# Patient Record
Sex: FEMALE | Race: WHITE | Employment: OTHER | URBAN - METROPOLITAN AREA
[De-identification: names, ages, dates, MRNs, and addresses within clinical notes are randomized per-mention and may not be internally consistent; named-entity substitution may affect disease eponyms.]

---

## 2017-01-17 ENCOUNTER — OFFICE VISIT (OUTPATIENT)
Dept: FAMILY MEDICINE CLINIC | Age: 82
End: 2017-01-17

## 2017-01-17 VITALS
DIASTOLIC BLOOD PRESSURE: 84 MMHG | BODY MASS INDEX: 31 KG/M2 | OXYGEN SATURATION: 97 % | RESPIRATION RATE: 16 BRPM | SYSTOLIC BLOOD PRESSURE: 132 MMHG | WEIGHT: 169.5 LBS | HEART RATE: 50 BPM

## 2017-01-17 DIAGNOSIS — I47.1 SVT (SUPRAVENTRICULAR TACHYCARDIA) (HCC): ICD-10-CM

## 2017-01-17 DIAGNOSIS — G47.09 OTHER INSOMNIA: ICD-10-CM

## 2017-01-17 DIAGNOSIS — G25.81 RESTLESS LEGS SYNDROME: ICD-10-CM

## 2017-01-17 DIAGNOSIS — E11.9 CONTROLLED TYPE 2 DIABETES MELLITUS WITHOUT COMPLICATION, WITHOUT LONG-TERM CURRENT USE OF INSULIN (HCC): ICD-10-CM

## 2017-01-17 DIAGNOSIS — M15.9 PRIMARY OSTEOARTHRITIS INVOLVING MULTIPLE JOINTS: Primary | ICD-10-CM

## 2017-01-17 DIAGNOSIS — H10.32 ACUTE BACTERIAL CONJUNCTIVITIS OF LEFT EYE: ICD-10-CM

## 2017-01-17 PROCEDURE — 99214 OFFICE O/P EST MOD 30 MIN: CPT | Performed by: FAMILY MEDICINE

## 2017-01-17 RX ORDER — LORAZEPAM 0.5 MG/1
0.5 TABLET ORAL 3 TIMES DAILY PRN
Qty: 90 TABLET | Refills: 2 | Status: SHIPPED | OUTPATIENT
Start: 2017-01-17 | End: 2017-04-13 | Stop reason: SDUPTHER

## 2017-01-17 RX ORDER — HYDROCODONE BITARTRATE AND ACETAMINOPHEN 5; 325 MG/1; MG/1
TABLET ORAL
Qty: 120 TABLET | Refills: 0 | Status: SHIPPED | OUTPATIENT
Start: 2017-01-17 | End: 2017-02-13 | Stop reason: SDUPTHER

## 2017-01-17 RX ORDER — ROPINIROLE 1 MG/1
TABLET, FILM COATED ORAL
Qty: 60 TABLET | Refills: 2 | Status: SHIPPED | OUTPATIENT
Start: 2017-01-17 | End: 2017-04-13 | Stop reason: SDUPTHER

## 2017-01-17 RX ORDER — CLONAZEPAM 1 MG/1
1 TABLET ORAL 2 TIMES DAILY PRN
Qty: 60 TABLET | Refills: 2 | Status: CANCELLED | OUTPATIENT
Start: 2017-01-17

## 2017-01-17 RX ORDER — DICYCLOMINE HYDROCHLORIDE 10 MG/1
10 CAPSULE ORAL 4 TIMES DAILY PRN
Qty: 90 CAPSULE | Refills: 3 | Status: SHIPPED | OUTPATIENT
Start: 2017-01-17 | End: 2017-04-13 | Stop reason: SDUPTHER

## 2017-01-17 RX ORDER — GENTAMICIN SULFATE 3 MG/ML
2 SOLUTION/ DROPS OPHTHALMIC 3 TIMES DAILY
Qty: 1 BOTTLE | Refills: 0 | Status: SHIPPED | OUTPATIENT
Start: 2017-01-17 | End: 2017-01-22

## 2017-01-18 PROBLEM — E11.9 CONTROLLED TYPE 2 DIABETES MELLITUS WITHOUT COMPLICATION, WITHOUT LONG-TERM CURRENT USE OF INSULIN (HCC): Status: ACTIVE | Noted: 2017-01-18

## 2017-01-18 PROBLEM — G25.81 RESTLESS LEGS SYNDROME: Status: ACTIVE | Noted: 2017-01-18

## 2017-02-09 RX ORDER — MECLIZINE HCL 12.5 MG/1
TABLET ORAL
Qty: 90 TABLET | Refills: 3 | Status: CANCELLED | OUTPATIENT
Start: 2017-02-09

## 2017-02-13 DIAGNOSIS — M15.9 PRIMARY OSTEOARTHRITIS INVOLVING MULTIPLE JOINTS: ICD-10-CM

## 2017-02-13 RX ORDER — HYDROCODONE BITARTRATE AND ACETAMINOPHEN 5; 325 MG/1; MG/1
TABLET ORAL
Qty: 120 TABLET | Refills: 0 | Status: SHIPPED | OUTPATIENT
Start: 2017-02-13 | End: 2017-03-13 | Stop reason: SDUPTHER

## 2017-03-13 ENCOUNTER — TELEPHONE (OUTPATIENT)
Dept: FAMILY MEDICINE CLINIC | Age: 82
End: 2017-03-13

## 2017-03-13 DIAGNOSIS — M15.9 PRIMARY OSTEOARTHRITIS INVOLVING MULTIPLE JOINTS: ICD-10-CM

## 2017-03-13 RX ORDER — HYDROCODONE BITARTRATE AND ACETAMINOPHEN 5; 325 MG/1; MG/1
TABLET ORAL
Qty: 120 TABLET | Refills: 0 | Status: SHIPPED | OUTPATIENT
Start: 2017-03-13 | End: 2017-04-13 | Stop reason: SDUPTHER

## 2017-03-20 ENCOUNTER — OFFICE VISIT (OUTPATIENT)
Dept: FAMILY MEDICINE CLINIC | Age: 82
End: 2017-03-20

## 2017-03-20 VITALS
SYSTOLIC BLOOD PRESSURE: 146 MMHG | HEART RATE: 54 BPM | WEIGHT: 164 LBS | DIASTOLIC BLOOD PRESSURE: 66 MMHG | OXYGEN SATURATION: 95 % | TEMPERATURE: 98.3 F | BODY MASS INDEX: 30 KG/M2

## 2017-03-20 DIAGNOSIS — I10 ESSENTIAL HYPERTENSION: Primary | ICD-10-CM

## 2017-03-20 PROCEDURE — 99213 OFFICE O/P EST LOW 20 MIN: CPT | Performed by: FAMILY MEDICINE

## 2017-03-20 RX ORDER — VALSARTAN 320 MG/1
TABLET ORAL
Qty: 90 TABLET | Refills: 5 | Status: SHIPPED | OUTPATIENT
Start: 2017-03-20 | End: 2017-08-31 | Stop reason: SDUPTHER

## 2017-03-20 RX ORDER — VALSARTAN 320 MG/1
320 TABLET ORAL DAILY
Qty: 30 TABLET | Refills: 5 | Status: SHIPPED | OUTPATIENT
Start: 2017-03-20 | End: 2017-03-20 | Stop reason: SDUPTHER

## 2017-03-21 ENCOUNTER — TELEPHONE (OUTPATIENT)
Dept: FAMILY MEDICINE CLINIC | Age: 82
End: 2017-03-21

## 2017-03-28 ENCOUNTER — HOSPITAL ENCOUNTER (OUTPATIENT)
Dept: OTHER | Age: 82
Discharge: HOME | End: 2017-03-28
Attending: FAMILY MEDICINE | Admitting: FAMILY MEDICINE

## 2017-03-28 ENCOUNTER — OFFICE VISIT (OUTPATIENT)
Dept: FAMILY MEDICINE CLINIC | Age: 82
End: 2017-03-28

## 2017-03-28 VITALS
WEIGHT: 167 LBS | DIASTOLIC BLOOD PRESSURE: 70 MMHG | HEART RATE: 56 BPM | BODY MASS INDEX: 30.54 KG/M2 | SYSTOLIC BLOOD PRESSURE: 150 MMHG

## 2017-03-28 DIAGNOSIS — Z20.5 EXPOSURE TO HEPATITIS C: Primary | ICD-10-CM

## 2017-03-28 LAB
HAV IGM SER IA-ACNC: NORMAL
HEPATITIS B CORE IGM ANTIBODY: NORMAL
HEPATITIS B SURFACE ANTIGEN INTERPRETATION: NORMAL
HEPATITIS C ANTIBODY INTERPRETATION: NORMAL

## 2017-03-28 PROCEDURE — 99212 OFFICE O/P EST SF 10 MIN: CPT | Performed by: PHYSICIAN ASSISTANT

## 2017-03-28 ASSESSMENT — ENCOUNTER SYMPTOMS
VOMITING: 0
DIARRHEA: 0
BACK PAIN: 0
NAUSEA: 0
ABDOMINAL PAIN: 0
CONSTIPATION: 0
SHORTNESS OF BREATH: 0

## 2017-04-13 ENCOUNTER — OFFICE VISIT (OUTPATIENT)
Dept: FAMILY MEDICINE CLINIC | Age: 82
End: 2017-04-13

## 2017-04-13 VITALS
BODY MASS INDEX: 30.43 KG/M2 | RESPIRATION RATE: 16 BRPM | OXYGEN SATURATION: 95 % | TEMPERATURE: 98.5 F | SYSTOLIC BLOOD PRESSURE: 134 MMHG | WEIGHT: 166.38 LBS | HEART RATE: 61 BPM | DIASTOLIC BLOOD PRESSURE: 82 MMHG

## 2017-04-13 DIAGNOSIS — I10 ESSENTIAL HYPERTENSION: Primary | ICD-10-CM

## 2017-04-13 DIAGNOSIS — J06.9 VIRAL UPPER RESPIRATORY TRACT INFECTION: ICD-10-CM

## 2017-04-13 DIAGNOSIS — G25.81 RESTLESS LEGS SYNDROME: ICD-10-CM

## 2017-04-13 DIAGNOSIS — G47.09 OTHER INSOMNIA: ICD-10-CM

## 2017-04-13 DIAGNOSIS — M15.9 PRIMARY OSTEOARTHRITIS INVOLVING MULTIPLE JOINTS: ICD-10-CM

## 2017-04-13 PROCEDURE — 99214 OFFICE O/P EST MOD 30 MIN: CPT | Performed by: FAMILY MEDICINE

## 2017-04-13 PROCEDURE — 3288F FALL RISK ASSESSMENT DOCD: CPT | Performed by: FAMILY MEDICINE

## 2017-04-13 PROCEDURE — G8510 SCR DEP NEG, NO PLAN REQD: HCPCS | Performed by: FAMILY MEDICINE

## 2017-04-13 RX ORDER — HYDROCODONE BITARTRATE AND ACETAMINOPHEN 5; 325 MG/1; MG/1
TABLET ORAL
Qty: 120 TABLET | Refills: 0 | Status: SHIPPED | OUTPATIENT
Start: 2017-04-13 | End: 2017-05-11 | Stop reason: SDUPTHER

## 2017-04-13 RX ORDER — LORAZEPAM 0.5 MG/1
0.5 TABLET ORAL 3 TIMES DAILY PRN
Qty: 90 TABLET | Refills: 2 | Status: SHIPPED | OUTPATIENT
Start: 2017-04-13 | End: 2017-07-06 | Stop reason: SDUPTHER

## 2017-04-13 RX ORDER — DICYCLOMINE HYDROCHLORIDE 10 MG/1
10 CAPSULE ORAL 4 TIMES DAILY PRN
Qty: 90 CAPSULE | Refills: 5 | Status: SHIPPED | OUTPATIENT
Start: 2017-04-13 | End: 2018-01-17 | Stop reason: SDUPTHER

## 2017-04-13 RX ORDER — ROPINIROLE 1 MG/1
TABLET, FILM COATED ORAL
Qty: 60 TABLET | Refills: 5 | Status: SHIPPED | OUTPATIENT
Start: 2017-04-13 | End: 2017-04-13 | Stop reason: SDUPTHER

## 2017-04-13 ASSESSMENT — PATIENT HEALTH QUESTIONNAIRE - PHQ9
SUM OF ALL RESPONSES TO PHQ9 QUESTIONS 1 & 2: 0
2. FEELING DOWN, DEPRESSED OR HOPELESS: 0
1. LITTLE INTEREST OR PLEASURE IN DOING THINGS: 0
SUM OF ALL RESPONSES TO PHQ QUESTIONS 1-9: 0

## 2017-04-19 ENCOUNTER — TELEPHONE (OUTPATIENT)
Dept: FAMILY MEDICINE CLINIC | Age: 82
End: 2017-04-19

## 2017-04-19 RX ORDER — DIPHENOXYLATE HYDROCHLORIDE AND ATROPINE SULFATE 2.5; .025 MG/1; MG/1
1 TABLET ORAL 4 TIMES DAILY PRN
Qty: 40 TABLET | Refills: 0 | OUTPATIENT
Start: 2017-04-19 | End: 2017-12-12 | Stop reason: SDUPTHER

## 2017-04-27 ENCOUNTER — TELEPHONE (OUTPATIENT)
Dept: FAMILY MEDICINE CLINIC | Age: 82
End: 2017-04-27

## 2017-04-27 DIAGNOSIS — G47.09 OTHER INSOMNIA: ICD-10-CM

## 2017-05-09 ENCOUNTER — TELEPHONE (OUTPATIENT)
Dept: FAMILY MEDICINE CLINIC | Age: 82
End: 2017-05-09

## 2017-05-11 ENCOUNTER — TELEPHONE (OUTPATIENT)
Dept: FAMILY MEDICINE CLINIC | Age: 82
End: 2017-05-11

## 2017-05-11 DIAGNOSIS — M15.9 PRIMARY OSTEOARTHRITIS INVOLVING MULTIPLE JOINTS: ICD-10-CM

## 2017-05-11 RX ORDER — DOXYCYCLINE 100 MG/1
100 TABLET ORAL 2 TIMES DAILY
Qty: 20 TABLET | Refills: 0 | Status: SHIPPED | OUTPATIENT
Start: 2017-05-11 | End: 2017-05-21

## 2017-05-11 RX ORDER — HYDROCODONE BITARTRATE AND ACETAMINOPHEN 5; 325 MG/1; MG/1
TABLET ORAL
Qty: 120 TABLET | Refills: 0 | Status: SHIPPED | OUTPATIENT
Start: 2017-05-11 | End: 2017-06-09 | Stop reason: SDUPTHER

## 2017-05-11 RX ORDER — DILTIAZEM HYDROCHLORIDE 360 MG/1
360 CAPSULE, EXTENDED RELEASE ORAL DAILY
Qty: 30 CAPSULE | Refills: 3 | Status: SHIPPED | OUTPATIENT
Start: 2017-05-11 | End: 2017-08-02

## 2017-05-17 ENCOUNTER — TELEPHONE (OUTPATIENT)
Dept: FAMILY MEDICINE CLINIC | Age: 82
End: 2017-05-17

## 2017-05-19 ENCOUNTER — TELEPHONE (OUTPATIENT)
Dept: FAMILY MEDICINE CLINIC | Age: 82
End: 2017-05-19

## 2017-06-07 ENCOUNTER — TELEPHONE (OUTPATIENT)
Dept: FAMILY MEDICINE CLINIC | Age: 82
End: 2017-06-07

## 2017-06-09 DIAGNOSIS — M15.9 PRIMARY OSTEOARTHRITIS INVOLVING MULTIPLE JOINTS: ICD-10-CM

## 2017-06-09 RX ORDER — HYDROCODONE BITARTRATE AND ACETAMINOPHEN 5; 325 MG/1; MG/1
TABLET ORAL
Qty: 120 TABLET | Refills: 0 | Status: SHIPPED | OUTPATIENT
Start: 2017-06-09 | End: 2017-07-06 | Stop reason: SDUPTHER

## 2017-06-19 ENCOUNTER — TELEPHONE (OUTPATIENT)
Dept: FAMILY MEDICINE CLINIC | Age: 82
End: 2017-06-19

## 2017-06-19 RX ORDER — BLOOD-GLUCOSE METER
EACH MISCELLANEOUS
Qty: 1 KIT | Refills: 0 | Status: SHIPPED | OUTPATIENT
Start: 2017-06-19 | End: 2019-01-09

## 2017-06-19 RX ORDER — SYRING-NEEDL,DISP,INSUL,0.3 ML 30 GX5/16"
1 SYRINGE, EMPTY DISPOSABLE MISCELLANEOUS ONCE
Qty: 100 DEVICE | Refills: 3 | Status: SHIPPED | OUTPATIENT
Start: 2017-06-19 | End: 2017-09-14 | Stop reason: SDUPTHER

## 2017-07-06 ENCOUNTER — OFFICE VISIT (OUTPATIENT)
Dept: FAMILY MEDICINE CLINIC | Age: 82
End: 2017-07-06

## 2017-07-06 VITALS
SYSTOLIC BLOOD PRESSURE: 110 MMHG | HEART RATE: 50 BPM | RESPIRATION RATE: 16 BRPM | HEIGHT: 62 IN | OXYGEN SATURATION: 97 % | WEIGHT: 170 LBS | BODY MASS INDEX: 31.28 KG/M2 | DIASTOLIC BLOOD PRESSURE: 70 MMHG

## 2017-07-06 DIAGNOSIS — M15.9 PRIMARY OSTEOARTHRITIS INVOLVING MULTIPLE JOINTS: ICD-10-CM

## 2017-07-06 DIAGNOSIS — J20.9 ACUTE BRONCHITIS, UNSPECIFIED ORGANISM: Primary | ICD-10-CM

## 2017-07-06 DIAGNOSIS — I10 ESSENTIAL HYPERTENSION: ICD-10-CM

## 2017-07-06 DIAGNOSIS — G25.81 RESTLESS LEGS SYNDROME: ICD-10-CM

## 2017-07-06 DIAGNOSIS — G47.09 OTHER INSOMNIA: ICD-10-CM

## 2017-07-06 PROCEDURE — 99214 OFFICE O/P EST MOD 30 MIN: CPT | Performed by: FAMILY MEDICINE

## 2017-07-06 RX ORDER — CEFDINIR 300 MG/1
300 CAPSULE ORAL 2 TIMES DAILY
Qty: 20 CAPSULE | Refills: 0 | Status: SHIPPED | OUTPATIENT
Start: 2017-07-06 | End: 2017-07-25 | Stop reason: SDUPTHER

## 2017-07-06 RX ORDER — LORAZEPAM 0.5 MG/1
0.5 TABLET ORAL 3 TIMES DAILY PRN
Qty: 90 TABLET | Refills: 2 | Status: SHIPPED | OUTPATIENT
Start: 2017-07-06 | End: 2017-08-11 | Stop reason: SDUPTHER

## 2017-07-06 RX ORDER — HYDROCODONE BITARTRATE AND ACETAMINOPHEN 5; 325 MG/1; MG/1
TABLET ORAL
Qty: 120 TABLET | Refills: 0 | Status: SHIPPED | OUTPATIENT
Start: 2017-07-06 | End: 2017-08-02 | Stop reason: SDUPTHER

## 2017-07-06 RX ORDER — LORAZEPAM 0.5 MG/1
0.5 TABLET ORAL 3 TIMES DAILY PRN
Qty: 90 TABLET | Refills: 2 | Status: CANCELLED | OUTPATIENT
Start: 2017-07-06

## 2017-07-25 ENCOUNTER — TELEPHONE (OUTPATIENT)
Dept: FAMILY MEDICINE CLINIC | Age: 82
End: 2017-07-25

## 2017-07-25 RX ORDER — CEFDINIR 300 MG/1
300 CAPSULE ORAL 2 TIMES DAILY
Qty: 20 CAPSULE | Refills: 0 | Status: SHIPPED | OUTPATIENT
Start: 2017-07-25 | End: 2017-12-06 | Stop reason: SDUPTHER

## 2017-08-02 ENCOUNTER — OFFICE VISIT (OUTPATIENT)
Dept: FAMILY MEDICINE CLINIC | Age: 82
End: 2017-08-02

## 2017-08-02 VITALS
OXYGEN SATURATION: 95 % | DIASTOLIC BLOOD PRESSURE: 44 MMHG | HEART RATE: 44 BPM | WEIGHT: 173 LBS | BODY MASS INDEX: 31.64 KG/M2 | TEMPERATURE: 96.7 F | SYSTOLIC BLOOD PRESSURE: 116 MMHG

## 2017-08-02 DIAGNOSIS — M15.9 PRIMARY OSTEOARTHRITIS INVOLVING MULTIPLE JOINTS: ICD-10-CM

## 2017-08-02 DIAGNOSIS — R00.1 BRADYCARDIA: ICD-10-CM

## 2017-08-02 DIAGNOSIS — J20.9 ACUTE BRONCHITIS, UNSPECIFIED ORGANISM: Primary | ICD-10-CM

## 2017-08-02 DIAGNOSIS — I49.9 IRREGULAR HEART RATE: ICD-10-CM

## 2017-08-02 PROCEDURE — 93000 ELECTROCARDIOGRAM COMPLETE: CPT | Performed by: FAMILY MEDICINE

## 2017-08-02 PROCEDURE — 99214 OFFICE O/P EST MOD 30 MIN: CPT | Performed by: FAMILY MEDICINE

## 2017-08-02 RX ORDER — HYDROCODONE BITARTRATE AND ACETAMINOPHEN 5; 325 MG/1; MG/1
TABLET ORAL
Qty: 120 TABLET | Refills: 0 | Status: SHIPPED | OUTPATIENT
Start: 2017-08-02 | End: 2017-08-31 | Stop reason: SDUPTHER

## 2017-08-02 RX ORDER — PREDNISONE 10 MG/1
TABLET ORAL
Qty: 15 TABLET | Refills: 0 | Status: SHIPPED | OUTPATIENT
Start: 2017-08-02 | End: 2017-08-31

## 2017-08-02 ASSESSMENT — ENCOUNTER SYMPTOMS
COUGH: 1
WHEEZING: 1

## 2017-08-04 RX ORDER — AMLODIPINE BESYLATE 5 MG/1
5 TABLET ORAL DAILY
Qty: 30 TABLET | Refills: 3 | Status: SHIPPED | OUTPATIENT
Start: 2017-08-04 | End: 2017-08-04 | Stop reason: SDUPTHER

## 2017-08-05 ENCOUNTER — TELEPHONE (OUTPATIENT)
Dept: FAMILY MEDICINE CLINIC | Age: 82
End: 2017-08-05

## 2017-08-05 RX ORDER — AMLODIPINE BESYLATE 5 MG/1
TABLET ORAL
Qty: 90 TABLET | Refills: 0 | Status: SHIPPED | OUTPATIENT
Start: 2017-08-05 | End: 2017-08-25

## 2017-08-08 ENCOUNTER — HOSPITAL ENCOUNTER (OUTPATIENT)
Dept: OTHER | Age: 82
Discharge: HOME | End: 2017-08-08
Attending: FAMILY MEDICINE | Admitting: FAMILY MEDICINE

## 2017-08-08 ENCOUNTER — TELEPHONE (OUTPATIENT)
Dept: FAMILY MEDICINE CLINIC | Age: 82
End: 2017-08-08

## 2017-08-08 DIAGNOSIS — R05.9 COUGH: ICD-10-CM

## 2017-08-10 ENCOUNTER — TELEPHONE (OUTPATIENT)
Dept: FAMILY MEDICINE CLINIC | Age: 82
End: 2017-08-10

## 2017-08-11 ENCOUNTER — OFFICE VISIT (OUTPATIENT)
Dept: FAMILY MEDICINE CLINIC | Age: 82
End: 2017-08-11

## 2017-08-11 VITALS
BODY MASS INDEX: 30.18 KG/M2 | HEART RATE: 70 BPM | OXYGEN SATURATION: 96 % | WEIGHT: 165 LBS | SYSTOLIC BLOOD PRESSURE: 120 MMHG | DIASTOLIC BLOOD PRESSURE: 70 MMHG

## 2017-08-11 DIAGNOSIS — I47.1 SVT (SUPRAVENTRICULAR TACHYCARDIA) (HCC): ICD-10-CM

## 2017-08-11 DIAGNOSIS — E11.9 CONTROLLED TYPE 2 DIABETES MELLITUS WITHOUT COMPLICATION, WITHOUT LONG-TERM CURRENT USE OF INSULIN (HCC): ICD-10-CM

## 2017-08-11 DIAGNOSIS — I10 ESSENTIAL HYPERTENSION: ICD-10-CM

## 2017-08-11 DIAGNOSIS — R42 VERTIGO: Primary | ICD-10-CM

## 2017-08-11 PROCEDURE — 99213 OFFICE O/P EST LOW 20 MIN: CPT | Performed by: FAMILY MEDICINE

## 2017-08-11 RX ORDER — LORAZEPAM 0.5 MG/1
TABLET ORAL
Qty: 120 TABLET | Refills: 2 | Status: SHIPPED | OUTPATIENT
Start: 2017-08-11 | End: 2017-10-18 | Stop reason: SDUPTHER

## 2017-08-12 ENCOUNTER — HOSPITAL ENCOUNTER (OUTPATIENT)
Dept: OTHER | Age: 82
Discharge: HOME | End: 2017-08-12
Attending: FAMILY MEDICINE | Admitting: FAMILY MEDICINE

## 2017-08-12 LAB
A/G RATIO: 1.6 (ref 1.1–2.2)
ALBUMIN SERPL-MCNC: 4.2 G/DL (ref 3.4–5)
ALP BLD-CCNC: 131 U/L (ref 40–129)
ALT SERPL-CCNC: 20 U/L (ref 10–40)
ANION GAP SERPL CALCULATED.3IONS-SCNC: 14 MMOL/L (ref 3–16)
AST SERPL-CCNC: 15 U/L (ref 15–37)
BILIRUB SERPL-MCNC: <0.2 MG/DL (ref 0–1)
BUN BLDV-MCNC: 37 MG/DL (ref 7–20)
CALCIUM SERPL-MCNC: 9.8 MG/DL (ref 8.3–10.6)
CHLORIDE BLD-SCNC: 106 MMOL/L (ref 99–110)
CHOLESTEROL, TOTAL: 224 MG/DL (ref 0–199)
CO2: 23 MMOL/L (ref 21–32)
CREAT SERPL-MCNC: 1.2 MG/DL (ref 0.6–1.2)
GFR AFRICAN AMERICAN: 52
GFR NON-AFRICAN AMERICAN: 43
GLOBULIN: 2.7 G/DL
GLUCOSE BLD-MCNC: 114 MG/DL (ref 70–99)
HDLC SERPL-MCNC: 39 MG/DL (ref 40–60)
LDL CHOLESTEROL CALCULATED: 140 MG/DL
POTASSIUM SERPL-SCNC: 4.8 MMOL/L (ref 3.5–5.1)
SODIUM BLD-SCNC: 143 MMOL/L (ref 136–145)
TOTAL PROTEIN: 6.9 G/DL (ref 6.4–8.2)
TRIGL SERPL-MCNC: 226 MG/DL (ref 0–150)
TSH SERPL DL<=0.05 MIU/L-ACNC: 3.65 UIU/ML (ref 0.27–4.2)
VLDLC SERPL CALC-MCNC: 45 MG/DL

## 2017-08-13 LAB
ESTIMATED AVERAGE GLUCOSE: 134.1 MG/DL
HBA1C MFR BLD: 6.3 %

## 2017-08-14 RX ORDER — SCOLOPAMINE TRANSDERMAL SYSTEM 1 MG/1
1 PATCH, EXTENDED RELEASE TRANSDERMAL
Qty: 4 PATCH | Refills: 0 | Status: SHIPPED | OUTPATIENT
Start: 2017-08-14 | End: 2017-09-15

## 2017-08-22 ENCOUNTER — TELEPHONE (OUTPATIENT)
Dept: FAMILY MEDICINE CLINIC | Age: 82
End: 2017-08-22

## 2017-08-22 RX ORDER — PANTOPRAZOLE SODIUM 40 MG/1
TABLET, DELAYED RELEASE ORAL
Qty: 90 TABLET | Refills: 0 | OUTPATIENT
Start: 2017-08-22

## 2017-08-22 RX ORDER — VALSARTAN 160 MG/1
TABLET ORAL
Qty: 90 TABLET | Refills: 0 | OUTPATIENT
Start: 2017-08-22

## 2017-08-23 ENCOUNTER — TELEPHONE (OUTPATIENT)
Dept: FAMILY MEDICINE CLINIC | Age: 82
End: 2017-08-23

## 2017-08-24 RX ORDER — LEVOTHYROXINE SODIUM 0.05 MG/1
TABLET ORAL
Qty: 90 TABLET | Refills: 3 | Status: SHIPPED | OUTPATIENT
Start: 2017-08-24 | End: 2018-06-12 | Stop reason: SDUPTHER

## 2017-08-25 ENCOUNTER — NURSE ONLY (OUTPATIENT)
Dept: FAMILY MEDICINE CLINIC | Age: 82
End: 2017-08-25

## 2017-08-25 VITALS — SYSTOLIC BLOOD PRESSURE: 150 MMHG | DIASTOLIC BLOOD PRESSURE: 70 MMHG

## 2017-08-25 RX ORDER — AMLODIPINE BESYLATE 10 MG/1
10 TABLET ORAL DAILY
Qty: 30 TABLET | Refills: 3 | Status: SHIPPED | OUTPATIENT
Start: 2017-08-25 | End: 2017-08-25 | Stop reason: SDUPTHER

## 2017-08-25 RX ORDER — AMLODIPINE BESYLATE 10 MG/1
TABLET ORAL
Qty: 90 TABLET | Refills: 0 | Status: SHIPPED | OUTPATIENT
Start: 2017-08-25 | End: 2017-10-26 | Stop reason: SDUPTHER

## 2017-08-31 ENCOUNTER — OFFICE VISIT (OUTPATIENT)
Dept: FAMILY MEDICINE CLINIC | Age: 82
End: 2017-08-31

## 2017-08-31 VITALS
DIASTOLIC BLOOD PRESSURE: 78 MMHG | SYSTOLIC BLOOD PRESSURE: 114 MMHG | RESPIRATION RATE: 16 BRPM | BODY MASS INDEX: 31.69 KG/M2 | HEART RATE: 80 BPM | WEIGHT: 173.25 LBS

## 2017-08-31 DIAGNOSIS — M15.9 PRIMARY OSTEOARTHRITIS INVOLVING MULTIPLE JOINTS: ICD-10-CM

## 2017-08-31 DIAGNOSIS — I10 ESSENTIAL HYPERTENSION: Primary | ICD-10-CM

## 2017-08-31 DIAGNOSIS — R42 VERTIGO: ICD-10-CM

## 2017-08-31 PROCEDURE — 99214 OFFICE O/P EST MOD 30 MIN: CPT | Performed by: FAMILY MEDICINE

## 2017-08-31 RX ORDER — PANTOPRAZOLE SODIUM 40 MG/1
TABLET, DELAYED RELEASE ORAL
Qty: 90 TABLET | Refills: 3 | Status: CANCELLED | OUTPATIENT
Start: 2017-08-31

## 2017-08-31 RX ORDER — HYDROCODONE BITARTRATE AND ACETAMINOPHEN 5; 325 MG/1; MG/1
TABLET ORAL
Qty: 120 TABLET | Refills: 0 | Status: SHIPPED | OUTPATIENT
Start: 2017-08-31 | End: 2017-10-02 | Stop reason: SDUPTHER

## 2017-08-31 RX ORDER — MUPIROCIN CALCIUM 20 MG/G
CREAM TOPICAL
Qty: 1 TUBE | Refills: 0 | Status: SHIPPED | OUTPATIENT
Start: 2017-08-31 | End: 2017-09-30

## 2017-08-31 RX ORDER — VALSARTAN 320 MG/1
TABLET ORAL
Qty: 90 TABLET | Refills: 3 | Status: SHIPPED | OUTPATIENT
Start: 2017-08-31 | End: 2018-06-12 | Stop reason: SDUPTHER

## 2017-09-08 ENCOUNTER — OFFICE VISIT (OUTPATIENT)
Dept: FAMILY MEDICINE CLINIC | Age: 82
End: 2017-09-08

## 2017-09-08 VITALS
SYSTOLIC BLOOD PRESSURE: 140 MMHG | OXYGEN SATURATION: 97 % | HEART RATE: 78 BPM | WEIGHT: 174 LBS | TEMPERATURE: 97.7 F | DIASTOLIC BLOOD PRESSURE: 64 MMHG | BODY MASS INDEX: 31.83 KG/M2

## 2017-09-08 DIAGNOSIS — R30.0 DYSURIA: Primary | ICD-10-CM

## 2017-09-08 DIAGNOSIS — R60.0 LOCALIZED EDEMA: ICD-10-CM

## 2017-09-08 LAB
BACTERIA URINE, POC: 0
BILIRUBIN URINE: 0 MG/DL
BLOOD, URINE: NEGATIVE
CASTS URINE, POC: 0
CLARITY: CLEAR
COLOR: YELLOW
CRYSTALS URINE, POC: 0
EPI CELLS URINE, POC: 0
GLUCOSE URINE: 0
KETONES, URINE: NEGATIVE
LEUKOCYTE EST, POC: 0
NITRITE, URINE: NEGATIVE
PH UA: 5.5 (ref 4.5–8)
PROTEIN UA: NEGATIVE
RBC URINE, POC: 0
SPECIFIC GRAVITY UA: 1 (ref 1–1.03)
UROBILINOGEN, URINE: NORMAL
WBC URINE, POC: 0
YEAST URINE, POC: 0

## 2017-09-08 PROCEDURE — 81000 URINALYSIS NONAUTO W/SCOPE: CPT | Performed by: FAMILY MEDICINE

## 2017-09-08 PROCEDURE — 99213 OFFICE O/P EST LOW 20 MIN: CPT | Performed by: FAMILY MEDICINE

## 2017-09-08 RX ORDER — HYDROCHLOROTHIAZIDE 25 MG/1
TABLET ORAL
Qty: 90 TABLET | Refills: 3 | Status: SHIPPED | OUTPATIENT
Start: 2017-09-08 | End: 2017-09-15

## 2017-09-08 RX ORDER — HYDROCHLOROTHIAZIDE 25 MG/1
25 TABLET ORAL DAILY
Qty: 30 TABLET | Refills: 3 | Status: SHIPPED | OUTPATIENT
Start: 2017-09-08 | End: 2017-09-08 | Stop reason: SDUPTHER

## 2017-09-14 ENCOUNTER — TELEPHONE (OUTPATIENT)
Dept: FAMILY MEDICINE CLINIC | Age: 82
End: 2017-09-14

## 2017-09-15 ENCOUNTER — TELEPHONE (OUTPATIENT)
Dept: FAMILY MEDICINE CLINIC | Age: 82
End: 2017-09-15

## 2017-09-15 RX ORDER — LANCETS
EACH MISCELLANEOUS
Qty: 100 EACH | Refills: 5 | Status: SHIPPED | OUTPATIENT
Start: 2017-09-15 | End: 2019-01-09

## 2017-09-17 PROBLEM — J96.00 ACUTE RESPIRATORY FAILURE (HCC): Status: ACTIVE | Noted: 2017-09-17

## 2017-09-17 PROBLEM — J18.9 PNEUMONIA: Status: ACTIVE | Noted: 2017-09-17

## 2017-09-18 ENCOUNTER — TELEPHONE (OUTPATIENT)
Dept: FAMILY MEDICINE CLINIC | Age: 82
End: 2017-09-18

## 2017-09-18 DIAGNOSIS — I50.9 CONGESTIVE HEART FAILURE, UNSPECIFIED CONGESTIVE HEART FAILURE CHRONICITY, UNSPECIFIED CONGESTIVE HEART FAILURE TYPE: Primary | ICD-10-CM

## 2017-09-21 ENCOUNTER — OFFICE VISIT (OUTPATIENT)
Dept: FAMILY MEDICINE CLINIC | Age: 82
End: 2017-09-21

## 2017-09-21 VITALS
SYSTOLIC BLOOD PRESSURE: 130 MMHG | WEIGHT: 165 LBS | BODY MASS INDEX: 30.18 KG/M2 | HEART RATE: 76 BPM | DIASTOLIC BLOOD PRESSURE: 70 MMHG | OXYGEN SATURATION: 96 % | TEMPERATURE: 98.3 F

## 2017-09-21 DIAGNOSIS — J96.01 ACUTE RESPIRATORY FAILURE WITH HYPOXIA (HCC): ICD-10-CM

## 2017-09-21 DIAGNOSIS — L21.9 SEBORRHEIC DERMATITIS: ICD-10-CM

## 2017-09-21 DIAGNOSIS — M54.50 ACUTE BILATERAL LOW BACK PAIN WITHOUT SCIATICA: ICD-10-CM

## 2017-09-21 DIAGNOSIS — Z23 NEEDS FLU SHOT: ICD-10-CM

## 2017-09-21 DIAGNOSIS — J18.9 PNEUMONIA DUE TO INFECTIOUS ORGANISM, UNSPECIFIED LATERALITY, UNSPECIFIED PART OF LUNG: Primary | ICD-10-CM

## 2017-09-21 PROCEDURE — 90662 IIV NO PRSV INCREASED AG IM: CPT | Performed by: FAMILY MEDICINE

## 2017-09-21 PROCEDURE — G0008 ADMIN INFLUENZA VIRUS VAC: HCPCS | Performed by: FAMILY MEDICINE

## 2017-09-21 PROCEDURE — 99214 OFFICE O/P EST MOD 30 MIN: CPT | Performed by: FAMILY MEDICINE

## 2017-09-22 ENCOUNTER — HOSPITAL ENCOUNTER (OUTPATIENT)
Dept: NON INVASIVE DIAGNOSTICS | Age: 82
Discharge: HOME | End: 2017-09-22
Attending: PHYSICIAN ASSISTANT | Admitting: PHYSICIAN ASSISTANT

## 2017-09-22 DIAGNOSIS — I50.9 CONGESTIVE HEART FAILURE, UNSPECIFIED CONGESTIVE HEART FAILURE CHRONICITY, UNSPECIFIED CONGESTIVE HEART FAILURE TYPE: ICD-10-CM

## 2017-09-22 DIAGNOSIS — M79.89 OTHER DISORDERS OF SOFT TISSUE: ICD-10-CM

## 2017-09-22 LAB
LEFT VENTRICULAR EJECTION FRACTION HIGH VALUE: 60 %
LEFT VENTRICULAR EJECTION FRACTION MODE: NORMAL
LV EF: 55 %
LV EF: 58 %
LVEF MODALITY: NORMAL

## 2017-09-29 ENCOUNTER — TELEPHONE (OUTPATIENT)
Dept: FAMILY MEDICINE CLINIC | Age: 82
End: 2017-09-29

## 2017-09-29 NOTE — TELEPHONE ENCOUNTER
Patient called to request a refill     HYDROcodone-acetaminophen (Ashley Blanc) 5-325 MG per tablet [[id number]]     She will  on October 3rd

## 2017-10-02 DIAGNOSIS — M15.9 PRIMARY OSTEOARTHRITIS INVOLVING MULTIPLE JOINTS: ICD-10-CM

## 2017-10-02 RX ORDER — HYDROCODONE BITARTRATE AND ACETAMINOPHEN 5; 325 MG/1; MG/1
TABLET ORAL
Qty: 120 TABLET | Refills: 0 | Status: SHIPPED | OUTPATIENT
Start: 2017-10-02 | End: 2017-10-18 | Stop reason: SDUPTHER

## 2017-10-05 RX ORDER — ROPINIROLE 1 MG/1
TABLET, FILM COATED ORAL
Qty: 180 TABLET | Refills: 1 | Status: SHIPPED | OUTPATIENT
Start: 2017-10-05 | End: 2018-01-17 | Stop reason: SDUPTHER

## 2017-10-10 ENCOUNTER — TELEPHONE (OUTPATIENT)
Dept: FAMILY MEDICINE CLINIC | Age: 82
End: 2017-10-10

## 2017-10-10 RX ORDER — POLYETHYLENE GLYCOL 3350 17 G/17G
17 POWDER, FOR SOLUTION ORAL DAILY PRN
Qty: 1 BOTTLE | Refills: 2 | Status: SHIPPED | OUTPATIENT
Start: 2017-10-10 | End: 2017-11-09

## 2017-10-10 NOTE — TELEPHONE ENCOUNTER
Pt states she is having trouble with her bowel movements and is using a laxative and would like to request a prescription for a stool softner.     Mally Rivera in [place] in chart

## 2017-10-18 ENCOUNTER — OFFICE VISIT (OUTPATIENT)
Dept: FAMILY MEDICINE CLINIC | Age: 82
End: 2017-10-18

## 2017-10-18 VITALS
WEIGHT: 166.2 LBS | SYSTOLIC BLOOD PRESSURE: 130 MMHG | TEMPERATURE: 97 F | BODY MASS INDEX: 30.4 KG/M2 | DIASTOLIC BLOOD PRESSURE: 60 MMHG

## 2017-10-18 DIAGNOSIS — M15.9 PRIMARY OSTEOARTHRITIS INVOLVING MULTIPLE JOINTS: ICD-10-CM

## 2017-10-18 DIAGNOSIS — M17.11 PRIMARY OSTEOARTHRITIS OF RIGHT KNEE: Primary | ICD-10-CM

## 2017-10-18 PROCEDURE — 20610 DRAIN/INJ JOINT/BURSA W/O US: CPT | Performed by: FAMILY MEDICINE

## 2017-10-18 PROCEDURE — 99213 OFFICE O/P EST LOW 20 MIN: CPT | Performed by: FAMILY MEDICINE

## 2017-10-18 RX ORDER — LORAZEPAM 0.5 MG/1
TABLET ORAL
Qty: 120 TABLET | Refills: 2 | Status: SHIPPED | OUTPATIENT
Start: 2017-10-18 | End: 2017-11-07 | Stop reason: SDUPTHER

## 2017-10-18 RX ORDER — TRIAMCINOLONE ACETONIDE 40 MG/ML
40 INJECTION, SUSPENSION INTRA-ARTICULAR; INTRAMUSCULAR ONCE
Status: COMPLETED | OUTPATIENT
Start: 2017-10-18 | End: 2017-10-18

## 2017-10-18 RX ORDER — LORAZEPAM 0.5 MG/1
TABLET ORAL
Qty: 120 TABLET | Refills: 2 | Status: SHIPPED | OUTPATIENT
Start: 2017-10-18 | End: 2017-10-18 | Stop reason: SDUPTHER

## 2017-10-18 RX ORDER — HYDROCODONE BITARTRATE AND ACETAMINOPHEN 5; 325 MG/1; MG/1
TABLET ORAL
Qty: 120 TABLET | Refills: 0 | Status: SHIPPED | OUTPATIENT
Start: 2017-10-18 | End: 2017-11-20 | Stop reason: SDUPTHER

## 2017-10-18 RX ORDER — HYDROCODONE BITARTRATE AND ACETAMINOPHEN 5; 325 MG/1; MG/1
TABLET ORAL
Qty: 120 TABLET | Refills: 0 | Status: SHIPPED | OUTPATIENT
Start: 2017-10-18 | End: 2017-10-18 | Stop reason: SDUPTHER

## 2017-10-18 RX ADMIN — TRIAMCINOLONE ACETONIDE 40 MG: 40 INJECTION, SUSPENSION INTRA-ARTICULAR; INTRAMUSCULAR at 16:03

## 2017-10-18 NOTE — PROGRESS NOTES
Arthrocentesis Procedure Note    Pre-operative Diagnosis:   1. Primary osteoarthritis of right knee    2. Primary osteoarthritis involving multiple joints        Post-operative Diagnosis: same    Locations:right knee    Procedure Details   After consent was obtained, using sterile technique the lateral   right knee was prepped and surrounding area with a sterile prep using chloraprep and draped in the usual sterile fashion. .  Kenalog  40 mg and 1 ml 0.5% Marcaine was then injected and the needle withdrawn. The procedure was well tolerated. Sterile dressing applied to site. Complications: none. Plan:  1. The patient was instructed to continue to rest the joint for a few more days before resuming regular activities. It may be more painful for the first 1-2 days. 2. Watch for fever, or increased swelling or persistent pain in the joint. 3. Call or return to clinic prn if such symptoms occur or there is failure to improve as anticipated. 4. Recommended that the patient use OTC analgesics as needed for pain.

## 2017-10-23 ENCOUNTER — TELEPHONE (OUTPATIENT)
Dept: FAMILY MEDICINE CLINIC | Age: 82
End: 2017-10-23

## 2017-10-26 ENCOUNTER — TELEPHONE (OUTPATIENT)
Dept: FAMILY MEDICINE CLINIC | Age: 82
End: 2017-10-26

## 2017-10-26 RX ORDER — AMLODIPINE BESYLATE 10 MG/1
TABLET ORAL
Qty: 90 TABLET | Refills: 3 | Status: SHIPPED | OUTPATIENT
Start: 2017-10-26 | End: 2018-01-17 | Stop reason: SDUPTHER

## 2017-10-26 NOTE — TELEPHONE ENCOUNTER
amLODIPine (NORVASC) 10 MG tablet 90 tablet 0 8/25/2017     Sig: TAKE 1 TABLET BY MOUTH DAILY      Pt requesting 90-day supply      Mally Kunz in chart

## 2017-11-01 ENCOUNTER — TELEPHONE (OUTPATIENT)
Dept: FAMILY MEDICINE CLINIC | Age: 82
End: 2017-11-01

## 2017-11-07 ENCOUNTER — TELEPHONE (OUTPATIENT)
Dept: FAMILY MEDICINE CLINIC | Age: 82
End: 2017-11-07

## 2017-11-07 RX ORDER — LORAZEPAM 0.5 MG/1
TABLET ORAL
Qty: 120 TABLET | Refills: 2 | OUTPATIENT
Start: 2017-11-07 | End: 2018-02-03 | Stop reason: SDUPTHER

## 2017-11-07 NOTE — TELEPHONE ENCOUNTER
Rx called as when pt was in the office at her appt on 10/18 we had printer issues and pt was never given script for this med. See media as both hard copy have been scan. PCP aware of situation and approved refill called.  Pt notified

## 2017-11-07 NOTE — TELEPHONE ENCOUNTER
LORazepam (ATIVAN) 0.5 MG tablet 120 tablet 2 10/18/2017     Si BID prn anxiety and 2 qhs        [facility] on [address] in chart    Please call if needs to pickup prescription

## 2017-11-14 RX ORDER — MECLIZINE HCL 12.5 MG/1
TABLET ORAL
Qty: 90 TABLET | Refills: 1 | Status: SHIPPED | OUTPATIENT
Start: 2017-11-14 | End: 2018-01-13 | Stop reason: SDUPTHER

## 2017-11-20 ENCOUNTER — TELEPHONE (OUTPATIENT)
Dept: FAMILY MEDICINE CLINIC | Age: 82
End: 2017-11-20

## 2017-11-20 DIAGNOSIS — M15.9 PRIMARY OSTEOARTHRITIS INVOLVING MULTIPLE JOINTS: ICD-10-CM

## 2017-11-20 RX ORDER — HYDROCODONE BITARTRATE AND ACETAMINOPHEN 5; 325 MG/1; MG/1
TABLET ORAL
Qty: 120 TABLET | Refills: 0 | Status: SHIPPED | OUTPATIENT
Start: 2017-11-20 | End: 2017-12-06 | Stop reason: SDUPTHER

## 2017-12-06 ENCOUNTER — OFFICE VISIT (OUTPATIENT)
Dept: FAMILY MEDICINE CLINIC | Age: 82
End: 2017-12-06

## 2017-12-06 VITALS
DIASTOLIC BLOOD PRESSURE: 68 MMHG | RESPIRATION RATE: 12 BRPM | WEIGHT: 165.13 LBS | OXYGEN SATURATION: 97 % | BODY MASS INDEX: 30.2 KG/M2 | SYSTOLIC BLOOD PRESSURE: 122 MMHG | HEART RATE: 68 BPM

## 2017-12-06 DIAGNOSIS — M15.9 PRIMARY OSTEOARTHRITIS INVOLVING MULTIPLE JOINTS: Primary | ICD-10-CM

## 2017-12-06 DIAGNOSIS — E11.9 CONTROLLED TYPE 2 DIABETES MELLITUS WITHOUT COMPLICATION, WITHOUT LONG-TERM CURRENT USE OF INSULIN (HCC): ICD-10-CM

## 2017-12-06 DIAGNOSIS — H66.001 ACUTE SUPPURATIVE OTITIS MEDIA OF RIGHT EAR WITHOUT SPONTANEOUS RUPTURE OF TYMPANIC MEMBRANE, RECURRENCE NOT SPECIFIED: ICD-10-CM

## 2017-12-06 PROCEDURE — 99214 OFFICE O/P EST MOD 30 MIN: CPT | Performed by: FAMILY MEDICINE

## 2017-12-06 PROCEDURE — G8417 CALC BMI ABV UP PARAM F/U: HCPCS | Performed by: FAMILY MEDICINE

## 2017-12-06 PROCEDURE — 4040F PNEUMOC VAC/ADMIN/RCVD: CPT | Performed by: FAMILY MEDICINE

## 2017-12-06 PROCEDURE — 3288F FALL RISK ASSESSMENT DOCD: CPT | Performed by: FAMILY MEDICINE

## 2017-12-06 PROCEDURE — 1090F PRES/ABSN URINE INCON ASSESS: CPT | Performed by: FAMILY MEDICINE

## 2017-12-06 PROCEDURE — 1123F ACP DISCUSS/DSCN MKR DOCD: CPT | Performed by: FAMILY MEDICINE

## 2017-12-06 PROCEDURE — G8484 FLU IMMUNIZE NO ADMIN: HCPCS | Performed by: FAMILY MEDICINE

## 2017-12-06 PROCEDURE — G8427 DOCREV CUR MEDS BY ELIG CLIN: HCPCS | Performed by: FAMILY MEDICINE

## 2017-12-06 PROCEDURE — 1036F TOBACCO NON-USER: CPT | Performed by: FAMILY MEDICINE

## 2017-12-06 PROCEDURE — G8399 PT W/DXA RESULTS DOCUMENT: HCPCS | Performed by: FAMILY MEDICINE

## 2017-12-06 RX ORDER — HYDROCODONE BITARTRATE AND ACETAMINOPHEN 5; 325 MG/1; MG/1
TABLET ORAL
Qty: 120 TABLET | Refills: 0 | Status: SHIPPED | OUTPATIENT
Start: 2017-12-06 | End: 2018-01-02 | Stop reason: SDUPTHER

## 2017-12-06 RX ORDER — CEFDINIR 300 MG/1
300 CAPSULE ORAL 2 TIMES DAILY
Qty: 20 CAPSULE | Refills: 0 | Status: SHIPPED | OUTPATIENT
Start: 2017-12-06 | End: 2018-02-06 | Stop reason: SDUPTHER

## 2017-12-06 NOTE — PROGRESS NOTES
Subjective:      Patient ID: Jerome Velazco is a 80 y.o. female. HPI Patient presents for re-evaluation of chronic health problems. Pt states she started feeling lite headed today at work and her right ear feels full and a little sore. Patient is concerned that she did have pneumonia and recurrent bronchitis of the summer months. She done extremely well since she was treated for the pneumonia. Her arthritis is her biggest problem affecting especially her thumb joints recently. She had arthrocentesis of both her knees in the past.  Patient's blood sugar during the hospitalization were elevated but it quickly improved. Her hemoglobin A1c's show extremely well controlled diabetes mellitus. Patient is very compliant with medications with no adverse reactions.     Review of Systems  Patient Active Problem List   Diagnosis    GERD (gastroesophageal reflux disease)    HTN (hypertension)    Hyperlipidemia    Insomnia    [facility]ricular tachycardia) ([facility])    IBS (irritable bowel syndrome)    Overactive bladder    Osteoarthritis    Controlled type 2 diabetes mellitus without complication, without long-term current use of insulin ([facility])    Restless legs syndrome    Pneumonia    Acute respiratory failure ([facility] [address]       Outpatient Prescriptions Marked as Taking for the 12/6/17 encounter (Office Visit) with Cory Urban MD   Medication Sig Dispense Refill    HYDROcodone-acetaminophen (NORCO) 5-325 MG per tablet TAKE 1 TABLET BY MOUTH EVERY 4 HOURS AS NEEDED FOR PAIN. 120 tablet 0    meclizine (ANTIVERT) 12.5 MG tablet TAKE 1 TABLET THREE TIMES DAILY AS NEEDED 90 tablet 1    LORazepam (ATIVAN) 0.5 MG tablet 1 BID prn anxiety and 2 qhs. 120 tablet 2    amLODIPine (NORVASC) 10 MG tablet TAKE 1 TABLET BY MOUTH DAILY 90 tablet 3    rOPINIRole (REQUIP) 1 MG tablet TAKE 1-2 TABLETS BY MOUTH EVERY NIGHT AT BEDTIME AS NEEDED FOR RESTLESS  tablet 1    hydrocortisone 2.5 % cream Apply topically 2 times daily as needed to patches on scalp only. 30 g 1    Accu-Chek Softclix Lancets MISC USE DAILY AS DIRECTED 100 each 5    valsartan (DIOVAN) 320 MG tablet TAKE 1 TABLET BY MOUTH DAILY 90 tablet 3    levothyroxine (SYNTHROID) 50 MCG tablet TAKE 1 TABLET BY MOUTH EVERY DAY 90 tablet 3    Blood Glucose Monitoring Suppl (ACCU-CHEK CHIP PLUS) w/Device KIT Once daily 1 kit 0    glucose blood VI test strips (ACCU-CHEK CHIP) strip 1 each by In Vitro route daily As needed. 50 each 3    diphenoxylate-atropine (LOMOTIL) 2.5-0.025 MG per tablet Take 1 tablet by mouth 4 times daily as needed for Diarrhea 40 tablet 0    dicyclomine (BENTYL) 10 MG capsule Take 1 capsule by mouth 4 times daily as needed (abd pain) 90 capsule 5    pantoprazole (PROTONIX) 40 MG tablet TAKE 1 TABLET BY MOUTH EVERY DAY 90 tablet 3    aspirin 81 MG tablet Take 81 mg by mouth every other day          Allergies   Allergen Reactions    Adhesive Tape     Clinoril [Sulindac] Other (See Comments)     Stomach pain    Codeine      FEEL NUMB    Diclofenac Sodium Hives    Hctz [Hydrochlorothiazide]      Sob    Motrin [Ibuprofen Micronized] Other (See Comments)     Tachycardia      Nsaids      Pt states she has hives and heart races     Pcn [Penicillins] Hives    Peach [Prunus Persica] Itching and Swelling     Cannot eat raw peaches    Ceclor [Cefaclor] Nausea And Vomiting       Social History   Substance Use Topics    Smoking status: Former Smoker     Start date: 9/15/1950     Quit date: 12/30/1995    Smokeless tobacco: Never Used    Alcohol use No       /68 (Site: Right Arm, Position: Sitting, Cuff Size: Large Adult)   Pulse 68   Resp 12   Wt 165 lb 2 oz (74.9 kg)   SpO2 97%   BMI 30.20 kg/m²     Objective:   Physical Exam   Constitutional: She appears well-developed and well-nourished. She is cooperative. HENT:   Right Ear: Ear canal normal. Tympanic membrane is erythematous.    Left Ear: Tympanic membrane and ear canal 3 months    Please note that this chart was generated using Dragon dictation software. Although every effort was made to ensure the accuracy of this automated transcription, some errors in transcription may have occurred.

## 2017-12-12 ENCOUNTER — TELEPHONE (OUTPATIENT)
Dept: FAMILY MEDICINE CLINIC | Age: 82
End: 2017-12-12

## 2017-12-12 RX ORDER — DIPHENOXYLATE HYDROCHLORIDE AND ATROPINE SULFATE 2.5; .025 MG/1; MG/1
1 TABLET ORAL 4 TIMES DAILY PRN
Qty: 40 TABLET | Refills: 0 | Status: SHIPPED | OUTPATIENT
Start: 2017-12-12 | End: 2019-04-01

## 2017-12-12 NOTE — TELEPHONE ENCOUNTER
diphenoxylate-atropine (DIPHENATOL) 2.5-0.025 MG per tablet (Discontinued) 20 tablet 0 2/20/2013 2/27/2013    Sig - Route: Take 1 tablet by mouth every 3 hours as needed for Diarrhea.  - Oral      Pt seen on 12/6 did not need then but needs now    Mally Peterson in chart

## 2018-01-02 ENCOUNTER — OFFICE VISIT (OUTPATIENT)
Dept: FAMILY MEDICINE CLINIC | Age: 83
End: 2018-01-02

## 2018-01-02 VITALS
WEIGHT: 159.13 LBS | HEART RATE: 78 BPM | DIASTOLIC BLOOD PRESSURE: 70 MMHG | RESPIRATION RATE: 12 BRPM | OXYGEN SATURATION: 95 % | BODY MASS INDEX: 29.1 KG/M2 | SYSTOLIC BLOOD PRESSURE: 130 MMHG

## 2018-01-02 DIAGNOSIS — M15.9 PRIMARY OSTEOARTHRITIS INVOLVING MULTIPLE JOINTS: ICD-10-CM

## 2018-01-02 DIAGNOSIS — K57.32 DIVERTICULITIS OF LARGE INTESTINE WITHOUT PERFORATION OR ABSCESS WITHOUT BLEEDING: Primary | ICD-10-CM

## 2018-01-02 PROCEDURE — 99213 OFFICE O/P EST LOW 20 MIN: CPT | Performed by: FAMILY MEDICINE

## 2018-01-02 RX ORDER — METRONIDAZOLE 500 MG/1
500 TABLET ORAL 3 TIMES DAILY
Qty: 21 TABLET | Refills: 0 | Status: SHIPPED | OUTPATIENT
Start: 2018-01-02 | End: 2018-01-09

## 2018-01-02 RX ORDER — HYDROCODONE BITARTRATE AND ACETAMINOPHEN 5; 325 MG/1; MG/1
1 TABLET ORAL EVERY 4 HOURS PRN
Qty: 120 TABLET | Refills: 0 | Status: SHIPPED | OUTPATIENT
Start: 2018-01-02 | End: 2018-01-31 | Stop reason: SDUPTHER

## 2018-01-02 RX ORDER — CIPROFLOXACIN 500 MG/1
500 TABLET, FILM COATED ORAL 2 TIMES DAILY
Qty: 20 TABLET | Refills: 0 | Status: SHIPPED | OUTPATIENT
Start: 2018-01-02 | End: 2018-01-12

## 2018-01-02 RX ORDER — HYDROCODONE BITARTRATE AND ACETAMINOPHEN 5; 325 MG/1; MG/1
TABLET ORAL
Qty: 120 TABLET | Refills: 0 | Status: SHIPPED | OUTPATIENT
Start: 2018-01-02 | End: 2018-01-02 | Stop reason: SDUPTHER

## 2018-01-02 NOTE — PROGRESS NOTES
Subjective:      Patient ID: Shubham Beltran is a 80 y.o. female. HPI pt states she is here due to BM problems. Pt states her BM have not moved lately and when it does it hurts. Pt states for he last 3 days she has been having bm with white thick mucus and some blood and makes her pad wet. Pt denies any diarrhea. Patient just started symptoms on last 4 days. She's having some bowel movements with mucousy Stool. She states she does have occasional normal bowel. She denies any fevers or chills. She is a lot lower abdominal discomfort. She's had no vomiting but feels nausea    Review of Systems  Allergies   Allergen Reactions    Adhesive Tape     Clinoril [Sulindac] Other (See Comments)     Stomach pain    Codeine      FEEL NUMB    Diclofenac Sodium Hives    Hctz [Hydrochlorothiazide]      Sob    Motrin [Ibuprofen Micronized] Other (See Comments)     Tachycardia      Nsaids      Pt states she has hives and heart races     Pcn [Penicillins] Hives    Peach [Prunus Persica] Itching and Swelling     Cannot eat raw peaches    Ceclor [Cefaclor] Nausea And Vomiting     Objective:   Physical Exam   Constitutional: She appears well-developed and well-nourished. She is cooperative. Abdominal: Soft. Normal appearance and bowel sounds are normal. She exhibits no distension and no mass. There is no hepatosplenomegaly. There is tenderness in the left lower quadrant. There is guarding. There is no rigidity, no rebound and no CVA tenderness. No hernia. Neurological: She is alert. Skin: No rash noted. Assessment:      Yang Ramos was seen today for gi problem. Diagnoses and all orders for this visit:    Diverticulitis of large intestine without perforation or abscess without bleeding    Primary osteoarthritis involving multiple joints  -     HYDROcodone-acetaminophen (NORCO) 5-325 MG per tablet; TAKE 1 TABLET BY MOUTH EVERY 4 HOURS AS NEEDED FOR PAIN.     Other orders  -     ciprofloxacin (CIPRO) 500 MG tablet; Take 1 tablet by mouth 2 times daily for 10 days  -     metroNIDAZOLE (FLAGYL) 500 MG tablet;  Take 1 tablet by mouth 3 times daily for 7 days    OARRS report checked          Plan:      Information provided regards to acute diverticulitis  Diet as tolerated  Begin Fiber supplementation 2 weeks  RTC when necessary

## 2018-01-02 NOTE — PATIENT INSTRUCTIONS
fiber supplement, such as Citrucel or Metamucil, every day if needed. Read and follow all instructions on the label. · Schedule time each day for a bowel movement. Having a daily routine may help. Take your time and do not strain when having a bowel movement. Some people avoid nuts, seeds, berries, and popcorn. They believe that these foods might get trapped in the diverticula and cause pain. But there is no proof that these foods cause diverticulitis or make it worse. How are these problems treated? · The best way to treat diverticulosis is to avoid constipation. (See the tips above.)  · Treatment for diverticulitis includes antibiotics and often a change in your diet. You may need only liquids at first. Your doctor may suggest pain medicines for pain or belly cramps. In some cases, surgery may be needed. Follow-up care is a key part of your treatment and safety. Be sure to make and go to all appointments, and call your doctor if you are having problems. It's also a good idea to know your test results and keep a list of the medicines you take. Where can you learn more? Go to [email or web address] org and sign in to your Mi[employer] account. Enter F563 in the [employer] box to learn more about \"Learning About Diverticulosis and Diverticulitis. \"     If you do not have an account, please click on the \"Sign Up Now\" link. Current as of: May 12, 2017  Content Version: 11.4  © 9828-5839 Healthwise, [employer]. Care instructions adapted under license by [facility] ([facility]). If you have questions about a medical condition or this instruction, always ask your healthcare professional. [place] [address] any warranty or liability for your use of this information.

## 2018-01-03 ENCOUNTER — TELEPHONE (OUTPATIENT)
Dept: FAMILY MEDICINE CLINIC | Age: 83
End: 2018-01-03

## 2018-01-05 ENCOUNTER — TELEPHONE (OUTPATIENT)
Dept: FAMILY MEDICINE CLINIC | Age: 83
End: 2018-01-05

## 2018-01-05 NOTE — TELEPHONE ENCOUNTER
Pt states she can not have a bowel movement she has a lot of gas and wants to know if she can take a Laxative      Please call and advise

## 2018-01-15 RX ORDER — MECLIZINE HCL 12.5 MG/1
TABLET ORAL
Qty: 90 TABLET | Refills: 1 | Status: SHIPPED | OUTPATIENT
Start: 2018-01-15 | End: 2018-07-17 | Stop reason: SDUPTHER

## 2018-01-17 ENCOUNTER — TELEPHONE (OUTPATIENT)
Dept: FAMILY MEDICINE CLINIC | Age: 83
End: 2018-01-17

## 2018-01-17 RX ORDER — AMLODIPINE BESYLATE 10 MG/1
TABLET ORAL
Qty: 90 TABLET | Refills: 1 | Status: SHIPPED | OUTPATIENT
Start: 2018-01-17 | End: 2018-07-11 | Stop reason: SDUPTHER

## 2018-01-17 RX ORDER — DICYCLOMINE HYDROCHLORIDE 10 MG/1
10 CAPSULE ORAL 4 TIMES DAILY PRN
Qty: 360 CAPSULE | Refills: 0 | Status: SHIPPED | OUTPATIENT
Start: 2018-01-17 | End: 2019-03-20 | Stop reason: SDUPTHER

## 2018-01-17 RX ORDER — ROPINIROLE 1 MG/1
TABLET, FILM COATED ORAL
Qty: 180 TABLET | Refills: 1 | Status: SHIPPED | OUTPATIENT
Start: 2018-01-17 | End: 2018-03-05 | Stop reason: SDUPTHER

## 2018-01-30 ENCOUNTER — TELEPHONE (OUTPATIENT)
Dept: FAMILY MEDICINE CLINIC | Age: 83
End: 2018-01-30

## 2018-01-30 NOTE — TELEPHONE ENCOUNTER
HYDROcodone-acetaminophen (NORCO) 5-325 MG per tablet 120 tablet 0 1/2/2018 1/2/2019    Sig - Route:  Take 1 tablet by mouth every 4 hours as needed for Pain. - Oral      Pharmacy:  [facility] [address] [phone] - F [phone]  In chart

## 2018-01-31 DIAGNOSIS — M15.9 PRIMARY OSTEOARTHRITIS INVOLVING MULTIPLE JOINTS: ICD-10-CM

## 2018-01-31 RX ORDER — HYDROCODONE BITARTRATE AND ACETAMINOPHEN 5; 325 MG/1; MG/1
1 TABLET ORAL EVERY 4 HOURS PRN
Qty: 120 TABLET | Refills: 0 | Status: SHIPPED | OUTPATIENT
Start: 2018-01-31 | End: 2018-02-01 | Stop reason: SDUPTHER

## 2018-02-01 ENCOUNTER — TELEPHONE (OUTPATIENT)
Dept: FAMILY MEDICINE CLINIC | Age: 83
End: 2018-02-01

## 2018-02-01 DIAGNOSIS — M15.9 PRIMARY OSTEOARTHRITIS INVOLVING MULTIPLE JOINTS: ICD-10-CM

## 2018-02-01 RX ORDER — HYDROCODONE BITARTRATE AND ACETAMINOPHEN 5; 325 MG/1; MG/1
1 TABLET ORAL EVERY 4 HOURS PRN
Qty: 120 TABLET | Refills: 0 | Status: SHIPPED | OUTPATIENT
Start: 2018-02-01 | End: 2018-03-05 | Stop reason: SDUPTHER

## 2018-02-01 NOTE — TELEPHONE ENCOUNTER
HYDROcodone-acetaminophen (NORCO) 5-325 MG per tablet 120 tablet 0 1/31/2018 3/2/2018    Sig - Route:  Take 1 tablet by mouth every 4 hours as needed for Pain for up to 30 days. - Oral      Above script was sent to [address] [place] [address] [address] order in error    Please cancel and resend to   [facility] on [address] in chart

## 2018-02-02 ENCOUNTER — TELEPHONE (OUTPATIENT)
Dept: FAMILY MEDICINE CLINIC | Age: 83
End: 2018-02-02

## 2018-02-03 ENCOUNTER — TELEPHONE (OUTPATIENT)
Dept: FAMILY MEDICINE CLINIC | Age: 83
End: 2018-02-03

## 2018-02-03 RX ORDER — LORAZEPAM 0.5 MG/1
TABLET ORAL
Qty: 120 TABLET | Refills: 0 | OUTPATIENT
Start: 2018-02-03 | End: 2018-03-05 | Stop reason: SDUPTHER

## 2018-02-03 NOTE — TELEPHONE ENCOUNTER
Patient needs a refill on (LORazepam (ATIVAN) 0.5 MG tablet    )     Pharmacy:  [facility] [address] [phone] - F [phone]      Please advise

## 2018-02-06 ENCOUNTER — OFFICE VISIT (OUTPATIENT)
Dept: FAMILY MEDICINE CLINIC | Age: 83
End: 2018-02-06

## 2018-02-06 VITALS
TEMPERATURE: 98.9 F | BODY MASS INDEX: 29.36 KG/M2 | SYSTOLIC BLOOD PRESSURE: 110 MMHG | DIASTOLIC BLOOD PRESSURE: 60 MMHG | WEIGHT: 160.5 LBS | RESPIRATION RATE: 16 BRPM | HEART RATE: 68 BPM | OXYGEN SATURATION: 94 %

## 2018-02-06 DIAGNOSIS — J01.90 ACUTE BACTERIAL SINUSITIS: ICD-10-CM

## 2018-02-06 DIAGNOSIS — B96.89 ACUTE BACTERIAL SINUSITIS: ICD-10-CM

## 2018-02-06 DIAGNOSIS — K52.9 ACUTE GASTROENTERITIS: Primary | ICD-10-CM

## 2018-02-06 PROCEDURE — G8399 PT W/DXA RESULTS DOCUMENT: HCPCS | Performed by: FAMILY MEDICINE

## 2018-02-06 PROCEDURE — 99213 OFFICE O/P EST LOW 20 MIN: CPT | Performed by: FAMILY MEDICINE

## 2018-02-06 PROCEDURE — 1123F ACP DISCUSS/DSCN MKR DOCD: CPT | Performed by: FAMILY MEDICINE

## 2018-02-06 PROCEDURE — G8417 CALC BMI ABV UP PARAM F/U: HCPCS | Performed by: FAMILY MEDICINE

## 2018-02-06 PROCEDURE — 1090F PRES/ABSN URINE INCON ASSESS: CPT | Performed by: FAMILY MEDICINE

## 2018-02-06 PROCEDURE — G8428 CUR MEDS NOT DOCUMENT: HCPCS | Performed by: FAMILY MEDICINE

## 2018-02-06 PROCEDURE — 4040F PNEUMOC VAC/ADMIN/RCVD: CPT | Performed by: FAMILY MEDICINE

## 2018-02-06 PROCEDURE — G8484 FLU IMMUNIZE NO ADMIN: HCPCS | Performed by: FAMILY MEDICINE

## 2018-02-06 PROCEDURE — 1036F TOBACCO NON-USER: CPT | Performed by: FAMILY MEDICINE

## 2018-02-06 RX ORDER — CEFDINIR 300 MG/1
300 CAPSULE ORAL 2 TIMES DAILY
Qty: 20 CAPSULE | Refills: 0 | Status: SHIPPED | OUTPATIENT
Start: 2018-02-06 | End: 2018-03-27 | Stop reason: SDUPTHER

## 2018-02-06 ASSESSMENT — ENCOUNTER SYMPTOMS
VOMITING: 1
ABDOMINAL PAIN: 1

## 2018-02-06 NOTE — PROGRESS NOTES
Subjective:      Patient ID: Lan Closs is a 80 y.o. female. Patient presents for acute medical problem. Medical assistant notes reviewed. Emesis    This is a new problem. The current episode started today. The problem has been gradually improving. The emesis has an appearance of stomach contents. There has been no fever. Associated symptoms include abdominal pain, dizziness and headaches. Treatments tried: mucinex, tylenol. The treatment provided no relief. Pt state she has been feeling sick for the last few days but this am woke up vomiting and feeling dizzy and tired. Pt also states her BP was 168/85 yesterday. Patient started having respiratory symptoms over the last 4-5 days. Then last night she awoke with vomiting and had emesis for 6-8 times. She has had no vomiting for the last 3 hours. She denies any diarrhea. She does have a lot of cramps and discomfort. Review of Systems   Gastrointestinal: Positive for abdominal pain and vomiting. Neurological: Positive for dizziness and headaches. Allergies   Allergen Reactions    Adhesive Tape     Clinoril [Sulindac] Other (See Comments)     Stomach pain    Codeine      FEEL NUMB    Diclofenac Sodium Hives    Hctz [Hydrochlorothiazide]      Sob    Motrin [Ibuprofen Micronized] Other (See Comments)     Tachycardia      Nsaids      Pt states she has hives and heart races     Pcn [Penicillins] Hives    Peach [Prunus Persica] Itching and Swelling     Cannot eat raw peaches    Ceclor [Cefaclor] Nausea And Vomiting       Objective:   Physical Exam   Constitutional: She appears well-developed and well-nourished. She is cooperative. HENT:   Right Ear: Tympanic membrane and ear canal normal.   Left Ear: Tympanic membrane and ear canal normal.   Nose: Mucosal edema and rhinorrhea present. Right sinus exhibits no maxillary sinus tenderness and no frontal sinus tenderness.  Left sinus exhibits no maxillary sinus tenderness and no frontal sinus tenderness. Mouth/Throat: Oropharynx is clear and moist and mucous membranes are normal.   Neck: Neck supple. Pulmonary/Chest: Effort normal and breath sounds normal. No respiratory distress. Abdominal: Soft. Normal appearance. She exhibits no distension and no mass. Bowel sounds are increased. There is no hepatosplenomegaly. There is tenderness in the epigastric area. There is no rigidity, no rebound, no guarding and no CVA tenderness. Tympanic throughout     Lymphadenopathy:     She has no cervical adenopathy. Neurological: She is alert. Assessment:      Jaimie Alberto was seen today for emesis. Diagnoses and all orders for this visit:    Acute gastroenteritis    Acute bacterial sinusitis    Other orders  -     cefdinir (OMNICEF) 300 MG capsule;  Take 1 capsule by mouth 2 times daily for 10 days            Plan:      Advised patient she probably has 2 separate issues one is the respiratory infection and now the acute gastroenteritis  Discussed and are clear liquid diet today and in light diet thereafter  Start antibiotic therapy later today  RTC when necessary

## 2018-02-12 ENCOUNTER — TELEPHONE (OUTPATIENT)
Dept: FAMILY MEDICINE CLINIC | Age: 83
End: 2018-02-12

## 2018-02-12 RX ORDER — DOXYCYCLINE 100 MG/1
100 CAPSULE ORAL 2 TIMES DAILY
Qty: 20 CAPSULE | Refills: 0 | Status: SHIPPED | OUTPATIENT
Start: 2018-02-12 | End: 2018-02-22

## 2018-02-16 ENCOUNTER — TELEPHONE (OUTPATIENT)
Dept: FAMILY MEDICINE CLINIC | Age: 83
End: 2018-02-16

## 2018-03-01 ENCOUNTER — TELEPHONE (OUTPATIENT)
Dept: FAMILY MEDICINE CLINIC | Age: 83
End: 2018-03-01

## 2018-03-02 DIAGNOSIS — M15.9 PRIMARY OSTEOARTHRITIS INVOLVING MULTIPLE JOINTS: ICD-10-CM

## 2018-03-02 RX ORDER — HYDROCODONE BITARTRATE AND ACETAMINOPHEN 5; 325 MG/1; MG/1
1 TABLET ORAL EVERY 4 HOURS PRN
Qty: 120 TABLET | Refills: 0 | Status: CANCELLED | OUTPATIENT
Start: 2018-03-02 | End: 2018-04-01

## 2018-03-02 RX ORDER — LORAZEPAM 0.5 MG/1
TABLET ORAL
Qty: 120 TABLET | Refills: 0 | Status: CANCELLED | OUTPATIENT
Start: 2018-03-02 | End: 2018-03-29

## 2018-03-05 ENCOUNTER — OFFICE VISIT (OUTPATIENT)
Dept: FAMILY MEDICINE CLINIC | Age: 83
End: 2018-03-05

## 2018-03-05 VITALS
OXYGEN SATURATION: 97 % | SYSTOLIC BLOOD PRESSURE: 134 MMHG | BODY MASS INDEX: 30.09 KG/M2 | HEART RATE: 79 BPM | DIASTOLIC BLOOD PRESSURE: 78 MMHG | RESPIRATION RATE: 12 BRPM | WEIGHT: 164.5 LBS

## 2018-03-05 DIAGNOSIS — F51.01 PRIMARY INSOMNIA: ICD-10-CM

## 2018-03-05 DIAGNOSIS — G25.81 RESTLESS LEGS SYNDROME: ICD-10-CM

## 2018-03-05 DIAGNOSIS — E11.9 CONTROLLED TYPE 2 DIABETES MELLITUS WITHOUT COMPLICATION, WITHOUT LONG-TERM CURRENT USE OF INSULIN (HCC): ICD-10-CM

## 2018-03-05 DIAGNOSIS — F41.9 ANXIETY: ICD-10-CM

## 2018-03-05 DIAGNOSIS — M15.9 PRIMARY OSTEOARTHRITIS INVOLVING MULTIPLE JOINTS: Primary | ICD-10-CM

## 2018-03-05 PROBLEM — J18.9 PNEUMONIA: Status: RESOLVED | Noted: 2017-09-17 | Resolved: 2018-03-05

## 2018-03-05 PROBLEM — J96.00 ACUTE RESPIRATORY FAILURE (HCC): Status: RESOLVED | Noted: 2017-09-17 | Resolved: 2018-03-05

## 2018-03-05 PROCEDURE — 1090F PRES/ABSN URINE INCON ASSESS: CPT | Performed by: FAMILY MEDICINE

## 2018-03-05 PROCEDURE — 4040F PNEUMOC VAC/ADMIN/RCVD: CPT | Performed by: FAMILY MEDICINE

## 2018-03-05 PROCEDURE — 1036F TOBACCO NON-USER: CPT | Performed by: FAMILY MEDICINE

## 2018-03-05 PROCEDURE — G8427 DOCREV CUR MEDS BY ELIG CLIN: HCPCS | Performed by: FAMILY MEDICINE

## 2018-03-05 PROCEDURE — G8484 FLU IMMUNIZE NO ADMIN: HCPCS | Performed by: FAMILY MEDICINE

## 2018-03-05 PROCEDURE — 1123F ACP DISCUSS/DSCN MKR DOCD: CPT | Performed by: FAMILY MEDICINE

## 2018-03-05 PROCEDURE — G8417 CALC BMI ABV UP PARAM F/U: HCPCS | Performed by: FAMILY MEDICINE

## 2018-03-05 PROCEDURE — G8399 PT W/DXA RESULTS DOCUMENT: HCPCS | Performed by: FAMILY MEDICINE

## 2018-03-05 PROCEDURE — 99214 OFFICE O/P EST MOD 30 MIN: CPT | Performed by: FAMILY MEDICINE

## 2018-03-05 RX ORDER — LORAZEPAM 0.5 MG/1
TABLET ORAL
Qty: 120 TABLET | Refills: 2 | Status: SHIPPED | OUTPATIENT
Start: 2018-03-05 | End: 2018-04-25 | Stop reason: SDUPTHER

## 2018-03-05 RX ORDER — ROPINIROLE 2 MG/1
2 TABLET, FILM COATED ORAL 2 TIMES DAILY
Qty: 180 TABLET | Refills: 3 | Status: SHIPPED | OUTPATIENT
Start: 2018-03-05 | End: 2019-01-09 | Stop reason: SDUPTHER

## 2018-03-05 RX ORDER — HYDROCODONE BITARTRATE AND ACETAMINOPHEN 5; 325 MG/1; MG/1
1 TABLET ORAL EVERY 4 HOURS PRN
Qty: 120 TABLET | Refills: 0 | Status: SHIPPED | OUTPATIENT
Start: 2018-03-05 | End: 2018-04-02 | Stop reason: SDUPTHER

## 2018-03-05 NOTE — PROGRESS NOTES
Mouth/Throat: Oropharynx is clear and moist and mucous membranes are normal.   Neck: Neck supple. Carotid bruit is not present. Cardiovascular: Normal rate, regular rhythm and normal heart sounds. No murmur heard. Pulses:       Dorsalis pedis pulses are 2+ on the right side, and 2+ on the left side. Posterior tibial pulses are 2+ on the right side, and 2+ on the left side. Pulmonary/Chest: Effort normal and breath sounds normal. No respiratory distress. Musculoskeletal: Normal range of motion. She exhibits no edema or tenderness. Crepitus of both knees and thumb MP joints with enlargement of the thumb MP joints bilaterally   Neurological: She is alert. She has normal strength. No sensory deficit. Psychiatric: She has a normal mood and affect. Her speech is normal and behavior is normal. Judgment and thought content normal. Cognition and memory are normal.       Assessment:      Mariya Neal was seen today for follow-up. Diagnoses and all orders for this visit:    Primary osteoarthritis involving multiple joints  -     HYDROcodone-acetaminophen (NORCO) 5-325 MG per tablet; Take 1 tablet by mouth every 4 hours as needed for Pain for up to 30 days. Earliest Fill Date: 3/5/18    Restless legs syndrome    Primary insomnia    Anxiety    Controlled type 2 diabetes mellitus without complication, without long-term current use of insulin ([facility])    Other orders  -     LORazepam (ATIVAN) 0.5 MG tablet; 1 BID prn anxiety and 2 qhs.  -     rOPINIRole (REQUIP) 2 MG tablet; Take 1 tablet by mouth 2 times daily    OARRS report checked          Plan:      Laboratory profiling ordered  Continue with current activities  Taking current medication  We'll decide upon further management of diabetes and also hyperlipidemia based on laboratory profiling currently she is without need for medication for diabetes mellitus  RTC 3 months    Please note that this chart was generated using Dragon dictation software.  Although every effort was made to ensure the accuracy of this automated transcription, some errors in transcription may have occurred.

## 2018-03-27 ENCOUNTER — OFFICE VISIT (OUTPATIENT)
Dept: FAMILY MEDICINE CLINIC | Age: 83
End: 2018-03-27

## 2018-03-27 VITALS
DIASTOLIC BLOOD PRESSURE: 78 MMHG | RESPIRATION RATE: 12 BRPM | WEIGHT: 166.13 LBS | OXYGEN SATURATION: 94 % | SYSTOLIC BLOOD PRESSURE: 132 MMHG | HEART RATE: 68 BPM | BODY MASS INDEX: 30.38 KG/M2

## 2018-03-27 DIAGNOSIS — B96.89 ACUTE BACTERIAL SINUSITIS: ICD-10-CM

## 2018-03-27 DIAGNOSIS — J01.90 ACUTE BACTERIAL SINUSITIS: ICD-10-CM

## 2018-03-27 DIAGNOSIS — M54.50 ACUTE MIDLINE LOW BACK PAIN WITHOUT SCIATICA: Primary | ICD-10-CM

## 2018-03-27 PROCEDURE — 4040F PNEUMOC VAC/ADMIN/RCVD: CPT | Performed by: FAMILY MEDICINE

## 2018-03-27 PROCEDURE — G8482 FLU IMMUNIZE ORDER/ADMIN: HCPCS | Performed by: FAMILY MEDICINE

## 2018-03-27 PROCEDURE — 1036F TOBACCO NON-USER: CPT | Performed by: FAMILY MEDICINE

## 2018-03-27 PROCEDURE — G8428 CUR MEDS NOT DOCUMENT: HCPCS | Performed by: FAMILY MEDICINE

## 2018-03-27 PROCEDURE — G8417 CALC BMI ABV UP PARAM F/U: HCPCS | Performed by: FAMILY MEDICINE

## 2018-03-27 PROCEDURE — 1090F PRES/ABSN URINE INCON ASSESS: CPT | Performed by: FAMILY MEDICINE

## 2018-03-27 PROCEDURE — G8399 PT W/DXA RESULTS DOCUMENT: HCPCS | Performed by: FAMILY MEDICINE

## 2018-03-27 PROCEDURE — 99213 OFFICE O/P EST LOW 20 MIN: CPT | Performed by: FAMILY MEDICINE

## 2018-03-27 PROCEDURE — 1123F ACP DISCUSS/DSCN MKR DOCD: CPT | Performed by: FAMILY MEDICINE

## 2018-03-27 RX ORDER — PREDNISONE 20 MG/1
TABLET ORAL
Qty: 15 TABLET | Refills: 0 | Status: SHIPPED | OUTPATIENT
Start: 2018-03-27 | End: 2018-04-25

## 2018-03-27 RX ORDER — CEFDINIR 300 MG/1
300 CAPSULE ORAL 2 TIMES DAILY
Qty: 20 CAPSULE | Refills: 0 | Status: SHIPPED | OUTPATIENT
Start: 2018-03-27 | End: 2019-12-28

## 2018-03-27 ASSESSMENT — ENCOUNTER SYMPTOMS: BACK PAIN: 1

## 2018-03-27 NOTE — PROGRESS NOTES
tenderness. Mouth/Throat: Oropharynx is clear and moist and mucous membranes are normal.   Neck: Neck supple. Pulmonary/Chest: Effort normal and breath sounds normal. No respiratory distress. Musculoskeletal:        Thoracic back: She exhibits spasm (Left side). Lumbar back: She exhibits spasm. She exhibits no swelling, no edema and no deformity. Right upper leg: She exhibits no tenderness, no swelling and no deformity. Left upper leg: She exhibits no tenderness, no swelling and no deformity. Lymphadenopathy:     She has no cervical adenopathy. Neurological: She is alert and oriented to person, place, and time. No sensory deficit. Reflex Scores:       Patellar reflexes are 2+ on the right side and 2+ on the left side. Achilles reflexes are 2+ on the right side and 2+ on the left side. Skin: Skin is warm. No rash noted. No erythema. Back Exam     Tenderness   The patient is experiencing tenderness in the lumbar. Range of Motion   Extension: abnormal   Flexion: abnormal   Lateral Bend Right: normal   Lateral Bend Left: normal     Muscle Strength   Right Quadriceps:  5/5/5   Left Quadriceps:  5/5/5   Right Hamstrings:  5/5/5   Left Hamstrings:  5/5/5     Tests   Straight leg raise right: negative  Straight leg raise left: negative    Other   Gait: normal             Assessment:      Jeanine Vicente was seen today for back pain. Diagnoses and all orders for this visit:    Acute midline low back pain without sciatica    Acute bacterial sinusitis    Other orders  -     cefdinir (OMNICEF) 300 MG capsule;  Take 1 capsule by mouth 2 times daily for 10 days  -     predniSONE (DELTASONE) 20 MG tablet; 1 TID for 3 day then 1 BID            Plan:      Local heat to the affected area 3 times a day  Activities as tolerated  RTC when necessary

## 2018-03-30 RX ORDER — ROPINIROLE 1 MG/1
TABLET, FILM COATED ORAL
Qty: 180 TABLET | Refills: 0 | OUTPATIENT
Start: 2018-03-30

## 2018-04-02 DIAGNOSIS — M15.9 PRIMARY OSTEOARTHRITIS INVOLVING MULTIPLE JOINTS: ICD-10-CM

## 2018-04-03 RX ORDER — HYDROCODONE BITARTRATE AND ACETAMINOPHEN 5; 325 MG/1; MG/1
1 TABLET ORAL EVERY 4 HOURS PRN
Qty: 120 TABLET | Refills: 0 | Status: SHIPPED | OUTPATIENT
Start: 2018-04-03 | End: 2018-04-25 | Stop reason: SDUPTHER

## 2018-04-09 ENCOUNTER — TELEPHONE (OUTPATIENT)
Dept: FAMILY MEDICINE CLINIC | Age: 83
End: 2018-04-09

## 2018-04-09 RX ORDER — LEVOFLOXACIN 500 MG/1
500 TABLET, FILM COATED ORAL DAILY
Qty: 7 TABLET | Refills: 0 | Status: SHIPPED | OUTPATIENT
Start: 2018-04-09 | End: 2018-04-25

## 2018-04-13 ENCOUNTER — OFFICE VISIT (OUTPATIENT)
Dept: FAMILY MEDICINE CLINIC | Age: 83
End: 2018-04-13

## 2018-04-13 VITALS
WEIGHT: 164 LBS | HEART RATE: 88 BPM | BODY MASS INDEX: 30 KG/M2 | OXYGEN SATURATION: 92 % | SYSTOLIC BLOOD PRESSURE: 132 MMHG | DIASTOLIC BLOOD PRESSURE: 60 MMHG | TEMPERATURE: 100 F

## 2018-04-13 DIAGNOSIS — J30.9 ACUTE ALLERGIC RHINITIS, UNSPECIFIED SEASONALITY, UNSPECIFIED TRIGGER: ICD-10-CM

## 2018-04-13 DIAGNOSIS — R42 VERTIGO: Primary | ICD-10-CM

## 2018-04-13 PROCEDURE — G8417 CALC BMI ABV UP PARAM F/U: HCPCS | Performed by: FAMILY MEDICINE

## 2018-04-13 PROCEDURE — G8428 CUR MEDS NOT DOCUMENT: HCPCS | Performed by: FAMILY MEDICINE

## 2018-04-13 PROCEDURE — 4040F PNEUMOC VAC/ADMIN/RCVD: CPT | Performed by: FAMILY MEDICINE

## 2018-04-13 PROCEDURE — 1123F ACP DISCUSS/DSCN MKR DOCD: CPT | Performed by: FAMILY MEDICINE

## 2018-04-13 PROCEDURE — 1090F PRES/ABSN URINE INCON ASSESS: CPT | Performed by: FAMILY MEDICINE

## 2018-04-13 PROCEDURE — 99213 OFFICE O/P EST LOW 20 MIN: CPT | Performed by: FAMILY MEDICINE

## 2018-04-13 PROCEDURE — 1036F TOBACCO NON-USER: CPT | Performed by: FAMILY MEDICINE

## 2018-04-13 PROCEDURE — 96372 THER/PROPH/DIAG INJ SC/IM: CPT | Performed by: FAMILY MEDICINE

## 2018-04-13 PROCEDURE — G8399 PT W/DXA RESULTS DOCUMENT: HCPCS | Performed by: FAMILY MEDICINE

## 2018-04-13 RX ORDER — BETAMETHASONE VALERATE 0.1 %
LOTION (ML) TOPICAL
Qty: 1 BOTTLE | Refills: 0 | Status: SHIPPED | OUTPATIENT
Start: 2018-04-13 | End: 2018-09-12

## 2018-04-13 RX ORDER — METHYLPREDNISOLONE ACETATE 80 MG/ML
80 INJECTION, SUSPENSION INTRA-ARTICULAR; INTRALESIONAL; INTRAMUSCULAR; SOFT TISSUE ONCE
Status: COMPLETED | OUTPATIENT
Start: 2018-04-13 | End: 2018-04-13

## 2018-04-13 RX ADMIN — METHYLPREDNISOLONE ACETATE 80 MG: 80 INJECTION, SUSPENSION INTRA-ARTICULAR; INTRALESIONAL; INTRAMUSCULAR; SOFT TISSUE at 09:44

## 2018-04-25 ENCOUNTER — OFFICE VISIT (OUTPATIENT)
Dept: FAMILY MEDICINE CLINIC | Age: 83
End: 2018-04-25

## 2018-04-25 VITALS
SYSTOLIC BLOOD PRESSURE: 150 MMHG | RESPIRATION RATE: 16 BRPM | OXYGEN SATURATION: 97 % | DIASTOLIC BLOOD PRESSURE: 80 MMHG | HEART RATE: 66 BPM | BODY MASS INDEX: 29.23 KG/M2 | WEIGHT: 162.38 LBS

## 2018-04-25 DIAGNOSIS — M15.9 PRIMARY OSTEOARTHRITIS INVOLVING MULTIPLE JOINTS: ICD-10-CM

## 2018-04-25 DIAGNOSIS — N30.00 ACUTE CYSTITIS WITHOUT HEMATURIA: ICD-10-CM

## 2018-04-25 DIAGNOSIS — S20.20XA CONTUSION OF MULTIPLE SITES OF TRUNK, INITIAL ENCOUNTER: Primary | ICD-10-CM

## 2018-04-25 PROCEDURE — 1123F ACP DISCUSS/DSCN MKR DOCD: CPT | Performed by: FAMILY MEDICINE

## 2018-04-25 PROCEDURE — G8417 CALC BMI ABV UP PARAM F/U: HCPCS | Performed by: FAMILY MEDICINE

## 2018-04-25 PROCEDURE — G8399 PT W/DXA RESULTS DOCUMENT: HCPCS | Performed by: FAMILY MEDICINE

## 2018-04-25 PROCEDURE — 1090F PRES/ABSN URINE INCON ASSESS: CPT | Performed by: FAMILY MEDICINE

## 2018-04-25 PROCEDURE — 1036F TOBACCO NON-USER: CPT | Performed by: FAMILY MEDICINE

## 2018-04-25 PROCEDURE — 4040F PNEUMOC VAC/ADMIN/RCVD: CPT | Performed by: FAMILY MEDICINE

## 2018-04-25 PROCEDURE — G8427 DOCREV CUR MEDS BY ELIG CLIN: HCPCS | Performed by: FAMILY MEDICINE

## 2018-04-25 PROCEDURE — 99214 OFFICE O/P EST MOD 30 MIN: CPT | Performed by: FAMILY MEDICINE

## 2018-04-25 RX ORDER — HYDROCODONE BITARTRATE AND ACETAMINOPHEN 5; 325 MG/1; MG/1
1 TABLET ORAL EVERY 4 HOURS PRN
Qty: 120 TABLET | Refills: 0 | Status: SHIPPED | OUTPATIENT
Start: 2018-04-25 | End: 2018-05-30 | Stop reason: SDUPTHER

## 2018-04-25 RX ORDER — LORAZEPAM 0.5 MG/1
TABLET ORAL
Qty: 120 TABLET | Refills: 2 | Status: SHIPPED | OUTPATIENT
Start: 2018-04-25 | End: 2018-07-11 | Stop reason: SDUPTHER

## 2018-04-25 RX ORDER — SULFAMETHOXAZOLE AND TRIMETHOPRIM 400; 80 MG/1; MG/1
1 TABLET ORAL DAILY
Qty: 5 TABLET | Refills: 0 | Status: SHIPPED | OUTPATIENT
Start: 2018-04-25 | End: 2018-04-30

## 2018-04-25 ASSESSMENT — PATIENT HEALTH QUESTIONNAIRE - PHQ9
1. LITTLE INTEREST OR PLEASURE IN DOING THINGS: 0
SUM OF ALL RESPONSES TO PHQ QUESTIONS 1-9: 0
SUM OF ALL RESPONSES TO PHQ9 QUESTIONS 1 & 2: 0
2. FEELING DOWN, DEPRESSED OR HOPELESS: 0

## 2018-05-30 DIAGNOSIS — M15.9 PRIMARY OSTEOARTHRITIS INVOLVING MULTIPLE JOINTS: ICD-10-CM

## 2018-05-30 RX ORDER — HYDROCODONE BITARTRATE AND ACETAMINOPHEN 5; 325 MG/1; MG/1
1 TABLET ORAL EVERY 4 HOURS PRN
Qty: 120 TABLET | Refills: 0 | Status: SHIPPED | OUTPATIENT
Start: 2018-05-30 | End: 2018-06-27 | Stop reason: SDUPTHER

## 2018-05-30 RX ORDER — LORAZEPAM 0.5 MG/1
TABLET ORAL
Qty: 120 TABLET | Refills: 2 | Status: CANCELLED | OUTPATIENT
Start: 2018-05-30 | End: 2018-08-26

## 2018-06-04 ENCOUNTER — TELEPHONE (OUTPATIENT)
Dept: FAMILY MEDICINE CLINIC | Age: 83
End: 2018-06-04

## 2018-06-04 RX ORDER — TOLTERODINE TARTRATE 2 MG/1
2 TABLET, EXTENDED RELEASE ORAL NIGHTLY
Qty: 30 TABLET | Refills: 0 | Status: SHIPPED | OUTPATIENT
Start: 2018-06-04 | End: 2018-07-01 | Stop reason: SDUPTHER

## 2018-06-13 RX ORDER — VALSARTAN 320 MG/1
TABLET ORAL
Qty: 90 TABLET | Refills: 1 | Status: ON HOLD | OUTPATIENT
Start: 2018-06-13 | End: 2018-08-29

## 2018-06-13 RX ORDER — LEVOTHYROXINE SODIUM 0.05 MG/1
TABLET ORAL
Qty: 90 TABLET | Refills: 1 | Status: SHIPPED | OUTPATIENT
Start: 2018-06-13 | End: 2018-10-15 | Stop reason: SDUPTHER

## 2018-06-25 DIAGNOSIS — M15.9 PRIMARY OSTEOARTHRITIS INVOLVING MULTIPLE JOINTS: ICD-10-CM

## 2018-06-27 RX ORDER — HYDROCODONE BITARTRATE AND ACETAMINOPHEN 5; 325 MG/1; MG/1
1 TABLET ORAL EVERY 4 HOURS PRN
Qty: 120 TABLET | Refills: 0 | Status: SHIPPED | OUTPATIENT
Start: 2018-06-27 | End: 2018-07-23 | Stop reason: SDUPTHER

## 2018-07-02 RX ORDER — TOLTERODINE TARTRATE 2 MG/1
TABLET, EXTENDED RELEASE ORAL
Qty: 30 TABLET | Refills: 0 | Status: SHIPPED | OUTPATIENT
Start: 2018-07-02 | End: 2018-08-02 | Stop reason: SDUPTHER

## 2018-07-06 ENCOUNTER — OFFICE VISIT (OUTPATIENT)
Dept: FAMILY MEDICINE CLINIC | Age: 83
End: 2018-07-06

## 2018-07-06 VITALS
SYSTOLIC BLOOD PRESSURE: 136 MMHG | WEIGHT: 169.3 LBS | BODY MASS INDEX: 30.47 KG/M2 | OXYGEN SATURATION: 91 % | HEART RATE: 74 BPM | TEMPERATURE: 99 F | DIASTOLIC BLOOD PRESSURE: 72 MMHG

## 2018-07-06 DIAGNOSIS — J45.41 MODERATE PERSISTENT ASTHMATIC BRONCHITIS WITH ACUTE EXACERBATION: Primary | ICD-10-CM

## 2018-07-06 PROCEDURE — G8399 PT W/DXA RESULTS DOCUMENT: HCPCS | Performed by: FAMILY MEDICINE

## 2018-07-06 PROCEDURE — 99213 OFFICE O/P EST LOW 20 MIN: CPT | Performed by: FAMILY MEDICINE

## 2018-07-06 PROCEDURE — 1036F TOBACCO NON-USER: CPT | Performed by: FAMILY MEDICINE

## 2018-07-06 PROCEDURE — 1090F PRES/ABSN URINE INCON ASSESS: CPT | Performed by: FAMILY MEDICINE

## 2018-07-06 PROCEDURE — 4040F PNEUMOC VAC/ADMIN/RCVD: CPT | Performed by: FAMILY MEDICINE

## 2018-07-06 PROCEDURE — 1123F ACP DISCUSS/DSCN MKR DOCD: CPT | Performed by: FAMILY MEDICINE

## 2018-07-06 PROCEDURE — G8428 CUR MEDS NOT DOCUMENT: HCPCS | Performed by: FAMILY MEDICINE

## 2018-07-06 PROCEDURE — G8417 CALC BMI ABV UP PARAM F/U: HCPCS | Performed by: FAMILY MEDICINE

## 2018-07-06 RX ORDER — LEVOFLOXACIN 500 MG/1
500 TABLET, FILM COATED ORAL DAILY
Qty: 7 TABLET | Refills: 0 | Status: SHIPPED | OUTPATIENT
Start: 2018-07-06 | End: 2018-07-13

## 2018-07-06 RX ORDER — BUDESONIDE AND FORMOTEROL FUMARATE DIHYDRATE 160; 4.5 UG/1; UG/1
2 AEROSOL RESPIRATORY (INHALATION) 2 TIMES DAILY
Qty: 2 INHALER | Refills: 0 | COMMUNITY
Start: 2018-07-06 | End: 2018-10-09 | Stop reason: SDUPTHER

## 2018-07-06 RX ORDER — PREDNISONE 20 MG/1
TABLET ORAL
Qty: 15 TABLET | Refills: 0 | Status: ON HOLD | OUTPATIENT
Start: 2018-07-06 | End: 2018-09-02 | Stop reason: HOSPADM

## 2018-07-06 NOTE — PROGRESS NOTES
Subjective:      Patient ID: Venkatesh Ceron is a 80 y.o. female. Patient presents for acute medical problem-cough for 1 week . Medical assistant notes reviewed. HPI patient presents today for cough and wheezing and over all not feeling well. Symptoms started about 3-4 days ago. Patient states that she has not taken anything to help with her symptoms. Allergies   Allergen Reactions    Adhesive Tape     Clinoril [Sulindac] Other (See Comments)     Stomach pain    Codeine      FEEL NUMB    Diclofenac Sodium Hives    Hctz [Hydrochlorothiazide]      Sob    Motrin [Ibuprofen Micronized] Other (See Comments)     Tachycardia      Nsaids      Pt states she has hives and heart races     Pcn [Penicillins] Hives    Peach [Prunus Persica] Itching and Swelling     Cannot eat raw peaches    Ceclor [Cefaclor] Nausea And Vomiting       Review of Systems    Objective:   Physical Exam   Constitutional: She appears well-developed and well-nourished. She is cooperative. HENT:   Right Ear: Tympanic membrane and ear canal normal.   Left Ear: Tympanic membrane and ear canal normal.   Nose: Nose normal.   Mouth/Throat: Uvula is midline and oropharynx is clear and moist.   Neck: Neck supple. Pulmonary/Chest: Effort normal. No accessory muscle usage. No respiratory distress. She has wheezes. She has no rhonchi. She exhibits no deformity. Lymphadenopathy:     She has no cervical adenopathy. Neurological: She is alert. Assessment:      Jim Recio was seen today for uri. Diagnoses and all orders for this visit:    Moderate persistent asthmatic bronchitis with acute exacerbation    Other orders  -     levofloxacin (LEVAQUIN) 500 MG tablet; Take 1 tablet by mouth daily for 7 days  -     predniSONE (DELTASONE) 20 MG tablet; 1 TID for 3 day then 1 BID  -     budesonide-formoterol (SYMBICORT) 160-4.5 MCG/ACT AERO;  Inhale 2 puffs into the lungs 2 times daily            Plan:      Humidification of the bedroom was discussed.   Maintain over-the-counter medication when necessary  RTC when necessary

## 2018-07-07 ENCOUNTER — TELEPHONE (OUTPATIENT)
Dept: FAMILY MEDICINE CLINIC | Age: 83
End: 2018-07-07

## 2018-07-10 ENCOUNTER — TELEPHONE (OUTPATIENT)
Dept: FAMILY MEDICINE CLINIC | Age: 83
End: 2018-07-10

## 2018-07-10 NOTE — TELEPHONE ENCOUNTER
Her insomnia is probably related to the steroid burst we just gave her.    we can have her try taking 3 of the 5 mg melatonin nightly

## 2018-07-11 ENCOUNTER — OFFICE VISIT (OUTPATIENT)
Dept: FAMILY MEDICINE CLINIC | Age: 83
End: 2018-07-11

## 2018-07-11 VITALS
OXYGEN SATURATION: 96 % | SYSTOLIC BLOOD PRESSURE: 134 MMHG | HEART RATE: 79 BPM | WEIGHT: 164.38 LBS | BODY MASS INDEX: 29.59 KG/M2 | RESPIRATION RATE: 16 BRPM | DIASTOLIC BLOOD PRESSURE: 70 MMHG

## 2018-07-11 DIAGNOSIS — F41.9 ANXIETY: ICD-10-CM

## 2018-07-11 DIAGNOSIS — M15.9 PRIMARY OSTEOARTHRITIS INVOLVING MULTIPLE JOINTS: ICD-10-CM

## 2018-07-11 DIAGNOSIS — E11.9 CONTROLLED TYPE 2 DIABETES MELLITUS WITHOUT COMPLICATION, WITHOUT LONG-TERM CURRENT USE OF INSULIN (HCC): ICD-10-CM

## 2018-07-11 DIAGNOSIS — G60.9 IDIOPATHIC PERIPHERAL NEUROPATHY: ICD-10-CM

## 2018-07-11 DIAGNOSIS — F51.01 PRIMARY INSOMNIA: Primary | ICD-10-CM

## 2018-07-11 PROCEDURE — 1101F PT FALLS ASSESS-DOCD LE1/YR: CPT | Performed by: FAMILY MEDICINE

## 2018-07-11 PROCEDURE — 1123F ACP DISCUSS/DSCN MKR DOCD: CPT | Performed by: FAMILY MEDICINE

## 2018-07-11 PROCEDURE — G8399 PT W/DXA RESULTS DOCUMENT: HCPCS | Performed by: FAMILY MEDICINE

## 2018-07-11 PROCEDURE — 1036F TOBACCO NON-USER: CPT | Performed by: FAMILY MEDICINE

## 2018-07-11 PROCEDURE — 1090F PRES/ABSN URINE INCON ASSESS: CPT | Performed by: FAMILY MEDICINE

## 2018-07-11 PROCEDURE — G8417 CALC BMI ABV UP PARAM F/U: HCPCS | Performed by: FAMILY MEDICINE

## 2018-07-11 PROCEDURE — 4040F PNEUMOC VAC/ADMIN/RCVD: CPT | Performed by: FAMILY MEDICINE

## 2018-07-11 PROCEDURE — G8427 DOCREV CUR MEDS BY ELIG CLIN: HCPCS | Performed by: FAMILY MEDICINE

## 2018-07-11 PROCEDURE — 99214 OFFICE O/P EST MOD 30 MIN: CPT | Performed by: FAMILY MEDICINE

## 2018-07-11 RX ORDER — AMLODIPINE BESYLATE 10 MG/1
TABLET ORAL
Qty: 90 TABLET | Refills: 1 | Status: SHIPPED | OUTPATIENT
Start: 2018-07-11 | End: 2018-08-03 | Stop reason: SDUPTHER

## 2018-07-11 RX ORDER — LORAZEPAM 0.5 MG/1
TABLET ORAL
Qty: 120 TABLET | Refills: 2 | Status: SHIPPED | OUTPATIENT
Start: 2018-07-11 | End: 2018-08-24 | Stop reason: SDUPTHER

## 2018-07-16 ENCOUNTER — TELEPHONE (OUTPATIENT)
Dept: FAMILY MEDICINE CLINIC | Age: 83
End: 2018-07-16

## 2018-07-16 RX ORDER — LEVOFLOXACIN 500 MG/1
500 TABLET, FILM COATED ORAL DAILY
Qty: 7 TABLET | Refills: 0 | Status: ON HOLD | OUTPATIENT
Start: 2018-07-16 | End: 2018-09-02 | Stop reason: HOSPADM

## 2018-07-16 NOTE — TELEPHONE ENCOUNTER
Pt states she woke up on Fri morning w/ stopped up ears, dizzy, congested head, feels sinus infection. Pt offered appt, states she was just in on 7/11, and does not want to come in again. Please advise.

## 2018-07-17 ENCOUNTER — OFFICE VISIT (OUTPATIENT)
Dept: FAMILY MEDICINE CLINIC | Age: 83
End: 2018-07-17

## 2018-07-17 VITALS
OXYGEN SATURATION: 93 % | SYSTOLIC BLOOD PRESSURE: 138 MMHG | HEART RATE: 74 BPM | BODY MASS INDEX: 29.7 KG/M2 | WEIGHT: 165 LBS | DIASTOLIC BLOOD PRESSURE: 64 MMHG | RESPIRATION RATE: 12 BRPM

## 2018-07-17 DIAGNOSIS — R42 VERTIGO: ICD-10-CM

## 2018-07-17 DIAGNOSIS — H66.001 ACUTE SUPPURATIVE OTITIS MEDIA OF RIGHT EAR WITHOUT SPONTANEOUS RUPTURE OF TYMPANIC MEMBRANE, RECURRENCE NOT SPECIFIED: Primary | ICD-10-CM

## 2018-07-17 PROCEDURE — G8399 PT W/DXA RESULTS DOCUMENT: HCPCS | Performed by: FAMILY MEDICINE

## 2018-07-17 PROCEDURE — 1036F TOBACCO NON-USER: CPT | Performed by: FAMILY MEDICINE

## 2018-07-17 PROCEDURE — 99213 OFFICE O/P EST LOW 20 MIN: CPT | Performed by: FAMILY MEDICINE

## 2018-07-17 PROCEDURE — 1101F PT FALLS ASSESS-DOCD LE1/YR: CPT | Performed by: FAMILY MEDICINE

## 2018-07-17 PROCEDURE — 1090F PRES/ABSN URINE INCON ASSESS: CPT | Performed by: FAMILY MEDICINE

## 2018-07-17 PROCEDURE — 4040F PNEUMOC VAC/ADMIN/RCVD: CPT | Performed by: FAMILY MEDICINE

## 2018-07-17 PROCEDURE — G8417 CALC BMI ABV UP PARAM F/U: HCPCS | Performed by: FAMILY MEDICINE

## 2018-07-17 PROCEDURE — 1123F ACP DISCUSS/DSCN MKR DOCD: CPT | Performed by: FAMILY MEDICINE

## 2018-07-17 PROCEDURE — G8428 CUR MEDS NOT DOCUMENT: HCPCS | Performed by: FAMILY MEDICINE

## 2018-07-17 RX ORDER — MECLIZINE HYDROCHLORIDE 25 MG/1
25 TABLET ORAL 3 TIMES DAILY PRN
Qty: 60 TABLET | Refills: 0 | Status: SHIPPED | OUTPATIENT
Start: 2018-07-17 | End: 2019-01-09

## 2018-07-17 ASSESSMENT — ENCOUNTER SYMPTOMS: NAUSEA: 1

## 2018-07-23 DIAGNOSIS — M15.9 PRIMARY OSTEOARTHRITIS INVOLVING MULTIPLE JOINTS: ICD-10-CM

## 2018-07-23 RX ORDER — HYDROCODONE BITARTRATE AND ACETAMINOPHEN 5; 325 MG/1; MG/1
1 TABLET ORAL EVERY 4 HOURS PRN
Qty: 120 TABLET | Refills: 0 | Status: SHIPPED | OUTPATIENT
Start: 2018-07-23 | End: 2018-08-23 | Stop reason: SDUPTHER

## 2018-07-23 NOTE — TELEPHONE ENCOUNTER
Medication:   Requested Prescriptions     Pending Prescriptions Disp Refills    HYDROcodone-acetaminophen (NORCO) 5-325 MG per tablet 120 tablet 0     Sig: Take 1 tablet by mouth every 4 hours as needed for Pain for up to 30 days. .      Last Filled:  6/27/18    Patient Phone Number: [phone] (home) [phone] (work)    Last appt: 7/17/2018   Next appt: 10/11/2018    Last OARRS:   RX Monitoring 7/11/2018   Attestation The Prescription Monitoring Report for this patient was reviewed today.    Documentation -       Preferred Pharmacy:   [facility] [address], [address]  Phone: [phone] Fax: [phone]

## 2018-07-23 NOTE — TELEPHONE ENCOUNTER
HYDROcodone-acetaminophen (NORCO) 5-325 MG per tablet 120 tablet 0 6/27/2018 7/27/2018    Sig - Route: Take 1 tablet by mouth every 4 hours as needed for Pain for up to 30 days. . - Oral       Mally gutierrez in chart

## 2018-07-27 ENCOUNTER — TELEPHONE (OUTPATIENT)
Dept: FAMILY MEDICINE CLINIC | Age: 83
End: 2018-07-27

## 2018-07-27 RX ORDER — IRBESARTAN 300 MG/1
300 TABLET ORAL NIGHTLY
Qty: 90 TABLET | Refills: 0 | Status: ON HOLD | OUTPATIENT
Start: 2018-07-27 | End: 2018-09-02 | Stop reason: HOSPADM

## 2018-07-28 ENCOUNTER — TELEPHONE (OUTPATIENT)
Dept: FAMILY MEDICINE CLINIC | Age: 83
End: 2018-07-28

## 2018-07-31 ENCOUNTER — TELEPHONE (OUTPATIENT)
Dept: FAMILY MEDICINE CLINIC | Age: 83
End: 2018-07-31

## 2018-08-02 RX ORDER — TOLTERODINE TARTRATE 2 MG/1
TABLET, EXTENDED RELEASE ORAL
Qty: 30 TABLET | Refills: 0 | Status: SHIPPED | OUTPATIENT
Start: 2018-08-02 | End: 2018-09-12

## 2018-08-04 RX ORDER — AMLODIPINE BESYLATE 10 MG/1
TABLET ORAL
Qty: 90 TABLET | Refills: 0 | Status: ON HOLD | OUTPATIENT
Start: 2018-08-04 | End: 2018-09-02 | Stop reason: HOSPADM

## 2018-08-13 ENCOUNTER — OFFICE VISIT (OUTPATIENT)
Dept: FAMILY MEDICINE CLINIC | Age: 83
End: 2018-08-13

## 2018-08-13 VITALS
BODY MASS INDEX: 29.59 KG/M2 | WEIGHT: 164.4 LBS | HEART RATE: 74 BPM | DIASTOLIC BLOOD PRESSURE: 70 MMHG | TEMPERATURE: 98.7 F | SYSTOLIC BLOOD PRESSURE: 130 MMHG | OXYGEN SATURATION: 93 %

## 2018-08-13 DIAGNOSIS — K57.92 DIVERTICULITIS: ICD-10-CM

## 2018-08-13 DIAGNOSIS — R10.32 LLQ ABDOMINAL PAIN: Primary | ICD-10-CM

## 2018-08-13 PROCEDURE — G8399 PT W/DXA RESULTS DOCUMENT: HCPCS | Performed by: FAMILY MEDICINE

## 2018-08-13 PROCEDURE — 99213 OFFICE O/P EST LOW 20 MIN: CPT | Performed by: FAMILY MEDICINE

## 2018-08-13 PROCEDURE — 1123F ACP DISCUSS/DSCN MKR DOCD: CPT | Performed by: FAMILY MEDICINE

## 2018-08-13 PROCEDURE — G8428 CUR MEDS NOT DOCUMENT: HCPCS | Performed by: FAMILY MEDICINE

## 2018-08-13 PROCEDURE — 1101F PT FALLS ASSESS-DOCD LE1/YR: CPT | Performed by: FAMILY MEDICINE

## 2018-08-13 PROCEDURE — 4040F PNEUMOC VAC/ADMIN/RCVD: CPT | Performed by: FAMILY MEDICINE

## 2018-08-13 PROCEDURE — 1036F TOBACCO NON-USER: CPT | Performed by: FAMILY MEDICINE

## 2018-08-13 PROCEDURE — G8417 CALC BMI ABV UP PARAM F/U: HCPCS | Performed by: FAMILY MEDICINE

## 2018-08-13 PROCEDURE — 1090F PRES/ABSN URINE INCON ASSESS: CPT | Performed by: FAMILY MEDICINE

## 2018-08-13 RX ORDER — CIPROFLOXACIN 500 MG/1
500 TABLET, FILM COATED ORAL 2 TIMES DAILY
Qty: 20 TABLET | Refills: 0 | Status: SHIPPED | OUTPATIENT
Start: 2018-08-13 | End: 2018-08-23

## 2018-08-13 RX ORDER — METRONIDAZOLE 500 MG/1
500 TABLET ORAL 3 TIMES DAILY
Qty: 21 TABLET | Refills: 0 | Status: SHIPPED | OUTPATIENT
Start: 2018-08-13 | End: 2018-08-20

## 2018-08-17 ENCOUNTER — TELEPHONE (OUTPATIENT)
Dept: FAMILY MEDICINE CLINIC | Age: 83
End: 2018-08-17

## 2018-08-17 RX ORDER — PROMETHAZINE HYDROCHLORIDE 25 MG/1
25 TABLET ORAL EVERY 6 HOURS PRN
Qty: 20 TABLET | Refills: 0 | Status: ON HOLD | OUTPATIENT
Start: 2018-08-17 | End: 2018-08-30

## 2018-08-17 NOTE — TELEPHONE ENCOUNTER
Pt states WM put her on an ATB and it is causing nausea, diarrhea and vomiting. Please send rx to stave off the diarrhea and vomiting or tell pt what she can take OTC.     Pt uses Mally Peterson (in chart)

## 2018-08-23 DIAGNOSIS — M15.9 PRIMARY OSTEOARTHRITIS INVOLVING MULTIPLE JOINTS: ICD-10-CM

## 2018-08-23 RX ORDER — HYDROCODONE BITARTRATE AND ACETAMINOPHEN 5; 325 MG/1; MG/1
1 TABLET ORAL EVERY 4 HOURS PRN
Qty: 120 TABLET | Refills: 0 | Status: SHIPPED | OUTPATIENT
Start: 2018-08-23 | End: 2018-09-24 | Stop reason: SDUPTHER

## 2018-08-24 DIAGNOSIS — F51.01 PRIMARY INSOMNIA: ICD-10-CM

## 2018-08-24 DIAGNOSIS — F41.9 ANXIETY: ICD-10-CM

## 2018-08-24 RX ORDER — LORAZEPAM 0.5 MG/1
TABLET ORAL
Qty: 120 TABLET | Refills: 1 | Status: SHIPPED | OUTPATIENT
Start: 2018-08-24 | End: 2018-09-04 | Stop reason: SDUPTHER

## 2018-08-29 ENCOUNTER — TELEPHONE (OUTPATIENT)
Dept: FAMILY MEDICINE CLINIC | Age: 83
End: 2018-08-29

## 2018-08-29 PROBLEM — I48.91 ATRIAL FIBRILLATION WITH RVR (HCC): Status: ACTIVE | Noted: 2018-08-29

## 2018-08-30 ENCOUNTER — TELEPHONE (OUTPATIENT)
Dept: RHEUMATOLOGY | Age: 83
End: 2018-08-30

## 2018-08-31 ENCOUNTER — TELEPHONE (OUTPATIENT)
Dept: FAMILY MEDICINE CLINIC | Age: 83
End: 2018-08-31

## 2018-08-31 DIAGNOSIS — G62.9 NEUROPATHY: Primary | ICD-10-CM

## 2018-09-04 ENCOUNTER — CARE COORDINATION (OUTPATIENT)
Dept: CASE MANAGEMENT | Age: 83
End: 2018-09-04

## 2018-09-04 DIAGNOSIS — I48.91 ATRIAL FIBRILLATION WITH RAPID VENTRICULAR RESPONSE (HCC): Primary | ICD-10-CM

## 2018-09-04 DIAGNOSIS — F41.9 ANXIETY: ICD-10-CM

## 2018-09-04 DIAGNOSIS — F51.01 PRIMARY INSOMNIA: ICD-10-CM

## 2018-09-04 RX ORDER — LORAZEPAM 0.5 MG/1
TABLET ORAL
Qty: 20 TABLET | Refills: 0 | Status: SHIPPED | OUTPATIENT
Start: 2018-09-04 | End: 2018-10-05 | Stop reason: SDUPTHER

## 2018-09-06 ENCOUNTER — OFFICE VISIT (OUTPATIENT)
Dept: CARDIOLOGY CLINIC | Age: 83
End: 2018-09-06

## 2018-09-06 ENCOUNTER — TELEPHONE (OUTPATIENT)
Dept: FAMILY MEDICINE CLINIC | Age: 83
End: 2018-09-06

## 2018-09-06 VITALS
DIASTOLIC BLOOD PRESSURE: 74 MMHG | HEART RATE: 61 BPM | HEIGHT: 62 IN | BODY MASS INDEX: 29.28 KG/M2 | SYSTOLIC BLOOD PRESSURE: 118 MMHG | WEIGHT: 159.12 LBS

## 2018-09-06 DIAGNOSIS — I48.91 ATRIAL FIBRILLATION WITH RAPID VENTRICULAR RESPONSE (HCC): Primary | ICD-10-CM

## 2018-09-06 PROCEDURE — G8417 CALC BMI ABV UP PARAM F/U: HCPCS | Performed by: NURSE PRACTITIONER

## 2018-09-06 PROCEDURE — G8399 PT W/DXA RESULTS DOCUMENT: HCPCS | Performed by: NURSE PRACTITIONER

## 2018-09-06 PROCEDURE — 99214 OFFICE O/P EST MOD 30 MIN: CPT | Performed by: NURSE PRACTITIONER

## 2018-09-06 PROCEDURE — 1111F DSCHRG MED/CURRENT MED MERGE: CPT | Performed by: NURSE PRACTITIONER

## 2018-09-06 PROCEDURE — 93000 ELECTROCARDIOGRAM COMPLETE: CPT | Performed by: NURSE PRACTITIONER

## 2018-09-06 PROCEDURE — G8598 ASA/ANTIPLAT THER USED: HCPCS | Performed by: NURSE PRACTITIONER

## 2018-09-06 PROCEDURE — 1036F TOBACCO NON-USER: CPT | Performed by: NURSE PRACTITIONER

## 2018-09-06 PROCEDURE — 4040F PNEUMOC VAC/ADMIN/RCVD: CPT | Performed by: NURSE PRACTITIONER

## 2018-09-06 PROCEDURE — G8427 DOCREV CUR MEDS BY ELIG CLIN: HCPCS | Performed by: NURSE PRACTITIONER

## 2018-09-06 PROCEDURE — 1101F PT FALLS ASSESS-DOCD LE1/YR: CPT | Performed by: NURSE PRACTITIONER

## 2018-09-06 PROCEDURE — 1090F PRES/ABSN URINE INCON ASSESS: CPT | Performed by: NURSE PRACTITIONER

## 2018-09-06 PROCEDURE — 1123F ACP DISCUSS/DSCN MKR DOCD: CPT | Performed by: NURSE PRACTITIONER

## 2018-09-06 RX ORDER — PANTOPRAZOLE SODIUM 40 MG/1
TABLET, DELAYED RELEASE ORAL
Qty: 90 TABLET | Refills: 0 | Status: SHIPPED | OUTPATIENT
Start: 2018-09-06 | End: 2018-11-13 | Stop reason: SDUPTHER

## 2018-09-06 NOTE — PROGRESS NOTES
She started smoking about 68 years ago. She has never used smokeless tobacco. She reports that she does not drink alcohol or use drugs. Family History:  Reviewed. family history includes Asthma in her paternal uncle; Heart Attack in her father; Heart Disease in her father; High Blood Pressure in her mother; Other in her brother. Review of System:  All other systems reviewed and are negative except for that noted above. Pertinent negatives are:     · General: negative for fever, chills   · Ophthalmic ROS: negative for - eye pain or loss of vision  · ENT ROS: negative for - headaches, sore throat   · Respiratory: negative for - cough, sputum  · Cardiovascular: Reviewed in HPI  · Gastrointestinal: negative for - abdominal pain, diarrhea, N/V  · Hematology: negative for - bleeding, blood clots, bruising or jaundice  · Genito-Urinary:  negative for - Dysuria or incontinence  · Musculoskeletal: negative for - Joint swelling, muscle pain  · Neurological: negative for - confusion, dizziness, headaches   · Psychiatric: No anxiety, no depression. · Dermatological: negative for - rash    Physical Examination:  Vitals:    09/06/18 1600   BP: 118/74   Pulse: 61        · Constitutional: Oriented. No distress. · Head: Normocephalic and atraumatic. · Mouth/Throat: Oropharynx is clear and moist.   · Eyes: Conjunctivae normal. EOM are normal.   · Neck: Neck supple. No rigidity. No JVD present. · Cardiovascular: Normal rate, regular rhythm, S1&S2. · Pulmonary/Chest: Bilateral respiratory sounds. No wheezes, No rhonchi. · Abdominal: Soft. Bowel sounds present. No distension, No tenderness. · Musculoskeletal: No tenderness. No edema    · Lymphadenopathy: Has no cervical adenopathy. · Neurological: Alert and oriented. Cranial nerve appears intact, No Gross deficit   · Skin: Skin is warm and dry. No rash noted. · Psychiatric: Has a normal behavior     Labs, diagnostic and imaging results reviewed.      ECG: SR, dicyclomine (BENTYL) 10 MG capsule Take 1 capsule by mouth 4 times daily as needed (abd pain) 360 capsule 0    diphenoxylate-atropine (LOMOTIL) 2.5-0.025 MG per tablet Take 1 tablet by mouth 4 times daily as needed for Diarrhea . 40 tablet 0    Accu-Chek Softclix Lancets MISC USE DAILY AS DIRECTED 100 each 5    Blood Glucose Monitoring Suppl (ACCU-CHEK CHIP PLUS) w/Device KIT Once daily 1 kit 0    glucose blood VI test strips (ACCU-CHEK CHIP) strip 1 each by In Vitro route daily As needed. 50 each 3    aspirin 81 MG tablet Take 81 mg by mouth every other day       budesonide-formoterol (SYMBICORT) 160-4.5 MCG/ACT AERO Inhale 2 puffs into the lungs 2 times daily 2 Inhaler 0    betamethasone valerate (VALISONE) 0.1 % lotion Apply topically 2 times daily. 1 Bottle 0    hydrocortisone 2.5 % cream Apply topically 2 times daily as needed to patches on scalp only. 30 g 1     No current facility-administered medications for this visit. Hospital course  80 y.o. female w/PMHx PAF, HTN, HLD, DM, and hypothyroidism. She presented to the hospital after sudden onset of symptoms- palpitations, shortness of breath, fatigue, and weakness. Found to be in AF w/RVR. Started on IV Cardizem admitted to the ICU. Wasn't on [address]  She has past medical history of paroxysmal atrial fibrillation and used to be on verapamil which was previously discontinued.  -Started on [address] and propafenone  -S/p TRACEY/DCCV (8/31/18) per Dr. Yaquelin Post   -Possible thrombus in descending aorta noted on TRACEY-evaluated by vascular    Assessment and plan:     Paroxsymal AF  -Was symptomatic and in RVR as above  -S/p TRACEY/DCCV (8/31/18) per Dr. Yaquelin Post   -During admission discussed antiarrythmic therapy and ablation. Was not interested in ablation at that time  -Calculated creatinine clearance with creatinine of 1.1 was 45ml/min during admission  -Was started on Xarelto 15 mg daily  -Cardiac cath in 2004 was normal.  No LVH.  Was also started on propafenone

## 2018-09-07 ENCOUNTER — TELEPHONE (OUTPATIENT)
Dept: VASCULAR SURGERY | Age: 83
End: 2018-09-07

## 2018-09-07 DIAGNOSIS — I65.23 BILATERAL CAROTID ARTERY STENOSIS: Primary | ICD-10-CM

## 2018-09-11 ENCOUNTER — CARE COORDINATION (OUTPATIENT)
Dept: CASE MANAGEMENT | Age: 83
End: 2018-09-11

## 2018-09-12 ENCOUNTER — OFFICE VISIT (OUTPATIENT)
Dept: FAMILY MEDICINE CLINIC | Age: 83
End: 2018-09-12

## 2018-09-12 VITALS
BODY MASS INDEX: 29.52 KG/M2 | HEART RATE: 64 BPM | OXYGEN SATURATION: 94 % | WEIGHT: 161.4 LBS | DIASTOLIC BLOOD PRESSURE: 92 MMHG | SYSTOLIC BLOOD PRESSURE: 144 MMHG

## 2018-09-12 DIAGNOSIS — I10 ESSENTIAL HYPERTENSION: ICD-10-CM

## 2018-09-12 DIAGNOSIS — M15.9 PRIMARY OSTEOARTHRITIS INVOLVING MULTIPLE JOINTS: ICD-10-CM

## 2018-09-12 DIAGNOSIS — E11.9 CONTROLLED TYPE 2 DIABETES MELLITUS WITHOUT COMPLICATION, WITHOUT LONG-TERM CURRENT USE OF INSULIN (HCC): ICD-10-CM

## 2018-09-12 DIAGNOSIS — I48.91 ATRIAL FIBRILLATION WITH RAPID VENTRICULAR RESPONSE (HCC): Primary | ICD-10-CM

## 2018-09-12 DIAGNOSIS — F51.01 PRIMARY INSOMNIA: ICD-10-CM

## 2018-09-12 PROCEDURE — 99495 TRANSJ CARE MGMT MOD F2F 14D: CPT | Performed by: FAMILY MEDICINE

## 2018-09-12 NOTE — PROGRESS NOTES
Subjective:      Patient ID: Pool Patel is a 80 y.o. female. CC: Patient presents in hospital follow-up  HPI Patient presents today for a follow-up from [facility] hospitalization. Patient was admitted due to a-fib on 8/29/18 and was discharged home on 9/2/18. During hospitalization she underwent cardioversion and medications were adjusted. There was some question of an abnormality in the aorta in regards to clotting and patient was also evaluated by vascular surgery. The decision was to keep the patient on long-term anticoagulation and statin medications. Patient feels much better since he was discharged from the hospital although she states she somewhat tired. She started back to work earlier this week. Patient was reevaluated by cardiology on September 6 and no change in current medical management. Patient also was evaluated by nephrology during hospitalization as she had an acute renal failure and the dehydration. Kidney function returned to baseline by the time of discharge. Care Coordinator phone contact documented in [address] note for 9/4/18.      Review of Systems     Patient Active Problem List   Diagnosis    GERD (gastroesophageal reflux disease)    HTN (hypertension)    Hyperlipidemia    Insomnia    Palpitations    IBS (irritable bowel syndrome)    Overactive bladder    Osteoarthritis    Controlled type 2 diabetes mellitus without complication, without long-term current use of insulin (HCC)    Restless legs syndrome    Atrial fibrillation with rapid ventricular response (HCC)    ANTHONY (obstructive sleep apnea)       Outpatient Prescriptions Marked as Taking for the 9/12/18 encounter (Office Visit) with Claudia Alexis MD   Medication Sig Dispense Refill    pantoprazole (PROTONIX) 40 MG tablet TAKE 1 TABLET BY MOUTH EVERY DAY 90 tablet 0    LORazepam (ATIVAN) 0.5 MG tablet 1 BID prn anxiety and 2 qhs. 20 tablet 0    propafenone (RYTHMOL) 150 MG tablet Take 1 tablet by mouth every 8 hours 90 tablet 3    rivaroxaban (XARELTO) 15 MG TABS tablet Take 1 tablet by mouth daily 42 tablet 2    atorvastatin (LIPITOR) 80 MG tablet Take 1 tablet by mouth nightly 30 tablet 3    amLODIPine (NORVASC) 5 MG tablet Take 1 tablet by mouth daily 30 tablet 3    HYDROcodone-acetaminophen (NORCO) 5-325 MG per tablet Take 1 tablet by mouth every 4 hours as needed for Pain for up to 30 days. . 120 tablet 0    meclizine (ANTIVERT) 25 MG tablet Take 1 tablet by mouth 3 times daily as needed for Dizziness 60 tablet 0    budesonide-formoterol (SYMBICORT) 160-4.5 MCG/ACT AERO Inhale 2 puffs into the lungs 2 times daily 2 Inhaler 0    levothyroxine (SYNTHROID) 50 MCG tablet TAKE 1 TABLET EVERY DAY 90 tablet 1    rOPINIRole (REQUIP) 2 MG tablet Take 1 tablet by mouth 2 times daily 180 tablet 3    dicyclomine (BENTYL) 10 MG capsule Take 1 capsule by mouth 4 times daily as needed (abd pain) 360 capsule 0    diphenoxylate-atropine (LOMOTIL) 2.5-0.025 MG per tablet Take 1 tablet by mouth 4 times daily as needed for Diarrhea . 40 tablet 0    Accu-Chek Softclix Lancets MISC USE DAILY AS DIRECTED 100 each 5    Blood Glucose Monitoring Suppl (ACCU-CHEK CHIP PLUS) w/Device KIT Once daily 1 kit 0    glucose blood VI test strips (ACCU-CHEK CHIP) strip 1 each by In Vitro route daily As needed.  50 each 3    aspirin 81 MG tablet Take 81 mg by mouth every other day          Allergies   Allergen Reactions    Adhesive Tape     Clinoril [Sulindac] Other (See Comments)     Stomach pain    Codeine      FEEL NUMB    Diclofenac Sodium Hives    Hctz [Hydrochlorothiazide]      Sob    Motrin [Ibuprofen Micronized] Other (See Comments)     Tachycardia      Nsaids      Pt states she has hives and heart races     Pcn [Penicillins] Hives    Peach [Prunus Persica] Itching and Swelling     Cannot eat raw peaches    Ceclor [Cefaclor] Nausea And Vomiting       Social History   Substance Use Topics    Smoking status: Former

## 2018-09-14 NOTE — CARE COORDINATION
[facility] Transitions Follow Up Call    2018    Patient: Devra Schwab  Patient : 1935   MRN: <[id number]>  Reason for Admission: There are no discharge diagnoses documented for the most recent discharge. Discharge Date: 18 RARS: Readmission Risk Score: 12       Spoke with: Cristiano Franklin 291 Transitions Subsequent and Final Call    Subsequent and Final Calls  Do you have any ongoing symptoms?:  No  Have your medications changed?:  No  Do you have any questions related to your medications?:  No  Do you currently have any active services?:  No  Do you have any needs or concerns that I can assist you with?:  No  Identified Barriers:  None  Care Transitions Interventions  No Identified Needs  Other Interventions:          Pt states doing real well, no issues or concerns. Denies CP; SOB or heart racing.  No need for further f/u CTC calls      Follow Up  Future Appointments  Date Time Provider Alissa Bustillo   2018 3:10 PM Mady Rushing MD [facility] CHARLES Cantu RN

## 2018-09-19 ENCOUNTER — TELEPHONE (OUTPATIENT)
Dept: FAMILY MEDICINE CLINIC | Age: 83
End: 2018-09-19

## 2018-09-24 DIAGNOSIS — M15.9 PRIMARY OSTEOARTHRITIS INVOLVING MULTIPLE JOINTS: ICD-10-CM

## 2018-09-24 RX ORDER — HYDROCODONE BITARTRATE AND ACETAMINOPHEN 5; 325 MG/1; MG/1
1 TABLET ORAL EVERY 4 HOURS PRN
Qty: 120 TABLET | Refills: 0 | Status: SHIPPED | OUTPATIENT
Start: 2018-09-24 | End: 2018-10-22 | Stop reason: SDUPTHER

## 2018-09-24 NOTE — TELEPHONE ENCOUNTER
Medication:   Requested Prescriptions     Pending Prescriptions Disp Refills    HYDROcodone-acetaminophen (NORCO) 5-325 MG per tablet 120 tablet 0     Sig: Take 1 tablet by mouth every 4 hours as needed for Pain for up to 30 days. Larisa Hart Date: 9/24/18      Last Filled:  08/23/18  Patient Phone Number: [phone] (home)     Last appt: 09/12/18   Next appt: 12/12/2018    Last OARRS:   RX Monitoring 9/12/2018   Attestation The Prescription Monitoring Report for this patient was reviewed today.    Documentation -       Preferred Pharmacy:   [facility] [place] [address]  [address] [address]  Phone: [phone] Fax: [phone]

## 2018-10-05 DIAGNOSIS — F41.9 ANXIETY: ICD-10-CM

## 2018-10-05 DIAGNOSIS — F51.01 PRIMARY INSOMNIA: ICD-10-CM

## 2018-10-05 RX ORDER — LORAZEPAM 0.5 MG/1
TABLET ORAL
Qty: 120 TABLET | Refills: 0 | Status: SHIPPED | OUTPATIENT
Start: 2018-10-05 | End: 2018-11-24 | Stop reason: SDUPTHER

## 2018-10-05 NOTE — TELEPHONE ENCOUNTER
Please send to [facility] due to [facility] [employer] not having medication       Medication:   Requested Prescriptions     Pending Prescriptions Disp Refills    LORazepam (ATIVAN) 0.5 MG tablet 120 tablet 0     Si BID prn anxiety and 2 qhs. Last Filled:  18  Patient Phone Number: [phone] (home)     Last appt: Visit date not found   Next appt: 2018    Last OARRS:   RX Monitoring 2018   Attestation The Prescription Monitoring Report for this patient was reviewed today.    Documentation -       Preferred Pharmacy:   [facility] [address] [address]  Phone: [phone] Fax: [phone]

## 2018-10-09 ENCOUNTER — OFFICE VISIT (OUTPATIENT)
Dept: FAMILY MEDICINE CLINIC | Age: 83
End: 2018-10-09
Payer: MEDICARE

## 2018-10-09 VITALS
BODY MASS INDEX: 29.45 KG/M2 | TEMPERATURE: 97.8 F | DIASTOLIC BLOOD PRESSURE: 60 MMHG | WEIGHT: 161 LBS | HEART RATE: 66 BPM | OXYGEN SATURATION: 96 % | SYSTOLIC BLOOD PRESSURE: 150 MMHG

## 2018-10-09 DIAGNOSIS — H66.91 RIGHT OTITIS MEDIA, UNSPECIFIED OTITIS MEDIA TYPE: ICD-10-CM

## 2018-10-09 DIAGNOSIS — G62.9 NEUROPATHY: ICD-10-CM

## 2018-10-09 DIAGNOSIS — R06.2 WHEEZING: Primary | ICD-10-CM

## 2018-10-09 DIAGNOSIS — I10 ESSENTIAL HYPERTENSION: ICD-10-CM

## 2018-10-09 DIAGNOSIS — R05.9 COUGH: ICD-10-CM

## 2018-10-09 PROCEDURE — G8417 CALC BMI ABV UP PARAM F/U: HCPCS | Performed by: FAMILY MEDICINE

## 2018-10-09 PROCEDURE — 4040F PNEUMOC VAC/ADMIN/RCVD: CPT | Performed by: FAMILY MEDICINE

## 2018-10-09 PROCEDURE — G8399 PT W/DXA RESULTS DOCUMENT: HCPCS | Performed by: FAMILY MEDICINE

## 2018-10-09 PROCEDURE — 99214 OFFICE O/P EST MOD 30 MIN: CPT | Performed by: FAMILY MEDICINE

## 2018-10-09 PROCEDURE — 1123F ACP DISCUSS/DSCN MKR DOCD: CPT | Performed by: FAMILY MEDICINE

## 2018-10-09 PROCEDURE — 1036F TOBACCO NON-USER: CPT | Performed by: FAMILY MEDICINE

## 2018-10-09 PROCEDURE — G8427 DOCREV CUR MEDS BY ELIG CLIN: HCPCS | Performed by: FAMILY MEDICINE

## 2018-10-09 PROCEDURE — 1090F PRES/ABSN URINE INCON ASSESS: CPT | Performed by: FAMILY MEDICINE

## 2018-10-09 PROCEDURE — 94640 AIRWAY INHALATION TREATMENT: CPT | Performed by: FAMILY MEDICINE

## 2018-10-09 PROCEDURE — G8484 FLU IMMUNIZE NO ADMIN: HCPCS | Performed by: FAMILY MEDICINE

## 2018-10-09 PROCEDURE — 1101F PT FALLS ASSESS-DOCD LE1/YR: CPT | Performed by: FAMILY MEDICINE

## 2018-10-09 PROCEDURE — G8598 ASA/ANTIPLAT THER USED: HCPCS | Performed by: FAMILY MEDICINE

## 2018-10-09 RX ORDER — GABAPENTIN 100 MG/1
100 CAPSULE ORAL NIGHTLY PRN
Qty: 90 CAPSULE | Refills: 1 | Status: SHIPPED | OUTPATIENT
Start: 2018-10-09 | End: 2018-12-12

## 2018-10-09 RX ORDER — IPRATROPIUM BROMIDE AND ALBUTEROL SULFATE 2.5; .5 MG/3ML; MG/3ML
1 SOLUTION RESPIRATORY (INHALATION) ONCE
Status: COMPLETED | OUTPATIENT
Start: 2018-10-09 | End: 2018-10-09

## 2018-10-09 RX ORDER — LEVOFLOXACIN 750 MG/1
750 TABLET ORAL DAILY
Qty: 7 TABLET | Refills: 0 | Status: CANCELLED | OUTPATIENT
Start: 2018-10-09 | End: 2018-10-19

## 2018-10-09 RX ORDER — BUDESONIDE AND FORMOTEROL FUMARATE DIHYDRATE 160; 4.5 UG/1; UG/1
2 AEROSOL RESPIRATORY (INHALATION) 2 TIMES DAILY
Qty: 1 INHALER | Refills: 2 | Status: SHIPPED | OUTPATIENT
Start: 2018-10-09 | End: 2019-01-09 | Stop reason: SDUPTHER

## 2018-10-09 RX ORDER — DOXYCYCLINE HYCLATE 100 MG
100 TABLET ORAL 2 TIMES DAILY
Qty: 14 TABLET | Refills: 0 | Status: SHIPPED | OUTPATIENT
Start: 2018-10-09 | End: 2018-11-17 | Stop reason: SDUPTHER

## 2018-10-09 RX ORDER — ALBUTEROL SULFATE 90 UG/1
2 AEROSOL, METERED RESPIRATORY (INHALATION) EVERY 6 HOURS PRN
Qty: 1 INHALER | Refills: 2 | Status: SHIPPED | OUTPATIENT
Start: 2018-10-09 | End: 2019-01-09 | Stop reason: SDUPTHER

## 2018-10-09 RX ADMIN — IPRATROPIUM BROMIDE AND ALBUTEROL SULFATE 1 AMPULE: 2.5; .5 SOLUTION RESPIRATORY (INHALATION) at 08:50

## 2018-10-09 ASSESSMENT — ENCOUNTER SYMPTOMS
SORE THROAT: 1
SINUS PRESSURE: 0
WHEEZING: 1
COUGH: 1
SHORTNESS OF BREATH: 1
SINUS PAIN: 0

## 2018-10-10 RX ORDER — AMLODIPINE BESYLATE 10 MG/1
TABLET ORAL
Qty: 90 TABLET | Refills: 0 | Status: SHIPPED | OUTPATIENT
Start: 2018-10-10 | End: 2019-01-09 | Stop reason: SDUPTHER

## 2018-10-16 RX ORDER — LEVOTHYROXINE SODIUM 0.05 MG/1
TABLET ORAL
Qty: 90 TABLET | Refills: 1 | Status: SHIPPED | OUTPATIENT
Start: 2018-10-16 | End: 2019-03-04 | Stop reason: SDUPTHER

## 2018-10-22 DIAGNOSIS — M15.9 PRIMARY OSTEOARTHRITIS INVOLVING MULTIPLE JOINTS: ICD-10-CM

## 2018-10-22 RX ORDER — HYDROCODONE BITARTRATE AND ACETAMINOPHEN 5; 325 MG/1; MG/1
1 TABLET ORAL EVERY 4 HOURS PRN
Qty: 120 TABLET | Refills: 0 | Status: SHIPPED | OUTPATIENT
Start: 2018-10-22 | End: 2018-11-20 | Stop reason: SDUPTHER

## 2018-10-22 NOTE — TELEPHONE ENCOUNTER
HYDROcodone-acetaminophen (NORCO) 5-325 MG per tablet 120 tablet 0 9/24/2018 10/24/2018    Sig - Route: Take 1 tablet by mouth every 4 hours as needed for Pain for up to 30 days. . - Oral      Mally gutierrez in chart

## 2018-10-23 RX ORDER — IRBESARTAN 300 MG/1
TABLET ORAL
Qty: 90 TABLET | Refills: 0 | Status: SHIPPED | OUTPATIENT
Start: 2018-10-23 | End: 2019-01-09 | Stop reason: SDUPTHER

## 2018-11-14 RX ORDER — PANTOPRAZOLE SODIUM 40 MG/1
TABLET, DELAYED RELEASE ORAL
Qty: 90 TABLET | Refills: 0 | Status: SHIPPED | OUTPATIENT
Start: 2018-11-14 | End: 2019-01-09 | Stop reason: SDUPTHER

## 2018-11-17 ENCOUNTER — TELEPHONE (OUTPATIENT)
Dept: FAMILY MEDICINE CLINIC | Age: 83
End: 2018-11-17

## 2018-11-17 RX ORDER — DOXYCYCLINE HYCLATE 100 MG
100 TABLET ORAL 2 TIMES DAILY
Qty: 14 TABLET | Refills: 0 | Status: SHIPPED | OUTPATIENT
Start: 2018-11-17 | End: 2018-11-24

## 2018-11-20 DIAGNOSIS — M15.9 PRIMARY OSTEOARTHRITIS INVOLVING MULTIPLE JOINTS: ICD-10-CM

## 2018-11-21 RX ORDER — HYDROCODONE BITARTRATE AND ACETAMINOPHEN 5; 325 MG/1; MG/1
1 TABLET ORAL EVERY 4 HOURS PRN
Qty: 120 TABLET | Refills: 0 | Status: SHIPPED | OUTPATIENT
Start: 2018-11-21 | End: 2018-12-12 | Stop reason: SDUPTHER

## 2018-11-24 DIAGNOSIS — F51.01 PRIMARY INSOMNIA: ICD-10-CM

## 2018-11-24 DIAGNOSIS — F41.9 ANXIETY: ICD-10-CM

## 2018-11-27 DIAGNOSIS — F41.9 ANXIETY: ICD-10-CM

## 2018-11-27 DIAGNOSIS — F51.01 PRIMARY INSOMNIA: ICD-10-CM

## 2018-11-27 RX ORDER — LORAZEPAM 0.5 MG/1
TABLET ORAL
Qty: 120 TABLET | Refills: 1 | Status: SHIPPED | OUTPATIENT
Start: 2018-11-27 | End: 2018-11-27 | Stop reason: SDUPTHER

## 2018-11-28 RX ORDER — LORAZEPAM 0.5 MG/1
TABLET ORAL
Qty: 120 TABLET | Refills: 0 | Status: SHIPPED | OUTPATIENT
Start: 2018-11-28 | End: 2018-12-12 | Stop reason: SDUPTHER

## 2018-11-28 RX ORDER — LORAZEPAM 0.5 MG/1
TABLET ORAL
Qty: 360 TABLET | Refills: 0 | Status: SHIPPED | OUTPATIENT
Start: 2018-11-28 | End: 2018-11-28 | Stop reason: SDUPTHER

## 2018-12-07 ENCOUNTER — TELEPHONE (OUTPATIENT)
Dept: FAMILY MEDICINE CLINIC | Age: 83
End: 2018-12-07

## 2018-12-11 RX ORDER — MECLIZINE HCL 12.5 MG/1
TABLET ORAL
Qty: 90 TABLET | Refills: 1 | Status: SHIPPED | OUTPATIENT
Start: 2018-12-11 | End: 2019-01-30 | Stop reason: SDUPTHER

## 2018-12-12 ENCOUNTER — TELEPHONE (OUTPATIENT)
Dept: FAMILY MEDICINE CLINIC | Age: 83
End: 2018-12-12

## 2018-12-12 ENCOUNTER — OFFICE VISIT (OUTPATIENT)
Dept: FAMILY MEDICINE CLINIC | Age: 83
End: 2018-12-12
Payer: MEDICARE

## 2018-12-12 VITALS
HEART RATE: 76 BPM | SYSTOLIC BLOOD PRESSURE: 140 MMHG | BODY MASS INDEX: 29.92 KG/M2 | WEIGHT: 163.6 LBS | OXYGEN SATURATION: 91 % | DIASTOLIC BLOOD PRESSURE: 64 MMHG

## 2018-12-12 DIAGNOSIS — E11.9 CONTROLLED TYPE 2 DIABETES MELLITUS WITHOUT COMPLICATION, WITHOUT LONG-TERM CURRENT USE OF INSULIN (HCC): ICD-10-CM

## 2018-12-12 DIAGNOSIS — F51.01 PRIMARY INSOMNIA: ICD-10-CM

## 2018-12-12 DIAGNOSIS — I48.0 PAROXYSMAL ATRIAL FIBRILLATION (HCC): ICD-10-CM

## 2018-12-12 DIAGNOSIS — F41.9 ANXIETY: ICD-10-CM

## 2018-12-12 DIAGNOSIS — M15.9 PRIMARY OSTEOARTHRITIS INVOLVING MULTIPLE JOINTS: Primary | ICD-10-CM

## 2018-12-12 PROCEDURE — 1123F ACP DISCUSS/DSCN MKR DOCD: CPT | Performed by: FAMILY MEDICINE

## 2018-12-12 PROCEDURE — G8399 PT W/DXA RESULTS DOCUMENT: HCPCS | Performed by: FAMILY MEDICINE

## 2018-12-12 PROCEDURE — 99214 OFFICE O/P EST MOD 30 MIN: CPT | Performed by: FAMILY MEDICINE

## 2018-12-12 PROCEDURE — 4040F PNEUMOC VAC/ADMIN/RCVD: CPT | Performed by: FAMILY MEDICINE

## 2018-12-12 PROCEDURE — G8482 FLU IMMUNIZE ORDER/ADMIN: HCPCS | Performed by: FAMILY MEDICINE

## 2018-12-12 PROCEDURE — G8598 ASA/ANTIPLAT THER USED: HCPCS | Performed by: FAMILY MEDICINE

## 2018-12-12 PROCEDURE — 1101F PT FALLS ASSESS-DOCD LE1/YR: CPT | Performed by: FAMILY MEDICINE

## 2018-12-12 PROCEDURE — 1090F PRES/ABSN URINE INCON ASSESS: CPT | Performed by: FAMILY MEDICINE

## 2018-12-12 PROCEDURE — G8417 CALC BMI ABV UP PARAM F/U: HCPCS | Performed by: FAMILY MEDICINE

## 2018-12-12 PROCEDURE — 1036F TOBACCO NON-USER: CPT | Performed by: FAMILY MEDICINE

## 2018-12-12 PROCEDURE — G8427 DOCREV CUR MEDS BY ELIG CLIN: HCPCS | Performed by: FAMILY MEDICINE

## 2018-12-12 RX ORDER — HYDROCODONE BITARTRATE AND ACETAMINOPHEN 5; 325 MG/1; MG/1
1 TABLET ORAL EVERY 4 HOURS PRN
Qty: 120 TABLET | Refills: 0 | Status: SHIPPED | OUTPATIENT
Start: 2018-12-12 | End: 2019-01-09 | Stop reason: SDUPTHER

## 2018-12-12 RX ORDER — LORAZEPAM 0.5 MG/1
TABLET ORAL
Qty: 120 TABLET | Refills: 2 | Status: SHIPPED | OUTPATIENT
Start: 2018-12-12 | End: 2019-03-20 | Stop reason: SDUPTHER

## 2018-12-12 NOTE — TELEPHONE ENCOUNTER
Patient was seen 12/12/18 and was told to call the office with the name of a medication the Cardiologist prescribed to her    Atorvastatin 80 mg take on qhs    Please advise

## 2018-12-15 ENCOUNTER — OFFICE VISIT (OUTPATIENT)
Dept: FAMILY MEDICINE CLINIC | Age: 83
End: 2018-12-15
Payer: MEDICARE

## 2018-12-15 VITALS
OXYGEN SATURATION: 95 % | BODY MASS INDEX: 29.08 KG/M2 | TEMPERATURE: 97.5 F | HEART RATE: 70 BPM | DIASTOLIC BLOOD PRESSURE: 70 MMHG | SYSTOLIC BLOOD PRESSURE: 138 MMHG | WEIGHT: 159 LBS

## 2018-12-15 DIAGNOSIS — J40 BRONCHITIS: Primary | ICD-10-CM

## 2018-12-15 PROCEDURE — G8427 DOCREV CUR MEDS BY ELIG CLIN: HCPCS | Performed by: FAMILY MEDICINE

## 2018-12-15 PROCEDURE — 1036F TOBACCO NON-USER: CPT | Performed by: FAMILY MEDICINE

## 2018-12-15 PROCEDURE — 99213 OFFICE O/P EST LOW 20 MIN: CPT | Performed by: FAMILY MEDICINE

## 2018-12-15 PROCEDURE — G8482 FLU IMMUNIZE ORDER/ADMIN: HCPCS | Performed by: FAMILY MEDICINE

## 2018-12-15 PROCEDURE — 1090F PRES/ABSN URINE INCON ASSESS: CPT | Performed by: FAMILY MEDICINE

## 2018-12-15 PROCEDURE — 1123F ACP DISCUSS/DSCN MKR DOCD: CPT | Performed by: FAMILY MEDICINE

## 2018-12-15 PROCEDURE — G8598 ASA/ANTIPLAT THER USED: HCPCS | Performed by: FAMILY MEDICINE

## 2018-12-15 PROCEDURE — G8417 CALC BMI ABV UP PARAM F/U: HCPCS | Performed by: FAMILY MEDICINE

## 2018-12-15 PROCEDURE — 4040F PNEUMOC VAC/ADMIN/RCVD: CPT | Performed by: FAMILY MEDICINE

## 2018-12-15 PROCEDURE — G8399 PT W/DXA RESULTS DOCUMENT: HCPCS | Performed by: FAMILY MEDICINE

## 2018-12-15 PROCEDURE — 1101F PT FALLS ASSESS-DOCD LE1/YR: CPT | Performed by: FAMILY MEDICINE

## 2018-12-15 RX ORDER — GUAIFENESIN 600 MG/1
1200 TABLET, EXTENDED RELEASE ORAL 2 TIMES DAILY
Qty: 20 TABLET | Refills: 0 | Status: SHIPPED | OUTPATIENT
Start: 2018-12-15 | End: 2019-05-06

## 2018-12-15 RX ORDER — DOXYCYCLINE HYCLATE 100 MG
100 TABLET ORAL 2 TIMES DAILY
Qty: 20 TABLET | Refills: 0 | Status: SHIPPED | OUTPATIENT
Start: 2018-12-15 | End: 2019-02-01 | Stop reason: SDUPTHER

## 2018-12-15 RX ORDER — PREDNISONE 20 MG/1
20 TABLET ORAL 2 TIMES DAILY
Qty: 10 TABLET | Refills: 0 | Status: SHIPPED | OUTPATIENT
Start: 2018-12-15 | End: 2018-12-20

## 2018-12-15 ASSESSMENT — ENCOUNTER SYMPTOMS
WHEEZING: 1
RHINORRHEA: 1
SINUS PAIN: 1
NAUSEA: 1
SHORTNESS OF BREATH: 1
SINUS PRESSURE: 1
CHEST TIGHTNESS: 1
COUGH: 1

## 2019-01-03 ENCOUNTER — TELEPHONE (OUTPATIENT)
Dept: CARDIOLOGY CLINIC | Age: 84
End: 2019-01-03

## 2019-01-03 RX ORDER — ATORVASTATIN CALCIUM 80 MG/1
80 TABLET, FILM COATED ORAL NIGHTLY
Qty: 90 TABLET | Refills: 3 | Status: SHIPPED | OUTPATIENT
Start: 2019-01-03 | End: 2020-01-13

## 2019-01-03 RX ORDER — PROPAFENONE HYDROCHLORIDE 150 MG/1
150 TABLET, FILM COATED ORAL EVERY 8 HOURS SCHEDULED
Qty: 270 TABLET | Refills: 3 | Status: SHIPPED | OUTPATIENT
Start: 2019-01-03 | End: 2020-01-09

## 2019-01-09 ENCOUNTER — OFFICE VISIT (OUTPATIENT)
Dept: FAMILY MEDICINE CLINIC | Age: 84
End: 2019-01-09
Payer: MEDICARE

## 2019-01-09 VITALS
DIASTOLIC BLOOD PRESSURE: 70 MMHG | OXYGEN SATURATION: 93 % | SYSTOLIC BLOOD PRESSURE: 128 MMHG | WEIGHT: 161.38 LBS | RESPIRATION RATE: 12 BRPM | HEART RATE: 69 BPM | BODY MASS INDEX: 29.52 KG/M2

## 2019-01-09 DIAGNOSIS — J30.9 ALLERGIC RHINITIS, UNSPECIFIED SEASONALITY, UNSPECIFIED TRIGGER: ICD-10-CM

## 2019-01-09 DIAGNOSIS — I48.0 PAROXYSMAL ATRIAL FIBRILLATION (HCC): ICD-10-CM

## 2019-01-09 DIAGNOSIS — M15.9 PRIMARY OSTEOARTHRITIS INVOLVING MULTIPLE JOINTS: ICD-10-CM

## 2019-01-09 DIAGNOSIS — R06.2 WHEEZING: Primary | ICD-10-CM

## 2019-01-09 PROCEDURE — G8427 DOCREV CUR MEDS BY ELIG CLIN: HCPCS | Performed by: FAMILY MEDICINE

## 2019-01-09 PROCEDURE — 1123F ACP DISCUSS/DSCN MKR DOCD: CPT | Performed by: FAMILY MEDICINE

## 2019-01-09 PROCEDURE — 1101F PT FALLS ASSESS-DOCD LE1/YR: CPT | Performed by: FAMILY MEDICINE

## 2019-01-09 PROCEDURE — G8482 FLU IMMUNIZE ORDER/ADMIN: HCPCS | Performed by: FAMILY MEDICINE

## 2019-01-09 PROCEDURE — 4040F PNEUMOC VAC/ADMIN/RCVD: CPT | Performed by: FAMILY MEDICINE

## 2019-01-09 PROCEDURE — G8417 CALC BMI ABV UP PARAM F/U: HCPCS | Performed by: FAMILY MEDICINE

## 2019-01-09 PROCEDURE — 99214 OFFICE O/P EST MOD 30 MIN: CPT | Performed by: FAMILY MEDICINE

## 2019-01-09 PROCEDURE — 1036F TOBACCO NON-USER: CPT | Performed by: FAMILY MEDICINE

## 2019-01-09 PROCEDURE — 1090F PRES/ABSN URINE INCON ASSESS: CPT | Performed by: FAMILY MEDICINE

## 2019-01-09 PROCEDURE — G8399 PT W/DXA RESULTS DOCUMENT: HCPCS | Performed by: FAMILY MEDICINE

## 2019-01-09 RX ORDER — ROPINIROLE 3 MG/1
3 TABLET, FILM COATED ORAL 2 TIMES DAILY
Qty: 180 TABLET | Refills: 1 | Status: SHIPPED | OUTPATIENT
Start: 2019-01-09 | End: 2019-03-20

## 2019-01-09 RX ORDER — ALBUTEROL SULFATE 90 UG/1
2 AEROSOL, METERED RESPIRATORY (INHALATION) EVERY 6 HOURS PRN
Qty: 1 INHALER | Refills: 2 | Status: SHIPPED | OUTPATIENT
Start: 2019-01-09 | End: 2019-10-25 | Stop reason: SDUPTHER

## 2019-01-09 RX ORDER — AMLODIPINE BESYLATE 5 MG/1
TABLET ORAL
Qty: 90 TABLET | Refills: 1 | Status: SHIPPED | OUTPATIENT
Start: 2019-01-09 | End: 2019-05-27 | Stop reason: SDUPTHER

## 2019-01-09 RX ORDER — HYDROCODONE BITARTRATE AND ACETAMINOPHEN 5; 325 MG/1; MG/1
1 TABLET ORAL 4 TIMES DAILY PRN
Qty: 120 TABLET | Refills: 0 | Status: SHIPPED | OUTPATIENT
Start: 2019-01-09 | End: 2019-02-18 | Stop reason: SDUPTHER

## 2019-01-09 RX ORDER — FLUTICASONE PROPIONATE 50 MCG
2 SPRAY, SUSPENSION (ML) NASAL NIGHTLY
Qty: 1 BOTTLE | Refills: 5 | Status: SHIPPED | OUTPATIENT
Start: 2019-01-09 | End: 2019-07-11 | Stop reason: SDUPTHER

## 2019-01-09 RX ORDER — PANTOPRAZOLE SODIUM 40 MG/1
TABLET, DELAYED RELEASE ORAL
Qty: 90 TABLET | Refills: 1 | Status: SHIPPED | OUTPATIENT
Start: 2019-01-09 | End: 2019-06-03 | Stop reason: SDUPTHER

## 2019-01-09 RX ORDER — IRBESARTAN 300 MG/1
TABLET ORAL
Qty: 90 TABLET | Refills: 1 | Status: SHIPPED | OUTPATIENT
Start: 2019-01-09 | End: 2019-06-26 | Stop reason: SDUPTHER

## 2019-01-09 RX ORDER — BUDESONIDE AND FORMOTEROL FUMARATE DIHYDRATE 160; 4.5 UG/1; UG/1
2 AEROSOL RESPIRATORY (INHALATION) 2 TIMES DAILY
Qty: 1 INHALER | Refills: 5 | Status: SHIPPED | OUTPATIENT
Start: 2019-01-09 | End: 2019-06-26 | Stop reason: SDUPTHER

## 2019-01-16 ENCOUNTER — TELEPHONE (OUTPATIENT)
Dept: FAMILY MEDICINE CLINIC | Age: 84
End: 2019-01-16

## 2019-01-22 ENCOUNTER — OFFICE VISIT (OUTPATIENT)
Dept: CARDIOLOGY CLINIC | Age: 84
End: 2019-01-22
Payer: MEDICARE

## 2019-01-22 VITALS
DIASTOLIC BLOOD PRESSURE: 81 MMHG | HEART RATE: 76 BPM | SYSTOLIC BLOOD PRESSURE: 131 MMHG | HEIGHT: 62 IN | BODY MASS INDEX: 30.55 KG/M2 | WEIGHT: 166 LBS

## 2019-01-22 DIAGNOSIS — I10 ESSENTIAL HYPERTENSION: Primary | ICD-10-CM

## 2019-01-22 DIAGNOSIS — I48.0 PAROXYSMAL ATRIAL FIBRILLATION (HCC): ICD-10-CM

## 2019-01-22 DIAGNOSIS — E78.5 HYPERLIPIDEMIA, UNSPECIFIED HYPERLIPIDEMIA TYPE: ICD-10-CM

## 2019-01-22 DIAGNOSIS — R00.2 PALPITATIONS: ICD-10-CM

## 2019-01-22 DIAGNOSIS — I48.91 ATRIAL FIBRILLATION WITH RAPID VENTRICULAR RESPONSE (HCC): ICD-10-CM

## 2019-01-22 PROCEDURE — 1123F ACP DISCUSS/DSCN MKR DOCD: CPT | Performed by: INTERNAL MEDICINE

## 2019-01-22 PROCEDURE — 99214 OFFICE O/P EST MOD 30 MIN: CPT | Performed by: INTERNAL MEDICINE

## 2019-01-22 PROCEDURE — G8482 FLU IMMUNIZE ORDER/ADMIN: HCPCS | Performed by: INTERNAL MEDICINE

## 2019-01-22 PROCEDURE — G8399 PT W/DXA RESULTS DOCUMENT: HCPCS | Performed by: INTERNAL MEDICINE

## 2019-01-22 PROCEDURE — 1090F PRES/ABSN URINE INCON ASSESS: CPT | Performed by: INTERNAL MEDICINE

## 2019-01-22 PROCEDURE — 1036F TOBACCO NON-USER: CPT | Performed by: INTERNAL MEDICINE

## 2019-01-22 PROCEDURE — G8427 DOCREV CUR MEDS BY ELIG CLIN: HCPCS | Performed by: INTERNAL MEDICINE

## 2019-01-22 PROCEDURE — 93000 ELECTROCARDIOGRAM COMPLETE: CPT | Performed by: INTERNAL MEDICINE

## 2019-01-22 PROCEDURE — 4040F PNEUMOC VAC/ADMIN/RCVD: CPT | Performed by: INTERNAL MEDICINE

## 2019-01-22 PROCEDURE — G8417 CALC BMI ABV UP PARAM F/U: HCPCS | Performed by: INTERNAL MEDICINE

## 2019-01-22 PROCEDURE — 1101F PT FALLS ASSESS-DOCD LE1/YR: CPT | Performed by: INTERNAL MEDICINE

## 2019-01-31 ENCOUNTER — TELEPHONE (OUTPATIENT)
Dept: FAMILY MEDICINE CLINIC | Age: 84
End: 2019-01-31

## 2019-01-31 RX ORDER — BENZONATATE 200 MG/1
200 CAPSULE ORAL 3 TIMES DAILY PRN
Qty: 30 CAPSULE | Refills: 0 | Status: SHIPPED | OUTPATIENT
Start: 2019-01-31 | End: 2019-02-01 | Stop reason: SDUPTHER

## 2019-02-01 ENCOUNTER — TELEPHONE (OUTPATIENT)
Dept: FAMILY MEDICINE CLINIC | Age: 84
End: 2019-02-01

## 2019-02-01 DIAGNOSIS — J40 BRONCHITIS: ICD-10-CM

## 2019-02-01 RX ORDER — DOXYCYCLINE HYCLATE 100 MG
100 TABLET ORAL 2 TIMES DAILY
Qty: 14 TABLET | Refills: 0 | Status: SHIPPED | OUTPATIENT
Start: 2019-02-01 | End: 2019-02-08

## 2019-02-01 RX ORDER — MECLIZINE HCL 12.5 MG/1
TABLET ORAL
Qty: 90 TABLET | Refills: 1 | Status: SHIPPED | OUTPATIENT
Start: 2019-02-01 | End: 2019-03-27 | Stop reason: SDUPTHER

## 2019-02-01 RX ORDER — BENZONATATE 200 MG/1
200 CAPSULE ORAL 3 TIMES DAILY PRN
Qty: 30 CAPSULE | Refills: 0 | Status: SHIPPED | OUTPATIENT
Start: 2019-02-01 | End: 2019-02-08

## 2019-02-05 ENCOUNTER — TELEPHONE (OUTPATIENT)
Dept: FAMILY MEDICINE CLINIC | Age: 84
End: 2019-02-05

## 2019-02-16 DIAGNOSIS — M15.9 PRIMARY OSTEOARTHRITIS INVOLVING MULTIPLE JOINTS: ICD-10-CM

## 2019-02-18 RX ORDER — HYDROCODONE BITARTRATE AND ACETAMINOPHEN 5; 325 MG/1; MG/1
1 TABLET ORAL 4 TIMES DAILY PRN
Qty: 120 TABLET | Refills: 0 | Status: SHIPPED | OUTPATIENT
Start: 2019-02-18 | End: 2019-03-20 | Stop reason: SDUPTHER

## 2019-02-21 RX ORDER — ROPINIROLE 2 MG/1
TABLET, FILM COATED ORAL
Qty: 180 TABLET | Refills: 0 | Status: SHIPPED | OUTPATIENT
Start: 2019-02-21 | End: 2019-05-19 | Stop reason: SDUPTHER

## 2019-03-05 RX ORDER — LEVOTHYROXINE SODIUM 0.05 MG/1
TABLET ORAL
Qty: 90 TABLET | Refills: 0 | Status: SHIPPED | OUTPATIENT
Start: 2019-03-05 | End: 2019-05-06 | Stop reason: SDUPTHER

## 2019-03-20 ENCOUNTER — OFFICE VISIT (OUTPATIENT)
Dept: FAMILY MEDICINE CLINIC | Age: 84
End: 2019-03-20
Payer: MEDICARE

## 2019-03-20 VITALS
BODY MASS INDEX: 30.02 KG/M2 | OXYGEN SATURATION: 95 % | HEART RATE: 67 BPM | DIASTOLIC BLOOD PRESSURE: 84 MMHG | SYSTOLIC BLOOD PRESSURE: 136 MMHG | RESPIRATION RATE: 16 BRPM | WEIGHT: 164.13 LBS

## 2019-03-20 DIAGNOSIS — J44.9 CHRONIC OBSTRUCTIVE PULMONARY DISEASE, UNSPECIFIED COPD TYPE (HCC): Primary | ICD-10-CM

## 2019-03-20 DIAGNOSIS — F41.9 ANXIETY: ICD-10-CM

## 2019-03-20 DIAGNOSIS — M15.9 PRIMARY OSTEOARTHRITIS INVOLVING MULTIPLE JOINTS: ICD-10-CM

## 2019-03-20 DIAGNOSIS — F51.01 PRIMARY INSOMNIA: ICD-10-CM

## 2019-03-20 PROCEDURE — G8417 CALC BMI ABV UP PARAM F/U: HCPCS | Performed by: FAMILY MEDICINE

## 2019-03-20 PROCEDURE — 4040F PNEUMOC VAC/ADMIN/RCVD: CPT | Performed by: FAMILY MEDICINE

## 2019-03-20 PROCEDURE — G8482 FLU IMMUNIZE ORDER/ADMIN: HCPCS | Performed by: FAMILY MEDICINE

## 2019-03-20 PROCEDURE — 99214 OFFICE O/P EST MOD 30 MIN: CPT | Performed by: FAMILY MEDICINE

## 2019-03-20 PROCEDURE — G8926 SPIRO NO PERF OR DOC: HCPCS | Performed by: FAMILY MEDICINE

## 2019-03-20 PROCEDURE — 1036F TOBACCO NON-USER: CPT | Performed by: FAMILY MEDICINE

## 2019-03-20 PROCEDURE — 1090F PRES/ABSN URINE INCON ASSESS: CPT | Performed by: FAMILY MEDICINE

## 2019-03-20 PROCEDURE — 1101F PT FALLS ASSESS-DOCD LE1/YR: CPT | Performed by: FAMILY MEDICINE

## 2019-03-20 PROCEDURE — G8427 DOCREV CUR MEDS BY ELIG CLIN: HCPCS | Performed by: FAMILY MEDICINE

## 2019-03-20 PROCEDURE — 1123F ACP DISCUSS/DSCN MKR DOCD: CPT | Performed by: FAMILY MEDICINE

## 2019-03-20 PROCEDURE — G8399 PT W/DXA RESULTS DOCUMENT: HCPCS | Performed by: FAMILY MEDICINE

## 2019-03-20 PROCEDURE — 3023F SPIROM DOC REV: CPT | Performed by: FAMILY MEDICINE

## 2019-03-20 RX ORDER — LORAZEPAM 0.5 MG/1
0.5 TABLET ORAL EVERY 6 HOURS PRN
COMMUNITY
End: 2019-04-01

## 2019-03-20 RX ORDER — LORAZEPAM 1 MG/1
1 TABLET ORAL
COMMUNITY
End: 2019-03-20

## 2019-03-20 RX ORDER — DICYCLOMINE HYDROCHLORIDE 10 MG/1
10 CAPSULE ORAL 4 TIMES DAILY PRN
Qty: 360 CAPSULE | Refills: 0 | Status: SHIPPED | OUTPATIENT
Start: 2019-03-20 | End: 2019-06-26 | Stop reason: SDUPTHER

## 2019-03-20 RX ORDER — LORAZEPAM 0.5 MG/1
TABLET ORAL
Qty: 120 TABLET | Refills: 2 | Status: SHIPPED | OUTPATIENT
Start: 2019-03-20 | End: 2019-06-18 | Stop reason: SDUPTHER

## 2019-03-20 RX ORDER — HYDROCODONE BITARTRATE AND ACETAMINOPHEN 5; 325 MG/1; MG/1
1 TABLET ORAL 4 TIMES DAILY PRN
Qty: 120 TABLET | Refills: 0 | Status: SHIPPED | OUTPATIENT
Start: 2019-03-20 | End: 2019-04-17 | Stop reason: SDUPTHER

## 2019-03-20 ASSESSMENT — PATIENT HEALTH QUESTIONNAIRE - PHQ9
SUM OF ALL RESPONSES TO PHQ QUESTIONS 1-9: 0
1. LITTLE INTEREST OR PLEASURE IN DOING THINGS: 0
2. FEELING DOWN, DEPRESSED OR HOPELESS: 0
SUM OF ALL RESPONSES TO PHQ9 QUESTIONS 1 & 2: 0
SUM OF ALL RESPONSES TO PHQ QUESTIONS 1-9: 0

## 2019-03-23 PROBLEM — J44.9 CHRONIC OBSTRUCTIVE PULMONARY DISEASE (HCC): Status: ACTIVE | Noted: 2019-03-23

## 2019-03-28 RX ORDER — MECLIZINE HCL 12.5 MG/1
TABLET ORAL
Qty: 90 TABLET | Refills: 1 | Status: SHIPPED | OUTPATIENT
Start: 2019-03-28 | End: 2019-05-17 | Stop reason: SDUPTHER

## 2019-04-01 ENCOUNTER — OFFICE VISIT (OUTPATIENT)
Dept: FAMILY MEDICINE CLINIC | Age: 84
End: 2019-04-01
Payer: MEDICARE

## 2019-04-01 VITALS
TEMPERATURE: 97.3 F | HEART RATE: 81 BPM | SYSTOLIC BLOOD PRESSURE: 132 MMHG | OXYGEN SATURATION: 94 % | DIASTOLIC BLOOD PRESSURE: 70 MMHG | WEIGHT: 168 LBS | HEIGHT: 62 IN | BODY MASS INDEX: 30.91 KG/M2

## 2019-04-01 DIAGNOSIS — M47.812 CERVICAL ARTHRITIS: ICD-10-CM

## 2019-04-01 DIAGNOSIS — Z01.818 PREOP EXAMINATION: ICD-10-CM

## 2019-04-01 DIAGNOSIS — H25.9 AGE-RELATED CATARACT OF BOTH EYES, UNSPECIFIED AGE-RELATED CATARACT TYPE: Primary | ICD-10-CM

## 2019-04-01 DIAGNOSIS — I48.0 PAROXYSMAL ATRIAL FIBRILLATION (HCC): ICD-10-CM

## 2019-04-01 DIAGNOSIS — J44.9 CHRONIC OBSTRUCTIVE PULMONARY DISEASE, UNSPECIFIED COPD TYPE (HCC): ICD-10-CM

## 2019-04-01 DIAGNOSIS — E11.9 CONTROLLED TYPE 2 DIABETES MELLITUS WITHOUT COMPLICATION, WITHOUT LONG-TERM CURRENT USE OF INSULIN (HCC): ICD-10-CM

## 2019-04-01 DIAGNOSIS — I10 ESSENTIAL HYPERTENSION: ICD-10-CM

## 2019-04-01 PROBLEM — I48.91 ATRIAL FIBRILLATION WITH RAPID VENTRICULAR RESPONSE (HCC): Status: RESOLVED | Noted: 2018-08-29 | Resolved: 2019-04-01

## 2019-04-01 PROCEDURE — 1090F PRES/ABSN URINE INCON ASSESS: CPT | Performed by: FAMILY MEDICINE

## 2019-04-01 PROCEDURE — 4040F PNEUMOC VAC/ADMIN/RCVD: CPT | Performed by: FAMILY MEDICINE

## 2019-04-01 PROCEDURE — 1036F TOBACCO NON-USER: CPT | Performed by: FAMILY MEDICINE

## 2019-04-01 PROCEDURE — 1123F ACP DISCUSS/DSCN MKR DOCD: CPT | Performed by: FAMILY MEDICINE

## 2019-04-01 PROCEDURE — 3023F SPIROM DOC REV: CPT | Performed by: FAMILY MEDICINE

## 2019-04-01 PROCEDURE — G8417 CALC BMI ABV UP PARAM F/U: HCPCS | Performed by: FAMILY MEDICINE

## 2019-04-01 PROCEDURE — G8399 PT W/DXA RESULTS DOCUMENT: HCPCS | Performed by: FAMILY MEDICINE

## 2019-04-01 PROCEDURE — G8926 SPIRO NO PERF OR DOC: HCPCS | Performed by: FAMILY MEDICINE

## 2019-04-01 PROCEDURE — 99214 OFFICE O/P EST MOD 30 MIN: CPT | Performed by: FAMILY MEDICINE

## 2019-04-01 PROCEDURE — G8427 DOCREV CUR MEDS BY ELIG CLIN: HCPCS | Performed by: FAMILY MEDICINE

## 2019-04-01 NOTE — PATIENT INSTRUCTIONS
Patient Education        Neck Arthritis: Exercises  Your Care Instructions  Here are some examples of typical rehabilitation exercises for your condition. Start each exercise slowly. Ease off the exercise if you start to have pain. Your doctor or physical therapist will tell you when you can start these exercises and which ones will work best for you. How to do the exercises  Neck stretches to the side    1. This stretch works best if you keep your shoulder down as you lean away from it. To help you remember to do this, start by relaxing your shoulders and lightly holding on to your thighs or your chair. 2. Tilt your head toward your shoulder and hold for 15 to 30 seconds. Let the weight of your head stretch your muscles. 3. Repeat 2 to 4 times toward each shoulder. Chin tuck    1. Lie on the floor with a rolled-up towel under your neck. Your head should be touching the floor. 2. Slowly bring your chin toward your chest.  3. Hold for a count of 6, and then relax for up to 10 seconds. 4. Repeat 8 to 12 times. Active cervical rotation    1. Sit in a firm chair, or stand up straight. 2. Keeping your chin level, turn your head to the right, and hold for 15 to 30 seconds. 3. Turn your head to the left and hold for 15 to 30 seconds. 4. Repeat 2 to 4 times to each side. Shoulder blade squeeze    1. While standing, squeeze your shoulder blades together. 2. Do not raise your shoulders up as you are squeezing. 3. Hold for 6 seconds. 4. Repeat 8 to 12 times. Shoulder rolls    1. Sit comfortably with your feet shoulder-width apart. You can also do this exercise standing up. 2. Roll your shoulders up, then back, and then down in a smooth, circular motion. 3. Repeat 2 to 4 times. Follow-up care is a key part of your treatment and safety. Be sure to make and go to all appointments, and call your doctor if you are having problems.  It's also a good idea to know your test results and keep a list of the medicines you take. Where can you learn more? Go to https://chpepiceweb.iGistics. org and sign in to your [employer] account. Enter Y444 in the Grafoid box to learn more about \"Neck Arthritis: Exercises. \"     If you do not have an account, please click on the \"Sign Up Now\" link. Current as of: September 20, 2018  Content Version: 11.9  © 9066-5157 [employer]. Care instructions adapted under license by [facility] ([facility]). If you have questions about a medical condition or this instruction, always ask your healthcare professional. [place] [address] any warranty or liability for your use of this information. Patient Education        Neck: Exercises  Your Care Instructions  Here are some examples of typical rehabilitation exercises for your condition. Start each exercise slowly. Ease off the exercise if you start to have pain. Your doctor or physical therapist will tell you when you can start these exercises and which ones will work best for you. How to do the exercises  Neck stretch    4. This stretch works best if you keep your shoulder down as you lean away from it. To help you remember to do this, start by relaxing your shoulders and lightly holding on to your thighs or your chair. 5. Tilt your head toward your shoulder and hold for 15 to 30 seconds. Let the weight of your head stretch your muscles. 6. If you would like a little added stretch, use your hand to gently and steadily pull your head toward your shoulder. For example, keeping your right shoulder down, lean your head to the left. 7. Repeat 2 to 4 times toward each shoulder. Diagonal neck stretch    5. Turn your head slightly toward the direction you will be stretching, and tilt your head diagonally toward your chest and hold for 15 to 30 seconds.   6. If you would like a little added stretch, use your hand to gently and steadily pull your head forward on the diagonal.  7. Repeat 2 to 4 times

## 2019-04-01 NOTE — PROGRESS NOTES
Preoperative Consultation    Maryanne Moya  YOB: 1935    This patient presents to the office today for a preoperative consultation at the request of surgeon, Dr. Nirmal Robbins, who plans on performing cataract removal right eye on April 16 at [address] [facility]. Patient also discussed severe arthritis of her neck. She has a lot of stiffness and soreness that sometimes even awakens her at nighttime. She has been trying some local heat and rubs. She's not able to take anti-inflammatory medication she she's already taking Xarelto.     Planned anesthesia: Local   Known anesthesia problems: Family history of problems with anesthesia   Bleeding risk: No recent or remote history of abnormal bleeding  Personal or FH of DVT/PE: No      Patient Active Problem List   Diagnosis    GERD (gastroesophageal reflux disease)    HTN (hypertension)    Hyperlipidemia    Insomnia    Palpitations    IBS (irritable bowel syndrome)    Overactive bladder    Osteoarthritis    Controlled type 2 diabetes mellitus without complication, without long-term current use of insulin (HCC)    Restless legs syndrome    Atrial fibrillation with rapid ventricular response (HCC)    ANTHONY (obstructive sleep apnea)    Paroxysmal atrial fibrillation (HCC)    Allergic rhinitis    Chronic obstructive pulmonary disease ([facility] [address]     Past Surgical History:   Procedure Laterality Date    APPENDECTOMY      CHOLECYSTECTOMY, LAPAROSCOPIC  2/24/2013    COLONOSCOPY         Allergies   Allergen Reactions    Adhesive Tape     Clinoril [Sulindac] Other (See Comments)     Stomach pain    Codeine      FEEL NUMB    Diclofenac Sodium Hives    Hctz [Hydrochlorothiazide]      Sob    Motrin [Ibuprofen Micronized] Other (See Comments)     Tachycardia      Nsaids      Pt states she has hives and heart races     Pcn [Penicillins] Hives    Peach [Prunus Persica] Itching and Swelling     Cannot eat raw peaches    Ceclor [Cefaclor] Nausea And Vomiting     Outpatient Medications Marked as Taking for the 4/1/19 encounter (Office Visit) with Fannie Salter MD   Medication Sig Dispense Refill    meclizine (ANTIVERT) 12.5 MG tablet TAKE 1 TABLET THREE TIMES DAILY AS NEEDED 90 tablet 1    HYDROcodone-acetaminophen (NORCO) 5-325 MG per tablet Take 1 tablet by mouth 4 times daily as needed for Pain for up to 30 days.  120 tablet 0    LORazepam (ATIVAN) 0.5 MG tablet TAKE 1 TABLET TWICE DAILY AS NEEDED  FOR  ANXIETY AND TAKE 2 TABLETS AT BEDTIME 120 tablet 2    dicyclomine (BENTYL) 10 MG capsule Take 1 capsule by mouth 4 times daily as needed (abd pain) 360 capsule 0    levothyroxine (SYNTHROID) 50 MCG tablet TAKE 1 TABLET EVERY DAY 90 tablet 0    rOPINIRole (REQUIP) 2 MG tablet TAKE 1 TABLET BY MOUTH TWICE DAILY 180 tablet 0    pantoprazole (PROTONIX) 40 MG tablet TAKE 1 TABLET EVERY DAY 90 tablet 1    irbesartan (AVAPRO) 300 MG tablet TAKE 1 TABLET BY MOUTH EVERY NIGHT 90 tablet 1    amLODIPine (NORVASC) 5 MG tablet TAKE 1 TABLET EVERY DAY 90 tablet 1    budesonide-formoterol (SYMBICORT) 160-4.5 MCG/ACT AERO Inhale 2 puffs into the lungs 2 times daily 1 Inhaler 5    albuterol sulfate HFA (PROAIR HFA) 108 (90 Base) MCG/ACT inhaler Inhale 2 puffs into the lungs every 6 hours as needed for Wheezing 1 Inhaler 2    fluticasone (FLONASE) 50 MCG/ACT nasal spray 2 sprays by Each Nare route nightly 1 Bottle 5    atorvastatin (LIPITOR) 80 MG tablet Take 1 tablet by mouth nightly 90 tablet 3    propafenone (RYTHMOL) 150 MG tablet Take 1 tablet by mouth every 8 hours 270 tablet 3    rivaroxaban (XARELTO) 15 MG TABS tablet Take 1 tablet by mouth daily 90 tablet 3    guaiFENesin (MUCINEX) 600 MG extended release tablet Take 2 tablets by mouth 2 times daily 20 tablet 0    Spacer/Aero-Holding Chambers (E-Z SPACER) CHANDLER 1 Device by Does not apply route daily as needed (wheezing) 1 Device 0    aspirin 81 MG tablet Take 81 mg by mouth

## 2019-04-08 DIAGNOSIS — G62.9 NEUROPATHY: ICD-10-CM

## 2019-04-08 RX ORDER — GABAPENTIN 100 MG/1
100 CAPSULE ORAL NIGHTLY PRN
Qty: 90 CAPSULE | Refills: 0 | Status: SHIPPED | OUTPATIENT
Start: 2019-04-08 | End: 2019-06-26 | Stop reason: SDUPTHER

## 2019-04-17 DIAGNOSIS — M15.9 PRIMARY OSTEOARTHRITIS INVOLVING MULTIPLE JOINTS: ICD-10-CM

## 2019-04-17 RX ORDER — HYDROCODONE BITARTRATE AND ACETAMINOPHEN 5; 325 MG/1; MG/1
1 TABLET ORAL 4 TIMES DAILY PRN
Qty: 120 TABLET | Refills: 0 | Status: SHIPPED | OUTPATIENT
Start: 2019-04-17 | End: 2019-05-17 | Stop reason: SDUPTHER

## 2019-04-17 NOTE — TELEPHONE ENCOUNTER
Medication:   Requested Prescriptions     Pending Prescriptions Disp Refills    HYDROcodone-acetaminophen (NORCO) 5-325 MG per tablet 120 tablet 0     Sig: Take 1 tablet by mouth 4 times daily as needed for Pain for up to 30 days. Last Filled:  03/20/19  Patient Phone Number: [phone] (home)     Last appt: 4/1/2019   Next appt: 5/9/2019    Last OARRS:   RX Monitoring 3/20/2019   Attestation The Prescription Monitoring Report for this patient was reviewed today.    Chronic Pain Routine Monitoring -       Preferred Pharmacy:   [facility] [address], [address] [address]  Phone: [phone] Fax: [phone]

## 2019-04-17 NOTE — TELEPHONE ENCOUNTER
HYDROcodone-acetaminophen (NORCO) 5-325 MG per tablet 120 tablet 0 3/20/2019 4/19/2019    Sig - Route:  Take 1 tablet by mouth 4 times daily as needed for Pain for up to 30 days. - Oral      Pharmacy:  [facility] [address], [address] [phone] - F [phone]    Please advise

## 2019-05-02 ENCOUNTER — TELEPHONE (OUTPATIENT)
Dept: FAMILY MEDICINE CLINIC | Age: 84
End: 2019-05-02

## 2019-05-02 RX ORDER — IPRATROPIUM BROMIDE AND ALBUTEROL SULFATE 2.5; .5 MG/3ML; MG/3ML
SOLUTION RESPIRATORY (INHALATION)
Qty: 1620 ML | Refills: 0 | Status: SHIPPED | OUTPATIENT
Start: 2019-05-02 | End: 2019-05-29 | Stop reason: SDUPTHER

## 2019-05-02 RX ORDER — IPRATROPIUM BROMIDE AND ALBUTEROL SULFATE 2.5; .5 MG/3ML; MG/3ML
1 SOLUTION RESPIRATORY (INHALATION) EVERY 4 HOURS
Qty: 100 VIAL | Refills: 0 | Status: SHIPPED | OUTPATIENT
Start: 2019-05-02 | End: 2019-05-02 | Stop reason: SDUPTHER

## 2019-05-04 ENCOUNTER — TELEPHONE (OUTPATIENT)
Dept: FAMILY MEDICINE CLINIC | Age: 84
End: 2019-05-04

## 2019-05-04 NOTE — TELEPHONE ENCOUNTER
Attempted to make follow up phone call to patient - unavailable - left voicemail with office call back number. Attempted x 2 to call back.

## 2019-05-06 ENCOUNTER — OFFICE VISIT (OUTPATIENT)
Dept: FAMILY MEDICINE CLINIC | Age: 84
End: 2019-05-06
Payer: MEDICARE

## 2019-05-06 VITALS
WEIGHT: 170 LBS | TEMPERATURE: 97.8 F | HEIGHT: 62 IN | OXYGEN SATURATION: 94 % | SYSTOLIC BLOOD PRESSURE: 118 MMHG | DIASTOLIC BLOOD PRESSURE: 52 MMHG | BODY MASS INDEX: 31.28 KG/M2 | HEART RATE: 61 BPM

## 2019-05-06 DIAGNOSIS — H26.9 CATARACT OF LEFT EYE, UNSPECIFIED CATARACT TYPE: ICD-10-CM

## 2019-05-06 DIAGNOSIS — N39.0 URINARY TRACT INFECTION WITHOUT HEMATURIA, SITE UNSPECIFIED: ICD-10-CM

## 2019-05-06 DIAGNOSIS — I10 ESSENTIAL HYPERTENSION: ICD-10-CM

## 2019-05-06 DIAGNOSIS — E11.9 CONTROLLED TYPE 2 DIABETES MELLITUS WITHOUT COMPLICATION, WITHOUT LONG-TERM CURRENT USE OF INSULIN (HCC): ICD-10-CM

## 2019-05-06 DIAGNOSIS — I48.0 PAROXYSMAL ATRIAL FIBRILLATION (HCC): ICD-10-CM

## 2019-05-06 DIAGNOSIS — Z01.818 PREOP EXAMINATION: Primary | ICD-10-CM

## 2019-05-06 LAB
BACTERIA URINE, POC: 0
BILIRUBIN URINE: 0 MG/DL
BLOOD, URINE: NEGATIVE
CASTS URINE, POC: 0
CLARITY: CLEAR
COLOR: YELLOW
CRYSTALS URINE, POC: 0
EPI CELLS URINE, POC: 3
GLUCOSE URINE: NEGATIVE
KETONES, URINE: NEGATIVE
LEUKOCYTE EST, POC: NORMAL
NITRITE, URINE: NEGATIVE
PH UA: 5 (ref 4.5–8)
PROTEIN UA: NEGATIVE
RBC URINE, POC: 0
SPECIFIC GRAVITY UA: 1.01 (ref 1–1.03)
UROBILINOGEN, URINE: NORMAL
WBC URINE, POC: NORMAL
YEAST URINE, POC: 0

## 2019-05-06 PROCEDURE — 99214 OFFICE O/P EST MOD 30 MIN: CPT | Performed by: PHYSICIAN ASSISTANT

## 2019-05-06 PROCEDURE — 4040F PNEUMOC VAC/ADMIN/RCVD: CPT | Performed by: PHYSICIAN ASSISTANT

## 2019-05-06 PROCEDURE — 1123F ACP DISCUSS/DSCN MKR DOCD: CPT | Performed by: PHYSICIAN ASSISTANT

## 2019-05-06 PROCEDURE — G8417 CALC BMI ABV UP PARAM F/U: HCPCS | Performed by: PHYSICIAN ASSISTANT

## 2019-05-06 PROCEDURE — 1036F TOBACCO NON-USER: CPT | Performed by: PHYSICIAN ASSISTANT

## 2019-05-06 PROCEDURE — G8427 DOCREV CUR MEDS BY ELIG CLIN: HCPCS | Performed by: PHYSICIAN ASSISTANT

## 2019-05-06 PROCEDURE — G8399 PT W/DXA RESULTS DOCUMENT: HCPCS | Performed by: PHYSICIAN ASSISTANT

## 2019-05-06 PROCEDURE — 1090F PRES/ABSN URINE INCON ASSESS: CPT | Performed by: PHYSICIAN ASSISTANT

## 2019-05-06 PROCEDURE — 81000 URINALYSIS NONAUTO W/SCOPE: CPT | Performed by: PHYSICIAN ASSISTANT

## 2019-05-06 RX ORDER — CIPROFLOXACIN 500 MG/1
500 TABLET, FILM COATED ORAL 2 TIMES DAILY
Qty: 6 TABLET | Refills: 0 | Status: SHIPPED | OUTPATIENT
Start: 2019-05-06 | End: 2019-05-09

## 2019-05-06 NOTE — PROGRESS NOTES
with JESSICA Moralez   Medication Sig Dispense Refill    ipratropium-albuterol (DUONEB) 0.5-2.5 (3) MG/3ML SOLN nebulizer solution INHALE THE CONTENTS OF 1 VIAL(3 MLS) VIA NEBULIZER EVERY 4 HOURS 1620 mL 0    HYDROcodone-acetaminophen (NORCO) 5-325 MG per tablet Take 1 tablet by mouth 4 times daily as needed for Pain for up to 30 days. 120 tablet 0    gabapentin (NEURONTIN) 100 MG capsule Take 1 capsule by mouth nightly as needed (neuropathy) for up to 122 days.  90 capsule 0    meclizine (ANTIVERT) 12.5 MG tablet TAKE 1 TABLET THREE TIMES DAILY AS NEEDED 90 tablet 1    LORazepam (ATIVAN) 0.5 MG tablet TAKE 1 TABLET TWICE DAILY AS NEEDED  FOR  ANXIETY AND TAKE 2 TABLETS AT BEDTIME 120 tablet 2    dicyclomine (BENTYL) 10 MG capsule Take 1 capsule by mouth 4 times daily as needed (abd pain) 360 capsule 0    levothyroxine (SYNTHROID) 50 MCG tablet TAKE 1 TABLET EVERY DAY 90 tablet 0    rOPINIRole (REQUIP) 2 MG tablet TAKE 1 TABLET BY MOUTH TWICE DAILY 180 tablet 0    pantoprazole (PROTONIX) 40 MG tablet TAKE 1 TABLET EVERY DAY 90 tablet 1    irbesartan (AVAPRO) 300 MG tablet TAKE 1 TABLET BY MOUTH EVERY NIGHT 90 tablet 1    amLODIPine (NORVASC) 5 MG tablet TAKE 1 TABLET EVERY DAY 90 tablet 1    budesonide-formoterol (SYMBICORT) 160-4.5 MCG/ACT AERO Inhale 2 puffs into the lungs 2 times daily 1 Inhaler 5    albuterol sulfate HFA (PROAIR HFA) 108 (90 Base) MCG/ACT inhaler Inhale 2 puffs into the lungs every 6 hours as needed for Wheezing 1 Inhaler 2    fluticasone (FLONASE) 50 MCG/ACT nasal spray 2 sprays by Each Nare route nightly 1 Bottle 5    atorvastatin (LIPITOR) 80 MG tablet Take 1 tablet by mouth nightly 90 tablet 3    propafenone (RYTHMOL) 150 MG tablet Take 1 tablet by mouth every 8 hours 270 tablet 3    rivaroxaban (XARELTO) 15 MG TABS tablet Take 1 tablet by mouth daily 90 tablet 3    Spacer/Aero-Holding Chambers (E-Z SPACER) CHANDLER 1 Device by Does not apply route daily as needed

## 2019-05-08 RX ORDER — LEVOTHYROXINE SODIUM 0.05 MG/1
TABLET ORAL
Qty: 90 TABLET | Refills: 0 | Status: SHIPPED | OUTPATIENT
Start: 2019-05-08 | End: 2019-06-26 | Stop reason: SDUPTHER

## 2019-05-14 ENCOUNTER — OFFICE VISIT (OUTPATIENT)
Dept: FAMILY MEDICINE CLINIC | Age: 84
End: 2019-05-14
Payer: MEDICARE

## 2019-05-14 VITALS
TEMPERATURE: 97.4 F | SYSTOLIC BLOOD PRESSURE: 130 MMHG | DIASTOLIC BLOOD PRESSURE: 80 MMHG | HEART RATE: 75 BPM | OXYGEN SATURATION: 93 %

## 2019-05-14 DIAGNOSIS — R30.0 DYSURIA: Primary | ICD-10-CM

## 2019-05-14 DIAGNOSIS — N30.00 ACUTE CYSTITIS WITHOUT HEMATURIA: ICD-10-CM

## 2019-05-14 LAB
BACTERIA URINE, POC: NORMAL
BILIRUBIN URINE: 0 MG/DL
BLOOD, URINE: NORMAL
CASTS URINE, POC: NORMAL
CLARITY: CLEAR
COLOR: YELLOW
CRYSTALS URINE, POC: NORMAL
EPI CELLS URINE, POC: NORMAL
GLUCOSE URINE: NORMAL
KETONES, URINE: NEGATIVE
LEUKOCYTE EST, POC: NORMAL
NITRITE, URINE: NEGATIVE
PH UA: 6 (ref 4.5–8)
PROTEIN UA: NEGATIVE
RBC URINE, POC: NORMAL
SPECIFIC GRAVITY UA: 1 (ref 1–1.03)
UROBILINOGEN, URINE: NORMAL
WBC URINE, POC: NORMAL
YEAST URINE, POC: NORMAL

## 2019-05-14 PROCEDURE — 81000 URINALYSIS NONAUTO W/SCOPE: CPT | Performed by: FAMILY MEDICINE

## 2019-05-14 PROCEDURE — G8428 CUR MEDS NOT DOCUMENT: HCPCS | Performed by: FAMILY MEDICINE

## 2019-05-14 PROCEDURE — 1123F ACP DISCUSS/DSCN MKR DOCD: CPT | Performed by: FAMILY MEDICINE

## 2019-05-14 PROCEDURE — 1036F TOBACCO NON-USER: CPT | Performed by: FAMILY MEDICINE

## 2019-05-14 PROCEDURE — G8417 CALC BMI ABV UP PARAM F/U: HCPCS | Performed by: FAMILY MEDICINE

## 2019-05-14 PROCEDURE — 4040F PNEUMOC VAC/ADMIN/RCVD: CPT | Performed by: FAMILY MEDICINE

## 2019-05-14 PROCEDURE — 99213 OFFICE O/P EST LOW 20 MIN: CPT | Performed by: FAMILY MEDICINE

## 2019-05-14 PROCEDURE — 1090F PRES/ABSN URINE INCON ASSESS: CPT | Performed by: FAMILY MEDICINE

## 2019-05-14 PROCEDURE — G8399 PT W/DXA RESULTS DOCUMENT: HCPCS | Performed by: FAMILY MEDICINE

## 2019-05-14 RX ORDER — IPRATROPIUM BROMIDE AND ALBUTEROL SULFATE 2.5; .5 MG/3ML; MG/3ML
SOLUTION RESPIRATORY (INHALATION)
Qty: 270 ML | Refills: 0 | Status: SHIPPED | OUTPATIENT
Start: 2019-05-14 | End: 2019-05-26 | Stop reason: SDUPTHER

## 2019-05-14 RX ORDER — CEPHALEXIN 500 MG/1
500 CAPSULE ORAL 3 TIMES DAILY
Qty: 15 CAPSULE | Refills: 0 | Status: SHIPPED | OUTPATIENT
Start: 2019-05-14 | End: 2022-05-19 | Stop reason: SDUPTHER

## 2019-05-14 NOTE — PROGRESS NOTES
Subjective:      Patient ID: Prieto Hannah is a 80 y.o. female. CC: Patient presents for acute medical problem-dysuria . Medical assistant notes reviewed. Dysuria    This is a new problem. Episode onset: about 1 week  There has been no fever. Associated symptoms include frequency and urgency. Associated symptoms comments: Lower back pain and lower abdominal pain. Patient thought her symptoms got somewhat better when she was taken and bike therapy but it recurred. She had similar problems in 2017 which demonstrated an her cocci that was resistant to sulfa drugs and Macrodantin. Patient is concerned that she has cataract surgery tomorrow and wonders if she should proceed. Review of Systems   Genitourinary: Positive for dysuria, frequency and urgency. Allergies   Allergen Reactions    Adhesive Tape     Clinoril [Sulindac] Other (See Comments)     Stomach pain    Codeine      FEEL NUMB    Diclofenac Sodium Hives    Hctz [Hydrochlorothiazide]      Sob    Macrobid [Nitrofurantoin Macrocrystal]      nausea    Motrin [Ibuprofen Micronized] Other (See Comments)     Tachycardia      Nsaids      Pt states she has hives and heart races     Pcn [Penicillins] Hives    Peach [Prunus Persica] Itching and Swelling     Cannot eat raw peaches    Ceclor [Cefaclor] Nausea And Vomiting       Objective:   Physical Exam   Constitutional: She appears well-developed and well-nourished. She is cooperative. Abdominal: Soft. Normal appearance and bowel sounds are normal. She exhibits no distension and no mass. There is no hepatosplenomegaly. There is tenderness in the suprapubic area. There is no rigidity, no rebound, no guarding and no CVA tenderness. No hernia. Neurological: She is alert. Assessment:      Jennifer Sincere was seen today for dysuria.     Diagnoses and all orders for this visit:    Dysuria  -     POC URINE with Microscopic    Acute cystitis without hematuria    Other orders  -     cephALEXin (KEFLEX) 500 MG capsule;  Take 1 capsule by mouth 3 times daily for 5 days            Plan:      Patient was encouraged drink plenty of fluids  May proceed with cataract surgery  RTC when necessary

## 2019-05-16 DIAGNOSIS — M15.9 PRIMARY OSTEOARTHRITIS INVOLVING MULTIPLE JOINTS: ICD-10-CM

## 2019-05-17 RX ORDER — MECLIZINE HCL 12.5 MG/1
TABLET ORAL
Qty: 90 TABLET | Refills: 1 | Status: SHIPPED | OUTPATIENT
Start: 2019-05-17 | End: 2019-07-08 | Stop reason: SDUPTHER

## 2019-05-17 RX ORDER — HYDROCODONE BITARTRATE AND ACETAMINOPHEN 5; 325 MG/1; MG/1
1 TABLET ORAL 4 TIMES DAILY PRN
Qty: 120 TABLET | Refills: 0 | Status: SHIPPED | OUTPATIENT
Start: 2019-05-17 | End: 2019-06-18 | Stop reason: SDUPTHER

## 2019-05-17 NOTE — TELEPHONE ENCOUNTER
Medication:   Requested Prescriptions     Pending Prescriptions Disp Refills    HYDROcodone-acetaminophen (NORCO) 5-325 MG per tablet 120 tablet 0     Sig: Take 1 tablet by mouth 4 times daily as needed for Pain for up to 30 days. Last Filled:  4/17/19    Patient Phone Number: [phone] (home)     Last appt: 5/14/2019   Next appt: 6/26/2019    Last OARRS:   RX Monitoring 3/20/2019   Attestation The Prescription Monitoring Report for this patient was reviewed today.    Chronic Pain Routine Monitoring -       Preferred Pharmacy:   [facility] [address], [address] [address]  Phone: [phone] Fax: [phone]

## 2019-05-20 RX ORDER — ROPINIROLE 2 MG/1
TABLET, FILM COATED ORAL
Qty: 180 TABLET | Refills: 0 | Status: SHIPPED | OUTPATIENT
Start: 2019-05-20 | End: 2019-06-26 | Stop reason: SDUPTHER

## 2019-05-28 RX ORDER — AMLODIPINE BESYLATE 5 MG/1
TABLET ORAL
Qty: 90 TABLET | Refills: 1 | Status: SHIPPED | OUTPATIENT
Start: 2019-05-28 | End: 2019-10-02 | Stop reason: SDUPTHER

## 2019-05-29 RX ORDER — IPRATROPIUM BROMIDE AND ALBUTEROL SULFATE 2.5; .5 MG/3ML; MG/3ML
SOLUTION RESPIRATORY (INHALATION)
Qty: 270 ML | Refills: 0 | Status: SHIPPED | OUTPATIENT
Start: 2019-05-29 | End: 2019-06-10 | Stop reason: SDUPTHER

## 2019-06-04 RX ORDER — PANTOPRAZOLE SODIUM 40 MG/1
TABLET, DELAYED RELEASE ORAL
Qty: 90 TABLET | Refills: 1 | Status: SHIPPED | OUTPATIENT
Start: 2019-06-04 | End: 2019-10-17 | Stop reason: SDUPTHER

## 2019-06-12 RX ORDER — IPRATROPIUM BROMIDE AND ALBUTEROL SULFATE 2.5; .5 MG/3ML; MG/3ML
SOLUTION RESPIRATORY (INHALATION)
Qty: 270 ML | Refills: 3 | Status: SHIPPED | OUTPATIENT
Start: 2019-06-12 | End: 2019-06-12 | Stop reason: SDUPTHER

## 2019-06-12 RX ORDER — IPRATROPIUM BROMIDE AND ALBUTEROL SULFATE 2.5; .5 MG/3ML; MG/3ML
SOLUTION RESPIRATORY (INHALATION)
Qty: 1620 ML | Refills: 3 | Status: SHIPPED | OUTPATIENT
Start: 2019-06-12 | End: 2020-06-16 | Stop reason: SDUPTHER

## 2019-06-14 ENCOUNTER — TELEPHONE (OUTPATIENT)
Dept: FAMILY MEDICINE CLINIC | Age: 84
End: 2019-06-14

## 2019-06-14 RX ORDER — PREDNISONE 20 MG/1
TABLET ORAL
Qty: 15 TABLET | Refills: 0 | Status: SHIPPED | OUTPATIENT
Start: 2019-06-14 | End: 2019-06-26

## 2019-06-14 NOTE — TELEPHONE ENCOUNTER
Patient is complaining that her arthritis is acting up and wants to know if she can get steriod. She can barely move her neck.   She has put the heating pad and aspercream on it nothing is helping    Pharmacy:  [facility] [address], [address] [phone]

## 2019-06-18 DIAGNOSIS — M15.9 PRIMARY OSTEOARTHRITIS INVOLVING MULTIPLE JOINTS: ICD-10-CM

## 2019-06-18 DIAGNOSIS — F41.9 ANXIETY: ICD-10-CM

## 2019-06-18 DIAGNOSIS — F51.01 PRIMARY INSOMNIA: ICD-10-CM

## 2019-06-18 RX ORDER — LORAZEPAM 0.5 MG/1
TABLET ORAL
Qty: 120 TABLET | Refills: 0 | Status: SHIPPED | OUTPATIENT
Start: 2019-06-18 | End: 2019-07-17 | Stop reason: SDUPTHER

## 2019-06-18 RX ORDER — HYDROCODONE BITARTRATE AND ACETAMINOPHEN 5; 325 MG/1; MG/1
1 TABLET ORAL 4 TIMES DAILY PRN
Qty: 120 TABLET | Refills: 0 | Status: SHIPPED | OUTPATIENT
Start: 2019-06-18 | End: 2019-07-17 | Stop reason: SDUPTHER

## 2019-06-18 NOTE — TELEPHONE ENCOUNTER
Medication:   Requested Prescriptions     Pending Prescriptions Disp Refills    LORazepam (ATIVAN) 0.5 MG tablet 120 tablet 2     Sig: TAKE 1 TABLET TWICE DAILY AS NEEDED  FOR  ANXIETY AND TAKE 2 TABLETS AT BEDTIME    HYDROcodone-acetaminophen (NORCO) 5-325 MG per tablet 120 tablet 0     Sig: Take 1 tablet by mouth 4 times daily as needed for Pain for up to 30 days. Last Filled:  5/17/19    Patient Phone Number: [phone] (home)     Last appt: 5/14/2019   Next appt: 6/26/2019    Last OARRS:   RX Monitoring 3/20/2019   Attestation The Prescription Monitoring Report for this patient was reviewed today.    Periodic Controlled Substance Monitoring -       Preferred Pharmacy:   [facility] [address], [address] [address]  Phone: [phone] Fax: [phone]

## 2019-06-18 NOTE — TELEPHONE ENCOUNTER
LORazepam (ATIVAN) 0.5 MG tablet 120 tablet 2 3/20/2019 6/19/2019    Sig: TAKE 1 TABLET TWICE DAILY AS NEEDED  FOR  ANXIETY AND TAKE 2 TABLETS AT BEDTIME      HYDROcodone-acetaminophen (NORCO) 5-325 MG per tablet 120 tablet 0 5/17/2019 6/16/2019    Sig - Route:  Take 1 tablet by mouth 4 times daily as needed for Pain for up to 30 days      Pharmacy:  [address] [place] [address] [address], [address] [phone]

## 2019-06-26 ENCOUNTER — OFFICE VISIT (OUTPATIENT)
Dept: FAMILY MEDICINE CLINIC | Age: 84
End: 2019-06-26
Payer: MEDICARE

## 2019-06-26 VITALS
DIASTOLIC BLOOD PRESSURE: 68 MMHG | WEIGHT: 171 LBS | OXYGEN SATURATION: 90 % | HEART RATE: 81 BPM | RESPIRATION RATE: 16 BRPM | SYSTOLIC BLOOD PRESSURE: 132 MMHG | BODY MASS INDEX: 31.28 KG/M2

## 2019-06-26 DIAGNOSIS — J44.1 CHRONIC OBSTRUCTIVE PULMONARY DISEASE WITH ACUTE EXACERBATION (HCC): ICD-10-CM

## 2019-06-26 DIAGNOSIS — R73.9 HYPERGLYCEMIA: ICD-10-CM

## 2019-06-26 DIAGNOSIS — G62.9 NEUROPATHY: ICD-10-CM

## 2019-06-26 DIAGNOSIS — I10 ESSENTIAL HYPERTENSION: ICD-10-CM

## 2019-06-26 DIAGNOSIS — R06.2 WHEEZING: Primary | ICD-10-CM

## 2019-06-26 DIAGNOSIS — M15.9 PRIMARY OSTEOARTHRITIS INVOLVING MULTIPLE JOINTS: ICD-10-CM

## 2019-06-26 DIAGNOSIS — D17.1 LIPOMA OF TORSO: ICD-10-CM

## 2019-06-26 PROCEDURE — 4040F PNEUMOC VAC/ADMIN/RCVD: CPT | Performed by: FAMILY MEDICINE

## 2019-06-26 PROCEDURE — 3023F SPIROM DOC REV: CPT | Performed by: FAMILY MEDICINE

## 2019-06-26 PROCEDURE — 1123F ACP DISCUSS/DSCN MKR DOCD: CPT | Performed by: FAMILY MEDICINE

## 2019-06-26 PROCEDURE — G8427 DOCREV CUR MEDS BY ELIG CLIN: HCPCS | Performed by: FAMILY MEDICINE

## 2019-06-26 PROCEDURE — G8399 PT W/DXA RESULTS DOCUMENT: HCPCS | Performed by: FAMILY MEDICINE

## 2019-06-26 PROCEDURE — 99214 OFFICE O/P EST MOD 30 MIN: CPT | Performed by: FAMILY MEDICINE

## 2019-06-26 PROCEDURE — G8417 CALC BMI ABV UP PARAM F/U: HCPCS | Performed by: FAMILY MEDICINE

## 2019-06-26 PROCEDURE — 1090F PRES/ABSN URINE INCON ASSESS: CPT | Performed by: FAMILY MEDICINE

## 2019-06-26 PROCEDURE — 1036F TOBACCO NON-USER: CPT | Performed by: FAMILY MEDICINE

## 2019-06-26 PROCEDURE — G8926 SPIRO NO PERF OR DOC: HCPCS | Performed by: FAMILY MEDICINE

## 2019-06-26 RX ORDER — ROPINIROLE 2 MG/1
TABLET, FILM COATED ORAL
Qty: 180 TABLET | Refills: 1 | Status: SHIPPED | OUTPATIENT
Start: 2019-06-26 | End: 2019-09-12

## 2019-06-26 RX ORDER — BUDESONIDE AND FORMOTEROL FUMARATE DIHYDRATE 160; 4.5 UG/1; UG/1
2 AEROSOL RESPIRATORY (INHALATION) 2 TIMES DAILY
Qty: 1 INHALER | Refills: 5 | Status: SHIPPED | OUTPATIENT
Start: 2019-06-26 | End: 2020-01-02

## 2019-06-26 RX ORDER — IRBESARTAN 300 MG/1
TABLET ORAL
Qty: 90 TABLET | Refills: 1 | Status: SHIPPED | OUTPATIENT
Start: 2019-06-26 | End: 2019-12-17 | Stop reason: SDUPTHER

## 2019-06-26 RX ORDER — PREDNISONE 20 MG/1
TABLET ORAL
Qty: 15 TABLET | Refills: 0 | Status: SHIPPED | OUTPATIENT
Start: 2019-06-26 | End: 2019-07-18 | Stop reason: ALTCHOICE

## 2019-06-26 RX ORDER — GABAPENTIN 100 MG/1
100 CAPSULE ORAL NIGHTLY PRN
Qty: 90 CAPSULE | Refills: 1 | Status: SHIPPED | OUTPATIENT
Start: 2019-06-26 | End: 2019-07-31 | Stop reason: SDUPTHER

## 2019-06-26 RX ORDER — LEVOTHYROXINE SODIUM 0.05 MG/1
TABLET ORAL
Qty: 90 TABLET | Refills: 1 | Status: SHIPPED | OUTPATIENT
Start: 2019-06-26 | End: 2019-07-08 | Stop reason: SDUPTHER

## 2019-06-26 RX ORDER — DICYCLOMINE HYDROCHLORIDE 10 MG/1
10 CAPSULE ORAL 4 TIMES DAILY PRN
Qty: 360 CAPSULE | Refills: 1 | Status: SHIPPED | OUTPATIENT
Start: 2019-06-26 | End: 2019-12-17 | Stop reason: SDUPTHER

## 2019-06-26 NOTE — PROGRESS NOTES
Subjective:      Patient ID: Gareth Lyons is a 80 y.o. female. CC: Patient presents for re-evaluation of chronic health problems including wheezing recently, COPD, osteoarthritis, lipoma and hypertension. HPI pt is here for a follow up, med refill, and will like to discuss cough ongoing for a week now, neck pain, right shoulder lump(painful to touch). Patient been having more breathing issues issues recently. She is having more coughing but denies any purulent sputum are no fevers or chills. Her arthritis overall is stable with her current treatment plan and she still continues to work about 4 hours every day. Patient is very compliant with medications with no adverse reactions. Review of Systems  Patient Active Problem List   Diagnosis    GERD (gastroesophageal reflux disease)    HTN (hypertension)    Hyperlipidemia    Insomnia    Palpitations    IBS (irritable bowel syndrome)    Overactive bladder    Osteoarthritis    Controlled type 2 diabetes mellitus without complication, without long-term current use of insulin (HCC)    Restless legs syndrome    ANTHONY (obstructive sleep apnea)    Paroxysmal atrial fibrillation ([facility])    Allergic rhinitis    Chronic obstructive pulmonary disease ([facility] [address]       Outpatient Medications Marked as Taking for the 6/26/19 encounter (Office Visit) with Ce Short MD   Medication Sig Dispense Refill    LORazepam (ATIVAN) 0.5 MG tablet TAKE 1 TABLET TWICE DAILY AS NEEDED  FOR  ANXIETY AND TAKE 2 TABLETS AT BEDTIME 120 tablet 0    HYDROcodone-acetaminophen (NORCO) 5-325 MG per tablet Take 1 tablet by mouth 4 times daily as needed for Pain for up to 30 days.  120 tablet 0    ipratropium-albuterol (DUONEB) 0.5-2.5 (3) MG/3ML SOLN nebulizer solution INHALE THE CONTENTS OF 1 VIAL VIA NEBULIZER EVERY 4 HOURS 1620 mL 3    pantoprazole (PROTONIX) 40 MG tablet TAKE 1 TABLET EVERY DAY 90 tablet 1    amLODIPine (NORVASC) 5 MG tablet TAKE 1 TABLET EVERY DAY 90 tablet 1    rOPINIRole (REQUIP) 2 MG tablet TAKE 1 TABLET BY MOUTH TWICE DAILY 180 tablet 0    meclizine (ANTIVERT) 12.5 MG tablet TAKE 1 TABLET THREE TIMES DAILY AS NEEDED 90 tablet 1    levothyroxine (SYNTHROID) 50 MCG tablet TAKE 1 TABLET EVERY DAY 90 tablet 0    gabapentin (NEURONTIN) 100 MG capsule Take 1 capsule by mouth nightly as needed (neuropathy) for up to 122 days.  90 capsule 0    dicyclomine (BENTYL) 10 MG capsule Take 1 capsule by mouth 4 times daily as needed (abd pain) 360 capsule 0    irbesartan (AVAPRO) 300 MG tablet TAKE 1 TABLET BY MOUTH EVERY NIGHT 90 tablet 1    budesonide-formoterol (SYMBICORT) 160-4.5 MCG/ACT AERO Inhale 2 puffs into the lungs 2 times daily 1 Inhaler 5    albuterol sulfate HFA (PROAIR HFA) 108 (90 Base) MCG/ACT inhaler Inhale 2 puffs into the lungs every 6 hours as needed for Wheezing 1 Inhaler 2    fluticasone (FLONASE) 50 MCG/ACT nasal spray 2 sprays by Each Nare route nightly 1 Bottle 5    atorvastatin (LIPITOR) 80 MG tablet Take 1 tablet by mouth nightly 90 tablet 3    propafenone (RYTHMOL) 150 MG tablet Take 1 tablet by mouth every 8 hours 270 tablet 3    rivaroxaban (XARELTO) 15 MG TABS tablet Take 1 tablet by mouth daily 90 tablet 3    Spacer/Aero-Holding Chambers (E-Z SPACER) CHANDLER 1 Device by Does not apply route daily as needed (wheezing) 1 Device 0    aspirin 81 MG tablet Take 81 mg by mouth every other day          Allergies   Allergen Reactions    Adhesive Tape     Clinoril [Sulindac] Other (See Comments)     Stomach pain    Codeine      FEEL NUMB    Diclofenac Sodium Hives    Hctz [Hydrochlorothiazide]      Sob    Macrobid [Nitrofurantoin Macrocrystal]      nausea    Motrin [Ibuprofen Micronized] Other (See Comments)     Tachycardia      Nsaids      Pt states she has hives and heart races     Pcn [Penicillins] Hives    Peach [Prunus Persica] Itching and Swelling     Cannot eat raw peaches    Ceclor [Cefaclor] Nausea And Vomiting without Reflex; Future    Primary osteoarthritis involving multiple joints    Hyperglycemia  -     Hemoglobin A1C; Future    Lipoma of torso    Other orders  -     rOPINIRole (REQUIP) 2 MG tablet; TAKE 1 TABLET BY MOUTH TWICE DAILY  -     levothyroxine (SYNTHROID) 50 MCG tablet; TAKE 1 TABLET EVERY DAY  -     dicyclomine (BENTYL) 10 MG capsule; Take 1 capsule by mouth 4 times daily as needed (abd pain)  -     irbesartan (AVAPRO) 300 MG tablet; TAKE 1 TABLET BY MOUTH EVERY NIGHT  -     predniSONE (DELTASONE) 20 MG tablet; 1 TID for 3 day then 1 BID      OARRS report checked        Plan:      Maintain current medications except burst prednisone for COPD exacerbation  Advised that if her respiratory symptoms would worsen she can call back for antibiotic therapy  Activities as tolerated  Encourage patient continue to be a non-smoker  Advised that the lipoma does not require any treatment  RTC 3 months    Please note that this chart was generated using Dragon dictation software. Although every effort was made to ensure the accuracy of this automated transcription, some errors in transcription may have occurred.             Ezra Joy

## 2019-07-09 RX ORDER — LEVOTHYROXINE SODIUM 0.05 MG/1
TABLET ORAL
Qty: 90 TABLET | Refills: 1 | Status: SHIPPED | OUTPATIENT
Start: 2019-07-09 | End: 2019-11-25 | Stop reason: SDUPTHER

## 2019-07-09 RX ORDER — MECLIZINE HCL 12.5 MG/1
TABLET ORAL
Qty: 90 TABLET | Refills: 1 | Status: SHIPPED | OUTPATIENT
Start: 2019-07-09 | End: 2019-08-21 | Stop reason: ALTCHOICE

## 2019-07-11 RX ORDER — FLUTICASONE PROPIONATE 50 MCG
SPRAY, SUSPENSION (ML) NASAL
Qty: 1 BOTTLE | Refills: 2 | Status: SHIPPED | OUTPATIENT
Start: 2019-07-11 | End: 2019-07-11 | Stop reason: SDUPTHER

## 2019-07-11 RX ORDER — FLUTICASONE PROPIONATE 50 MCG
SPRAY, SUSPENSION (ML) NASAL
Qty: 3 BOTTLE | Refills: 1 | Status: SHIPPED | OUTPATIENT
Start: 2019-07-11 | End: 2020-07-06 | Stop reason: SDUPTHER

## 2019-07-16 DIAGNOSIS — F51.01 PRIMARY INSOMNIA: ICD-10-CM

## 2019-07-16 DIAGNOSIS — F41.9 ANXIETY: ICD-10-CM

## 2019-07-16 DIAGNOSIS — M15.9 PRIMARY OSTEOARTHRITIS INVOLVING MULTIPLE JOINTS: ICD-10-CM

## 2019-07-17 RX ORDER — LORAZEPAM 0.5 MG/1
TABLET ORAL
Qty: 120 TABLET | Refills: 0 | Status: SHIPPED | OUTPATIENT
Start: 2019-07-17 | End: 2019-08-12 | Stop reason: SDUPTHER

## 2019-07-17 RX ORDER — HYDROCODONE BITARTRATE AND ACETAMINOPHEN 5; 325 MG/1; MG/1
1 TABLET ORAL 4 TIMES DAILY PRN
Qty: 120 TABLET | Refills: 0 | Status: SHIPPED | OUTPATIENT
Start: 2019-07-17 | End: 2019-08-12 | Stop reason: SDUPTHER

## 2019-07-18 ENCOUNTER — APPOINTMENT (OUTPATIENT)
Dept: CT IMAGING | Age: 84
DRG: 190 | End: 2019-07-18
Payer: MEDICARE

## 2019-07-18 ENCOUNTER — HOSPITAL ENCOUNTER (INPATIENT)
Age: 84
LOS: 2 days | Discharge: HOME | DRG: 190 | End: 2019-07-20
Attending: EMERGENCY MEDICINE | Admitting: FAMILY MEDICINE
Payer: MEDICARE

## 2019-07-18 ENCOUNTER — TELEPHONE (OUTPATIENT)
Dept: FAMILY MEDICINE CLINIC | Age: 84
End: 2019-07-18

## 2019-07-18 ENCOUNTER — APPOINTMENT (OUTPATIENT)
Dept: GENERAL RADIOLOGY | Age: 84
DRG: 190 | End: 2019-07-18
Payer: MEDICARE

## 2019-07-18 DIAGNOSIS — J96.01 ACUTE RESPIRATORY FAILURE WITH HYPOXEMIA (HCC): Primary | ICD-10-CM

## 2019-07-18 DIAGNOSIS — J44.1 COPD EXACERBATION (HCC): ICD-10-CM

## 2019-07-18 PROBLEM — J44.9 COPD (CHRONIC OBSTRUCTIVE PULMONARY DISEASE) (HCC): Status: ACTIVE | Noted: 2019-07-18

## 2019-07-18 LAB
A/G RATIO: 1.2 (ref 1.1–2.2)
ALBUMIN SERPL-MCNC: 3.7 G/DL (ref 3.4–5)
ALP BLD-CCNC: 146 U/L (ref 40–129)
ALT SERPL-CCNC: 26 U/L (ref 10–40)
ANION GAP SERPL CALCULATED.3IONS-SCNC: 9 MMOL/L (ref 3–16)
AST SERPL-CCNC: 16 U/L (ref 15–37)
BASOPHILS ABSOLUTE: 0 K/UL (ref 0–0.2)
BASOPHILS RELATIVE PERCENT: 0.8 %
BILIRUB SERPL-MCNC: 0.4 MG/DL (ref 0–1)
BILIRUBIN URINE: NEGATIVE
BLOOD, URINE: NEGATIVE
BUN BLDV-MCNC: 23 MG/DL (ref 7–20)
CALCIUM SERPL-MCNC: 9.1 MG/DL (ref 8.3–10.6)
CASTS: ABNORMAL /LPF
CHLORIDE BLD-SCNC: 102 MMOL/L (ref 99–110)
CLARITY: ABNORMAL
CO2: 23 MMOL/L (ref 21–32)
COLOR: YELLOW
CREAT SERPL-MCNC: 1.1 MG/DL (ref 0.6–1.2)
EKG ATRIAL RATE: 78 BPM
EKG DIAGNOSIS: NORMAL
EKG P AXIS: 22 DEGREES
EKG P-R INTERVAL: 152 MS
EKG Q-T INTERVAL: 362 MS
EKG QRS DURATION: 78 MS
EKG QTC CALCULATION (BAZETT): 412 MS
EKG R AXIS: 11 DEGREES
EKG T AXIS: 27 DEGREES
EKG VENTRICULAR RATE: 78 BPM
EOSINOPHILS ABSOLUTE: 0.1 K/UL (ref 0–0.6)
EOSINOPHILS RELATIVE PERCENT: 2.8 %
EPITHELIAL CELLS, UA: ABNORMAL /HPF
GFR AFRICAN AMERICAN: 57
GFR NON-AFRICAN AMERICAN: 47
GLOBULIN: 3 G/DL
GLUCOSE BLD-MCNC: 195 MG/DL (ref 70–99)
GLUCOSE BLD-MCNC: 203 MG/DL (ref 70–99)
GLUCOSE BLD-MCNC: 323 MG/DL (ref 70–99)
GLUCOSE BLD-MCNC: 493 MG/DL (ref 70–99)
GLUCOSE URINE: NEGATIVE MG/DL
HCT VFR BLD CALC: 35.6 % (ref 36–48)
HEMOGLOBIN: 11.7 G/DL (ref 12–16)
KETONES, URINE: NEGATIVE MG/DL
LACTIC ACID: 1 MMOL/L (ref 0.4–2)
LEUKOCYTE ESTERASE, URINE: ABNORMAL
LYMPHOCYTES ABSOLUTE: 1 K/UL (ref 1–5.1)
LYMPHOCYTES RELATIVE PERCENT: 25.1 %
MCH RBC QN AUTO: 29.6 PG (ref 26–34)
MCHC RBC AUTO-ENTMCNC: 32.9 G/DL (ref 31–36)
MCV RBC AUTO: 90 FL (ref 80–100)
MICROSCOPIC EXAMINATION: YES
MONOCYTES ABSOLUTE: 0.3 K/UL (ref 0–1.3)
MONOCYTES RELATIVE PERCENT: 7.4 %
NEUTROPHILS ABSOLUTE: 2.6 K/UL (ref 1.7–7.7)
NEUTROPHILS RELATIVE PERCENT: 63.9 %
NITRITE, URINE: NEGATIVE
PDW BLD-RTO: 14.9 % (ref 12.4–15.4)
PERFORMED ON: ABNORMAL
PH UA: 5 (ref 5–8)
PLATELET # BLD: 100 K/UL (ref 135–450)
PMV BLD AUTO: 7.9 FL (ref 5–10.5)
POTASSIUM SERPL-SCNC: 4.5 MMOL/L (ref 3.5–5.1)
PRO-BNP: 315 PG/ML (ref 0–449)
PROTEIN UA: NEGATIVE MG/DL
RBC # BLD: 3.96 M/UL (ref 4–5.2)
RBC UA: ABNORMAL /HPF (ref 0–2)
SODIUM BLD-SCNC: 134 MMOL/L (ref 136–145)
SPECIFIC GRAVITY UA: 1.02 (ref 1–1.03)
TOTAL PROTEIN: 6.7 G/DL (ref 6.4–8.2)
TROPONIN: <0.01 NG/ML
URINE REFLEX TO CULTURE: YES
URINE TYPE: ABNORMAL
UROBILINOGEN, URINE: 0.2 E.U./DL
WBC # BLD: 4 K/UL (ref 4–11)
WBC UA: ABNORMAL /HPF (ref 0–5)

## 2019-07-18 PROCEDURE — 6370000000 HC RX 637 (ALT 250 FOR IP): Performed by: FAMILY MEDICINE

## 2019-07-18 PROCEDURE — 81001 URINALYSIS AUTO W/SCOPE: CPT

## 2019-07-18 PROCEDURE — 99285 EMERGENCY DEPT VISIT HI MDM: CPT

## 2019-07-18 PROCEDURE — 6370000000 HC RX 637 (ALT 250 FOR IP): Performed by: PHYSICIAN ASSISTANT

## 2019-07-18 PROCEDURE — 83605 ASSAY OF LACTIC ACID: CPT

## 2019-07-18 PROCEDURE — 71260 CT THORAX DX C+: CPT

## 2019-07-18 PROCEDURE — 94760 N-INVAS EAR/PLS OXIMETRY 1: CPT

## 2019-07-18 PROCEDURE — 93010 ELECTROCARDIOGRAM REPORT: CPT | Performed by: INTERNAL MEDICINE

## 2019-07-18 PROCEDURE — 87040 BLOOD CULTURE FOR BACTERIA: CPT

## 2019-07-18 PROCEDURE — 93005 ELECTROCARDIOGRAM TRACING: CPT | Performed by: EMERGENCY MEDICINE

## 2019-07-18 PROCEDURE — 85025 COMPLETE CBC W/AUTO DIFF WBC: CPT

## 2019-07-18 PROCEDURE — 6360000002 HC RX W HCPCS: Performed by: PHYSICIAN ASSISTANT

## 2019-07-18 PROCEDURE — 83880 ASSAY OF NATRIURETIC PEPTIDE: CPT

## 2019-07-18 PROCEDURE — 96375 TX/PRO/DX INJ NEW DRUG ADDON: CPT

## 2019-07-18 PROCEDURE — 87086 URINE CULTURE/COLONY COUNT: CPT

## 2019-07-18 PROCEDURE — 96365 THER/PROPH/DIAG IV INF INIT: CPT

## 2019-07-18 PROCEDURE — 84484 ASSAY OF TROPONIN QUANT: CPT

## 2019-07-18 PROCEDURE — 83036 HEMOGLOBIN GLYCOSYLATED A1C: CPT

## 2019-07-18 PROCEDURE — 2700000000 HC OXYGEN THERAPY PER DAY

## 2019-07-18 PROCEDURE — 80053 COMPREHEN METABOLIC PANEL: CPT

## 2019-07-18 PROCEDURE — 71045 X-RAY EXAM CHEST 1 VIEW: CPT

## 2019-07-18 PROCEDURE — 94640 AIRWAY INHALATION TREATMENT: CPT

## 2019-07-18 PROCEDURE — 71046 X-RAY EXAM CHEST 2 VIEWS: CPT

## 2019-07-18 PROCEDURE — 1200000000 HC SEMI PRIVATE

## 2019-07-18 PROCEDURE — 6360000004 HC RX CONTRAST MEDICATION: Performed by: EMERGENCY MEDICINE

## 2019-07-18 PROCEDURE — 2580000003 HC RX 258: Performed by: FAMILY MEDICINE

## 2019-07-18 RX ORDER — LORAZEPAM 0.5 MG/1
0.5 TABLET ORAL 2 TIMES DAILY PRN
Status: DISCONTINUED | OUTPATIENT
Start: 2019-07-18 | End: 2019-07-20 | Stop reason: HOSPADM

## 2019-07-18 RX ORDER — PANTOPRAZOLE SODIUM 40 MG/1
40 TABLET, DELAYED RELEASE ORAL
Status: DISCONTINUED | OUTPATIENT
Start: 2019-07-19 | End: 2019-07-20 | Stop reason: HOSPADM

## 2019-07-18 RX ORDER — ATORVASTATIN CALCIUM 80 MG/1
80 TABLET, FILM COATED ORAL NIGHTLY
Status: DISCONTINUED | OUTPATIENT
Start: 2019-07-18 | End: 2019-07-20 | Stop reason: HOSPADM

## 2019-07-18 RX ORDER — ALBUTEROL SULFATE 2.5 MG/3ML
5 SOLUTION RESPIRATORY (INHALATION) ONCE
Status: COMPLETED | OUTPATIENT
Start: 2019-07-18 | End: 2019-07-18

## 2019-07-18 RX ORDER — IPRATROPIUM BROMIDE AND ALBUTEROL SULFATE 2.5; .5 MG/3ML; MG/3ML
1 SOLUTION RESPIRATORY (INHALATION)
Status: DISCONTINUED | OUTPATIENT
Start: 2019-07-18 | End: 2019-07-20 | Stop reason: HOSPADM

## 2019-07-18 RX ORDER — LEVOTHYROXINE SODIUM 0.03 MG/1
50 TABLET ORAL DAILY
Status: DISCONTINUED | OUTPATIENT
Start: 2019-07-19 | End: 2019-07-20 | Stop reason: HOSPADM

## 2019-07-18 RX ORDER — PROPAFENONE HYDROCHLORIDE 150 MG/1
150 TABLET, FILM COATED ORAL EVERY 8 HOURS SCHEDULED
Status: DISCONTINUED | OUTPATIENT
Start: 2019-07-18 | End: 2019-07-20 | Stop reason: HOSPADM

## 2019-07-18 RX ORDER — DEXTROSE MONOHYDRATE 50 MG/ML
100 INJECTION, SOLUTION INTRAVENOUS PRN
Status: DISCONTINUED | OUTPATIENT
Start: 2019-07-18 | End: 2019-07-20 | Stop reason: HOSPADM

## 2019-07-18 RX ORDER — GABAPENTIN 100 MG/1
100 CAPSULE ORAL NIGHTLY PRN
Status: DISCONTINUED | OUTPATIENT
Start: 2019-07-18 | End: 2019-07-20 | Stop reason: HOSPADM

## 2019-07-18 RX ORDER — METHYLPREDNISOLONE SODIUM SUCCINATE 125 MG/2ML
125 INJECTION, POWDER, LYOPHILIZED, FOR SOLUTION INTRAMUSCULAR; INTRAVENOUS ONCE
Status: COMPLETED | OUTPATIENT
Start: 2019-07-18 | End: 2019-07-18

## 2019-07-18 RX ORDER — NICOTINE POLACRILEX 4 MG
15 LOZENGE BUCCAL PRN
Status: DISCONTINUED | OUTPATIENT
Start: 2019-07-18 | End: 2019-07-20 | Stop reason: HOSPADM

## 2019-07-18 RX ORDER — HYDROCODONE BITARTRATE AND ACETAMINOPHEN 5; 325 MG/1; MG/1
1 TABLET ORAL 4 TIMES DAILY PRN
Status: DISCONTINUED | OUTPATIENT
Start: 2019-07-18 | End: 2019-07-20 | Stop reason: HOSPADM

## 2019-07-18 RX ORDER — SODIUM CHLORIDE 0.9 % (FLUSH) 0.9 %
10 SYRINGE (ML) INJECTION EVERY 12 HOURS SCHEDULED
Status: DISCONTINUED | OUTPATIENT
Start: 2019-07-18 | End: 2019-07-20 | Stop reason: HOSPADM

## 2019-07-18 RX ORDER — METHYLPREDNISOLONE SODIUM SUCCINATE 40 MG/ML
40 INJECTION, POWDER, LYOPHILIZED, FOR SOLUTION INTRAMUSCULAR; INTRAVENOUS DAILY
Status: DISCONTINUED | OUTPATIENT
Start: 2019-07-19 | End: 2019-07-19

## 2019-07-18 RX ORDER — AMLODIPINE BESYLATE 5 MG/1
5 TABLET ORAL DAILY
Status: DISCONTINUED | OUTPATIENT
Start: 2019-07-19 | End: 2019-07-20 | Stop reason: HOSPADM

## 2019-07-18 RX ORDER — IRBESARTAN 75 MG/1
75 TABLET ORAL NIGHTLY
Status: DISCONTINUED | OUTPATIENT
Start: 2019-07-18 | End: 2019-07-18 | Stop reason: CLARIF

## 2019-07-18 RX ORDER — ROPINIROLE 1 MG/1
2 TABLET, FILM COATED ORAL 2 TIMES DAILY
Status: DISCONTINUED | OUTPATIENT
Start: 2019-07-18 | End: 2019-07-20 | Stop reason: HOSPADM

## 2019-07-18 RX ORDER — LOSARTAN POTASSIUM 25 MG/1
25 TABLET ORAL NIGHTLY
Status: DISCONTINUED | OUTPATIENT
Start: 2019-07-18 | End: 2019-07-20 | Stop reason: HOSPADM

## 2019-07-18 RX ORDER — SODIUM CHLORIDE 0.9 % (FLUSH) 0.9 %
10 SYRINGE (ML) INJECTION PRN
Status: DISCONTINUED | OUTPATIENT
Start: 2019-07-18 | End: 2019-07-20 | Stop reason: HOSPADM

## 2019-07-18 RX ORDER — LEVOFLOXACIN 5 MG/ML
500 INJECTION, SOLUTION INTRAVENOUS ONCE
Status: COMPLETED | OUTPATIENT
Start: 2019-07-18 | End: 2019-07-18

## 2019-07-18 RX ORDER — DEXTROSE MONOHYDRATE 25 G/50ML
12.5 INJECTION, SOLUTION INTRAVENOUS PRN
Status: DISCONTINUED | OUTPATIENT
Start: 2019-07-18 | End: 2019-07-20 | Stop reason: HOSPADM

## 2019-07-18 RX ORDER — ASPIRIN 81 MG/1
81 TABLET ORAL EVERY OTHER DAY
Status: DISCONTINUED | OUTPATIENT
Start: 2019-07-19 | End: 2019-07-20 | Stop reason: HOSPADM

## 2019-07-18 RX ORDER — ONDANSETRON 2 MG/ML
4 INJECTION INTRAMUSCULAR; INTRAVENOUS EVERY 6 HOURS PRN
Status: DISCONTINUED | OUTPATIENT
Start: 2019-07-18 | End: 2019-07-20 | Stop reason: HOSPADM

## 2019-07-18 RX ORDER — IPRATROPIUM BROMIDE AND ALBUTEROL SULFATE 2.5; .5 MG/3ML; MG/3ML
1 SOLUTION RESPIRATORY (INHALATION) ONCE
Status: COMPLETED | OUTPATIENT
Start: 2019-07-18 | End: 2019-07-18

## 2019-07-18 RX ADMIN — IOPAMIDOL 75 ML: 755 INJECTION, SOLUTION INTRAVENOUS at 14:21

## 2019-07-18 RX ADMIN — Medication 2 PUFF: at 20:16

## 2019-07-18 RX ADMIN — METHYLPREDNISOLONE SODIUM SUCCINATE 125 MG: 125 INJECTION, POWDER, FOR SOLUTION INTRAMUSCULAR; INTRAVENOUS at 13:37

## 2019-07-18 RX ADMIN — GABAPENTIN 100 MG: 100 CAPSULE ORAL at 21:20

## 2019-07-18 RX ADMIN — ALBUTEROL SULFATE 5 MG: 2.5 SOLUTION RESPIRATORY (INHALATION) at 13:43

## 2019-07-18 RX ADMIN — IPRATROPIUM BROMIDE AND ALBUTEROL SULFATE 1 AMPULE: .5; 3 SOLUTION RESPIRATORY (INHALATION) at 13:43

## 2019-07-18 RX ADMIN — HYDROCODONE BITARTRATE AND ACETAMINOPHEN 1 TABLET: 5; 325 TABLET ORAL at 23:42

## 2019-07-18 RX ADMIN — PROPAFENONE HYDROCHLORIDE 150 MG: 150 TABLET, FILM COATED ORAL at 21:19

## 2019-07-18 RX ADMIN — RIVAROXABAN 15 MG: 15 TABLET, FILM COATED ORAL at 17:35

## 2019-07-18 RX ADMIN — Medication 10 ML: at 22:26

## 2019-07-18 RX ADMIN — LEVOFLOXACIN 500 MG: 5 INJECTION, SOLUTION INTRAVENOUS at 15:40

## 2019-07-18 RX ADMIN — LORAZEPAM 0.5 MG: 0.5 TABLET ORAL at 21:20

## 2019-07-18 RX ADMIN — INSULIN LISPRO 4 UNITS: 100 INJECTION, SOLUTION INTRAVENOUS; SUBCUTANEOUS at 22:08

## 2019-07-18 RX ADMIN — PROPAFENONE HYDROCHLORIDE 150 MG: 150 TABLET, FILM COATED ORAL at 17:34

## 2019-07-18 RX ADMIN — ATORVASTATIN CALCIUM 80 MG: 80 TABLET, FILM COATED ORAL at 21:20

## 2019-07-18 RX ADMIN — INSULIN LISPRO 4 UNITS: 100 INJECTION, SOLUTION INTRAVENOUS; SUBCUTANEOUS at 17:33

## 2019-07-18 RX ADMIN — ROPINIROLE HYDROCHLORIDE 2 MG: 1 TABLET, FILM COATED ORAL at 21:54

## 2019-07-18 RX ADMIN — IPRATROPIUM BROMIDE AND ALBUTEROL SULFATE 1 AMPULE: .5; 3 SOLUTION RESPIRATORY (INHALATION) at 20:16

## 2019-07-18 ASSESSMENT — ENCOUNTER SYMPTOMS
NAUSEA: 0
RHINORRHEA: 0
CHEST TIGHTNESS: 0
VOMITING: 0
SHORTNESS OF BREATH: 1
WHEEZING: 1
ABDOMINAL PAIN: 0
COUGH: 1
DIARRHEA: 0

## 2019-07-18 ASSESSMENT — PAIN SCALES - GENERAL
PAINLEVEL_OUTOF10: 9
PAINLEVEL_OUTOF10: 0

## 2019-07-18 NOTE — ED PROVIDER NOTES
EVERY 4 HOURS    IRBESARTAN (AVAPRO) 300 MG TABLET    TAKE 1 TABLET BY MOUTH EVERY NIGHT    LEVOTHYROXINE (SYNTHROID) 50 MCG TABLET    TAKE 1 TABLET EVERY DAY    LORAZEPAM (ATIVAN) 0.5 MG TABLET    TAKE 1 TABLET TWICE DAILY AS NEEDED  FOR  ANXIETY AND TAKE 2 TABLETS AT BEDTIME    MECLIZINE (ANTIVERT) 12.5 MG TABLET    TAKE 1 TABLET THREE TIMES DAILY AS NEEDED    PANTOPRAZOLE (PROTONIX) 40 MG TABLET    TAKE 1 TABLET EVERY DAY    PROPAFENONE (RYTHMOL) 150 MG TABLET    Take 1 tablet by mouth every 8 hours    RIVAROXABAN (XARELTO) 15 MG TABS TABLET    Take 1 tablet by mouth daily    ROPINIROLE (REQUIP) 2 MG TABLET    TAKE 1 TABLET BY MOUTH TWICE DAILY         ALLERGIES     Adhesive tape; Clinoril [sulindac]; Codeine; Diclofenac sodium; Hctz [hydrochlorothiazide]; Macrobid [nitrofurantoin macrocrystal]; Motrin [ibuprofen micronized]; Nsaids; Pcn [penicillins]; Peach [prunus persica]; and Ceclor [cefaclor]    FAMILYHISTORY       Family History   Problem Relation Age of Onset    High Blood Pressure Mother     Heart Attack Father     Heart Disease Father     Other Brother     Asthma Paternal Uncle           SOCIAL HISTORY       Social History     Socioeconomic History    Marital status:       Spouse name: None    Number of children: 9    Years of education: None    Highest education level: None   Occupational History    None   Social Needs    Financial resource strain: None    Food insecurity:     Worry: None     Inability: None    Transportation needs:     Medical: None     Non-medical: None   Tobacco Use    Smoking status: Former Smoker     Start date: 9/15/1950     Last attempt to quit: 1995     Years since quittin.5    Smokeless tobacco: Never Used   Substance and Sexual Activity    Alcohol use: No     Alcohol/week: 0.0 standard drinks    Drug use: No    Sexual activity: None   Lifestyle    Physical activity:     Days per week: None     Minutes per session: None    Stress: None mometasone-formoterol (DULERA) 100-5 MCG/ACT inhaler 2 puff (has no administration in time range)   methylPREDNISolone sodium (SOLU-MEDROL) injection 40 mg (has no administration in time range)   amLODIPine (NORVASC) tablet 5 mg (has no administration in time range)   aspirin tablet 81 mg (has no administration in time range)   atorvastatin (LIPITOR) tablet 80 mg (has no administration in time range)   gabapentin (NEURONTIN) capsule 100 mg (has no administration in time range)   HYDROcodone-acetaminophen (NORCO) 5-325 MG per tablet 1 tablet (has no administration in time range)   irbesartan (AVAPRO) tablet 75 mg (has no administration in time range)   levothyroxine (SYNTHROID) tablet 50 mcg (has no administration in time range)   LORazepam (ATIVAN) tablet 0.5 mg (has no administration in time range)   pantoprazole (PROTONIX) tablet 40 mg (has no administration in time range)   propafenone (RYTHMOL) tablet 150 mg (has no administration in time range)   rivaroxaban (XARELTO) tablet 15 mg (has no administration in time range)   rOPINIRole (REQUIP) tablet 2 mg (has no administration in time range)   sodium chloride flush 0.9 % injection 10 mL (has no administration in time range)   sodium chloride flush 0.9 % injection 10 mL (has no administration in time range)   magnesium hydroxide (MILK OF MAGNESIA) 400 MG/5ML suspension 30 mL (has no administration in time range)   ondansetron (ZOFRAN) injection 4 mg (has no administration in time range)   ipratropium-albuterol (DUONEB) nebulizer solution 1 ampule (1 ampule Inhalation Given 7/18/19 1343)   albuterol (PROVENTIL) nebulizer solution 5 mg (5 mg Nebulization Given 7/18/19 1343)   methylPREDNISolone sodium (SOLU-MEDROL) injection 125 mg (125 mg Intravenous Given 7/18/19 1337)   iopamidol (ISOVUE-370) 76 % injection 75 mL (75 mLs Intravenous Given 7/18/19 1421)       The patient presents to the emergency department today for evaluation for shortness of breath.   The mis-transcribed.)    Didi Alvarez PA-C (electronically signed)            Didi Alvarez PA-C  07/18/19 1261

## 2019-07-18 NOTE — H&P
facility-administered medications on file prior to encounter. Current Outpatient Medications on File Prior to Encounter   Medication Sig Dispense Refill    LORazepam (ATIVAN) 0.5 MG tablet TAKE 1 TABLET TWICE DAILY AS NEEDED  FOR  ANXIETY AND TAKE 2 TABLETS AT BEDTIME 120 tablet 0    HYDROcodone-acetaminophen (NORCO) 5-325 MG per tablet Take 1 tablet by mouth 4 times daily as needed for Pain for up to 30 days. 120 tablet 0    fluticasone (FLONASE) 50 MCG/ACT nasal spray USE 2 SPRAYS IN EACH NOSTRIL EVERY EVENING 3 Bottle 1    levothyroxine (SYNTHROID) 50 MCG tablet TAKE 1 TABLET EVERY DAY 90 tablet 1    meclizine (ANTIVERT) 12.5 MG tablet TAKE 1 TABLET THREE TIMES DAILY AS NEEDED 90 tablet 1    rOPINIRole (REQUIP) 2 MG tablet TAKE 1 TABLET BY MOUTH TWICE DAILY 180 tablet 1    gabapentin (NEURONTIN) 100 MG capsule Take 1 capsule by mouth nightly as needed (neuropathy) for up to 122 days.  90 capsule 1    dicyclomine (BENTYL) 10 MG capsule Take 1 capsule by mouth 4 times daily as needed (abd pain) 360 capsule 1    irbesartan (AVAPRO) 300 MG tablet TAKE 1 TABLET BY MOUTH EVERY NIGHT 90 tablet 1    ipratropium-albuterol (DUONEB) 0.5-2.5 (3) MG/3ML SOLN nebulizer solution INHALE THE CONTENTS OF 1 VIAL VIA NEBULIZER EVERY 4 HOURS 1620 mL 3    pantoprazole (PROTONIX) 40 MG tablet TAKE 1 TABLET EVERY DAY 90 tablet 1    amLODIPine (NORVASC) 5 MG tablet TAKE 1 TABLET EVERY DAY 90 tablet 1    albuterol sulfate HFA (PROAIR HFA) 108 (90 Base) MCG/ACT inhaler Inhale 2 puffs into the lungs every 6 hours as needed for Wheezing 1 Inhaler 2    atorvastatin (LIPITOR) 80 MG tablet Take 1 tablet by mouth nightly 90 tablet 3    propafenone (RYTHMOL) 150 MG tablet Take 1 tablet by mouth every 8 hours 270 tablet 3    rivaroxaban (XARELTO) 15 MG TABS tablet Take 1 tablet by mouth daily 90 tablet 3    aspirin 81 MG tablet Take 81 mg by mouth every other day       budesonide-formoterol (SYMBICORT) 160-4.5 MCG/ACT AERO Inhale 2 puffs into the lungs 2 times daily 1 Inhaler 5     Current Facility-Administered Medications   Medication Dose Route Frequency Provider Last Rate Last Dose    ipratropium-albuterol (DUONEB) nebulizer solution 1 ampule  1 ampule Inhalation Q4H WA Lis Cervantes MD        mometasone-formoterol (DULERA) 100-5 MCG/ACT inhaler 2 puff  2 puff Inhalation BID Lis Cervantes MD       [facility] [START ON 7/19/2019] methylPREDNISolone sodium (SOLU-MEDROL) injection 40 mg  40 mg Intravenous Daily Lis Cervantes MD       [facility] [START ON 7/19/2019] amLODIPine (NORVASC) tablet 5 mg  5 mg Oral Daily Lis Cervantes MD       [facility] [START ON 7/19/2019] aspirin EC tablet 81 mg  81 mg Oral Every Other Day Lis Cervantes MD        atorvastatin (LIPITOR) tablet 80 mg  80 mg Oral Nightly Lis Cervantes MD        gabapentin (NEURONTIN) capsule 100 mg  100 mg Oral Nightly PRN Lis Cervantes MD        HYDROcodone-acetaminophen (NORCO) 5-325 MG per tablet 1 tablet  1 tablet Oral 4x Daily PRN Lis Cervantes MD       [facility] [START ON 7/19/2019] levothyroxine (SYNTHROID) tablet 50 mcg  50 mcg Oral Daily Lis Cervantes MD        LORazepam (ATIVAN) tablet 0.5 mg  0.5 mg Oral BID PRN Lis Cervantes MD       [facility] [START ON 7/19/2019] pantoprazole (PROTONIX) tablet 40 mg  40 mg Oral QAM AC Lis Cervantes MD        propafenone (RYTHMOL) tablet 150 mg  150 mg Oral 3 times per day Lis Cervantes MD   150 mg at 07/18/19 1734    rivaroxaban (XARELTO) tablet 15 mg  15 mg Oral Daily Lis Cervantes MD   15 mg at 07/18/19 1735    rOPINIRole (REQUIP) tablet 2 mg  2 mg Oral BID Lis Cervantes MD        sodium chloride flush 0.9 % injection 10 mL  10 mL Intravenous 2 times per day Lis Cervantes MD        sodium chloride flush 0.9 % injection 10 mL  10 mL Intravenous PRN Lis Cervantes MD        magnesium hydroxide (MILK OF MAGNESIA) 400 MG/5ML suspension 30 mL  30 mL Oral Daily PRN Lis Cervantes MD        ondansetron (ZOFRAN) injection 4 mg  4 mg Intravenous Q6H PRN Theresa Mcknight, necessary diagnoses.       Kamala Castano MD    7/18/2019 6:01 PM

## 2019-07-18 NOTE — ED NOTES
Pharmacy Medication History Note      List of current medications patient is taking is complete. Source of information: patient    Changes made to medication list:  Medications flagged for removal (include reason, ex. noncompliance):  N/A    Medications removed (include reason, ex. therapy complete or physician discontinued):  N/A    Medications added/doses adjusted:  N/A    Other notes (ex. Recent course of antibiotics, Coumadin dosing):  Denies use of other OTC or herbal medications. Last dose times updated. Kell Carranza [facility]  No current facility-administered medications on file prior to encounter. Current Outpatient Medications on File Prior to Encounter   Medication Sig Dispense Refill    LORazepam (ATIVAN) 0.5 MG tablet TAKE 1 TABLET TWICE DAILY AS NEEDED  FOR  ANXIETY AND TAKE 2 TABLETS AT BEDTIME 120 tablet 0    HYDROcodone-acetaminophen (NORCO) 5-325 MG per tablet Take 1 tablet by mouth 4 times daily as needed for Pain for up to 30 days. 120 tablet 0    fluticasone (FLONASE) 50 MCG/ACT nasal spray USE 2 SPRAYS IN EACH NOSTRIL EVERY EVENING 3 Bottle 1    levothyroxine (SYNTHROID) 50 MCG tablet TAKE 1 TABLET EVERY DAY 90 tablet 1    meclizine (ANTIVERT) 12.5 MG tablet TAKE 1 TABLET THREE TIMES DAILY AS NEEDED 90 tablet 1    rOPINIRole (REQUIP) 2 MG tablet TAKE 1 TABLET BY MOUTH TWICE DAILY 180 tablet 1    gabapentin (NEURONTIN) 100 MG capsule Take 1 capsule by mouth nightly as needed (neuropathy) for up to 122 days.  90 capsule 1    dicyclomine (BENTYL) 10 MG capsule Take 1 capsule by mouth 4 times daily as needed (abd pain) 360 capsule 1    irbesartan (AVAPRO) 300 MG tablet TAKE 1 TABLET BY MOUTH EVERY NIGHT 90 tablet 1    ipratropium-albuterol (DUONEB) 0.5-2.5 (3) MG/3ML SOLN nebulizer solution INHALE THE CONTENTS OF 1 VIAL VIA NEBULIZER EVERY 4 HOURS 1620 mL 3    pantoprazole (PROTONIX) 40 MG tablet TAKE 1 TABLET EVERY DAY 90 tablet 1    amLODIPine (NORVASC) 5 MG tablet TAKE 1 TABLET EVERY

## 2019-07-18 NOTE — ED PROVIDER NOTES
I independently performed a history and physical on Hebert Harrison. All diagnostic, treatment, and disposition decisions were made by myself in conjunction with the advanced practice provider. Briefly, this is a 80 y.o. female here for cough, congestion, shortness of breath. Has been ongoing for a little over 2 days but progressively worsening. States that she has been on her Xarelto and using her inhalers faithfully. Denies fevers or chest pain. No leg pain/swelling. On exam,   General: Patient is well appearing  Skin: No cyanosis  HEENT: Moist mucous membranes  Heart: Regular rate, regular rhythm  Lung: No respiratory distress, using nebulizer, mild wheezing left side  Abdomen: Soft, nontender  Neuro: Moving all extremities, no facial droop, no slurred speech, answers questions appropriately    EKG      EKG  The Ekg interpreted by me in the absence of a cardiologist shows. Normal sinus rhythm HR 78  No acute ST changes or T wave abnormalities ; similar compared to 8/2018 study    Screenings            MDM  Patient is an 27-year-old woman with past medical history of COPD who presents with hypoxia to 75% on room air. Does not normally use oxygen at home. Has had URI-like symptoms for the past 2 days. May be due to mucous plugging, COPD exacerbation, heart failure, ACS, pulmonary embolism. Chest x-ray unremarkable. Will obtain CT PE study. Has faithfully been taking her Xarelto. Given DuoNeb's with symptomatic improvement. Will be admitted to medicine service for hypoxia. EKG unremarkable and lower suspicion for ACS. Patient Referrals:  No follow-up provider specified. Discharge Medications:  New Prescriptions    No medications on file       FINAL IMPRESSION  No diagnosis found. Blood pressure (!) 169/55, pulse 84, temperature 99 °F (37.2 °C), temperature source Oral, resp. rate 22, height 5' 2\" (1.575 m), weight 165 lb (74.8 kg), SpO2 99 %, not currently breastfeeding.      For further details of [facility] emergency department encounter, please see documentation by advanced practice provider, Brooklynn Juarez.        Stu Matos MD  07/18/19 3153

## 2019-07-18 NOTE — TELEPHONE ENCOUNTER
Pt call stating her oxygen level have been low since last night. She is at 78% right now but it has been as high as 86%. Pt stated she doesn't feel good at all and that she is wheezing. Pt was advise to go to the ED ans was ask if there was someone with her at home. Pt stated her son is there but it would be a while before she could make to this office. Pt states if PCP was to sent her to the ED anyway she would just go straight to the ED.  WM advise

## 2019-07-19 LAB
ESTIMATED AVERAGE GLUCOSE: 171.4 MG/DL
GLUCOSE BLD-MCNC: 200 MG/DL (ref 70–99)
GLUCOSE BLD-MCNC: 213 MG/DL (ref 70–99)
GLUCOSE BLD-MCNC: 231 MG/DL (ref 70–99)
GLUCOSE BLD-MCNC: 231 MG/DL (ref 70–99)
GLUCOSE BLD-MCNC: 299 MG/DL (ref 70–99)
HBA1C MFR BLD: 7.6 %
PERFORMED ON: ABNORMAL

## 2019-07-19 PROCEDURE — 2700000000 HC OXYGEN THERAPY PER DAY

## 2019-07-19 PROCEDURE — 6370000000 HC RX 637 (ALT 250 FOR IP): Performed by: FAMILY MEDICINE

## 2019-07-19 PROCEDURE — 6360000002 HC RX W HCPCS: Performed by: INTERNAL MEDICINE

## 2019-07-19 PROCEDURE — 1200000000 HC SEMI PRIVATE

## 2019-07-19 PROCEDURE — 2580000003 HC RX 258: Performed by: FAMILY MEDICINE

## 2019-07-19 PROCEDURE — 6370000000 HC RX 637 (ALT 250 FOR IP): Performed by: INTERNAL MEDICINE

## 2019-07-19 PROCEDURE — 94761 N-INVAS EAR/PLS OXIMETRY MLT: CPT

## 2019-07-19 PROCEDURE — 94640 AIRWAY INHALATION TREATMENT: CPT

## 2019-07-19 RX ORDER — ALPRAZOLAM 0.5 MG/1
1 TABLET ORAL NIGHTLY PRN
Status: DISCONTINUED | OUTPATIENT
Start: 2019-07-19 | End: 2019-07-20 | Stop reason: HOSPADM

## 2019-07-19 RX ORDER — LEVOFLOXACIN 500 MG/1
500 TABLET, FILM COATED ORAL DAILY
Status: DISCONTINUED | OUTPATIENT
Start: 2019-07-19 | End: 2019-07-20 | Stop reason: HOSPADM

## 2019-07-19 RX ORDER — PREDNISONE 20 MG/1
40 TABLET ORAL DAILY
Status: DISCONTINUED | OUTPATIENT
Start: 2019-07-20 | End: 2019-07-20 | Stop reason: HOSPADM

## 2019-07-19 RX ORDER — LANOLIN ALCOHOL/MO/W.PET/CERES
6 CREAM (GRAM) TOPICAL NIGHTLY PRN
Status: DISCONTINUED | OUTPATIENT
Start: 2019-07-19 | End: 2019-07-20 | Stop reason: HOSPADM

## 2019-07-19 RX ORDER — METHYLPREDNISOLONE SODIUM SUCCINATE 40 MG/ML
40 INJECTION, POWDER, LYOPHILIZED, FOR SOLUTION INTRAMUSCULAR; INTRAVENOUS EVERY 6 HOURS
Status: DISCONTINUED | OUTPATIENT
Start: 2019-07-19 | End: 2019-07-19

## 2019-07-19 RX ADMIN — Medication 10 ML: at 21:27

## 2019-07-19 RX ADMIN — IPRATROPIUM BROMIDE AND ALBUTEROL SULFATE 1 AMPULE: .5; 3 SOLUTION RESPIRATORY (INHALATION) at 15:59

## 2019-07-19 RX ADMIN — LEVOFLOXACIN 500 MG: 500 TABLET, FILM COATED ORAL at 13:22

## 2019-07-19 RX ADMIN — METHYLPREDNISOLONE SODIUM SUCCINATE 40 MG: 40 INJECTION, POWDER, FOR SOLUTION INTRAMUSCULAR; INTRAVENOUS at 08:49

## 2019-07-19 RX ADMIN — PROPAFENONE HYDROCHLORIDE 150 MG: 150 TABLET, FILM COATED ORAL at 06:07

## 2019-07-19 RX ADMIN — PROPAFENONE HYDROCHLORIDE 150 MG: 150 TABLET, FILM COATED ORAL at 13:22

## 2019-07-19 RX ADMIN — ATORVASTATIN CALCIUM 80 MG: 80 TABLET, FILM COATED ORAL at 21:27

## 2019-07-19 RX ADMIN — IPRATROPIUM BROMIDE AND ALBUTEROL SULFATE 1 AMPULE: .5; 3 SOLUTION RESPIRATORY (INHALATION) at 12:17

## 2019-07-19 RX ADMIN — Medication 10 ML: at 08:50

## 2019-07-19 RX ADMIN — IPRATROPIUM BROMIDE AND ALBUTEROL SULFATE 1 AMPULE: .5; 3 SOLUTION RESPIRATORY (INHALATION) at 21:25

## 2019-07-19 RX ADMIN — LORAZEPAM 0.5 MG: 0.5 TABLET ORAL at 17:30

## 2019-07-19 RX ADMIN — INSULIN LISPRO 4 UNITS: 100 INJECTION, SOLUTION INTRAVENOUS; SUBCUTANEOUS at 08:51

## 2019-07-19 RX ADMIN — INSULIN LISPRO 4 UNITS: 100 INJECTION, SOLUTION INTRAVENOUS; SUBCUTANEOUS at 11:41

## 2019-07-19 RX ADMIN — HYDROCODONE BITARTRATE AND ACETAMINOPHEN 1 TABLET: 5; 325 TABLET ORAL at 14:35

## 2019-07-19 RX ADMIN — MELATONIN TAB 3 MG 6 MG: 3 TAB at 21:45

## 2019-07-19 RX ADMIN — ROPINIROLE HYDROCHLORIDE 2 MG: 1 TABLET, FILM COATED ORAL at 08:49

## 2019-07-19 RX ADMIN — INSULIN GLARGINE 7 UNITS: 100 INJECTION, SOLUTION SUBCUTANEOUS at 08:50

## 2019-07-19 RX ADMIN — Medication 2 PUFF: at 07:43

## 2019-07-19 RX ADMIN — INSULIN LISPRO 2 UNITS: 100 INJECTION, SOLUTION INTRAVENOUS; SUBCUTANEOUS at 21:28

## 2019-07-19 RX ADMIN — ROPINIROLE HYDROCHLORIDE 2 MG: 1 TABLET, FILM COATED ORAL at 21:27

## 2019-07-19 RX ADMIN — RIVAROXABAN 15 MG: 15 TABLET, FILM COATED ORAL at 16:59

## 2019-07-19 RX ADMIN — AMLODIPINE BESYLATE 5 MG: 5 TABLET ORAL at 08:49

## 2019-07-19 RX ADMIN — PROPAFENONE HYDROCHLORIDE 150 MG: 150 TABLET, FILM COATED ORAL at 21:45

## 2019-07-19 RX ADMIN — PANTOPRAZOLE SODIUM 40 MG: 40 TABLET, DELAYED RELEASE ORAL at 06:06

## 2019-07-19 RX ADMIN — INSULIN LISPRO 6 UNITS: 100 INJECTION, SOLUTION INTRAVENOUS; SUBCUTANEOUS at 16:59

## 2019-07-19 RX ADMIN — ASPIRIN 81 MG: 81 TABLET, COATED ORAL at 08:50

## 2019-07-19 RX ADMIN — IPRATROPIUM BROMIDE AND ALBUTEROL SULFATE 1 AMPULE: .5; 3 SOLUTION RESPIRATORY (INHALATION) at 07:43

## 2019-07-19 RX ADMIN — Medication 2 PUFF: at 21:25

## 2019-07-19 RX ADMIN — LOSARTAN POTASSIUM 25 MG: 25 TABLET ORAL at 21:27

## 2019-07-19 RX ADMIN — LEVOTHYROXINE SODIUM 50 MCG: 25 TABLET ORAL at 06:06

## 2019-07-19 ASSESSMENT — PAIN DESCRIPTION - ONSET: ONSET: ON-GOING

## 2019-07-19 ASSESSMENT — PAIN DESCRIPTION - FREQUENCY: FREQUENCY: CONTINUOUS

## 2019-07-19 ASSESSMENT — PAIN - FUNCTIONAL ASSESSMENT: PAIN_FUNCTIONAL_ASSESSMENT: ACTIVITIES ARE NOT PREVENTED

## 2019-07-19 ASSESSMENT — PAIN DESCRIPTION - ORIENTATION: ORIENTATION: RIGHT;LEFT;UPPER

## 2019-07-19 ASSESSMENT — PAIN SCALES - GENERAL
PAINLEVEL_OUTOF10: 0
PAINLEVEL_OUTOF10: 7
PAINLEVEL_OUTOF10: 3
PAINLEVEL_OUTOF10: 0
PAINLEVEL_OUTOF10: 0

## 2019-07-19 ASSESSMENT — PAIN DESCRIPTION - LOCATION: LOCATION: SHOULDER

## 2019-07-19 ASSESSMENT — PAIN DESCRIPTION - DESCRIPTORS: DESCRIPTORS: ACHING

## 2019-07-19 ASSESSMENT — PAIN DESCRIPTION - PAIN TYPE: TYPE: CHRONIC PAIN

## 2019-07-19 ASSESSMENT — PAIN DESCRIPTION - PROGRESSION: CLINICAL_PROGRESSION: GRADUALLY WORSENING

## 2019-07-19 NOTE — PLAN OF CARE
Problem: Pain Control  Goal: Maintain pain level at or below patient's acceptable level (or 5 if patient is unable to determine acceptable level)  7/19/2019 1833 by Katie Mar RN  Outcome: Ongoing  Note:   PRN pain med given x 1 for chronic shoulder pain. Problem: Respiratory  Goal: O2 Sat > 90%  7/19/2019 1833 by Katie Mar RN  Outcome: Ongoing  Note:   Down to 2L O2 per NC.  O2 sats low 90s       Problem: Serum Glucose Level - Abnormal:  Goal: Ability to maintain appropriate glucose levels will improve  Description  Ability to maintain appropriate glucose levels will improve  7/19/2019 1833 by Katie Mar RN  Outcome: Ongoing

## 2019-07-19 NOTE — PROGRESS NOTES
4 Eyes Skin Assessment     The patient is being assess for  Admission    I agree that 2 RN's have performed a thorough Head to Toe Skin Assessment on the patient. ALL assessment sites listed below have been assessed. Areas assessed by both nurses: Lashanda Workman RN and Concepcion Rinaldi RN  [x]   Head, Face, and Ears   [x]   Shoulders, Back, and Chest  [x]   Arms, Elbows, and Hands   [x]   Coccyx, Sacrum, and IschIum  [x]   Legs, Feet, and Heels        Does the Patient have Skin Breakdown?   No         Cecilio Prevention initiated:  No   Wound Care Orders initiated:  No      [facility] nurse consulted for Pressure Injury (Stage 3,4, Unstageable, DTI, NWPT, and Complex wounds), New and Established Ostomies:  No      Nurse 1 eSignature: Electronically signed by Bia Watts RN on 7/19/19 at 5:26 AM    **SHARE this note so that the co-signing nurse is able to place an eSignature**    Nurse 2 eSignature: Electronically signed by Kain Linton RN on 7/19/19 at 10:38 AM
Admission and Assessment complete, see doc flowsheet. Patient resting in bed, call light in reach, bed in lowest position, brake set. Patient denies any needs at this time. Patient encouraged to call if needs arise.   Deejay Ribeiro RN
Ht 5' 2\" (1.575 m)   Wt 165 lb (74.8 kg)   SpO2 90%   BMI 30.18 kg/m²   No intake or output data in the 24 hours ending 07/19/19 1200 Wt Readings from Last 3 Encounters:   07/18/19 165 lb (74.8 kg)   06/26/19 171 lb (77.6 kg)   05/06/19 170 lb (77.1 kg)       General appearance:  Appears comfortable  Eyes: Sclera clear. Pupils equal.  ENT: Moist oral mucosa. Trachea midline, no adenopathy. Cardiovascular: Regular rhythm, normal S1, S2. No murmur. No edema in lower extremities  Respiratory: Not using accessory muscles. Good inspiratory effort. Clear to auscultation bilaterally, no wheeze or crackles. GI: Abdomen soft, no tenderness, not distended, normal bowel sounds  Musculoskeletal: No cyanosis in digits, neck supple  Neurology: CN 2-12 grossly intact. No speech or motor deficits  Psych: Normal affect. Alert and oriented in time, place and person  Skin: Warm, dry, normal turgor  Extremity exam shows brisk capillary refill. Peripheral pulses are palpable in lower extremities     Labs and Tests:  CBC:   Recent Labs     07/18/19  1318   WBC 4.0   HGB 11.7*   *     BMP:  Recent Labs     07/18/19  1318   *   K 4.5      CO2 23   BUN 23*   CREATININE 1.1   GLUCOSE 195*     Hepatic: Recent Labs     07/18/19  1318   AST 16   ALT 26   BILITOT 0.4   ALKPHOS 146*     CT CHEST PULMONARY EMBOLISM W CONTRAST   Preliminary Result   No acute abnormality. No pulmonary embolism. Minimal chronic appearing ground-glass opacities may be infectious or   inflammatory. XR CHEST PORTABLE   Final Result   No acute cardiopulmonary pathology. Problem List  Active Problems:    COPD (chronic obstructive pulmonary disease) ([facility] [address]  Resolved Problems:    * No resolved hospital problems.  *       Assessment & Plan:   Acute respiratory failure with hypoxia due to COPD exacerbation not on home oxygen therapy oxygen being tapered off at this time currently 2.5 L oxygen CT of the chest was negative for

## 2019-07-19 NOTE — CARE COORDINATION
Discharge Planning Assessment  RN/SW discharge planner met with patient/(and family member) to discuss reason for admission, current living situation, and potential needs at the time of discharge    Demographics/Insurance verified Yes    Current type of dwelling: no steps to enter [facility] apartment,  [address] [place] [id number]    Living arrangements: lives with son, he is handicapped    Level of function/Support: Independent, son helps with homemaking    PCP: Jeff Montes MD    DME: no    Active with any community resources/agencies/skilled home care: no, no prefernce    Medication compliance issues: none    Financial issues that could impact healthcare: none    Transportation at the time of discharge:  family    Tentative discharge plan:   Referral made to [facility],  for home oxygen upon discharge. Will need an order and qualifying SATs before discharge. SATs must be day before Discharge or day of Discharge. The following is how the SATs should read:     RA at rest_____%   RA with Ambulation ____%   Ambulation with _____LPM of O2 _____%.    Thanks for the referral

## 2019-07-20 VITALS
SYSTOLIC BLOOD PRESSURE: 113 MMHG | BODY MASS INDEX: 30.36 KG/M2 | DIASTOLIC BLOOD PRESSURE: 55 MMHG | RESPIRATION RATE: 18 BRPM | OXYGEN SATURATION: 92 % | HEIGHT: 62 IN | WEIGHT: 165 LBS | TEMPERATURE: 97.6 F | HEART RATE: 65 BPM

## 2019-07-20 LAB
GLUCOSE BLD-MCNC: 133 MG/DL (ref 70–99)
GLUCOSE BLD-MCNC: 165 MG/DL (ref 70–99)
PERFORMED ON: ABNORMAL
PERFORMED ON: ABNORMAL
URINE CULTURE, ROUTINE: NORMAL

## 2019-07-20 PROCEDURE — 2580000003 HC RX 258: Performed by: FAMILY MEDICINE

## 2019-07-20 PROCEDURE — 94761 N-INVAS EAR/PLS OXIMETRY MLT: CPT

## 2019-07-20 PROCEDURE — 94640 AIRWAY INHALATION TREATMENT: CPT

## 2019-07-20 PROCEDURE — 6370000000 HC RX 637 (ALT 250 FOR IP): Performed by: INTERNAL MEDICINE

## 2019-07-20 PROCEDURE — 6370000000 HC RX 637 (ALT 250 FOR IP): Performed by: FAMILY MEDICINE

## 2019-07-20 PROCEDURE — 94680 O2 UPTK RST&XERS DIR SIMPLE: CPT

## 2019-07-20 PROCEDURE — 99223 1ST HOSP IP/OBS HIGH 75: CPT | Performed by: INTERNAL MEDICINE

## 2019-07-20 RX ORDER — LANCETS 30 GAUGE
1 EACH MISCELLANEOUS 2 TIMES DAILY
Qty: 300 EACH | Refills: 1 | Status: SHIPPED | OUTPATIENT
Start: 2019-07-20 | End: 2021-06-30 | Stop reason: SDUPTHER

## 2019-07-20 RX ORDER — PREDNISONE 20 MG/1
40 TABLET ORAL DAILY
Qty: 6 TABLET | Refills: 0 | Status: SHIPPED | OUTPATIENT
Start: 2019-07-21 | End: 2019-12-17 | Stop reason: SDUPTHER

## 2019-07-20 RX ORDER — LEVOFLOXACIN 500 MG/1
500 TABLET, FILM COATED ORAL DAILY
Qty: 3 TABLET | Refills: 0 | Status: SHIPPED | OUTPATIENT
Start: 2019-07-21 | End: 2019-07-24

## 2019-07-20 RX ORDER — GLIPIZIDE 5 MG/1
5 TABLET ORAL 2 TIMES DAILY
Qty: 60 TABLET | Refills: 3 | Status: SHIPPED | OUTPATIENT
Start: 2019-07-20 | End: 2019-07-31

## 2019-07-20 RX ORDER — GLUCOSAMINE HCL/CHONDROITIN SU 500-400 MG
CAPSULE ORAL
Qty: 100 STRIP | Refills: 0 | Status: SHIPPED | OUTPATIENT
Start: 2019-07-20 | End: 2019-07-22 | Stop reason: SDUPTHER

## 2019-07-20 RX ORDER — BLOOD-GLUCOSE METER
1 KIT MISCELLANEOUS DAILY
Qty: 1 KIT | Refills: 0 | Status: SHIPPED | OUTPATIENT
Start: 2019-07-20 | End: 2020-08-11

## 2019-07-20 RX ADMIN — PREDNISONE 40 MG: 20 TABLET ORAL at 09:31

## 2019-07-20 RX ADMIN — LEVOTHYROXINE SODIUM 50 MCG: 25 TABLET ORAL at 05:52

## 2019-07-20 RX ADMIN — IPRATROPIUM BROMIDE AND ALBUTEROL SULFATE 1 AMPULE: .5; 3 SOLUTION RESPIRATORY (INHALATION) at 08:28

## 2019-07-20 RX ADMIN — IPRATROPIUM BROMIDE AND ALBUTEROL SULFATE 1 AMPULE: .5; 3 SOLUTION RESPIRATORY (INHALATION) at 13:30

## 2019-07-20 RX ADMIN — LEVOFLOXACIN 500 MG: 500 TABLET, FILM COATED ORAL at 09:32

## 2019-07-20 RX ADMIN — INSULIN GLARGINE 7 UNITS: 100 INJECTION, SOLUTION SUBCUTANEOUS at 09:33

## 2019-07-20 RX ADMIN — PROPAFENONE HYDROCHLORIDE 150 MG: 150 TABLET, FILM COATED ORAL at 05:52

## 2019-07-20 RX ADMIN — PANTOPRAZOLE SODIUM 40 MG: 40 TABLET, DELAYED RELEASE ORAL at 05:52

## 2019-07-20 RX ADMIN — PROPAFENONE HYDROCHLORIDE 150 MG: 150 TABLET, FILM COATED ORAL at 16:48

## 2019-07-20 RX ADMIN — AMLODIPINE BESYLATE 5 MG: 5 TABLET ORAL at 09:31

## 2019-07-20 RX ADMIN — ROPINIROLE HYDROCHLORIDE 2 MG: 1 TABLET, FILM COATED ORAL at 09:32

## 2019-07-20 RX ADMIN — Medication 10 ML: at 09:37

## 2019-07-20 RX ADMIN — Medication 2 PUFF: at 08:28

## 2019-07-20 RX ADMIN — INSULIN LISPRO 2 UNITS: 100 INJECTION, SOLUTION INTRAVENOUS; SUBCUTANEOUS at 09:34

## 2019-07-20 RX ADMIN — ALPRAZOLAM 1 MG: 0.5 TABLET ORAL at 01:04

## 2019-07-20 ASSESSMENT — PAIN SCALES - GENERAL: PAINLEVEL_OUTOF10: 0

## 2019-07-20 NOTE — CONSULTS
P Pulmonary and Critical Care   Consult Note      Reason for Consult: Shortness of breath, respiratory failure  Requesting Physician: Dr Be Perez    Subjective:   [facility] / HPI:                The patient is a 80 y.o. female with significant past medical history of:      Diagnosis Date    Arthritis     Atrial fibrillation ([facility]     Diabetes mellitus type II, controlled ([facility]     GERD (gastroesophageal reflux disease)     HTN (hypertension)     Hyperlipidemia     Hypothyroid     Insomnia     Lumbago     SVT (supraventricular tachycardia) ([facility]      The patient was originally admitted to the hospital 7/18 with shortness of breath and wheezing over the preceding several days. In the emergency department she was found to be hypoxemic and placed on 4 L nasal cannula oxygen. She is weaned down to 1 to 2 L oxygen but has been unable to be completely weaned off the oxygen. She does admit to baseline dyspnea. She is short of breath with exertion such as carrying a bag of groceries. She is not short of breath dressing or showering. She has a remote smoking history. She has a nebulizer and inhaler that she takes at home. There are no prior outpatient pulmonary function tests on her. She has a history of atrial fibrillation. She denies sleep apnea.   She does have a history of GERD      Past Surgical History:        Procedure Laterality Date    APPENDECTOMY      CHOLECYSTECTOMY, LAPAROSCOPIC  2/24/2013    COLONOSCOPY       Current Medications:    Current Facility-Administered Medications: levofloxacin (LEVAQUIN) tablet 500 mg, 500 mg, Oral, Daily  insulin glargine (LANTUS) injection pen 7 Units, 7 Units, Subcutaneous, Daily  predniSONE (DELTASONE) tablet 40 mg, 40 mg, Oral, Daily  melatonin tablet 6 mg, 6 mg, Oral, Nightly PRN  ALPRAZolam (XANAX) tablet 1 mg, 1 mg, Oral, Nightly PRN  ipratropium-albuterol (DUONEB) nebulizer solution 1 ampule, 1 ampule, Inhalation, Q4H WA  MDI Treatment, , , BID

## 2019-07-21 ENCOUNTER — CARE COORDINATION (OUTPATIENT)
Dept: CASE MANAGEMENT | Age: 84
End: 2019-07-21

## 2019-07-21 DIAGNOSIS — J44.9 CHRONIC OBSTRUCTIVE PULMONARY DISEASE, UNSPECIFIED COPD TYPE (HCC): Primary | ICD-10-CM

## 2019-07-21 PROCEDURE — 1111F DSCHRG MED/CURRENT MED MERGE: CPT | Performed by: FAMILY MEDICINE

## 2019-07-22 DIAGNOSIS — E11.9 CONTROLLED TYPE 2 DIABETES MELLITUS WITHOUT COMPLICATION, WITHOUT LONG-TERM CURRENT USE OF INSULIN (HCC): Primary | ICD-10-CM

## 2019-07-22 RX ORDER — GLUCOSAMINE HCL/CHONDROITIN SU 500-400 MG
CAPSULE ORAL
Qty: 100 STRIP | Refills: 5 | Status: SHIPPED | OUTPATIENT
Start: 2019-07-22 | End: 2021-03-18 | Stop reason: SDUPTHER

## 2019-07-23 LAB
BLOOD CULTURE, ROUTINE: NORMAL
CULTURE, BLOOD 2: NORMAL

## 2019-07-25 ENCOUNTER — CARE COORDINATION (OUTPATIENT)
Dept: CASE MANAGEMENT | Age: 84
End: 2019-07-25

## 2019-07-27 ENCOUNTER — TELEPHONE (OUTPATIENT)
Dept: FAMILY MEDICINE CLINIC | Age: 84
End: 2019-07-27

## 2019-07-30 ENCOUNTER — CARE COORDINATION (OUTPATIENT)
Dept: CASE MANAGEMENT | Age: 84
End: 2019-07-30

## 2019-07-30 ENCOUNTER — TELEPHONE (OUTPATIENT)
Dept: FAMILY MEDICINE CLINIC | Age: 84
End: 2019-07-30

## 2019-07-31 ENCOUNTER — OFFICE VISIT (OUTPATIENT)
Dept: PULMONOLOGY | Age: 84
End: 2019-07-31
Payer: MEDICARE

## 2019-07-31 ENCOUNTER — OFFICE VISIT (OUTPATIENT)
Dept: FAMILY MEDICINE CLINIC | Age: 84
End: 2019-07-31
Payer: MEDICARE

## 2019-07-31 VITALS
WEIGHT: 167 LBS | HEART RATE: 62 BPM | BODY MASS INDEX: 30.54 KG/M2 | OXYGEN SATURATION: 98 % | RESPIRATION RATE: 16 BRPM | DIASTOLIC BLOOD PRESSURE: 64 MMHG | SYSTOLIC BLOOD PRESSURE: 110 MMHG

## 2019-07-31 VITALS
WEIGHT: 167.38 LBS | DIASTOLIC BLOOD PRESSURE: 72 MMHG | SYSTOLIC BLOOD PRESSURE: 176 MMHG | BODY MASS INDEX: 30.61 KG/M2 | OXYGEN SATURATION: 93 % | HEART RATE: 89 BPM

## 2019-07-31 DIAGNOSIS — E11.9 CONTROLLED TYPE 2 DIABETES MELLITUS WITHOUT COMPLICATION, WITHOUT LONG-TERM CURRENT USE OF INSULIN (HCC): ICD-10-CM

## 2019-07-31 DIAGNOSIS — J44.1 CHRONIC OBSTRUCTIVE PULMONARY DISEASE WITH ACUTE EXACERBATION (HCC): Primary | ICD-10-CM

## 2019-07-31 DIAGNOSIS — G47.00 INSOMNIA, UNSPECIFIED TYPE: ICD-10-CM

## 2019-07-31 DIAGNOSIS — G62.9 NEUROPATHY: ICD-10-CM

## 2019-07-31 DIAGNOSIS — K21.9 GERD WITHOUT ESOPHAGITIS: ICD-10-CM

## 2019-07-31 DIAGNOSIS — R42 VERTIGO: ICD-10-CM

## 2019-07-31 DIAGNOSIS — J44.9 COPD, SEVERITY TO BE DETERMINED (HCC): Primary | ICD-10-CM

## 2019-07-31 DIAGNOSIS — Z09 HOSPITAL DISCHARGE FOLLOW-UP: ICD-10-CM

## 2019-07-31 PROCEDURE — G8926 SPIRO NO PERF OR DOC: HCPCS | Performed by: NURSE PRACTITIONER

## 2019-07-31 PROCEDURE — 3023F SPIROM DOC REV: CPT | Performed by: NURSE PRACTITIONER

## 2019-07-31 PROCEDURE — 1111F DSCHRG MED/CURRENT MED MERGE: CPT | Performed by: NURSE PRACTITIONER

## 2019-07-31 PROCEDURE — 4040F PNEUMOC VAC/ADMIN/RCVD: CPT | Performed by: NURSE PRACTITIONER

## 2019-07-31 PROCEDURE — 1123F ACP DISCUSS/DSCN MKR DOCD: CPT | Performed by: NURSE PRACTITIONER

## 2019-07-31 PROCEDURE — 99214 OFFICE O/P EST MOD 30 MIN: CPT | Performed by: NURSE PRACTITIONER

## 2019-07-31 PROCEDURE — 99495 TRANSJ CARE MGMT MOD F2F 14D: CPT | Performed by: FAMILY MEDICINE

## 2019-07-31 PROCEDURE — 1036F TOBACCO NON-USER: CPT | Performed by: NURSE PRACTITIONER

## 2019-07-31 PROCEDURE — G8399 PT W/DXA RESULTS DOCUMENT: HCPCS | Performed by: NURSE PRACTITIONER

## 2019-07-31 PROCEDURE — G8417 CALC BMI ABV UP PARAM F/U: HCPCS | Performed by: NURSE PRACTITIONER

## 2019-07-31 PROCEDURE — 1090F PRES/ABSN URINE INCON ASSESS: CPT | Performed by: NURSE PRACTITIONER

## 2019-07-31 PROCEDURE — G8427 DOCREV CUR MEDS BY ELIG CLIN: HCPCS | Performed by: NURSE PRACTITIONER

## 2019-07-31 RX ORDER — GABAPENTIN 100 MG/1
100 CAPSULE ORAL NIGHTLY PRN
Qty: 90 CAPSULE | Refills: 1 | Status: SHIPPED | OUTPATIENT
Start: 2019-07-31 | End: 2019-12-17 | Stop reason: SDUPTHER

## 2019-07-31 RX ORDER — LORATADINE 10 MG/1
10 TABLET ORAL DAILY
Qty: 30 TABLET | Refills: 5 | Status: SHIPPED | OUTPATIENT
Start: 2019-07-31 | End: 2019-09-26 | Stop reason: ALTCHOICE

## 2019-07-31 ASSESSMENT — ENCOUNTER SYMPTOMS
SHORTNESS OF BREATH: 1
COUGH: 1
ABDOMINAL PAIN: 0
CONSTIPATION: 0
COLOR CHANGE: 0

## 2019-07-31 NOTE — PROGRESS NOTES
the accuracy of this automated transcription, some errors in transcription may have occurred.             Mariusz Mac

## 2019-07-31 NOTE — PROGRESS NOTES
July 2019 reveals the following:  No acute abnormality. No pulmonary embolism. Minimal chronic appearing ground-glass opacities may be infectious or inflammatory. Last PFTs:  None on file. Assessment / Plan:   1. COPD, severity to be determined ([address]  - Continue Dulera but increase to BID  - Continue AME PRN  - Check Full PFT Study With Bronchodilator; for further evaluation  - She needs new tubing for nebulizer, this is medically necessary     2. GERD without esophagitis  - Continue Protonix    3. Hospital discharge follow-up  - Overall improved     4. Insomnia   - Really she has trouble staying asleep, ? Underlying ANTHONY  - Refer to Dr. Mitchell Hewitt    Return in about 4 weeks (around 8/28/2019) for short term follow up of symptoms.

## 2019-08-02 ENCOUNTER — CARE COORDINATION (OUTPATIENT)
Dept: CASE MANAGEMENT | Age: 84
End: 2019-08-02

## 2019-08-06 ENCOUNTER — OFFICE VISIT (OUTPATIENT)
Dept: CARDIOLOGY CLINIC | Age: 84
End: 2019-08-06
Payer: MEDICARE

## 2019-08-06 VITALS
BODY MASS INDEX: 30.73 KG/M2 | SYSTOLIC BLOOD PRESSURE: 130 MMHG | DIASTOLIC BLOOD PRESSURE: 62 MMHG | WEIGHT: 167 LBS | HEART RATE: 64 BPM | OXYGEN SATURATION: 90 % | HEIGHT: 62 IN

## 2019-08-06 DIAGNOSIS — I65.23 BILATERAL CAROTID ARTERY STENOSIS: ICD-10-CM

## 2019-08-06 DIAGNOSIS — I48.0 PAROXYSMAL ATRIAL FIBRILLATION (HCC): Primary | ICD-10-CM

## 2019-08-06 DIAGNOSIS — I38 HEART VALVE REGURGITATION: ICD-10-CM

## 2019-08-06 DIAGNOSIS — E78.2 MIXED HYPERLIPIDEMIA: ICD-10-CM

## 2019-08-06 DIAGNOSIS — I10 ESSENTIAL HYPERTENSION: ICD-10-CM

## 2019-08-06 PROCEDURE — G8417 CALC BMI ABV UP PARAM F/U: HCPCS | Performed by: NURSE PRACTITIONER

## 2019-08-06 PROCEDURE — G8598 ASA/ANTIPLAT THER USED: HCPCS | Performed by: NURSE PRACTITIONER

## 2019-08-06 PROCEDURE — G8427 DOCREV CUR MEDS BY ELIG CLIN: HCPCS | Performed by: NURSE PRACTITIONER

## 2019-08-06 PROCEDURE — 1111F DSCHRG MED/CURRENT MED MERGE: CPT | Performed by: NURSE PRACTITIONER

## 2019-08-06 PROCEDURE — 1123F ACP DISCUSS/DSCN MKR DOCD: CPT | Performed by: NURSE PRACTITIONER

## 2019-08-06 PROCEDURE — 1090F PRES/ABSN URINE INCON ASSESS: CPT | Performed by: NURSE PRACTITIONER

## 2019-08-06 PROCEDURE — G8399 PT W/DXA RESULTS DOCUMENT: HCPCS | Performed by: NURSE PRACTITIONER

## 2019-08-06 PROCEDURE — 93000 ELECTROCARDIOGRAM COMPLETE: CPT | Performed by: NURSE PRACTITIONER

## 2019-08-06 PROCEDURE — 1036F TOBACCO NON-USER: CPT | Performed by: NURSE PRACTITIONER

## 2019-08-06 PROCEDURE — 4040F PNEUMOC VAC/ADMIN/RCVD: CPT | Performed by: NURSE PRACTITIONER

## 2019-08-06 PROCEDURE — 99214 OFFICE O/P EST MOD 30 MIN: CPT | Performed by: NURSE PRACTITIONER

## 2019-08-06 RX ORDER — GLIPIZIDE 5 MG/1
5 TABLET ORAL
COMMUNITY
End: 2019-09-26 | Stop reason: ALTCHOICE

## 2019-08-06 NOTE — PROGRESS NOTES
distress, and appears well nourished / developed  NEUROLOGIC:  Awake and orientated to person, place and time. PSYCH: Calm affect. SKIN: Warm and dry. HEENT: Sclera non-icteric, normocephalic, neck supple, no elevation of JVP, normal carotid pulses with no bruits and thyroid normal size. LUNGS:  No increased work of breathing and dry crackles  CARDIOVASCULAR:  Regular rate and rhythm with no murmurs, gallops, rubs, or abnormal heart sounds, normal PMI. The apical impulses not displaced  JVP less than 8 cm H2O  Heart tones are crisp and normal  Cervical veins are not engorged  The carotid upstroke is normal in amplitude and contour without delay or bruit  JVP is not elevated  ABDOMEN:  Normal bowel sounds, non-distended and non-tender to palpation  EXT: No edema, no calf tenderness. Pulses are present bilaterally.     DATA:    Lab Results   Component Value Date    ALT 26 07/18/2019    AST 16 07/18/2019    ALKPHOS 146 (H) 07/18/2019    BILITOT 0.4 07/18/2019     Lab Results   Component Value Date    CREATININE 1.1 07/18/2019    BUN 23 (H) 07/18/2019     (L) 07/18/2019    K 4.5 07/18/2019     07/18/2019    CO2 23 07/18/2019     Lab Results   Component Value Date    WBC 4.0 07/18/2019    HGB 11.7 (L) 07/18/2019    HCT 35.6 (L) 07/18/2019    MCV 90.0 07/18/2019     (L) 07/18/2019     No components found for: CHLPL  Lab Results   Component Value Date    TRIG 226 (H) 08/12/2017    TRIG 221 (H) 06/20/2016    TRIG 224 (H) 12/11/2013     Lab Results   Component Value Date    HDL 39 (L) 08/12/2017    HDL 45 06/20/2016    HDL 40 12/11/2013     Lab Results   Component Value Date    LDLCALC 140 (H) 08/12/2017    LDLCALC 133 (H) 06/20/2016    LDLCALC 96 12/11/2013     Lab Results   Component Value Date    LABVLDL 45 08/12/2017    LABVLDL 44 06/20/2016    LABVLDL 45 12/11/2013     Radiology Review:  Pertinent images / reports were reviewed as a part of this visit and reveals the following:    [id number] Score

## 2019-08-08 ENCOUNTER — TELEPHONE (OUTPATIENT)
Dept: FAMILY MEDICINE CLINIC | Age: 84
End: 2019-08-08

## 2019-08-09 ENCOUNTER — HOSPITAL ENCOUNTER (OUTPATIENT)
Dept: PULMONOLOGY | Age: 84
Discharge: HOME | End: 2019-08-09
Payer: MEDICARE

## 2019-08-09 VITALS — OXYGEN SATURATION: 94 % | RESPIRATION RATE: 18 BRPM | HEART RATE: 66 BPM

## 2019-08-09 DIAGNOSIS — J44.9 COPD, SEVERITY TO BE DETERMINED (HCC): ICD-10-CM

## 2019-08-09 LAB
DLCO %PRED: 39 %
DLCO PRED: NORMAL ML/MIN/MMHG
DLCO/VA %PRED: NORMAL %
DLCO/VA PRED: NORMAL ML/MIN/MMHG
DLCO/VA: NORMAL ML/MIN/MMHG
DLCO: NORMAL ML/MIN/MMHG
EXPIRATORY TIME-POST: NORMAL SEC
EXPIRATORY TIME: NORMAL SEC
FEF 25-75% %CHNG: NORMAL
FEF 25-75% %PRED-POST: NORMAL %
FEF 25-75% %PRED-PRE: NORMAL L/SEC
FEF 25-75% PRED: NORMAL L/SEC
FEF 25-75%-POST: NORMAL L/SEC
FEF 25-75%-PRE: NORMAL L/SEC
FEV1 %PRED-POST: 77 %
FEV1 %PRED-PRE: 75 %
FEV1 PRED: NORMAL L
FEV1-POST: NORMAL L
FEV1-PRE: NORMAL L
FEV1/FVC %PRED-POST: NORMAL %
FEV1/FVC %PRED-PRE: NORMAL %
FEV1/FVC PRED: NORMAL %
FEV1/FVC-POST: 63 %
FEV1/FVC-PRE: 65 %
FVC %PRED-POST: NORMAL L
FVC %PRED-PRE: NORMAL %
FVC PRED: NORMAL L
FVC-POST: NORMAL L
FVC-PRE: NORMAL L
GAW %PRED: NORMAL %
GAW PRED: NORMAL L/S/CMH2O
GAW: NORMAL L/S/CMH2O
IC %PRED: NORMAL %
IC PRED: NORMAL L
IC: NORMAL L
MEP: NORMAL
MIP: NORMAL
MVV %PRED-PRE: NORMAL %
MVV PRED: NORMAL L/MIN
MVV-PRE: NORMAL L/MIN
PEF %PRED-POST: NORMAL %
PEF %PRED-PRE: NORMAL L/SEC
PEF PRED: NORMAL L/SEC
PEF%CHNG: NORMAL
PEF-POST: NORMAL L/SEC
PEF-PRE: NORMAL L/SEC
RAW %PRED: NORMAL %
RAW PRED: NORMAL CMH2O/L/S
RAW: NORMAL CMH2O/L/S
RV %PRED: NORMAL %
RV PRED: NORMAL L
RV: NORMAL L
SVC %PRED: NORMAL %
SVC PRED: NORMAL L
SVC: NORMAL L
TLC %PRED: 84 %
TLC PRED: NORMAL L
TLC: NORMAL L
VA %PRED: NORMAL %
VA PRED: NORMAL L
VA: NORMAL L
VTG %PRED: NORMAL %
VTG PRED: NORMAL L
VTG: NORMAL L

## 2019-08-09 PROCEDURE — 94760 N-INVAS EAR/PLS OXIMETRY 1: CPT

## 2019-08-09 PROCEDURE — 94729 DIFFUSING CAPACITY: CPT

## 2019-08-09 PROCEDURE — 94726 PLETHYSMOGRAPHY LUNG VOLUMES: CPT

## 2019-08-09 PROCEDURE — 94200 LUNG FUNCTION TEST (MBC/MVV): CPT

## 2019-08-09 PROCEDURE — 94060 EVALUATION OF WHEEZING: CPT

## 2019-08-09 PROCEDURE — 6370000000 HC RX 637 (ALT 250 FOR IP): Performed by: NURSE PRACTITIONER

## 2019-08-09 RX ORDER — ALBUTEROL SULFATE 90 UG/1
4 AEROSOL, METERED RESPIRATORY (INHALATION) ONCE
Status: COMPLETED | OUTPATIENT
Start: 2019-08-09 | End: 2019-08-09

## 2019-08-09 RX ADMIN — Medication 4 PUFF: at 15:14

## 2019-08-09 ASSESSMENT — PULMONARY FUNCTION TESTS
FEV1/FVC_PRE: 65
FEV1_PERCENT_PREDICTED_PRE: 75
FEV1/FVC_POST: 63
FEV1_PERCENT_PREDICTED_POST: 77

## 2019-08-12 DIAGNOSIS — F51.01 PRIMARY INSOMNIA: ICD-10-CM

## 2019-08-12 DIAGNOSIS — F41.9 ANXIETY: ICD-10-CM

## 2019-08-12 DIAGNOSIS — M15.9 PRIMARY OSTEOARTHRITIS INVOLVING MULTIPLE JOINTS: ICD-10-CM

## 2019-08-12 RX ORDER — HYDROCODONE BITARTRATE AND ACETAMINOPHEN 5; 325 MG/1; MG/1
1 TABLET ORAL 4 TIMES DAILY PRN
Qty: 120 TABLET | Refills: 0 | Status: SHIPPED | OUTPATIENT
Start: 2019-08-12 | End: 2019-09-09 | Stop reason: SDUPTHER

## 2019-08-12 RX ORDER — LORAZEPAM 0.5 MG/1
TABLET ORAL
Qty: 120 TABLET | Refills: 1 | Status: SHIPPED | OUTPATIENT
Start: 2019-08-12 | End: 2019-09-09 | Stop reason: SDUPTHER

## 2019-08-14 ENCOUNTER — TELEPHONE (OUTPATIENT)
Dept: PULMONOLOGY | Age: 84
End: 2019-08-14

## 2019-08-15 ENCOUNTER — TELEPHONE (OUTPATIENT)
Dept: FAMILY MEDICINE CLINIC | Age: 84
End: 2019-08-15

## 2019-08-15 NOTE — TELEPHONE ENCOUNTER
Patient has been on Requip 2mg for a long time for RLS. In the last few nights it has stopped working like it used to. She has been up all night because of it. Is there a stronger dose that she can take?

## 2019-08-16 RX ORDER — ROPINIROLE 2 MG/1
TABLET, FILM COATED ORAL
Qty: 180 TABLET | Refills: 0 | Status: SHIPPED | OUTPATIENT
Start: 2019-08-16 | End: 2019-09-12 | Stop reason: SDUPTHER

## 2019-08-20 ENCOUNTER — HOSPITAL ENCOUNTER (OUTPATIENT)
Dept: NON INVASIVE DIAGNOSTICS | Age: 84
Discharge: HOME | End: 2019-08-20
Payer: MEDICARE

## 2019-08-20 ENCOUNTER — HOSPITAL ENCOUNTER (OUTPATIENT)
Dept: VASCULAR LAB | Age: 84
Discharge: HOME | End: 2019-08-20
Payer: MEDICARE

## 2019-08-20 DIAGNOSIS — I65.23 BILATERAL CAROTID ARTERY STENOSIS: ICD-10-CM

## 2019-08-20 LAB
LEFT VENTRICULAR EJECTION FRACTION MODE: NORMAL
LV EF: 60 %
LV EF: 60 %
LVEF MODALITY: NORMAL

## 2019-08-20 PROCEDURE — 93306 TTE W/DOPPLER COMPLETE: CPT

## 2019-08-20 PROCEDURE — 93880 EXTRACRANIAL BILAT STUDY: CPT

## 2019-08-21 ENCOUNTER — TELEPHONE (OUTPATIENT)
Dept: PULMONOLOGY | Age: 84
End: 2019-08-21

## 2019-08-21 ENCOUNTER — TELEPHONE (OUTPATIENT)
Dept: FAMILY MEDICINE CLINIC | Age: 84
End: 2019-08-21

## 2019-08-21 ENCOUNTER — TELEPHONE (OUTPATIENT)
Dept: CARDIOLOGY CLINIC | Age: 84
End: 2019-08-21

## 2019-08-21 RX ORDER — MECLIZINE HYDROCHLORIDE 25 MG/1
25 TABLET ORAL 3 TIMES DAILY PRN
Qty: 90 TABLET | Refills: 1 | Status: SHIPPED | OUTPATIENT
Start: 2019-08-21 | End: 2019-08-31

## 2019-08-21 NOTE — TELEPHONE ENCOUNTER
Result Notes for VL DUP CAROTID BILATERAL     Notes recorded by KATHY Rodriguez CNP on 8/20/2019 at 4:59 PM EDT  Ok; comparable to [address] done last year; recheck in one year     Spoke with patient informed her of her carotid results she verbally understood.

## 2019-08-23 DIAGNOSIS — E11.9 CONTROLLED TYPE 2 DIABETES MELLITUS WITHOUT COMPLICATION, WITHOUT LONG-TERM CURRENT USE OF INSULIN (HCC): Primary | ICD-10-CM

## 2019-08-28 NOTE — TELEPHONE ENCOUNTER
Pt informed that she needs to stay on her oxygen with exertion and at night - she will keep her appt tomorrow

## 2019-08-29 ENCOUNTER — OFFICE VISIT (OUTPATIENT)
Dept: PULMONOLOGY | Age: 84
End: 2019-08-29
Payer: MEDICARE

## 2019-08-29 VITALS — DIASTOLIC BLOOD PRESSURE: 96 MMHG | HEART RATE: 76 BPM | OXYGEN SATURATION: 86 % | SYSTOLIC BLOOD PRESSURE: 146 MMHG

## 2019-08-29 DIAGNOSIS — J30.2 SEASONAL ALLERGIC RHINITIS, UNSPECIFIED TRIGGER: ICD-10-CM

## 2019-08-29 DIAGNOSIS — R06.02 SOB (SHORTNESS OF BREATH): ICD-10-CM

## 2019-08-29 DIAGNOSIS — J44.9 COPD, MILD (HCC): ICD-10-CM

## 2019-08-29 DIAGNOSIS — J96.11 CHRONIC RESPIRATORY FAILURE WITH HYPOXIA (HCC): Primary | ICD-10-CM

## 2019-08-29 PROCEDURE — 4040F PNEUMOC VAC/ADMIN/RCVD: CPT | Performed by: NURSE PRACTITIONER

## 2019-08-29 PROCEDURE — G8417 CALC BMI ABV UP PARAM F/U: HCPCS | Performed by: NURSE PRACTITIONER

## 2019-08-29 PROCEDURE — 1036F TOBACCO NON-USER: CPT | Performed by: NURSE PRACTITIONER

## 2019-08-29 PROCEDURE — G8399 PT W/DXA RESULTS DOCUMENT: HCPCS | Performed by: NURSE PRACTITIONER

## 2019-08-29 PROCEDURE — G8926 SPIRO NO PERF OR DOC: HCPCS | Performed by: NURSE PRACTITIONER

## 2019-08-29 PROCEDURE — G8598 ASA/ANTIPLAT THER USED: HCPCS | Performed by: NURSE PRACTITIONER

## 2019-08-29 PROCEDURE — 3023F SPIROM DOC REV: CPT | Performed by: NURSE PRACTITIONER

## 2019-08-29 PROCEDURE — 99214 OFFICE O/P EST MOD 30 MIN: CPT | Performed by: NURSE PRACTITIONER

## 2019-08-29 PROCEDURE — 1123F ACP DISCUSS/DSCN MKR DOCD: CPT | Performed by: NURSE PRACTITIONER

## 2019-08-29 PROCEDURE — G8427 DOCREV CUR MEDS BY ELIG CLIN: HCPCS | Performed by: NURSE PRACTITIONER

## 2019-08-29 PROCEDURE — 1090F PRES/ABSN URINE INCON ASSESS: CPT | Performed by: NURSE PRACTITIONER

## 2019-08-29 ASSESSMENT — ENCOUNTER SYMPTOMS
CONSTIPATION: 0
ABDOMINAL PAIN: 0
COLOR CHANGE: 0
COUGH: 1
SHORTNESS OF BREATH: 1

## 2019-08-29 NOTE — PROGRESS NOTES
EVERY NIGHT AT BEDTIME 120 tablet 1    HYDROcodone-acetaminophen (NORCO) 5-325 MG per tablet Take 1 tablet by mouth 4 times daily as needed for Pain for up to 30 days. 120 tablet 0    glipiZIDE (GLUCOTROL) 5 MG tablet Take 5 mg by mouth 2 times daily (before meals)      loratadine (CLARITIN) 10 MG tablet Take 1 tablet by mouth daily 30 tablet 5    gabapentin (NEURONTIN) 100 MG capsule Take 1 capsule by mouth nightly as needed (neuropathy) for up to 122 days.  90 capsule 1    blood glucose monitor strips Test one time a day 100 strip 5    mometasone-formoterol (DULERA) 100-5 MCG/ACT inhaler Inhale 2 puffs into the lungs 2 times daily 1 Inhaler 0    Lancets MISC 1 each by Does not apply route 2 times daily 300 each 1    glucose monitoring kit (FREESTYLE) monitoring kit 1 kit by Does not apply route daily 1 kit 0    fluticasone (FLONASE) 50 MCG/ACT nasal spray USE 2 SPRAYS IN EACH NOSTRIL EVERY EVENING 3 Bottle 1    levothyroxine (SYNTHROID) 50 MCG tablet TAKE 1 TABLET EVERY DAY 90 tablet 1    rOPINIRole (REQUIP) 2 MG tablet TAKE 1 TABLET BY MOUTH TWICE DAILY 180 tablet 1    dicyclomine (BENTYL) 10 MG capsule Take 1 capsule by mouth 4 times daily as needed (abd pain) 360 capsule 1    irbesartan (AVAPRO) 300 MG tablet TAKE 1 TABLET BY MOUTH EVERY NIGHT 90 tablet 1    budesonide-formoterol (SYMBICORT) 160-4.5 MCG/ACT AERO Inhale 2 puffs into the lungs 2 times daily 1 Inhaler 5    ipratropium-albuterol (DUONEB) 0.5-2.5 (3) MG/3ML SOLN nebulizer solution INHALE THE CONTENTS OF 1 VIAL VIA NEBULIZER EVERY 4 HOURS 1620 mL 3    pantoprazole (PROTONIX) 40 MG tablet TAKE 1 TABLET EVERY DAY 90 tablet 1    amLODIPine (NORVASC) 5 MG tablet TAKE 1 TABLET EVERY DAY 90 tablet 1    albuterol sulfate HFA (PROAIR HFA) 108 (90 Base) MCG/ACT inhaler Inhale 2 puffs into the lungs every 6 hours as needed for Wheezing 1 Inhaler 2    atorvastatin (LIPITOR) 80 MG tablet Take 1 tablet by mouth nightly 90 tablet 3    trigger  - Add back Flonase    Return in about 2 weeks (around 9/12/2019) for short term follow up of symptoms.      Klarissa Plasencia MSN APRN-ACNP CCRN

## 2019-09-04 RX ORDER — MECLIZINE HCL 12.5 MG/1
TABLET ORAL
Qty: 90 TABLET | Refills: 0 | Status: SHIPPED | OUTPATIENT
Start: 2019-09-04 | End: 2019-09-27 | Stop reason: SDUPTHER

## 2019-09-09 DIAGNOSIS — M15.9 PRIMARY OSTEOARTHRITIS INVOLVING MULTIPLE JOINTS: ICD-10-CM

## 2019-09-09 DIAGNOSIS — F51.01 PRIMARY INSOMNIA: ICD-10-CM

## 2019-09-09 DIAGNOSIS — F41.9 ANXIETY: ICD-10-CM

## 2019-09-09 NOTE — TELEPHONE ENCOUNTER
Medication:   Requested Prescriptions     Pending Prescriptions Disp Refills    HYDROcodone-acetaminophen (NORCO) 5-325 MG per tablet 120 tablet 0     Sig: Take 1 tablet by mouth 4 times daily as needed for Pain for up to 30 days.  LORazepam (ATIVAN) 0.5 MG tablet 120 tablet 1     Sig: TAKE 1 TABLET BY MOUTH TWICE DAILY AS NEEDED FOR ANXIETY AND TAKE 2 TABLETS BY MOUTH EVERY NIGHT AT BEDTIME      Last Filled:  08/12/19  Patient Phone Number: [phone] (home)     Last appt: 7/31/2019   Next appt: 9/26/2019    Last OARRS:   RX Monitoring 3/20/2019   Attestation The Prescription Monitoring Report for this patient was reviewed today.    Periodic Controlled Substance Monitoring -       Preferred Pharmacy:   [facility] [address] [facility] [address] [phone]  [address] [facility] [address]  Phone: [phone] Fax: [phone]

## 2019-09-10 RX ORDER — LORAZEPAM 0.5 MG/1
TABLET ORAL
Qty: 120 TABLET | Refills: 0 | Status: SHIPPED | OUTPATIENT
Start: 2019-09-10 | End: 2019-11-05 | Stop reason: SDUPTHER

## 2019-09-10 RX ORDER — HYDROCODONE BITARTRATE AND ACETAMINOPHEN 5; 325 MG/1; MG/1
1 TABLET ORAL 4 TIMES DAILY PRN
Qty: 120 TABLET | Refills: 0 | Status: SHIPPED | OUTPATIENT
Start: 2019-09-10 | End: 2019-10-09 | Stop reason: SDUPTHER

## 2019-09-12 ENCOUNTER — TELEPHONE (OUTPATIENT)
Dept: FAMILY MEDICINE CLINIC | Age: 84
End: 2019-09-12

## 2019-09-12 RX ORDER — ROPINIROLE 2 MG/1
TABLET, FILM COATED ORAL
Qty: 270 TABLET | Refills: 0
Start: 2019-09-12 | End: 2020-02-04 | Stop reason: SDUPTHER

## 2019-09-16 ENCOUNTER — HOSPITAL ENCOUNTER (OUTPATIENT)
Age: 84
Discharge: HOME | End: 2019-09-16
Payer: MEDICARE

## 2019-09-16 DIAGNOSIS — R73.9 HYPERGLYCEMIA: ICD-10-CM

## 2019-09-16 DIAGNOSIS — I10 ESSENTIAL HYPERTENSION: ICD-10-CM

## 2019-09-16 LAB
A/G RATIO: 1.6 (ref 1.1–2.2)
ALBUMIN SERPL-MCNC: 4.1 G/DL (ref 3.4–5)
ALP BLD-CCNC: 134 U/L (ref 40–129)
ALT SERPL-CCNC: 18 U/L (ref 10–40)
ANION GAP SERPL CALCULATED.3IONS-SCNC: 16 MMOL/L (ref 3–16)
AST SERPL-CCNC: 20 U/L (ref 15–37)
BILIRUB SERPL-MCNC: 0.3 MG/DL (ref 0–1)
BUN BLDV-MCNC: 28 MG/DL (ref 7–20)
CALCIUM SERPL-MCNC: 9.5 MG/DL (ref 8.3–10.6)
CHLORIDE BLD-SCNC: 101 MMOL/L (ref 99–110)
CO2: 21 MMOL/L (ref 21–32)
CREAT SERPL-MCNC: 1.1 MG/DL (ref 0.6–1.2)
GFR AFRICAN AMERICAN: 57
GFR NON-AFRICAN AMERICAN: 47
GLOBULIN: 2.5 G/DL
GLUCOSE BLD-MCNC: 130 MG/DL (ref 70–99)
POTASSIUM SERPL-SCNC: 4.9 MMOL/L (ref 3.5–5.1)
SODIUM BLD-SCNC: 138 MMOL/L (ref 136–145)
TOTAL PROTEIN: 6.6 G/DL (ref 6.4–8.2)
TSH SERPL DL<=0.05 MIU/L-ACNC: 3.96 UIU/ML (ref 0.27–4.2)

## 2019-09-16 PROCEDURE — 36415 COLL VENOUS BLD VENIPUNCTURE: CPT

## 2019-09-16 PROCEDURE — 84443 ASSAY THYROID STIM HORMONE: CPT

## 2019-09-16 PROCEDURE — 83036 HEMOGLOBIN GLYCOSYLATED A1C: CPT

## 2019-09-16 PROCEDURE — 80053 COMPREHEN METABOLIC PANEL: CPT

## 2019-09-17 LAB
ESTIMATED AVERAGE GLUCOSE: 128.4 MG/DL
HBA1C MFR BLD: 6.1 %

## 2019-09-20 ENCOUNTER — OFFICE VISIT (OUTPATIENT)
Dept: PULMONOLOGY | Age: 84
End: 2019-09-20
Payer: MEDICARE

## 2019-09-20 VITALS
OXYGEN SATURATION: 94 % | HEART RATE: 76 BPM | WEIGHT: 169 LBS | BODY MASS INDEX: 30.91 KG/M2 | DIASTOLIC BLOOD PRESSURE: 74 MMHG | SYSTOLIC BLOOD PRESSURE: 154 MMHG

## 2019-09-20 DIAGNOSIS — J84.9 INTERSTITIAL LUNG DISEASE (HCC): ICD-10-CM

## 2019-09-20 DIAGNOSIS — K21.9 GASTROESOPHAGEAL REFLUX DISEASE, ESOPHAGITIS PRESENCE NOT SPECIFIED: ICD-10-CM

## 2019-09-20 DIAGNOSIS — R05.3 CHRONIC COUGH: ICD-10-CM

## 2019-09-20 DIAGNOSIS — T17.908S ASPIRATION INTO AIRWAY, SEQUELA: Primary | ICD-10-CM

## 2019-09-20 PROBLEM — T17.908A ASPIRATION INTO AIRWAY: Status: ACTIVE | Noted: 2019-09-20

## 2019-09-20 PROCEDURE — 1090F PRES/ABSN URINE INCON ASSESS: CPT | Performed by: INTERNAL MEDICINE

## 2019-09-20 PROCEDURE — G8427 DOCREV CUR MEDS BY ELIG CLIN: HCPCS | Performed by: INTERNAL MEDICINE

## 2019-09-20 PROCEDURE — G8399 PT W/DXA RESULTS DOCUMENT: HCPCS | Performed by: INTERNAL MEDICINE

## 2019-09-20 PROCEDURE — 4040F PNEUMOC VAC/ADMIN/RCVD: CPT | Performed by: INTERNAL MEDICINE

## 2019-09-20 PROCEDURE — 99214 OFFICE O/P EST MOD 30 MIN: CPT | Performed by: INTERNAL MEDICINE

## 2019-09-20 PROCEDURE — 1123F ACP DISCUSS/DSCN MKR DOCD: CPT | Performed by: INTERNAL MEDICINE

## 2019-09-20 PROCEDURE — 1036F TOBACCO NON-USER: CPT | Performed by: INTERNAL MEDICINE

## 2019-09-20 PROCEDURE — G8598 ASA/ANTIPLAT THER USED: HCPCS | Performed by: INTERNAL MEDICINE

## 2019-09-20 PROCEDURE — G8417 CALC BMI ABV UP PARAM F/U: HCPCS | Performed by: INTERNAL MEDICINE

## 2019-09-26 ENCOUNTER — OFFICE VISIT (OUTPATIENT)
Dept: FAMILY MEDICINE CLINIC | Age: 84
End: 2019-09-26
Payer: MEDICARE

## 2019-09-26 VITALS
BODY MASS INDEX: 30.95 KG/M2 | HEART RATE: 79 BPM | WEIGHT: 169.2 LBS | OXYGEN SATURATION: 92 % | SYSTOLIC BLOOD PRESSURE: 134 MMHG | DIASTOLIC BLOOD PRESSURE: 60 MMHG

## 2019-09-26 DIAGNOSIS — M15.9 PRIMARY OSTEOARTHRITIS INVOLVING MULTIPLE JOINTS: ICD-10-CM

## 2019-09-26 DIAGNOSIS — E11.9 CONTROLLED TYPE 2 DIABETES MELLITUS WITHOUT COMPLICATION, WITHOUT LONG-TERM CURRENT USE OF INSULIN (HCC): ICD-10-CM

## 2019-09-26 DIAGNOSIS — J44.1 CHRONIC OBSTRUCTIVE PULMONARY DISEASE WITH ACUTE EXACERBATION (HCC): ICD-10-CM

## 2019-09-26 DIAGNOSIS — G25.81 RESTLESS LEGS SYNDROME: ICD-10-CM

## 2019-09-26 DIAGNOSIS — J84.9 INTERSTITIAL LUNG DISEASE (HCC): Primary | ICD-10-CM

## 2019-09-26 PROCEDURE — 1090F PRES/ABSN URINE INCON ASSESS: CPT | Performed by: FAMILY MEDICINE

## 2019-09-26 PROCEDURE — 4040F PNEUMOC VAC/ADMIN/RCVD: CPT | Performed by: FAMILY MEDICINE

## 2019-09-26 PROCEDURE — 3023F SPIROM DOC REV: CPT | Performed by: FAMILY MEDICINE

## 2019-09-26 PROCEDURE — G8598 ASA/ANTIPLAT THER USED: HCPCS | Performed by: FAMILY MEDICINE

## 2019-09-26 PROCEDURE — 99214 OFFICE O/P EST MOD 30 MIN: CPT | Performed by: FAMILY MEDICINE

## 2019-09-26 PROCEDURE — G8926 SPIRO NO PERF OR DOC: HCPCS | Performed by: FAMILY MEDICINE

## 2019-09-26 PROCEDURE — 1036F TOBACCO NON-USER: CPT | Performed by: FAMILY MEDICINE

## 2019-09-26 PROCEDURE — 1123F ACP DISCUSS/DSCN MKR DOCD: CPT | Performed by: FAMILY MEDICINE

## 2019-09-26 PROCEDURE — G8417 CALC BMI ABV UP PARAM F/U: HCPCS | Performed by: FAMILY MEDICINE

## 2019-09-26 PROCEDURE — G8427 DOCREV CUR MEDS BY ELIG CLIN: HCPCS | Performed by: FAMILY MEDICINE

## 2019-09-26 PROCEDURE — G8399 PT W/DXA RESULTS DOCUMENT: HCPCS | Performed by: FAMILY MEDICINE

## 2019-09-27 RX ORDER — MECLIZINE HCL 12.5 MG/1
TABLET ORAL
Qty: 270 TABLET | Refills: 0 | Status: SHIPPED | OUTPATIENT
Start: 2019-09-27 | End: 2019-11-29 | Stop reason: SDUPTHER

## 2019-10-02 RX ORDER — AMLODIPINE BESYLATE 5 MG/1
TABLET ORAL
Qty: 90 TABLET | Refills: 1 | Status: SHIPPED | OUTPATIENT
Start: 2019-10-02 | End: 2019-10-17 | Stop reason: SDUPTHER

## 2019-10-09 DIAGNOSIS — M15.9 PRIMARY OSTEOARTHRITIS INVOLVING MULTIPLE JOINTS: ICD-10-CM

## 2019-10-09 RX ORDER — HYDROCODONE BITARTRATE AND ACETAMINOPHEN 5; 325 MG/1; MG/1
1 TABLET ORAL 4 TIMES DAILY PRN
Qty: 120 TABLET | Refills: 0 | Status: SHIPPED | OUTPATIENT
Start: 2019-10-09 | End: 2019-11-05 | Stop reason: SDUPTHER

## 2019-10-16 ENCOUNTER — HOSPITAL ENCOUNTER (OUTPATIENT)
Dept: GENERAL RADIOLOGY | Age: 84
Discharge: HOME | End: 2019-10-16
Payer: MEDICARE

## 2019-10-16 ENCOUNTER — HOSPITAL ENCOUNTER (OUTPATIENT)
Dept: CT IMAGING | Age: 84
Discharge: HOME | End: 2019-10-16
Payer: MEDICARE

## 2019-10-16 DIAGNOSIS — T17.908S ASPIRATION INTO AIRWAY, SEQUELA: ICD-10-CM

## 2019-10-16 DIAGNOSIS — R05.3 CHRONIC COUGH: ICD-10-CM

## 2019-10-16 PROCEDURE — 74230 X-RAY XM SWLNG FUNCJ C+: CPT

## 2019-10-16 PROCEDURE — 71250 CT THORAX DX C-: CPT

## 2019-10-16 PROCEDURE — 92611 MOTION FLUOROSCOPY/SWALLOW: CPT

## 2019-10-16 PROCEDURE — 92526 ORAL FUNCTION THERAPY: CPT

## 2019-10-17 RX ORDER — AMLODIPINE BESYLATE 5 MG/1
TABLET ORAL
Qty: 90 TABLET | Refills: 1 | Status: SHIPPED | OUTPATIENT
Start: 2019-10-17 | End: 2020-03-12

## 2019-10-17 RX ORDER — PANTOPRAZOLE SODIUM 40 MG/1
TABLET, DELAYED RELEASE ORAL
Qty: 90 TABLET | Refills: 1 | Status: SHIPPED | OUTPATIENT
Start: 2019-10-17 | End: 2020-03-12

## 2019-10-25 ENCOUNTER — OFFICE VISIT (OUTPATIENT)
Dept: PULMONOLOGY | Age: 84
End: 2019-10-25
Payer: MEDICARE

## 2019-10-25 VITALS
OXYGEN SATURATION: 95 % | BODY MASS INDEX: 30.91 KG/M2 | WEIGHT: 169 LBS | DIASTOLIC BLOOD PRESSURE: 70 MMHG | SYSTOLIC BLOOD PRESSURE: 146 MMHG | HEART RATE: 76 BPM

## 2019-10-25 DIAGNOSIS — K21.9 GASTROESOPHAGEAL REFLUX DISEASE, ESOPHAGITIS PRESENCE NOT SPECIFIED: ICD-10-CM

## 2019-10-25 DIAGNOSIS — R05.3 CHRONIC COUGH: ICD-10-CM

## 2019-10-25 DIAGNOSIS — J84.9 INTERSTITIAL LUNG DISEASE (HCC): ICD-10-CM

## 2019-10-25 DIAGNOSIS — J44.9 COPD, MILD (HCC): Primary | ICD-10-CM

## 2019-10-25 DIAGNOSIS — J47.9 BRONCHIECTASIS WITHOUT COMPLICATION (HCC): ICD-10-CM

## 2019-10-25 DIAGNOSIS — R06.2 WHEEZING: ICD-10-CM

## 2019-10-25 PROCEDURE — G8926 SPIRO NO PERF OR DOC: HCPCS | Performed by: INTERNAL MEDICINE

## 2019-10-25 PROCEDURE — G8598 ASA/ANTIPLAT THER USED: HCPCS | Performed by: INTERNAL MEDICINE

## 2019-10-25 PROCEDURE — 1090F PRES/ABSN URINE INCON ASSESS: CPT | Performed by: INTERNAL MEDICINE

## 2019-10-25 PROCEDURE — 4040F PNEUMOC VAC/ADMIN/RCVD: CPT | Performed by: INTERNAL MEDICINE

## 2019-10-25 PROCEDURE — G8399 PT W/DXA RESULTS DOCUMENT: HCPCS | Performed by: INTERNAL MEDICINE

## 2019-10-25 PROCEDURE — G8417 CALC BMI ABV UP PARAM F/U: HCPCS | Performed by: INTERNAL MEDICINE

## 2019-10-25 PROCEDURE — 3023F SPIROM DOC REV: CPT | Performed by: INTERNAL MEDICINE

## 2019-10-25 PROCEDURE — 99214 OFFICE O/P EST MOD 30 MIN: CPT | Performed by: INTERNAL MEDICINE

## 2019-10-25 PROCEDURE — G8427 DOCREV CUR MEDS BY ELIG CLIN: HCPCS | Performed by: INTERNAL MEDICINE

## 2019-10-25 PROCEDURE — 1036F TOBACCO NON-USER: CPT | Performed by: INTERNAL MEDICINE

## 2019-10-25 PROCEDURE — 1123F ACP DISCUSS/DSCN MKR DOCD: CPT | Performed by: INTERNAL MEDICINE

## 2019-10-25 PROCEDURE — G8484 FLU IMMUNIZE NO ADMIN: HCPCS | Performed by: INTERNAL MEDICINE

## 2019-10-25 RX ORDER — DOXYCYCLINE HYCLATE 100 MG/1
100 CAPSULE ORAL DAILY
Qty: 10 CAPSULE | Refills: 3 | Status: SHIPPED | OUTPATIENT
Start: 2019-10-25 | End: 2019-11-04

## 2019-10-25 RX ORDER — ALBUTEROL SULFATE 90 UG/1
2 AEROSOL, METERED RESPIRATORY (INHALATION) EVERY 6 HOURS PRN
Qty: 1 INHALER | Refills: 6 | Status: SHIPPED | OUTPATIENT
Start: 2019-10-25

## 2019-10-28 RX ORDER — RIVAROXABAN 15 MG/1
TABLET, FILM COATED ORAL
Qty: 90 TABLET | Refills: 3 | Status: SHIPPED | OUTPATIENT
Start: 2019-10-28 | End: 2020-01-30 | Stop reason: SDUPTHER

## 2019-11-05 DIAGNOSIS — F41.9 ANXIETY: ICD-10-CM

## 2019-11-05 DIAGNOSIS — M15.9 PRIMARY OSTEOARTHRITIS INVOLVING MULTIPLE JOINTS: ICD-10-CM

## 2019-11-05 DIAGNOSIS — F51.01 PRIMARY INSOMNIA: ICD-10-CM

## 2019-11-05 RX ORDER — HYDROCODONE BITARTRATE AND ACETAMINOPHEN 5; 325 MG/1; MG/1
1 TABLET ORAL 4 TIMES DAILY PRN
Qty: 120 TABLET | Refills: 0 | Status: SHIPPED | OUTPATIENT
Start: 2019-11-05 | End: 2019-12-02 | Stop reason: SDUPTHER

## 2019-11-05 RX ORDER — LORAZEPAM 0.5 MG/1
TABLET ORAL
Qty: 120 TABLET | Refills: 0 | Status: SHIPPED | OUTPATIENT
Start: 2019-11-05 | End: 2019-12-02 | Stop reason: SDUPTHER

## 2019-11-13 ENCOUNTER — HOSPITAL ENCOUNTER (INPATIENT)
Age: 84
LOS: 1 days | Discharge: HOME | DRG: 310 | End: 2019-11-14
Attending: EMERGENCY MEDICINE | Admitting: HOSPITALIST
Payer: MEDICARE

## 2019-11-13 ENCOUNTER — APPOINTMENT (OUTPATIENT)
Dept: GENERAL RADIOLOGY | Age: 84
DRG: 310 | End: 2019-11-13
Payer: MEDICARE

## 2019-11-13 PROBLEM — I48.91 ATRIAL FIBRILLATION WITH RVR (HCC): Status: ACTIVE | Noted: 2019-11-13

## 2019-11-13 LAB
A/G RATIO: 1.4 (ref 1.1–2.2)
ALBUMIN SERPL-MCNC: 4.2 G/DL (ref 3.4–5)
ALP BLD-CCNC: 216 U/L (ref 40–129)
ALT SERPL-CCNC: 110 U/L (ref 10–40)
ANION GAP SERPL CALCULATED.3IONS-SCNC: 13 MMOL/L (ref 3–16)
AST SERPL-CCNC: 48 U/L (ref 15–37)
BASOPHILS ABSOLUTE: 0 K/UL (ref 0–0.2)
BASOPHILS RELATIVE PERCENT: 0.8 %
BILIRUB SERPL-MCNC: 0.5 MG/DL (ref 0–1)
BILIRUBIN URINE: NEGATIVE
BLOOD, URINE: NEGATIVE
BUN BLDV-MCNC: 25 MG/DL (ref 7–20)
CALCIUM SERPL-MCNC: 9.3 MG/DL (ref 8.3–10.6)
CHLORIDE BLD-SCNC: 100 MMOL/L (ref 99–110)
CLARITY: CLEAR
CO2: 23 MMOL/L (ref 21–32)
COLOR: YELLOW
CREAT SERPL-MCNC: 1.1 MG/DL (ref 0.6–1.2)
EKG ATRIAL RATE: 159 BPM
EKG DIAGNOSIS: NORMAL
EKG Q-T INTERVAL: 304 MS
EKG QRS DURATION: 96 MS
EKG QTC CALCULATION (BAZETT): 472 MS
EKG R AXIS: 50 DEGREES
EKG T AXIS: -62 DEGREES
EKG VENTRICULAR RATE: 145 BPM
EOSINOPHILS ABSOLUTE: 0.1 K/UL (ref 0–0.6)
EOSINOPHILS RELATIVE PERCENT: 3.4 %
EPITHELIAL CELLS, UA: 0 /HPF (ref 0–5)
GFR AFRICAN AMERICAN: 57
GFR NON-AFRICAN AMERICAN: 47
GLOBULIN: 3 G/DL
GLUCOSE BLD-MCNC: 112 MG/DL (ref 70–99)
GLUCOSE BLD-MCNC: 114 MG/DL (ref 70–99)
GLUCOSE BLD-MCNC: 137 MG/DL (ref 70–99)
GLUCOSE BLD-MCNC: 139 MG/DL (ref 70–99)
GLUCOSE BLD-MCNC: 170 MG/DL (ref 70–99)
GLUCOSE URINE: NEGATIVE MG/DL
HCT VFR BLD CALC: 37.3 % (ref 36–48)
HEMOGLOBIN: 12.4 G/DL (ref 12–16)
HYALINE CASTS: 1 /LPF (ref 0–8)
KETONES, URINE: NEGATIVE MG/DL
LEUKOCYTE ESTERASE, URINE: ABNORMAL
LYMPHOCYTES ABSOLUTE: 1.1 K/UL (ref 1–5.1)
LYMPHOCYTES RELATIVE PERCENT: 26.6 %
MCH RBC QN AUTO: 29.3 PG (ref 26–34)
MCHC RBC AUTO-ENTMCNC: 33.2 G/DL (ref 31–36)
MCV RBC AUTO: 88.5 FL (ref 80–100)
MICROSCOPIC EXAMINATION: YES
MONOCYTES ABSOLUTE: 0.3 K/UL (ref 0–1.3)
MONOCYTES RELATIVE PERCENT: 8.1 %
NEUTROPHILS ABSOLUTE: 2.5 K/UL (ref 1.7–7.7)
NEUTROPHILS RELATIVE PERCENT: 61.1 %
NITRITE, URINE: NEGATIVE
PDW BLD-RTO: 15.1 % (ref 12.4–15.4)
PERFORMED ON: ABNORMAL
PH UA: 7 (ref 5–8)
PLATELET # BLD: 105 K/UL (ref 135–450)
PMV BLD AUTO: 8.4 FL (ref 5–10.5)
POTASSIUM REFLEX MAGNESIUM: 4.4 MMOL/L (ref 3.5–5.1)
PRO-BNP: 325 PG/ML (ref 0–449)
PROTEIN UA: ABNORMAL MG/DL
RBC # BLD: 4.22 M/UL (ref 4–5.2)
RBC UA: 1 /HPF (ref 0–4)
SODIUM BLD-SCNC: 136 MMOL/L (ref 136–145)
SPECIFIC GRAVITY UA: 1.01 (ref 1–1.03)
TOTAL PROTEIN: 7.2 G/DL (ref 6.4–8.2)
TROPONIN: <0.01 NG/ML
URINE REFLEX TO CULTURE: YES
URINE TYPE: ABNORMAL
UROBILINOGEN, URINE: 0.2 E.U./DL
WBC # BLD: 4.1 K/UL (ref 4–11)
WBC UA: 4 /HPF (ref 0–5)

## 2019-11-13 PROCEDURE — 96361 HYDRATE IV INFUSION ADD-ON: CPT

## 2019-11-13 PROCEDURE — 83880 ASSAY OF NATRIURETIC PEPTIDE: CPT

## 2019-11-13 PROCEDURE — 99223 1ST HOSP IP/OBS HIGH 75: CPT | Performed by: INTERNAL MEDICINE

## 2019-11-13 PROCEDURE — 2580000003 HC RX 258: Performed by: INTERNAL MEDICINE

## 2019-11-13 PROCEDURE — 2580000003 HC RX 258: Performed by: EMERGENCY MEDICINE

## 2019-11-13 PROCEDURE — 2700000000 HC OXYGEN THERAPY PER DAY

## 2019-11-13 PROCEDURE — 80053 COMPREHEN METABOLIC PANEL: CPT

## 2019-11-13 PROCEDURE — 2060000000 HC ICU INTERMEDIATE R&B

## 2019-11-13 PROCEDURE — 94640 AIRWAY INHALATION TREATMENT: CPT

## 2019-11-13 PROCEDURE — 96374 THER/PROPH/DIAG INJ IV PUSH: CPT

## 2019-11-13 PROCEDURE — 84484 ASSAY OF TROPONIN QUANT: CPT

## 2019-11-13 PROCEDURE — APPSS45 APP SPLIT SHARED TIME 31-45 MINUTES: Performed by: NURSE PRACTITIONER

## 2019-11-13 PROCEDURE — 93010 ELECTROCARDIOGRAM REPORT: CPT | Performed by: INTERNAL MEDICINE

## 2019-11-13 PROCEDURE — 99291 CRITICAL CARE FIRST HOUR: CPT

## 2019-11-13 PROCEDURE — 6370000000 HC RX 637 (ALT 250 FOR IP): Performed by: HOSPITALIST

## 2019-11-13 PROCEDURE — 96365 THER/PROPH/DIAG IV INF INIT: CPT

## 2019-11-13 PROCEDURE — 2500000003 HC RX 250 WO HCPCS: Performed by: EMERGENCY MEDICINE

## 2019-11-13 PROCEDURE — 96366 THER/PROPH/DIAG IV INF ADDON: CPT

## 2019-11-13 PROCEDURE — 85025 COMPLETE CBC W/AUTO DIFF WBC: CPT

## 2019-11-13 PROCEDURE — 94761 N-INVAS EAR/PLS OXIMETRY MLT: CPT

## 2019-11-13 PROCEDURE — 71046 X-RAY EXAM CHEST 2 VIEWS: CPT

## 2019-11-13 PROCEDURE — 87086 URINE CULTURE/COLONY COUNT: CPT

## 2019-11-13 PROCEDURE — 93005 ELECTROCARDIOGRAM TRACING: CPT | Performed by: EMERGENCY MEDICINE

## 2019-11-13 PROCEDURE — 96376 TX/PRO/DX INJ SAME DRUG ADON: CPT

## 2019-11-13 PROCEDURE — 81001 URINALYSIS AUTO W/SCOPE: CPT

## 2019-11-13 RX ORDER — DILTIAZEM HYDROCHLORIDE 5 MG/ML
10 INJECTION INTRAVENOUS ONCE
Status: COMPLETED | OUTPATIENT
Start: 2019-11-13 | End: 2019-11-13

## 2019-11-13 RX ORDER — NICOTINE POLACRILEX 4 MG
15 LOZENGE BUCCAL PRN
Status: DISCONTINUED | OUTPATIENT
Start: 2019-11-13 | End: 2019-11-14 | Stop reason: HOSPADM

## 2019-11-13 RX ORDER — 0.9 % SODIUM CHLORIDE 0.9 %
500 INTRAVENOUS SOLUTION INTRAVENOUS ONCE
Status: COMPLETED | OUTPATIENT
Start: 2019-11-13 | End: 2019-11-13

## 2019-11-13 RX ORDER — SODIUM CHLORIDE 0.9 % (FLUSH) 0.9 %
10 SYRINGE (ML) INJECTION EVERY 12 HOURS SCHEDULED
Status: DISCONTINUED | OUTPATIENT
Start: 2019-11-13 | End: 2019-11-14 | Stop reason: HOSPADM

## 2019-11-13 RX ORDER — FLUTICASONE PROPIONATE 50 MCG
1 SPRAY, SUSPENSION (ML) NASAL DAILY PRN
Status: DISCONTINUED | OUTPATIENT
Start: 2019-11-13 | End: 2019-11-14 | Stop reason: HOSPADM

## 2019-11-13 RX ORDER — DIAPER,BRIEF,INFANT-TODD,DISP
EACH MISCELLANEOUS 2 TIMES DAILY
Status: DISCONTINUED | OUTPATIENT
Start: 2019-11-13 | End: 2019-11-14 | Stop reason: HOSPADM

## 2019-11-13 RX ORDER — DEXTROSE MONOHYDRATE 50 MG/ML
100 INJECTION, SOLUTION INTRAVENOUS PRN
Status: DISCONTINUED | OUTPATIENT
Start: 2019-11-13 | End: 2019-11-14 | Stop reason: HOSPADM

## 2019-11-13 RX ORDER — GABAPENTIN 100 MG/1
100 CAPSULE ORAL NIGHTLY PRN
Status: DISCONTINUED | OUTPATIENT
Start: 2019-11-13 | End: 2019-11-14 | Stop reason: HOSPADM

## 2019-11-13 RX ORDER — ACETAMINOPHEN 325 MG/1
650 TABLET ORAL EVERY 4 HOURS PRN
Status: DISCONTINUED | OUTPATIENT
Start: 2019-11-13 | End: 2019-11-14 | Stop reason: HOSPADM

## 2019-11-13 RX ORDER — PANTOPRAZOLE SODIUM 40 MG/1
40 TABLET, DELAYED RELEASE ORAL DAILY
Status: DISCONTINUED | OUTPATIENT
Start: 2019-11-13 | End: 2019-11-14 | Stop reason: HOSPADM

## 2019-11-13 RX ORDER — ROPINIROLE 1 MG/1
2 TABLET, FILM COATED ORAL 3 TIMES DAILY
Status: DISCONTINUED | OUTPATIENT
Start: 2019-11-13 | End: 2019-11-14 | Stop reason: HOSPADM

## 2019-11-13 RX ORDER — DEXTROSE MONOHYDRATE 25 G/50ML
12.5 INJECTION, SOLUTION INTRAVENOUS PRN
Status: DISCONTINUED | OUTPATIENT
Start: 2019-11-13 | End: 2019-11-14 | Stop reason: HOSPADM

## 2019-11-13 RX ORDER — LEVOTHYROXINE SODIUM 0.03 MG/1
50 TABLET ORAL DAILY
Status: DISCONTINUED | OUTPATIENT
Start: 2019-11-13 | End: 2019-11-14 | Stop reason: HOSPADM

## 2019-11-13 RX ORDER — ONDANSETRON 2 MG/ML
4 INJECTION INTRAMUSCULAR; INTRAVENOUS EVERY 6 HOURS PRN
Status: DISCONTINUED | OUTPATIENT
Start: 2019-11-13 | End: 2019-11-14 | Stop reason: HOSPADM

## 2019-11-13 RX ORDER — LORAZEPAM 0.5 MG/1
0.5 TABLET ORAL 3 TIMES DAILY PRN
Status: DISCONTINUED | OUTPATIENT
Start: 2019-11-13 | End: 2019-11-14 | Stop reason: HOSPADM

## 2019-11-13 RX ORDER — SODIUM CHLORIDE 0.9 % (FLUSH) 0.9 %
10 SYRINGE (ML) INJECTION PRN
Status: DISCONTINUED | OUTPATIENT
Start: 2019-11-13 | End: 2019-11-14 | Stop reason: HOSPADM

## 2019-11-13 RX ORDER — DILTIAZEM HYDROCHLORIDE 60 MG/1
60 CAPSULE, EXTENDED RELEASE ORAL 2 TIMES DAILY PRN
Status: DISCONTINUED | OUTPATIENT
Start: 2019-11-13 | End: 2019-11-14 | Stop reason: HOSPADM

## 2019-11-13 RX ORDER — DICYCLOMINE HYDROCHLORIDE 10 MG/1
10 CAPSULE ORAL 4 TIMES DAILY PRN
Status: DISCONTINUED | OUTPATIENT
Start: 2019-11-13 | End: 2019-11-14 | Stop reason: HOSPADM

## 2019-11-13 RX ORDER — INSULIN LISPRO 100 [IU]/ML
0-6 INJECTION, SOLUTION INTRAVENOUS; SUBCUTANEOUS
Status: DISCONTINUED | OUTPATIENT
Start: 2019-11-13 | End: 2019-11-14 | Stop reason: HOSPADM

## 2019-11-13 RX ORDER — HYDROCODONE BITARTRATE AND ACETAMINOPHEN 5; 325 MG/1; MG/1
1 TABLET ORAL EVERY 6 HOURS PRN
Status: DISCONTINUED | OUTPATIENT
Start: 2019-11-13 | End: 2019-11-14 | Stop reason: HOSPADM

## 2019-11-13 RX ORDER — PROPAFENONE HYDROCHLORIDE 150 MG/1
150 TABLET, FILM COATED ORAL EVERY 8 HOURS SCHEDULED
Status: DISCONTINUED | OUTPATIENT
Start: 2019-11-13 | End: 2019-11-14 | Stop reason: HOSPADM

## 2019-11-13 RX ORDER — INSULIN LISPRO 100 [IU]/ML
0-3 INJECTION, SOLUTION INTRAVENOUS; SUBCUTANEOUS NIGHTLY
Status: DISCONTINUED | OUTPATIENT
Start: 2019-11-13 | End: 2019-11-14 | Stop reason: HOSPADM

## 2019-11-13 RX ADMIN — LEVOTHYROXINE SODIUM 50 MCG: 25 TABLET ORAL at 09:03

## 2019-11-13 RX ADMIN — Medication 2 PUFF: at 20:17

## 2019-11-13 RX ADMIN — HYDROCORTISONE: 1 CREAM TOPICAL at 10:54

## 2019-11-13 RX ADMIN — PROPAFENONE HYDROCHLORIDE 150 MG: 150 TABLET, FILM COATED ORAL at 21:12

## 2019-11-13 RX ADMIN — Medication 2 PUFF: at 13:13

## 2019-11-13 RX ADMIN — DILTIAZEM HYDROCHLORIDE 10 MG: 5 INJECTION INTRAVENOUS at 02:22

## 2019-11-13 RX ADMIN — INSULIN LISPRO 1 UNITS: 100 INJECTION, SOLUTION INTRAVENOUS; SUBCUTANEOUS at 21:52

## 2019-11-13 RX ADMIN — RIVAROXABAN 15 MG: 15 TABLET, FILM COATED ORAL at 17:30

## 2019-11-13 RX ADMIN — DILTIAZEM HYDROCHLORIDE 10 MG: 5 INJECTION INTRAVENOUS at 04:30

## 2019-11-13 RX ADMIN — HYDROCORTISONE: 1 CREAM TOPICAL at 21:12

## 2019-11-13 RX ADMIN — SODIUM CHLORIDE 500 ML: 9 INJECTION, SOLUTION INTRAVENOUS at 02:21

## 2019-11-13 RX ADMIN — ROPINIROLE HYDROCHLORIDE 2 MG: 1 TABLET, FILM COATED ORAL at 09:03

## 2019-11-13 RX ADMIN — Medication 10 ML: at 09:03

## 2019-11-13 RX ADMIN — PROPAFENONE HYDROCHLORIDE 150 MG: 150 TABLET, FILM COATED ORAL at 09:03

## 2019-11-13 RX ADMIN — HYDROCODONE BITARTRATE AND ACETAMINOPHEN 1 TABLET: 5; 325 TABLET ORAL at 13:52

## 2019-11-13 RX ADMIN — Medication 10 ML: at 21:13

## 2019-11-13 RX ADMIN — LORAZEPAM 0.5 MG: 0.5 TABLET ORAL at 21:12

## 2019-11-13 RX ADMIN — ROPINIROLE HYDROCHLORIDE 2 MG: 1 TABLET, FILM COATED ORAL at 13:48

## 2019-11-13 RX ADMIN — DILTIAZEM HYDROCHLORIDE 5 MG/HR: 5 INJECTION INTRAVENOUS at 04:45

## 2019-11-13 RX ADMIN — PANTOPRAZOLE SODIUM 40 MG: 40 TABLET, DELAYED RELEASE ORAL at 09:03

## 2019-11-13 RX ADMIN — PROPAFENONE HYDROCHLORIDE 150 MG: 150 TABLET, FILM COATED ORAL at 13:48

## 2019-11-13 RX ADMIN — ROPINIROLE HYDROCHLORIDE 2 MG: 1 TABLET, FILM COATED ORAL at 21:12

## 2019-11-13 ASSESSMENT — PAIN SCALES - GENERAL
PAINLEVEL_OUTOF10: 3
PAINLEVEL_OUTOF10: 0
PAINLEVEL_OUTOF10: 0
PAINLEVEL_OUTOF10: 3
PAINLEVEL_OUTOF10: 3
PAINLEVEL_OUTOF10: 0
PAINLEVEL_OUTOF10: 0
PAINLEVEL_OUTOF10: 5

## 2019-11-13 NOTE — PROGRESS NOTES
 Macrobid [Nitrofurantoin Macrocrystal]      nausea    Motrin [Ibuprofen Micronized] Other (See Comments)     Tachycardia      Nsaids      Pt states she has hives and heart races     Pcn [Penicillins] Hives    Peach [Prunus Persica] Itching and Swelling     Cannot eat raw peaches    Ceclor [Cefaclor] Nausea And Vomiting       Home Meds:  Prior to Visit Medications    Medication Sig Taking? Authorizing Provider   HYDROcodone-acetaminophen (NORCO) 5-325 MG per tablet Take 1 tablet by mouth 4 times daily as needed for Pain for up to 30 days. Yes Timmy Luna MD   LORazepam (ATIVAN) 0.5 MG tablet TAKE 1 TABLET BY MOUTH TWICE DAILY AS NEEDED FOR ANXIETY AND TAKE 2 TABLETS BY MOUTH EVERY NIGHT AT BEDTIME Yes Timmy Luna MD   XARELTO 15 MG TABS tablet TAKE 1 TABLET BY MOUTH DAILY Yes KATHY Hidalgo - CNP   albuterol sulfate HFA (PROAIR HFA) 108 (90 Base) MCG/ACT inhaler Inhale 2 puffs into the lungs every 6 hours as needed for Wheezing Yes Curtis [address] MD   amLODIPine (NORVASC) 5 MG tablet TAKE 1 TABLET EVERY DAY Yes Timmy Luna MD   pantoprazole (PROTONIX) 40 MG tablet TAKE 1 TABLET EVERY DAY Yes Timmy Luna MD   meclizine (ANTIVERT) 12.5 MG tablet TAKE 1 TABLET THREE TIMES DAILY AS NEEDED Yes Timmy Luna MD   rOPINIRole (REQUIP) 2 MG tablet One tablet 3 times daily Yes Timmy Luna MD   blood glucose test strips (TRUE METRIX BLOOD GLUCOSE TEST) strip 1 each by In Vitro route daily Yes Timmy Luna MD   gabapentin (NEURONTIN) 100 MG capsule Take 1 capsule by mouth nightly as needed (neuropathy) for up to 122 days.  Yes Timmy Luna MD   blood glucose monitor strips Test one time a day Yes Timmy Luna MD   Lancets MISC 1 each by Does not apply route 2 times daily Yes Navid Benton MD   glucose monitoring kit (FREESTYLE) monitoring kit 1 kit by Does not apply route daily Yes Navid Benton MD   fluticasone (FLONASE) 50 MCG/ACT nasal spray USE 2 SPRAYS IN EACH NOSTRIL EVERY EVENING Yes Radha Ingram MD   levothyroxine (SYNTHROID) 50 MCG tablet TAKE 1 TABLET EVERY DAY Yes Radha Ingram MD   dicyclomine (BENTYL) 10 MG capsule Take 1 capsule by mouth 4 times daily as needed (abd pain) Yes Radha Ingram MD   irbesartan (AVAPRO) 300 MG tablet TAKE 1 TABLET BY MOUTH EVERY NIGHT Yes Radha Ingram MD   budesonide-formoterol (SYMBICORT) 160-4.5 MCG/ACT AERO Inhale 2 puffs into the lungs 2 times daily Yes Radha Ingram MD   ipratropium-albuterol (DUONEB) 0.5-2.5 (3) MG/3ML SOLN nebulizer solution INHALE THE CONTENTS OF 1 VIAL VIA NEBULIZER EVERY 4 HOURS Yes Radha Ingram MD   atorvastatin (LIPITOR) 80 MG tablet Take 1 tablet by mouth nightly Yes Angella Sherman MD   propafenone (RYTHMOL) 150 MG tablet Take 1 tablet by mouth every 8 hours Yes Angella Sherman MD   aspirin 81 MG tablet Take 81 mg by mouth every other day  Yes Historical Provider, MD        Past Medical History:  Past Medical History:   Diagnosis Date    Arthritis     Atrial fibrillation ([facility]     Diabetes mellitus type II, controlled ([facility]     GERD (gastroesophageal reflux disease)     HTN (hypertension)     Hyperlipidemia     Hypothyroid     Insomnia     Lumbago     SVT (supraventricular tachycardia) (HCC)         Past Surgical History:    has a past surgical history that includes Appendectomy; Colonoscopy; and Cholecystectomy, laparoscopic (2/24/2013). Social History:  Reviewed. reports that she quit smoking about 23 years ago. She started smoking about 69 years ago. She has never used smokeless tobacco. She reports that she does not drink alcohol or use drugs. Family History:  Reviewed. family history includes Asthma in her paternal uncle; Heart Attack in her father; Heart Disease in her father; High Blood Pressure in her mother; Other in her brother.    Denies family history of sudden cardiac death, arrhythmia, premature CAD    Review of atrial fibrillation ([facility] 12/12/2018    Controlled type 2 diabetes mellitus without complication, without long-term current use of insulin ([facility] 01/18/2017    Restless legs syndrome 01/18/2017    Osteoarthritis 06/18/2015    Overactive bladder 12/11/2013    IBS (irritable bowel syndrome) 05/02/2013    Palpitations 11/09/2012    GERD (gastroesophageal reflux disease)     HTN (hypertension)     Hyperlipidemia     Insomnia         Assessment:  Paroxysmal atrial fibrillation w/ RVR              -in NSR 65 w/ PACs   -S/p TRACEY/DCCV (8/31/18, Dr. Brit Bustillo)               -on Propafenone 150 mg TID    -bradycardia with IV Cardizem this admission  - [id number] score: at least 6 (age, gender, HTN, PAD, DM) ; [id number] Vasc score and anticoagulation discussed. High risk for stroke and thromboembolism. Anticoagulation is recommended.   ~ On Xarelto 15 mg  - Afib risk factors including age, HTN, obesity, inactivity and ANTHONY were discussed with patient. Risk factor modification recommended   ~ TSH 3.96 (9/16/19)      HTN  Goal <130/80   -Controlled      PAD/carotid atherosclerosis               On high dose lipitor. Plan:  -Continue current medications  -Treatment options including cardioversion, rate control strategy, antiarrhythmics, anticoagulation and possible ablation were discussed with patient. Risks, benefits and alternative of each treatment options were explained. All questions answered    EF of 41%  ACEi for systolic HF: N/A  ASA and Statin for CAD: N/A  Anticoagulation for AF and heart failure: Xarelto  Tobacco use was discussed with the patient and education provided: Nonsmoker    All pertinent information and plan of care discussed with the EP physician. All questions and concerns were addressed to the patient/family. Alternatives to my treatment were discussed. I have discussed the above stated plan and the patient verbalized understanding and agreed with the plan.     Discussed plan with patient and nurse.    Thank you for allowing to us to participate in the care of Irina [place].     KATHY Irizarry-CNP  [place]    Office: [phone]

## 2019-11-13 NOTE — CONSULTS
Itching and Swelling     Cannot eat raw peaches    Ceclor [Cefaclor] Nausea And Vomiting       Social History:  Reviewed. reports that she quit smoking about 23 years ago. She started smoking about 69 years ago. She has never used smokeless tobacco. She reports that she does not drink alcohol or use drugs. Family History:  Reviewed. family history includes Asthma in her paternal uncle; Heart Attack in her father; Heart Disease in her father; High Blood Pressure in her mother; Other in her brother. Review of System:  All other systems reviewed except for that noted above. Pertinent negatives and positives are:       · General: negative for fever, chills   · Ophthalmic ROS: negative for - eye pain or loss of vision  · ENT ROS: negative for - headaches, sore throat   · Respiratory: negative for - cough, sputum  · Cardiovascular: Reviewed in HPI  · Gastrointestinal: negative for - abdominal pain, diarrhea, N/V  · Hematology: negative for - bleeding, blood clots, bruising or jaundice  · Genito-Urinary:  negative for - Dysuria or incontinence  · Musculoskeletal: negative for - Joint swelling, muscle pain  · Neurological: negative for - confusion, dizziness, headaches   · Psychiatric: No anxiety, no depression. · Dermatological: negative for - rash    Physical Examination:  Vitals:    19 1345   BP: (!) 142/64   Pulse: 69   Resp: 16   Temp: 96.7 °F (35.9 °C)   SpO2: 94%      In: 250 [P.O.:240;  I.V.:10]  Out: -    Wt Readings from Last 3 Encounters:   19 170 lb 3.1 oz (77.2 kg)   10/25/19 169 lb (76.7 kg)   19 169 lb 3.2 oz (76.7 kg)     Temp  Av.3 °F (36.3 °C)  Min: 96.7 °F (35.9 °C)  Max: 97.8 °F (36.6 °C)  Pulse  Av.1  Min: 51  Max: 144  BP  Min: 110/64  Max: 176/76  SpO2  Av.4 %  Min: 90 %  Max: 96 %    Intake/Output Summary (Last 24 hours) at 2019 1406  Last data filed at 2019 0903  Gross per 24 hour   Intake 250 ml   Output --   Net 250 ml       · Constitutional: Oriented. No distress. · Head: Normocephalic and atraumatic. · Mouth/Throat: Oropharynx is clear and moist.   · Eyes: Conjunctivae normal. EOM are normal.   · Neck: Neck supple. No rigidity. No JVD present. · Cardiovascular: Normal rate, regular rhythm, S1&S2. Faint systolic murmur  · Pulmonary/Chest: Bilateral respiratory sounds. No wheezes, No rhonchi. · Abdominal: Soft. Bowel sounds present. No distension, No tenderness. · Musculoskeletal: No tenderness. No edema    · Lymphadenopathy: Has no cervical adenopathy. · Neurological: Alert and oriented. Cranial nerve appears intact, No Gross deficit   · Skin: Skin is warm and dry. No rash noted. · Psychiatric: Has a normal behavior     Labs, diagnostic and imaging results reviewed. Reviewed.    Recent Labs     11/13/19 0226      K 4.4      CO2 23   BUN 25*   CREATININE 1.1     Recent Labs     11/13/19 0226   WBC 4.1   HGB 12.4   HCT 37.3   MCV 88.5   *     Lab Results   Component Value Date    TROPONINI <0.01 11/13/2019     No results found for: BNP  Lab Results   Component Value Date    PROTIME 10.7 08/29/2018    PROTIME 12.0 09/16/2017    PROTIME 10.9 12/10/2013    INR 0.94 08/29/2018    INR 1.06 09/16/2017    INR 0.97 12/10/2013     Lab Results   Component Value Date    CHOL 224 08/12/2017    HDL 39 08/12/2017    HDL 32 06/23/2011    TRIG 226 08/12/2017       ECG: Atrial fibrillation with rapid ventricular rate  Echo: Left ventricle - normal size, thickness and function with EF of 60%     Aortic valve - sclerotic, mild regurgitation    Scheduled Meds:   sodium chloride flush  10 mL Intravenous 2 times per day    mometasone-formoterol  2 puff Inhalation BID    levothyroxine  50 mcg Oral Daily    pantoprazole  40 mg Oral Daily    propafenone  150 mg Oral 3 times per day    rOPINIRole  2 mg Oral TID    rivaroxaban  15 mg Oral Daily    insulin lispro  0-6 Units Subcutaneous TID     insulin lispro  0-3 Units 07/18/2019       Assessment and plan:     -Paroxysmal atrial fibrillation   Patient is symptomatic with episodes of atrial fibrillation. Treated with IV Cardizem   Converted to sinus rhythm   DC IV Cardizem     She was relatively bradycardic after conversion to sinus rhythm. Discussed treatment options including ablation versus medical therapy. Risk-benefit alternatives discussed   She is not interested in invasive therapy including atrial for ablation ablation which is reasonable due to her age and multiple comorbidities     Will use Cardizem as needed at home with episodes of atrial fibrillation rapid ventricular rate   Recommend taking propafenone 3 times a day as prescribed. Aggressive risk factor modifications recommended. She will be at risk for episodes of atrial fibrillation during acute illness. High [id number] score and high risk for stroke and thromboembolism. Anticoagulation is recommended. On Xarelto    HTN:  Controlled. Continue current medications. PAD: on lipitor. Thank you for allowing me to participate in the care of Luiz Ford     All questions and concerns were addressed to the patient/family. Alternatives to my treatment were discussed. I have discussed the above stated plan and the patient verbalized understanding and agreed with the plan. NOTE: This report was transcribed using voice recognition software. Every effort was made to ensure accuracy, however, inadvertent computerized transcription errors may be present.      Kenzie Urban MD, MPH  [facility]   Office: [phone]

## 2019-11-13 NOTE — ED PROVIDER NOTES
[facility] [address] [place] [address]  [facility]      Pt Name: Lorin Monaco  MRN: [id number]  [place] 1935  Date of evaluation: 11/13/2019  Provider: Susy Arce MD    CHIEF COMPLAINT       Chief Complaint   Patient presents with    Palpitations     Pt presents to ED from home c/o palpitations while going to the bathroom this evening. Pt stated they have hx of afib bit hav not had a flare up in along time. Pt takes xarelto. HISTORY OF PRESENT ILLNESS   (Location/Symptom, Timing/Onset,Context/Setting, Quality, Duration, Modifying Factors, Severity)  Note limiting factors. Lorin Monaco is a 80 y.o. female who presents to the emergency department for malaise and palpitations. The patient states that she was getting ready for bed when she felt like her heart was racing. She has a history of paroxysmal A. fib. She is on Xarelto. She said is been a long time since her A. fib has acted up. She also complains that over the past few days she has been feeling more tired than usual.  No fevers or chills. No cough. No urinary complaints. Nursing notes were reviewed. REVIEW OF SYSTEMS    (2-9 systems for level 4, 10 or more for level 5)     Review of Systems    Positive and pertinent negative as per HPI. Except as noted above in the ROS, all other systems were reviewed and were negative.     PAST MEDICAL HISTORY     Past Medical History:   Diagnosis Date    Arthritis     Atrial fibrillation ([address]     Diabetes mellitus type II, controlled ([address]     GERD (gastroesophageal reflux disease)     HTN (hypertension)     Hyperlipidemia     Hypothyroid     Insomnia     Lumbago     SVT (supraventricular tachycardia) (HCC)          SURGICALHISTORY       Past Surgical History:   Procedure Laterality Date    APPENDECTOMY      CHOLECYSTECTOMY, LAPAROSCOPIC  2/24/2013    COLONOSCOPY           CURRENT MEDICATIONS       Current Discharge Medication List      CONTINUE these long-term current use of insulin (HCC)      Lancets MISC 1 each by Does not apply route 2 times daily  Qty: 300 each, Refills: 1      glucose monitoring kit (FREESTYLE) monitoring kit 1 kit by Does not apply route daily  Qty: 1 kit, Refills: 0      fluticasone (FLONASE) 50 MCG/ACT nasal spray USE 2 SPRAYS IN EACH NOSTRIL EVERY EVENING  Qty: 3 Bottle, Refills: 1    Comments: **Patient requests 90 days supply**      levothyroxine (SYNTHROID) 50 MCG tablet TAKE 1 TABLET EVERY DAY  Qty: 90 tablet, Refills: 1      dicyclomine (BENTYL) 10 MG capsule Take 1 capsule by mouth 4 times daily as needed (abd pain)  Qty: 360 capsule, Refills: 1      irbesartan (AVAPRO) 300 MG tablet TAKE 1 TABLET BY MOUTH EVERY NIGHT  Qty: 90 tablet, Refills: 1      budesonide-formoterol (SYMBICORT) 160-4.5 MCG/ACT AERO Inhale 2 puffs into the lungs 2 times daily  Qty: 1 Inhaler, Refills: 5    Associated Diagnoses: Wheezing      ipratropium-albuterol (DUONEB) 0.5-2.5 (3) MG/3ML SOLN nebulizer solution INHALE THE CONTENTS OF 1 VIAL VIA NEBULIZER EVERY 4 HOURS  Qty: 1620 mL, Refills: 3    Comments: **Patient requests 90 days supply**      atorvastatin (LIPITOR) 80 MG tablet Take 1 tablet by mouth nightly  Qty: 90 tablet, Refills: 3      propafenone (RYTHMOL) 150 MG tablet Take 1 tablet by mouth every 8 hours  Qty: 270 tablet, Refills: 3      aspirin 81 MG tablet Take 81 mg by mouth every other day        !! - Potential duplicate medications found. Please discuss with provider. ALLERGIES     Adhesive tape; Clinoril [sulindac]; Codeine; Diclofenac sodium; Hctz [hydrochlorothiazide]; Macrobid [nitrofurantoin macrocrystal]; Motrin [ibuprofen micronized]; Nsaids; Pcn [penicillins];  Peach [prunus persica]; and Ceclor [cefaclor]    FAMILY HISTORY       Family History   Problem Relation Age of Onset    High Blood Pressure Mother     Heart Attack Father     Heart Disease Father     Other Brother     Asthma Paternal Uncle           SOCIAL BUN 25 (*)     GFR Non- 47 (*)     GFR African American 57 (*)     Alkaline Phosphatase 216 (*)      (*)     AST 48 (*)     All other components within normal limits    Narrative:     Performed at:  [facility]  [address], [place]   Phone [phone]   URINE RT REFLEX TO CULTURE - Abnormal; Notable for the following components:    Protein, UA TRACE (*)     Leukocyte Esterase, Urine TRACE (*)     All other components within normal limits    Narrative:     Performed at:  [facility] [address], [place]   Phone [phone]   URINE CULTURE   TROPONIN    Narrative:     Performed at:  [facility] [address], [place]   Phone [phone]    Narrative:     Performed at:  [facility] [address], [place]   Phone [phone]   MICROSCOPIC URINALYSIS    Narrative:     Performed at:  [facility] [address], [place]   Phone [phone]       All other labs were within normal range or not returned as of this dictation. EMERGENCY DEPARTMENT COURSE and DIFFERENTIAL DIAGNOSIS/MDM:   Vitals:    Vitals:    11/13/19 0500 11/13/19 0515 11/13/19 0545 11/13/19 0600   BP: 115/63 133/72 110/64 (!) 122/52   Pulse: 75 76 77 51   Resp: 18 18 17 16   Temp:       TempSrc:       SpO2: 96% 95% 94% 96%   Weight:       Height:           Elderly female with paroxysmal A. fib. Currently on anticoagulation. She is given 10 mg of Cardizem. The patient did become a rate control but while in the emergency room awaiting disposition she did return in atrial fibrillation with rapid ventricular response at that time she is given another 10 mg of IV Cardizem and started on a Cardizem drip.   One point the patient's heart rate did drop into the 30s she was asymptomatic. Cardizem drip was stopped. Patient should be admitted to the hospital for further care. She is agreeable to this. Her family is also agreeable. The hospitalist on duty is notified. CRITICAL CARE TIME   Total Critical Care time was 45 minutes, excluding separately reportable procedures. There was a high probability of clinically significant/life threatening deterioration in the patient's condition which required my urgent intervention. CONSULTS:  IP CONSULT TO HOSPITALIST  IP CONSULT TO CARDIOLOGY    PROCEDURES:  Unless otherwise noted above, none     Procedures    FINAL IMPRESSION      1.  Paroxysmal atrial fibrillation [facility])          DISPOSITION/PLAN   DISPOSITION Admitted 11/13/2019 05:49:34 AM      PATIENT REFERREDTO:  Christy Osborne MD  [facility]  [phone]            DISCHARGEMEDICATIONS:  Current Discharge Medication List             (Please note that portions of this note were completed with a voice recognition program.  Efforts were made to edit the dictations but occasionally words are mis-transcribed.)    Jemal Hay MD (electronically signed)  Attending Emergency Physician        Jemal Hay MD  11/13/19 1636

## 2019-11-13 NOTE — H&P
90 tablet 1    budesonide-formoterol (SYMBICORT) 160-4.5 MCG/ACT AERO Inhale 2 puffs into the lungs 2 times daily 1 Inhaler 5    ipratropium-albuterol (DUONEB) 0.5-2.5 (3) MG/3ML SOLN nebulizer solution INHALE THE CONTENTS OF 1 VIAL VIA NEBULIZER EVERY 4 HOURS 1620 mL 3    atorvastatin (LIPITOR) 80 MG tablet Take 1 tablet by mouth nightly 90 tablet 3    propafenone (RYTHMOL) 150 MG tablet Take 1 tablet by mouth every 8 hours 270 tablet 3    aspirin 81 MG tablet Take 81 mg by mouth every other day          Allergies: Allergies   Allergen Reactions    Adhesive Tape     Clinoril [Sulindac] Other (See Comments)     Stomach pain    Codeine      FEEL NUMB    Diclofenac Sodium Hives    Hctz [Hydrochlorothiazide]      Sob    Macrobid [Nitrofurantoin Macrocrystal]      nausea    Motrin [Ibuprofen Micronized] Other (See Comments)     Tachycardia      Nsaids      Pt states she has hives and heart races     Pcn [Penicillins] Hives    Peach [Prunus Persica] Itching and Swelling     Cannot eat raw peaches    Ceclor [Cefaclor] Nausea And Vomiting        Social History:   reports that she quit smoking about 23 years ago. She started smoking about 69 years ago. She has never used smokeless tobacco. She reports that she does not drink alcohol or use drugs. Family History:  family history includes Asthma in her paternal uncle; Heart Attack in her father; Heart Disease in her father; High Blood Pressure in her mother; Other in her brother. ,     Physical Exam:  /61   Pulse 67   Temp 97.6 °F (36.4 °C) (Temporal)   Resp 18   Ht 5' 2\" (1.575 m)   Wt 170 lb 3.1 oz (77.2 kg)   SpO2 96%   BMI 31.13 kg/m²     General appearance:  Appears comfortable. Well nourished  Eyes: Sclera clear, pupils equal  ENT: Moist mucus membranes, no thrush. Trachea midline. Cardiovascular: irregular rhythm, normal S1, S2. No murmur, gallop, rub.  No edema in lower extremities  Respiratory: Clear to auscultation bilaterally, no

## 2019-11-13 NOTE — PLAN OF CARE
Problem: Falls - Risk of:  Goal: Will remain free from falls  Description  Will remain free from falls  Outcome: Ongoing  Note:   Fall risk assessment completed. Pt gait steady. Clear pathway provided to and from bathroom as pt is ambulatory. Patient calls appropriately for assist as needed. Bed kept in lowest position with wheels locked. Call light within reach. Pt encouraged to call for any needs. Pt free from accidental injury this shift. Problem: Skin Integrity:  Goal: Skin integrity will stabilize  Description  Skin integrity will stabilize  Outcome: Ongoing  Note:   Patient skin is clean dry and intact. Problem: Cardiac:  Goal: Ability to maintain an adequate cardiac output will improve  Description  Ability to maintain an adequate cardiac output will improve  Outcome: Ongoing  Note:   Patient has been in NSR this shift with HR in the 60's. BP stable this shift. Will continue to monitor cardiac status.

## 2019-11-13 NOTE — PROGRESS NOTES
Pt admitted from ED able to transfer self bed to bed. Family at bedside. Pt is alert and oriented w/ no signs of distress noted. Respirations are even/unlabored/diminished. Pt voices no complaints at the time. Pt on 2L O2 via NC (baseline @ home). Admission completed. Home medications reviewed and updated. Vitals are stable. Pt currently in Sinus Lewis w/ HR in 50's. Will continue to monitor. Fall precautions in place. Bed in lowest position, with call light in reach on bedside table.    Vitals:    11/13/19 0700   BP: (!) 145/55   Pulse: 60   Resp: 16   Temp: 97.8 °F (36.6 °C)   SpO2:

## 2019-11-14 VITALS
HEART RATE: 62 BPM | DIASTOLIC BLOOD PRESSURE: 77 MMHG | SYSTOLIC BLOOD PRESSURE: 149 MMHG | OXYGEN SATURATION: 95 % | RESPIRATION RATE: 18 BRPM | BODY MASS INDEX: 30.59 KG/M2 | HEIGHT: 62 IN | TEMPERATURE: 97.8 F | WEIGHT: 166.2 LBS

## 2019-11-14 LAB
GLUCOSE BLD-MCNC: 116 MG/DL (ref 70–99)
PERFORMED ON: ABNORMAL

## 2019-11-14 PROCEDURE — 2580000003 HC RX 258: Performed by: INTERNAL MEDICINE

## 2019-11-14 PROCEDURE — 99232 SBSQ HOSP IP/OBS MODERATE 35: CPT | Performed by: NURSE PRACTITIONER

## 2019-11-14 PROCEDURE — 6370000000 HC RX 637 (ALT 250 FOR IP): Performed by: HOSPITALIST

## 2019-11-14 RX ORDER — DILTIAZEM HYDROCHLORIDE 60 MG/1
60 CAPSULE, EXTENDED RELEASE ORAL 2 TIMES DAILY PRN
Qty: 60 CAPSULE | Refills: 3 | Status: ON HOLD | OUTPATIENT
Start: 2019-11-14 | End: 2020-02-20 | Stop reason: HOSPADM

## 2019-11-14 RX ADMIN — Medication 10 ML: at 08:34

## 2019-11-14 RX ADMIN — ROPINIROLE HYDROCHLORIDE 2 MG: 1 TABLET, FILM COATED ORAL at 08:33

## 2019-11-14 RX ADMIN — PROPAFENONE HYDROCHLORIDE 150 MG: 150 TABLET, FILM COATED ORAL at 05:30

## 2019-11-14 RX ADMIN — LORAZEPAM 0.5 MG: 0.5 TABLET ORAL at 08:33

## 2019-11-14 RX ADMIN — HYDROCODONE BITARTRATE AND ACETAMINOPHEN 1 TABLET: 5; 325 TABLET ORAL at 02:57

## 2019-11-14 RX ADMIN — LEVOTHYROXINE SODIUM 50 MCG: 25 TABLET ORAL at 08:33

## 2019-11-14 RX ADMIN — HYDROCORTISONE: 1 CREAM TOPICAL at 08:34

## 2019-11-14 RX ADMIN — PANTOPRAZOLE SODIUM 40 MG: 40 TABLET, DELAYED RELEASE ORAL at 08:33

## 2019-11-14 ASSESSMENT — PAIN SCALES - GENERAL
PAINLEVEL_OUTOF10: 3
PAINLEVEL_OUTOF10: 4
PAINLEVEL_OUTOF10: 4
PAINLEVEL_OUTOF10: 8

## 2019-11-14 NOTE — PROGRESS NOTES
100 MG capsule Take 1 capsule by mouth nightly as needed (neuropathy) for up to 122 days. Yes Jesus Ulloa MD   blood glucose monitor strips Test one time a day Yes Jesus Ulloa MD   Lancets MISC 1 each by Does not apply route 2 times daily Yes Antoni Bangura MD   glucose monitoring kit (FREESTYLE) monitoring kit 1 kit by Does not apply route daily Yes Antoni Bangura MD   fluticasone (FLONASE) 50 MCG/ACT nasal spray USE 2 SPRAYS IN EACH NOSTRIL EVERY EVENING Yes Jesus Ulloa MD   levothyroxine (SYNTHROID) 50 MCG tablet TAKE 1 TABLET EVERY DAY Yes Jesus Ulloa MD   dicyclomine (BENTYL) 10 MG capsule Take 1 capsule by mouth 4 times daily as needed (abd pain) Yes Jesus Ulloa MD   irbesartan (AVAPRO) 300 MG tablet TAKE 1 TABLET BY MOUTH EVERY NIGHT Yes Jesus Ulloa MD   budesonide-formoterol (SYMBICORT) 160-4.5 MCG/ACT AERO Inhale 2 puffs into the lungs 2 times daily Yes Jesus Ulloa MD   ipratropium-albuterol (DUONEB) 0.5-2.5 (3) MG/3ML SOLN nebulizer solution INHALE THE CONTENTS OF 1 VIAL VIA NEBULIZER EVERY 4 HOURS Yes Jesus Ulloa MD   atorvastatin (LIPITOR) 80 MG tablet Take 1 tablet by mouth nightly Yes Favian Perez MD   propafenone (RYTHMOL) 150 MG tablet Take 1 tablet by mouth every 8 hours Yes Favian Perez MD   aspirin 81 MG tablet Take 81 mg by mouth every other day  Yes Bign Bearden MD        Past Medical History:  Past Medical History:   Diagnosis Date    Arthritis     [facility]     Diabetes mellitus type II, controlled ([facility]     GERD (gastroesophageal reflux disease)     HTN (hypertension)     Hyperlipidemia     Hypothyroid     Insomnia     Lumbago     SVT (supraventricular tachycardia) (HCC)         Past Surgical History:    has a past surgical history that includes Appendectomy; Colonoscopy; and Cholecystectomy, laparoscopic (2/24/2013). Social History:  Reviewed. reports that she quit smoking about 23 years ago. High    Atrial fibrillation with RVR ([facility]) 11/13/2019    Bronchiectasis without complication ([facility]) 33/79/4651    Aspiration into airway 09/20/2019    Interstitial lung disease ([facility]) 09/20/2019    Chronic cough 09/20/2019    Vertigo 07/31/2019    COPD, mild ([facility] 07/18/2019    Chronic obstructive pulmonary disease ([facility]) 03/23/2019    Allergic rhinitis 01/09/2019    Paroxysmal atrial fibrillation ([facility] 12/12/2018    Controlled type 2 diabetes mellitus without complication, without long-term current use of insulin ([facility] 01/18/2017    Restless legs syndrome 01/18/2017    Osteoarthritis 06/18/2015    Overactive bladder 12/11/2013    IBS (irritable bowel syndrome) 05/02/2013    Palpitations 11/09/2012    GERD (gastroesophageal reflux disease)     HTN (hypertension)     Hyperlipidemia     Insomnia       Assessment:  Paroxysmal atrial fibrillation w/ RVR              -Off monitor, regular rhythm, no symptoms   -S/p TRACEY/DCCV (8/31/18, Dr. Oh Conti)               -on Propafenone 150 mg TID, added prn po Cardizem for use at home if episodes reoccur  - [id number] score: at least 6 (age, gender, HTN, PAD, DM) ; POS4CI2 Vasc score and anticoagulation discussed. High risk for stroke and thromboembolism. Anticoagulation is recommended.   ~ On Xarelto 15 mg  - Afib risk factors including age, HTN, obesity, inactivity and ANTHONY were discussed with patient.  Risk factor modification recommended   ~ TSH 3.96 (9/16/19)      HTN  Goal <130/80   -slightly elevated, will follow in office in 2 months with NP   -discussed low sodium diet and lifestyle modifications   -encouraged her to monitor BP at home and bring to visits  PAD/carotid atherosclerosis               On high dose lipitor     Plan:  -Continue current medications  -Ok for discharge from EP standpoint  -reinforced appropriated use of prn Cardizem, pt and daughter VU  -Follow up in 2 months    EF of 16%  ACEi for systolic HF: N/A  ASA and Statin for CAD: N/A  Anticoagulation for AF and heart failure: Xarelto  Tobacco use was discussed with the patient and education provided: Nonsmoker    All pertinent information and plan of care discussed with the EP physician. All questions and concerns were addressed to the patient/family. Alternatives to my treatment were discussed. I have discussed the above stated plan and the patient verbalized understanding and agreed with the plan. Discussed plan with patient and nurse. Thank you for allowing to us to participate in the care of Sreekanth Rooney.     KATHY Solis-CNP  [facility]   Office: [phone]

## 2019-11-14 NOTE — PROGRESS NOTES
.Shift assessment done. Vitals stable. Meds passed. Resting well. Call light and table in reach. Hourly roundings done. Will continue to monitor.

## 2019-11-14 NOTE — PLAN OF CARE
Problem: Falls - Risk of:  Goal: Will remain free from falls  Description  Will remain free from falls  11/14/2019 0513 by Larissa Grey RN  Outcome: Met This Shift  11/13/2019 1754 by Charley Horne RN  Outcome: Ongoing  Note:   Fall risk assessment completed. Pt gait steady. Clear pathway provided to and from bathroom as pt is ambulatory. Patient calls appropriately for assist as needed. Bed kept in lowest position with wheels locked. Call light within reach. Pt encouraged to call for any needs. Pt free from accidental injury this shift. Problem: Pain:  Goal: Patient's pain/discomfort is manageable  Description  Patient's pain/discomfort is manageable  11/14/2019 0513 by Larissa rGey RN  Outcome: Met This Shift  11/13/2019 1754 by Charley Horne RN  Note:   Patient with complaint of pain to left shoulder 5/10. Patient stated it is arthritis which is chronic for patient. Norco given as ordered. Patient stated pain is tolerable at 3/10 at this time. Will continue to monitor comfort. Problem: Infection:  Goal: Will remain free from infection  Description  Will remain free from infection  11/14/2019 0513 by Larissa Grey RN  Outcome: Met This Shift  11/13/2019 1754 by Charley Horne RN  Note:   Patient has no signs of infection at this time. Problem: Safety:  Goal: Free from accidental physical injury  Description  Free from accidental physical injury  Outcome: Met This Shift     Problem: Daily Care:  Goal: Daily care needs are met  Description  Daily care needs are met  Outcome: Met This Shift     Problem: Skin Integrity:  Goal: Skin integrity will stabilize  Description  Skin integrity will stabilize  11/14/2019 0513 by Larissa Grey RN  Outcome: Met This Shift  11/13/2019 1754 by Charley Horne RN  Outcome: Ongoing  Note:   Patient skin is clean dry and intact.       Problem: Discharge Planning:  Goal: Patients continuum of care needs are met  Description  Patients continuum of

## 2019-11-14 NOTE — PROGRESS NOTES
Disposition is home (no HHC/DME needs), transported with daughter per car, taken to lobby via w/c w/ prescription & portable oxygen from home, no complications. Anuja Webster RN, BSN, PCCN.

## 2019-11-15 ENCOUNTER — CARE COORDINATION (OUTPATIENT)
Dept: CASE MANAGEMENT | Age: 84
End: 2019-11-15

## 2019-11-15 DIAGNOSIS — I48.91 ATRIAL FIBRILLATION WITH RVR (HCC): Primary | ICD-10-CM

## 2019-11-15 LAB
ORGANISM: ABNORMAL
URINE CULTURE, ROUTINE: ABNORMAL

## 2019-11-15 PROCEDURE — 1111F DSCHRG MED/CURRENT MED MERGE: CPT | Performed by: FAMILY MEDICINE

## 2019-11-18 ENCOUNTER — OFFICE VISIT (OUTPATIENT)
Dept: FAMILY MEDICINE CLINIC | Age: 84
End: 2019-11-18
Payer: MEDICARE

## 2019-11-18 VITALS
WEIGHT: 166.4 LBS | OXYGEN SATURATION: 97 % | DIASTOLIC BLOOD PRESSURE: 62 MMHG | SYSTOLIC BLOOD PRESSURE: 130 MMHG | BODY MASS INDEX: 30.43 KG/M2 | HEART RATE: 74 BPM

## 2019-11-18 DIAGNOSIS — J44.9 COPD, MILD (HCC): ICD-10-CM

## 2019-11-18 DIAGNOSIS — J44.1 CHRONIC OBSTRUCTIVE PULMONARY DISEASE WITH ACUTE EXACERBATION (HCC): ICD-10-CM

## 2019-11-18 DIAGNOSIS — I48.91 ATRIAL FIBRILLATION WITH RVR (HCC): Primary | ICD-10-CM

## 2019-11-18 PROCEDURE — 99495 TRANSJ CARE MGMT MOD F2F 14D: CPT | Performed by: FAMILY MEDICINE

## 2019-11-18 RX ORDER — ROPINIROLE 2 MG/1
TABLET, FILM COATED ORAL
Qty: 180 TABLET | Refills: 0 | Status: SHIPPED | OUTPATIENT
Start: 2019-11-18 | End: 2019-11-18

## 2019-11-19 ENCOUNTER — CARE COORDINATION (OUTPATIENT)
Dept: CASE MANAGEMENT | Age: 84
End: 2019-11-19

## 2019-11-25 RX ORDER — LEVOTHYROXINE SODIUM 0.05 MG/1
TABLET ORAL
Qty: 90 TABLET | Refills: 2 | Status: SHIPPED | OUTPATIENT
Start: 2019-11-25 | End: 2020-06-02 | Stop reason: SDUPTHER

## 2019-11-29 ENCOUNTER — CARE COORDINATION (OUTPATIENT)
Dept: CASE MANAGEMENT | Age: 84
End: 2019-11-29

## 2019-11-29 RX ORDER — MECLIZINE HCL 12.5 MG/1
TABLET ORAL
Qty: 270 TABLET | Refills: 0 | Status: SHIPPED | OUTPATIENT
Start: 2019-11-29 | End: 2020-01-30

## 2019-12-02 DIAGNOSIS — F41.9 ANXIETY: ICD-10-CM

## 2019-12-02 DIAGNOSIS — M15.9 PRIMARY OSTEOARTHRITIS INVOLVING MULTIPLE JOINTS: ICD-10-CM

## 2019-12-02 DIAGNOSIS — F51.01 PRIMARY INSOMNIA: ICD-10-CM

## 2019-12-02 RX ORDER — LORAZEPAM 0.5 MG/1
TABLET ORAL
Qty: 120 TABLET | Refills: 0 | Status: SHIPPED | OUTPATIENT
Start: 2019-12-02 | End: 2020-01-03

## 2019-12-02 RX ORDER — HYDROCODONE BITARTRATE AND ACETAMINOPHEN 5; 325 MG/1; MG/1
1 TABLET ORAL 4 TIMES DAILY PRN
Qty: 120 TABLET | Refills: 0 | Status: SHIPPED | OUTPATIENT
Start: 2019-12-02 | End: 2020-01-02 | Stop reason: SDUPTHER

## 2019-12-17 ENCOUNTER — OFFICE VISIT (OUTPATIENT)
Dept: FAMILY MEDICINE CLINIC | Age: 84
End: 2019-12-17
Payer: MEDICARE

## 2019-12-17 VITALS
WEIGHT: 167.25 LBS | BODY MASS INDEX: 30.59 KG/M2 | HEART RATE: 74 BPM | OXYGEN SATURATION: 95 % | DIASTOLIC BLOOD PRESSURE: 62 MMHG | RESPIRATION RATE: 16 BRPM | SYSTOLIC BLOOD PRESSURE: 134 MMHG

## 2019-12-17 DIAGNOSIS — G62.9 NEUROPATHY: ICD-10-CM

## 2019-12-17 DIAGNOSIS — I48.91 ATRIAL FIBRILLATION WITH RVR (HCC): ICD-10-CM

## 2019-12-17 DIAGNOSIS — J06.9 URI, ACUTE: ICD-10-CM

## 2019-12-17 DIAGNOSIS — M17.11 PRIMARY OSTEOARTHRITIS OF RIGHT KNEE: Primary | ICD-10-CM

## 2019-12-17 DIAGNOSIS — N18.30 CONTROLLED TYPE 2 DIABETES MELLITUS WITH STAGE 3 CHRONIC KIDNEY DISEASE, WITHOUT LONG-TERM CURRENT USE OF INSULIN (HCC): ICD-10-CM

## 2019-12-17 DIAGNOSIS — I48.0 PAROXYSMAL ATRIAL FIBRILLATION (HCC): ICD-10-CM

## 2019-12-17 DIAGNOSIS — E11.22 CONTROLLED TYPE 2 DIABETES MELLITUS WITH STAGE 3 CHRONIC KIDNEY DISEASE, WITHOUT LONG-TERM CURRENT USE OF INSULIN (HCC): ICD-10-CM

## 2019-12-17 PROBLEM — T17.908A ASPIRATION INTO AIRWAY: Status: RESOLVED | Noted: 2019-09-20 | Resolved: 2019-12-17

## 2019-12-17 PROCEDURE — G8417 CALC BMI ABV UP PARAM F/U: HCPCS | Performed by: FAMILY MEDICINE

## 2019-12-17 PROCEDURE — 1123F ACP DISCUSS/DSCN MKR DOCD: CPT | Performed by: FAMILY MEDICINE

## 2019-12-17 PROCEDURE — 4040F PNEUMOC VAC/ADMIN/RCVD: CPT | Performed by: FAMILY MEDICINE

## 2019-12-17 PROCEDURE — 99214 OFFICE O/P EST MOD 30 MIN: CPT | Performed by: FAMILY MEDICINE

## 2019-12-17 PROCEDURE — G8427 DOCREV CUR MEDS BY ELIG CLIN: HCPCS | Performed by: FAMILY MEDICINE

## 2019-12-17 PROCEDURE — 1090F PRES/ABSN URINE INCON ASSESS: CPT | Performed by: FAMILY MEDICINE

## 2019-12-17 PROCEDURE — G8598 ASA/ANTIPLAT THER USED: HCPCS | Performed by: FAMILY MEDICINE

## 2019-12-17 PROCEDURE — 1036F TOBACCO NON-USER: CPT | Performed by: FAMILY MEDICINE

## 2019-12-17 PROCEDURE — G8399 PT W/DXA RESULTS DOCUMENT: HCPCS | Performed by: FAMILY MEDICINE

## 2019-12-17 PROCEDURE — G8482 FLU IMMUNIZE ORDER/ADMIN: HCPCS | Performed by: FAMILY MEDICINE

## 2019-12-17 RX ORDER — GABAPENTIN 100 MG/1
100 CAPSULE ORAL NIGHTLY PRN
Qty: 90 CAPSULE | Refills: 1 | Status: SHIPPED | OUTPATIENT
Start: 2019-12-17 | End: 2020-06-19

## 2019-12-17 RX ORDER — PREDNISONE 20 MG/1
40 TABLET ORAL DAILY
Qty: 12 TABLET | Refills: 0 | Status: SHIPPED | OUTPATIENT
Start: 2019-12-17 | End: 2019-12-23

## 2019-12-17 RX ORDER — IRBESARTAN 300 MG/1
TABLET ORAL
Qty: 90 TABLET | Refills: 1 | Status: SHIPPED | OUTPATIENT
Start: 2019-12-17 | End: 2020-01-03 | Stop reason: SDUPTHER

## 2019-12-17 RX ORDER — DICYCLOMINE HYDROCHLORIDE 10 MG/1
10 CAPSULE ORAL 4 TIMES DAILY PRN
Qty: 360 CAPSULE | Refills: 1 | Status: ON HOLD | OUTPATIENT
Start: 2019-12-17 | End: 2021-02-22

## 2019-12-18 PROBLEM — E11.22 CONTROLLED TYPE 2 DIABETES MELLITUS WITH STAGE 3 CHRONIC KIDNEY DISEASE, WITHOUT LONG-TERM CURRENT USE OF INSULIN (HCC): Status: ACTIVE | Noted: 2017-01-18

## 2019-12-18 PROBLEM — N18.30 CONTROLLED TYPE 2 DIABETES MELLITUS WITH STAGE 3 CHRONIC KIDNEY DISEASE, WITHOUT LONG-TERM CURRENT USE OF INSULIN (HCC): Status: ACTIVE | Noted: 2017-01-18

## 2019-12-25 ENCOUNTER — HOSPITAL ENCOUNTER (EMERGENCY)
Age: 84
Discharge: HOME | End: 2019-12-26
Payer: MEDICARE

## 2019-12-25 DIAGNOSIS — R21 RASH AND OTHER NONSPECIFIC SKIN ERUPTION: Primary | ICD-10-CM

## 2019-12-25 PROCEDURE — 99283 EMERGENCY DEPT VISIT LOW MDM: CPT

## 2019-12-26 ENCOUNTER — TELEPHONE (OUTPATIENT)
Dept: FAMILY MEDICINE CLINIC | Age: 84
End: 2019-12-26

## 2019-12-26 VITALS
HEIGHT: 62 IN | TEMPERATURE: 97.8 F | SYSTOLIC BLOOD PRESSURE: 150 MMHG | OXYGEN SATURATION: 92 % | BODY MASS INDEX: 30.36 KG/M2 | RESPIRATION RATE: 18 BRPM | WEIGHT: 165 LBS | HEART RATE: 68 BPM | DIASTOLIC BLOOD PRESSURE: 76 MMHG

## 2019-12-26 RX ORDER — DIAPER,BRIEF,INFANT-TODD,DISP
EACH MISCELLANEOUS
Qty: 1 TUBE | Refills: 1 | Status: SHIPPED | OUTPATIENT
Start: 2019-12-26 | End: 2020-01-02

## 2019-12-26 ASSESSMENT — ENCOUNTER SYMPTOMS
NAUSEA: 0
ABDOMINAL PAIN: 0
VOMITING: 0
SHORTNESS OF BREATH: 0

## 2019-12-28 ENCOUNTER — TELEPHONE (OUTPATIENT)
Dept: FAMILY MEDICINE CLINIC | Age: 84
End: 2019-12-28

## 2019-12-28 RX ORDER — CEFDINIR 300 MG/1
300 CAPSULE ORAL 2 TIMES DAILY
Qty: 14 CAPSULE | Refills: 0 | Status: SHIPPED | OUTPATIENT
Start: 2019-12-28 | End: 2020-01-04

## 2019-12-30 ENCOUNTER — TELEPHONE (OUTPATIENT)
Dept: PULMONOLOGY | Age: 84
End: 2019-12-30

## 2020-01-02 RX ORDER — BUDESONIDE AND FORMOTEROL FUMARATE DIHYDRATE 160; 4.5 UG/1; UG/1
AEROSOL RESPIRATORY (INHALATION)
Qty: 10.2 G | Refills: 5 | Status: SHIPPED | OUTPATIENT
Start: 2020-01-02 | End: 2021-01-20 | Stop reason: SDUPTHER

## 2020-01-02 RX ORDER — HYDROCODONE BITARTRATE AND ACETAMINOPHEN 5; 325 MG/1; MG/1
1 TABLET ORAL 4 TIMES DAILY PRN
Qty: 120 TABLET | Refills: 0 | Status: SHIPPED | OUTPATIENT
Start: 2020-01-02 | End: 2020-02-04 | Stop reason: SDUPTHER

## 2020-01-02 NOTE — TELEPHONE ENCOUNTER
Medication:   Requested Prescriptions     Pending Prescriptions Disp Refills    HYDROcodone-acetaminophen (NORCO) 5-325 MG per tablet 120 tablet 0     Sig: Take 1 tablet by mouth 4 times daily as needed for Pain for up to 30 days. Last Filled:  12/2/19  Patient Phone Number: [phone] (home)     Last appt: 12/17/2019   Next appt: 3/17/2020    Last OARRS:   RX Monitoring 3/20/2019   Attestation The Prescription Monitoring Report for this patient was reviewed today.    Periodic Controlled Substance Monitoring -     PDMP Monitoring:    Last PDMP Aabd Garcia as Reviewed [facility]):  Review User Review Instant Review Result   Louie Aid 12/17/2019  1:40 PM Reviewed PDMP [1]     [facility] [address] [address]  Phone: [phone] Fax: [phone]

## 2020-01-02 NOTE — TELEPHONE ENCOUNTER
Medication:   Requested Prescriptions     Pending Prescriptions Disp Refills    SYMBICORT 160-4.5 MCG/ACT AERO [Pharmacy Med Name: SYMBICORT 160/4. 5MCG (120 ORAL INH)] 10.2 g      Sig: INHALE 2 PUFFS INTO THE LUNGS TWICE DAILY      Last Filled:  6/26/2019    Patient Phone Number: [phone] (home)     Last appt: 12/17/2019   Next appt: 3/17/2020    Last OARRS:   RX Monitoring 3/20/2019   Attestation The Prescription Monitoring Report for this patient was reviewed today.    Periodic Controlled Substance Monitoring -     PDMP Monitoring:    Last PDMP Greyson Samaniego as Reviewed [facility]):  Review User Review Instant Review Result   Clarence Joaquin 12/17/2019  1:40 PM Reviewed PDMP [1]     Preferred Pharmacy:   [facility] [id number]  Phone: [phone] Fax: [phone]    [facility] - [address] [phone] - F [phone]  [address] [place] [id number]  Phone: [phone] Fax: [phone]

## 2020-01-02 NOTE — TELEPHONE ENCOUNTER
Patient requesting a medication refill.yes  Medication hydrocodone and ativan   [facility]  Last office visit: 12/2019  Next office visit: 3/17/2020        Needs refills

## 2020-01-03 RX ORDER — IRBESARTAN 300 MG/1
TABLET ORAL
Qty: 90 TABLET | Refills: 1 | Status: SHIPPED | OUTPATIENT
Start: 2020-01-03 | End: 2020-10-12 | Stop reason: SDUPTHER

## 2020-01-03 RX ORDER — LORAZEPAM 0.5 MG/1
TABLET ORAL
Qty: 120 TABLET | Refills: 0 | Status: SHIPPED | OUTPATIENT
Start: 2020-01-03 | End: 2020-02-04 | Stop reason: SDUPTHER

## 2020-01-13 RX ORDER — ATORVASTATIN CALCIUM 80 MG/1
TABLET, FILM COATED ORAL
Qty: 90 TABLET | Refills: 3 | Status: SHIPPED | OUTPATIENT
Start: 2020-01-13 | End: 2020-04-09

## 2020-01-13 NOTE — TELEPHONE ENCOUNTER
Received refill request for Lipitor 80 mg from [facility] AND [facility].     Last OV: 1/22/19 (RMM) 8/6/19 (NPTS)    Next Appt: 1/16/20 (NPAL)    Last Refill: 1/3/19#90 with 3 refills    Last Labs: 11/13/19

## 2020-01-15 ENCOUNTER — TELEPHONE (OUTPATIENT)
Dept: CARDIOLOGY CLINIC | Age: 85
End: 2020-01-15

## 2020-01-15 NOTE — TELEPHONE ENCOUNTER
She can be added on to my schedule in the afternoon 2:30 or 3:00pm slot the week of Jan 27th any day that is available.      Gabrielle Gutierrez, APRN-CNP

## 2020-01-20 NOTE — PROGRESS NOTES
albuterol sulfate HFA (PROAIR HFA) 108 (90 Base) MCG/ACT inhaler Inhale 2 puffs into the lungs every 6 hours as needed for Wheezing  Aster Quinones MD   amLODIPine (NORVASC) 5 MG tablet TAKE 1 TABLET EVERY DAY  Rick Davila MD   pantoprazole (PROTONIX) 40 MG tablet TAKE 1 TABLET EVERY DAY  Rick Davila MD   rOPINIRole (REQUIP) 2 MG tablet One tablet 3 times daily  Rick Davila MD   blood glucose test strips (TRUE METRIX BLOOD GLUCOSE TEST) strip 1 each by In Vitro route daily  Rick Davila MD   blood glucose monitor strips Test one time a day  Rick Davila MD   Lancets MISC 1 each by Does not apply route 2 times daily  Kyree Way MD   glucose monitoring kit (FREESTYLE) monitoring kit 1 kit by Does not apply route daily  Kyree Way MD   fluticasone (FLONASE) 50 MCG/ACT nasal spray USE 2 SPRAYS IN EACH NOSTRIL EVERY EVENING  Rick Davila MD   ipratropium-albuterol (DUONEB) 0.5-2.5 (3) MG/3ML SOLN nebulizer solution INHALE THE CONTENTS OF 1 VIAL VIA NEBULIZER EVERY 4 HOURS  Rick Davila MD   aspirin 81 MG tablet Take 81 mg by mouth every other day   Historical Provider, MD      Past Medical History:  Past Medical History:   Diagnosis Date    Arthritis     [facility]     Diabetes mellitus type II, controlled ([facility]     GERD (gastroesophageal reflux disease)     HTN (hypertension)     Hyperlipidemia     Hypothyroid     Insomnia     Lumbago     SVT (supraventricular tachycardia) (HCC)      Past Surgical History:    has a past surgical history that includes Appendectomy; Colonoscopy; and Cholecystectomy, laparoscopic (2/24/2013). Social History:  Reviewed. reports that she quit smoking about 24 years ago. She started smoking about 69 years ago. She has never used smokeless tobacco. She reports that she does not drink alcohol or use drugs. Family History:  Reviewed. family history includes Asthma in her paternal uncle;  Heart Attack in her father; Heart Disease in her father; High Blood Pressure in her mother; Other in her brother. Denies family history of sudden cardiac death, arrhythmia, premature CAD    Review of System:  · Constitutional: No fevers, chills, weight changes or weakness  · HEENT: No visual changes. No mouth sores or sore throat. · Cardiovascular: denies chest pain, admits to dyspnea on exertion and at rest, denies palpitations or denies loss of consciousness. No cough, hemoptysis, denies pleuritic pain, or phlebitis. · Respiratory: admits to cough or wheezing. No hematemesis. · Gastrointestinal: No abdominal pain, blood in stools. · Genitourinary: No dysuria, urgency or hematuria. · Musculoskeletal: denies gait disturbance, No muscle weakness. · Integumentary: No rash or pruritis. · Neurological: No headache, change in muscle strength, numbness or tingling. · Psychiatric: No confusion, anxiety, or depression. · Endocrine: No temperature intolerance. No excessive thirst, fluid intake, or urination. · Hem/Lymph: No abnormal bruising or bleeding, blood clots or swollen lymph nodes. Physical Examination:  There were no vitals filed for this visit. Wt Readings from Last 3 Encounters:   12/25/19 165 lb (74.8 kg)   12/17/19 167 lb 4 oz (75.9 kg)   11/18/19 166 lb 6.4 oz (75.5 kg)       Constitutional: Cooperative and in no apparent distress, and appears well nourished + weight gain 10 lbs  Skin: Warm and pink; no pallor, cyanosis, bruising, or clubbing  HEENT: Symmetric and normocephalic. PERRL, EOM intact. Conjunctiva pink with clear sclera. Mucus membranes pink and moist. Teeth intact. Thyroid smooth without nodules or goiter  Respiratory: Respirations symmetric and unlabored. Lungs clear to auscultation bilaterally, no wheezing, rhonchi, or crackles  Cardiovascular:  regular rate and rhythm. S1 & S2 present without murmurs, rubs, or gallops. Peripheral pulses 2+, capillary refill < 3 seconds.  negative elevation of JVP. + trace peripheral edema, + crackles BLL  Gastrointestinal: Abdomen soft and round. Bowel sounds normoactive in all quadrants without tenderness or masses. Musculoskeletal: Bilateral upper and lower extremity strength 5/5 with full ROM. Neurological/Psych: Awake and orientated to person, place and time. Calm affect, appropriate mood. Pertinent labs, diagnostic, device, and imaging results reviewed as a part of this visit    LABS    CBC:   Lab Results   Component Value Date    WBC 4.1 2019    HGB 12.4 2019    HCT 37.3 2019    MCV 88.5 2019     (L) 2019     BMP:   Lab Results   Component Value Date    CREATININE 1.1 2019    BUN 25 (H) 2019     2019    K 4.4 2019     2019    CO2 23 2019     CrCl cannot be calculated (Unknown ideal weight.). No results found for: BNP    Thyroid:   Lab Results   Component Value Date    TSH 3.96 2019     Lipid Panel:   Lab Results   Component Value Date    CHOL 224 2017    HDL 39 2017    HDL 32 2011    TRIG 226 2017     LFTs:  Lab Results   Component Value Date     (H) 2019    AST 48 (H) 2019    ALKPHOS 216 (H) 2019    BILITOT 0.5 2019     Coags:   Lab Results   Component Value Date    PROTIME 10.7 2018    INR 0.94 2018    APTT 134.0 (HH) 2018     EC2020 SR HR 70, , QRS 92, QTc 401    Echo: 19  Summary  Left ventricle - normal size, thickness and function with EF of 60%   Aortic valve - sclerotic, mild regurgitation   Left Atrium   The left atrium is normal in size.   GXT:   Normal LV function and no evidence of ischemia  LHC: 2004  Nonobstructive CAD    Assessment:  Paroxysmal Atrial Fibrillation  - ECG today shows SR  - S/p TRACEY/DCCV (Dr. Georgiana Jay, 18)  - On propafenone 150 mg tid and Cardizem 60 mg bid  - Denies recurrence of symptoms  - [id number] score: at least 10 (age, gender, Cessation Counseling: Nonsmoker, N/A   2. Retake of BP if >140/90: N/A  3. Documentation to PCP: Note sent to PCP office visit  4. CAD patient on anti-platelet: N/A  5.   CAD patient on STATIN therapy: N/A  6. Patient with history of CHF and atrial fibrillation on anticoagulation: Yes     I have addressed the patient's cardiac risk factors and adjusted pharmacologic treatment as needed. In addition, I have reinforced the need for patient directed risk factor modification. I independently reviewed the ECG    All questions and concerns were addressed with the patient. Alternatives to treatment were discussed. Thank you for allowing to us to participate in the care of Radha DanKATHY Sr-CNP  [address]   Office: [phone]

## 2020-01-27 ENCOUNTER — OFFICE VISIT (OUTPATIENT)
Dept: CARDIOLOGY CLINIC | Age: 85
End: 2020-01-27
Payer: MEDICARE

## 2020-01-27 VITALS
DIASTOLIC BLOOD PRESSURE: 62 MMHG | BODY MASS INDEX: 32.39 KG/M2 | SYSTOLIC BLOOD PRESSURE: 136 MMHG | HEIGHT: 62 IN | OXYGEN SATURATION: 93 % | HEART RATE: 72 BPM | WEIGHT: 176 LBS

## 2020-01-27 PROCEDURE — 4040F PNEUMOC VAC/ADMIN/RCVD: CPT | Performed by: NURSE PRACTITIONER

## 2020-01-27 PROCEDURE — 1090F PRES/ABSN URINE INCON ASSESS: CPT | Performed by: NURSE PRACTITIONER

## 2020-01-27 PROCEDURE — 1123F ACP DISCUSS/DSCN MKR DOCD: CPT | Performed by: NURSE PRACTITIONER

## 2020-01-27 PROCEDURE — 1036F TOBACCO NON-USER: CPT | Performed by: NURSE PRACTITIONER

## 2020-01-27 PROCEDURE — G8427 DOCREV CUR MEDS BY ELIG CLIN: HCPCS | Performed by: NURSE PRACTITIONER

## 2020-01-27 PROCEDURE — G8399 PT W/DXA RESULTS DOCUMENT: HCPCS | Performed by: NURSE PRACTITIONER

## 2020-01-27 PROCEDURE — 93000 ELECTROCARDIOGRAM COMPLETE: CPT | Performed by: NURSE PRACTITIONER

## 2020-01-27 PROCEDURE — G8482 FLU IMMUNIZE ORDER/ADMIN: HCPCS | Performed by: NURSE PRACTITIONER

## 2020-01-27 PROCEDURE — 99213 OFFICE O/P EST LOW 20 MIN: CPT | Performed by: NURSE PRACTITIONER

## 2020-01-27 PROCEDURE — G8417 CALC BMI ABV UP PARAM F/U: HCPCS | Performed by: NURSE PRACTITIONER

## 2020-01-27 RX ORDER — FUROSEMIDE 20 MG/1
20 TABLET ORAL DAILY
Qty: 30 TABLET | Refills: 0 | Status: SHIPPED | OUTPATIENT
Start: 2020-01-27 | End: 2020-02-15

## 2020-01-30 RX ORDER — MECLIZINE HCL 12.5 MG/1
TABLET ORAL
Qty: 270 TABLET | Refills: 0 | Status: SHIPPED | OUTPATIENT
Start: 2020-01-30 | End: 2020-04-04

## 2020-02-03 ENCOUNTER — TELEPHONE (OUTPATIENT)
Dept: CARDIOLOGY | Age: 85
End: 2020-02-03

## 2020-02-03 ENCOUNTER — HOSPITAL ENCOUNTER (OUTPATIENT)
Age: 85
Discharge: HOME | End: 2020-02-03
Payer: MEDICARE

## 2020-02-03 LAB
ANION GAP SERPL CALCULATED.3IONS-SCNC: 13 MMOL/L (ref 3–16)
BUN BLDV-MCNC: 22 MG/DL (ref 7–20)
CALCIUM SERPL-MCNC: 9.5 MG/DL (ref 8.3–10.6)
CHLORIDE BLD-SCNC: 100 MMOL/L (ref 99–110)
CO2: 24 MMOL/L (ref 21–32)
CREAT SERPL-MCNC: 1.3 MG/DL (ref 0.6–1.2)
GFR AFRICAN AMERICAN: 47
GFR NON-AFRICAN AMERICAN: 39
GLUCOSE BLD-MCNC: 110 MG/DL (ref 70–99)
POTASSIUM SERPL-SCNC: 4.6 MMOL/L (ref 3.5–5.1)
SODIUM BLD-SCNC: 137 MMOL/L (ref 136–145)

## 2020-02-03 PROCEDURE — 36415 COLL VENOUS BLD VENIPUNCTURE: CPT

## 2020-02-03 PROCEDURE — 80048 BASIC METABOLIC PNL TOTAL CA: CPT

## 2020-02-04 ENCOUNTER — OFFICE VISIT (OUTPATIENT)
Dept: FAMILY MEDICINE CLINIC | Age: 85
End: 2020-02-04
Payer: MEDICARE

## 2020-02-04 VITALS
HEART RATE: 78 BPM | WEIGHT: 172.13 LBS | SYSTOLIC BLOOD PRESSURE: 116 MMHG | RESPIRATION RATE: 16 BRPM | BODY MASS INDEX: 31.48 KG/M2 | OXYGEN SATURATION: 91 % | DIASTOLIC BLOOD PRESSURE: 72 MMHG

## 2020-02-04 PROBLEM — I50.21 ACUTE SYSTOLIC CONGESTIVE HEART FAILURE (HCC): Status: ACTIVE | Noted: 2020-02-04

## 2020-02-04 PROCEDURE — G8417 CALC BMI ABV UP PARAM F/U: HCPCS | Performed by: FAMILY MEDICINE

## 2020-02-04 PROCEDURE — 4040F PNEUMOC VAC/ADMIN/RCVD: CPT | Performed by: FAMILY MEDICINE

## 2020-02-04 PROCEDURE — 1090F PRES/ABSN URINE INCON ASSESS: CPT | Performed by: FAMILY MEDICINE

## 2020-02-04 PROCEDURE — 99214 OFFICE O/P EST MOD 30 MIN: CPT | Performed by: FAMILY MEDICINE

## 2020-02-04 PROCEDURE — 1123F ACP DISCUSS/DSCN MKR DOCD: CPT | Performed by: FAMILY MEDICINE

## 2020-02-04 PROCEDURE — G8399 PT W/DXA RESULTS DOCUMENT: HCPCS | Performed by: FAMILY MEDICINE

## 2020-02-04 PROCEDURE — 1036F TOBACCO NON-USER: CPT | Performed by: FAMILY MEDICINE

## 2020-02-04 PROCEDURE — G8482 FLU IMMUNIZE ORDER/ADMIN: HCPCS | Performed by: FAMILY MEDICINE

## 2020-02-04 PROCEDURE — G8427 DOCREV CUR MEDS BY ELIG CLIN: HCPCS | Performed by: FAMILY MEDICINE

## 2020-02-04 RX ORDER — LORAZEPAM 0.5 MG/1
2 TABLET ORAL
COMMUNITY
End: 2020-03-17 | Stop reason: SDUPTHER

## 2020-02-04 RX ORDER — HYDROCODONE BITARTRATE AND ACETAMINOPHEN 5; 325 MG/1; MG/1
1 TABLET ORAL 4 TIMES DAILY PRN
Qty: 120 TABLET | Refills: 0 | Status: SHIPPED | OUTPATIENT
Start: 2020-02-04 | End: 2020-03-03 | Stop reason: SDUPTHER

## 2020-02-04 RX ORDER — LORAZEPAM 0.5 MG/1
TABLET ORAL
Qty: 120 TABLET | Refills: 0 | Status: SHIPPED | OUTPATIENT
Start: 2020-02-04 | End: 2020-03-02 | Stop reason: SDUPTHER

## 2020-02-04 RX ORDER — ROPINIROLE 2 MG/1
TABLET, FILM COATED ORAL
Qty: 270 TABLET | Refills: 0 | Status: SHIPPED | OUTPATIENT
Start: 2020-02-04 | End: 2020-05-06

## 2020-02-04 RX ORDER — PREDNISONE 20 MG/1
TABLET ORAL
Qty: 20 TABLET | Refills: 0 | Status: SHIPPED | OUTPATIENT
Start: 2020-02-04 | End: 2020-03-17 | Stop reason: ALTCHOICE

## 2020-02-04 ASSESSMENT — PATIENT HEALTH QUESTIONNAIRE - PHQ9
SUM OF ALL RESPONSES TO PHQ9 QUESTIONS 1 & 2: 0
SUM OF ALL RESPONSES TO PHQ QUESTIONS 1-9: 0
1. LITTLE INTEREST OR PLEASURE IN DOING THINGS: 0
2. FEELING DOWN, DEPRESSED OR HOPELESS: 0
SUM OF ALL RESPONSES TO PHQ QUESTIONS 1-9: 0

## 2020-02-05 ENCOUNTER — TELEPHONE (OUTPATIENT)
Dept: FAMILY MEDICINE CLINIC | Age: 85
End: 2020-02-05

## 2020-02-07 RX ORDER — BLOOD-GLUCOSE METER
1 EACH MISCELLANEOUS
Qty: 1 KIT | Refills: 0 | Status: SHIPPED | OUTPATIENT
Start: 2020-02-07 | End: 2020-05-27 | Stop reason: SDUPTHER

## 2020-02-07 RX ORDER — LANCETS 30 GAUGE
1 EACH MISCELLANEOUS DAILY
Qty: 100 EACH | Refills: 3 | Status: SHIPPED | OUTPATIENT
Start: 2020-02-07 | End: 2020-08-11

## 2020-02-07 NOTE — TELEPHONE ENCOUNTER
[facility] to [facility] and pt gets a free meter at the beginning of the year when signing up with them. Talked to patient and she is good with us sending a new script in for the free meter. Please send to pharmacy.

## 2020-02-15 ENCOUNTER — APPOINTMENT (OUTPATIENT)
Dept: GENERAL RADIOLOGY | Age: 85
End: 2020-02-15
Payer: MEDICARE

## 2020-02-15 ENCOUNTER — TELEPHONE (OUTPATIENT)
Dept: FAMILY MEDICINE CLINIC | Age: 85
End: 2020-02-15

## 2020-02-15 ENCOUNTER — HOSPITAL ENCOUNTER (EMERGENCY)
Age: 85
Discharge: HOME | End: 2020-02-15
Attending: EMERGENCY MEDICINE
Payer: MEDICARE

## 2020-02-15 VITALS
WEIGHT: 165 LBS | RESPIRATION RATE: 19 BRPM | HEIGHT: 63 IN | DIASTOLIC BLOOD PRESSURE: 66 MMHG | TEMPERATURE: 97 F | HEART RATE: 76 BPM | SYSTOLIC BLOOD PRESSURE: 121 MMHG | BODY MASS INDEX: 29.23 KG/M2 | OXYGEN SATURATION: 91 %

## 2020-02-15 LAB
A/G RATIO: 1.6 (ref 1.1–2.2)
ALBUMIN SERPL-MCNC: 3.9 G/DL (ref 3.4–5)
ALP BLD-CCNC: 102 U/L (ref 40–129)
ALT SERPL-CCNC: 21 U/L (ref 10–40)
ANION GAP SERPL CALCULATED.3IONS-SCNC: 9 MMOL/L (ref 3–16)
APTT: 35.2 SEC (ref 24.2–36.2)
AST SERPL-CCNC: 18 U/L (ref 15–37)
ATYPICAL LYMPHOCYTE RELATIVE PERCENT: 3 % (ref 0–6)
BASOPHILS ABSOLUTE: 0 K/UL (ref 0–0.2)
BASOPHILS RELATIVE PERCENT: 0 %
BILIRUB SERPL-MCNC: 0.4 MG/DL (ref 0–1)
BUN BLDV-MCNC: 28 MG/DL (ref 7–20)
CALCIUM SERPL-MCNC: 9.1 MG/DL (ref 8.3–10.6)
CHLORIDE BLD-SCNC: 99 MMOL/L (ref 99–110)
CO2: 24 MMOL/L (ref 21–32)
CREAT SERPL-MCNC: 1.2 MG/DL (ref 0.6–1.2)
EOSINOPHILS ABSOLUTE: 0 K/UL (ref 0–0.6)
EOSINOPHILS RELATIVE PERCENT: 0 %
GFR AFRICAN AMERICAN: 52
GFR NON-AFRICAN AMERICAN: 43
GLOBULIN: 2.5 G/DL
GLUCOSE BLD-MCNC: 105 MG/DL (ref 70–99)
HCT VFR BLD CALC: 38.3 % (ref 36–48)
HEMOGLOBIN: 12.5 G/DL (ref 12–16)
INR BLD: 1.21 (ref 0.86–1.14)
LYMPHOCYTES ABSOLUTE: 1.3 K/UL (ref 1–5.1)
LYMPHOCYTES RELATIVE PERCENT: 16 %
MAGNESIUM: 2.1 MG/DL (ref 1.8–2.4)
MCH RBC QN AUTO: 29.9 PG (ref 26–34)
MCHC RBC AUTO-ENTMCNC: 32.8 G/DL (ref 31–36)
MCV RBC AUTO: 91.1 FL (ref 80–100)
METAMYELOCYTES RELATIVE PERCENT: 3 %
MONOCYTES ABSOLUTE: 0.7 K/UL (ref 0–1.3)
MONOCYTES RELATIVE PERCENT: 11 %
NEUTROPHILS ABSOLUTE: 4.7 K/UL (ref 1.7–7.7)
NEUTROPHILS RELATIVE PERCENT: 67 %
PDW BLD-RTO: 15.4 % (ref 12.4–15.4)
PLATELET # BLD: 136 K/UL (ref 135–450)
PLATELET SLIDE REVIEW: ADEQUATE
PMV BLD AUTO: 8.1 FL (ref 5–10.5)
POLYCHROMASIA: ABNORMAL
POTASSIUM SERPL-SCNC: 4.9 MMOL/L (ref 3.5–5.1)
PRO-BNP: 1142 PG/ML (ref 0–449)
PROTHROMBIN TIME: 14.1 SEC (ref 10–13.2)
RBC # BLD: 4.2 M/UL (ref 4–5.2)
SLIDE REVIEW: ABNORMAL
SODIUM BLD-SCNC: 132 MMOL/L (ref 136–145)
TOTAL PROTEIN: 6.4 G/DL (ref 6.4–8.2)
TROPONIN: <0.01 NG/ML
WBC # BLD: 6.7 K/UL (ref 4–11)

## 2020-02-15 PROCEDURE — 85730 THROMBOPLASTIN TIME PARTIAL: CPT

## 2020-02-15 PROCEDURE — 2580000003 HC RX 258: Performed by: PHYSICIAN ASSISTANT

## 2020-02-15 PROCEDURE — 99285 EMERGENCY DEPT VISIT HI MDM: CPT

## 2020-02-15 PROCEDURE — 85025 COMPLETE CBC W/AUTO DIFF WBC: CPT

## 2020-02-15 PROCEDURE — 84484 ASSAY OF TROPONIN QUANT: CPT

## 2020-02-15 PROCEDURE — 93005 ELECTROCARDIOGRAM TRACING: CPT | Performed by: PHYSICIAN ASSISTANT

## 2020-02-15 PROCEDURE — 83880 ASSAY OF NATRIURETIC PEPTIDE: CPT

## 2020-02-15 PROCEDURE — 80053 COMPREHEN METABOLIC PANEL: CPT

## 2020-02-15 PROCEDURE — 2500000003 HC RX 250 WO HCPCS

## 2020-02-15 PROCEDURE — 93005 ELECTROCARDIOGRAM TRACING: CPT | Performed by: EMERGENCY MEDICINE

## 2020-02-15 PROCEDURE — 71045 X-RAY EXAM CHEST 1 VIEW: CPT

## 2020-02-15 PROCEDURE — 83735 ASSAY OF MAGNESIUM: CPT

## 2020-02-15 PROCEDURE — 92960 CARDIOVERSION ELECTRIC EXT: CPT

## 2020-02-15 PROCEDURE — 85610 PROTHROMBIN TIME: CPT

## 2020-02-15 RX ORDER — DILTIAZEM HYDROCHLORIDE 5 MG/ML
10 INJECTION INTRAVENOUS ONCE
Status: DISCONTINUED | OUTPATIENT
Start: 2020-02-15 | End: 2020-02-15

## 2020-02-15 RX ORDER — 0.9 % SODIUM CHLORIDE 0.9 %
500 INTRAVENOUS SOLUTION INTRAVENOUS ONCE
Status: COMPLETED | OUTPATIENT
Start: 2020-02-15 | End: 2020-02-15

## 2020-02-15 RX ORDER — ETOMIDATE 2 MG/ML
INJECTION INTRAVENOUS
Status: COMPLETED
Start: 2020-02-15 | End: 2020-02-15

## 2020-02-15 RX ADMIN — ETOMIDATE: 20 INJECTION, SOLUTION INTRAVENOUS at 16:52

## 2020-02-15 RX ADMIN — SODIUM CHLORIDE 500 ML: 9 INJECTION, SOLUTION INTRAVENOUS at 15:45

## 2020-02-15 ASSESSMENT — ENCOUNTER SYMPTOMS
DIARRHEA: 0
COLOR CHANGE: 0
CONSTIPATION: 0
NAUSEA: 0
ABDOMINAL PAIN: 0
BACK PAIN: 0
VOMITING: 0
ABDOMINAL DISTENTION: 0
SHORTNESS OF BREATH: 1
WHEEZING: 0
COUGH: 0
STRIDOR: 0

## 2020-02-15 ASSESSMENT — PAIN SCALES - GENERAL: PAINLEVEL_OUTOF10: 0

## 2020-02-15 NOTE — ED NOTES
10mg of etomidate given.      December VINNY Garner  02/15/20 5368 Bilateral Helical Rim Advancement Flap Text: The defect edges were debeveled with a #15 blade scalpel.  Given the location of the defect and the proximity to free margins (helical rim) a bilateral helical rim advancement flap was deemed most appropriate.  Using a sterile surgical marker, the appropriate advancement flaps were drawn incorporating the defect and placing the expected incisions between the helical rim and antihelix where possible.  The area thus outlined was incised through and through with a #15 scalpel blade.  With a skin hook and iris scissors, the flaps were gently and sharply undermined and freed up.

## 2020-02-15 NOTE — PROGRESS NOTES
Respiratory assisted with Cardioversion. Patient SPO2 93-96% on 2L. Patient was increased to 4L per Minute due to patient mouth breathing.  SpO2 still at 94%

## 2020-02-15 NOTE — ED PROVIDER NOTES
[address] [facility]        Pt Name: Giovanni Lopez  MRN: [id number]  Armstracigfsharon 1935  Date of evaluation: 2/15/2020  Provider: Eric Dobbs PA-C  PCP: Nadine Martin MD    This patient was seen and evaluated by the attending physician  Dr Faustino Neal       Chief Complaint   Patient presents with    Shortness of Breath     HX of AFIB, was SOB and her NP came to see her and stated she was in RVR, called cardiologist and he said come to ED, no CP. Took PO Cardizem (120mg) and Rythmol (300mg)       HISTORY OF PRESENT ILLNESS   (Location, Timing/Onset, Context/Setting, Quality, Duration, Modifying Factors, Severity, Associated Signs and Symptoms)  Note limiting factors. Giovanni Lopez is a 80 y.o. female who presents to the emergency department complaining of palpitations and shortness of breath that she reported this morning. She called her nurse practitioner who came to the house to assess her. She was found to be in atrial fibrillation with rapid ventricular response and instructed to take Cardizem 120 mg and Rythmol 300 mg. She states that her palpitations and shortness of breath has improved but still feels the sensation and slightly fatigued. She is anticoagulated on Xarelto. She typically takes these other medications, Cardizem and Rythmol, as needed for these palpitation sensation. She was admitted in November of last year for the same presentation. They were able to control this with medication. The Last time she was cardioverted for this issue was in 2018. Nursing Notes were all reviewed and agreed with or any disagreements were addressed in the HPI. REVIEW OF SYSTEMS    (2-9 systems for level 4, 10 or more for level 5)     Review of Systems   Constitutional: Positive for fatigue. Negative for chills and fever. HENT: Negative. Eyes: Negative for visual disturbance. Effort: Pulmonary effort is normal.      Breath sounds: Normal breath sounds. Abdominal:      General: Bowel sounds are normal. There is no distension. Palpations: Abdomen is soft. Tenderness: There is no abdominal tenderness. Musculoskeletal: Normal range of motion. Comments: Trace pretibial edema. No posterior calf or thigh tenderness, palpable cord, discoloration. Negative homans   Skin:     General: Skin is warm and dry. Capillary Refill: Capillary refill takes less than 2 seconds. Coloration: Skin is not jaundiced or pale. Findings: No bruising, erythema, lesion or rash. Neurological:      General: No focal deficit present. Mental Status: She is alert and oriented to person, place, and time.    Psychiatric:         Mood and Affect: Mood normal.         Behavior: Behavior normal.         DIAGNOSTIC RESULTS   LABS:    Labs Reviewed   CBC WITH AUTO DIFFERENTIAL - Abnormal; Notable for the following components:       Result Value    Metamyelocytes Relative 3 (*)     Polychromasia Occasional (*)     All other components within normal limits    Narrative:     Performed at:  [facility] [address], [place], [address]   Phone [phone]   COMPREHENSIVE METABOLIC PANEL - Abnormal; Notable for the following components:    Sodium 132 (*)     Glucose 105 (*)     BUN 28 (*)     GFR Non- 43 (*)     GFR  52 (*)     All other components within normal limits    Narrative:     Performed at:  [facility] [address], [place], [facility] [place]   Phone [phone]   PROTIME-INR - Abnormal; Notable for the following components:    Protime 14.1 (*)     INR 1.21 (*)     All other components within normal limits    Narrative:     Performed at:  [facility] [address], [place], [address]   Phone [phone]   [address] - Abnormal; Notable for the following components:    Pro-BNP 1,142 (*)     All other components within normal limits    Narrative:     Performed at:  [facility] [address], [facility], [address]   Phone [phone]   TROPONIN    Narrative:     Performed at:  [facility] [address], [facility], [address]   Phone [phone]   APTT    Narrative:     Performed at:  [facility] [address], [facility], [address]   Phone [phone]   MAGNESIUM    Narrative:     Performed at:  [facility] [address], [facility], [address]   Phone [phone]   POCT GLUCOSE       All other labs were within normal range or not returned as of this dictation. EKG: All EKG's are interpreted by the Emergency Department Physician in the absence of a cardiologist.  Please see their note for interpretation of EKG. RADIOLOGY:   Non-plain film images such as CT, Ultrasound and MRI are read by the radiologist. Plain radiographic images are visualized and preliminarily interpreted by the ED Provider with the below findings:        Interpretation per the Radiologist below, if available at the time of this note:    XR CHEST PORTABLE   Final Result   Vascular congestion without overt pulmonary edema. PROCEDURES   Unless otherwise noted below, none     Procedures    CRITICAL CARE TIME   Critical Care  There was a high probability of life-threatening clinical deterioration in the patient's condition requiring my urgent intervention. Total critical care time with the patient was 31 minutes excluding separately reportable procedures. Critical care required due to patients atrial fibrillation with rvr prompting consideration of medical management, treatment, reassessment.       CONSULTS:  None      EMERGENCY DEPARTMENT COURSE and DIFFERENTIAL DIAGNOSIS/MDM:   Vitals: days    [facility]  [address] [facility]  [phone]    If symptoms worsen      DISCHARGE MEDICATIONS:  New Prescriptions    No medications on file       DISCONTINUED MEDICATIONS:  Discontinued Medications    FUROSEMIDE (LASIX) 20 MG TABLET    Take 1 tablet by mouth daily              (Please note that portions of this note were completed with a voice recognition program.  Efforts were made to edit the dictations but occasionally words are mis-transcribed.)    Cheyenne oFx PA-C (electronically signed)           Cheyenne Fox PA-C  02/15/20 1705       Quentin Delaney MD  82/03/92 [address] Carlo Nguyen MD  93/08/53 1850

## 2020-02-16 LAB
EKG ATRIAL RATE: 69 BPM
EKG ATRIAL RATE: 98 BPM
EKG DIAGNOSIS: NORMAL
EKG DIAGNOSIS: NORMAL
EKG P AXIS: 81 DEGREES
EKG P-R INTERVAL: 174 MS
EKG Q-T INTERVAL: 320 MS
EKG Q-T INTERVAL: 386 MS
EKG QRS DURATION: 86 MS
EKG QRS DURATION: 96 MS
EKG QTC CALCULATION (BAZETT): 413 MS
EKG QTC CALCULATION (BAZETT): 444 MS
EKG R AXIS: 18 DEGREES
EKG R AXIS: 32 DEGREES
EKG T AXIS: 61 DEGREES
EKG T AXIS: 61 DEGREES
EKG VENTRICULAR RATE: 116 BPM
EKG VENTRICULAR RATE: 69 BPM

## 2020-02-16 PROCEDURE — 93010 ELECTROCARDIOGRAM REPORT: CPT | Performed by: INTERNAL MEDICINE

## 2020-02-17 ENCOUNTER — TELEPHONE (OUTPATIENT)
Dept: FAMILY MEDICINE CLINIC | Age: 85
End: 2020-02-17

## 2020-02-18 PROBLEM — I48.91 ATRIAL FIBRILLATION WITH RVR (HCC): Status: RESOLVED | Noted: 2019-11-13 | Resolved: 2020-02-18

## 2020-02-18 PROBLEM — J44.9 COPD, MILD (HCC): Status: RESOLVED | Noted: 2019-07-18 | Resolved: 2020-02-18

## 2020-02-18 PROBLEM — I25.119 CORONARY ARTERY DISEASE INVOLVING NATIVE CORONARY ARTERY OF NATIVE HEART WITH ANGINA PECTORIS (HCC): Status: ACTIVE | Noted: 2020-02-18

## 2020-02-19 ENCOUNTER — HOSPITAL ENCOUNTER (INPATIENT)
Age: 85
LOS: 1 days | Discharge: HOME HEALTH | DRG: 309 | End: 2020-02-20
Attending: EMERGENCY MEDICINE | Admitting: INTERNAL MEDICINE
Payer: MEDICARE

## 2020-02-19 ENCOUNTER — APPOINTMENT (OUTPATIENT)
Dept: CT IMAGING | Age: 85
DRG: 309 | End: 2020-02-19
Payer: MEDICARE

## 2020-02-19 PROBLEM — I48.91 A-FIB (HCC): Status: ACTIVE | Noted: 2020-02-19

## 2020-02-19 PROBLEM — Z78.9 FAILURE OF OUTPATIENT TREATMENT: Status: ACTIVE | Noted: 2020-02-19

## 2020-02-19 PROBLEM — E03.9 HYPOTHYROID: Status: ACTIVE | Noted: 2020-02-19

## 2020-02-19 PROBLEM — J96.11 CHRONIC RESPIRATORY FAILURE WITH HYPOXIA (HCC): Status: ACTIVE | Noted: 2020-02-19

## 2020-02-19 LAB
ANION GAP SERPL CALCULATED.3IONS-SCNC: 12 MMOL/L (ref 3–16)
BASOPHILS ABSOLUTE: 0 K/UL (ref 0–0.2)
BASOPHILS RELATIVE PERCENT: 0.6 %
BILIRUBIN URINE: NEGATIVE
BLOOD, URINE: NEGATIVE
BUN BLDV-MCNC: 21 MG/DL (ref 7–20)
CALCIUM SERPL-MCNC: 9.3 MG/DL (ref 8.3–10.6)
CHLORIDE BLD-SCNC: 102 MMOL/L (ref 99–110)
CLARITY: CLEAR
CO2: 25 MMOL/L (ref 21–32)
COLOR: YELLOW
CREAT SERPL-MCNC: 1 MG/DL (ref 0.6–1.2)
EKG ATRIAL RATE: 105 BPM
EKG ATRIAL RATE: 115 BPM
EKG ATRIAL RATE: 80 BPM
EKG DIAGNOSIS: NORMAL
EKG P AXIS: 21 DEGREES
EKG P-R INTERVAL: 150 MS
EKG Q-T INTERVAL: 302 MS
EKG Q-T INTERVAL: 310 MS
EKG Q-T INTERVAL: 364 MS
EKG QRS DURATION: 72 MS
EKG QRS DURATION: 90 MS
EKG QRS DURATION: 92 MS
EKG QTC CALCULATION (BAZETT): 419 MS
EKG QTC CALCULATION (BAZETT): 450 MS
EKG QTC CALCULATION (BAZETT): 466 MS
EKG R AXIS: 10 DEGREES
EKG R AXIS: 13 DEGREES
EKG R AXIS: 25 DEGREES
EKG T AXIS: 107 DEGREES
EKG T AXIS: 45 DEGREES
EKG T AXIS: 74 DEGREES
EKG VENTRICULAR RATE: 127 BPM
EKG VENTRICULAR RATE: 143 BPM
EKG VENTRICULAR RATE: 80 BPM
EOSINOPHILS ABSOLUTE: 0.1 K/UL (ref 0–0.6)
EOSINOPHILS RELATIVE PERCENT: 1.6 %
GFR AFRICAN AMERICAN: >60
GFR NON-AFRICAN AMERICAN: 53
GLUCOSE BLD-MCNC: 136 MG/DL (ref 70–99)
GLUCOSE URINE: NEGATIVE MG/DL
HCT VFR BLD CALC: 36.9 % (ref 36–48)
HEMOGLOBIN: 12.2 G/DL (ref 12–16)
KETONES, URINE: NEGATIVE MG/DL
LEUKOCYTE ESTERASE, URINE: NEGATIVE
LYMPHOCYTES ABSOLUTE: 0.9 K/UL (ref 1–5.1)
LYMPHOCYTES RELATIVE PERCENT: 12.7 %
MCH RBC QN AUTO: 29.8 PG (ref 26–34)
MCHC RBC AUTO-ENTMCNC: 33.1 G/DL (ref 31–36)
MCV RBC AUTO: 90.2 FL (ref 80–100)
MICROSCOPIC EXAMINATION: NORMAL
MONOCYTES ABSOLUTE: 0.5 K/UL (ref 0–1.3)
MONOCYTES RELATIVE PERCENT: 8 %
NEUTROPHILS ABSOLUTE: 5.2 K/UL (ref 1.7–7.7)
NEUTROPHILS RELATIVE PERCENT: 77.1 %
NITRITE, URINE: NEGATIVE
PDW BLD-RTO: 14.9 % (ref 12.4–15.4)
PH UA: 7.5 (ref 5–8)
PLATELET # BLD: 112 K/UL (ref 135–450)
PMV BLD AUTO: 8 FL (ref 5–10.5)
POTASSIUM REFLEX MAGNESIUM: 4.9 MMOL/L (ref 3.5–5.1)
PRO-BNP: 356 PG/ML (ref 0–449)
PROTEIN UA: NEGATIVE MG/DL
RAPID INFLUENZA  B AGN: NEGATIVE
RAPID INFLUENZA A AGN: NEGATIVE
RBC # BLD: 4.09 M/UL (ref 4–5.2)
SODIUM BLD-SCNC: 139 MMOL/L (ref 136–145)
SPECIFIC GRAVITY UA: 1.01 (ref 1–1.03)
T4 FREE: 1.2 NG/DL (ref 0.9–1.8)
TROPONIN: <0.01 NG/ML
TSH REFLEX: 5.82 UIU/ML (ref 0.27–4.2)
URINE REFLEX TO CULTURE: NORMAL
URINE TYPE: NORMAL
UROBILINOGEN, URINE: 0.2 E.U./DL
WBC # BLD: 6.8 K/UL (ref 4–11)

## 2020-02-19 PROCEDURE — 6370000000 HC RX 637 (ALT 250 FOR IP): Performed by: INTERNAL MEDICINE

## 2020-02-19 PROCEDURE — 81003 URINALYSIS AUTO W/O SCOPE: CPT

## 2020-02-19 PROCEDURE — 96376 TX/PRO/DX INJ SAME DRUG ADON: CPT

## 2020-02-19 PROCEDURE — 2700000000 HC OXYGEN THERAPY PER DAY

## 2020-02-19 PROCEDURE — 96365 THER/PROPH/DIAG IV INF INIT: CPT

## 2020-02-19 PROCEDURE — 96374 THER/PROPH/DIAG INJ IV PUSH: CPT

## 2020-02-19 PROCEDURE — 84439 ASSAY OF FREE THYROXINE: CPT

## 2020-02-19 PROCEDURE — 2580000003 HC RX 258: Performed by: EMERGENCY MEDICINE

## 2020-02-19 PROCEDURE — 93010 ELECTROCARDIOGRAM REPORT: CPT | Performed by: INTERNAL MEDICINE

## 2020-02-19 PROCEDURE — 99223 1ST HOSP IP/OBS HIGH 75: CPT | Performed by: INTERNAL MEDICINE

## 2020-02-19 PROCEDURE — 2500000003 HC RX 250 WO HCPCS: Performed by: INTERNAL MEDICINE

## 2020-02-19 PROCEDURE — 92960 CARDIOVERSION ELECTRIC EXT: CPT | Performed by: INTERNAL MEDICINE

## 2020-02-19 PROCEDURE — 99285 EMERGENCY DEPT VISIT HI MDM: CPT

## 2020-02-19 PROCEDURE — 84484 ASSAY OF TROPONIN QUANT: CPT

## 2020-02-19 PROCEDURE — 94761 N-INVAS EAR/PLS OXIMETRY MLT: CPT

## 2020-02-19 PROCEDURE — 93005 ELECTROCARDIOGRAM TRACING: CPT | Performed by: EMERGENCY MEDICINE

## 2020-02-19 PROCEDURE — 36415 COLL VENOUS BLD VENIPUNCTURE: CPT

## 2020-02-19 PROCEDURE — 94640 AIRWAY INHALATION TREATMENT: CPT

## 2020-02-19 PROCEDURE — 99222 1ST HOSP IP/OBS MODERATE 55: CPT | Performed by: INTERNAL MEDICINE

## 2020-02-19 PROCEDURE — 92610 EVALUATE SWALLOWING FUNCTION: CPT

## 2020-02-19 PROCEDURE — 96366 THER/PROPH/DIAG IV INF ADDON: CPT

## 2020-02-19 PROCEDURE — 1200000000 HC SEMI PRIVATE

## 2020-02-19 PROCEDURE — 92526 ORAL FUNCTION THERAPY: CPT

## 2020-02-19 PROCEDURE — 84443 ASSAY THYROID STIM HORMONE: CPT

## 2020-02-19 PROCEDURE — 87804 INFLUENZA ASSAY W/OPTIC: CPT

## 2020-02-19 PROCEDURE — 93005 ELECTROCARDIOGRAM TRACING: CPT | Performed by: INTERNAL MEDICINE

## 2020-02-19 PROCEDURE — 92960 CARDIOVERSION ELECTRIC EXT: CPT

## 2020-02-19 PROCEDURE — 71250 CT THORAX DX C-: CPT

## 2020-02-19 PROCEDURE — 83880 ASSAY OF NATRIURETIC PEPTIDE: CPT

## 2020-02-19 PROCEDURE — 2500000003 HC RX 250 WO HCPCS: Performed by: EMERGENCY MEDICINE

## 2020-02-19 PROCEDURE — 85025 COMPLETE CBC W/AUTO DIFF WBC: CPT

## 2020-02-19 PROCEDURE — 5A2204Z RESTORATION OF CARDIAC RHYTHM, SINGLE: ICD-10-PCS | Performed by: INTERNAL MEDICINE

## 2020-02-19 PROCEDURE — 80048 BASIC METABOLIC PNL TOTAL CA: CPT

## 2020-02-19 PROCEDURE — 2580000003 HC RX 258: Performed by: INTERNAL MEDICINE

## 2020-02-19 RX ORDER — ACETAMINOPHEN 325 MG/1
650 TABLET ORAL EVERY 6 HOURS PRN
Status: DISCONTINUED | OUTPATIENT
Start: 2020-02-19 | End: 2020-02-20 | Stop reason: HOSPADM

## 2020-02-19 RX ORDER — LEVOTHYROXINE SODIUM 0.03 MG/1
50 TABLET ORAL DAILY
Status: DISCONTINUED | OUTPATIENT
Start: 2020-02-19 | End: 2020-02-20 | Stop reason: HOSPADM

## 2020-02-19 RX ORDER — IPRATROPIUM BROMIDE AND ALBUTEROL SULFATE 2.5; .5 MG/3ML; MG/3ML
1 SOLUTION RESPIRATORY (INHALATION)
Status: DISCONTINUED | OUTPATIENT
Start: 2020-02-19 | End: 2020-02-20 | Stop reason: HOSPADM

## 2020-02-19 RX ORDER — ROPINIROLE 1 MG/1
2 TABLET, FILM COATED ORAL 2 TIMES DAILY
Status: DISCONTINUED | OUTPATIENT
Start: 2020-02-19 | End: 2020-02-20 | Stop reason: HOSPADM

## 2020-02-19 RX ORDER — ONDANSETRON 2 MG/ML
4 INJECTION INTRAMUSCULAR; INTRAVENOUS EVERY 6 HOURS PRN
Status: DISCONTINUED | OUTPATIENT
Start: 2020-02-19 | End: 2020-02-20 | Stop reason: HOSPADM

## 2020-02-19 RX ORDER — DICYCLOMINE HYDROCHLORIDE 10 MG/1
10 CAPSULE ORAL 4 TIMES DAILY PRN
Status: DISCONTINUED | OUTPATIENT
Start: 2020-02-19 | End: 2020-02-20 | Stop reason: HOSPADM

## 2020-02-19 RX ORDER — LORAZEPAM 0.5 MG/1
0.5 TABLET ORAL 2 TIMES DAILY
Status: DISCONTINUED | OUTPATIENT
Start: 2020-02-19 | End: 2020-02-20 | Stop reason: HOSPADM

## 2020-02-19 RX ORDER — SODIUM CHLORIDE 0.9 % (FLUSH) 0.9 %
10 SYRINGE (ML) INJECTION EVERY 12 HOURS SCHEDULED
Status: DISCONTINUED | OUTPATIENT
Start: 2020-02-19 | End: 2020-02-20 | Stop reason: HOSPADM

## 2020-02-19 RX ORDER — PROMETHAZINE HYDROCHLORIDE 25 MG/1
12.5 TABLET ORAL EVERY 6 HOURS PRN
Status: DISCONTINUED | OUTPATIENT
Start: 2020-02-19 | End: 2020-02-20 | Stop reason: HOSPADM

## 2020-02-19 RX ORDER — POLYETHYLENE GLYCOL 3350 17 G/17G
17 POWDER, FOR SOLUTION ORAL DAILY PRN
Status: DISCONTINUED | OUTPATIENT
Start: 2020-02-19 | End: 2020-02-20 | Stop reason: HOSPADM

## 2020-02-19 RX ORDER — PANTOPRAZOLE SODIUM 40 MG/1
40 TABLET, DELAYED RELEASE ORAL DAILY
Status: DISCONTINUED | OUTPATIENT
Start: 2020-02-19 | End: 2020-02-20 | Stop reason: HOSPADM

## 2020-02-19 RX ORDER — DILTIAZEM HYDROCHLORIDE 120 MG/1
120 CAPSULE, COATED, EXTENDED RELEASE ORAL DAILY
Status: DISCONTINUED | OUTPATIENT
Start: 2020-02-19 | End: 2020-02-20 | Stop reason: HOSPADM

## 2020-02-19 RX ORDER — LOSARTAN POTASSIUM 100 MG/1
100 TABLET ORAL DAILY
Status: DISCONTINUED | OUTPATIENT
Start: 2020-02-19 | End: 2020-02-20 | Stop reason: HOSPADM

## 2020-02-19 RX ORDER — SODIUM CHLORIDE 0.9 % (FLUSH) 0.9 %
10 SYRINGE (ML) INJECTION PRN
Status: DISCONTINUED | OUTPATIENT
Start: 2020-02-19 | End: 2020-02-20 | Stop reason: HOSPADM

## 2020-02-19 RX ORDER — DILTIAZEM HYDROCHLORIDE 5 MG/ML
10 INJECTION INTRAVENOUS ONCE
Status: DISCONTINUED | OUTPATIENT
Start: 2020-02-19 | End: 2020-02-19 | Stop reason: SDUPTHER

## 2020-02-19 RX ORDER — BUDESONIDE AND FORMOTEROL FUMARATE DIHYDRATE 160; 4.5 UG/1; UG/1
1 AEROSOL RESPIRATORY (INHALATION) DAILY
Status: DISCONTINUED | OUTPATIENT
Start: 2020-02-19 | End: 2020-02-20 | Stop reason: HOSPADM

## 2020-02-19 RX ORDER — GABAPENTIN 100 MG/1
100 CAPSULE ORAL NIGHTLY PRN
Status: DISCONTINUED | OUTPATIENT
Start: 2020-02-19 | End: 2020-02-20 | Stop reason: HOSPADM

## 2020-02-19 RX ORDER — HYDROCODONE BITARTRATE AND ACETAMINOPHEN 5; 325 MG/1; MG/1
1 TABLET ORAL EVERY 6 HOURS PRN
Status: DISCONTINUED | OUTPATIENT
Start: 2020-02-19 | End: 2020-02-20 | Stop reason: HOSPADM

## 2020-02-19 RX ORDER — ATORVASTATIN CALCIUM 80 MG/1
80 TABLET, FILM COATED ORAL DAILY
Status: DISCONTINUED | OUTPATIENT
Start: 2020-02-19 | End: 2020-02-20 | Stop reason: HOSPADM

## 2020-02-19 RX ORDER — ASPIRIN 81 MG/1
81 TABLET, CHEWABLE ORAL EVERY OTHER DAY
Status: DISCONTINUED | OUTPATIENT
Start: 2020-02-19 | End: 2020-02-20 | Stop reason: HOSPADM

## 2020-02-19 RX ORDER — PROPAFENONE HYDROCHLORIDE 150 MG/1
150 TABLET, FILM COATED ORAL EVERY 8 HOURS SCHEDULED
Status: DISCONTINUED | OUTPATIENT
Start: 2020-02-19 | End: 2020-02-20 | Stop reason: HOSPADM

## 2020-02-19 RX ORDER — AMLODIPINE BESYLATE 5 MG/1
5 TABLET ORAL DAILY
Status: DISCONTINUED | OUTPATIENT
Start: 2020-02-19 | End: 2020-02-20 | Stop reason: HOSPADM

## 2020-02-19 RX ORDER — LORAZEPAM 1 MG/1
1 TABLET ORAL NIGHTLY PRN
Status: DISCONTINUED | OUTPATIENT
Start: 2020-02-19 | End: 2020-02-20 | Stop reason: HOSPADM

## 2020-02-19 RX ORDER — DILTIAZEM HYDROCHLORIDE 5 MG/ML
10 INJECTION INTRAVENOUS ONCE
Status: COMPLETED | OUTPATIENT
Start: 2020-02-19 | End: 2020-02-19

## 2020-02-19 RX ORDER — ACETAMINOPHEN 650 MG/1
650 SUPPOSITORY RECTAL EVERY 6 HOURS PRN
Status: DISCONTINUED | OUTPATIENT
Start: 2020-02-19 | End: 2020-02-20 | Stop reason: HOSPADM

## 2020-02-19 RX ORDER — ALBUTEROL SULFATE 90 UG/1
2 AEROSOL, METERED RESPIRATORY (INHALATION) EVERY 6 HOURS PRN
Status: DISCONTINUED | OUTPATIENT
Start: 2020-02-19 | End: 2020-02-20 | Stop reason: HOSPADM

## 2020-02-19 RX ADMIN — LEVOTHYROXINE SODIUM 50 MCG: 0.03 TABLET ORAL at 14:04

## 2020-02-19 RX ADMIN — ATORVASTATIN CALCIUM 80 MG: 80 TABLET, FILM COATED ORAL at 14:03

## 2020-02-19 RX ADMIN — LOSARTAN POTASSIUM 100 MG: 100 TABLET, FILM COATED ORAL at 14:04

## 2020-02-19 RX ADMIN — DILTIAZEM HYDROCHLORIDE 10 MG: 5 INJECTION INTRAVENOUS at 05:42

## 2020-02-19 RX ADMIN — IPRATROPIUM BROMIDE AND ALBUTEROL SULFATE 1 AMPULE: .5; 3 SOLUTION RESPIRATORY (INHALATION) at 08:31

## 2020-02-19 RX ADMIN — LORAZEPAM 0.5 MG: 0.5 TABLET ORAL at 14:04

## 2020-02-19 RX ADMIN — IPRATROPIUM BROMIDE AND ALBUTEROL SULFATE 1 AMPULE: .5; 3 SOLUTION RESPIRATORY (INHALATION) at 15:21

## 2020-02-19 RX ADMIN — ASPIRIN 81 MG 81 MG: 81 TABLET ORAL at 14:03

## 2020-02-19 RX ADMIN — ROPINIROLE HYDROCHLORIDE 2 MG: 1 TABLET, FILM COATED ORAL at 14:03

## 2020-02-19 RX ADMIN — Medication 10 ML: at 20:16

## 2020-02-19 RX ADMIN — PROPAFENONE HYDROCHLORIDE 150 MG: 150 TABLET, FILM COATED ORAL at 14:04

## 2020-02-19 RX ADMIN — ROPINIROLE HYDROCHLORIDE 2 MG: 1 TABLET, FILM COATED ORAL at 20:16

## 2020-02-19 RX ADMIN — IPRATROPIUM BROMIDE AND ALBUTEROL SULFATE 1 AMPULE: .5; 3 SOLUTION RESPIRATORY (INHALATION) at 20:17

## 2020-02-19 RX ADMIN — PANTOPRAZOLE SODIUM 40 MG: 40 TABLET, DELAYED RELEASE ORAL at 14:03

## 2020-02-19 RX ADMIN — LORAZEPAM 1 MG: 1 TABLET ORAL at 20:16

## 2020-02-19 RX ADMIN — Medication 1 PUFF: at 08:31

## 2020-02-19 RX ADMIN — PROPAFENONE HYDROCHLORIDE 150 MG: 150 TABLET, FILM COATED ORAL at 21:00

## 2020-02-19 RX ADMIN — DILTIAZEM HYDROCHLORIDE 120 MG: 120 CAPSULE, COATED, EXTENDED RELEASE ORAL at 17:53

## 2020-02-19 RX ADMIN — DILTIAZEM HYDROCHLORIDE 5 MG/HR: 5 INJECTION INTRAVENOUS at 05:43

## 2020-02-19 RX ADMIN — AMLODIPINE BESYLATE 5 MG: 5 TABLET ORAL at 14:03

## 2020-02-19 RX ADMIN — RIVAROXABAN 15 MG: 15 TABLET, FILM COATED ORAL at 12:28

## 2020-02-19 ASSESSMENT — PAIN SCALES - GENERAL
PAINLEVEL_OUTOF10: 0

## 2020-02-19 NOTE — ED PROVIDER NOTES
Emergency Department Encounter    Patient: Isabel Paulino  MRN: [id number]  : 1935  Date of Evaluation: 2020  ED Provider:  Romie Holm    Triage Chief Complaint:   Palpitations    [place]:  Isabel Paulino is a 80 y.o. female that presents to the ER for evaluation of increasing palpitations, actually saw this patient 4 days prior, with A. fib with RVR, she had synchronized cardioversion with no complication she is on chronic anticoagulation, and now presents the ER for evaluation recurrent atrial fibrillation. No shortness of breath. No active chest pain. No rectal bleeding. No headache. No weakness. No syncope.     ROS - see HPI, below listed is current ROS at time of my eval:  General:  No fevers, no weakness  Eyes:  no discharge  ENT:  No sore throat, no nasal congestion  Cardiovascular:  no chest pain, + palpitations  Respiratory:  No shortness of breath, no cough, no wheezing  Gastrointestinal:  No pain, no nausea, no vomiting, no diarrhea  Musculoskeletal:  No muscle pain, no joint pain  Skin:  No rash, no pruritis  Neurologic:  No speech problems, no headache, no extremity numbness, no extremity tingling, no extremity weakness  Psychiatric:  No anxiety  Genitourinary:  No dysuria, no hematuria  Endocrine:  No unexpected weight gain, no unexpected weight loss  Extremities:  no edema, no pain    Past Medical History:   Diagnosis Date    Arthritis     Atrial fibrillation (HCC)     Diabetes mellitus type II, controlled ([facility]     GERD (gastroesophageal reflux disease)     HTN (hypertension)     Hyperlipidemia     Hypothyroid     Insomnia     Lumbago     SVT (supraventricular tachycardia) (HCC)      Past Surgical History:   Procedure Laterality Date    APPENDECTOMY      CHOLECYSTECTOMY, LAPAROSCOPIC  2013    COLONOSCOPY       Family History   Problem Relation Age of Onset    High Blood Pressure Mother     Heart Attack Father     Heart Disease Father    [facility] Other Brother     Asthma Paternal Uncle      Social History     Socioeconomic History    Marital status:       Spouse name: Not on file    Number of children: 9    Years of education: Not on file    Highest education level: Not on file   Occupational History    Not on file   Social Needs    Financial resource strain: Not on file    Food insecurity:     Worry: Not on file     Inability: Not on file    Transportation needs:     Medical: Not on file     Non-medical: Not on file   Tobacco Use    Smoking status: Former Smoker     Start date: 9/15/1950     Last attempt to quit: 1995     Years since quittin.1    Smokeless tobacco: Never Used   Substance and Sexual Activity    Alcohol use: No     Alcohol/week: 0.0 standard drinks    Drug use: No    Sexual activity: Not Currently     Partners: Male   Lifestyle    Physical activity:     Days per week: Not on file     Minutes per session: Not on file    Stress: Not on file   Relationships    Social connections:     Talks on phone: Not on file     Gets together: Not on file     Attends [employer] service: Not on file     Active member of club or organization: Not on file     Attends meetings of clubs or organizations: Not on file     Relationship status: Not on file    Intimate partner violence:     Fear of current or ex partner: Not on file     Emotionally abused: Not on file     Physically abused: Not on file     Forced sexual activity: Not on file   Other Topics Concern    Not on file   Social History Narrative    Not on file     Current Facility-Administered Medications   Medication Dose Route Frequency Provider Last Rate Last Dose    dilTIAZem 125 mg in dextrose 5 % 125 mL infusion  5 mg/hr Intravenous Continuous Abhay Gibson MD 5 mL/hr at 20 0543 5 mg/hr at 20 0543     Current Outpatient Medications   Medication Sig Dispense Refill    blood glucose test strips (ACCU-CHEK CHIP) strip 1 each by In Vitro route daily As needed. 100 each 3    Blood Glucose Monitoring Suppl (ACCU-CHEK CHIP PLUS) w/Device KIT 1 kit by Does not apply route 4 times daily (before meals and nightly) 1 kit 0    Lancets MISC 1 each by Does not apply route daily 100 each 3    LORazepam (ATIVAN) 0.5 MG tablet Take 2 tablets by mouth.  rOPINIRole (REQUIP) 2 MG tablet One tablet 3 times daily 270 tablet 0    LORazepam (ATIVAN) 0.5 MG tablet TAKE 1 TABLET BY MOUTH TWICE DAILY AND 2 TABLETS AT BEDTIME AS NEEDED FOR ANXIETY 120 tablet 0    HYDROcodone-acetaminophen (NORCO) 5-325 MG per tablet Take 1 tablet by mouth 4 times daily as needed for Pain for up to 30 days. 120 tablet 0    predniSONE (DELTASONE) 20 MG tablet 1 TID for 4 day then 1 BID 20 tablet 0    meclizine (ANTIVERT) 12.5 MG tablet TAKE 1 TABLET THREE TIMES DAILY AS NEEDED 270 tablet 0    rivaroxaban (XARELTO) 15 MG TABS tablet TAKE 1 TABLET BY MOUTH DAILY 90 tablet 3    atorvastatin (LIPITOR) 80 MG tablet TAKE 1 TABLET BY MOUTH EVERY NIGHT 90 tablet 3    propafenone (RYTHMOL) 150 MG tablet TAKE 1 TABLET BY MOUTH EVERY 8 HOURS 270 tablet 1    irbesartan (AVAPRO) 300 MG tablet TAKE 1 TABLET BY MOUTH EVERY NIGHT 90 tablet 1    SYMBICORT 160-4.5 MCG/ACT AERO INHALE 2 PUFFS INTO THE LUNGS TWICE DAILY 10.2 g 5    dicyclomine (BENTYL) 10 MG capsule Take 1 capsule by mouth 4 times daily as needed (abd pain) 360 capsule 1    gabapentin (NEURONTIN) 100 MG capsule Take 1 capsule by mouth nightly as needed (neuropathy) for up to 90 days.  90 capsule 1    levothyroxine (SYNTHROID) 50 MCG tablet TAKE 1 TABLET EVERY DAY 90 tablet 2    diltiazem (CARDIZEM 12 HR) 60 MG extended release capsule Take 1 capsule by mouth 2 times daily as needed (For Afib w/ RVR >100) 60 capsule 3    albuterol sulfate HFA (PROAIR HFA) 108 (90 Base) MCG/ACT inhaler Inhale 2 puffs into the lungs every 6 hours as needed for Wheezing 1 Inhaler 6    amLODIPine (NORVASC) 5 MG tablet TAKE 1 TABLET EVERY DAY 90 tablet 1 interpretation is - normal    General appearance:  No acute distress. Skin:  Warm. Dry. Eye:  Extraocular movements intact. Ears, nose, mouth and throat:  Oral mucosa moist   Neck:  Trachea midline. Extremity:  No swelling. Normal ROM     Heart:  Irregular tachycardia  Perfusion:  intact  Respiratory:  Lungs clear to auscultation bilaterally. Respirations nonlabored. Abdominal:  Normal bowel sounds. Soft. Nontender. Non distended. Neurological:  Alert and oriented times 3.              Psychiatric:  Appropriate    I have reviewed and interpreted all of the currently available lab results from this visit (if applicable):  Results for orders placed or performed during the hospital encounter of 02/19/20   CBC Auto Differential   Result Value Ref Range    WBC 6.8 4.0 - 11.0 K/uL    RBC 4.09 4.00 - 5.20 M/uL    Hemoglobin 12.2 12.0 - 16.0 g/dL    Hematocrit 36.9 36.0 - 48.0 %    MCV 90.2 80.0 - 100.0 fL    MCH 29.8 26.0 - 34.0 pg    MCHC 33.1 31.0 - 36.0 g/dL    RDW 14.9 12.4 - 15.4 %    Platelets 240 (L) 843 - 450 K/uL    MPV 8.0 5.0 - 10.5 fL    Neutrophils % 77.1 %    Lymphocytes % 12.7 %    Monocytes % 8.0 %    Eosinophils % 1.6 %    Basophils % 0.6 %    Neutrophils Absolute 5.2 1.7 - 7.7 K/uL    Lymphocytes Absolute 0.9 (L) 1.0 - 5.1 K/uL    Monocytes Absolute 0.5 0.0 - 1.3 K/uL    Eosinophils Absolute 0.1 0.0 - 0.6 K/uL    Basophils Absolute 0.0 0.0 - 0.2 K/uL   Basic Metabolic Panel w/ Reflex to MG   Result Value Ref Range    Sodium 139 136 - 145 mmol/L    Potassium reflex Magnesium 4.9 3.5 - 5.1 mmol/L    Chloride 102 99 - 110 mmol/L    CO2 25 21 - 32 mmol/L    Anion Gap 12 3 - 16    Glucose 136 (H) 70 - 99 mg/dL    BUN 21 (H) 7 - 20 mg/dL    CREATININE 1.0 0.6 - 1.2 mg/dL    GFR Non- 53 (A) >60    GFR African American >60 >60    Calcium 9.3 8.3 - 10.6 mg/dL   Troponin   Result Value Ref Range    Troponin <0.01 <0.01 ng/mL   Brain Natriuretic Peptide   Result Value Ref Range Pro- 0 - 449 pg/mL   EKG 12 Lead   Result Value Ref Range    Ventricular Rate 143 BPM    Atrial Rate 105 BPM    QRS Duration 90 ms    Q-T Interval 302 ms    QTc Calculation (Bazett) 466 ms    R Axis 13 degrees    T Axis 107 degrees    Diagnosis       Atrial fibrillation with rapid ventricular responsePossible Anterior infarct , age undeterminedAbnormal ECG      Radiographs (if obtained):  Radiologist's Report Reviewed:  No results found. EKG (if obtained): (All EKG's are interpreted by myself in the absence of a cardiologist)      MDM:  Presents to the ER for evaluation of recurrent proximal atrial fibrillation now is her second episode in the last 5 days she was recently cardioverted she did not want cardioversion she was given a dose of diltiazem bolus and diltiazem infusion, she will be admitted for cardiology consultation for possible ablation or reevaluation medical treatment goals. Rate controlled this point in time, reevaluation of rhythm versus rate control versus possible ablation. Clinical Impression:  1. Paroxysmal atrial fibrillation (HCC)      Disposition referral (if applicable):  No follow-up provider specified. Disposition medications (if applicable):  New Prescriptions    No medications on file       Comment: Please note this report has been produced using speech recognition software and may contain errors related to that system including errors in grammar, punctuation, and spelling, as well as words and phrases that may be inappropriate. Efforts were made to edit the dictations.      Abhay Gibson MD  49/97/32 [id number]

## 2020-02-19 NOTE — PROGRESS NOTES
CLINICAL BEDSIDE SWALLOWING EVALUATION  Speech Therapy Department    Patient Name:  Alexander Woods  :  1935  Pain level: Pt did not report pain. Medical Diagnosis:   A-fib ([address] [I48.91]  A-fib ([address] [I48.91]    HPI: Patient underwent synchronized DC cardioversion for atrial fibrillation on 2/15. Has had history of persistent atrial fibrillation-failed prior DCCV in 2018. Complains of recurrent palpitations, shortness of breath and chronic dry cough. States that she had 1 fever spike of 102F at home over the last couple of days. Denies any flulike symptoms. Pulmonary consultation has been requested. Chest X-ray 02/15/20:   Impression   Vascular congestion without overt pulmonary edema. \"SLP eval and treat\" orders received. Pt had a MBS study conducted in 2019. Results revealed intermittent laryngeal penetration with thin liquids and nectar thick liquids. Per report, penetrated material entered the airway, remained above the vocal cords and was ejected from the airway. Recommendations were made for a regular texture diet with thin liquids. Please refer to MBS report for any further questions. Treatment Diagnosis: Mild Oropharyngeal Dysphagia    Impressions: Pt repositioned in bed upon SLP arrival and currently on 2L O2. Pt typically on 2L O2 at home. Pt reports no notable swallowing issues since MBS in October. Oral motor exam was unremarkable. Various textures were provided to assess swallow function. Pt presents with mild oropharyngeal dysphagia characterized by prolonged mastication, suspected bolus loss to the pharynx, delayed swallow initiation, reduced A-P propulsion and mildly reduced laryngeal elevation. Thin liquids revealed reduced bolus control with a delayed swallow initiation. Prolonged mastication and reduced A-P propulsion assessed with regular solids. Pt able to achieve adequate oral clearance with use of liquid wash.  Mildly reduced laryngeal elevation upon manual palpation assessed. No overt throat clearing, coughing, or wet vocal quality assessed with any consistency. Recommend regular texture diet with thin liquids. Dietary Recommendations:  Recommend regular texture diet with thin liquids, no straws, meds as tolerated    Strategies: 90 degree positioning with all p.o. intake; small bites/sips; alternate textures through meal; reduce rate of intake    Treatment/Goals: Speech therapy for dysphagia tx 3-5 times per week. ST.) Pt will tolerate recommended diet without s/s of aspiration     Oral motor Exam:  Dentition: adequate  Labial/Facial: within functional limits  Lingual: within functional limits  Voice: within functional limits     Oral Phase:   Prolonged mastication  Reduced A-P propulsion  Suspected premature bolus loss to pharynx    Pharyngeal Phase:  Suspect pharyngeal pooling  Delayed swallow initiation  Decreased laryngeal elevation via palpation     Patient/Family Education: Education, results and recommendations given to the Pt and nurse, who verbalized understanding    Timed Code Treatment: 0 minutes    Total Treatment Time: 23 minutes    Discharge Recommendations: Speech Therapy for Speech/Dysphagia treatment at discharge. Lori DUNN,  Clinician    The speech-language pathologist was present, directed the patient's care, made skilled judgment and was responsible for assessment and treatment.     Kasia Duek M.A., [address] [place] [address]  Speech-Language Pathologist

## 2020-02-19 NOTE — CONSULTS
tablet 1 tablet, 1 tablet, Oral, Q6H PRN  ipratropium-albuterol (DUONEB) nebulizer solution 1 ampule, 1 ampule, Inhalation, Q4H WA  losartan (COZAAR) tablet 100 mg, 100 mg, Oral, Daily  levothyroxine (SYNTHROID) tablet 50 mcg, 50 mcg, Oral, Daily  LORazepam (ATIVAN) tablet 0.5 mg, 0.5 mg, Oral, BID  LORazepam (ATIVAN) tablet 1 mg, 1 mg, Oral, Nightly PRN  pantoprazole (PROTONIX) tablet 40 mg, 40 mg, Oral, Daily  propafenone (RYTHMOL) tablet 150 mg, 150 mg, Oral, 3 times per day  rivaroxaban (XARELTO) tablet 15 mg, 15 mg, Oral, Daily  rOPINIRole (REQUIP) tablet 2 mg, 2 mg, Oral, BID  budesonide-formoterol (SYMBICORT) 160-4.5 MCG/ACT inhaler 1 puff, 1 puff, Inhalation, Daily  sodium chloride flush 0.9 % injection 10 mL, 10 mL, Intravenous, 2 times per day  sodium chloride flush 0.9 % injection 10 mL, 10 mL, Intravenous, PRN  acetaminophen (TYLENOL) tablet 650 mg, 650 mg, Oral, Q6H PRN  acetaminophen (TYLENOL) suppository 650 mg, 650 mg, Rectal, Q6H PRN  polyethylene glycol (GLYCOLAX) packet 17 g, 17 g, Oral, Daily PRN  promethazine (PHENERGAN) tablet 12.5 mg, 12.5 mg, Oral, Q6H PRN  ondansetron (ZOFRAN) injection 4 mg, 4 mg, Intravenous, Q6H PRN  dilTIAZem 125 mg in dextrose 5 % 125 mL infusion, 5 mg/hr, Intravenous, Continuous    Allergies   Allergen Reactions    Adhesive Tape Anaphylaxis    Cefaclor Nausea And Vomiting     Other reaction(s): Chest pain    Nsaids      Pt states she has hives and heart races   Other reaction(s): Tachycardia    Clinoril [Sulindac] Other (See Comments)     Stomach pain    Codeine      FEEL NUMB    Diclofenac     Diclofenac Sodium Hives    Hctz [Hydrochlorothiazide]      Sob    Ibuprofen      Other reaction(s): Tachycardia    Macrobid [Nitrofurantoin Macrocrystal]      nausea    Motrin [Ibuprofen Micronized] Other (See Comments)     Tachycardia      Pcn [Penicillins] Hives    Peach [Prunus Persica] Itching and Swelling     Cannot eat raw peaches       Social History: -- -- -- -- --   02/19/20 0900 (!) 91/49 -- -- -- -- -- -- --   02/19/20 0831 126/82 97.4 °F (36.3 °C) Temporal 88 18 96 % -- --   02/19/20 0800 124/62 -- -- -- -- -- -- --   02/19/20 0703 -- -- -- -- -- -- 5' 2\" (1.575 m) 172 lb 4.8 oz (78.2 kg)   02/19/20 0649 (!) 160/81 96.1 °F (35.6 °C) Temporal 76 18 98 % -- --   02/19/20 0600 (!) 159/83 -- -- 90 21 98 % -- --   02/19/20 0555 -- -- -- 94 16 98 % -- --   02/19/20 0545 (!) 141/59 -- -- 92 17 96 % -- --     No intake/output data recorded. No intake/output data recorded.     Physical Exam:  General Appearance: alert and oriented to person, place and time, well developed and well- nourished, in no acute distress  Skin: warm and dry, no rash or erythema  Head: normocephalic and atraumatic  Eyes: pupils equal, round, and reactive to light, extraocular eye movements intact, conjunctivae normal  ENT: external ear and ear canal normal bilaterally, nose without deformity, nasal mucosa and turbinates normal  Neck: supple and non-tender without mass, no cervical lymphadenopathy  Pulmonary/Chest: clear to auscultation bilaterally- no wheezes, rales or rhonchi, normal air movement, no respiratory distress  Cardiovascular: normal rate, regular rhythm,  no murmurs, rubs, distal pulses intact, no carotid bruits  Abdomen: soft, non-tender, non-distended, normal bowel sounds, no masses or organomegaly  Lymph Nodes: Cervical, supraclavicular normal  Extremities: no cyanosis, clubbing or edema  Musculoskeletal: normal range of motion, no joint swelling, deformity or tenderness  Neurologic: alert, no focal neurologic deficits    Data Review:  CBC:   Lab Results   Component Value Date    WBC 6.8 02/19/2020    RBC 4.09 02/19/2020     BMP:   Lab Results   Component Value Date    GLUCOSE 136 02/19/2020    CO2 25 02/19/2020    BUN 21 02/19/2020    CREATININE 1.0 02/19/2020    CALCIUM 9.3 02/19/2020     ABG: No results found for: NWO1QGL, BEART, B0JKOJZE, PHART, THGBART, HAD1AQT, PO2ART, [address] [place]    Radiology: All pertinent images / reports were reviewed as a part of this visit. Narrative   EXAMINATION:   ONE XRAY VIEW OF THE CHEST       2/15/2020 3:36 pm       COMPARISON:   11/13/2019 and August 29, 2018       HISTORY:   ORDERING SYSTEM PROVIDED HISTORY: palpitations   TECHNOLOGIST PROVIDED HISTORY:   Reason for exam:->palpitations   Reason for Exam: Shortness of Breath (HX of AFIB, was SOB and her NP came to   see her and stated she was in RVR, called cardiologist and he said come to   ED, no CP   Acuity: Unknown   Type of Exam: Unknown       FINDINGS:   There is mild vascular congestion without overt pulmonary edema.  No evidence   of focal consolidation, pleural effusion or pneumothorax.  No evidence of   acute process of the cardiac or mediastinal structures.           Impression   Vascular congestion without overt pulmonary edema. Problem List:   Chronic bronchitis/moderate COPD, stable  A. fib with RVR-refractory    Assessment/Plan:     Patient has had recurrent issues from atrial fibrillation with RVR, currently rate in the low 100s. Symptomatic with palpitations and shortness of breath, plans for DCCV today by cardiology. May need to consider RFA, since multiple failed attempts noted. Chronic bronchitis/moderate COPD, chest x-ray appears stable. Reviewed patient's prior CT imaging from October which shows bilateral apical scar, bronchiectasis and scattered groundglass opacities/pulmonary nodules which are nonspecific. No indication for steroids or antibiotics. Check influenza swab. Pulmonary will follow for 1 day.     Mary King MD

## 2020-02-19 NOTE — PROGRESS NOTES
H&P Update    I have reviewed the history and physical and examined the patient and updated with relevant changes. Consent: I have discussed with the patient and/or the patient representative the indication, alternatives, and the possible risks and/or complications of the planned procedure and the anesthesia methods. The patient and/or patient representative appear to understand and agree to proceed. Vitals:    02/19/20 1211   BP: (!) 117/49   Pulse: 76   Resp: 17   Temp: 97.4 °F (36.3 °C)   SpO2: 94%     Prior to Admission medications    Medication Sig Start Date End Date Taking? Authorizing Provider   blood glucose test strips (ACCU-CHEK CHIP) strip 1 each by In Vitro route daily As needed. 2/7/20   KATHY Rodriguez CNP   Blood Glucose Monitoring Suppl (ACCU-CHEK CHIP PLUS) w/Device KIT 1 kit by Does not apply route 4 times daily (before meals and nightly) 2/7/20   KATHY Rodriguez CNP   Lancets MISC 1 each by Does not apply route daily 2/7/20   KATHY Rodriguez CNP   LORazepam (ATIVAN) 0.5 MG tablet Take 2 tablets by mouth. Historical Provider, MD   rOPINIRole (REQUIP) 2 MG tablet One tablet 3 times daily 2/4/20   Juan José Castellano MD   LORazepam (ATIVAN) 0.5 MG tablet TAKE 1 TABLET BY MOUTH TWICE DAILY AND 2 TABLETS AT BEDTIME AS NEEDED FOR ANXIETY 2/4/20 3/6/20  Juan José Castellano MD   HYDROcodone-acetaminophen (NORCO) 5-325 MG per tablet Take 1 tablet by mouth 4 times daily as needed for Pain for up to 30 days.  2/4/20 3/5/20  Juan José Castellano MD   predniSONE (DELTASONE) 20 MG tablet 1 TID for 4 day then 1 BID 2/4/20   Juan José Castellano MD   meclizine (ANTIVERT) 12.5 MG tablet TAKE 1 TABLET THREE TIMES DAILY AS NEEDED 1/30/20   Juan José Castellano MD   rivaroxaban (XARELTO) 15 MG TABS tablet TAKE 1 TABLET BY MOUTH DAILY 1/30/20   KATHY Yoon CNP   atorvastatin (LIPITOR) 80 MG tablet TAKE 1 TABLET BY MOUTH EVERY NIGHT 1/13/20   KATHY Carrington CNP   propafenone (RYTHMOL) 150 MG tablet TAKE 1 TABLET BY MOUTH EVERY 8 HOURS 1/9/20   Dino Agarwal APRN - CNP   irbesartan (AVAPRO) 300 MG tablet TAKE 1 TABLET BY MOUTH EVERY NIGHT 1/3/20   Sony Quinones MD   SYMBICORT 160-4.5 MCG/ACT AERO INHALE 2 PUFFS INTO THE LUNGS TWICE DAILY 1/2/20 1/1/21  Sony Quinones MD   dicyclomine (BENTYL) 10 MG capsule Take 1 capsule by mouth 4 times daily as needed (abd pain) 12/17/19   Sony Quinones MD   gabapentin (NEURONTIN) 100 MG capsule Take 1 capsule by mouth nightly as needed (neuropathy) for up to 90 days.  12/17/19 3/16/20  Sony Quinones MD   levothyroxine (SYNTHROID) 50 MCG tablet TAKE 1 TABLET EVERY DAY 11/25/19   Sony Quinones MD   diltiazem (CARDIZEM 12 HR) 60 MG extended release capsule Take 1 capsule by mouth 2 times daily as needed (For Afib w/ RVR >100) 11/14/19   Pranay Davis MD   albuterol sulfate HFA (PROAIR HFA) 108 (90 Base) MCG/ACT inhaler Inhale 2 puffs into the lungs every 6 hours as needed for Wheezing 10/25/19   Thelma Jansen MD   amLODIPine (NORVASC) 5 MG tablet TAKE 1 TABLET EVERY DAY 10/17/19   Sony Quinones MD   pantoprazole (PROTONIX) 40 MG tablet TAKE 1 TABLET EVERY DAY  Patient taking differently: TAKE 1 TABLET EVERY DAY PRN 10/17/19   Sony Quinones MD   blood glucose test strips (TRUE METRIX BLOOD GLUCOSE TEST) strip 1 each by In Vitro route daily 8/23/19   Sony Quinones MD   blood glucose monitor strips Test one time a day 7/22/19   Sony Quinones MD   Lancets MISC 1 each by Does not apply route 2 times daily 7/20/19   Bryson Mejia MD   glucose monitoring kit (FREESTYLE) monitoring kit 1 kit by Does not apply route daily 7/20/19   Bryson Mejia MD   fluticasone (FLONASE) 50 MCG/ACT nasal spray USE 2 SPRAYS IN EACH NOSTRIL EVERY EVENING 7/11/19   Sony Quinones MD   ipratropium-albuterol (DUONEB) 0.5-2.5 (3) MG/3ML SOLN nebulizer solution INHALE THE CONTENTS OF 1 VIAL VIA NEBULIZER EVERY 4 HOURS

## 2020-02-19 NOTE — CONSULTS
[address]   Electrophysiology Consultation   Date: 2/19/2020  Reason for Consultation: Atrial fibrillation   Consult Requesting Physician: Morteza Balderrama MD     Chief Complaint   Patient presents with    Palpitations     HPI: Mesfin Jalloh is a 80 y.o. female with multiple co morbidities including HTN, HLD, DM, COPD, hypothyroid, and atrial fibrillation. S/p TRACEY/DCCV 8/31/18, treated with propafenone and Xarelto. Admitted on 11/13/19 for afib with RVR accompanied by sudden on set palpitations, fatigue, and SOB. She was also being treated for PNA. She developed bradycardia with IV cardizem. She converted to SR spontaneously. Came to the ER with palpitation and SOB. She was found to be in atrial fibrillation with RVR. She complains of being fatigued, tired and also palpitation. She was admitted to the hospital. She is on chronic anticoagulation. Cardiology has been consulted.        Past Medical History:   Diagnosis Date    Arthritis     Atrial fibrillation ([facility]     Diabetes mellitus type II, controlled ([facility]     GERD (gastroesophageal reflux disease)     HTN (hypertension)     Hyperlipidemia     Hypothyroid     Insomnia     Lumbago     SVT (supraventricular tachycardia) (HCC)         Past Surgical History:   Procedure Laterality Date    APPENDECTOMY      CHOLECYSTECTOMY, LAPAROSCOPIC  2/24/2013    COLONOSCOPY         Allergies   Allergen Reactions    Adhesive Tape Anaphylaxis    Cefaclor Nausea And Vomiting     Other reaction(s): Chest pain    Nsaids      Pt states she has hives and heart races   Other reaction(s): Tachycardia    Clinoril [Sulindac] Other (See Comments)     Stomach pain    Codeine      FEEL NUMB    Diclofenac     Diclofenac Sodium Hives    Hctz [Hydrochlorothiazide]      Sob    Ibuprofen      Other reaction(s): Tachycardia    Macrobid [Nitrofurantoin Macrocrystal]      nausea    Motrin [Ibuprofen Micronized] Other (See Comments)     Tachycardia      Pcn [Penicillins] Hives    Peach [Prunus Persica] Itching and Swelling     Cannot eat raw peaches       Social History:  Reviewed. reports that she quit smoking about 24 years ago. She started smoking about 69 years ago. She has never used smokeless tobacco. She reports that she does not drink alcohol or use drugs. Family History:  Reviewed. family history includes Asthma in her paternal uncle; Heart Attack in her father; Heart Disease in her father; High Blood Pressure in her mother; Other in her brother. Review of System:  All other systems reviewed except for that noted above. Pertinent negatives and positives are:       · General: negative for fever, chills + fatigue   · Ophthalmic ROS: negative for - eye pain or loss of vision  · ENT ROS: negative for - headaches, sore throat   · Respiratory: negative for - cough, sputum  · Cardiovascular: Reviewed in HPI  · Gastrointestinal: negative for - abdominal pain, diarrhea, N/V  · Hematology: negative for - bleeding, blood clots, bruising or jaundice  · Genito-Urinary:  negative for - Dysuria or incontinence  · Musculoskeletal: negative for - Joint swelling, muscle pain  · Neurological: negative for - confusion, dizziness, headaches   · Psychiatric: No anxiety, no depression. · Dermatological: negative for - rash    Physical Examination:  Vitals:    20 1211   BP: (!) 117/49   Pulse: 76   Resp: 17   Temp: 97.4 °F (36.3 °C)   SpO2: 94%      No intake/output data recorded. Wt Readings from Last 3 Encounters:   20 172 lb 4.8 oz (78.2 kg)   02/15/20 165 lb (74.8 kg)   20 172 lb 2 oz (78.1 kg)     Temp  Av.1 °F (36.2 °C)  Min: 96.1 °F (35.6 °C)  Max: 97.6 °F (36.4 °C)  Pulse  Av.2  Min: 76  Max: 141  BP  Min: 91/49  Max: 160/72  SpO2  Av.1 %  Min: 92 %  Max: 98 %  No intake or output data in the 24 hours ending 20 1255    · Telemetry: Atrial fibrillation  · Constitutional: Oriented. No distress.    · Head: Normocephalic and flush  10 mL Intravenous 2 times per day     Continuous Infusions:   diltiazem       PRN Meds:.albuterol sulfate HFA, dicyclomine, gabapentin, HYDROcodone-acetaminophen, LORazepam, sodium chloride flush, acetaminophen, acetaminophen, polyethylene glycol, promethazine, ondansetron     Patient Active Problem List    Diagnosis Date Noted    ANTHONY (obstructive sleep apnea)      Priority: High    Controlled type 2 diabetes mellitus with stage 3 chronic kidney disease, without long-term current use of insulin ([facility] [facility] 01/18/2017     Priority: Low    Restless legs syndrome 01/18/2017     Priority: Low    Osteoarthritis 06/18/2015     Priority: Low    Overactive bladder 12/11/2013     Priority: Low    IBS (irritable bowel syndrome) 05/02/2013     Priority: Low    GERD (gastroesophageal reflux disease)      Priority: Low    HTN (hypertension)      Priority: Low    Hyperlipidemia      Priority: Low    Insomnia      Priority: Low    A-fib ([facility] 02/19/2020    Hypothyroid 02/19/2020    Chronic respiratory failure with hypoxia ([facility] [address] [facility] 02/19/2020    Coronary artery disease involving native coronary artery of native heart with angina pectoris ([address] 88/54/4307    Acute systolic congestive heart failure ([address] 02/04/2020    Bronchiectasis without complication ([address] 29/59/1315    Interstitial lung disease ([address] 09/20/2019    Chronic cough 09/20/2019    Vertigo 07/31/2019    Chronic obstructive pulmonary disease ([address] 03/23/2019    Allergic rhinitis 01/09/2019    Paroxysmal atrial fibrillation ([address] 12/12/2018      Active Hospital Problems    Diagnosis Date Noted    ANTHONY (obstructive sleep apnea) [G47.33]      Priority: High    Controlled type 2 diabetes mellitus with stage 3 chronic kidney disease, without long-term current use of insulin ([address] [E11.22, N18.3] 01/18/2017     Priority: Low    Restless legs syndrome [G25.81] 01/18/2017     Priority: Low    HTN (hypertension) [I10]      Priority: Low    GERD (gastroesophageal reflux disease) [K21.9]      Priority: Low    Hyperlipidemia [E78.5]      Priority: Low    Insomnia [G47.00]      Priority: Low    A-fib ([address] [I48.91] 02/19/2020    Hypothyroid [E03.9] 02/19/2020    Chronic respiratory failure with hypoxia (HCC) [J96.11] 02/19/2020    Coronary artery disease involving native coronary artery of native heart with angina pectoris ([address] [I25.119] 02/18/2020    Bronchiectasis without complication ([address] [M63.7] 10/25/2019    Interstitial lung disease ([address] [J84.9] 09/20/2019    Chronic cough [R05] 09/20/2019    Chronic obstructive pulmonary disease ([address] [J44.9] 03/23/2019    Paroxysmal atrial fibrillation ([address] [I48.0] 12/12/2018       Assessment:     -Paroxysmal atrial fibrillation with rapid ventricular rate. Patient remains tachycardic and symptomatic with atrial fibrillation. Rate is not controlled. Cardioversion discussed with patient in details. Risk-benefit alternative discussed     Opted for cardioversion. Continue with anticoagulation. Also discussed ablation of atrial fibrillation with patient. She has had recurrent symptomatic atrial fibrillation. I have discussed ablation in the past with her. Resume Propafenone   Resume Cardizem     - HTN: Controlled. - PAD on lipitor. Thank you for allowing me to participate in the care of Rodney Smith       All questions and concerns were addressed to the patient/family. Alternatives to my treatment were discussed. I have discussed the above stated plan and the patient verbalized understanding and agreed with the plan. NOTE: This report was transcribed using voice recognition software. Every effort was made to ensure accuracy, however, inadvertent computerized transcription errors may be present.      Jessica Goncalves MD, MPH  [place] [address]   Office: [phone]

## 2020-02-19 NOTE — ED NOTES
Bed: 07  Expected date:   Expected time:   Means of arrival:   Comments:  [address], RN  02/19/20 7153

## 2020-02-19 NOTE — PROGRESS NOTES
Assessment completed, see doc flowsheets. Pt is alert, awake and orient x 3. Lung sounds are diminished. Tele on, rhythm A-FIB.  VSS. Patient is currently NPO and awaiting cardiology. Call light with in reach, will continue to monitor.

## 2020-02-19 NOTE — PROGRESS NOTES
Speech Language Pathology    Rosendo Warner  1935    SLP Eval and Treat orders were received. Pt is currently NPO with cardiology consult. Will hold evaluation and follow-up as schedule allows.     Aleksander Ozuna M.A., [address] [place] [address]  Speech-Language Pathologist

## 2020-02-20 ENCOUNTER — TELEPHONE (OUTPATIENT)
Dept: PULMONOLOGY | Age: 85
End: 2020-02-20

## 2020-02-20 ENCOUNTER — TELEPHONE (OUTPATIENT)
Dept: FAMILY MEDICINE CLINIC | Age: 85
End: 2020-02-20

## 2020-02-20 VITALS
BODY MASS INDEX: 31.14 KG/M2 | WEIGHT: 169.2 LBS | HEIGHT: 62 IN | SYSTOLIC BLOOD PRESSURE: 122 MMHG | DIASTOLIC BLOOD PRESSURE: 57 MMHG | RESPIRATION RATE: 16 BRPM | OXYGEN SATURATION: 95 % | HEART RATE: 66 BPM | TEMPERATURE: 97.6 F

## 2020-02-20 LAB
ANION GAP SERPL CALCULATED.3IONS-SCNC: 14 MMOL/L (ref 3–16)
BASOPHILS ABSOLUTE: 0 K/UL (ref 0–0.2)
BASOPHILS RELATIVE PERCENT: 0.7 %
BUN BLDV-MCNC: 22 MG/DL (ref 7–20)
CALCIUM SERPL-MCNC: 8.7 MG/DL (ref 8.3–10.6)
CHLORIDE BLD-SCNC: 99 MMOL/L (ref 99–110)
CO2: 22 MMOL/L (ref 21–32)
CREAT SERPL-MCNC: 1.2 MG/DL (ref 0.6–1.2)
EOSINOPHILS ABSOLUTE: 0.1 K/UL (ref 0–0.6)
EOSINOPHILS RELATIVE PERCENT: 2.3 %
GFR AFRICAN AMERICAN: 52
GFR NON-AFRICAN AMERICAN: 43
GLUCOSE BLD-MCNC: 151 MG/DL (ref 70–99)
HCT VFR BLD CALC: 33.1 % (ref 36–48)
HEMOGLOBIN: 11.1 G/DL (ref 12–16)
INR BLD: 1.43 (ref 0.86–1.14)
LYMPHOCYTES ABSOLUTE: 1.2 K/UL (ref 1–5.1)
LYMPHOCYTES RELATIVE PERCENT: 24 %
MAGNESIUM: 1.8 MG/DL (ref 1.8–2.4)
MCH RBC QN AUTO: 30 PG (ref 26–34)
MCHC RBC AUTO-ENTMCNC: 33.5 G/DL (ref 31–36)
MCV RBC AUTO: 89.5 FL (ref 80–100)
MONOCYTES ABSOLUTE: 0.4 K/UL (ref 0–1.3)
MONOCYTES RELATIVE PERCENT: 9.2 %
NEUTROPHILS ABSOLUTE: 3.1 K/UL (ref 1.7–7.7)
NEUTROPHILS RELATIVE PERCENT: 63.8 %
PDW BLD-RTO: 15 % (ref 12.4–15.4)
PLATELET # BLD: 105 K/UL (ref 135–450)
PMV BLD AUTO: 7.9 FL (ref 5–10.5)
POTASSIUM SERPL-SCNC: 4.1 MMOL/L (ref 3.5–5.1)
PROTHROMBIN TIME: 16.6 SEC (ref 10–13.2)
RBC # BLD: 3.7 M/UL (ref 4–5.2)
SODIUM BLD-SCNC: 135 MMOL/L (ref 136–145)
WBC # BLD: 4.8 K/UL (ref 4–11)

## 2020-02-20 PROCEDURE — 7100000010 HC PHASE II RECOVERY - FIRST 15 MIN

## 2020-02-20 PROCEDURE — 99232 SBSQ HOSP IP/OBS MODERATE 35: CPT | Performed by: INTERNAL MEDICINE

## 2020-02-20 PROCEDURE — 92526 ORAL FUNCTION THERAPY: CPT

## 2020-02-20 PROCEDURE — 2500000003 HC RX 250 WO HCPCS

## 2020-02-20 PROCEDURE — 94761 N-INVAS EAR/PLS OXIMETRY MLT: CPT

## 2020-02-20 PROCEDURE — 80048 BASIC METABOLIC PNL TOTAL CA: CPT

## 2020-02-20 PROCEDURE — 97161 PT EVAL LOW COMPLEX 20 MIN: CPT

## 2020-02-20 PROCEDURE — 6370000000 HC RX 637 (ALT 250 FOR IP): Performed by: INTERNAL MEDICINE

## 2020-02-20 PROCEDURE — 97116 GAIT TRAINING THERAPY: CPT

## 2020-02-20 PROCEDURE — 85025 COMPLETE CBC W/AUTO DIFF WBC: CPT

## 2020-02-20 PROCEDURE — 97165 OT EVAL LOW COMPLEX 30 MIN: CPT

## 2020-02-20 PROCEDURE — 83735 ASSAY OF MAGNESIUM: CPT

## 2020-02-20 PROCEDURE — 2700000000 HC OXYGEN THERAPY PER DAY

## 2020-02-20 PROCEDURE — 97530 THERAPEUTIC ACTIVITIES: CPT

## 2020-02-20 PROCEDURE — 2580000003 HC RX 258: Performed by: INTERNAL MEDICINE

## 2020-02-20 PROCEDURE — 94640 AIRWAY INHALATION TREATMENT: CPT

## 2020-02-20 PROCEDURE — 85610 PROTHROMBIN TIME: CPT

## 2020-02-20 RX ORDER — DOXYCYCLINE HYCLATE 100 MG
100 TABLET ORAL 2 TIMES DAILY
Qty: 14 TABLET | Refills: 0 | Status: SHIPPED | OUTPATIENT
Start: 2020-02-20 | End: 2020-02-27

## 2020-02-20 RX ORDER — DILTIAZEM HYDROCHLORIDE 120 MG/1
120 CAPSULE, COATED, EXTENDED RELEASE ORAL DAILY
Qty: 30 CAPSULE | Refills: 5 | Status: SHIPPED | OUTPATIENT
Start: 2020-02-20 | End: 2020-08-11 | Stop reason: SDUPTHER

## 2020-02-20 RX ADMIN — LOSARTAN POTASSIUM 100 MG: 100 TABLET, FILM COATED ORAL at 07:59

## 2020-02-20 RX ADMIN — PROPAFENONE HYDROCHLORIDE 150 MG: 150 TABLET, FILM COATED ORAL at 05:12

## 2020-02-20 RX ADMIN — LORAZEPAM 0.5 MG: 0.5 TABLET ORAL at 14:19

## 2020-02-20 RX ADMIN — DILTIAZEM HYDROCHLORIDE 120 MG: 120 CAPSULE, COATED, EXTENDED RELEASE ORAL at 14:56

## 2020-02-20 RX ADMIN — PANTOPRAZOLE SODIUM 40 MG: 40 TABLET, DELAYED RELEASE ORAL at 07:58

## 2020-02-20 RX ADMIN — RIVAROXABAN 15 MG: 15 TABLET, FILM COATED ORAL at 07:59

## 2020-02-20 RX ADMIN — Medication 1 PUFF: at 08:13

## 2020-02-20 RX ADMIN — LEVOTHYROXINE SODIUM 50 MCG: 0.03 TABLET ORAL at 07:58

## 2020-02-20 RX ADMIN — AMLODIPINE BESYLATE 5 MG: 5 TABLET ORAL at 07:58

## 2020-02-20 RX ADMIN — ATORVASTATIN CALCIUM 80 MG: 80 TABLET, FILM COATED ORAL at 07:59

## 2020-02-20 RX ADMIN — PROPAFENONE HYDROCHLORIDE 150 MG: 150 TABLET, FILM COATED ORAL at 14:56

## 2020-02-20 RX ADMIN — ROPINIROLE HYDROCHLORIDE 2 MG: 1 TABLET, FILM COATED ORAL at 07:58

## 2020-02-20 RX ADMIN — LORAZEPAM 0.5 MG: 0.5 TABLET ORAL at 07:59

## 2020-02-20 RX ADMIN — IPRATROPIUM BROMIDE AND ALBUTEROL SULFATE 1 AMPULE: .5; 3 SOLUTION RESPIRATORY (INHALATION) at 08:13

## 2020-02-20 RX ADMIN — Medication 10 ML: at 08:00

## 2020-02-20 RX ADMIN — IPRATROPIUM BROMIDE AND ALBUTEROL SULFATE 1 AMPULE: .5; 3 SOLUTION RESPIRATORY (INHALATION) at 11:51

## 2020-02-20 ASSESSMENT — PAIN SCALES - GENERAL
PAINLEVEL_OUTOF10: 0

## 2020-02-20 NOTE — DISCHARGE INSTR - COC
***    Intake/Output Summary (Last 24 hours) at 2020 1433  Last data filed at 2020 1316  Gross per 24 hour   Intake 490 ml   Output --   Net 490 ml     I/O last 3 completed shifts: In: 10 [I.V.:10]  Out: -     Safety Concerns:     [address] Safety Concerns:451895920}    Impairments/Disabilities:      [address] Impairments/Disabilities:564003476}    Nutrition Therapy:  Current Nutrition Therapy:   [address] Diet List:934390294}    Routes of Feeding: {CHP DME Other Feedings:616923679}  Liquids: {Slp liquid thickness:77633}  Daily Fluid Restriction: {CHP DME Yes amt example:750285131}  Last Modified Barium Swallow with Video (Video Swallowing Test): {Done Not Done XFBN:202793109}    Treatments at the Time of Hospital Discharge:   Respiratory Treatments: ***  Oxygen Therapy:  {Therapy; copd oxygen:44307}  Ventilator:    { CC Vent YHSV:471010773}    Rehab Therapies: NURSE, PT,OT, HHA, SLP  Weight Bearing Status/Restrictions: [employer] Weight Bearin}  Other Medical Equipment (for information only, NOT a DME order):  {EQUIPMENT:281652512}  Other Treatments: ***    Patient's personal belongings (please select all that are sent with patient):  {[facility] DME Belongings:216327988}    RN SIGNATURE:  {Esignature:611241154}    CASE MANAGEMENT/SOCIAL WORK SECTION    Inpatient Status Date: 2020    Readmission Risk Assessment Score:  Readmission Risk              Risk of Unplanned Readmission:        30           Discharging to Facility/ Agency   · Name: [facility]  · [address] [facility] [place] [facility] [address]  · Phone: [phone]  · Fax:      / signature: Electronically signed by KIET Carter on 2020 at 3:19 PM    PHYSICIAN SECTION    Prognosis: Fair    Condition at Discharge: Stable    Rehab Potential (if transferring to Rehab):  Fair    Recommended Labs or Other Treatments After Discharge:     Physician Certification: I certify the above information and

## 2020-02-20 NOTE — CARE COORDINATION
The Plan for Transition of Care is related to the following treatment goals: To improve patient's functional status. The Patient and/or patient was provided with a choice of provider and agrees with the discharge plan. [x] Yes [] No    Freedom of choice list was provided with basic dialogue that supports the patient's individualized plan of care/goals, treatment preferences and shares the quality data associated with the providers.  [x] Yes [] No    Patient requesting to be referred to [facility]

## 2020-02-20 NOTE — PROGRESS NOTES
Patient assessment completed and morning medications given. Vitals are stable. Patient in NSR but nancy this am. Will hold off on cardizem until heart rate rises. Patient on room air and satting 89%. 1 L oxygen administered. Will continue to monitor. Patient denies any pain, sob. Call light within reach. Chair alarm on.

## 2020-02-20 NOTE — PROGRESS NOTES
in recliner upon arrival, agreeable to evaluation. Patient Currently in Pain: Denies    Social/Functional History  Social/Functional History  Lives With: Alone  Type of Home: Apartment  Home Layout: One level  Home Access: Level entry  Bathroom Shower/Tub: Tub/Shower unit  Bathroom Toilet: Standard  Bathroom Equipment: Grab bars in shower, Hand-held shower  Bathroom Accessibility: Walker accessible  Home Equipment: Oxygen(2L O2 at home almost 24/7 + portable O2)  Receives Help From: Family(Pt reports she spends much time with family, one family member is almost always at the house)  ADL Assistance: Independent  Homemaking Assistance: Independent  Homemaking Responsibilities: Yes  Ambulation Assistance: Independent  Transfer Assistance: Independent  Active : Yes  Mode of Transportation: Car  Occupation: Part time employment  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, 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[employer] [address]: Visit kids, grandkids, great grandkids, reading, watch tv  Additional Comments: Pt reports no recent falls in past 6 months       Objective   Vision: Impaired  Vision Exceptions: Wears glasses at all times  Hearing: Exceptions to [facility]  Hearing Exceptions: Hard of hearing/hearing concerns    Orientation  Overall Orientation Status: Within Functional Limits  Observation/Palpation  Posture: Fair  Balance  Sitting Balance: Supervision  Standing Balance: Stand by assistance  Standing Balance  Activity: ~3 min in stance at sink without AD  Functional Mobility  Functional - Mobility Device: Rolling Walker  Activity: Other  Assist Level: Stand by assistance  Functional Mobility Comments: ~270 ft total, with cues to bring RW closer to body  ADL  Grooming: Stand by assistance(brushing teeth and using mouthwash in stance at bathroom sink)  Tone RUE  RUE Tone: Normotonic  Tone LUE  LUE Tone: Normotonic  Coordination  Movements Are Fluid And Coordinated: Yes     Bed mobility  Comment: Unable to assess.  Pt in recliner upon OTR/L [id number]

## 2020-02-20 NOTE — PROGRESS NOTES
Discussed d/c paperwork with patient, removed IV, removed tele, gathered belongings. Patient wheeled out for discharge.

## 2020-02-20 NOTE — PROGRESS NOTES
Speech Language Pathology  Dysphagia Treatment Note/Discharge note     Name:  Fina Cuello  :   1935  Medical Diagnosis:  A-fib ([address] [I48.91]  A-fib ([facility] [I48.91]  Treatment Diagnosis: Oropharyngeal Dysphagia  Pain level: denies     Current Diet Level: Regular texture diet with thin liquids   Tolerance of Current Diet Level: Per chart review, no documented difficulties with current diet level noted. Assessment of Texture Tolerance:  -Impressions: Pt was positioned upright in bed. She denied any difficulty swallowing. Trials of regular solids and thin liquids were provided to assess swallow function. Pt demonstrated minimally prolonged but effective mastication. She was able to independently take small bites/sips. No overt clinical s/s of aspiration/penetration assessed this date. Pt was educated on safe swallow strategies and s/s of aspiration. Diet and Treatment Recommendations:  regular texture diet with thin liquids, no straws, meds as tolerated     (Dysphagia Goals addressed, if appropriate)  1.) Pt will tolerate recommended diet without s/s of aspiration (GOAL MET)    Plan: D/c from SLP caseload. Patient/Family Education:Education given to the Pt and nurse, who verbalized understanding    Discharge Recommendations:  No further SLP for dysphagia indicated at this time.      Timed Code Treatment:  0 minutes     Total Treatment Time: 10 minutes     Alon Raines [address]  Speech Language Pathologist

## 2020-02-20 NOTE — FLOWSHEET NOTE
02/20/20 1128   Provider Notification   Reason for Communication Evaluate   Provider Name [facility] cardiology   Provider Notification Physician   Method of Communication Secure Message   Response Waiting for response   Notification Time [phone]   Patient has ER Cardizem scheduled for this am. HR has been in 50's all morning. It's at 64 currently. Should I hold or give Cardizem? Cardiology RN stated to hold cardizem until seen by Dr. Dondra Peabody. Will continue to monitor HR.

## 2020-02-20 NOTE — PROGRESS NOTES
and Cholecystectomy, laparoscopic (2/24/2013). Restrictions  Restrictions/Precautions  Restrictions/Precautions: Modified Diet, Fall Risk(high fall risk, cardiac diet)  Position Activity Restriction  Other position/activity restrictions: Jeff Logan is a 80 y.o. female that presents to the ER for evaluation of increasing palpitations, actually saw this patient 4 days prior, with A. fib with RVR, she had synchronized cardioversion with no complication she is on chronic anticoagulation, and now presents the ER for evaluation recurrent atrial fibrillation. No shortness of breath. No active chest pain. No rectal bleeding. No headache. No weakness. No syncope.   Vision/Hearing  Vision: Impaired  Vision Exceptions: Wears glasses at all times  Hearing: Exceptions to [facility]  Hearing Exceptions: Hard of hearing/hearing concerns     Subjective  General  Chart Reviewed: Yes  Response To Previous Treatment: Not applicable  Family / Caregiver Present: No  Diagnosis: Atrial Fribrillation  Follows Commands: Within Functional Limits  General Comment  Comments: Pt sitting recliner in chair upon arrival.  Subjective  Subjective: Pt aggreeable to PT/OT eval.  Pain Screening  Patient Currently in Pain: Denies  Vital Signs  Patient Currently in Pain: Denies       Orientation  Orientation  Overall Orientation Status: Within Normal Limits  Social/Functional History  Social/Functional History  Lives With: Alone  Type of Home: Apartment  Home Layout: One level  Home Access: Level entry  Bathroom Shower/Tub: Tub/Shower unit  Bathroom Toilet: Standard  Bathroom Equipment: Grab bars in shower, Hand-held shower  Bathroom Accessibility: Walker accessible  Home Equipment: Oxygen(2L O2 at home almost 24/7 + portable O2)  Receives Help From: Family(Pt reports she spends much time with family, one family member is almost always at the house)  ADL Assistance: [address] [place] [address] Avenue: Independent  Homemaking Responsibilities: Training, Stair training, Home Exercise Program, Safety Education & Training, Patient/Caregiver Education & Training, Equipment Evaluation, Education, & procurement  Safety Devices  Type of devices: All fall risk precautions in place, Call light within reach, Chair alarm in place, Gait belt, Patient at risk for falls, Left in chair, Nurse notified  Restraints  Initially in place: No    G-Code       OutComes Score                                                  AM-PAC Score  AM-PAC Inpatient Mobility Raw Score : 19 (02/20/20 1027)  AM-PAC Inpatient T-Scale Score : 45.44 (02/20/20 1027)  Mobility Inpatient CMS 0-100% Score: 41.77 (02/20/20 1027)  Mobility Inpatient CMS G-Code Modifier : CK (02/20/20 1027)          Goals  Short term goals  Time Frame for Short term goals:  To be met prior to discharge  Short term goal 1: Pt will complete bed mobility with mod I  Short term goal 2: Pt will complete sit to/from stand with mod I  Short term goal 3: P will ambulate 150 ft with LRAD and mod I       Therapy Time   Individual Concurrent Group Co-treatment   Time In 0833         Time Out 0904         Minutes 31         Timed Code Treatment Minutes: [address], PT   Rani Briceño, DPT, [id number]

## 2020-02-20 NOTE — TELEPHONE ENCOUNTER
Jaimie from [place] [address] [phone] states patient will be discharged from hospital soon and will need home health care. They would like to know if  will sign future home health orders.         Provider out of office      Please advise

## 2020-02-20 NOTE — TELEPHONE ENCOUNTER
----- Message from Sharath Anna MD sent at 2/20/2020 10:53 AM EST -----  Please organize follow up with Dr Garrett Grigsby or Jeannine Worthington in 2 weeks.

## 2020-02-20 NOTE — DISCHARGE SUMMARY
DAY  Qty: 90 tablet, Refills: 1      !! blood glucose test strips (TRUE METRIX BLOOD GLUCOSE TEST) strip 1 each by In Vitro route daily  Qty: 100 each, Refills: 3    Associated Diagnoses: Controlled type 2 diabetes mellitus without complication, without long-term current use of insulin ([address]      ! ! blood glucose monitor strips Test one time a day  Qty: 100 strip, Refills: 5    Comments: Brand per patient preference. May round up to next available package size. Associated Diagnoses: Controlled type 2 diabetes mellitus without complication, without long-term current use of insulin ([address]      ! ! Lancets MISC 1 each by Does not apply route 2 times daily  Qty: 300 each, Refills: 1      glucose monitoring kit (FREESTYLE) monitoring kit 1 kit by Does not apply route daily  Qty: 1 kit, Refills: 0      fluticasone (FLONASE) 50 MCG/ACT nasal spray USE 2 SPRAYS IN EACH NOSTRIL EVERY EVENING  Qty: 3 Bottle, Refills: 1    Comments: **Patient requests 90 days supply**      ipratropium-albuterol (DUONEB) 0.5-2.5 (3) MG/3ML SOLN nebulizer solution INHALE THE CONTENTS OF 1 VIAL VIA NEBULIZER EVERY 4 HOURS  Qty: 1620 mL, Refills: 3    Comments: **Patient requests 90 days supply**      aspirin 81 MG tablet Take 81 mg by mouth every other day        !! - Potential duplicate medications found. Please discuss with provider. Current Discharge Medication List      STOP taking these medications       diltiazem (CARDIZEM 12 HR) 60 MG extended release capsule Comments:   Reason for Stopping:                 Discharge Exam:    BP (!) 122/57   Pulse 66   Temp 97.6 °F (36.4 °C) (Temporal)   Resp 16   Ht 5' 2\" (1.575 m)   Wt 169 lb 3.2 oz (76.7 kg)   SpO2 95%   BMI 30.95 kg/m²   General appearance:  Appears comfortable, obese. Well nourished  Eyes: Sclera clear, pupils equal  ENT: Moist mucus membranes, no thrush. Trachea midline. Cardiovascular: RRR. No murmur, gallop, rub.  No edema in lower extremities  Respiratory: Bibasilar rales.  No wheezing or rhonchi  Gastrointestinal: Abdomen soft, obese, non-tender, not distended, normal bowel sounds  Musculoskeletal: No cyanosis in digits, neck supple  Neurology: Grossly intact. Alert and oriented in time, place and person. No speech or motor deficits  Psychiatry: Anxious affect. Not agitated  Skin: Warm, dry, normal turgor, no rash  Brisk capillary refill, peripheral pulses palpable     Labs: For convenience and continuity at follow-up the following most recent labs are provided:    Lab Results   Component Value Date    WBC 4.8 02/20/2020    HGB 11.1 02/20/2020    HCT 33.1 02/20/2020    MCV 89.5 02/20/2020     02/20/2020     02/20/2020    K 4.1 02/20/2020    K 4.9 02/19/2020    CL 99 02/20/2020    CO2 22 02/20/2020    BUN 22 02/20/2020    CREATININE 1.2 02/20/2020    CALCIUM 8.7 02/20/2020    PHOS 3.0 08/31/2018    ALKPHOS 102 02/15/2020    ALT 21 02/15/2020    AST 18 02/15/2020    BILITOT 0.4 02/15/2020    LABALBU 3.9 02/15/2020    LDLCALC 140 08/12/2017    TRIG 226 08/12/2017     Lab Results   Component Value Date    INR 1.43 (H) 02/20/2020    INR 1.21 (H) 02/15/2020    INR 0.94 08/29/2018       Radiology:  Ct Chest Wo Contrast    Result Date: 2/19/2020  EXAMINATION: CT OF THE CHEST WITHOUT CONTRAST 2/19/2020 4:18 pm TECHNIQUE: CT of the chest was performed without the administration of intravenous contrast. Multiplanar reformatted images are provided for review. Dose modulation, iterative reconstruction, and/or weight based adjustment of the mA/kV was utilized to reduce the radiation dose to as low as reasonably achievable. COMPARISON: 10/16/2019, 09/01/2018 HISTORY: ORDERING SYSTEM PROVIDED HISTORY: SOB TECHNOLOGIST PROVIDED HISTORY: Reason for exam:->SOB Reason for Exam: sob Acuity: Acute Type of Exam: Initial FINDINGS: Mediastinum: The thyroid is unremarkable. There are stable borderline enlarged mediastinal lymph nodes noted, unchanged from 02/16/2019.   No new pathologic with any daily progress note from day of discharge. Time Spent on discharge is 45 minutes  in the examination, evaluation, counseling and review of medications and discharge plan. Note that more than 30 minutes was spent in preparing discharge papers, discussing discharge with patient, medication review, etc.       Signed:    Hilda Bianchi MD   2/20/2020      Thank you Saran Azul MD for the opportunity to be involved in this patient's care.  If you have any questions or concerns please feel free to contact me at [address] [place] [address]

## 2020-02-20 NOTE — CARE COORDINATION
Discharge Planning:    PT referred to [facility]. [facility] could not take patient. Patient agreeable to a referral to [facility]. SW referred patient to [facility]. Pt discharging home today with [facility]. Order/ AVS faxed and agency notifed. No other   SW needs at this time.   Rose SANTA, [facility]  [phone]    Electronically signed by KIET Ross on 2/20/2020 at 4:39 PM

## 2020-02-20 NOTE — PROGRESS NOTES
CLINICAL PHARMACY NOTE: MEDS TO [address] Select Patient?: No  Total # of Prescriptions Filled: 2   The following medications were delivered to the patient:  · Diltiazem   · Doxycycline 10mg  Total # of Interventions Completed: 0  Time Spent (min): 15    Additional Documentation:    Delivered to Patient    Julia Denton CPhT

## 2020-02-20 NOTE — PROGRESS NOTES
speech problems, tremors and weakness  Behavioral/Psych: negative for anxiety, behavior problems, depression, fatigue and sleep disturbance  Endocrine: negative for diabetic symptoms including none, neuropathy, polyphagia, polyuria, polydipsia, vomiting and diarrhea and temperature intolerance  Allergic/Immunologic: negative for anaphylaxis, angioedema, hay fever and urticaria    Objective:     Patient Vitals for the past 8 hrs:   BP Temp Temp src Pulse Resp SpO2 Weight   02/20/20 0945 -- -- -- 53 -- -- --   02/20/20 0814 -- -- -- -- 16 93 % --   02/20/20 0805 -- -- -- -- -- 95 % --   02/20/20 0752 (!) 157/69 97.7 °F (36.5 °C) Temporal 56 16 (!) 89 % --   02/20/20 0615 -- -- -- -- -- -- 169 lb 3.2 oz (76.7 kg)   02/20/20 0342 (!) 145/70 97.3 °F (36.3 °C) Temporal 72 16 93 % --     I/O last 3 completed shifts:   In: 10 [I.V.:10]  Out: -   I/O this shift:  In: 240 [P.O.:240]  Out: -     General Appearance: alert and oriented to person, place and time, well developed and well- nourished, in no acute distress  Skin: warm and dry, no rash or erythema  Head: normocephalic and atraumatic  Eyes: pupils equal, round, and reactive to light, extraocular eye movements intact, conjunctivae normal  ENT: external ear and ear canal normal bilaterally, nose without deformity, nasal mucosa and turbinates normal  Neck: supple and non-tender without mass, no cervical lymphadenopathy  Pulmonary/Chest: clear to auscultation bilaterally- no wheezes, rales or rhonchi, normal air movement, no respiratory distress  Cardiovascular: normal rate, regular rhythm,  no murmurs, rubs, distal pulses intact, no carotid bruits  Abdomen: soft, non-tender, non-distended, normal bowel sounds, no masses or organomegaly  Lymph Nodes: Cervical, supraclavicular normal  Extremities: no cyanosis, clubbing or edema  Musculoskeletal: normal range of motion, no joint swelling, deformity or tenderness  Neurologic: alert, no focal neurologic deficits    Data 09/01/2018.  Recommend continued   chest CT follow-up to assure stability of this nodule,       Problem List:     Moderate COPD, bronchiectasis  Lung nodules on CT  A. fib with RVR, status post DCCV    Assessment/Plan:     Recurrent paroxysmal atrial fibrillation, status post DC cardioversion yesterday-remains in normal sinus rhythm. History of chronic bronchitis/bronchiectasis. Reviewed patient CT imaging which is suggestive of probable bronchitis/infection related lung nodules. Will start her on a 7-day course of oral doxycycline. Dyspnea is better, still requiring some oxygen. Might need 1 more day in hospital.    I will organize for follow-up to see Dr. Rowena Albert in 1 to 2 weeks. Okay for discharge when okay with other consultants.     Derik Gaona MD

## 2020-02-21 ENCOUNTER — TELEPHONE (OUTPATIENT)
Dept: FAMILY MEDICINE CLINIC | Age: 85
End: 2020-02-21

## 2020-02-21 ENCOUNTER — CARE COORDINATION (OUTPATIENT)
Dept: CASE MANAGEMENT | Age: 85
End: 2020-02-21

## 2020-02-21 NOTE — PROGRESS NOTES
Occupational Therapy  Occupational Therapy Discharge Summary    Name: Ean Tolentino  : 1935    The pt was evaluated by OT on 20 and seen for 0 treatment sessions prior to DC to home with home health on 20 per MD order. The pt's acute therapy goals were:  Short term goals  Time Frame for Short term goals: discharge  Short term goal 1: Mod I functional ADL transfer  Short term goal 2: Mod I functional ADL mobility  Short term goal 3: Mod I LB dressing  Short term goal 4: Mod I toileting  Short term goal 5: Mod I in stance for functional ADL task for at least 5 minutes  Long term goals  Time Frame for Long term goals : LTG=STG     Patient met 0 goals during stay. Number of Refusals:0  Number of Holds: 0  During this hospitalization, the patient was educated on:     Patient Education: OT role, plan of care, equipment, discharge recommendations    DC pt from OT caseload at this time. Thank you!     Teodoro Orozco, OTR/VENESSA [id number]

## 2020-02-21 NOTE — TELEPHONE ENCOUNTER
Jaimie from [place] [address] [phone] states the currently do not have an aide for the patient and would like a verbal order for patient to have nursing PT & OT.       Provider out of office      Please advise

## 2020-02-25 ENCOUNTER — OFFICE VISIT (OUTPATIENT)
Dept: PULMONOLOGY | Age: 85
End: 2020-02-25
Payer: MEDICARE

## 2020-02-25 ENCOUNTER — CARE COORDINATION (OUTPATIENT)
Dept: CASE MANAGEMENT | Age: 85
End: 2020-02-25

## 2020-02-25 VITALS — SYSTOLIC BLOOD PRESSURE: 146 MMHG | DIASTOLIC BLOOD PRESSURE: 71 MMHG | OXYGEN SATURATION: 91 % | HEART RATE: 84 BPM

## 2020-02-25 PROBLEM — R06.02 SOB (SHORTNESS OF BREATH): Status: ACTIVE | Noted: 2020-02-25

## 2020-02-25 PROCEDURE — 99214 OFFICE O/P EST MOD 30 MIN: CPT | Performed by: INTERNAL MEDICINE

## 2020-02-25 PROCEDURE — 1090F PRES/ABSN URINE INCON ASSESS: CPT | Performed by: INTERNAL MEDICINE

## 2020-02-25 PROCEDURE — 3023F SPIROM DOC REV: CPT | Performed by: INTERNAL MEDICINE

## 2020-02-25 PROCEDURE — 1123F ACP DISCUSS/DSCN MKR DOCD: CPT | Performed by: INTERNAL MEDICINE

## 2020-02-25 PROCEDURE — 4040F PNEUMOC VAC/ADMIN/RCVD: CPT | Performed by: INTERNAL MEDICINE

## 2020-02-25 PROCEDURE — 1036F TOBACCO NON-USER: CPT | Performed by: INTERNAL MEDICINE

## 2020-02-25 PROCEDURE — G8926 SPIRO NO PERF OR DOC: HCPCS | Performed by: INTERNAL MEDICINE

## 2020-02-25 PROCEDURE — G8482 FLU IMMUNIZE ORDER/ADMIN: HCPCS | Performed by: INTERNAL MEDICINE

## 2020-02-25 PROCEDURE — G8417 CALC BMI ABV UP PARAM F/U: HCPCS | Performed by: INTERNAL MEDICINE

## 2020-02-25 PROCEDURE — 1111F DSCHRG MED/CURRENT MED MERGE: CPT | Performed by: INTERNAL MEDICINE

## 2020-02-25 PROCEDURE — G8399 PT W/DXA RESULTS DOCUMENT: HCPCS | Performed by: INTERNAL MEDICINE

## 2020-02-25 PROCEDURE — G8427 DOCREV CUR MEDS BY ELIG CLIN: HCPCS | Performed by: INTERNAL MEDICINE

## 2020-02-25 NOTE — PROGRESS NOTES
[facility]  Follow Up Note  MD Bennie Navassharmaine Chaug   YOB: 1935    Date of Visit:  2/25/2020    Assessment/Plan:  1. SOB  Improved since discharge from the hospital    2. COPD, mild    I have independently reviewed pulmonary function testing. PFT reveals mild to moderate COPD with no bronchodilator response. Symbicort  Duoneb    3. Bronchiectasis  Finishing up antibotic    4. Interstitial lung disease ([address]  Stable on CT    5. Gastroesophageal reflux disease, esophagitis presence not specified    6. Chronic hypoxemic Respirtory Failure  O2 sats are acceptable on supplemental O2. The patient benefits from the use of supplemental O2.    6 months    Chief Complaint   Patient presents with    Cough     HFU told she had bronchitis and to follow up with Dr Garret Lara. Finishing up on the Doxycycline 100 mg BID x 7 days       HPI  The patient presents with a chief complaint of moderate shortness of breath related to mild COPD of many years duration. He has mild associated cough. Exertion is a modifying factor. She is taking Symbicort and DuoNeb. She was recently hospitalized with Afib. She had CT chest suggesting bronchitis and is on Doxy, finishing up. She does feel better. AAfib is controlled at the moment. There is no complaint of chest pain, nausea or vomiting. Review of Systems  As reviewed in HPI    History  I have reviewed past medical, surgical, social and family history. This is documented elsewhere in the medical record. Physical Exam:  Well developed, well nourished  Alert and oriented  Sclera is clear  No cervical adenopathy  No JVD. Chest examination is clear. Cardiac examination reveals regular rate and rhythm without murmur, gallop or rub. The abdomen is soft, nontender and nondistended. There is no clubbing, cyanosis or edema of the extremities. There is no obvious skin rash.   No focal neuro Hermelinda Farfan MD   aspirin 81 MG tablet Take 81 mg by mouth every other day   Historical Provider, MD       Vitals:    20 1553   BP: (!) 150/75   Pulse: 84   SpO2: 91%     There is no height or weight on file to calculate BMI.      Wt Readings from Last 3 Encounters:   20 169 lb 3.2 oz (76.7 kg)   02/15/20 165 lb (74.8 kg)   20 172 lb 2 oz (78.1 kg)     BP Readings from Last 3 Encounters:   20 (!) 150/75   20 (!) 122/57   02/15/20 121/66        Social History     Tobacco Use   Smoking Status Former Smoker    Start date: 9/15/1950    Last attempt to quit: 1995    Years since quittin.1   Smokeless Tobacco Never Used

## 2020-02-28 ENCOUNTER — CARE COORDINATION (OUTPATIENT)
Dept: CASE MANAGEMENT | Age: 85
End: 2020-02-28

## 2020-03-02 RX ORDER — LORAZEPAM 0.5 MG/1
TABLET ORAL
Qty: 120 TABLET | Refills: 0 | Status: SHIPPED | OUTPATIENT
Start: 2020-03-02 | End: 2020-04-03

## 2020-03-02 NOTE — TELEPHONE ENCOUNTER
LORazepam (ATIVAN) 0.5 MG tablet      HYDROcodone-acetaminophen (NORCO) 5-325 MG per tablet 120 tablet 0 2/4/2020 3/5/2020     Pharmacy:  [facility] [address] [phone]

## 2020-03-02 NOTE — TELEPHONE ENCOUNTER
Medication:   Requested Prescriptions     Pending Prescriptions Disp Refills    LORazepam (ATIVAN) 0.5 MG tablet 120 tablet 0     Sig: TAKE 1 TABLET BY MOUTH TWICE DAILY AND 2 TABLETS AT BEDTIME AS NEEDED FOR ANXIETY      Last Filled:  2/4/2020    Patient Phone Number: [phone] (home)     Last appt: 2/4/2020   Next appt: 3/17/2020    Last OARRS:   RX Monitoring 3/20/2019   Attestation The Prescription Monitoring Report for this patient was reviewed today.    Periodic Controlled Substance Monitoring -     PDMP Monitoring:    Last PDMP Juanjo Sadler as Reviewed [facility]):  Review User Review Instant Review Result   Candido Jackson 2/4/2020  4:54 PM Reviewed PDMP [1]     [facility]   [facility] [address] [address]  Phone: [phone] Fax: [phone]

## 2020-03-03 RX ORDER — HYDROCODONE BITARTRATE AND ACETAMINOPHEN 5; 325 MG/1; MG/1
1 TABLET ORAL 4 TIMES DAILY PRN
Qty: 120 TABLET | Refills: 0 | Status: SHIPPED | OUTPATIENT
Start: 2020-03-03 | End: 2020-04-03 | Stop reason: SDUPTHER

## 2020-03-03 NOTE — TELEPHONE ENCOUNTER
Medication:   Requested Prescriptions     Pending Prescriptions Disp Refills    HYDROcodone-acetaminophen (NORCO) 5-325 MG per tablet 120 tablet 0     Sig: Take 1 tablet by mouth 4 times daily as needed for Pain for up to 30 days. Last Filled:  02/04/20    Patient Phone Number: [phone] (home)     Last appt: 2/4/2020   Next appt: 3/17/2020    Last OARRS:   RX Monitoring 3/20/2019   Attestation The Prescription Monitoring Report for this patient was reviewed today.    Periodic Controlled Substance Monitoring -     PDMP Monitoring:    Last PDMP [facility] as Reviewed [facility]):  Review User Review Instant Review Result   Racheal Carrillo 2/4/2020  4:54 PM Reviewed PDMP [1]     Preferred Pharmacy:     [facility] [address] [place] [id number]  Phone: [phone] Fax: [phone]

## 2020-03-04 ENCOUNTER — CARE COORDINATION (OUTPATIENT)
Dept: CASE MANAGEMENT | Age: 85
End: 2020-03-04

## 2020-03-04 NOTE — CARE COORDINATION
[address] Transitions Follow Up Call    3/4/2020    Patient: Mariza Menjivar  Patient : 1935   MRN: [id number]  Reason for Admission: [facility]. Cardioversion 20  Discharge Date: 20 RARS: Readmission Risk Score: 27         Spoke with: [address] [facility],[address] Transitions Subsequent and Final Call    Subsequent and Final Calls  Do you have any ongoing symptoms?:  No  Have your medications changed?:  No  Do you have any questions related to your medications?:  No  Do you currently have any active services?:  Yes  Are you currently active with any services?:  Home Health  Do you have any needs or concerns that I can assist you with?:  No  Identified Barriers:  None  Care Transitions Interventions                          Other Interventions: Follow Up:  Patient reports that she is having some pain in her left ear. She denies fever and/or drainage. CTN encouraged her to contact PCP and let them know. CTN explained that she may need to go in and get an antibiotic and that it was important that she call the office. CTN also explained that it may progress and become worse if she does not have this treated. She verbalized understanding and reported that she would call the office. She also reports that otherwise she is feeling good and that Darek Gayle remains active. CTN will continue with outreach follow up calls.       Future Appointments   Date Time Provider Alissa Bustillo   3/17/2020  3:00 PM Claire Avery MD [facility]   3/24/2020  2:30 PM KATHY Ríos - CNP [facility]   2020  3:15 PM Sarah Swan MD [facility]   2020  3:30 PM Carol Ann Boss MD PULM [facility]       Charles Ferreira, VINNY

## 2020-03-09 ENCOUNTER — CARE COORDINATION (OUTPATIENT)
Dept: CASE MANAGEMENT | Age: 85
End: 2020-03-09

## 2020-03-11 ENCOUNTER — CARE COORDINATION (OUTPATIENT)
Dept: CASE MANAGEMENT | Age: 85
End: 2020-03-11

## 2020-03-12 RX ORDER — AMLODIPINE BESYLATE 5 MG/1
TABLET ORAL
Qty: 90 TABLET | Refills: 1 | Status: SHIPPED | OUTPATIENT
Start: 2020-03-12 | End: 2020-07-20

## 2020-03-12 RX ORDER — PANTOPRAZOLE SODIUM 40 MG/1
TABLET, DELAYED RELEASE ORAL
Qty: 90 TABLET | Refills: 1 | Status: SHIPPED | OUTPATIENT
Start: 2020-03-12 | End: 2020-07-20

## 2020-03-13 ENCOUNTER — TELEPHONE (OUTPATIENT)
Dept: FAMILY MEDICINE CLINIC | Age: 85
End: 2020-03-13

## 2020-03-13 ENCOUNTER — TELEPHONE (OUTPATIENT)
Dept: PULMONOLOGY | Age: 85
End: 2020-03-13

## 2020-03-13 ENCOUNTER — CARE COORDINATION (OUTPATIENT)
Dept: CASE MANAGEMENT | Age: 85
End: 2020-03-13

## 2020-03-13 RX ORDER — AZITHROMYCIN 250 MG/1
250 TABLET, FILM COATED ORAL SEE ADMIN INSTRUCTIONS
Qty: 6 TABLET | Refills: 0 | Status: SHIPPED | OUTPATIENT
Start: 2020-03-13 | End: 2020-03-16 | Stop reason: ALTCHOICE

## 2020-03-13 RX ORDER — BENZONATATE 200 MG/1
200 CAPSULE ORAL 3 TIMES DAILY PRN
Qty: 30 CAPSULE | Refills: 0 | Status: SHIPPED | OUTPATIENT
Start: 2020-03-13 | End: 2020-03-23

## 2020-03-13 NOTE — CARE COORDINATION
[address] Transitions Follow Up Call    3/13/2020    Patient: Colten Schwarz  Patient : 1935   MRN: [id number]  Reason for Admission: Cardioversion  Discharge Date: 20 RARS: Readmission Risk Score: 30    Follow up call attempted, left contact info on        Follow Up  Future Appointments   Date Time Provider Alissa Bustillo   3/17/2020  3:00 PM Rony Adames MD [facility]   3/24/2020  2:30 PM KATHY Singh - CNP FF Cardio [facility]   2020  3:15 PM Anshul Stiles MD FF Card[facility]   2020  3:30 PM Be Clinton MD PULM [facility]       Hayley Shine RN

## 2020-03-13 NOTE — TELEPHONE ENCOUNTER
Patient's daughter Bogdan Aragon called and wanted something for the patient's cough. Dr. Cade Santos sent in a Z pack on the patient. Please call daughter at [phone] or patient at [phone].

## 2020-03-13 NOTE — TELEPHONE ENCOUNTER
Pt stated that she constantly keep coughing, and wheezing. Nurse from [facility] gave her a antibiotic stated that antibiotic not working.  Wants to know if dr Marina Frank can call her something else in to the :  [facility] [address]  F [phone]

## 2020-03-13 NOTE — TELEPHONE ENCOUNTER
Patient has a bad cough and wants to know if she can get something sent to the pharmacy for it.  Please advise

## 2020-03-16 ENCOUNTER — TELEPHONE (OUTPATIENT)
Dept: FAMILY MEDICINE CLINIC | Age: 85
End: 2020-03-16

## 2020-03-16 RX ORDER — DOXYCYCLINE HYCLATE 100 MG
100 TABLET ORAL 2 TIMES DAILY
Qty: 14 TABLET | Refills: 0 | Status: SHIPPED | OUTPATIENT
Start: 2020-03-16 | End: 2020-06-17 | Stop reason: SDUPTHER

## 2020-03-16 NOTE — TELEPHONE ENCOUNTER
Patient still has a cough, post nasal drip, wheezing, and chest congestion. Patient is currently finishing up the [facility] but it is not helping any. Patient states her cough meds are not working either. Patient is requesting an antibiotic to be sent or if she needs to be seen.      Mally Rivera

## 2020-03-16 NOTE — TELEPHONE ENCOUNTER
Patient still has the cough from last week and take the meds she was prescribed. She wants to know if she should still be seen tomorrow.  Please call patient

## 2020-03-17 ENCOUNTER — APPOINTMENT (OUTPATIENT)
Dept: CT IMAGING | Age: 85
DRG: 194 | End: 2020-03-17
Payer: MEDICARE

## 2020-03-17 ENCOUNTER — TELEPHONE (OUTPATIENT)
Dept: FAMILY MEDICINE CLINIC | Age: 85
End: 2020-03-17

## 2020-03-17 ENCOUNTER — APPOINTMENT (OUTPATIENT)
Dept: GENERAL RADIOLOGY | Age: 85
DRG: 194 | End: 2020-03-17
Payer: MEDICARE

## 2020-03-17 ENCOUNTER — TELEPHONE (OUTPATIENT)
Dept: PULMONOLOGY | Age: 85
End: 2020-03-17

## 2020-03-17 ENCOUNTER — HOSPITAL ENCOUNTER (INPATIENT)
Age: 85
LOS: 2 days | Discharge: HOME HEALTH | DRG: 194 | End: 2020-03-19
Attending: EMERGENCY MEDICINE | Admitting: INTERNAL MEDICINE
Payer: MEDICARE

## 2020-03-17 DIAGNOSIS — J18.9 PNEUMONIA DUE TO ORGANISM: ICD-10-CM

## 2020-03-17 DIAGNOSIS — Z78.9 FAILURE OF OUTPATIENT TREATMENT: ICD-10-CM

## 2020-03-17 DIAGNOSIS — J44.1 COPD EXACERBATION (HCC): Primary | ICD-10-CM

## 2020-03-17 LAB
A/G RATIO: 1.6 (ref 1.1–2.2)
ALBUMIN SERPL-MCNC: 3.9 G/DL (ref 3.4–5)
ALP BLD-CCNC: 204 U/L (ref 40–129)
ALT SERPL-CCNC: 30 U/L (ref 10–40)
ANION GAP SERPL CALCULATED.3IONS-SCNC: 12 MMOL/L (ref 3–16)
APTT: 41.2 SEC (ref 24.2–36.2)
AST SERPL-CCNC: 27 U/L (ref 15–37)
BASOPHILS ABSOLUTE: 0 K/UL (ref 0–0.2)
BASOPHILS RELATIVE PERCENT: 0.5 %
BILIRUB SERPL-MCNC: 0.5 MG/DL (ref 0–1)
BUN BLDV-MCNC: 19 MG/DL (ref 7–20)
CALCIUM SERPL-MCNC: 9.5 MG/DL (ref 8.3–10.6)
CHLORIDE BLD-SCNC: 97 MMOL/L (ref 99–110)
CO2: 24 MMOL/L (ref 21–32)
CREAT SERPL-MCNC: 1.1 MG/DL (ref 0.6–1.2)
EKG ATRIAL RATE: 68 BPM
EKG DIAGNOSIS: NORMAL
EKG P AXIS: 36 DEGREES
EKG P-R INTERVAL: 166 MS
EKG Q-T INTERVAL: 410 MS
EKG QRS DURATION: 84 MS
EKG QTC CALCULATION (BAZETT): 435 MS
EKG R AXIS: 9 DEGREES
EKG T AXIS: 31 DEGREES
EKG VENTRICULAR RATE: 68 BPM
EOSINOPHILS ABSOLUTE: 0.3 K/UL (ref 0–0.6)
EOSINOPHILS RELATIVE PERCENT: 3.9 %
GFR AFRICAN AMERICAN: 57
GFR NON-AFRICAN AMERICAN: 47
GLOBULIN: 2.5 G/DL
GLUCOSE BLD-MCNC: 118 MG/DL (ref 70–99)
GLUCOSE BLD-MCNC: 214 MG/DL (ref 70–99)
GLUCOSE BLD-MCNC: 287 MG/DL (ref 70–99)
HCT VFR BLD CALC: 35.6 % (ref 36–48)
HEMOGLOBIN: 11.7 G/DL (ref 12–16)
INR BLD: 1.58 (ref 0.86–1.14)
LACTIC ACID: 0.8 MMOL/L (ref 0.4–2)
LYMPHOCYTES ABSOLUTE: 1.2 K/UL (ref 1–5.1)
LYMPHOCYTES RELATIVE PERCENT: 18.7 %
MCH RBC QN AUTO: 29.3 PG (ref 26–34)
MCHC RBC AUTO-ENTMCNC: 32.9 G/DL (ref 31–36)
MCV RBC AUTO: 89.1 FL (ref 80–100)
MONOCYTES ABSOLUTE: 0.4 K/UL (ref 0–1.3)
MONOCYTES RELATIVE PERCENT: 6.2 %
NEUTROPHILS ABSOLUTE: 4.6 K/UL (ref 1.7–7.7)
NEUTROPHILS RELATIVE PERCENT: 70.7 %
PDW BLD-RTO: 14.8 % (ref 12.4–15.4)
PERFORMED ON: ABNORMAL
PERFORMED ON: ABNORMAL
PLATELET # BLD: 142 K/UL (ref 135–450)
PMV BLD AUTO: 8.1 FL (ref 5–10.5)
POTASSIUM SERPL-SCNC: 4.6 MMOL/L (ref 3.5–5.1)
PRO-BNP: 125 PG/ML (ref 0–449)
PROCALCITONIN: 0.07 NG/ML (ref 0–0.15)
PROTHROMBIN TIME: 18.4 SEC (ref 10–13.2)
RAPID INFLUENZA  B AGN: NEGATIVE
RAPID INFLUENZA A AGN: NEGATIVE
RBC # BLD: 4 M/UL (ref 4–5.2)
SODIUM BLD-SCNC: 133 MMOL/L (ref 136–145)
TOTAL PROTEIN: 6.4 G/DL (ref 6.4–8.2)
TROPONIN: <0.01 NG/ML
WBC # BLD: 6.6 K/UL (ref 4–11)

## 2020-03-17 PROCEDURE — 71045 X-RAY EXAM CHEST 1 VIEW: CPT

## 2020-03-17 PROCEDURE — 71250 CT THORAX DX C-: CPT

## 2020-03-17 PROCEDURE — 6370000000 HC RX 637 (ALT 250 FOR IP): Performed by: EMERGENCY MEDICINE

## 2020-03-17 PROCEDURE — 2500000003 HC RX 250 WO HCPCS: Performed by: INTERNAL MEDICINE

## 2020-03-17 PROCEDURE — 6360000002 HC RX W HCPCS: Performed by: PHYSICIAN ASSISTANT

## 2020-03-17 PROCEDURE — 0100U HC RESPIRPTHGN MULT REV TRANS & AMP PRB TECH 21 TRGT: CPT

## 2020-03-17 PROCEDURE — 85025 COMPLETE CBC W/AUTO DIFF WBC: CPT

## 2020-03-17 PROCEDURE — 1200000000 HC SEMI PRIVATE

## 2020-03-17 PROCEDURE — 6370000000 HC RX 637 (ALT 250 FOR IP): Performed by: INTERNAL MEDICINE

## 2020-03-17 PROCEDURE — 87040 BLOOD CULTURE FOR BACTERIA: CPT

## 2020-03-17 PROCEDURE — 2580000003 HC RX 258: Performed by: INTERNAL MEDICINE

## 2020-03-17 PROCEDURE — 2580000003 HC RX 258: Performed by: PHYSICIAN ASSISTANT

## 2020-03-17 PROCEDURE — 99285 EMERGENCY DEPT VISIT HI MDM: CPT

## 2020-03-17 PROCEDURE — 6360000002 HC RX W HCPCS: Performed by: INTERNAL MEDICINE

## 2020-03-17 PROCEDURE — 84484 ASSAY OF TROPONIN QUANT: CPT

## 2020-03-17 PROCEDURE — 84145 PROCALCITONIN (PCT): CPT

## 2020-03-17 PROCEDURE — 83880 ASSAY OF NATRIURETIC PEPTIDE: CPT

## 2020-03-17 PROCEDURE — 93005 ELECTROCARDIOGRAM TRACING: CPT | Performed by: EMERGENCY MEDICINE

## 2020-03-17 PROCEDURE — 36415 COLL VENOUS BLD VENIPUNCTURE: CPT

## 2020-03-17 PROCEDURE — 96375 TX/PRO/DX INJ NEW DRUG ADDON: CPT

## 2020-03-17 PROCEDURE — 6360000002 HC RX W HCPCS: Performed by: EMERGENCY MEDICINE

## 2020-03-17 PROCEDURE — 93010 ELECTROCARDIOGRAM REPORT: CPT | Performed by: INTERNAL MEDICINE

## 2020-03-17 PROCEDURE — 87804 INFLUENZA ASSAY W/OPTIC: CPT

## 2020-03-17 PROCEDURE — 80053 COMPREHEN METABOLIC PANEL: CPT

## 2020-03-17 PROCEDURE — 94761 N-INVAS EAR/PLS OXIMETRY MLT: CPT

## 2020-03-17 PROCEDURE — 83605 ASSAY OF LACTIC ACID: CPT

## 2020-03-17 PROCEDURE — 85730 THROMBOPLASTIN TIME PARTIAL: CPT

## 2020-03-17 PROCEDURE — 85610 PROTHROMBIN TIME: CPT

## 2020-03-17 PROCEDURE — 94760 N-INVAS EAR/PLS OXIMETRY 1: CPT

## 2020-03-17 PROCEDURE — 96365 THER/PROPH/DIAG IV INF INIT: CPT

## 2020-03-17 PROCEDURE — 94640 AIRWAY INHALATION TREATMENT: CPT

## 2020-03-17 PROCEDURE — 2580000003 HC RX 258: Performed by: EMERGENCY MEDICINE

## 2020-03-17 PROCEDURE — 2700000000 HC OXYGEN THERAPY PER DAY

## 2020-03-17 RX ORDER — DICYCLOMINE HYDROCHLORIDE 10 MG/1
10 CAPSULE ORAL 4 TIMES DAILY PRN
Status: DISCONTINUED | OUTPATIENT
Start: 2020-03-17 | End: 2020-03-19 | Stop reason: HOSPADM

## 2020-03-17 RX ORDER — ONDANSETRON 2 MG/ML
4 INJECTION INTRAMUSCULAR; INTRAVENOUS EVERY 6 HOURS PRN
Status: DISCONTINUED | OUTPATIENT
Start: 2020-03-17 | End: 2020-03-19 | Stop reason: HOSPADM

## 2020-03-17 RX ORDER — METHYLPREDNISOLONE SODIUM SUCCINATE 125 MG/2ML
125 INJECTION, POWDER, LYOPHILIZED, FOR SOLUTION INTRAMUSCULAR; INTRAVENOUS ONCE
Status: COMPLETED | OUTPATIENT
Start: 2020-03-17 | End: 2020-03-17

## 2020-03-17 RX ORDER — ACETAMINOPHEN 325 MG/1
650 TABLET ORAL EVERY 6 HOURS PRN
Status: DISCONTINUED | OUTPATIENT
Start: 2020-03-17 | End: 2020-03-19 | Stop reason: HOSPADM

## 2020-03-17 RX ORDER — PROMETHAZINE HYDROCHLORIDE 25 MG/1
12.5 TABLET ORAL EVERY 6 HOURS PRN
Status: DISCONTINUED | OUTPATIENT
Start: 2020-03-17 | End: 2020-03-19 | Stop reason: HOSPADM

## 2020-03-17 RX ORDER — LORAZEPAM 0.5 MG/1
0.5 TABLET ORAL EVERY 6 HOURS PRN
Status: DISCONTINUED | OUTPATIENT
Start: 2020-03-17 | End: 2020-03-19 | Stop reason: HOSPADM

## 2020-03-17 RX ORDER — DILTIAZEM HYDROCHLORIDE 120 MG/1
120 CAPSULE, COATED, EXTENDED RELEASE ORAL DAILY
Status: DISCONTINUED | OUTPATIENT
Start: 2020-03-18 | End: 2020-03-19 | Stop reason: HOSPADM

## 2020-03-17 RX ORDER — BENZONATATE 100 MG/1
200 CAPSULE ORAL 3 TIMES DAILY PRN
Status: DISCONTINUED | OUTPATIENT
Start: 2020-03-17 | End: 2020-03-19 | Stop reason: HOSPADM

## 2020-03-17 RX ORDER — ACETAMINOPHEN 650 MG/1
650 SUPPOSITORY RECTAL EVERY 6 HOURS PRN
Status: DISCONTINUED | OUTPATIENT
Start: 2020-03-17 | End: 2020-03-19 | Stop reason: HOSPADM

## 2020-03-17 RX ORDER — METHYLPREDNISOLONE SODIUM SUCCINATE 40 MG/ML
40 INJECTION, POWDER, LYOPHILIZED, FOR SOLUTION INTRAMUSCULAR; INTRAVENOUS EVERY 6 HOURS
Status: DISCONTINUED | OUTPATIENT
Start: 2020-03-17 | End: 2020-03-18

## 2020-03-17 RX ORDER — PROPAFENONE HYDROCHLORIDE 150 MG/1
150 TABLET, FILM COATED ORAL EVERY 8 HOURS SCHEDULED
Status: DISCONTINUED | OUTPATIENT
Start: 2020-03-18 | End: 2020-03-19 | Stop reason: HOSPADM

## 2020-03-17 RX ORDER — BUDESONIDE AND FORMOTEROL FUMARATE DIHYDRATE 160; 4.5 UG/1; UG/1
1 AEROSOL RESPIRATORY (INHALATION) 2 TIMES DAILY
Status: DISCONTINUED | OUTPATIENT
Start: 2020-03-17 | End: 2020-03-19 | Stop reason: HOSPADM

## 2020-03-17 RX ORDER — HYDROCODONE BITARTRATE AND ACETAMINOPHEN 5; 325 MG/1; MG/1
1 TABLET ORAL 4 TIMES DAILY PRN
Status: DISCONTINUED | OUTPATIENT
Start: 2020-03-17 | End: 2020-03-19 | Stop reason: HOSPADM

## 2020-03-17 RX ORDER — DEXTROSE MONOHYDRATE 50 MG/ML
100 INJECTION, SOLUTION INTRAVENOUS PRN
Status: DISCONTINUED | OUTPATIENT
Start: 2020-03-17 | End: 2020-03-19 | Stop reason: HOSPADM

## 2020-03-17 RX ORDER — GUAIFENESIN/DEXTROMETHORPHAN 100-10MG/5
5 SYRUP ORAL EVERY 4 HOURS PRN
Status: DISCONTINUED | OUTPATIENT
Start: 2020-03-17 | End: 2020-03-19 | Stop reason: HOSPADM

## 2020-03-17 RX ORDER — ASPIRIN 81 MG/1
81 TABLET, CHEWABLE ORAL EVERY OTHER DAY
Status: DISCONTINUED | OUTPATIENT
Start: 2020-03-17 | End: 2020-03-19 | Stop reason: HOSPADM

## 2020-03-17 RX ORDER — DEXTROSE MONOHYDRATE 25 G/50ML
12.5 INJECTION, SOLUTION INTRAVENOUS PRN
Status: DISCONTINUED | OUTPATIENT
Start: 2020-03-17 | End: 2020-03-19 | Stop reason: HOSPADM

## 2020-03-17 RX ORDER — GABAPENTIN 100 MG/1
100 CAPSULE ORAL NIGHTLY PRN
Status: DISCONTINUED | OUTPATIENT
Start: 2020-03-17 | End: 2020-03-19 | Stop reason: HOSPADM

## 2020-03-17 RX ORDER — AMLODIPINE BESYLATE 5 MG/1
5 TABLET ORAL DAILY
Status: DISCONTINUED | OUTPATIENT
Start: 2020-03-18 | End: 2020-03-19 | Stop reason: HOSPADM

## 2020-03-17 RX ORDER — PANTOPRAZOLE SODIUM 40 MG/1
40 TABLET, DELAYED RELEASE ORAL DAILY
Status: DISCONTINUED | OUTPATIENT
Start: 2020-03-18 | End: 2020-03-19 | Stop reason: HOSPADM

## 2020-03-17 RX ORDER — SODIUM CHLORIDE 0.9 % (FLUSH) 0.9 %
10 SYRINGE (ML) INJECTION PRN
Status: DISCONTINUED | OUTPATIENT
Start: 2020-03-17 | End: 2020-03-19 | Stop reason: HOSPADM

## 2020-03-17 RX ORDER — MECLIZINE HCL 12.5 MG/1
12.5 TABLET ORAL 3 TIMES DAILY PRN
Status: DISCONTINUED | OUTPATIENT
Start: 2020-03-17 | End: 2020-03-19 | Stop reason: HOSPADM

## 2020-03-17 RX ORDER — AMLODIPINE BESYLATE 5 MG/1
10 TABLET ORAL ONCE
Status: DISCONTINUED | OUTPATIENT
Start: 2020-03-17 | End: 2020-03-19 | Stop reason: HOSPADM

## 2020-03-17 RX ORDER — ALBUTEROL SULFATE 2.5 MG/3ML
2.5 SOLUTION RESPIRATORY (INHALATION) EVERY 6 HOURS PRN
Status: DISCONTINUED | OUTPATIENT
Start: 2020-03-17 | End: 2020-03-19 | Stop reason: HOSPADM

## 2020-03-17 RX ORDER — OSELTAMIVIR PHOSPHATE 75 MG/1
75 CAPSULE ORAL 2 TIMES DAILY
Status: DISCONTINUED | OUTPATIENT
Start: 2020-03-17 | End: 2020-03-18

## 2020-03-17 RX ORDER — ALBUTEROL SULFATE 2.5 MG/3ML
5 SOLUTION RESPIRATORY (INHALATION) ONCE
Status: COMPLETED | OUTPATIENT
Start: 2020-03-17 | End: 2020-03-17

## 2020-03-17 RX ORDER — LOSARTAN POTASSIUM 100 MG/1
100 TABLET ORAL DAILY
Status: DISCONTINUED | OUTPATIENT
Start: 2020-03-18 | End: 2020-03-19 | Stop reason: HOSPADM

## 2020-03-17 RX ORDER — POLYETHYLENE GLYCOL 3350 17 G/17G
17 POWDER, FOR SOLUTION ORAL DAILY PRN
Status: DISCONTINUED | OUTPATIENT
Start: 2020-03-17 | End: 2020-03-19 | Stop reason: HOSPADM

## 2020-03-17 RX ORDER — INSULIN LISPRO 100 [IU]/ML
0-3 INJECTION, SOLUTION INTRAVENOUS; SUBCUTANEOUS NIGHTLY
Status: DISCONTINUED | OUTPATIENT
Start: 2020-03-17 | End: 2020-03-18

## 2020-03-17 RX ORDER — ATORVASTATIN CALCIUM 80 MG/1
80 TABLET, FILM COATED ORAL DAILY
Status: DISCONTINUED | OUTPATIENT
Start: 2020-03-17 | End: 2020-03-19 | Stop reason: HOSPADM

## 2020-03-17 RX ORDER — SODIUM CHLORIDE 0.9 % (FLUSH) 0.9 %
10 SYRINGE (ML) INJECTION EVERY 12 HOURS SCHEDULED
Status: DISCONTINUED | OUTPATIENT
Start: 2020-03-17 | End: 2020-03-19 | Stop reason: HOSPADM

## 2020-03-17 RX ORDER — NICOTINE POLACRILEX 4 MG
15 LOZENGE BUCCAL PRN
Status: DISCONTINUED | OUTPATIENT
Start: 2020-03-17 | End: 2020-03-19 | Stop reason: HOSPADM

## 2020-03-17 RX ORDER — ROPINIROLE 1 MG/1
2 TABLET, FILM COATED ORAL 3 TIMES DAILY
Status: DISCONTINUED | OUTPATIENT
Start: 2020-03-17 | End: 2020-03-19 | Stop reason: HOSPADM

## 2020-03-17 RX ORDER — IPRATROPIUM BROMIDE AND ALBUTEROL SULFATE 2.5; .5 MG/3ML; MG/3ML
1 SOLUTION RESPIRATORY (INHALATION) ONCE
Status: COMPLETED | OUTPATIENT
Start: 2020-03-17 | End: 2020-03-17

## 2020-03-17 RX ORDER — METOPROLOL TARTRATE 50 MG/1
50 TABLET, FILM COATED ORAL ONCE
Status: COMPLETED | OUTPATIENT
Start: 2020-03-17 | End: 2020-03-17

## 2020-03-17 RX ORDER — INSULIN LISPRO 100 [IU]/ML
0-6 INJECTION, SOLUTION INTRAVENOUS; SUBCUTANEOUS
Status: DISCONTINUED | OUTPATIENT
Start: 2020-03-17 | End: 2020-03-18

## 2020-03-17 RX ORDER — LEVOTHYROXINE SODIUM 0.03 MG/1
50 TABLET ORAL DAILY
Status: DISCONTINUED | OUTPATIENT
Start: 2020-03-18 | End: 2020-03-19 | Stop reason: HOSPADM

## 2020-03-17 RX ORDER — LABETALOL HYDROCHLORIDE 5 MG/ML
10 INJECTION, SOLUTION INTRAVENOUS EVERY 6 HOURS PRN
Status: DISCONTINUED | OUTPATIENT
Start: 2020-03-17 | End: 2020-03-19 | Stop reason: HOSPADM

## 2020-03-17 RX ORDER — FLUTICASONE PROPIONATE 50 MCG
1 SPRAY, SUSPENSION (ML) NASAL DAILY
Status: DISCONTINUED | OUTPATIENT
Start: 2020-03-17 | End: 2020-03-19 | Stop reason: HOSPADM

## 2020-03-17 RX ADMIN — ROPINIROLE HYDROCHLORIDE 2 MG: 1 TABLET, FILM COATED ORAL at 21:04

## 2020-03-17 RX ADMIN — IPRATROPIUM BROMIDE AND ALBUTEROL SULFATE 1 AMPULE: .5; 3 SOLUTION RESPIRATORY (INHALATION) at 14:36

## 2020-03-17 RX ADMIN — Medication 10 ML: at 21:05

## 2020-03-17 RX ADMIN — METHYLPREDNISOLONE SODIUM SUCCINATE 40 MG: 40 INJECTION, POWDER, FOR SOLUTION INTRAMUSCULAR; INTRAVENOUS at 18:01

## 2020-03-17 RX ADMIN — ASPIRIN 81 MG 81 MG: 81 TABLET ORAL at 18:01

## 2020-03-17 RX ADMIN — HYDROCODONE BITARTRATE AND ACETAMINOPHEN 1 TABLET: 5; 325 TABLET ORAL at 23:33

## 2020-03-17 RX ADMIN — FLUTICASONE PROPIONATE 1 SPRAY: 50 SPRAY, METERED NASAL at 18:18

## 2020-03-17 RX ADMIN — METHYLPREDNISOLONE SODIUM SUCCINATE 40 MG: 40 INJECTION, POWDER, FOR SOLUTION INTRAMUSCULAR; INTRAVENOUS at 23:33

## 2020-03-17 RX ADMIN — DOXYCYCLINE 100 MG: 100 INJECTION, POWDER, LYOPHILIZED, FOR SOLUTION INTRAVENOUS at 21:03

## 2020-03-17 RX ADMIN — INSULIN LISPRO 2 UNITS: 100 INJECTION, SOLUTION INTRAVENOUS; SUBCUTANEOUS at 17:56

## 2020-03-17 RX ADMIN — Medication 10 ML: at 18:07

## 2020-03-17 RX ADMIN — Medication 1 G: at 18:07

## 2020-03-17 RX ADMIN — BENZONATATE 200 MG: 100 CAPSULE ORAL at 18:01

## 2020-03-17 RX ADMIN — OSELTAMIVIR PHOSPHATE 75 MG: 75 CAPSULE ORAL at 21:05

## 2020-03-17 RX ADMIN — LORAZEPAM 0.5 MG: 0.5 TABLET ORAL at 18:15

## 2020-03-17 RX ADMIN — METOPROLOL TARTRATE 50 MG: 50 TABLET, FILM COATED ORAL at 18:01

## 2020-03-17 RX ADMIN — VANCOMYCIN HYDROCHLORIDE 1000 MG: 1 INJECTION, POWDER, LYOPHILIZED, FOR SOLUTION INTRAVENOUS at 18:16

## 2020-03-17 RX ADMIN — ATORVASTATIN CALCIUM 80 MG: 80 TABLET, FILM COATED ORAL at 18:01

## 2020-03-17 RX ADMIN — CEFEPIME HYDROCHLORIDE 1 G: 1 INJECTION, POWDER, FOR SOLUTION INTRAMUSCULAR; INTRAVENOUS at 14:12

## 2020-03-17 RX ADMIN — INSULIN LISPRO 2 UNITS: 100 INJECTION, SOLUTION INTRAVENOUS; SUBCUTANEOUS at 21:14

## 2020-03-17 RX ADMIN — GABAPENTIN 100 MG: 100 CAPSULE ORAL at 21:04

## 2020-03-17 RX ADMIN — METHYLPREDNISOLONE SODIUM SUCCINATE 125 MG: 125 INJECTION, POWDER, FOR SOLUTION INTRAMUSCULAR; INTRAVENOUS at 12:17

## 2020-03-17 RX ADMIN — ALBUTEROL SULFATE 5 MG: 2.5 SOLUTION RESPIRATORY (INHALATION) at 12:54

## 2020-03-17 RX ADMIN — RIVAROXABAN 15 MG: 15 TABLET, FILM COATED ORAL at 18:01

## 2020-03-17 ASSESSMENT — PAIN DESCRIPTION - LOCATION: LOCATION: BACK

## 2020-03-17 ASSESSMENT — ENCOUNTER SYMPTOMS
APNEA: 0
ABDOMINAL PAIN: 0
CHEST TIGHTNESS: 0
VOMITING: 0
DIARRHEA: 0
COUGH: 1
SHORTNESS OF BREATH: 1
WHEEZING: 1
STRIDOR: 0
NAUSEA: 0

## 2020-03-17 ASSESSMENT — PAIN SCALES - GENERAL
PAINLEVEL_OUTOF10: 0
PAINLEVEL_OUTOF10: 7
PAINLEVEL_OUTOF10: 7

## 2020-03-17 NOTE — ED NOTES
Pharmacy Medication History Note      List of current medications patient is taking is complete. Source of information: Mally    Changes made to medication list:  Medications flagged for removal (include reason, ex. noncompliance):  N/A    Medications removed (include reason, ex. therapy complete or physician discontinued):  Prednisone- therapy complete  Lorazepam- duplicate    Medications added/doses adjusted:  N/A    Other notes (ex. Recent course of antibiotics, Coumadin dosing):  Doxycycline 100mg BID started 3/16 for 7 days  Denies use of other OTC or herbal medications. Last dose times updated. Liliana Fofana [facility]    No current facility-administered medications on file prior to encounter. Current Outpatient Medications on File Prior to Encounter   Medication Sig Dispense Refill    doxycycline hyclate (VIBRA-TABS) 100 MG tablet Take 1 tablet by mouth 2 times daily for 7 days 14 tablet 0    benzonatate (TESSALON) 200 MG capsule Take 1 capsule by mouth 3 times daily as needed for Cough 30 capsule 0    amLODIPine (NORVASC) 5 MG tablet TAKE 1 TABLET EVERY DAY 90 tablet 1    pantoprazole (PROTONIX) 40 MG tablet TAKE 1 TABLET EVERY DAY 90 tablet 1    HYDROcodone-acetaminophen (NORCO) 5-325 MG per tablet Take 1 tablet by mouth 4 times daily as needed for Pain for up to 30 days. 120 tablet 0    LORazepam (ATIVAN) 0.5 MG tablet TAKE 1 TABLET BY MOUTH TWICE DAILY AND 2 TABLETS AT BEDTIME AS NEEDED FOR ANXIETY 120 tablet 0    dilTIAZem (CARDIZEM CD) 120 MG extended release capsule Take 1 capsule by mouth daily 30 capsule 5    blood glucose test strips (ACCU-CHEK CHIP) strip 1 each by In Vitro route daily As needed.  100 each 3    Blood Glucose Monitoring Suppl (ACCU-CHEK CHIP PLUS) w/Device KIT 1 kit by Does not apply route 4 times daily (before meals and nightly) 1 kit 0    Lancets MISC 1 each by Does not apply route daily 100 each 3    rOPINIRole (REQUIP) 2 MG tablet One tablet 3 times daily 270 tablet 0    meclizine (ANTIVERT) 12.5 MG tablet TAKE 1 TABLET THREE TIMES DAILY AS NEEDED 270 tablet 0    rivaroxaban (XARELTO) 15 MG TABS tablet TAKE 1 TABLET BY MOUTH DAILY 90 tablet 3    atorvastatin (LIPITOR) 80 MG tablet TAKE 1 TABLET BY MOUTH EVERY NIGHT 90 tablet 3    propafenone (RYTHMOL) 150 MG tablet TAKE 1 TABLET BY MOUTH EVERY 8 HOURS 270 tablet 1    irbesartan (AVAPRO) 300 MG tablet TAKE 1 TABLET BY MOUTH EVERY NIGHT 90 tablet 1    SYMBICORT 160-4.5 MCG/ACT AERO INHALE 2 PUFFS INTO THE LUNGS TWICE DAILY 10.2 g 5    dicyclomine (BENTYL) 10 MG capsule Take 1 capsule by mouth 4 times daily as needed (abd pain) 360 capsule 1    gabapentin (NEURONTIN) 100 MG capsule Take 1 capsule by mouth nightly as needed (neuropathy) for up to 90 days. 90 capsule 1    levothyroxine (SYNTHROID) 50 MCG tablet TAKE 1 TABLET EVERY DAY 90 tablet 2    albuterol sulfate HFA (PROAIR HFA) 108 (90 Base) MCG/ACT inhaler Inhale 2 puffs into the lungs every 6 hours as needed for Wheezing 1 Inhaler 6    blood glucose test strips (TRUE METRIX BLOOD GLUCOSE TEST) strip 1 each by In Vitro route daily 100 each 3    blood glucose monitor strips Test one time a day 100 strip 5    Lancets MISC 1 each by Does not apply route 2 times daily 300 each 1    glucose monitoring kit (FREESTYLE) monitoring kit 1 kit by Does not apply route daily 1 kit 0    fluticasone (FLONASE) 50 MCG/ACT nasal spray USE 2 SPRAYS IN EACH NOSTRIL EVERY EVENING 3 Bottle 1    ipratropium-albuterol (DUONEB) 0.5-2.5 (3) MG/3ML SOLN nebulizer solution INHALE THE CONTENTS OF 1 VIAL VIA NEBULIZER EVERY 4 HOURS 1620 mL 3    aspirin 81 MG tablet Take 81 mg by mouth every other day       [DISCONTINUED] LORazepam (ATIVAN) 0.5 MG tablet Take 2 tablets by mouth.       [DISCONTINUED] predniSONE (DELTASONE) 20 MG tablet 1 TID for 4 day then 1 BID 20 tablet 0

## 2020-03-17 NOTE — H&P
HOSPITALISTS HISTORY AND PHYSICAL    3/17/2020 4:01 PM    Patient Information:  Manfred Pickens is a 80 y.o. female [id number]  PCP:  Tyson Whitfield MD (Tel: [phone] )    Chief complaint:    Chief Complaint   Patient presents with    Cough     Pt reports coughing and wheezing since saturday, on abx but unsure why. Pt was sent here by PCP. History of Present Illness:  Jerry Liang is a 80 y.o. female is a known case of COPD on home oxygen therapy 2 L/min she is ex-smoker currently does not smoke she presents to us with cough and shortness of breath the last 7 days is getting worse over the Period Of time this is sharp decline from baseline status saw her PCP initially given azithromycin on Friday later on switched to doxycycline went back to her PCP did not had increasing oxygen requirement but patient markedly symptomatic patient admitted further evaluation     X-ray shows perihilar infiltrate. Was given cefepime and IV vancomycin in  ER along with Solu-Medrol albuterol nebulization symptoms have  marginally improved    Was preceded with flulike symptoms in the form of runny nose cough body pain flu test was negative    Discharge from the hospital on February 20 for atrial fibrillation with rapid ventricular rate    REVIEW OF SYSTEMS:     Point ROS is complete negative unless noted above  Past Medical History:   has a past medical history of Arthritis, Atrial fibrillation ([address] Diabetes mellitus type II, controlled ([address] GERD (gastroesophageal reflux disease), HTN (hypertension), Hyperlipidemia, Hypothyroid, Insomnia, Lumbago, and SVT (supraventricular tachycardia) ([address] Past Surgical History:   has a past surgical history that includes Appendectomy; Colonoscopy; and Cholecystectomy, laparoscopic (2/24/2013).      Medications:  No current facility-administered medications on file prior to encounter. Current Outpatient Medications on File Prior to Encounter   Medication Sig Dispense Refill    doxycycline hyclate (VIBRA-TABS) 100 MG tablet Take 1 tablet by mouth 2 times daily for 7 days 14 tablet 0    benzonatate (TESSALON) 200 MG capsule Take 1 capsule by mouth 3 times daily as needed for Cough 30 capsule 0    amLODIPine (NORVASC) 5 MG tablet TAKE 1 TABLET EVERY DAY 90 tablet 1    pantoprazole (PROTONIX) 40 MG tablet TAKE 1 TABLET EVERY DAY 90 tablet 1    HYDROcodone-acetaminophen (NORCO) 5-325 MG per tablet Take 1 tablet by mouth 4 times daily as needed for Pain for up to 30 days. 120 tablet 0    LORazepam (ATIVAN) 0.5 MG tablet TAKE 1 TABLET BY MOUTH TWICE DAILY AND 2 TABLETS AT BEDTIME AS NEEDED FOR ANXIETY 120 tablet 0    dilTIAZem (CARDIZEM CD) 120 MG extended release capsule Take 1 capsule by mouth daily 30 capsule 5    blood glucose test strips (ACCU-CHEK CHIP) strip 1 each by In Vitro route daily As needed.  100 each 3    Blood Glucose Monitoring Suppl (ACCU-CHEK CHIP PLUS) w/Device KIT 1 kit by Does not apply route 4 times daily (before meals and nightly) 1 kit 0    Lancets MISC 1 each by Does not apply route daily 100 each 3    rOPINIRole (REQUIP) 2 MG tablet One tablet 3 times daily 270 tablet 0    meclizine (ANTIVERT) 12.5 MG tablet TAKE 1 TABLET THREE TIMES DAILY AS NEEDED 270 tablet 0    rivaroxaban (XARELTO) 15 MG TABS tablet TAKE 1 TABLET BY MOUTH DAILY 90 tablet 3    atorvastatin (LIPITOR) 80 MG tablet TAKE 1 TABLET BY MOUTH EVERY NIGHT 90 tablet 3    propafenone (RYTHMOL) 150 MG tablet TAKE 1 TABLET BY MOUTH EVERY 8 HOURS 270 tablet 1    irbesartan (AVAPRO) 300 MG tablet TAKE 1 TABLET BY MOUTH EVERY NIGHT 90 tablet 1    SYMBICORT 160-4.5 MCG/ACT AERO INHALE 2 PUFFS INTO THE LUNGS TWICE DAILY 10.2 g 5    dicyclomine (BENTYL) 10 MG capsule Take 1 capsule by mouth 4 times daily as needed (abd pain) 360 capsule 1    gabapentin (NEURONTIN) 100 MG Family History:  family history includes Asthma in her paternal uncle; Heart Attack in her father; Heart Disease in her father; High Blood Pressure in her mother; Other in her brother. ,     Physical Exam:  BP (!) 211/73   Pulse 73   Temp 97.7 °F (36.5 °C) (Infrared)   Resp 18   Ht 5' 2\" (1.575 m)   Wt 165 lb (74.8 kg)   SpO2 92%   BMI 30.18 kg/m²     General appearance:  Appears comfortable. Well nourished  Eyes: Sclera clear, pupils equal  ENT: Moist mucus membranes, no thrush. Trachea midline. Cardiovascular: Regular rhythm, normal S1, S2. No murmur, gallop, rub. No edema in lower extremities  Respiratory: Bilateral rhonchi  Gastrointestinal: Abdomen soft, non-tender, not distended, normal bowel sounds  Musculoskeletal: No cyanosis in digits, neck supple  Neurology: Cranial nerves grossly intact. Alert and oriented in time, place and person. No speech or motor deficits  Psychiatry: Appropriate affect. Not agitated  Skin: Warm, dry, normal turgor, no rash  Brisk capillary refill, peripheral pulses palpable   Labs:  CBC:   Lab Results   Component Value Date    WBC 6.6 03/17/2020    RBC 4.00 03/17/2020    HGB 11.7 03/17/2020    HCT 35.6 03/17/2020    MCV 89.1 03/17/2020    MCH 29.3 03/17/2020    MCHC 32.9 03/17/2020    RDW 14.8 03/17/2020     03/17/2020    MPV 8.1 03/17/2020     BMP:    Lab Results   Component Value Date     03/17/2020    K 4.6 03/17/2020    K 4.9 02/19/2020    CL 97 03/17/2020    CO2 24 03/17/2020    BUN 19 03/17/2020    CREATININE 1.1 03/17/2020    CALCIUM 9.5 03/17/2020    GFRAA 57 03/17/2020    GFRAA >60 02/27/2013    LABGLOM 47 03/17/2020    GLUCOSE 118 03/17/2020     XR CHEST PORTABLE   Final Result   Increasing perihilar and lower zone airspace opacities from prior exam.   Pattern is nonspecific and may represent developing pulmonary edema or an   underlying viral infection.              EKG: Normal sinus rhythm        Problem List  Active Problems:

## 2020-03-17 NOTE — ED NOTES
PCP patient sent patient into ER today, pt reporting that she has been coughing since Sunday and patient was put on ATBs, patient was not told why she was placed on ATBs by her pcp. IV placed, blood collected and sent to lab, patient on O2 which is her baseline, medicated per MAR. Will continue to monitor.       Anna Lay RN  03/17/20 [id number]

## 2020-03-17 NOTE — ED PROVIDER NOTES
[facility]        Pt Name: Salomon Riddle  MRN: [id number]  [place] 1935  Date of evaluation: 3/17/2020  Provider: Houston Landon PA-C  PCP: Abby Fuentes MD     I have seen and evaluated this patient with my supervising physician Teresa Calhoun, [address] [facility]       Chief Complaint   Patient presents with    Cough     Pt reports coughing and wheezing since saturday, on abx but unsure why. Pt was sent here by PCP. HISTORY OF PRESENT ILLNESS   (Location, Timing/Onset, Context/Setting, Quality, Duration, Modifying Factors, Severity, Associated Signs and Symptoms)  Note limiting factors. Salomon Riddle is a 80 y.o. female with a history of hypertension, hyperlipidemia, atrial fibrillation, COPD, tobacco abuse and diabetes who presents to the emergency department today at the recommendation of her PCP secondary to shortness of breath, coughing and wheezing that has been worsening over the last week. She states this is starting to keep her up at night. She reports her cough is mostly nonproductive. She was started on azithromycin on Friday and switched to doxycycline today. She denies chest pain, palpitations, diaphoresis, lightheadedness or dizziness. She denies having any fevers or chills. She denies any recent travel or unilateral leg swelling. Nursing Notes were all reviewed and agreed with or any disagreements were addressed in the HPI. REVIEW OF SYSTEMS    (2-9 systems for level 4, 10 or more for level 5)     Review of Systems   Constitutional: Negative for chills, diaphoresis, fatigue and fever. Respiratory: Positive for cough, shortness of breath and wheezing. Negative for apnea, chest tightness and stridor. Cardiovascular: Negative for chest pain, palpitations and leg swelling. Gastrointestinal: Negative for abdominal pain, diarrhea, nausea and vomiting.    Genitourinary: Negative for difficulty urinating, dysuria, flank pain, frequency, hematuria and urgency. Neurological: Negative for dizziness, weakness, light-headedness, numbness and headaches. All other systems reviewed and are negative. Positives and Pertinent negatives as per HPI. Except as noted above in the ROS, all other systems were reviewed and negative. PAST MEDICAL HISTORY     Past Medical History:   Diagnosis Date    Arthritis     [facility]     Diabetes mellitus type II, controlled ([facility]     GERD (gastroesophageal reflux disease)     HTN (hypertension)     Hyperlipidemia     Hypothyroid     Insomnia     Lumbago     SVT (supraventricular tachycardia) ([facility])          SURGICAL HISTORY     Past Surgical History:   Procedure Laterality Date    APPENDECTOMY      CHOLECYSTECTOMY, LAPAROSCOPIC  2/24/2013    COLONOSCOPY           CURRENTMEDICATIONS       Previous Medications    ALBUTEROL SULFATE HFA (PROAIR HFA) 108 (90 BASE) MCG/ACT INHALER    Inhale 2 puffs into the lungs every 6 hours as needed for Wheezing    AMLODIPINE (NORVASC) 5 MG TABLET    TAKE 1 TABLET EVERY DAY    ASPIRIN 81 MG TABLET    Take 81 mg by mouth every other day     ATORVASTATIN (LIPITOR) 80 MG TABLET    TAKE 1 TABLET BY MOUTH EVERY NIGHT    BENZONATATE (TESSALON) 200 MG CAPSULE    Take 1 capsule by mouth 3 times daily as needed for Cough    BLOOD GLUCOSE MONITOR STRIPS    Test one time a day    BLOOD GLUCOSE MONITORING SUPPL (ACCU-CHEK CHIP PLUS) W/DEVICE KIT    1 kit by Does not apply route 4 times daily (before meals and nightly)    BLOOD GLUCOSE TEST STRIPS (ACCU-CHEK CHIP) STRIP    1 each by In Vitro route daily As needed.     BLOOD GLUCOSE TEST STRIPS (TRUE METRIX BLOOD GLUCOSE TEST) STRIP    1 each by In Vitro route daily    DICYCLOMINE (BENTYL) 10 MG CAPSULE    Take 1 capsule by mouth 4 times daily as needed (abd pain)    DILTIAZEM (CARDIZEM CD) 120 MG EXTENDED RELEASE CAPSULE    Take 1 capsule by mouth daily    DOXYCYCLINE HYCLATE (VIBRA-TABS) 100 MG TABLET    Take 1 tablet by mouth 2 times daily for 7 days    FLUTICASONE (FLONASE) 50 MCG/ACT NASAL SPRAY    USE 2 SPRAYS IN EACH NOSTRIL EVERY EVENING    GABAPENTIN (NEURONTIN) 100 MG CAPSULE    Take 1 capsule by mouth nightly as needed (neuropathy) for up to 90 days. GLUCOSE MONITORING KIT (FREESTYLE) MONITORING KIT    1 kit by Does not apply route daily    HYDROCODONE-ACETAMINOPHEN (NORCO) 5-325 MG PER TABLET    Take 1 tablet by mouth 4 times daily as needed for Pain for up to 30 days. IPRATROPIUM-ALBUTEROL (DUONEB) 0.5-2.5 (3) MG/3ML SOLN NEBULIZER SOLUTION    INHALE THE CONTENTS OF 1 VIAL VIA NEBULIZER EVERY 4 HOURS    IRBESARTAN (AVAPRO) 300 MG TABLET    TAKE 1 TABLET BY MOUTH EVERY NIGHT    LANCETS MISC    1 each by Does not apply route 2 times daily    LANCETS MISC    1 each by Does not apply route daily    LEVOTHYROXINE (SYNTHROID) 50 MCG TABLET    TAKE 1 TABLET EVERY DAY    LORAZEPAM (ATIVAN) 0.5 MG TABLET    TAKE 1 TABLET BY MOUTH TWICE DAILY AND 2 TABLETS AT BEDTIME AS NEEDED FOR ANXIETY    MECLIZINE (ANTIVERT) 12.5 MG TABLET    TAKE 1 TABLET THREE TIMES DAILY AS NEEDED    PANTOPRAZOLE (PROTONIX) 40 MG TABLET    TAKE 1 TABLET EVERY DAY    PROPAFENONE (RYTHMOL) 150 MG TABLET    TAKE 1 TABLET BY MOUTH EVERY 8 HOURS    RIVAROXABAN (XARELTO) 15 MG TABS TABLET    TAKE 1 TABLET BY MOUTH DAILY    ROPINIROLE (REQUIP) 2 MG TABLET    One tablet 3 times daily    SYMBICORT 160-4.5 MCG/ACT AERO    INHALE 2 PUFFS INTO THE LUNGS TWICE DAILY         ALLERGIES     Adhesive tape; Cefaclor; Nsaids; Clinoril [sulindac]; Codeine; Diclofenac; Diclofenac sodium; Hctz [hydrochlorothiazide]; Ibuprofen; Macrobid [nitrofurantoin macrocrystal];  Motrin [ibuprofen micronized]; Pcn [penicillins]; and Peach [prunus persica]    FAMILYHISTORY       Family History   Problem Relation Age of Onset    High Blood Pressure Mother     Heart Attack Father     Heart Disease Father    [employer] Other Brother     Asthma Paternal Uncle           SOCIAL HISTORY       Social History     Tobacco Use    Smoking status: Former Smoker     Packs/day: 1.00     Years: 45.00     Pack years: 45.00     Start date: 9/15/1950     Last attempt to quit: 1995     Years since quittin.2    Smokeless tobacco: Never Used   Substance Use Topics    Alcohol use: No     Alcohol/week: 0.0 standard drinks    Drug use: No       SCREENINGS             PHYSICAL EXAM    (up to 7 for level 4, 8 or more for level 5)     ED Triage Vitals [20 1131]   BP Temp Temp Source Pulse Resp SpO2 Height Weight   (!) 149/65 97.7 °F (36.5 °C) Infrared 70 22 90 % 5' 2\" (1.575 m) 165 lb (74.8 kg)       Physical Exam  Vitals signs and nursing note reviewed. Constitutional:       Appearance: She is well-developed. She is not diaphoretic. HENT:      Head: Normocephalic and atraumatic. Nose: Nose normal.   Eyes:      General:         Right eye: No discharge. Left eye: No discharge. Neck:      Musculoskeletal: Normal range of motion and neck supple. Cardiovascular:      Rate and Rhythm: Normal rate and regular rhythm. Pulses: Normal pulses. Heart sounds: Normal heart sounds. Pulmonary:      Effort: Tachypnea present. No respiratory distress. Breath sounds: Wheezing and rhonchi present. Abdominal:      General: Abdomen is flat. Palpations: Abdomen is soft. Musculoskeletal: Normal range of motion. Skin:     General: Skin is warm and dry. Coloration: Skin is not pale. Neurological:      Mental Status: She is alert and oriented to person, place, and time.    Psychiatric:         Behavior: Behavior normal.         DIAGNOSTIC RESULTS   LABS:    Labs Reviewed   CBC WITH AUTO DIFFERENTIAL - Abnormal; Notable for the following components:       Result Value    Hemoglobin 11.7 (*)     Hematocrit 35.6 (*)     All other components within normal limits    Narrative:     Performed at:  [facility]) - or not returned as of this dictation. EKG: All EKG's are interpreted by the Emergency Department Physician in the absence of a cardiologist.  Please see their note for interpretation of EKG. Please see attending physician's note    RADIOLOGY:   Non-plain film images such as CT, Ultrasound and MRI are read by the radiologist. Plain radiographic images are visualized and preliminarily interpreted by the ED Provider with the below findings:    Interpretation per the Radiologist below, if available at the time of this note:    XR CHEST PORTABLE   Final Result   Increasing perihilar and lower zone airspace opacities from prior exam.   Pattern is nonspecific and may represent developing pulmonary edema or an   underlying viral infection. PROCEDURES   Unless otherwise noted below, none     Procedures    CRITICAL CARE TIME   N/A    CONSULTS:  IP CONSULT TO HOSPITALIST      EMERGENCY DEPARTMENT COURSE and DIFFERENTIAL DIAGNOSIS/MDM:   Vitals:    Vitals:    03/17/20 1330 03/17/20 1345 03/17/20 1400 03/17/20 1415   BP: (!) 145/60 (!) 146/59 (!) 140/63 (!) 147/51   Pulse: 59 62 64 70   Resp: 17 22 20 20   Temp:       TempSrc:       SpO2: 91% 93% 93% 91%   Weight:       Height:           Patient was given the following medications:  Medications   vancomycin 1000 mg IVPB in 250 mL D5W addavial (has no administration in time range)   cefepime (MAXIPIME) 1 g IVPB minibag (1 g Intravenous New Bag 3/17/20 1412)   ipratropium-albuterol (DUONEB) nebulizer solution 1 ampule (has no administration in time range)   albuterol (PROVENTIL) nebulizer solution 5 mg (5 mg Nebulization Given 3/17/20 1254)   methylPREDNISolone sodium (SOLU-MEDROL) injection 125 mg (125 mg Intravenous Given 3/17/20 1217)       Patient presented today at the recommendation of her PCP secondary to worsening shortness of breath, coughing and wheezing despite conservative treatment including 2 different antibiotics.   She is oxygenating in the low 90s on room air and is placed on 2 L. She is very wheezy with some rhonchi on auscultation. Chest x-ray shows increasing perihilar and lower zone airspace opacities from prior exam.  She is given breathing treatments as well as Solu-Medrol. Blood cultures are pending and she is started on vancomycin and cefepime. She denies having any chest pain. EKG shows no signs of acute ischemia or infarction. Troponin <0.01.  CBC is without leukocytosis or anemia. Rapid influenza is negative. I spoke with the hospitalist. We thoroughly discussed the history, physical exam, laboratory and imaging studies, as well as, emergency department course. Based upon that discussion, we've decided to admit Juajno Guerrier for further observation and evaluation of Abhay Stauffer's dyspnea. As I have deemed necessary from their history, physical, and studies, I have considered and evaluated Juanjo Guerrier for the following diagnoses:  ACUTE CORONARY SYNDROME, CHRONIC OBSTRUCTIVE PULMONARY DISEASE, CONGESTIVE HEART FAILURE, PERICARDIAL TAMPONADE, PNEUMONIA, PNEUMOTHORAX, PULMONARY EMBOLISM, SEPSIS, and THORACIC DISSECTION. FINAL IMPRESSION      1. COPD exacerbation ([facility] [address]    2. Pneumonia due to organism          DISPOSITION/PLAN   DISPOSITION Decision To Admit 03/17/2020 01:19:12 PM      PATIENT REFERREDTO:  No follow-up provider specified. DISCHARGE MEDICATIONS:  New Prescriptions    No medications on file       DISCONTINUED MEDICATIONS:  Discontinued Medications    LORAZEPAM (ATIVAN) 0.5 MG TABLET    Take 2 tablets by mouth.     PREDNISONE (DELTASONE) 20 MG TABLET    1 TID for 4 day then 1 BID              (Please note that portions of this note were completed with a voice recognition program.  Efforts were made to edit the dictations but occasionally words are mis-transcribed.)    Yeni Gamboa PA-C (electronically signed)           Yeni Gamboa PA-C  03/17/20 [id number]

## 2020-03-17 NOTE — ED NOTES
Report given to Juma Qureshi RN pt transported to floor in stable condition, pt wore mask to be transferred to floor.      Deepak Cano RN  03/17/20 [id number]

## 2020-03-18 LAB
A/G RATIO: 1.3 (ref 1.1–2.2)
ALBUMIN SERPL-MCNC: 3.5 G/DL (ref 3.4–5)
ALP BLD-CCNC: 195 U/L (ref 40–129)
ALT SERPL-CCNC: 23 U/L (ref 10–40)
ANION GAP SERPL CALCULATED.3IONS-SCNC: 15 MMOL/L (ref 3–16)
AST SERPL-CCNC: 15 U/L (ref 15–37)
BILIRUB SERPL-MCNC: 0.3 MG/DL (ref 0–1)
BUN BLDV-MCNC: 26 MG/DL (ref 7–20)
CALCIUM SERPL-MCNC: 9.5 MG/DL (ref 8.3–10.6)
CHLORIDE BLD-SCNC: 100 MMOL/L (ref 99–110)
CO2: 19 MMOL/L (ref 21–32)
CREAT SERPL-MCNC: 1.2 MG/DL (ref 0.6–1.2)
GFR AFRICAN AMERICAN: 52
GFR NON-AFRICAN AMERICAN: 43
GLOBULIN: 2.7 G/DL
GLUCOSE BLD-MCNC: 172 MG/DL (ref 70–99)
GLUCOSE BLD-MCNC: 220 MG/DL (ref 70–99)
GLUCOSE BLD-MCNC: 225 MG/DL (ref 70–99)
GLUCOSE BLD-MCNC: 254 MG/DL (ref 70–99)
GLUCOSE BLD-MCNC: 286 MG/DL (ref 70–99)
HCT VFR BLD CALC: 32.2 % (ref 36–48)
HEMOGLOBIN: 10.9 G/DL (ref 12–16)
MCH RBC QN AUTO: 30 PG (ref 26–34)
MCHC RBC AUTO-ENTMCNC: 34 G/DL (ref 31–36)
MCV RBC AUTO: 88.2 FL (ref 80–100)
ORGANISM: ABNORMAL
PDW BLD-RTO: 14.4 % (ref 12.4–15.4)
PERFORMED ON: ABNORMAL
PLATELET # BLD: 138 K/UL (ref 135–450)
PMV BLD AUTO: 8 FL (ref 5–10.5)
POTASSIUM REFLEX MAGNESIUM: 4.6 MMOL/L (ref 3.5–5.1)
RBC # BLD: 3.65 M/UL (ref 4–5.2)
REPORT: NORMAL
RESPIRATORY PANEL PCR: ABNORMAL
SODIUM BLD-SCNC: 134 MMOL/L (ref 136–145)
TOTAL PROTEIN: 6.2 G/DL (ref 6.4–8.2)
WBC # BLD: 9.3 K/UL (ref 4–11)

## 2020-03-18 PROCEDURE — 2580000003 HC RX 258: Performed by: INTERNAL MEDICINE

## 2020-03-18 PROCEDURE — 2500000003 HC RX 250 WO HCPCS: Performed by: INTERNAL MEDICINE

## 2020-03-18 PROCEDURE — 94640 AIRWAY INHALATION TREATMENT: CPT

## 2020-03-18 PROCEDURE — 6360000002 HC RX W HCPCS: Performed by: INTERNAL MEDICINE

## 2020-03-18 PROCEDURE — 36415 COLL VENOUS BLD VENIPUNCTURE: CPT

## 2020-03-18 PROCEDURE — 80053 COMPREHEN METABOLIC PANEL: CPT

## 2020-03-18 PROCEDURE — 85027 COMPLETE CBC AUTOMATED: CPT

## 2020-03-18 PROCEDURE — 99223 1ST HOSP IP/OBS HIGH 75: CPT | Performed by: INTERNAL MEDICINE

## 2020-03-18 PROCEDURE — 6370000000 HC RX 637 (ALT 250 FOR IP): Performed by: INTERNAL MEDICINE

## 2020-03-18 PROCEDURE — 1200000000 HC SEMI PRIVATE

## 2020-03-18 PROCEDURE — 6370000000 HC RX 637 (ALT 250 FOR IP): Performed by: NURSE PRACTITIONER

## 2020-03-18 RX ORDER — INSULIN LISPRO 100 [IU]/ML
0-6 INJECTION, SOLUTION INTRAVENOUS; SUBCUTANEOUS NIGHTLY
Status: DISCONTINUED | OUTPATIENT
Start: 2020-03-18 | End: 2020-03-19 | Stop reason: HOSPADM

## 2020-03-18 RX ORDER — METHYLPREDNISOLONE SODIUM SUCCINATE 40 MG/ML
40 INJECTION, POWDER, LYOPHILIZED, FOR SOLUTION INTRAMUSCULAR; INTRAVENOUS EVERY 12 HOURS
Status: DISCONTINUED | OUTPATIENT
Start: 2020-03-18 | End: 2020-03-19 | Stop reason: HOSPADM

## 2020-03-18 RX ORDER — INSULIN LISPRO 100 [IU]/ML
0-12 INJECTION, SOLUTION INTRAVENOUS; SUBCUTANEOUS
Status: DISCONTINUED | OUTPATIENT
Start: 2020-03-19 | End: 2020-03-19 | Stop reason: HOSPADM

## 2020-03-18 RX ADMIN — ROPINIROLE HYDROCHLORIDE 2 MG: 1 TABLET, FILM COATED ORAL at 14:36

## 2020-03-18 RX ADMIN — Medication 10 ML: at 21:28

## 2020-03-18 RX ADMIN — LEVOTHYROXINE SODIUM 50 MCG: 0.03 TABLET ORAL at 09:07

## 2020-03-18 RX ADMIN — PROPAFENONE HYDROCHLORIDE 150 MG: 150 TABLET, FILM COATED ORAL at 05:27

## 2020-03-18 RX ADMIN — METHYLPREDNISOLONE SODIUM SUCCINATE 40 MG: 40 INJECTION, POWDER, FOR SOLUTION INTRAMUSCULAR; INTRAVENOUS at 05:27

## 2020-03-18 RX ADMIN — FLUTICASONE PROPIONATE 1 SPRAY: 50 SPRAY, METERED NASAL at 09:10

## 2020-03-18 RX ADMIN — METHYLPREDNISOLONE SODIUM SUCCINATE 40 MG: 40 INJECTION, POWDER, FOR SOLUTION INTRAMUSCULAR; INTRAVENOUS at 23:36

## 2020-03-18 RX ADMIN — RIVAROXABAN 15 MG: 15 TABLET, FILM COATED ORAL at 09:07

## 2020-03-18 RX ADMIN — ROPINIROLE HYDROCHLORIDE 2 MG: 1 TABLET, FILM COATED ORAL at 21:26

## 2020-03-18 RX ADMIN — Medication 10 ML: at 05:27

## 2020-03-18 RX ADMIN — INSULIN LISPRO 2 UNITS: 100 INJECTION, SOLUTION INTRAVENOUS; SUBCUTANEOUS at 09:12

## 2020-03-18 RX ADMIN — INSULIN LISPRO 3 UNITS: 100 INJECTION, SOLUTION INTRAVENOUS; SUBCUTANEOUS at 21:28

## 2020-03-18 RX ADMIN — OSELTAMIVIR PHOSPHATE 75 MG: 75 CAPSULE ORAL at 09:07

## 2020-03-18 RX ADMIN — LORAZEPAM 0.5 MG: 0.5 TABLET ORAL at 18:57

## 2020-03-18 RX ADMIN — DOXYCYCLINE 100 MG: 100 INJECTION, POWDER, LYOPHILIZED, FOR SOLUTION INTRAVENOUS at 17:14

## 2020-03-18 RX ADMIN — INSULIN LISPRO 3 UNITS: 100 INJECTION, SOLUTION INTRAVENOUS; SUBCUTANEOUS at 12:13

## 2020-03-18 RX ADMIN — ROPINIROLE HYDROCHLORIDE 2 MG: 1 TABLET, FILM COATED ORAL at 09:07

## 2020-03-18 RX ADMIN — PROPAFENONE HYDROCHLORIDE 150 MG: 150 TABLET, FILM COATED ORAL at 14:36

## 2020-03-18 RX ADMIN — Medication 1 PUFF: at 20:53

## 2020-03-18 RX ADMIN — AMLODIPINE BESYLATE 5 MG: 5 TABLET ORAL at 09:07

## 2020-03-18 RX ADMIN — Medication 1 G: at 17:14

## 2020-03-18 RX ADMIN — HYDROCODONE BITARTRATE AND ACETAMINOPHEN 1 TABLET: 5; 325 TABLET ORAL at 21:25

## 2020-03-18 RX ADMIN — DOXYCYCLINE 100 MG: 100 INJECTION, POWDER, LYOPHILIZED, FOR SOLUTION INTRAVENOUS at 05:49

## 2020-03-18 RX ADMIN — BENZONATATE 200 MG: 100 CAPSULE ORAL at 23:36

## 2020-03-18 RX ADMIN — PANTOPRAZOLE SODIUM 40 MG: 40 TABLET, DELAYED RELEASE ORAL at 09:07

## 2020-03-18 RX ADMIN — PROPAFENONE HYDROCHLORIDE 150 MG: 150 TABLET, FILM COATED ORAL at 21:26

## 2020-03-18 RX ADMIN — ATORVASTATIN CALCIUM 80 MG: 80 TABLET, FILM COATED ORAL at 09:07

## 2020-03-18 RX ADMIN — LOSARTAN POTASSIUM 100 MG: 100 TABLET, FILM COATED ORAL at 09:07

## 2020-03-18 RX ADMIN — BENZONATATE 200 MG: 100 CAPSULE ORAL at 14:36

## 2020-03-18 RX ADMIN — INSULIN LISPRO 1 UNITS: 100 INJECTION, SOLUTION INTRAVENOUS; SUBCUTANEOUS at 17:20

## 2020-03-18 RX ADMIN — Medication 10 ML: at 09:11

## 2020-03-18 RX ADMIN — LORAZEPAM 0.5 MG: 0.5 TABLET ORAL at 09:07

## 2020-03-18 RX ADMIN — DILTIAZEM HYDROCHLORIDE 120 MG: 120 CAPSULE, COATED, EXTENDED RELEASE ORAL at 09:06

## 2020-03-18 ASSESSMENT — PAIN SCALES - GENERAL
PAINLEVEL_OUTOF10: 8
PAINLEVEL_OUTOF10: 3
PAINLEVEL_OUTOF10: 0

## 2020-03-18 ASSESSMENT — PAIN DESCRIPTION - LOCATION: LOCATION: BACK

## 2020-03-18 NOTE — PLAN OF CARE
Problem: Falls - Risk of:  Goal: Will remain free from falls  Description: Will remain free from falls  3/18/2020 1413 by Earline Fermin RN  Outcome: Ongoing  Note: Pt remains free from falls. Safety precautions in place. Bed in lowest position, bed wheels locked, call light with in reach, bed alarm on, yellow blanket in place, fall risk wrist band on, SAFE outside of doorway. Will continue to monitor. 3/18/2020 0708 by Saskia Lao RN  Outcome: Ongoing  Note: Patient free from harm. ID bands on, bed in lowest position, call light in reach. Patient instructed to call for help if needed. Patient educated on ambulation and safety.     Goal: Absence of physical injury  Description: Absence of physical injury  3/18/2020 1413 by Earline Fermin RN  Outcome: Ongoing  3/18/2020 0708 by Saskia Lao RN  Outcome: Ongoing

## 2020-03-18 NOTE — CONSULTS
[facility] Pulmonary and Critical Care   Consult Note      Reason for Consult: COPD exacerbation, pneumonia, respiratory failure  Requesting Physician: Dr Lenka Enriquez    Subjective:   [facility] / HPI:                The patient is a 80 y.o. female with significant past medical history of:      Diagnosis Date    Arthritis     Atrial fibrillation ([facility]     Diabetes mellitus type II, controlled ([facility]     GERD (gastroesophageal reflux disease)     HTN (hypertension)     Hyperlipidemia     Hypothyroid     Insomnia     Lumbago     SVT (supraventricular tachycardia) ([facility]      The patient presented to the emergency department with a chief complaint of increasingly severe shortness of breath over the preceding 7 days. She had moderate associated cough as well. As a modifying factor the patient is known to have COPD. Based on pulmonary function testing in the past this has been graded as mild. However, she has had prior admissions with exacerbation. She denied fever, chills. She is coughing up a little bit of yellow mucus. She denied contact with other sick people. She is also known to have bronchiectasis. CT imaging has revealed mild interstitial lung disease in the past as well.   She does have an oxygen requirement at home      Past Surgical History:        Procedure Laterality Date    APPENDECTOMY      CHOLECYSTECTOMY, LAPAROSCOPIC  2/24/2013    COLONOSCOPY       Current Medications:    Current Facility-Administered Medications: albuterol (PROVENTIL) nebulizer solution 2.5 mg, 2.5 mg, Nebulization, Q6H PRN  amLODIPine (NORVASC) tablet 5 mg, 5 mg, Oral, Daily  aspirin chewable tablet 81 mg, 81 mg, Oral, Every Other Day  atorvastatin (LIPITOR) tablet 80 mg, 80 mg, Oral, Daily  benzonatate (TESSALON) capsule 200 mg, 200 mg, Oral, TID PRN  dicyclomine (BENTYL) capsule 10 mg, 10 mg, Oral, 4x Daily PRN  dilTIAZem (CARDIZEM CD) extended release capsule 120 mg, 120 mg, Oral, Daily  fluticasone (FLONASE) 50 MCG/ACT nasal spray 1 spray, 1 spray, Each Nostril, Daily  gabapentin (NEURONTIN) capsule 100 mg, 100 mg, Oral, Nightly PRN  HYDROcodone-acetaminophen (NORCO) 5-325 MG per tablet 1 tablet, 1 tablet, Oral, 4x Daily PRN  losartan (COZAAR) tablet 100 mg, 100 mg, Oral, Daily  levothyroxine (SYNTHROID) tablet 50 mcg, 50 mcg, Oral, Daily  LORazepam (ATIVAN) tablet 0.5 mg, 0.5 mg, Oral, Q6H PRN  meclizine (ANTIVERT) tablet 12.5 mg, 12.5 mg, Oral, TID PRN  pantoprazole (PROTONIX) tablet 40 mg, 40 mg, Oral, Daily  propafenone (RYTHMOL) tablet 150 mg, 150 mg, Oral, 3 times per day  rivaroxaban (XARELTO) tablet 15 mg, 15 mg, Oral, Daily  rOPINIRole (REQUIP) tablet 2 mg, 2 mg, Oral, TID  budesonide-formoterol (SYMBICORT) 160-4.5 MCG/ACT inhaler 1 puff, 1 puff, Inhalation, BID  methylPREDNISolone sodium (SOLU-MEDROL) injection 40 mg, 40 mg, Intravenous, Q6H  cefTRIAXone (ROCEPHIN) 1 g in sterile water 10 mL IV syringe, 1 g, Intravenous, Q24H  doxycycline (VIBRAMYCIN) 100 mg in dextrose 5 % 100 mL IVPB, 100 mg, Intravenous, Q12H  oseltamivir (TAMIFLU) capsule 75 mg, 75 mg, Oral, BID  guaiFENesin-dextromethorphan (ROBITUSSIN DM) 100-10 MG/5ML syrup 5 mL, 5 mL, Oral, Q4H PRN  sodium chloride flush 0.9 % injection 10 mL, 10 mL, Intravenous, 2 times per day  sodium chloride flush 0.9 % injection 10 mL, 10 mL, Intravenous, PRN  acetaminophen (TYLENOL) tablet 650 mg, 650 mg, Oral, Q6H PRN **OR** acetaminophen (TYLENOL) suppository 650 mg, 650 mg, Rectal, Q6H PRN  polyethylene glycol (GLYCOLAX) packet 17 g, 17 g, Oral, Daily PRN  promethazine (PHENERGAN) tablet 12.5 mg, 12.5 mg, Oral, Q6H PRN **OR** ondansetron (ZOFRAN) injection 4 mg, 4 mg, Intravenous, Q6H PRN  insulin lispro (1 Unit Dial) 0-6 Units, 0-6 Units, Subcutaneous, TID WC  insulin lispro (1 Unit Dial) 0-3 Units, 0-3 Units, Subcutaneous, Nightly  amLODIPine (NORVASC) tablet 10 mg, 10 mg, Oral, Once  labetalol (NORMODYNE;TRANDATE) injection 10 mg, 10 mg, Intravenous, Q6H PRN  glucose (GLUTOSE) 40 % oral gel 15 g, 15 g, Oral, PRN  dextrose 50 % IV solution, 12.5 g, Intravenous, PRN  glucagon (rDNA) injection 1 mg, 1 mg, Intramuscular, PRN  dextrose 5 % solution, 100 mL/hr, Intravenous, PRN    Allergies   Allergen Reactions    Adhesive Tape Anaphylaxis    Cefaclor Nausea And Vomiting     Other reaction(s): Chest pain    Nsaids      Pt states she has hives and heart races   Other reaction(s): Tachycardia    Clinoril [Sulindac] Other (See Comments)     Stomach pain    Codeine      FEEL NUMB    Diclofenac     Diclofenac Sodium Hives    Hctz [Hydrochlorothiazide]      Sob    Ibuprofen      Other reaction(s): Tachycardia    Macrobid [Nitrofurantoin Macrocrystal]      nausea    Motrin [Ibuprofen Micronized] Other (See Comments)     Tachycardia      Pcn [Penicillins] Hives    Peach [Prunus Persica] Itching and Swelling     Cannot eat raw peaches       Social History:    TOBACCO:   reports that she quit smoking about 24 years ago. She started smoking about 69 years ago. She has a 45.00 pack-year smoking history. She has never used smokeless tobacco.  ETOH:   reports no history of alcohol use. Patient currently lives independently  Environmental/chemical exposure: None known    Family History:       Problem Relation Age of Onset    High Blood Pressure Mother     Heart Attack Father     Heart Disease Father     Other Brother     Asthma Paternal Uncle      REVIEW OF SYSTEMS:    CONSTITUTIONAL:  negative for fevers, chills, diaphoresis, activity change, appetite change, fatigue, night sweats and unexpected weight change.    EYES:  negative for blurred vision, eye discharge, visual disturbance and icterus  HEENT:  negative for hearing loss, tinnitus, ear drainage, sinus pressure, nasal congestion, epistaxis and snoring  RESPIRATORY:  See HPI  CARDIOVASCULAR:  negative for chest pain, palpitations, exertional chest pressure/discomfort, edema, syncope  GASTROINTESTINAL:  negative for nausea, vomiting, diarrhea, constipation, blood in stool and abdominal pain  GENITOURINARY:  negative for frequency, dysuria, urinary incontinence, decreased urine volume, and hematuria  HEMATOLOGIC/LYMPHATIC:  negative for easy bruising, bleeding and lymphadenopathy  ALLERGIC/IMMUNOLOGIC:  negative for recurrent infections, angioedema, anaphylaxis and drug reactions  ENDOCRINE:  negative for weight changes and diabetic symptoms including polyuria, polydipsia and polyphagia  MUSCULOSKELETAL:  negative for  pain, joint swelling, decreased range of motion and muscle weakness  NEUROLOGICAL:  negative for headaches, slurred speech, unilateral weakness  PSYCHIATRIC/BEHAVIORAL: negative for hallucinations, behavioral problems, confusion and agitation. Objective:   PHYSICAL EXAM:      VITALS:  /67   Pulse 78   Temp 96.6 °F (35.9 °C) (Temporal)   Resp 18   Ht 5' 2\" (1.575 m)   Wt 165 lb (74.8 kg)   SpO2 94%   BMI 30.18 kg/m²      24HR INTAKE/OUTPUT:      Intake/Output Summary (Last 24 hours) at 3/18/2020 1138  Last data filed at 3/18/2020 8152  Gross per 24 hour   Intake 400 ml   Output --   Net 400 ml     CONSTITUTIONAL:  awake, alert, cooperative, no apparent distress, and appears stated age  NECK:  Supple, symmetrical, trachea midline, no adenopathy, thyroid symmetric, not enlarged and no tenderness, skin normal  LUNGS:  no increased work of breathing and crackles at the left lung base to auscultation. No accessory muscle use  CARDIOVASCULAR: S1 and S2, no edema and no JVD  ABDOMEN:  normal bowel sounds, non-distended and no masses palpated, and no tenderness to palpation. No hepatospleenomegaly  LYMPHADENOPATHY:  no axillary or supraclavicular adenopathy. No cervical adnenopathy  PSYCHIATRIC: Oriented to person place and time. No obvious depression or anxiety. MUSCULOSKELETAL: No obvious misalignment or effusion of the joints. No clubbing, cyanosis of the digits.   SKIN:  normal skin color, texture, turgor and no redness, warmth, or swelling. No palpable nodules    DATA:    Old records have been reviewed    CBC:  Recent Labs     03/17/20  1218 03/18/20  0509   WBC 6.6 9.3   RBC 4.00 3.65*   HGB 11.7* 10.9*   HCT 35.6* 32.2*    138   MCV 89.1 88.2   MCH 29.3 30.0   MCHC 32.9 34.0   RDW 14.8 14.4      BMP:  Recent Labs     03/17/20  1218 03/18/20  0509   * 134*   K 4.6 4.6   CL 97* 100   CO2 24 19*   BUN 19 26*   CREATININE 1.1 1.2   CALCIUM 9.5 9.5   GLUCOSE 118* 225*      ABG:  No results for input(s): PHART, TEC2WKE, PO2ART, UCL6LBK, N2OXBCVV, BEART in the last 72 hours. No results found for: BNP  Lab Results   Component Value Date    TROPONINI <0.01 03/17/2020       Cultures:     Abx:    Radiology Review:  All pertinent images / reports were reviewed as a part of this visit. Assessment:     1. Acute hypoxemic respiratory failure  · Presented with hypoxemia  · Has required 2 L/min oxygen supplement to maintain saturations in the low to mid 90s  · Does have supplemental oxygen at home which she mostly uses with sleep. 2.  Community-acquired pneumonia  · I have reviewed laboratories, medical records and images for this visit  · Febrile she is afebrile and does not have leukocytosis  · Procalcitonin is normal  · CT imaging revealing some evidence of mucous plugging but no significant lobar consolidation or groundglass opacities are noted  · She is on doxycycline and ceftriaxone  · Can likely de-escalate to doxycycline at the time of discharge  · Did test positive for human rhinovirus  · Can stop Tamiflu    3.   COPD exacerbation  · Looks and feels a bit better  · Not wheezing  · Taper steroids  · Continue Symbicort  · If she continues to improve, she may be able to go home in the morning

## 2020-03-18 NOTE — PLAN OF CARE
Problem: Falls - Risk of:  Goal: Will remain free from falls  Description: Will remain free from falls  Outcome: Ongoing  Note: Patient free from harm. ID bands on, bed in lowest position, call light in reach. Patient instructed to call for help if needed. Patient educated on ambulation and safety. Goal: Absence of physical injury  Description: Absence of physical injury  Outcome: Ongoing     Problem: Pain:  Goal: Pain level will decrease  Description: Pain level will decrease  Outcome: Ongoing  Note: . Pain assessed. Patient received relief with medication. Patient offered other alternatives such as distraction. Pain reassessed post medication.     Goal: Control of acute pain  Description: Control of acute pain  Outcome: Ongoing  Goal: Control of chronic pain  Description: Control of chronic pain  Outcome: Ongoing

## 2020-03-18 NOTE — PROGRESS NOTES
[facility] PROGRESS NOTE    3/18/2020 1:56 PM        Name: Gabriela Odom . Admitted: 3/17/2020  Primary Care Provider: Katarzyna Park MD (Tel: [phone])      Subjective:  Patient is an 81 yo female with hx atrial fibrillation, DM2, COPD, GERD, HTN, HLD. She presented to hospital with increasing shortness of breath. CT demonstrated bilateral bronchiectasis with mucous plugging. Respiratory panel + human rhinovirus/enterovirus. Presently resting in bed. States she is feeling better and hopes to be able to go home tomorrow. Main complaint remains cough productive \"little yellow mucous. \" Afebrile, denies chills. Says breathing is at baseline.      Reviewed interval ancillary notes    Current Medications  methylPREDNISolone sodium (SOLU-MEDROL) injection 40 mg, Q12H  albuterol (PROVENTIL) nebulizer solution 2.5 mg, Q6H PRN  amLODIPine (NORVASC) tablet 5 mg, Daily  aspirin chewable tablet 81 mg, Every Other Day  atorvastatin (LIPITOR) tablet 80 mg, Daily  benzonatate (TESSALON) capsule 200 mg, TID PRN  dicyclomine (BENTYL) capsule 10 mg, 4x Daily PRN  dilTIAZem (CARDIZEM CD) extended release capsule 120 mg, Daily  fluticasone (FLONASE) 50 MCG/ACT nasal spray 1 spray, Daily  gabapentin (NEURONTIN) capsule 100 mg, Nightly PRN  HYDROcodone-acetaminophen (NORCO) 5-325 MG per tablet 1 tablet, 4x Daily PRN  losartan (COZAAR) tablet 100 mg, Daily  levothyroxine (SYNTHROID) tablet 50 mcg, Daily  LORazepam (ATIVAN) tablet 0.5 mg, Q6H PRN  meclizine (ANTIVERT) tablet 12.5 mg, TID PRN  pantoprazole (PROTONIX) tablet 40 mg, Daily  propafenone (RYTHMOL) tablet 150 mg, 3 times per day  rivaroxaban (XARELTO) tablet 15 mg, Daily  rOPINIRole (REQUIP) tablet 2 mg, TID  budesonide-formoterol (SYMBICORT) 160-4.5 MCG/ACT inhaler 1 puff, BID  cefTRIAXone (ROCEPHIN) 1 g in sterile water 10 mL IV syringe, Q24H  doxycycline (VIBRAMYCIN) 100 mg in dextrose 5 % 100 mL IVPB, Q12H  guaiFENesin-dextromethorphan (ROBITUSSIN DM) 100-10 MG/5ML syrup 5 mL, Q4H PRN  sodium chloride flush 0.9 % injection 10 mL, 2 times per day  sodium chloride flush 0.9 % injection 10 mL, PRN  acetaminophen (TYLENOL) tablet 650 mg, Q6H PRN    Or  acetaminophen (TYLENOL) suppository 650 mg, Q6H PRN  polyethylene glycol (GLYCOLAX) packet 17 g, Daily PRN  promethazine (PHENERGAN) tablet 12.5 mg, Q6H PRN    Or  ondansetron (ZOFRAN) injection 4 mg, Q6H PRN  insulin lispro (1 Unit Dial) 0-6 Units, TID WC  insulin lispro (1 Unit Dial) 0-3 Units, Nightly  amLODIPine (NORVASC) tablet 10 mg, Once  labetalol (NORMODYNE;TRANDATE) injection 10 mg, Q6H PRN  glucose (GLUTOSE) 40 % oral gel 15 g, PRN  dextrose 50 % IV solution, PRN  glucagon (rDNA) injection 1 mg, PRN  dextrose 5 % solution, PRN        Objective:  /71   Pulse 73   Temp 96.5 °F (35.8 °C) (Temporal)   Resp 18   Ht 5' 2\" (1.575 m)   Wt 165 lb (74.8 kg)   SpO2 93%   BMI 30.18 kg/m²     Intake/Output Summary (Last 24 hours) at 3/18/2020 1356  Last data filed at 3/18/2020 0903  Gross per 24 hour   Intake 400 ml   Output --   Net 400 ml      Wt Readings from Last 3 Encounters:   03/17/20 165 lb (74.8 kg)   02/20/20 169 lb 3.2 oz (76.7 kg)   02/15/20 165 lb (74.8 kg)       General appearance:  Appears comfortable  Eyes: Sclera clear. Pupils equal.  ENT: Moist oral mucosa. Trachea midline, no adenopathy. Cardiovascular: Regular rhythm, normal S1, S2. No murmur. No edema in lower extremities  Respiratory: Not using accessory muscles. Good inspiratory effort. Crackles in bases, no wheezes   GI: Abdomen soft, no tenderness, not distended, normal bowel sounds  Musculoskeletal: No cyanosis in digits, neck supple  Neurology: CN 2-12 grossly intact. No speech or motor deficits  Psych: Normal affect.  Alert and oriented in time, place and person  Skin: Warm, dry, normal turgor    Labs and Tests:  CBC:   Recent Labs Previously considered mild. On home O2, sats stable at 2 liters for mid 90s. On ceftriaxone, doxycycline, and IV steroid. On prn albuterol. No wheezes. 4. Atrial fibrillation. Remains in sinus rhythm on propafenone and Xarelto. On diltiazem for rate control. 5. Hypertension. Controlled. Continue amlodipine and losartan. 6. DM2. BG elevated secondary to steroids. Increase insulin to medium dose corrective algorithm.       Diet: DIET CARB CONTROL;  Code:Full Code  DVT PPX: on Syl [address], APRN - CNP   3/18/2020 1:56 PM

## 2020-03-18 NOTE — ED PROVIDER NOTES
I independently performed a history and physical on Rocio Horowitz. All diagnostic, treatment, and disposition decisions were made by myself in conjunction with the advanced practice provider. Briefly, this is a 80 y.o. female here for shortness of breath. Longstanding history of COPD, wears 2L oxygen at baseline. Has had worsening symptoms over the past week, has felt worsening despite breathing treatments and doxycycline outpatient. .    On exam, Patient afebrile and nontoxic. No distress. Heart RRR. Lungs diminished at bases with expiratory wheezes and largely prolonged expiratory phase. Abdomen soft, nondistended, nontender to palpation in all quadrants. EKG  EKG was reviewed by emergency department physician in the absence of a cardiologist    Narrow complex sinus rhythm, rate 68, normal axis, normal TX and QRS intervals, normal Qtc, no ST elevations or depressions, normal t-wave morphology, impression NSR, no STEMI      Screenings   Desire Coma Scale  Eye Opening: Spontaneous  Best Verbal Response: Oriented  Best Motor Response: Obeys commands  Desire Coma Scale Score: 15        MDM    Patient with worsening COPD exacerbation despite outpatient treatment. No distress. CXR appears with worsening infiltrate, likely viral however patient with significant risk for bacterial infection including recent hospitalization and will cover with antibiotics for HCAP. Cardiac workup thus far unremarkable, no chest pain. She is not septic. Will admit to internal medicine service for further evaluation and care. Patient Referrals:  MD Yana Winter  [phone]            Discharge Medications:  Current Discharge Medication List          FINAL IMPRESSION  1. COPD exacerbation ([facility]    2. Pneumonia due to organism    3. Failure of outpatient treatment        Blood pressure 137/64, pulse 63, temperature 97 °F (36.1 °C), temperature source Temporal, resp.  rate 18, height 5' 2\" (1.575 m), weight 165 lb (74.8 kg), SpO2 94 %, not currently breastfeeding. For further details of [facility] emergency department encounter, please see documentation by advanced practice provider, Angela President, PA.     Tanna Fernandes DO (electronically signed)  Attending Emergency Physician       Tanna Fernandes DO  03/17/20 5896

## 2020-03-19 VITALS
RESPIRATION RATE: 16 BRPM | HEIGHT: 62 IN | HEART RATE: 68 BPM | SYSTOLIC BLOOD PRESSURE: 105 MMHG | TEMPERATURE: 98 F | DIASTOLIC BLOOD PRESSURE: 57 MMHG | WEIGHT: 169.5 LBS | OXYGEN SATURATION: 93 % | BODY MASS INDEX: 31.19 KG/M2

## 2020-03-19 LAB
ANION GAP SERPL CALCULATED.3IONS-SCNC: 14 MMOL/L (ref 3–16)
BUN BLDV-MCNC: 38 MG/DL (ref 7–20)
CALCIUM SERPL-MCNC: 9.2 MG/DL (ref 8.3–10.6)
CHLORIDE BLD-SCNC: 100 MMOL/L (ref 99–110)
CO2: 21 MMOL/L (ref 21–32)
CREAT SERPL-MCNC: 1.3 MG/DL (ref 0.6–1.2)
GFR AFRICAN AMERICAN: 47
GFR NON-AFRICAN AMERICAN: 39
GLUCOSE BLD-MCNC: 184 MG/DL (ref 70–99)
GLUCOSE BLD-MCNC: 202 MG/DL (ref 70–99)
GLUCOSE BLD-MCNC: 232 MG/DL (ref 70–99)
HCT VFR BLD CALC: 31.9 % (ref 36–48)
HEMOGLOBIN: 10.7 G/DL (ref 12–16)
MCH RBC QN AUTO: 29.2 PG (ref 26–34)
MCHC RBC AUTO-ENTMCNC: 33.5 G/DL (ref 31–36)
MCV RBC AUTO: 87.4 FL (ref 80–100)
PDW BLD-RTO: 14.3 % (ref 12.4–15.4)
PERFORMED ON: ABNORMAL
PERFORMED ON: ABNORMAL
PLATELET # BLD: 153 K/UL (ref 135–450)
PMV BLD AUTO: 8.1 FL (ref 5–10.5)
POTASSIUM SERPL-SCNC: 4.7 MMOL/L (ref 3.5–5.1)
RBC # BLD: 3.65 M/UL (ref 4–5.2)
SODIUM BLD-SCNC: 135 MMOL/L (ref 136–145)
WBC # BLD: 14.9 K/UL (ref 4–11)

## 2020-03-19 PROCEDURE — 2500000003 HC RX 250 WO HCPCS: Performed by: INTERNAL MEDICINE

## 2020-03-19 PROCEDURE — 6360000002 HC RX W HCPCS: Performed by: INTERNAL MEDICINE

## 2020-03-19 PROCEDURE — 85027 COMPLETE CBC AUTOMATED: CPT

## 2020-03-19 PROCEDURE — 6370000000 HC RX 637 (ALT 250 FOR IP): Performed by: INTERNAL MEDICINE

## 2020-03-19 PROCEDURE — 99232 SBSQ HOSP IP/OBS MODERATE 35: CPT | Performed by: INTERNAL MEDICINE

## 2020-03-19 PROCEDURE — 36415 COLL VENOUS BLD VENIPUNCTURE: CPT

## 2020-03-19 PROCEDURE — 2580000003 HC RX 258: Performed by: INTERNAL MEDICINE

## 2020-03-19 PROCEDURE — 6370000000 HC RX 637 (ALT 250 FOR IP): Performed by: NURSE PRACTITIONER

## 2020-03-19 PROCEDURE — 80048 BASIC METABOLIC PNL TOTAL CA: CPT

## 2020-03-19 RX ORDER — PREDNISONE 10 MG/1
TABLET ORAL
Qty: 30 TABLET | Refills: 0 | Status: SHIPPED | OUTPATIENT
Start: 2020-03-19 | End: 2020-03-25 | Stop reason: ALTCHOICE

## 2020-03-19 RX ADMIN — GUAIFENESIN AND DEXTROMETHORPHAN 5 ML: 100; 10 SYRUP ORAL at 09:01

## 2020-03-19 RX ADMIN — ASPIRIN 81 MG 81 MG: 81 TABLET ORAL at 09:01

## 2020-03-19 RX ADMIN — GUAIFENESIN AND DEXTROMETHORPHAN 5 ML: 100; 10 SYRUP ORAL at 01:34

## 2020-03-19 RX ADMIN — RIVAROXABAN 15 MG: 15 TABLET, FILM COATED ORAL at 09:01

## 2020-03-19 RX ADMIN — ROPINIROLE HYDROCHLORIDE 2 MG: 1 TABLET, FILM COATED ORAL at 09:01

## 2020-03-19 RX ADMIN — ROPINIROLE HYDROCHLORIDE 2 MG: 1 TABLET, FILM COATED ORAL at 13:13

## 2020-03-19 RX ADMIN — BENZONATATE 200 MG: 100 CAPSULE ORAL at 14:20

## 2020-03-19 RX ADMIN — Medication 10 ML: at 09:13

## 2020-03-19 RX ADMIN — PROPAFENONE HYDROCHLORIDE 150 MG: 150 TABLET, FILM COATED ORAL at 05:56

## 2020-03-19 RX ADMIN — ATORVASTATIN CALCIUM 80 MG: 80 TABLET, FILM COATED ORAL at 09:02

## 2020-03-19 RX ADMIN — LEVOTHYROXINE SODIUM 50 MCG: 0.03 TABLET ORAL at 09:01

## 2020-03-19 RX ADMIN — PANTOPRAZOLE SODIUM 40 MG: 40 TABLET, DELAYED RELEASE ORAL at 09:01

## 2020-03-19 RX ADMIN — INSULIN LISPRO 4 UNITS: 100 INJECTION, SOLUTION INTRAVENOUS; SUBCUTANEOUS at 09:03

## 2020-03-19 RX ADMIN — PROPAFENONE HYDROCHLORIDE 150 MG: 150 TABLET, FILM COATED ORAL at 13:13

## 2020-03-19 RX ADMIN — AMLODIPINE BESYLATE 5 MG: 5 TABLET ORAL at 09:02

## 2020-03-19 RX ADMIN — Medication 1 PUFF: at 08:08

## 2020-03-19 RX ADMIN — INSULIN LISPRO 2 UNITS: 100 INJECTION, SOLUTION INTRAVENOUS; SUBCUTANEOUS at 13:15

## 2020-03-19 RX ADMIN — LOSARTAN POTASSIUM 100 MG: 100 TABLET, FILM COATED ORAL at 09:02

## 2020-03-19 RX ADMIN — METHYLPREDNISOLONE SODIUM SUCCINATE 40 MG: 40 INJECTION, POWDER, FOR SOLUTION INTRAMUSCULAR; INTRAVENOUS at 13:14

## 2020-03-19 RX ADMIN — FLUTICASONE PROPIONATE 1 SPRAY: 50 SPRAY, METERED NASAL at 09:03

## 2020-03-19 RX ADMIN — DILTIAZEM HYDROCHLORIDE 120 MG: 120 CAPSULE, COATED, EXTENDED RELEASE ORAL at 09:02

## 2020-03-19 RX ADMIN — DOXYCYCLINE 100 MG: 100 INJECTION, POWDER, LYOPHILIZED, FOR SOLUTION INTRAVENOUS at 05:56

## 2020-03-19 RX ADMIN — HYDROCODONE BITARTRATE AND ACETAMINOPHEN 1 TABLET: 5; 325 TABLET ORAL at 09:02

## 2020-03-19 RX ADMIN — LORAZEPAM 0.5 MG: 0.5 TABLET ORAL at 09:14

## 2020-03-19 ASSESSMENT — PAIN SCALES - GENERAL
PAINLEVEL_OUTOF10: 4
PAINLEVEL_OUTOF10: 0
PAINLEVEL_OUTOF10: 3
PAINLEVEL_OUTOF10: 0
PAINLEVEL_OUTOF10: 7

## 2020-03-19 ASSESSMENT — PAIN DESCRIPTION - PROGRESSION: CLINICAL_PROGRESSION: NOT CHANGED

## 2020-03-19 ASSESSMENT — PAIN DESCRIPTION - FREQUENCY: FREQUENCY: CONTINUOUS

## 2020-03-19 ASSESSMENT — PAIN DESCRIPTION - LOCATION: LOCATION: BACK;KNEE

## 2020-03-19 ASSESSMENT — PAIN DESCRIPTION - ORIENTATION: ORIENTATION: RIGHT;LEFT

## 2020-03-19 ASSESSMENT — PAIN DESCRIPTION - PAIN TYPE: TYPE: CHRONIC PAIN

## 2020-03-19 ASSESSMENT — PAIN DESCRIPTION - ONSET: ONSET: ON-GOING

## 2020-03-19 ASSESSMENT — PAIN - FUNCTIONAL ASSESSMENT: PAIN_FUNCTIONAL_ASSESSMENT: PREVENTS OR INTERFERES SOME ACTIVE ACTIVITIES AND ADLS

## 2020-03-19 ASSESSMENT — PAIN DESCRIPTION - DESCRIPTORS: DESCRIPTORS: ACHING

## 2020-03-19 NOTE — CONSULTS
P Pulmonary and Critical Care   Consult Note      Reason for Consult: COPD exacerbation, pneumonia, respiratory failure  Requesting Physician: Dr Nelson Friends    Subjective:   [facility]:                The patient is a 80 y.o. female with significant past medical history of:      Diagnosis Date    Arthritis     Atrial fibrillation ([facility]     [facility]     GERD (gastroesophageal reflux disease)     HTN (hypertension)     Hyperlipidemia     Hypothyroid     Insomnia     Lumbago     SVT (supraventricular tachycardia) (HCC)      Interval history: She feels better. Does still have some cough. She is blaming this mostly on sinus drainage.       Past Surgical History:        Procedure Laterality Date    APPENDECTOMY      CHOLECYSTECTOMY, LAPAROSCOPIC  2/24/2013    COLONOSCOPY       Current Medications:    Current Facility-Administered Medications: methylPREDNISolone sodium (SOLU-MEDROL) injection 40 mg, 40 mg, Intravenous, Q12H  insulin lispro (1 Unit Dial) 0-12 Units, 0-12 Units, Subcutaneous, TID WC  insulin lispro (1 Unit Dial) 0-6 Units, 0-6 Units, Subcutaneous, Nightly  albuterol (PROVENTIL) nebulizer solution 2.5 mg, 2.5 mg, Nebulization, Q6H PRN  amLODIPine (NORVASC) tablet 5 mg, 5 mg, Oral, Daily  aspirin chewable tablet 81 mg, 81 mg, Oral, Every Other Day  atorvastatin (LIPITOR) tablet 80 mg, 80 mg, Oral, Daily  benzonatate (TESSALON) capsule 200 mg, 200 mg, Oral, TID PRN  dicyclomine (BENTYL) capsule 10 mg, 10 mg, Oral, 4x Daily PRN  dilTIAZem (CARDIZEM CD) extended release capsule 120 mg, 120 mg, Oral, Daily  fluticasone (FLONASE) 50 MCG/ACT nasal spray 1 spray, 1 spray, Each Nostril, Daily  gabapentin (NEURONTIN) capsule 100 mg, 100 mg, Oral, Nightly PRN  HYDROcodone-acetaminophen (NORCO) 5-325 MG per tablet 1 tablet, 1 tablet, Oral, 4x Daily PRN  losartan (COZAAR) tablet 100 mg, 100 mg, Oral, Daily  levothyroxine (SYNTHROID) tablet 50 mcg, 50 mcg, Oral, Daily  LORazepam (ATIVAN) tablet 0.5 mg, 0.5 mg, Oral, Q6H PRN  meclizine (ANTIVERT) tablet 12.5 mg, 12.5 mg, Oral, TID PRN  pantoprazole (PROTONIX) tablet 40 mg, 40 mg, Oral, Daily  propafenone (RYTHMOL) tablet 150 mg, 150 mg, Oral, 3 times per day  rivaroxaban (XARELTO) tablet 15 mg, 15 mg, Oral, Daily  rOPINIRole (REQUIP) tablet 2 mg, 2 mg, Oral, TID  budesonide-formoterol (SYMBICORT) 160-4.5 MCG/ACT inhaler 1 puff, 1 puff, Inhalation, BID  cefTRIAXone (ROCEPHIN) 1 g in sterile water 10 mL IV syringe, 1 g, Intravenous, Q24H  doxycycline (VIBRAMYCIN) 100 mg in dextrose 5 % 100 mL IVPB, 100 mg, Intravenous, Q12H  guaiFENesin-dextromethorphan (ROBITUSSIN DM) 100-10 MG/5ML syrup 5 mL, 5 mL, Oral, Q4H PRN  sodium chloride flush 0.9 % injection 10 mL, 10 mL, Intravenous, 2 times per day  sodium chloride flush 0.9 % injection 10 mL, 10 mL, Intravenous, PRN  acetaminophen (TYLENOL) tablet 650 mg, 650 mg, Oral, Q6H PRN **OR** acetaminophen (TYLENOL) suppository 650 mg, 650 mg, Rectal, Q6H PRN  polyethylene glycol (GLYCOLAX) packet 17 g, 17 g, Oral, Daily PRN  promethazine (PHENERGAN) tablet 12.5 mg, 12.5 mg, Oral, Q6H PRN **OR** ondansetron (ZOFRAN) injection 4 mg, 4 mg, Intravenous, Q6H PRN  amLODIPine (NORVASC) tablet 10 mg, 10 mg, Oral, Once  labetalol (NORMODYNE;TRANDATE) injection 10 mg, 10 mg, Intravenous, Q6H PRN  glucose (GLUTOSE) 40 % oral gel 15 g, 15 g, Oral, PRN  dextrose 50 % IV solution, 12.5 g, Intravenous, PRN  glucagon (rDNA) injection 1 mg, 1 mg, Intramuscular, PRN  dextrose 5 % solution, 100 mL/hr, Intravenous, PRN    Allergies   Allergen Reactions    Adhesive Tape Anaphylaxis    Cefaclor Nausea And Vomiting     Other reaction(s): Chest pain    Nsaids      Pt states she has hives and heart races   Other reaction(s): Tachycardia    Clinoril [Sulindac] Other (See Comments)     Stomach pain    Codeine      FEEL NUMB    Diclofenac     Diclofenac Sodium Hives    Hctz [Hydrochlorothiazide]      Sob  Ibuprofen      Other reaction(s): Tachycardia    Macrobid [Nitrofurantoin Macrocrystal]      nausea    Motrin [Ibuprofen Micronized] Other (See Comments)     Tachycardia      Pcn [Penicillins] Hives    Peach [Prunus Persica] Itching and Swelling     Cannot eat raw peaches       Social History:    TOBACCO:   reports that she quit smoking about 24 years ago. She started smoking about 69 years ago. She has a 45.00 pack-year smoking history. She has never used smokeless tobacco.  ETOH:   reports no history of alcohol use. Patient currently lives independently  Environmental/chemical exposure: None known    Family History:       Problem Relation Age of Onset    High Blood Pressure Mother     Heart Attack Father     Heart Disease Father     Other Brother     Asthma Paternal Uncle      REVIEW OF SYSTEMS:    CONSTITUTIONAL:  negative for fevers, chills, diaphoresis, activity change, appetite change, fatigue, night sweats and unexpected weight change.    EYES:  negative for blurred vision, eye discharge, visual disturbance and icterus  HEENT:  negative for hearing loss, tinnitus, ear drainage, sinus pressure, nasal congestion, epistaxis and snoring  RESPIRATORY:  See HPI  CARDIOVASCULAR:  negative for chest pain, palpitations, exertional chest pressure/discomfort, edema, syncope  GASTROINTESTINAL:  negative for nausea, vomiting, diarrhea, constipation, blood in stool and abdominal pain  GENITOURINARY:  negative for frequency, dysuria, urinary incontinence, decreased urine volume, and hematuria  HEMATOLOGIC/LYMPHATIC:  negative for easy bruising, bleeding and lymphadenopathy  ALLERGIC/IMMUNOLOGIC:  negative for recurrent infections, angioedema, anaphylaxis and drug reactions  ENDOCRINE:  negative for weight changes and diabetic symptoms including polyuria, polydipsia and polyphagia  MUSCULOSKELETAL:  negative for  pain, joint swelling, decreased range of motion and muscle weakness  NEUROLOGICAL:  negative for headaches, slurred speech, unilateral weakness  PSYCHIATRIC/BEHAVIORAL: negative for hallucinations, behavioral problems, confusion and agitation. Objective:   PHYSICAL EXAM:      VITALS:  /65   Pulse 70   Temp 98.1 °F (36.7 °C) (Oral)   Resp 16   Ht 5' 2\" (1.575 m)   Wt 169 lb 8 oz (76.9 kg)   SpO2 92%   BMI 31.00 kg/m²      24HR INTAKE/OUTPUT:      Intake/Output Summary (Last 24 hours) at 3/19/2020 1016  Last data filed at 3/19/2020 0857  Gross per 24 hour   Intake 880 ml   Output --   Net 880 ml     CONSTITUTIONAL:  awake, alert, cooperative, no apparent distress, and appears stated age  NECK:  Supple, symmetrical, trachea midline, no adenopathy, thyroid symmetric, not enlarged and no tenderness, skin normal  LUNGS:  no increased work of breathing and crackles at the left lung base to auscultation. No accessory muscle use  CARDIOVASCULAR: S1 and S2, no edema and no JVD  ABDOMEN:  normal bowel sounds, non-distended and no masses palpated, and no tenderness to palpation. No hepatospleenomegaly  LYMPHADENOPATHY:  no axillary or supraclavicular adenopathy. No cervical adnenopathy  PSYCHIATRIC: Oriented to person place and time. No obvious depression or anxiety. MUSCULOSKELETAL: No obvious misalignment or effusion of the joints. No clubbing, cyanosis of the digits. SKIN:  normal skin color, texture, turgor and no redness, warmth, or swelling.  No palpable nodules    DATA:    Old records have been reviewed    CBC:  Recent Labs     03/17/20  1218 03/18/20  0509 03/19/20  0452   WBC 6.6 9.3 14.9*   RBC 4.00 3.65* 3.65*   HGB 11.7* 10.9* 10.7*   HCT 35.6* 32.2* 31.9*    138 153   MCV 89.1 88.2 87.4   MCH 29.3 30.0 29.2   MCHC 32.9 34.0 33.5   RDW 14.8 14.4 14.3      BMP:  Recent Labs     03/17/20  1218 03/18/20  0509 03/19/20  0452   * 134* 135*   K 4.6 4.6 4.7   CL 97* 100 100   CO2 24 19* 21   BUN 19 26* 38*   CREATININE 1.1 1.2 1.3*   CALCIUM 9.5 9.5 9.2   GLUCOSE 118* 225* 202* ABG:  No results for input(s): PHART, NQJ9MTY, PO2ART, AZS2ODX, H0XWTLIP, BEART in the last 72 hours. No results found for: BNP  Lab Results   Component Value Date    TROPONINI <0.01 03/17/2020       Cultures:     Abx:    Radiology Review:  All pertinent images / reports were reviewed as a part of this visit. Assessment:     1. Acute hypoxemic respiratory failure  · Presented with hypoxemia  · Has required 2 L/min oxygen supplement to maintain saturations in the low to mid 90s  · Does have supplemental oxygen at home which she mostly uses with sleep. 2.  Community-acquired pneumonia  · I have reviewed laboratories, medical records and images for this visit  · Febrile she is afebrile and does not have leukocytosis  · Procalcitonin is normal  · CT imaging revealing some evidence of mucous plugging but no significant lobar consolidation or groundglass opacities are noted  · She is on doxycycline and ceftriaxone  · Can likely de-escalate to doxycycline at the time of discharge  · Did test positive for human rhinovirus  · Can stop Tamiflu    3. COPD exacerbation  · Looks and feels a bit better  · Not wheezing  · Taper steroids  · Continue Symbicort    Okay with me if she is discharged home.

## 2020-03-19 NOTE — PLAN OF CARE
Problem: Falls - Risk of:  Goal: Will remain free from falls  Description: Will remain free from falls  Outcome: Ongoing     Problem: Pain:  Goal: Pain level will decrease  Description: Pain level will decrease  Outcome: Ongoing   Patient alert and oriented, respirations easy and unlabored, slight dyspnea on exertion, norco given for C/O of  back pain, stated pain tolerable after given norco, tessalon and  robitussin DM given for cough with slight cough relief,  patient O2 sat>90% on 2L/NC, free from falls all safety precautions in place.

## 2020-03-19 NOTE — DISCHARGE SUMMARY
[facility] DISCHARGE SUMMARY    Patient Demographics    Patient. Ayesha Berman  Date of Birth. 1935  MRN. [id number]     Primary care provider. Ulises Madrigal MD  (Tel: [phone])    Admit date: 3/17/2020    Discharge date (blank if same as Note Date): Note Date: 3/19/2020     Reason for Hospitalization. Chief Complaint   Patient presents with    Cough     Pt reports coughing and wheezing since saturday, on abx but unsure why. Pt was sent here by PCP. Significant Findings. Active Problems:    Pneumonia  Resolved Problems:    * No resolved hospital problems. *       Problems and results from this hospitalization that need follow up. 1. BMP on Monday 3/23. Home health to draw. 2. Pulmonary nodule vs mucous plugging RML. Recommend CT follow-up to assess stability / clearing. Significant test results and incidental findings. CXR 3/17/2020:  FINDINGS:   The heart is mildly enlarged.  Increasing mild perihilar opacities have   developed from prior exam.  Scattered ground-glass opacities have also   increased in the right mid and lower lung zone and in the left lower lung   zone.  No focal consolidation.  No pneumothorax.  No significant skeletal   finding.      CT of chest 3/17/2020:  Emphysema.  Bilateral bronchiectasis with mucous plugging.  Scattered   bilateral airspace disease is partially improved as compared to exam dated   02/19/2020 with residual opacity at bilateral lung bases.       New 5 mm nodule or focus of mucous plugging within the right middle lobe. Additional pulmonary nodules are not significantly changed.  Continued   attention on CT follow-up recommended.       Sequela of granulomatous disease. Invasive procedures and treatments. 1. None     Problem-based Hospital Course.   Patient is an 81 yo female with hx atrial fibrillation, DM2, COPD, GERD, HTN, HLD. She presented to hospital with increasing shortness of breath. CT demonstrated bilateral bronchiectasis with mucous plugging. She was admitted and treated for followin. Acute on chronic hypoxic respiratory failure. O2 at 2 liters at baseline. Sats 90% on arrival and increased to 3 liters. Now at 2 liters for mid 90s. Pulmonary followed    2. CAP. CT scan with bilateral bronchiectasis with mucous plugging. Respiratory panel + for human rhinovirus/enterovirus. No significant leukocytosis, procalcitonin 0.07. Discharged on doxycycline. 3. COPD. Previously considered mild. On home O2, sats stable at 2 liters for mid 90s. Discharged on slow steroid dose taper per pulmonary recs. 4. Atrial fibrillation. Remains in sinus rhythm on propafenone and Xarelto. On diltiazem for rate control. 5. Hypertension. Controlled. Continue amlodipine and losartan. 6. DM2. A1c 6.1 (2019). BG elevated secondary to steroids. She was covered with insulin while in hospital.      7. Renal insufficiency. Creatinine 1.2 on admission, 1.3 on DC. Declined to remain overnight for recheck lab in am. Home health to draw labs (BMP) Monday 3/23, results to PCP      Consults. IP CONSULT TO HOSPITALIST  IP CONSULT TO PULMONOLOGY  IP CONSULT TO SPIRITUAL SERVICES    Physical examination on discharge day. /65   Pulse 70   Temp 98.1 °F (36.7 °C) (Oral)   Resp 16   Ht 5' 2\" (1.575 m)   Wt 169 lb 8 oz (76.9 kg)   SpO2 92%   BMI 31.00 kg/m²   General appearance. Alert. Looks comfortable. HEENT. Sclera clear. Moist mucus membranes. Cardiovascular. Regular rate and rhythm, normal S1, S2. No murmur. Respiratory. Not using accessory muscles. Crackles in bases, no wheeze. Gastrointestinal. Abdomen soft, non-tender, not distended, normal bowel sounds  Neurology. Facial symmetry. No speech deficits. Moving all extremities equally. Extremities. No edema in lower extremities. Skin.  Warm, dry, normal turgor    Condition at time of discharge stable    Medication instructions provided to patient at discharge. Medication List      CHANGE how you take these medications    predniSONE 10 MG tablet  Commonly known as:  DELTASONE  Take 4 tablets by mouth daily for 3 days, THEN 3 tablets daily for 3 days, THEN 2 tablets daily for 3 days, THEN 1 tablet daily for 3 days. Start taking on:  March 19, 2020  What changed:    · medication strength  · See the new instructions. Notes to patient:  Use: treat asthma and copd, inflammation  Side effects: upset stomach, high blood sugars, weight gain, mood changes, and increased chances of infection        CONTINUE taking these medications    albuterol sulfate  (90 Base) MCG/ACT inhaler  Commonly known as:  ProAir HFA  Inhale 2 puffs into the lungs every 6 hours as needed for Wheezing  Notes to patient:  Use:bronchodilator, to open airways, rescue inhaler  Side effects: nervousness, jitteriness, shakiness, headache, fast heartbeat     amLODIPine 5 MG tablet  Commonly known as:  NORVASC  TAKE 1 TABLET EVERY DAY  Notes to patient:  Amlodipine (Norvasc*)  Use: lower blood pressure  Side effects: dizziness, headache, and constipation. aspirin 81 MG tablet  Notes to patient:  Aspirin  Use: Prevents heart attacks & strokes. Side effects: bleeding or bruising more easily, upset stomach. atorvastatin 80 MG tablet  Commonly known as:  LIPITOR  TAKE 1 TABLET BY MOUTH EVERY NIGHT  Notes to patient:     Atorvastatin (Lipitor®)  Use: lower bad cholesterol   Side effects: headache, muscle pains, constipation, diarrhea.      benzonatate 200 MG capsule  Commonly known as:  TESSALON  Take 1 capsule by mouth 3 times daily as needed for Cough     * blood glucose test strips  Test one time a day     * blood glucose test strips strip  Commonly known as:  True Metrix Blood Glucose Test  1 each by In Vitro route daily     * blood glucose test strips strip  Commonly known as:  Accu-Chek Teodora  1 each by In Vitro route daily As needed. dicyclomine 10 MG capsule  Commonly known as:  Bentyl  Take 1 capsule by mouth 4 times daily as needed (abd pain)  Notes to patient:  Use: Treatment of irritable bowel syndrome  Side effects: Fatigue, blurred vision, stomach upset     dilTIAZem 120 MG extended release capsule  Commonly known as:  CARDIZEM CD  Take 1 capsule by mouth daily  Notes to patient:  Diltiazem (Cardizem ®)  Use: lower heart rate. Side effects: dizziness, headache, and constipation. doxycycline hyclate 100 MG tablet  Commonly known as:  VIBRA-TABS  Take 1 tablet by mouth 2 times daily for 7 days  Notes to patient:  Doxycycline (Vibramycin, Doxy, Vibra Tabs)  Use: Treat Infection  Side effects: nausea, vomiting, diarrhea & rash     fluticasone 50 MCG/ACT nasal spray  Commonly known as:  FLONASE  USE 2 SPRAYS IN EACH NOSTRIL EVERY EVENING  Notes to patient:  Use: Allergies  Side effects: Headache, back pain, dizziness     gabapentin 100 MG capsule  Commonly known as:  Neurontin  Take 1 capsule by mouth nightly as needed (neuropathy) for up to 90 days. Notes to patient:  Gabapentin (Neurontin)  Use: alleviate nerve pain  Side Effects: Dizziness, drowsiness       * glucose monitoring kit monitoring kit  1 kit by Does not apply route daily     * Accu-Chek Teodora Plus w/Device Kit  1 kit by Does not apply route 4 times daily (before meals and nightly)     HYDROcodone-acetaminophen 5-325 MG per tablet  Commonly known as:  NORCO  Take 1 tablet by mouth 4 times daily as needed for Pain for up to 30 days.   Notes to patient:  Analgesics  Use: moderate to severe pain  Side Effects: drowsiness, dizziness or possibly nausea     ipratropium-albuterol 0.5-2.5 (3) MG/3ML Soln nebulizer solution  Commonly known as:  DUONEB  INHALE THE CONTENTS OF 1 VIAL VIA NEBULIZER EVERY 4 HOURS  Notes to patient:  Use: Treatment of asthma, bronchospasm  Side effects: Back pain, headache, nasal or throat irritation irbesartan 300 MG tablet  Commonly known as:  AVAPRO  TAKE 1 TABLET BY MOUTH EVERY NIGHT     * Lancets Misc  1 each by Does not apply route 2 times daily     * Lancets Misc  1 each by Does not apply route daily     levothyroxine 50 MCG tablet  Commonly known as:  SYNTHROID  TAKE 1 TABLET EVERY DAY  Notes to patient:  Use: to treat low thyroid levels  Side effects: hair loss, chest pain, irregular heartbeat, irritability, insomnia      LORazepam 0.5 MG tablet  Commonly known as:  ATIVAN  TAKE 1 TABLET BY MOUTH TWICE DAILY AND 2 TABLETS AT BEDTIME AS NEEDED FOR ANXIETY  Notes to patient:  Lorazepam (Ativan)  Use: anti-anxiety, sometimes used to treat seizures  Side effects: dizziness, drowsiness, headache, feeling weak and tired     meclizine 12.5 MG tablet  Commonly known as:  ANTIVERT  TAKE 1 TABLET THREE TIMES DAILY AS NEEDED  Notes to patient:  Use: Prevention and treatment of motion sickness and dizziness  Side effects: Dry mouth, fatigue, headache, vomiting     pantoprazole 40 MG tablet  Commonly known as:  PROTONIX  TAKE 1 TABLET EVERY DAY  Notes to patient:  Use: Prevention and treatment of gastric ulcers  Side Effects: Headache, fatigue, constipation, dry  mouth     propafenone 150 MG tablet  Commonly known as:  RYTHMOL  TAKE 1 TABLET BY MOUTH EVERY 8 HOURS  Notes to patient:  Propafenone (Rythmol®)  Use: restore a regular heart rhythm, lower the heart rate  Side effects: feeling lightheaded, headache, and upset stomach     rivaroxaban 15 MG Tabs tablet  Commonly known as:  Xarelto  TAKE 1 TABLET BY MOUTH DAILY  Notes to patient:  Use:  Reduces risk of blood clots  Side effects:  Unusual bleeding, bruising, stomach irritation     rOPINIRole 2 MG tablet  Commonly known as:  REQUIP  One tablet 3 times daily     Symbicort 160-4.5 MCG/ACT Aero  Generic drug:  budesonide-formoterol  INHALE 2 PUFFS INTO THE LUNGS TWICE DAILY  Notes to patient:  Use: to treat asthma or COPD  Side effects: runny nose, sore throat, headache, oral yeast infection         * This list has 7 medication(s) that are the same as other medications prescribed for you. Read the directions carefully, and ask your doctor or other care provider to review them with you. Where to Get Your Medications      These medications were sent to [facility] [address] [address]    Phone:  [phone]   · predniSONE 10 MG tablet       Discharge recommendations given to patient. Follow Up. PCP in 1 week   Disposition. Home with [facility]   Activity. As tolerated  Diet: DIET CARB CONTROL;      Spent 35 minutes in discharge process.     Signed:  KATHY Perez CNP     3/19/2020 10:48 AM

## 2020-03-19 NOTE — PROGRESS NOTES
Data- discharge order received, pt verbalized agreement to discharge, needs for [facility] with [facility], REYNALDO reviewed and signed by MD, to be completed by RN. Action- AVS prepared, discharge instructions prepared and given to Patient, medication information packet given r/t NEW or CHANGED prescriptions, pt verbalized understanding further self-review. D/C instruction summary: Diet- Carb control, Activity- As tolerated, follow up with Primary Care Physician Bia Enrique [phone] appointment within 1 week, medications prescriptions sent to personal pharmacy. Contact information provided to above agencies used. Response- Case Management/ reported faxing completed REYNALDO and AVS to needed HHC/DME services stated above. Pt belongings gathered, IV removed, pt dressed with personal belongings. Disposition is home with HHC/DME as stated above, taken to lobby via w/c with personal belongings, no complications.

## 2020-03-19 NOTE — CARE COORDINATION
Patient discharged 3-19-20, Home most likely w/continuing [facility] pending a HC order.  (Notified via perfect serve)  All discharge needs met per case management

## 2020-03-20 ENCOUNTER — CARE COORDINATION (OUTPATIENT)
Dept: CASE MANAGEMENT | Age: 85
End: 2020-03-20

## 2020-03-20 PROCEDURE — 1111F DSCHRG MED/CURRENT MED MERGE: CPT | Performed by: FAMILY MEDICINE

## 2020-03-20 NOTE — CARE COORDINATION
[facility] Transitions Initial Follow Up Call    Call within 2 business days of discharge: Yes    Patient: Cesar Lanier Patient : 1935   MRN: [id number]  Reason for Admission: pneumonia  Discharge Date: 3/19/20 RARS: Readmission Risk Score: 40      Last Discharge [facility]       Complaint Diagnosis Description Type Department Provider    3/17/20 Cough COPD exacerbation ([address] . .. ED to Hosp-Admission (Discharged) (ADMITTED) [facility] 3T Khurram Marin MD; Miranda Kerr . .. Spoke with: Cesar Lanier      Facility:[facility]    Non-face-to-face services provided:  Obtained and reviewed discharge summary and/or continuity of care documents    Care Transitions 24 Hour Call    Do you have any ongoing symptoms?:  No  Do you have a copy of your discharge instructions?:  Yes  Do you have all of your prescriptions and are they filled?:  Yes  Have you been contacted by a [address]?:  No  Have you scheduled your follow up appointment?:  Yes  How are you going to get to your appointment?:  Car - family or friend to transport  Were you discharged with any Home Care or Post Acute Services:  Yes  Post Acute Services:  Home Health (Comment: [facility])  Do you have support at home?:  Child  Do you feel like you have everything you need to keep you well at home?:  Yes  Are you an active caregiver in your home?:  No  Care Transitions Interventions  No Identified Needs       Pt states doing pretty well, just \"a little worn out\" Denies SOB, CP, fever. Continues on antibiotics and prednisone. Using oxygen at 2L. Weight today was 169, which was her d/c weight as well. No new meds, reviewed all others. [facility] will be out tomorrow.       COVID-19 Screening Initial Follow-up Note    Other Symptoms reviewed with patient:    Pain or aching joints no  Cough no  Shortness of breath no    Confusion or unusual change in mental status no    Chills or shaking no    Sweating no    Fast heart rate no    Fast

## 2020-03-21 ENCOUNTER — TELEPHONE (OUTPATIENT)
Dept: FAMILY MEDICINE CLINIC | Age: 85
End: 2020-03-21

## 2020-03-21 LAB
BLOOD CULTURE, ROUTINE: NORMAL
CULTURE, BLOOD 2: NORMAL

## 2020-03-21 NOTE — TELEPHONE ENCOUNTER
Patient states that she has been up all night go to the bathroom that she hasn't had any sleep. Lower stomach has a discomfort like if she is having to have a movement. But when she goes to the bathroom she is just urinating. She states she was prescribed steroids and they want her to take it qid and she states she can't take that many a day. That she can't sleep when she takes those. Also is asking about blood work that she is supposed to have and states to follow up with her PCP.         Please advise

## 2020-03-23 ENCOUNTER — TELEPHONE (OUTPATIENT)
Dept: FAMILY MEDICINE CLINIC | Age: 85
End: 2020-03-23

## 2020-03-23 LAB
ANION GAP SERPL CALCULATED.3IONS-SCNC: 9 MMOL/L (ref 6–18)
BUN BLDV-MCNC: 35 MG/DL (ref 8–26)
CALCIUM SERPL-MCNC: 9.1 MG/DL (ref 8.5–10.5)
CHLORIDE BLD-SCNC: 102 MEQ/L (ref 101–111)
CO2: 23 MMOL/L (ref 24–36)
CREAT SERPL-MCNC: 1.28 MG/DL (ref 0.44–1.03)
GFR AFRICAN AMERICAN: 43 ML/MIN/1.73 M2
GFR NON-AFRICAN AMERICAN: 37 ML/MIN/1.73 M2
GLUCOSE BLD-MCNC: 204 MG/DL (ref 70–99)
POTASSIUM SERPL-SCNC: 4.2 MEQ/L (ref 3.6–5.1)
SODIUM BLD-SCNC: 134 MEQ/L (ref 135–145)

## 2020-03-23 RX ORDER — BENZONATATE 200 MG/1
CAPSULE ORAL
Qty: 30 CAPSULE | Refills: 0 | Status: SHIPPED | OUTPATIENT
Start: 2020-03-23 | End: 2020-07-29 | Stop reason: SDUPTHER

## 2020-03-23 NOTE — TELEPHONE ENCOUNTER
Yusef Negrete with [facility] [phone]    Wasn't able to get the BMP due the patient being dehydrated and bruised up from just getting out of the hospital.  They will send another nurse out to retry and a few days

## 2020-03-25 ENCOUNTER — OFFICE VISIT (OUTPATIENT)
Dept: FAMILY MEDICINE CLINIC | Age: 85
End: 2020-03-25
Payer: MEDICARE

## 2020-03-25 VITALS
SYSTOLIC BLOOD PRESSURE: 126 MMHG | BODY MASS INDEX: 30.45 KG/M2 | DIASTOLIC BLOOD PRESSURE: 72 MMHG | OXYGEN SATURATION: 97 % | TEMPERATURE: 97.6 F | HEART RATE: 67 BPM | WEIGHT: 166.5 LBS

## 2020-03-25 PROCEDURE — 99496 TRANSJ CARE MGMT HIGH F2F 7D: CPT | Performed by: FAMILY MEDICINE

## 2020-03-25 NOTE — PROGRESS NOTES
(See Comments)     Tachycardia      Pcn [Penicillins] Hives    Peach [Prunus Persica] Itching and Swelling     Cannot eat raw peaches       Social History     Tobacco Use    Smoking status: Former Smoker     Packs/day: 1.00     Years: 45.00     Pack years: 45.00     Start date: 9/15/1950     Last attempt to quit: 1995     Years since quittin.2    Smokeless tobacco: Never Used   Substance Use Topics    Alcohol use: No     Alcohol/week: 0.0 standard drinks       /72 (Site: Left Upper Arm, Position: Sitting, Cuff Size: Medium Adult)   Pulse 67   Temp 97.6 °F (36.4 °C) (Tympanic)   Wt 166 lb 8 oz (75.5 kg)   SpO2 97% Comment: with 2l oxygen  BMI 30.45 kg/m²     Objective:   Physical Exam  Constitutional:       General: She is not in acute distress. Appearance: Normal appearance. She is well-developed. Neck:      Vascular: No carotid bruit. Cardiovascular:      Rate and Rhythm: Normal rate and regular rhythm. Pulses:           Dorsalis pedis pulses are 2+ on the right side and 2+ on the left side. Posterior tibial pulses are 2+ on the right side and 2+ on the left side. Heart sounds: Normal heart sounds. No murmur. No friction rub. No gallop. Pulmonary:      Effort: Pulmonary effort is normal.      Breath sounds: Normal breath sounds. Abdominal:      General: Bowel sounds are normal. There is no distension. Palpations: Abdomen is soft. Abdomen is not rigid. There is no hepatomegaly, splenomegaly or mass. Tenderness: There is abdominal tenderness in the epigastric area and suprapubic area. There is no right CVA tenderness, left CVA tenderness, guarding or rebound. Hernia: No hernia is present. Musculoskeletal: Normal range of motion. General: No tenderness. Right lower leg: No edema. Left lower leg: No edema. Skin:     General: Skin is warm. Findings: No rash. Neurological:      Mental Status: She is alert.  Mental status

## 2020-03-26 ENCOUNTER — CARE COORDINATION (OUTPATIENT)
Dept: CASE MANAGEMENT | Age: 85
End: 2020-03-26

## 2020-03-26 RX ORDER — NEBULIZER ACCESSORIES
1 KIT MISCELLANEOUS 4 TIMES DAILY PRN
Qty: 1 KIT | Refills: 0 | Status: SHIPPED | OUTPATIENT
Start: 2020-03-26

## 2020-03-26 NOTE — TELEPHONE ENCOUNTER
Patient stated that she has infibulator and she need the parts to it. They come separate from the machine. They have been washed and broken they are old,    [facility] on [address] they said they have them in stock that she needs a script for it.       Please advise

## 2020-03-26 NOTE — CARE COORDINATION
Edd 45 Transitions Follow Up Call    3/26/2020    Patient: Dipika Guzmán  Patient : 1935   MRN: [id number]  Reason for Admission: PNA   Discharge Date: 3/19/20 RARS: Readmission Risk Score: 40         Spoke with: Dipika Guzmán    Spoke with patient who reported she is doing ok and stated her breathing is stable and would be better if she could get her nebulizer machine. Patient stated her nebulizer broke and she called MD office and is waiting for a script to be sent to [place]. CTN encouraged patient to call back to PCP office is she doesn't hear from the pharmacy. Patient verbalized understanding and denied any further issues or concerns at this time. CTN advised patient of use of urgent care or physicians 24 hr access line if assistance is needed after hours. Also advised that they can always contact their home care provider to request a nurse visit even if it isn't their regularly scheduled day for their nurse to visit. Care Transitions Subsequent and Final Call    Subsequent and Final Calls  Do you have any ongoing symptoms?:  No  Have your medications changed?:  No  Do you have any questions related to your medications?:  No  Do you currently have any active services?:  Yes  Are you currently active with any services?:  Home Health  Do you have any needs or concerns that I can assist you with?:  No  Identified Barriers:  None  Care Transitions Interventions  Other Interventions:             Follow Up  Future Appointments   Date Time Provider Alissa Bustillo   2020  1:15 PM Darby Merlin, MD [facility]   2020  3:15 PM Steve Lagunas MD FF Cardio [facility]   2020  3:30 PM James Narvaez MD PULM & CC [facility]       Brittany Lamar RN

## 2020-03-30 ENCOUNTER — TELEPHONE (OUTPATIENT)
Dept: PULMONOLOGY | Age: 85
End: 2020-03-30

## 2020-03-30 RX ORDER — GUAIFENESIN 600 MG/1
600 TABLET, EXTENDED RELEASE ORAL 2 TIMES DAILY
Qty: 60 TABLET | Refills: 3 | Status: SHIPPED | OUTPATIENT
Start: 2020-03-30 | End: 2020-07-28

## 2020-03-30 RX ORDER — LEVOFLOXACIN 500 MG/1
500 TABLET, FILM COATED ORAL DAILY
Qty: 7 TABLET | Refills: 0 | Status: SHIPPED | OUTPATIENT
Start: 2020-03-30 | End: 2020-04-06

## 2020-03-30 NOTE — TELEPHONE ENCOUNTER
Pt was seen by you during hospital admission 3-19-20. She was discharged home with Doxycycline and Prednisone Taper. She had a HFU with Dr Maren Pepe 3-25-20 who had her stop the Prednisone due to GI issues and she had taken her last Doxycycline by this appointment . Looks like her nebulizer had broken and Dr Maren Pepe ordered another one and she has this again and using it. Fever has been running around 100 but nothing higher. Tessalon Pearls were no help. Daughter feels she is still infected. Would sputum culture be beneficial? Or a stronger antibiotic?

## 2020-03-30 NOTE — TELEPHONE ENCOUNTER
Pt's daughter Jose Amor called in stating that Pt has been \"coughing her head off\". Jose  stated that Pt tried to take the prednisone that  prescribed her, but it just makes her shake. Jose  stated that Pt's home nurse was in today and told them that she still hears rattling in Pt's lungs, Pt wanting another abx. Jose  stated the doxy did not work well for her, wanting to get something different. Pt c/o of dry cough,  prescribed Mucinex. Jose  sated Pt has been running a low grade, close to 100 degree fever off and on but not constant. Pt uses [facility] on [address].      Jose  # [phone]

## 2020-03-30 NOTE — TELEPHONE ENCOUNTER
She probably has a recurrence of the mucus plugging.   I would recommend that she use her nebulizer every 4 hours for the next several days and start Mucinex 600 mg twice daily #60×3

## 2020-04-02 ENCOUNTER — CARE COORDINATION (OUTPATIENT)
Dept: CASE MANAGEMENT | Age: 85
End: 2020-04-02

## 2020-04-03 ENCOUNTER — TELEPHONE (OUTPATIENT)
Dept: FAMILY MEDICINE CLINIC | Age: 85
End: 2020-04-03

## 2020-04-03 RX ORDER — HYDROCODONE BITARTRATE AND ACETAMINOPHEN 5; 325 MG/1; MG/1
1 TABLET ORAL 4 TIMES DAILY PRN
Qty: 120 TABLET | Refills: 0 | Status: SHIPPED | OUTPATIENT
Start: 2020-04-03 | End: 2020-04-29 | Stop reason: SDUPTHER

## 2020-04-03 RX ORDER — LORAZEPAM 0.5 MG/1
TABLET ORAL
Qty: 120 TABLET | Refills: 0 | Status: SHIPPED | OUTPATIENT
Start: 2020-04-03 | End: 2020-04-29

## 2020-04-04 RX ORDER — MECLIZINE HCL 12.5 MG/1
TABLET ORAL
Qty: 270 TABLET | Refills: 0 | Status: SHIPPED | OUTPATIENT
Start: 2020-04-04 | End: 2020-06-12

## 2020-04-09 ENCOUNTER — CARE COORDINATION (OUTPATIENT)
Dept: CASE MANAGEMENT | Age: 85
End: 2020-04-09

## 2020-04-09 RX ORDER — ATORVASTATIN CALCIUM 80 MG/1
TABLET, FILM COATED ORAL
Qty: 90 TABLET | Refills: 3 | Status: CANCELLED | OUTPATIENT
Start: 2020-04-09

## 2020-04-09 RX ORDER — ATORVASTATIN CALCIUM 80 MG/1
TABLET, FILM COATED ORAL
Qty: 90 TABLET | Refills: 3 | OUTPATIENT
Start: 2020-04-09

## 2020-04-09 RX ORDER — ATORVASTATIN CALCIUM 80 MG/1
TABLET, FILM COATED ORAL
Qty: 90 TABLET | Refills: 2 | Status: SHIPPED | OUTPATIENT
Start: 2020-04-09 | End: 2020-08-17

## 2020-04-10 ENCOUNTER — CARE COORDINATION (OUTPATIENT)
Dept: CASE MANAGEMENT | Age: 85
End: 2020-04-10

## 2020-04-13 ENCOUNTER — CARE COORDINATION (OUTPATIENT)
Dept: CASE MANAGEMENT | Age: 85
End: 2020-04-13

## 2020-04-13 NOTE — CARE COORDINATION
COVID-19 Screening Follow-up Note    Patient contacted regarding COVID-19 risk and screening. Care Transition Nurse/ Ambulatory Care Manager contacted the patient by telephone to perform follow-up assessment. Verified name and  with patient as identifiers. **Patient reports that she is feeling better, very tired. She denies fever, chills, cough, chest discomfort. She has followed up with MD's and is using her breathing treatments as prescribed. CTN will continue with outreach follow up calls. Symptoms reviewed with patient who verbalized the following symptoms: fatigue and no new/worsening symptoms       Due to no new or worsening symptoms encounter was not routed to provider for escalation. Education provided regarding infection prevention, and signs and symptoms of COVID-19 and when to seek medical attention with patient who verbalized understanding. Discussed exposure protocols and quarantine from [address] you at higher risk for severe illness  and given an opportunity for questions and concerns. The patient agrees to contact the COVID-19 hotline [phone] or PCP office for questions related to their healthcare. CTN/ACM provided contact information for future reference. From CDC: Are you at higher risk for severe illness?  Wash your hands often.  Avoid close contact (6 feet, which is about two arm lengths) with people who are sick.  Put distance between yourself and other people if COVID-19 is spreading in your community.  Clean and disinfect frequently touched surfaces.  Avoid all cruise travel and non-essential air travel.  Call your healthcare professional if you have concerns about COVID-19 and your underlying condition or if you are sick.     For more information on steps you can take to protect yourself, see CDC's How to [address] [place] [address] for follow-up call in 3-5 days based on severity of symptoms and risk factors

## 2020-04-16 ENCOUNTER — CARE COORDINATION (OUTPATIENT)
Dept: CASE MANAGEMENT | Age: 85
End: 2020-04-16

## 2020-04-27 ENCOUNTER — TELEPHONE (OUTPATIENT)
Dept: OTHER | Facility: CLINIC | Age: 85
End: 2020-04-27

## 2020-04-27 NOTE — TELEPHONE ENCOUNTER
Edgar Meraz was contacted to set up a video visit with Darrick Montesinos MD.     Spoke with: Patient     Patient encouraged to sign up for MyChart in order to complete Virtual Visits, if MyChart status was not already active. Patient agreeableto sign up for a Virtual Visit with PCP within the next week.      Patient appointment rescheduled as VV with PCP      Maybell Cogan

## 2020-04-29 ENCOUNTER — VIRTUAL VISIT (OUTPATIENT)
Dept: FAMILY MEDICINE CLINIC | Age: 85
End: 2020-04-29
Payer: MEDICARE

## 2020-04-29 VITALS
HEART RATE: 70 BPM | TEMPERATURE: 95.7 F | WEIGHT: 167 LBS | DIASTOLIC BLOOD PRESSURE: 70 MMHG | BODY MASS INDEX: 30.54 KG/M2 | SYSTOLIC BLOOD PRESSURE: 134 MMHG

## 2020-04-29 PROBLEM — R06.02 SOB (SHORTNESS OF BREATH): Status: RESOLVED | Noted: 2020-02-25 | Resolved: 2020-04-29

## 2020-04-29 PROBLEM — I50.21 ACUTE SYSTOLIC CONGESTIVE HEART FAILURE (HCC): Status: RESOLVED | Noted: 2020-02-04 | Resolved: 2020-04-29

## 2020-04-29 PROBLEM — J18.9 PNEUMONIA: Status: RESOLVED | Noted: 2020-03-17 | Resolved: 2020-04-29

## 2020-04-29 PROCEDURE — 99443 PR PHYS/QHP TELEPHONE EVALUATION 21-30 MIN: CPT | Performed by: FAMILY MEDICINE

## 2020-04-29 RX ORDER — LORAZEPAM 0.5 MG/1
TABLET ORAL
Qty: 120 TABLET | Refills: 2 | Status: SHIPPED | OUTPATIENT
Start: 2020-04-29 | End: 2020-07-29

## 2020-04-29 RX ORDER — LORAZEPAM 0.5 MG/1
TABLET ORAL
Qty: 120 TABLET | Refills: 0 | Status: CANCELLED | OUTPATIENT
Start: 2020-04-29 | End: 2020-05-29

## 2020-04-29 RX ORDER — HYDROCODONE BITARTRATE AND ACETAMINOPHEN 5; 325 MG/1; MG/1
1 TABLET ORAL 4 TIMES DAILY PRN
Qty: 120 TABLET | Refills: 0 | Status: SHIPPED | OUTPATIENT
Start: 2020-04-29 | End: 2020-06-01 | Stop reason: SDUPTHER

## 2020-04-29 NOTE — PROGRESS NOTES
2020    TELEHEALTH EVALUATION --telephone evaluation (During [facility]-17 public health emergency)    HPI: Susie Baig (:  1935) has requested an audio/video evaluation for the following concern(s):    Osteoarthritis, insomnia, anxiety, chronic respiratory failure with hypoxia and atrial fibrillation    Subjective: Patient feels she is done well since she completed antibiotic therapy. The doxycycline back was not effective and she was changed to Levaquin and she feels now her breathing is much improved. She is down using 2 L nasal oxygen. If she takes her oxygen off her O2 saturation dropped down to 82%. She is not working at this point in time mostly because of the osteoarthritis. She is having significant discomfort in both her knees and using her pain medications regularly to keep her more comfortable. She also request a refill of the medications for chronic insomnia. She feels her anxiety levels overall are controlled at this point of time since her respiratory status has significantly improved. She does use her nebulizer 3 times a day and knows she can increase it up if necessary. Patient maintains a Symbicort inhaler twice a day. She is not noticed any heart racing episodes. She continues with blood thinning medication regularly.     Review of Systems    Patient Active Problem List   Diagnosis    GERD (gastroesophageal reflux disease)    HTN (hypertension)    Hyperlipidemia    Insomnia    IBS (irritable bowel syndrome)    Overactive bladder    Osteoarthritis    Controlled type 2 diabetes mellitus with stage 3 chronic kidney disease, without long-term current use of insulin (HCC)    Restless legs syndrome    ANTHONY (obstructive sleep apnea)    Paroxysmal atrial fibrillation (HCC)    Allergic rhinitis    Chronic obstructive pulmonary disease (HCC)    COPD, mild (HCC)    Vertigo    Interstitial lung disease ([facility] [address]    Chronic cough    Bronchiectasis without complication transcription, some errors in transcription may have occurred. An  electronic signature was used to authenticate this note. --Christy Osborne MD on 4/29/2020 at 1:03 PM        Pursuant to the emergency declaration under the [address] [place] [address], [facility] waiver authority and the [employer] Act, this Virtual  Visit was conducted, with patient's consent, to reduce the patient's risk of exposure to COVID-19 and provide continuity of care for an established patient. Services were provided through a video synchronous discussion virtually to substitute for in-person clinic visit.

## 2020-05-05 ENCOUNTER — TELEPHONE (OUTPATIENT)
Dept: FAMILY MEDICINE CLINIC | Age: 85
End: 2020-05-05

## 2020-05-05 ENCOUNTER — NURSE TRIAGE (OUTPATIENT)
Dept: OTHER | Facility: CLINIC | Age: 85
End: 2020-05-05

## 2020-05-06 RX ORDER — ROPINIROLE 2 MG/1
TABLET, FILM COATED ORAL
Qty: 270 TABLET | Refills: 0 | Status: SHIPPED | OUTPATIENT
Start: 2020-05-06 | End: 2020-07-27

## 2020-05-11 ENCOUNTER — OFFICE VISIT (OUTPATIENT)
Dept: INTERNAL MEDICINE CLINIC | Age: 85
End: 2020-05-11
Payer: MEDICARE

## 2020-05-11 VITALS
TEMPERATURE: 98.3 F | OXYGEN SATURATION: 93 % | SYSTOLIC BLOOD PRESSURE: 142 MMHG | DIASTOLIC BLOOD PRESSURE: 66 MMHG | BODY MASS INDEX: 32.01 KG/M2 | HEART RATE: 80 BPM | RESPIRATION RATE: 16 BRPM | WEIGHT: 175 LBS

## 2020-05-11 PROCEDURE — 20610 DRAIN/INJ JOINT/BURSA W/O US: CPT | Performed by: FAMILY MEDICINE

## 2020-05-11 RX ORDER — TRIAMCINOLONE ACETONIDE 40 MG/ML
40 INJECTION, SUSPENSION INTRA-ARTICULAR; INTRAMUSCULAR ONCE
Status: COMPLETED | OUTPATIENT
Start: 2020-05-11 | End: 2020-05-11

## 2020-05-11 RX ADMIN — TRIAMCINOLONE ACETONIDE 40 MG: 40 INJECTION, SUSPENSION INTRA-ARTICULAR; INTRAMUSCULAR at 09:56

## 2020-05-11 NOTE — PROGRESS NOTES
Medication given during visit:    Administrations This Visit     triamcinolone acetonide (KENALOG-40) injection 40 mg     Admin Date  05/11/2020  09:56 Action  Given Dose  40 mg Route  Intra-articular Site  Knee Right Administered By  Rocio Sadler    Ordering Provider:  Angeles Tavarez MD    NDC:  2642-0290-10    Lot#:  [id number]    :  [place] [employer] [place] (PRIMARY CARE)    Patient Supplied?:  No    Comments:  EXP10/20                Patient instructed to remain in clinic for 20 minutes after injection and was advised to report any adverse reaction to me immediately.

## 2020-05-11 NOTE — PROGRESS NOTES
Subjective:      Patient ID: Darcy Funez is a 80 y.o. female. CC: Presents wanting cortisone injection of her right knee. She is never had a cortisone injection in the past.  She is taking her pain medication regularly. Knee Pain    There was no injury mechanism. The pain is present in the right knee. The quality of the pain is described as stabbing, shooting and aching. The pain is at a severity of 5/10. The pain is moderate. The pain has been worsening since onset. Associated symptoms include an inability to bear weight. She reports no foreign bodies present. The symptoms are aggravated by movement. She has tried ice and non-weight bearing for the symptoms. The treatment provided mild relief. Review of Systems  Allergies   Allergen Reactions    Adhesive Tape Anaphylaxis    Cefaclor Nausea And Vomiting     Other reaction(s): Chest pain    Nsaids      Pt states she has hives and heart races   Other reaction(s): Tachycardia    Clinoril [Sulindac] Other (See Comments)     Stomach pain    Codeine      FEEL NUMB    Diclofenac     Diclofenac Sodium Hives    Hctz [Hydrochlorothiazide]      Sob    Ibuprofen      Other reaction(s): Tachycardia    Macrobid [Nitrofurantoin Macrocrystal]      nausea    Motrin [Ibuprofen Micronized] Other (See Comments)     Tachycardia      Pcn [Penicillins] Hives    Peach [Prunus Persica] Itching and Swelling     Cannot eat raw peaches       Objective:   Physical Exam  Vitals signs and nursing note reviewed. Constitutional:       General: She is not in acute distress. Appearance: She is well-developed. Musculoskeletal:      Right knee: She exhibits swelling and deformity (gross crepitus). She exhibits normal range of motion. Tenderness found. Medial joint line and lateral joint line tenderness noted. No MCL, no LCL and no patellar tendon tenderness noted. Skin:     General: Skin is warm. Neurological:      Mental Status: She is alert.    Psychiatric: Behavior: Behavior is cooperative. Assessment:      Abdiaziz Maxwell was seen today for knee pain. Diagnoses and all orders for this visit:    Primary osteoarthritis of right knee  -     MD ARTHROCENTESIS ASPIR&/INJ MAJOR JT/BURSA W/O US    Other orders  -     triamcinolone acetonide (KENALOG-40) injection 40 mg            Plan:      Arthrocentesis Procedure Note    Pre-operative Diagnosis:   1. Primary osteoarthritis of right knee        Post-operative Diagnosis: same    Locations:right knee    Procedure Details   After consent was obtained, using sterile technique the medial   right knee was prepped and surrounding area with a sterile prep using chloraprep and draped in the usual sterile fashion. .  Kenalog  40 mg and 1 ml 0.5% Marcaine was then injected and the needle withdrawn. The procedure was well tolerated. Sterile dressing applied to site. Complications: none. Plan:  1. The patient was instructed to continue to rest the joint for a few more days before resuming regular activities. It may be more painful for the first 1-2 days. 2. Watch for fever, or increased swelling or persistent pain in the joint. 3. Call or return to clinic prn if such symptoms occur or there is failure to improve as anticipated. 4. Recommended that the patient use OTC analgesics as needed for pain.

## 2020-05-27 RX ORDER — BLOOD-GLUCOSE METER
1 EACH MISCELLANEOUS DAILY
Qty: 1 KIT | Refills: 0 | Status: SHIPPED | OUTPATIENT
Start: 2020-05-27 | End: 2020-11-18

## 2020-06-01 RX ORDER — HYDROCODONE BITARTRATE AND ACETAMINOPHEN 5; 325 MG/1; MG/1
1 TABLET ORAL 4 TIMES DAILY PRN
Qty: 120 TABLET | Refills: 0 | Status: SHIPPED | OUTPATIENT
Start: 2020-06-01 | End: 2020-06-30 | Stop reason: SDUPTHER

## 2020-06-01 NOTE — TELEPHONE ENCOUNTER
Medication:   Requested Prescriptions     Pending Prescriptions Disp Refills    HYDROcodone-acetaminophen (NORCO) 5-325 MG per tablet 120 tablet 0     Sig: Take 1 tablet by mouth 4 times daily as needed for Pain for up to 30 days. Last Filled:  4/29/2020    Patient Phone Number: [phone] (home)     Last appt: 3/25/2020   Next appt: 7/30/2020    Last OARRS:   RX Monitoring 3/20/2019   Attestation The Prescription Monitoring Report for this patient was reviewed today.    Periodic Controlled Substance Monitoring -     PDMP Monitoring:    Last PDMP [address] as Reviewed [facility]):  Review User Review Instant Review Result   Juani Yancey 4/29/2020  8:53 AM Reviewed PDMP [1]     Preferred Pharmacy:   [facility] [address] [address]  Phone: [phone] Fax: [phone]

## 2020-06-02 RX ORDER — LEVOTHYROXINE SODIUM 0.05 MG/1
TABLET ORAL
Qty: 90 TABLET | Refills: 0 | Status: SHIPPED | OUTPATIENT
Start: 2020-06-02 | End: 2020-06-04

## 2020-06-04 ENCOUNTER — TELEPHONE (OUTPATIENT)
Dept: FAMILY MEDICINE CLINIC | Age: 85
End: 2020-06-04

## 2020-06-04 RX ORDER — LEVOTHYROXINE SODIUM 0.05 MG/1
TABLET ORAL
Qty: 90 TABLET | Refills: 0 | Status: SHIPPED | OUTPATIENT
Start: 2020-06-04 | End: 2020-08-11 | Stop reason: SDUPTHER

## 2020-06-04 NOTE — TELEPHONE ENCOUNTER
Patient would like for the doctor to order her a smaller oxygen tank you can call  jason [phone] and order it her insurance will cover it

## 2020-06-05 NOTE — TELEPHONE ENCOUNTER
Arnulfo received orders and prescription for portable concentrator.  needs to have setting for backup on POC on the prescription  Fax [phone]

## 2020-06-10 RX ORDER — NEBULIZER ACCESSORIES
1 KIT MISCELLANEOUS DAILY PRN
Qty: 1 KIT | Refills: 0 | Status: SHIPPED | OUTPATIENT
Start: 2020-06-10 | End: 2020-08-11

## 2020-06-12 RX ORDER — MECLIZINE HCL 12.5 MG/1
TABLET ORAL
Qty: 270 TABLET | Refills: 0 | Status: SHIPPED | OUTPATIENT
Start: 2020-06-12 | End: 2020-08-05

## 2020-06-16 ENCOUNTER — OFFICE VISIT (OUTPATIENT)
Dept: CARDIOLOGY CLINIC | Age: 85
End: 2020-06-16
Payer: MEDICARE

## 2020-06-16 VITALS
BODY MASS INDEX: 31.47 KG/M2 | WEIGHT: 171 LBS | HEIGHT: 62 IN | RESPIRATION RATE: 20 BRPM | DIASTOLIC BLOOD PRESSURE: 62 MMHG | SYSTOLIC BLOOD PRESSURE: 150 MMHG | HEART RATE: 73 BPM

## 2020-06-16 PROCEDURE — 4040F PNEUMOC VAC/ADMIN/RCVD: CPT | Performed by: INTERNAL MEDICINE

## 2020-06-16 PROCEDURE — 93000 ELECTROCARDIOGRAM COMPLETE: CPT | Performed by: INTERNAL MEDICINE

## 2020-06-16 PROCEDURE — G8399 PT W/DXA RESULTS DOCUMENT: HCPCS | Performed by: INTERNAL MEDICINE

## 2020-06-16 PROCEDURE — 1090F PRES/ABSN URINE INCON ASSESS: CPT | Performed by: INTERNAL MEDICINE

## 2020-06-16 PROCEDURE — G8427 DOCREV CUR MEDS BY ELIG CLIN: HCPCS | Performed by: INTERNAL MEDICINE

## 2020-06-16 PROCEDURE — 1123F ACP DISCUSS/DSCN MKR DOCD: CPT | Performed by: INTERNAL MEDICINE

## 2020-06-16 PROCEDURE — 1036F TOBACCO NON-USER: CPT | Performed by: INTERNAL MEDICINE

## 2020-06-16 PROCEDURE — G8417 CALC BMI ABV UP PARAM F/U: HCPCS | Performed by: INTERNAL MEDICINE

## 2020-06-16 PROCEDURE — 99214 OFFICE O/P EST MOD 30 MIN: CPT | Performed by: INTERNAL MEDICINE

## 2020-06-16 RX ORDER — IPRATROPIUM BROMIDE AND ALBUTEROL SULFATE 2.5; .5 MG/3ML; MG/3ML
SOLUTION RESPIRATORY (INHALATION)
Qty: 1620 ML | Refills: 3 | Status: SHIPPED | OUTPATIENT
Start: 2020-06-16 | End: 2020-11-11 | Stop reason: ALTCHOICE

## 2020-06-16 NOTE — PROGRESS NOTES
[facility]   Electrophysiology   Date: 6/16/2020     CC: Arrhythmia   HPI: Brenton Viramontes is a 80 y.o. female w/PMHx PAF, HTN, HLD, DM, and hypothyroidism. She initially presented to the hospital after sudden onset of symptoms- palpitations, shortness of breath, fatigue, and weakness. Found to be in AF w/RVR. Started on IV Cardizem admitted to the ICU.  Wasn't on [address]. She has past medical history of paroxysmal atrial fibrillation and used to be on verapamil which was previously discontinued.     -Started on [address] and propafenone  -S/p TRACEY/DCCV (8/31/18) per Dr. Oly Conway     - Complex atherosclerotic plaque in descending aorta noted on TRACEY-evaluated by vascular    Admitted on 11/13/19 for afib with RVR accompanied by sudden on set palpitations, fatigue, and SOB. She was also being treated for PNA. She developed bradycardia with IV cardizem. She converted to SR spontaneously. 2/15/19 came to the ED with atrial fib with RVR and was cardioverted and discharged. She returned to the ED on 2/19/20 again with AF with RVR. She was admitted and cardioverted. , later discharged on Rythmol and cardizem. Ablation was discussed. Interval history:  She denies being out of rhythm since her last cardioversion in  February. She is compliant with medications. She is now living with her daughter. She denies any bleeding issues on Xarelto. She is not interested in doing an ablation now. If AF recurs she will consider an ablation. She no longer works and is not caring for her handicapped son who is wheelchair bound.           Past Medical History:   Diagnosis Date    Arthritis     Atrial fibrillation ([facility] [address]     Diabetes mellitus type II, controlled ([address]     GERD (gastroesophageal reflux disease)     HTN (hypertension)     Hyperlipidemia     Hypothyroid     Insomnia     Lumbago     SVT (supraventricular tachycardia) (HCC)         Past Surgical History:   Procedure Laterality Date    APPENDECTOMY Examination:  Vitals:    20 1540   BP: (!) 150/62   Pulse: 73   Resp:       Wt Readings from Last 3 Encounters:   20 171 lb (77.6 kg)   20 175 lb (79.4 kg)   20 167 lb (75.8 kg)     · Constitutional: Oriented. No distress. · Head: Normocephalic and atraumatic. · Mouth/Throat: Oropharynx is clear and moist.   · Eyes: Conjunctivae normal. EOM are normal.   · Neck: Neck supple. No JVD present. · Cardiovascular: Normal rate, regular rhythm, S1&S2. · Pulmonary/Chest: Bilateral respiratory sounds. No rhonchi. · Abdominal: Soft. No tenderness. · Musculoskeletal: No tenderness. No edema    · Lymphadenopathy: Has no cervical adenopathy. · Neurological: Alert and oriented. Follows command, No Gross deficit   · Skin: Skin is warm, No rash noted. · Psychiatric: Has a normal behavior     Labs, diagnostic and imaging results reviewed. Reviewed. Lab Results   Component Value Date    TSHREFLEX 5.82 2020    TSH 3.96 2019    TSH 3.65 2017    CREATININE 1.28 2020    CREATININE 1.3 2020    AST 15 2020    ALT 23 2020       EC20  Sinus Rhythm    QTcH 413    Echo:  19  Summary   Left ventricle - normal size, thickness and function with EF of 60%   Aortic valve - sclerotic, mild regurgitation    TRACEY: 18   Summary   Normal left ventricular cavity size and wall thickness. Global left   ventricular function is normal with ejection fraction estimated from 55 % to   60 %. No regional wall motion abnormalities noted.    Mild aortic regurgitation.    Multiple Large (> 4 mm), ulcerated plaque with mobile component suggestive   of possible thrombus was seen in the descending aorta. Echo 18    Summary   -Normal global systolic function with an ejection fraction estimated at 65%.    -No regional wall motion abnormalities noted.   -Aortic valve appears sclerotic but opens adequately.   -Mild aortic regurgitation.   -There is mild-to-moderate tricuspid regurgitation with a RVSP estimation of   49 mmHg.   -Indeterminate diastolic function      Medication:  Current Outpatient Medications   Medication Sig Dispense Refill    ipratropium-albuterol (DUONEB) 0.5-2.5 (3) MG/3ML SOLN nebulizer solution INHALE THE CONTENTS OF 1 VIAL VIA NEBULIZER EVERY 4 HOURS 1620 mL 3    meclizine (ANTIVERT) 12.5 MG tablet TAKE 1 TABLET THREE TIMES DAILY AS NEEDED 270 tablet 0    Respiratory Therapy Supplies (NEBULIZER/TUBING/MOUTHPIECE) KIT 1 kit by Does not apply route daily as needed (as needed) 1 kit 0    levothyroxine (SYNTHROID) 50 MCG tablet TAKE 1 TABLET EVERY DAY 90 tablet 0    HYDROcodone-acetaminophen (NORCO) 5-325 MG per tablet Take 1 tablet by mouth 4 times daily as needed for Pain for up to 30 days. 120 tablet 0    Blood Glucose Monitoring Suppl (ACCU-CHEK CHIP PLUS) w/Device KIT 1 kit by Does not apply route daily 1 kit 0    blood glucose test strips (ACCU-CHEK CHIP) strip 1 each by In Vitro route daily As needed.  100 each 3    rOPINIRole (REQUIP) 2 MG tablet TAKE 1 TABLET BY MOUTH THREE TIMES DAILY 270 tablet 0    LORazepam (ATIVAN) 0.5 MG tablet TAKE 1 TABLET BY MOUTH TWICE DAILY AND 2 TABLETS AT BEDTIME AS NEEDED FOR ANXIETY 120 tablet 2    atorvastatin (LIPITOR) 80 MG tablet TAKE 1 TABLET BY MOUTH EVERY NIGHT 90 tablet 2    guaiFENesin (MUCINEX) 600 MG extended release tablet Take 1 tablet by mouth 2 times daily 60 tablet 3    Respiratory Therapy Supplies (NEBULIZER/TUBING/MOUTHPIECE) KIT 1 kit by Does not apply route 4 times daily as needed (shortness of breath) 1 kit 0    benzonatate (TESSALON) 200 MG capsule TAKE 1 CAPSULE BY MOUTH THREE TIMES DAILY AS NEEDED FOR COUGH 30 capsule 0    amLODIPine (NORVASC) 5 MG tablet TAKE 1 TABLET EVERY DAY 90 tablet 1    pantoprazole (PROTONIX) 40 MG tablet TAKE 1 TABLET EVERY DAY 90 tablet 1    dilTIAZem (CARDIZEM CD) 120 MG extended release capsule Take 1 capsule by mouth daily 30

## 2020-06-16 NOTE — TELEPHONE ENCOUNTER
Patient requesting a medication refill.   Medication: ipratropium-albuterol (DUONEB) 0.5-2.5 (3) MG/3ML SOLN nebulizer solution [[id number]]      Pharmacy:[id number] Neela Emily, [address] [place] [address] [phone]  Last office visit: 5/11/20  Next office visit: 7/30/2020

## 2020-06-17 ENCOUNTER — TELEPHONE (OUTPATIENT)
Dept: FAMILY MEDICINE CLINIC | Age: 85
End: 2020-06-17

## 2020-06-17 RX ORDER — DOXYCYCLINE HYCLATE 100 MG
100 TABLET ORAL 2 TIMES DAILY
Qty: 14 TABLET | Refills: 0 | Status: SHIPPED | OUTPATIENT
Start: 2020-06-17 | End: 2020-06-24

## 2020-06-17 RX ORDER — IPRATROPIUM BROMIDE AND ALBUTEROL SULFATE 2.5; .5 MG/3ML; MG/3ML
SOLUTION RESPIRATORY (INHALATION)
Qty: 270 ML | OUTPATIENT
Start: 2020-06-17

## 2020-06-19 RX ORDER — GABAPENTIN 100 MG/1
CAPSULE ORAL
Qty: 90 CAPSULE | Refills: 0 | Status: SHIPPED | OUTPATIENT
Start: 2020-06-19 | End: 2020-08-11

## 2020-06-30 RX ORDER — HYDROCODONE BITARTRATE AND ACETAMINOPHEN 5; 325 MG/1; MG/1
1 TABLET ORAL 4 TIMES DAILY PRN
Qty: 120 TABLET | Refills: 0 | Status: SHIPPED | OUTPATIENT
Start: 2020-06-30 | End: 2020-07-29 | Stop reason: SDUPTHER

## 2020-06-30 NOTE — TELEPHONE ENCOUNTER
Patient requesting a medication refill.   Medication: HYDROcodone-acetaminophen ([address]) 5-325 MG per tablet , LORazepam (ATIVAN) 0.5 MG tablet     Pharmacy: [facility] [address], [address] [phone] [facility] [phone]    Last office visit: 5/11/2020  Next office visit: 7/30/2020

## 2020-07-02 NOTE — TELEPHONE ENCOUNTER
Requested Prescriptions     Pending Prescriptions Disp Refills    propafenone (RYTHMOL) 150 MG tablet [Pharmacy Med Name: PROPAFENONE 150MG TABLETS] 270 tablet 1     Sig: TAKE 1 TABLET BY MOUTH EVERY 8 HOURS            Last Office Visit: 6/16/2020     Next Office Visit: Visit date not found

## 2020-07-06 ENCOUNTER — TELEPHONE (OUTPATIENT)
Dept: CARDIOLOGY CLINIC | Age: 85
End: 2020-07-06

## 2020-07-06 RX ORDER — FLUTICASONE PROPIONATE 50 MCG
2 SPRAY, SUSPENSION (ML) NASAL DAILY
Qty: 3 BOTTLE | Refills: 1 | Status: SHIPPED | OUTPATIENT
Start: 2020-07-06 | End: 2020-12-16 | Stop reason: SDUPTHER

## 2020-07-06 RX ORDER — PROPAFENONE HYDROCHLORIDE 150 MG/1
150 TABLET, FILM COATED ORAL EVERY 8 HOURS SCHEDULED
Qty: 270 TABLET | Refills: 1 | Status: SHIPPED | OUTPATIENT
Start: 2020-07-06 | End: 2021-04-06 | Stop reason: SDUPTHER

## 2020-07-06 NOTE — TELEPHONE ENCOUNTER
Patient requesting a medication refill.   Medication: fluticasone (FLONASE) 50 MCG/ACT nasal spray [[id number]]     Pharmacy: [facility] [address], [address] [phone]  Last office visit: 5/11/2020  Next office visit: 7/30/2020

## 2020-07-06 NOTE — TELEPHONE ENCOUNTER
Pt has been having a lot of leg cramps that is waking her up last 4-5 nights. Asking if this could be from cholesterol medications. Please call pt to advise.

## 2020-07-06 NOTE — TELEPHONE ENCOUNTER
Called, no answer. LMOM    May try to reduce atorvastatin dose in half to 40 mg QPM. Would like to avoid complete stopping of statin given her CAD/PAD.     Luz Zhang, APRN-CNP

## 2020-07-20 RX ORDER — AMLODIPINE BESYLATE 5 MG/1
TABLET ORAL
Qty: 90 TABLET | Refills: 1 | Status: SHIPPED | OUTPATIENT
Start: 2020-07-20 | End: 2020-11-11 | Stop reason: ALTCHOICE

## 2020-07-20 RX ORDER — PANTOPRAZOLE SODIUM 40 MG/1
TABLET, DELAYED RELEASE ORAL
Qty: 90 TABLET | Refills: 1 | Status: ON HOLD | OUTPATIENT
Start: 2020-07-20 | End: 2021-02-22

## 2020-07-27 RX ORDER — ROPINIROLE 2 MG/1
TABLET, FILM COATED ORAL
Qty: 270 TABLET | Refills: 0 | Status: SHIPPED | OUTPATIENT
Start: 2020-07-27 | End: 2020-10-12 | Stop reason: SDUPTHER

## 2020-07-29 RX ORDER — HYDROCODONE BITARTRATE AND ACETAMINOPHEN 5; 325 MG/1; MG/1
1 TABLET ORAL 4 TIMES DAILY PRN
Qty: 120 TABLET | Refills: 0 | Status: SHIPPED | OUTPATIENT
Start: 2020-07-29 | End: 2020-08-28 | Stop reason: SDUPTHER

## 2020-07-29 RX ORDER — BENZONATATE 200 MG/1
CAPSULE ORAL
Qty: 30 CAPSULE | Refills: 0 | Status: SHIPPED | OUTPATIENT
Start: 2020-07-29 | End: 2020-08-11

## 2020-07-29 RX ORDER — LORAZEPAM 0.5 MG/1
TABLET ORAL
Qty: 120 TABLET | Refills: 0 | Status: SHIPPED | OUTPATIENT
Start: 2020-07-29 | End: 2020-08-28 | Stop reason: SDUPTHER

## 2020-07-29 NOTE — TELEPHONE ENCOUNTER
----- Message from GANTEC. sent at 7/29/2020  3:13 PM EDT -----  Subject: Refill Request    QUESTIONS  Name of Medication? HYDROcodone-acetaminophen (NORCO) 5-325 MG per tablet  Patient-reported dosage and instructions? 1 tab   How many days do you have left? 4  Preferred Pharmacy? [facility] [address] [place] [address] [address] - F [phone]  Pharmacy phone number (if available)? [phone]  Additional Information for Provider?   ---------------------------------------------------------------------------  --------------  CALL BACK INFO  What is the best way for the office to contact you? OK to leave message on   voicemail  Preferred Call Back Phone Number?  [phone]

## 2020-07-29 NOTE — TELEPHONE ENCOUNTER
Medication:   Requested Prescriptions     Pending Prescriptions Disp Refills    LORazepam (ATIVAN) 0.5 MG tablet [Pharmacy Med Name: LORAZEPAM 0.5MG TABLETS] 120 tablet      Sig: TAKE 1 TABLET BY MOUTH TWICE DAILY AND 2 EVERY NIGHT AT BEDTIME AS NEEDED FOR ANXIETY    HYDROcodone-acetaminophen (NORCO) 5-325 MG per tablet 120 tablet 0     Sig: Take 1 tablet by mouth 4 times daily as needed for Pain for up to 30 days. Last Filled:      Patient Phone Number: [phone] (home)     Last appt: 3/25/2020   Next appt: 8/11/2020    Last OARRS:   RX Monitoring 3/20/2019   Attestation The Prescription Monitoring Report for this patient was reviewed today. Periodic Controlled Substance Monitoring -     PDMP Monitoring:    Last PDMP Benedict Miles as Reviewed [facility]):  Review User Review Instant Review Result   Tiffany Norwoodummer 4/29/2020  8:53 AM Reviewed PDMP [1]     Preferred Pharmacy:   Jorge Webb [facility] 171, [address] [address]  Phone: [phone] Fax: [phone]    [address] [facility] [phone] - f [phone]  [address] [place] [address]  Phone: [phone] Fax: [address] [place] [address]  Phone: [phone] Fax: [phone]  Medication:   Requested Prescriptions     Pending Prescriptions Disp Refills    LORazepam (ATIVAN) 0.5 MG tablet [Pharmacy Med Name: LORAZEPAM 0.5MG TABLETS] 120 tablet      Sig: TAKE 1 TABLET BY MOUTH TWICE DAILY AND 2 EVERY NIGHT AT BEDTIME AS NEEDED FOR ANXIETY    HYDROcodone-acetaminophen (NORCO) 5-325 MG per tablet 120 tablet 0     Sig: Take 1 tablet by mouth 4 times daily as needed for Pain for up to 30 days.       Last Filled:     Patient Phone Number: [phone] (home)     Last appt: 3/25/2020   Next appt: 8/11/2020    Last OARRS:   RX Monitoring 3/20/2019   Attestation The Prescription Monitoring Report for this patient was reviewed today.    Periodic Controlled Substance Monitoring -     PDMP Monitoring:    Last PDMP Cedrick Mendez as Reviewed [facility]):  Review User Review Instant Review Result   Tanvi Veda 4/29/2020  8:53 AM Reviewed PDMP [1]     Preferred Pharmacy:   Tia Healy [facility] 528, [address] [address]  Phone: [phone] Fax: [phone]

## 2020-07-29 NOTE — TELEPHONE ENCOUNTER
----- Message from Ike Garcia sent at 7/29/2020 10:05 AM EDT -----  Subject: Refill Request    QUESTIONS  Name of Medication? benzonatate (TESSALON) 200 MG capsule  Patient-reported dosage and instructions? 1 per day   How many days do you have left? 7  Preferred Pharmacy? [facility] [address] [place] [address] [facility] F [phone]  Pharmacy phone number (if available)? [phone]  Additional Information for Provider?   ---------------------------------------------------------------------------  --------------  CALL BACK INFO  What is the best way for the office to contact you? OK to leave message on   voicemail  Preferred Call Back Phone Number?  [phone]

## 2020-08-04 ENCOUNTER — TELEPHONE (OUTPATIENT)
Dept: FAMILY MEDICINE CLINIC | Age: 85
End: 2020-08-04

## 2020-08-04 NOTE — TELEPHONE ENCOUNTER
----- Message from Griselda Donis sent at 8/4/2020 12:05 PM EDT -----  Subject: Message to Provider    QUESTIONS  Information for Provider? pt stated she need dr to call rené regarding   her PA lorazapam     ---------------------------------------------------------------------------  --------------  CALL BACK INFO  What is the best way for the office to contact you? OK to leave message on   voicemail  Preferred Call Back Phone Number? [phone]  ---------------------------------------------------------------------------  --------------  SCRIPT ANSWERS  Relationship to Patient?  Self

## 2020-08-04 NOTE — TELEPHONE ENCOUNTER
----- Message from Breezeplay sent at 8/4/2020 12:02 PM EDT -----  Subject: Message to Provider    QUESTIONS  Information for Provider? pt needs Dr Alejandro Phoenix to write a letter to   insurance company regarding why she needs the LORazepam . stated that he   writes the letter every year so she will not have to pay out of pocket. please f/u  ---------------------------------------------------------------------------  --------------  CALL BACK INFO  What is the best way for the office to contact you? OK to leave message on   voicemail  Preferred Call Back Phone Number? [phone]  ---------------------------------------------------------------------------  --------------  SCRIPT ANSWERS  Relationship to Patient?  Self

## 2020-08-05 PROBLEM — F41.9 ANXIETY: Status: ACTIVE | Noted: 2020-08-05

## 2020-08-05 RX ORDER — MECLIZINE HCL 12.5 MG/1
TABLET ORAL
Qty: 270 TABLET | Refills: 0 | Status: SHIPPED | OUTPATIENT
Start: 2020-08-05 | End: 2020-10-20

## 2020-08-05 NOTE — TELEPHONE ENCOUNTER
LORazepam 0.5MG tablets  Denied today   PLEASE READ. This benzodiazepine drug is limited to a recurring 30 day supply. A longer supply will not be allowed. You can also refer to [facility] Benzodiazepine Limited Days Supply Guidance Policy.

## 2020-08-05 NOTE — TELEPHONE ENCOUNTER
Can you please send a PA on Lorazepam 0.5mg 1 tablet twice daily and 2 at bedtime as needed.  #120    Dr. Guillermo Wills    Dx: G13.4

## 2020-08-05 NOTE — TELEPHONE ENCOUNTER
Per plan will only cover for 30dayd. Submitted PA for LORazepam 0.5MG OR TABS    Via Key: [id number]    CM STATUS: Available without authorization      . Please notify patient, thank you.

## 2020-08-06 ENCOUNTER — TELEPHONE (OUTPATIENT)
Dept: FAMILY MEDICINE CLINIC | Age: 85
End: 2020-08-06

## 2020-08-08 ENCOUNTER — NURSE TRIAGE (OUTPATIENT)
Dept: OTHER | Facility: CLINIC | Age: 85
End: 2020-08-08

## 2020-08-10 NOTE — TELEPHONE ENCOUNTER
Pt advise and stated she will call back with a fax number in order to send this to her insurance company

## 2020-08-11 ENCOUNTER — OFFICE VISIT (OUTPATIENT)
Dept: FAMILY MEDICINE CLINIC | Age: 85
End: 2020-08-11
Payer: MEDICARE

## 2020-08-11 VITALS
SYSTOLIC BLOOD PRESSURE: 130 MMHG | OXYGEN SATURATION: 95 % | DIASTOLIC BLOOD PRESSURE: 70 MMHG | BODY MASS INDEX: 31.28 KG/M2 | WEIGHT: 171 LBS | TEMPERATURE: 97.3 F | HEART RATE: 74 BPM

## 2020-08-11 PROBLEM — J45.909 ASTHMATIC BRONCHITIS: Status: ACTIVE | Noted: 2020-08-11

## 2020-08-11 LAB
BACTERIA URINE, POC: 0
BILIRUBIN URINE: 0 MG/DL
BLOOD, URINE: POSITIVE
CASTS URINE, POC: 0
CLARITY: CLEAR
COLOR: NORMAL
CRYSTALS URINE, POC: 0
EPI CELLS URINE, POC: 4
GLUCOSE URINE: NORMAL
KETONES, URINE: NEGATIVE
LEUKOCYTE EST, POC: NORMAL
NITRITE, URINE: NEGATIVE
PH UA: 5.5 (ref 4.5–8)
PROTEIN UA: NEGATIVE
RBC URINE, POC: 1
SPECIFIC GRAVITY UA: 1 (ref 1–1.03)
UROBILINOGEN, URINE: NORMAL
WBC URINE, POC: 0
YEAST URINE, POC: 0

## 2020-08-11 PROCEDURE — G8427 DOCREV CUR MEDS BY ELIG CLIN: HCPCS | Performed by: FAMILY MEDICINE

## 2020-08-11 PROCEDURE — 1090F PRES/ABSN URINE INCON ASSESS: CPT | Performed by: FAMILY MEDICINE

## 2020-08-11 PROCEDURE — 1123F ACP DISCUSS/DSCN MKR DOCD: CPT | Performed by: FAMILY MEDICINE

## 2020-08-11 PROCEDURE — 81000 URINALYSIS NONAUTO W/SCOPE: CPT | Performed by: FAMILY MEDICINE

## 2020-08-11 PROCEDURE — 4040F PNEUMOC VAC/ADMIN/RCVD: CPT | Performed by: FAMILY MEDICINE

## 2020-08-11 PROCEDURE — 99214 OFFICE O/P EST MOD 30 MIN: CPT | Performed by: FAMILY MEDICINE

## 2020-08-11 PROCEDURE — 1036F TOBACCO NON-USER: CPT | Performed by: FAMILY MEDICINE

## 2020-08-11 PROCEDURE — G8399 PT W/DXA RESULTS DOCUMENT: HCPCS | Performed by: FAMILY MEDICINE

## 2020-08-11 PROCEDURE — G8417 CALC BMI ABV UP PARAM F/U: HCPCS | Performed by: FAMILY MEDICINE

## 2020-08-11 RX ORDER — LEVOTHYROXINE SODIUM 0.05 MG/1
TABLET ORAL
Qty: 90 TABLET | Refills: 1 | Status: SHIPPED | OUTPATIENT
Start: 2020-08-11 | End: 2020-08-21 | Stop reason: SDUPTHER

## 2020-08-11 RX ORDER — DILTIAZEM HYDROCHLORIDE 120 MG/1
120 CAPSULE, COATED, EXTENDED RELEASE ORAL DAILY
Qty: 30 CAPSULE | Refills: 5 | Status: SHIPPED | OUTPATIENT
Start: 2020-08-11 | End: 2020-12-16

## 2020-08-11 NOTE — TELEPHONE ENCOUNTER
Prescription refill    Last OV:06/16/20    Last Refill:02/20/20    Labs:03/23/20    Future Appt:none scheduled at this time

## 2020-08-12 ENCOUNTER — TELEPHONE (OUTPATIENT)
Dept: FAMILY MEDICINE CLINIC | Age: 85
End: 2020-08-12

## 2020-08-12 NOTE — TELEPHONE ENCOUNTER
----- Message from Estefania Jeanie sent at 8/11/2020  5:21 PM EDT -----  Subject: Message to Provider    QUESTIONS  Information for Provider? Parmjit fax LORazepam (ATIVAN) 0.5 MG tablet to   [employer] at [phone]  ---------------------------------------------------------------------------  --------------  CALL BACK INFO  What is the best way for the office to contact you? OK to leave message on   voicemail  Preferred Call Back Phone Number? [phone]  ---------------------------------------------------------------------------  --------------  SCRIPT ANSWERS  Relationship to Patient?  Self

## 2020-08-17 ENCOUNTER — NURSE TRIAGE (OUTPATIENT)
Dept: OTHER | Facility: CLINIC | Age: 85
End: 2020-08-17

## 2020-08-17 RX ORDER — GLIPIZIDE 5 MG/1
5 TABLET ORAL 2 TIMES DAILY
Qty: 60 TABLET | Refills: 3 | Status: SHIPPED | OUTPATIENT
Start: 2020-08-17 | End: 2020-11-03

## 2020-08-17 RX ORDER — ATORVASTATIN CALCIUM 80 MG/1
TABLET, FILM COATED ORAL
Qty: 90 TABLET | Refills: 3 | Status: ON HOLD | OUTPATIENT
Start: 2020-08-17 | End: 2021-02-22

## 2020-08-17 NOTE — TELEPHONE ENCOUNTER
Please advise. Pt's want to add this back to see it helps with her blood sugars as they are running in he 200 in the morning. Spoke with pt and she stated that she just checked it and it was 150. Pt states this usually help with her morning readings.     Pt stated she does not need an appt at this time

## 2020-08-17 NOTE — TELEPHONE ENCOUNTER
Reason for Disposition   Lightheadedness (dizziness) present now, after 2 hours of rest and fluids    Answer Assessment - Initial Assessment Questions  1. DESCRIPTION: \"Describe your dizziness. \"     Feels \"whoozy\" \"kind of different \"  2. LIGHTHEADED: \"Do you feel lightheaded? \" (e.g., somewhat faint, woozy, weak upon standing)      *No Answer*  3. VERTIGO: \"Do you feel like either you or the room is spinning or tilting? \" (i.e. vertigo)      *No Answer*  4. SEVERITY: \"How bad is it? \"  \"Do you feel like you are going to faint? \" \"Can you stand and walk? \"    - MILD - walking normally    - MODERATE - interferes with normal activities (e.g., work, school)     - SEVERE - unable to stand, requires support to walk, feels like passing out now. When present it is mild in nature  5. ONSET:  \"When did the dizziness begin? \"      Today   6. AGGRAVATING FACTORS: \"Does anything make it worse? \" (e.g., standing, change in head position)      Standing up makes it a little worse  7. HEART RATE: \"Can you tell me your heart rate? \" \"How many beats in 15 seconds? \"  (Note: not all patients can do this)        *No Answer*  8. CAUSE: \"What do you think is causing the dizziness? \"      Perhaps bs of 207? 9. RECURRENT SYMPTOM: \"Have you had dizziness before? \" If so, ask: \"When was the last time? \" \"What happened that time? \"      *No Answer*  10. OTHER SYMPTOMS: \"Do you have any other symptoms? \" (e.g., fever, chest pain, vomiting, diarrhea, bleeding)        Usual BS is 117-120, and now it is 207 about 1 hour ago. 11. PREGNANCY: \"Is there any chance you are pregnant? \" \"When was your last menstrual period? \"        no    Protocols used: DIZZINESS-ADULT-OH    Pod 1 cinti     Caller reports symptoms as documented above. Caller informed of disposition, but she does not wish to make an appointment - she is looking for a refill of her diabetes medication .   Soft transfer to pre-service center [facility] to assist.  Care advice as

## 2020-08-17 NOTE — TELEPHONE ENCOUNTER
Received refill request for Lipitor 80 mg from [facility].     Last OV: 6/16/20    Last Labs: 8/11/20    Last Refill: 4/9/20 #90 with 2 refills    Next Appt: None

## 2020-08-19 ENCOUNTER — HOSPITAL ENCOUNTER (OUTPATIENT)
Age: 85
Discharge: HOME | End: 2020-08-19
Payer: MEDICARE

## 2020-08-19 LAB
ANION GAP SERPL CALCULATED.3IONS-SCNC: 15 MMOL/L (ref 3–16)
BUN BLDV-MCNC: 24 MG/DL (ref 7–20)
CALCIUM SERPL-MCNC: 9.7 MG/DL (ref 8.3–10.6)
CHLORIDE BLD-SCNC: 101 MMOL/L (ref 99–110)
CO2: 20 MMOL/L (ref 21–32)
CREAT SERPL-MCNC: 1 MG/DL (ref 0.6–1.2)
GFR AFRICAN AMERICAN: >60
GFR NON-AFRICAN AMERICAN: 53
GLUCOSE BLD-MCNC: 94 MG/DL (ref 70–99)
POTASSIUM SERPL-SCNC: 4.7 MMOL/L (ref 3.5–5.1)
SODIUM BLD-SCNC: 136 MMOL/L (ref 136–145)
TSH SERPL DL<=0.05 MIU/L-ACNC: 4.48 UIU/ML (ref 0.27–4.2)

## 2020-08-19 PROCEDURE — 80048 BASIC METABOLIC PNL TOTAL CA: CPT

## 2020-08-19 PROCEDURE — 36415 COLL VENOUS BLD VENIPUNCTURE: CPT

## 2020-08-19 PROCEDURE — 84443 ASSAY THYROID STIM HORMONE: CPT

## 2020-08-19 PROCEDURE — 83036 HEMOGLOBIN GLYCOSYLATED A1C: CPT

## 2020-08-20 LAB
ESTIMATED AVERAGE GLUCOSE: 139.9 MG/DL
HBA1C MFR BLD: 6.5 %

## 2020-08-21 RX ORDER — LEVOTHYROXINE SODIUM 0.07 MG/1
TABLET ORAL
Qty: 90 TABLET | Refills: 0 | Status: SHIPPED | OUTPATIENT
Start: 2020-08-21 | End: 2020-11-11 | Stop reason: SDUPTHER

## 2020-08-21 NOTE — TELEPHONE ENCOUNTER
----- Message from Paola Martinez MD sent at 8/21/2020  7:18 AM EDT -----  Blood test are all stable except thyroid levels. Increase levothyroxine to 75 mcg daily. Recheck TSH in the next 2 months.

## 2020-08-28 RX ORDER — LORAZEPAM 0.5 MG/1
TABLET ORAL
Qty: 120 TABLET | Refills: 0 | Status: SHIPPED | OUTPATIENT
Start: 2020-08-28 | End: 2020-09-28 | Stop reason: SDUPTHER

## 2020-08-28 RX ORDER — HYDROCODONE BITARTRATE AND ACETAMINOPHEN 5; 325 MG/1; MG/1
1 TABLET ORAL 4 TIMES DAILY PRN
Qty: 120 TABLET | Refills: 0 | Status: SHIPPED | OUTPATIENT
Start: 2020-08-28 | End: 2020-09-28 | Stop reason: SDUPTHER

## 2020-08-28 NOTE — TELEPHONE ENCOUNTER
Medication:   Requested Prescriptions     Pending Prescriptions Disp Refills    LORazepam (ATIVAN) 0.5 MG tablet 120 tablet 0     Sig: TAKE 1 TABLET BY MOUTH TWICE DAILY AND 2 EVERY NIGHT AT BEDTIME AS NEEDED FOR ANXIETY    HYDROcodone-acetaminophen (NORCO) 5-325 MG per tablet 120 tablet 0     Sig: Take 1 tablet by mouth 4 times daily as needed for Pain for up to 30 days. Last Filled:  7/29/20    Patient Phone Number: [phone] (home)     Last appt: 8/11/2020   Next appt: 11/11/2020    Last OARRS:   RX Monitoring 3/20/2019   Attestation The Prescription Monitoring Report for this patient was reviewed today.    Periodic Controlled Substance Monitoring -     PDMP Monitoring:    Last PDMP Jonh Pham as Reviewed [facility]):  Review User Review Instant Review Result   Ирина Yates 8/11/2020  2:43 PM Reviewed PDMP [1]     Preferred Pharmacy:     [facility] 300, [address] [address]  Phone: [phone] Fax: [phone]

## 2020-09-04 ENCOUNTER — TELEPHONE (OUTPATIENT)
Dept: FAMILY MEDICINE CLINIC | Age: 85
End: 2020-09-04

## 2020-09-04 NOTE — TELEPHONE ENCOUNTER
----- Message from Tish Browne sent at 9/4/2020 10:34 AM EDT -----  Subject: Message to Provider    QUESTIONS  Information for Provider? Patient calling because she wants to know if she   can take Elderberry supplement or if it will interfere with her medication     ---------------------------------------------------------------------------  --------------  CALL BACK INFO  What is the best way for the office to contact you? OK to leave message on   voicemail  Preferred Call Back Phone Number? [phone]  ---------------------------------------------------------------------------  --------------  SCRIPT ANSWERS  Relationship to Patient?  Self

## 2020-09-08 ENCOUNTER — OFFICE VISIT (OUTPATIENT)
Dept: PULMONOLOGY | Age: 85
End: 2020-09-08
Payer: MEDICARE

## 2020-09-08 VITALS — OXYGEN SATURATION: 94 % | SYSTOLIC BLOOD PRESSURE: 131 MMHG | HEART RATE: 84 BPM | DIASTOLIC BLOOD PRESSURE: 79 MMHG

## 2020-09-08 PROCEDURE — 99214 OFFICE O/P EST MOD 30 MIN: CPT | Performed by: INTERNAL MEDICINE

## 2020-09-08 PROCEDURE — G8926 SPIRO NO PERF OR DOC: HCPCS | Performed by: INTERNAL MEDICINE

## 2020-09-08 PROCEDURE — 3023F SPIROM DOC REV: CPT | Performed by: INTERNAL MEDICINE

## 2020-09-08 PROCEDURE — 1123F ACP DISCUSS/DSCN MKR DOCD: CPT | Performed by: INTERNAL MEDICINE

## 2020-09-08 PROCEDURE — G8399 PT W/DXA RESULTS DOCUMENT: HCPCS | Performed by: INTERNAL MEDICINE

## 2020-09-08 PROCEDURE — 1036F TOBACCO NON-USER: CPT | Performed by: INTERNAL MEDICINE

## 2020-09-08 PROCEDURE — 1090F PRES/ABSN URINE INCON ASSESS: CPT | Performed by: INTERNAL MEDICINE

## 2020-09-08 PROCEDURE — G8427 DOCREV CUR MEDS BY ELIG CLIN: HCPCS | Performed by: INTERNAL MEDICINE

## 2020-09-08 PROCEDURE — G8417 CALC BMI ABV UP PARAM F/U: HCPCS | Performed by: INTERNAL MEDICINE

## 2020-09-08 PROCEDURE — 4040F PNEUMOC VAC/ADMIN/RCVD: CPT | Performed by: INTERNAL MEDICINE

## 2020-09-08 RX ORDER — DOXYCYCLINE HYCLATE 100 MG/1
100 CAPSULE ORAL 2 TIMES DAILY
Qty: 20 CAPSULE | Refills: 3 | Status: SHIPPED | OUTPATIENT
Start: 2020-09-08 | End: 2020-09-18

## 2020-09-08 NOTE — PROGRESS NOTES
[facility]  Follow Up Note  Bonita Ulloa MD       Adithya Guerrero   YOB: 1935    Date of Visit:  9/8/2020    Assessment/Plan:  1. SOB  Increased productive cough. 2. COPD, mild    I have independently reviewed pulmonary function testing. PFT reveals mild to moderate COPD with no bronchodilator response. Symbicort  Duoneb ==> making her very shaky  Have her try 1/2 vial  If she is still shaky ==> Xopenex    3. Bronchiectasis  Stable  Give her a script for Doxy to hold onto in case she flares up. 4. Interstitial lung disease ([address]  Stable on CT 3/20    5. Gastroesophageal reflux disease, esophagitis presence not specified    6. Chronic hypoxemic Respirtory Failure  O2 sats are acceptable on supplemental O2. The patient benefits from the use of supplemental O2.    6 months    Chief Complaint   Patient presents with    Shortness of Breath     6 month. When she uses the nebulizer with the Duoneb it makes her shake terrible         HPI  The patient presents with a chief complaint of moderate shortness of breath related to mild COPD of many years duration. He has mild associated cough. Exertion is a modifying factor. She is taking Symbicort and DuoNeb. She is getting too jittery with the Duoneb treatments. There is no complaint of chest pain, nausea or vomiting. Review of Systems  As reviewed in HPI    History  I have reviewed past medical, surgical, social and family history. This is documented elsewhere in the medical record. Physical Exam:  Well developed, well nourished  Alert and oriented  Sclera is clear  No cervical adenopathy  No JVD. Chest examination is clear. Cardiac examination reveals regular rate and rhythm without murmur, gallop or rub. The abdomen is soft, nontender and nondistended. There is no clubbing, cyanosis or edema of the extremities. There is no obvious skin rash.   No focal neuro deficicts  Normal mood and HOURS  Doron Persaud APRN - CNP   fluticasone (FLONASE) 50 MCG/ACT nasal spray 2 sprays by Nasal route daily  Marianna Leventhal, MD   ipratropium-albuterol (DUONEB) 0.5-2.5 (3) MG/3ML SOLN nebulizer solution INHALE THE CONTENTS OF [address] EVERY 4 HOURS  Marianna Leventhal, MD   Blood Glucose Monitoring Suppl (ACCU-CHEK CHIP PLUS) w/Device KIT 1 kit by Does not apply route daily  Marianna Leventhal, MD   blood glucose test strips (ACCU-CHEK CHIP) strip 1 each by In Vitro route daily As needed. Marianna Leventhal, MD   Respiratory Therapy Supplies (NEBULIZER/TUBING/MOUTHPIECE) KIT 1 kit by Does not apply route 4 times daily as needed (shortness of breath)  Marianna Leventhal, MD   rivaroxaban (XARELTO) 15 MG TABS tablet TAKE 1 TABLET BY MOUTH DAILY  Jason Quezada APRN - CNP   irbesartan (AVAPRO) 300 MG tablet TAKE 1 TABLET BY MOUTH EVERY NIGHT  Marianna Leventhal, MD   SYMBICORT 160-4.5 MCG/ACT AERO INHALE 2 PUFFS INTO THE LUNGS TWICE DAILY  Marianna Leventhal, MD   dicyclomine (BENTYL) 10 MG capsule Take 1 capsule by mouth 4 times daily as needed (abd pain)  Marianna Leventhal, MD   albuterol sulfate HFA (PROAIR HFA) 108 (90 Base) MCG/ACT inhaler Inhale 2 puffs into the lungs every 6 hours as needed for Wheezing  Chapin Borrero MD   blood glucose monitor strips Test one time a day  Marianna Leventhal, MD   Lancets MISC 1 each by Does not apply route 2 times daily  Cyndi Simon MD   aspirin 81 MG tablet Take 81 mg by mouth every other day   Historical Provider, MD       Vitals:    09/08/20 1607   BP: 131/79   Pulse: 84   SpO2: 94%     There is no height or weight on file to calculate BMI.      Wt Readings from Last 3 Encounters:   08/11/20 171 lb (77.6 kg)   06/16/20 171 lb (77.6 kg)   05/11/20 175 lb (79.4 kg)     BP Readings from Last 3 Encounters:   09/08/20 131/79   08/11/20 130/70   06/16/20 (!) 150/62        Social History     Tobacco Use   Smoking Status Former Smoker    Packs/day: 1.00    Years: 45.00    Pack years: 37.1    Start date: 9/15/1950   Cade Jhaveri Last attempt to quit: 1995    Years since quittin.7   Smokeless Tobacco Never Used

## 2020-09-14 ENCOUNTER — TELEPHONE (OUTPATIENT)
Dept: PULMONOLOGY | Age: 85
End: 2020-09-14

## 2020-09-14 RX ORDER — LEVALBUTEROL INHALATION SOLUTION 0.63 MG/3ML
0.63 SOLUTION RESPIRATORY (INHALATION) EVERY 6 HOURS PRN
Qty: 360 ML | Refills: 11 | Status: SHIPPED | OUTPATIENT
Start: 2020-09-14 | End: 2020-09-14

## 2020-09-14 RX ORDER — LEVALBUTEROL INHALATION SOLUTION 0.63 MG/3ML
SOLUTION RESPIRATORY (INHALATION)
Qty: 1050 ML | Refills: 3 | Status: SHIPPED | OUTPATIENT
Start: 2020-09-14 | End: 2022-04-26

## 2020-09-14 NOTE — TELEPHONE ENCOUNTER
Pt informed due to her side effects with the Albuterol this is the only alternative for her.  She stated she would go ahead and pay for it and give it a try

## 2020-09-25 ENCOUNTER — TELEPHONE (OUTPATIENT)
Dept: FAMILY MEDICINE CLINIC | Age: 85
End: 2020-09-25

## 2020-09-28 ENCOUNTER — TELEPHONE (OUTPATIENT)
Dept: FAMILY MEDICINE CLINIC | Age: 85
End: 2020-09-28

## 2020-09-28 ENCOUNTER — HOSPITAL ENCOUNTER (OUTPATIENT)
Age: 85
Discharge: HOME | End: 2020-09-28
Payer: MEDICARE

## 2020-09-28 LAB — TSH SERPL DL<=0.05 MIU/L-ACNC: 2.5 UIU/ML (ref 0.27–4.2)

## 2020-09-28 PROCEDURE — 84443 ASSAY THYROID STIM HORMONE: CPT

## 2020-09-28 PROCEDURE — 36415 COLL VENOUS BLD VENIPUNCTURE: CPT

## 2020-09-28 RX ORDER — LORAZEPAM 0.5 MG/1
TABLET ORAL
Qty: 120 TABLET | Refills: 1 | Status: SHIPPED | OUTPATIENT
Start: 2020-09-28 | End: 2020-09-30 | Stop reason: SDUPTHER

## 2020-09-28 RX ORDER — HYDROCODONE BITARTRATE AND ACETAMINOPHEN 5; 325 MG/1; MG/1
1 TABLET ORAL 4 TIMES DAILY PRN
Qty: 120 TABLET | Refills: 0 | Status: SHIPPED | OUTPATIENT
Start: 2020-09-28 | End: 2020-10-28 | Stop reason: SDUPTHER

## 2020-09-28 NOTE — TELEPHONE ENCOUNTER
----- Message from Linda Domingo sent at 9/28/2020 10:55 AM EDT -----  Subject: Message to Provider    QUESTIONS  Information for Provider? pt needs HYDROcodone-acetaminophen (NORCO) 5-325   MG per tablet [48660] HYDROcodone-acetaminophen (NORCO) 5-325 MG per   tablet [[id number]]  ENDED   Order Details Dose? 1 tablet Route? Oral   Frequency? 4 TIMES DAILY send to soniya on [employer]   ---------------------------------------------------------------------------  --------------  CALL BACK INFO  What is the best way for the office to contact you? OK to leave message on   voicemail  Preferred Call Back Phone Number? [phone]  ---------------------------------------------------------------------------  --------------  SCRIPT ANSWERS  Relationship to Patient?  Self

## 2020-09-28 NOTE — TELEPHONE ENCOUNTER
Medication:   Requested Prescriptions     Pending Prescriptions Disp Refills    LORazepam (ATIVAN) 0.5 MG tablet 120 tablet 0     Sig: TAKE 1 TABLET BY MOUTH TWICE DAILY AND 2 EVERY NIGHT AT BEDTIME AS NEEDED FOR ANXIETY      Last Filled: 8/28/2020    Patient Phone Number: [phone] (home)     Last appt: 8/11/2020   Next appt: 11/11/2020    Last OARRS:   RX Monitoring 3/20/2019   Attestation The Prescription Monitoring Report for this patient was reviewed today.    Periodic Controlled Substance Monitoring -     PDMP Monitoring:    Last PDMP José Miguel Su as Reviewed [facility]):  Review User Review Instant Review Result   Angelina Woodruff 8/11/2020  2:43 PM Reviewed PDMP [1]     Preferred Pharmacy:   Jonathan [facility]all [address], [address] [place] [phone]  Phone: [phone] Fax: [phone]

## 2020-09-28 NOTE — TELEPHONE ENCOUNTER
Medication:   Requested Prescriptions     Pending Prescriptions Disp Refills    HYDROcodone-acetaminophen (NORCO) 5-325 MG per tablet 120 tablet 0     Sig: Take 1 tablet by mouth 4 times daily as needed for Pain for up to 30 days. Last Filled: 8/28/2020    Patient Phone Number: [phone] (home)     Last appt: 8/11/2020   Next appt: 11/11/2020    Last OARRS:   RX Monitoring 3/20/2019   Attestation The Prescription Monitoring Report for this patient was reviewed today.    Periodic Controlled Substance Monitoring -     PDMP Monitoring:    Last PDMP Clovis Allred as Reviewed [facility]):  Review User Review Instant Review Result   Geetha Villarreal 8/11/2020  2:43 PM Reviewed PDMP [1]     Preferred Pharmacy:   Charles Woodall [address], [address] [place] [phone]  Phone: [phone] Fax: [phone]    [address] [address] Mail Delivery - [address] [phone] - F [phone]  [address] [place] [address]  Phone: [phone] Fax: [phone] [facility]  6 Rosana Hardwick [phone]  [address] [place] [address]  Phone: [phone] Fax: [phone]

## 2020-09-28 NOTE — TELEPHONE ENCOUNTER
Pt states this is regards to her lorazepam. Pt stated she spoke with her insurance and they said they will need a new Rx for a 1 mg tab to be taken  0.5 mg 3 x a day and that way they can pay for it. Pt is wasn't sure what they meant.  Pt will call back with insurance number so we can call and figure out what they need

## 2020-09-28 NOTE — TELEPHONE ENCOUNTER
----- Message from Shola Farias sent at 9/28/2020  3:20 PM EDT -----  Subject: Message to Provider    QUESTIONS  Information for Provider? P/t refill request cant be filled because of too   many pills. Only pay for 60 or 90 per Insurance. Needs new refill   ---------------------------------------------------------------------------  --------------  CALL BACK INFO  What is the best way for the office to contact you? OK to leave message on   voicemail  Preferred Call Back Phone Number? [phone]  ---------------------------------------------------------------------------  --------------  SCRIPT ANSWERS  Relationship to Patient?  Self

## 2020-09-28 NOTE — TELEPHONE ENCOUNTER
----- Message from Gema Cantrell sent at 9/28/2020 10:52 AM EDT -----  Subject: Refill Request    QUESTIONS  Name of Medication? LORazepam (ATIVAN) 0.5 MG tablet  Patient-reported dosage and instructions? 0.5 mg   How many days do you have left? 3  Preferred Pharmacy? [facility] [address] [place] [address] [address] - F [phone]  Pharmacy phone number (if available)? [phone]  Additional Information for Provider? pt needs refill on this med right   away .   ---------------------------------------------------------------------------  --------------  CALL BACK INFO  What is the best way for the office to contact you? OK to leave message on   voicemail  Preferred Call Back Phone Number?  [phone]

## 2020-09-29 NOTE — TELEPHONE ENCOUNTER
Pt called back again and states her usual [facility] is now back open.  She would like the prescription sent to:    Vicente Woodall [address], [address]  f [phone]

## 2020-09-30 ENCOUNTER — TELEPHONE (OUTPATIENT)
Dept: FAMILY MEDICINE CLINIC | Age: 85
End: 2020-09-30

## 2020-09-30 RX ORDER — LORAZEPAM 1 MG/1
TABLET ORAL
Qty: 60 TABLET | Refills: 1 | Status: SHIPPED | OUTPATIENT
Start: 2020-09-30 | End: 2020-11-27 | Stop reason: SDUPTHER

## 2020-09-30 NOTE — TELEPHONE ENCOUNTER
Spoke with pharmacy and they stated that insurance will cover the 1 mg lorazepam and to adjust sig to 1/2 tab twice a day and 1 tab every night.

## 2020-09-30 NOTE — TELEPHONE ENCOUNTER
Spoke with pt as Rx was sent to pharmacy about an hr ago.  Pt stated that she also spoke with the pharmacy and they will get med ready for her

## 2020-10-12 RX ORDER — ROPINIROLE 2 MG/1
TABLET, FILM COATED ORAL
Qty: 270 TABLET | Refills: 1 | Status: SHIPPED | OUTPATIENT
Start: 2020-10-12 | End: 2020-11-18

## 2020-10-12 RX ORDER — IRBESARTAN 300 MG/1
TABLET ORAL
Qty: 90 TABLET | Refills: 1 | Status: SHIPPED | OUTPATIENT
Start: 2020-10-12 | End: 2021-01-25 | Stop reason: SDUPTHER

## 2020-10-12 NOTE — TELEPHONE ENCOUNTER
Medication:   Requested Prescriptions     Pending Prescriptions Disp Refills    irbesartan (AVAPRO) 300 MG tablet 90 tablet 1     Sig: TAKE 1 TABLET BY MOUTH EVERY NIGHT          Patient Phone Number: [phone] (home)     Last appt: 8/11/2020   Next appt: 11/11/2020    Last OARRS:   RX Monitoring 3/20/2019   Attestation The Prescription Monitoring Report for this patient was reviewed today.    Periodic Controlled Substance Monitoring -     PDMP Monitoring:    Last PDMP Higinio Drummond as Reviewed [facility]):  Review User Review Instant Review Result   Mary Parra 8/11/2020  2:43 PM Reviewed PDMP [1]     Preferred Pharmacy:   [facility], [address] [place] [phone]  Phone: [phone] Fax: [phone]    Viral [address] [place] [address] [phone] [facility] [phone]  [address] [place] [address]  Phone: [phone] Fax: [phone], [address] Jenni Dumas [phone]  [address] [place] [address]  Phone: [phone] Fax: [phone]

## 2020-10-12 NOTE — TELEPHONE ENCOUNTER
Medication:   Requested Prescriptions     Pending Prescriptions Disp Refills    rOPINIRole (REQUIP) 2 MG tablet 270 tablet 0     Sig: TAKE 1 TABLET BY MOUTH THREE TIMES DAILY          Patient Phone Number: [phone] (home)     Last appt: 8/11/2020   Next appt: 11/11/2020    Last OARRS:   RX Monitoring 3/20/2019   Attestation The Prescription Monitoring Report for this patient was reviewed today.    Periodic Controlled Substance Monitoring -     PDMP Monitoring:    Last PDMP Carmel Schwab as Reviewed [facility]):  Review User Review Instant Review Result   Vanessa Correa 8/11/2020  2:43 PM Reviewed PDMP [1]     Preferred Pharmacy:   Radha Dunham [facility] 229, [address] [address]  Phone: [phone] Fax: [phone]

## 2020-10-20 ENCOUNTER — TELEPHONE (OUTPATIENT)
Dept: FAMILY MEDICINE CLINIC | Age: 85
End: 2020-10-20

## 2020-10-20 RX ORDER — MECLIZINE HCL 12.5 MG/1
TABLET ORAL
Qty: 270 TABLET | Refills: 0 | Status: SHIPPED | OUTPATIENT
Start: 2020-10-20 | End: 2020-11-11 | Stop reason: ALTCHOICE

## 2020-10-20 NOTE — TELEPHONE ENCOUNTER
Patient is calling with concerns about getting a flu shot. She is  not sure if she should get it because she hasn't taken a COVID test. She does not have any symptoms.       Please give patient a callback

## 2020-10-27 NOTE — TELEPHONE ENCOUNTER
HYDROcodone-acetaminophen (NORCO) 5-325 MG per tablet  120 tablet  0  9/28/2020  10/28/2020     Sig - Route:  Take 1 tablet by mouth 4 times daily as needed for Pain for up to 30 days. - [facility]

## 2020-10-27 NOTE — TELEPHONE ENCOUNTER
Medication:   Requested Prescriptions     Pending Prescriptions Disp Refills    HYDROcodone-acetaminophen (NORCO) 5-325 MG per tablet 120 tablet 0     Sig: Take 1 tablet by mouth 4 times daily as needed for Pain for up to 30 days. Last Filled:  9/28/2020    Patient Phone Number: [phone] (home)     Last appt: 8/11/2020   Next appt: 11/11/2020    Last OARRS:   RX Monitoring 3/20/2019   Attestation The Prescription Monitoring Report for this patient was reviewed today.    Periodic Controlled Substance Monitoring -     PDMP Monitoring:    Last PDMP Verberthaon as Reviewed [facility]):  Review User Review Instant Review Result   Lily Jeffery 8/11/2020  2:43 PM Reviewed PDMP [1]     Preferred Pharmacy:   [facility] [address], [address] [id number]  Phone: [phone] Fax: [phone]    [facility] [address] [place] [address] [phone] F [phone]  [address] [place] [address]  Phone: [phone] Fax: [phone] [facility] [place] [address] [address] [phone]  [address] [place] [address]  Phone: [phone] Fax: [phone]

## 2020-10-28 RX ORDER — HYDROCODONE BITARTRATE AND ACETAMINOPHEN 5; 325 MG/1; MG/1
1 TABLET ORAL 4 TIMES DAILY PRN
Qty: 120 TABLET | Refills: 0 | Status: SHIPPED | OUTPATIENT
Start: 2020-10-28 | End: 2020-12-07 | Stop reason: SDUPTHER

## 2020-10-31 RX ORDER — GLIPIZIDE 5 MG/1
TABLET ORAL
Qty: 60 TABLET | Refills: 3 | Status: CANCELLED | OUTPATIENT
Start: 2020-10-31

## 2020-11-02 NOTE — TELEPHONE ENCOUNTER
Received refill request for Xarelto from [facility] [employer] [facility].      Last OV: 06/16/2020 w/ RMM     Last Refill: 01/30/2020 #90 w/ 3 refills    Next Appointment: none at this time

## 2020-11-03 PROBLEM — J44.9 CHRONIC OBSTRUCTIVE PULMONARY DISEASE (HCC): Status: RESOLVED | Noted: 2019-03-23 | Resolved: 2020-11-03

## 2020-11-03 RX ORDER — GLIPIZIDE 5 MG/1
TABLET ORAL
Qty: 60 TABLET | Refills: 3 | Status: SHIPPED | OUTPATIENT
Start: 2020-11-03 | End: 2020-11-11 | Stop reason: ALTCHOICE

## 2020-11-11 ENCOUNTER — OFFICE VISIT (OUTPATIENT)
Dept: FAMILY MEDICINE CLINIC | Age: 85
End: 2020-11-11
Payer: MEDICARE

## 2020-11-11 VITALS
BODY MASS INDEX: 30.91 KG/M2 | DIASTOLIC BLOOD PRESSURE: 80 MMHG | OXYGEN SATURATION: 96 % | WEIGHT: 169 LBS | TEMPERATURE: 96.9 F | HEART RATE: 74 BPM | SYSTOLIC BLOOD PRESSURE: 116 MMHG

## 2020-11-11 PROCEDURE — 4040F PNEUMOC VAC/ADMIN/RCVD: CPT | Performed by: FAMILY MEDICINE

## 2020-11-11 PROCEDURE — 1036F TOBACCO NON-USER: CPT | Performed by: FAMILY MEDICINE

## 2020-11-11 PROCEDURE — 99214 OFFICE O/P EST MOD 30 MIN: CPT | Performed by: FAMILY MEDICINE

## 2020-11-11 PROCEDURE — G8427 DOCREV CUR MEDS BY ELIG CLIN: HCPCS | Performed by: FAMILY MEDICINE

## 2020-11-11 PROCEDURE — G8399 PT W/DXA RESULTS DOCUMENT: HCPCS | Performed by: FAMILY MEDICINE

## 2020-11-11 PROCEDURE — 1090F PRES/ABSN URINE INCON ASSESS: CPT | Performed by: FAMILY MEDICINE

## 2020-11-11 PROCEDURE — G8482 FLU IMMUNIZE ORDER/ADMIN: HCPCS | Performed by: FAMILY MEDICINE

## 2020-11-11 PROCEDURE — G8417 CALC BMI ABV UP PARAM F/U: HCPCS | Performed by: FAMILY MEDICINE

## 2020-11-11 PROCEDURE — 1123F ACP DISCUSS/DSCN MKR DOCD: CPT | Performed by: FAMILY MEDICINE

## 2020-11-11 RX ORDER — LEVOTHYROXINE SODIUM 0.07 MG/1
TABLET ORAL
Qty: 90 TABLET | Refills: 0 | Status: SHIPPED | OUTPATIENT
Start: 2020-11-11 | End: 2021-01-26 | Stop reason: SDUPTHER

## 2020-11-11 NOTE — PROGRESS NOTES
complication ([facility]    Coronary artery disease involving native coronary artery of native heart with angina pectoris ([facility]    A-fib ([facility]    Hypothyroid    Chronic respiratory failure with hypoxia (HCC)    Failure of outpatient treatment    SOB (shortness of breath)    Anxiety    Asthmatic bronchitis       Outpatient Medications Marked as Taking for the 11/11/20 encounter (Office Visit) with Mark Zarate MD   Medication Sig Dispense Refill    glipiZIDE (GLUCOTROL) 5 MG tablet TAKE 1 TABLET BY MOUTH TWICE DAILY 60 tablet 3    rivaroxaban (XARELTO) 15 MG TABS tablet TAKE 1 TABLET BY MOUTH DAILY 90 tablet 3    HYDROcodone-acetaminophen (NORCO) 5-325 MG per tablet Take 1 tablet by mouth 4 times daily as needed for Pain for up to 30 days.  120 tablet 0    meclizine (ANTIVERT) 12.5 MG tablet TAKE 1 TABLET THREE TIMES DAILY AS NEEDED 270 tablet 0    rOPINIRole (REQUIP) 2 MG tablet TAKE 1 TABLET BY MOUTH THREE TIMES DAILY 270 tablet 1    irbesartan (AVAPRO) 300 MG tablet TAKE 1 TABLET BY MOUTH EVERY NIGHT 90 tablet 1    LORazepam (ATIVAN) 1 MG tablet TAKE 1/2 TABLET BY MOUTH TWICE DAILY AND 1 EVERY NIGHT AT BEDTIME AS NEEDED FOR ANXIETY 60 tablet 1    levalbuterol (XOPENEX) 0.63 MG/3ML nebulization INHALE CONTENTS OF 1 VIAL PER NEBULIZER EVERY 6 HOURS AS NEEDED FOR WHEEZING 1050 mL 3    levothyroxine (SYNTHROID) 75 MCG tablet TAKE 1 TABLET EVERY DAY 90 tablet 0    atorvastatin (LIPITOR) 80 MG tablet TAKE 1 TABLET EVERY NIGHT 90 tablet 3    dilTIAZem (CARDIZEM CD) 120 MG extended release capsule Take 1 capsule by mouth daily 30 capsule 5    pantoprazole (PROTONIX) 40 MG tablet TAKE 1 TABLET EVERY DAY 90 tablet 1    amLODIPine (NORVASC) 5 MG tablet TAKE 1 TABLET EVERY DAY 90 tablet 1    propafenone (RYTHMOL) 150 MG tablet TAKE 1 TABLET BY MOUTH EVERY 8 HOURS 270 tablet 1    fluticasone (FLONASE) 50 MCG/ACT nasal spray 2 sprays by Nasal route daily 3 Bottle 1    ipratropium-albuterol (DUONEB) 0.5-2.5 (3) MG/3ML SOLN nebulizer solution INHALE THE CONTENTS OF 1 VIAL VIA NEBULIZER EVERY 4 HOURS 1620 mL 3    Blood Glucose Monitoring Suppl (ACCU-CHEK CHIP PLUS) w/Device KIT 1 kit by Does not apply route daily 1 kit 0    blood glucose test strips (ACCU-CHEK CHIP) strip 1 each by In Vitro route daily As needed.  100 each 3    Respiratory Therapy Supplies (NEBULIZER/TUBING/MOUTHPIECE) KIT 1 kit by Does not apply route 4 times daily as needed (shortness of breath) 1 kit 0    SYMBICORT 160-4.5 MCG/ACT AERO INHALE 2 PUFFS INTO THE LUNGS TWICE DAILY 10.2 g 5    dicyclomine (BENTYL) 10 MG capsule Take 1 capsule by mouth 4 times daily as needed (abd pain) 360 capsule 1    albuterol sulfate HFA (PROAIR HFA) 108 (90 Base) MCG/ACT inhaler Inhale 2 puffs into the lungs every 6 hours as needed for Wheezing 1 Inhaler 6    blood glucose monitor strips Test one time a day 100 strip 5    Lancets MISC 1 each by Does not apply route 2 times daily 300 each 1    aspirin 81 MG tablet Take 81 mg by mouth every other day          Allergies   Allergen Reactions    Adhesive Tape Anaphylaxis    Cefaclor Nausea And Vomiting     Other reaction(s): Chest pain    Nsaids      Pt states she has hives and heart races   Other reaction(s): Tachycardia    Clinoril [Sulindac] Other (See Comments)     Stomach pain    Codeine      FEEL NUMB    Diclofenac     Diclofenac Sodium Hives    Diclofenac Sodium Hives    Hctz [Hydrochlorothiazide]      Sob    Ibuprofen Other (See Comments)     Other reaction(s): Tachycardia    Macrobid [Nitrofurantoin Macrocrystal]      nausea    Motrin [Ibuprofen Micronized] Other (See Comments)     Tachycardia      Pcn [Penicillins] Hives    Peach [Prunus Persica] Itching and Swelling     Cannot eat raw peaches       Social History     Tobacco Use    Smoking status: Former Smoker     Packs/day: 1.00     Years: 45.00     Pack years: 45.00     Start date: 9/15/1950     Last attempt to quit: 12/30/1995 insomnia    Ataxia    Chronic respiratory failure with hypoxia (HCC)    Controlled type 2 diabetes mellitus with stage 3 chronic kidney disease, without long-term current use of insulin (HCC)    Paroxysmal atrial fibrillation (HCC)    Contusion of head, unspecified part of head, initial encounter      OARRS report checked        Plan:      Laboratory profile reviewed with patient demonstrating good control of her diabetes mellitus. Kidney function has improved    Thyroid is now in the normal range and she should maintain her current dose of thyroid. Patient was advised she needs to bring in her medications for us to review entirely. Probably should not be on 2 calcium channel blockers and since she does have a history of rapid atrial fibrillation probably discontinue amlodipine we will have to monitor blood pressure closely. RTC in 3 months for regular appointment time    Please note that this chart was generated using Dragon dictation software. Although every effort was made to ensure the accuracy of this automated transcription, some errors in transcription may have occurred.

## 2020-11-11 NOTE — TELEPHONE ENCOUNTER
Medication:   Requested Prescriptions     Pending Prescriptions Disp Refills    levothyroxine (SYNTHROID) 75 MCG tablet [Pharmacy Med Name: LEVOTHYROXINE 0.075MG (75MCG) TABS] 90 tablet 0     Sig: TAKE 1 TABLET BY MOUTH EVERY DAY          Patient Phone Number: [phone] (home)     Last appt: 8/11/2020   Next appt: 11/11/2020    Last OARRS:   RX Monitoring 3/20/2019   Attestation The Prescription Monitoring Report for this patient was reviewed today.    Periodic Controlled Substance Monitoring -     PDMP Monitoring:    Last PDMP Gold Mathis as Reviewed [facility]):  Review User Review Instant Review Result   Ezra Island 8/11/2020  2:43 PM Reviewed PDMP [1]     Preferred Pharmacy:   [facility] [address], [address] [address]  Phone: [phone] Fax: [phone]

## 2020-11-12 PROBLEM — R27.0 ATAXIA: Status: ACTIVE | Noted: 2020-11-12

## 2020-11-13 ENCOUNTER — TELEPHONE (OUTPATIENT)
Dept: FAMILY MEDICINE CLINIC | Age: 85
End: 2020-11-13

## 2020-11-13 RX ORDER — LEVOTHYROXINE SODIUM 0.07 MG/1
TABLET ORAL
Qty: 90 TABLET | Refills: 0 | OUTPATIENT
Start: 2020-11-13

## 2020-11-13 NOTE — TELEPHONE ENCOUNTER
Continue on current medication regimen. For Restless leg she is at the max recommended daily dosage. Can address with Dr. Angela Chamberlain if she would like to make changes. Recently had office visit two days prior and she was advised to bring in medication bottles - please ensure she does so if she needs in office follow up.

## 2020-11-13 NOTE — TELEPHONE ENCOUNTER
Patient states that the mediacation isn't helping her. She is wanting to know if she can have the medication increased. She is take bid now. States she is to old for this pain.             Please advise    Provider out of the office           rOPINIRole (REQUIP) 2 MG tablet  270 tablet  1  10/12/2020      Sig: TAKE 1 TABLET BY MOUTH THREE TIMES DAILY

## 2020-11-16 ENCOUNTER — TELEPHONE (OUTPATIENT)
Dept: FAMILY MEDICINE CLINIC | Age: 85
End: 2020-11-16

## 2020-11-16 NOTE — TELEPHONE ENCOUNTER
Patient is calling to request a different med for her restless leg syndrome    propafenone (RYTHMOL) 150 MG tablet  270 tablet  1  7/6/2020      Sig - Route: TAKE 1 TABLET BY MOUTH EVERY 8 HOURS - Oral      She says that it doesn't seem to be working.  Theres no pain but has to get up and walk and is more irritating than anything      Patients provider is out of office

## 2020-11-17 NOTE — TELEPHONE ENCOUNTER
This medicine is not for restless legs this is for her heart rhythm problem.   Patient was to bring up her medications for my MA to review or she can do this over the phone

## 2020-11-18 RX ORDER — ROPINIROLE 3 MG/1
TABLET, FILM COATED ORAL
Qty: 270 TABLET | Refills: 1 | Status: SHIPPED | OUTPATIENT
Start: 2020-11-18 | End: 2021-01-25 | Stop reason: SDUPTHER

## 2020-11-18 NOTE — TELEPHONE ENCOUNTER
Patient is calling about her Requip medication. She states it is not strong enough for her. She is having a lot of issues with her restless legs. She is taking 2mg 3 times daily. Patient is wanting to know if it can be increased or changed.  Please advise    Mally Kunz

## 2020-11-27 RX ORDER — LORAZEPAM 1 MG/1
TABLET ORAL
Qty: 60 TABLET | Refills: 0 | Status: SHIPPED | OUTPATIENT
Start: 2020-11-27 | End: 2020-12-29 | Stop reason: SDUPTHER

## 2020-11-27 NOTE — TELEPHONE ENCOUNTER
Pt is calling for a refill on her Lorazepam 1 mg.         [facility] [address], [address] - [place] [phone]

## 2020-11-27 NOTE — TELEPHONE ENCOUNTER
Medication:   Requested Prescriptions     Pending Prescriptions Disp Refills    LORazepam (ATIVAN) 1 MG tablet 60 tablet 1     Sig: TAKE 1/2 TABLET BY MOUTH TWICE DAILY AND 1 EVERY NIGHT AT BEDTIME AS NEEDED FOR ANXIETY      Last Filled:      Patient Phone Number: [phone] (home)     Last appt: 11/11/2020   Next appt: 12/16/2020    Last OARRS:   RX Monitoring 3/20/2019   Attestation The Prescription Monitoring Report for this patient was reviewed today.    Periodic Controlled Substance Monitoring -     PDMP Monitoring:    Last PDMP Alexi Currie as Reviewed [facility]):  Review User Review Instant Review Result   Marge Crawfordwaylon 8/11/2020  2:43 PM Reviewed PDMP [1]     Preferred Pharmacy:   [facility] [address] [address]  Phone: [phone] Fax: [phone]

## 2020-12-07 RX ORDER — HYDROCODONE BITARTRATE AND ACETAMINOPHEN 5; 325 MG/1; MG/1
1 TABLET ORAL 4 TIMES DAILY PRN
Qty: 120 TABLET | Refills: 0 | Status: SHIPPED | OUTPATIENT
Start: 2020-12-07 | End: 2020-12-29 | Stop reason: SDUPTHER

## 2020-12-07 NOTE — TELEPHONE ENCOUNTER
Medication:   Requested Prescriptions     Pending Prescriptions Disp Refills    HYDROcodone-acetaminophen (NORCO) 5-325 MG per tablet 120 tablet 0     Sig: Take 1 tablet by mouth 4 times daily as needed for Pain for up to 30 days. Last Filled:  10/28/20    Patient Phone Number: [phone] (home)     Last appt: 11/11/2020   Next appt: 12/16/2020    Last OARRS:   RX Monitoring 3/20/2019   Attestation The Prescription Monitoring Report for this patient was reviewed today.    Periodic Controlled Substance Monitoring -     PDMP Monitoring:    Last PDMP Rick Beasley as Reviewed [facility]):  Review User Review Instant Review Result   Frank Peraza 8/11/2020  2:43 PM Reviewed PDMP [1]     Preferred Pharmacy:     [facility] [address], [address] [address]  Phone: [phone] Fax: [phone]

## 2020-12-10 RX ORDER — AMLODIPINE BESYLATE 5 MG/1
TABLET ORAL
Qty: 90 TABLET | OUTPATIENT
Start: 2020-12-10

## 2020-12-10 RX ORDER — PANTOPRAZOLE SODIUM 40 MG/1
TABLET, DELAYED RELEASE ORAL
Qty: 90 TABLET | Refills: 0 | OUTPATIENT
Start: 2020-12-10

## 2020-12-15 ENCOUNTER — TELEPHONE (OUTPATIENT)
Dept: PULMONOLOGY | Age: 85
End: 2020-12-15

## 2020-12-16 ENCOUNTER — OFFICE VISIT (OUTPATIENT)
Dept: FAMILY MEDICINE CLINIC | Age: 85
End: 2020-12-16
Payer: MEDICARE

## 2020-12-16 VITALS
DIASTOLIC BLOOD PRESSURE: 84 MMHG | BODY MASS INDEX: 31.28 KG/M2 | RESPIRATION RATE: 16 BRPM | OXYGEN SATURATION: 94 % | WEIGHT: 171 LBS | HEART RATE: 101 BPM | TEMPERATURE: 97.6 F | SYSTOLIC BLOOD PRESSURE: 150 MMHG

## 2020-12-16 PROBLEM — Z78.9 FAILURE OF OUTPATIENT TREATMENT: Status: RESOLVED | Noted: 2020-02-19 | Resolved: 2020-12-16

## 2020-12-16 PROCEDURE — G8427 DOCREV CUR MEDS BY ELIG CLIN: HCPCS | Performed by: FAMILY MEDICINE

## 2020-12-16 PROCEDURE — 1036F TOBACCO NON-USER: CPT | Performed by: FAMILY MEDICINE

## 2020-12-16 PROCEDURE — 20610 DRAIN/INJ JOINT/BURSA W/O US: CPT | Performed by: FAMILY MEDICINE

## 2020-12-16 PROCEDURE — 1123F ACP DISCUSS/DSCN MKR DOCD: CPT | Performed by: FAMILY MEDICINE

## 2020-12-16 PROCEDURE — 4040F PNEUMOC VAC/ADMIN/RCVD: CPT | Performed by: FAMILY MEDICINE

## 2020-12-16 PROCEDURE — 1090F PRES/ABSN URINE INCON ASSESS: CPT | Performed by: FAMILY MEDICINE

## 2020-12-16 PROCEDURE — G8482 FLU IMMUNIZE ORDER/ADMIN: HCPCS | Performed by: FAMILY MEDICINE

## 2020-12-16 PROCEDURE — G8399 PT W/DXA RESULTS DOCUMENT: HCPCS | Performed by: FAMILY MEDICINE

## 2020-12-16 PROCEDURE — 99214 OFFICE O/P EST MOD 30 MIN: CPT | Performed by: FAMILY MEDICINE

## 2020-12-16 PROCEDURE — G8417 CALC BMI ABV UP PARAM F/U: HCPCS | Performed by: FAMILY MEDICINE

## 2020-12-16 RX ORDER — DILTIAZEM HYDROCHLORIDE 240 MG/1
240 CAPSULE, COATED, EXTENDED RELEASE ORAL DAILY
Qty: 90 CAPSULE | Refills: 1 | Status: SHIPPED | OUTPATIENT
Start: 2020-12-16 | End: 2021-01-27 | Stop reason: SDUPTHER

## 2020-12-16 RX ORDER — TRIAMCINOLONE ACETONIDE 40 MG/ML
40 INJECTION, SUSPENSION INTRA-ARTICULAR; INTRAMUSCULAR ONCE
Status: COMPLETED | OUTPATIENT
Start: 2020-12-16 | End: 2020-12-16

## 2020-12-16 RX ORDER — FLUTICASONE PROPIONATE 50 MCG
2 SPRAY, SUSPENSION (ML) NASAL DAILY
Qty: 3 BOTTLE | Refills: 1 | Status: SHIPPED | OUTPATIENT
Start: 2020-12-16 | End: 2021-01-27 | Stop reason: SDUPTHER

## 2020-12-16 RX ADMIN — TRIAMCINOLONE ACETONIDE 40 MG: 40 INJECTION, SUSPENSION INTRA-ARTICULAR; INTRAMUSCULAR at 17:24

## 2020-12-16 NOTE — PROGRESS NOTES
Subjective:      Patient ID: Kaia Zavala is a 80 y.o. female. CC: Patient presents for re-evaluation of chronic health problems including increasing right knee pain secondary to osteoarthritis, hypertension, chronic respiratory failure and history of atrial fibrillation. Freddy HUMPHREY Pt is here for a 1 month follow up on her blood pressure. Pt states she has been having high BP in the last 4 days and thinks this is due to her right knee pain. Pt states she cant hardly walk. Pt has been using ice and heat and pain med. Patient arthrocentesis 6 months ago she feels this helped out a great deal and she would like to have this done again. She does continue wearing her oxygen and she feels her respiratory status is stable. She is very limited in her activity at this point time because of the Covid pandemic. Last month she was taken off amlodipine and the rest of her medications were continued.     Review of Systems  Patient Active Problem List   Diagnosis    GERD (gastroesophageal reflux disease)    HTN (hypertension)    Hyperlipidemia    Insomnia    IBS (irritable bowel syndrome)    Overactive bladder    Osteoarthritis    Controlled type 2 diabetes mellitus with stage 3 chronic kidney disease, without long-term current use of insulin (HCC)    Restless legs syndrome    ANTHONY (obstructive sleep apnea)    Paroxysmal atrial fibrillation (HCC)    Allergic rhinitis    COPD, mild (HCC)    Vertigo    Interstitial lung disease ([address]    Chronic cough    Bronchiectasis without complication ([address]    Coronary artery disease involving native coronary artery of native heart with angina pectoris ([address]    A-fib ([address]    Hypothyroid    Chronic respiratory failure with hypoxia (HCC)    Failure of outpatient treatment    SOB (shortness of breath)    Anxiety    Asthmatic bronchitis    Ataxia       Outpatient Medications Marked as Taking for the 12/16/20 encounter (Office Visit) with Isaac Harrison MD Medication Sig Dispense Refill    HYDROcodone-acetaminophen (NORCO) 5-325 MG per tablet Take 1 tablet by mouth 4 times daily as needed for Pain for up to 30 days.  120 tablet 0    LORazepam (ATIVAN) 1 MG tablet TAKE 1/2 TABLET BY MOUTH TWICE DAILY AND 1 EVERY NIGHT AT BEDTIME AS NEEDED FOR ANXIETY 60 tablet 0    rOPINIRole (REQUIP) 3 MG tablet TAKE 1 TABLET BY MOUTH THREE TIMES DAILY 270 tablet 1    levothyroxine (SYNTHROID) 75 MCG tablet TAKE 1 TABLET EVERY DAY 90 tablet 0    rivaroxaban (XARELTO) 15 MG TABS tablet TAKE 1 TABLET BY MOUTH DAILY 90 tablet 3    irbesartan (AVAPRO) 300 MG tablet TAKE 1 TABLET BY MOUTH EVERY NIGHT 90 tablet 1    levalbuterol (XOPENEX) 0.63 MG/3ML nebulization INHALE CONTENTS OF 1 VIAL PER NEBULIZER EVERY 6 HOURS AS NEEDED FOR WHEEZING 1050 mL 3    atorvastatin (LIPITOR) 80 MG tablet TAKE 1 TABLET EVERY NIGHT 90 tablet 3    dilTIAZem (CARDIZEM CD) 120 MG extended release capsule Take 1 capsule by mouth daily 30 capsule 5    pantoprazole (PROTONIX) 40 MG tablet TAKE 1 TABLET EVERY DAY 90 tablet 1    propafenone (RYTHMOL) 150 MG tablet TAKE 1 TABLET BY MOUTH EVERY 8 HOURS 270 tablet 1    fluticasone (FLONASE) 50 MCG/ACT nasal spray 2 sprays by Nasal route daily 3 Bottle 1    Respiratory Therapy Supplies (NEBULIZER/TUBING/MOUTHPIECE) KIT 1 kit by Does not apply route 4 times daily as needed (shortness of breath) 1 kit 0    SYMBICORT 160-4.5 MCG/ACT AERO INHALE 2 PUFFS INTO THE LUNGS TWICE DAILY 10.2 g 5    dicyclomine (BENTYL) 10 MG capsule Take 1 capsule by mouth 4 times daily as needed (abd pain) 360 capsule 1    albuterol sulfate HFA (PROAIR HFA) 108 (90 Base) MCG/ACT inhaler Inhale 2 puffs into the lungs every 6 hours as needed for Wheezing 1 Inhaler 6    blood glucose monitor strips Test one time a day 100 strip 5    Lancets MISC 1 each by Does not apply route 2 times daily 300 each 1    aspirin 81 MG tablet Take 81 mg by mouth every other day          Allergies Allergen Reactions    Adhesive Tape Anaphylaxis    Cefaclor Nausea And Vomiting     Other reaction(s): Chest pain    Nsaids      Pt states she has hives and heart races   Other reaction(s): Tachycardia    Clinoril [Sulindac] Other (See Comments)     Stomach pain    Codeine      FEEL NUMB    Diclofenac     Diclofenac Sodium Hives    Diclofenac Sodium Hives    Hctz [Hydrochlorothiazide]      Sob    Ibuprofen Other (See Comments)     Other reaction(s): Tachycardia    Macrobid [Nitrofurantoin Macrocrystal]      nausea    Motrin [Ibuprofen Micronized] Other (See Comments)     Tachycardia      Pcn [Penicillins] Hives    Peach [Prunus Persica] Itching and Swelling     Cannot eat raw peaches       Social History     Tobacco Use    Smoking status: Former Smoker     Packs/day: 1.00     Years: 45.00     Pack years: 45.00     Start date: 9/15/1950     Quit date: 1995     Years since quittin.9    Smokeless tobacco: Never Used   Substance Use Topics    Alcohol use: No     Alcohol/week: 0.0 standard drinks       BP (!) 150/84 (Site: Left Upper Arm, Position: Sitting, Cuff Size: Large Adult)   Pulse 101   Temp 97.6 °F (36.4 °C) (Infrared)   Resp 16   Wt 171 lb (77.6 kg)   SpO2 94%   BMI 31.28 kg/m²     Objective:   Physical Exam  Vitals signs and nursing note reviewed. Constitutional:       General: She is not in acute distress. Appearance: She is well-developed. Neck:      Vascular: No carotid bruit. Cardiovascular:      Rate and Rhythm: Normal rate and regular rhythm. Pulses:           Dorsalis pedis pulses are 2+ on the right side and 2+ on the left side. Posterior tibial pulses are 2+ on the right side and 2+ on the left side. Heart sounds: Normal heart sounds. No murmur. No friction rub. No gallop. Pulmonary:      Effort: Pulmonary effort is normal.      Breath sounds: Normal breath sounds.    Musculoskeletal: Right knee: She exhibits swelling and deformity (gross crepitus). She exhibits normal range of motion. Tenderness found. Medial joint line and lateral joint line tenderness noted. No MCL, no LCL and no patellar tendon tenderness noted. Right lower leg: No edema. Left lower leg: No edema. Skin:     General: Skin is warm. Neurological:      Mental Status: She is alert and oriented to person, place, and time. Sensory: Sensation is intact. Motor: Motor function is intact. Psychiatric:         Behavior: Behavior is cooperative. Assessment:      Katie Melendez was seen today for 1 month follow-up. Diagnoses and all orders for this visit:    Primary osteoarthritis of right knee  -     OH ARTHROCENTESIS ASPIR&/INJ MAJOR JT/BURSA W/O US    Hypertension, unspecified type    Chronic respiratory failure with hypoxia (HCC)    Other orders  -     dilTIAZem (CARDIZEM CD) 240 MG extended release capsule; Take 1 capsule by mouth daily  -     fluticasone (FLONASE) 50 MCG/ACT nasal spray; 2 sprays by Nasal route daily  -     triamcinolone acetonide (KENALOG-40) injection 40 mg    OARRS report checked          Plan:      Medications reviewed with patient and daughter    Increase diltiazem to 240 mg daily    No other change in medications    Patient would like to proceed with arthrocentesis    RTC 3 months    Please note that this chart was generated using Dragon dictation software. Although every effort was made to ensure the accuracy of this automated transcription, some errors in transcription may have occurred.

## 2020-12-16 NOTE — PROGRESS NOTES
Arthrocentesis Procedure Note    Pre-operative Diagnosis:   1. Primary osteoarthritis of right knee    2. Hypertension, unspecified type    3. Chronic respiratory failure with hypoxia (HCC)        Post-operative Diagnosis: same    Locations:right knee    Procedure Details   After consent was obtained, using sterile technique the medial   right knee was prepped and surrounding area with a sterile prep using chloraprep and draped in the usual sterile fashion. .  Kenalog  40 mg and 1 ml 0.5% Marcaine was then injected and the needle withdrawn. The procedure was well tolerated. Sterile dressing applied to site. Complications: none. Plan:  1. The patient was instructed to continue to rest the joint for a few more days before resuming regular activities. It may be more painful for the first 1-2 days. 2. Watch for fever, or increased swelling or persistent pain in the joint. 3. Call or return to clinic prn if such symptoms occur or there is failure to improve as anticipated. 4. Recommended that the patient use OTC analgesics as needed for pain.

## 2020-12-16 NOTE — PROGRESS NOTES
Medication given during visit:    Administrations This Visit     triamcinolone acetonide (KENALOG-40) injection 40 mg     Admin Date  12/16/2020  17:24 Action  Given Dose  40 mg Route  Intra-articular Site  Knee Right Administered By  Mobile Cintia    Ordering Provider: Enedina Durbin MD    NDC: 0346-1632-91    Lot#: [id number]    : [employer] (PRIMARY CARE)    Patient Supplied?: No    Comments: EXP 02/22                Patient instructed to remain in clinic for 20 minutes after injection and was advised to report any adverse reaction to me immediately.

## 2020-12-28 ENCOUNTER — TELEPHONE (OUTPATIENT)
Dept: PULMONOLOGY | Age: 85
End: 2020-12-28

## 2020-12-28 NOTE — TELEPHONE ENCOUNTER
Called pt and her sx's a week ago. She states that it is getting worse today. She is real light headed when she stands up and feels like she has laryngitis  Was coughing up phlegm but now cannot get anything up.  Real weak no fever Allergic to PCN

## 2020-12-29 ENCOUNTER — OFFICE VISIT (OUTPATIENT)
Dept: PRIMARY CARE CLINIC | Age: 85
End: 2020-12-29
Payer: MEDICARE

## 2020-12-29 PROCEDURE — G8428 CUR MEDS NOT DOCUMENT: HCPCS | Performed by: NURSE PRACTITIONER

## 2020-12-29 PROCEDURE — 99211 OFF/OP EST MAY X REQ PHY/QHP: CPT | Performed by: NURSE PRACTITIONER

## 2020-12-29 PROCEDURE — G8417 CALC BMI ABV UP PARAM F/U: HCPCS | Performed by: NURSE PRACTITIONER

## 2020-12-29 RX ORDER — HYDROCODONE BITARTRATE AND ACETAMINOPHEN 5; 325 MG/1; MG/1
1 TABLET ORAL 4 TIMES DAILY PRN
Qty: 120 TABLET | Refills: 0 | Status: SHIPPED | OUTPATIENT
Start: 2020-12-29 | End: 2021-03-01 | Stop reason: SDUPTHER

## 2020-12-29 RX ORDER — LORAZEPAM 1 MG/1
TABLET ORAL
Qty: 60 TABLET | Refills: 0 | Status: SHIPPED | OUTPATIENT
Start: 2020-12-29 | End: 2021-01-27 | Stop reason: SDUPTHER

## 2020-12-29 NOTE — TELEPHONE ENCOUNTER
Disp Refills Start End    HYDROcodone-acetaminophen (NORCO) 5-325 MG per tablet 120 tablet 0 12/7/2020 1/6/2021    Sig - Route:  Take 1 tablet by mouth 4 times daily as needed for Pain for up to 30 days. - Oral       Disp Refills Start End    LORazepam (ATIVAN) 1 MG tablet 60 tablet 0 11/27/2020 12/27/2020    Sig: TAKE 1/2 TABLET BY MOUTH TWICE DAILY AND 1 EVERY NIGHT AT BEDTIME AS NEEDED FOR ANXIETY      [facility] #[id number] [address], [address] - F [phone]       Please advise

## 2020-12-29 NOTE — PATIENT INSTRUCTIONS
You have received a viral test for COVID-19. Below is education on quarantine per the CDC guidelines. For any symptoms, seek care from your PCP, call [phone] to establish care with a doctor, or go directly to an urgent care or the emergency room. Test results will take 2-7 days and will be sent to you in your [employer] account. If you test positive, you will be contacted via phone. If you test negative, the ONLY communication will be through [address] GO TO [employer] AND SIGN UP FOR [employer]  (LOWER LEFT OF THE HOME PAGE)  No test is 100%. If you have symptoms, you should follow the guidance of quarantine as previously stated. You can still be contagious if you have symptoms. Your [facility] will reach out to you if you have a positive result. They will provide you with a return to work date and note. If you were tested for a pre-op, then you should remain in quarantine until your procedure. How do I know if I need to be in quarantine? If you live in a community where COVID-19 is or might be spreading (currently, that is virtually everywhere in the [place])  Be alert for symptoms. Watch for fever, cough, shortness of breath, or other symptoms of COVID-19.  ? Take your temperature if symptoms develop. ? Practice social distancing. Maintain 6 feet of distance from others and stay out of crowded places. ? Follow CDC guidance if symptoms develop. If you feel healthy but:  ? Recently had close contact with a person with COVID-19 you need to Quarantine:  ? Stay home until 14 days after your last exposure. ? Check your temperature twice a day and watch for symptoms of COVID-19.  ? If possible, stay away from people who are at higher-risk for getting very sick from COVID-19. Stay Home and Monitor Your Health if you:  ? Have been diagnosed with COVID-19, or  ? Are waiting for test results, or  ?  Have cough, fever, or shortness of breath, or symptoms of COVID-19 When You Can be Around Others After You Had or Likely Had COVID-19     If you have or think you might have COVID-19, it is important to stay home and away from other people. Staying away from others helps stop the spread of COVID-19. If you have an emergency warning sign (including trouble breathing), get emergency medical care immediately. When you can be around others (end home isolation) depends on different factors for different situations. Find CDC's recommendations for your situation below. I think or know I had COVID-19, and I had symptoms  You can be with others after  ? 3 days with no fever and  ? Respiratory symptoms have improved (e.g. cough, shortness of breath) and  ? 10 days since symptoms first appeared  Depending on your healthcare provider's advice and availability of testing, you might get tested to see if you still have COVID-19. If you will be tested, you can be around others when you have no fever, respiratory symptoms have improved, and you receive two negative test results in a row, at least 24 hours apart. I tested positive for COVID-19 but had no symptoms  If you continue to have no symptoms, you can be with others after:  ? 10 days have passed since test or 14 days since your exposure test   Depending on your healthcare provider's advice and availability of testing, you might get tested to see if you still have COVID-19. If you will be tested, you can be around others after you receive two negative test results in a row, at least 24 hours apart. If you develop symptoms after testing positive, follow the guidance above for I think or know I had COVID, and I had symptoms.   For Anyone Who Has Been Around a Person with COVID-19  It is important to remember that anyone who has close contact with someone with COVID-19 should stay home for 14 days after exposure based on the time it takes to develop illness. Testing is not necessary.     www.cdc.gov/coronavirus/2019-ncov/index.html

## 2020-12-29 NOTE — PROGRESS NOTES
Justin Wilson received a viral test for COVID-19. They were educated on isolation and quarantine as appropriate. For any symptoms, they were directed to seek care from their PCP, given contact information to establish with a doctor, directed to an urgent care or the emergency room.

## 2020-12-29 NOTE — TELEPHONE ENCOUNTER
Medication:   Requested Prescriptions      No prescriptions requested or ordered in this encounter      Last Filled:  12/7/2020    Patient Phone Number: [phone] (home)     Last appt: 12/16/2020   Next appt: 3/22/2021    Last OARRS:   RX Monitoring 3/20/2019   Attestation The Prescription Monitoring Report for this patient was reviewed today.    Periodic Controlled Substance Monitoring -     PDMP Monitoring:    Last PDMP Cloyde Records as Reviewed [facility]):  Review User Review Instant Review Result   Geovanylashaun Noah 8/11/2020  2:43 PM Reviewed PDMP [1]     [facility]:   [facility] [address] [address]  Phone: [phone] Fax: [phone]    [facility] - [facility] Elianestela [phone] - F [phone]  [address] [place] [address]  Phone: [phone] Fax: [phone]  [address] Rosana [address] [phone]  [address] [address]  Phone: [phone] Fax: [phone]

## 2020-12-31 ENCOUNTER — TELEPHONE (OUTPATIENT)
Dept: PULMONOLOGY | Age: 85
End: 2020-12-31

## 2020-12-31 LAB — SARS-COV-2, NAA: NOT DETECTED

## 2020-12-31 RX ORDER — PREDNISONE 10 MG/1
TABLET ORAL
Qty: 30 TABLET | Refills: 0 | Status: ON HOLD | OUTPATIENT
Start: 2020-12-31 | End: 2021-02-22

## 2020-12-31 RX ORDER — AZITHROMYCIN 250 MG/1
250 TABLET, FILM COATED ORAL SEE ADMIN INSTRUCTIONS
Qty: 6 TABLET | Refills: 0 | Status: SHIPPED | OUTPATIENT
Start: 2020-12-31 | End: 2021-01-05

## 2020-12-31 NOTE — TELEPHONE ENCOUNTER
Pt called in stating that she had the Covid test 12/29 and it was negative. Pt stated she still has the symptoms from the 12/28 telephone call but feels like they could be getting worse. Pt stated that she thinks it could be sinus infection, informed Pt to call her PCP she stated they are not in office on Thursdays. Pt requesting an abx other than doxy.     Pt # [phone]

## 2021-01-08 RX ORDER — MECLIZINE HCL 12.5 MG/1
12.5 TABLET ORAL 3 TIMES DAILY PRN
Qty: 30 TABLET | Refills: 1 | Status: SHIPPED | OUTPATIENT
Start: 2021-01-08 | End: 2021-01-27

## 2021-01-08 NOTE — TELEPHONE ENCOUNTER
she could either use over-the-counter meclizine or we consented a prescription for meclizine 12 mg 3 times a day as needed

## 2021-01-08 NOTE — TELEPHONE ENCOUNTER
Flora Arana from [facility]. 74 states patient's dizziness has increased in the past 2 days and has a new cough. Patient has no other symptoms. They want to know if they can give patient an antihistamine or if he would like for them to do something else for her.        Please advise

## 2021-01-14 ENCOUNTER — NURSE TRIAGE (OUTPATIENT)
Dept: OTHER | Facility: CLINIC | Age: 86
End: 2021-01-14

## 2021-01-14 NOTE — TELEPHONE ENCOUNTER
Patient reports bladder or kidney issue. Patient report's that there was a nurse practitioner that told her she had blood in her urine a few weeks ago. She prescribed an abx but patient is not any better after abx. Patient reports burning with urination, nausea, foul odor, and lower abdominal pain. Reason for Disposition   All other patients with painful urination(Exception: [1] EITHER frequency or urgency AND [2] has on-call doctor)    Answer Assessment - Initial Assessment Questions  1. SEVERITY: \"How bad is the pain? \"  (e.g., Scale 1-10; mild, moderate, or severe)    - MILD (1-3): complains slightly about urination hurting    - MODERATE (4-7): interferes with normal activities      - SEVERE (8-10): excruciating, unwilling or unable to urinate because of the pain       Moderate    2. FREQUENCY: \"How many times have you had painful urination today? \"       Every time. 3. PATTERN: \"Is pain present every time you urinate or just sometimes? \"       Every time. 4. ONSET: \"When did the painful urination start? \"       Couple seeks. 5. FEVER: \"Do you have a fever? \" If so, ask: \"What is your temperature, how was it measured, and when did it start? \"      Denies    6. PAST UTI: \"Have you had a urine infection before? \" If so, ask: \"When was the last time? \" and \"What happened that time? \"       Hx of them. 7. CAUSE: \"What do you think is causing the painful urination? \"  (e.g., UTI, scratch, Herpes sore)      UTI    8. OTHER SYMPTOMS: \"Do you have any other symptoms? \" (e.g., flank pain, vaginal discharge, genital sores, urgency, blood in urine)      Back pain, nausea, and lower abdominal pain. 9. PREGNANCY: \"Is there any chance you are pregnant? \" \"When was your last menstrual period? \"      N/A    Protocols used: URINATION PAIN [facility]    Patient called pre-service center [facility] with red flag complaint. Brief description of triage: see above.     Triage indicates for patient to see in 24 hours. Care advice provided, patient verbalizes understanding; denies any other questions or concerns; instructed to call back for any new or worsening symptoms. Writer provided warm transfer to [facility] for appointment scheduling. Attention Provider: Thank you for allowing me to participate in the care of your patient. The patient was connected to triage in response to information provided to the [facility]. Please do not respond through this encounter as the response is not directed to a shared pool.

## 2021-01-15 ENCOUNTER — TELEPHONE (OUTPATIENT)
Dept: ADMINISTRATIVE | Age: 86
End: 2021-01-15

## 2021-01-15 ENCOUNTER — HOSPITAL ENCOUNTER (OUTPATIENT)
Age: 86
Discharge: HOME | End: 2021-01-15
Payer: MEDICARE

## 2021-01-15 ENCOUNTER — OFFICE VISIT (OUTPATIENT)
Dept: FAMILY MEDICINE CLINIC | Age: 86
End: 2021-01-15
Payer: MEDICARE

## 2021-01-15 ENCOUNTER — TELEPHONE (OUTPATIENT)
Dept: FAMILY MEDICINE CLINIC | Age: 86
End: 2021-01-15

## 2021-01-15 VITALS — HEART RATE: 75 BPM | TEMPERATURE: 98 F | OXYGEN SATURATION: 98 %

## 2021-01-15 DIAGNOSIS — R10.30 LOWER ABDOMINAL PAIN: ICD-10-CM

## 2021-01-15 DIAGNOSIS — K92.1 BLACK STOOLS: ICD-10-CM

## 2021-01-15 DIAGNOSIS — R30.0 DYSURIA: ICD-10-CM

## 2021-01-15 DIAGNOSIS — K92.1 BLACK STOOLS: Primary | ICD-10-CM

## 2021-01-15 LAB
APPEARANCE FLUID: ABNORMAL
BASOPHILS ABSOLUTE: 0 K/UL (ref 0–0.2)
BASOPHILS RELATIVE PERCENT: 0.4 %
BILIRUBIN, POC: NEGATIVE
BLOOD URINE, POC: NEGATIVE
CLARITY, POC: ABNORMAL
COLOR, POC: YELLOW
EOSINOPHILS ABSOLUTE: 0 K/UL (ref 0–0.6)
EOSINOPHILS RELATIVE PERCENT: 0.1 %
GLUCOSE URINE, POC: NEGATIVE
HCT VFR BLD CALC: 36.2 % (ref 36–48)
HEMOGLOBIN: 11.8 G/DL (ref 12–16)
KETONES, POC: NEGATIVE
LEUKOCYTE EST, POC: ABNORMAL
LYMPHOCYTES ABSOLUTE: 1.1 K/UL (ref 1–5.1)
LYMPHOCYTES RELATIVE PERCENT: 11.1 %
MCH RBC QN AUTO: 29.6 PG (ref 26–34)
MCHC RBC AUTO-ENTMCNC: 32.7 G/DL (ref 31–36)
MCV RBC AUTO: 90.6 FL (ref 80–100)
MONOCYTES ABSOLUTE: 0.3 K/UL (ref 0–1.3)
MONOCYTES RELATIVE PERCENT: 3.1 %
NEUTROPHILS ABSOLUTE: 8.7 K/UL (ref 1.7–7.7)
NEUTROPHILS RELATIVE PERCENT: 85.3 %
NITRITE, POC: ABNORMAL
PDW BLD-RTO: 15.4 % (ref 12.4–15.4)
PH, POC: 6
PLATELET # BLD: 148 K/UL (ref 135–450)
PMV BLD AUTO: 7.8 FL (ref 5–10.5)
PROTEIN, POC: ABNORMAL
RBC # BLD: 3.99 M/UL (ref 4–5.2)
SPECIFIC GRAVITY, POC: 1.02
UROBILINOGEN, POC: 0.2
WBC # BLD: 10.2 K/UL (ref 4–11)

## 2021-01-15 PROCEDURE — 1123F ACP DISCUSS/DSCN MKR DOCD: CPT | Performed by: NURSE PRACTITIONER

## 2021-01-15 PROCEDURE — 85025 COMPLETE CBC W/AUTO DIFF WBC: CPT

## 2021-01-15 PROCEDURE — 99214 OFFICE O/P EST MOD 30 MIN: CPT | Performed by: NURSE PRACTITIONER

## 2021-01-15 PROCEDURE — 81002 URINALYSIS NONAUTO W/O SCOPE: CPT | Performed by: NURSE PRACTITIONER

## 2021-01-15 PROCEDURE — 1036F TOBACCO NON-USER: CPT | Performed by: NURSE PRACTITIONER

## 2021-01-15 PROCEDURE — 4040F PNEUMOC VAC/ADMIN/RCVD: CPT | Performed by: NURSE PRACTITIONER

## 2021-01-15 PROCEDURE — G8428 CUR MEDS NOT DOCUMENT: HCPCS | Performed by: NURSE PRACTITIONER

## 2021-01-15 PROCEDURE — 1090F PRES/ABSN URINE INCON ASSESS: CPT | Performed by: NURSE PRACTITIONER

## 2021-01-15 PROCEDURE — G8417 CALC BMI ABV UP PARAM F/U: HCPCS | Performed by: NURSE PRACTITIONER

## 2021-01-15 PROCEDURE — 36415 COLL VENOUS BLD VENIPUNCTURE: CPT

## 2021-01-15 PROCEDURE — G8399 PT W/DXA RESULTS DOCUMENT: HCPCS | Performed by: NURSE PRACTITIONER

## 2021-01-15 PROCEDURE — G8482 FLU IMMUNIZE ORDER/ADMIN: HCPCS | Performed by: NURSE PRACTITIONER

## 2021-01-15 NOTE — PROGRESS NOTES
1/15/2021  Migel Sultana (:  1935)    Allergies: Allergies   Allergen Reactions    Adhesive Tape Anaphylaxis    Cefaclor Nausea And Vomiting     Other reaction(s): Chest pain    Nsaids      Pt states she has hives and heart races   Other reaction(s): Tachycardia    Clinoril [Sulindac] Other (See Comments)     Stomach pain    Codeine      FEEL NUMB    Diclofenac     Diclofenac Sodium Hives    Diclofenac Sodium Hives    Hctz [Hydrochlorothiazide]      Sob    Ibuprofen Other (See Comments)     Other reaction(s): Tachycardia    Macrobid [Nitrofurantoin Macrocrystal]      nausea    Motrin [Ibuprofen Micronized] Other (See Comments)     Tachycardia      Pcn [Penicillins] Hives    Peach [Prunus Persica] Itching and Swelling     Cannot eat raw peaches     (review in Epic)      FLU/RESPIRATORY/COVID-19 CLINIC EVALUATION    HPI:   Chief Complaint   Patient presents with    Abdominal Pain        SYMPTOMS:    INSTRUCTIONS:  \"[x]\" Indicates a positive item  \"[]\" Indicates a negative item      [] Denies Fever, Cough, SOB, Loss of Taste or Smell, Body Aches, Diarrhea      Symptom duration, days:  [] 1   [] 2   [] 3   [] 4 - 7   [] 8 - 10   [] 11 - 13   [] >14    [] Fevers    [] Symptom (not measured)  [] Measured (Result:  degrees)  [] Chills  [] Cough [] Dry [] Productive   []Loss of Taste  [] Loss of Smell  []Decreased Appetite  [] Coughing up blood  }  [] Chest Congestion  [x] Nasal Congestion - chronic - several months - Pulmonology has given her steroids in the recent past to help.   [] Runny  Nose  [] Sneezing  [] Feeling short of breath   []Sometimes    [] Frequently    [] All the time     [] Chest pain     [] Headaches  []Tolerable  [] Severe     [] Fatigue  [] Sore throat  [] Muscle aches  [] Nausea  [] Vomiting  []Unable to keep fluids down     [] Diarrhea  [] Mild  []Severe         [x] OTHER SYMPTOMS:  Reports burning with urination that originally started one month prior - urine was dipped and culture sent through home health agency - patient reports that they said she had bacteria in her urine and required two antibiotics for treatment - patient unsure of which ones - no record of this in Epic. Since being treated has continued with burning with urination and lower abdominal discomfort. Over the past 2-3 weeks has also noted black stools along with the abdominal discomfort. Have been more loose in the recent past, but no diarrhea. Denies any bright red blood or rectal discomfort. Black stools are intermittent. Patient on a blood thinner - Xarelto. Symptom course:   [x] Worsening     [] Stable     [] Improving      RISK FACTORS:1INSTRUCTIONS:  \"[x]\" Indicates a positive item. Negative  for risk factors if not checked.     [] Close contact with a lab confirmed COVID-19 patient within 14 days of symptom onset  [] History of travel from affected geographical areas within 14 days of symptom onset    PHYSICAL EXAMINATION:    Vitals:    01/15/21 1452   Pulse: 75   Temp: 98 °F (36.7 °C)   SpO2: 98%      [x] Alert  [x] Oriented to person/place/time    [x] No apparent distress   [] Toxic appearing  [] Face flushed appearing     [x] Normal Mood  [] Anxious appearing      [] Sclera clear    [] Pinna, TMs,  Canals normal bilaterally  [] TM Red  [] Right [] Left [] Bilateral  [] TM Bulging [] Right [] Left []  Billateral    [] Oropharynx [] Clear [] Red [] Exudate [] Swollen    [] No adenopathy [] Adenopathy __________    [x] Lungs clear with good movement and effort  [x] Breathing appears normal     [] Speaks in complete sentences  [] Appears tachypneic   [] Wheezing           [] Rhonchi   [] Decreased    [x] CV RRR  [x] No Murmur  [] Murmur  [] Irregular  [] Tachycardic    [] OTHER:  1}      TESTS ORDERED:    [] POCT FLU  [] POCT STREP  [] COVID-19 Test sent  [] Appointment made at testing clinic for patient to get a COVID test.       TEST RESULTS:    Results for POC orders placed in visit on 01/15/21   POCT Urinalysis no Micro   Result Value Ref Range    Color, UA Yellow     Clarity, UA Cloudy     Glucose, UA POC Negative     Bilirubin, UA Negative     Ketones, UA Negative     Spec Grav, UA 1.020     Blood, UA POC Negative     pH, UA 6.0     Protein, UA POC 30mg/dL     Urobilinogen, UA 0.2     Leukocytes, UA Small     Nitrite, UA Trace     Appearance, Fluid Cloudy (A) Clear, Slightly Cloudy     ASSESSMENT:  [] Allergic Rhinitis  [] Asthma Exacerbation  [] Bronchitis  [] COPD Exacerbation  [] Gastroenteritis  [] Influenza  [] Sinusitis  [] Strep Throat [] Sore Throat  [] Viral URI   [] Possible COVID-19   [] Exposure to COVID -19  [] Positive for COVID  [] Screening for Viral Disease (COVID test no sx)        Mino Matute was seen today for abdominal pain. Diagnoses and all orders for this visit:    Black stools  Complete STAT CBC. If normal will have patient follow with GI next week. If abnormal or worsening will send to ED.  -     CBC Auto Differential; Future  -     BAY Kurtz MD, Gastroenterology, [facility]    Lower abdominal pain  -     CBC Auto Differential; Future  -     BAY Kurtz MD, Gastroenterology, [facility]    Dysuria  U/A unremarkable in office. Will send for culture for further evaluation.   -     POCT Urinalysis no Micro  -     Culture, Urine                PLAN:    [x] Discharge home with written instructions for:  [] Flu management  [] Strep throat management  [] Viral respiratory illness management  [] Sinusitis management  [] Bronchitis Management  [] Possible COVID-19 infection with self-quarantine and management of symptoms  [x] Follow-up with primary care physician or emergency department if worsens  [] Referred to emergency department for evaluation    Medical decision making of moderate complexity.

## 2021-01-15 NOTE — TELEPHONE ENCOUNTER
Shereen Graham has called regarding a FAX he sent for request for information for [facility], for Medicaid.     FAX#: [phone]

## 2021-01-15 NOTE — TELEPHONE ENCOUNTER
----- Message from Tomi Devlin sent at 1/15/2021  5:09 PM EST -----  Subject: Message to Provider    QUESTIONS  Information for Provider? PT returned phone call A/H   please call back as soon as possible  ---------------------------------------------------------------------------  --------------  [address]  What is the best way for the office to contact you? OK to leave message on   voicemail  Preferred Call Back Phone Number? [phone]  ---------------------------------------------------------------------------  --------------  SCRIPT ANSWERS  Relationship to Patient?  Self

## 2021-01-16 ENCOUNTER — NURSE TRIAGE (OUTPATIENT)
Dept: OTHER | Facility: CLINIC | Age: 86
End: 2021-01-16

## 2021-01-16 NOTE — TELEPHONE ENCOUNTER
Reason for Disposition   Health Information question, no triage required and triager able to answer question    Protocols used: INFORMATION ONLY CALL - NO TRIAGE-ADULT-AH    Patient called pre-service center [facility]) Meghan with red flag complaint. Brief description of triage: Pt calling states she had a message yesterday to call the office. Wondering if her lab results are back yet. Pt will call on Monday if she hasn't heard from PCP. Triage indicates for patient to     Care advice provided, patient verbalizes understanding; denies any other questions or concerns; instructed to call back for any new or worsening symptoms. Attention Provider: Thank you for allowing me to participate in the care of your patient. The patient was connected to triage in response to information provided to the [facility]. Please do not respond through this encounter as the response is not directed to a shared pool.

## 2021-01-17 LAB — URINE CULTURE, ROUTINE: NORMAL

## 2021-01-19 RX ORDER — NEBULIZER ACCESSORIES
1 KIT MISCELLANEOUS 4 TIMES DAILY PRN
Qty: 1 KIT | Refills: 0 | Status: SHIPPED | OUTPATIENT
Start: 2021-01-19 | End: 2021-03-03

## 2021-01-19 NOTE — TELEPHONE ENCOUNTER
Patient states that she needs a attachments for her nebuliser. She states that Mally knows what she needs. She said they come in a bag.         Mally in chart

## 2021-01-20 DIAGNOSIS — R06.2 WHEEZING: ICD-10-CM

## 2021-01-20 RX ORDER — BUDESONIDE AND FORMOTEROL FUMARATE DIHYDRATE 160; 4.5 UG/1; UG/1
AEROSOL RESPIRATORY (INHALATION)
Qty: 10.2 G | Refills: 5 | Status: SHIPPED | OUTPATIENT
Start: 2021-01-20 | End: 2021-03-24 | Stop reason: SDUPTHER

## 2021-01-25 RX ORDER — CLONIDINE HYDROCHLORIDE 0.1 MG/1
0.1 TABLET ORAL 2 TIMES DAILY
Qty: 60 TABLET | Refills: 0 | Status: SHIPPED | OUTPATIENT
Start: 2021-01-25 | End: 2021-01-27 | Stop reason: SDUPTHER

## 2021-01-25 RX ORDER — IRBESARTAN 300 MG/1
TABLET ORAL
Qty: 90 TABLET | Refills: 1 | Status: SHIPPED | OUTPATIENT
Start: 2021-01-25 | End: 2021-01-29 | Stop reason: SDUPTHER

## 2021-01-25 RX ORDER — ROPINIROLE 3 MG/1
TABLET, FILM COATED ORAL
Qty: 270 TABLET | Refills: 1 | Status: SHIPPED | OUTPATIENT
Start: 2021-01-25 | End: 2021-02-22 | Stop reason: ALTCHOICE

## 2021-01-25 NOTE — TELEPHONE ENCOUNTER
Medication:   Requested Prescriptions     Pending Prescriptions Disp Refills    irbesartan (AVAPRO) 300 MG tablet 90 tablet 1     Sig: TAKE 1 TABLET BY MOUTH EVERY NIGHT    rOPINIRole (REQUIP) 3 MG tablet 270 tablet 1     Sig: TAKE 1 TABLET BY MOUTH THREE TIMES DAILY        Last Filled:      Patient Phone Number: [phone] (home)     Last appt: 1/15/2021   Next appt: 3/22/2021    Last OARRS:   RX Monitoring 3/20/2019   Attestation The Prescription Monitoring Report for this patient was reviewed today.    Periodic Controlled Substance Monitoring -

## 2021-01-26 DIAGNOSIS — E03.9 ACQUIRED HYPOTHYROIDISM: ICD-10-CM

## 2021-01-26 RX ORDER — LEVOTHYROXINE SODIUM 0.07 MG/1
TABLET ORAL
Qty: 90 TABLET | Refills: 1 | Status: SHIPPED | OUTPATIENT
Start: 2021-01-26 | End: 2021-07-06 | Stop reason: SDUPTHER

## 2021-01-26 NOTE — TELEPHONE ENCOUNTER
levothyroxine (SYNTHROID) 75 MCG tablet 90 tablet 0 11/11/2020     Sig: TAKE 1 TABLET EVERY DAY          Mally in chart

## 2021-01-26 NOTE — TELEPHONE ENCOUNTER
Medication:   Requested Prescriptions     Pending Prescriptions Disp Refills    levothyroxine (SYNTHROID) 75 MCG tablet 90 tablet 0     Sig: TAKE 1 TABLET EVERY DAY      Last Filled:      Patient Phone Number: [phone] (home)     Last appt: 1/15/2021   Next appt: 3/22/2021    Last OARRS:   RX Monitoring 3/20/2019   Attestation The Prescription Monitoring Report for this patient was reviewed today.    Periodic Controlled Substance Monitoring -     PDMP Monitoring:    Last PDMP Cresenciano Lele as Reviewed [facility]):  Review User Review Instant Review Result   Mary Ellen Diaz 8/11/2020  2:43 PM Reviewed PDMP [1]     Preferred Pharmacy:   [facility], [address] [address]  Phone: [phone] Fax: [phone]    [facility] - [address] [phone] - F [phone]  [address] [place] [address]  Phone: [phone] Fax: [phone] [address] 6 [facility] [phone]  [address] [place] [address]  Phone: [phone] Fax: [phone]

## 2021-01-27 DIAGNOSIS — F41.9 ANXIETY: ICD-10-CM

## 2021-01-27 DIAGNOSIS — F51.01 PRIMARY INSOMNIA: ICD-10-CM

## 2021-01-27 RX ORDER — LORAZEPAM 1 MG/1
TABLET ORAL
Qty: 60 TABLET | Refills: 1 | Status: ON HOLD | OUTPATIENT
Start: 2021-01-27 | End: 2021-02-28 | Stop reason: HOSPADM

## 2021-01-27 RX ORDER — CLONIDINE HYDROCHLORIDE 0.1 MG/1
0.1 TABLET ORAL 2 TIMES DAILY
Qty: 180 TABLET | Refills: 1 | Status: SHIPPED | OUTPATIENT
Start: 2021-01-27 | End: 2021-03-02

## 2021-01-27 RX ORDER — FLUTICASONE PROPIONATE 50 MCG
2 SPRAY, SUSPENSION (ML) NASAL DAILY
Qty: 3 BOTTLE | Refills: 1 | Status: SHIPPED | OUTPATIENT
Start: 2021-01-27 | End: 2021-08-27

## 2021-01-27 RX ORDER — DILTIAZEM HYDROCHLORIDE 240 MG/1
240 CAPSULE, COATED, EXTENDED RELEASE ORAL DAILY
Qty: 90 CAPSULE | Refills: 1 | Status: SHIPPED | OUTPATIENT
Start: 2021-01-27 | End: 2021-06-22

## 2021-01-27 RX ORDER — ALBUTEROL SULFATE 90 UG/1
2 AEROSOL, METERED RESPIRATORY (INHALATION) EVERY 6 HOURS PRN
Qty: 3 INHALER | Refills: 3 | Status: SHIPPED | OUTPATIENT
Start: 2021-01-27 | End: 2021-03-03

## 2021-01-27 RX ORDER — MECLIZINE HCL 12.5 MG/1
TABLET ORAL
Qty: 270 TABLET | Refills: 0 | Status: SHIPPED | OUTPATIENT
Start: 2021-01-27 | End: 2021-03-24

## 2021-01-27 NOTE — TELEPHONE ENCOUNTER
Medication:   Requested Prescriptions     Pending Prescriptions Disp Refills    LORazepam (ATIVAN) 1 MG tablet 60 tablet 0     Sig: TAKE 1/2 TABLET BY MOUTH TWICE DAILY AND 1 EVERY NIGHT AT BEDTIME AS NEEDED FOR ANXIETY      Last Filled:  12/29/2020    Patient Phone Number: [phone] (home)     Last appt: 1/15/2021   Next appt: 3/22/2021    Last OARRS:   RX Monitoring 3/20/2019   Attestation The Prescription Monitoring Report for this patient was reviewed today.    Periodic Controlled Substance Monitoring -     PDMP Monitoring:    Last PDMP Cresenciano Lele as Reviewed [facility]):  Review User Review Instant Review Result   Mary Ellen Diaz 8/11/2020  2:43 PM Reviewed PDMP [1]     Preferred Pharmacy:   Morgan [address], [address] [address]  Phone: [phone] Fax: [phone]    [facility] - [facility] [phone] - F [phone]  [address] [place] [address]  Phone: [phone] Fax: [address], [address]  [address] [phone]  [address] [place] [address]  Phone: [phone] Fax: [phone]

## 2021-01-27 NOTE — TELEPHONE ENCOUNTER
Disp Refills Start End    LORazepam (ATIVAN) 1 MG tablet 60 tablet 0 12/29/2020 1/28/2021    Sig: TAKE 1/2 TABLET BY MOUTH TWICE DAILY AND 1 EVERY NIGHT AT BEDTIME AS NEEDED FOR ANXIETY      [id number] Teddy [place], [address] [facility] - P [phone] - F [phone]    Please advise

## 2021-01-29 RX ORDER — IRBESARTAN 300 MG/1
TABLET ORAL
Qty: 90 TABLET | Refills: 1 | Status: SHIPPED | OUTPATIENT
Start: 2021-01-29 | End: 2021-04-23

## 2021-01-29 NOTE — TELEPHONE ENCOUNTER
Medication:   Requested Prescriptions     Pending Prescriptions Disp Refills    irbesartan (AVAPRO) 300 MG tablet 90 tablet 1     Sig: TAKE 1 TABLET BY MOUTH EVERY NIGHT      Last Filled:      Patient Phone Number: [phone] (home)     Last appt: 1/15/2021   Next appt: 3/22/2021    Last OARRS:   RX Monitoring 3/20/2019   Attestation The Prescription Monitoring Report for this patient was reviewed today.    Periodic Controlled Substance Monitoring -     PDMP Monitoring:    Last PDMP Von Galvan as Reviewed [facility]):  Review User Review Instant Review Result   Mariel Persons 8/11/2020  2:43 PM Reviewed PDMP [1]     Preferred Pharmacy:   [address] [place] [address] Rosana [address], [address] [address]  Phone: [phone] Fax: [phone]    [address] [facility] - [facility] [phone] - F [phone]  [address] [place] [address]  [facility] [place] [address]  Phone: [phone] Fax: [phone] [facility] [address] [phone]  [address] [place] [address]  Phone: [phone] Fax: [phone]

## 2021-02-10 RX ORDER — IPRATROPIUM BROMIDE AND ALBUTEROL SULFATE 2.5; .5 MG/3ML; MG/3ML
SOLUTION RESPIRATORY (INHALATION)
Qty: 1620 ML | Refills: 3 | Status: SHIPPED | OUTPATIENT
Start: 2021-02-10 | End: 2021-03-09 | Stop reason: SINTOL

## 2021-02-10 NOTE — TELEPHONE ENCOUNTER
Medication:   Requested Prescriptions     Pending Prescriptions Disp Refills    ipratropium-albuterol (DUONEB) 0.5-2.5 (3) MG/3ML SOLN nebulizer solution 1620 mL 3     Sig: INHALE THE CONTENTS OF 1 VIAL VIA NEBULIZER EVERY 4 HOURS      Last Filled:     Patient Phone Number: [phone] (home)     Last appt: 1/15/2021   Next appt: 3/22/2021    Last OARRS:   RX Monitoring 3/20/2019   Attestation The Prescription Monitoring Report for this patient was reviewed today.    Periodic Controlled Substance Monitoring -     PDMP Monitoring:    Last PDMP Von Galvan as Reviewed [facility]):  Review User Review Instant Review Result   Mariel Luque 8/11/2020  2:43 PM Reviewed PDMP [1]     Preferred Pharmacy:   [facility], [address] [id number]  Phone: [phone] Fax: [phone]    [facility] [address] [phone] - F [phone]  [address] [place] [address]  Phone: [phone] Fax: [phone] [facility] [address] [address] [address] [phone]  [address] [place] [address]  Phone: [phone] Fax: [phone]

## 2021-02-12 ENCOUNTER — TELEPHONE (OUTPATIENT)
Dept: FAMILY MEDICINE CLINIC | Age: 86
End: 2021-02-12

## 2021-02-12 NOTE — TELEPHONE ENCOUNTER
Left message for pt to call- pt needs to stay off the Glipizide, Dr. Wallace More stopped that medication back in November.

## 2021-02-12 NOTE — TELEPHONE ENCOUNTER
Priscilla Nurse from [facility] [phone] states patient called them again today stating she does not feel well and dizzy. They went through patient's current meds and found out she is taking her glipizide PRN, checking her sugar PRN and they do not have a current A1C on her. As of now, Gwen Hoyos has advised patient to hold off on the glipizide and to check her sugar right when she wakes up and 2hrs after her meals. Priscilla wants to speak with Dr Jacque Goode about the glipizide and if he feels she should continue it.       Please advise

## 2021-02-15 ENCOUNTER — TELEPHONE (OUTPATIENT)
Dept: FAMILY MEDICINE CLINIC | Age: 86
End: 2021-02-15

## 2021-02-15 NOTE — TELEPHONE ENCOUNTER
----- Message from Kimberley Mines sent at 2/15/2021  1:21 PM EST -----  Subject: Medication Problem    QUESTIONS  Name of Medication? dilTIAZem (CARDIZEM CD) 240 MG extended release   capsule  Patient-reported dosage and instructions? 1 tablet daily by mouth   What question or problem do you have with the medication? Patient is   currently taking 1 tablet 120mg daily of Cardizem CD and 2 tablets of   0.1mg of Clonidine by mouth daily she isn't sure if she is taking the   medication correctly or if she is to take both of them daily. She can be   reached at [phone]  Preferred Pharmacy? [address] [phone] [facility] [phone]  Pharmacy phone number (if available)? [phone]  Additional Information for Provider?   ---------------------------------------------------------------------------  --------------  [address]  What is the best way for the office to contact you? OK to leave message on   voicemail  Preferred Call Back Phone Number? [phone]  ---------------------------------------------------------------------------  --------------  SCRIPT ANSWERS  Relationship to Patient?  Self

## 2021-02-18 ENCOUNTER — TELEPHONE (OUTPATIENT)
Dept: FAMILY MEDICINE CLINIC | Age: 86
End: 2021-02-18

## 2021-02-18 NOTE — TELEPHONE ENCOUNTER
Belkis Hussein is calling from [facility] [phone] asking if we received the fax needing a ICD code for special recovery waiver for medicaid.  Was faxed on 2/11/21      Please advise      Provider out of the office

## 2021-02-22 ENCOUNTER — APPOINTMENT (OUTPATIENT)
Dept: GENERAL RADIOLOGY | Age: 86
DRG: 191 | End: 2021-02-22
Payer: MEDICARE

## 2021-02-22 ENCOUNTER — APPOINTMENT (OUTPATIENT)
Dept: CT IMAGING | Age: 86
DRG: 191 | End: 2021-02-22
Payer: MEDICARE

## 2021-02-22 ENCOUNTER — HOSPITAL ENCOUNTER (INPATIENT)
Age: 86
LOS: 6 days | Discharge: HOME HEALTH | DRG: 191 | End: 2021-02-28
Attending: INTERNAL MEDICINE | Admitting: INTERNAL MEDICINE
Payer: MEDICARE

## 2021-02-22 DIAGNOSIS — N17.9 AKI (ACUTE KIDNEY INJURY) (HCC): ICD-10-CM

## 2021-02-22 DIAGNOSIS — R74.01 TRANSAMINITIS: ICD-10-CM

## 2021-02-22 DIAGNOSIS — J18.9 PNEUMONIA DUE TO ORGANISM: Primary | ICD-10-CM

## 2021-02-22 DIAGNOSIS — R09.02 HYPOXIA: ICD-10-CM

## 2021-02-22 LAB
A/G RATIO: 1.5 (ref 1.1–2.2)
ALBUMIN SERPL-MCNC: 3.8 G/DL (ref 3.4–5)
ALP BLD-CCNC: 238 U/L (ref 40–129)
ALT SERPL-CCNC: 613 U/L (ref 10–40)
ANION GAP SERPL CALCULATED.3IONS-SCNC: 11 MMOL/L (ref 3–16)
AST SERPL-CCNC: 528 U/L (ref 15–37)
BASOPHILS ABSOLUTE: 0 K/UL (ref 0–0.2)
BASOPHILS RELATIVE PERCENT: 0.4 %
BILIRUB SERPL-MCNC: 1.1 MG/DL (ref 0–1)
BUN BLDV-MCNC: 37 MG/DL (ref 7–20)
CALCIUM SERPL-MCNC: 9.1 MG/DL (ref 8.3–10.6)
CHLORIDE BLD-SCNC: 103 MMOL/L (ref 99–110)
CO2: 21 MMOL/L (ref 21–32)
CREAT SERPL-MCNC: 1.8 MG/DL (ref 0.6–1.2)
EOSINOPHILS ABSOLUTE: 0 K/UL (ref 0–0.6)
EOSINOPHILS RELATIVE PERCENT: 0.5 %
GFR AFRICAN AMERICAN: 32
GFR NON-AFRICAN AMERICAN: 27
GLOBULIN: 2.6 G/DL
GLUCOSE BLD-MCNC: 148 MG/DL (ref 70–99)
HCT VFR BLD CALC: 33 % (ref 36–48)
HEMOGLOBIN: 10.8 G/DL (ref 12–16)
INR BLD: 1.58 (ref 0.86–1.14)
LACTATE DEHYDROGENASE: 632 U/L (ref 100–190)
LACTIC ACID, SEPSIS: 1.5 MMOL/L (ref 0.4–1.9)
LYMPHOCYTES ABSOLUTE: 1.1 K/UL (ref 1–5.1)
LYMPHOCYTES RELATIVE PERCENT: 12.7 %
MCH RBC QN AUTO: 30.5 PG (ref 26–34)
MCHC RBC AUTO-ENTMCNC: 32.7 G/DL (ref 31–36)
MCV RBC AUTO: 93.3 FL (ref 80–100)
MONOCYTES ABSOLUTE: 0.3 K/UL (ref 0–1.3)
MONOCYTES RELATIVE PERCENT: 3.8 %
NEUTROPHILS ABSOLUTE: 7.1 K/UL (ref 1.7–7.7)
NEUTROPHILS RELATIVE PERCENT: 82.6 %
PDW BLD-RTO: 16.6 % (ref 12.4–15.4)
PLATELET # BLD: 120 K/UL (ref 135–450)
PMV BLD AUTO: 7.8 FL (ref 5–10.5)
POTASSIUM REFLEX MAGNESIUM: 4.6 MMOL/L (ref 3.5–5.1)
PRO-BNP: 367 PG/ML (ref 0–449)
PROCALCITONIN: 1.11 NG/ML (ref 0–0.15)
PROTHROMBIN TIME: 18.4 SEC (ref 10–13.2)
RBC # BLD: 3.53 M/UL (ref 4–5.2)
SODIUM BLD-SCNC: 135 MMOL/L (ref 136–145)
TOTAL CK: 80 U/L (ref 26–192)
TOTAL PROTEIN: 6.4 G/DL (ref 6.4–8.2)
TROPONIN: <0.01 NG/ML
WBC # BLD: 8.6 K/UL (ref 4–11)

## 2021-02-22 PROCEDURE — 6370000000 HC RX 637 (ALT 250 FOR IP): Performed by: PHYSICIAN ASSISTANT

## 2021-02-22 PROCEDURE — 82550 ASSAY OF CK (CPK): CPT

## 2021-02-22 PROCEDURE — 71045 X-RAY EXAM CHEST 1 VIEW: CPT

## 2021-02-22 PROCEDURE — U0003 INFECTIOUS AGENT DETECTION BY NUCLEIC ACID (DNA OR RNA); SEVERE ACUTE RESPIRATORY SYNDROME CORONAVIRUS 2 (SARS-COV-2) (CORONAVIRUS DISEASE [COVID-19]), AMPLIFIED PROBE TECHNIQUE, MAKING USE OF HIGH THROUGHPUT TECHNOLOGIES AS DESCRIBED BY CMS-2020-01-R: HCPCS

## 2021-02-22 PROCEDURE — 85025 COMPLETE CBC W/AUTO DIFF WBC: CPT

## 2021-02-22 PROCEDURE — 74176 CT ABD & PELVIS W/O CONTRAST: CPT

## 2021-02-22 PROCEDURE — 36415 COLL VENOUS BLD VENIPUNCTURE: CPT

## 2021-02-22 PROCEDURE — 6360000002 HC RX W HCPCS: Performed by: PHYSICIAN ASSISTANT

## 2021-02-22 PROCEDURE — 2580000003 HC RX 258: Performed by: PHYSICIAN ASSISTANT

## 2021-02-22 PROCEDURE — 83880 ASSAY OF NATRIURETIC PEPTIDE: CPT

## 2021-02-22 PROCEDURE — 83605 ASSAY OF LACTIC ACID: CPT

## 2021-02-22 PROCEDURE — 80074 ACUTE HEPATITIS PANEL: CPT

## 2021-02-22 PROCEDURE — 93005 ELECTROCARDIOGRAM TRACING: CPT | Performed by: INTERNAL MEDICINE

## 2021-02-22 PROCEDURE — 99283 EMERGENCY DEPT VISIT LOW MDM: CPT

## 2021-02-22 PROCEDURE — 84145 PROCALCITONIN (PCT): CPT

## 2021-02-22 PROCEDURE — 1200000000 HC SEMI PRIVATE

## 2021-02-22 PROCEDURE — 84484 ASSAY OF TROPONIN QUANT: CPT

## 2021-02-22 PROCEDURE — 85610 PROTHROMBIN TIME: CPT

## 2021-02-22 PROCEDURE — 87040 BLOOD CULTURE FOR BACTERIA: CPT

## 2021-02-22 PROCEDURE — 80053 COMPREHEN METABOLIC PANEL: CPT

## 2021-02-22 PROCEDURE — 83615 LACTATE (LD) (LDH) ENZYME: CPT

## 2021-02-22 RX ORDER — ONDANSETRON 2 MG/ML
4 INJECTION INTRAMUSCULAR; INTRAVENOUS EVERY 6 HOURS PRN
Status: DISCONTINUED | OUTPATIENT
Start: 2021-02-22 | End: 2021-02-28 | Stop reason: HOSPADM

## 2021-02-22 RX ORDER — DEXAMETHASONE SODIUM PHOSPHATE 10 MG/ML
6 INJECTION, SOLUTION INTRAMUSCULAR; INTRAVENOUS EVERY 24 HOURS
Status: DISCONTINUED | OUTPATIENT
Start: 2021-02-23 | End: 2021-02-23

## 2021-02-22 RX ORDER — SODIUM CHLORIDE 0.9 % (FLUSH) 0.9 %
10 SYRINGE (ML) INJECTION EVERY 12 HOURS SCHEDULED
Status: DISCONTINUED | OUTPATIENT
Start: 2021-02-22 | End: 2021-02-22 | Stop reason: SDUPTHER

## 2021-02-22 RX ORDER — LACTOBACILLUS RHAMNOSUS GG 10B CELL
1 CAPSULE ORAL 2 TIMES DAILY WITH MEALS
Status: DISCONTINUED | OUTPATIENT
Start: 2021-02-23 | End: 2021-02-28 | Stop reason: HOSPADM

## 2021-02-22 RX ORDER — CLONIDINE HYDROCHLORIDE 0.1 MG/1
0.1 TABLET ORAL 2 TIMES DAILY
Status: DISCONTINUED | OUTPATIENT
Start: 2021-02-22 | End: 2021-02-28 | Stop reason: HOSPADM

## 2021-02-22 RX ORDER — AMLODIPINE BESYLATE 5 MG/1
1 TABLET ORAL DAILY
Status: ON HOLD | COMMUNITY
End: 2021-02-22

## 2021-02-22 RX ORDER — SODIUM CHLORIDE 9 MG/ML
INJECTION, SOLUTION INTRAVENOUS CONTINUOUS
Status: ACTIVE | OUTPATIENT
Start: 2021-02-22 | End: 2021-02-25

## 2021-02-22 RX ORDER — LORAZEPAM 1 MG/1
1 TABLET ORAL NIGHTLY
Status: DISCONTINUED | OUTPATIENT
Start: 2021-02-22 | End: 2021-02-28 | Stop reason: HOSPADM

## 2021-02-22 RX ORDER — 0.9 % SODIUM CHLORIDE 0.9 %
500 INTRAVENOUS SOLUTION INTRAVENOUS ONCE
Status: COMPLETED | OUTPATIENT
Start: 2021-02-22 | End: 2021-02-22

## 2021-02-22 RX ORDER — ASPIRIN 81 MG/1
81 TABLET ORAL EVERY OTHER DAY
Status: DISCONTINUED | OUTPATIENT
Start: 2021-02-23 | End: 2021-02-28 | Stop reason: HOSPADM

## 2021-02-22 RX ORDER — BUDESONIDE AND FORMOTEROL FUMARATE DIHYDRATE 160; 4.5 UG/1; UG/1
2 AEROSOL RESPIRATORY (INHALATION) 2 TIMES DAILY
Status: DISCONTINUED | OUTPATIENT
Start: 2021-02-22 | End: 2021-02-28 | Stop reason: HOSPADM

## 2021-02-22 RX ORDER — LORAZEPAM 0.5 MG/1
0.5 TABLET ORAL 2 TIMES DAILY PRN
Status: DISCONTINUED | OUTPATIENT
Start: 2021-02-22 | End: 2021-02-28 | Stop reason: HOSPADM

## 2021-02-22 RX ORDER — MECLIZINE HCL 12.5 MG/1
12.5 TABLET ORAL 3 TIMES DAILY PRN
Status: DISCONTINUED | OUTPATIENT
Start: 2021-02-22 | End: 2021-02-28 | Stop reason: HOSPADM

## 2021-02-22 RX ORDER — DEXAMETHASONE SODIUM PHOSPHATE 10 MG/ML
10 INJECTION, SOLUTION INTRAMUSCULAR; INTRAVENOUS ONCE
Status: COMPLETED | OUTPATIENT
Start: 2021-02-22 | End: 2021-02-22

## 2021-02-22 RX ORDER — SODIUM CHLORIDE 0.9 % (FLUSH) 0.9 %
10 SYRINGE (ML) INJECTION PRN
Status: DISCONTINUED | OUTPATIENT
Start: 2021-02-22 | End: 2021-02-28 | Stop reason: HOSPADM

## 2021-02-22 RX ORDER — FLUTICASONE PROPIONATE 50 MCG
2 SPRAY, SUSPENSION (ML) NASAL DAILY
Status: DISCONTINUED | OUTPATIENT
Start: 2021-02-23 | End: 2021-02-28 | Stop reason: HOSPADM

## 2021-02-22 RX ORDER — SODIUM CHLORIDE 0.9 % (FLUSH) 0.9 %
10 SYRINGE (ML) INJECTION PRN
Status: DISCONTINUED | OUTPATIENT
Start: 2021-02-22 | End: 2021-02-22 | Stop reason: SDUPTHER

## 2021-02-22 RX ORDER — HYDROCODONE BITARTRATE AND ACETAMINOPHEN 5; 325 MG/1; MG/1
1 TABLET ORAL EVERY 6 HOURS PRN
Status: DISCONTINUED | OUTPATIENT
Start: 2021-02-22 | End: 2021-02-28 | Stop reason: HOSPADM

## 2021-02-22 RX ORDER — DILTIAZEM HYDROCHLORIDE 240 MG/1
240 CAPSULE, COATED, EXTENDED RELEASE ORAL DAILY
Status: DISCONTINUED | OUTPATIENT
Start: 2021-02-23 | End: 2021-02-28 | Stop reason: HOSPADM

## 2021-02-22 RX ORDER — ACETAMINOPHEN 325 MG/1
650 TABLET ORAL EVERY 6 HOURS PRN
Status: CANCELLED | OUTPATIENT
Start: 2021-02-22

## 2021-02-22 RX ORDER — ROPINIROLE 2 MG/1
1 TABLET, FILM COATED ORAL 3 TIMES DAILY
COMMUNITY
Start: 2021-01-06 | End: 2021-10-29 | Stop reason: ALTCHOICE

## 2021-02-22 RX ORDER — ROPINIROLE 1 MG/1
3 TABLET, FILM COATED ORAL 3 TIMES DAILY
Status: DISCONTINUED | OUTPATIENT
Start: 2021-02-22 | End: 2021-02-22

## 2021-02-22 RX ORDER — LEVOTHYROXINE SODIUM 0.07 MG/1
75 TABLET ORAL DAILY
Status: DISCONTINUED | OUTPATIENT
Start: 2021-02-23 | End: 2021-02-28 | Stop reason: HOSPADM

## 2021-02-22 RX ORDER — SODIUM CHLORIDE 0.9 % (FLUSH) 0.9 %
10 SYRINGE (ML) INJECTION EVERY 12 HOURS SCHEDULED
Status: DISCONTINUED | OUTPATIENT
Start: 2021-02-22 | End: 2021-02-28 | Stop reason: HOSPADM

## 2021-02-22 RX ORDER — ALBUTEROL SULFATE 90 UG/1
2 AEROSOL, METERED RESPIRATORY (INHALATION) 4 TIMES DAILY
Status: DISCONTINUED | OUTPATIENT
Start: 2021-02-22 | End: 2021-02-23

## 2021-02-22 RX ORDER — DICYCLOMINE HYDROCHLORIDE 10 MG/1
10 CAPSULE ORAL 4 TIMES DAILY PRN
Status: DISCONTINUED | OUTPATIENT
Start: 2021-02-22 | End: 2021-02-28 | Stop reason: HOSPADM

## 2021-02-22 RX ORDER — FAMOTIDINE 20 MG/1
10 TABLET, FILM COATED ORAL DAILY
Status: DISCONTINUED | OUTPATIENT
Start: 2021-02-23 | End: 2021-02-28 | Stop reason: HOSPADM

## 2021-02-22 RX ORDER — ACETAMINOPHEN 650 MG/1
650 SUPPOSITORY RECTAL EVERY 6 HOURS PRN
Status: CANCELLED | OUTPATIENT
Start: 2021-02-22

## 2021-02-22 RX ORDER — ATORVASTATIN CALCIUM 80 MG/1
80 TABLET, FILM COATED ORAL NIGHTLY
Status: CANCELLED | OUTPATIENT
Start: 2021-02-22

## 2021-02-22 RX ORDER — ROPINIROLE 1 MG/1
2 TABLET, FILM COATED ORAL 3 TIMES DAILY
Status: DISCONTINUED | OUTPATIENT
Start: 2021-02-22 | End: 2021-02-28 | Stop reason: HOSPADM

## 2021-02-22 RX ORDER — ATORVASTATIN CALCIUM 80 MG/1
80 TABLET, FILM COATED ORAL NIGHTLY
Status: DISCONTINUED | OUTPATIENT
Start: 2021-02-22 | End: 2021-02-28 | Stop reason: HOSPADM

## 2021-02-22 RX ORDER — AMLODIPINE BESYLATE 5 MG/1
5 TABLET ORAL DAILY
Status: DISCONTINUED | OUTPATIENT
Start: 2021-02-23 | End: 2021-02-28 | Stop reason: HOSPADM

## 2021-02-22 RX ORDER — PROPAFENONE HYDROCHLORIDE 150 MG/1
150 TABLET, FILM COATED ORAL EVERY 8 HOURS SCHEDULED
Status: DISCONTINUED | OUTPATIENT
Start: 2021-02-22 | End: 2021-02-28 | Stop reason: HOSPADM

## 2021-02-22 RX ORDER — HYDROCODONE BITARTRATE AND ACETAMINOPHEN 5; 325 MG/1; MG/1
1 TABLET ORAL EVERY 6 HOURS PRN
COMMUNITY
End: 2021-06-15

## 2021-02-22 RX ORDER — ALBUTEROL SULFATE 90 UG/1
2 AEROSOL, METERED RESPIRATORY (INHALATION) EVERY 6 HOURS PRN
Status: DISCONTINUED | OUTPATIENT
Start: 2021-02-22 | End: 2021-02-28 | Stop reason: HOSPADM

## 2021-02-22 RX ADMIN — ATORVASTATIN CALCIUM 80 MG: 80 TABLET, FILM COATED ORAL at 23:38

## 2021-02-22 RX ADMIN — SODIUM CHLORIDE 500 ML: 9 INJECTION, SOLUTION INTRAVENOUS at 21:54

## 2021-02-22 RX ADMIN — ROPINIROLE HYDROCHLORIDE 2 MG: 1 TABLET, FILM COATED ORAL at 23:38

## 2021-02-22 RX ADMIN — DEXAMETHASONE SODIUM PHOSPHATE 10 MG: 10 INJECTION, SOLUTION INTRAMUSCULAR; INTRAVENOUS at 18:17

## 2021-02-22 RX ADMIN — AZITHROMYCIN MONOHYDRATE 500 MG: 500 INJECTION, POWDER, LYOPHILIZED, FOR SOLUTION INTRAVENOUS at 21:54

## 2021-02-22 RX ADMIN — Medication 1000 MG: at 21:54

## 2021-02-22 RX ADMIN — CLONIDINE HYDROCHLORIDE 0.1 MG: 0.1 TABLET ORAL at 23:38

## 2021-02-22 RX ADMIN — SODIUM CHLORIDE: 9 INJECTION, SOLUTION INTRAVENOUS at 23:19

## 2021-02-22 RX ADMIN — PROPAFENONE HYDROCHLORIDE 150 MG: 150 TABLET, FILM COATED ORAL at 23:38

## 2021-02-22 RX ADMIN — Medication 10 ML: at 23:40

## 2021-02-22 RX ADMIN — LORAZEPAM 1 MG: 1 TABLET ORAL at 23:38

## 2021-02-22 NOTE — ED NOTES
Bed: 19  Expected date:   Expected time:   Means of arrival:   Comments:  Keisha Willson RN  02/22/21 [id number]

## 2021-02-23 LAB
A/G RATIO: 1.4 (ref 1.1–2.2)
ALBUMIN SERPL-MCNC: 3.5 G/DL (ref 3.4–5)
ALP BLD-CCNC: 195 U/L (ref 40–129)
ALT SERPL-CCNC: 412 U/L (ref 10–40)
ANION GAP SERPL CALCULATED.3IONS-SCNC: 8 MMOL/L (ref 3–16)
AST SERPL-CCNC: 193 U/L (ref 15–37)
BILIRUB SERPL-MCNC: 0.6 MG/DL (ref 0–1)
BILIRUBIN DIRECT: <0.2 MG/DL (ref 0–0.3)
BILIRUBIN, INDIRECT: NORMAL MG/DL (ref 0–1)
BUN BLDV-MCNC: 30 MG/DL (ref 7–20)
CALCIUM SERPL-MCNC: 8.8 MG/DL (ref 8.3–10.6)
CHLORIDE BLD-SCNC: 109 MMOL/L (ref 99–110)
CO2: 21 MMOL/L (ref 21–32)
CREAT SERPL-MCNC: 1.4 MG/DL (ref 0.6–1.2)
EKG ATRIAL RATE: 87 BPM
EKG DIAGNOSIS: NORMAL
EKG P AXIS: 69 DEGREES
EKG P-R INTERVAL: 166 MS
EKG Q-T INTERVAL: 360 MS
EKG QRS DURATION: 84 MS
EKG QTC CALCULATION (BAZETT): 433 MS
EKG R AXIS: 10 DEGREES
EKG T AXIS: 58 DEGREES
EKG VENTRICULAR RATE: 87 BPM
ESTIMATED AVERAGE GLUCOSE: 128.4 MG/DL
GAMMA GLUTAMYL TRANSFERASE: 226 U/L (ref 5–36)
GFR AFRICAN AMERICAN: 43
GFR NON-AFRICAN AMERICAN: 36
GLOBULIN: 2.5 G/DL
GLUCOSE BLD-MCNC: 175 MG/DL (ref 70–99)
GLUCOSE BLD-MCNC: 207 MG/DL (ref 70–99)
GLUCOSE BLD-MCNC: 244 MG/DL (ref 70–99)
HAV IGM SER IA-ACNC: NORMAL
HBA1C MFR BLD: 6.1 %
HCT VFR BLD CALC: 30.5 % (ref 36–48)
HEMOGLOBIN: 9.9 G/DL (ref 12–16)
HEPATITIS B CORE IGM ANTIBODY: NORMAL
HEPATITIS B SURFACE ANTIGEN INTERPRETATION: NORMAL
HEPATITIS C ANTIBODY INTERPRETATION: NORMAL
L. PNEUMOPHILA SEROGP 1 UR AG: NORMAL
MCH RBC QN AUTO: 30.6 PG (ref 26–34)
MCHC RBC AUTO-ENTMCNC: 32.6 G/DL (ref 31–36)
MCV RBC AUTO: 93.7 FL (ref 80–100)
PDW BLD-RTO: 16.1 % (ref 12.4–15.4)
PERFORMED ON: ABNORMAL
PERFORMED ON: ABNORMAL
PLATELET # BLD: 109 K/UL (ref 135–450)
PMV BLD AUTO: 7.9 FL (ref 5–10.5)
POTASSIUM REFLEX MAGNESIUM: 4.6 MMOL/L (ref 3.5–5.1)
RBC # BLD: 3.25 M/UL (ref 4–5.2)
SARS-COV-2: NOT DETECTED
SODIUM BLD-SCNC: 138 MMOL/L (ref 136–145)
STREP PNEUMONIAE ANTIGEN, URINE: NORMAL
TOTAL PROTEIN: 6 G/DL (ref 6.4–8.2)
WBC # BLD: 6.8 K/UL (ref 4–11)

## 2021-02-23 PROCEDURE — 94640 AIRWAY INHALATION TREATMENT: CPT

## 2021-02-23 PROCEDURE — 85027 COMPLETE CBC AUTOMATED: CPT

## 2021-02-23 PROCEDURE — 93010 ELECTROCARDIOGRAM REPORT: CPT | Performed by: INTERNAL MEDICINE

## 2021-02-23 PROCEDURE — 87449 NOS EACH ORGANISM AG IA: CPT

## 2021-02-23 PROCEDURE — 82977 ASSAY OF GGT: CPT

## 2021-02-23 PROCEDURE — 1200000000 HC SEMI PRIVATE

## 2021-02-23 PROCEDURE — 2580000003 HC RX 258: Performed by: PHYSICIAN ASSISTANT

## 2021-02-23 PROCEDURE — 83516 IMMUNOASSAY NONANTIBODY: CPT

## 2021-02-23 PROCEDURE — 2700000000 HC OXYGEN THERAPY PER DAY

## 2021-02-23 PROCEDURE — 83036 HEMOGLOBIN GLYCOSYLATED A1C: CPT

## 2021-02-23 PROCEDURE — 94150 VITAL CAPACITY TEST: CPT

## 2021-02-23 PROCEDURE — 94761 N-INVAS EAR/PLS OXIMETRY MLT: CPT

## 2021-02-23 PROCEDURE — 6360000002 HC RX W HCPCS: Performed by: INTERNAL MEDICINE

## 2021-02-23 PROCEDURE — 36415 COLL VENOUS BLD VENIPUNCTURE: CPT

## 2021-02-23 PROCEDURE — 82248 BILIRUBIN DIRECT: CPT

## 2021-02-23 PROCEDURE — 6370000000 HC RX 637 (ALT 250 FOR IP): Performed by: INTERNAL MEDICINE

## 2021-02-23 PROCEDURE — 6360000002 HC RX W HCPCS: Performed by: PHYSICIAN ASSISTANT

## 2021-02-23 PROCEDURE — 6370000000 HC RX 637 (ALT 250 FOR IP): Performed by: PHYSICIAN ASSISTANT

## 2021-02-23 PROCEDURE — 80053 COMPREHEN METABOLIC PANEL: CPT

## 2021-02-23 RX ORDER — NICOTINE POLACRILEX 4 MG
15 LOZENGE BUCCAL PRN
Status: DISCONTINUED | OUTPATIENT
Start: 2021-02-23 | End: 2021-02-28 | Stop reason: HOSPADM

## 2021-02-23 RX ORDER — DEXTROSE MONOHYDRATE 25 G/50ML
12.5 INJECTION, SOLUTION INTRAVENOUS PRN
Status: DISCONTINUED | OUTPATIENT
Start: 2021-02-23 | End: 2021-02-28 | Stop reason: HOSPADM

## 2021-02-23 RX ORDER — METHYLPREDNISOLONE SODIUM SUCCINATE 40 MG/ML
40 INJECTION, POWDER, LYOPHILIZED, FOR SOLUTION INTRAMUSCULAR; INTRAVENOUS EVERY 8 HOURS
Status: DISCONTINUED | OUTPATIENT
Start: 2021-02-23 | End: 2021-02-24

## 2021-02-23 RX ORDER — DEXTROSE MONOHYDRATE 50 MG/ML
100 INJECTION, SOLUTION INTRAVENOUS PRN
Status: DISCONTINUED | OUTPATIENT
Start: 2021-02-23 | End: 2021-02-28 | Stop reason: HOSPADM

## 2021-02-23 RX ORDER — IPRATROPIUM BROMIDE AND ALBUTEROL SULFATE 2.5; .5 MG/3ML; MG/3ML
1 SOLUTION RESPIRATORY (INHALATION)
Status: DISCONTINUED | OUTPATIENT
Start: 2021-02-23 | End: 2021-02-28 | Stop reason: HOSPADM

## 2021-02-23 RX ADMIN — RIVAROXABAN 15 MG: 15 TABLET, FILM COATED ORAL at 08:06

## 2021-02-23 RX ADMIN — Medication 10 ML: at 21:28

## 2021-02-23 RX ADMIN — Medication 2 PUFF: at 16:03

## 2021-02-23 RX ADMIN — CLONIDINE HYDROCHLORIDE 0.1 MG: 0.1 TABLET ORAL at 21:28

## 2021-02-23 RX ADMIN — AZITHROMYCIN MONOHYDRATE 500 MG: 500 INJECTION, POWDER, LYOPHILIZED, FOR SOLUTION INTRAVENOUS at 21:32

## 2021-02-23 RX ADMIN — SODIUM CHLORIDE: 9 INJECTION, SOLUTION INTRAVENOUS at 14:19

## 2021-02-23 RX ADMIN — Medication 10 ML: at 23:44

## 2021-02-23 RX ADMIN — PROPAFENONE HYDROCHLORIDE 150 MG: 150 TABLET, FILM COATED ORAL at 06:09

## 2021-02-23 RX ADMIN — FAMOTIDINE 10 MG: 20 TABLET ORAL at 08:06

## 2021-02-23 RX ADMIN — Medication 2 PUFF: at 12:27

## 2021-02-23 RX ADMIN — ROPINIROLE HYDROCHLORIDE 2 MG: 1 TABLET, FILM COATED ORAL at 08:06

## 2021-02-23 RX ADMIN — METHYLPREDNISOLONE SODIUM SUCCINATE 40 MG: 40 INJECTION, POWDER, FOR SOLUTION INTRAMUSCULAR; INTRAVENOUS at 16:40

## 2021-02-23 RX ADMIN — AMLODIPINE BESYLATE 5 MG: 5 TABLET ORAL at 08:06

## 2021-02-23 RX ADMIN — ROPINIROLE HYDROCHLORIDE 2 MG: 1 TABLET, FILM COATED ORAL at 21:30

## 2021-02-23 RX ADMIN — DILTIAZEM HYDROCHLORIDE 240 MG: 240 CAPSULE, COATED, EXTENDED RELEASE ORAL at 08:07

## 2021-02-23 RX ADMIN — PROPAFENONE HYDROCHLORIDE 150 MG: 150 TABLET, FILM COATED ORAL at 21:28

## 2021-02-23 RX ADMIN — CLONIDINE HYDROCHLORIDE 0.1 MG: 0.1 TABLET ORAL at 08:06

## 2021-02-23 RX ADMIN — LEVOTHYROXINE SODIUM 75 MCG: 0.07 TABLET ORAL at 06:09

## 2021-02-23 RX ADMIN — IPRATROPIUM BROMIDE AND ALBUTEROL SULFATE 1 AMPULE: .5; 3 SOLUTION RESPIRATORY (INHALATION) at 20:52

## 2021-02-23 RX ADMIN — Medication 2 PUFF: at 09:11

## 2021-02-23 RX ADMIN — ATORVASTATIN CALCIUM 80 MG: 80 TABLET, FILM COATED ORAL at 21:28

## 2021-02-23 RX ADMIN — FLUTICASONE PROPIONATE 2 SPRAY: 50 SPRAY, METERED NASAL at 11:58

## 2021-02-23 RX ADMIN — Medication 1 CAPSULE: at 16:40

## 2021-02-23 RX ADMIN — ASPIRIN 81 MG: 81 TABLET, COATED ORAL at 08:06

## 2021-02-23 RX ADMIN — METHYLPREDNISOLONE SODIUM SUCCINATE 40 MG: 40 INJECTION, POWDER, FOR SOLUTION INTRAMUSCULAR; INTRAVENOUS at 23:44

## 2021-02-23 RX ADMIN — Medication 2 PUFF: at 20:52

## 2021-02-23 RX ADMIN — PROPAFENONE HYDROCHLORIDE 150 MG: 150 TABLET, FILM COATED ORAL at 14:17

## 2021-02-23 RX ADMIN — Medication 1000 MG: at 21:27

## 2021-02-23 RX ADMIN — Medication 1 CAPSULE: at 08:06

## 2021-02-23 RX ADMIN — ROPINIROLE HYDROCHLORIDE 2 MG: 1 TABLET, FILM COATED ORAL at 14:17

## 2021-02-23 RX ADMIN — LORAZEPAM 1 MG: 1 TABLET ORAL at 21:28

## 2021-02-23 ASSESSMENT — PAIN SCALES - GENERAL
PAINLEVEL_OUTOF10: 0
PAINLEVEL_OUTOF10: 0

## 2021-02-23 NOTE — PROGRESS NOTES
Incentive Spirometry education and demonstration completed by Respiratory Therapy Yes      Response to education: Very Good     Teaching Time: 5 minutes    Minimum Predicted Vital Capacity - 524 mL. Patient's Actual Vital Capacity - 1000 mL. Turning over to Nursing for routine follow-up Yes.         Electronically signed by Shelley Wells RCP on 2/23/2021 at 12:32 PM

## 2021-02-23 NOTE — CARE COORDINATION
CM received call back from [place] with [facility] and they will accept pt at discharge.      Kirk Huynh RN, BSN  [phone]

## 2021-02-23 NOTE — H&P
Hospital Medicine History & Physical      Patient Name: Tez Willard    : 1935    PCP: Velma Knapp MD    Date of Service:  Patient seen and examined on 2021     Chief Complaint:  Shortness of breath    History Of Present Illness:    Tez Willard is a 80 y.o. female who presented to ED with complaint of increased shortness of breath and cough. She has a history of COPD and normally wears 2L NC O2 at night only. She reports she woke up with increased productive cough and increased shortness of breath. Sje reports some chest discomfort which is only present when she coughs. She reports some nausea and one episode of vomiting last night. She denies fever, dizziness, abdominal pain, diarrhea, constipation, urinary symptoms. She denies any known sick contacts. Imaging and labs performed in ED reviewed. CXR shows findings concerning for RLL pneumonia. CT abdomen negative for acute abdominopelvic process but reveals bibasilar ground-glass opacities, right > left. Cr 1.8 (baseline 1.0). , , bilirubin 1.1. No leukocytosis, stable anemia. Troponin negative, proBNP 367. Past Medical History:    Patient has a past medical history of Arthritis, Atrial fibrillation ([address] Diabetes mellitus type II, controlled ([address] GERD (gastroesophageal reflux disease), HTN (hypertension), Hyperlipidemia, Hypothyroid, Insomnia, Lumbago, and SVT (supraventricular tachycardia) ([address] Past Surgical History:    Patient has a past surgical history that includes Appendectomy; Colonoscopy; and Cholecystectomy, laparoscopic (2013). Medications Prior to Admission:      Prior to Admission medications    Medication Sig Start Date End Date Taking?  Authorizing Provider   rivaroxaban (XARELTO) 15 MG TABS tablet TAKE 1 TABLET EVERY DAY 21  Yes Mirella Hitchcock MD   ipratropium-albuterol (DUONEB) 0.5-2.5 (3) MG/3ML SOLN nebulizer solution INHALE THE CONTENTS OF 1 VIAL VIA NEBULIZER EVERY 4 HOURS 2/10/21  Yes Marisela Encinas MD   irbesartan (AVAPRO) 300 MG tablet TAKE 1 TABLET BY MOUTH EVERY NIGHT 1/29/21  Yes Marisela Encinas MD   LORazepam (ATIVAN) 1 MG tablet TAKE 1/2 TABLET BY MOUTH TWICE DAILY AND 1 EVERY NIGHT AT BEDTIME AS NEEDED FOR ANXIETY 1/27/21 2/26/21 Yes Marisela Encinas MD   albuterol sulfate  (90 Base) MCG/ACT inhaler Inhale 2 puffs into the lungs every 6 hours as needed for Wheezing 1/27/21 1/27/22 Yes Bhupendra Rogel MD   meclizine (ANTIVERT) 12.5 MG tablet TAKE 1 TABLET THREE TIMES DAILY AS NEEDED 1/27/21  Yes Marisela Encinas MD   cloNIDine (CATAPRES) 0.1 MG tablet Take 1 tablet by mouth 2 times daily 1/27/21  Yes Marisela Encinas MD   dilTIAZem (CARDIZEM CD) 240 MG extended release capsule Take 1 capsule by mouth daily 1/27/21  Yes Marisela Encinas MD   fluticasone [facility]) 50 MCG/ACT nasal spray 2 sprays by Nasal route daily 1/27/21  Yes Marisela Encinas MD   levothyroxine (SYNTHROID) 75 MCG tablet TAKE 1 TABLET EVERY DAY 1/26/21  Yes Marisela Encinas MD   rOPINIRole (REQUIP) 3 MG tablet TAKE 1 TABLET BY MOUTH THREE TIMES DAILY 1/25/21  Yes Marisela Encinas MD   budesonide-formoterol (SYMBICORT) 160-4.5 MCG/ACT AERO INHALE 2 PUFFS INTO THE LUNGS TWICE DAILY 1/20/21  Yes Marisela Encinas MD   levalbuterol (XOPENEX) 0.63 MG/3ML nebulization INHALE CONTENTS OF 1 VIAL PER NEBULIZER EVERY 6 HOURS AS NEEDED FOR WHEEZING 9/14/20  Yes Bhupendra Rogel MD   atorvastatin (LIPITOR) 80 MG tablet TAKE 1 TABLET EVERY NIGHT 8/17/20  Yes KATHY Ball CNP   pantoprazole (PROTONIX) 40 MG tablet TAKE 1 TABLET EVERY DAY 7/20/20  Yes Marisela Encinas MD   propafenone (RYTHMOL) 150 MG tablet TAKE 1 TABLET BY MOUTH EVERY 8 HOURS 7/6/20  Yes KATHY Che CNP   dicyclomine (BENTYL) 10 MG capsule Take 1 capsule by mouth 4 times daily as needed (abd pain) 12/17/19  Yes Marisela Encinas MD   albuterol sulfate HFA (PROAIR HFA) 108 (90 Base) MCG/ACT inhaler Inhale 2 puffs into the lungs every 6 hours as needed for Wheezing 10/25/19  Yes Alexi Kurtz MD   Lancets MISC 1 each by Does not apply route 2 times daily 7/20/19  Yes Wendy Gordon MD   aspirin 81 MG tablet Take 81 mg by mouth every other day    Yes Historical Provider, MD   Respiratory Therapy Supplies (NEBULIZER/TUBING/MOUTHPIECE) KIT 1 kit by Does not apply route 4 times daily as needed (wheezing, shortness of breath) 1/19/21   Neeta Jiang MD   predniSONE (DELTASONE) 10 MG tablet Take 40 mg daily x 3 days, 30 mg daily x 3 days, 20 mg daily x 3 days, 10 mg daily x 3 days then stop 12/31/20   Alexi Kurtz MD   Respiratory Therapy Supplies (NEBULIZER/TUBING/MOUTHPIECE) KIT 1 kit by Does not apply route 4 times daily as needed (shortness of breath) 3/26/20   Neeta Jiang MD   blood glucose monitor strips Test one time a day 7/22/19   Neeta Jiang MD       Allergies:  Adhesive tape, Cefaclor, Nsaids, Clinoril [sulindac], Codeine, Diclofenac, Diclofenac sodium, Diclofenac sodium, Hctz [hydrochlorothiazide], Ibuprofen, Macrobid [nitrofurantoin macrocrystal], Motrin [ibuprofen micronized], Pcn [penicillins], and Peach [prunus persica]    Social History:      TOBACCO:   reports that she quit smoking about 25 years ago. She started smoking about 70 years ago. She has a 45.00 pack-year smoking history. She has never used smokeless tobacco.  ETOH:   reports no history of alcohol use. DRUGS:  reports no history of drug use. Family History:      Reviewed in detail positive as follows:        Problem Relation Age of Onset    High Blood Pressure Mother     Heart Attack Father     Heart Disease Father     Other Brother     Asthma Paternal Uncle        REVIEW OF SYSTEMS:   Pertinent positives as noted in the HPI. All other systems reviewed and negative.     PHYSICAL EXAM PERFORMED:    BP (!) 148/63   Pulse 76   Temp 98.6 °F (37 °C) (Oral)   Resp 17   Ht 5' 3\" (1.6 m)   Wt 170 lb (77.1 kg)   SpO2 98%   BMI 30.11 kg/m²     General appearance:  Awake, alert, no apparent distress  HEENT:  Normocephalic, atraumatic without obvious deformity. PERRL. EOM intact. Conjunctivae/corneas clear. Neck: Supple, with full range of motion. No JVD. Trachea midline. Respiratory:  Diminished throughout. Clear to auscultation bilaterally without rales, wheezes, or rhonchi. Normal respiratory effort. Cardiovascular:  Regular rate and rhythm without murmurs, rubs or gallops. Abdomen: Soft, NT, ND, without rebound or guarding. Normal bowel sounds. Extremities:  No clubbing, cyanosis, or edema bilaterally. Full range of motion without deformity. +2 palpable pulses, equal bilaterally. Capillary refill brisk,< 3 seconds   Skin: No rashes or lesions. Warm/dry. Neurologic:  Neurovascularly intact without any focal sensory/motor deficits. Cranial nerves: II-XII intact, grossly non-focal. Alert and oriented x 3. Normal speech. Psychiatric:  Thought content appropriate, normal insight.     Labs:   CBC   Recent Labs     02/22/21  1629   WBC 8.6   HGB 10.8*   HCT 33.0*   *        RENAL  Recent Labs     02/22/21  1629   *   K 4.6      CO2 21   BUN 37*   CREATININE 1.8*       LFTS  Recent Labs     02/22/21  1629   *   *   BILITOT 1.1*   ALKPHOS 238*       COAG  Recent Labs     02/22/21  1629   INR 1.58*       CARDIAC ENZYMES  Recent Labs     02/22/21  1629   TROPONINI <0.01       LIPIDS  Cholesterol, Total   Date/Time Value Ref Range Status   08/12/2017 07:22  (H) 0 - 199 mg/dL Final     Triglycerides   Date/Time Value Ref Range Status   08/12/2017 07:22  (H) 0 - 150 mg/dL Final     HDL   Date/Time Value Ref Range Status   08/12/2017 07:22 AM 39 (L) 40 - 60 mg/dL Final   06/23/2011 06:45 AM 32 (L) 40 - 60 mg/dl Final     LDL Calculated   Date/Time Value Ref Range Status   08/12/2017 07:22  (H) <100 mg/dL Final         Radiology:     CT ABDOMEN PELVIS WO CONTRAST Additional Contrast? None   Final Result   No acute inflammatory abnormality is identified in the abdomen or pelvis. Bibasilar ground-glass opacities, greater in the right lung. These are   nonspecific for pulmonary edema, atelectasis or pneumonia. Small hiatal hernia. CT ABDOMEN PELVIS WO CONTRAST Additional Contrast? None   Final Result   No acute intra-abdominal abnormality         XR CHEST PORTABLE   Final Result   Right infrahilar airspace disease, likely pneumonia in the right lower lobe. Follow-up until resolution is recommended. ASSESSMENT/PLAN:    Pneumonia  Empiric rocephin and azithromycin started in the ED. Will continue  Check blood and sputum cultures, strep pneumo and legionella antigens  COVID test results pending. Droplet Plus Isolation  Procalcitonin 1.11  Supplemental O2 PRN   Albuterol/Ipratropium inhalers    CAROLANN  Cr baseline 1.0, today 1.8  Suspect prerenal, due to dehydration  IVF  Avoid nephrotoxic medications  Renally dose medications  Monitor for electrolyte abnormalities    COPD exacerbation  Albuterol/Ipratropium inhalers  Decadron  Symbicort  Supplemental O2; on 2L NC O2 at night only at baseline  Azithromycin day 1     Transaminitis  ; ; bilirubin 1.1  Acute hepatitis panel pending  Check total direct and indirect bilirubin  GI consulted    Paroxysmal A fib  Currently NSR  Continue home rythmol, diltiazem, xarelto   On Coumadin; monitor daily PT/INR.      HTN  Hold home irbesartan for CAROLANN  Continue home norvasc, cardizem,  and clonidine    CAD  Continue home ASA and lipitor    Hypothyroidism  Continue home synthroid      DVT prophylaxis: Xarelto   Probiotic if on abx: Yes    Diet: DIET GENERAL;  Code Status: Full Code    Consults:  IP CONSULT TO HOSPITALIST  IP CONSULT TO GI    Disposition: Admit to Inpatient   ELOS: Greater than two midnights due to medical therapy     Mejia Pacheco PA-C    Thank you Miesha Perez

## 2021-02-23 NOTE — PROGRESS NOTES
Admission assessment complete. Med rec verified with [address] d/t pt. not knowing medications. VSS. Scheduled medications given. Pt. oriented to the room. Call light within reach. Pt. denies further needs at this time.

## 2021-02-23 NOTE — CONSULTS
Diclofenac     Diclofenac Sodium Hives    Diclofenac Sodium Hives    Hctz [Hydrochlorothiazide]      Sob    Ibuprofen Other (See Comments)     Other reaction(s): Tachycardia    Macrobid [Nitrofurantoin Macrocrystal]      nausea    Motrin [Ibuprofen Micronized] Other (See Comments)     Tachycardia      Pcn [Penicillins] Hives    Peach [Prunus Persica] Itching and Swelling     Cannot eat raw peaches      Social   Social History     Tobacco Use    Smoking status: Former Smoker     Packs/day: 1.00     Years: 45.00     Pack years: 45.00     Start date: 9/15/1950     Quit date: 1995     Years since quittin.1    Smokeless tobacco: Never Used   Substance Use Topics    Alcohol use: No     Alcohol/week: 0.0 standard drinks        Family History   Problem Relation Age of Onset    High Blood Pressure Mother     Heart Attack Father     Heart Disease Father     Other Brother     Asthma Paternal Uncle         Review of Systems  Constitutional: negative for fevers, chills, sweats    Ears, nose, mouth, throat, and face: negative for nasal congestion and sore throat   Respiratory: + for cough and shortness of breath   Cardiovascular: negative for chest pain and dyspnea   Gastrointestinal: see hpi   Genitourinary:negative for dysuria and frequency   Integument/breast: negative for pruritus and rash   Hematologic/lymphatic: negative for bleeding and easy bruising   Musculoskeletal:negative for arthralgias and myalgias   Neurological: negative for dizziness and weakness         Physical Exam  Blood pressure 128/74, pulse 70, temperature 98.3 °F (36.8 °C), resp. rate 18, height 5' 3\" (1.6 m), weight 185 lb 1.6 oz (84 kg), SpO2 98 %, not currently breastfeeding.     General appearance: alert, cooperative, no distress, appears stated age  Eyes: Anicteric  Head: Normocephalic, without obvious abnormality  Lungs: clear to auscultation bilaterally, Normal Effort  Heart: regular rate and rhythm, normal S1 and S2, no murmurs or rubs  Abdomen: soft, non-tender. Bowel sounds normal. No masses,  no organomegaly. Extremities: atraumatic, no cyanosis or edema  Skin: warm and dry, no jaundice  Neuro: Grossly intact, A&OX3  Musculoskeletal: 5/5  strength BUE      Data Review:    Recent Labs     02/22/21 1629 02/23/21  0519   WBC 8.6 6.8   HGB 10.8* 9.9*   HCT 33.0* 30.5*   MCV 93.3 93.7   * 109*     Recent Labs     02/22/21 1629 02/23/21  0519   * 138   K 4.6 4.6    109   CO2 21 21   BUN 37* 30*   CREATININE 1.8* 1.4*     Recent Labs     02/22/21  1629 02/23/21  0519   * 193*   * 412*   BILIDIR  --  <0.2   BILITOT 1.1* 0.6   ALKPHOS 238* 195*     No results for input(s): LIPASE, AMYLASE in the last 72 hours. Recent Labs     02/22/21 1629   PROTIME 18.4*   INR 1.58*     No results for input(s): PTT in the last 72 hours. No results for input(s): OCCULTBLD in the last 72 hours. Imaging Studies:                               CT-scan of abdomen and pelvis wo contrast 2/22/21  Impression   No acute inflammatory abnormality is identified in the abdomen or pelvis.       Bibasilar ground-glass opacities, greater in the right lung.  These are   nonspecific for pulmonary edema, atelectasis or pneumonia.       Small hiatal hernia.                          Assessment:     Active Problems:    Pneumonia  Resolved Problems:    * No resolved hospital problems. *    Elevated liver enzymes - she has had elevated alk phos in the past and intermittent mild elevation of transaminases. ,  at admission with bili 1.1 and alk phos 238. Enzymes all improved today. INR 1.5 but appears she was on xarelto. She is s/p josue. No RUQ pain suspicious for CBD stone. Normal liver on CT. Acute hepatitis panel neg for Hep A, B, and C. If she has covid, this could be source of elevated liver enzymes.  She is on rythmol which can cause elevated liver enzymes but enzymes are improving while on this med so this is less likely. Pneumonia - covid pending. Recommendations:   - monitor liver enzymes  - repeat INR tomorrow  - check f-actin  - f/u covid test    Discussed with Dr. Shelli Morales, LU[facility]  [facility]  I have personally performed a face to face diagnostic evaluation on this patient. I have interviewed and examined the patient and I agree with the findings and recommended plan of care. In summary, my findings and plan are the following: As above, elderly woman with history of chronic A.fib, SVT, GERD, DM admitted with bilateral pneumonia, but COVID (-), found to have elevated liver enzymes. She denies abdominal pain. On exam she is not jaundiced, has no stigmata of chronic liver disease, abd is soft and NT with no HSM. CT imaging of liver is normal, HepbA,B,C serologies are (-). This acute hepatitis is resolving with levels declining today. Ischemia, viral infection or medication-induced liver injury all possible. Rhythmol and Cardizem high on possible list, but she has been on these for months. We will Check f-actin to be sure this isn't an autoimmune hepatitis and follow liver enzyme trend daily -- hopefully will resolve without intervention. If numbers rise, she may require liver biopsy.     Austin Tello, [address]  2/23/2021

## 2021-02-23 NOTE — PROGRESS NOTES
Pt.'s daughter updated regarding plan of care. Pt.'s daughter requesting consults to nephrology and is concerned about liver enzymes.

## 2021-02-23 NOTE — CARE COORDINATION
CM discussed with pt possible therapy recommendations of hc vs snf. Pt states that she will go home with home care she does not want to go to a facility. Therapy evaluations are pending at this time. Patient has used [facility] in the past and she would like to use them again if needed. CM called Ayden Chiang with [facility] [phone] and left vm making referral for her to follow pt. Therapy and covid pending at this time.      Silvia Sandoval RN, BSN  [phone]

## 2021-02-23 NOTE — PROGRESS NOTES
started in the ED. blood and sputum cultures NTD, strep pneumo and legionella antigens negative; SARS-CoV-2 negative  Procalcitonin 1.11  Supplemental O2 PRN   Albuterol/Ipratropium inhalers     COPD exacerbation  Albuterol/Ipratropium inhalers  Solumedrol  Symbicort  Supplemental O2; on 2L NC O2 at night only at baseline  Azithromycin day 2   Pt improving; Pulm consulted per Pt request    CAROLANN  Cr baseline 1.0, today 1.4  Suspect prerenal, due to dehydration  IVF  Avoid nephrotoxic medications  Renally dose medications  Monitor for electrolyte abnormalities  resolving     Transaminitis  GGT elevated  Acute hepatitis panel unremarkable  CT ABD/Pelvis w/o contrast shows no acute intraabdominal or Pelvis abnormality, specifically no evidence of an Acute Pancreatitis; thus, amylase and lipase are not medically necessary  resolving  GI consulted; r/o autoimmune ds, ischemia, medication-induced; consider Liver Bx if worsens  Serialize LFTs daily    Hyperglycemia  Suspect Steroid induced  A1C 6.1 %  Follow Accuchecks and adjust PRN  Avoid SSI currently given high risk of hypoglycemia     Paroxysmal A fib  Currently NSR  Continue home rythmol, diltiazem, xarelto     HTN  Hold home irbesartan for CAROLANN  Continue home norvasc, cardizem,  and clonidine     CAD  Continue home ASA and lipitor     Hypothyroidism  Continue home synthroid        DVT prophylaxis: Xarelto   Probiotic if on abx:  Yes     Diet: DIET GENERAL;  Code Status: Full Code            Sonal Vanegas DO

## 2021-02-23 NOTE — CARE COORDINATION
Discharge Planning Assessment  Discharge Planning Assessment  RN/SW discharge planner met with patient/ (and family member) to discuss reason for admission, current living situation, and potential needs at the time of discharge    Demographics/Insurance verified Yes    Current type of dwelling:Tri level home with 6-7 steps to between levels and pt stays on main level. There is one step from outside to enter home. Patient from ECF/SW confirmed with:n/a     Living arrangements: patient lives with her daughter and son in law. They work during the day. Level of function/Support: Independent with adl;s, pt still cooks. Pt does not drive     PCP: Sam López     Last Visit to PCP:2 months ago     DME: has a walker only uses once in awhile. Pt has oxygen through [facility] and uses 2 liters at all times. Has portable tank and concentrator. Active with any community resources/agencies/skilled home care: Not active with any agencies at this time. Has had [facility] in the past and would use again. Medication compliance issues:independent     Financial issues that could impact healthcare:none       Tentative discharge plan: pt wants to go home with home care. Discussed and provided facilities of choice if transition to a skilled nursing facility is required at the time of discharge      Discussed with patient and/or family that on the day of discharge home tentative time of discharge will be between 10 AM and noon. Transportation at the time of discharge: family in private vehicle.      Natalie Ayon RN, BSN  [phone]

## 2021-02-24 LAB
ALBUMIN SERPL-MCNC: 3.4 G/DL (ref 3.4–5)
ALP BLD-CCNC: 174 U/L (ref 40–129)
ALT SERPL-CCNC: 262 U/L (ref 10–40)
ANION GAP SERPL CALCULATED.3IONS-SCNC: 13 MMOL/L (ref 3–16)
ANION GAP SERPL CALCULATED.3IONS-SCNC: 16 MMOL/L (ref 3–16)
AST SERPL-CCNC: 66 U/L (ref 15–37)
BASE EXCESS VENOUS: -3.9 MMOL/L (ref -3–3)
BASOPHILS ABSOLUTE: 0.1 K/UL (ref 0–0.2)
BASOPHILS RELATIVE PERCENT: 0.9 %
BILIRUB SERPL-MCNC: 0.3 MG/DL (ref 0–1)
BILIRUBIN DIRECT: <0.2 MG/DL (ref 0–0.3)
BILIRUBIN, INDIRECT: ABNORMAL MG/DL (ref 0–1)
BUN BLDV-MCNC: 25 MG/DL (ref 7–20)
BUN BLDV-MCNC: 26 MG/DL (ref 7–20)
CALCIUM SERPL-MCNC: 8.6 MG/DL (ref 8.3–10.6)
CALCIUM SERPL-MCNC: 8.8 MG/DL (ref 8.3–10.6)
CARBOXYHEMOGLOBIN: 4.7 % (ref 0–1.5)
CHLORIDE BLD-SCNC: 102 MMOL/L (ref 99–110)
CHLORIDE BLD-SCNC: 107 MMOL/L (ref 99–110)
CO2: 13 MMOL/L (ref 21–32)
CO2: 19 MMOL/L (ref 21–32)
CREAT SERPL-MCNC: 0.9 MG/DL (ref 0.6–1.2)
CREAT SERPL-MCNC: 1.1 MG/DL (ref 0.6–1.2)
EOSINOPHILS ABSOLUTE: 0 K/UL (ref 0–0.6)
EOSINOPHILS RELATIVE PERCENT: 0.2 %
GFR AFRICAN AMERICAN: 57
GFR AFRICAN AMERICAN: >60
GFR NON-AFRICAN AMERICAN: 47
GFR NON-AFRICAN AMERICAN: 59
GLUCOSE BLD-MCNC: 253 MG/DL (ref 70–99)
GLUCOSE BLD-MCNC: 280 MG/DL (ref 70–99)
GLUCOSE BLD-MCNC: 324 MG/DL (ref 70–99)
GLUCOSE BLD-MCNC: 332 MG/DL (ref 70–99)
HCO3 VENOUS: 17.8 MMOL/L (ref 23–29)
HCT VFR BLD CALC: 31.4 % (ref 36–48)
HEMOGLOBIN, VEN, REDUCED: 3 %
HEMOGLOBIN: 10.1 G/DL (ref 12–16)
LYMPHOCYTES ABSOLUTE: 0.5 K/UL (ref 1–5.1)
LYMPHOCYTES RELATIVE PERCENT: 4.6 %
MAGNESIUM: 2.2 MG/DL (ref 1.8–2.4)
MCH RBC QN AUTO: 30.5 PG (ref 26–34)
MCHC RBC AUTO-ENTMCNC: 32.2 G/DL (ref 31–36)
MCV RBC AUTO: 94.8 FL (ref 80–100)
METHEMOGLOBIN VENOUS: 0.4 %
MONOCYTES ABSOLUTE: 0.3 K/UL (ref 0–1.3)
MONOCYTES RELATIVE PERCENT: 2.3 %
NEUTROPHILS ABSOLUTE: 10 K/UL (ref 1.7–7.7)
NEUTROPHILS RELATIVE PERCENT: 92 %
O2 CONTENT, VEN: 14 VOL %
O2 SAT, VEN: 97 %
O2 THERAPY: ABNORMAL
PCO2, VEN: 22.5 MMHG (ref 40–50)
PDW BLD-RTO: 16.2 % (ref 12.4–15.4)
PERFORMED ON: ABNORMAL
PERFORMED ON: ABNORMAL
PH VENOUS: 7.51 (ref 7.35–7.45)
PHOSPHORUS: 2.6 MG/DL (ref 2.5–4.9)
PLATELET # BLD: 135 K/UL (ref 135–450)
PMV BLD AUTO: 9.2 FL (ref 5–10.5)
PO2, VEN: 70.1 MMHG (ref 25–40)
POTASSIUM REFLEX MAGNESIUM: 5.6 MMOL/L (ref 3.5–5.1)
POTASSIUM SERPL-SCNC: 4.6 MMOL/L (ref 3.5–5.1)
PROCALCITONIN: 0.26 NG/ML (ref 0–0.15)
RBC # BLD: 3.31 M/UL (ref 4–5.2)
REPORT: NORMAL
RESPIRATORY PANEL PCR: NORMAL
SODIUM BLD-SCNC: 134 MMOL/L (ref 136–145)
SODIUM BLD-SCNC: 136 MMOL/L (ref 136–145)
TCO2 CALC VENOUS: 41 MMOL/L
TOTAL PROTEIN: 6.4 G/DL (ref 6.4–8.2)
WBC # BLD: 10.8 K/UL (ref 4–11)

## 2021-02-24 PROCEDURE — 97165 OT EVAL LOW COMPLEX 30 MIN: CPT

## 2021-02-24 PROCEDURE — 97161 PT EVAL LOW COMPLEX 20 MIN: CPT

## 2021-02-24 PROCEDURE — 94669 MECHANICAL CHEST WALL OSCILL: CPT

## 2021-02-24 PROCEDURE — 6370000000 HC RX 637 (ALT 250 FOR IP): Performed by: PHYSICIAN ASSISTANT

## 2021-02-24 PROCEDURE — 97116 GAIT TRAINING THERAPY: CPT

## 2021-02-24 PROCEDURE — 94640 AIRWAY INHALATION TREATMENT: CPT

## 2021-02-24 PROCEDURE — 2580000003 HC RX 258: Performed by: PHYSICIAN ASSISTANT

## 2021-02-24 PROCEDURE — 80048 BASIC METABOLIC PNL TOTAL CA: CPT

## 2021-02-24 PROCEDURE — 97530 THERAPEUTIC ACTIVITIES: CPT

## 2021-02-24 PROCEDURE — 36415 COLL VENOUS BLD VENIPUNCTURE: CPT

## 2021-02-24 PROCEDURE — 82803 BLOOD GASES ANY COMBINATION: CPT

## 2021-02-24 PROCEDURE — 84145 PROCALCITONIN (PCT): CPT

## 2021-02-24 PROCEDURE — 6360000002 HC RX W HCPCS: Performed by: PHYSICIAN ASSISTANT

## 2021-02-24 PROCEDURE — 99223 1ST HOSP IP/OBS HIGH 75: CPT | Performed by: INTERNAL MEDICINE

## 2021-02-24 PROCEDURE — 80076 HEPATIC FUNCTION PANEL: CPT

## 2021-02-24 PROCEDURE — 85025 COMPLETE CBC W/AUTO DIFF WBC: CPT

## 2021-02-24 PROCEDURE — 2580000003 HC RX 258: Performed by: INTERNAL MEDICINE

## 2021-02-24 PROCEDURE — 84100 ASSAY OF PHOSPHORUS: CPT

## 2021-02-24 PROCEDURE — 83735 ASSAY OF MAGNESIUM: CPT

## 2021-02-24 PROCEDURE — 0202U NFCT DS 22 TRGT SARS-COV-2: CPT

## 2021-02-24 PROCEDURE — 6370000000 HC RX 637 (ALT 250 FOR IP): Performed by: INTERNAL MEDICINE

## 2021-02-24 PROCEDURE — 1200000000 HC SEMI PRIVATE

## 2021-02-24 PROCEDURE — 97535 SELF CARE MNGMENT TRAINING: CPT

## 2021-02-24 PROCEDURE — 2700000000 HC OXYGEN THERAPY PER DAY

## 2021-02-24 PROCEDURE — 94761 N-INVAS EAR/PLS OXIMETRY MLT: CPT

## 2021-02-24 RX ORDER — POLYETHYLENE GLYCOL 3350 17 G/17G
17 POWDER, FOR SOLUTION ORAL DAILY
Status: DISCONTINUED | OUTPATIENT
Start: 2021-02-24 | End: 2021-02-28 | Stop reason: HOSPADM

## 2021-02-24 RX ORDER — NICOTINE POLACRILEX 4 MG
15 LOZENGE BUCCAL PRN
Status: DISCONTINUED | OUTPATIENT
Start: 2021-02-24 | End: 2021-02-24 | Stop reason: SDUPTHER

## 2021-02-24 RX ORDER — DEXTROSE MONOHYDRATE 25 G/50ML
12.5 INJECTION, SOLUTION INTRAVENOUS PRN
Status: DISCONTINUED | OUTPATIENT
Start: 2021-02-24 | End: 2021-02-24 | Stop reason: SDUPTHER

## 2021-02-24 RX ORDER — INSULIN LISPRO 100 [IU]/ML
0-3 INJECTION, SOLUTION INTRAVENOUS; SUBCUTANEOUS NIGHTLY
Status: DISCONTINUED | OUTPATIENT
Start: 2021-02-24 | End: 2021-02-28 | Stop reason: HOSPADM

## 2021-02-24 RX ORDER — METHYLPREDNISOLONE SODIUM SUCCINATE 40 MG/ML
40 INJECTION, POWDER, LYOPHILIZED, FOR SOLUTION INTRAMUSCULAR; INTRAVENOUS DAILY
Status: DISCONTINUED | OUTPATIENT
Start: 2021-02-25 | End: 2021-02-25

## 2021-02-24 RX ORDER — INSULIN LISPRO 100 [IU]/ML
0-6 INJECTION, SOLUTION INTRAVENOUS; SUBCUTANEOUS
Status: DISCONTINUED | OUTPATIENT
Start: 2021-02-24 | End: 2021-02-28 | Stop reason: HOSPADM

## 2021-02-24 RX ORDER — DEXTROSE MONOHYDRATE 50 MG/ML
100 INJECTION, SOLUTION INTRAVENOUS PRN
Status: DISCONTINUED | OUTPATIENT
Start: 2021-02-24 | End: 2021-02-24 | Stop reason: SDUPTHER

## 2021-02-24 RX ADMIN — PROPAFENONE HYDROCHLORIDE 150 MG: 150 TABLET, FILM COATED ORAL at 06:06

## 2021-02-24 RX ADMIN — SODIUM CHLORIDE: 9 INJECTION, SOLUTION INTRAVENOUS at 04:23

## 2021-02-24 RX ADMIN — ATORVASTATIN CALCIUM 80 MG: 80 TABLET, FILM COATED ORAL at 21:24

## 2021-02-24 RX ADMIN — INSULIN LISPRO 2 UNITS: 100 INJECTION, SOLUTION INTRAVENOUS; SUBCUTANEOUS at 21:28

## 2021-02-24 RX ADMIN — CLONIDINE HYDROCHLORIDE 0.1 MG: 0.1 TABLET ORAL at 21:24

## 2021-02-24 RX ADMIN — AZITHROMYCIN MONOHYDRATE 500 MG: 500 INJECTION, POWDER, LYOPHILIZED, FOR SOLUTION INTRAVENOUS at 21:24

## 2021-02-24 RX ADMIN — SODIUM CHLORIDE: 9 INJECTION, SOLUTION INTRAVENOUS at 21:31

## 2021-02-24 RX ADMIN — CLONIDINE HYDROCHLORIDE 0.1 MG: 0.1 TABLET ORAL at 08:05

## 2021-02-24 RX ADMIN — HYDROCODONE BITARTRATE AND ACETAMINOPHEN 1 TABLET: 5; 325 TABLET ORAL at 21:24

## 2021-02-24 RX ADMIN — Medication 1 CAPSULE: at 16:11

## 2021-02-24 RX ADMIN — FLUTICASONE PROPIONATE 2 SPRAY: 50 SPRAY, METERED NASAL at 08:08

## 2021-02-24 RX ADMIN — INSULIN LISPRO 4 UNITS: 100 INJECTION, SOLUTION INTRAVENOUS; SUBCUTANEOUS at 17:42

## 2021-02-24 RX ADMIN — POLYETHYLENE GLYCOL 3350 17 G: 17 POWDER, FOR SOLUTION ORAL at 16:11

## 2021-02-24 RX ADMIN — DILTIAZEM HYDROCHLORIDE 240 MG: 240 CAPSULE, COATED, EXTENDED RELEASE ORAL at 08:06

## 2021-02-24 RX ADMIN — Medication 2 PUFF: at 07:59

## 2021-02-24 RX ADMIN — AMLODIPINE BESYLATE 5 MG: 5 TABLET ORAL at 08:05

## 2021-02-24 RX ADMIN — PROPAFENONE HYDROCHLORIDE 150 MG: 150 TABLET, FILM COATED ORAL at 14:04

## 2021-02-24 RX ADMIN — FAMOTIDINE 10 MG: 20 TABLET ORAL at 08:05

## 2021-02-24 RX ADMIN — Medication 10 ML: at 21:25

## 2021-02-24 RX ADMIN — LEVOTHYROXINE SODIUM 75 MCG: 0.07 TABLET ORAL at 06:06

## 2021-02-24 RX ADMIN — PROPAFENONE HYDROCHLORIDE 150 MG: 150 TABLET, FILM COATED ORAL at 21:23

## 2021-02-24 RX ADMIN — Medication 1000 MG: at 21:23

## 2021-02-24 RX ADMIN — ROPINIROLE HYDROCHLORIDE 2 MG: 1 TABLET, FILM COATED ORAL at 21:23

## 2021-02-24 RX ADMIN — ROPINIROLE HYDROCHLORIDE 2 MG: 1 TABLET, FILM COATED ORAL at 14:04

## 2021-02-24 RX ADMIN — Medication 1 CAPSULE: at 08:05

## 2021-02-24 RX ADMIN — ROPINIROLE HYDROCHLORIDE 2 MG: 1 TABLET, FILM COATED ORAL at 08:05

## 2021-02-24 RX ADMIN — Medication 2 PUFF: at 20:08

## 2021-02-24 RX ADMIN — RIVAROXABAN 15 MG: 15 TABLET, FILM COATED ORAL at 08:06

## 2021-02-24 RX ADMIN — LORAZEPAM 0.5 MG: 0.5 TABLET ORAL at 14:04

## 2021-02-24 RX ADMIN — LORAZEPAM 1 MG: 1 TABLET ORAL at 21:24

## 2021-02-24 ASSESSMENT — PAIN SCALES - GENERAL
PAINLEVEL_OUTOF10: 1
PAINLEVEL_OUTOF10: 7

## 2021-02-24 ASSESSMENT — PAIN DESCRIPTION - LOCATION: LOCATION: ABDOMEN;BACK;NECK

## 2021-02-24 NOTE — PROGRESS NOTES
Hospitalist Progress Note      PCP: Adriana Biggs MD    Date of Admission: 2/22/2021    Chief Complaint: Dyspnea    Hospital Course: 80-year-old female hospital medical history of COPD on 2 L nasal cannula oxygen nightly at home presented with increased shortness of breath. Is currently being treated for CAP and COPD exacerbation. Subjective: Patient seen and examined at bedside. States respirations are better.       Medications:  Reviewed    Infusion Medications    dextrose      dextrose      sodium chloride 100 mL/hr at 02/24/21 1220     Scheduled Medications    [START ON 2/25/2021] methylPREDNISolone  40 mg Intravenous Daily    polyethylene glycol  17 g Oral Daily    insulin lispro  0-6 Units Subcutaneous TID WC    insulin lispro  0-3 Units Subcutaneous Nightly    ipratropium-albuterol  1 ampule Inhalation Q4H WA    aspirin  81 mg Oral Every Other Day    budesonide-formoterol  2 puff Inhalation BID    cloNIDine  0.1 mg Oral BID    dilTIAZem  240 mg Oral Daily    fluticasone  2 spray Nasal Daily    levothyroxine  75 mcg Oral Daily    propafenone  150 mg Oral 3 times per day    sodium chloride flush  10 mL Intravenous 2 times per day    famotidine  10 mg Oral Daily    rivaroxaban  15 mg Oral Daily with breakfast    amLODIPine  5 mg Oral Daily    rOPINIRole  2 mg Oral TID    atorvastatin  80 mg Oral Nightly    LORazepam  1 mg Oral Nightly    lactobacillus  1 capsule Oral BID     cefTRIAXone (ROCEPHIN) IV  1,000 mg Intravenous Q24H    azithromycin  500 mg Intravenous Q24H     PRN Meds: glucose, dextrose, glucagon (rDNA), dextrose, glucose, dextrose, glucagon (rDNA), dextrose, albuterol sulfate HFA, dicyclomine, sodium chloride flush, magnesium hydroxide, ondansetron, meclizine, HYDROcodone-acetaminophen, LORazepam **AND** LORazepam      Intake/Output Summary (Last 24 hours) at 2/24/2021 4734  Last data filed at 2/24/2021 0828  Gross per 24 hour   Intake 480 ml   Output -- Net 480 ml       Physical Exam Performed:    /71   Pulse 71   Temp 98 °F (36.7 °C) (Oral)   Resp 17   Ht 5' 3\" (1.6 m)   Wt 185 lb 1.6 oz (84 kg)   SpO2 98%   BMI 32.79 kg/m²     General appearance: No apparent distress, appears stated age and cooperative. HEENT: Pupils equal, round, and reactive to light. Conjunctivae/corneas clear. Neck: Supple, with full range of motion. No jugular venous distention. Trachea midline. Respiratory:  Normal respiratory effort. Clear to auscultation, bilaterally without Rales/Wheezes/Rhonchi. Cardiovascular: Regular rate and rhythm with normal S1/S2 without murmurs, rubs or gallops. Abdomen: Soft, non-tender, non-distended with normal bowel sounds. Musculoskeletal: No clubbing, cyanosis or edema bilaterally. Full range of motion without deformity. Skin: Skin color, texture, turgor normal.  No rashes or lesions. Neurologic:  Neurovascularly intact without any focal sensory/motor deficits.  Cranial nerves: II-XII intact, grossly non-focal.  Psychiatric: Alert and oriented, thought content appropriate, normal insight  Capillary Refill: Brisk,< 3 seconds   Peripheral Pulses: +2 palpable, equal bilaterally       Labs:   Recent Labs     02/22/21 1629 02/23/21 0519 02/24/21  0606   WBC 8.6 6.8 10.8   HGB 10.8* 9.9* 10.1*   HCT 33.0* 30.5* 31.4*   * 109* 135     Recent Labs     02/22/21 1629 02/23/21 0519 02/24/21  0606 02/24/21  0624   * 138  --  136   K 4.6 4.6  --  5.6*    109  --  107   CO2 21 21  --  13*   BUN 37* 30*  --  26*   CREATININE 1.8* 1.4*  --  1.1   CALCIUM 9.1 8.8  --  8.8   PHOS  --   --  2.6  --      Recent Labs     02/22/21 1629 02/23/21 0519 02/24/21  0624   * 193* 66*   * 412* 262*   BILIDIR  --  <0.2 <0.2   BILITOT 1.1* 0.6 0.3   ALKPHOS 238* 195* 174*     Recent Labs     02/22/21  1629   INR 1.58*     Recent Labs     02/22/21  1629   CKTOTAL 80   TROPONINI <0.01       Urinalysis:      Lab Results Component Value Date    NITRU Negative 08/11/2020    WBCUA 4 11/13/2019    BACTERIA 0 08/11/2020    BACTERIA Rare 09/15/2017    RBCUA 1 08/11/2020    RBCUA 1 11/13/2019    BLOODU Negative 01/15/2021    BLOODU Positive 08/11/2020    SPECGRAV 1.020 01/15/2021    SPECGRAV 1.005 08/11/2020    GLUCOSEU Negative 01/15/2021    GLUCOSEU neg 08/11/2020       Radiology:  CT ABDOMEN PELVIS WO CONTRAST Additional Contrast? None   Final Result   No acute inflammatory abnormality is identified in the abdomen or pelvis. Bibasilar ground-glass opacities, greater in the right lung. These are   nonspecific for pulmonary edema, atelectasis or pneumonia. Small hiatal hernia. CT ABDOMEN PELVIS WO CONTRAST Additional Contrast? None   Final Result   Addendum 1 of 1   ADDENDUM:   Please disregard this report as it was entered into the wrong patient    chart. Final      XR CHEST PORTABLE   Final Result   Right infrahilar airspace disease, likely pneumonia in the right lower lobe. Follow-up until resolution is recommended. Assessment/Plan:    Active Hospital Problems    Diagnosis    Pneumonia [J18.9]     CAP  RLL pneumonia on chest x-ray  Rocephin cefepime  Procalcitonin 1.1 on admission  Basilar groundglass opacification on abdominal CT imaging, concerning for viral pneumonia  Follow-up molecular panel  Maintain droplet plus isolation    COPD exacerbation  Continue with inhaled therapy  On IV corticosteroid  Pulmonology input appreciated    CAROLANN  Resolved with fluids    Hyperkalemia  Secondary to acidosis?   We will start insulin for this hyperglycemic patient  Continue with IV fluids  Repeat BMP this afternoon    Transaminitis  GI consulted  Negative for acute hepatitis  Imaging within normal limits  Trending down    Acidosis on basic chemistry  Follow-up VBG    Paroxysmal A. fib  Rate controlled  On Xarelto    DVT Prophylaxis: Xarelto  Diet: DIET GENERAL;  Code Status: Full

## 2021-02-24 NOTE — DISCHARGE INSTR - COC
Continuity of Care Form    Patient Name: Phani Pradhan   :  1935  MRN:  [id number]    Admit date:  2021  Discharge date:  2021    Code Status Order: Full Code   Advance Directives:   Advance Care Flowsheet Documentation       Date/Time Healthcare Directive Type of Healthcare Directive Copy in [address] Agent's Name Healthcare Agent's Phone Number    [phone]  No, patient does not have an advance directive for healthcare treatment -- -- -- -- --            Admitting Physician:  Maico Clinton MD  PCP: Naomie Wolfe MD    Discharging Nurse: [facility] Unit/Room#: 9XT-6145/6976-10  Discharging Unit Phone Number: [phone]    Emergency Contact:   Extended Emergency Contact Information  Primary Emergency Contact: [address] Phone: [phone]  Relation: Child  Secondary Emergency Contact: [facility]  Home Phone: [phone]  Relation: Child    Past Surgical History:  Past Surgical History:   Procedure Laterality Date    APPENDECTOMY      CHOLECYSTECTOMY, LAPAROSCOPIC  2013    COLONOSCOPY         Immunization History:   Immunization History   Administered Date(s) Administered    Influenza Vaccine, unspecified formulation 10/24/2015    Influenza Virus Vaccine 09/10/2013, 2014    Influenza, High Dose (Fluzone 65 yrs and older) 2016, 2017, 2018    Influenza, Quadv, adjuvanted, 65 yrs +, IM, PF (Fluad) 10/22/2020    Influenza, Triv, inactivated, subunit, adjuvanted, IM (Fluad 65 yrs and older) 2019, 10/22/2020    Pneumococcal Conjugate 13-valent (Qozpiwk07) 2015    Pneumococcal Polysaccharide (Ujdbcqwyq55) 09/10/2010, 2016    Td, unspecified formulation 2005    Tdap (Boostrix, Adacel) 2018       Active Problems:  Patient Active Problem List   Diagnosis Code    HTN (hypertension) I10    Hyperlipidemia E78.5    Insomnia G47.00    IBS (irritable bowel syndrome) K58.9    Overactive bladder N32.81    Osteoarthritis M19.90    Controlled type 2 diabetes mellitus with stage 3 chronic kidney disease, without long-term current use of insulin ([facility]) E11.22, N18.30    Restless legs syndrome G25.81    ANTHONY (obstructive sleep apnea) G47.33    Paroxysmal atrial fibrillation ([facility]) I48.0    Allergic rhinitis J30.9    COPD, mild ([facility]) J44.9    Vertigo R42    Interstitial lung disease ([facility]) J84.9    Chronic cough R05    Bronchiectasis without complication ([address] J85.0    Coronary artery disease involving native coronary artery of native heart with angina pectoris ([facility]) I25.119    A-fib ([facility]) I48.91    Hypothyroid E03.9    Chronic respiratory failure with hypoxia ([facility]) J96.11    SOB (shortness of breath) R06.02    Anxiety F41.9    Asthmatic bronchitis J45.909    Ataxia R27.0    Pneumonia J18.9       Isolation/Infection:   Isolation            Droplet Plus          Patient Infection Status       Infection Onset Added Last Indicated Last Indicated By Review Planned Expiration Resolved Resolved By    COVID-19 Rule Out 02/24/21 02/24/21 02/24/21 Respiratory Panel, Molecular, with COVID-19 (Restricted: peds pts or suitable admitted adults) (Ordered) 03/03/21 03/10/21      Resolved    COVID-19 Rule Out 02/22/21 02/22/21 02/22/21 COVID-19 (Ordered)   02/23/21 Rule-Out Test Resulted            Nurse Assessment:  Last Vital Signs: /71   Pulse 71   Temp 98 °F (36.7 °C) (Oral)   Resp 17   Ht 5' 3\" (1.6 m)   Wt 185 lb 1.6 oz (84 kg)   SpO2 98%   BMI 32.79 kg/m²     Last documented pain score (0-10 scale): Pain Level: 2(slight pain in abdomen, back, and neck)  Last Weight:   Wt Readings from Last 1 Encounters:   02/22/21 185 lb 1.6 oz (84 kg)     Mental Status:  oriented, alert, logical, thought processes intact and able to concentrate and follow conversation    IV Access:  - None    Nursing Mobility/ADLs:  Walking   Independent  Transfer  Independent  Bathing Independent  Dressing  Independent  Toileting  Independent  Feeding  Independent  Med Admin  Independent  Med Delivery   whole    Wound Care Documentation and Therapy:        Elimination:  Continence: Bowel: Yes  Bladder: Yes  Urinary Catheter: None   Colostomy/Ileostomy/Ileal Conduit: No       Date of Last BM: 02/24/2021    Intake/Output Summary (Last 24 hours) at 2/24/2021 1409  Last data filed at 2/24/2021 0828  Gross per 24 hour   Intake 480 ml   Output --   Net 480 ml     I/O last 3 completed shifts: In: 360 [P.O.:360]  Out: -     Safety Concerns:     None    Impairments/Disabilities:      None    Nutrition Therapy:  Current Nutrition Therapy:   - Oral Diet:  General    Routes of Feeding: Oral  Liquids: Thin Liquids  Daily Fluid Restriction: no  Last Modified Barium Swallow with Video (Video Swallowing Test): not done    Treatments at the Time of Hospital Discharge:   Respiratory Treatments: Flonase, Albuterol, Symbicort, Duoneb-Refer to med list  Oxygen Therapy:  is on oxygen at 2 L/min per nasal cannula.   Ventilator:    - No ventilator support    Rehab Therapies: Physical Therapy and Occupational Therapy  Weight Bearing Status/Restrictions: No weight bearing restirctions  Other Medical Equipment (for information only, NOT a DME order):  None  Other Treatments: NA    Patient's personal belongings (please select all that are sent with patient):  Glasses    RN SIGNATURE:  Electronically signed by Sarah Clemons RN on 2/28/21 at 3:04 PM EST    CASE MANAGEMENT/SOCIAL WORK SECTION    Inpatient Status Date: 2/24/2021    Readmission Risk Assessment Score:  Readmission Risk              Risk of Unplanned Readmission:        22           Discharging to Facility/ Agency   Name: [address]   Address:  [address]   Fax:[phone]    Dialysis Facility (if applicable)   Name:  Address:  Dialysis Schedule:  Phone:  Fax:    / signature: Mayank Lynn RN, [facility]  [phone]       PHYSICIAN SECTION    Prognosis: Good    Condition at Discharge: Stable    Rehab Potential (if transferring to Rehab): Good    Recommended Labs or Other Treatments After Discharge: PT, OT, home oxygen titrate to SPO2 of 90 to 89%    Physician Certification: I certify the above information and transfer of Rosy Go  is necessary for the continuing treatment of the diagnosis listed and that she requires Home Care for greater 30 days.      Update Admission H&P: No change in H&P    PHYSICIAN SIGNATURE:  Electronically signed by Dionna Howell MD on 2/28/21 at 3:22 PM EST

## 2021-02-24 NOTE — PROGRESS NOTES
Physical Therapy    Facility/Department: [address] ONCOLOGY  Initial Assessment    NAME: Von Livingston  : 1935  MRN: [id number]    Date of Service: 2021    Discharge Recommendations: Von Livingston scored a 21/24 on the AM-PAC short mobility form. Current research shows that an AM-PAC score of 18 or greater is typically associated with a discharge to the patient's home setting. Based on the patient's AM-PAC score and their current functional mobility deficits, it is recommended that the patient have 2-3 sessions per week of Physical Therapy at d/c to increase the patient's independence. At this time, this patient demonstrates the endurance and safety to discharge home with HHPT and a follow up treatment frequency of 2-3x/wk. Please see assessment section for further patient specific details. HOME HEALTH CARE: LEVEL 3 SAFETY  - Initial home health evaluation to occur within 24-48 hours, in patient home   - Therapy evaluations in home within 24-48 hours of discharge; including DME and home safety   - Frontload therapy 5 days, then 3x a week   - Therapy to evaluate if patient has [address] needs for personal care   -  evaluation within 24-48 hours, includes evaluation of resources and insurance to determine [place], [place], LTC, and Medicaid options     If patient discharges prior to next session this note will serve as a discharge summary. Please see below for the latest assessment towards goals. PT Equipment Recommendations  Equipment Needed: No    Assessment   Body structures, Functions, Activity limitations: Decreased functional mobility ; Decreased strength;Decreased endurance;Decreased balance;Decreased sensation  Assessment: Pt presents with decreased functional mobility, unsteady gait pattern, and decreased balance. Pt's gait pattern was improved after utilization of rolling walker as it decreased lateral sway and staggering.  Pt would continue to benefit from skilled therapy in order to increased ambulation safety and endurance, assess gait pattern with LRAD, increase strength and balance and improve overall functional mobility. Treatment Diagnosis: impaired gait, transfers, and balance  Prognosis: Good  Decision Making: Low Complexity  Clinical Presentation: stable  PT Education: PT Role;Transfer Training;Equipment;Orientation;General Safety;Gait Training;Functional Mobility Training  Patient Education: Pt educated to ambulate within the base of the rolling walker rather than pushing it out in front of her and d/c recommendations--pt verbalizing understanding  Barriers to Learning: none  REQUIRES PT FOLLOW UP: Yes  Activity Tolerance  Activity Tolerance: Patient Tolerated treatment well;Patient limited by endurance       Patient Diagnosis(es): The primary encounter diagnosis was Pneumonia due to organism. Diagnoses of Hypoxia, Transaminitis, and CAROLANN (acute kidney injury) ([facility] were also pertinent to this visit. has a past medical history of Arthritis, Atrial fibrillation ([address] Diabetes mellitus type II, controlled ([facility] GERD (gastroesophageal reflux disease), HTN (hypertension), Hyperlipidemia, Hypothyroid, Insomnia, Lumbago, and SVT (supraventricular tachycardia) ([address] has a past surgical history that includes Appendectomy; Colonoscopy; and Cholecystectomy, laparoscopic (2/24/2013). Restrictions  Restrictions/Precautions  Restrictions/Precautions: Fall Risk(medium fall risk)  Position Activity Restriction  Other position/activity restrictions: Naty Causey is a 80 y.o. female that presents to the emergency department with a chief complaint of increased shortness of breath and cough. Has history of COPD normally just on 2 L of nasal cannula oxygen at night. She states she woke up this morning with increased cough productive of brown sputum, increased shortness of breath. She does have some chest discomfort only when she coughs.   Denies any pain just sitting there. Denies fevers. States that she has been having some intermittent nausea and one episode of vomiting this morning. Has not had Covid or Covid vaccine. Denies recent sick contacts, abdominal pain, urinary or bowel symptoms. States she has been having chills. Vision/Hearing  Vision: Impaired(reports eye sight deterioation)  Vision Exceptions: Wears glasses at all times  Hearing: Exceptions to [facility]  Hearing Exceptions: Hard of hearing/hearing concerns; No hearing aid       Subjective  General  Chart Reviewed: Yes  Patient assessed for rehabilitation services?: Yes  Response To Previous Treatment: Not applicable  Family / Caregiver Present: No  Diagnosis: Pneumonia  Follows Commands: Within Functional Limits  Subjective  Subjective: Pt reports slight abdominal pain.  Pt was agreeable to treatment  Pain Screening  Patient Currently in Pain: Yes  Pain Assessment  Pain Assessment: 0-10  Pain Level: 2(slight pain in abdomen, back, and neck)  Pain Location: Abdomen;Back;Neck  Vital Signs  Patient Currently in Pain: Yes       Orientation  Orientation  Overall Orientation Status: Within Functional Limits     Social/Functional History  Social/Functional History  Lives With: Family(lives with lisa in tri-level)  Type of Home: House  Home Layout: Multi-level, Bed/Bath upstairs(tri-level, 6 stairs to bedroom level, one handrail)  Home Access: Stairs to enter without rails  Entrance Stairs - Number of Steps: 1  Bathroom Shower/Tub: Tub/Shower unit  Bathroom Toilet: Standard  Home Equipment: 4 wheeled walker  ADL Assistance: Independent  Homemaking Assistance: Needs assistance(Pt does light cleaning and daughter does most of chores)  Ambulation Assistance: Independent(uses 4 wheeled walker if feeling woozy)  Transfer Assistance: Independent  Active : No  Occupation: Retired  Leisure & Hobbies: TV, country music, sometimes dances to it, iPad, Facebook  Additional Comments: reports no falls in last 6 months. Pt's daughter works; pt home alone 9-4 or 11-4. Cognition   Cognition  Overall Cognitive Status: WFL    Objective  Observation/Palpation  Posture: Fair(slight kyphotic posture)    AROM RLE (degrees)  RLE AROM: WFL  AROM LLE (degrees)  LLE AROM : WFL  Strength RLE  Strength RLE: WFL  Comment: MMT grossly 4/5  Strength LLE  Strength LLE: WFL  Comment: MMT grossly 4/5  Tone RLE  RLE Tone: Normotonic  Tone LLE  LLE Tone: Normotonic  Sensation  Overall Sensation Status: Impaired  Light Touch: Partial deficits in the RLE;Partial deficits in the LLE(left deficits >right defictis)  Bed mobility  Supine to Sit: Supervision(HOB elevated)  Scooting: Supervision  Transfers  Sit to Stand: Stand by assistance  Stand to sit: Stand by assistance  Bed to Chair: Stand by assistance(SBA with rolling walker)  Ambulation  Ambulation?: Yes  More Ambulation?: Yes  Ambulation 1  Surface: level tile  Device: No Device  Assistance: Contact guard assistance  Quality of Gait: pt ambulates with unsteady gait pattern with B lateral sway  Gait Deviations: Deviated path;Staggers  Distance: 90 ft  Comments: After ambulation was attempted without AD, rolling walker was trialed for increased safety. PT followed Pt with 2 L portable 02  Ambulation 2  Surface - 2: level tile  Device 2: Rolling Walker  Assistance 2: Stand by assistance  Quality of Gait 2: cues provided for pt to ambulate within the rolling walker rather than pushing it out in front of her. Pt ambulates wih decreased velocity and deviated path.   Gait Deviations: Slow Gila;Deviated path  Distance: 200 ft  Comments: PT followed pt with 2 L portable 02     Balance  Sitting - Static: Good  Sitting - Dynamic: Good;-  Standing - Static: Fair;+(Occasional UE support needed on sink and SBA to ensure safety)  Standing - Dynamic: Fair;-(Pt performed bathing/grooming activities standing at sink for 10-15 mins with SBA and occasional UE support on sink)        Plan   Plan  Times per week: 3-5x  Times per day: Daily  Current Treatment Recommendations: Strengthening, Balance Training, Functional Mobility Training, Transfer Training, Stair training, Gait Training, Endurance Training, Patient/Caregiver Education & Training, Safety Education & Training, Equipment Evaluation, Education, & procurement  Safety Devices  Type of devices: All fall risk precautions in place, Call light within reach, Gait belt, Left in chair, Nurse notified  Restraints  Initially in place: No    G-Code       OutComes Score       AM-PAC Score  AM-PAC Inpatient Mobility Raw Score : 21 (02/24/21 1336)  AM-PAC Inpatient T-Scale Score : 50.25 (02/24/21 1336)  Mobility Inpatient CMS 0-100% Score: 28.97 (02/24/21 1336)  Mobility Inpatient CMS G-Code Modifier : Desean Palmas (02/24/21 1336)          Goals  Short term goals  Time Frame for Short term goals: upon d/c  Short term goal 1: Pt will ambulate 250 ft with LRAD and supervision  Short term goal 2: Pt will perfrom dynamic standing balance ~15 minutes with LRAD and supervision  Short term goal 3: Pt will perform sit<>stand transfers with supervision  Short term goal 4: Pt will negotiate 6 stairs with HR and SBA       Therapy Time   Individual Concurrent Group Co-treatment   Time In 1127         Time Out 1212         Minutes 45              Timed Code Treatment Minutes:   30    Total Treatment Minutes:  39    Jennifer Cabrera, SPT    PT providing direct supervision during session and assisting in making skilled judgements throughout session.   [id number] [facility] PT, DPT [id number]    [id number] [facility], PT

## 2021-02-24 NOTE — CONSULTS
PULMONARY AND CRITICAL CARE MEDICINE CONSULTATION NOTE    CONSULTING PHYSICIAN:  Ancelmo Robles DO    ADMISSION DATE: 2/22/2021  ADMISSION DIAGNOSIS: Pneumonia [J18.9]    REASON FOR CONSULT:   Chief Complaint   Patient presents with    Shortness of Breath     Pt with c/o SOB and chest discomfort. O2 79% on RA. Placed on 2L O2 in triage. States she ususally wears 2L O2 at night. DATE OF CONSULT: 2/22/2021    HISTORY OF PRESENT ILLNESS: 80y.o. year old female with a past medical history significant for mild COPD on nocturnal oxygen at 2 L/min, mild pulmonary fibrosis, obesity, hypertension, hyperlipidemia who presented to the hospital with worsening shortness of breath associated with cough. Patient reports that she had been having worsening shortness of breath for at least a week prior to hospital admission. It was associated with cough productive of whitish-yellowish expectoration. Denied any hemoptysis, fevers, night sweats. Has been taking her inhaler therapy including Symbicort and ProAir at home. Denies any sick contact or recent travel. Patient chest imaging showed right lower lobe pneumonia. She is being treated for that and getting round-the-clock breathing treatments. Today patient is sitting in bed in no apparent respiratory distress. Reports that while sitting she does not have shortness of breath, but does get short of breath with any exertion. Denies any chest pain, chest tightness. Cough is slowly resolving. REVIEW OF SYSTEMS:     CONSTITUTIONAL SYMPTOMS: The patient denies fever, fatigue, night sweats, weight loss or weight gain. HEENT: No vision changes. No tinnitus, Denies sinus pain. No hoarseness, or dysphagia. NECK: Patient denies swelling in the neck. CARDIOVASCULAR: Denies chest pain, palpitation, syncope. RESPIRATORY: As per HPI. GASTROINTESTINAL: Denies nausea, abdominal pain or change in bowel function. GENITOURINARY: Denies obstructive symptoms. TAKE 1 TABLET BY MOUTH EVERY NIGHT 90 tablet 1    albuterol sulfate  (90 Base) MCG/ACT inhaler Inhale 2 puffs into the lungs every 6 hours as needed for Wheezing 3 Inhaler 3    cloNIDine (CATAPRES) 0.1 MG tablet Take 1 tablet by mouth 2 times daily 180 tablet 1    dilTIAZem (CARDIZEM CD) 240 MG extended release capsule Take 1 capsule by mouth daily 90 capsule 1    fluticasone (FLONASE) 50 MCG/ACT nasal spray 2 sprays by Nasal route daily 3 Bottle 1    levothyroxine (SYNTHROID) 75 MCG tablet TAKE 1 TABLET EVERY DAY 90 tablet 1    budesonide-formoterol (SYMBICORT) 160-4.5 MCG/ACT AERO INHALE 2 PUFFS INTO THE LUNGS TWICE DAILY 10.2 g 5    Respiratory Therapy Supplies (NEBULIZER/TUBING/MOUTHPIECE) KIT 1 kit by Does not apply route 4 times daily as needed (wheezing, shortness of breath) 1 kit 0    propafenone (RYTHMOL) 150 MG tablet TAKE 1 TABLET BY MOUTH EVERY 8 HOURS 270 tablet 1    Respiratory Therapy Supplies (NEBULIZER/TUBING/MOUTHPIECE) KIT 1 kit by Does not apply route 4 times daily as needed (shortness of breath) 1 kit 0    albuterol sulfate HFA (PROAIR HFA) 108 (90 Base) MCG/ACT inhaler Inhale 2 puffs into the lungs every 6 hours as needed for Wheezing 1 Inhaler 6    blood glucose monitor strips Test one time a day 100 strip 5    Lancets MISC 1 each by Does not apply route 2 times daily 300 each 1    HYDROcodone-acetaminophen (NORCO) 5-325 MG per tablet Take 1 tablet by mouth every 6 hours as needed.       LORazepam (ATIVAN) 1 MG tablet TAKE 1/2 TABLET BY MOUTH TWICE DAILY AND 1 EVERY NIGHT AT BEDTIME AS NEEDED FOR ANXIETY 60 tablet 1    meclizine (ANTIVERT) 12.5 MG tablet TAKE 1 TABLET THREE TIMES DAILY AS NEEDED 270 tablet 0    levalbuterol (XOPENEX) 0.63 MG/3ML nebulization INHALE CONTENTS OF 1 VIAL PER NEBULIZER EVERY 6 HOURS AS NEEDED FOR WHEEZING 1050 mL 3    aspirin 81 MG tablet Take 81 mg by mouth every other day           [START ON 2/25/2021] methylPREDNISolone  40 mg Intravenous Daily    polyethylene glycol  17 g Oral Daily    ipratropium-albuterol  1 ampule Inhalation Q4H WA    aspirin  81 mg Oral Every Other Day    budesonide-formoterol  2 puff Inhalation BID    cloNIDine  0.1 mg Oral BID    dilTIAZem  240 mg Oral Daily    fluticasone  2 spray Nasal Daily    levothyroxine  75 mcg Oral Daily    propafenone  150 mg Oral 3 times per day    sodium chloride flush  10 mL Intravenous 2 times per day    famotidine  10 mg Oral Daily    rivaroxaban  15 mg Oral Daily with breakfast    amLODIPine  5 mg Oral Daily    rOPINIRole  2 mg Oral TID    atorvastatin  80 mg Oral Nightly    LORazepam  1 mg Oral Nightly    lactobacillus  1 capsule Oral BID WC    cefTRIAXone (ROCEPHIN) IV  1,000 mg Intravenous Q24H    azithromycin  500 mg Intravenous Q24H      dextrose      sodium chloride 100 mL/hr at 02/24/21 1220     glucose, dextrose, glucagon (rDNA), dextrose, albuterol sulfate HFA, dicyclomine, sodium chloride flush, magnesium hydroxide, ondansetron, meclizine, HYDROcodone-acetaminophen, LORazepam **AND** LORazepam      ALLERGIES:   Allergies as of 02/22/2021 - Review Complete 02/22/2021   Allergen Reaction Noted    Adhesive tape Anaphylaxis 07/31/2015    Cefaclor Nausea And Vomiting 10/15/2012    Nsaids  02/24/2013    Clinoril [sulindac] Other (See Comments) 10/15/2012    Codeine  10/15/2012    Diclofenac  04/03/2019    Diclofenac sodium Hives 10/15/2012    Diclofenac sodium Hives 10/15/2012    Hctz [hydrochlorothiazide]  09/15/2017    Ibuprofen Other (See Comments) 10/15/2012    Macrobid [nitrofurantoin macrocrystal]  05/06/2019    Motrin [ibuprofen micronized] Other (See Comments) 10/15/2012    Pcn [penicillins] Hives 10/15/2012    Peach [prunus persica] Itching and Swelling 12/10/2013      OBJECTIVE:   height is 5' 3\" (1.6 m) and weight is 185 lb 1.6 oz (84 kg). Her oral temperature is 98 °F (36.7 °C). Her blood pressure is 127/71 and her pulse is 71.  Her respiration is 17 and oxygen saturation is 98%. No intake/output data recorded. PHYSICAL EXAM:    CONSTITUTIONAL: She is a 80y.o.-year-old who appears her stated age. She is alert and oriented x 3 and in no acute distress. HEENT: PERRLA, EOMI. No scleral icterus. No thrush, atraumatic, normocephalic. NECK: Supple, without cervical or supraclavicular lymphadenopathy:  CARDIOVASCULAR: S1 S2 RRR. Without murmer  RESPIRATORY & CHEST: Bilateral scattered wheezes are heard. GASTROINTESTINAL & ABDOMEN: Soft, nontender, positive bowel sounds in all quadrants, non-distended, without hepatosplenomegaly. GENITOURINARY: Deferred. MUSCULOSKELETAL: No tenderness to palpation of the axial skeleton. There is no clubbing. No cyanosis. No edema of the lower extremities. SKIN OF BODY: No rash or jaundice. PSYCHIATRIC EVALUATION: Normal affect. Patient answers questions appropriately. HEMATOLOGIC/LYMPHATIC/ IMMUNOLOGIC: No palpable lymphadenopathy. NEUROLOGIC: Alert and oriented x 3. Groslly non-focal. Motor strength is 5+/5 in all muscle groups. The patient has a normal sensorium globally. LABS:  Recent Labs     02/22/21  1629 02/23/21 0519 02/24/21  0606 02/24/21  0624   WBC 8.6 6.8 10.8  --    HGB 10.8* 9.9* 10.1*  --    HCT 33.0* 30.5* 31.4*  --    * 109* 135  --    * 412*  --  262*   * 193*  --  66*   * 138  --  136   K 4.6 4.6  --  5.6*    109  --  107   CREATININE 1.8* 1.4*  --  1.1   BUN 37* 30*  --  26*   CO2 21 21  --  13*   INR 1.58*  --   --   --        Recent Labs     02/22/21  1629 02/23/21  0519 02/24/21  0624   GLUCOSE 148* 207* 280*   CALCIUM 9.1 8.8 8.8   * 138 136   K 4.6 4.6 5.6*   CO2 21 21 13*    109 107   BUN 37* 30* 26*   CREATININE 1.8* 1.4* 1.1       No results for input(s): PHART, QJL0DVA, PO2ART, IOJ9RVR, X0IBGPNH, BEART, Z0ADYBEX in the last 72 hours.     Lab Results   Component Value Date    INR 1.58 (H) 02/22/2021    INR 1.58 (H) 03/17/2020    INR 1.43 (H) 02/20/2020    PROTIME 18.4 (H) 02/22/2021    PROTIME 18.4 (H) 03/17/2020    PROTIME 16.6 (H) 02/20/2020     No results found for: AMYLASE   Lab Results   Component Value Date    LABA1C 6.1 02/23/2021     Lab Results   Component Value Date    .4 02/23/2021     Lab Results   Component Value Date    TSH 2.50 09/28/2020    T4FREE 1.2 02/19/2020     Lab Results   Component Value Date    CKTOTAL 80 02/22/2021    TROPONINI <0.01 02/22/2021      No results found for: CRP   No results found for: BNP   No results found for: DDIMER   No results found for: FERRITIN   Lab Results   Component Value Date    LACTA 0.8 03/17/2020           IMAGING:    Chest x-ray portable 1 view done on 2/22/2021 was personally viewed by me and my interpretation is: Right lower lobe lung infiltrate suggestive of focal consolidation. No pleural effusions or pneumothorax seen. Cardiac silhouette within normal limits. IMPRESSION:     1. Community-acquired pneumonia  2. Mild COPD in exacerbation  3. Chronic hypoxic respiratory failure  4. Bibasilar bronchiectasis  5. Previous history of tobacco abuse  6. Obesity      RECOMMENDATION:     1. Patient has presented to the hospital with acute respiratory distress. 2. Found to have right lower lobe pneumonia suspected to be community-acquired. Urine Legionella and strep antigens are negative. No leukocytosis or fevers. Patient does have elevated procalcitonin level. 3. Patient also has a background of mild COPD and bibasilar bronchiectasis. She does have scattered wheezes on auscultation today. 4. Continue with Rocephin and azithromycin for a total of 5 days. 5. Patient already on Symbicort and albuterol. We will continue. 6. I will add MetaNebs to the regimen to help patient get intermittent positive pressure breathing and help her expectorate. 7. Continue with Solu-Medrol 40 mg IV daily. Will change it to prednisone 40 mg p.o. daily by tomorrow.   8. Incentive spirometry. 9. Out of bed into chair. 10. Oxygen supplementation to maintain SPO2 between 88 to 92%. Can try taking her off the oxygen during the daytime as she does at home in order to assess if she is close to her baseline. Thank you for your consultation. We will continue to follow the patient. Regan Barriga MD  Pulmonary Critical Care and Sleep Medicine  2/22/2021, 3:48 PM    This note was completed using dragon medical speech recognition software. Grammatical errors, random word insertions, pronoun errors and incomplete sentences are occasional consequences of this technology due to software limitations. If there are questions or concerns about the content of this note of information contained within the body of this dictation they should be addressed with the provider for clarification.

## 2021-02-24 NOTE — PROGRESS NOTES
Gastroenterology Progress Note    Phani Pradhan is a 80 y.o. female patient. Active Problems:    Pneumonia  Resolved Problems:    * No resolved hospital problems. *      SUBJECTIVE:  Didn't sleep well last night. No abdominal pain. ROS:  No fever, chills  No chest pain, palpitations  No SOB, cough  Gastrointestinal ROS: see above    Physical    VITALS:  /71   Pulse 71   Temp 98 °F (36.7 °C) (Oral)   Resp 17   Ht 5' 3\" (1.6 m)   Wt 185 lb 1.6 oz (84 kg)   SpO2 98%   BMI 32.79 kg/m²   TEMPERATURE:  Current - Temp: 98 °F (36.7 °C); Max - Temp  Av.6 °F (36.4 °C)  Min: 97.4 °F (36.3 °C)  Max: 98 °F (36.7 °C)    NAD  Regular rate   Lungs CTA anteriorly  Abdomen soft, ND, NT,  Bowel sounds normal.    Data    Data Review:    Recent Labs     21   WBC 8.6 6.8 10.8   HGB 10.8* 9.9* 10.1*   HCT 33.0* 30.5* 31.4*   MCV 93.3 93.7 94.8   * 109* 135     Recent Labs     21  0606 21  0624   * 138  --  136   K 4.6 4.6  --  5.6*    109  --  107   CO2   --  13*   PHOS  --   --  2.6  --    BUN 37* 30*  --  26*   CREATININE 1.8* 1.4*  --  1.1     Recent Labs     21  0624   * 193* 66*   * 412* 262*   BILIDIR  --  <0.2 <0.2   BILITOT 1.1* 0.6 0.3   ALKPHOS 238* 195* 174*     No results for input(s): LIPASE, AMYLASE in the last 72 hours. Recent Labs     21   PROTIME 18.4*   INR 1.58*     No results for input(s): PTT in the last 72 hours. ASSESSMENT :    Elevated liver enzymes - she has had elevated alk phos in the past and intermittent mild elevation of transaminases. ,  at admission with bili 1.1 and alk phos 238. Enzymes all improved since. INR 1.5 but appears she was on xarelto. She is s/p josue. No RUQ pain suspicious for CBD stone. Normal liver on CT.  Acute hepatitis panel neg for Hep A, B, and C. covid negative. She is on rythmol and cardizem which can cause elevated liver enzymes but enzymes are improving while on these meds and these are not new meds so this is less likely. Pneumonia - covid negative. constipation - new onset. PLAN :  - follow liver enzymes  - f/u f-actin  - miralax daily for constipation    Discussed with Dr. Gamal Heaton, PALILLI  [address] [place] [address]  I have personally performed a face to face diagnostic evaluation on this patient. I have interviewed and examined the patient and I agree with the findings and recommended plan of care. In summary, my findings and plan are the following: As above, liver enzymes are improving. She is having BM's. She will take Miralax daily for constipation and hold if diarrhea. As long as enzymes improving, no need for liver bx or stop meds. Reconsider if liver enzymes rise.     Bonnie Mena, [address] [place] [address]  2/24/2021

## 2021-02-24 NOTE — PROGRESS NOTES
Occupational Therapy   Occupational Therapy Initial Assessment  Date: 2021   Patient Name: Frankey Son  MRN: [id number]     : 1935    Date of Service: 2021    Discharge Recommendations:  Frankey Son scored a 20/24 on the AM-PAC ADL Inpatient form. Current research shows that an AM-PAC score of 18 or greater is typically associated with a discharge to the patient's home setting. Based on the patient's AM-PAC score, and their current ADL deficits, it is recommended that the patient have 2-3 sessions per week of Occupational Therapy at d/c to increase the patient's independence. At this time, this patient demonstrates the endurance and safety to discharge home with home services and a follow up treatment frequency of 2-3x/wk. Please see assessment section for further patient specific details. If patient discharges prior to next session this note will serve as a discharge summary. Please see below for the latest assessment towards goals. OT Equipment Recommendations  Equipment Needed: Yes  Mobility Devices: ADL Assistive Devices  ADL Assistive Devices: Shower Chair with back;Grab Bars - shower(vs tub transfer bench for safeety; pt reports sometims gets woozy standing in the shower;pt holds towel rack for balance at times.)    Assessment   Performance deficits / Impairments: Decreased functional mobility ; Decreased ADL status  Assessment: Pt is below her baseline level of occupational function, based on the above deficits associated with PNA. Pt would benefit from continued skilled acute OT services to address these deficits. Treatment Diagnosis: Decreased ADL status and functional mobility associated with PNA  Prognosis: Good  Decision Making: Low Complexity  History: Pt 81 yo, lives w/dtr, tri-level home, I ADLs, dtr does IADLs.  PMH: DM, HTN, HLD, A-fib  Exam: ROM, MMT, 6 clicks, 2 performance deficits, stable presentation  Assistance / Modification: SBA w/RW for tranfers, ambulation, SBA UB bathing, grooming in stance at sink  OT Education: OT Role;Plan of Care;Equipment  Patient Education: D/C recommendation, safety w/walker for functional mobility. Pt independently verbalized and demonstrated understanding. Barriers to Learning: None, hearing  REQUIRES OT FOLLOW UP: Yes  Activity Tolerance  Activity Tolerance: Patient Tolerated treatment well  Safety Devices  Safety Devices in place: Yes  Type of devices: Call light within reach; Left in chair;Nurse notified;Gait belt           Patient Diagnosis(es): The primary encounter diagnosis was Pneumonia due to organism. Diagnoses of Hypoxia, Transaminitis, and CAROLANN (acute kidney injury) ([facility] [address] were also pertinent to this visit. has a past medical history of Arthritis, Atrial fibrillation ([address] Diabetes mellitus type II, controlled ([address] GERD (gastroesophageal reflux disease), HTN (hypertension), Hyperlipidemia, Hypothyroid, Insomnia, Lumbago, and SVT (supraventricular tachycardia) ([address] has a past surgical history that includes Appendectomy; Colonoscopy; and Cholecystectomy, laparoscopic (2/24/2013). Treatment Diagnosis: Decreased ADL status and functional mobility associated with PNA      Restrictions  Restrictions/Precautions  Restrictions/Precautions: Fall Risk(medium fall risk)  Position Activity Restriction  Other position/activity restrictions: Naty Causey is a 80 y.o. female that presents to the emergency department with a chief complaint of increased shortness of breath and cough. Has history of COPD normally just on 2 L of nasal cannula oxygen at night. She states she woke up this morning with increased cough productive of brown sputum, increased shortness of breath. She does have some chest discomfort only when she coughs. Denies any pain just sitting there. Denies fevers. States that she has been having some intermittent nausea and one episode of vomiting this morning.   Has not had Covid or Covid vaccine. Denies recent sick contacts, abdominal pain, urinary or bowel symptoms. States she has been having chills. Subjective   General  Chart Reviewed: Yes  Patient assessed for rehabilitation services?: Yes  Family / Caregiver Present: No  Diagnosis: PNA  Subjective  Subjective: Pt side-lying in bed watching TV on arrival. Pt pleasant and agreeable to OT eval. Pt reports 1-2/10 pain in abdomen, neck, back  Patient Currently in Pain: Yes  Pain Assessment  Pain Assessment: 0-10  Pain Level: 2(slight pain in abdomen, back, and neck)  Pain Location: Abdomen;Back;Neck  Functional Pain Assessment: Activities are not prevented  Pre Treatment Pain Screening  Intervention List: Patient able to continue with treatment  Vital Signs  Patient Currently in Pain: Yes  Social/Functional History  Social/Functional History  Lives With: Family(lives with lisa in tri-level)  Type of Home: House  Home Layout: Multi-level, Bed/Bath upstairs(tri-level, 6 stairs to bedroom level, one handrail)  Home Access: Stairs to enter without rails  Entrance Stairs - Number of Steps: 1  Bathroom Shower/Tub: Tub/Shower unit  Bathroom Toilet: Standard  Home Equipment: 4 wheeled walker  ADL Assistance: Independent  Homemaking Assistance: Needs assistance(Pt does light cleaning and daughter does most of chores)  Ambulation Assistance: Independent(uses 4 wheeled walker if feeling woozy)  Transfer Assistance: Independent  Active : No  Occupation: Retired  Leisure & Hobbies: TV, country music, sometimes dances to it, iPad, Facebook  Additional Comments: reports no falls in last 6 months. Pt's daughter works; pt home alone 9-4 or 11-4. Objective   Vision: Impaired(reports eye sight deterioation)  Vision Exceptions: Wears glasses at all times  Hearing: Exceptions to [facility]  Hearing Exceptions: Hard of hearing/hearing concerns; No hearing aid    Orientation  Overall Orientation Status: Within Functional Limits  Observation/Palpation  Posture: Fair(slight kyphotic posture)  Balance  Sitting Balance: Independent  Standing Balance: Contact guard assistance(without AD, SBA w/RW)  Standing Balance  Time: 11 min  Activity: functional mobility to bathroom, UB bathing in stance at sink, functional mobility ~250 ft in aden  Functional Mobility  Functional - Mobility Device: Rolling Walker(CGA without AD ~90 ft; used RW ~200 ft w/SBA)  Activity: To/from bathroom; Other  Assist Level: Stand by assistance(w/RW)  Functional Mobility Comments: verbal cues to stay closer to walker; pt used to 4WW. ADL  Grooming: None  UE Bathing: Stand by assistance(in stance at sink)  LE Bathing: None  UE Dressing: Moderate assistance(assist with gown)  LE Dressing: Independent(don/doff socks)  Toileting: None  Tone RUE  RUE Tone: Normotonic  Tone LUE  LUE Tone: Normotonic  Coordination  Movements Are Fluid And Coordinated: Yes     Bed mobility  Supine to Sit: Supervision(HOB elevated)  Transfers  Stand Step Transfers: Stand by assistance  Sit to stand: Stand by assistance  Stand to sit: Stand by assistance  Vision - Basic Assessment  Prior Vision: Wears glasses all the time  Visual History: No significant visual history  Patient Visual Report: No visual complaint reported.   Cognition  Overall Cognitive Status: WNL  Perception  Overall Perceptual Status: WFL     Sensation  Overall Sensation Status: Impaired  Light Touch: Partial deficits in the RLE;Partial deficits in the LLE(left deficits >right defictis; no deficits UEs)        LUE AROM (degrees)  LUE AROM : WNL  Left Hand AROM (degrees)  Left Hand AROM: WFL  Left Hand General AROM: arthritic changes  RUE AROM (degrees)  RUE AROM : WNL  Right Hand AROM (degrees)  Right Hand AROM: WFL  Right Hand General AROM: arthritic changes  LUE Strength  Gross LUE Strength: WNL(5/5 elbow, shoulder)  L Hand General: 5/5  RUE Strength  Gross RUE Strength: WNL(5/5 elbow, shoulder)  R Hand General: 5/5 Plan   Plan  Times per week: 3-5  Times per day: Daily  Current Treatment Recommendations: Self-Care / ADL, Functional Mobility Training    AM-PAC Score        AM-PAC Inpatient Daily Activity Raw Score: 20 (02/24/21 1333)  AM-PAC Inpatient ADL T-Scale Score : 42.03 (02/24/21 1333)  ADL Inpatient CMS 0-100% Score: 38.32 (02/24/21 1333)  ADL Inpatient CMS G-Code Modifier : Teepranav Hummel (02/24/21 1333)    Goals  Short term goals  Time Frame for Short term goals: Discharge  Short term goal 1: Mod I for functional transfers to ADL surfaces w/RW  Short term goal 2: Mod I for functional mobility w/RW for ADL activity  Short term goal 3: Mod I for standing ADLs at sink  Short term goal 4: Mod I for LB ADLs  Short term goal 5: Mod I for toileting       Therapy Time   Individual Concurrent Group Co-treatment   Time In 1127         Time Out 1212         Minutes 45               Timed Code Treatment Minutes:   30 min    Total Treatment Minutes:  45 min    BRICE Zheng 66, OT   Darby Avery., OTR/L, [id number]

## 2021-02-24 NOTE — CARE COORDINATION
Therapy recommends home care. Pt will need hc orders on d/c for RN/PT/OT/Aide. CM called Zi Jarrett with [facility] and updated on pt and that Covid result was negative.      Eriberto Hartley RN, BSN  [phone]

## 2021-02-25 ENCOUNTER — APPOINTMENT (OUTPATIENT)
Dept: GENERAL RADIOLOGY | Age: 86
DRG: 191 | End: 2021-02-25
Payer: MEDICARE

## 2021-02-25 LAB
A/G RATIO: 1.2 (ref 1.1–2.2)
ALBUMIN SERPL-MCNC: 3.5 G/DL (ref 3.4–5)
ALP BLD-CCNC: 174 U/L (ref 40–129)
ALT SERPL-CCNC: 190 U/L (ref 10–40)
ANION GAP SERPL CALCULATED.3IONS-SCNC: 9 MMOL/L (ref 3–16)
AST SERPL-CCNC: 27 U/L (ref 15–37)
BASOPHILS ABSOLUTE: 0 K/UL (ref 0–0.2)
BASOPHILS RELATIVE PERCENT: 0.1 %
BILIRUB SERPL-MCNC: 0.3 MG/DL (ref 0–1)
BILIRUBIN DIRECT: <0.2 MG/DL (ref 0–0.3)
BILIRUBIN, INDIRECT: ABNORMAL MG/DL (ref 0–1)
BUN BLDV-MCNC: 22 MG/DL (ref 7–20)
CALCIUM SERPL-MCNC: 9.3 MG/DL (ref 8.3–10.6)
CHLORIDE BLD-SCNC: 108 MMOL/L (ref 99–110)
CO2: 20 MMOL/L (ref 21–32)
CREAT SERPL-MCNC: 1 MG/DL (ref 0.6–1.2)
EOSINOPHILS ABSOLUTE: 0 K/UL (ref 0–0.6)
EOSINOPHILS RELATIVE PERCENT: 0.1 %
F-ACTIN AB IGG: 2 UNITS (ref 0–19)
GFR AFRICAN AMERICAN: >60
GFR NON-AFRICAN AMERICAN: 53
GLOBULIN: 3 G/DL
GLUCOSE BLD-MCNC: 143 MG/DL (ref 70–99)
GLUCOSE BLD-MCNC: 157 MG/DL (ref 70–99)
GLUCOSE BLD-MCNC: 218 MG/DL (ref 70–99)
GLUCOSE BLD-MCNC: 237 MG/DL (ref 70–99)
GLUCOSE BLD-MCNC: 258 MG/DL (ref 70–99)
HCT VFR BLD CALC: 31.9 % (ref 36–48)
HEMOGLOBIN: 10.2 G/DL (ref 12–16)
LYMPHOCYTES ABSOLUTE: 1.1 K/UL (ref 1–5.1)
LYMPHOCYTES RELATIVE PERCENT: 8.5 %
MAGNESIUM: 2.1 MG/DL (ref 1.8–2.4)
MCH RBC QN AUTO: 29.4 PG (ref 26–34)
MCHC RBC AUTO-ENTMCNC: 31.9 G/DL (ref 31–36)
MCV RBC AUTO: 92.3 FL (ref 80–100)
MONOCYTES ABSOLUTE: 0.5 K/UL (ref 0–1.3)
MONOCYTES RELATIVE PERCENT: 3.9 %
NEUTROPHILS ABSOLUTE: 11.3 K/UL (ref 1.7–7.7)
NEUTROPHILS RELATIVE PERCENT: 87.4 %
PDW BLD-RTO: 16.3 % (ref 12.4–15.4)
PERFORMED ON: ABNORMAL
PHOSPHORUS: 2.4 MG/DL (ref 2.5–4.9)
PLATELET # BLD: 167 K/UL (ref 135–450)
PMV BLD AUTO: 8.3 FL (ref 5–10.5)
POTASSIUM SERPL-SCNC: 4.4 MMOL/L (ref 3.5–5.1)
RBC # BLD: 3.46 M/UL (ref 4–5.2)
SODIUM BLD-SCNC: 137 MMOL/L (ref 136–145)
TOTAL PROTEIN: 6.5 G/DL (ref 6.4–8.2)
WBC # BLD: 13 K/UL (ref 4–11)

## 2021-02-25 PROCEDURE — 6370000000 HC RX 637 (ALT 250 FOR IP): Performed by: INTERNAL MEDICINE

## 2021-02-25 PROCEDURE — 2580000003 HC RX 258: Performed by: PHYSICIAN ASSISTANT

## 2021-02-25 PROCEDURE — 1200000000 HC SEMI PRIVATE

## 2021-02-25 PROCEDURE — 94669 MECHANICAL CHEST WALL OSCILL: CPT

## 2021-02-25 PROCEDURE — 6360000002 HC RX W HCPCS: Performed by: PHYSICIAN ASSISTANT

## 2021-02-25 PROCEDURE — 82248 BILIRUBIN DIRECT: CPT

## 2021-02-25 PROCEDURE — 6360000002 HC RX W HCPCS: Performed by: INTERNAL MEDICINE

## 2021-02-25 PROCEDURE — 84100 ASSAY OF PHOSPHORUS: CPT

## 2021-02-25 PROCEDURE — 36415 COLL VENOUS BLD VENIPUNCTURE: CPT

## 2021-02-25 PROCEDURE — 6370000000 HC RX 637 (ALT 250 FOR IP): Performed by: PHYSICIAN ASSISTANT

## 2021-02-25 PROCEDURE — 80053 COMPREHEN METABOLIC PANEL: CPT

## 2021-02-25 PROCEDURE — 94640 AIRWAY INHALATION TREATMENT: CPT

## 2021-02-25 PROCEDURE — 97530 THERAPEUTIC ACTIVITIES: CPT

## 2021-02-25 PROCEDURE — 83735 ASSAY OF MAGNESIUM: CPT

## 2021-02-25 PROCEDURE — 99233 SBSQ HOSP IP/OBS HIGH 50: CPT | Performed by: INTERNAL MEDICINE

## 2021-02-25 PROCEDURE — 85025 COMPLETE CBC W/AUTO DIFF WBC: CPT

## 2021-02-25 PROCEDURE — 71045 X-RAY EXAM CHEST 1 VIEW: CPT

## 2021-02-25 RX ORDER — PREDNISONE 20 MG/1
40 TABLET ORAL DAILY
Status: DISCONTINUED | OUTPATIENT
Start: 2021-02-25 | End: 2021-02-28 | Stop reason: HOSPADM

## 2021-02-25 RX ADMIN — PROPAFENONE HYDROCHLORIDE 150 MG: 150 TABLET, FILM COATED ORAL at 06:23

## 2021-02-25 RX ADMIN — CLONIDINE HYDROCHLORIDE 0.1 MG: 0.1 TABLET ORAL at 21:10

## 2021-02-25 RX ADMIN — PROPAFENONE HYDROCHLORIDE 150 MG: 150 TABLET, FILM COATED ORAL at 14:37

## 2021-02-25 RX ADMIN — FLUTICASONE PROPIONATE 2 SPRAY: 50 SPRAY, METERED NASAL at 08:17

## 2021-02-25 RX ADMIN — HYDROCODONE BITARTRATE AND ACETAMINOPHEN 1 TABLET: 5; 325 TABLET ORAL at 23:26

## 2021-02-25 RX ADMIN — AZITHROMYCIN MONOHYDRATE 500 MG: 500 INJECTION, POWDER, LYOPHILIZED, FOR SOLUTION INTRAVENOUS at 23:13

## 2021-02-25 RX ADMIN — LEVOTHYROXINE SODIUM 75 MCG: 0.07 TABLET ORAL at 06:23

## 2021-02-25 RX ADMIN — METHYLPREDNISOLONE SODIUM SUCCINATE 40 MG: 40 INJECTION, POWDER, FOR SOLUTION INTRAMUSCULAR; INTRAVENOUS at 08:17

## 2021-02-25 RX ADMIN — ATORVASTATIN CALCIUM 80 MG: 80 TABLET, FILM COATED ORAL at 21:10

## 2021-02-25 RX ADMIN — PREDNISONE 40 MG: 20 TABLET ORAL at 16:57

## 2021-02-25 RX ADMIN — Medication 2 PUFF: at 19:41

## 2021-02-25 RX ADMIN — Medication 1 CAPSULE: at 08:16

## 2021-02-25 RX ADMIN — LORAZEPAM 0.5 MG: 0.5 TABLET ORAL at 08:20

## 2021-02-25 RX ADMIN — CLONIDINE HYDROCHLORIDE 0.1 MG: 0.1 TABLET ORAL at 08:16

## 2021-02-25 RX ADMIN — INSULIN LISPRO 1 UNITS: 100 INJECTION, SOLUTION INTRAVENOUS; SUBCUTANEOUS at 21:16

## 2021-02-25 RX ADMIN — ROPINIROLE HYDROCHLORIDE 2 MG: 1 TABLET, FILM COATED ORAL at 08:15

## 2021-02-25 RX ADMIN — INSULIN LISPRO 1 UNITS: 100 INJECTION, SOLUTION INTRAVENOUS; SUBCUTANEOUS at 08:25

## 2021-02-25 RX ADMIN — IPRATROPIUM BROMIDE AND ALBUTEROL SULFATE 1 AMPULE: .5; 3 SOLUTION RESPIRATORY (INHALATION) at 08:52

## 2021-02-25 RX ADMIN — AMLODIPINE BESYLATE 5 MG: 5 TABLET ORAL at 08:16

## 2021-02-25 RX ADMIN — ROPINIROLE HYDROCHLORIDE 2 MG: 1 TABLET, FILM COATED ORAL at 14:37

## 2021-02-25 RX ADMIN — IPRATROPIUM BROMIDE AND ALBUTEROL SULFATE 1 AMPULE: .5; 3 SOLUTION RESPIRATORY (INHALATION) at 11:34

## 2021-02-25 RX ADMIN — Medication 1 CAPSULE: at 16:57

## 2021-02-25 RX ADMIN — ASPIRIN 81 MG: 81 TABLET, COATED ORAL at 08:15

## 2021-02-25 RX ADMIN — IPRATROPIUM BROMIDE AND ALBUTEROL SULFATE 1 AMPULE: .5; 3 SOLUTION RESPIRATORY (INHALATION) at 19:42

## 2021-02-25 RX ADMIN — IPRATROPIUM BROMIDE AND ALBUTEROL SULFATE 1 AMPULE: .5; 3 SOLUTION RESPIRATORY (INHALATION) at 16:24

## 2021-02-25 RX ADMIN — LORAZEPAM 1 MG: 1 TABLET ORAL at 21:10

## 2021-02-25 RX ADMIN — RIVAROXABAN 15 MG: 15 TABLET, FILM COATED ORAL at 08:16

## 2021-02-25 RX ADMIN — Medication 1000 MG: at 23:12

## 2021-02-25 RX ADMIN — Medication 2 PUFF: at 08:52

## 2021-02-25 RX ADMIN — INSULIN LISPRO 3 UNITS: 100 INJECTION, SOLUTION INTRAVENOUS; SUBCUTANEOUS at 16:59

## 2021-02-25 RX ADMIN — POLYETHYLENE GLYCOL 3350 17 G: 17 POWDER, FOR SOLUTION ORAL at 08:15

## 2021-02-25 RX ADMIN — Medication 10 ML: at 21:11

## 2021-02-25 RX ADMIN — ROPINIROLE HYDROCHLORIDE 2 MG: 1 TABLET, FILM COATED ORAL at 21:11

## 2021-02-25 RX ADMIN — INSULIN LISPRO 2 UNITS: 100 INJECTION, SOLUTION INTRAVENOUS; SUBCUTANEOUS at 12:21

## 2021-02-25 RX ADMIN — PROPAFENONE HYDROCHLORIDE 150 MG: 150 TABLET, FILM COATED ORAL at 23:11

## 2021-02-25 RX ADMIN — DILTIAZEM HYDROCHLORIDE 240 MG: 240 CAPSULE, COATED, EXTENDED RELEASE ORAL at 08:16

## 2021-02-25 RX ADMIN — FAMOTIDINE 10 MG: 20 TABLET ORAL at 08:15

## 2021-02-25 ASSESSMENT — PAIN SCALES - GENERAL
PAINLEVEL_OUTOF10: 7
PAINLEVEL_OUTOF10: 0
PAINLEVEL_OUTOF10: 0

## 2021-02-25 ASSESSMENT — PAIN DESCRIPTION - LOCATION: LOCATION: BACK

## 2021-02-25 NOTE — PROGRESS NOTES
Hospitalist Progress Note      PCP: Tamiko Hendrickson MD    Date of Admission: 2/22/2021    Chief Complaint: Dyspnea    Hospital Course: 80-year-old female hospital medical history of COPD on 2 L nasal cannula oxygen nightly at home presented with increased shortness of breath. Is currently being treated for CAP and COPD exacerbation. Subjective: Patient seen and examined at bedside. States respirations are better.       Medications:  Reviewed    Infusion Medications    dextrose       Scheduled Medications    predniSONE  40 mg Oral Daily    polyethylene glycol  17 g Oral Daily    insulin lispro  0-6 Units Subcutaneous TID WC    insulin lispro  0-3 Units Subcutaneous Nightly    ipratropium-albuterol  1 ampule Inhalation Q4H WA    aspirin  81 mg Oral Every Other Day    budesonide-formoterol  2 puff Inhalation BID    cloNIDine  0.1 mg Oral BID    dilTIAZem  240 mg Oral Daily    fluticasone  2 spray Nasal Daily    levothyroxine  75 mcg Oral Daily    propafenone  150 mg Oral 3 times per day    sodium chloride flush  10 mL Intravenous 2 times per day    famotidine  10 mg Oral Daily    rivaroxaban  15 mg Oral Daily with breakfast    amLODIPine  5 mg Oral Daily    rOPINIRole  2 mg Oral TID    atorvastatin  80 mg Oral Nightly    LORazepam  1 mg Oral Nightly    lactobacillus  1 capsule Oral BID WC    cefTRIAXone (ROCEPHIN) IV  1,000 mg Intravenous Q24H    azithromycin  500 mg Intravenous Q24H     PRN Meds: glucose, dextrose, glucagon (rDNA), dextrose, albuterol sulfate HFA, dicyclomine, sodium chloride flush, magnesium hydroxide, ondansetron, meclizine, HYDROcodone-acetaminophen, LORazepam **AND** LORazepam    No intake or output data in the 24 hours ending 02/25/21 0778    Physical Exam Performed:    BP (!) 138/59   Pulse 72   Temp 98.3 °F (36.8 °C) (Oral)   Resp 17   Ht 5' 3\" (1.6 m)   Wt 185 lb 1.6 oz (84 kg)   SpO2 93%   BMI 32.79 kg/m²     General appearance: No apparent distress, appears stated age and cooperative. HEENT: Pupils equal, round, and reactive to light. Conjunctivae/corneas clear. Neck: Supple, with full range of motion. No jugular venous distention. Trachea midline. Respiratory:  Normal respiratory effort. Clear to auscultation, bilaterally without Rales/Wheezes/Rhonchi. Cardiovascular: Regular rate and rhythm with normal S1/S2 without murmurs, rubs or gallops. Abdomen: Soft, non-tender, non-distended with normal bowel sounds. Musculoskeletal: No clubbing, cyanosis or edema bilaterally. Full range of motion without deformity. Skin: Skin color, texture, turgor normal.  No rashes or lesions. Neurologic:  Neurovascularly intact without any focal sensory/motor deficits. Cranial nerves: II-XII intact, grossly non-focal.  Psychiatric: Alert and oriented, thought content appropriate, normal insight  Capillary Refill: Brisk,< 3 seconds   Peripheral Pulses: +2 palpable, equal bilaterally       Labs:   Recent Labs     02/23/21  0519 02/24/21  0606 02/25/21  0608   WBC 6.8 10.8 13.0*   HGB 9.9* 10.1* 10.2*   HCT 30.5* 31.4* 31.9*   * 135 167     Recent Labs     02/24/21  0606 02/24/21  0624 02/24/21  1712 02/25/21  0608   NA  --  136 134* 137   K  --  5.6* 4.6 4.4   CL  --  107 102 108   CO2  --  13* 19* 20*   BUN  --  26* 25* 22*   CREATININE  --  1.1 0.9 1.0   CALCIUM  --  8.8 8.6 9.3   PHOS 2.6  --   --  2.4*     Recent Labs     02/23/21  0519 02/24/21  0624 02/25/21  0608   * 66* 27   * 262* 190*   BILIDIR <0.2 <0.2 <0.2   BILITOT 0.6 0.3 0.3   ALKPHOS 195* 174* 174*     No results for input(s): INR in the last 72 hours. No results for input(s): Dipika Simmer in the last 72 hours.     Urinalysis:      Lab Results   Component Value Date    NITRU Negative 08/11/2020    WBCUA 4 11/13/2019    BACTERIA 0 08/11/2020    BACTERIA Rare 09/15/2017    RBCUA 1 08/11/2020    RBCUA 1 11/13/2019    BLOODU Negative 01/15/2021    BLOODU Positive 08/11/2020    SPECGRAV

## 2021-02-25 NOTE — PROGRESS NOTES
Occupational Therapy  Facility/Department: [address] ONCOLOGY  Daily Treatment Note  NAME: Shai Tarango  : 1935  MRN: [id number]    Date of Service: 2021    Discharge Recommendations:  Shai Tarango scored a 21/24 on the AM-PAC ADL Inpatient form. Current research shows that an AM-PAC score of 18 or greater is typically associated with a discharge to the patient's home setting. Based on the patient's AM-PAC score, and their current ADL deficits, it is recommended that the patient have 2-3 sessions per week of Occupational Therapy at d/c to increase the patient's independence. At this time, this patient demonstrates the endurance and safety to discharge home with home health (home vs OP services) and a follow up treatment frequency of 2-3x/wk. Please see assessment section for further patient specific details. If patient discharges prior to next session this note will serve as a discharge summary. Please see below for the latest assessment towards goals. HOME HEALTH CARE: LEVEL 1 STANDARD    - Initial home health evaluation to occur within 24-48 hours, in patient home   - Therapy to evaluate with goal of regaining prior level of functioning   - Therapy to evaluate if patient has [address] needs for personal care       OT Equipment Recommendations  ADL Assistive Devices: Shower Chair with back;Grab Bars - shower(must fit in tub-needed secondary to low endurance)    Assessment   Performance deficits / Impairments: Decreased functional mobility ; Decreased ADL status; Decreased endurance  Assessment: pt supervision for treatment this date, pt educated on energy conservation this date and verbalized understanding this date.  pt would benefit from ongoing skilled OT services in order to return to PLOF  Treatment Diagnosis: Decreased ADL status and functional mobility/endurance associated with PNA  Prognosis: Good  History: Pt 79 yo, lives w/dtr, tri-level home, I ADLs, dtr does IADLs. PMH: DM, HTN, HLD, A-fib  Assistance / Modification: supervision  OT Education: OT Role;Plan of Care;Equipment; Energy Conservation  Patient Education: D/C recommendation, safety w/walker for functional mobility. Pt independently verbalized and demonstrated understanding. REQUIRES OT FOLLOW UP: Yes  Activity Tolerance  Activity Tolerance: Patient Tolerated treatment well  Safety Devices  Safety Devices in place: Yes  Type of devices: Call light within reach; Left in chair;Nurse notified;Gait belt(med fall risk, RN notified)         Patient Diagnosis(es): The primary encounter diagnosis was Pneumonia due to organism. Diagnoses of Hypoxia, Transaminitis, and CAROLANN (acute kidney injury) ([facility] were also pertinent to this visit. has a past medical history of Arthritis, Atrial fibrillation ([address] Diabetes mellitus type II, controlled ([address] GERD (gastroesophageal reflux disease), HTN (hypertension), Hyperlipidemia, Hypothyroid, Insomnia, Lumbago, and SVT (supraventricular tachycardia) ([address] has a past surgical history that includes Appendectomy; Colonoscopy; and Cholecystectomy, laparoscopic (2/24/2013). Restrictions  Restrictions/Precautions  Restrictions/Precautions: Fall Risk(medium fall risk)  Position Activity Restriction  Other position/activity restrictions: Ewa Aguilar is a 80 y.o. female that presents to the emergency department with a chief complaint of increased shortness of breath and cough. Has history of COPD normally just on 2 L of nasal cannula oxygen at night. She states she woke up this morning with increased cough productive of brown sputum, increased shortness of breath. She does have some chest discomfort only when she coughs. Denies any pain just sitting there. Denies fevers. States that she has been having some intermittent nausea and one episode of vomiting this morning. Has not had Covid or Covid vaccine.   Denies recent sick contacts, abdominal pain, urinary or bowel symptoms. States she has been having chills. Subjective   General  Chart Reviewed: Yes  Patient assessed for rehabilitation services?: Yes  Response to previous treatment: Patient with no complaints from previous session  Family / Caregiver Present: No  Diagnosis: PNA  Subjective  Subjective: pt in bed upon arrival, SPO2 at 89% on 2 L O2. agreeable to OT session, denies pain  Vital Signs  Patient Currently in Pain: Denies   Orientation  Orientation  Overall Orientation Status: Within Functional Limits  Objective    ADL  Feeding: Independent  Additional Comments: pt declined ADLs this date, reports she had already completed this date. lunch arriving, pt seated in recliner. independent with set up for Lunch        Balance  Sitting Balance: Independent  Standing Balance: Stand by assistance(SBA without AD, supervision with RW)  Standing Balance  Time: ~12 minutes total  Activity: mobility with RW in room, ~60 ft total in room  Comment: no LOB, supervision for mobility. pt mildly SOB after mobility, seated for rest SPO2 at 92%  Functional Mobility  Functional - Mobility Device: Rolling Walker  Activity: Other  Assist Level: Supervision  Functional Mobility Comments: pt used to using 4WW, used RW this date. ~65 ft total. good line manamgement. pt educated on energy conservation technique this date, verbalized independent understanding as well as asked questions about education this date  Tub Transfers  Tub Transfers Comments: educated pt on energy conservation technique for showering with use of shower chair and use of O2 while showering.  pt verbalized independent understanding  Bed mobility  Supine to Sit: Modified independent  Scooting: Modified independent  Comment: pt left in recliner for lunch  Transfers  Sit to stand: Supervision  Stand to sit: Supervision                       Cognition  Overall Cognitive Status: [facility]     Perception  Overall Perceptual Status: [facility]         Plan   Plan  Times per week: 1-2  Times per day: Daily  Current Treatment Recommendations: Self-Care / ADL, Functional Mobility Training, Endurance Training    AM-PAC Score        AM-PAC Inpatient Daily Activity Raw Score: 21 (02/25/21 1327)  AM-PAC Inpatient ADL T-Scale Score : 44.27 (02/25/21 1327)  ADL Inpatient CMS 0-100% Score: 32.79 (02/25/21 1327)  ADL Inpatient CMS G-Code Modifier : Mei Contreras (02/25/21 1327)    Goals  Short term goals  Time Frame for Short term goals: Discharge  Short term goal 1: Mod I for functional transfers to ADL surfaces w/RW---supervision 2/25  Short term goal 2: Mod I for functional mobility w/RW for ADL activity--supervision 2/25  Short term goal 3: Mod I for standing ADLs at sink---supervision in static stance 2/25  Short term goal 4: Mod I for LB ADLs---pt declined 2/25  Short term goal 5: Mod I for toileting---pt declined 2/25       Therapy Time   Individual Concurrent Group Co-treatment   Time In 1145         Time Out 1208         Minutes 23           Timed Code Treatment Minutes:   23 minutes    Total Treatment Minutes:  23 minutes      Hector Wilkerson OTR/L [id number]      Hector Wilkerson OT

## 2021-02-25 NOTE — PROGRESS NOTES
Gastroenterology Progress Note    Micki Ramirez is a 80 y.o. female patient. Active Problems:    Pneumonia  Resolved Problems:    * No resolved hospital problems. *      SUBJECTIVE:  Feels ok today. No abdominal pain. ROS:  No fever, chills  No chest pain, palpitations  No SOB, cough  Gastrointestinal ROS: see above    Physical    VITALS:  /72   Pulse 62   Temp 97.9 °F (36.6 °C) (Oral)   Resp 18   Ht 5' 3\" (1.6 m)   Wt 185 lb 1.6 oz (84 kg)   SpO2 90%   BMI 32.79 kg/m²   TEMPERATURE:  Current - Temp: 97.9 °F (36.6 °C); Max - Temp  Av.8 °F (36.6 °C)  Min: 97.2 °F (36.2 °C)  Max: 98.2 °F (36.8 °C)    NAD  Regular rate   Lungs CTA anteriorly  Abdomen soft, ND, NT,  Bowel sounds normal.    Data    Data Review:    Recent Labs     21  0621  0608   WBC 6.8 10.8 13.0*   HGB 9.9* 10.1* 10.2*   HCT 30.5* 31.4* 31.9*   MCV 93.7 94.8 92.3   * 135 167     Recent Labs     21  0621  0621  1712 21  0608   NA  --  136 134* 137   K  --  5.6* 4.6 4.4   CL  --  107 102 108   CO2  --  13* 19* 20*   PHOS 2.6  --   --  2.4*   BUN  --  26* 25* 22*   CREATININE  --  1.1 0.9 1.0     Recent Labs     21  0521  0621  0608   * 66* 27   * 262* 190*   BILIDIR <0.2 <0.2 <0.2   BILITOT 0.6 0.3 0.3   ALKPHOS 195* 174* 174*     No results for input(s): LIPASE, AMYLASE in the last 72 hours. Recent Labs     21  1629   PROTIME 18.4*   INR 1.58*     No results for input(s): PTT in the last 72 hours. ASSESSMENT :    Elevated liver enzymes - she has had elevated alk phos in the past and intermittent mild elevation of transaminases. ,  at admission with bili 1.1 and alk phos 238. Enzymes all improved since. INR 1.5 but appears she was on xarelto. She is s/p josue. No RUQ pain suspicious for CBD stone. Normal liver on CT. Acute hepatitis panel neg for Hep A, B, and C. covid negative.  She

## 2021-02-25 NOTE — PROGRESS NOTES
PULMONARY AND CRITICAL CARE MEDICINE PROGRESS NOTE    Interval history: Patient sitting in chair in no apparent respiratory distress. Reports that she is able to cough more and is expectorating yellowish mucus slightly blood-tinged. She was unable to tolerate MetaNeb therapy. Did have some wheezing this morning. Oxygen requirements remain stable at 2 L/min. REVIEW OF SYSTEMS:     CONSTITUTIONAL SYMPTOMS: The patient denies fever, fatigue, night sweats, weight loss or weight gain. HEENT: No vision changes. No tinnitus, Denies sinus pain. No hoarseness, or dysphagia. NECK: Patient denies swelling in the neck. CARDIOVASCULAR: Denies chest pain, palpitation, syncope. RESPIRATORY: As per HPI. GASTROINTESTINAL: Denies nausea, abdominal pain or change in bowel function. GENITOURINARY: Denies obstructive symptoms. No history of incontinence. BREASTS: No masses or lumps in the breasts. SKIN: No rashes or itching. MUSCULOSKELETAL: Denies weakness or bone pain. NEUROLOGICAL: No headaches or seizures. PSYCHIATRIC: Denies mood swings or depression. ENDOCRINE: Denies heat or cold intolerance or excessive thirst.  HEMATOLOGIC/LYMPHATIC: Denies easy bruising or lymph node swelling. ALLERGIC/IMMUNOLOGIC: No environmental allergies.     PAST MEDICAL HISTORY:   Past Medical History:   Diagnosis Date    Arthritis     Atrial fibrillation ([address]     Diabetes mellitus type II, controlled ([address]     GERD (gastroesophageal reflux disease)     HTN (hypertension)     Hyperlipidemia     Hypothyroid     Insomnia     Lumbago     SVT (supraventricular tachycardia) (HCC)        PAST SURGICAL HISTORY:   Past Surgical History:   Procedure Laterality Date    APPENDECTOMY      CHOLECYSTECTOMY, LAPAROSCOPIC  2/24/2013    COLONOSCOPY         SOCIAL HISTORY:   Social History     Tobacco Use    Smoking status: Former Smoker     Packs/day: 1.00     Years: 45.00     Pack years: 45.00     Start date: 9/15/1950 HYDROcodone-acetaminophen, LORazepam **AND** LORazepam      ALLERGIES:   Allergies as of 02/22/2021 - Review Complete 02/22/2021   Allergen Reaction Noted    Adhesive tape Anaphylaxis 07/31/2015    Cefaclor Nausea And Vomiting 10/15/2012    Nsaids  02/24/2013    Clinoril [sulindac] Other (See Comments) 10/15/2012    Codeine  10/15/2012    Diclofenac  04/03/2019    Diclofenac sodium Hives 10/15/2012    Diclofenac sodium Hives 10/15/2012    Hctz [hydrochlorothiazide]  09/15/2017    Ibuprofen Other (See Comments) 10/15/2012    Macrobid [nitrofurantoin macrocrystal]  05/06/2019    Motrin [ibuprofen micronized] Other (See Comments) 10/15/2012    Pcn [penicillins] Hives 10/15/2012    Peach [prunus persica] Itching and Swelling 12/10/2013      OBJECTIVE:   height is 5' 3\" (1.6 m) and weight is 185 lb 1.6 oz (84 kg). Her oral temperature is 98 °F (36.7 °C). Her blood pressure is 131/66 and her pulse is 78. Her respiration is 16 and oxygen saturation is 94%. No intake/output data recorded. PHYSICAL EXAM:    CONSTITUTIONAL: She is a 80y.o.-year-old who appears her stated age. She is alert and oriented x 3 and in no acute distress. HEENT: PERRLA, EOMI. No scleral icterus. No thrush, atraumatic, normocephalic. NECK: Supple, without cervical or supraclavicular lymphadenopathy:  CARDIOVASCULAR: S1 S2 RRR. Without murmer  RESPIRATORY & CHEST: Bibasilar decreased breath sounds. Minimal wheezing with no crackles heard. GASTROINTESTINAL & ABDOMEN: Soft, nontender, positive bowel sounds in all quadrants, non-distended, without hepatosplenomegaly. GENITOURINARY: Deferred. MUSCULOSKELETAL: No tenderness to palpation of the axial skeleton. There is no clubbing. No cyanosis. No edema of the lower extremities. SKIN OF BODY: No rash or jaundice. PSYCHIATRIC EVALUATION: Normal affect. Patient answers questions appropriately. HEMATOLOGIC/LYMPHATIC/ IMMUNOLOGIC: No palpable lymphadenopathy.   NEUROLOGIC: Alert and oriented x 3. Groslly non-focal. Motor strength is 5+/5 in all muscle groups. The patient has a normal sensorium globally. LABS:  Recent Labs     02/22/21  1629 02/23/21  0519 02/24/21  0606 02/24/21  0624 02/24/21  1712 02/25/21  0608   WBC 8.6 6.8 10.8  --   --  13.0*   HGB 10.8* 9.9* 10.1*  --   --  10.2*   HCT 33.0* 30.5* 31.4*  --   --  31.9*   * 109* 135  --   --  167   * 412*  --  262*  --  190*   * 193*  --  66*  --  27   * 138  --  136 134* 137   K 4.6 4.6  --  5.6* 4.6 4.4    109  --  107 102 108   CREATININE 1.8* 1.4*  --  1.1 0.9 1.0   BUN 37* 30*  --  26* 25* 22*   CO2 21 21  --  13* 19* 20*   INR 1.58*  --   --   --   --   --        Recent Labs     02/24/21  0624 02/24/21 1712 02/25/21  0608   GLUCOSE 280* 324* 157*   CALCIUM 8.8 8.6 9.3    134* 137   K 5.6* 4.6 4.4   CO2 13* 19* 20*    102 108   BUN 26* 25* 22*   CREATININE 1.1 0.9 1.0       No results for input(s): PHART, NVR5PFL, PO2ART, FAS4BFL, W2DOOHFZ, BEART, W1NNIJHU in the last 72 hours.     Lab Results   Component Value Date    INR 1.58 (H) 02/22/2021    INR 1.58 (H) 03/17/2020    INR 1.43 (H) 02/20/2020    PROTIME 18.4 (H) 02/22/2021    PROTIME 18.4 (H) 03/17/2020    PROTIME 16.6 (H) 02/20/2020     No results found for: AMYLASE   Lab Results   Component Value Date    LABA1C 6.1 02/23/2021     Lab Results   Component Value Date    .4 02/23/2021     Lab Results   Component Value Date    TSH 2.50 09/28/2020    T4FREE 1.2 02/19/2020     Lab Results   Component Value Date    CKTOTAL 80 02/22/2021    TROPONINI <0.01 02/22/2021      No results found for: CRP   No results found for: BNP   No results found for: DDIMER   No results found for: FERRITIN   Lab Results   Component Value Date    LACTA 0.8 03/17/2020           IMAGING:    Chest x-ray portable 1 view done on 2/22/2021 was personally viewed by me and my interpretation is: Right lower lobe lung infiltrate suggestive of focal consolidation. No pleural effusions or pneumothorax seen. Cardiac silhouette within normal limits. IMPRESSION:     1. Community-acquired pneumonia, resolving  2. Mild COPD in exacerbation, improving  3. Chronic hypoxic respiratory failure, stable  4. Bibasilar bronchiectasis  5. Previous history of tobacco abuse  6. Obesity      RECOMMENDATION:     1. Patient's respiratory status is slowly improving. 2. She has started to expectorate slightly more. Blood streaking of the mucus is expected as the pneumonia slowly heals. I will get a repeat chest x-ray to assess for radiological resolution. 3. Unfortunately she could not tolerate MetaNeb therapy. Instead I will start her on Acapella device to help her with the pulmonary toilet. 4. Patient also has a background of mild COPD and bibasilar bronchiectasis. Her wheezing has decreased. 5. Continue with Rocephin and azithromycin for a total of 5 days. 6. Patient already on Symbicort and albuterol. We will continue. 7. I will change the Solu-Medrol to prednisone 40 p.o. daily today. 8. Incentive spirometry. 9. Out of bed into chair. 10. Oxygen supplementation to maintain SPO2 between 88 to 92%. Can try taking her off the oxygen during the daytime as she does at home in order to assess if she is close to her baseline. We will continue to follow the patient. Alesia Cheung MD  Pulmonary Critical Care and Sleep Medicine  2/22/2021, 11:05 AM    This note was completed using dragon medical speech recognition software. Grammatical errors, random word insertions, pronoun errors and incomplete sentences are occasional consequences of this technology due to software limitations. If there are questions or concerns about the content of this note of information contained within the body of this dictation they should be addressed with the provider for clarification.

## 2021-02-26 DIAGNOSIS — M15.9 PRIMARY OSTEOARTHRITIS INVOLVING MULTIPLE JOINTS: ICD-10-CM

## 2021-02-26 LAB
ALBUMIN SERPL-MCNC: 3.6 G/DL (ref 3.4–5)
ALP BLD-CCNC: 142 U/L (ref 40–129)
ALT SERPL-CCNC: 127 U/L (ref 10–40)
ANION GAP SERPL CALCULATED.3IONS-SCNC: 14 MMOL/L (ref 3–16)
AST SERPL-CCNC: 17 U/L (ref 15–37)
ATYPICAL LYMPHOCYTE RELATIVE PERCENT: 1 % (ref 0–6)
BASOPHILS ABSOLUTE: 0 K/UL (ref 0–0.2)
BASOPHILS RELATIVE PERCENT: 0 %
BILIRUB SERPL-MCNC: 0.4 MG/DL (ref 0–1)
BILIRUBIN DIRECT: <0.2 MG/DL (ref 0–0.3)
BILIRUBIN, INDIRECT: ABNORMAL MG/DL (ref 0–1)
BLOOD CULTURE, ROUTINE: NORMAL
BUN BLDV-MCNC: 22 MG/DL (ref 7–20)
BURR CELLS: ABNORMAL
CALCIUM SERPL-MCNC: 8.7 MG/DL (ref 8.3–10.6)
CHLORIDE BLD-SCNC: 105 MMOL/L (ref 99–110)
CO2: 19 MMOL/L (ref 21–32)
CREAT SERPL-MCNC: 0.8 MG/DL (ref 0.6–1.2)
CULTURE, BLOOD 2: NORMAL
EOSINOPHILS ABSOLUTE: 0 K/UL (ref 0–0.6)
EOSINOPHILS RELATIVE PERCENT: 0 %
GFR AFRICAN AMERICAN: >60
GFR NON-AFRICAN AMERICAN: >60
GLUCOSE BLD-MCNC: 156 MG/DL (ref 70–99)
GLUCOSE BLD-MCNC: 160 MG/DL (ref 70–99)
GLUCOSE BLD-MCNC: 204 MG/DL (ref 70–99)
GLUCOSE BLD-MCNC: 231 MG/DL (ref 70–99)
GLUCOSE BLD-MCNC: 322 MG/DL (ref 70–99)
HCT VFR BLD CALC: 33 % (ref 36–48)
HEMOGLOBIN: 9.9 G/DL (ref 12–16)
LYMPHOCYTES ABSOLUTE: 1.2 K/UL (ref 1–5.1)
LYMPHOCYTES RELATIVE PERCENT: 12 %
MACROCYTES: ABNORMAL
MAGNESIUM: 2 MG/DL (ref 1.8–2.4)
MCH RBC QN AUTO: 30.5 PG (ref 26–34)
MCHC RBC AUTO-ENTMCNC: 29.8 G/DL (ref 31–36)
MCV RBC AUTO: 102.4 FL (ref 80–100)
MONOCYTES ABSOLUTE: 0.4 K/UL (ref 0–1.3)
MONOCYTES RELATIVE PERCENT: 4 %
NEUTROPHILS ABSOLUTE: 7.8 K/UL (ref 1.7–7.7)
NEUTROPHILS RELATIVE PERCENT: 83 %
PDW BLD-RTO: 17.3 % (ref 12.4–15.4)
PERFORMED ON: ABNORMAL
PHOSPHORUS: 2.3 MG/DL (ref 2.5–4.9)
PLATELET # BLD: 137 K/UL (ref 135–450)
PLATELET SLIDE REVIEW: ABNORMAL
PMV BLD AUTO: 8.2 FL (ref 5–10.5)
POTASSIUM REFLEX MAGNESIUM: 4.3 MMOL/L (ref 3.5–5.1)
RBC # BLD: 3.23 M/UL (ref 4–5.2)
SLIDE REVIEW: ABNORMAL
SODIUM BLD-SCNC: 138 MMOL/L (ref 136–145)
TOTAL PROTEIN: 6.1 G/DL (ref 6.4–8.2)
WBC # BLD: 9.4 K/UL (ref 4–11)

## 2021-02-26 PROCEDURE — 99233 SBSQ HOSP IP/OBS HIGH 50: CPT | Performed by: INTERNAL MEDICINE

## 2021-02-26 PROCEDURE — 2580000003 HC RX 258: Performed by: INTERNAL MEDICINE

## 2021-02-26 PROCEDURE — 94761 N-INVAS EAR/PLS OXIMETRY MLT: CPT

## 2021-02-26 PROCEDURE — 84100 ASSAY OF PHOSPHORUS: CPT

## 2021-02-26 PROCEDURE — 6370000000 HC RX 637 (ALT 250 FOR IP): Performed by: INTERNAL MEDICINE

## 2021-02-26 PROCEDURE — 94669 MECHANICAL CHEST WALL OSCILL: CPT

## 2021-02-26 PROCEDURE — 2700000000 HC OXYGEN THERAPY PER DAY

## 2021-02-26 PROCEDURE — 1200000000 HC SEMI PRIVATE

## 2021-02-26 PROCEDURE — 85025 COMPLETE CBC W/AUTO DIFF WBC: CPT

## 2021-02-26 PROCEDURE — 94640 AIRWAY INHALATION TREATMENT: CPT

## 2021-02-26 PROCEDURE — 6370000000 HC RX 637 (ALT 250 FOR IP): Performed by: PHYSICIAN ASSISTANT

## 2021-02-26 PROCEDURE — 6360000002 HC RX W HCPCS: Performed by: INTERNAL MEDICINE

## 2021-02-26 PROCEDURE — 80076 HEPATIC FUNCTION PANEL: CPT

## 2021-02-26 PROCEDURE — 2580000003 HC RX 258: Performed by: PHYSICIAN ASSISTANT

## 2021-02-26 PROCEDURE — 83735 ASSAY OF MAGNESIUM: CPT

## 2021-02-26 PROCEDURE — 36415 COLL VENOUS BLD VENIPUNCTURE: CPT

## 2021-02-26 PROCEDURE — 80048 BASIC METABOLIC PNL TOTAL CA: CPT

## 2021-02-26 RX ADMIN — PROPAFENONE HYDROCHLORIDE 150 MG: 150 TABLET, FILM COATED ORAL at 05:47

## 2021-02-26 RX ADMIN — PROPAFENONE HYDROCHLORIDE 150 MG: 150 TABLET, FILM COATED ORAL at 13:09

## 2021-02-26 RX ADMIN — ATORVASTATIN CALCIUM 80 MG: 80 TABLET, FILM COATED ORAL at 20:29

## 2021-02-26 RX ADMIN — PREDNISONE 40 MG: 20 TABLET ORAL at 09:43

## 2021-02-26 RX ADMIN — DILTIAZEM HYDROCHLORIDE 240 MG: 240 CAPSULE, COATED, EXTENDED RELEASE ORAL at 09:43

## 2021-02-26 RX ADMIN — ROPINIROLE HYDROCHLORIDE 2 MG: 1 TABLET, FILM COATED ORAL at 20:30

## 2021-02-26 RX ADMIN — HYDROCODONE BITARTRATE AND ACETAMINOPHEN 1 TABLET: 5; 325 TABLET ORAL at 22:29

## 2021-02-26 RX ADMIN — IPRATROPIUM BROMIDE AND ALBUTEROL SULFATE 1 AMPULE: .5; 3 SOLUTION RESPIRATORY (INHALATION) at 08:57

## 2021-02-26 RX ADMIN — CLONIDINE HYDROCHLORIDE 0.1 MG: 0.1 TABLET ORAL at 20:29

## 2021-02-26 RX ADMIN — Medication 1 CAPSULE: at 09:42

## 2021-02-26 RX ADMIN — INSULIN LISPRO 1 UNITS: 100 INJECTION, SOLUTION INTRAVENOUS; SUBCUTANEOUS at 20:30

## 2021-02-26 RX ADMIN — IPRATROPIUM BROMIDE AND ALBUTEROL SULFATE 1 AMPULE: .5; 3 SOLUTION RESPIRATORY (INHALATION) at 12:42

## 2021-02-26 RX ADMIN — AZITHROMYCIN MONOHYDRATE 500 MG: 500 INJECTION, POWDER, LYOPHILIZED, FOR SOLUTION INTRAVENOUS at 23:18

## 2021-02-26 RX ADMIN — INSULIN LISPRO 1 UNITS: 100 INJECTION, SOLUTION INTRAVENOUS; SUBCUTANEOUS at 09:44

## 2021-02-26 RX ADMIN — RIVAROXABAN 15 MG: 15 TABLET, FILM COATED ORAL at 09:42

## 2021-02-26 RX ADMIN — FAMOTIDINE 10 MG: 20 TABLET ORAL at 09:43

## 2021-02-26 RX ADMIN — ROPINIROLE HYDROCHLORIDE 2 MG: 1 TABLET, FILM COATED ORAL at 09:42

## 2021-02-26 RX ADMIN — LORAZEPAM 0.5 MG: 0.5 TABLET ORAL at 09:48

## 2021-02-26 RX ADMIN — AMLODIPINE BESYLATE 5 MG: 5 TABLET ORAL at 09:43

## 2021-02-26 RX ADMIN — INSULIN LISPRO 4 UNITS: 100 INJECTION, SOLUTION INTRAVENOUS; SUBCUTANEOUS at 17:39

## 2021-02-26 RX ADMIN — IPRATROPIUM BROMIDE AND ALBUTEROL SULFATE 1 AMPULE: .5; 3 SOLUTION RESPIRATORY (INHALATION) at 18:22

## 2021-02-26 RX ADMIN — ROPINIROLE HYDROCHLORIDE 2 MG: 1 TABLET, FILM COATED ORAL at 13:09

## 2021-02-26 RX ADMIN — LORAZEPAM 1 MG: 1 TABLET ORAL at 20:29

## 2021-02-26 RX ADMIN — Medication 2 PUFF: at 08:57

## 2021-02-26 RX ADMIN — INSULIN LISPRO 1 UNITS: 100 INJECTION, SOLUTION INTRAVENOUS; SUBCUTANEOUS at 13:10

## 2021-02-26 RX ADMIN — IPRATROPIUM BROMIDE AND ALBUTEROL SULFATE 1 AMPULE: .5; 3 SOLUTION RESPIRATORY (INHALATION) at 18:23

## 2021-02-26 RX ADMIN — CLONIDINE HYDROCHLORIDE 0.1 MG: 0.1 TABLET ORAL at 09:43

## 2021-02-26 RX ADMIN — LEVOTHYROXINE SODIUM 75 MCG: 0.07 TABLET ORAL at 05:47

## 2021-02-26 RX ADMIN — Medication 1 CAPSULE: at 17:41

## 2021-02-26 RX ADMIN — Medication 1000 MG: at 20:29

## 2021-02-26 RX ADMIN — Medication 2 PUFF: at 18:24

## 2021-02-26 RX ADMIN — Medication 10 ML: at 11:08

## 2021-02-26 RX ADMIN — Medication 10 ML: at 20:30

## 2021-02-26 RX ADMIN — PROPAFENONE HYDROCHLORIDE 150 MG: 150 TABLET, FILM COATED ORAL at 20:29

## 2021-02-26 ASSESSMENT — PAIN SCALES - GENERAL: PAINLEVEL_OUTOF10: 5

## 2021-02-26 ASSESSMENT — PAIN DESCRIPTION - LOCATION: LOCATION: BACK;CHEST

## 2021-02-26 NOTE — PROGRESS NOTES
PULMONARY AND CRITICAL CARE MEDICINE PROGRESS NOTE    Interval history: Patient sitting in bed in no apparent respiratory distress. Reports that her breathing has improved. She has been able to expectorate more. Oxygen requirements remain stable at 2 L/min. REVIEW OF SYSTEMS:     CONSTITUTIONAL SYMPTOMS: The patient denies fever, fatigue, night sweats, weight loss or weight gain. HEENT: No vision changes. No tinnitus, Denies sinus pain. No hoarseness, or dysphagia. NECK: Patient denies swelling in the neck. CARDIOVASCULAR: Denies chest pain, palpitation, syncope. RESPIRATORY: As per HPI. GASTROINTESTINAL: Denies nausea, abdominal pain or change in bowel function. GENITOURINARY: Denies obstructive symptoms. No history of incontinence. BREASTS: No masses or lumps in the breasts. SKIN: No rashes or itching. MUSCULOSKELETAL: Denies weakness or bone pain. NEUROLOGICAL: No headaches or seizures. PSYCHIATRIC: Denies mood swings or depression. ENDOCRINE: Denies heat or cold intolerance or excessive thirst.  HEMATOLOGIC/LYMPHATIC: Denies easy bruising or lymph node swelling. ALLERGIC/IMMUNOLOGIC: No environmental allergies.     PAST MEDICAL HISTORY:   Past Medical History:   Diagnosis Date    Arthritis     Atrial fibrillation ([facility] [address]     Diabetes mellitus type II, controlled ([address]     GERD (gastroesophageal reflux disease)     HTN (hypertension)     Hyperlipidemia     Hypothyroid     Insomnia     Lumbago     SVT (supraventricular tachycardia) (HCC)        PAST SURGICAL HISTORY:   Past Surgical History:   Procedure Laterality Date    APPENDECTOMY      CHOLECYSTECTOMY, LAPAROSCOPIC  2013    COLONOSCOPY         SOCIAL HISTORY:   Social History     Tobacco Use    Smoking status: Former Smoker     Packs/day: 1.00     Years: 45.00     Pack years: 45.00     Start date: 9/15/1950     Quit date: 1995     Years since quittin.1    Smokeless tobacco: Never Used   Substance Use Topics    Alcohol use: No     Alcohol/week: 0.0 standard drinks    Drug use: No       FAMILY HISTORY:   Family History   Problem Relation Age of Onset    High Blood Pressure Mother     Heart Attack Father     Heart Disease Father     Other Brother     Asthma Paternal Uncle        MEDICATIONS:     No current facility-administered medications on file prior to encounter.       Current Outpatient Medications on File Prior to Encounter   Medication Sig Dispense Refill    rOPINIRole (REQUIP) 2 MG tablet Take 1 tablet by mouth 3 times daily      ipratropium-albuterol (DUONEB) 0.5-2.5 (3) MG/3ML SOLN nebulizer solution INHALE THE CONTENTS OF 1 VIAL VIA NEBULIZER EVERY 4 HOURS 1620 mL 3    irbesartan (AVAPRO) 300 MG tablet TAKE 1 TABLET BY MOUTH EVERY NIGHT 90 tablet 1    albuterol sulfate  (90 Base) MCG/ACT inhaler Inhale 2 puffs into the lungs every 6 hours as needed for Wheezing 3 Inhaler 3    cloNIDine (CATAPRES) 0.1 MG tablet Take 1 tablet by mouth 2 times daily 180 tablet 1    dilTIAZem (CARDIZEM CD) 240 MG extended release capsule Take 1 capsule by mouth daily 90 capsule 1    fluticasone (FLONASE) 50 MCG/ACT nasal spray 2 sprays by Nasal route daily 3 Bottle 1    levothyroxine (SYNTHROID) 75 MCG tablet TAKE 1 TABLET EVERY DAY 90 tablet 1    budesonide-formoterol (SYMBICORT) 160-4.5 MCG/ACT AERO INHALE 2 PUFFS INTO THE LUNGS TWICE DAILY 10.2 g 5    Respiratory Therapy Supplies (NEBULIZER/TUBING/MOUTHPIECE) KIT 1 kit by Does not apply route 4 times daily as needed (wheezing, shortness of breath) 1 kit 0    propafenone (RYTHMOL) 150 MG tablet TAKE 1 TABLET BY MOUTH EVERY 8 HOURS 270 tablet 1    Respiratory Therapy Supplies (NEBULIZER/TUBING/MOUTHPIECE) KIT 1 kit by Does not apply route 4 times daily as needed (shortness of breath) 1 kit 0    albuterol sulfate HFA (PROAIR HFA) 108 (90 Base) MCG/ACT inhaler Inhale 2 puffs into the lungs every 6 hours as needed for Wheezing 1 Inhaler 6    blood glucose monitor strips Test one time a day 100 strip 5    Lancets MISC 1 each by Does not apply route 2 times daily 300 each 1    HYDROcodone-acetaminophen (NORCO) 5-325 MG per tablet Take 1 tablet by mouth every 6 hours as needed.       LORazepam (ATIVAN) 1 MG tablet TAKE 1/2 TABLET BY MOUTH TWICE DAILY AND 1 EVERY NIGHT AT BEDTIME AS NEEDED FOR ANXIETY 60 tablet 1    meclizine (ANTIVERT) 12.5 MG tablet TAKE 1 TABLET THREE TIMES DAILY AS NEEDED 270 tablet 0    levalbuterol (XOPENEX) 0.63 MG/3ML nebulization INHALE CONTENTS OF 1 VIAL PER NEBULIZER EVERY 6 HOURS AS NEEDED FOR WHEEZING 1050 mL 3    aspirin 81 MG tablet Take 81 mg by mouth every other day           predniSONE  40 mg Oral Daily    polyethylene glycol  17 g Oral Daily    insulin lispro  0-6 Units Subcutaneous TID WC    insulin lispro  0-3 Units Subcutaneous Nightly    ipratropium-albuterol  1 ampule Inhalation Q4H WA    aspirin  81 mg Oral Every Other Day    budesonide-formoterol  2 puff Inhalation BID    cloNIDine  0.1 mg Oral BID    dilTIAZem  240 mg Oral Daily    fluticasone  2 spray Nasal Daily    levothyroxine  75 mcg Oral Daily    propafenone  150 mg Oral 3 times per day    sodium chloride flush  10 mL Intravenous 2 times per day    famotidine  10 mg Oral Daily    rivaroxaban  15 mg Oral Daily with breakfast    amLODIPine  5 mg Oral Daily    rOPINIRole  2 mg Oral TID    atorvastatin  80 mg Oral Nightly    LORazepam  1 mg Oral Nightly    lactobacillus  1 capsule Oral BID     cefTRIAXone (ROCEPHIN) IV  1,000 mg Intravenous Q24H    azithromycin  500 mg Intravenous Q24H      dextrose       glucose, dextrose, glucagon (rDNA), dextrose, albuterol sulfate HFA, dicyclomine, sodium chloride flush, magnesium hydroxide, ondansetron, meclizine, HYDROcodone-acetaminophen, LORazepam **AND** LORazepam      ALLERGIES:   Allergies as of 02/22/2021 - Review Complete 02/22/2021   Allergen Reaction Noted    Adhesive tape Anaphylaxis 07/31/2015    Cefaclor Nausea And Vomiting 10/15/2012    Nsaids  02/24/2013    Clinoril [sulindac] Other (See Comments) 10/15/2012    Codeine  10/15/2012    Diclofenac  04/03/2019    Diclofenac sodium Hives 10/15/2012    Diclofenac sodium Hives 10/15/2012    Hctz [hydrochlorothiazide]  09/15/2017    Ibuprofen Other (See Comments) 10/15/2012    Macrobid [nitrofurantoin macrocrystal]  05/06/2019    Motrin [ibuprofen micronized] Other (See Comments) 10/15/2012    Pcn [penicillins] Hives 10/15/2012    Peach [prunus persica] Itching and Swelling 12/10/2013      OBJECTIVE:   height is 5' 3\" (1.6 m) and weight is 185 lb 1.6 oz (84 kg). Her oral temperature is 97.9 °F (36.6 °C). Her blood pressure is 174/75 (abnormal) and her pulse is 84. Her respiration is 18 and oxygen saturation is 92%. No intake/output data recorded. PHYSICAL EXAM:    CONSTITUTIONAL: She is a 80y.o.-year-old who appears her stated age. She is alert and oriented x 3 and in no acute distress. HEENT: PERRLA, EOMI. No scleral icterus. No thrush, atraumatic, normocephalic. NECK: Supple, without cervical or supraclavicular lymphadenopathy:  CARDIOVASCULAR: S1 S2 RRR. Without murmer  RESPIRATORY & CHEST: Bibasilar decreased breath sounds. Minimal wheezing with no crackles heard. GASTROINTESTINAL & ABDOMEN: Soft, nontender, positive bowel sounds in all quadrants, non-distended, without hepatosplenomegaly. GENITOURINARY: Deferred. MUSCULOSKELETAL: No tenderness to palpation of the axial skeleton. There is no clubbing. No cyanosis. No edema of the lower extremities. SKIN OF BODY: No rash or jaundice. PSYCHIATRIC EVALUATION: Normal affect. Patient answers questions appropriately. HEMATOLOGIC/LYMPHATIC/ IMMUNOLOGIC: No palpable lymphadenopathy. NEUROLOGIC: Alert and oriented x 3. Groslly non-focal. Motor strength is 5+/5 in all muscle groups. The patient has a normal sensorium globally.       LABS:  Recent Labs     02/24/21  0606 02/24/21  0606 02/24/21  7569 02/24/21  1712 02/25/21  0922 02/26/21  0521 02/26/21  0522   WBC 10.8  --   --   --  13.0*  --  9.4   HGB 10.1*  --   --   --  10.2*  --  9.9*   HCT 31.4*  --   --   --  31.9*  --  33.0*     --   --   --  167  --  137   ALT  --   --  262*  --  190* 127*  --    AST  --   --  66*  --  27 17  --    NA  --    < > 136 134* 137 138  --    K  --   --  5.6* 4.6 4.4 4.3  --    CL  --    < > 107 102 108 105  --    CREATININE  --    < > 1.1 0.9 1.0 0.8  --    BUN  --    < > 26* 25* 22* 22*  --    CO2  --    < > 13* 19* 20* 19*  --     < > = values in this interval not displayed. Recent Labs     02/24/21 1712 02/25/21  0608 02/26/21  0521   GLUCOSE 324* 157* 204*   CALCIUM 8.6 9.3 8.7   * 137 138   K 4.6 4.4 4.3   CO2 19* 20* 19*    108 105   BUN 25* 22* 22*   CREATININE 0.9 1.0 0.8       No results for input(s): PHART, XAY6EGV, PO2ART, LGN1LEH, C9XXVYQX, BEART, P8LGDDVQ in the last 72 hours. Lab Results   Component Value Date    INR 1.58 (H) 02/22/2021    INR 1.58 (H) 03/17/2020    INR 1.43 (H) 02/20/2020    PROTIME 18.4 (H) 02/22/2021    PROTIME 18.4 (H) 03/17/2020    PROTIME 16.6 (H) 02/20/2020     No results found for: AMYLASE   Lab Results   Component Value Date    LABA1C 6.1 02/23/2021     Lab Results   Component Value Date    .4 02/23/2021     Lab Results   Component Value Date    TSH 2.50 09/28/2020    T4FREE 1.2 02/19/2020     Lab Results   Component Value Date    CKTOTAL 80 02/22/2021    TROPONINI <0.01 02/22/2021      No results found for: CRP   No results found for: BNP   No results found for: DDIMER   No results found for: FERRITIN   Lab Results   Component Value Date    LACTA 0.8 03/17/2020           IMAGING:    Chest x-ray portable 1 view done on 2/25/2021 was personally viewed by me and my interpretation is: Right lower lobe lung infiltrate which has improved. No pleural effusions or pneumothorax seen.   Cardiac silhouette within normal limits. IMPRESSION:     1. Community-acquired pneumonia, resolving  2. Mild COPD in exacerbation, improving  3. Chronic hypoxic respiratory failure, stable  4. Bibasilar bronchiectasis  5. Previous history of tobacco abuse  6. Obesity      RECOMMENDATION:     1. Patient's respiratory status has improved and she is close to her baseline. 2. Chest imaging performed yesterday showed improvement in the right lung pneumonia. 3. She has started to expectorate slightly more. Blood streaking of the mucus is expected as the pneumonia slowly heals. Patient is also on chronic anticoagulation with Xarelto. 4. She can be given Acapella device to use at home for pulmonary toilet. 5. Patient also has a background of mild COPD and bibasilar bronchiectasis. Her wheezing has decreased. 6. She has finished a 5-day course of Rocephin and azithromycin. Afebrile without leukocytosis. Antibiotics can be stopped. 7. Patient already on Symbicort and albuterol. She can be discharged on these inhalers. 8. On prednisone 40 p.o. daily today. Can take it for 5 more days and then stop. 9. Incentive spirometry. 10. Out of bed into chair. 11. Oxygen supplementation to maintain SPO2 between 88 to 92%. 12. From pulmonary standpoint patient can be discharged back home today. She will follow-up with pulmonary office in 7 to 10 days post hospital discharge. Thank you for allowing us to participate in this patient's management. Chapito Stovall MD  Pulmonary Critical Care and Sleep Medicine  2/22/2021, 10:49 AM    This note was completed using dragon medical speech recognition software. Grammatical errors, random word insertions, pronoun errors and incomplete sentences are occasional consequences of this technology due to software limitations.  If there are questions or concerns about the content of this note of information contained within the body of this dictation they should be addressed with the provider for

## 2021-02-26 NOTE — TELEPHONE ENCOUNTER
HYDROcodone-acetaminophen (NORCO) 5-325 MG per tablet        Sig - Route: Take 1 tablet by mouth every 6 hours as needed.  - Oral      LORazepam (ATIVAN) tablet 0.5 mg 0.5 mg 2 TIMES DAILY PRN 2/22/2021    Route: Oral     [facility] in chart

## 2021-02-26 NOTE — PROGRESS NOTES
Physical Therapy  Patient states she has been ambulating in room multiple times each day. She plans to go home with daughter who is taking a leave of absence to be home with her. Patient informed therapy wouldn't be back until Monday but patient encouraged to continue mobility with nursing staff. Patient declined therapy at this time stating she feels tired.   Rani Quijano, DPT, ATC-R [id number]

## 2021-02-26 NOTE — PROGRESS NOTES
Hospitalist Progress Note      PCP: Beti James MD    Date of Admission: 2/22/2021    Chief Complaint: Dyspnea    Hospital Course: 80-year-old female hospital medical history of COPD on 2 L nasal cannula oxygen nightly at home presented with increased shortness of breath. Is currently being treated for CAP and COPD exacerbation. Subjective: Patient seen and examined at bedside. States respirations are better.       Medications:  Reviewed    Infusion Medications    dextrose       Scheduled Medications    predniSONE  40 mg Oral Daily    polyethylene glycol  17 g Oral Daily    insulin lispro  0-6 Units Subcutaneous TID WC    insulin lispro  0-3 Units Subcutaneous Nightly    ipratropium-albuterol  1 ampule Inhalation Q4H WA    aspirin  81 mg Oral Every Other Day    budesonide-formoterol  2 puff Inhalation BID    cloNIDine  0.1 mg Oral BID    dilTIAZem  240 mg Oral Daily    fluticasone  2 spray Nasal Daily    levothyroxine  75 mcg Oral Daily    propafenone  150 mg Oral 3 times per day    sodium chloride flush  10 mL Intravenous 2 times per day    famotidine  10 mg Oral Daily    rivaroxaban  15 mg Oral Daily with breakfast    amLODIPine  5 mg Oral Daily    rOPINIRole  2 mg Oral TID    atorvastatin  80 mg Oral Nightly    LORazepam  1 mg Oral Nightly    lactobacillus  1 capsule Oral BID WC    cefTRIAXone (ROCEPHIN) IV  1,000 mg Intravenous Q24H    azithromycin  500 mg Intravenous Q24H     PRN Meds: glucose, dextrose, glucagon (rDNA), dextrose, albuterol sulfate HFA, dicyclomine, sodium chloride flush, magnesium hydroxide, ondansetron, meclizine, HYDROcodone-acetaminophen, LORazepam **AND** LORazepam      Intake/Output Summary (Last 24 hours) at 2/26/2021 1657  Last data filed at 2/25/2021 1748  Gross per 24 hour   Intake 120 ml   Output --   Net 120 ml       Physical Exam Performed:    BP (!) 174/75   Pulse 84   Temp 97.9 °F (36.6 °C) (Oral)   Resp 18   Ht 5' 3\" (1.6 m)   Wt 185 lb 1.6 oz (84 kg)   SpO2 92%   BMI 32.79 kg/m²     General appearance: No apparent distress, appears stated age and cooperative. HEENT: Pupils equal, round, and reactive to light. Conjunctivae/corneas clear. Neck: Supple, with full range of motion. No jugular venous distention. Trachea midline. Respiratory:  Normal respiratory effort. Clear to auscultation, bilaterally without Rales/Wheezes/Rhonchi. Cardiovascular: Regular rate and rhythm with normal S1/S2 without murmurs, rubs or gallops. Abdomen: Soft, non-tender, non-distended with normal bowel sounds. Musculoskeletal: No clubbing, cyanosis or edema bilaterally. Full range of motion without deformity. Skin: Skin color, texture, turgor normal.  No rashes or lesions. Neurologic:  Neurovascularly intact without any focal sensory/motor deficits. Cranial nerves: II-XII intact, grossly non-focal.  Psychiatric: Alert and oriented, thought content appropriate, normal insight  Capillary Refill: Brisk,< 3 seconds   Peripheral Pulses: +2 palpable, equal bilaterally       Labs:   Recent Labs     02/24/21  0606 02/25/21  0608 02/26/21  0522   WBC 10.8 13.0* 9.4   HGB 10.1* 10.2* 9.9*   HCT 31.4* 31.9* 33.0*    167 137     Recent Labs     02/24/21  0606 02/24/21  0606 02/24/21  1712 02/25/21  0608 02/26/21  0521   NA  --    < > 134* 137 138   K  --    < > 4.6 4.4 4.3   CL  --    < > 102 108 105   CO2  --    < > 19* 20* 19*   BUN  --    < > 25* 22* 22*   CREATININE  --    < > 0.9 1.0 0.8   CALCIUM  --    < > 8.6 9.3 8.7   PHOS 2.6  --   --  2.4* 2.3*    < > = values in this interval not displayed. Recent Labs     02/24/21  0624 02/25/21  0608 02/26/21  0521   AST 66* 27 17   * 190* 127*   BILIDIR <0.2 <0.2 <0.2   BILITOT 0.3 0.3 0.4   ALKPHOS 174* 174* 142*     No results for input(s): INR in the last 72 hours. No results for input(s): Towana Ilda in the last 72 hours.     Urinalysis:      Lab Results   Component Value Date    NITRU Negative 08/11/2020    WBCUA 4 11/13/2019    BACTERIA 0 08/11/2020    BACTERIA Rare 09/15/2017    RBCUA 1 08/11/2020    RBCUA 1 11/13/2019    BLOODU Negative 01/15/2021    BLOODU Positive 08/11/2020    SPECGRAV 1.020 01/15/2021    SPECGRAV 1.005 08/11/2020    GLUCOSEU Negative 01/15/2021    GLUCOSEU neg 08/11/2020       Radiology:  XR CHEST PORTABLE   Final Result   Interval change in pulmonary opacities as above suggesting multifocal   pneumonia. Atypical edema is less likely. CT ABDOMEN PELVIS WO CONTRAST Additional Contrast? None   Final Result   No acute inflammatory abnormality is identified in the abdomen or pelvis. Bibasilar ground-glass opacities, greater in the right lung. These are   nonspecific for pulmonary edema, atelectasis or pneumonia. Small hiatal hernia. CT ABDOMEN PELVIS WO CONTRAST Additional Contrast? None   Final Result   Addendum 1 of 1   ADDENDUM:   Please disregard this report as it was entered into the wrong patient    chart. Final      XR CHEST PORTABLE   Final Result   Right infrahilar airspace disease, likely pneumonia in the right lower lobe. Follow-up until resolution is recommended. Assessment/Plan:    Active Hospital Problems    Diagnosis    Pneumonia [J18.9]     CAP  RLL pneumonia on chest x-ray  Rocephin cefepime  Procalcitonin 1.1 on admission  Basilar groundglass opacification on abdominal CT imaging, concerning for viral pneumonia  Follow-up molecular panel  Maintain droplet plus isolation    COPD exacerbation  Continue with inhaled therapy  On IV corticosteroid  Pulmonology input appreciated    CAROLANN  Resolved with fluids    Hyperkalemia  Secondary to acidosis? We will start insulin for this hyperglycemic patient  Continue with IV fluids  Repeat BMP this afternoon    Transaminitis  GI consulted  Negative for acute hepatitis  Imaging within normal limits  Trending down    Acidosis on basic chemistry  Follow-up VBG    Paroxysmal A. fib  Rate controlled  On Xarelto    DVT Prophylaxis: Xarelto  Diet: DIET GENERAL;  Code Status: Full Code      Electronically signed by Clint Parks MD on 2/26/2021 at 4:57 PM

## 2021-02-26 NOTE — PROGRESS NOTES
Shift assessment complete. VSS. Scheduled medications given. Pt independent in the room and denies further needs at this time.

## 2021-02-27 LAB
ALBUMIN SERPL-MCNC: 4.1 G/DL (ref 3.4–5)
ALP BLD-CCNC: 147 U/L (ref 40–129)
ALT SERPL-CCNC: 101 U/L (ref 10–40)
ANION GAP SERPL CALCULATED.3IONS-SCNC: 14 MMOL/L (ref 3–16)
ANISOCYTOSIS: ABNORMAL
AST SERPL-CCNC: 16 U/L (ref 15–37)
BANDED NEUTROPHILS RELATIVE PERCENT: 6 % (ref 0–7)
BASOPHILS ABSOLUTE: 0 K/UL (ref 0–0.2)
BASOPHILS RELATIVE PERCENT: 0 %
BILIRUB SERPL-MCNC: 0.6 MG/DL (ref 0–1)
BILIRUBIN DIRECT: <0.2 MG/DL (ref 0–0.3)
BILIRUBIN, INDIRECT: ABNORMAL MG/DL (ref 0–1)
BUN BLDV-MCNC: 18 MG/DL (ref 7–20)
CALCIUM SERPL-MCNC: 9.3 MG/DL (ref 8.3–10.6)
CHLORIDE BLD-SCNC: 102 MMOL/L (ref 99–110)
CO2: 24 MMOL/L (ref 21–32)
CREAT SERPL-MCNC: 0.9 MG/DL (ref 0.6–1.2)
EKG ATRIAL RATE: 147 BPM
EKG DIAGNOSIS: NORMAL
EKG Q-T INTERVAL: 340 MS
EKG QRS DURATION: 88 MS
EKG QTC CALCULATION (BAZETT): 480 MS
EKG R AXIS: 20 DEGREES
EKG T AXIS: 198 DEGREES
EKG VENTRICULAR RATE: 120 BPM
EOSINOPHILS ABSOLUTE: 0 K/UL (ref 0–0.6)
EOSINOPHILS RELATIVE PERCENT: 0 %
GFR AFRICAN AMERICAN: >60
GFR NON-AFRICAN AMERICAN: 59
GLUCOSE BLD-MCNC: 136 MG/DL (ref 70–99)
GLUCOSE BLD-MCNC: 147 MG/DL (ref 70–99)
GLUCOSE BLD-MCNC: 157 MG/DL (ref 70–99)
GLUCOSE BLD-MCNC: 255 MG/DL (ref 70–99)
GLUCOSE BLD-MCNC: 261 MG/DL (ref 70–99)
HCT VFR BLD CALC: 32.7 % (ref 36–48)
HEMOGLOBIN: 10.9 G/DL (ref 12–16)
LYMPHOCYTES ABSOLUTE: 1.6 K/UL (ref 1–5.1)
LYMPHOCYTES RELATIVE PERCENT: 15 %
MAGNESIUM: 1.9 MG/DL (ref 1.8–2.4)
MCH RBC QN AUTO: 30.5 PG (ref 26–34)
MCHC RBC AUTO-ENTMCNC: 33.5 G/DL (ref 31–36)
MCV RBC AUTO: 91 FL (ref 80–100)
MONOCYTES ABSOLUTE: 0.4 K/UL (ref 0–1.3)
MONOCYTES RELATIVE PERCENT: 4 %
NEUTROPHILS ABSOLUTE: 8.6 K/UL (ref 1.7–7.7)
NEUTROPHILS RELATIVE PERCENT: 75 %
PDW BLD-RTO: 15.7 % (ref 12.4–15.4)
PERFORMED ON: ABNORMAL
PHOSPHORUS: 2.2 MG/DL (ref 2.5–4.9)
PLATELET # BLD: 162 K/UL (ref 135–450)
PLATELET SLIDE REVIEW: ADEQUATE
PMV BLD AUTO: 8.1 FL (ref 5–10.5)
POTASSIUM SERPL-SCNC: 3.2 MMOL/L (ref 3.5–5.1)
RBC # BLD: 3.59 M/UL (ref 4–5.2)
SLIDE REVIEW: ABNORMAL
SODIUM BLD-SCNC: 140 MMOL/L (ref 136–145)
TEAR DROP CELLS: ABNORMAL
TOTAL PROTEIN: 6.6 G/DL (ref 6.4–8.2)
WBC # BLD: 10.6 K/UL (ref 4–11)

## 2021-02-27 PROCEDURE — 6370000000 HC RX 637 (ALT 250 FOR IP): Performed by: INTERNAL MEDICINE

## 2021-02-27 PROCEDURE — 1200000000 HC SEMI PRIVATE

## 2021-02-27 PROCEDURE — 85025 COMPLETE CBC W/AUTO DIFF WBC: CPT

## 2021-02-27 PROCEDURE — 93005 ELECTROCARDIOGRAM TRACING: CPT | Performed by: INTERNAL MEDICINE

## 2021-02-27 PROCEDURE — 94761 N-INVAS EAR/PLS OXIMETRY MLT: CPT

## 2021-02-27 PROCEDURE — 94640 AIRWAY INHALATION TREATMENT: CPT

## 2021-02-27 PROCEDURE — 2580000003 HC RX 258: Performed by: PHYSICIAN ASSISTANT

## 2021-02-27 PROCEDURE — 6370000000 HC RX 637 (ALT 250 FOR IP): Performed by: PHYSICIAN ASSISTANT

## 2021-02-27 PROCEDURE — 80048 BASIC METABOLIC PNL TOTAL CA: CPT

## 2021-02-27 PROCEDURE — 80076 HEPATIC FUNCTION PANEL: CPT

## 2021-02-27 PROCEDURE — 84100 ASSAY OF PHOSPHORUS: CPT

## 2021-02-27 PROCEDURE — 36415 COLL VENOUS BLD VENIPUNCTURE: CPT

## 2021-02-27 PROCEDURE — 99222 1ST HOSP IP/OBS MODERATE 55: CPT | Performed by: INTERNAL MEDICINE

## 2021-02-27 PROCEDURE — 83735 ASSAY OF MAGNESIUM: CPT

## 2021-02-27 PROCEDURE — 2700000000 HC OXYGEN THERAPY PER DAY

## 2021-02-27 PROCEDURE — 2580000003 HC RX 258: Performed by: INTERNAL MEDICINE

## 2021-02-27 PROCEDURE — 93010 ELECTROCARDIOGRAM REPORT: CPT | Performed by: INTERNAL MEDICINE

## 2021-02-27 PROCEDURE — 2500000003 HC RX 250 WO HCPCS: Performed by: INTERNAL MEDICINE

## 2021-02-27 RX ORDER — DILTIAZEM HYDROCHLORIDE 5 MG/ML
10 INJECTION INTRAVENOUS ONCE
Status: COMPLETED | OUTPATIENT
Start: 2021-02-27 | End: 2021-02-27

## 2021-02-27 RX ADMIN — INSULIN LISPRO 2 UNITS: 100 INJECTION, SOLUTION INTRAVENOUS; SUBCUTANEOUS at 20:14

## 2021-02-27 RX ADMIN — ROPINIROLE HYDROCHLORIDE 2 MG: 1 TABLET, FILM COATED ORAL at 14:10

## 2021-02-27 RX ADMIN — LORAZEPAM 1 MG: 1 TABLET ORAL at 20:11

## 2021-02-27 RX ADMIN — RIVAROXABAN 15 MG: 15 TABLET, FILM COATED ORAL at 10:16

## 2021-02-27 RX ADMIN — DICYCLOMINE HYDROCHLORIDE 10 MG: 10 CAPSULE ORAL at 10:28

## 2021-02-27 RX ADMIN — PROPAFENONE HYDROCHLORIDE 150 MG: 150 TABLET, FILM COATED ORAL at 05:55

## 2021-02-27 RX ADMIN — IPRATROPIUM BROMIDE AND ALBUTEROL SULFATE 1 AMPULE: .5; 3 SOLUTION RESPIRATORY (INHALATION) at 07:39

## 2021-02-27 RX ADMIN — PREDNISONE 40 MG: 20 TABLET ORAL at 10:16

## 2021-02-27 RX ADMIN — ATORVASTATIN CALCIUM 80 MG: 80 TABLET, FILM COATED ORAL at 20:10

## 2021-02-27 RX ADMIN — DILTIAZEM HYDROCHLORIDE 10 MG: 5 INJECTION INTRAVENOUS at 11:46

## 2021-02-27 RX ADMIN — Medication 2 PUFF: at 07:41

## 2021-02-27 RX ADMIN — CLONIDINE HYDROCHLORIDE 0.1 MG: 0.1 TABLET ORAL at 20:11

## 2021-02-27 RX ADMIN — AMLODIPINE BESYLATE 5 MG: 5 TABLET ORAL at 10:18

## 2021-02-27 RX ADMIN — FLUTICASONE PROPIONATE 2 SPRAY: 50 SPRAY, METERED NASAL at 10:19

## 2021-02-27 RX ADMIN — INSULIN LISPRO 1 UNITS: 100 INJECTION, SOLUTION INTRAVENOUS; SUBCUTANEOUS at 08:36

## 2021-02-27 RX ADMIN — DILTIAZEM HYDROCHLORIDE 240 MG: 240 CAPSULE, COATED, EXTENDED RELEASE ORAL at 10:18

## 2021-02-27 RX ADMIN — INSULIN LISPRO 1 UNITS: 100 INJECTION, SOLUTION INTRAVENOUS; SUBCUTANEOUS at 12:00

## 2021-02-27 RX ADMIN — ROPINIROLE HYDROCHLORIDE 2 MG: 1 TABLET, FILM COATED ORAL at 10:17

## 2021-02-27 RX ADMIN — CLONIDINE HYDROCHLORIDE 0.1 MG: 0.1 TABLET ORAL at 10:16

## 2021-02-27 RX ADMIN — POLYETHYLENE GLYCOL 3350 17 G: 17 POWDER, FOR SOLUTION ORAL at 10:16

## 2021-02-27 RX ADMIN — ASPIRIN 81 MG: 81 TABLET, COATED ORAL at 10:18

## 2021-02-27 RX ADMIN — Medication 10 ML: at 20:11

## 2021-02-27 RX ADMIN — INSULIN LISPRO 3 UNITS: 100 INJECTION, SOLUTION INTRAVENOUS; SUBCUTANEOUS at 17:51

## 2021-02-27 RX ADMIN — Medication 10 ML: at 10:18

## 2021-02-27 RX ADMIN — LORAZEPAM 0.5 MG: 0.5 TABLET ORAL at 09:54

## 2021-02-27 RX ADMIN — POTASSIUM PHOSPHATE, MONOBASIC AND POTASSIUM PHOSPHATE, DIBASIC 15 MMOL: 224; 236 INJECTION, SOLUTION, CONCENTRATE INTRAVENOUS at 10:28

## 2021-02-27 RX ADMIN — PROPAFENONE HYDROCHLORIDE 150 MG: 150 TABLET, FILM COATED ORAL at 20:10

## 2021-02-27 RX ADMIN — FAMOTIDINE 10 MG: 20 TABLET ORAL at 10:20

## 2021-02-27 RX ADMIN — LEVOTHYROXINE SODIUM 75 MCG: 0.07 TABLET ORAL at 05:55

## 2021-02-27 RX ADMIN — ROPINIROLE HYDROCHLORIDE 2 MG: 1 TABLET, FILM COATED ORAL at 20:11

## 2021-02-27 RX ADMIN — PROPAFENONE HYDROCHLORIDE 150 MG: 150 TABLET, FILM COATED ORAL at 14:10

## 2021-02-27 RX ADMIN — Medication 1 CAPSULE: at 10:18

## 2021-02-27 RX ADMIN — Medication 1 CAPSULE: at 17:55

## 2021-02-27 ASSESSMENT — PAIN SCALES - GENERAL: PAINLEVEL_OUTOF10: 0

## 2021-02-27 NOTE — CONSULTS
[address]  Inpatient Consult/H+P  Interventional Cardiology/Structural Heart Disease    REASON FOR CONSULT/CHIEF COMPLAINT/HPI     RFC/CC afib   HPI Mihai Castelan is a 80 y. o.presented with sob on 2/22/21. Found to have pneumonia and copd exacerbation. Treated for pneumonia with abx and steroids. covid negative. Hx of p afib. Hx multiple admissions for afib with rvr and cardioversions. Taking xarelto and propafenone op and follows with Dr. Jan Long, last visit 6/20. Last echo 8/19 60%. Episode of afib this am with RVR and found weak, tired and sob. Tele now with afib with HR 70-80. Patient feels much better. HISTORY/ALLERGIES/ROS     Med:  has a past medical history of Arthritis, Atrial fibrillation ([address] Diabetes mellitus type II, controlled ([address] GERD (gastroesophageal reflux disease), HTN (hypertension), Hyperlipidemia, Hypothyroid, Insomnia, Lumbago, and SVT (supraventricular tachycardia) ([facility] [address] Surg:  has a past surgical history that includes Appendectomy; Colonoscopy; and Cholecystectomy, laparoscopic (2/24/2013). Soc:  reports that she quit smoking about 25 years ago. She started smoking about 70 years ago. She has a 45.00 pack-year smoking history. She has never used smokeless tobacco. She reports that she does not drink alcohol or use drugs. Fam: @[employer]@  Allerg: Adhesive tape, Cefaclor, Nsaids, Clinoril [sulindac], Codeine, Diclofenac, Diclofenac sodium, Diclofenac sodium, Hctz [hydrochlorothiazide], Ibuprofen, Macrobid [nitrofurantoin macrocrystal], Motrin [ibuprofen micronized], Pcn [penicillins], and Peach [prunus persica]   ROS:  [x]Full ROS obtained and negative except as mentioned in HPI    MEDICATIONS      Prior to Admission medications    Medication Sig Start Date End Date Taking?  Authorizing Provider   rOPINIRole (REQUIP) 2 MG tablet Take 1 tablet by mouth 3 times daily 1/6/21  Yes Historical Provider, MD   ipratropium-albuterol (DUONEB) 0.5-2.5 (3) MG/3ML SOLN nebulizer solution INHALE THE CONTENTS OF 1 VIAL VIA NEBULIZER EVERY 4 HOURS 2/10/21  Yes Naya Kent MD   irbesartan (AVAPRO) 300 MG tablet TAKE 1 TABLET BY MOUTH EVERY NIGHT 1/29/21  Yes Naya Kent MD   albuterol sulfate  (90 Base) MCG/ACT inhaler Inhale 2 puffs into the lungs every 6 hours as needed for Wheezing 1/27/21 1/27/22 Yes Jose Juan Lagunas MD   cloNIDine (CATAPRES) 0.1 MG tablet Take 1 tablet by mouth 2 times daily 1/27/21  Yes Naya Kent MD   dilTIAZem (CARDIZEM CD) 240 MG extended release capsule Take 1 capsule by mouth daily 1/27/21  Yes Naya Kent MD   fluticasone [facility]) 50 MCG/ACT nasal spray 2 sprays by Nasal route daily 1/27/21  Yes Naya Kent MD   levothyroxine (SYNTHROID) 75 MCG tablet TAKE 1 TABLET EVERY DAY 1/26/21  Yes Naya Kent MD   budesonide-formoterol (SYMBICORT) 160-4.5 MCG/ACT AERO INHALE 2 PUFFS INTO THE LUNGS TWICE DAILY 1/20/21  Yes Naya Kent MD   Respiratory Therapy Supplies (NEBULIZER/TUBING/MOUTHPIECE) KIT 1 kit by Does not apply route 4 times daily as needed (wheezing, shortness of breath) 1/19/21  Yes Naya Kent MD   propafenone (RYTHMOL) 150 MG tablet TAKE 1 TABLET BY MOUTH EVERY 8 HOURS 7/6/20  Yes KATHY Davidson - CNP   Respiratory Therapy Supplies (NEBULIZER/TUBING/MOUTHPIECE) KIT 1 kit by Does not apply route 4 times daily as needed (shortness of breath) 3/26/20  Yes Naya Kent MD   albuterol sulfate HFA (PROAIR HFA) 108 (90 Base) MCG/ACT inhaler Inhale 2 puffs into the lungs every 6 hours as needed for Wheezing 10/25/19  Yes Jose Juan Lagunas MD   blood glucose monitor strips Test one time a day 7/22/19  Yes Naya Kent MD   Lancets MISC 1 each by Does not apply route 2 times daily 7/20/19  Yes Fauzia Reinoso MD   HYDROcodone-acetaminophen (NORCO) 5-325 MG per tablet Take 1 tablet by mouth every 6 hours as needed.     Historical Provider, MD   meclizine (ANTIVERT) 12.5 MG tablet TAKE 1 TABLET THREE TIMES DAILY AS NEEDED 1/27/21   Jose Miguel Thompson MD   levalbuterol (XOPENEX) 0.63 MG/3ML nebulization INHALE CONTENTS OF 1 VIAL PER NEBULIZER EVERY 6 HOURS AS NEEDED FOR WHEEZING 9/14/20   Cathleen Madrigal MD   aspirin 81 MG tablet Take 81 mg by mouth every other day     Historical Provider, MD       PHYSICAL EXAM        Vitals:    02/27/21 1045   BP:    Pulse: 129   Resp:    Temp:    SpO2:     Weight: 185 lb 1.6 oz (84 kg)     Gen Alert, coop, no distress Heart irreg   Head NC, AT, no abnorm Abd Soft, NT, ND, +BS, no mass, no OM   Eyes PERRLA, conj/corn clear Ext Ext nl, AT, no c/c/e   Nose Nares nl, no drain, NT Pulse 1+ symm ra, dp, pt   Throat Lips, mucosa, tongue nl Skin Color/tect/turg nl, no rash/lesion   Neck S/S, TM, NT, no bruit/JVD Psych Nl mood and affect   Lung decr bilat Lymph No cervical or ax LA   Ch Wall NT, no deform Neuro Nl gross M/S exam     LABS     Relevant and available CV data reviewed    ASSESSMENT AND PLAN     *pAFIB  Status Chronic intermittent problem, multiple CVs, follows with EP  OPM [address], propafenone 150tid, diltiazem 240  TTE 8/19 60%  MPI No recent  LHC None  Plan Chronic intermittent problem   Exacerbation likely due to copd, pneumonia, steroids    Rate controlled now   Continue current meds, outpatient followup with Dr. Abril Manzo for high CVS Gabapentin Counseling: I discussed with the patient the risks of gabapentin including but not limited to dizziness, somnolence, fatigue and ataxia.

## 2021-02-27 NOTE — PROGRESS NOTES
Hospitalist Progress Note      PCP: Katelyn Garcia MD    Date of Admission: 2/22/2021    Chief Complaint: Dyspnea    Hospital Course: 80-year-old female hospital medical history of COPD on 2 L nasal cannula oxygen nightly at home presented with increased shortness of breath. Is currently being treated for CAP and COPD exacerbation. Subjective: Patient seen and examined at bedside. States she is feeling crappy today. Nothing in particular just feeling weak.       Medications:  Reviewed    Infusion Medications    dextrose       Scheduled Medications    potassium phosphate IVPB  15 mmol Intravenous Once    predniSONE  40 mg Oral Daily    polyethylene glycol  17 g Oral Daily    insulin lispro  0-6 Units Subcutaneous TID WC    insulin lispro  0-3 Units Subcutaneous Nightly    ipratropium-albuterol  1 ampule Inhalation Q4H WA    aspirin  81 mg Oral Every Other Day    budesonide-formoterol  2 puff Inhalation BID    cloNIDine  0.1 mg Oral BID    dilTIAZem  240 mg Oral Daily    fluticasone  2 spray Nasal Daily    levothyroxine  75 mcg Oral Daily    propafenone  150 mg Oral 3 times per day    sodium chloride flush  10 mL Intravenous 2 times per day    famotidine  10 mg Oral Daily    rivaroxaban  15 mg Oral Daily with breakfast    amLODIPine  5 mg Oral Daily    rOPINIRole  2 mg Oral TID    atorvastatin  80 mg Oral Nightly    LORazepam  1 mg Oral Nightly    lactobacillus  1 capsule Oral BID      PRN Meds: glucose, dextrose, glucagon (rDNA), dextrose, albuterol sulfate HFA, dicyclomine, sodium chloride flush, magnesium hydroxide, ondansetron, meclizine, HYDROcodone-acetaminophen, LORazepam **AND** LORazepam      Intake/Output Summary (Last 24 hours) at 2/27/2021 1404  Last data filed at 2/27/2021 1045  Gross per 24 hour   Intake 250 ml   Output --   Net 250 ml       Physical Exam Performed:    BP (!) 163/89   Pulse 129   Temp 97.8 °F (36.6 °C) (Oral)   Resp 20   Ht 5' 3\" (1.6 m)   Wt 185 lb 1.6 oz (84 kg)   SpO2 93%   BMI 32.79 kg/m²     General appearance: No apparent distress, appears stated age and cooperative. HEENT: Pupils equal, round, and reactive to light. Conjunctivae/corneas clear. Neck: Supple, with full range of motion. No jugular venous distention. Trachea midline. Respiratory:  Normal respiratory effort. Clear to auscultation, bilaterally without Rales/Wheezes/Rhonchi. Cardiovascular: Irregularly irregular tachycardic rhythm with normal S1/S2 without murmurs, rubs or gallops. Abdomen: Soft, non-tender, non-distended with normal bowel sounds. Musculoskeletal: No clubbing, cyanosis or edema bilaterally. Full range of motion without deformity. Skin: Skin color, texture, turgor normal.  No rashes or lesions. Neurologic:  Neurovascularly intact without any focal sensory/motor deficits. Cranial nerves: II-XII intact, grossly non-focal.  Psychiatric: Alert and oriented, thought content appropriate, normal insight  Capillary Refill: Brisk,< 3 seconds   Peripheral Pulses: +2 palpable, equal bilaterally       Labs:   Recent Labs     02/25/21  0608 02/26/21  0522 02/27/21  0550   WBC 13.0* 9.4 10.6   HGB 10.2* 9.9* 10.9*   HCT 31.9* 33.0* 32.7*    137 162     Recent Labs     02/25/21  0608 02/26/21  0521 02/27/21  0550    138 140   K 4.4 4.3 3.2*    105 102   CO2 20* 19* 24   BUN 22* 22* 18   CREATININE 1.0 0.8 0.9   CALCIUM 9.3 8.7 9.3   PHOS 2.4* 2.3* 2.2*     Recent Labs     02/25/21  0608 02/26/21  0521 02/27/21  0550   AST 27 17 16   * 127* 101*   BILIDIR <0.2 <0.2 <0.2   BILITOT 0.3 0.4 0.6   ALKPHOS 174* 142* 147*     No results for input(s): INR in the last 72 hours. No results for input(s): [employer] in the last 72 hours.     Urinalysis:      Lab Results   Component Value Date    NITRU Negative 08/11/2020    WBCUA 4 11/13/2019    BACTERIA 0 08/11/2020    BACTERIA Rare 09/15/2017    RBCUA 1 08/11/2020    RBCUA 1 11/13/2019    BLOODU

## 2021-02-28 VITALS
BODY MASS INDEX: 32.8 KG/M2 | DIASTOLIC BLOOD PRESSURE: 78 MMHG | RESPIRATION RATE: 18 BRPM | SYSTOLIC BLOOD PRESSURE: 130 MMHG | TEMPERATURE: 98.6 F | HEIGHT: 63 IN | HEART RATE: 90 BPM | OXYGEN SATURATION: 97 % | WEIGHT: 185.1 LBS

## 2021-02-28 LAB
ALBUMIN SERPL-MCNC: 3.2 G/DL (ref 3.4–5)
ALP BLD-CCNC: 134 U/L (ref 40–129)
ALT SERPL-CCNC: 72 U/L (ref 10–40)
ANION GAP SERPL CALCULATED.3IONS-SCNC: 11 MMOL/L (ref 3–16)
ANISOCYTOSIS: ABNORMAL
AST SERPL-CCNC: 14 U/L (ref 15–37)
BASOPHILS ABSOLUTE: 0.1 K/UL (ref 0–0.2)
BASOPHILS RELATIVE PERCENT: 1 %
BILIRUB SERPL-MCNC: 0.8 MG/DL (ref 0–1)
BILIRUBIN DIRECT: <0.2 MG/DL (ref 0–0.3)
BILIRUBIN, INDIRECT: ABNORMAL MG/DL (ref 0–1)
BUN BLDV-MCNC: 23 MG/DL (ref 7–20)
BURR CELLS: ABNORMAL
CALCIUM SERPL-MCNC: 9.3 MG/DL (ref 8.3–10.6)
CHLORIDE BLD-SCNC: 105 MMOL/L (ref 99–110)
CO2: 22 MMOL/L (ref 21–32)
CREAT SERPL-MCNC: 0.9 MG/DL (ref 0.6–1.2)
EOSINOPHILS ABSOLUTE: 0 K/UL (ref 0–0.6)
EOSINOPHILS RELATIVE PERCENT: 0 %
GFR AFRICAN AMERICAN: >60
GFR NON-AFRICAN AMERICAN: 59
GLUCOSE BLD-MCNC: 144 MG/DL (ref 70–99)
GLUCOSE BLD-MCNC: 161 MG/DL (ref 70–99)
GLUCOSE BLD-MCNC: 192 MG/DL (ref 70–99)
HCT VFR BLD CALC: 35.5 % (ref 36–48)
HEMOGLOBIN: 11.2 G/DL (ref 12–16)
LYMPHOCYTES ABSOLUTE: 2.2 K/UL (ref 1–5.1)
LYMPHOCYTES RELATIVE PERCENT: 22 %
MAGNESIUM: 2 MG/DL (ref 1.8–2.4)
MCH RBC QN AUTO: 30.4 PG (ref 26–34)
MCHC RBC AUTO-ENTMCNC: 31.6 G/DL (ref 31–36)
MCV RBC AUTO: 96.3 FL (ref 80–100)
MONOCYTES ABSOLUTE: 0.5 K/UL (ref 0–1.3)
MONOCYTES RELATIVE PERCENT: 5 %
NEUTROPHILS ABSOLUTE: 7.1 K/UL (ref 1.7–7.7)
NEUTROPHILS RELATIVE PERCENT: 72 %
PDW BLD-RTO: 16.4 % (ref 12.4–15.4)
PERFORMED ON: ABNORMAL
PERFORMED ON: ABNORMAL
PHOSPHORUS: 3 MG/DL (ref 2.5–4.9)
PLATELET # BLD: 165 K/UL (ref 135–450)
PLATELET SLIDE REVIEW: ADEQUATE
PMV BLD AUTO: 8.2 FL (ref 5–10.5)
POLYCHROMASIA: ABNORMAL
POTASSIUM SERPL-SCNC: 4.1 MMOL/L (ref 3.5–5.1)
RBC # BLD: 3.68 M/UL (ref 4–5.2)
SLIDE REVIEW: ABNORMAL
SODIUM BLD-SCNC: 138 MMOL/L (ref 136–145)
TOTAL PROTEIN: 6.2 G/DL (ref 6.4–8.2)
WBC # BLD: 9.9 K/UL (ref 4–11)

## 2021-02-28 PROCEDURE — 94680 O2 UPTK RST&XERS DIR SIMPLE: CPT

## 2021-02-28 PROCEDURE — 83735 ASSAY OF MAGNESIUM: CPT

## 2021-02-28 PROCEDURE — 80048 BASIC METABOLIC PNL TOTAL CA: CPT

## 2021-02-28 PROCEDURE — 80076 HEPATIC FUNCTION PANEL: CPT

## 2021-02-28 PROCEDURE — 6370000000 HC RX 637 (ALT 250 FOR IP): Performed by: INTERNAL MEDICINE

## 2021-02-28 PROCEDURE — 6370000000 HC RX 637 (ALT 250 FOR IP): Performed by: PHYSICIAN ASSISTANT

## 2021-02-28 PROCEDURE — 94640 AIRWAY INHALATION TREATMENT: CPT

## 2021-02-28 PROCEDURE — 85025 COMPLETE CBC W/AUTO DIFF WBC: CPT

## 2021-02-28 PROCEDURE — 2700000000 HC OXYGEN THERAPY PER DAY

## 2021-02-28 PROCEDURE — 94761 N-INVAS EAR/PLS OXIMETRY MLT: CPT

## 2021-02-28 PROCEDURE — 2580000003 HC RX 258: Performed by: PHYSICIAN ASSISTANT

## 2021-02-28 PROCEDURE — 84100 ASSAY OF PHOSPHORUS: CPT

## 2021-02-28 RX ORDER — LACTOBACILLUS RHAMNOSUS GG 10B CELL
1 CAPSULE ORAL 2 TIMES DAILY WITH MEALS
Qty: 60 CAPSULE | Refills: 0 | Status: SHIPPED | OUTPATIENT
Start: 2021-02-28 | End: 2021-07-20

## 2021-02-28 RX ORDER — ATORVASTATIN CALCIUM 80 MG/1
80 TABLET, FILM COATED ORAL NIGHTLY
Qty: 30 TABLET | Refills: 3 | Status: SHIPPED | OUTPATIENT
Start: 2021-02-28 | End: 2021-10-18 | Stop reason: SDUPTHER

## 2021-02-28 RX ORDER — PREDNISONE 20 MG/1
TABLET ORAL
Qty: 9 TABLET | Refills: 0 | Status: SHIPPED | OUTPATIENT
Start: 2021-03-01 | End: 2021-03-07

## 2021-02-28 RX ADMIN — PREDNISONE 40 MG: 20 TABLET ORAL at 08:04

## 2021-02-28 RX ADMIN — Medication 10 ML: at 08:05

## 2021-02-28 RX ADMIN — Medication 1 CAPSULE: at 08:04

## 2021-02-28 RX ADMIN — INSULIN LISPRO 1 UNITS: 100 INJECTION, SOLUTION INTRAVENOUS; SUBCUTANEOUS at 08:19

## 2021-02-28 RX ADMIN — FAMOTIDINE 10 MG: 20 TABLET ORAL at 08:04

## 2021-02-28 RX ADMIN — LORAZEPAM 0.5 MG: 0.5 TABLET ORAL at 08:17

## 2021-02-28 RX ADMIN — PROPAFENONE HYDROCHLORIDE 150 MG: 150 TABLET, FILM COATED ORAL at 05:48

## 2021-02-28 RX ADMIN — LEVOTHYROXINE SODIUM 75 MCG: 0.07 TABLET ORAL at 05:48

## 2021-02-28 RX ADMIN — PROPAFENONE HYDROCHLORIDE 150 MG: 150 TABLET, FILM COATED ORAL at 14:21

## 2021-02-28 RX ADMIN — POLYETHYLENE GLYCOL 3350 17 G: 17 POWDER, FOR SOLUTION ORAL at 08:04

## 2021-02-28 RX ADMIN — ROPINIROLE HYDROCHLORIDE 2 MG: 1 TABLET, FILM COATED ORAL at 08:05

## 2021-02-28 RX ADMIN — INSULIN LISPRO 1 UNITS: 100 INJECTION, SOLUTION INTRAVENOUS; SUBCUTANEOUS at 13:45

## 2021-02-28 RX ADMIN — FLUTICASONE PROPIONATE 2 SPRAY: 50 SPRAY, METERED NASAL at 08:06

## 2021-02-28 RX ADMIN — DILTIAZEM HYDROCHLORIDE 240 MG: 240 CAPSULE, COATED, EXTENDED RELEASE ORAL at 08:05

## 2021-02-28 RX ADMIN — RIVAROXABAN 15 MG: 15 TABLET, FILM COATED ORAL at 08:04

## 2021-02-28 RX ADMIN — AMLODIPINE BESYLATE 5 MG: 5 TABLET ORAL at 08:04

## 2021-02-28 RX ADMIN — CLONIDINE HYDROCHLORIDE 0.1 MG: 0.1 TABLET ORAL at 08:05

## 2021-02-28 RX ADMIN — ROPINIROLE HYDROCHLORIDE 2 MG: 1 TABLET, FILM COATED ORAL at 14:21

## 2021-02-28 ASSESSMENT — PAIN SCALES - GENERAL: PAINLEVEL_OUTOF10: 0

## 2021-02-28 NOTE — CARE COORDINATION
Patient discharged 2/28/2021 to home with [address]  PHONE; [phone]: [phone]. Order/ AVS faxed and agency notifed. No other SW needs at this time.     All discharge needs met per case management    RENETTA KhalilN, CCM, RN  [facility]  [phone]

## 2021-02-28 NOTE — PROGRESS NOTES
Pt discharged. Discharge paperwork completed, questions answered and signature sheet in chart. Pt left with own oxygen pump on 2L, via nasal cannula. Tele monitor taken off and returned to charge RN. RN escorted patient in wheelchair to front lobby where daughter picked her up.

## 2021-02-28 NOTE — PROGRESS NOTES
Patient alert and awake, OOB and sitting in chair. Routine vitals stable. Shift assessment completed. Respirations easy and unlabored, no distress noted. Currently on 2L oxygen via nasal cannula. Scheduled medications given per MAR orders and tolerated. Needs met appropriately. Resting comfortably in chair. Fall precautions being maintained and call light within reach. Will continue to monitor.

## 2021-02-28 NOTE — PROGRESS NOTES
(Blue and Red & unused documentation).     -89% or greater on room air at rest and awake        O2 sat on room air at rest =95%       O2 sat on room air with exertion = 90%

## 2021-02-28 NOTE — DISCHARGE SUMMARY
thought content appropriate, normal insight  Capillary Refill: Brisk,< 3 seconds   Peripheral Pulses: +2 palpable, equal bilaterally       Labs: For convenience and continuity at follow-up the following most recent labs are provided:      CBC:    Lab Results   Component Value Date    WBC 9.9 02/28/2021    HGB 11.2 02/28/2021    HCT 35.5 02/28/2021     02/28/2021       Renal:    Lab Results   Component Value Date     02/28/2021    K 4.1 02/28/2021    K 4.3 02/26/2021     02/28/2021    CO2 22 02/28/2021    BUN 23 02/28/2021    CREATININE 0.9 02/28/2021    CALCIUM 9.3 02/28/2021    PHOS 3.0 02/28/2021         Significant Diagnostic Studies    Radiology:   XR CHEST PORTABLE   Final Result   Interval change in pulmonary opacities as above suggesting multifocal   pneumonia. Atypical edema is less likely. CT ABDOMEN PELVIS WO CONTRAST Additional Contrast? None   Final Result   No acute inflammatory abnormality is identified in the abdomen or pelvis. Bibasilar ground-glass opacities, greater in the right lung. These are   nonspecific for pulmonary edema, atelectasis or pneumonia. Small hiatal hernia. CT ABDOMEN PELVIS WO CONTRAST Additional Contrast? None   Final Result   Addendum 1 of 1   ADDENDUM:   Please disregard this report as it was entered into the wrong patient    chart. Final      XR CHEST PORTABLE   Final Result   Right infrahilar airspace disease, likely pneumonia in the right lower lobe. Follow-up until resolution is recommended.                 Consults:     IP CONSULT TO HOSPITALIST  IP CONSULT TO GI  IP CONSULT TO PULMONOLOGY  IP CONSULT TO HOME CARE NEEDS  IP CONSULT TO CARDIOLOGY  IP CONSULT TO HOME CARE NEEDS    Disposition: Home with home care    Condition at Discharge: Stable    Discharge Instructions/Follow-up: PCP  Pulmonology    Code Status:  Full Code     Activity: activity as tolerated    Diet: cardiac diet      Discharge Medications: Discharge Medication List as of 2/28/2021  3:31 PM           Details   lactobacillus (CULTURELLE) capsule Take 1 capsule by mouth 2 times daily (with meals), Disp-60 capsule, R-0Normal              Details   rivaroxaban (XARELTO) 15 MG TABS tablet Take 1 tablet by mouth daily (with breakfast), Disp-42 tablet, R-0Normal      predniSONE (DELTASONE) 20 MG tablet Take 2 tablets by mouth daily for 3 days, THEN 1 tablet daily for 3 days. , Disp-9 tablet, R-0Normal      atorvastatin (LIPITOR) 80 MG tablet Take 1 tablet by mouth nightly, Disp-30 tablet, R-3Normal              Details   HYDROcodone-acetaminophen (NORCO) 5-325 MG per tablet Take 1 tablet by mouth every 6 hours as needed. Historical Med      rOPINIRole (REQUIP) 2 MG tablet Take 1 tablet by mouth 3 times dailyHistorical Med      ipratropium-albuterol (DUONEB) 0.5-2.5 (3) MG/3ML SOLN nebulizer solution INHALE THE CONTENTS OF 1 VIAL VIA NEBULIZER EVERY 4 HOURS, Disp-1620 mL, R-3**Patient requests 90 days supply**Normal      irbesartan (AVAPRO) 300 MG tablet TAKE 1 TABLET BY MOUTH EVERY NIGHT, Disp-90 tablet, R-1Normal      !! albuterol sulfate  (90 Base) MCG/ACT inhaler Inhale 2 puffs into the lungs every 6 hours as needed for Wheezing, Disp-3 Inhaler, R-3Normal      meclizine (ANTIVERT) 12.5 MG tablet TAKE 1 TABLET THREE TIMES DAILY AS NEEDED, Disp-270 tablet, R-0Normal      cloNIDine (CATAPRES) 0.1 MG tablet Take 1 tablet by mouth 2 times daily, Disp-180 tablet, R-1Normal      dilTIAZem (CARDIZEM CD) 240 MG extended release capsule Take 1 capsule by mouth daily, Disp-90 capsule, R-1Normal      fluticasone (FLONASE) 50 MCG/ACT nasal spray 2 sprays by Nasal route daily, Disp-3 Bottle, R-1**Patient requests 90 days supply**Normal      levothyroxine (SYNTHROID) 75 MCG tablet TAKE 1 TABLET EVERY DAY, Disp-90 tablet, R-1Normal      budesonide-formoterol (SYMBICORT) 160-4.5 MCG/ACT AERO INHALE 2 PUFFS INTO THE LUNGS TWICE DAILY, Disp-10.2 g, R-5Normal      !! Respiratory Therapy Supplies (NEBULIZER/TUBING/MOUTHPIECE) KIT 4 TIMES DAILY PRN Starting Tue 1/19/2021, Disp-1 kit, R-0, Normal      levalbuterol (XOPENEX) 0.63 MG/3ML nebulization INHALE CONTENTS OF 1 VIAL PER NEBULIZER EVERY 6 HOURS AS NEEDED FOR WHEEZING, Disp-1050 mL,R-3DX:COPD J44.9Normal      propafenone (RYTHMOL) 150 MG tablet TAKE 1 TABLET BY MOUTH EVERY 8 HOURS, Disp-270 tablet, R-1Normal      !! Respiratory Therapy Supplies (NEBULIZER/TUBING/MOUTHPIECE) KIT 4 TIMES DAILY PRN Starting Thu 3/26/2020, Disp-1 kit, R-0, Normal      !! albuterol sulfate HFA (PROAIR HFA) 108 (90 Base) MCG/ACT inhaler Inhale 2 puffs into the lungs every 6 hours as needed for Wheezing, Disp-1 Inhaler, R-6Normal      blood glucose monitor strips Test one time a day, Disp-100 strip, R-5, Normal      Lancets MISC 2 TIMES DAILY Starting Sat 7/20/2019, Disp-300 each, R-1, Normal      aspirin 81 MG tablet Take 81 mg by mouth every other day        !! - Potential duplicate medications found. Please discuss with provider. Time Spent on discharge is more than 30 minutes in the examination, evaluation, counseling and review of medications and discharge plan.       Signed:    Electronically signed by Ritesh Purvis MD on 2/28/2021 at 4:56 PM

## 2021-03-01 ENCOUNTER — CARE COORDINATION (OUTPATIENT)
Dept: CASE MANAGEMENT | Age: 86
End: 2021-03-01

## 2021-03-01 DIAGNOSIS — J18.9 PNEUMONIA OF RIGHT LOWER LOBE DUE TO INFECTIOUS ORGANISM: Primary | ICD-10-CM

## 2021-03-01 PROCEDURE — 1111F DSCHRG MED/CURRENT MED MERGE: CPT | Performed by: FAMILY MEDICINE

## 2021-03-01 RX ORDER — HYDROCODONE BITARTRATE AND ACETAMINOPHEN 5; 325 MG/1; MG/1
1 TABLET ORAL 4 TIMES DAILY PRN
Qty: 120 TABLET | Refills: 0 | Status: SHIPPED | OUTPATIENT
Start: 2021-03-01 | End: 2021-03-03

## 2021-03-01 NOTE — CARE COORDINATION
Edd 45 Transitions Initial Follow Up Call:  Patient reports that she is doing \"OK\". She does have SOB due to being hospitalized with pneumonia, encouraged her to call PCP if the SOB does not seem to be improving. She denies cough, fever, chest pain. Discussed discharge instructions and reviewed medications, 1111F completed. She was instructed to discontinue ativan, she reports that she has been on it for a long time and cannot relax without it. She just picked up a new refill for this medications. CTN encouraged her to discuss with Dr. Monica Jama when she goes in for her hospital follow up appointment. She will call today and schedule a follow up with her PCP. She is scheduled to follow up with pulmonologist 3/9/21. CTN will continue with outreach follow up calls. Call within 2 business days of discharge: Yes    Patient: Toma Damian Patient : 1935   MRN: [id number]  Reason for Admission: PNA  Discharge Date: 21 RARS: Readmission Risk Score: 22      Last Discharge [address] [address]       Complaint Diagnosis Description Type Department Provider    21 Shortness of Breath Pneumonia due to organism . .. ED to Hosp-Admission (Discharged) (ADMITTED) FZ 5T Danny Arreaga MD; [place]. .. Spoke with: Toma Damian        Challenges to be reviewed by the provider   Additional needs identified to be addressed with provider No  none    Discussed COVID-19 related testing which was available at this time. Test results were negative. Patient informed of results, if available? No         Method of communication with provider : none    Advance Care Planning:   Does patient have an Advance Directive:  not on file. Was this a readmission? No  Patient stated reason for admission: PNA  Patients top risk factors for readmission: medical condition    Care Transition Nurse (CTN) contacted the patient by telephone to perform post hospital discharge assessment. Verified name and  with patient as identifiers. Provided introduction to self, and explanation of the CTN role. CTN reviewed discharge instructions, medical action plan and red flags with patient who verbalized understanding. Patient given an opportunity to ask questions and does not have any further questions or concerns at this time. Were discharge instructions available to patient? Yes. Reviewed appropriate site of care based on symptoms and resources available to patient including: PCP, Urgent care clinics, Home health and When to call 911. The patient agrees to contact the PCP office for questions related to their healthcare. Medication reconciliation was performed with patient, who verbalizes understanding of administration of home medications. Advised obtaining a 90-day supply of all daily and as-needed medications. Covid Risk Education    Patient has following risk factors of: COPD, acute respiratory failure and diabetes. Education provided regarding infection prevention, and signs and symptoms of COVID-19 and when to seek medical attention with patient who verbalized understanding. Discussed exposure protocols and quarantine From CDC: Are you at higher risk for severe illness?   and given an opportunity for questions and concerns. The patient agrees to contact the COVID-19 hotline [phone] or PCP office for questions related to COVID-19. For more information on steps you can take to protect yourself, see CDC's How to Protect Yourself         Discussed follow-up appointments. If no appointment was previously scheduled, appointment scheduling offered: Yes. Is follow up appointment scheduled within 7 days of discharge? No  Non-[facility] follow up appointment(s):     Plan for follow-up call in 3-5 days based on severity of symptoms and risk factors. Plan for next call: symptom management-., self management-. and follow up appointment-.   CTN provided contact information for future needs.          Non-face-to-face services provided:  Obtained and reviewed discharge summary and/or continuity of care documents    Care Transitions 24 Hour Call    Do you have any ongoing symptoms?: No  Do you have a copy of your discharge instructions?: Yes  Do you have all of your prescriptions and are they filled?: Yes  Have you been contacted by a [employer] [address]?: No  Have you scheduled your follow up appointment?: No  Were you discharged with any Home Care or Post Acute Services: Yes  Post Acute Services: [facility] (Comment: [facility])  Do you have support at home?: Child  Do you feel like you have everything you need to keep you well at home?: Yes  Are you an active caregiver in your home?: No  Care Transitions Interventions         Follow Up  Future Appointments   Date Time Provider Alissa Bustillo   3/9/2021  4:00 PM Edwin Sánchez MD [address] [facility]   3/19/2021  8:30 AM Edwin Sánchez MD PULM & CC [facility]   3/22/2021  8:45 AM Layo Whalen MD [facility]       Poonam Bailey RN

## 2021-03-02 RX ORDER — CLONIDINE HYDROCHLORIDE 0.1 MG/1
TABLET ORAL
Qty: 60 TABLET | Refills: 0 | Status: SHIPPED | OUTPATIENT
Start: 2021-03-02 | End: 2021-04-07

## 2021-03-02 NOTE — TELEPHONE ENCOUNTER
Medication:   Requested Prescriptions     Pending Prescriptions Disp Refills    cloNIDine (CATAPRES) 0.1 MG tablet [Pharmacy Med Name: CLONIDINE 0.1MG TABLETS] 60 tablet      Sig: TAKE 1 TABLET BY MOUTH TWICE DAILY      Last Filled:      Patient Phone Number: [phone] (home)     Last appt: 1/15/2021   Next appt: 3/22/2021    Last OARRS:   RX Monitoring 3/20/2019   Attestation The Prescription Monitoring Report for this patient was reviewed today.    Periodic Controlled Substance Monitoring -     PDMP Monitoring:    Last PDMP Hi Yepez as Reviewed [facility]):  Review User Review Instant Review Result   Juani Yeny 8/11/2020  2:43 PM Reviewed PDMP [1]     Preferred Pharmacy:   [facility] [address], [address] [address]  Phone: [phone] Fax: [phone]    [facility] - [place] , [place] [phone] - F [phone]  [address] [place] [address]  [address] [facility] [address]  Phone: [phone] Fax: [phone] [address] [address] Jenni Dumas [phone]  [address] [facility] [address]  Phone: [phone] Fax: [phone]  [address] [address]  Phone: [phone] Fax: [phone]

## 2021-03-03 ENCOUNTER — TELEPHONE (OUTPATIENT)
Dept: FAMILY MEDICINE CLINIC | Age: 86
End: 2021-03-03

## 2021-03-03 ENCOUNTER — OFFICE VISIT (OUTPATIENT)
Dept: FAMILY MEDICINE CLINIC | Age: 86
End: 2021-03-03
Payer: MEDICARE

## 2021-03-03 VITALS
WEIGHT: 171.5 LBS | RESPIRATION RATE: 16 BRPM | BODY MASS INDEX: 30.38 KG/M2 | OXYGEN SATURATION: 98 % | HEART RATE: 111 BPM | SYSTOLIC BLOOD PRESSURE: 132 MMHG | TEMPERATURE: 97.2 F | DIASTOLIC BLOOD PRESSURE: 68 MMHG

## 2021-03-03 DIAGNOSIS — N18.2 CONTROLLED TYPE 2 DIABETES MELLITUS WITH STAGE 2 CHRONIC KIDNEY DISEASE, WITHOUT LONG-TERM CURRENT USE OF INSULIN (HCC): ICD-10-CM

## 2021-03-03 DIAGNOSIS — J96.11 CHRONIC RESPIRATORY FAILURE WITH HYPOXIA (HCC): ICD-10-CM

## 2021-03-03 DIAGNOSIS — I48.21 PERMANENT ATRIAL FIBRILLATION (HCC): ICD-10-CM

## 2021-03-03 DIAGNOSIS — E11.22 CONTROLLED TYPE 2 DIABETES MELLITUS WITH STAGE 2 CHRONIC KIDNEY DISEASE, WITHOUT LONG-TERM CURRENT USE OF INSULIN (HCC): ICD-10-CM

## 2021-03-03 DIAGNOSIS — F51.01 PRIMARY INSOMNIA: ICD-10-CM

## 2021-03-03 DIAGNOSIS — J18.9 PNEUMONIA OF RIGHT LOWER LOBE DUE TO INFECTIOUS ORGANISM: Primary | ICD-10-CM

## 2021-03-03 PROBLEM — I48.0 PAROXYSMAL ATRIAL FIBRILLATION (HCC): Status: RESOLVED | Noted: 2018-12-12 | Resolved: 2021-03-03

## 2021-03-03 PROCEDURE — 99495 TRANSJ CARE MGMT MOD F2F 14D: CPT | Performed by: FAMILY MEDICINE

## 2021-03-03 RX ORDER — DICYCLOMINE HYDROCHLORIDE 10 MG/1
10 CAPSULE ORAL 4 TIMES DAILY PRN
Qty: 360 CAPSULE | Refills: 1 | Status: SHIPPED | OUTPATIENT
Start: 2021-03-03 | End: 2021-10-04

## 2021-03-03 ASSESSMENT — PATIENT HEALTH QUESTIONNAIRE - PHQ9
SUM OF ALL RESPONSES TO PHQ9 QUESTIONS 1 & 2: 0
SUM OF ALL RESPONSES TO PHQ QUESTIONS 1-9: 0
SUM OF ALL RESPONSES TO PHQ QUESTIONS 1-9: 0

## 2021-03-03 NOTE — TELEPHONE ENCOUNTER
Jareth Valencia with [facility] is calling from [phone] to request a order that the patient needs to be on a diabetic diet     The order can be faxed to [phone]

## 2021-03-03 NOTE — PROGRESS NOTES
Subjective:      Patient ID: Toma Damian is a 80 y.o. female. CC: Patient presents for hospital follow-up. HPI pt is here for a hospital follow up in accompaniment of daughter. . Pt was seen and admitted to [facility] on 2/22/21 due to SOB, weakness. Patient was admitted to the hospital February 22 and discharged February 28. She was sent home on oxygen at 2 L nasal cannula. She had completed antibiotic therapy in the hospital of IV steroids. She was sent home on oral steroids. Her blood sugars were quite elevated with the steroids and she received sliding scale insulin during hospital.  Daughter is concerned about what her blood sugars are doing now at home. She did have a hemoglobin A1c is 6.1 prior to steroid therapy. She plans to start home care soon with physical therapy and Occupational Therapy. She is feeling much better although she is feeling fatigued at this time. Care Coordinator phone contact documented in [address] note for work for 2021. Patricia Arias      Review of Systems  Patient Active Problem List   Diagnosis    HTN (hypertension)    Hyperlipidemia    Insomnia    IBS (irritable bowel syndrome)    Overactive bladder    Osteoarthritis    Controlled type 2 diabetes mellitus with stage 3 chronic kidney disease, without long-term current use of insulin (HCC)    Restless legs syndrome    ANTHONY (obstructive sleep apnea)    Paroxysmal atrial fibrillation (HCC)    Allergic rhinitis    COPD, mild (HCC)    Vertigo    Interstitial lung disease (HCC)    Chronic cough    Bronchiectasis without complication ([address]    Coronary artery disease involving native coronary artery of native heart with angina pectoris (HCC)    A-fib ([address]    Hypothyroid    Chronic respiratory failure with hypoxia (HCC)    SOB (shortness of breath)    Anxiety    Asthmatic bronchitis    Ataxia    Pneumonia       Outpatient Medications Marked as Taking for the 3/3/21 encounter (Office Visit) with Randolph Friend MD Medication Sig Dispense Refill    cloNIDine (CATAPRES) 0.1 MG tablet TAKE 1 TABLET BY MOUTH TWICE DAILY 60 tablet 0    rivaroxaban (XARELTO) 15 MG TABS tablet Take 1 tablet by mouth daily (with breakfast) 42 tablet 0    lactobacillus (CULTURELLE) capsule Take 1 capsule by mouth 2 times daily (with meals) 60 capsule 0    predniSONE (DELTASONE) 20 MG tablet Take 2 tablets by mouth daily for 3 days, THEN 1 tablet daily for 3 days. 9 tablet 0    atorvastatin (LIPITOR) 80 MG tablet Take 1 tablet by mouth nightly 30 tablet 3    HYDROcodone-acetaminophen (NORCO) 5-325 MG per tablet Take 1 tablet by mouth every 6 hours as needed.       rOPINIRole (REQUIP) 2 MG tablet Take 1 tablet by mouth 3 times daily      ipratropium-albuterol (DUONEB) 0.5-2.5 (3) MG/3ML SOLN nebulizer solution INHALE THE CONTENTS OF 1 VIAL VIA NEBULIZER EVERY 4 HOURS 1620 mL 3    irbesartan (AVAPRO) 300 MG tablet TAKE 1 TABLET BY MOUTH EVERY NIGHT 90 tablet 1    meclizine (ANTIVERT) 12.5 MG tablet TAKE 1 TABLET THREE TIMES DAILY AS NEEDED 270 tablet 0    dilTIAZem (CARDIZEM CD) 240 MG extended release capsule Take 1 capsule by mouth daily 90 capsule 1    fluticasone (FLONASE) 50 MCG/ACT nasal spray 2 sprays by Nasal route daily 3 Bottle 1    levothyroxine (SYNTHROID) 75 MCG tablet TAKE 1 TABLET EVERY DAY 90 tablet 1    budesonide-formoterol (SYMBICORT) 160-4.5 MCG/ACT AERO INHALE 2 PUFFS INTO THE LUNGS TWICE DAILY 10.2 g 5    levalbuterol (XOPENEX) 0.63 MG/3ML nebulization INHALE CONTENTS OF 1 VIAL PER NEBULIZER EVERY 6 HOURS AS NEEDED FOR WHEEZING 1050 mL 3    propafenone (RYTHMOL) 150 MG tablet TAKE 1 TABLET BY MOUTH EVERY 8 HOURS 270 tablet 1    Respiratory Therapy Supplies (NEBULIZER/TUBING/MOUTHPIECE) KIT 1 kit by Does not apply route 4 times daily as needed (shortness of breath) 1 kit 0    albuterol sulfate HFA (PROAIR HFA) 108 (90 Base) MCG/ACT inhaler Inhale 2 puffs into the lungs every 6 hours as needed for Wheezing 1 Inhaler 6    blood glucose monitor strips Test one time a day 100 strip 5    Lancets MISC 1 each by Does not apply route 2 times daily 300 each 1       Allergies   Allergen Reactions    Adhesive Tape Anaphylaxis    Cefaclor Nausea And Vomiting     Other reaction(s): Chest pain    Nsaids      Pt states she has hives and heart races   Other reaction(s): Tachycardia    Clinoril [Sulindac] Other (See Comments)     Stomach pain    Codeine      FEEL NUMB    Diclofenac     Diclofenac Sodium Hives    Diclofenac Sodium Hives    Hctz [Hydrochlorothiazide]      Sob    Ibuprofen Other (See Comments)     Other reaction(s): Tachycardia    Macrobid [Nitrofurantoin Macrocrystal]      nausea    Motrin [Ibuprofen Micronized] Other (See Comments)     Tachycardia      Pcn [Penicillins] Hives    Peach [Prunus Persica] Itching and Swelling     Cannot eat raw peaches       Social History     Tobacco Use    Smoking status: Former Smoker     Packs/day: 1.00     Years: 45.00     Pack years: 45.00     Start date: 9/15/1950     Quit date: 1995     Years since quittin.1    Smokeless tobacco: Never Used   Substance Use Topics    Alcohol use: No     Alcohol/week: 0.0 standard drinks       /68 (Site: Left Upper Arm, Position: Sitting, Cuff Size: Medium Adult)   Pulse 111   Temp 97.2 °F (36.2 °C)   Resp 16   Wt 171 lb 8 oz (77.8 kg)   SpO2 98%   BMI 30.38 kg/m²     Objective:   Physical Exam  Constitutional:       General: She is not in acute distress. Appearance: She is well-developed. Neck:      Vascular: No carotid bruit. Cardiovascular:      Rate and Rhythm: Normal rate. Rhythm irregularly irregular. Pulses:           Dorsalis pedis pulses are 2+ on the right side and 2+ on the left side. Posterior tibial pulses are 2+ on the right side and 2+ on the left side. Heart sounds: Normal heart sounds. No murmur. No friction rub. No gallop.     Pulmonary:      Effort: Pulmonary effort is

## 2021-03-05 ENCOUNTER — TELEPHONE (OUTPATIENT)
Dept: CARDIOLOGY CLINIC | Age: 86
End: 2021-03-05

## 2021-03-05 ENCOUNTER — CARE COORDINATION (OUTPATIENT)
Dept: CASE MANAGEMENT | Age: 86
End: 2021-03-05

## 2021-03-05 NOTE — CARE COORDINATION
E[facility] Transitions Follow Up Call    3/5/2021    Patient: Nieves Leung  Patient : 1935   MRN: [id number]  Reason for Admission: PNA  Discharge Date: 21 RARS: Readmission Risk Score: 25         Spoke with: Daughter \"Maxine\"    Care Transitions Subsequent and Final Call    Subsequent and Final Calls  Do you have any ongoing symptoms?: No  Have your medications changed?: No  Do you have any questions related to your medications?: No  Do you currently have any active services?: Yes  Are you currently active with any services?: Home Health  Do you have any needs or concerns that I can assist you with?: No  Identified Barriers: None  Care Transitions Interventions  Other Interventions: Follow Up: Daughter reports that patient is resting and that she \"is not doing very well\". Daughter denies that patient has any SOB, chest pain, cough, fever. She reports that she is lethargic and tired, experiencing Afib. She also stated that home care remains active and \"I have all of her doctors on board\". Patient will follow up with cardiology on Monday. Daughter agrees to additional follow up calls. CTN will remain available.   Future Appointments   Date Time Provider Alissa Bustillo   3/8/2021  2:00 PM Geovanny Medina MD [facility] PLANO Cardio [facility]   3/9/2021  4:00 PM Won Carvajal MD PULM & [facility]   3/19/2021  8:30 AM Won Carvajal MD PULMARIE & CC [facility]   2021  3:15 PM Claudio Sheth MD [facility]       Tiffanie Trent RN

## 2021-03-05 NOTE — TELEPHONE ENCOUNTER
Home from hospital on Sunday . She is still very weak from having pneumonia . Her AF started acting up while she was in the hospital and she states RMM was notified .  Her AF is really bothering her now , it is waking her up

## 2021-03-05 NOTE — TELEPHONE ENCOUNTER
Daughter calling asking what to do because the patient is so weak , cant sleep, and feels bad . Please call daughter .

## 2021-03-05 NOTE — TELEPHONE ENCOUNTER
Discussed with daughter,  She has been waking up with tachycardia to 115 and feels bad with it. HR has been  at home. BP has been 514-746'M systolic. Instructed to take additional 60 mg diltiazem 2 times daily as needed for HR >100. If symptoms worsen over the weekend she will go to the emergency room. Recently hospitalized with PNA and seen by Se. Khan on 3/3 without any concerns.      Yonis Johnson, KATHY-CNP

## 2021-03-05 NOTE — TELEPHONE ENCOUNTER
I spoke w/ pt's daughter. She states since patient has been home from hospital her HR's have been fluctuating with high HR's in the evening. She states her mom doesn't feel well when her HR is fast. HR's fluctuate between 70's 115bpm, mostly low 100's. I made an appt with [facility] 3/8/21, but her daughter wants to know if there is anything she can do in the meantime to help with HR's.

## 2021-03-08 ENCOUNTER — TELEPHONE (OUTPATIENT)
Dept: CARDIOLOGY CLINIC | Age: 86
End: 2021-03-08

## 2021-03-08 ENCOUNTER — OFFICE VISIT (OUTPATIENT)
Dept: CARDIOLOGY CLINIC | Age: 86
End: 2021-03-08
Payer: MEDICARE

## 2021-03-08 VITALS
DIASTOLIC BLOOD PRESSURE: 77 MMHG | HEIGHT: 62 IN | WEIGHT: 172.4 LBS | BODY MASS INDEX: 31.73 KG/M2 | SYSTOLIC BLOOD PRESSURE: 112 MMHG | OXYGEN SATURATION: 98 % | HEART RATE: 56 BPM

## 2021-03-08 DIAGNOSIS — E78.5 HYPERLIPIDEMIA, UNSPECIFIED HYPERLIPIDEMIA TYPE: ICD-10-CM

## 2021-03-08 DIAGNOSIS — I10 HYPERTENSION, UNSPECIFIED TYPE: Primary | ICD-10-CM

## 2021-03-08 PROCEDURE — 1036F TOBACCO NON-USER: CPT | Performed by: INTERNAL MEDICINE

## 2021-03-08 PROCEDURE — 99214 OFFICE O/P EST MOD 30 MIN: CPT | Performed by: INTERNAL MEDICINE

## 2021-03-08 PROCEDURE — G8399 PT W/DXA RESULTS DOCUMENT: HCPCS | Performed by: INTERNAL MEDICINE

## 2021-03-08 PROCEDURE — 93000 ELECTROCARDIOGRAM COMPLETE: CPT | Performed by: INTERNAL MEDICINE

## 2021-03-08 PROCEDURE — G8482 FLU IMMUNIZE ORDER/ADMIN: HCPCS | Performed by: INTERNAL MEDICINE

## 2021-03-08 PROCEDURE — 1123F ACP DISCUSS/DSCN MKR DOCD: CPT | Performed by: INTERNAL MEDICINE

## 2021-03-08 PROCEDURE — 1090F PRES/ABSN URINE INCON ASSESS: CPT | Performed by: INTERNAL MEDICINE

## 2021-03-08 PROCEDURE — 1111F DSCHRG MED/CURRENT MED MERGE: CPT | Performed by: INTERNAL MEDICINE

## 2021-03-08 PROCEDURE — G8417 CALC BMI ABV UP PARAM F/U: HCPCS | Performed by: INTERNAL MEDICINE

## 2021-03-08 PROCEDURE — 4040F PNEUMOC VAC/ADMIN/RCVD: CPT | Performed by: INTERNAL MEDICINE

## 2021-03-08 PROCEDURE — G8427 DOCREV CUR MEDS BY ELIG CLIN: HCPCS | Performed by: INTERNAL MEDICINE

## 2021-03-08 NOTE — LETTER
[facility]  EP Procedure Sheet    3/8/21  Dakota Griffin  1935  EP Procedures     Pacemaker implant (single/dual)  EP Study    ICD implant (single/dual)  Atrial flutter ablation (TRACEY Y/N)    Biv implant ICD  Tilt Table    Biv implant PPM  Atrial fibrillation ablation (TRACEY Yes)    Generator Change (PPM/ICD/BiV)  SVT ablation    Lead revision (RV/LA/RA) (<1 month)  VT ablation      Lead extraction +/- upgrade (BiV/PPM/ICD)  VT Ischemic/ non-ischemic    Loop implant/ removal  VT RVOT   xxx Cardioversion  VT Left sided    TRACEY  AVN ablation     Equipment     Medtr[employer]   ANABELLA Mapping System    [place]  [address] [address]  CryoAblation    Biotronik  Laser Lead Extraction     EP Procedures Scheduling Request    # hours Requested   Scheduled  Date:   Specific Day  Completed    Anesthesia  Yes  F/u Date:   CT surgery backup  COVID     Overnight stay      Location [facility]       Pre-Procedure Labs / Imaging     PT/INR  Type & cross    CBC  Units PRBC    BMP/Mg  Units FFP    Venogram  Cardiac CTA for Pulmonary vein mapping     RN INITIALS: DW   Patient Instructions  Do not eat or drink after midnight the night prior to procedure  Dx: Atrial fibrillation

## 2021-03-08 NOTE — PROGRESS NOTES
[address]   Electrophysiology Follow up   Date: 3/8/2021  I had the privilege of visiting Cuco oSto in the office. CC: Shortness of breath/ lethagy   HPI: Cuco Soto is a 80 y.o. female  w/PMHx PAF, HTN, HLD, DM, and hypothyroidism. She initially presented to the hospital after sudden onset of symptoms- palpitations, shortness of breath, fatigue, and weakness. Found to be in AF w/RVR. Started on IV Cardizem admitted to the ICU.  Wasn't on [address]. She has past medical history of paroxysmal atrial fibrillation and used to be on verapamil which was previously discontinued.     -Started on [address] and propafenone  -S/p TRACEY/DCCV (8/31/18) per Dr. Rob Russell      - Complex atherosclerotic plaque in descending aorta noted on TRACEY-evaluated by vascular     Admitted on 11/13/19 for afib with RVR accompanied by sudden on set palpitations, fatigue, and SOB. She was also being treated for PNA. She developed bradycardia with IV cardizem. She converted to SR spontaneously.      2/15/19 came to the ED with atrial fib with RVR and was cardioverted and discharged. She returned to the ED on 2/19/20 again with AF with RVR. She was admitted and cardioverted. , later discharged on Rythmol and cardizem. Ablation was discussed. 02/22/2021- admitted with Pneumonia and COPD exacerbation. Discharged home on O2 2L. Iwona Rajan presents today in follow up. She complains of Fatigue and SOB. She has been tachycardic. She is in atrial fibrillation today. Assessment and plan:     Persistent Atrial Fibrillation   EKG Today  Atrial fibrillation rate 94    Cardizem 240 mg daily   Cardizem 60 mg 2 time daily prn for HR > 100   Propafenone 150 mg every 8 hours   Xarelto 15 mg daily creatinine 02/28/021 0.9, creatinine clearance 02/28/2021   Ablation has been discussed in the past and she has not been interested. He has had fusion 2018.   Discussed her treatment options including cardioversion again she would like to have cardioversion     She has COPD and is oxygen. Needs anesthesia help prior to cardioversion     Not a good candidate for ablation due to multiple comorbidities. Also not a good candidate for amiodarone therapy due to COPD and oxygen    HTN  -Controlled  -BP goal <130/80  -Home BP monitoring encouraged, printed information provided on how to accurately measure BP at home.  -Counseled to follow a low salt diet to assure blood pressure remains controlled for cardiovascular risk factor modification.   -The patient is counseled to get regular exercise 3-5 times per week and maintain a healthy weight reduce cardiovascular risk factors.    Catapres 0.1 mg BID   Follows with Dr. Melida Martínez     Hyperlipidemia    On Lipitor 80 mg     PAD and complex Aortic atherosclerosis   On High intensity statin    COPD   Follows with Dr. Jade Solo   Has beebn on  Home O2 for a year  Plan  Cardioversion with Anesthesia       Patient Active Problem List    Diagnosis Date Noted    ANTHONY (obstructive sleep apnea)      Priority: High    Controlled type 2 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 disease, without long-term current use of insulin ([facility] [address] 01/18/2017     Priority: Low    Restless legs syndrome 01/18/2017     Priority: Low    Osteoarthritis 06/18/2015     Priority: Low    Overactive bladder 12/11/2013     Priority: Low    IBS (irritable bowel syndrome) 05/02/2013     Priority: Low    HTN (hypertension)      Priority: Low    Hyperlipidemia      Priority: Low    Insomnia      Priority: Low    Pneumonia 02/22/2021    Ataxia 11/12/2020    Asthmatic bronchitis 08/11/2020    Anxiety 08/05/2020    SOB (shortness of breath) 02/25/2020    A-fib ([address] 02/19/2020    Hypothyroid 02/19/2020    Chronic respiratory failure with hypoxia ([address] 02/19/2020    Coronary artery disease involving native coronary artery of native heart with angina pectoris ([address] 02/18/2020    Bronchiectasis without complication ([address] 10/25/2019    Interstitial lung disease ([facility] [address] 09/20/2019    Chronic cough 09/20/2019    Vertigo 07/31/2019    COPD, mild ([facility] [address] 07/18/2019    Allergic rhinitis 01/09/2019     Diagnostic studies:   EKG today  Atrial fibrillation     Echo:  8/20/19  Summary   Left ventricle - normal size, thickness and function with EF of 60%   Aortic valve - sclerotic, mild regurgitation     TRACEY: 8/31/18   Summary   Normal left ventricular cavity size and wall thickness. Global left   ventricular function is normal with ejection fraction estimated from 55 % to   60 %. No regional wall motion abnormalities noted.    Mild aortic regurgitation.    Multiple Large (> 4 mm), ulcerated plaque with mobile component suggestive   of possible thrombus was seen in the descending aorta.     Echo 8/29/18    Summary   -Normal global systolic function with an ejection fraction estimated at 65%.  -No regional wall motion abnormalities noted.   -Aortic valve appears sclerotic but opens adequately.   -Mild aortic regurgitation.   -There is mild-to-moderate tricuspid regurgitation with a RVSP estimation of   49 mmHg.   -Indeterminate diastolic function     Left heart Cath 01/19/2004  Luminal irregularities pLAD and pRCA    I independently reviewed the cardiac diagnostic studies, ECG and relevant imaging studies. Lab Results   Component Value Date    LVEF 60 08/20/2019    LVEFMODE Echo 08/20/2019     Lab Results   Component Value Date    TSH 2.50 09/28/2020         Physical Examination:  Vitals:    03/08/21 1425   BP: 112/77   Pulse: 56   SpO2: 98%      Wt Readings from Last 3 Encounters:   03/08/21 172 lb 6.4 oz (78.2 kg)   03/03/21 171 lb 8 oz (77.8 kg)   02/22/21 185 lb 1.6 oz (84 kg)       · Constitutional: Oriented. No distress. On oxygen  · Head: Normocephalic and atraumatic. · Mouth/Throat: Oropharynx is clear and moist.   · Eyes: Conjunctivae normal. EOM are normal.   · Neck: Neck supple. No JVD present.     · Cardiovascular: Normal rate, irregular rhythm, S1&S2. · Pulmonary/Chest: Coarse respiratory sounds. No rhonchi. · Abdominal: Soft. No tenderness. · Musculoskeletal: No tenderness. No edema    · Lymphadenopathy: Has no cervical adenopathy. · Neurological: Alert and oriented. Follows command, No Gross deficit   · Skin: Skin is warm, No rash noted. · Psychiatric: Has a normal behavior          Review of System:  [x] Full ROS obtained and negative except as mentioned in HPI    Prior to Admission medications    Medication Sig Start Date End Date Taking? Authorizing Provider   dilTIAZem (CARDIZEM) 30 MG tablet Take 2 tablets by mouth 2 times daily as needed (tachycardia with heart rate >100) 3/5/21  Yes KATHY Singre - CNP   dicyclomine (BENTYL) 10 MG capsule Take 1 capsule by mouth 4 times daily as needed (abd pain) 3/3/21  Yes Claudio Sheth MD   cloNIDine (CATAPRES) 0.1 MG tablet TAKE 1 TABLET BY MOUTH TWICE DAILY 3/2/21  Yes Claudio Sheth MD   rivaroxaban (XARELTO) 15 MG TABS tablet Take 1 tablet by mouth daily (with breakfast) 3/1/21  Yes Danny Parker MD   atorvastatin (LIPITOR) 80 MG tablet Take 1 tablet by mouth nightly 2/28/21  Yes Danny Parker MD   HYDROcodone-acetaminophen (NORCO) 5-325 MG per tablet Take 1 tablet by mouth every 6 hours as needed.    Yes Historical Provider, MD   rOPINIRole (REQUIP) 2 MG tablet Take 1 tablet by mouth 3 times daily PRN 1/6/21  Yes Historical Provider, MD   ipratropium-albuterol (DUONEB) 0.5-2.5 (3) MG/3ML SOLN nebulizer solution INHALE THE CONTENTS OF 1 VIAL VIA NEBULIZER EVERY 4 HOURS 2/10/21  Yes Claudio Sheth MD   irbesartan (AVAPRO) 300 MG tablet TAKE 1 TABLET BY MOUTH EVERY NIGHT 1/29/21  Yes Claudio Sheth MD   meclizine (ANTIVERT) 12.5 MG tablet TAKE 1 TABLET THREE TIMES DAILY AS NEEDED 1/27/21  Yes Claudio Sheth MD   dilTIAZem (CARDIZEM CD) 240 MG extended release capsule Take 1 capsule by mouth daily 1/27/21  Yes Claduio Sheth MD fluticasone (FLONASE) 50 MCG/ACT nasal spray 2 sprays by Nasal route daily 1/27/21  Yes Adriana Biggs MD   levothyroxine (SYNTHROID) 75 MCG tablet TAKE 1 TABLET EVERY DAY 1/26/21  Yes Adriana Biggs MD   budesonide-formoterol (SYMBICORT) 160-4.5 MCG/ACT AERO INHALE 2 PUFFS INTO THE LUNGS TWICE DAILY 1/20/21  Yes Adriana Biggs MD   levalbuterol (XOPENEX) 0.63 MG/3ML nebulization INHALE CONTENTS OF 1 VIAL PER NEBULIZER EVERY 6 HOURS AS NEEDED FOR WHEEZING 9/14/20  Yes Lorraine Nagel MD   propafenone (RYTHMOL) 150 MG tablet TAKE 1 TABLET BY MOUTH EVERY 8 HOURS 7/6/20  Yes KATHY Woods - CNP   Respiratory Therapy Supplies (NEBULIZER/TUBING/MOUTHPIECE) KIT 1 kit by Does not apply route 4 times daily as needed (shortness of breath) 3/26/20  Yes Adriana Biggs MD   albuterol sulfate HFA (PROAIR HFA) 108 (90 Base) MCG/ACT inhaler Inhale 2 puffs into the lungs every 6 hours as needed for Wheezing 10/25/19  Yes Lorraine Nagel MD   blood glucose monitor strips Test one time a day 7/22/19  Yes Adriana Biggs MD   Lancets MISC 1 each by Does not apply route 2 times daily 7/20/19  Yes Rowena Barriga MD   lactobacillus (CULTURELLE) capsule Take 1 capsule by mouth 2 times daily (with meals)  Patient not taking: Reported on 3/8/2021 2/28/21   Doris Salinas MD       Allergies   Allergen Reactions    Adhesive Tape Anaphylaxis    Cefaclor Nausea And Vomiting     Other reaction(s): Chest pain    Nsaids      Pt states she has hives and heart races   Other reaction(s): Tachycardia    Clinoril [Sulindac] Other (See Comments)     Stomach pain    Codeine      FEEL NUMB    Diclofenac     Diclofenac Sodium Hives    Diclofenac Sodium Hives    Hctz [Hydrochlorothiazide]      Sob    Ibuprofen Other (See Comments)     Other reaction(s): Tachycardia    Macrobid [Nitrofurantoin Macrocrystal]      nausea    Motrin [Ibuprofen Micronized] Other (See Comments)     Tachycardia      Pcn [Penicillins] Hives    Peach [Prunus Persica] Itching and Swelling     Cannot eat raw peaches       Social History:  Reviewed. reports that she quit smoking about 25 years ago. She started smoking about 70 years ago. She has a 45.00 pack-year smoking history. She has never used smokeless tobacco. She reports that she does not drink alcohol or use drugs. Family History:  Reviewed. Reviewed. No family history of SCD. Relevant and available labs, and cardiovascular diagnostics reviewed. Reviewed. I independently reviewed relevant and available cardiac diagnostic tests ECG, CXR, Echo, Stress test, Device interrogation, Holter, CT scan. Complex medical condition with multiple medical problems affecting prognosis and outcome of EP interventions    All questions and concerns were addressed to the patient/family. Alternatives to my treatment were discussed. I have discussed the above stated plan and the patient verbalized understanding and agreed with the plan. Physician Attestation: I, Dr. Isidro Pelaez, confirm that the scribe's documentation has been prepared under my direction and personally reviewed by me in its entirety. I also confirm that the note above accurately reflects all work, treatment, procedures, and medical decision making performed by me. NOTE: This report was transcribed using voice recognition software. Every effort was made to ensure accuracy, however, inadvertent computerized transcription errors may be present. Scribe attestation:  This note was scribed in the presence of Isidro Pelaez MD by Angle Pelaez MD, MPH  [facility]   Office: [phone]  Fax: [phone]

## 2021-03-08 NOTE — TELEPHONE ENCOUNTER
I spoke with Damaris Stephens. She stated that she spoke with pt family on the phone. Pt's issue was addressed in office today.

## 2021-03-08 NOTE — TELEPHONE ENCOUNTER
Pt daughter ho calling, pt has an appt today at 2 pm with RMM. However, pts HR is 120-155 and she's in AFib, very tired and Gina Mederos gave her the extra clonidine like ERIC told her to do on Fri and her HR is still 120 now. Needs to know what pt should do?  Pls call to advise Thank you

## 2021-03-09 ENCOUNTER — HOSPITAL ENCOUNTER (OUTPATIENT)
Dept: GENERAL RADIOLOGY | Age: 86
Discharge: HOME | End: 2021-03-09
Payer: MEDICARE

## 2021-03-09 ENCOUNTER — HOSPITAL ENCOUNTER (OUTPATIENT)
Age: 86
Discharge: HOME | End: 2021-03-09
Payer: MEDICARE

## 2021-03-09 ENCOUNTER — OFFICE VISIT (OUTPATIENT)
Dept: PULMONOLOGY | Age: 86
End: 2021-03-09
Payer: MEDICARE

## 2021-03-09 VITALS — HEART RATE: 74 BPM | OXYGEN SATURATION: 96 %

## 2021-03-09 DIAGNOSIS — J84.9 INTERSTITIAL LUNG DISEASE (HCC): ICD-10-CM

## 2021-03-09 DIAGNOSIS — J44.9 COPD, MILD (HCC): ICD-10-CM

## 2021-03-09 DIAGNOSIS — J96.11 CHRONIC RESPIRATORY FAILURE WITH HYPOXIA (HCC): ICD-10-CM

## 2021-03-09 DIAGNOSIS — J18.9 PNEUMONIA OF RIGHT LOWER LOBE DUE TO INFECTIOUS ORGANISM: ICD-10-CM

## 2021-03-09 DIAGNOSIS — J47.9 BRONCHIECTASIS WITHOUT COMPLICATION (HCC): ICD-10-CM

## 2021-03-09 DIAGNOSIS — R06.02 SOB (SHORTNESS OF BREATH): Primary | ICD-10-CM

## 2021-03-09 PROCEDURE — 4040F PNEUMOC VAC/ADMIN/RCVD: CPT | Performed by: INTERNAL MEDICINE

## 2021-03-09 PROCEDURE — 1090F PRES/ABSN URINE INCON ASSESS: CPT | Performed by: INTERNAL MEDICINE

## 2021-03-09 PROCEDURE — 3023F SPIROM DOC REV: CPT | Performed by: INTERNAL MEDICINE

## 2021-03-09 PROCEDURE — 99214 OFFICE O/P EST MOD 30 MIN: CPT | Performed by: INTERNAL MEDICINE

## 2021-03-09 PROCEDURE — G8417 CALC BMI ABV UP PARAM F/U: HCPCS | Performed by: INTERNAL MEDICINE

## 2021-03-09 PROCEDURE — 1111F DSCHRG MED/CURRENT MED MERGE: CPT | Performed by: INTERNAL MEDICINE

## 2021-03-09 PROCEDURE — G8399 PT W/DXA RESULTS DOCUMENT: HCPCS | Performed by: INTERNAL MEDICINE

## 2021-03-09 PROCEDURE — 1036F TOBACCO NON-USER: CPT | Performed by: INTERNAL MEDICINE

## 2021-03-09 PROCEDURE — G8926 SPIRO NO PERF OR DOC: HCPCS | Performed by: INTERNAL MEDICINE

## 2021-03-09 PROCEDURE — G8482 FLU IMMUNIZE ORDER/ADMIN: HCPCS | Performed by: INTERNAL MEDICINE

## 2021-03-09 PROCEDURE — 71046 X-RAY EXAM CHEST 2 VIEWS: CPT

## 2021-03-09 PROCEDURE — 1123F ACP DISCUSS/DSCN MKR DOCD: CPT | Performed by: INTERNAL MEDICINE

## 2021-03-09 PROCEDURE — G8427 DOCREV CUR MEDS BY ELIG CLIN: HCPCS | Performed by: INTERNAL MEDICINE

## 2021-03-09 RX ORDER — PREDNISONE 10 MG/1
TABLET ORAL
Qty: 30 TABLET | Refills: 0 | Status: SHIPPED | OUTPATIENT
Start: 2021-03-09 | End: 2021-03-19

## 2021-03-09 RX ORDER — LEVALBUTEROL INHALATION SOLUTION 0.63 MG/3ML
0.63 SOLUTION RESPIRATORY (INHALATION) EVERY 4 HOURS PRN
Qty: 360 ML | Refills: 11 | Status: SHIPPED | OUTPATIENT
Start: 2021-03-09 | End: 2021-04-13

## 2021-03-09 RX ORDER — DOXYCYCLINE HYCLATE 100 MG/1
100 CAPSULE ORAL 2 TIMES DAILY
Qty: 20 CAPSULE | Refills: 0 | Status: SHIPPED | OUTPATIENT
Start: 2021-03-09 | End: 2021-03-19

## 2021-03-09 NOTE — PROGRESS NOTES
HPI    History  I have reviewed past medical, surgical, social and family history. This is documented elsewhere in the medical record. Physical Exam:  Well developed, well nourished  Alert and oriented  Sclera is clear  No cervical adenopathy  No JVD. Chest examination is wheezing and congested. Cardiac examination reveals regular rate and rhythm without murmur, gallop or rub. The abdomen is soft, nontender and nondistended. There is no clubbing, cyanosis or edema of the extremities. There is no obvious skin rash. No focal neuro deficicts  Normal mood and affect    Allergies   Allergen Reactions    Adhesive Tape Anaphylaxis    Cefaclor Nausea And Vomiting     Other reaction(s): Chest pain    Nsaids      Pt states she has hives and heart races   Other reaction(s): Tachycardia    Clinoril [Sulindac] Other (See Comments)     Stomach pain    Codeine      FEEL NUMB    Diclofenac     Diclofenac Sodium Hives    Diclofenac Sodium Hives    Hctz [Hydrochlorothiazide]      Sob    Ibuprofen Other (See Comments)     Other reaction(s): Tachycardia    Macrobid [Nitrofurantoin Macrocrystal]      nausea    Motrin [Ibuprofen Micronized] Other (See Comments)     Tachycardia      Pcn [Penicillins] Hives    Peach [Prunus Persica] Itching and Swelling     Cannot eat raw peaches     Prior to Visit Medications    Medication Sig Taking?  Authorizing Provider   levalbuterol (XOPENEX) 0.63 MG/3ML nebulization Take 3 mLs by nebulization every 4 hours as needed for Wheezing DX:COPD J44.9 Yes Yeny Acevedo MD   dilTIAZem (CARDIZEM) 30 MG tablet Take 2 tablets by mouth 2 times daily as needed (tachycardia with heart rate >100)  Jose Goodman APRN - CNP   dicyclomine (BENTYL) 10 MG capsule Take 1 capsule by mouth 4 times daily as needed (abd pain)  Bismark Flaherty MD   cloNIDine (CATAPRES) 0.1 MG tablet TAKE 1 TABLET BY MOUTH TWICE DAILY  Bismark Flaherty MD   rivaroxaban (XARELTO) 15 MG TABS tablet Take 1 tablet by mouth daily (with breakfast)  Danny Cisse MD   lactobacillus (CULTURELLE) capsule Take 1 capsule by mouth 2 times daily (with meals)  Patient not taking: Reported on 3/8/2021  [facility] Ulices Cisse MD   atorvastatin (LIPITOR) 80 MG tablet Take 1 tablet by mouth nightly  Danny CANNON MD   HYDROcodone-acetaminophen (NORCO) 5-325 MG per tablet Take 1 tablet by mouth every 6 hours as needed.   Historical Provider, MD   rOPINIRole (REQUIP) 2 MG tablet Take 1 tablet by mouth 3 times daily PRN  Historical Provider, MD   irbesartan (AVAPRO) 300 MG tablet TAKE 1 TABLET BY MOUTH EVERY NIGHT  Johnny Fitzpatrick MD   meclizine (ANTIVERT) 12.5 MG tablet TAKE 1 TABLET THREE TIMES DAILY AS NEEDED  Johnny Fitzpatrick MD   dilTIAZem (CARDIZEM CD) 240 MG extended release capsule Take 1 capsule by mouth daily  Johnny Fitzpatrick MD   fluticasone (FLONASE) 50 MCG/ACT nasal spray 2 sprays by Nasal route daily  Johnny Fitzpatrick MD   levothyroxine (SYNTHROID) 75 MCG tablet TAKE 1 TABLET EVERY DAY  Johnny Fitzpatrick MD   budesonide-formoterol (SYMBICORT) 160-4.5 MCG/ACT AERO INHALE 2 PUFFS INTO THE LUNGS TWICE DAILY  Johnny Fitzpatrick MD   levalbuterol (XOPENEX) 0.63 MG/3ML nebulization INHALE CONTENTS OF 1 VIAL PER NEBULIZER EVERY 6 HOURS AS NEEDED FOR WHEEZING  Michelle Alvarez MD   propafenone (RYTHMOL) 150 MG tablet TAKE 1 TABLET BY MOUTH EVERY 8 HOURS  Mihai Buchanan APRN - MU   Respiratory Therapy Supplies (NEBULIZER/TUBING/MOUTHPIECE) KIT 1 kit by Does not apply route 4 times daily as needed (shortness of breath)  Johnny Fitzpatrick MD   albuterol sulfate HFA (PROAIR HFA) 108 (90 Base) MCG/ACT inhaler Inhale 2 puffs into the lungs every 6 hours as needed for Wheezing  Michelle Alvarez MD   blood glucose monitor strips Test one time a day  Johnny Fitzpatrick MD   Lancets MISC 1 each by Does not apply route 2 times daily  Maggi Cohen MD       Vitals:    03/09/21 1607   Pulse: 74   SpO2: 96%     There is no height or weight on file to calculate BMI.      Wt Readings from Last 3 Encounters:   21 172 lb 6.4 oz (78.2 kg)   21 171 lb 8 oz (77.8 kg)   21 185 lb 1.6 oz (84 kg)     BP Readings from Last 3 Encounters:   21 112/77   21 132/68   21 130/78        Social History     Tobacco Use   Smoking Status Former Smoker    Packs/day: 1.00    Years: 45.00    Pack years: 45.00    Start date: 9/15/1950   [facility] Quit date: 1995    Years since quittin.2   Smokeless Tobacco Never Used

## 2021-03-10 ENCOUNTER — CARE COORDINATION (OUTPATIENT)
Dept: CASE MANAGEMENT | Age: 86
End: 2021-03-10

## 2021-03-11 ENCOUNTER — OFFICE VISIT (OUTPATIENT)
Dept: PRIMARY CARE CLINIC | Age: 86
End: 2021-03-11
Payer: MEDICARE

## 2021-03-11 DIAGNOSIS — Z01.818 PREOP TESTING: Primary | ICD-10-CM

## 2021-03-11 PROCEDURE — G8417 CALC BMI ABV UP PARAM F/U: HCPCS | Performed by: NURSE PRACTITIONER

## 2021-03-11 PROCEDURE — G8428 CUR MEDS NOT DOCUMENT: HCPCS | Performed by: NURSE PRACTITIONER

## 2021-03-11 PROCEDURE — 99211 OFF/OP EST MAY X REQ PHY/QHP: CPT | Performed by: NURSE PRACTITIONER

## 2021-03-12 ENCOUNTER — HOSPITAL ENCOUNTER (OUTPATIENT)
Dept: CARDIAC CATH/INVASIVE PROCEDURES | Age: 86
Setting detail: OUTPATIENT SURGERY
Discharge: HOME | End: 2021-03-12
Attending: INTERNAL MEDICINE | Admitting: INTERNAL MEDICINE
Payer: MEDICARE

## 2021-03-12 ENCOUNTER — ANESTHESIA EVENT (OUTPATIENT)
Dept: CARDIAC CATH/INVASIVE PROCEDURES | Age: 86
End: 2021-03-12
Payer: MEDICARE

## 2021-03-12 ENCOUNTER — ANESTHESIA (OUTPATIENT)
Dept: CARDIAC CATH/INVASIVE PROCEDURES | Age: 86
End: 2021-03-12
Payer: MEDICARE

## 2021-03-12 VITALS
WEIGHT: 172 LBS | RESPIRATION RATE: 18 BRPM | TEMPERATURE: 98.5 F | OXYGEN SATURATION: 99 % | BODY MASS INDEX: 31.65 KG/M2 | SYSTOLIC BLOOD PRESSURE: 140 MMHG | HEART RATE: 77 BPM | DIASTOLIC BLOOD PRESSURE: 66 MMHG | HEIGHT: 62 IN

## 2021-03-12 VITALS — OXYGEN SATURATION: 98 % | SYSTOLIC BLOOD PRESSURE: 111 MMHG | DIASTOLIC BLOOD PRESSURE: 53 MMHG

## 2021-03-12 LAB
EKG ATRIAL RATE: 357 BPM
EKG DIAGNOSIS: NORMAL
EKG Q-T INTERVAL: 360 MS
EKG QRS DURATION: 94 MS
EKG QTC CALCULATION (BAZETT): 435 MS
EKG R AXIS: 21 DEGREES
EKG T AXIS: 19 DEGREES
EKG VENTRICULAR RATE: 88 BPM
SARS-COV-2: NOT DETECTED

## 2021-03-12 PROCEDURE — 93005 ELECTROCARDIOGRAM TRACING: CPT | Performed by: INTERNAL MEDICINE

## 2021-03-12 PROCEDURE — 3700000001 HC ADD 15 MINUTES (ANESTHESIA)

## 2021-03-12 PROCEDURE — 93010 ELECTROCARDIOGRAM REPORT: CPT | Performed by: INTERNAL MEDICINE

## 2021-03-12 PROCEDURE — 92960 CARDIOVERSION ELECTRIC EXT: CPT

## 2021-03-12 PROCEDURE — 6360000002 HC RX W HCPCS: Performed by: NURSE ANESTHETIST, CERTIFIED REGISTERED

## 2021-03-12 PROCEDURE — 2580000003 HC RX 258: Performed by: NURSE ANESTHETIST, CERTIFIED REGISTERED

## 2021-03-12 PROCEDURE — 92960 CARDIOVERSION ELECTRIC EXT: CPT | Performed by: INTERNAL MEDICINE

## 2021-03-12 PROCEDURE — 7100000010 HC PHASE II RECOVERY - FIRST 15 MIN

## 2021-03-12 PROCEDURE — 3700000000 HC ANESTHESIA ATTENDED CARE

## 2021-03-12 RX ORDER — SODIUM CHLORIDE, SODIUM LACTATE, POTASSIUM CHLORIDE, CALCIUM CHLORIDE 600; 310; 30; 20 MG/100ML; MG/100ML; MG/100ML; MG/100ML
INJECTION, SOLUTION INTRAVENOUS CONTINUOUS PRN
Status: DISCONTINUED | OUTPATIENT
Start: 2021-03-12 | End: 2021-03-12 | Stop reason: SDUPTHER

## 2021-03-12 RX ORDER — PROPOFOL 10 MG/ML
INJECTION, EMULSION INTRAVENOUS PRN
Status: DISCONTINUED | OUTPATIENT
Start: 2021-03-12 | End: 2021-03-12 | Stop reason: SDUPTHER

## 2021-03-12 RX ADMIN — SODIUM CHLORIDE, POTASSIUM CHLORIDE, SODIUM LACTATE AND CALCIUM CHLORIDE: 600; 310; 30; 20 INJECTION, SOLUTION INTRAVENOUS at 09:59

## 2021-03-12 RX ADMIN — PROPOFOL 50 MG: 10 INJECTION, EMULSION INTRAVENOUS at 11:45

## 2021-03-12 ASSESSMENT — ENCOUNTER SYMPTOMS: SHORTNESS OF BREATH: 1

## 2021-03-12 ASSESSMENT — COPD QUESTIONNAIRES: CAT_SEVERITY: SEVERE

## 2021-03-12 ASSESSMENT — LIFESTYLE VARIABLES: SMOKING_STATUS: 0

## 2021-03-12 NOTE — PROCEDURES
[employer]     Electrophysiology Procedure Note       Date of Procedure: 3/12/2021  Patient's Name: Olivier Jackson  YOB: 1935   Medical Record Number: [id number]  Procedure Performed by: Regina Howard MD    Procedures performed:  External Electrical cardioversion       Indication of the procedure: Persistent atrial fibrillation   Details of procedure: The patient was brought to the cath lab area in a fasting and non-sedated state. The risks, benefits and alternatives of the procedure were discussed with the patient. The patient opted to proceed with the procedure. Written informed consent was signed and placed in the chart. A timeout protocol was completed to identify the patient and the procedure being performed. Sedation was provided by anesthesia team.     Patient was converted to sinus rhythm. The patient tolerated the procedure well and there were no complications.      Conclusion:   Successful external DC cardioversion of atrial fibrillation

## 2021-03-12 NOTE — ANESTHESIA PRE PROCEDURE
Department of Anesthesiology  Preprocedure Note       Name:  Migel Sultana   Age:  80 y.o.  :  1935                                          MRN:  [id number]         Date:  3/12/2021      Surgeon: * Surgery not found *    Procedure:     Medications prior to admission:   Prior to Admission medications    Medication Sig Start Date End Date Taking? Authorizing Provider   levalbuterol Jay Rutledge) 0.63 MG/3ML nebulization Take 3 mLs by nebulization every 4 hours as needed for Wheezing DX:COPD J44.9 3/9/21   Flex Tabor MD   doxycycline hyclate (VIBRAMYCIN) 100 MG capsule Take 1 capsule by mouth 2 times daily for 10 days 3/9/21 3/19/21  Flex Tabor MD   predniSONE (DELTASONE) 10 MG tablet Take 40 mg by mouth for 3 days 30 mg for 3 days 20 mg for 3 days 10 mg for 3 days. 3/9/21 3/19/21  Flex Tabor MD   dilTIAZem (CARDIZEM) 30 MG tablet Take 2 tablets by mouth 2 times daily as needed (tachycardia with heart rate >100) 3/5/21   KATHY Blank - CNP   dicyclomine (BENTYL) 10 MG capsule Take 1 capsule by mouth 4 times daily as needed (abd pain) 3/3/21   Katie Farias MD   cloNIDine (CATAPRES) 0.1 MG tablet TAKE 1 TABLET BY MOUTH TWICE DAILY 3/2/21   Katie Farias MD   rivaroxaban (XARELTO) 15 MG TABS tablet Take 1 tablet by mouth daily (with breakfast) 3/1/21   Danny Herrmann MD   lactobacillus (CULTURELLE) capsule Take 1 capsule by mouth 2 times daily (with meals)  Patient not taking: Reported on 3/8/2021 2/28/21   Danny Herrmann MD   atorvastatin (LIPITOR) 80 MG tablet Take 1 tablet by mouth nightly 21   Danny Herrmann MD   HYDROcodone-acetaminophen (NORCO) 5-325 MG per tablet Take 1 tablet by mouth every 6 hours as needed.     Historical Provider, MD   rOPINIRole (REQUIP) 2 MG tablet Take 1 tablet by mouth 3 times daily PRN 21   Historical Provider, MD   irbesartan (AVAPRO) 300 MG tablet TAKE 1 TABLET BY MOUTH EVERY NIGHT 21   Katie Farias MD meclizine (ANTIVERT) 12.5 MG tablet TAKE 1 TABLET THREE TIMES DAILY AS NEEDED 1/27/21   Johnny Fitzpatrick MD   dilTIAZem (CARDIZEM CD) 240 MG extended release capsule Take 1 capsule by mouth daily 1/27/21   Johnny Fitzpatrick MD   fluticasone [facility]) 50 MCG/ACT nasal spray 2 sprays by Nasal route daily 1/27/21   Johnny Fitzpatrick MD   levothyroxine (SYNTHROID) 75 MCG tablet TAKE 1 TABLET EVERY DAY 1/26/21   Johnny Fitzpatrick MD   budesonide-formoterol (SYMBICORT) 160-4.5 MCG/ACT AERO INHALE 2 PUFFS INTO THE LUNGS TWICE DAILY 1/20/21   Johnny Fitzpatrick MD   levalbuterol (XOPENEX) 0.63 MG/3ML nebulization INHALE CONTENTS OF 1 VIAL PER NEBULIZER EVERY 6 HOURS AS NEEDED FOR WHEEZING 9/14/20   Michelle Alvarez MD   propafenone (RYTHMOL) 150 MG tablet TAKE 1 TABLET BY MOUTH EVERY 8 HOURS 7/6/20   KATHY Mercado - MU   Respiratory Therapy Supplies (NEBULIZER/TUBING/MOUTHPIECE) KIT 1 kit by Does not apply route 4 times daily as needed (shortness of breath) 3/26/20   Johnny Fitzpatrick MD   albuterol sulfate HFA (PROAIR HFA) 108 (90 Base) MCG/ACT inhaler Inhale 2 puffs into the lungs every 6 hours as needed for Wheezing 10/25/19   Michelle Alvarez MD   blood glucose monitor strips Test one time a day 7/22/19   Johnny Fitzpatrick MD   Lancets MISC 1 each by Does not apply route 2 times daily 7/20/19   Maggi Cohen MD       Current medications:    No current facility-administered medications for this encounter. Allergies:     Allergies   Allergen Reactions    Adhesive Tape Anaphylaxis    Cefaclor Nausea And Vomiting     Other reaction(s): Chest pain    Nsaids      Pt states she has hives and heart races   Other reaction(s): Tachycardia    Clinoril [Sulindac] Other (See Comments)     Stomach pain    Codeine      FEEL NUMB    Diclofenac     Diclofenac Sodium Hives    Diclofenac Sodium Hives    Hctz [Hydrochlorothiazide]      Sob    Ibuprofen Other (See Comments)     Other reaction(s): Tachycardia    Macrobid [Nitrofurantoin Macrocrystal]      nausea    Motrin [Ibuprofen Micronized] Other (See Comments)     Tachycardia      Pcn [Penicillins] Hives    Peach [Prunus Persica] Itching and Swelling     Cannot eat raw peaches       Problem List:    Patient Active Problem List   Diagnosis Code    HTN (hypertension) I10    Hyperlipidemia E78.5    Insomnia G47.00    IBS (irritable bowel syndrome) K58.9    Overactive bladder N32.81    Osteoarthritis M19.90    Controlled type 2 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 disease, without long-term current use of insulin ([facility]) E11.22, N18.2    Restless legs syndrome G25.81    ANTHONY (obstructive sleep apnea) G47.33    Allergic rhinitis J30.9    COPD, mild ([facility]) J44.9    Vertigo R42    Interstitial lung disease ([facility]) J84.9    Chronic cough R05    Bronchiectasis without complication ([facility]) G91.7    Coronary artery disease involving native coronary artery of native heart with angina pectoris ([facility]) I25.119    A-fib ([facility]) I48.91    Hypothyroid E03.9    Chronic respiratory failure with hypoxia ([facility]) J96.11    SOB (shortness of breath) R06.02    Anxiety F41.9    Asthmatic bronchitis J45.909    Ataxia R27.0    Pneumonia J18.9       Past Medical History:        Diagnosis Date    Arthritis     Atrial fibrillation ([facility])     Diabetes mellitus type II, controlled ([facility] [address]     GERD (gastroesophageal reflux disease)     HTN (hypertension)     Hyperlipidemia     Hypothyroid     Insomnia     Lumbago     SVT (supraventricular tachycardia) ([facility])        Past Surgical History:        Procedure Laterality Date    APPENDECTOMY      CHOLECYSTECTOMY, LAPAROSCOPIC  2013    COLONOSCOPY         Social History:    Social History     Tobacco Use    Smoking status: Former Smoker     Packs/day: 1.00     Years: 45.00     Pack years: 45.00     Start date: 9/15/1950     Quit date: 1995     Years since quittin.2    Smokeless tobacco: Never Used   Substance Use Topics    Alcohol use: No     Alcohol/week: 0.0 standard drinks                                Counseling given: Not Answered      Vital Signs (Current): There were no vitals filed for this visit. BP Readings from Last 3 Encounters:   03/08/21 112/77   03/03/21 132/68   02/28/21 130/78       NPO Status:                                                                                 BMI:   Wt Readings from Last 3 Encounters:   03/08/21 172 lb 6.4 oz (78.2 kg)   03/03/21 171 lb 8 oz (77.8 kg)   02/22/21 185 lb 1.6 oz (84 kg)     There is no height or weight on file to calculate BMI.    CBC:   Lab Results   Component Value Date    WBC 9.9 02/28/2021    RBC 3.68 02/28/2021    HGB 11.2 02/28/2021    HCT 35.5 02/28/2021    MCV 96.3 02/28/2021    RDW 16.4 02/28/2021     02/28/2021       CMP:   Lab Results   Component Value Date     02/28/2021    K 4.1 02/28/2021    K 4.3 02/26/2021     02/28/2021    CO2 22 02/28/2021    BUN 23 02/28/2021    CREATININE 0.9 02/28/2021    GFRAA >60 02/28/2021    GFRAA >60 02/27/2013    AGRATIO 1.2 02/25/2021    LABGLOM 59 02/28/2021    GLUCOSE 161 02/28/2021    PROT 6.2 02/28/2021    PROT 6.9 02/21/2013    CALCIUM 9.3 02/28/2021    BILITOT 0.8 02/28/2021    ALKPHOS 134 02/28/2021    AST 14 02/28/2021    ALT 72 02/28/2021       POC Tests: No results for input(s): POCGLU, POCNA, POCK, POCCL, POCBUN, POCHEMO, POCHCT in the last 72 hours.     Coags:   Lab Results   Component Value Date    PROTIME 18.4 02/22/2021    INR 1.58 02/22/2021    APTT 41.2 03/17/2020       HCG (If Applicable): No results found for: PREGTESTUR, PREGSERUM, HCG, HCGQUANT     ABGs: No results found for: PHART, PO2ART, KQY9CCC, VSS3EKO, BEART, I3YSLPAG     Type & Screen (If Applicable):  No results found for: LABABO, LABRH    Drug/Infectious Status (If Applicable):  No results found for: HIV, HEPCAB    COVID-19 Screening (If Applicable):   Lab Results   Component Value Date    COVID19 Not Detected 03/11/2021    COVID19 NOT DETECTED 12/29/2020         Anesthesia Evaluation  Patient summary reviewed and Nursing notes reviewed no history of anesthetic complications:   Airway: Mallampati: III  TM distance: >3 FB   Neck ROM: full  Mouth opening: > = 3 FB Dental:    (+) upper dentures and lower dentures      Pulmonary:   (+) COPD (chronic hypox resp failure, on supp O2, daily inh/nebs): severe,  shortness of breath (ILD, stable on CT, on pred):  sleep apnea: on noncompliant,  asthma (mixed resp dz):     (-) not a current smoker (former)                          PE comment: Distant Cardiovascular:    (+) hypertension:, dysrhythmias (persistent afib, on dilt and rythmol, on ac as outpt): atrial fibrillation, hyperlipidemia    (-) past MI, CAD, CABG/stent,  angina and  CHF (echo 2019 EF 60 mild AR)    ECG reviewed  Rhythm: irregular  Rate: normal  Echocardiogram reviewed                  Neuro/Psych:   (+) neuromuscular disease (RLS, vertigo):,    (-) seizures, TIA and CVA           GI/Hepatic/Renal:   (+) GERD: well controlled,      (-) liver disease and no renal disease       Endo/Other:    (+) Diabetes (a1c 6.1)Type II DM, , hypothyroidism: arthritis: OA., .                 Abdominal:   (+) obese,         Vascular:   + PVD, aortic or cerebral (PAD and complex Aortic atherosclerosis), . Anesthesia Plan      MAC     ASA 4       Induction: intravenous. Anesthetic plan and risks discussed with patient. Plan discussed with CRNA.                   Lara Pal MD   3/12/2021

## 2021-03-12 NOTE — H&P
[address]   Electrophysiology Follow up   Date: 3/12/2021  I had the privilege of visiting Lo Jacob in the office. CC: Shortness of breath/ lethagy   HPI: Lo Jacob is a 80 y.o. female  w/PMHx PAF, HTN, HLD, DM, and hypothyroidism. She initially presented to the hospital after sudden onset of symptoms- palpitations, shortness of breath, fatigue, and weakness. Found to be in AF w/RVR. Started on IV Cardizem admitted to the ICU.  Wasn't on [address]. She has past medical history of paroxysmal atrial fibrillation and used to be on verapamil which was previously discontinued.     -Started on [address] and propafenone  -S/p TRACEY/DCCV (8/31/18) per Dr. Neff File      - Complex atherosclerotic plaque in descending aorta noted on TRACEY-evaluated by vascular     Admitted on 11/13/19 for afib with RVR accompanied by sudden on set palpitations, fatigue, and SOB. She was also being treated for PNA. She developed bradycardia with IV cardizem. She converted to SR spontaneously.      2/15/19 came to the ED with atrial fib with RVR and was cardioverted and discharged. She returned to the ED on 2/19/20 again with AF with RVR. She was admitted and cardioverted. , later discharged on Rythmol and cardizem. Ablation was discussed. 02/22/2021- admitted with Pneumonia and COPD exacerbation. Discharged home on O2 2L. Loraine Haddad presents today in follow up. She complains of Fatigue and SOB. She has been tachycardic. She is in atrial fibrillation today. Assessment and plan:     Persistent Atrial Fibrillation   EKG Today  Atrial fibrillation rate 94    Cardizem 240 mg daily   Cardizem 60 mg 2 time daily prn for HR > 100   Propafenone 150 mg every 8 hours   Xarelto 15 mg daily creatinine 02/28/021 0.9, creatinine clearance 02/28/2021   Ablation has been discussed in the past and she has not been interested. He has had fusion 2018.   Discussed her treatment options including cardioversion again she would like to have cardioversion     She has COPD and is oxygen. Needs anesthesia help prior to cardioversion     Not a good candidate for ablation due to multiple comorbidities. Also not a good candidate for amiodarone therapy due to COPD and oxygen    HTN  -Controlled  -BP goal <130/80  -Home BP monitoring encouraged, printed information provided on how to accurately measure BP at home.  -Counseled to follow a low salt diet to assure blood pressure remains controlled for cardiovascular risk factor modification.   -The patient is counseled to get regular exercise 3-5 times per week and maintain a healthy weight reduce cardiovascular risk factors.    Catapres 0.1 mg BID   Follows with Dr. Deandra Dickson     Hyperlipidemia    On Lipitor 80 mg     PAD and complex Aortic atherosclerosis   On High intensity statin    COPD   Follows with Dr. Paul Patel   Has beebn on  Home O2 for a year  Plan  Cardioversion with Anesthesia       Patient Active Problem List    Diagnosis Date Noted    ANTHONY (obstructive sleep apnea)      Priority: High    Controlled type 2 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 disease, without long-term current use of insulin ([facility] [address] 01/18/2017     Priority: Low    Restless legs syndrome 01/18/2017     Priority: Low    Osteoarthritis 06/18/2015     Priority: Low    Overactive bladder 12/11/2013     Priority: Low    IBS (irritable bowel syndrome) 05/02/2013     Priority: Low    HTN (hypertension)      Priority: Low    Hyperlipidemia      Priority: Low    Insomnia      Priority: Low    Pneumonia 02/22/2021    Ataxia 11/12/2020    Asthmatic bronchitis 08/11/2020    Anxiety 08/05/2020    SOB (shortness of breath) 02/25/2020    A-fib ([address] 02/19/2020    Hypothyroid 02/19/2020    Chronic respiratory failure with hypoxia ([address] 02/19/2020    Coronary artery disease involving native coronary artery of native heart with angina pectoris ([address] 02/18/2020    Bronchiectasis without complication ([address] present. · Cardiovascular: Normal rate, irregular rhythm, S1&S2. · Pulmonary/Chest: Coarse respiratory sounds. No rhonchi. · Abdominal: Soft. No tenderness. · Musculoskeletal: No tenderness. No edema    · Lymphadenopathy: Has no cervical adenopathy. · Neurological: Alert and oriented. Follows command, No Gross deficit   · Skin: Skin is warm, No rash noted. · Psychiatric: Has a normal behavior          Review of System:  [x] Full ROS obtained and negative except as mentioned in HPI    Prior to Admission medications    Medication Sig Start Date End Date Taking? Authorizing Provider   levalbuterol (XOPENEX) 0.63 MG/3ML nebulization Take 3 mLs by nebulization every 4 hours as needed for Wheezing DX:COPD J44.9 3/9/21  Yes Rylee Rivera MD   doxycycline hyclate (VIBRAMYCIN) 100 MG capsule Take 1 capsule by mouth 2 times daily for 10 days 3/9/21 3/19/21 Yes Rylee Rivera MD   predniSONE (DELTASONE) 10 MG tablet Take 40 mg by mouth for 3 days 30 mg for 3 days 20 mg for 3 days 10 mg for 3 days.  3/9/21 3/19/21 Yes Rylee Rivera MD   dicyclomine (BENTYL) 10 MG capsule Take 1 capsule by mouth 4 times daily as needed (abd pain) 3/3/21  Yes Lorena Peña MD   cloNIDine (CATAPRES) 0.1 MG tablet TAKE 1 TABLET BY MOUTH TWICE DAILY 3/2/21  Yes Lorena Peña MD   rivaroxaban (XARELTO) 15 MG TABS tablet Take 1 tablet by mouth daily (with breakfast) 3/1/21  Yes Danny Garcia MD   lactobacillus (CULTURELLE) capsule Take 1 capsule by mouth 2 times daily (with meals) 2/28/21  Yes Danny Garcia MD   atorvastatin (LIPITOR) 80 MG tablet Take 1 tablet by mouth nightly 2/28/21  Yes Danny Garcia MD   rOPINIRole (REQUIP) 2 MG tablet Take 1 tablet by mouth 3 times daily PRN 1/6/21  Yes Historical Provider, MD   irbesartan (AVAPRO) 300 MG tablet TAKE 1 TABLET BY MOUTH EVERY NIGHT 1/29/21  Yes Lorena Peña MD   dilTIAZem (CARDIZEM CD) 240 MG extended release capsule Take 1 capsule by mouth daily 1/27/21  Yes Adam Rodríguez MD   fluticasone [facility]) 50 MCG/ACT nasal spray 2 sprays by Nasal route daily 1/27/21  Yes Adam Rodríguez MD   levothyroxine (SYNTHROID) 75 MCG tablet TAKE 1 TABLET EVERY DAY 1/26/21  Yes Adam Rodríguez MD   levalbuterol (XOPENEX) 0.63 MG/3ML nebulization INHALE CONTENTS OF 1 VIAL PER NEBULIZER EVERY 6 HOURS AS NEEDED FOR WHEEZING 9/14/20  Yes Mandeep Payton MD   propafenone (RYTHMOL) 150 MG tablet TAKE 1 TABLET BY MOUTH EVERY 8 HOURS 7/6/20  Yes KATHY Floyd CNP   Respiratory Therapy Supplies (NEBULIZER/TUBING/MOUTHPIECE) KIT 1 kit by Does not apply route 4 times daily as needed (shortness of breath) 3/26/20  Yes Medina Sports, MD   dilTIAZem (CARDIZEM) 30 MG tablet Take 2 tablets by mouth 2 times daily as needed (tachycardia with heart rate >100) 3/5/21   KATHY Floyd CNP   HYDROcodone-acetaminophen (NORCO) 5-325 MG per tablet Take 1 tablet by mouth every 6 hours as needed.     Historical Provider, MD   meclizine (ANTIVERT) 12.5 MG tablet TAKE 1 TABLET THREE TIMES DAILY AS NEEDED 1/27/21   Adam Rodríguez MD   budesonide-formoterol (SYMBICORT) 160-4.5 MCG/ACT AERO INHALE 2 PUFFS INTO THE LUNGS TWICE DAILY 1/20/21   Adam Rodríguez MD   albuterol sulfate HFA (PROAIR HFA) 108 (90 Base) MCG/ACT inhaler Inhale 2 puffs into the lungs every 6 hours as needed for Wheezing 10/25/19   Mandeep Payton MD   blood glucose monitor strips Test one time a day 7/22/19   Adam Rodríguez MD   Lancets MISC 1 each by Does not apply route 2 times daily 7/20/19   Yash Alston MD       Allergies   Allergen Reactions    Adhesive Tape Anaphylaxis    Cefaclor Nausea And Vomiting     Other reaction(s): Chest pain    Nsaids      Pt states she has hives and heart races   Other reaction(s): Tachycardia    Clinoril [Sulindac] Other (See Comments)     Stomach pain    Codeine      FEEL NUMB    Diclofenac     Diclofenac Sodium Hives    Diclofenac Sodium Hives    Hctz [Hydrochlorothiazide]      Sob    Ibuprofen Other (See Comments)     Other reaction(s): Tachycardia    Macrobid [Nitrofurantoin Macrocrystal]      nausea    Motrin [Ibuprofen Micronized] Other (See Comments)     Tachycardia      Pcn [Penicillins] Hives    Peach [Prunus Persica] Itching and Swelling     Cannot eat raw peaches       Social History:  Reviewed. reports that she quit smoking about 25 years ago. She started smoking about 70 years ago. She has a 45.00 pack-year smoking history. She has never used smokeless tobacco. She reports that she does not drink alcohol or use drugs. Family History:  Reviewed. Reviewed. No family history of SCD. Relevant and available labs, and cardiovascular diagnostics reviewed. Reviewed. I independently reviewed relevant and available cardiac diagnostic tests ECG, CXR, Echo, Stress test, Device interrogation, Holter, CT scan. Complex medical condition with multiple medical problems affecting prognosis and outcome of EP interventions    All questions and concerns were addressed to the patient/family. Alternatives to my treatment were discussed. I have discussed the above stated plan and the patient verbalized understanding and agreed with the plan. NOTE: This report was transcribed using voice recognition software. Every effort was made to ensure accuracy, however, inadvertent computerized transcription errors may be present. Chelle Bueno MD, MPH  [facility]   Office: [phone]  Fax: [phone]      H&P Update    I have reviewed the history and physical and examined the patient and updated with relevant changes. Consent: I have discussed with the patient and/or the patient representative the indication, alternatives, and the possible risks and/or complications of the planned procedure and the anesthesia methods.  The patient and/or patient representative appear to understand and agree to proceed. Vitals:    03/12/21 0937   BP: (!) 140/66   Pulse: 77   Resp: 18   Temp: 98.5 °F (36.9 °C)   SpO2: 99%     Prior to Admission medications    Medication Sig Start Date End Date Taking? Authorizing Provider   levalbuterol (XOPENEX) 0.63 MG/3ML nebulization Take 3 mLs by nebulization every 4 hours as needed for Wheezing DX:COPD J44.9 3/9/21  Yes Darryle Innocent, MD   doxycycline hyclate (VIBRAMYCIN) 100 MG capsule Take 1 capsule by mouth 2 times daily for 10 days 3/9/21 3/19/21 Yes Darryle Innocent, MD   predniSONE (DELTASONE) 10 MG tablet Take 40 mg by mouth for 3 days 30 mg for 3 days 20 mg for 3 days 10 mg for 3 days.  3/9/21 3/19/21 Yes Darryle Innocent, MD   dicyclomine (BENTYL) 10 MG capsule Take 1 capsule by mouth 4 times daily as needed (abd pain) 3/3/21  Yes Ale Ivey MD   cloNIDine (CATAPRES) 0.1 MG tablet TAKE 1 TABLET BY MOUTH TWICE DAILY 3/2/21  Yes Ale Ivey MD   rivaroxaban (XARELTO) 15 MG TABS tablet Take 1 tablet by mouth daily (with breakfast) 3/1/21  Yes Danny Hmumel MD   lactobacillus (CULTURELLE) capsule Take 1 capsule by mouth 2 times daily (with meals) 2/28/21  Yes Danny Hummel MD   atorvastatin (LIPITOR) 80 MG tablet Take 1 tablet by mouth nightly 2/28/21  Yes Danny Hummel MD   rOPINIRole (REQUIP) 2 MG tablet Take 1 tablet by mouth 3 times daily PRN 1/6/21  Yes Historical Provider, MD   irbesartan (AVAPRO) 300 MG tablet TAKE 1 TABLET BY MOUTH EVERY NIGHT 1/29/21  Yes Ale Ivey MD   dilTIAZem (CARDIZEM CD) 240 MG extended release capsule Take 1 capsule by mouth daily 1/27/21  Yes Ale Ivey MD   fluticasone [facility]) 50 MCG/ACT nasal spray 2 sprays by Nasal route daily 1/27/21  Yes Ale Ivey MD   levothyroxine (SYNTHROID) 75 MCG tablet TAKE 1 TABLET EVERY DAY 1/26/21  Yes Ale Ivey MD   levalbuterol (XOPENEX) 0.63 MG/3ML nebulization INHALE CONTENTS OF 1 VIAL PER NEBULIZER EVERY 6 HOURS AS NEEDED FOR WHEEZING 9/14/20  Diclofenac     Diclofenac Sodium Hives    Diclofenac Sodium Hives    Hctz [Hydrochlorothiazide]      Sob    Ibuprofen Other (See Comments)     Other reaction(s): Tachycardia    Macrobid [Nitrofurantoin Macrocrystal]      nausea    Motrin [Ibuprofen Micronized] Other (See Comments)     Tachycardia      Pcn [Penicillins] Hives    Peach [Prunus Persica] Itching and Swelling     Cannot eat raw peaches       Documentation and Exam:   I have personally completed a history, physical exam & review of systems for this patient (see notes).     Sedation will be provided by anesthesia team.     Electronically signed by Denise Ortiz MD on 3/12/2021 at 11:35 AM

## 2021-03-12 NOTE — ANESTHESIA POSTPROCEDURE EVALUATION
Department of Anesthesiology  Postprocedure Note    Patient: Too Mata  MRN: [id number]  YOB: 1935  Date of evaluation: 3/12/2021  Time:  12:35 PM     Procedure Summary     Date: 03/12/21 Room / Location: [facility] Cath Lab    Anesthesia Start: 4477 Anesthesia Stop: 6618    Procedure: CARDIOVERSION Diagnosis: Paroxysmal atrial fibrillation    Scheduled Providers:  Responsible Provider: Maura Dang MD    Anesthesia Type: MAC ASA Status: 4          Anesthesia Type: MAC    Patti Phase I:      Patti Phase II:      Last vitals: Reviewed and per EMR flowsheets.        Anesthesia Post Evaluation    Patient location during evaluation: PACU  Patient participation: complete - patient participated  Level of consciousness: awake and alert  Airway patency: patent  Nausea & Vomiting: no vomiting and no nausea  Complications: no  Cardiovascular status: hemodynamically stable  Respiratory status: acceptable  Hydration status: stable

## 2021-03-13 LAB
EKG ATRIAL RATE: 52 BPM
EKG DIAGNOSIS: NORMAL
EKG P AXIS: 27 DEGREES
EKG P-R INTERVAL: 188 MS
EKG Q-T INTERVAL: 444 MS
EKG QRS DURATION: 74 MS
EKG QTC CALCULATION (BAZETT): 412 MS
EKG R AXIS: 18 DEGREES
EKG T AXIS: 38 DEGREES
EKG VENTRICULAR RATE: 52 BPM

## 2021-03-13 PROCEDURE — 93010 ELECTROCARDIOGRAM REPORT: CPT | Performed by: INTERNAL MEDICINE

## 2021-03-15 ENCOUNTER — TELEPHONE (OUTPATIENT)
Dept: FAMILY MEDICINE CLINIC | Age: 86
End: 2021-03-15

## 2021-03-15 NOTE — TELEPHONE ENCOUNTER
Patient is stating that she is feeling better and wants to know if she can get the covid vaccine?         Please advise

## 2021-03-16 ENCOUNTER — IMMUNIZATION (OUTPATIENT)
Dept: FAMILY MEDICINE CLINIC | Age: 86
End: 2021-03-16
Payer: MEDICARE

## 2021-03-16 ENCOUNTER — CARE COORDINATION (OUTPATIENT)
Dept: CASE MANAGEMENT | Age: 86
End: 2021-03-16

## 2021-03-17 PROCEDURE — 0001A COVID-19, PFIZER VACCINE 30MCG/0.3ML DOSE: CPT | Performed by: FAMILY MEDICINE

## 2021-03-17 PROCEDURE — 91300 COVID-19, PFIZER VACCINE 30MCG/0.3ML DOSE: CPT | Performed by: FAMILY MEDICINE

## 2021-03-18 ENCOUNTER — CARE COORDINATION (OUTPATIENT)
Dept: CASE MANAGEMENT | Age: 86
End: 2021-03-18

## 2021-03-18 DIAGNOSIS — E11.9 CONTROLLED TYPE 2 DIABETES MELLITUS WITHOUT COMPLICATION, WITHOUT LONG-TERM CURRENT USE OF INSULIN (HCC): ICD-10-CM

## 2021-03-18 RX ORDER — GLUCOSAMINE HCL/CHONDROITIN SU 500-400 MG
CAPSULE ORAL
Qty: 100 STRIP | Refills: 5 | Status: SHIPPED | OUTPATIENT
Start: 2021-03-18 | End: 2022-07-11

## 2021-03-18 NOTE — TELEPHONE ENCOUNTER
Medication:   Requested Prescriptions     Pending Prescriptions Disp Refills    blood glucose monitor strips 100 strip 5     Sig: Test one time a day      Last Filled:      Patient Phone Number: [phone] (home)     Last appt: 3/3/2021   Next appt: 4/13/2021    Last OARRS:   RX Monitoring 3/20/2019   Attestation The Prescription Monitoring Report for this patient was reviewed today.    Periodic Controlled Substance Monitoring -     PDMP Monitoring:    Last PDMP Dalton Hannon as Reviewed [facility]):  Review User Review Instant Review Result   Rema Reyna 8/11/2020  2:43 PM Reviewed PDMP [1]     Preferred Pharmacy:   [facility] [address] [address]  Phone: [phone] Fax: [phone]

## 2021-03-18 NOTE — CARE COORDINATION
\"Keith\" called and left  for CTN confirming that she would reach out to Dr. Marie Even office today and request order to resume PT.

## 2021-03-19 ENCOUNTER — TELEPHONE (OUTPATIENT)
Dept: FAMILY MEDICINE CLINIC | Age: 86
End: 2021-03-19

## 2021-03-19 NOTE — TELEPHONE ENCOUNTER
[facility] from [facility] would like an order to continue Home Care, PT and OT      Please advise

## 2021-03-22 ENCOUNTER — TELEPHONE (OUTPATIENT)
Dept: FAMILY MEDICINE CLINIC | Age: 86
End: 2021-03-22

## 2021-03-22 ENCOUNTER — HOSPITAL ENCOUNTER (OUTPATIENT)
Age: 86
Discharge: HOME | End: 2021-03-22
Payer: MEDICARE

## 2021-03-22 LAB
ALBUMIN SERPL-MCNC: 3.9 G/DL (ref 3.4–5)
ALP BLD-CCNC: 130 U/L (ref 40–129)
ALT SERPL-CCNC: 20 U/L (ref 10–40)
AST SERPL-CCNC: 19 U/L (ref 15–37)
BILIRUB SERPL-MCNC: 0.4 MG/DL (ref 0–1)
BILIRUBIN DIRECT: <0.2 MG/DL (ref 0–0.3)
BILIRUBIN, INDIRECT: ABNORMAL MG/DL (ref 0–1)
TOTAL PROTEIN: 6.8 G/DL (ref 6.4–8.2)

## 2021-03-22 PROCEDURE — 36415 COLL VENOUS BLD VENIPUNCTURE: CPT

## 2021-03-22 PROCEDURE — 80076 HEPATIC FUNCTION PANEL: CPT

## 2021-03-22 RX ORDER — LORATADINE 10 MG/1
10 TABLET ORAL DAILY
Qty: 90 TABLET | Refills: 1 | Status: SHIPPED | OUTPATIENT
Start: 2021-03-22 | End: 2021-03-30 | Stop reason: ALTCHOICE

## 2021-03-22 NOTE — TELEPHONE ENCOUNTER
Patient would like to know if you can call in a prescription for Claritin.   The Mucinex causes her heart rate to increase      [facility] DRUG STORE [address], [address] [place] [phone]

## 2021-03-22 NOTE — TELEPHONE ENCOUNTER
Maria De Jesus PT from [address] [place] [address] requests verbal for patient to continue PT w/ frequency of 2 times a week for 3 weeks and 1 time a week for 1 week. Okay to leave message on secured VM.       Please advise

## 2021-03-23 ENCOUNTER — CARE COORDINATION (OUTPATIENT)
Dept: CASE MANAGEMENT | Age: 86
End: 2021-03-23

## 2021-03-24 DIAGNOSIS — F51.01 PRIMARY INSOMNIA: ICD-10-CM

## 2021-03-24 DIAGNOSIS — F41.9 ANXIETY: ICD-10-CM

## 2021-03-24 DIAGNOSIS — R06.2 WHEEZING: ICD-10-CM

## 2021-03-24 RX ORDER — BUDESONIDE AND FORMOTEROL FUMARATE DIHYDRATE 160; 4.5 UG/1; UG/1
AEROSOL RESPIRATORY (INHALATION)
Qty: 3 INHALER | Refills: 0 | Status: SHIPPED | OUTPATIENT
Start: 2021-03-24 | End: 2022-03-23 | Stop reason: SDUPTHER

## 2021-03-24 RX ORDER — LORAZEPAM 1 MG/1
TABLET ORAL
Qty: 60 TABLET | Refills: 0 | Status: SHIPPED | OUTPATIENT
Start: 2021-03-24 | End: 2021-04-23 | Stop reason: SDUPTHER

## 2021-03-24 RX ORDER — MECLIZINE HCL 12.5 MG/1
TABLET ORAL
Qty: 270 TABLET | Refills: 0 | Status: SHIPPED | OUTPATIENT
Start: 2021-03-24 | End: 2021-06-08

## 2021-03-24 NOTE — TELEPHONE ENCOUNTER
[id number] Korina Marquis for this? Medication:   Requested Prescriptions     Pending Prescriptions Disp Refills    LORazepam (ATIVAN) 1 MG tablet 60 tablet 0     Sig: TAKE 1/2 TABLET BY MOUTH TWICE DAILY AND 1 EVERY NIGHT AT BEDTIME AS NEEDED FOR ANXIETY      Last Filled:  1/27/21    Patient Phone Number: [phone] (home)     Last appt: 3/3/2021   Next appt: 4/13/2021    Last OARRS:   RX Monitoring 3/20/2019   Attestation The Prescription Monitoring Report for this patient was reviewed today.    Periodic Controlled Substance Monitoring -     PDMP Monitoring:    Last PDMP Cresenciano Lele as Reviewed [facility]):  Review User Review Instant Review Result   Mary Ellen Diaz 8/11/2020  2:43 PM Reviewed PDMP [1]     Preferred Pharmacy:     Morgan [address], [address] [address]  Phone: [phone] Fax: [phone]

## 2021-03-24 NOTE — TELEPHONE ENCOUNTER
Medication:   Requested Prescriptions     Pending Prescriptions Disp Refills    meclizine (ANTIVERT) 12.5 MG tablet [Pharmacy Med Name: MECLIZINE HCL 12.5 MG Tablet] 270 tablet 0     Sig: TAKE 1 TABLET THREE TIMES DAILY AS NEEDED      Last Filled:      Patient Phone Number: [phone] (home)     Last appt: 3/3/2021   Next appt: 4/13/2021    Last OARRS:   RX Monitoring 3/20/2019   Attestation The Prescription Monitoring Report for this patient was reviewed today.    Periodic Controlled Substance Monitoring -     PDMP Monitoring:    Last PDMP Johnston Lower as Reviewed [facility]):  Review User Review Instant Review Result   Saúl Dante 8/11/2020  2:43 PM Reviewed PDMP [1]     Preferred Pharmacy:   [facility], [address] [address]  Phone: [phone] Fax: [phone]    [facility] - [address] [phone] - F [phone]  18 [facility]  550 [facility] [address]  Phone: [phone] Fax: [phone] [facility] [place] [address] 6 [facility]i [facility] [phone]  2408 [facility] [address]  Phone: [phone] Fax: [phone]  [address] [address]  Phone: [phone] Fax: [phone]

## 2021-03-25 ENCOUNTER — CARE COORDINATION (OUTPATIENT)
Dept: CASE MANAGEMENT | Age: 86
End: 2021-03-25

## 2021-03-25 ENCOUNTER — TELEPHONE (OUTPATIENT)
Dept: FAMILY MEDICINE CLINIC | Age: 86
End: 2021-03-25

## 2021-03-25 NOTE — TELEPHONE ENCOUNTER
Yecenia Fiore from [address] [facility] care calling stating that patient's BP is running high. Physical therapist was there earlier this morning told her that her BP was high, but patient didn't remember the numbers, but Yecenia Fiore (nurse) states that BP was 192/92 LA  192/89 RA  - she switched cuffs and retook BP and her BP was 181/86 - LA  180/89 -RA   Said that she has taken all her BP meds this morning and hasn't missed any doses. Nurse states that she is having a HA and dizziness.

## 2021-03-30 ENCOUNTER — TELEPHONE (OUTPATIENT)
Dept: FAMILY MEDICINE CLINIC | Age: 86
End: 2021-03-30

## 2021-03-30 RX ORDER — CETIRIZINE HYDROCHLORIDE 10 MG/1
10 TABLET ORAL DAILY
Qty: 30 TABLET | Refills: 5 | Status: SHIPPED | OUTPATIENT
Start: 2021-03-30 | End: 2021-07-20

## 2021-03-30 NOTE — TELEPHONE ENCOUNTER
Patient states the Claritin is not helping her sinus, she is still congested. She wants to know if she can have something else called into the pharmacy.     [facility] DRUG STORE [address], [address] [place] [phone]       Please advise

## 2021-03-31 ENCOUNTER — CARE COORDINATION (OUTPATIENT)
Dept: CASE MANAGEMENT | Age: 86
End: 2021-03-31

## 2021-04-01 ENCOUNTER — HOSPITAL ENCOUNTER (OUTPATIENT)
Age: 86
Discharge: HOME | End: 2021-04-01
Payer: MEDICARE

## 2021-04-01 ENCOUNTER — HOSPITAL ENCOUNTER (OUTPATIENT)
Dept: GENERAL RADIOLOGY | Age: 86
Discharge: HOME | End: 2021-04-01
Payer: MEDICARE

## 2021-04-01 ENCOUNTER — TELEPHONE (OUTPATIENT)
Dept: PULMONOLOGY | Age: 86
End: 2021-04-01

## 2021-04-01 DIAGNOSIS — R06.02 SOB (SHORTNESS OF BREATH): Primary | ICD-10-CM

## 2021-04-01 DIAGNOSIS — R06.02 SOB (SHORTNESS OF BREATH): ICD-10-CM

## 2021-04-01 PROCEDURE — 71046 X-RAY EXAM CHEST 2 VIEWS: CPT

## 2021-04-01 RX ORDER — LEVOFLOXACIN 750 MG/1
750 TABLET ORAL DAILY
Qty: 7 TABLET | Refills: 0 | Status: SHIPPED | OUTPATIENT
Start: 2021-04-01 | End: 2021-04-08

## 2021-04-01 NOTE — TELEPHONE ENCOUNTER
Pt daughter called and said that pt is coughing up reddish brown mucas that started 2 days ago. When she walks with 02 on her 02 drops down to the 80's and she get sob, no fever, bp is good. Please call to discuss.

## 2021-04-02 ENCOUNTER — CARE COORDINATION (OUTPATIENT)
Dept: CASE MANAGEMENT | Age: 86
End: 2021-04-02

## 2021-04-02 NOTE — CARE COORDINATION
Edd 45 Transitions Follow Up Call    2021    Patient: Eh High  Patient : 1935   MRN: [id number]  Reason for Admission:   Discharge Date: 3/12/21 RARS: Readmission Risk Score: 25         Spoke with: pt's daughter, HIPAA verified    Care Transitions Subsequent and Final Call    Subsequent and Final Calls  Do you have any ongoing symptoms?: Yes  Onset of Patient-reported symptoms: Other  Patient-reported symptoms: Cough  Interventions for patient-reported symptoms: Other  Have your medications changed?: Yes  Patient Reports: levaquin  Do you have any questions related to your medications?: No  Do you currently have any active services?: Yes  Are you currently active with any services?: Home Health  Do you have any needs or concerns that I can assist you with?: No  Identified Barriers: None  Care Transitions Interventions  No Identified Needs  Other Interventions:         Pt's daughter states pt is doing ok, she may have PNA again, productive cough and oximeter readings are in the 80s at times. Dr. Eric Arreaga ordered a chest xray and Levaquin. Had chest xray done, waiting on results. Taking antibiotic as directed. F/U appts listed below.  Agreed to more CTC f/u calls      Follow Up  Future Appointments   Date Time Provider Alissa Bustillo   2021  3:00 PM Klarissa DESOUZA, PFIZER 21 DAY SECOND DOSE Ingrid Butler [facility]   2021  3:15 PM Brit Gutierrez MD [facility]   2021  2:30 PM Breezy Hayes, APRN - CNP FF Cardio [facility]   2021  3:30 PM Geri Madrid MD PULM [facility] CC CHARLES Kaur RN

## 2021-04-06 ENCOUNTER — IMMUNIZATION (OUTPATIENT)
Dept: FAMILY MEDICINE CLINIC | Age: 86
End: 2021-04-06
Payer: MEDICARE

## 2021-04-06 RX ORDER — PROPAFENONE HYDROCHLORIDE 150 MG/1
150 TABLET, FILM COATED ORAL EVERY 8 HOURS SCHEDULED
Qty: 270 TABLET | Refills: 1 | Status: SHIPPED | OUTPATIENT
Start: 2021-04-06 | End: 2021-12-22 | Stop reason: SDUPTHER

## 2021-04-07 ENCOUNTER — CARE COORDINATION (OUTPATIENT)
Dept: CASE MANAGEMENT | Age: 86
End: 2021-04-07

## 2021-04-07 RX ORDER — CLONIDINE HYDROCHLORIDE 0.1 MG/1
TABLET ORAL
Qty: 60 TABLET | Refills: 0 | Status: ON HOLD | OUTPATIENT
Start: 2021-04-07 | End: 2021-08-13

## 2021-04-09 PROCEDURE — 0002A COVID-19, PFIZER VACCINE 30MCG/0.3ML DOSE: CPT | Performed by: FAMILY MEDICINE

## 2021-04-09 PROCEDURE — 91300 COVID-19, PFIZER VACCINE 30MCG/0.3ML DOSE: CPT | Performed by: FAMILY MEDICINE

## 2021-04-12 DIAGNOSIS — M15.9 PRIMARY OSTEOARTHRITIS INVOLVING MULTIPLE JOINTS: ICD-10-CM

## 2021-04-12 RX ORDER — HYDROCODONE BITARTRATE AND ACETAMINOPHEN 5; 325 MG/1; MG/1
1 TABLET ORAL 4 TIMES DAILY PRN
Qty: 120 TABLET | Refills: 0 | Status: SHIPPED | OUTPATIENT
Start: 2021-04-12 | End: 2021-05-12 | Stop reason: SDUPTHER

## 2021-04-12 NOTE — TELEPHONE ENCOUNTER
HYDROcodone-acetaminophen (Cherl Teetee) 5-325 MG per tablet [[id number]] 238 [facility] [address], [address] F [phone]

## 2021-04-12 NOTE — TELEPHONE ENCOUNTER
Medication:   Requested Prescriptions     Pending Prescriptions Disp Refills    HYDROcodone-acetaminophen (NORCO) 5-325 MG per tablet 120 tablet 0     Sig: Take 1 tablet by mouth 4 times daily as needed for Pain for up to 30 days. Last Filled:  3/1/21  Patient Phone Number: [phone] (home)     Last appt: 3/3/2021   Next appt: 4/13/2021    Last OARRS:   RX Monitoring 3/20/2019   Attestation The Prescription Monitoring Report for this patient was reviewed today.    Periodic Controlled Substance Monitoring -     PDMP Monitoring:    Last PDMP [employer] as Reviewed [facility]):  Review User Review Instant Review Result   Lori Saxena 8/11/2020  2:43 PM Reviewed PDMP [1]     Preferred Pharmacy:     Ludy [address], [address] [address]  Phone: [phone] Fax: [phone]

## 2021-04-13 ENCOUNTER — OFFICE VISIT (OUTPATIENT)
Dept: FAMILY MEDICINE CLINIC | Age: 86
End: 2021-04-13
Payer: MEDICARE

## 2021-04-13 VITALS
DIASTOLIC BLOOD PRESSURE: 70 MMHG | SYSTOLIC BLOOD PRESSURE: 122 MMHG | HEART RATE: 61 BPM | TEMPERATURE: 97.3 F | OXYGEN SATURATION: 98 % | WEIGHT: 175.38 LBS | BODY MASS INDEX: 32.08 KG/M2 | RESPIRATION RATE: 16 BRPM

## 2021-04-13 DIAGNOSIS — I48.21 PERMANENT ATRIAL FIBRILLATION (HCC): ICD-10-CM

## 2021-04-13 DIAGNOSIS — R79.89 ELEVATED LIVER FUNCTION TESTS: ICD-10-CM

## 2021-04-13 DIAGNOSIS — I25.119 CORONARY ARTERY DISEASE INVOLVING NATIVE CORONARY ARTERY OF NATIVE HEART WITH ANGINA PECTORIS (HCC): ICD-10-CM

## 2021-04-13 DIAGNOSIS — J96.11 CHRONIC RESPIRATORY FAILURE WITH HYPOXIA (HCC): ICD-10-CM

## 2021-04-13 DIAGNOSIS — M75.52 ACUTE BURSITIS OF LEFT SHOULDER: Primary | ICD-10-CM

## 2021-04-13 PROCEDURE — G8427 DOCREV CUR MEDS BY ELIG CLIN: HCPCS | Performed by: FAMILY MEDICINE

## 2021-04-13 PROCEDURE — G8399 PT W/DXA RESULTS DOCUMENT: HCPCS | Performed by: FAMILY MEDICINE

## 2021-04-13 PROCEDURE — 4040F PNEUMOC VAC/ADMIN/RCVD: CPT | Performed by: FAMILY MEDICINE

## 2021-04-13 PROCEDURE — 1090F PRES/ABSN URINE INCON ASSESS: CPT | Performed by: FAMILY MEDICINE

## 2021-04-13 PROCEDURE — G8417 CALC BMI ABV UP PARAM F/U: HCPCS | Performed by: FAMILY MEDICINE

## 2021-04-13 PROCEDURE — 99214 OFFICE O/P EST MOD 30 MIN: CPT | Performed by: FAMILY MEDICINE

## 2021-04-13 PROCEDURE — 1123F ACP DISCUSS/DSCN MKR DOCD: CPT | Performed by: FAMILY MEDICINE

## 2021-04-13 PROCEDURE — 1036F TOBACCO NON-USER: CPT | Performed by: FAMILY MEDICINE

## 2021-04-13 PROCEDURE — 20610 DRAIN/INJ JOINT/BURSA W/O US: CPT | Performed by: FAMILY MEDICINE

## 2021-04-13 RX ORDER — TRIAMCINOLONE ACETONIDE 40 MG/ML
40 INJECTION, SUSPENSION INTRA-ARTICULAR; INTRAMUSCULAR ONCE
Status: COMPLETED | OUTPATIENT
Start: 2021-04-13 | End: 2021-04-13

## 2021-04-13 RX ADMIN — TRIAMCINOLONE ACETONIDE 40 MG: 40 INJECTION, SUSPENSION INTRA-ARTICULAR; INTRAMUSCULAR at 16:51

## 2021-04-13 NOTE — PROGRESS NOTES
Arthrocentesis Procedure Note    Pre-operative Diagnosis:   1. Acute bursitis of left shoulder    2. Coronary artery disease involving native coronary artery of native heart with angina pectoris ([address]    3. Chronic respiratory failure with hypoxia (HCC)    4. Elevated liver function tests    5. Permanent atrial fibrillation (HCC)        Post-operative Diagnosis: same    Locations:left shoulder    Procedure Details   After consent was obtained, using sterile technique the subacromial bursa of the   left shoulder was prepped and surrounding area with a sterile prep using chloraprep and draped in the usual sterile fashion. .  Kenalog  40 mg and 1 ml 0.5% Marcaine was then injected and the needle withdrawn. The procedure was well tolerated. Sterile dressing applied to site. Complications: none. Plan:  1. The patient was instructed to continue to rest the joint for a few more days before resuming regular activities. It may be more painful for the first 1-2 days. 2. Watch for fever, or increased swelling or persistent pain in the joint. 3. Call or return to clinic prn if such symptoms occur or there is failure to improve as anticipated. 4. Recommended that the patient use OTC analgesics as needed for pain.

## 2021-04-13 NOTE — PROGRESS NOTES
Medication given during visit:    Administrations This Visit     triamcinolone acetonide (KENALOG-40) injection 40 mg     Admin Date  04/13/2021  16:51 Action  Given Dose  40 mg Route  Intra-articular Site  Shoulder Left Administered By  Margaux Kenney    Ordering Provider: Lorena Peña MD    NDC: 0274-0785-20    Lot#: [id number]    : [employer] (PRIMARY CARE)    Patient Supplied?: No    Comments: EXP 04/2022            atient instructed to remain in clinic for 20 minutes after injection and was advised to report any adverse reaction to me immediately. Subjective:      Patient ID: Lora Valles is a 80 y.o. female. CC: Patient presents for re-evaluation of chronic health problems including left shoulder pain, COPD with hypoxia, elevated liver function test, atrial fibrillation coronary disease. HPI pt is here for a follow up in accompaniment of daughter. Since last office evaluation patient was evaluated by cardiology and found to have atrial fibrillation with a rapid ventricular response. She then underwent a cardioversion March 12 and since that time her heart rate is more stable for her. She was reevaluated with pulmonology and originally treated with doxycycline but then she still had persistent cough and underwent a chest x-ray demonstrating near atelectasis of both lung bases and is now completed Levaquin medication. She is requiring oxygen at 2 L nasal cannula. She still feels her cough is persistent nature although not as severe since she is completing antibiotic therapy. Patient been compliant with her medications with no adverse reactions. She is having increasing left shoulder pain and discomfort having difficulty sleeping on the left side. She has discomfort with any overhead maneuvers and reaching.     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, without long-term current use of insulin (HCC)    Restless legs syndrome    ANTHONY (obstructive sleep apnea)    Allergic rhinitis    COPD, mild (HCC)    Vertigo    Interstitial lung disease ([facility])    Chronic cough    [facility]ctasis without complication [facility]    Coronary artery disease involving native coronary artery of native heart with angina pectoris ([facility])    A-fib (HCC)    Hypothyroid    Chronic respiratory failure with hypoxia ([facility])    SOB (shortness of breath)    Anxiety    Asthmatic bronchitis    Ataxia    Pneumonia       Outpatient Medications Marked as Taking for the 4/13/21 encounter (Office Visit) with Shakir Rudolph MD   Medication Sig Dispense Refill    HYDROcodone-acetaminophen (NORCO) 5-325 MG per tablet Take 1 tablet by mouth 4 times daily as needed for Pain for up to 30 days.  120 tablet 0    cloNIDine (CATAPRES) 0.1 MG tablet TAKE 1 TABLET BY MOUTH TWICE DAILY 60 tablet 0    propafenone (RYTHMOL) 150 MG tablet Take 1 tablet by mouth every 8 hours 270 tablet 1    cetirizine (ZYRTEC) 10 MG tablet Take 1 tablet by mouth daily 30 tablet 5    LORazepam (ATIVAN) 1 MG tablet TAKE 1/2 TABLET BY MOUTH TWICE DAILY AND 1 EVERY NIGHT AT BEDTIME AS NEEDED FOR ANXIETY 60 tablet 0    meclizine (ANTIVERT) 12.5 MG tablet TAKE 1 TABLET THREE TIMES DAILY AS NEEDED 270 tablet 0    budesonide-formoterol (SYMBICORT) 160-4.5 MCG/ACT AERO INHALE 2 PUFFS INTO THE LUNGS TWICE DAILY 3 Inhaler 0    blood glucose monitor strips Test one time a day 100 strip 5    dilTIAZem (CARDIZEM) 30 MG tablet Take 2 tablets by mouth 2 times daily as needed (tachycardia with heart rate >100) 60 tablet 0    dicyclomine (BENTYL) 10 MG capsule Take 1 capsule by mouth 4 times daily as needed (abd pain) 360 capsule 1    rivaroxaban (XARELTO) 15 MG TABS tablet Take 1 tablet by mouth daily (with breakfast) 42 tablet 0 coronary artery of native heart with angina pectoris (HCC)    Chronic respiratory failure with hypoxia (HCC)    Elevated liver function tests    Permanent atrial fibrillation (HCC)    Other orders  -     triamcinolone acetonide (KENALOG-40) injection 40 mg            Plan:      Hospital information and chest x-ray reviewed with patient and daughter. Advised her to go to maintain her oxygen therapy and continue with respiratory care. Liver function test that were elevated during hospitalization have improved significantly almost back to normal.    Patient wishes to proceed with arthrocentesis of the left shoulder. Atrial fibrillation is now controlled with the current heart rate    Keep follow-up RTC appointment with cardiology in 1 month    RTC 3 months    Medical decision making of moderate complexity. Please note that this chart was generated using Dragon dictation software. Although every effort was made to ensure the accuracy of this automated transcription, some errors in transcription may have occurred.

## 2021-04-14 PROBLEM — J18.9 PNEUMONIA: Status: RESOLVED | Noted: 2021-02-22 | Resolved: 2021-04-14

## 2021-04-23 DIAGNOSIS — F41.9 ANXIETY: ICD-10-CM

## 2021-04-23 DIAGNOSIS — F51.01 PRIMARY INSOMNIA: ICD-10-CM

## 2021-04-23 RX ORDER — IRBESARTAN 300 MG/1
TABLET ORAL
Qty: 90 TABLET | Refills: 0 | Status: SHIPPED | OUTPATIENT
Start: 2021-04-23 | End: 2021-07-20

## 2021-04-23 RX ORDER — LORAZEPAM 1 MG/1
TABLET ORAL
Qty: 60 TABLET | Refills: 2 | Status: SHIPPED | OUTPATIENT
Start: 2021-04-23 | End: 2021-07-15 | Stop reason: SDUPTHER

## 2021-04-23 NOTE — TELEPHONE ENCOUNTER
Medication:   Requested Prescriptions     Pending Prescriptions Disp Refills    LORazepam (ATIVAN) 1 MG tablet 60 tablet 0     Sig: TAKE 1/2 TABLET BY MOUTH TWICE DAILY AND 1 EVERY NIGHT AT BEDTIME AS NEEDED FOR ANXIETY      Last Filled:  3/24/2021    Patient Phone Number: [phone] (home)     Last appt: 4/13/2021   Next appt: 7/13/2021    Last OARRS:   RX Monitoring 3/20/2019   Attestation The Prescription Monitoring Report for this patient was reviewed today.    Periodic Controlled Substance Monitoring -     PDMP Monitoring:    Last PDMP Lucius Montesinos as Reviewed [facility]):  Review User Review Instant Review Result   Heather Jane 8/11/2020  2:43 PM Reviewed PDMP [1]     Preferred Pharmacy:   [facility] ST[facility] [address], [address] [address]  Phone: [phone] Fax: [phone]    [facility] - [place] [phone] - F [phone]  18 [facility]  550 [facility] [address]  Phone: [phone] Fax: 2107 [facility] [address] Rosana [address] [phone]  2400 [facility] [address]  Phone: [phone] Fax: 569  [address] [address]  Phone: [phone] Fax: [phone]

## 2021-04-23 NOTE — TELEPHONE ENCOUNTER
LORazepam (ATIVAN) 1 MG tablet [[id number]    [facility] DRUG STORE [address], [address] [facility] Flip Antwon Smith [phone]   [address] [address]   Phone:  [phone]  Fax:  [phone]

## 2021-04-26 RX ORDER — NITROFURANTOIN 25; 75 MG/1; MG/1
100 CAPSULE ORAL 2 TIMES DAILY
Qty: 10 CAPSULE | Refills: 0 | OUTPATIENT
Start: 2021-04-26 | End: 2021-05-01

## 2021-04-26 RX ORDER — SULFAMETHOXAZOLE AND TRIMETHOPRIM 800; 160 MG/1; MG/1
1 TABLET ORAL 2 TIMES DAILY
Qty: 10 TABLET | Refills: 0 | Status: SHIPPED | OUTPATIENT
Start: 2021-04-26 | End: 2021-05-01

## 2021-04-26 NOTE — TELEPHONE ENCOUNTER
Patient called with concerns that she has burning when she urinates and would like to know if you will call in something for her            [facility]""NAYELI [address], [address] [place] [id number]   Phone:  [phone]  Fax:  [phone]

## 2021-04-27 RX ORDER — CIPROFLOXACIN 500 MG/1
500 TABLET, FILM COATED ORAL 2 TIMES DAILY
Qty: 6 TABLET | Refills: 0 | Status: SHIPPED | OUTPATIENT
Start: 2021-04-27 | End: 2021-04-30

## 2021-04-27 NOTE — TELEPHONE ENCOUNTER
Patient states that the medication that was called in yesterday for her for her bladder infection has sulfa.   She cant take it she says its making her sick and gives her diarrhea      Please advise        Mally in chart

## 2021-04-27 NOTE — TELEPHONE ENCOUNTER
This is not listed on her allergies and really is more of an intolerance.   Change to Cipro 500 mg twice daily #6

## 2021-04-29 ENCOUNTER — TELEPHONE (OUTPATIENT)
Dept: FAMILY MEDICINE CLINIC | Age: 86
End: 2021-04-29

## 2021-04-29 NOTE — TELEPHONE ENCOUNTER
Gema [facility] [address] is calling to state that the provider Ann Wood was out to see the patient . She  states that she needs Humidified oxygen to be ordered. She had noticed that her sinus cavity was dry and that she is going to start having nose bleeds if she doesn't get this. They were told that Dr. Peres Client orders her oxygen.       Please advise      Provider out of the office

## 2021-04-30 NOTE — TELEPHONE ENCOUNTER
Talked to [facility] and they will be sending out the equipment to the patient. Patient advised of this.

## 2021-05-03 DIAGNOSIS — R06.2 WHEEZING: ICD-10-CM

## 2021-05-03 RX ORDER — BUDESONIDE AND FORMOTEROL FUMARATE DIHYDRATE 160; 4.5 UG/1; UG/1
AEROSOL RESPIRATORY (INHALATION)
Qty: 1 INHALER | OUTPATIENT
Start: 2021-05-03

## 2021-05-05 NOTE — PROGRESS NOTES
[facility]   Electrophysiology  KATHY Stevens-MU  Attending EP: Dr. Mauricio Teran    Date: 5/12/2021  I had the privilege of visiting Mendel Memos in the office. Chief Complaint:   Chief Complaint   Patient presents with    Follow-up     post CV     History of Present Illness: History obtained from patient and medical record. Mendel Memos is 80 y.o. female with a past medical history of HTN, HLD, DM, COPD, hypothyroid, and atrial fibrillation. S/p TRACEY/DCCV (8/31/18, Dr. Kaylie Cano) and placed on propafenone and Xarelto without recurrence until now. In November of 2019, she was admitted for afib with RVR accompanied by sudden onset palpitations, fatigue, and SOB. She was also being treated for PNA. She developed bradycardia with IV cardizem. She converted to SR spontaneously. S/p DCCV (3/12/21)    -Interval history: Today, Mendel Memos is being seen for routine follow up. Her daughter is present for the visit. She does not feel well and has felt poorly for several days. She feels very dizzy with standing. Pt is in sinus rhythm on EKG. Pt denies any recurrent AF Her BP is extremely elevated, 192/72. Upon my manual recheck, she was 176/70. She states it is generally very well controlled at home when she checks it. Pt reports that she had a GI bug for several days with a lot of diarrhea, which stopped yesterday. She tries to drink plenty of fluids, but her daughter does not think she stays well hydrated. Pt is very restricted by her COPD and wears 2L of oxygen continuously. Denies having chest pain, palpitations, shortness of breath, orthopnea/PND, cough, or dizziness at the time of this visit. With regard to medication therapy the patient has been compliant with prescribed regimen. They have tolerated therapy to date. Allergies:   Allergies   Allergen Reactions    Adhesive Tape Anaphylaxis    Cefaclor Nausea And Vomiting     Other reaction(s): Chest pain    Nsaids Pt states she has hives and heart races   Other reaction(s): Tachycardia    Clinoril [Sulindac] Other (See Comments)     Stomach pain    Codeine      FEEL NUMB    Diclofenac     Diclofenac Sodium Hives    Diclofenac Sodium Hives    Hctz [Hydrochlorothiazide]      Sob    Ibuprofen Other (See Comments)     Other reaction(s): Tachycardia    Macrobid [Nitrofurantoin Macrocrystal]      nausea    Motrin [Ibuprofen Micronized] Other (See Comments)     Tachycardia      Pcn [Penicillins] Hives    Peach [Prunus Persica] Itching and Swelling     Cannot eat raw peaches    Sulfa Antibiotics      Nausea, diarrhea     Home Medications:  Prior to Visit Medications    Medication Sig Taking? Authorizing Provider   OXYGEN Inhale 2 L into the lungs Yes Historical Provider, MD   XARELTO 15 MG TABS tablet TAKE 1 TABLET EVERY DAY Yes Lyric Markham APRN - CNP   irbesartan (AVAPRO) 300 MG tablet TAKE 1 TABLET BY MOUTH EVERY NIGHT Yes Ale Ivey MD   LORazepam (ATIVAN) 1 MG tablet TAKE 1/2 TABLET BY MOUTH TWICE DAILY AND 1 EVERY NIGHT AT BEDTIME AS NEEDED FOR ANXIETY Yes Ale Ivey MD   HYDROcodone-acetaminophen (NORCO) 5-325 MG per tablet Take 1 tablet by mouth 4 times daily as needed for Pain for up to 30 days.  Yes Ale Ivey MD   cloNIDine (CATAPRES) 0.1 MG tablet TAKE 1 TABLET BY MOUTH TWICE DAILY Yes Ale Ivey MD   propafenone (RYTHMOL) 150 MG tablet Take 1 tablet by mouth every 8 hours Yes Ale Ivey MD   cetirizine (ZYRTEC) 10 MG tablet Take 1 tablet by mouth daily Yes Ale Ivey MD   meclizine (ANTIVERT) 12.5 MG tablet TAKE 1 TABLET THREE TIMES DAILY AS NEEDED Yes Ale Ivey MD   budesonide-formoterol (SYMBICORT) 160-4.5 MCG/ACT AERO INHALE 2 PUFFS INTO THE LUNGS TWICE DAILY Yes Ale Ivey MD   blood glucose monitor strips Test one time a day Yes Ale Ivey MD   dilTIAZem (CARDIZEM) 30 MG tablet Take 2 tablets by mouth 2 times daily as needed (tachycardia with heart rate >100) Yes KATHY Hwang - CNP   dicyclomine (BENTYL) 10 MG capsule Take 1 capsule by mouth 4 times daily as needed (abd pain) Yes Dayanara Sun MD   lactobacillus (CULTURELLE) capsule Take 1 capsule by mouth 2 times daily (with meals) Yes Danny CANNON MD   atorvastatin (LIPITOR) 80 MG tablet Take 1 tablet by mouth nightly Yes Danny CANNON MD   HYDROcodone-acetaminophen (NORCO) 5-325 MG per tablet Take 1 tablet by mouth every 6 hours as needed.  Yes Historical Provider, MD   rOPINIRole (REQUIP) 2 MG tablet Take 1 tablet by mouth 3 times daily PRN Yes Historical Provider, MD   dilTIAZem (CARDIZEM CD) 240 MG extended release capsule Take 1 capsule by mouth daily Yes Dayanara Sun MD   fluticasone (FLONASE) 50 MCG/ACT nasal spray 2 sprays by Nasal route daily Yes Dayanara Sun MD   levothyroxine (SYNTHROID) 75 MCG tablet TAKE 1 TABLET EVERY DAY Yes Dayanara Sun MD   levalbuterol (XOPENEX) 0.63 MG/3ML nebulization INHALE CONTENTS OF 1 VIAL PER NEBULIZER EVERY 6 HOURS AS NEEDED FOR WHEEZING Yes Win Montes De Oca MD   Respiratory Therapy Supplies (NEBULIZER/TUBING/MOUTHPIECE) KIT 1 kit by Does not apply route 4 times daily as needed (shortness of breath) Yes Dayanara Sun MD   albuterol sulfate HFA (PROAIR HFA) 108 (90 Base) MCG/ACT inhaler Inhale 2 puffs into the lungs every 6 hours as needed for Wheezing Yes Win Montes De Oca MD   Lancets MISC 1 each by Does not apply route 2 times daily Yes Micheal Daugherty MD      Past Medical History:  Past Medical History:   Diagnosis Date    Arthritis     [facility]     Diabetes mellitus type II, controlled ([facility]     GERD (gastroesophageal reflux disease)     HTN (hypertension)     Hyperlipidemia     Hypothyroid     Insomnia     Lumbago     SVT (supraventricular tachycardia) ([facility]      Past Surgical History:    has a past surgical history that includes Appendectomy; Colonoscopy; and rhonchi, or crackles. On 2L of oxygen (chronic)  Cardiovascular:  Regular rate and rhythm. S1/S2 present without murmurs, rubs, or gallops. Peripheral pulses 2+, capillary refill < 3 seconds. No elevation of JVP. No peripheral edema  Gastrointestinal: Abdomen soft and round. Bowel sounds normoactive in all quadrants without tenderness or masses. Musculoskeletal: Bilateral upper and lower extremity strength 5/5 with full ROM. In wheel chair  Neurological/Psych: Awake and orientated to person, place and time. Calm affect, appropriate mood. Pertinent labs, diagnostic, device, and imaging results reviewed as a part of this visit    LABS    CBC:   Lab Results   Component Value Date    WBC 9.9 2021    HGB 11.2 (L) 2021    HCT 35.5 (L) 2021    MCV 96.3 2021     2021     BMP:   Lab Results   Component Value Date    CREATININE 0.9 2021    BUN 23 (H) 2021     2021    K 4.1 2021     2021    CO2 22 2021     Estimated Creatinine Clearance: 45 mL/min (based on SCr of 0.9 mg/dL). Thyroid:   Lab Results   Component Value Date    TSH 2.50 2020     Lipid Panel:   Lab Results   Component Value Date    CHOL 224 2017    HDL 39 2017    HDL 32 2011    TRIG 226 2017     LFTs:  Lab Results   Component Value Date    ALT 20 2021    AST 19 2021     (H) 2021    ALKPHOS 130 (H) 2021    BILITOT 0.4 2021     Coags:   Lab Results   Component Value Date    PROTIME 18.4 (H) 2021    INR 1.58 (H) 2021    APTT 41.2 (H) 2020       EC2021  - NSR, rate 61. QTc 415,     Echo:   Left ventricle - normal size, thickness and function with EF of 60%  Aortic valve - sclerotic, mild regurgitation    GXT:   Normal LV function and no evidence of ischemia    LHC: 2004  Nonobstructive CAD    Assessment:    1.  Paroxysmal Atrial Fibrillation  - S/p TRACEY/DCCV (); DCCV (3/12/21)  - Currently in NSR  - Continue cardizem 60 mg BID (may take extra 30 mg BID for HR >100)  - On propafenone 150 mg TID    ~     - [id number] score:high ; [id number] Vasc score and anticoagulation discussed. High risk for stroke and thromboembolism. Anticoagulation is recommended. Risk of bleeding was discussed.  ~ On Xarelto 15 mg QD; tolerating well    - Afib risk factors including age, HTN, obesity, inactivity and ANTHONY were discussed with patient. Risk factor modification recommended   ~ TSH 3.96 (9/19)      - Treatment options including cardioversion, rate control strategy, antiarrhythmics, anticoagulation and possible ablation were discussed with patient. Risks, benefits and alternative of each treatment options were explained. All questions answered    ~ Pt not interested in ablation and poor candidate for her age and co-morbidities    2. HTN  - Uncontrolled: Goal <140/80 (Given age and fall risk)  - Continue current medications   ~ Instructed to take extra dose of clonidine when she gets home  - Instructed to monitor BP at home 1-2 hours after her morning medications   ~ Pt will call if SBP consistently >140  - Discussed importance of low sodium diet, weight control and exercise    3. Dizziness   - Likely secondary to hypovolemia due to recent GI issues/diarrhea   - Encourage adequate fluid intake   - Instructed to call if dizziness worsens or does not improve    4. Hyperlipidemia  - Uncontrolled ? Goal: LDL <100   ~  (2017); needs checked  - On atorvastatin 80 mg QD  - Discussed diet, weight loss, and exercise needs  - Followed per PCP    5. PAD   - On Xarelto, Statin    6. ANTHONY  - Stable: Uses CPAP  - Encourage to use CPAP to prevent long term effects of untreated ANTHONY    7. COPD  - Stable, wears 2L oxygen baseline  - Continue medical therapy and inhalers  - Followed by pulmonary    Plan:  1. Take a dose of clonidine when you get home  2. Call if blood pressure consistently >150  3.

## 2021-05-12 ENCOUNTER — OFFICE VISIT (OUTPATIENT)
Dept: CARDIOLOGY CLINIC | Age: 86
End: 2021-05-12
Payer: MEDICARE

## 2021-05-12 VITALS
SYSTOLIC BLOOD PRESSURE: 176 MMHG | HEIGHT: 63 IN | BODY MASS INDEX: 30.48 KG/M2 | DIASTOLIC BLOOD PRESSURE: 70 MMHG | OXYGEN SATURATION: 99 % | HEART RATE: 62 BPM | WEIGHT: 172 LBS

## 2021-05-12 DIAGNOSIS — G47.33 OSA (OBSTRUCTIVE SLEEP APNEA): ICD-10-CM

## 2021-05-12 DIAGNOSIS — M15.9 PRIMARY OSTEOARTHRITIS INVOLVING MULTIPLE JOINTS: ICD-10-CM

## 2021-05-12 DIAGNOSIS — J44.9 COPD, MILD (HCC): ICD-10-CM

## 2021-05-12 DIAGNOSIS — I10 HYPERTENSION, UNSPECIFIED TYPE: ICD-10-CM

## 2021-05-12 DIAGNOSIS — E78.5 HYPERLIPIDEMIA, UNSPECIFIED HYPERLIPIDEMIA TYPE: ICD-10-CM

## 2021-05-12 DIAGNOSIS — I48.21 PERMANENT ATRIAL FIBRILLATION (HCC): Primary | ICD-10-CM

## 2021-05-12 DIAGNOSIS — I25.119 CORONARY ARTERY DISEASE INVOLVING NATIVE CORONARY ARTERY OF NATIVE HEART WITH ANGINA PECTORIS (HCC): ICD-10-CM

## 2021-05-12 PROBLEM — J47.9 BRONCHIECTASIS WITHOUT COMPLICATION (HCC): Status: RESOLVED | Noted: 2019-10-25 | Resolved: 2021-05-12

## 2021-05-12 PROCEDURE — 1036F TOBACCO NON-USER: CPT | Performed by: NURSE PRACTITIONER

## 2021-05-12 PROCEDURE — 93000 ELECTROCARDIOGRAM COMPLETE: CPT | Performed by: NURSE PRACTITIONER

## 2021-05-12 PROCEDURE — 4040F PNEUMOC VAC/ADMIN/RCVD: CPT | Performed by: NURSE PRACTITIONER

## 2021-05-12 PROCEDURE — G8417 CALC BMI ABV UP PARAM F/U: HCPCS | Performed by: NURSE PRACTITIONER

## 2021-05-12 PROCEDURE — 1090F PRES/ABSN URINE INCON ASSESS: CPT | Performed by: NURSE PRACTITIONER

## 2021-05-12 PROCEDURE — 1123F ACP DISCUSS/DSCN MKR DOCD: CPT | Performed by: NURSE PRACTITIONER

## 2021-05-12 PROCEDURE — G8399 PT W/DXA RESULTS DOCUMENT: HCPCS | Performed by: NURSE PRACTITIONER

## 2021-05-12 PROCEDURE — 99214 OFFICE O/P EST MOD 30 MIN: CPT | Performed by: NURSE PRACTITIONER

## 2021-05-12 PROCEDURE — G8427 DOCREV CUR MEDS BY ELIG CLIN: HCPCS | Performed by: NURSE PRACTITIONER

## 2021-05-12 PROCEDURE — 3023F SPIROM DOC REV: CPT | Performed by: NURSE PRACTITIONER

## 2021-05-12 RX ORDER — HYDROCODONE BITARTRATE AND ACETAMINOPHEN 5; 325 MG/1; MG/1
1 TABLET ORAL 4 TIMES DAILY PRN
Qty: 120 TABLET | Refills: 0 | Status: SHIPPED | OUTPATIENT
Start: 2021-05-12 | End: 2021-06-09 | Stop reason: SDUPTHER

## 2021-05-12 NOTE — PATIENT INSTRUCTIONS
1. Take a dose of clonidine when you get home  2. Call if blood pressure consistently >150  3.  Drink plenty of fluids

## 2021-05-12 NOTE — TELEPHONE ENCOUNTER
Disp Refills Start End    HYDROcodone-acetaminophen (NORCO) 5-325 MG per tablet 120 tablet 0 4/12/2021 5/12/2021    Sig - Route:  Take 1 tablet by mouth 4 times daily as needed for Pain for up to 30 days. - [facility] [address] [id number] - [address] [phone]       Please advise

## 2021-05-12 NOTE — TELEPHONE ENCOUNTER
Medication:   Requested Prescriptions     Pending Prescriptions Disp Refills    HYDROcodone-acetaminophen (NORCO) 5-325 MG per tablet 120 tablet 0     Sig: Take 1 tablet by mouth 4 times daily as needed for Pain for up to 30 days. Last Filled:  4/12/21    Patient Phone Number: [phone] (home)     Last appt: 4/13/2021   Next appt: 7/20/2021    Last OARRS:   RX Monitoring 3/20/2019   Attestation The Prescription Monitoring Report for this patient was reviewed today.    Periodic Controlled Substance Monitoring -     PDMP Monitoring:    Last PDMP Pam Guillermo as Reviewed [facility]):  Review User Review Instant Review Result   Zoraida Leung 8/11/2020  2:43 PM Reviewed PDMP [1]     Preferred Pharmacy:     [facility] [id number] [address]  Phone: [phone] Fax: [phone]

## 2021-05-27 ENCOUNTER — TELEPHONE (OUTPATIENT)
Dept: FAMILY MEDICINE CLINIC | Age: 86
End: 2021-05-27

## 2021-06-02 ENCOUNTER — CLINICAL DOCUMENTATION (OUTPATIENT)
Dept: OTHER | Age: 86
End: 2021-06-02

## 2021-06-04 ENCOUNTER — OFFICE VISIT (OUTPATIENT)
Dept: FAMILY MEDICINE CLINIC | Age: 86
End: 2021-06-04
Payer: MEDICARE

## 2021-06-04 VITALS
SYSTOLIC BLOOD PRESSURE: 120 MMHG | TEMPERATURE: 97.1 F | DIASTOLIC BLOOD PRESSURE: 60 MMHG | HEART RATE: 59 BPM | BODY MASS INDEX: 29.97 KG/M2 | WEIGHT: 169.2 LBS | OXYGEN SATURATION: 98 %

## 2021-06-04 DIAGNOSIS — R10.32 LLQ PAIN: ICD-10-CM

## 2021-06-04 DIAGNOSIS — N30.00 ACUTE CYSTITIS WITHOUT HEMATURIA: Primary | ICD-10-CM

## 2021-06-04 PROCEDURE — 81002 URINALYSIS NONAUTO W/O SCOPE: CPT | Performed by: FAMILY MEDICINE

## 2021-06-04 PROCEDURE — 99213 OFFICE O/P EST LOW 20 MIN: CPT | Performed by: FAMILY MEDICINE

## 2021-06-04 PROCEDURE — 1036F TOBACCO NON-USER: CPT | Performed by: FAMILY MEDICINE

## 2021-06-04 PROCEDURE — 1123F ACP DISCUSS/DSCN MKR DOCD: CPT | Performed by: FAMILY MEDICINE

## 2021-06-04 PROCEDURE — G8417 CALC BMI ABV UP PARAM F/U: HCPCS | Performed by: FAMILY MEDICINE

## 2021-06-04 PROCEDURE — 4040F PNEUMOC VAC/ADMIN/RCVD: CPT | Performed by: FAMILY MEDICINE

## 2021-06-04 PROCEDURE — G8399 PT W/DXA RESULTS DOCUMENT: HCPCS | Performed by: FAMILY MEDICINE

## 2021-06-04 PROCEDURE — G8428 CUR MEDS NOT DOCUMENT: HCPCS | Performed by: FAMILY MEDICINE

## 2021-06-04 PROCEDURE — 1090F PRES/ABSN URINE INCON ASSESS: CPT | Performed by: FAMILY MEDICINE

## 2021-06-04 RX ORDER — CIPROFLOXACIN 500 MG/1
500 TABLET, FILM COATED ORAL 2 TIMES DAILY
Qty: 10 TABLET | Refills: 0 | Status: SHIPPED | OUTPATIENT
Start: 2021-06-04 | End: 2022-06-13 | Stop reason: SDUPTHER

## 2021-06-04 RX ORDER — CIPROFLOXACIN 500 MG/1
500 TABLET, FILM COATED ORAL 2 TIMES DAILY
Qty: 20 TABLET | Refills: 0 | Status: SHIPPED | OUTPATIENT
Start: 2021-06-04 | End: 2021-06-14

## 2021-06-04 NOTE — PROGRESS NOTES
Here with concerns for UTI. Sx stated 2 weeks ago, seen by Blanca ROSS, thought UTI, took 3-4 doses of bactrim and 2-3 doses of a different \"sulfa\" med. States didn't tolerate them and had to stop early. Was feeling better until yesterday. Symptoms started last night. Frequency: off and on  Urgency: Yes - sometimes feels like has to go but can't  Burning: Yes  Blood in urine: No  Abd pain: Yes - low abd. Back pain: Yes - started last night L>R low back  Nausea: Yes  Vomiting: No  Bowel changes :No  Fever: No  Vaginal discharge: No    Vitals:    06/04/21 1434   BP: 120/60   Site: Left Upper Arm   Position: Sitting   Cuff Size: Large Adult   Pulse: 59   Temp: 97.1 °F (36.2 °C)   TempSrc: Infrared   SpO2: 98%   Weight: 169 lb 3.2 oz (76.7 kg)     Wt Readings from Last 3 Encounters:   06/04/21 169 lb 3.2 oz (76.7 kg)   05/12/21 172 lb (78 kg)   04/13/21 175 lb 6 oz (79.5 kg)     Body mass index is 29.97 kg/m². PHQ Scores 3/3/2021 2/4/2020 3/20/2019 4/25/2018 4/13/2017 3/10/2016 9/25/2015   PHQ2 Score 0 0 0 0 0 0 0   PHQ9 Score 0 0 0 0 0 0 0           GEN: Alert and oriented x 4 NAD, affect appropriate and overweight, well hydrated, well developed. ABD: nl BS, soft, tender suprapubic but more so in LLQ, no R/g          ASSESSMENT AND PLAN:         was seen today for urinary tract infection. Diagnoses and all orders for this visit:    Acute cystitis without hematuria  -     POCT Urinalysis no Micro  -     Culture, Urine  UA looks suspicious  cipro    LLQ pain  Wonder if diverticulitis given pain  -     ciprofloxacin (CIPRO) 500 MG tablet;  Take 1 tablet by mouth 2 times daily for 10 days  Soft diet  If worsening sx, n/v, fever over weekend go to ED  RTO next week if not improving

## 2021-06-06 LAB — URINE CULTURE, ROUTINE: NORMAL

## 2021-06-08 ENCOUNTER — APPOINTMENT (OUTPATIENT)
Dept: CT IMAGING | Age: 86
End: 2021-06-08
Payer: MEDICARE

## 2021-06-08 ENCOUNTER — HOSPITAL ENCOUNTER (EMERGENCY)
Age: 86
Discharge: HOME | End: 2021-06-08
Attending: EMERGENCY MEDICINE
Payer: MEDICARE

## 2021-06-08 VITALS
SYSTOLIC BLOOD PRESSURE: 191 MMHG | RESPIRATION RATE: 17 BRPM | HEIGHT: 62 IN | DIASTOLIC BLOOD PRESSURE: 76 MMHG | BODY MASS INDEX: 31.1 KG/M2 | HEART RATE: 58 BPM | OXYGEN SATURATION: 99 % | WEIGHT: 169 LBS | TEMPERATURE: 97.6 F

## 2021-06-08 DIAGNOSIS — N39.0 URINARY TRACT INFECTION WITHOUT HEMATURIA, SITE UNSPECIFIED: ICD-10-CM

## 2021-06-08 DIAGNOSIS — R11.2 NON-INTRACTABLE VOMITING WITH NAUSEA, UNSPECIFIED VOMITING TYPE: Primary | ICD-10-CM

## 2021-06-08 LAB
ALBUMIN SERPL-MCNC: 4.5 G/DL (ref 3.4–5)
ALP BLD-CCNC: 151 U/L (ref 40–129)
ALT SERPL-CCNC: 18 U/L (ref 10–40)
ANION GAP SERPL CALCULATED.3IONS-SCNC: 12 MMOL/L (ref 3–16)
AST SERPL-CCNC: 16 U/L (ref 15–37)
BASE EXCESS VENOUS: -0.1 MMOL/L (ref -3–3)
BASOPHILS ABSOLUTE: 0 K/UL (ref 0–0.2)
BASOPHILS RELATIVE PERCENT: 0.6 %
BILIRUB SERPL-MCNC: 0.6 MG/DL (ref 0–1)
BILIRUBIN DIRECT: <0.2 MG/DL (ref 0–0.3)
BILIRUBIN URINE: NEGATIVE
BILIRUBIN, INDIRECT: ABNORMAL MG/DL (ref 0–1)
BLOOD, URINE: ABNORMAL
BUN BLDV-MCNC: 17 MG/DL (ref 7–20)
CALCIUM SERPL-MCNC: 9.9 MG/DL (ref 8.3–10.6)
CARBOXYHEMOGLOBIN: 2.6 % (ref 0–1.5)
CHLORIDE BLD-SCNC: 92 MMOL/L (ref 99–110)
CLARITY: CLEAR
CO2: 25 MMOL/L (ref 21–32)
COLOR: YELLOW
CREAT SERPL-MCNC: 1 MG/DL (ref 0.6–1.2)
EOSINOPHILS ABSOLUTE: 0.1 K/UL (ref 0–0.6)
EOSINOPHILS RELATIVE PERCENT: 2 %
EPITHELIAL CELLS, UA: 0 /HPF (ref 0–5)
GFR AFRICAN AMERICAN: >60
GFR NON-AFRICAN AMERICAN: 53
GLUCOSE BLD-MCNC: 127 MG/DL (ref 70–99)
GLUCOSE URINE: NEGATIVE MG/DL
HCO3 VENOUS: 26.8 MMOL/L (ref 23–29)
HCT VFR BLD CALC: 34.4 % (ref 36–48)
HEMOGLOBIN, VEN, REDUCED: 29 %
HEMOGLOBIN: 11.5 G/DL (ref 12–16)
HYALINE CASTS: 0 /LPF (ref 0–8)
KETONES, URINE: NEGATIVE MG/DL
LACTIC ACID, SEPSIS: 0.8 MMOL/L (ref 0.4–1.9)
LEUKOCYTE ESTERASE, URINE: NEGATIVE
LIPASE: 18 U/L (ref 13–60)
LYMPHOCYTES ABSOLUTE: 1.1 K/UL (ref 1–5.1)
LYMPHOCYTES RELATIVE PERCENT: 25.8 %
MCH RBC QN AUTO: 29.5 PG (ref 26–34)
MCHC RBC AUTO-ENTMCNC: 33.6 G/DL (ref 31–36)
MCV RBC AUTO: 87.8 FL (ref 80–100)
METHEMOGLOBIN VENOUS: 0.5 %
MICROSCOPIC EXAMINATION: YES
MONOCYTES ABSOLUTE: 0.3 K/UL (ref 0–1.3)
MONOCYTES RELATIVE PERCENT: 6.7 %
NEUTROPHILS ABSOLUTE: 2.9 K/UL (ref 1.7–7.7)
NEUTROPHILS RELATIVE PERCENT: 64.9 %
NITRITE, URINE: NEGATIVE
O2 CONTENT, VEN: 11 VOL %
O2 SAT, VEN: 71 %
O2 THERAPY: ABNORMAL
PCO2, VEN: 53 MMHG (ref 40–50)
PDW BLD-RTO: 15.2 % (ref 12.4–15.4)
PH UA: 6.5 (ref 5–8)
PH VENOUS: 7.31 (ref 7.35–7.45)
PLATELET # BLD: 139 K/UL (ref 135–450)
PMV BLD AUTO: 7.9 FL (ref 5–10.5)
PO2, VEN: 41.3 MMHG (ref 25–40)
POTASSIUM REFLEX MAGNESIUM: 4.7 MMOL/L (ref 3.5–5.1)
PROTEIN UA: 30 MG/DL
RBC # BLD: 3.92 M/UL (ref 4–5.2)
RBC UA: 1 /HPF (ref 0–4)
SODIUM BLD-SCNC: 129 MMOL/L (ref 136–145)
SPECIFIC GRAVITY UA: 1.01 (ref 1–1.03)
TCO2 CALC VENOUS: 64 MMOL/L
TOTAL PROTEIN: 7.2 G/DL (ref 6.4–8.2)
TROPONIN: <0.01 NG/ML
URINE TYPE: ABNORMAL
UROBILINOGEN, URINE: 0.2 E.U./DL
WBC # BLD: 4.4 K/UL (ref 4–11)
WBC UA: 1 /HPF (ref 0–5)

## 2021-06-08 PROCEDURE — 93005 ELECTROCARDIOGRAM TRACING: CPT | Performed by: EMERGENCY MEDICINE

## 2021-06-08 PROCEDURE — 82803 BLOOD GASES ANY COMBINATION: CPT

## 2021-06-08 PROCEDURE — 99284 EMERGENCY DEPT VISIT MOD MDM: CPT

## 2021-06-08 PROCEDURE — 83605 ASSAY OF LACTIC ACID: CPT

## 2021-06-08 PROCEDURE — 87086 URINE CULTURE/COLONY COUNT: CPT

## 2021-06-08 PROCEDURE — 80076 HEPATIC FUNCTION PANEL: CPT

## 2021-06-08 PROCEDURE — 80048 BASIC METABOLIC PNL TOTAL CA: CPT

## 2021-06-08 PROCEDURE — 6360000002 HC RX W HCPCS: Performed by: EMERGENCY MEDICINE

## 2021-06-08 PROCEDURE — 81001 URINALYSIS AUTO W/SCOPE: CPT

## 2021-06-08 PROCEDURE — 85025 COMPLETE CBC W/AUTO DIFF WBC: CPT

## 2021-06-08 PROCEDURE — 74176 CT ABD & PELVIS W/O CONTRAST: CPT

## 2021-06-08 PROCEDURE — 84484 ASSAY OF TROPONIN QUANT: CPT

## 2021-06-08 PROCEDURE — 6370000000 HC RX 637 (ALT 250 FOR IP): Performed by: EMERGENCY MEDICINE

## 2021-06-08 PROCEDURE — 87040 BLOOD CULTURE FOR BACTERIA: CPT

## 2021-06-08 PROCEDURE — 96374 THER/PROPH/DIAG INJ IV PUSH: CPT

## 2021-06-08 PROCEDURE — 83690 ASSAY OF LIPASE: CPT

## 2021-06-08 RX ORDER — SODIUM CHLORIDE, SODIUM LACTATE, POTASSIUM CHLORIDE, CALCIUM CHLORIDE 600; 310; 30; 20 MG/100ML; MG/100ML; MG/100ML; MG/100ML
1000 INJECTION, SOLUTION INTRAVENOUS ONCE
Status: DISCONTINUED | OUTPATIENT
Start: 2021-06-08 | End: 2021-06-08 | Stop reason: HOSPADM

## 2021-06-08 RX ORDER — PROMETHAZINE HYDROCHLORIDE 12.5 MG/1
12.5 TABLET ORAL 4 TIMES DAILY PRN
Qty: 20 TABLET | Refills: 0 | Status: SHIPPED | OUTPATIENT
Start: 2021-06-08 | End: 2021-06-15

## 2021-06-08 RX ORDER — ONDANSETRON 2 MG/ML
4 INJECTION INTRAMUSCULAR; INTRAVENOUS EVERY 6 HOURS PRN
Status: DISCONTINUED | OUTPATIENT
Start: 2021-06-08 | End: 2021-06-08 | Stop reason: HOSPADM

## 2021-06-08 RX ORDER — CEPHALEXIN 500 MG/1
CAPSULE ORAL
COMMUNITY
Start: 2021-04-28 | End: 2021-06-15

## 2021-06-08 RX ORDER — SULFAMETHOXAZOLE AND TRIMETHOPRIM 800; 160 MG/1; MG/1
TABLET ORAL
COMMUNITY
Start: 2021-05-25 | End: 2021-06-15

## 2021-06-08 RX ORDER — MECLIZINE HCL 12.5 MG/1
TABLET ORAL
Qty: 270 TABLET | Refills: 0 | Status: SHIPPED | OUTPATIENT
Start: 2021-06-08 | End: 2021-09-15

## 2021-06-08 RX ORDER — FENTANYL CITRATE 50 UG/ML
25 INJECTION, SOLUTION INTRAMUSCULAR; INTRAVENOUS ONCE
Status: DISCONTINUED | OUTPATIENT
Start: 2021-06-08 | End: 2021-06-08 | Stop reason: HOSPADM

## 2021-06-08 RX ORDER — ONDANSETRON 4 MG/1
4 TABLET, FILM COATED ORAL DAILY PRN
Qty: 30 TABLET | Refills: 0 | Status: SHIPPED | OUTPATIENT
Start: 2021-06-08

## 2021-06-08 RX ORDER — ONDANSETRON 4 MG/1
4 TABLET, ORALLY DISINTEGRATING ORAL ONCE
Status: COMPLETED | OUTPATIENT
Start: 2021-06-08 | End: 2021-06-08

## 2021-06-08 RX ADMIN — ONDANSETRON 4 MG: 2 INJECTION INTRAMUSCULAR; INTRAVENOUS at 08:20

## 2021-06-08 RX ADMIN — ONDANSETRON 4 MG: 4 TABLET, ORALLY DISINTEGRATING ORAL at 10:19

## 2021-06-08 NOTE — ED PROVIDER NOTES
Emergency Department Encounter    Patient: Becky Dominique  MRN: [id number]  : 1935  Date of Evaluation: 2021  ED Provider:  Eduardo Allen MD    Triage Chief Complaint:   Abdominal Pain (brought in by ems, accidently hit her life line button, pt has been vomiting through out the night. being treated for a uti since last friday. no diarrhea. oxygen dependent at 2 liters)    [place]:  Becky Dominique is a 80 y.o. female that presents to the ER for evaluation of nausea and vomiting, currently being treated for urinary tract infection with Cipro, chronic 2 L supplemental oxygen dependent was admitted in February for pneumonia. From home. History obtained from the patient, as well as daughter. Afebrile. No new shortness of breath no active chest pain. Left lower quadrant abdominal pain was seen by the physician 96 hours prior, and was started on Cipro for UTI prior history of diverticulitis as well. Suprapubic and left lower quadrant pain.     ROS - see HPI, below listed is current ROS at time of my eval:  General:  No fevers, no chills, no weakness  Eyes:  no discharge  ENT:  No sore throat, no nasal congestion  Cardiovascular:  No chest pain, no palpitations  Respiratory:  No shortness of breath, no cough, no wheezing  Gastrointestinal:  + pain, + nausea, + vomiting, no diarrhea  Musculoskeletal:  No muscle pain, no joint pain  Skin:  No rash, no pruritis  Neurologic:  no headache  Genitourinary:  No dysuria, no hematuria  Endocrine:  No unexpected weight gain, no unexpected weight loss  Extremities:  no edema, no pain    Past Medical History:   Diagnosis Date    Arthritis     Atrial fibrillation ([address]     Diabetes mellitus type II, controlled ([address]     GERD (gastroesophageal reflux disease)     HTN (hypertension)     Hyperlipidemia     Hypothyroid     Insomnia     Lumbago     SVT (supraventricular tachycardia) (HCC)      Past Surgical History:   Procedure Laterality Date    APPENDECTOMY      CHOLECYSTECTOMY, LAPAROSCOPIC  2013    COLONOSCOPY       Family History   Problem Relation Age of Onset    High Blood Pressure Mother     Heart Attack Father     Heart Disease Father     Other Brother     Asthma Paternal Uncle      Social History     Socioeconomic History    Marital status:      Spouse name: Not on file    Number of children: 9    Years of education: Not on file    Highest education level: Not on file   Occupational History    Not on file   Tobacco Use    Smoking status: Former Smoker     Packs/day: 1.00     Years: 45.00     Pack years: 45.00     Start date: 9/15/1950     Quit date: 1995     Years since quittin.4    Smokeless tobacco: Never Used   Vaping Use    Vaping Use: Never used   Substance and Sexual Activity    Alcohol use: No     Alcohol/week: 0.0 standard drinks    Drug use: No    Sexual activity: Not Currently     Partners: Male   Other Topics Concern    Not on file   Social History Narrative    Not on file     Social Determinants of Health     Financial Resource Strain:     Difficulty of Paying Living Expenses:    Food Insecurity:     Worried About Running Out of Food in the Last Year:     [address] in the Last Year:    Transportation Needs:     Lack of Transportation (Medical):      Lack of Transportation (Non-Medical):    Physical Activity:     Days of Exercise per Week:     Minutes of Exercise per Session:    Stress:     Feeling of Stress :    Social Connections:     Frequency of Communication with Friends and Family:     Frequency of Social Gatherings with Friends and Family:     Attends Druze Services:     Active Member of Clubs or Organizations:     Attends Club or Organization Meetings:     Marital Status:    Intimate Partner Violence:     Fear of Current or Ex-Partner:     Emotionally Abused:     Physically Abused:     Sexually Abused:      No current facility-administered medications for this lactobacillus (CULTURELLE) capsule Take 1 capsule by mouth 2 times daily (with meals) 60 capsule 0    atorvastatin (LIPITOR) 80 MG tablet Take 1 tablet by mouth nightly 30 tablet 3    HYDROcodone-acetaminophen (NORCO) 5-325 MG per tablet Take 1 tablet by mouth every 6 hours as needed.       rOPINIRole (REQUIP) 2 MG tablet Take 1 tablet by mouth 3 times daily PRN      dilTIAZem (CARDIZEM CD) 240 MG extended release capsule Take 1 capsule by mouth daily 90 capsule 1    fluticasone (FLONASE) 50 MCG/ACT nasal spray 2 sprays by Nasal route daily 3 Bottle 1    levothyroxine (SYNTHROID) 75 MCG tablet TAKE 1 TABLET EVERY DAY 90 tablet 1    levalbuterol (XOPENEX) 0.63 MG/3ML nebulization INHALE CONTENTS OF 1 VIAL PER NEBULIZER EVERY 6 HOURS AS NEEDED FOR WHEEZING 1050 mL 3    Respiratory Therapy Supplies (NEBULIZER/TUBING/MOUTHPIECE) KIT 1 kit by Does not apply route 4 times daily as needed (shortness of breath) 1 kit 0    albuterol sulfate HFA (PROAIR HFA) 108 (90 Base) MCG/ACT inhaler Inhale 2 puffs into the lungs every 6 hours as needed for Wheezing 1 Inhaler 6    Lancets MISC 1 each by Does not apply route 2 times daily 300 each 1     Allergies   Allergen Reactions    Adhesive Tape Anaphylaxis    Cefaclor Nausea And Vomiting     Other reaction(s): Chest pain    Nsaids      Pt states she has hives and heart races   Other reaction(s): Tachycardia    Clinoril [Sulindac] Other (See Comments)     Stomach pain    Codeine      FEEL NUMB    Diclofenac     Diclofenac Sodium Hives    Diclofenac Sodium Hives    Hctz [Hydrochlorothiazide]      Sob    Ibuprofen Other (See Comments)     Other reaction(s): Tachycardia    Macrobid [Nitrofurantoin Macrocrystal]      nausea    Motrin [Ibuprofen Micronized] Other (See Comments)     Tachycardia      Pcn [Penicillins] Hives    Peach [Prunus Persica] Itching and Swelling     Cannot eat raw peaches    Sulfa Antibiotics      Nausea, diarrhea       Nursing Notes Reviewed    Physical Exam:  Triage VS:    ED Triage Vitals [06/08/21 0732]   Enc Vitals Group      BP (!) 187/75      Pulse 65      Resp 14      Temp 97.6 °F (36.4 °C)      Temp Source Oral      SpO2 98 %      Weight 169 lb (76.7 kg)      Height 5' 2\" (1.575 m)      Head Circumference       Peak Flow       Pain Score       Pain Loc       Pain Edu? Excl. in [address]? My pulse ox interpretation is - normal    General appearance:  No acute distress. Skin:  Warm. Dry. No rash. Neck:  Trachea midline. Heart:  Regular rate and rhythm, normal S1 & S2.    Perfusion:  intact  Respiratory:  Lungs clear to auscultation bilaterally. Respirations nonlabored. Abdominal: Mild suprapubic and left lower quadrant tenderness to palpation, no rebound guarding or rigidity, neg Gomez's sign, neg McBurney's point tenderness to palpation, normal bowel sounds, no masses, no organomegaly, no pulsatile masses  Back:  No CVA TTP  Extremity:    normal ROM   Neurological:  Alert and oriented times 3.       I have reviewed and interpreted all of the currently available lab results from this visit (if applicable):  Results for orders placed or performed during the hospital encounter of 06/08/21   CBC Auto Differential   Result Value Ref Range    WBC 4.4 4.0 - 11.0 K/uL    RBC 3.92 (L) 4.00 - 5.20 M/uL    Hemoglobin 11.5 (L) 12.0 - 16.0 g/dL    Hematocrit 34.4 (L) 36.0 - 48.0 %    MCV 87.8 80.0 - 100.0 fL    MCH 29.5 26.0 - 34.0 pg    MCHC 33.6 31.0 - 36.0 g/dL    RDW 15.2 12.4 - 15.4 %    Platelets 154 832 - 921 K/uL    MPV 7.9 5.0 - 10.5 fL    Neutrophils % 64.9 %    Lymphocytes % 25.8 %    Monocytes % 6.7 %    Eosinophils % 2.0 %    Basophils % 0.6 %    Neutrophils Absolute 2.9 1.7 - 7.7 K/uL    Lymphocytes Absolute 1.1 1.0 - 5.1 K/uL    Monocytes Absolute 0.3 0.0 - 1.3 K/uL    Eosinophils Absolute 0.1 0.0 - 0.6 K/uL    Basophils Absolute 0.0 0.0 - 0.2 K/uL   Basic Metabolic Panel w/ Reflex to MG   Result Value Ref Range Sodium 129 (L) 136 - 145 mmol/L    Potassium reflex Magnesium 4.7 3.5 - 5.1 mmol/L    Chloride 92 (L) 99 - 110 mmol/L    CO2 25 21 - 32 mmol/L    Anion Gap 12 3 - 16    Glucose 127 (H) 70 - 99 mg/dL    BUN 17 7 - 20 mg/dL    CREATININE 1.0 0.6 - 1.2 mg/dL    GFR Non- 53 (A) >60    GFR African American >60 >60    Calcium 9.9 8.3 - 10.6 mg/dL   Troponin   Result Value Ref Range    Troponin <0.01 <0.01 ng/mL   Hepatic Function Panel   Result Value Ref Range    Total Protein 7.2 6.4 - 8.2 g/dL    Albumin 4.5 3.4 - 5.0 g/dL    Alkaline Phosphatase 151 (H) 40 - 129 U/L    ALT 18 10 - 40 U/L    AST 16 15 - 37 U/L    Total Bilirubin 0.6 0.0 - 1.0 mg/dL    Bilirubin, Direct <0.2 0.0 - 0.3 mg/dL    Bilirubin, Indirect see below 0.0 - 1.0 mg/dL   Lipase   Result Value Ref Range    Lipase 18.0 13.0 - 60.0 U/L   Blood Gas, Venous   Result Value Ref Range    pH, Nadeem 7.313 (L) 7.350 - 7.450    pCO2, Nadeem 53.0 (H) 40.0 - 50.0 mmHg    pO2, Nadeem 41.3 (H) 25 - 40 mmHg    HCO3, Venous 26.8 23.0 - 29.0 mmol/L    Base Excess, Nadeem -0.1 -3.0 - 3.0 mmol/L    O2 Sat, Nadeem 71 Not Established %    Carboxyhemoglobin 2.6 (H) 0.0 - 1.5 %    MetHgb, Nadeem 0.5 <1.5 %    TC02 (Calc), Nadeem 64 Not Established mmol/L    O2 Content, Nadeem 11 Not Established VOL %    O2 Therapy Unknown     Hemoglobin, Nadeem, Reduced 29 %   Lactate, Sepsis   Result Value Ref Range    Lactic Acid, Sepsis 0.8 0.4 - 1.9 mmol/L   EKG 12 Lead   Result Value Ref Range    Ventricular Rate 64 BPM    Atrial Rate 64 BPM    P-R Interval 152 ms    QRS Duration 88 ms    Q-T Interval 404 ms    QTc Calculation (Bazett) 416 ms    P Axis 26 degrees    R Axis 11 degrees    T Axis 35 degrees    Diagnosis Normal sinus rhythmNormal ECG       Radiographs (if obtained):  Radiologist's Report Reviewed:  No results found.     EKG (if obtained): (All EKG's are interpreted by myself in the absence of a cardiologist)  Sinus rhythm, normal axis, normal R wave progression no acute ST segment abnormalities    MDM:  Patient here with abdominal pain. I estimate there is LOW risk for an acute emergent intraabdominal process including, but not limited to, acute appendicitis, bowel obstruction, acute cholecystitis, ruptured diverticulitis, incarcerated/strangling hernia,  perforated bowel/ulcer. Workup reveals no bowel obstruction or free air no diverticulitis no signs of sepsis. Normal creatinine. No active vomiting in the ER. Antiemetic therapy continue oral antibiotic, return if worse or new symptoms    Patient's abdominal exam in the ED is nonsurgical.  I do feel the Patient can be discharged home. I have discussed the results, plan for treatment and possible risks, and patient agrees with discharging home with close follow-up. Patient understands and agrees with the plan, return warnings given. We have discussed the symptoms which are most concerning that necessitate immediate return. Clinical Impression:  1. Non-intractable vomiting with nausea, unspecified vomiting type    2. Urinary tract infection without hematuria, site unspecified      Disposition referral (if applicable): Lorena Peña MD  [place]  [phone]          Disposition medications (if applicable):  Discharge Medication List as of 6/8/2021 10:16 AM      START taking these medications    Details   ondansetron (ZOFRAN) 4 MG tablet Take 1 tablet by mouth daily as needed for Nausea or Vomiting, Disp-30 tablet, R-0Print      promethazine (PHENERGAN) 12.5 MG tablet Take 1 tablet by mouth 4 times daily as needed for Nausea, Disp-20 tablet, R-0Print             Comment: Please note this report has been produced using speech recognition software and may contain errors related to that system including errors in grammar, punctuation, and spelling, as well as words and phrases that may be inappropriate. Efforts were made to edit the dictations.       Quynh Garcia MD  08/78/95 6601

## 2021-06-09 ENCOUNTER — TELEPHONE (OUTPATIENT)
Dept: FAMILY MEDICINE CLINIC | Age: 86
End: 2021-06-09

## 2021-06-09 ENCOUNTER — CARE COORDINATION (OUTPATIENT)
Dept: CARE COORDINATION | Age: 86
End: 2021-06-09

## 2021-06-09 DIAGNOSIS — M15.9 PRIMARY OSTEOARTHRITIS INVOLVING MULTIPLE JOINTS: ICD-10-CM

## 2021-06-09 LAB
EKG ATRIAL RATE: 64 BPM
EKG DIAGNOSIS: NORMAL
EKG P AXIS: 26 DEGREES
EKG P-R INTERVAL: 152 MS
EKG Q-T INTERVAL: 404 MS
EKG QRS DURATION: 88 MS
EKG QTC CALCULATION (BAZETT): 416 MS
EKG R AXIS: 11 DEGREES
EKG T AXIS: 35 DEGREES
EKG VENTRICULAR RATE: 64 BPM
URINE CULTURE, ROUTINE: NORMAL

## 2021-06-09 PROCEDURE — 93010 ELECTROCARDIOGRAM REPORT: CPT | Performed by: INTERNAL MEDICINE

## 2021-06-09 RX ORDER — HYDROCODONE BITARTRATE AND ACETAMINOPHEN 5; 325 MG/1; MG/1
1 TABLET ORAL 4 TIMES DAILY PRN
Qty: 120 TABLET | Refills: 0 | Status: SHIPPED | OUTPATIENT
Start: 2021-06-09 | End: 2021-07-12 | Stop reason: SDUPTHER

## 2021-06-09 NOTE — CARE COORDINATION
Ambulatory Care Coordination  ED Follow up Call    Reason for ED visit:  LLQ and Suprapubic  Still having back pain. Pt accidentally hit her medical alert button and EMS showed up yesterday. Status:     improved    Did you call your PCP prior to going to the ED? Not Applicable      Did you receive a discharge instructions from the Emergency Room? Yes  Review of Instructions:     Understands what to report/when to return?:  Yes   Understands discharge instructions?:  Yes   Following discharge instructions?:  Yes       Are there any new complaints of pain? Yes stil having back pain   New Pain Meds? No    Constipation prophylaxis needed? N/A    If you have a wound is the dressing clean, dry, and intact? N/A  Understands wound care regimen? N/A    Are there any other complaints/concerns that you wish to tell your provider? Drinking a lot of water. FU appts/Provider:    Future Appointments   Date Time Provider Alissa Bustillo   7/20/2021  3:00 PM Camilla Whalen MD [facility] [address]   9/8/2021  3:30 PM Shane Wynne MD [facility]   9/14/2021  3:00 PM KATHY Morel - CNP [facility]           New Medications?:   Yes   ondansetron (ZOFRAN) 4 MG tablet 30 tablet 0 6/8/2021     Sig - Route: Take 1 tablet by mouth daily as needed for Nausea or Vomiting - Oral      promethazine (PHENERGAN) 12.5 MG tablet 20 tablet 0 6/8/2021 6/15/2021    Sig - Route: Take 1 tablet by mouth 4 times daily as needed for Nausea - Oral      meclizine (ANTIVERT) 12.5 MG tablet 270 tablet 0 6/8/2021     Sig: TAKE 1 TABLET THREE TIMES DAILY AS NEEDED           Medication Reconciliation by phone - Yes  Understands Medications? Yes  Taking Medications? Yes  Can you swallow your pills? Yes    Any further needs in the home i.e. Equipment?   Yes    Link to services in community?:  No   Which services:  NA

## 2021-06-09 NOTE — TELEPHONE ENCOUNTER
HYDROcodone-acetaminophen (NORCO) 5-325 MG per tablet 120 tablet 0 5/12/2021 6/11/2021    Sig - Route:  Take 1 tablet by mouth 4 times daily as needed for Pain for up to 30 days. - Oral        [facility] in chart     Please advise

## 2021-06-09 NOTE — TELEPHONE ENCOUNTER
Medication:   Requested Prescriptions     Pending Prescriptions Disp Refills    HYDROcodone-acetaminophen (NORCO) 5-325 MG per tablet 120 tablet 0     Sig: Take 1 tablet by mouth 4 times daily as needed for Pain for up to 30 days. Last Filled:  5/12/2021    Patient Phone Number: [phone] (home)     Last appt: 6/4/2021   Next appt: 7/20/2021    Last OARRS:   RX Monitoring 3/20/2019   Attestation The Prescription Monitoring Report for this patient was reviewed today.    Periodic Controlled Substance Monitoring -     PDMP Monitoring:    Last PDMP Maryam Marietta as Reviewed [facility]):  Review User Review Instant Review Result   Dolly Watkins 8/11/2020  2:43 PM Reviewed PDMP [1]     Preferred Pharmacy:   [facility] [phone]  Phone: [phone] Fax: [phone]    [facility] [address], [place] [phone] - F [phone]  [address] [facility]  [address] [facility] [place] [address]  Phone: [phone] Fax: [phone] [address] [place] [phone]  [address] [facility] [address]  Phone: [phone] Fax: [phone]  [address], [address] [address]  Phone: [phone] Fax: [phone]

## 2021-06-12 LAB
BLOOD CULTURE, ROUTINE: NORMAL
CULTURE, BLOOD 2: NORMAL

## 2021-06-15 ENCOUNTER — OFFICE VISIT (OUTPATIENT)
Dept: FAMILY MEDICINE CLINIC | Age: 86
End: 2021-06-15
Payer: MEDICARE

## 2021-06-15 ENCOUNTER — CARE COORDINATION (OUTPATIENT)
Dept: CARE COORDINATION | Age: 86
End: 2021-06-15

## 2021-06-15 VITALS
BODY MASS INDEX: 31.31 KG/M2 | SYSTOLIC BLOOD PRESSURE: 130 MMHG | DIASTOLIC BLOOD PRESSURE: 80 MMHG | OXYGEN SATURATION: 98 % | TEMPERATURE: 97.1 F | HEART RATE: 54 BPM | WEIGHT: 171.2 LBS

## 2021-06-15 DIAGNOSIS — J44.9 COPD, MILD (HCC): ICD-10-CM

## 2021-06-15 DIAGNOSIS — R42 VERTIGO: ICD-10-CM

## 2021-06-15 DIAGNOSIS — N32.81 OAB (OVERACTIVE BLADDER): ICD-10-CM

## 2021-06-15 DIAGNOSIS — N39.0 RECURRENT UTI: Primary | ICD-10-CM

## 2021-06-15 PROCEDURE — 3023F SPIROM DOC REV: CPT | Performed by: FAMILY MEDICINE

## 2021-06-15 PROCEDURE — G8427 DOCREV CUR MEDS BY ELIG CLIN: HCPCS | Performed by: FAMILY MEDICINE

## 2021-06-15 PROCEDURE — 99214 OFFICE O/P EST MOD 30 MIN: CPT | Performed by: FAMILY MEDICINE

## 2021-06-15 PROCEDURE — G8399 PT W/DXA RESULTS DOCUMENT: HCPCS | Performed by: FAMILY MEDICINE

## 2021-06-15 PROCEDURE — 1090F PRES/ABSN URINE INCON ASSESS: CPT | Performed by: FAMILY MEDICINE

## 2021-06-15 PROCEDURE — 1123F ACP DISCUSS/DSCN MKR DOCD: CPT | Performed by: FAMILY MEDICINE

## 2021-06-15 PROCEDURE — 1036F TOBACCO NON-USER: CPT | Performed by: FAMILY MEDICINE

## 2021-06-15 PROCEDURE — 4040F PNEUMOC VAC/ADMIN/RCVD: CPT | Performed by: FAMILY MEDICINE

## 2021-06-15 PROCEDURE — G8417 CALC BMI ABV UP PARAM F/U: HCPCS | Performed by: FAMILY MEDICINE

## 2021-06-15 RX ORDER — FESOTERODINE FUMARATE 4 MG/1
4 TABLET, EXTENDED RELEASE ORAL DAILY
Qty: 30 TABLET | Refills: 2 | Status: SHIPPED | OUTPATIENT
Start: 2021-06-15 | End: 2021-07-20

## 2021-06-15 NOTE — CARE COORDINATION
The RN, ACM started to call the pt and realized the pt was following up with the PCP today at 4:15 PM. The RN, ACM will reach out to the pt next week.

## 2021-06-15 NOTE — PATIENT INSTRUCTIONS
Patient Education        Ménière's Disease: Care Instructions  Your Care Instructions     Ménière's (say \"men-YEERS\") disease is a problem of the inner ear that affects hearing and balance. It causes sudden attacks of vertigo that make you feel like you are spinning. It can also cause a loud ringing in the ears called tinnitus, a temporary loss of hearing, and a feeling of fullness in the ear. Your hearing loss may not get better. The cause of Ménière's disease is not known, but it may be related to a fluid imbalance in the inner ear. The goal of treatment is to make the vertigo less severe until the attack ends. Some people can prevent attacks by eating a diet low in sodium and by doing exercises to improve balance. Medicines may also help. Surgery is an option for some people. Follow-up care is a key part of your treatment and safety. Be sure to make and go to all appointments, and call your doctor if you are having problems. It's also a good idea to know your test results and keep a list of the medicines you take. How can you care for yourself at home? · During an attack of vertigo, lie down and hold your head very still until the feeling passes. This may help you cope with vertigo. · Take your medicines exactly as prescribed. Call your doctor if you think you are having a problem with your medicine. You will get more details on the specific medicines your doctor prescribes. · Avoid caffeine, alcohol, tobacco, and stress, along with any other substances or conditions that trigger an attack. · Eat a diet low in sodium to reduce fluid buildup in the inner ear. · Do exercises to improve your balance. These can help ease vertigo. Keep a chart to track your progress. It can make you aware of any improvement in your vertigo symptoms. Include things like the date, what exercises were done or how far you walked, and how you felt during each exercise.   ? Stand with your feet together, arms at your sides, and or leg, especially on only one side of your body. ? Sudden vision changes. ? Sudden trouble speaking. ? Sudden confusion or trouble understanding simple statements. ? Sudden problems with walking or balance. ? A sudden, severe headache that is different from past headaches. Call your doctor now or seek immediate medical care if:    · You have new or worse dizziness.     · You notice changes in your hearing. Watch closely for changes in your health, and be sure to contact your doctor if:    · You have new or worse nausea or vomiting.     · Your vertigo gets worse.     · You do not get better as expected. Where can you learn more? Go to https://chpepiceweb.[employer]. org and sign in to your [employer] account. Enter I144 in the "[employer] box to learn more about \"Ménière's Disease: Care Instructions. \"     If you do not have an account, please click on the \"Sign Up Now\" link. Current as of: December 2, 2020               Content Version: 12.8  © 2006-2021 [employer]. Care instructions adapted under license by [facility] ([facility]). If you have questions about a medical condition or this instruction, always ask your healthcare professional. [place] [address] any warranty or liability for your use of this information. Patient Education        Vertigo: Exercises  Introduction  Here are some examples of exercises for you to try. The exercises may be suggested for a condition or for rehabilitation. Start each exercise slowly. Ease off the exercises if you start to have pain. You will be told when to start these exercises and which ones will work best for you. How to do the exercises  Exercise 1   1. Stand with a chair in front of you and a wall behind you. If you begin to fall, you may use them for support. 2. Stand with your feet together and your arms at your sides. 3. Move your head up and down 10 times. Exercise 2   1.  Move your head side to side 10 times.    Exercise 3   1. Move your head diagonally up and down 10 times. Exercise 4   1. Move your head diagonally up and down 10 times on the other side. Follow-up care is a key part of your treatment and safety. Be sure to make and go to all appointments, and call your doctor if you are having problems. It's also a good idea to know your test results and keep a list of the medicines you take. Where can you learn more? Go to https://MedStartrpeHole 19.[employer]. org and sign in to your [employer] account. Enter F349 in the [employer] box to learn more about \"Vertigo: Exercises. \"     If you do not have an account, please click on the \"Sign Up Now\" link. Current as of: December 2, 2020               Content Version: 12.8  © 2006-2021 Healthwise, [employer]. Care instructions adapted under license by [facility] ([facility]). If you have questions about a medical condition or this instruction, always ask your healthcare professional. [place] [address] any warranty or liability for your use of this information. Patient Education        [place] Exercises for Vertigo: Care Instructions  Your Care Instructions  Simple exercises can help you regain your balance when you have vertigo. If you have Ménière's disease, benign paroxysmal positional vertigo (BPPV), or another inner ear problem, you may have vertigo off and on. Do these exercises first thing in the morning and before you go to bed. You might get dizzy when you first start them. If this happens, try to do them for at least 5 minutes. Do a group of exercises at a time, starting at the top of the list. It may take several weeks before you can do all the exercises without feeling dizzy. Follow-up care is a key part of your treatment and safety. Be sure to make and go to all appointments, and call your doctor if you are having problems.  It's also a good idea to know your test results and keep a list of the medicines you about \"[place] Exercises for Vertigo: Care Instructions. \"     If you do not have an account, please click on the \"Sign Up Now\" link. Current as of: December 2, 2020               Content Version: 12.8  © 6919-0498 Healthwise, Incorporated. Care instructions adapted under license by [facility] ([facility]). If you have questions about a medical condition or this instruction, always ask your healthcare professional. [address] any warranty or liability for your use of this information.

## 2021-06-15 NOTE — PROGRESS NOTES
Subjective:      Patient ID: Joanne Humphrey is a 80 y.o. female. CC: Patient presents for hospital follow-up. [facility] Patient presents for follow-up from [facility] ED visit in accompaniment of daughter. Patient originally was seen and treated for UTI prior to coming into our office June 4. June 4 she was still having symptoms of starting Cipro antibiotic but her urine culture came back with no bacterial growth. 4 days later she awoke with nausea and vomiting and was feeling extremely weak. She was then brought to the emergency room and was found to have high blood pressure and was treated with Zofran medication and then discharged home. He also had a low sodium during that time which is not typical for her. Daughter is concerned that she seems to have recurrent urinary tract infections and patient has overactive bladder with nocturia and daytime frequency sometimes up to 30 times a day. She also has recurrent vertigo which have been longstanding for her. She seems to have episodes once or twice a week. She has never been evaluated by ENT specialist in the past.  She does take the meclizine on a as needed basis. She apparently was on a diuretic therapy years ago and had significant renal impairment. Daughter would also like to have a transport chair as she has difficulty getting her mom in and out of doctors offices and shopping.     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, without long-term current use of insulin (HCC)    Restless legs syndrome    ANTHONY (obstructive sleep apnea)    Allergic rhinitis    COPD, mild (HCC)    Vertigo    Interstitial lung disease (HCC)    Chronic cough    Coronary artery disease involving native coronary artery of native heart with angina pectoris ([facility]    A-fib ([facility]    Hypothyroid    Chronic respiratory failure with hypoxia (HCC)    SOB (shortness of breath)    Anxiety    Asthmatic bronchitis    Ataxia       Outpatient Medications Marked as Taking for the 6/15/21 encounter (Office Visit) with Susan Arriaga MD   Medication Sig Dispense Refill    HYDROcodone-acetaminophen (NORCO) 5-325 MG per tablet Take 1 tablet by mouth 4 times daily as needed for Pain for up to 30 days.  120 tablet 0    meclizine (ANTIVERT) 12.5 MG tablet TAKE 1 TABLET THREE TIMES DAILY AS NEEDED 270 tablet 0    ondansetron (ZOFRAN) 4 MG tablet Take 1 tablet by mouth daily as needed for Nausea or Vomiting 30 tablet 0    promethazine (PHENERGAN) 12.5 MG tablet Take 1 tablet by mouth 4 times daily as needed for Nausea 20 tablet 0    OXYGEN Inhale 2 L into the lungs      XARELTO 15 MG TABS tablet TAKE 1 TABLET EVERY DAY 90 tablet 0    irbesartan (AVAPRO) 300 MG tablet TAKE 1 TABLET BY MOUTH EVERY NIGHT 90 tablet 0    LORazepam (ATIVAN) 1 MG tablet TAKE 1/2 TABLET BY MOUTH TWICE DAILY AND 1 EVERY NIGHT AT BEDTIME AS NEEDED FOR ANXIETY 60 tablet 2    cloNIDine (CATAPRES) 0.1 MG tablet TAKE 1 TABLET BY MOUTH TWICE DAILY 60 tablet 0    propafenone (RYTHMOL) 150 MG tablet Take 1 tablet by mouth every 8 hours 270 tablet 1    cetirizine (ZYRTEC) 10 MG tablet Take 1 tablet by mouth daily 30 tablet 5    budesonide-formoterol (SYMBICORT) 160-4.5 MCG/ACT AERO INHALE 2 PUFFS INTO THE LUNGS TWICE DAILY 3 Inhaler 0    blood glucose monitor strips Test one time a day 100 strip 5    dilTIAZem (CARDIZEM) 30 MG tablet Take 2 tablets by mouth 2 times daily as needed (tachycardia with heart rate >100) 60 tablet 0    dicyclomine (BENTYL) 10 MG capsule Take 1 capsule by mouth 4 times daily as needed (abd pain) 360 capsule 1    lactobacillus (CULTURELLE) capsule Take 1 capsule by mouth 2 times daily (with meals) 60 capsule 0    atorvastatin (LIPITOR) 80 MG tablet Take 1 tablet by mouth nightly 30 tablet 3    rOPINIRole (REQUIP) 2 MG tablet Take 1 tablet by mouth 3 times daily PRN      dilTIAZem (CARDIZEM CD) 240 MG extended release capsule Take 1 capsule by mouth daily 90 capsule 1    fluticasone (FLONASE) 50 MCG/ACT nasal spray 2 sprays by Nasal route daily 3 Bottle 1    levothyroxine (SYNTHROID) 75 MCG tablet TAKE 1 TABLET EVERY DAY 90 tablet 1    levalbuterol (XOPENEX) 0.63 MG/3ML nebulization INHALE CONTENTS OF 1 VIAL PER NEBULIZER EVERY 6 HOURS AS NEEDED FOR WHEEZING 1050 mL 3    Respiratory Therapy Supplies (NEBULIZER/TUBING/MOUTHPIECE) KIT 1 kit by Does not apply route 4 times daily as needed (shortness of breath) 1 kit 0    albuterol sulfate HFA (PROAIR HFA) 108 (90 Base) MCG/ACT inhaler Inhale 2 puffs into the lungs every 6 hours as needed for Wheezing 1 Inhaler 6    Lancets MISC 1 each by Does not apply route 2 times daily 300 each 1       Allergies   Allergen Reactions    Adhesive Tape Anaphylaxis    Cefaclor Nausea And Vomiting     Other reaction(s): Chest pain    Nsaids      Pt states she has hives and heart races   Other reaction(s): Tachycardia    Clinoril [Sulindac] Other (See Comments)     Stomach pain    Codeine      FEEL NUMB    Diclofenac     Diclofenac Sodium Hives    Diclofenac Sodium Hives    Hctz [Hydrochlorothiazide]      Sob    Ibuprofen Other (See Comments)     Other reaction(s): Tachycardia    Macrobid [Nitrofurantoin Macrocrystal]      nausea    Motrin [Ibuprofen Micronized] Other (See Comments)     Tachycardia      Pcn [Penicillins] Hives    Peach [Prunus Persica] Itching and Swelling     Cannot eat raw peaches    Sulfa Antibiotics      Nausea, diarrhea       Social History     Tobacco Use    Smoking status: Former Smoker     Packs/day: 1.00     Years: 45.00     Pack years: 45.00     Start date: 9/15/1950     Quit date: 1995     Years since quittin.4    Smokeless tobacco: Never Used   Substance Use Topics    Alcohol use: No     Alcohol/week: 0.0 standard drinks       /80 (Site: Right Upper Arm, Position: Sitting, Cuff Size: Medium Adult)   Pulse 54   Temp 97.1 °F (36.2 °C) (Infrared)   Wt 171 lb 3.2 oz (77.7 kg)   SpO2 98%   BMI 31.31 kg/m²             Objective:   Physical Exam  Constitutional:       General: She is not in acute distress. Appearance: Normal appearance. She is well-developed. HENT:      Right Ear: Hearing and tympanic membrane normal.      Left Ear: Hearing and tympanic membrane normal.   Cardiovascular:      Rate and Rhythm: Normal rate and regular rhythm. Pulmonary:      Effort: Pulmonary effort is normal.      Breath sounds: Normal breath sounds. Abdominal:      General: Bowel sounds are normal. There is no distension. Palpations: Abdomen is soft. Abdomen is not rigid. There is no hepatomegaly, splenomegaly or mass. Tenderness: There is no abdominal tenderness. There is no right CVA tenderness, left CVA tenderness, guarding or rebound. Hernia: No hernia is present. Skin:     General: Skin is warm. Findings: No rash. Neurological:      General: No focal deficit present. Mental Status: She is alert. Mental status is at baseline. Psychiatric:         Behavior: Behavior is cooperative. Assessment:      Dali Parish was seen today for follow-up from hospital.    Diagnoses and all orders for this visit:    Recurrent UTI  -     BAY - Anthony Whittington MD, Urology, [facility]    OAB (overactive bladder)  -     BAY - Anthony Whittington MD, Urology, [facility]    Vertigo    COPD, mild ([address]    Other orders  -     fesoterodine (TOVIAZ) 4 MG TB24 ER tablet; Take 1 tablet by mouth daily            Plan:      ER information reviewed with patient and daughter    Clinically she has not been found to have a urinary tract infection this may be all overactive bladder.   She is in agreement this point time to start [place] but a referral be made to urology for consultation    For the vertigo recommended that we start her on vertigo exercises and these were given to her today. Told her she needs to do these every day. If she fails this the next step would be physical therapy for vestibular training    Prescription was sent for transport chair to [facility] at normal appointment time    Medical decision making of moderate complexity. Please note that this chart was generated using Dragon dictation software. Although every effort was made to ensure the accuracy of this automated transcription, some errors in transcription may have occurred.

## 2021-06-21 ENCOUNTER — CARE COORDINATION (OUTPATIENT)
Dept: CARE COORDINATION | Age: 86
End: 2021-06-21

## 2021-06-22 RX ORDER — DILTIAZEM HYDROCHLORIDE 240 MG/1
240 CAPSULE, COATED, EXTENDED RELEASE ORAL DAILY
Qty: 90 CAPSULE | Refills: 0 | Status: SHIPPED | OUTPATIENT
Start: 2021-06-22 | End: 2021-07-06

## 2021-06-25 ENCOUNTER — TELEPHONE (OUTPATIENT)
Dept: FAMILY MEDICINE CLINIC | Age: 86
End: 2021-06-25

## 2021-06-25 RX ORDER — CONJUGATED ESTROGENS 0.62 MG/G
1 CREAM VAGINAL
Qty: 1 TUBE | Refills: 3 | Status: SHIPPED | OUTPATIENT
Start: 2021-06-28 | End: 2022-02-11 | Stop reason: SDUPTHER

## 2021-06-25 NOTE — TELEPHONE ENCOUNTER
----- Message from Zee Xochilt sent at 6/25/2021  7:32 AM EDT -----  Subject: Message to Provider    QUESTIONS  Information for Provider? patient seen Neurologist, Dr. Chetna Messina,   did not exam her, assistant did sonogram, and had prescribed tamsulosin -   4 mg. Patient states that she is still having the same irritation. and was   advised that her bladder was not emptying. NO follow up scheduled, to call   back in 3 weeks. Patient is asking for a vaginal cream to help with the   continued irritation. Please contact patient to advise.  ---------------------------------------------------------------------------  --------------  CALL BACK INFO  What is the best way for the office to contact you? OK to leave message on   voicemail  Preferred Call Back Phone Number? [phone]  ---------------------------------------------------------------------------  --------------  SCRIPT ANSWERS  Relationship to Patient?  Self

## 2021-06-28 ENCOUNTER — TELEPHONE (OUTPATIENT)
Dept: FAMILY MEDICINE CLINIC | Age: 86
End: 2021-06-28

## 2021-06-28 NOTE — TELEPHONE ENCOUNTER
Patient called to state that the Urologist prescribed Tamsulosin and it causing her pulse to be in the 90's    She will not be taking it anymore    Please advise and give her a call back

## 2021-06-30 ENCOUNTER — TELEPHONE (OUTPATIENT)
Dept: PULMONOLOGY | Age: 86
End: 2021-06-30

## 2021-06-30 DIAGNOSIS — R73.9 HYPERGLYCEMIA: Primary | ICD-10-CM

## 2021-06-30 RX ORDER — DOXYCYCLINE HYCLATE 100 MG
100 TABLET ORAL DAILY
Qty: 10 TABLET | Refills: 0 | Status: SHIPPED | OUTPATIENT
Start: 2021-06-30 | End: 2021-07-10

## 2021-06-30 RX ORDER — LANCETS 30 GAUGE
1 EACH MISCELLANEOUS 2 TIMES DAILY
Qty: 300 EACH | Refills: 1 | Status: SHIPPED | OUTPATIENT
Start: 2021-06-30

## 2021-06-30 NOTE — TELEPHONE ENCOUNTER
Patient is calling and she checked her sugar today 250. She is out of there pin needles and she has nothing to take. She doesn't know what she needs. She was saying she was taking a pill and they took them away from her because the were old.       Mally in chart

## 2021-06-30 NOTE — TELEPHONE ENCOUNTER
Pt said she has been coughing up brown stuff and would like something called in.       Call pt at [phone]

## 2021-06-30 NOTE — TELEPHONE ENCOUNTER
I would recommend that she start monitor blood sugars over the next several days. Typically I would have her take it before breakfast and before supper. She should call back in about 1 week with her blood sugars. Not sure I would tell her to do anything differently we just one isolated reading.

## 2021-07-06 ENCOUNTER — HOSPITAL ENCOUNTER (OUTPATIENT)
Age: 86
Discharge: HOME | End: 2021-07-06
Payer: MEDICARE

## 2021-07-06 ENCOUNTER — TELEPHONE (OUTPATIENT)
Dept: CARDIOLOGY CLINIC | Age: 86
End: 2021-07-06

## 2021-07-06 ENCOUNTER — TELEPHONE (OUTPATIENT)
Dept: FAMILY MEDICINE CLINIC | Age: 86
End: 2021-07-06

## 2021-07-06 DIAGNOSIS — E03.9 ACQUIRED HYPOTHYROIDISM: ICD-10-CM

## 2021-07-06 DIAGNOSIS — Z79.01 ON ANTICOAGULANT THERAPY: ICD-10-CM

## 2021-07-06 DIAGNOSIS — I10 HYPERTENSION, UNSPECIFIED TYPE: ICD-10-CM

## 2021-07-06 DIAGNOSIS — I10 HYPERTENSION, UNSPECIFIED TYPE: Primary | ICD-10-CM

## 2021-07-06 DIAGNOSIS — Z79.01 ON ANTICOAGULANT THERAPY: Primary | ICD-10-CM

## 2021-07-06 LAB
ANION GAP SERPL CALCULATED.3IONS-SCNC: 12 MMOL/L (ref 3–16)
BUN BLDV-MCNC: 40 MG/DL (ref 7–20)
CALCIUM SERPL-MCNC: 9.7 MG/DL (ref 8.3–10.6)
CHLORIDE BLD-SCNC: 101 MMOL/L (ref 99–110)
CO2: 23 MMOL/L (ref 21–32)
CREAT SERPL-MCNC: 1.3 MG/DL (ref 0.6–1.2)
GFR AFRICAN AMERICAN: 47
GFR NON-AFRICAN AMERICAN: 39
GLUCOSE BLD-MCNC: 101 MG/DL (ref 70–99)
HCT VFR BLD CALC: 28.6 % (ref 36–48)
HEMOGLOBIN: 9.8 G/DL (ref 12–16)
MCH RBC QN AUTO: 30 PG (ref 26–34)
MCHC RBC AUTO-ENTMCNC: 34.1 G/DL (ref 31–36)
MCV RBC AUTO: 88 FL (ref 80–100)
PDW BLD-RTO: 15.2 % (ref 12.4–15.4)
PLATELET # BLD: 147 K/UL (ref 135–450)
PMV BLD AUTO: 8 FL (ref 5–10.5)
POTASSIUM SERPL-SCNC: 5.2 MMOL/L (ref 3.5–5.1)
RBC # BLD: 3.25 M/UL (ref 4–5.2)
SODIUM BLD-SCNC: 136 MMOL/L (ref 136–145)
TSH REFLEX: 1.31 UIU/ML (ref 0.27–4.2)
WBC # BLD: 5.9 K/UL (ref 4–11)

## 2021-07-06 PROCEDURE — 36415 COLL VENOUS BLD VENIPUNCTURE: CPT

## 2021-07-06 PROCEDURE — 85027 COMPLETE CBC AUTOMATED: CPT

## 2021-07-06 PROCEDURE — 80048 BASIC METABOLIC PNL TOTAL CA: CPT

## 2021-07-06 PROCEDURE — 84443 ASSAY THYROID STIM HORMONE: CPT

## 2021-07-06 RX ORDER — LEVOTHYROXINE SODIUM 0.07 MG/1
TABLET ORAL
Qty: 90 TABLET | Refills: 0 | Status: SHIPPED | OUTPATIENT
Start: 2021-07-06 | End: 2021-09-29 | Stop reason: SDUPTHER

## 2021-07-06 RX ORDER — DILTIAZEM HYDROCHLORIDE 240 MG/1
CAPSULE, COATED, EXTENDED RELEASE ORAL
Qty: 90 CAPSULE | Refills: 0 | Status: SHIPPED | OUTPATIENT
Start: 2021-07-06 | End: 2021-09-10

## 2021-07-06 NOTE — TELEPHONE ENCOUNTER
May check CBC, although, results from June appear unremarkable. Unfortunately, bruising is common with all anti-coagulants which she needs for her atrial fibrillation to reduce the risk of stroke. If CBC unremarkable, she may try eliquis 5 mg BID to see if bruising is better on a different blood thinner.     Andrew Carrillo, APRN-CNP

## 2021-07-06 NOTE — TELEPHONE ENCOUNTER
----- Message from Nicolasa Bañuelos sent at 7/6/2021  8:33 AM EDT -----  Subject: Referral Request    QUESTIONS   Reason for referral request? 3month bw   Has the physician seen you for this condition before? Yes  Select a date? 2020-09-28  Select the physician (PCP or Specialist)? Carlso Spring   Preferred Specialist (if applicable)? Do you already have an appointment scheduled? Yes  Select Scheduled Date? 2021-07-20  Select Scheduled Physician? Carlos Spring   Additional Information for Provider?   ---------------------------------------------------------------------------  --------------  Deannie Sandhoff INFO  What is the best way for the office to contact you? OK to leave message on   voicemail  Preferred Call Back Phone Number?  [phone]

## 2021-07-08 ENCOUNTER — TELEPHONE (OUTPATIENT)
Dept: FAMILY MEDICINE CLINIC | Age: 86
End: 2021-07-08

## 2021-07-08 NOTE — TELEPHONE ENCOUNTER
----- Message from [facility] (ESTEFANIA AHN) sent at 7/8/2021 10:13 AM EDT -----  Subject: Referral Request    QUESTIONS   Reason for referral request? pt is needing to know her test results   Has the physician seen you for this condition before? No   Preferred Specialist (if applicable)? Do you already have an appointment scheduled? No  Additional Information for Provider?   ---------------------------------------------------------------------------  --------------  CALL BACK INFO  What is the best way for the office to contact you? OK to leave message on   voicemail  Preferred Call Back Phone Number?  [phone]

## 2021-07-09 ENCOUNTER — TELEPHONE (OUTPATIENT)
Dept: CARDIOLOGY CLINIC | Age: 86
End: 2021-07-09

## 2021-07-09 NOTE — TELEPHONE ENCOUNTER
Spoke with the patient and advised her of the message below per NPSR . Patient states that we can try to switch to Eliquis from Xarelto. Pt would like it to be sent to [facility] on [address] . Pt also aware to stop taking Irbesartan . Pt will also follow up with PCP in regards to repeat labs due to elevated values.      Xarelto d/c in chart

## 2021-07-09 NOTE — TELEPHONE ENCOUNTER
CBC shows anemia, but no cause of bruising. She may try switching to eliquis 5 mg BID from Xarelto if she wants. Based on her chart, she has labile H/H with chronic anemia. May benefit from iron studies and possible iron supplementation if found to be iron deficient. BMP was ordered by her PCP. May need rechecked in a few weeks as creatinine and potassium are elevated from baseline.  Would recommend stopping irbesartan for now    Deepa Mark, APRN-CNP

## 2021-07-12 DIAGNOSIS — M15.9 PRIMARY OSTEOARTHRITIS INVOLVING MULTIPLE JOINTS: ICD-10-CM

## 2021-07-12 RX ORDER — HYDROCODONE BITARTRATE AND ACETAMINOPHEN 5; 325 MG/1; MG/1
1 TABLET ORAL 4 TIMES DAILY PRN
Qty: 120 TABLET | Refills: 0 | Status: SHIPPED | OUTPATIENT
Start: 2021-07-12 | End: 2021-08-09 | Stop reason: SDUPTHER

## 2021-07-12 NOTE — TELEPHONE ENCOUNTER
Medication:   Requested Prescriptions     Pending Prescriptions Disp Refills    HYDROcodone-acetaminophen (NORCO) 5-325 MG per tablet 120 tablet 0     Sig: Take 1 tablet by mouth 4 times daily as needed for Pain for up to 30 days. Last Filled:  6/9/2021    Patient Phone Number: [phone] (home)     Last appt: 6/15/2021   Next appt: 7/20/2021    Last OARRS:   RX Monitoring 3/20/2019   Attestation The Prescription Monitoring Report for this patient was reviewed today.    Periodic Controlled Substance Monitoring -     PDMP Monitoring:    Last PDMP Eva Donnelly as Reviewed [facility]):  Review User Review Instant Review Result   Raul Avalos 8/11/2020  2:43 PM Reviewed PDMP [1]     Preferred Pharmacy:   [address] [id number]  Phone: [phone] Fax: [phone]    [facility] [address] [place] [address] [phone] - F [phone]  [address] [facility]  [address] [id number]  Phone: [phone] Fax: [phone] [facility] [address] [phone]  [address] [facility] [address]  Phone: [phone] Fax: 100  [address], [address] [address]  Phone: [phone] Fax: [phone]

## 2021-07-12 NOTE — TELEPHONE ENCOUNTER
Disp Refills Start End    HYDROcodone-acetaminophen (NORCO) 5-325 MG per tablet 120 tablet 0 6/9/2021 7/9/2021    Sig - Route:  Take 1 tablet by mouth 4 times daily as needed for Pain for up to 30 days. - [facility] [address], [address] [phone] - F [phone]         Provider out of office      Please advise

## 2021-07-12 NOTE — CARE COORDINATION
E[address] Transitions Follow Up Call    3/10/2021    Patient: Magy Vega  Patient : 1935   MRN: [id number]  Reason for Admission: PNA  Discharge Date: 21 RARS: Readmission Risk Score: 25         Spoke with: Daughter \"Maxine\"    Care Transitions Subsequent and Final Call    Subsequent and Final Calls  Do you have any ongoing symptoms?: No  Have your medications changed?: No  Do you have any questions related to your medications?: No  Do you currently have any active services?: Yes  Are you currently active with any services?: Home Health  Do you have any needs or concerns that I can assist you with?: No  Identified Barriers: None  Care Transitions Interventions  Other Interventions: Follow Up: Daughter reports that patient is doing \"OK\", she will be having an ablation, she is waiting for a call to schedule the procedure. Patient did follow up with cardiology and pulmonology this week, continues to be in afib. Daughter denies any questions or concerns at this time. CTN will continue with outreach follow up calls.   Future Appointments   Date Time Provider Alissa Bustillo   2021  3:15 PM Keeley Urena MD [facility]   2021  3:30 PM MD [address] [facility]       Luciana Cheney RN
If you are a smoker, it is important for your health to stop smoking. Please be aware that second hand smoke is also harmful.

## 2021-07-13 ENCOUNTER — CARE COORDINATION (OUTPATIENT)
Dept: CARE COORDINATION | Age: 86
End: 2021-07-13

## 2021-07-13 NOTE — CARE COORDINATION
RN, ACM Note:  The RN, ACM spoke with the pt's daughter, Sonia Paul and she stated the pt has been very tired lately and sleeps a lot. The daughter stated the pt's lab results were off so she made an appointment with the PCP to discuss the labs and the pt's increased fatigue. The daughter stated the cardiologist did make some recent changes to the pt's medications. The pt was switched to Eliquis from Xarelto. Pt was instructed to stop taking Irbesartan per cardiology. The pt's daughter was driving at the time so the conversation was brief. The pt's daughter thanked the RN, [place] for calling and checking on the pt. The RN, ACM encouraged the daughter to call the nurse with any questions or concerns. Plan:  The RN, ACM will follow up with the pt in 2 weeks.

## 2021-07-15 DIAGNOSIS — F51.01 PRIMARY INSOMNIA: ICD-10-CM

## 2021-07-15 DIAGNOSIS — F41.9 ANXIETY: ICD-10-CM

## 2021-07-15 RX ORDER — LORAZEPAM 1 MG/1
TABLET ORAL
Qty: 60 TABLET | Refills: 2 | Status: SHIPPED | OUTPATIENT
Start: 2021-07-15 | End: 2021-10-12 | Stop reason: SDUPTHER

## 2021-07-15 NOTE — TELEPHONE ENCOUNTER
----- Message from Héctor Santana sent at 7/15/2021  7:05 AM EDT -----  Subject: Refill Request    QUESTIONS  Name of Medication? LORazepam (ATIVAN) 1 MG tablet  Patient-reported dosage and instructions? TAKE 1/2 TABLET BY MOUTH TWICE   DAILY AND 1 EVERY NIGHT AT BEDTIME AS NEEDED FOR ANXIETY  How many days do you have left? 3  [facility]? [id number]  Pharmacy phone number (if available)? [phone]  ---------------------------------------------------------------------------  --------------  Merissa COWAN  What is the best way for the office to contact you? OK to leave message on   voicemail  Preferred Call Back Phone Number?  [phone]

## 2021-07-15 NOTE — TELEPHONE ENCOUNTER
Medication:   Requested Prescriptions     Pending Prescriptions Disp Refills    LORazepam (ATIVAN) 1 MG tablet 60 tablet 2     Sig: TAKE 1/2 TABLET BY MOUTH TWICE DAILY AND 1 EVERY NIGHT AT BEDTIME AS NEEDED FOR ANXIETY      Last Filled:      Patient Phone Number: [phone] (home)     Last appt: 6/15/2021   Next appt: 7/20/2021    Last OARRS:   RX Monitoring 3/20/2019   Attestation The Prescription Monitoring Report for this patient was reviewed today.    Periodic Controlled Substance Monitoring -     PDMP Monitoring:    Last PDMP Lauren Arora as Reviewed [facility]):  Review User Review Instant Review Result   Tang MONTEZ 7/12/2021 12:57 PM Reviewed PDMP [1]     Preferred Pharmacy:   [facility] [address], [address] [address]  Phone: [phone] Fax: [phone]

## 2021-07-20 ENCOUNTER — OFFICE VISIT (OUTPATIENT)
Dept: FAMILY MEDICINE CLINIC | Age: 86
End: 2021-07-20
Payer: MEDICARE

## 2021-07-20 ENCOUNTER — HOSPITAL ENCOUNTER (OUTPATIENT)
Age: 86
Discharge: HOME | End: 2021-07-20
Payer: MEDICARE

## 2021-07-20 VITALS
BODY MASS INDEX: 30.18 KG/M2 | WEIGHT: 165 LBS | HEART RATE: 62 BPM | TEMPERATURE: 97 F | OXYGEN SATURATION: 94 % | SYSTOLIC BLOOD PRESSURE: 110 MMHG | DIASTOLIC BLOOD PRESSURE: 62 MMHG

## 2021-07-20 DIAGNOSIS — D64.9 ANEMIA, UNSPECIFIED TYPE: ICD-10-CM

## 2021-07-20 DIAGNOSIS — I10 HYPERTENSION, UNSPECIFIED TYPE: ICD-10-CM

## 2021-07-20 DIAGNOSIS — E03.9 ACQUIRED HYPOTHYROIDISM: ICD-10-CM

## 2021-07-20 DIAGNOSIS — R53.83 FATIGUE, UNSPECIFIED TYPE: ICD-10-CM

## 2021-07-20 DIAGNOSIS — J84.9 INTERSTITIAL LUNG DISEASE (HCC): ICD-10-CM

## 2021-07-20 DIAGNOSIS — N17.9 ACUTE RENAL FAILURE, UNSPECIFIED ACUTE RENAL FAILURE TYPE (HCC): Primary | ICD-10-CM

## 2021-07-20 LAB
ANION GAP SERPL CALCULATED.3IONS-SCNC: 13 MMOL/L (ref 3–16)
BUN BLDV-MCNC: 27 MG/DL (ref 7–20)
CALCIUM SERPL-MCNC: 9.6 MG/DL (ref 8.3–10.6)
CHLORIDE BLD-SCNC: 97 MMOL/L (ref 99–110)
CO2: 25 MMOL/L (ref 21–32)
CREAT SERPL-MCNC: 1.2 MG/DL (ref 0.6–1.2)
FOLATE: 10.96 NG/ML (ref 4.78–24.2)
GFR AFRICAN AMERICAN: 52
GFR NON-AFRICAN AMERICAN: 43
GLUCOSE BLD-MCNC: 98 MG/DL (ref 70–99)
HCT VFR BLD CALC: 30.8 % (ref 36–48)
HEMOGLOBIN: 10.4 G/DL (ref 12–16)
IRON SATURATION: 20 % (ref 15–50)
IRON: 59 UG/DL (ref 37–145)
MCH RBC QN AUTO: 29.9 PG (ref 26–34)
MCHC RBC AUTO-ENTMCNC: 33.7 G/DL (ref 31–36)
MCV RBC AUTO: 88.6 FL (ref 80–100)
PDW BLD-RTO: 15.8 % (ref 12.4–15.4)
PLATELET # BLD: 130 K/UL (ref 135–450)
PMV BLD AUTO: 8.5 FL (ref 5–10.5)
POTASSIUM SERPL-SCNC: 4.5 MMOL/L (ref 3.5–5.1)
RBC # BLD: 3.48 M/UL (ref 4–5.2)
SODIUM BLD-SCNC: 135 MMOL/L (ref 136–145)
TOTAL IRON BINDING CAPACITY: 300 UG/DL (ref 260–445)
VITAMIN B-12: 579 PG/ML (ref 211–911)
WBC # BLD: 6.1 K/UL (ref 4–11)

## 2021-07-20 PROCEDURE — 1123F ACP DISCUSS/DSCN MKR DOCD: CPT | Performed by: FAMILY MEDICINE

## 2021-07-20 PROCEDURE — G8399 PT W/DXA RESULTS DOCUMENT: HCPCS | Performed by: FAMILY MEDICINE

## 2021-07-20 PROCEDURE — G8427 DOCREV CUR MEDS BY ELIG CLIN: HCPCS | Performed by: FAMILY MEDICINE

## 2021-07-20 PROCEDURE — 99214 OFFICE O/P EST MOD 30 MIN: CPT | Performed by: FAMILY MEDICINE

## 2021-07-20 PROCEDURE — 1090F PRES/ABSN URINE INCON ASSESS: CPT | Performed by: FAMILY MEDICINE

## 2021-07-20 PROCEDURE — 36415 COLL VENOUS BLD VENIPUNCTURE: CPT

## 2021-07-20 PROCEDURE — 1036F TOBACCO NON-USER: CPT | Performed by: FAMILY MEDICINE

## 2021-07-20 PROCEDURE — 80048 BASIC METABOLIC PNL TOTAL CA: CPT

## 2021-07-20 PROCEDURE — 82607 VITAMIN B-12: CPT

## 2021-07-20 PROCEDURE — 4040F PNEUMOC VAC/ADMIN/RCVD: CPT | Performed by: FAMILY MEDICINE

## 2021-07-20 PROCEDURE — G8417 CALC BMI ABV UP PARAM F/U: HCPCS | Performed by: FAMILY MEDICINE

## 2021-07-20 PROCEDURE — 85027 COMPLETE CBC AUTOMATED: CPT

## 2021-07-20 PROCEDURE — 83540 ASSAY OF IRON: CPT

## 2021-07-20 PROCEDURE — 82746 ASSAY OF FOLIC ACID SERUM: CPT

## 2021-07-20 PROCEDURE — 83550 IRON BINDING TEST: CPT

## 2021-07-20 RX ORDER — BETHANECHOL CHLORIDE 25 MG/1
25 TABLET ORAL 3 TIMES DAILY PRN
COMMUNITY
End: 2022-02-15

## 2021-07-20 ASSESSMENT — PATIENT HEALTH QUESTIONNAIRE - PHQ9
SUM OF ALL RESPONSES TO PHQ QUESTIONS 1-9: 0
1. LITTLE INTEREST OR PLEASURE IN DOING THINGS: 0
SUM OF ALL RESPONSES TO PHQ QUESTIONS 1-9: 0
2. FEELING DOWN, DEPRESSED OR HOPELESS: 0
SUM OF ALL RESPONSES TO PHQ QUESTIONS 1-9: 0
SUM OF ALL RESPONSES TO PHQ9 QUESTIONS 1 & 2: 0

## 2021-07-20 NOTE — PATIENT INSTRUCTIONS
Patient Education        Cawthorne Exercises for Vertigo: Care Instructions  Your Care Instructions  Simple exercises can help you regain your balance when you have vertigo. If you have Ménière's disease, benign paroxysmal positional vertigo (BPPV), or another inner ear problem, you may have vertigo off and on. Do these exercises first thing in the morning and before you go to bed. You might get dizzy when you first start them. If this happens, try to do them for at least 5 minutes. Do a group of exercises at a time, starting at the top of the list. It may take several weeks before you can do all the exercises without feeling dizzy. Follow-up care is a key part of your treatment and safety. Be sure to make and go to all appointments, and call your doctor if you are having problems. It's also a good idea to know your test results and keep a list of the medicines you take. How can you care for yourself at home? Exercise 1  While sitting on the side of the bed and holding your head still:  · Look up as far as you can. · Look down as far as you can. · Look from side to side as far as you can. · Stretch your arm straight out in front of you. Focus on your index finger. Continue to focus on your finger while you bring it to your nose. Exercise 2  While sitting on the side of the bed:  · Bring your head as far back as you can. · Bring your head forward to touch your chin to your chest.  · Turn your head from side to side. · Do these exercises first with your eyes open. Then try with your eyes closed. Exercise 3  While sitting on the side of the bed:  · Shrug your shoulders straight upward, then relax them. · Bend over and try to touch the ground with your fingers. Then go back to a sitting position. · Toss a small ball from one hand to the other. Throw the ball higher than your eyes so you have to look up.   Exercise 4  While standing (with someone close by if you feel uncomfortable):  · Repeat Exercise 1. · Repeat Exercise 2.  · Pass a ball between your legs and above your head. · Sit down and then stand up. Repeat. Turn around in a [place] a different way each time you stand. · With someone close by to help you, try the above exercises with your eyes closed. Exercise 5  In a room that is cleared of obstacles:  · Walk to a corner of the room, turn to your right, and walk back to the starting point. Now, repeat and turn left. · Walk up and down a slope. Now try stairs. · While holding on to someone's arm, try these exercises with your eyes closed. When should you call for help? Watch closely for changes in your health, and be sure to contact your doctor if:    · You do not get better as expected. Where can you learn more? Go to [email or web address]://[email or web address].[email or web address]. org and sign in to your [employer] account. Enter M690 in the Tame box to learn more about \"Cawthorne Exercises for Vertigo: Care Instructions. \"     If you do not have an account, please click on the \"Sign Up Now\" link. Current as of: December 2, 2020               Content Version: 12.9  © 4267-8240 [employer] Care instructions adapted under license by [facility] ([facility]). If you have questions about a medical condition or this instruction, always ask your healthcare professional. [address] any warranty or liability for your use of this information. Patient Education        Vertigo: Exercises  Introduction  Here are some examples of exercises for you to try. The exercises may be suggested for a condition or for rehabilitation. Start each exercise slowly. Ease off the exercises if you start to have pain. You will be told when to start these exercises and which ones will work best for you. How to do the exercises  Exercise 1   1. Stand with a chair in front of you and a wall behind you. If you begin to fall, you may use them for support.   2. Stand with your feet together and your arms at your sides. 3. Move your head up and down 10 times. Exercise 2   1. Move your head side to side 10 times. Exercise 3   1. Move your head diagonally up and down 10 times. Exercise 4   1. Move your head diagonally up and down 10 times on the other side. Follow-up care is a key part of your treatment and safety. Be sure to make and go to all appointments, and call your doctor if you are having problems. It's also a good idea to know your test results and keep a list of the medicines you take. Where can you learn more? Go to https://Hughes [email or web address] org and sign in to your [employer] account. Enter F349 in the multiBIND biotec box to learn more about \"Vertigo: Exercises. \"     If you do not have an account, please click on the \"Sign Up Now\" link. Current as of: December 2, 2020               Content Version: 12.9  © 2006-2021 [employer]. Care instructions adapted under license by [facility] ([facility]). If you have questions about a medical condition or this instruction, always ask your healthcare professional. [address] [place] [address] any warranty or liability for your use of this information.

## 2021-07-20 NOTE — PROGRESS NOTES
Subjective:      Patient ID: Brandon Lubin is a 80 y.o. female. CC: Patient presents for re-evaluation of chronic health problems including acute renal failure, anemia, fatigue, interstitial lung disease, hypertension and hypothyroidism. Rob HUMPHREY Patient presents today for a follow-up on chronic medications and medical conditions in accompaniment of daughter. Patient has been more tired lately for about 3-4 weeks. She was feeling better and now she feels worse today. Patient was recently evaluated by cardiology and found to have acute renal failure and was taken off Avapro medication. There was also some concern that she is more anemic now than she was in the past.  She is also just feeling generally more tired. She states she did receive a new mattress and with a new mattress she feels more rested when she awakens in the morning. She feels she is eating well although her daughter states there is days she simply will not eat because she does not have any appetite. She denies any issues with her bowel or bladder. Respiratory status is unchanged.     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, without long-term current use of insulin (HCC)    Restless legs syndrome    ANTHONY (obstructive sleep apnea)    Allergic rhinitis    COPD, mild (HCC)    Vertigo    Interstitial lung disease (HCC)    Chronic cough    Coronary artery disease involving native coronary artery of native heart with angina pectoris (HCC)    A-fib ([address]    Hypothyroid    Chronic respiratory failure with hypoxia (HCC)    SOB (shortness of breath)    Anxiety    Asthmatic bronchitis    Ataxia       Outpatient Medications Marked as Taking for the 7/20/21 encounter (Office Visit) with Kamille Dudley MD Medication Sig Dispense Refill    bethanechol (URECHOLINE) 25 MG tablet Take 25 mg by mouth 3 times daily      rivaroxaban (XARELTO) 15 MG TABS tablet Take 15 mg by mouth daily      LORazepam (ATIVAN) 1 MG tablet TAKE 1/2 TABLET BY MOUTH TWICE DAILY AND 1 EVERY NIGHT AT BEDTIME AS NEEDED FOR ANXIETY 60 tablet 2    HYDROcodone-acetaminophen (NORCO) 5-325 MG per tablet Take 1 tablet by mouth 4 times daily as needed for Pain for up to 30 days.  120 tablet 0    dilTIAZem (CARDIZEM CD) 240 MG extended release capsule TAKE 1 CAPSULE EVERY DAY 90 capsule 0    levothyroxine (SYNTHROID) 75 MCG tablet TAKE 1 TABLET EVERY DAY 90 tablet 0    Lancets MISC 1 each by Does not apply route 2 times daily 300 each 1    conjugated estrogens (PREMARIN) 0.625 MG/GM vaginal cream Place 1 g vaginally Twice a Week 1 Tube 3    meclizine (ANTIVERT) 12.5 MG tablet TAKE 1 TABLET THREE TIMES DAILY AS NEEDED 270 tablet 0    ondansetron (ZOFRAN) 4 MG tablet Take 1 tablet by mouth daily as needed for Nausea or Vomiting 30 tablet 0    OXYGEN Inhale 2 L into the lungs      cloNIDine (CATAPRES) 0.1 MG tablet TAKE 1 TABLET BY MOUTH TWICE DAILY 60 tablet 0    propafenone (RYTHMOL) 150 MG tablet Take 1 tablet by mouth every 8 hours 270 tablet 1    budesonide-formoterol (SYMBICORT) 160-4.5 MCG/ACT AERO INHALE 2 PUFFS INTO THE LUNGS TWICE DAILY 3 Inhaler 0    blood glucose monitor strips Test one time a day 100 strip 5    dilTIAZem (CARDIZEM) 30 MG tablet Take 2 tablets by mouth 2 times daily as needed (tachycardia with heart rate >100) 60 tablet 0    dicyclomine (BENTYL) 10 MG capsule Take 1 capsule by mouth 4 times daily as needed (abd pain) 360 capsule 1    atorvastatin (LIPITOR) 80 MG tablet Take 1 tablet by mouth nightly 30 tablet 3    rOPINIRole (REQUIP) 2 MG tablet Take 1 tablet by mouth 3 times daily PRN      fluticasone (FLONASE) 50 MCG/ACT nasal spray 2 sprays by Nasal route daily 3 Bottle 1    levalbuterol (XOPENEX) 0.63 MG/3ML nebulization INHALE CONTENTS OF 1 VIAL PER NEBULIZER EVERY 6 HOURS AS NEEDED FOR WHEEZING 1050 mL 3    Respiratory Therapy Supplies (NEBULIZER/TUBING/MOUTHPIECE) KIT 1 kit by Does not apply route 4 times daily as needed (shortness of breath) 1 kit 0    albuterol sulfate HFA (PROAIR HFA) 108 (90 Base) MCG/ACT inhaler Inhale 2 puffs into the lungs every 6 hours as needed for Wheezing 1 Inhaler 6       Allergies   Allergen Reactions    Adhesive Tape Anaphylaxis    Cefaclor Nausea And Vomiting     Other reaction(s): Chest pain    Nsaids      Pt states she has hives and heart races   Other reaction(s): Tachycardia    Clinoril [Sulindac] Other (See Comments)     Stomach pain    Codeine      FEEL NUMB    Diclofenac     Diclofenac Sodium Hives    Diclofenac Sodium Hives    Hctz [Hydrochlorothiazide]      Sob    Ibuprofen Other (See Comments)     Other reaction(s): Tachycardia    Macrobid [Nitrofurantoin Macrocrystal]      nausea    Motrin [Ibuprofen Micronized] Other (See Comments)     Tachycardia      Pcn [Penicillins] Hives    Peach [Prunus Persica] Itching and Swelling     Cannot eat raw peaches    Sulfa Antibiotics      Nausea, diarrhea       Social History     Tobacco Use    Smoking status: Former Smoker     Packs/day: 1.00     Years: 45.00     Pack years: 45.00     Start date: 9/15/1950     Quit date: 1995     Years since quittin.5    Smokeless tobacco: Never Used   Substance Use Topics    Alcohol use: No     Alcohol/week: 0.0 standard drinks       /62 (Site: Left Upper Arm, Position: Sitting, Cuff Size: Medium Adult)   Pulse 62   Temp 97 °F (36.1 °C) (Infrared)   Wt 165 lb (74.8 kg)   SpO2 94%   BMI 30.18 kg/m²       Objective:   Physical Exam  Constitutional:       General: She is not in acute distress. Appearance: She is well-developed. Neck:      Vascular: No carotid bruit. Cardiovascular:      Rate and Rhythm: Normal rate. Rhythm irregularly irregular. Pulses:           Dorsalis pedis pulses are 2+ on the right side and 2+ on the left side. Posterior tibial pulses are 2+ on the right side and 2+ on the left side. Heart sounds: Normal heart sounds. No murmur heard. No friction rub. No gallop. Pulmonary:      Effort: Pulmonary effort is normal. No respiratory distress. Breath sounds: Rhonchi present. No wheezing or rales. Musculoskeletal:         General: No tenderness. Normal range of motion. Right lower leg: No edema. Left lower leg: No edema. Lymphadenopathy:      Cervical: No cervical adenopathy. Neurological:      Mental Status: She is alert and oriented to person, place, and time. Sensory: Sensation is intact. Motor: Motor function is intact. Psychiatric:         Mood and Affect: Mood and affect normal.         Behavior: Behavior is cooperative. Cognition and Memory: Cognition and memory normal.         Assessment:      Yamila Law was seen today for 3 month follow-up and hypertension. Diagnoses and all orders for this visit:    Acute renal failure, unspecified acute renal failure type ([address]    Hypertension, unspecified type  -     Basic Metabolic Panel; Future    Anemia, unspecified type  -     CBC; Future  -     Vitamin B12 & Folate; Future  -     Iron and TIBC; Future    Fatigue, unspecified type    Acquired hypothyroidism    Interstitial lung disease ([address]      OARRS report checked        Plan:      Laboratory profile reviewed with patient and daughter. Kidney function did become more stressed and this need to be reevaluated. Certainly recommended she maintain a low-salt diet and avoid anti-inflammatory medications. For the anemia this will also need reinvestigation and additional laboratory testing was ordered. Proceed with the above laboratory testing regards to fatigue but she may indeed need a sleep evaluation as she has known sleep apnea and is currently not under treatment.     No other change in medication at this time    RTC 3 months    Medical decision making of moderate complexity. Please note that this chart was generated using Dragon dictation software. Although every effort was made to ensure the accuracy of this automated transcription, some errors in transcription may have occurred.

## 2021-07-27 ENCOUNTER — CARE COORDINATION (OUTPATIENT)
Dept: CARE COORDINATION | Age: 86
End: 2021-07-27

## 2021-07-27 NOTE — CARE COORDINATION
RN, ACM Note:  The RN, ACM left a voice message for the pt's daughter asking if the daughter and pt were still interested in Care Coordination. The RN, ACM asked the pt's daughter to return the nurse's call.

## 2021-08-06 ENCOUNTER — TELEPHONE (OUTPATIENT)
Dept: CARDIOLOGY CLINIC | Age: 86
End: 2021-08-06

## 2021-08-06 NOTE — TELEPHONE ENCOUNTER
Spoke spoke with daughter and then with the patient. Patient states she large \" spots' on her arms and legs from washing in the shower. They are not bleeding and do not hurt. We discussed wound care. Patient would like to go back on  Eliquis. She will call us if she has further issues.

## 2021-08-06 NOTE — TELEPHONE ENCOUNTER
Pt took shower and wash her left  arm, skin came off started bleeding now her arm has blue spots like its bleeding underneath the skin , she's never had this before and she stated it is scary. She is taking her blood thinners, should she stop?     plse advise thank you

## 2021-08-09 ENCOUNTER — TELEPHONE (OUTPATIENT)
Dept: CARDIOLOGY CLINIC | Age: 86
End: 2021-08-09

## 2021-08-09 DIAGNOSIS — K92.1 MELENA: Primary | ICD-10-CM

## 2021-08-09 DIAGNOSIS — M15.9 PRIMARY OSTEOARTHRITIS INVOLVING MULTIPLE JOINTS: ICD-10-CM

## 2021-08-09 RX ORDER — HYDROCODONE BITARTRATE AND ACETAMINOPHEN 5; 325 MG/1; MG/1
1 TABLET ORAL 4 TIMES DAILY PRN
Qty: 120 TABLET | Refills: 0 | Status: SHIPPED | OUTPATIENT
Start: 2021-08-09 | End: 2021-09-13 | Stop reason: SDUPTHER

## 2021-08-09 NOTE — TELEPHONE ENCOUNTER
Patient is no longer having red spots on her underwear she states ' that was just hemmorhoids\" She is having black stool. Daughter spoke to GI doctor  And per Valene List she is not passing blood . She states  Bruising on her arms are getting better     Explained to patient the importance of staying on eliquis as ordered. Ordered CBC to check blood counts. Instructed to notify us if he has red stool or rectal bleeding. Advised at last week  And again today  follow up with GI or PCP for cause of bleeding. She verbalized understanding .

## 2021-08-09 NOTE — TELEPHONE ENCOUNTER
HYDROcodone-acetaminophen (Alex Carne) 5-325 MG per tablet [[id number]    [facility] [address] [facility] [address] [phone]   [address], [place] [facility] [address]   Phone:  [phone]  Fax:  [phone]

## 2021-08-09 NOTE — TELEPHONE ENCOUNTER
Pt is having constipation and her stool is black. Pt states she does not feel right and very fatigued . Pt states she changed her eliquis to once a day 2 days ago. Pt states she had labs done on 7/20/21 please review and call pt to advise.

## 2021-08-10 ENCOUNTER — TELEPHONE (OUTPATIENT)
Dept: CARDIOLOGY CLINIC | Age: 86
End: 2021-08-10

## 2021-08-10 ENCOUNTER — CARE COORDINATION (OUTPATIENT)
Dept: CARE COORDINATION | Age: 86
End: 2021-08-10

## 2021-08-10 ENCOUNTER — HOSPITAL ENCOUNTER (OUTPATIENT)
Age: 86
Discharge: HOME | End: 2021-08-10
Payer: MEDICARE

## 2021-08-10 DIAGNOSIS — K92.1 MELENA: ICD-10-CM

## 2021-08-10 LAB
HCT VFR BLD CALC: 31.5 % (ref 36–48)
HEMOGLOBIN: 10.5 G/DL (ref 12–16)
MCH RBC QN AUTO: 29.7 PG (ref 26–34)
MCHC RBC AUTO-ENTMCNC: 33.3 G/DL (ref 31–36)
MCV RBC AUTO: 89.1 FL (ref 80–100)
PDW BLD-RTO: 16.5 % (ref 12.4–15.4)
PLATELET # BLD: 138 K/UL (ref 135–450)
PMV BLD AUTO: 9.1 FL (ref 5–10.5)
RBC # BLD: 3.53 M/UL (ref 4–5.2)
WBC # BLD: 8.2 K/UL (ref 4–11)

## 2021-08-10 PROCEDURE — 36415 COLL VENOUS BLD VENIPUNCTURE: CPT

## 2021-08-10 PROCEDURE — 85027 COMPLETE CBC AUTOMATED: CPT

## 2021-08-10 NOTE — CARE COORDINATION
RNERIK Note:  The pt appears to be stable at this time. The pt's daughter and the nurse are not engaged in any current plan of care. The RNERIK will sign off for now.

## 2021-08-10 NOTE — TELEPHONE ENCOUNTER
Called Mayra White and let her know her h/h is stable. She will call if she has further bleeding issues.  She has resumed her Eliquis at [facility].

## 2021-08-10 NOTE — TELEPHONE ENCOUNTER
LIBBY... just letting SHERRY know that she got her labs drawn yesterday at [facility]

## 2021-08-11 ENCOUNTER — TELEPHONE (OUTPATIENT)
Dept: FAMILY MEDICINE CLINIC | Age: 86
End: 2021-08-11

## 2021-08-11 ENCOUNTER — APPOINTMENT (OUTPATIENT)
Dept: GENERAL RADIOLOGY | Age: 86
End: 2021-08-11
Payer: MEDICARE

## 2021-08-11 ENCOUNTER — HOSPITAL ENCOUNTER (OUTPATIENT)
Age: 86
Setting detail: OBSERVATION
Discharge: HOME | End: 2021-08-13
Attending: EMERGENCY MEDICINE | Admitting: HOSPITALIST
Payer: MEDICARE

## 2021-08-11 ENCOUNTER — APPOINTMENT (OUTPATIENT)
Dept: CT IMAGING | Age: 86
End: 2021-08-11
Payer: MEDICARE

## 2021-08-11 DIAGNOSIS — Z79.01 ANTICOAGULATED: ICD-10-CM

## 2021-08-11 DIAGNOSIS — R51.9 PRESSURE IN HEAD: Primary | ICD-10-CM

## 2021-08-11 DIAGNOSIS — R53.81 MALAISE AND FATIGUE: ICD-10-CM

## 2021-08-11 DIAGNOSIS — I10 ACCELERATED HYPERTENSION: ICD-10-CM

## 2021-08-11 DIAGNOSIS — R53.83 MALAISE AND FATIGUE: ICD-10-CM

## 2021-08-11 LAB
A/G RATIO: 1.4 (ref 1.1–2.2)
ALBUMIN SERPL-MCNC: 4.2 G/DL (ref 3.4–5)
ALP BLD-CCNC: 150 U/L (ref 40–129)
ALT SERPL-CCNC: 21 U/L (ref 10–40)
ANION GAP SERPL CALCULATED.3IONS-SCNC: 14 MMOL/L (ref 3–16)
AST SERPL-CCNC: 18 U/L (ref 15–37)
BASOPHILS ABSOLUTE: 0 K/UL (ref 0–0.2)
BASOPHILS RELATIVE PERCENT: 0.2 %
BILIRUB SERPL-MCNC: 0.5 MG/DL (ref 0–1)
BILIRUBIN URINE: NEGATIVE
BLOOD, URINE: NEGATIVE
BUN BLDV-MCNC: 27 MG/DL (ref 7–20)
CALCIUM SERPL-MCNC: 9.8 MG/DL (ref 8.3–10.6)
CHLORIDE BLD-SCNC: 99 MMOL/L (ref 99–110)
CLARITY: CLEAR
CO2: 23 MMOL/L (ref 21–32)
COLOR: YELLOW
CREAT SERPL-MCNC: 1.2 MG/DL (ref 0.6–1.2)
EOSINOPHILS ABSOLUTE: 0 K/UL (ref 0–0.6)
EOSINOPHILS RELATIVE PERCENT: 0.2 %
EPITHELIAL CELLS, UA: 1 /HPF (ref 0–5)
GFR AFRICAN AMERICAN: 52
GFR NON-AFRICAN AMERICAN: 43
GLOBULIN: 2.9 G/DL
GLUCOSE BLD-MCNC: 135 MG/DL (ref 70–99)
GLUCOSE BLD-MCNC: 180 MG/DL (ref 70–99)
GLUCOSE URINE: 100 MG/DL
HCT VFR BLD CALC: 36 % (ref 36–48)
HEMOGLOBIN: 11.9 G/DL (ref 12–16)
HYALINE CASTS: 1 /LPF (ref 0–8)
INR BLD: 1.46 (ref 0.88–1.12)
KETONES, URINE: NEGATIVE MG/DL
LEUKOCYTE ESTERASE, URINE: ABNORMAL
LYMPHOCYTES ABSOLUTE: 0.9 K/UL (ref 1–5.1)
LYMPHOCYTES RELATIVE PERCENT: 10 %
MCH RBC QN AUTO: 30 PG (ref 26–34)
MCHC RBC AUTO-ENTMCNC: 33.2 G/DL (ref 31–36)
MCV RBC AUTO: 90.4 FL (ref 80–100)
MICROSCOPIC EXAMINATION: YES
MONOCYTES ABSOLUTE: 0.1 K/UL (ref 0–1.3)
MONOCYTES RELATIVE PERCENT: 1.3 %
NEUTROPHILS ABSOLUTE: 8.3 K/UL (ref 1.7–7.7)
NEUTROPHILS RELATIVE PERCENT: 88.3 %
NITRITE, URINE: NEGATIVE
PDW BLD-RTO: 16.2 % (ref 12.4–15.4)
PERFORMED ON: ABNORMAL
PH UA: 7 (ref 5–8)
PLATELET # BLD: 143 K/UL (ref 135–450)
PMV BLD AUTO: 8.1 FL (ref 5–10.5)
POTASSIUM REFLEX MAGNESIUM: 4.5 MMOL/L (ref 3.5–5.1)
PRO-BNP: 386 PG/ML (ref 0–449)
PROTEIN UA: 100 MG/DL
PROTHROMBIN TIME: 16.8 SEC (ref 9.9–12.7)
RBC # BLD: 3.98 M/UL (ref 4–5.2)
RBC UA: 0 /HPF (ref 0–4)
SODIUM BLD-SCNC: 136 MMOL/L (ref 136–145)
SPECIFIC GRAVITY UA: 1.01 (ref 1–1.03)
TOTAL PROTEIN: 7.1 G/DL (ref 6.4–8.2)
TROPONIN: <0.01 NG/ML
URINE REFLEX TO CULTURE: ABNORMAL
URINE TYPE: ABNORMAL
UROBILINOGEN, URINE: 0.2 E.U./DL
WBC # BLD: 9.3 K/UL (ref 4–11)
WBC UA: 6 /HPF (ref 0–5)

## 2021-08-11 PROCEDURE — 71045 X-RAY EXAM CHEST 1 VIEW: CPT

## 2021-08-11 PROCEDURE — 85025 COMPLETE CBC W/AUTO DIFF WBC: CPT

## 2021-08-11 PROCEDURE — 2580000003 HC RX 258: Performed by: HOSPITALIST

## 2021-08-11 PROCEDURE — 70496 CT ANGIOGRAPHY HEAD: CPT

## 2021-08-11 PROCEDURE — G0378 HOSPITAL OBSERVATION PER HR: HCPCS

## 2021-08-11 PROCEDURE — 99283 EMERGENCY DEPT VISIT LOW MDM: CPT

## 2021-08-11 PROCEDURE — 93005 ELECTROCARDIOGRAM TRACING: CPT | Performed by: STUDENT IN AN ORGANIZED HEALTH CARE EDUCATION/TRAINING PROGRAM

## 2021-08-11 PROCEDURE — 85610 PROTHROMBIN TIME: CPT

## 2021-08-11 PROCEDURE — 6370000000 HC RX 637 (ALT 250 FOR IP): Performed by: EMERGENCY MEDICINE

## 2021-08-11 PROCEDURE — 80053 COMPREHEN METABOLIC PANEL: CPT

## 2021-08-11 PROCEDURE — 2580000003 HC RX 258: Performed by: EMERGENCY MEDICINE

## 2021-08-11 PROCEDURE — 70450 CT HEAD/BRAIN W/O DYE: CPT

## 2021-08-11 PROCEDURE — 6360000004 HC RX CONTRAST MEDICATION: Performed by: EMERGENCY MEDICINE

## 2021-08-11 PROCEDURE — 83880 ASSAY OF NATRIURETIC PEPTIDE: CPT

## 2021-08-11 PROCEDURE — 81001 URINALYSIS AUTO W/SCOPE: CPT

## 2021-08-11 PROCEDURE — 6370000000 HC RX 637 (ALT 250 FOR IP): Performed by: HOSPITALIST

## 2021-08-11 PROCEDURE — 84484 ASSAY OF TROPONIN QUANT: CPT

## 2021-08-11 RX ORDER — PROPAFENONE HYDROCHLORIDE 150 MG/1
150 TABLET, FILM COATED ORAL EVERY 8 HOURS SCHEDULED
Status: DISCONTINUED | OUTPATIENT
Start: 2021-08-11 | End: 2021-08-13 | Stop reason: HOSPADM

## 2021-08-11 RX ORDER — BETHANECHOL CHLORIDE 10 MG/1
25 TABLET ORAL 3 TIMES DAILY
Status: DISCONTINUED | OUTPATIENT
Start: 2021-08-11 | End: 2021-08-13 | Stop reason: HOSPADM

## 2021-08-11 RX ORDER — SODIUM CHLORIDE 0.9 % (FLUSH) 0.9 %
5-40 SYRINGE (ML) INJECTION EVERY 12 HOURS SCHEDULED
Status: DISCONTINUED | OUTPATIENT
Start: 2021-08-11 | End: 2021-08-13 | Stop reason: HOSPADM

## 2021-08-11 RX ORDER — LORAZEPAM 1 MG/1
1 TABLET ORAL NIGHTLY PRN
Status: DISCONTINUED | OUTPATIENT
Start: 2021-08-11 | End: 2021-08-13 | Stop reason: HOSPADM

## 2021-08-11 RX ORDER — SODIUM CHLORIDE, SODIUM LACTATE, POTASSIUM CHLORIDE, AND CALCIUM CHLORIDE .6; .31; .03; .02 G/100ML; G/100ML; G/100ML; G/100ML
1000 INJECTION, SOLUTION INTRAVENOUS ONCE
Status: COMPLETED | OUTPATIENT
Start: 2021-08-11 | End: 2021-08-11

## 2021-08-11 RX ORDER — ACETAMINOPHEN 325 MG/1
650 TABLET ORAL EVERY 6 HOURS PRN
Status: DISCONTINUED | OUTPATIENT
Start: 2021-08-11 | End: 2021-08-13 | Stop reason: HOSPADM

## 2021-08-11 RX ORDER — PREDNISONE 10 MG/1
TABLET ORAL
Qty: 15 TABLET | Refills: 0 | Status: ON HOLD | OUTPATIENT
Start: 2021-08-11 | End: 2021-08-13 | Stop reason: HOSPADM

## 2021-08-11 RX ORDER — ONDANSETRON 2 MG/ML
4 INJECTION INTRAMUSCULAR; INTRAVENOUS EVERY 6 HOURS PRN
Status: DISCONTINUED | OUTPATIENT
Start: 2021-08-11 | End: 2021-08-13 | Stop reason: HOSPADM

## 2021-08-11 RX ORDER — CLONIDINE HYDROCHLORIDE 0.1 MG/1
0.1 TABLET ORAL 2 TIMES DAILY
Status: DISCONTINUED | OUTPATIENT
Start: 2021-08-11 | End: 2021-08-12

## 2021-08-11 RX ORDER — POLYETHYLENE GLYCOL 3350 17 G/17G
17 POWDER, FOR SOLUTION ORAL DAILY PRN
Status: DISCONTINUED | OUTPATIENT
Start: 2021-08-11 | End: 2021-08-13 | Stop reason: HOSPADM

## 2021-08-11 RX ORDER — HYDRALAZINE HYDROCHLORIDE 25 MG/1
50 TABLET, FILM COATED ORAL ONCE
Status: COMPLETED | OUTPATIENT
Start: 2021-08-11 | End: 2021-08-11

## 2021-08-11 RX ORDER — BUDESONIDE AND FORMOTEROL FUMARATE DIHYDRATE 160; 4.5 UG/1; UG/1
1 AEROSOL RESPIRATORY (INHALATION) DAILY
Status: DISCONTINUED | OUTPATIENT
Start: 2021-08-12 | End: 2021-08-13 | Stop reason: HOSPADM

## 2021-08-11 RX ORDER — LEVOTHYROXINE SODIUM 0.07 MG/1
75 TABLET ORAL DAILY
Status: DISCONTINUED | OUTPATIENT
Start: 2021-08-12 | End: 2021-08-13 | Stop reason: HOSPADM

## 2021-08-11 RX ORDER — ONDANSETRON 4 MG/1
4 TABLET, ORALLY DISINTEGRATING ORAL EVERY 8 HOURS PRN
Status: DISCONTINUED | OUTPATIENT
Start: 2021-08-11 | End: 2021-08-13 | Stop reason: HOSPADM

## 2021-08-11 RX ORDER — LORAZEPAM 1 MG/1
0.5 TABLET ORAL 2 TIMES DAILY PRN
Status: DISCONTINUED | OUTPATIENT
Start: 2021-08-12 | End: 2021-08-13 | Stop reason: HOSPADM

## 2021-08-11 RX ORDER — DILTIAZEM HYDROCHLORIDE 240 MG/1
240 CAPSULE, COATED, EXTENDED RELEASE ORAL DAILY
Status: DISCONTINUED | OUTPATIENT
Start: 2021-08-12 | End: 2021-08-13 | Stop reason: HOSPADM

## 2021-08-11 RX ORDER — SODIUM CHLORIDE 0.9 % (FLUSH) 0.9 %
5-40 SYRINGE (ML) INJECTION PRN
Status: DISCONTINUED | OUTPATIENT
Start: 2021-08-11 | End: 2021-08-13 | Stop reason: HOSPADM

## 2021-08-11 RX ORDER — HYDROCODONE BITARTRATE AND ACETAMINOPHEN 5; 325 MG/1; MG/1
1 TABLET ORAL 4 TIMES DAILY PRN
Status: DISCONTINUED | OUTPATIENT
Start: 2021-08-11 | End: 2021-08-13 | Stop reason: HOSPADM

## 2021-08-11 RX ORDER — ACETAMINOPHEN 650 MG/1
650 SUPPOSITORY RECTAL EVERY 6 HOURS PRN
Status: DISCONTINUED | OUTPATIENT
Start: 2021-08-11 | End: 2021-08-13 | Stop reason: HOSPADM

## 2021-08-11 RX ORDER — SODIUM CHLORIDE 9 MG/ML
25 INJECTION, SOLUTION INTRAVENOUS PRN
Status: DISCONTINUED | OUTPATIENT
Start: 2021-08-11 | End: 2021-08-13 | Stop reason: HOSPADM

## 2021-08-11 RX ORDER — ATORVASTATIN CALCIUM 80 MG/1
80 TABLET, FILM COATED ORAL NIGHTLY
Status: DISCONTINUED | OUTPATIENT
Start: 2021-08-11 | End: 2021-08-13 | Stop reason: HOSPADM

## 2021-08-11 RX ADMIN — APIXABAN 5 MG: 5 TABLET, FILM COATED ORAL at 23:49

## 2021-08-11 RX ADMIN — IOPAMIDOL 75 ML: 755 INJECTION, SOLUTION INTRAVENOUS at 17:39

## 2021-08-11 RX ADMIN — LORAZEPAM 1 MG: 1 TABLET ORAL at 23:52

## 2021-08-11 RX ADMIN — HYDROCODONE BITARTRATE AND ACETAMINOPHEN 1 TABLET: 5; 325 TABLET ORAL at 23:53

## 2021-08-11 RX ADMIN — CLONIDINE HYDROCHLORIDE 0.1 MG: 0.1 TABLET ORAL at 23:51

## 2021-08-11 RX ADMIN — HYDRALAZINE HYDROCHLORIDE 50 MG: 25 TABLET, FILM COATED ORAL at 18:44

## 2021-08-11 RX ADMIN — BETHANECHOL CHLORIDE 25 MG: 10 TABLET ORAL at 23:50

## 2021-08-11 RX ADMIN — Medication 10 ML: at 23:51

## 2021-08-11 RX ADMIN — ATORVASTATIN CALCIUM 80 MG: 80 TABLET, FILM COATED ORAL at 23:50

## 2021-08-11 RX ADMIN — PROPAFENONE HYDROCHLORIDE 150 MG: 150 TABLET, FILM COATED ORAL at 23:51

## 2021-08-11 RX ADMIN — SODIUM CHLORIDE, POTASSIUM CHLORIDE, SODIUM LACTATE AND CALCIUM CHLORIDE 1000 ML: 600; 310; 30; 20 INJECTION, SOLUTION INTRAVENOUS at 17:50

## 2021-08-11 ASSESSMENT — PAIN SCALES - GENERAL
PAINLEVEL_OUTOF10: 4
PAINLEVEL_OUTOF10: 5
PAINLEVEL_OUTOF10: 7

## 2021-08-11 ASSESSMENT — PAIN DESCRIPTION - LOCATION
LOCATION: HEAD
LOCATION: HEAD

## 2021-08-11 NOTE — TELEPHONE ENCOUNTER
Patient would like to know mif you will call her in a prescription for prednisone for swollen sinuses.   She does not have a fever        [facility] [address] [facility] [address] [phone]   [address] [place] [id number]   Phone:  [phone]  Fax:  [phone]

## 2021-08-11 NOTE — ED NOTES
Bed: 01  Expected date:   Expected time:   Means of arrival:   Comments:  ashley Guzman December, RN  08/11/21 9113

## 2021-08-11 NOTE — ED PROVIDER NOTES
[facility] Emergency Department    CHIEF COMPLAINT  Chief Complaint   Patient presents with    Dizziness     Pt reports waking up this morning feeling weak, fatigued, and dizzy. c/o headache and bilat ear pain. Hx COPD-wears 2LNC      HISTORY OF PRESENT ILLNESS  Kei Reid is a 80 y.o. female  who presents to the ED complaining of malaise, fatigue this morning with lightheadedness. No fevers, no headaches but c/o head pressure and neck pressure onset since she woke up at 0400 with progression of sx throughout the day. No syncope. Did not have sx last night when she went to bed between 9-10pm or so. Anticoagulated on Eliquis for afib history. Denies any focal numbness tingling or weakness, denies any chest pain cough SOB or fevers, no abd pain or n/v/d. No vertiginous dizziness but rather a feeling of lightheadedness. Took 20mg total of prednisone after calling her PCP - had a sinus infection a few weeks ago but is not complaining of sinus drainage, ST or ear pain now. No other complaints, modifying factors or associated symptoms. I have reviewed the following from the nursing documentation. Past Medical History:   Diagnosis Date    Arthritis     Atrial fibrillation ([address]     Diabetes mellitus type II, controlled ([address]     GERD (gastroesophageal reflux disease)     HTN (hypertension)     Hyperlipidemia     Hypothyroid     Insomnia     Lumbago     SVT (supraventricular tachycardia) (HCC)      Past Surgical History:   Procedure Laterality Date    APPENDECTOMY      CHOLECYSTECTOMY, LAPAROSCOPIC  2/24/2013    COLONOSCOPY       Family History   Problem Relation Age of Onset    High Blood Pressure Mother     Heart Attack Father     Heart Disease Father     Other Brother     Asthma Paternal Uncle      Social History     Socioeconomic History    Marital status:       Spouse name: Not on file    Number of children: 9    Years of education: Not on file daily as needed for Pain for up to 30 days.  120 tablet 0    apixaban (ELIQUIS) 5 MG TABS tablet Take 1 tablet by mouth 2 times daily 180 tablet 1    bethanechol (URECHOLINE) 25 MG tablet Take 25 mg by mouth 3 times daily      LORazepam (ATIVAN) 1 MG tablet TAKE 1/2 TABLET BY MOUTH TWICE DAILY AND 1 EVERY NIGHT AT BEDTIME AS NEEDED FOR ANXIETY 60 tablet 2    dilTIAZem (CARDIZEM CD) 240 MG extended release capsule TAKE 1 CAPSULE EVERY DAY 90 capsule 0    levothyroxine (SYNTHROID) 75 MCG tablet TAKE 1 TABLET EVERY DAY 90 tablet 0    Lancets MISC 1 each by Does not apply route 2 times daily 300 each 1    conjugated estrogens (PREMARIN) 0.625 MG/GM vaginal cream Place 1 g vaginally Twice a Week 1 Tube 3    meclizine (ANTIVERT) 12.5 MG tablet TAKE 1 TABLET THREE TIMES DAILY AS NEEDED 270 tablet 0    ondansetron (ZOFRAN) 4 MG tablet Take 1 tablet by mouth daily as needed for Nausea or Vomiting 30 tablet 0    OXYGEN Inhale 2 L into the lungs      cloNIDine (CATAPRES) 0.1 MG tablet TAKE 1 TABLET BY MOUTH TWICE DAILY 60 tablet 0    propafenone (RYTHMOL) 150 MG tablet Take 1 tablet by mouth every 8 hours 270 tablet 1    budesonide-formoterol (SYMBICORT) 160-4.5 MCG/ACT AERO INHALE 2 PUFFS INTO THE LUNGS TWICE DAILY 3 Inhaler 0    blood glucose monitor strips Test one time a day 100 strip 5    dilTIAZem (CARDIZEM) 30 MG tablet Take 2 tablets by mouth 2 times daily as needed (tachycardia with heart rate >100) 60 tablet 0    dicyclomine (BENTYL) 10 MG capsule Take 1 capsule by mouth 4 times daily as needed (abd pain) 360 capsule 1    atorvastatin (LIPITOR) 80 MG tablet Take 1 tablet by mouth nightly 30 tablet 3    rOPINIRole (REQUIP) 2 MG tablet Take 1 tablet by mouth 3 times daily PRN      fluticasone (FLONASE) 50 MCG/ACT nasal spray 2 sprays by Nasal route daily 3 Bottle 1    levalbuterol (XOPENEX) 0.63 MG/3ML nebulization INHALE CONTENTS OF 1 VIAL PER NEBULIZER EVERY 6 HOURS AS NEEDED FOR WHEEZING 1050 mL 3  Respiratory Therapy Supplies (NEBULIZER/TUBING/MOUTHPIECE) KIT 1 kit by Does not apply route 4 times daily as needed (shortness of breath) 1 kit 0    albuterol sulfate HFA (PROAIR HFA) 108 (90 Base) MCG/ACT inhaler Inhale 2 puffs into the lungs every 6 hours as needed for Wheezing 1 Inhaler 6     Allergies   Allergen Reactions    Adhesive Tape Anaphylaxis    Cefaclor Nausea And Vomiting     Other reaction(s): Chest pain    Nsaids      Pt states she has hives and heart races   Other reaction(s): Tachycardia    Clinoril [Sulindac] Other (See Comments)     Stomach pain    Codeine      FEEL NUMB    Diclofenac     Diclofenac Sodium Hives    Diclofenac Sodium Hives    Hctz [Hydrochlorothiazide]      Sob    Ibuprofen Other (See Comments)     Other reaction(s): Tachycardia    Macrobid [Nitrofurantoin Macrocrystal]      nausea    Motrin [Ibuprofen Micronized] Other (See Comments)     Tachycardia      Pcn [Penicillins] Hives    Peach [Prunus Persica] Itching and Swelling     Cannot eat raw peaches    Sulfa Antibiotics      Nausea, diarrhea       REVIEW OF SYSTEMS  10 systems reviewed, pertinent positives per HPI otherwise noted to be negative. PHYSICAL EXAM  BP (!) 199/81   Pulse 89   Temp 98.6 °F (37 °C) (Oral)   Resp 18   Ht 5' 2\" (1.575 m)   Wt 165 lb (74.8 kg)   SpO2 96%   BMI 30.18 kg/m²    GENERAL APPEARANCE: Awake and alert. Cooperative. No distress. HENT: Normocephalic. Atraumatic. Mucous membranes are dry. NECK: Supple. No bruits. Trachea midline. EYES: PERRL. EOM's grossly intact. No nystagmus. No vertical skew deviation. HEART/CHEST: RRR. No murmurs. No chest wall tenderness. LUNGS: Respirations unlabored. CTAB. Good air exchange. Speaking comfortably in full sentences. ABDOMEN: No tenderness. Soft. Non-distended. No masses. No organomegaly. No guarding or rebound. Normal bowel sounds throughout. MUSCULOSKELETAL: No extremity edema. Compartments soft. No deformity.   No tenderness in the extremities. All extremities neurovascularly intact. SKIN: Warm and dry. No acute rashes. NEUROLOGICAL: Alert and oriented. CN's 2-12 intact. No gross facial drooping. Strength 5/5, sensation intact in all 4 extremities. 2 plus DTR's in knees bilaterally. Gait normal.  NIHSS of 0. No dysmetria/ataxia, normal FNF testing bilat. PSYCHIATRIC: Normal mood and affect. LABS  I have reviewed all labs for this visit.    Results for orders placed or performed during the hospital encounter of 08/11/21   CBC auto differential   Result Value Ref Range    WBC 9.3 4.0 - 11.0 K/uL    RBC 3.98 (L) 4.00 - 5.20 M/uL    Hemoglobin 11.9 (L) 12.0 - 16.0 g/dL    Hematocrit 36.0 36.0 - 48.0 %    MCV 90.4 80.0 - 100.0 fL    MCH 30.0 26.0 - 34.0 pg    MCHC 33.2 31.0 - 36.0 g/dL    RDW 16.2 (H) 12.4 - 15.4 %    Platelets 100 671 - 033 K/uL    MPV 8.1 5.0 - 10.5 fL    Neutrophils % 88.3 %    Lymphocytes % 10.0 %    Monocytes % 1.3 %    Eosinophils % 0.2 %    Basophils % 0.2 %    Neutrophils Absolute 8.3 (H) 1.7 - 7.7 K/uL    Lymphocytes Absolute 0.9 (L) 1.0 - 5.1 K/uL    Monocytes Absolute 0.1 0.0 - 1.3 K/uL    Eosinophils Absolute 0.0 0.0 - 0.6 K/uL    Basophils Absolute 0.0 0.0 - 0.2 K/uL   Comprehensive Metabolic Panel w/ Reflex to MG   Result Value Ref Range    Sodium 136 136 - 145 mmol/L    Potassium reflex Magnesium 4.5 3.5 - 5.1 mmol/L    Chloride 99 99 - 110 mmol/L    CO2 23 21 - 32 mmol/L    Anion Gap 14 3 - 16    Glucose 180 (H) 70 - 99 mg/dL    BUN 27 (H) 7 - 20 mg/dL    CREATININE 1.2 0.6 - 1.2 mg/dL    GFR Non- 43 (A) >60    GFR  52 (A) >60    Calcium 9.8 8.3 - 10.6 mg/dL    Total Protein 7.1 6.4 - 8.2 g/dL    Albumin 4.2 3.4 - 5.0 g/dL    Albumin/Globulin Ratio 1.4 1.1 - 2.2    Total Bilirubin 0.5 0.0 - 1.0 mg/dL    Alkaline Phosphatase 150 (H) 40 - 129 U/L    ALT 21 10 - 40 U/L    AST 18 15 - 37 U/L    Globulin 2.9 g/dL   Protime-INR   Result Value Ref Range    Protime of intravenous contrast. Multiplanar reformatted images are provided for review. MIP images are provided for review. Stenosis of the internal carotid arteries measured using NASCET criteria. Dose modulation, iterative reconstruction, and/or weight based adjustment of the mA/kV was utilized to reduce the radiation dose to as low as reasonably achievable.; CT of the head was performed without the administration of intravenous contrast. Dose modulation, iterative reconstruction, and/or weight based adjustment of the mA/kV was utilized to reduce the radiation dose to as low as reasonably achievable. COMPARISON: None. HISTORY: ORDERING SYSTEM PROVIDED HISTORY: dizzy HTN on a/c TECHNOLOGIST PROVIDED HISTORY: Reason for exam:->dizzy HTN on a/c Decision Support Exception - unselect if not a suspected or confirmed emergency medical condition->Emergency Medical Condition (MA) Reason for Exam: Dizziness (Pt reports waking up this morning feeling weak, fatigued, and dizzy. c/o headache and bilat ear pain. Hx COPD-wears 2LNC; ORDERING SYSTEM PROVIDED HISTORY: dizzy HTN TECHNOLOGIST PROVIDED HISTORY: Reason for exam:->dizzy HTN Has a \"code stroke\" or \"stroke alert\" been called? ->No Decision Support Exception - unselect if not a suspected or confirmed emergency medical condition->Emergency Medical Condition (MA) Reason for Exam: Dizziness (Pt reports waking up this morning feeling weak, fatigued, and dizzy. c/o headache and bilat ear pain. Hx COPD-wears 2LNC FINDINGS: CTA NECK: AORTIC ARCH/ARCH VESSELS: No dissection or arterial injury. No significant stenosis of the brachiocephalic or subclavian arteries. CAROTID ARTERIES: No dissection, arterial injury, or hemodynamically significant stenosis by NASCET criteria. Atherosclerotic changes are noted. There is 50% stenosis of the proximal right cervical ICA. VERTEBRAL ARTERIES: No dissection, arterial injury, or significant stenosis. SOFT TISSUES: The lung apices are clear.   No cervical or superior mediastinal lymphadenopathy. The larynx and pharynx are unremarkable. No acute abnormality of the salivary and thyroid glands. BONES: Degenerative changes of the spine are present. CTA HEAD: ANTERIOR CIRCULATION: No significant stenosis of the intracranial internal carotid, anterior cerebral, or middle cerebral arteries. No aneurysm. POSTERIOR CIRCULATION: No significant stenosis of the vertebral, basilar, or posterior cerebral arteries. No aneurysm. OTHER: No dural venous sinus thrombosis on this non-dedicated study. BRAIN: No acute intracranial mass, shift, or bleed is identified. There is no CT evidence of an acute infarct. No CT evidence of an acute infarct. No large vessel occlusion visualized within the head or neck. 50% stenosis of the proximal right cervical ICA. XR CHEST PORTABLE    Result Date: 8/11/2021  EXAMINATION: ONE XRAY VIEW OF THE CHEST 8/11/2021 3:31 pm COMPARISON: April 1, 2021 HISTORY: ORDERING SYSTEM PROVIDED HISTORY: HTN TECHNOLOGIST PROVIDED HISTORY: Reason for exam:->HTN Reason for Exam: HTN Acuity: Acute Type of Exam: Initial FINDINGS: No evidence of pneumonia, edema or other acute pulmonary process. No evidence of acute process of the cardiac or mediastinal structures. No evidence of pneumothorax or pleural effusion. No evidence of acute cardiopulmonary disease. CTA HEAD NECK W CONTRAST    Result Date: 8/11/2021  EXAMINATION: CTA OF THE HEAD AND NECK WITH CONTRAST; CT OF THE HEAD WITHOUT CONTRAST 8/11/2021 4:57 pm: TECHNIQUE: CTA of the head and neck was performed with the administration of intravenous contrast. Multiplanar reformatted images are provided for review. MIP images are provided for review. Stenosis of the internal carotid arteries measured using NASCET criteria.  Dose modulation, iterative reconstruction, and/or weight based adjustment of the mA/kV was utilized to reduce the radiation dose to as low as reasonably achievable.; CT of the head was performed without the administration of intravenous contrast. Dose modulation, iterative reconstruction, and/or weight based adjustment of the mA/kV was utilized to reduce the radiation dose to as low as reasonably achievable. COMPARISON: None. HISTORY: ORDERING SYSTEM PROVIDED HISTORY: dizzy HTN on a/c TECHNOLOGIST PROVIDED HISTORY: Reason for exam:->dizzy HTN on a/c Decision Support Exception - unselect if not a suspected or confirmed emergency medical condition->Emergency Medical Condition (MA) Reason for Exam: Dizziness (Pt reports waking up this morning feeling weak, fatigued, and dizzy. c/o headache and bilat ear pain. Hx COPD-wears 2LNC; ORDERING SYSTEM PROVIDED HISTORY: dizzy HTN TECHNOLOGIST PROVIDED HISTORY: Reason for exam:->dizzy HTN Has a \"code stroke\" or \"stroke alert\" been called? ->No Decision Support Exception - unselect if not a suspected or confirmed emergency medical condition->Emergency Medical Condition (MA) Reason for Exam: Dizziness (Pt reports waking up this morning feeling weak, fatigued, and dizzy. c/o headache and bilat ear pain. Hx COPD-wears 2LNC FINDINGS: CTA NECK: AORTIC ARCH/ARCH VESSELS: No dissection or arterial injury. No significant stenosis of the brachiocephalic or subclavian arteries. CAROTID ARTERIES: No dissection, arterial injury, or hemodynamically significant stenosis by NASCET criteria. Atherosclerotic changes are noted. There is 50% stenosis of the proximal right cervical ICA. VERTEBRAL ARTERIES: No dissection, arterial injury, or significant stenosis. SOFT TISSUES: The lung apices are clear. No cervical or superior mediastinal lymphadenopathy. The larynx and pharynx are unremarkable. No acute abnormality of the salivary and thyroid glands. BONES: Degenerative changes of the spine are present. CTA HEAD: ANTERIOR CIRCULATION: No significant stenosis of the intracranial internal carotid, anterior cerebral, or middle cerebral arteries. No aneurysm.  POSTERIOR CIRCULATION: No significant stenosis of the vertebral, basilar, or posterior cerebral arteries. No aneurysm. OTHER: No dural venous sinus thrombosis on this non-dedicated study. BRAIN: No acute intracranial mass, shift, or bleed is identified. There is no CT evidence of an acute infarct. No CT evidence of an acute infarct. No large vessel occlusion visualized within the head or neck. 50% stenosis of the proximal right cervical ICA. ED COURSE/MDM  Patient seen and evaluated. Old records reviewed. Labs and imaging reviewed and results discussed with patient. After initial evaluation, differential diagnostic considerations included: benign positional vertigo, labyrinthitis/otitis, sepsis, dehydration/orthostasis, vasovagal reaction, stroke, TIA, intracranial bleed, migraine, anxiety, ACS/dysrhythmia, medication side effects, head trauma    The patient's ED workup was notable for notable hypertension with concern for accelerated HTN. On anticoagulation. HCT neg, CTA nonacute. NIHSS of 0, plus anticoagulation and timeline and sx more typical for lightheadedness than vertigo, so stroke alert protocols not initiated. Will admit for Bp control, PO meds for now for BP control. Nothing infectious apparent currently. Trop neg. EKG not ischemic. During the patient's ED course, the patient was given:  Medications   lactated ringers bolus (1,000 mLs Intravenous New Bag 8/11/21 1750)   iopamidol (ISOVUE-370) 76 % injection 75 mL (75 mLs Intravenous Given 8/11/21 1739)   hydrALAZINE (APRESOLINE) tablet 50 mg (50 mg Oral Given 8/11/21 1844)        CLINICAL IMPRESSION  1. Pressure in head    2. Accelerated hypertension    3. Anticoagulated    4. Malaise and fatigue        Blood pressure (!) 199/81, pulse 89, temperature 98.6 °F (37 °C), temperature source Oral, resp. rate 18, height 5' 2\" (1.575 m), weight 165 lb (74.8 kg), SpO2 96 %, not currently breastfeeding.     DISPOSITION  Brandon Lubin was admitted in fair condition. The plan is to admit to the hospital at this time under the hospitalist service. Dr. Alon Sargent accepted the patient and will take over the patient's care. The total critical care time spent while evaluating and treating this patient was 35 minutes. This excludes time spent doing separately billable procedures. This includes time at the bedside, data interpretation, medication management, obtaining critical history from collateral sources if the patient is unable to provide it directly, and physician consultation. Specifics of interventions taken and potentially life-threatening diagnostic considerations are listed above in the medical decision making. DISCLAIMER: This chart was created using Dragon dictation software. Efforts were made by me to ensure accuracy, however some errors may be present due to limitations of this technology and occasionally words are not transcribed correctly.         Shyanne Webster MD  08/11/21 2869

## 2021-08-11 NOTE — H&P
HOSPITALISTS HISTORY AND PHYSICAL    8/11/2021 6:46 PM    Patient Information:  Alisha Boudreaux is a 80 y.o. female [id number]  PCP:  Malick Benjamin MD (Tel: [phone] )    Chief complaint:    Chief Complaint   Patient presents with    Dizziness     Pt reports waking up this morning feeling weak, fatigued, and dizzy. c/o headache and bilat ear pain. Hx COPD-wears 2LNC        History of Present Illness:  Rodrigo Lyons is a 80 y.o. female who presented with complaints of feeling weird feeling dizzy and headache. Bilateral neck pain. Onset this morning. Not getting better. Patient with history of COPD on 2 L nasal cannula. Patient denies any fever chills no focal deficit no speech disorder order no facial droop. No history of stroke. Patient on arrival noted to have blood pressure of 210. Patient does endorse running high blood pressure. Currently denies any vision issues. Nothing that makes it better or worse. REVIEW OF SYSTEMS:   Constitutional: Negative for fever,chills or night sweats  ENT: Negative for rhinorrhea, epistaxis, hoarseness, sore throat. Respiratory: Negative for shortness of breath,wheezing  Cardiovascular: Negative for chest pain, palpitations   Gastrointestinal: Negative for nausea, vomiting, diarrhea  Genitourinary: Negative for polyuria, dysuria   Hematologic/Lymphatic: Negative for bleeding tendency, easy bruising  Musculoskeletal: Negative for myalgias and arthralgias  Neurologic: Negative for confusion,dysarthria. Skin: Negative for itching,rash, good capillary refill. Psychiatric: Negative for depression,anxiety, agitation. Endocrine: Negative for polydipsia,polyuria,heat /cold intolerance.     Past Medical History:   has a past medical history of Arthritis, Atrial fibrillation ([address] Diabetes mellitus type II, controlled ([address] GERD (gastroesophageal reflux disease), HTN (hypertension), Hyperlipidemia, Hypothyroid, Insomnia, Lumbago, and SVT (supraventricular tachycardia) ([facility] Past Surgical History:   has a past surgical history that includes Appendectomy; Colonoscopy; and Cholecystectomy, laparoscopic (2/24/2013). Medications:  No current facility-administered medications on file prior to encounter. Current Outpatient Medications on File Prior to Encounter   Medication Sig Dispense Refill    predniSONE (DELTASONE) 10 MG tablet 1 TID for 3 day then 1 BID 15 tablet 0    HYDROcodone-acetaminophen (NORCO) 5-325 MG per tablet Take 1 tablet by mouth 4 times daily as needed for Pain for up to 30 days.  120 tablet 0    apixaban (ELIQUIS) 5 MG TABS tablet Take 1 tablet by mouth 2 times daily 180 tablet 1    bethanechol (URECHOLINE) 25 MG tablet Take 25 mg by mouth 3 times daily      LORazepam (ATIVAN) 1 MG tablet TAKE 1/2 TABLET BY MOUTH TWICE DAILY AND 1 EVERY NIGHT AT BEDTIME AS NEEDED FOR ANXIETY 60 tablet 2    dilTIAZem (CARDIZEM CD) 240 MG extended release capsule TAKE 1 CAPSULE EVERY DAY 90 capsule 0    levothyroxine (SYNTHROID) 75 MCG tablet TAKE 1 TABLET EVERY DAY 90 tablet 0    Lancets MISC 1 each by Does not apply route 2 times daily 300 each 1    conjugated estrogens (PREMARIN) 0.625 MG/GM vaginal cream Place 1 g vaginally Twice a Week 1 Tube 3    meclizine (ANTIVERT) 12.5 MG tablet TAKE 1 TABLET THREE TIMES DAILY AS NEEDED 270 tablet 0    ondansetron (ZOFRAN) 4 MG tablet Take 1 tablet by mouth daily as needed for Nausea or Vomiting 30 tablet 0    OXYGEN Inhale 2 L into the lungs      cloNIDine (CATAPRES) 0.1 MG tablet TAKE 1 TABLET BY MOUTH TWICE DAILY 60 tablet 0    propafenone (RYTHMOL) 150 MG tablet Take 1 tablet by mouth every 8 hours 270 tablet 1    budesonide-formoterol (SYMBICORT) 160-4.5 MCG/ACT AERO INHALE 2 PUFFS INTO THE LUNGS TWICE DAILY 3 Inhaler 0    blood glucose monitor strips Test one time a day 100 strip 5    dilTIAZem (CARDIZEM) 30 MG tablet Take 2 tablets by mouth 2 times daily as needed (tachycardia with heart rate >100) 60 tablet 0    dicyclomine (BENTYL) 10 MG capsule Take 1 capsule by mouth 4 times daily as needed (abd pain) 360 capsule 1    atorvastatin (LIPITOR) 80 MG tablet Take 1 tablet by mouth nightly 30 tablet 3    rOPINIRole (REQUIP) 2 MG tablet Take 1 tablet by mouth 3 times daily PRN      fluticasone (FLONASE) 50 MCG/ACT nasal spray 2 sprays by Nasal route daily 3 Bottle 1    levalbuterol (XOPENEX) 0.63 MG/3ML nebulization INHALE CONTENTS OF 1 VIAL PER NEBULIZER EVERY 6 HOURS AS NEEDED FOR WHEEZING 1050 mL 3    Respiratory Therapy Supplies (NEBULIZER/TUBING/MOUTHPIECE) KIT 1 kit by Does not apply route 4 times daily as needed (shortness of breath) 1 kit 0    albuterol sulfate HFA (PROAIR HFA) 108 (90 Base) MCG/ACT inhaler Inhale 2 puffs into the lungs every 6 hours as needed for Wheezing 1 Inhaler 6       Allergies: Allergies   Allergen Reactions    Adhesive Tape Anaphylaxis    Cefaclor Nausea And Vomiting     Other reaction(s): Chest pain    Nsaids      Pt states she has hives and heart races   Other reaction(s): Tachycardia    Clinoril [Sulindac] Other (See Comments)     Stomach pain    Codeine      FEEL NUMB    Diclofenac     Diclofenac Sodium Hives    Diclofenac Sodium Hives    Hctz [Hydrochlorothiazide]      Sob    Ibuprofen Other (See Comments)     Other reaction(s): Tachycardia    Macrobid [Nitrofurantoin Macrocrystal]      nausea    Motrin [Ibuprofen Micronized] Other (See Comments)     Tachycardia      Pcn [Penicillins] Hives    Peach [Prunus Persica] Itching and Swelling     Cannot eat raw peaches    Sulfa Antibiotics      Nausea, diarrhea        Social History:   reports that she quit smoking about 25 years ago. She started smoking about 70 years ago. She has a 45.00 pack-year smoking history. She has never used smokeless tobacco. She reports that she does not drink alcohol and does not use drugs.      Family History:  family history includes Asthma in her paternal uncle; Heart Attack in her father; Heart Disease in her father; High Blood Pressure in her mother; Other in her brother. Physical Exam:  BP (!) 199/81   Pulse 89   Temp 98.6 °F (37 °C) (Oral)   Resp 18   Ht 5' 2\" (1.575 m)   Wt 165 lb (74.8 kg)   SpO2 96%   BMI 30.18 kg/m²     General appearance:  Appears comfortable. Well nourished  Eyes: Sclera clear, pupils equal  ENT: Moist mucus membranes, no thrush. Trachea midline. Cardiovascular: Regular rhythm, normal S1, S2. No murmur, gallop, rub. No edema in lower extremities  Respiratory: Clear to auscultation bilaterally, no wheeze, good inspiratory effort  Gastrointestinal: Abdomen soft, non-tender, not distended, normal bowel sounds  Musculoskeletal: No cyanosis in digits, neck supple  Neurology: Cranial nerves grossly intact. Alert and oriented in time, place and person. No speech or motor deficits  Psychiatry: Appropriate affect. Not agitated  Skin: Warm, dry, normal turgor, no rash    Labs:  CBC:   Lab Results   Component Value Date    WBC 9.3 08/11/2021    RBC 3.98 08/11/2021    HGB 11.9 08/11/2021    HCT 36.0 08/11/2021    MCV 90.4 08/11/2021    MCH 30.0 08/11/2021    MCHC 33.2 08/11/2021    RDW 16.2 08/11/2021     08/11/2021    MPV 8.1 08/11/2021     BMP:    Lab Results   Component Value Date     08/11/2021    K 4.5 08/11/2021    CL 99 08/11/2021    CO2 23 08/11/2021    BUN 27 08/11/2021    CREATININE 1.2 08/11/2021    CALCIUM 9.8 08/11/2021    GFRAA 52 08/11/2021    GFRAA >60 02/27/2013    LABGLOM 43 08/11/2021    GLUCOSE 180 08/11/2021       Chest Xray:   EKG:    I visualized CXR images and EKG strip        Problem List  Active Problems:    Head ache  Resolved Problems:    * No resolved hospital problems.  *        Assessment/Plan:   Dizziness with headache  -With excellent high blood pressure on arrival to [facility]  -Was given p.o. hydralazine with improvement in the 190s CT head negative.   Patient will be admitted for observation  -MRI to rule out stroke-  -blood pressure medication will be resume home dose and monitor to see if adjustment needs to be made    COPD on 2 L nasal cannula  Continue home dose inhalers           Loco Harris MD    8/11/2021 6:46 PM

## 2021-08-12 ENCOUNTER — APPOINTMENT (OUTPATIENT)
Dept: MRI IMAGING | Age: 86
End: 2021-08-12
Payer: MEDICARE

## 2021-08-12 PROBLEM — I48.0 PAF (PAROXYSMAL ATRIAL FIBRILLATION) (HCC): Status: ACTIVE | Noted: 2020-02-19

## 2021-08-12 LAB
ABO/RH: NORMAL
ANTIBODY SCREEN: NORMAL
BASOPHILS ABSOLUTE: 0 K/UL (ref 0–0.2)
BASOPHILS RELATIVE PERCENT: 0.5 %
EKG ATRIAL RATE: 83 BPM
EKG DIAGNOSIS: NORMAL
EKG P AXIS: 72 DEGREES
EKG P-R INTERVAL: 162 MS
EKG Q-T INTERVAL: 380 MS
EKG QRS DURATION: 90 MS
EKG QTC CALCULATION (BAZETT): 446 MS
EKG R AXIS: 7 DEGREES
EKG T AXIS: 35 DEGREES
EKG VENTRICULAR RATE: 83 BPM
EOSINOPHILS ABSOLUTE: 0.1 K/UL (ref 0–0.6)
EOSINOPHILS RELATIVE PERCENT: 0.5 %
HCT VFR BLD CALC: 30.4 % (ref 36–48)
HCT VFR BLD CALC: 31.4 % (ref 36–48)
HCT VFR BLD CALC: 33.3 % (ref 36–48)
HCT VFR BLD CALC: 35.5 % (ref 36–48)
HEMOGLOBIN: 10.1 G/DL (ref 12–16)
HEMOGLOBIN: 10.5 G/DL (ref 12–16)
HEMOGLOBIN: 11 G/DL (ref 12–16)
HEMOGLOBIN: 11.8 G/DL (ref 12–16)
LYMPHOCYTES ABSOLUTE: 1.8 K/UL (ref 1–5.1)
LYMPHOCYTES RELATIVE PERCENT: 17.9 %
MCH RBC QN AUTO: 30.2 PG (ref 26–34)
MCHC RBC AUTO-ENTMCNC: 33.3 G/DL (ref 31–36)
MCV RBC AUTO: 90.9 FL (ref 80–100)
MONOCYTES ABSOLUTE: 0.6 K/UL (ref 0–1.3)
MONOCYTES RELATIVE PERCENT: 6.1 %
NEUTROPHILS ABSOLUTE: 7.4 K/UL (ref 1.7–7.7)
NEUTROPHILS RELATIVE PERCENT: 75 %
OCCULT BLOOD DIAGNOSTIC: NORMAL
PDW BLD-RTO: 16 % (ref 12.4–15.4)
PLATELET # BLD: 142 K/UL (ref 135–450)
PMV BLD AUTO: 7.8 FL (ref 5–10.5)
RBC # BLD: 3.91 M/UL (ref 4–5.2)
WBC # BLD: 9.9 K/UL (ref 4–11)

## 2021-08-12 PROCEDURE — 6360000002 HC RX W HCPCS: Performed by: INTERNAL MEDICINE

## 2021-08-12 PROCEDURE — 6370000000 HC RX 637 (ALT 250 FOR IP): Performed by: HOSPITALIST

## 2021-08-12 PROCEDURE — 85025 COMPLETE CBC W/AUTO DIFF WBC: CPT

## 2021-08-12 PROCEDURE — 85018 HEMOGLOBIN: CPT

## 2021-08-12 PROCEDURE — 96374 THER/PROPH/DIAG INJ IV PUSH: CPT

## 2021-08-12 PROCEDURE — 86900 BLOOD TYPING SEROLOGIC ABO: CPT

## 2021-08-12 PROCEDURE — 6370000000 HC RX 637 (ALT 250 FOR IP): Performed by: INTERNAL MEDICINE

## 2021-08-12 PROCEDURE — 86901 BLOOD TYPING SEROLOGIC RH(D): CPT

## 2021-08-12 PROCEDURE — 36415 COLL VENOUS BLD VENIPUNCTURE: CPT

## 2021-08-12 PROCEDURE — 99205 OFFICE O/P NEW HI 60 MIN: CPT | Performed by: PSYCHIATRY & NEUROLOGY

## 2021-08-12 PROCEDURE — G0378 HOSPITAL OBSERVATION PER HR: HCPCS

## 2021-08-12 PROCEDURE — 93010 ELECTROCARDIOGRAM REPORT: CPT | Performed by: INTERNAL MEDICINE

## 2021-08-12 PROCEDURE — 85014 HEMATOCRIT: CPT

## 2021-08-12 PROCEDURE — G0328 FECAL BLOOD SCRN IMMUNOASSAY: HCPCS

## 2021-08-12 PROCEDURE — 6370000000 HC RX 637 (ALT 250 FOR IP): Performed by: PHYSICIAN ASSISTANT

## 2021-08-12 PROCEDURE — 86850 RBC ANTIBODY SCREEN: CPT

## 2021-08-12 PROCEDURE — 2580000003 HC RX 258: Performed by: INTERNAL MEDICINE

## 2021-08-12 PROCEDURE — 70551 MRI BRAIN STEM W/O DYE: CPT

## 2021-08-12 PROCEDURE — 2580000003 HC RX 258: Performed by: HOSPITALIST

## 2021-08-12 PROCEDURE — C9113 INJ PANTOPRAZOLE SODIUM, VIA: HCPCS | Performed by: INTERNAL MEDICINE

## 2021-08-12 RX ORDER — ROPINIROLE 1 MG/1
2 TABLET, FILM COATED ORAL NIGHTLY PRN
Status: DISCONTINUED | OUTPATIENT
Start: 2021-08-12 | End: 2021-08-13 | Stop reason: HOSPADM

## 2021-08-12 RX ORDER — PANTOPRAZOLE SODIUM 40 MG/10ML
80 INJECTION, POWDER, LYOPHILIZED, FOR SOLUTION INTRAVENOUS ONCE
Status: COMPLETED | OUTPATIENT
Start: 2021-08-12 | End: 2021-08-12

## 2021-08-12 RX ORDER — SODIUM CHLORIDE, SODIUM LACTATE, POTASSIUM CHLORIDE, CALCIUM CHLORIDE 600; 310; 30; 20 MG/100ML; MG/100ML; MG/100ML; MG/100ML
INJECTION, SOLUTION INTRAVENOUS CONTINUOUS
Status: DISCONTINUED | OUTPATIENT
Start: 2021-08-12 | End: 2021-08-12

## 2021-08-12 RX ORDER — DIAZEPAM 5 MG/1
10 TABLET ORAL
Status: COMPLETED | OUTPATIENT
Start: 2021-08-12 | End: 2021-08-12

## 2021-08-12 RX ORDER — CLONIDINE HYDROCHLORIDE 0.1 MG/1
0.2 TABLET ORAL 3 TIMES DAILY
Status: DISCONTINUED | OUTPATIENT
Start: 2021-08-12 | End: 2021-08-13 | Stop reason: HOSPADM

## 2021-08-12 RX ORDER — PANTOPRAZOLE SODIUM 40 MG/10ML
40 INJECTION, POWDER, LYOPHILIZED, FOR SOLUTION INTRAVENOUS DAILY
Status: DISCONTINUED | OUTPATIENT
Start: 2021-08-13 | End: 2021-08-13 | Stop reason: HOSPADM

## 2021-08-12 RX ORDER — HYDRALAZINE HYDROCHLORIDE 20 MG/ML
10 INJECTION INTRAMUSCULAR; INTRAVENOUS EVERY 4 HOURS PRN
Status: DISCONTINUED | OUTPATIENT
Start: 2021-08-12 | End: 2021-08-13 | Stop reason: HOSPADM

## 2021-08-12 RX ADMIN — ROPINIROLE HYDROCHLORIDE 2 MG: 1 TABLET, FILM COATED ORAL at 21:28

## 2021-08-12 RX ADMIN — DIAZEPAM 10 MG: 5 TABLET ORAL at 11:16

## 2021-08-12 RX ADMIN — CLONIDINE HYDROCHLORIDE 0.2 MG: 0.1 TABLET ORAL at 14:52

## 2021-08-12 RX ADMIN — Medication 10 ML: at 21:27

## 2021-08-12 RX ADMIN — PROPAFENONE HYDROCHLORIDE 150 MG: 150 TABLET, FILM COATED ORAL at 21:28

## 2021-08-12 RX ADMIN — LEVOTHYROXINE SODIUM 75 MCG: 0.07 TABLET ORAL at 07:08

## 2021-08-12 RX ADMIN — BETHANECHOL CHLORIDE 25 MG: 10 TABLET ORAL at 14:52

## 2021-08-12 RX ADMIN — Medication 10 ML: at 08:46

## 2021-08-12 RX ADMIN — BETHANECHOL CHLORIDE 25 MG: 10 TABLET ORAL at 08:46

## 2021-08-12 RX ADMIN — SODIUM CHLORIDE, POTASSIUM CHLORIDE, SODIUM LACTATE AND CALCIUM CHLORIDE: 600; 310; 30; 20 INJECTION, SOLUTION INTRAVENOUS at 04:51

## 2021-08-12 RX ADMIN — CLONIDINE HYDROCHLORIDE 0.2 MG: 0.1 TABLET ORAL at 21:27

## 2021-08-12 RX ADMIN — CLONIDINE HYDROCHLORIDE 0.1 MG: 0.1 TABLET ORAL at 08:47

## 2021-08-12 RX ADMIN — DILTIAZEM HYDROCHLORIDE 240 MG: 240 CAPSULE, COATED, EXTENDED RELEASE ORAL at 08:47

## 2021-08-12 RX ADMIN — BETHANECHOL CHLORIDE 25 MG: 10 TABLET ORAL at 21:27

## 2021-08-12 RX ADMIN — PANTOPRAZOLE SODIUM 80 MG: 40 INJECTION, POWDER, FOR SOLUTION INTRAVENOUS at 04:52

## 2021-08-12 RX ADMIN — ROPINIROLE HYDROCHLORIDE 2 MG: 1 TABLET, FILM COATED ORAL at 02:20

## 2021-08-12 RX ADMIN — APIXABAN 5 MG: 5 TABLET, FILM COATED ORAL at 08:47

## 2021-08-12 RX ADMIN — LORAZEPAM 1 MG: 1 TABLET ORAL at 23:21

## 2021-08-12 RX ADMIN — PROPAFENONE HYDROCHLORIDE 150 MG: 150 TABLET, FILM COATED ORAL at 05:06

## 2021-08-12 RX ADMIN — ATORVASTATIN CALCIUM 80 MG: 80 TABLET, FILM COATED ORAL at 21:27

## 2021-08-12 RX ADMIN — PROPAFENONE HYDROCHLORIDE 150 MG: 150 TABLET, FILM COATED ORAL at 14:52

## 2021-08-12 RX ADMIN — APIXABAN 5 MG: 5 TABLET, FILM COATED ORAL at 21:27

## 2021-08-12 ASSESSMENT — PAIN SCALES - GENERAL
PAINLEVEL_OUTOF10: 0

## 2021-08-12 NOTE — TELEPHONE ENCOUNTER
Medication:   Requested Prescriptions     Pending Prescriptions Disp Refills    cloNIDine (CATAPRES) 0.1 MG tablet [Pharmacy Med Name: CLONIDINE HYDROCHLORIDE 0.1 MG Tablet] 180 tablet      Sig: TAKE 1 TABLET TWICE DAILY      Last Filled:      Patient Phone Number: [phone] (home)     Last appt: 7/20/2021   Next appt: Visit date not found    Last OARRS:   RX Monitoring 3/20/2019   Attestation The Prescription Monitoring Report for this patient was reviewed today.    Periodic Controlled Substance Monitoring -     PDMP Monitoring:    Last PDMP Ginny Woodson as Reviewed [facility]):  Review User Review Instant Review Result   Mio Lito 7/12/2021 12:57 PM Reviewed PDMP [1]     Preferred Pharmacy:       [facility] [phone] - F [phone]  [address] [address]  Phone: [phone] Fax: [phone]

## 2021-08-12 NOTE — PROGRESS NOTES
According to page received from nursing staff, patient had a bowel movement that happens to be black, concerning for possible GI bleed. For now, will discontinue Eliquis (pending results of occult stool). Will send for occult stool. Will check stat CBC now. Will type and screen. Give IV Protonix 80 mg x 1. If occult stool returns positive, will start Protonix infusion and consult GI to assist with evaluation.     SIGNED: Simon Baldwin MD, MPH . 8/12/2021

## 2021-08-12 NOTE — PROGRESS NOTES
Patient arrived from ED via stretcher, alert and orient x 4, 2l NC ,  sat > 90 dyspnea on exertion, dizziness and headache , pain med will be given as ordered,  oriented to room and call light, plan of care reviewed with patient with verbalizing understanding. All safety precaution put in place.

## 2021-08-12 NOTE — PROGRESS NOTES
Assessment complete. Pain evaluation complete. Rate pain at 8-10 , dilaudid x2   slight relief , tramadol x1 with slight relief  , 4 mg  Ativan total over night. patient declined non pharmacologolical  Pain intervention  Offered . Patient encouraged to use call light with any needs. Patient states understanding, call light in reach, bed alarm on. Will continue to monitor.

## 2021-08-12 NOTE — PROGRESS NOTES
Hospitalist Progress Note      PCP: Eduardo Lee MD    Date of Admission: 8/11/2021    LOS: 0    Chief Complaint:   Chief Complaint   Patient presents with    Dizziness     Pt reports waking up this morning feeling weak, fatigued, and dizzy. c/o headache and bilat ear pain. Hx COPD-wears 2LNC       Case Summary:   80-year-old lady with history of atrial fibrillation, type 2 diabetes, GERD, hypertension, hyperlipidemia, hypothyroidism who presented with dizziness and associated head pressure/headache in the setting of hypertensive urgency. Active Hospital Problems    Diagnosis Date Noted    Head ache [R51.9] 08/11/2021    Hypothyroid [E03.9] 02/19/2020    Coronary artery disease involving native coronary artery of native heart with angina pectoris ([address] [I25.119] 02/18/2020    COPD, mild ([address] [J44.9] 07/18/2019    Controlled type 2 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 disease, without long-term current use of insulin ([address] [E11.22, N18.2] 01/18/2017    Hyperlipidemia [E78.5]     Hypertensive urgency [I16.0]          Principal Problem:    Hypertensive urgency: Blood pressure remains uncontrolled this morning. Still feeling head pressure. No nausea or vomiting.  -We will readjust home medications and increase clonidine 2.2 mg 3 times daily as well as adding as needed hydralazine for systolics more than 012  -Monitor BP closely  -Get head imaging as proposed with MRI      Head ache: In the setting of uncontrolled hypertension. Reports significantly improved this morning. No nausea or vomiting. Await MRI brain. Will get neurology consult. Control blood pressure. Active Problems:    Hyperlipidemia: On statin    Controlled type 2 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 disease, without long-term current use of insulin: On sliding scale insulin    COPD, mild ([address]): Without exacerbation.   Will monitor on home inhalers    Coronary artery disease involving native coronary artery of native heart with angina pectoris [facility]): With no chest pains or shortness of breath. On apixaban, statin, clonidine, diltiazem. Hypothyroid: On thyroid replacement therapy    Atrial fibrillation: Controlled on diltiazem and propafenone with Eliquis        Medications:  Reviewed  Infusion Medications    sodium chloride       Scheduled Medications    apixaban  5 mg Oral BID    [START ON 8/13/2021] pantoprazole  40 mg Intravenous Daily    cloNIDine  0.2 mg Oral TID    atorvastatin  80 mg Oral Nightly    bethanechol  25 mg Oral TID    budesonide-formoterol  1 puff Inhalation Daily    dilTIAZem  240 mg Oral Daily    levothyroxine  75 mcg Oral Daily    propafenone  150 mg Oral 3 times per day    sodium chloride flush  5-40 mL Intravenous 2 times per day     PRN Meds: rOPINIRole, diazePAM, hydrALAZINE, HYDROcodone-acetaminophen, LORazepam, sodium chloride flush, sodium chloride, ondansetron **OR** ondansetron, polyethylene glycol, acetaminophen **OR** acetaminophen, LORazepam      DVT Prophylaxis: On Eliquis  Diet: ADULT DIET; Regular  Code Status: Full Code    Dispo: Anticipate discharge in the next 24 hrs    ____________________________________________________________________________    Subjective:   Overnight Events:   Uneventful overnight  Improved chest pressure  Still with uncontrolled BP      Physical Exam:  BP (!) 209/79   Pulse 61   Temp 97.8 °F (36.6 °C) (Oral)   Resp 16   Ht 5' 2\" (1.575 m)   Wt 162 lb 6.4 oz (73.7 kg)   SpO2 99%   BMI 29.70 kg/m²   General appearance: No apparent distress, appears stated age and cooperative. HEENT: Normocephalic, atraumatic, MMM, No sclera icterus/conjuctival palor  Neck: Supple, no thyromegally. No jugular venous distention. Respiratory:  Normal respiratory effort. Clear to auscultation, no Rales/Wheezes/Rhonchi. Cardiovascular: S1/S2 without murmurs, rubs or gallops.  RRR  Abdomen: Soft, non-tender, non-distended, bowel sounds

## 2021-08-12 NOTE — CONSULTS
In patient Neurology consult        [facility] Neurology      MD Fadi Capellan  1935    Date of Service: 8/12/2021    Referring Physician: Marlon Tariq MD      Reason for the consult and CC: New onset dizziness    HPI:   The patient is a 80y.o.  years old female with history of COPD, hypertension and CAD who was admitted to the hospital last night with new onset dizziness and ataxia. Onset was several hours prior to admission. Description was sudden lightheadedness and ataxia without spinning sensation. Degree was severe. Duration was persistent. No chest pain or headache or focal weakness. No spinning sensation. No other relieving or aggravating factors. No prior history of dizziness. She denies any recent falling or injury or fever. Symptoms were severe enough and she decided to come to the ER. Initial blood pressure was above 200. CT head showed no acute stroke or large vessel occlusion. She was admitted to the hospital.  Today she is awake and alert. She denies any residual deficit. Blood pressure remains elevated. Blood test showed some anemia. Other review of system was unremarkable.       Family History   Problem Relation Age of Onset    High Blood Pressure Mother     Heart Attack Father     Heart Disease Father     Other Brother     Asthma Paternal Uncle      Past Surgical History:   Procedure Laterality Date    APPENDECTOMY      CHOLECYSTECTOMY, LAPAROSCOPIC  2/24/2013    COLONOSCOPY          Past Medical History:   Diagnosis Date    Arthritis     Atrial fibrillation ([address]     Diabetes mellitus type II, controlled ([address]     GERD (gastroesophageal reflux disease)     HTN (hypertension)     Hyperlipidemia     Hypothyroid     Insomnia     Lumbago     SVT (supraventricular tachycardia) (HCC)      Social History     Tobacco Use    Smoking status: Former Smoker     Packs/day: 1.00     Years: 45.00     Pack years: 45.00     Start date: (CARDIZEM CD) extended release capsule 240 mg  240 mg Oral Daily Pranay Davis MD   240 mg at 08/12/21 0847    HYDROcodone-acetaminophen (NORCO) 5-325 MG per tablet 1 tablet  1 tablet Oral 4x Daily PRN Corine Bond MD   1 tablet at 08/11/21 2353    levothyroxine (SYNTHROID) tablet 75 mcg  75 mcg Oral Daily Pranay Davis MD   75 mcg at 08/12/21 0708    LORazepam (ATIVAN) tablet 0.5 mg  0.5 mg Oral BID PRN Corine Bond MD        propafenone (RYTHMOL) tablet 150 mg  150 mg Oral 3 times per day Corine Bond MD   150 mg at 08/12/21 0506    sodium chloride flush 0.9 % injection 5-40 mL  5-40 mL Intravenous 2 times per day Corine Bond MD   10 mL at 08/12/21 0846    sodium chloride flush 0.9 % injection 5-40 mL  5-40 mL Intravenous PRN Pranay Davis MD        0.9 % sodium chloride infusion  25 mL Intravenous PRN Corine Bond MD        ondansetron (ZOFRAN-ODT) disintegrating tablet 4 mg  4 mg Oral Q8H PRN Pranay Davis MD        Or    ondansetron (ZOFRAN) injection 4 mg  4 mg Intravenous Q6H PRN Pranay Davis MD        polyethylene glycol (GLYCOLAX) packet 17 g  17 g Oral Daily PRN Corine Bond MD        acetaminophen (TYLENOL) tablet 650 mg  650 mg Oral Q6H PRN Pranay Davis MD        Or   Jhoan Simon acetaminophen (TYLENOL) suppository 650 mg  650 mg Rectal Q6H PRN Pranay Davis MD        LORazepam (ATIVAN) tablet 1 mg  1 mg Oral Nightly PRN Corine Bond MD   1 mg at 08/11/21 2352       ROS : A 10-14 system review of constitutional, cardiovascular, respiratory, eyes, musculoskeletal, endocrine, GI, ENT, skin, hematological, genitourinary, psychiatric and neurologic systems was obtained and updated today and is unremarkable except as mentioned in my HPI      Exam:     Constitutional:   Vitals:    08/11/21 2353 08/12/21 0407 08/12/21 0830 08/12/21 0848   BP: (!) 163/82 (!) 147/78 (!) 209/79    Pulse: 64 57 61    Resp: 18 18 16    Temp: 97.8 °F (36.6 °C) 97.9 °F (36.6 °C) 97.8 °F (36.6 °C)    TempSrc: Oral Oral Oral SpO2: 96% 98% 99%    Weight:    162 lb 6.4 oz (73.7 kg)   Height:           General appearance:  Normal development and appear in no acute distress. Eye:  Fundus of the eye: Funduscopic examination cannot be performed due to COVID19 restrictions and precautions. Neck: supple  Cardiovascular: Distal lower leg edema with good pulsation. Mental Status:   Oriented to person, place, problem, and time. Memory: Good immediate recall. Intact remote memory  Normal attention span and concentration. Language: intact naming, repeating and fluency   Good fund of Knowledge. Aware of current events and vocabulary   Cranial Nerves:   II: Visual fields: Full. Pupils: equal, round, reactive to light  III,IV,VI: Extra Ocular Movements are intact. No nystagmus  V: Facial sensation is intact   VII: Facial strength and movements: intact and symmetric  VIII: Hearing: Intact  IX: Palate elevation is symmetric  XI: Shoulder shrug is intact  XII: Tongue movements are normal  Musculoskeletal: 5/5 in all 4 extremities. Tone: Normal tone. Reflexes: 2+ in the upper extremity and 1 in the lower extremity   Planters: flexor bilaterally. Coordination: no pronator drift, no dysmetria    Sensation: normal to all modalities in both arms and legs.   Gait/Posture: Not tested due to dizziness    Data:  LABS:   Lab Results   Component Value Date     08/11/2021    K 4.5 08/11/2021    CL 99 08/11/2021    CO2 23 08/11/2021    BUN 27 08/11/2021    CREATININE 1.2 08/11/2021    GFRAA 52 08/11/2021    GFRAA >60 02/27/2013    LABGLOM 43 08/11/2021    GLUCOSE 180 08/11/2021    PHOS 3.0 02/28/2021    MG 2.00 02/28/2021    CALCIUM 9.8 08/11/2021     Lab Results   Component Value Date    WBC 9.9 08/12/2021    RBC 3.91 08/12/2021    HGB 11.0 08/12/2021    HCT 33.3 08/12/2021    MCV 90.9 08/12/2021    RDW 16.0 08/12/2021     08/12/2021     Lab Results   Component Value Date    INR 1.46 (H) 08/11/2021    PROTIME 16.8 (H) 08/11/2021 Neuroimaging were independently reviewed by me and discussed results with the patient  Reviewed notes from different physicians  Reviewed lab and blood testing    Impression:  New onset dizziness with ataxia, severe. Likely hypertensive urgency. Other possibility could include hypoperfusion from high blood pressure and anemia. Hypertension, not controlled  Anemia  Hyperlipidemia  COPD  A. fib on Eliquis    Recommendation:  Blood pressure monitor and control. Goal inpatient 160/90  MRI brain  PT OT  Speech  Telemetry  Neurochecks  Continue Eliquis but follow CBC closely and anemia work-up. Lipid panel  A1c  Statin  Resume home blood pressure medications  Respiratory support  PPI  We will follow    MDM: High due to hypertensive urgency, risk of ICH and strokes especially being on Eliquis. Thank you for referring such patient. If you have any questions regarding my consult note, please don't hesitate to call me. Ranjit Eric MD  [phone]    This dictation was generated by voice recognition computer software.  Although all attempts are made to edit the dictation for accuracy, there may be errors in the  transcription that are not intended

## 2021-08-12 NOTE — PROGRESS NOTES
Notified hospitalist about patient's dark stool ,patient states has been having it for a while. Occult blood collected and sent as ordered  ,see mar for additional orders.

## 2021-08-13 VITALS
WEIGHT: 161.5 LBS | HEART RATE: 52 BPM | BODY MASS INDEX: 29.72 KG/M2 | TEMPERATURE: 97.8 F | SYSTOLIC BLOOD PRESSURE: 105 MMHG | DIASTOLIC BLOOD PRESSURE: 52 MMHG | RESPIRATION RATE: 14 BRPM | HEIGHT: 62 IN | OXYGEN SATURATION: 98 %

## 2021-08-13 LAB
GLUCOSE BLD-MCNC: 149 MG/DL (ref 70–99)
GLUCOSE BLD-MCNC: 214 MG/DL (ref 70–99)
HCT VFR BLD CALC: 30.2 % (ref 36–48)
HEMOGLOBIN: 10.2 G/DL (ref 12–16)
PERFORMED ON: ABNORMAL
PERFORMED ON: ABNORMAL

## 2021-08-13 PROCEDURE — G0378 HOSPITAL OBSERVATION PER HR: HCPCS

## 2021-08-13 PROCEDURE — 96376 TX/PRO/DX INJ SAME DRUG ADON: CPT

## 2021-08-13 PROCEDURE — 94760 N-INVAS EAR/PLS OXIMETRY 1: CPT

## 2021-08-13 PROCEDURE — 6360000002 HC RX W HCPCS: Performed by: INTERNAL MEDICINE

## 2021-08-13 PROCEDURE — C9113 INJ PANTOPRAZOLE SODIUM, VIA: HCPCS | Performed by: INTERNAL MEDICINE

## 2021-08-13 PROCEDURE — 6370000000 HC RX 637 (ALT 250 FOR IP): Performed by: INTERNAL MEDICINE

## 2021-08-13 PROCEDURE — 85014 HEMATOCRIT: CPT

## 2021-08-13 PROCEDURE — 2580000003 HC RX 258: Performed by: HOSPITALIST

## 2021-08-13 PROCEDURE — 2700000000 HC OXYGEN THERAPY PER DAY

## 2021-08-13 PROCEDURE — 94640 AIRWAY INHALATION TREATMENT: CPT

## 2021-08-13 PROCEDURE — 36415 COLL VENOUS BLD VENIPUNCTURE: CPT

## 2021-08-13 PROCEDURE — 99214 OFFICE O/P EST MOD 30 MIN: CPT | Performed by: PSYCHIATRY & NEUROLOGY

## 2021-08-13 PROCEDURE — 85018 HEMOGLOBIN: CPT

## 2021-08-13 PROCEDURE — 6370000000 HC RX 637 (ALT 250 FOR IP): Performed by: HOSPITALIST

## 2021-08-13 RX ORDER — CLONIDINE HYDROCHLORIDE 0.2 MG/1
0.2 TABLET ORAL 3 TIMES DAILY
Qty: 60 TABLET | Refills: 3 | Status: SHIPPED | OUTPATIENT
Start: 2021-08-13 | End: 2021-08-20

## 2021-08-13 RX ORDER — CLONIDINE HYDROCHLORIDE 0.1 MG/1
TABLET ORAL
Qty: 180 TABLET | Refills: 0 | Status: SHIPPED | OUTPATIENT
Start: 2021-08-13 | End: 2021-08-13 | Stop reason: HOSPADM

## 2021-08-13 RX ADMIN — CLONIDINE HYDROCHLORIDE 0.2 MG: 0.1 TABLET ORAL at 09:14

## 2021-08-13 RX ADMIN — LEVOTHYROXINE SODIUM 75 MCG: 0.07 TABLET ORAL at 06:32

## 2021-08-13 RX ADMIN — APIXABAN 5 MG: 5 TABLET, FILM COATED ORAL at 09:14

## 2021-08-13 RX ADMIN — PROPAFENONE HYDROCHLORIDE 150 MG: 150 TABLET, FILM COATED ORAL at 06:32

## 2021-08-13 RX ADMIN — PANTOPRAZOLE SODIUM 40 MG: 40 INJECTION, POWDER, FOR SOLUTION INTRAVENOUS at 09:13

## 2021-08-13 RX ADMIN — BETHANECHOL CHLORIDE 25 MG: 10 TABLET ORAL at 09:13

## 2021-08-13 RX ADMIN — DILTIAZEM HYDROCHLORIDE 240 MG: 240 CAPSULE, COATED, EXTENDED RELEASE ORAL at 09:13

## 2021-08-13 RX ADMIN — HYDROCODONE BITARTRATE AND ACETAMINOPHEN 1 TABLET: 5; 325 TABLET ORAL at 04:46

## 2021-08-13 RX ADMIN — Medication 1 PUFF: at 05:58

## 2021-08-13 RX ADMIN — Medication 10 ML: at 09:14

## 2021-08-13 ASSESSMENT — PAIN DESCRIPTION - ONSET: ONSET: ON-GOING

## 2021-08-13 ASSESSMENT — PAIN DESCRIPTION - FREQUENCY: FREQUENCY: CONTINUOUS

## 2021-08-13 ASSESSMENT — PAIN SCALES - GENERAL
PAINLEVEL_OUTOF10: 0
PAINLEVEL_OUTOF10: 7

## 2021-08-13 ASSESSMENT — PAIN DESCRIPTION - LOCATION: LOCATION: NECK

## 2021-08-13 ASSESSMENT — PAIN DESCRIPTION - ORIENTATION: ORIENTATION: POSTERIOR

## 2021-08-13 ASSESSMENT — PAIN DESCRIPTION - PAIN TYPE: TYPE: ACUTE PAIN

## 2021-08-13 ASSESSMENT — PAIN DESCRIPTION - DESCRIPTORS: DESCRIPTORS: ACHING;SORE

## 2021-08-13 NOTE — DISCHARGE SUMMARY
Hospital Medicine Discharge Summary    Patient ID: Wes Benjamin      Patient's PCP: Raheel Fowler MD    Admit Date: 8/11/2021     Discharge Date:   8/13/2021     Admitting Physician: Cynthia Manzo MD     Discharge Physician: Kyree Barraza MD     Discharge Diagnoses: Active Hospital Problems    Diagnosis     HTN (hypertension), benign [I10]     Head ache [R51.9]     Hypothyroid [E03.9]     PAF (paroxysmal atrial fibrillation) (HCC) [I48.0]     Coronary artery disease involving native coronary artery of native heart with angina pectoris (HCC) [I25.119]     Dizziness [R42]     COPD, mild (HCC) [J44.9]     Controlled type 2 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 disease, without long-term current use of insulin (HCC) [E11.22, N18.2]     Hyperlipidemia [E78.5]     Hypertensive urgency [I16.0]        The patient was seen and examined on day of discharge and this discharge summary is in conjunction with any daily progress note from day of discharge. Hospital Course: The patient is an 60-year-old lady with history of atrial fibrillation, type 2 diabetes, GERD, hypertension, hyperlipidemia, hypothyroidism who presented with dizziness and associated head pressure/headache in the setting of hypertensive urgency. He had sensation may be related to uncontrolled hypertension. She had an unremarkable MRI of the brain. He was seen by neurology with no other details. Symptoms improved with blood pressure control. Of note and clonidine was increased to 0.2 mg 3 times a day. Her head pressure/headache has since resolved. She will discharge home to follow-up with PCP in 1 to 2 weeks to monitor BP        Physical Exam Performed:     BP (!) 105/52   Pulse 52   Temp 97.8 °F (36.6 °C) (Oral)   Resp 14   Ht 5' 2\" (1.575 m)   Wt 161 lb 8 oz (73.3 kg)   SpO2 98%   BMI 29.54 kg/m²       General appearance:  No apparent distress, appears stated age and cooperative.   HEENT: Normal cephalic, atraumatic without obvious deformity. Pupils equal, round, and reactive to light. Extra ocular muscles intact. Conjunctivae/corneas clear. Neck: Supple, with full range of motion. No jugular venous distention. Trachea midline. Respiratory:  Normal respiratory effort. Clear to auscultation, bilaterally without Rales/Wheezes/Rhonchi. Cardiovascular:  Regular rate and rhythm with normal S1/S2 without murmurs, rubs or gallops. Abdomen: Soft, non-tender, non-distended with normal bowel sounds. Musculoskeletal:  No clubbing, cyanosis or edema bilaterally. Full range of motion without deformity. Skin: Skin color, texture, turgor normal.  No rashes or lesions. Neurologic:  Neurovascularly intact without any focal sensory/motor deficits. Cranial nerves: II-XII intact, grossly non-focal.  Psychiatric:  Alert and oriented, thought content appropriate, normal insight  Capillary Refill: Brisk,< 3 seconds   Peripheral Pulses: +2 palpable, equal bilaterally       Labs: For convenience and continuity at follow-up the following most recent labs are provided:      CBC:    Lab Results   Component Value Date    WBC 9.9 08/12/2021    HGB 10.2 08/13/2021    HCT 30.2 08/13/2021     08/12/2021       Renal:    Lab Results   Component Value Date     08/11/2021    K 4.5 08/11/2021    CL 99 08/11/2021    CO2 23 08/11/2021    BUN 27 08/11/2021    CREATININE 1.2 08/11/2021    CALCIUM 9.8 08/11/2021    PHOS 3.0 02/28/2021         Significant Diagnostic Studies    Radiology:   MRI BRAIN WO CONTRAST   Final Result   1. No acute intracranial abnormality. No acute infarct. 2. Minimal global volume loss with chronic microvascular ischemic changes. CTA HEAD NECK W CONTRAST   Final Result   No CT evidence of an acute infarct. No large vessel occlusion visualized within the head or neck. 50% stenosis of the proximal right cervical ICA.          CT HEAD WO CONTRAST   Final Result   No CT evidence of an acute infarct. No large vessel occlusion visualized within the head or neck. 50% stenosis of the proximal right cervical ICA. XR CHEST PORTABLE   Final Result   No evidence of acute cardiopulmonary disease. Consults:     IP CONSULT TO HOSPITALIST  IP CONSULT TO NEUROLOGY    Disposition: Home    Condition at Discharge: Stable    Discharge Instructions/Follow-up:    PCP follow-up in 1 to 2 weeks    Code Status:  Full Code     Activity: activity as tolerated    Diet: regular diet      Discharge Medications:     Current Discharge Medication List           Details   cloNIDine (CATAPRES) 0.2 MG tablet Take 1 tablet by mouth 3 times daily  Qty: 60 tablet, Refills: 3              Details   HYDROcodone-acetaminophen (NORCO) 5-325 MG per tablet Take 1 tablet by mouth 4 times daily as needed for Pain for up to 30 days. Qty: 120 tablet, Refills: 0    Comments: Reduce doses taken as pain becomes manageable  Associated Diagnoses: Primary osteoarthritis involving multiple joints      apixaban (ELIQUIS) 5 MG TABS tablet Take 1 tablet by mouth 2 times daily  Qty: 180 tablet, Refills: 1      bethanechol (URECHOLINE) 25 MG tablet Take 25 mg by mouth 3 times daily      LORazepam (ATIVAN) 1 MG tablet TAKE 1/2 TABLET BY MOUTH TWICE DAILY AND 1 EVERY NIGHT AT BEDTIME AS NEEDED FOR ANXIETY  Qty: 60 tablet, Refills: 2    Associated Diagnoses: Primary insomnia;  Anxiety      dilTIAZem (CARDIZEM CD) 240 MG extended release capsule TAKE 1 CAPSULE EVERY DAY  Qty: 90 capsule, Refills: 0      levothyroxine (SYNTHROID) 75 MCG tablet TAKE 1 TABLET EVERY DAY  Qty: 90 tablet, Refills: 0    Associated Diagnoses: Acquired hypothyroidism      Lancets MISC 1 each by Does not apply route 2 times daily  Qty: 300 each, Refills: 1    Associated Diagnoses: Hyperglycemia      conjugated estrogens (PREMARIN) 0.625 MG/GM vaginal cream Place 1 g vaginally Twice a Week  Qty: 1 Tube, Refills: 3      meclizine (ANTIVERT) 12.5 MG tablet TAKE 1 TABLET THREE TIMES DAILY AS NEEDED  Qty: 270 tablet, Refills: 0      ondansetron (ZOFRAN) 4 MG tablet Take 1 tablet by mouth daily as needed for Nausea or Vomiting  Qty: 30 tablet, Refills: 0      OXYGEN Inhale 2 L into the lungs      propafenone (RYTHMOL) 150 MG tablet Take 1 tablet by mouth every 8 hours  Qty: 270 tablet, Refills: 1      budesonide-formoterol (SYMBICORT) 160-4.5 MCG/ACT AERO INHALE 2 PUFFS INTO THE LUNGS TWICE DAILY  Qty: 3 Inhaler, Refills: 0    Associated Diagnoses: Wheezing      blood glucose monitor strips Test one time a day  Qty: 100 strip, Refills: 5    Comments: Brand per patient preference. May round up to next available package size.   Associated Diagnoses: Controlled type 2 diabetes mellitus without complication, without long-term current use of insulin ([facility])      dicyclomine (BENTYL) 10 MG capsule Take 1 capsule by mouth 4 times daily as needed (abd pain)  Qty: 360 capsule, Refills: 1      atorvastatin (LIPITOR) 80 MG tablet Take 1 tablet by mouth nightly  Qty: 30 tablet, Refills: 3      rOPINIRole (REQUIP) 2 MG tablet Take 1 tablet by mouth 3 times daily PRN      fluticasone (FLONASE) 50 MCG/ACT nasal spray 2 sprays by Nasal route daily  Qty: 3 Bottle, Refills: 1    Comments: **Patient requests 90 days supply**      levalbuterol (XOPENEX) 0.63 MG/3ML nebulization INHALE CONTENTS OF 1 VIAL PER NEBULIZER EVERY 6 HOURS AS NEEDED FOR WHEEZING  Qty: 1050 mL, Refills: 3    Comments: DX:COPD J44.9      Respiratory Therapy Supplies (NEBULIZER/TUBING/MOUTHPIECE) KIT 1 kit by Does not apply route 4 times daily as needed (shortness of breath)  Qty: 1 kit, Refills: 0    Associated Diagnoses: Chronic respiratory failure with hypoxia ([facility])      albuterol sulfate HFA (PROAIR HFA) 108 (90 Base) MCG/ACT inhaler Inhale 2 puffs into the lungs every 6 hours as needed for Wheezing  Qty: 1 Inhaler, Refills: 6    Associated Diagnoses: Wheezing             Time Spent on discharge is more than 20 minutes in the examination, evaluation, counseling and review of medications and discharge plan. Signed: Juan Greco MD   8/13/2021      Thank you Madison Harvey MD for the opportunity to be involved in this patient's care. If you have any questions or concerns please feel free to contact me at [phone].

## 2021-08-13 NOTE — PROGRESS NOTES
Sofie Gunter  Neurology Follow-up  [facility] Neurology    Date of Service: 8/13/2021    Subjective:   CC: Follow up today regarding: Onset dizziness and hypertensive urgency    Events noted. Chart and lab reviewed. The patient feels better today. No residual deficit. No headache or chest pain or dizziness. MRI showed no acute stroke. Other review of system was unremarkable. ROS : A 10-12 system review obtained and updated today and is unremarkable except as mentioned  in my interval history. family history includes Asthma in her paternal uncle; Heart Attack in her father; Heart Disease in her father; High Blood Pressure in her mother; Other in her brother.     Past Medical History:   Diagnosis Date    Arthritis     Atrial fibrillation ([facility] [address]     Diabetes mellitus type II, controlled ([address]     GERD (gastroesophageal reflux disease)     HTN (hypertension)     Hyperlipidemia     Hypothyroid     Insomnia     Lumbago     SVT (supraventricular tachycardia) (HCC)      Current Facility-Administered Medications   Medication Dose Route Frequency Provider Last Rate Last Admin    rOPINIRole (REQUIP) tablet 2 mg  2 mg Oral Nightly PRN Vijaya Harrison PA-C   2 mg at 08/12/21 2128    apixaban (ELIQUIS) tablet 5 mg  5 mg Oral BID Rupinder Lazaro MD   5 mg at 08/13/21 0914    pantoprazole (PROTONIX) injection 40 mg  40 mg Intravenous Daily Rupinder Lazaro MD   40 mg at 08/13/21 0913    cloNIDine (CATAPRES) tablet 0.2 mg  0.2 mg Oral TID Raheem Mcnamara MD   0.2 mg at 08/13/21 0914    hydrALAZINE (APRESOLINE) injection 10 mg  10 mg Intravenous Q4H PRN Raheem Mcnamara MD        atorvastatin (LIPITOR) tablet 80 mg  80 mg Oral Nightly Pranay Davis MD   80 mg at 08/12/21 2127    bethanechol (URECHOLINE) tablet 25 mg  25 mg Oral TID Loco Harris MD   25 mg at 08/13/21 0913    budesonide-formoterol (SYMBICORT) 160-4.5 MCG/ACT inhaler 1 puff  1 puff Inhalation Daily Loco Harris MD 1 puff at 08/13/21 0558    dilTIAZem (CARDIZEM CD) extended release capsule 240 mg  240 mg Oral Daily Pranay Davis MD   240 mg at 08/13/21 0913    HYDROcodone-acetaminophen (NORCO) 5-325 MG per tablet 1 tablet  1 tablet Oral 4x Daily PRN Annita Parra MD   1 tablet at 08/13/21 0446    levothyroxine (SYNTHROID) tablet 75 mcg  75 mcg Oral Daily Pranay Davis MD   75 mcg at 08/13/21 7535    LORazepam (ATIVAN) tablet 0.5 mg  0.5 mg Oral BID PRN Annita Parra MD        propafenone (RYTHMOL) tablet 150 mg  150 mg Oral 3 times per day Annita Parra MD   150 mg at 08/13/21 4992    sodium chloride flush 0.9 % injection 5-40 mL  5-40 mL Intravenous 2 times per day Annita Parra MD   10 mL at 08/13/21 0914    sodium chloride flush 0.9 % injection 5-40 mL  5-40 mL Intravenous PRN Annita Parra MD        0.9 % sodium chloride infusion  25 mL Intravenous PRN Annita Parra MD        ondansetron (ZOFRAN-ODT) disintegrating tablet 4 mg  4 mg Oral Q8H PRN Pranay Davis MD        Or    ondansetron (ZOFRAN) injection 4 mg  4 mg Intravenous Q6H PRN Pranay Davis MD        polyethylene glycol (GLYCOLAX) packet 17 g  17 g Oral Daily PRN Annita Parra MD        acetaminophen (TYLENOL) tablet 650 mg  650 mg Oral Q6H PRN Pranay Davis MD        Or   [facility] acetaminophen (TYLENOL) suppository 650 mg  650 mg Rectal Q6H PRN Pranay Davis MD        LORazepam (ATIVAN) tablet 1 mg  1 mg Oral Nightly PRN Pranay Davis MD   1 mg at 08/12/21 2881     Allergies   Allergen Reactions    Adhesive Tape Anaphylaxis    Cefaclor Nausea And Vomiting     Other reaction(s): Chest pain    Nsaids      Pt states she has hives and heart races   Other reaction(s): Tachycardia    Clinoril [Sulindac] Other (See Comments)     Stomach pain    Codeine      FEEL NUMB    Diclofenac     Diclofenac Sodium Hives    Diclofenac Sodium Hives    Hctz [Hydrochlorothiazide]      Sob    Ibuprofen Other (See Comments)     Other reaction(s): Tachycardia    Macrobid [Nitrofurantoin Macrocrystal]      nausea    Motrin [Ibuprofen Micronized] Other (See Comments)     Tachycardia      Pcn [Penicillins] Hives    Peach [Prunus Persica] Itching and Swelling     Cannot eat raw peaches    Sulfa Antibiotics      Nausea, diarrhea      reports that she quit smoking about 25 years ago. She started smoking about 70 years ago. She has a 45.00 pack-year smoking history. She has never used smokeless tobacco. She reports that she does not drink alcohol and does not use drugs. Objective:  Exam:   Constitutional:   Vitals:    08/13/21 0614 08/13/21 0624 08/13/21 0632 08/13/21 0900   BP:   (!) 151/64 (!) 105/52   Pulse:  (!) 47 52 52   Resp:    14   Temp:    97.8 °F (36.6 °C)   TempSrc:    Oral   SpO2:    98%   Weight: 161 lb 8 oz (73.3 kg)      Height:         General appearance:  Normal development and appear in no acute distress. Mental Status:   Oriented to person, place, problem, and time. Memory: Good immediate recall. Intact remote memory  Normal attention span and concentration. Language: intact naming, repeating and fluency   Good fund of Knowledge. Cranial Nerves:   II: Visual fields: Full. Pupils: equal, round, reactive to light  III,IV,VI: Extra Ocular Movements are intact. No nystagmus  V: Facial sensation is intact  VII: Facial strength and movements: intact and symmetric  XII: Tongue movements are normal  Musculoskeletal: 5/5 in all 4 extremities. Coordination: no pronator drift, no dysmetria with FNF  Sensation: normal to all modalities in both arms and legs.   Gait/Posture: Not tested        Data:  LABS:   Lab Results   Component Value Date     08/11/2021    K 4.5 08/11/2021    CL 99 08/11/2021    CO2 23 08/11/2021    BUN 27 08/11/2021    CREATININE 1.2 08/11/2021    GFRAA 52 08/11/2021    GFRAA >60 02/27/2013    LABGLOM 43 08/11/2021    GLUCOSE 180 08/11/2021    PHOS 3.0 02/28/2021    MG 2.00 02/28/2021    CALCIUM 9.8 08/11/2021     Lab Results Component Value Date    WBC 9.9 08/12/2021    RBC 3.91 08/12/2021    HGB 10.2 08/13/2021    HCT 30.2 08/13/2021    MCV 90.9 08/12/2021    RDW 16.0 08/12/2021     08/12/2021     Lab Results   Component Value Date    INR 1.46 (H) 08/11/2021    PROTIME 16.8 (H) 08/11/2021       Neuroimaging was independently reviewed by me and discussed results with the patient  I reviewed blood testing and other test results and discussed results with the patient      Impression:  New onset dizziness with ataxia likely secondary to hypertensive urgency  Hypertension, not controlled  A. fib on Eliquis  Hyperlipidemia      Recommendation  MRI results discussed with the patient  PT and OT  Continue current blood pressure medications  Statin  Eliquis  Telemetry  Fall precautions  Stroke education was addressed  Discussed risk of poorly controlled hypertension  Can be discharged once medically stable  No further recommendation  We will sign off           Perlita Rose MD   [phone]      This dictation was generated by voice recognition computer software. Although all attempts are made to edit the dictation for accuracy, there may be errors in the transcription that are not intended.

## 2021-08-13 NOTE — PROGRESS NOTES
Data- discharge order received, pt verbalized agreement to discharge, disposition to previous residence, no needs for HHC/DME. Action- discharge instructions prepared and given to pt, pt verbalized understanding. Medication information packet given r/t NEW and/or CHANGED prescriptions emphasizing name/purpose/side effects, pt verbalized understanding. Discharge instruction summary: Diet- general, Activity- as tolerated, Primary Care Physician as follows: Alba Miranda [phone] f/u appointment to be scheduled by pt, immunizations reviewed and, prescription medications filled at [place]. 1. WEIGHT: Admit Weight: 165 lb (74.8 kg) (08/11/21 1508)        Today  Weight: 161 lb 8 oz (73.3 kg) (08/13/21 0124)       2. O2 SAT.: SpO2: 98 % (08/13/21 0900)    Response- Pt belongings gathered, IV removed. Disposition is home (no HHC/DME needs), transported with daughter, taken to lobby via w/c w/ this RN, no complications.

## 2021-08-13 NOTE — PROGRESS NOTES
Dr. Christie Olivas notified of telemetry showing sinus nancy 45-50, his is okay for pt to get Rythmol per [facility]

## 2021-08-16 ENCOUNTER — TELEPHONE (OUTPATIENT)
Dept: FAMILY MEDICINE CLINIC | Age: 86
End: 2021-08-16

## 2021-08-17 ENCOUNTER — TELEPHONE (OUTPATIENT)
Dept: FAMILY MEDICINE CLINIC | Age: 86
End: 2021-08-17

## 2021-08-17 NOTE — PROGRESS NOTES
[address]   Electrophysiology  [facility], APRN-CNP  Attending EP: Dr. Geetha Feliciano    Date: 9/14/2021  I had the privilege of visiting Angela Delgado in the office. Chief Complaint:   Chief Complaint   Patient presents with    Follow-up    Atrial Fibrillation     History of Present Illness: History obtained from patient and medical record. Angela Delgado is 80 y.o. female with a past medical history of HTN, HLD, DM, COPD, hypothyroid, and atrial fibrillation. S/p TRACEY/DCCV (8/31/18, Dr. Griselda Amaya) and placed on propafenone and Xarelto without recurrence until now. In November of 2019, she was admitted for afib with RVR accompanied by sudden onset palpitations, fatigue, and SOB. She was also being treated for PNA. She developed bradycardia with IV cardizem. She converted to SR spontaneously. S/p DCCV (3/12/21)    -Interval history: Today, Angela Delgado is being seen for follow up. Her son is present for the visit. Pt is present in a wheel chair on 2L of oxygen, which is chronic for her. She is doing pretty well. She got the flu shot this week and feels \"bleh\", but denies any cardiac complaints. She now lives with her daughter and no longer drives. Her BP is high today, 168/82. Her BP was 146/80 this morning at home. Pt reports it generally runs in the 140-150s at home since her PCP started her on hydralazine. We discussed what to do to help control her BP when it is elevated. She admits she is not very active at home due to her age and co-morbidities. Denies having chest pain, palpitations, shortness of breath, orthopnea/PND, cough, or dizziness at the time of this visit. With regard to medication therapy the patient has been compliant with prescribed regimen. They have tolerated therapy to date. Allergies:   Allergies   Allergen Reactions    Adhesive Tape Anaphylaxis    Cefaclor Nausea And Vomiting     Other reaction(s): Chest pain    Nsaids      Pt states she has hives and heart races   Other reaction(s): Tachycardia    Clinoril [Sulindac] Other (See Comments)     Stomach pain    Codeine      FEEL NUMB    Diclofenac     Diclofenac Sodium Hives    Diclofenac Sodium Hives    Hctz [Hydrochlorothiazide]      Sob    Ibuprofen Other (See Comments)     Other reaction(s): Tachycardia    Macrobid [Nitrofurantoin Macrocrystal]      nausea    Motrin [Ibuprofen Micronized] Other (See Comments)     Tachycardia      Pcn [Penicillins] Hives    Peach [Prunus Persica] Itching and Swelling     Cannot eat raw peaches    Prednisone      Causes elevated blood pressure     Sulfa Antibiotics      Nausea, diarrhea     Home Medications:  Prior to Visit Medications    Medication Sig Taking? Authorizing Provider   cloNIDine (CATAPRES) 0.1 MG tablet Take 0.1 mg by mouth 2 times daily Yes Historical Provider, MD   HYDROcodone-acetaminophen (NORCO) 5-325 MG per tablet Take 1 tablet by mouth 4 times daily as needed for Pain for up to 30 days.  Yes Abril Garza MD   dilTIAZem (CARDIZEM CD) 240 MG extended release capsule TAKE 1 CAPSULE EVERY DAY Yes Abril Garza MD   fluticasone (FLONASE) 50 MCG/ACT nasal spray USE 2 SPRAYS NASALLY EVERY DAY Yes Abril Garza MD   hydrALAZINE (APRESOLINE) 50 MG tablet TAKE 1 TABLET BY MOUTH THREE TIMES DAILY Yes Abril Garza MD   irbesartan (AVAPRO) 300 MG tablet TAKE 1 TABLET BY MOUTH EVERY NIGHT Yes Abril Garza MD   apixaban (ELIQUIS) 5 MG TABS tablet Take 1 tablet by mouth 2 times daily Yes Kalie Summers MD   bethanechol (URECHOLINE) 25 MG tablet Take 25 mg by mouth 3 times daily Yes Historical Provider, MD   LORazepam (ATIVAN) 1 MG tablet TAKE 1/2 TABLET BY MOUTH TWICE DAILY AND 1 EVERY NIGHT AT BEDTIME AS NEEDED FOR ANXIETY Yes Cesario Cyr APRN - NP   levothyroxine (SYNTHROID) 75 MCG tablet TAKE 1 TABLET EVERY DAY Yes Abril Garza MD   Lancets MISC 1 each by Does not apply route 2 times daily Yes Bárbara Haley MD Erin   conjugated estrogens (PREMARIN) 0.625 MG/GM vaginal cream Place 1 g vaginally Twice a Week Yes Leanne Hampton MD   meclizine (ANTIVERT) 12.5 MG tablet TAKE 1 TABLET THREE TIMES DAILY AS NEEDED Yes Leanne Hampton MD   ondansetron (ZOFRAN) 4 MG tablet Take 1 tablet by mouth daily as needed for Nausea or Vomiting Yes Cruz Chisholm MD   OXYGEN Inhale 2 L into the lungs Yes Historical Provider, MD   propafenone (RYTHMOL) 150 MG tablet Take 1 tablet by mouth every 8 hours Yes Leanne Hampton MD   budesonide-formoterol (SYMBICORT) 160-4.5 MCG/ACT AERO INHALE 2 PUFFS INTO THE LUNGS TWICE DAILY Yes Leanne Hampton MD   blood glucose monitor strips Test one time a day Yes Leanne Hampton MD   dicyclomine (BENTYL) 10 MG capsule Take 1 capsule by mouth 4 times daily as needed (abd pain) Yes Leanne Hampton MD   atorvastatin (LIPITOR) 80 MG tablet Take 1 tablet by mouth nightly Yes Danny CANNON MD   rOPINIRole (REQUIP) 2 MG tablet Take 1 tablet by mouth 3 times daily PRN Yes Historical Provider, MD   levalbuterol (XOPENEX) 0.63 MG/3ML nebulization INHALE CONTENTS OF 1 VIAL PER NEBULIZER EVERY 6 HOURS AS NEEDED FOR WHEEZING Yes Lai العراقي MD   Respiratory Therapy Supplies (NEBULIZER/TUBING/MOUTHPIECE) KIT 1 kit by Does not apply route 4 times daily as needed (shortness of breath) Yes Leanne Hampton MD   albuterol sulfate HFA (PROAIR HFA) 108 (90 Base) MCG/ACT inhaler Inhale 2 puffs into the lungs every 6 hours as needed for Wheezing Yes Lai العراقي MD      Past Medical History:  Past Medical History:   Diagnosis Date    Arthritis     [facility]rial fibrillation ([facility] [facility]     Diabetes mellitus type II, controlled ([facility]     GERD (gastroesophageal reflux disease)     HTN (hypertension)     Hyperlipidemia     Hypothyroid     Insomnia     Lumbago     SVT (supraventricular tachycardia) ([facility] [facility]      Past Surgical History:    has a past surgical history that includes Appendectomy; Colonoscopy; and Cholecystectomy, laparoscopic (2/24/2013). Social History:  Reviewed. reports that she quit smoking about 25 years ago. Her smoking use included cigarettes. She started smoking about 71 years ago. She has a 45.00 pack-year smoking history. She has never used smokeless tobacco. She reports that she does not drink alcohol and does not use drugs. Family History:  Reviewed. family history includes Asthma in her paternal uncle; Heart Attack in her father; Heart Disease in her father; High Blood Pressure in her mother; Other in her brother. Review of System:  · Constitutional: Negative for fever, night sweats, chills, weight changes, or weakness  · Skin: Negative for rash, dry skin, pruritus, bruising, bleeding, blood clots, or changes in skin pigment  · HEENT: Negative for vision changes, ringing in the ears, sore throat, dysphagia, or swollen lymph nodes  · Respiratory: Positive for SOB/CHIN (chronic at baseline)  · Cardiovascular: Reviewed in HPI  · Gastrointestinal: Negative for abdominal pain, N/V/D, constipation, or black/tarry stools  · Genito-Urinary: Negative for dysuria, incontinence, urgency, or hematuria  · Musculoskeletal: Negative for joint swelling, muscle pain, or injuries  · Neurological/Psych: Negative for confusion, seizures, dizziness, headaches, balance issues or TIA-like symptoms. No anxiety, depression, or insomnia    Physical Examination:  Vitals:    09/14/21 1513   BP: (!) 168/82   Pulse: 63   SpO2: 100%      Wt Readings from Last 3 Encounters:   09/14/21 168 lb (76.2 kg)   08/13/21 161 lb 8 oz (73.3 kg)   07/20/21 165 lb (74.8 kg)     Constitutional: Cooperative and in no apparent distress, and appears stated age  Skin: Warm and pink; no pallor, cyanosis, bruising, or clubbing  HEENT: Symmetric and normocephalic. PERRL, EOM intact. Conjunctiva pink with clear sclera. Mucus membranes pink and moist. Teeth intact.  Thyroid smooth without nodules or 1/19/2004  Nonobstructive CAD    Assessment:    1. Paroxysmal Atrial Fibrillation  - S/p TRACEY/DCCV (9/18); DCCV (3/12/21)  - Currently in NSR  - Continue cardizem 240 mg QD  - On propafenone 150 mg TID    ~ QRS 98    - [id number] score:high ; [id number] Vasc score and anticoagulation discussed. High risk for stroke and thromboembolism. Anticoagulation is recommended. Risk of bleeding was discussed.  ~ On Xarelto 15 mg QD; tolerating well    - Afib risk factors including age, HTN, obesity, inactivity and ANTHONY were discussed with patient. Risk factor modification recommended   ~ TSH 3.96 (9/19)      - Treatment options including cardioversion, rate control strategy, antiarrhythmics, anticoagulation and possible ablation were discussed with patient. Risks, benefits and alternative of each treatment options were explained. All questions answered    ~ Pt not interested in ablation and poor candidate due to her age and co-morbidities    2. HTN  - Controlled: Goal <140/80 (Given age and fall risk)  - Continue current medications  - Instructed to monitor BP at home around lunch time   ~ If BP >170, she may take an extra dose of clonidine. If pt consistently taking extra clonidine, may need to increase hydralazine to 75 mg TID  - Discussed importance of low sodium diet, weight control and exercise    3. Hyperlipidemia  - Uncontrolled ? Goal: LDL <100   ~  (2017); needs checked  - On atorvastatin 80 mg QD  - Discussed diet, weight loss, and exercise needs  - Followed per PCP    4. PAD   - On Xarelto, Statin    5. ANTHONY  - Stable: Uses CPAP  - Encourage to use CPAP to prevent long term effects of untreated ANTHONY    6. COPD, Chronic respiratory failure  - Stable, wears 2L oxygen baseline  - Continue medical therapy and inhalers  - Followed by pulmonary    Plan:  1. Call office if any issues  2.  If blood pressure elevated >170, ok to take extra dose of clonidine daily    F/U: Follow-up with EP in 6 months  -Call [facility] at [phone] with any questions    Diet & Exercise:   The patient is counseled to follow a low salt diet to assure blood pressure remains controlled for cardiovascular risk factor modification   The patient is counseled to avoid excess caffeine, and energy drinks as this may exacerbated ectopy and arrhythmia   The patient is counseled to lose weight to control cardiovascular risk factors   Exercise program discussed: To improve overall cardiovascular health, the patient is instructed to increase cardiovascular related activities with a goal of 150 min/week of moderate level activity or 10,000 steps per day. Encouraged to perform as much activity as tolerated    I have addressed the patient's cardiac risk factors and adjusted pharmacologic treatment as needed. In addition, I have reinforced the need for patient directed risk factor modification. I independently reviewed the ECG    All questions and concerns were addressed with the patient. Alternatives to treatment were discussed. Thank you for allowing to us to participate in the care of Korina Mratinez. Time  32 minutes spent preparing to see patient including reviewing patient history/prior tests/prior consults, performing a medical exam, counseling and educating patient/family/caregiver, ordering medications/tests/procedures, referring and communicating with PCPs and other pertinent consultants, documenting information in the EMR, independently interpreting results and communicating to family and coordination of patient care.     KATHY Heck-CNP  [facility]   Office: [phone]

## 2021-08-17 NOTE — TELEPHONE ENCOUNTER
PT is having really bad sinus drainage and is making her sick to her stomach and would like something to be prescribed and sent to her pharmacy. There are no more red visit appts left for today PT also has a F/U from the hospital on 08/20 @ 4:15. Please advise.  If alvarado is called in please send it to:    [facility] Rosana [address]all [address], [address] [facility]

## 2021-08-20 ENCOUNTER — OFFICE VISIT (OUTPATIENT)
Dept: FAMILY MEDICINE CLINIC | Age: 86
End: 2021-08-20
Payer: MEDICAID

## 2021-08-20 DIAGNOSIS — G44.52 NEW DAILY PERSISTENT HEADACHE: ICD-10-CM

## 2021-08-20 DIAGNOSIS — I16.0 HYPERTENSIVE URGENCY: Primary | ICD-10-CM

## 2021-08-20 DIAGNOSIS — E11.22 CONTROLLED TYPE 2 DIABETES MELLITUS WITH STAGE 2 CHRONIC KIDNEY DISEASE, WITHOUT LONG-TERM CURRENT USE OF INSULIN (HCC): ICD-10-CM

## 2021-08-20 DIAGNOSIS — R42 DIZZINESS: ICD-10-CM

## 2021-08-20 DIAGNOSIS — J96.11 CHRONIC RESPIRATORY FAILURE WITH HYPOXIA (HCC): ICD-10-CM

## 2021-08-20 DIAGNOSIS — N18.2 CONTROLLED TYPE 2 DIABETES MELLITUS WITH STAGE 2 CHRONIC KIDNEY DISEASE, WITHOUT LONG-TERM CURRENT USE OF INSULIN (HCC): ICD-10-CM

## 2021-08-20 PROCEDURE — 1123F ACP DISCUSS/DSCN MKR DOCD: CPT | Performed by: FAMILY MEDICINE

## 2021-08-20 PROCEDURE — G8417 CALC BMI ABV UP PARAM F/U: HCPCS | Performed by: FAMILY MEDICINE

## 2021-08-20 PROCEDURE — 1090F PRES/ABSN URINE INCON ASSESS: CPT | Performed by: FAMILY MEDICINE

## 2021-08-20 PROCEDURE — 1036F TOBACCO NON-USER: CPT | Performed by: FAMILY MEDICINE

## 2021-08-20 PROCEDURE — 4040F PNEUMOC VAC/ADMIN/RCVD: CPT | Performed by: FAMILY MEDICINE

## 2021-08-20 PROCEDURE — G8399 PT W/DXA RESULTS DOCUMENT: HCPCS | Performed by: FAMILY MEDICINE

## 2021-08-20 PROCEDURE — 99214 OFFICE O/P EST MOD 30 MIN: CPT | Performed by: FAMILY MEDICINE

## 2021-08-20 PROCEDURE — G8427 DOCREV CUR MEDS BY ELIG CLIN: HCPCS | Performed by: FAMILY MEDICINE

## 2021-08-20 RX ORDER — ZOSTER VACCINE RECOMBINANT, ADJUVANTED 50 MCG/0.5
0.5 KIT INTRAMUSCULAR SEE ADMIN INSTRUCTIONS
Qty: 0.5 ML | Refills: 0 | Status: SHIPPED | OUTPATIENT
Start: 2021-08-20 | End: 2021-09-14 | Stop reason: CLARIF

## 2021-08-20 RX ORDER — IRBESARTAN 300 MG/1
TABLET ORAL
Qty: 90 TABLET | Refills: 1 | Status: SHIPPED | OUTPATIENT
Start: 2021-08-20 | End: 2021-09-15

## 2021-08-20 NOTE — PROGRESS NOTES
Subjective:      Patient ID: Thalia Thibodeaux is a 80 y.o. female. CC: Patient presents for hospital follow-up. HPI Patient presents due to a follow-up from [facility] hospitalization. Patient went to the ED due her not feeling well and her blood pressure was very elevated. She was having a lot of dizziness, and she was feeling very foggy. Patient was admitted to [facility] August 11 and discharged August 13. She was found to have uncontrolled hypertension and admitted for hypertensive urgency. She had increasing confusion thought secondary to the hypertensive issues. The clonidine blood pressure medication was increased to 0.2 mg 3 times a day. Her headaches did improve to some extent by the time she left the hospital.  She had an MRI scan of the brain and was evaluated by neurology. The MRI scan was unremarkable. Since she is left the hospital she discontinued the clonidine medication as she thought this was causing her not to feel well and her heart rate was too slow.     Review of Systems     Patient Active Problem List   Diagnosis    Hyperlipidemia    Insomnia    IBS (irritable bowel syndrome)    Overactive bladder    Osteoarthritis    Controlled type 2 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 disease, without long-term current use of insulin (HCC)    Restless legs syndrome    ANTHONY (obstructive sleep apnea)    Allergic rhinitis    COPD, mild (HCC)    Dizziness    Interstitial lung disease (HCC)    Chronic cough    Coronary artery disease involving native coronary artery of native heart with angina pectoris (HCC)    PAF [facility]oxysmal atrial fibrillation) ([facility] [address]    Hypothyroid    Chronic respiratory failure with hypoxia (HCC)    SOB (shortness of breath)    Anxiety    Asthmatic bronchitis    Ataxia    Head ache    HTN (hypertension), benign       Outpatient Medications Marked as Taking for the 8/20/21 encounter (Office Visit) with Alba Miranda MD Medication Sig Dispense Refill    HYDROcodone-acetaminophen (NORCO) 5-325 MG per tablet Take 1 tablet by mouth 4 times daily as needed for Pain for up to 30 days.  120 tablet 0    apixaban (ELIQUIS) 5 MG TABS tablet Take 1 tablet by mouth 2 times daily 180 tablet 1    bethanechol (URECHOLINE) 25 MG tablet Take 25 mg by mouth 3 times daily      LORazepam (ATIVAN) 1 MG tablet TAKE 1/2 TABLET BY MOUTH TWICE DAILY AND 1 EVERY NIGHT AT BEDTIME AS NEEDED FOR ANXIETY 60 tablet 2    dilTIAZem (CARDIZEM CD) 240 MG extended release capsule TAKE 1 CAPSULE EVERY DAY 90 capsule 0    levothyroxine (SYNTHROID) 75 MCG tablet TAKE 1 TABLET EVERY DAY 90 tablet 0    Lancets MISC 1 each by Does not apply route 2 times daily 300 each 1    conjugated estrogens (PREMARIN) 0.625 MG/GM vaginal cream Place 1 g vaginally Twice a Week 1 Tube 3    meclizine (ANTIVERT) 12.5 MG tablet TAKE 1 TABLET THREE TIMES DAILY AS NEEDED 270 tablet 0    ondansetron (ZOFRAN) 4 MG tablet Take 1 tablet by mouth daily as needed for Nausea or Vomiting 30 tablet 0    OXYGEN Inhale 2 L into the lungs      propafenone (RYTHMOL) 150 MG tablet Take 1 tablet by mouth every 8 hours 270 tablet 1    budesonide-formoterol (SYMBICORT) 160-4.5 MCG/ACT AERO INHALE 2 PUFFS INTO THE LUNGS TWICE DAILY 3 Inhaler 0    blood glucose monitor strips Test one time a day 100 strip 5    dicyclomine (BENTYL) 10 MG capsule Take 1 capsule by mouth 4 times daily as needed (abd pain) 360 capsule 1    atorvastatin (LIPITOR) 80 MG tablet Take 1 tablet by mouth nightly 30 tablet 3    rOPINIRole (REQUIP) 2 MG tablet Take 1 tablet by mouth 3 times daily PRN      fluticasone (FLONASE) 50 MCG/ACT nasal spray 2 sprays by Nasal route daily 3 Bottle 1    levalbuterol (XOPENEX) 0.63 MG/3ML nebulization INHALE CONTENTS OF 1 VIAL PER NEBULIZER EVERY 6 HOURS AS NEEDED FOR WHEEZING 1050 mL 3    Respiratory Therapy Supplies (NEBULIZER/TUBING/MOUTHPIECE) KIT 1 kit by Does not apply route 4 times daily as needed (shortness of breath) 1 kit 0    albuterol sulfate HFA (PROAIR HFA) 108 (90 Base) MCG/ACT inhaler Inhale 2 puffs into the lungs every 6 hours as needed for Wheezing 1 Inhaler 6       Allergies   Allergen Reactions    Adhesive Tape Anaphylaxis    Cefaclor Nausea And Vomiting     Other reaction(s): Chest pain    Nsaids      Pt states she has hives and heart races   Other reaction(s): Tachycardia    Clinoril [Sulindac] Other (See Comments)     Stomach pain    Codeine      FEEL NUMB    Diclofenac     Diclofenac Sodium Hives    Diclofenac Sodium Hives    Hctz [Hydrochlorothiazide]      Sob    Ibuprofen Other (See Comments)     Other reaction(s): Tachycardia    Macrobid [Nitrofurantoin Macrocrystal]      nausea    Motrin [Ibuprofen Micronized] Other (See Comments)     Tachycardia      Pcn [Penicillins] Hives    Peach [Prunus Persica] Itching and Swelling     Cannot eat raw peaches    Prednisone      Causes elevated blood pressure     Sulfa Antibiotics      Nausea, diarrhea       Social History     Tobacco Use    Smoking status: Former Smoker     Packs/day: 1.00     Years: 45.00     Pack years: 45.00     Start date: 9/15/1950     Quit date: 1995     Years since quittin.6    Smokeless tobacco: Never Used   Substance Use Topics    Alcohol use: No     Alcohol/week: 0.0 standard drinks       BP (!) 152/80 (Site: Right Upper Arm, Position: Sitting, Cuff Size: Medium Adult)   Pulse 77   Temp 97.2 °F (36.2 °C) (Infrared)   SpO2 97%       Objective:   Physical Exam  Constitutional:       General: She is not in acute distress. Appearance: She is well-developed. Neck:      Vascular: No carotid bruit. Cardiovascular:      Rate and Rhythm: Normal rate and regular rhythm. Pulses:           Dorsalis pedis pulses are 2+ on the right side and 2+ on the left side. Posterior tibial pulses are 2+ on the right side and 2+ on the left side.       Heart sounds: Normal heart sounds. No murmur heard. No friction rub. No gallop. Pulmonary:      Effort: Pulmonary effort is normal.      Breath sounds: Normal breath sounds. Musculoskeletal:         General: No tenderness. Normal range of motion. Right lower leg: No edema. Left lower leg: No edema. Neurological:      General: No focal deficit present. Mental Status: She is alert and oriented to person, place, and time. Sensory: Sensation is intact. Motor: Motor function is intact. Psychiatric:         Behavior: Behavior is cooperative. Assessment:      Jaclyn Noland was seen today for follow-up from hospital.    Diagnoses and all orders for this visit:    Hypertensive urgency    New daily persistent headache    Chronic respiratory failure with hypoxia ([address]    Controlled type 2 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 disease, without long-term current use of insulin ([facility])    Dizziness    Other orders  -     irbesartan (AVAPRO) 300 MG tablet; TAKE 1 TABLET BY MOUTH EVERY NIGHT  -     zoster recombinant adjuvanted vaccine (SHINGRIX) 50 MCG/0.5ML SUSR injection; Inject 0.5 mLs into the muscle See Admin Instructions 1 dose now and repeat in 2-6 months            Plan:      Reviewed labs and test results with patient and daughter    Restart irbesartan medication. Irbesartan was discontinued at one time as she developed acute renal failure but this probably combination with other medications. Family is noted with daily blood pressure checks and report back her blood pressure in the next week. Headaches and dizziness are obviously secondary to hypertensive urgency. Maintain oxygen therapy and current diabetic management. Patient received counseling on the following healthy behaviors: nutrition and exercise     Patient given educational materials     Health maintenance updated    Discussed use, benefit, and side effects of prescribed medications.   Barriers to medication compliance addressed. All patient questions answered. Pt voiced understanding. Patient needs RTC in 1 month. Medical decision making of moderate complexity. Please note that this chart was generated using Dragon dictation software. Although every effort was made to ensure the accuracy of this automated transcription, some errors in transcription may have occurred.

## 2021-08-23 ENCOUNTER — TELEPHONE (OUTPATIENT)
Dept: FAMILY MEDICINE CLINIC | Age: 86
End: 2021-08-23

## 2021-08-23 VITALS
DIASTOLIC BLOOD PRESSURE: 100 MMHG | OXYGEN SATURATION: 97 % | TEMPERATURE: 97.2 F | SYSTOLIC BLOOD PRESSURE: 165 MMHG | HEART RATE: 77 BPM

## 2021-08-23 PROBLEM — R05.3 CHRONIC COUGH: Status: RESOLVED | Noted: 2019-09-20 | Resolved: 2021-08-23

## 2021-08-23 NOTE — TELEPHONE ENCOUNTER
PT was just discharged from the Hospital for High Blood pressure @ the Hospital 240/95 and now it's at  212/70 @4:30pm Today PT's daughter wants to know what to do? ?

## 2021-08-24 ENCOUNTER — TELEPHONE (OUTPATIENT)
Dept: CARDIOLOGY CLINIC | Age: 86
End: 2021-08-24

## 2021-08-24 RX ORDER — HYDRALAZINE HYDROCHLORIDE 50 MG/1
TABLET, FILM COATED ORAL
Qty: 270 TABLET | Refills: 1 | Status: SHIPPED | OUTPATIENT
Start: 2021-08-24 | End: 2022-07-14

## 2021-08-24 RX ORDER — HYDRALAZINE HYDROCHLORIDE 50 MG/1
50 TABLET, FILM COATED ORAL 3 TIMES DAILY
Qty: 90 TABLET | Refills: 3 | Status: SHIPPED | OUTPATIENT
Start: 2021-08-24 | End: 2021-08-24

## 2021-08-24 NOTE — TELEPHONE ENCOUNTER
I sent a prescription for hydralazine to be taken 3 times a day if her blood pressures over 160.   The irbesartan medication has not had time to work and may take up to 4 weeks to be fully effective

## 2021-08-24 NOTE — TELEPHONE ENCOUNTER
Called Dulce Maria Amador (daughter) and relayed message below with verbal understanding from her and encouraged her to call office with any other questions.

## 2021-08-24 NOTE — TELEPHONE ENCOUNTER
Called pt about the message below and she stated that she is feeling like she is in a fog with no energy and that she don't want to go to the ER because she was just there. Patient stated that her BP is not in the 200's and her HR is not in the 30's.

## 2021-08-24 NOTE — TELEPHONE ENCOUNTER
It appears that Dr. Latanya Martini has been managing her BP and their office has sent recommendations. I am uncertain about her heartrate as I do not see documentation of this. One provider should be managing BP we will  defer this to .  if her heartrate is actually in the 30s she needs to go to the ED

## 2021-08-24 NOTE — TELEPHONE ENCOUNTER
If her BP is over 200 and/or HR in the 30s, she needs to go to the ER.  She would benefit from a nephrologist to help with her BP management as she is on numerous blood pressure medications with poor control    TIM Asencio

## 2021-08-24 NOTE — TELEPHONE ENCOUNTER
Pt daughter called sitting pt was in the hos couple weeks ago, she stated she was  put on different BP meds and her BP has not been stable since she has been put on it.  HR rate goes down into the 30's BP is 212/77 does she need an appt sooner than Sept?    Reford Oven  [phone]    pls advise thank you

## 2021-08-24 NOTE — TELEPHONE ENCOUNTER
Last OV 5/12/21 SR, CNP  Dr Carrillo Koenig has also been involved in this, and has told the pt to contact our office.

## 2021-08-25 ENCOUNTER — OFFICE VISIT (OUTPATIENT)
Dept: FAMILY MEDICINE CLINIC | Age: 86
End: 2021-08-25
Payer: MEDICAID

## 2021-08-25 DIAGNOSIS — G44.52 NEW DAILY PERSISTENT HEADACHE: ICD-10-CM

## 2021-08-25 DIAGNOSIS — I10 UNCONTROLLED HYPERTENSION: Primary | ICD-10-CM

## 2021-08-25 PROCEDURE — 1036F TOBACCO NON-USER: CPT | Performed by: FAMILY MEDICINE

## 2021-08-25 PROCEDURE — 4040F PNEUMOC VAC/ADMIN/RCVD: CPT | Performed by: FAMILY MEDICINE

## 2021-08-25 PROCEDURE — 1090F PRES/ABSN URINE INCON ASSESS: CPT | Performed by: FAMILY MEDICINE

## 2021-08-25 PROCEDURE — 1123F ACP DISCUSS/DSCN MKR DOCD: CPT | Performed by: FAMILY MEDICINE

## 2021-08-25 PROCEDURE — G8428 CUR MEDS NOT DOCUMENT: HCPCS | Performed by: FAMILY MEDICINE

## 2021-08-25 PROCEDURE — G8399 PT W/DXA RESULTS DOCUMENT: HCPCS | Performed by: FAMILY MEDICINE

## 2021-08-25 PROCEDURE — 99214 OFFICE O/P EST MOD 30 MIN: CPT | Performed by: FAMILY MEDICINE

## 2021-08-25 PROCEDURE — G8417 CALC BMI ABV UP PARAM F/U: HCPCS | Performed by: FAMILY MEDICINE

## 2021-08-26 ENCOUNTER — TELEPHONE (OUTPATIENT)
Dept: FAMILY MEDICINE CLINIC | Age: 86
End: 2021-08-26

## 2021-08-26 NOTE — TELEPHONE ENCOUNTER
Medication:   Requested Prescriptions     Pending Prescriptions Disp Refills    fluticasone (FLONASE) 50 MCG/ACT nasal spray [Pharmacy Med Name: FLUTICASONE PROPIONATE 50 MCG/ACT Suspension] 48 g      Sig: USE 2 SPRAYS NASALLY EVERY DAY      Last Filled:      Patient Phone Number: [phone] (home)     Last appt: 8/20/2021   Next appt: 9/17/2021    Last OARRS:   RX Monitoring 3/20/2019   Attestation The Prescription Monitoring Report for this patient was reviewed today.    Periodic Controlled Substance Monitoring -     PDMP Monitoring:    Last PDMP Renata Jones as Reviewed [facility]):  Review User Review Instant Review Result   Kammichael Sanjana 7/12/2021 12:57 PM Reviewed PDMP [1]     Preferred Pharmacy:     [facility] Cee[facility] [phone] - F [phone]  [address] [address]  Phone: [phone] Fax: [phone]

## 2021-08-26 NOTE — TELEPHONE ENCOUNTER
Pt started taking dilTIAZem (CARDIZEM CD) 240 MG extended release capsule again and when she checked her pulse today it was 35. She wants to know if she should only take half the pill or if the dose should be lowered or what she should do to prevent her pulse from dropping that low.     Please advise

## 2021-08-27 RX ORDER — FLUTICASONE PROPIONATE 50 MCG
SPRAY, SUSPENSION (ML) NASAL
Qty: 48 G | Refills: 3 | Status: SHIPPED | OUTPATIENT
Start: 2021-08-27 | End: 2021-11-08

## 2021-08-28 VITALS
TEMPERATURE: 98.5 F | DIASTOLIC BLOOD PRESSURE: 78 MMHG | HEART RATE: 65 BPM | SYSTOLIC BLOOD PRESSURE: 184 MMHG | OXYGEN SATURATION: 94 %

## 2021-08-28 PROBLEM — I10 UNCONTROLLED HYPERTENSION: Status: ACTIVE | Noted: 2021-08-28

## 2021-08-28 NOTE — PROGRESS NOTES
Subjective:      Patient ID: Sofie Gunter is a 80 y.o. female. HPI patient resents for reevaluation of elevated blood pressure. She came into the office and stated she had respiratory symptoms and therefore was seen in the parking lot with PPE. Since last appointment time she only took 1 dose of the irbesartan medication thought this was causing some side effects and therefore decided not to take it. She was prescribed hydralazine yesterday but was never picked up from the pharmacy and daughter started giving her lower dose clonidine which she felt helped out her blood pressure. She has a lot of headaches and feels she has a sinus infection but this is secondary to the hypertension as she has no respiratory symptoms. Patient was just recently hospitalized for accelerated hypertension. 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, without long-term current use of insulin (HCC)    Restless legs syndrome    ANTHONY (obstructive sleep apnea)    Allergic rhinitis    COPD, mild (HCC)    Dizziness    Interstitial lung disease ([facility]    Coronary artery disease involving native coronary artery of native heart with angina pectoris (HCC)    PAF (paroxysmal atrial fibrillation) ([facility]    Hypothyroid    Chronic respiratory failure with hypoxia (HCC)    SOB (shortness of breath)    Anxiety    Asthmatic bronchitis    Ataxia    Head ache    HTN (hypertension), benign    Uncontrolled hypertension       Outpatient Medications Marked as Taking for the 8/25/21 encounter (Office Visit) with Ra Alonso MD   Medication Sig Dispense Refill    HYDROcodone-acetaminophen (NORCO) 5-325 MG per tablet Take 1 tablet by mouth 4 times daily as needed for Pain for up to 30 days.  120 tablet 0    apixaban (ELIQUIS) 5 MG TABS tablet Take 1 tablet by mouth 2 times daily 180 tablet 1    bethanechol (URECHOLINE) 25 MG tablet Take 25 mg by mouth 3 times daily      LORazepam (ATIVAN) 1 MG tablet TAKE 1/2 TABLET BY MOUTH TWICE DAILY AND 1 EVERY NIGHT AT BEDTIME AS NEEDED FOR ANXIETY 60 tablet 2    dilTIAZem (CARDIZEM CD) 240 MG extended release capsule TAKE 1 CAPSULE EVERY DAY 90 capsule 0    levothyroxine (SYNTHROID) 75 MCG tablet TAKE 1 TABLET EVERY DAY 90 tablet 0    OXYGEN Inhale 2 L into the lungs      propafenone (RYTHMOL) 150 MG tablet Take 1 tablet by mouth every 8 hours 270 tablet 1    budesonide-formoterol (SYMBICORT) 160-4.5 MCG/ACT AERO INHALE 2 PUFFS INTO THE LUNGS TWICE DAILY 3 Inhaler 0    atorvastatin (LIPITOR) 80 MG tablet Take 1 tablet by mouth nightly 30 tablet 3    rOPINIRole (REQUIP) 2 MG tablet Take 1 tablet by mouth 3 times daily PRN      albuterol sulfate HFA (PROAIR HFA) 108 (90 Base) MCG/ACT inhaler Inhale 2 puffs into the lungs every 6 hours as needed for Wheezing 1 Inhaler 6       Allergies   Allergen Reactions    Adhesive Tape Anaphylaxis    Cefaclor Nausea And Vomiting     Other reaction(s): Chest pain    Nsaids      Pt states she has hives and heart races   Other reaction(s): Tachycardia    Clinoril [Sulindac] Other (See Comments)     Stomach pain    Codeine      FEEL NUMB    Diclofenac     Diclofenac Sodium Hives    Diclofenac Sodium Hives    Hctz [Hydrochlorothiazide]      Sob    Ibuprofen Other (See Comments)     Other reaction(s): Tachycardia    Macrobid [Nitrofurantoin Macrocrystal]      nausea    Motrin [Ibuprofen Micronized] Other (See Comments)     Tachycardia      Pcn [Penicillins] Hives    Peach [Prunus Persica] Itching and Swelling     Cannot eat raw peaches    Prednisone      Causes elevated blood pressure     Sulfa Antibiotics      Nausea, diarrhea       Social History     Tobacco Use    Smoking status: Former Smoker     Packs/day: 1.00     Years: 45.00     Pack years: 45.00     Start date: 9/15/1950     Quit date: 1995     Years since quittin.6    Smokeless tobacco: Never Used   Substance Use Topics    Alcohol use: No     Alcohol/week: 0.0 standard drinks       BP (!) 184/78   Pulse 65   Temp 98.5 °F (36.9 °C)   SpO2 94%       Objective:   Physical Exam  Constitutional:       General: She is not in acute distress. Appearance: She is well-developed. HENT:      Right Ear: Tympanic membrane normal.      Left Ear: Tympanic membrane normal.      Nose: Nose normal.   Neck:      Vascular: No carotid bruit. Cardiovascular:      Rate and Rhythm: Normal rate and regular rhythm. Pulses:           Dorsalis pedis pulses are 2+ on the right side and 2+ on the left side. Posterior tibial pulses are 2+ on the right side and 2+ on the left side. Heart sounds: Normal heart sounds. No murmur heard. No friction rub. No gallop. Pulmonary:      Effort: Pulmonary effort is normal.      Breath sounds: Normal breath sounds. Musculoskeletal:      Right lower leg: No edema. Left lower leg: No edema. Lymphadenopathy:      Cervical: No cervical adenopathy. Neurological:      Mental Status: She is alert and oriented to person, place, and time. Sensory: Sensation is intact. Motor: Motor function is intact. Psychiatric:         Behavior: Behavior is cooperative. Assessment:      Alana Haynes was seen today for hypertension and headache. Diagnoses and all orders for this visit:    Uncontrolled hypertension    New daily persistent headache            Plan:      Advised patient and daughter that she need to restart the irbesartan medication that was advised at her last appointment time. She was doing well on this medication in the past and this was stopped as she developed some acute renal failure. She is also going to restart the lower dose clonidine 0.1 mg twice daily. Maintain Cardizem.     We discussed for her to maintain a

## 2021-09-01 ENCOUNTER — TELEPHONE (OUTPATIENT)
Dept: FAMILY MEDICINE CLINIC | Age: 86
End: 2021-09-01

## 2021-09-01 NOTE — TELEPHONE ENCOUNTER
----- Message from Paras Yun sent at 8/31/2021  5:49 PM EDT -----  Subject: Message to Provider    QUESTIONS  Information for Provider? patient called to see when she needs to get her   flu shot and would to know she she can get that done as soon as possible   ---------------------------------------------------------------------------  --------------  CALL BACK INFO  What is the best way for the office to contact you? OK to leave message on   voicemail  Preferred Call Back Phone Number? [phone]  ---------------------------------------------------------------------------  --------------  SCRIPT ANSWERS  Relationship to Patient?  Self

## 2021-09-03 ENCOUNTER — TELEPHONE (OUTPATIENT)
Dept: PULMONOLOGY | Age: 86
End: 2021-09-03

## 2021-09-03 RX ORDER — DOXYCYCLINE HYCLATE 100 MG
100 TABLET ORAL DAILY
Qty: 10 TABLET | Refills: 0 | Status: SHIPPED | OUTPATIENT
Start: 2021-09-03 | End: 2021-09-13

## 2021-09-03 NOTE — TELEPHONE ENCOUNTER
Patient is c/o productive cough yellowish phlegm, sob, chest congestion, and chest tightness. She denies fever she is using her prescribed inhalers.  She is requesting something be called in before it gets worse

## 2021-09-08 ENCOUNTER — OFFICE VISIT (OUTPATIENT)
Dept: PULMONOLOGY | Age: 86
End: 2021-09-08
Payer: MEDICARE

## 2021-09-08 VITALS — OXYGEN SATURATION: 94 % | HEART RATE: 74 BPM

## 2021-09-08 DIAGNOSIS — J44.9 COPD, MILD (HCC): ICD-10-CM

## 2021-09-08 DIAGNOSIS — J96.11 CHRONIC RESPIRATORY FAILURE WITH HYPOXIA (HCC): ICD-10-CM

## 2021-09-08 DIAGNOSIS — R06.02 SOB (SHORTNESS OF BREATH): Primary | ICD-10-CM

## 2021-09-08 DIAGNOSIS — J43.2 CENTRILOBULAR EMPHYSEMA (HCC): ICD-10-CM

## 2021-09-08 DIAGNOSIS — R91.1 PULMONARY NODULE: ICD-10-CM

## 2021-09-08 DIAGNOSIS — J84.9 INTERSTITIAL LUNG DISEASE (HCC): ICD-10-CM

## 2021-09-08 PROCEDURE — G8399 PT W/DXA RESULTS DOCUMENT: HCPCS | Performed by: INTERNAL MEDICINE

## 2021-09-08 PROCEDURE — 1090F PRES/ABSN URINE INCON ASSESS: CPT | Performed by: INTERNAL MEDICINE

## 2021-09-08 PROCEDURE — 99214 OFFICE O/P EST MOD 30 MIN: CPT | Performed by: INTERNAL MEDICINE

## 2021-09-08 PROCEDURE — 90694 VACC AIIV4 NO PRSRV 0.5ML IM: CPT | Performed by: INTERNAL MEDICINE

## 2021-09-08 PROCEDURE — 3023F SPIROM DOC REV: CPT | Performed by: INTERNAL MEDICINE

## 2021-09-08 PROCEDURE — G8417 CALC BMI ABV UP PARAM F/U: HCPCS | Performed by: INTERNAL MEDICINE

## 2021-09-08 PROCEDURE — 1036F TOBACCO NON-USER: CPT | Performed by: INTERNAL MEDICINE

## 2021-09-08 PROCEDURE — G8427 DOCREV CUR MEDS BY ELIG CLIN: HCPCS | Performed by: INTERNAL MEDICINE

## 2021-09-08 PROCEDURE — 4040F PNEUMOC VAC/ADMIN/RCVD: CPT | Performed by: INTERNAL MEDICINE

## 2021-09-08 PROCEDURE — 1123F ACP DISCUSS/DSCN MKR DOCD: CPT | Performed by: INTERNAL MEDICINE

## 2021-09-08 PROCEDURE — G0008 ADMIN INFLUENZA VIRUS VAC: HCPCS | Performed by: INTERNAL MEDICINE

## 2021-09-08 RX ORDER — BUDESONIDE AND FORMOTEROL FUMARATE DIHYDRATE 160; 4.5 UG/1; UG/1
2 AEROSOL RESPIRATORY (INHALATION) 2 TIMES DAILY
Qty: 30.6 G | Refills: 1 | Status: SHIPPED | OUTPATIENT
Start: 2021-09-08 | End: 2021-09-14 | Stop reason: SDUPTHER

## 2021-09-08 RX ORDER — ALBUTEROL SULFATE 90 UG/1
2 AEROSOL, METERED RESPIRATORY (INHALATION) EVERY 6 HOURS PRN
Qty: 18 G | Refills: 11 | Status: SHIPPED | OUTPATIENT
Start: 2021-09-08 | End: 2021-09-14 | Stop reason: SDUPTHER

## 2021-09-08 RX ORDER — LEVALBUTEROL INHALATION SOLUTION 0.63 MG/3ML
0.63 SOLUTION RESPIRATORY (INHALATION) EVERY 6 HOURS PRN
Qty: 360 ML | Refills: 11 | Status: SHIPPED | OUTPATIENT
Start: 2021-09-08 | End: 2021-09-14 | Stop reason: SDUPTHER

## 2021-09-08 NOTE — PROGRESS NOTES
[facility]  Follow Up Note  Sherlie Lombard, MD       Jessica Kanika   YOB: 1935    Date of Visit:  9/8/2021    Assessment/Plan:  1. SOB  Stable  Good days and bad days. 2. COPD, mild/Emphysema/Bronchiectasis  CT Chest 3/20:    Impression   Emphysema.  Bilateral bronchiectasis with mucous plugging.  Scattered   bilateral airspace disease is partially improved as compared to exam dated   02/19/2020 with residual opacity at bilateral lung bases.       New 5 mm nodule or focus of mucous plugging within the right middle lobe. Additional pulmonary nodules are not significantly changed.  Continued   attention on CT follow-up recommended.       Sequela of granulomatous disease. I have independently reviewed pulmonary function testing. PFT reveals mild to moderate COPD with no bronchodilator response. Symbicort  Duoneb ==> making her very shaky  Have her try 1/2 vial  If she is still shaky ==> Xopenex  Her nebulizer is broken beyond repair and she needs a new device. 3. Bronchiectasis/PN  Recent Bronchitis    We called in Abx 9/3  Doxycycline 100mg, 10 days   She is improving and almost finished with the medication    4. Interstitial lung disease ([address]  Stable on CT 3/20    5. Gastroesophageal reflux disease, esophagitis presence not specified    6. Chronic hypoxemic Respirtory Failure  O2 sats are acceptable on supplemental O2. The patient benefits from the use of supplemental O2.    6 months    Chief Complaint   Patient presents with    Shortness of Breath     6 month f.u. In need of new nebulizer       HPI  The patient presents with a chief complaint of moderate shortness of breath related to mild COPD of many years duration. He has mild associated cough. Exertion is a modifying factor. She was hospitalized here the end of February with right-sided pneumonia. She was discharged from the hospital with a brief prednisone taper. However, she is still pretty congested and wheezy. Her daughter brought her in and said that she really has not bounced back. She has been lethargic at times at home as well. No complaint of chest pain, nausea or vomiting. .    Review of Systems  As reviewed in HPI    History  I have reviewed past medical, surgical, social and family history. This is documented elsewhere in the medical record. Physical Exam:  Well developed, well nourished  Alert and oriented  Sclera is clear  No cervical adenopathy  No JVD. Chest examination is few basilar crackles. Cardiac examination reveals regular rate and rhythm without murmur, gallop or rub. The abdomen is soft, nontender and nondistended. There is no clubbing, cyanosis or edema of the extremities. There is no obvious skin rash. No focal neuro deficicts  Normal mood and affect    Allergies   Allergen Reactions    Adhesive Tape Anaphylaxis    Cefaclor Nausea And Vomiting     Other reaction(s): Chest pain    Nsaids      Pt states she has hives and heart races   Other reaction(s): Tachycardia    Clinoril [Sulindac] Other (See Comments)     Stomach pain    Codeine      FEEL NUMB    Diclofenac     Diclofenac Sodium Hives    Diclofenac Sodium Hives    Hctz [Hydrochlorothiazide]      Sob    Ibuprofen Other (See Comments)     Other reaction(s): Tachycardia    Macrobid [Nitrofurantoin Macrocrystal]      nausea    Motrin [Ibuprofen Micronized] Other (See Comments)     Tachycardia      Pcn [Penicillins] Hives    Peach [Prunus Persica] Itching and Swelling     Cannot eat raw peaches    Prednisone      Causes elevated blood pressure     Sulfa Antibiotics      Nausea, diarrhea     Prior to Visit Medications    Medication Sig Taking?  Authorizing Provider   doxycycline hyclate (VIBRA-TABS) 100 MG tablet Take 1 tablet by mouth daily for 10 days  Quique Delgado MD   fluticasone (FLONASE) 50 MCG/ACT nasal spray USE 2 SPRAYS NASALLY EVERY DAY  Sadie Jerome, MD   hydrALAZINE (APRESOLINE) 50 MG tablet TAKE 1 TABLET BY MOUTH THREE TIMES DAILY  Patient not taking: Reported on 8/28/2021  Gage Keith MD   irbesartan (AVAPRO) 300 MG tablet TAKE 1 TABLET BY MOUTH EVERY NIGHT  Patient not taking: Reported on 8/28/2021  Gage Keith MD   zoster recombinant adjuvanted vaccine [facility]) 50 MCG/0.5ML SUSR injection Inject 0.5 mLs into the muscle See Admin Instructions 1 dose now and repeat in 2-6 months  Gage Keith MD   HYDROcodone-acetaminophen (NORCO) 5-325 MG per tablet Take 1 tablet by mouth 4 times daily as needed for Pain for up to 30 days.   Gage Keith MD   apixaban (ELIQUIS) 5 MG TABS tablet Take 1 tablet by mouth 2 times daily  Griselda Amaya MD   bethanechol (URECHOLINE) 25 MG tablet Take 25 mg by mouth 3 times daily  Historical Provider, MD   LORazepam (ATIVAN) 1 MG tablet TAKE 1/2 TABLET BY MOUTH TWICE DAILY AND 1 EVERY NIGHT AT BEDTIME AS NEEDED FOR ANXIETY  Lelia Chávez Blinks, APRN - NP   dilTIAZem (CARDIZEM CD) 240 MG extended release capsule TAKE 1 CAPSULE EVERY DAY  Gage Keith MD   levothyroxine (SYNTHROID) 75 MCG tablet TAKE 1 TABLET EVERY DAY  Gage Keith MD   Lancets MISC 1 each by Does not apply route 2 times daily  Gage Keith MD   conjugated estrogens (PREMARIN) 0.625 MG/GM vaginal cream Place 1 g vaginally Twice a Week  Gage Keith MD   meclizine (ANTIVERT) 12.5 MG tablet TAKE 1 TABLET THREE TIMES DAILY AS NEEDED  Gage Keith MD   ondansetron (ZOFRAN) 4 MG tablet Take 1 tablet by mouth daily as needed for Nausea or Vomiting  Rose Barrera MD   OXYGEN Inhale 2 L into the lungs  Historical Provider, MD   propafenone (RYTHMOL) 150 MG tablet Take 1 tablet by mouth every 8 hours  Gage Keith MD   budesonide-formoterol (SYMBICORT) 160-4.5 MCG/ACT AERO INHALE 2 PUFFS INTO THE LUNGS TWICE DAILY  Gage Keith MD   blood glucose monitor strips Test one time a day  Jordyn Ludwig MD Erin   dicyclomine (BENTYL) 10 MG capsule Take 1 capsule by mouth 4 times daily as needed (abd pain)  Raheel Fowler MD   atorvastatin (LIPITOR) 80 MG tablet Take 1 tablet by mouth nightly  Danny Suh MD   rOPINIRole (REQUIP) 2 MG tablet Take 1 tablet by mouth 3 times daily PRN  Historical Provider, MD   levalbuterol (XOPENEX) 0.63 MG/3ML nebulization INHALE CONTENTS OF 1 VIAL PER NEBULIZER EVERY 6 HOURS AS NEEDED FOR WHEEZING  Jasbir Rincon MD   Respiratory Therapy Supplies (NEBULIZER/TUBING/MOUTHPIECE) KIT 1 kit by Does not apply route 4 times daily as needed (shortness of breath)  Raheel Fowler MD   albuterol sulfate HFA (PROAIR HFA) 108 (90 Base) MCG/ACT inhaler Inhale 2 puffs into the lungs every 6 hours as needed for Wheezing  Jasbir Rincon MD       Vitals:    21 1549   Pulse: 74   SpO2: 94%     There is no height or weight on file to calculate BMI.      Wt Readings from Last 3 Encounters:   21 161 lb 8 oz (73.3 kg)   21 165 lb (74.8 kg)   06/15/21 171 lb 3.2 oz (77.7 kg)     BP Readings from Last 3 Encounters:   21 (!) 184/78   21 (!) 165/100   21 (!) 105/52        Social History     Tobacco Use   Smoking Status Former Smoker    Packs/day: 1.00    Years: 45.00    Pack years: 45.00    Start date: 9/15/1950    Quit date: 1995    Years since quittin.7   Smokeless Tobacco Never Used

## 2021-09-08 NOTE — PROGRESS NOTES
Vaccine Information Sheet, \"Influenza - Inactivated\"  given to Stan Savage, or parent/legal guardian of  Stan Savage and verbalized understanding. Patient responses:    Have you ever had a reaction to a flu vaccine? No  Do you have any current illness? No  Have you ever had [place] Syndrome? No  Do you have a serious allergy to any of the follow: Neomycin, Polymyxin, Thimerosal, eggs or egg products? No    Flu vaccine given per order. Please see immunization tab. Risks and benefits explained. Current VIS given.       Immunizations Administered     Name Date Dose Route    Influenza, Quadv, adjuvanted, 65 yrs +, IM, PF (Fluad) 9/8/2021 0.5 mL Intramuscular    Site: Deltoid- Left    Lot: [id number]    NDC: 29303-318-98

## 2021-09-10 RX ORDER — DILTIAZEM HYDROCHLORIDE 240 MG/1
CAPSULE, COATED, EXTENDED RELEASE ORAL
Qty: 90 CAPSULE | Refills: 1 | Status: SHIPPED | OUTPATIENT
Start: 2021-09-10 | End: 2022-02-14

## 2021-09-13 DIAGNOSIS — M15.9 PRIMARY OSTEOARTHRITIS INVOLVING MULTIPLE JOINTS: ICD-10-CM

## 2021-09-13 RX ORDER — HYDROCODONE BITARTRATE AND ACETAMINOPHEN 5; 325 MG/1; MG/1
1 TABLET ORAL 4 TIMES DAILY PRN
Qty: 120 TABLET | Refills: 0 | Status: SHIPPED | OUTPATIENT
Start: 2021-09-13 | End: 2021-10-12 | Stop reason: SDUPTHER

## 2021-09-13 NOTE — TELEPHONE ENCOUNTER
PT is requesting a refill on HYDROcodone-acetaminophen (NORCO) 5-325 MG per tablet to please be sent to [address] [address] [address] [facility] [phone]

## 2021-09-14 ENCOUNTER — OFFICE VISIT (OUTPATIENT)
Dept: CARDIOLOGY CLINIC | Age: 86
End: 2021-09-14
Payer: MEDICAID

## 2021-09-14 VITALS
OXYGEN SATURATION: 100 % | BODY MASS INDEX: 30.91 KG/M2 | HEIGHT: 62 IN | DIASTOLIC BLOOD PRESSURE: 82 MMHG | SYSTOLIC BLOOD PRESSURE: 168 MMHG | HEART RATE: 68 BPM | WEIGHT: 168 LBS

## 2021-09-14 DIAGNOSIS — E78.5 HYPERLIPIDEMIA, UNSPECIFIED HYPERLIPIDEMIA TYPE: ICD-10-CM

## 2021-09-14 DIAGNOSIS — I10 HTN (HYPERTENSION), BENIGN: ICD-10-CM

## 2021-09-14 DIAGNOSIS — I48.0 PAF (PAROXYSMAL ATRIAL FIBRILLATION) (HCC): Primary | ICD-10-CM

## 2021-09-14 DIAGNOSIS — J96.11 CHRONIC RESPIRATORY FAILURE WITH HYPOXIA (HCC): ICD-10-CM

## 2021-09-14 DIAGNOSIS — G47.33 OSA (OBSTRUCTIVE SLEEP APNEA): ICD-10-CM

## 2021-09-14 DIAGNOSIS — I25.119 CORONARY ARTERY DISEASE INVOLVING NATIVE CORONARY ARTERY OF NATIVE HEART WITH ANGINA PECTORIS (HCC): ICD-10-CM

## 2021-09-14 PROBLEM — R51.9 HEAD ACHE: Status: RESOLVED | Noted: 2021-08-11 | Resolved: 2021-09-14

## 2021-09-14 PROBLEM — J44.9 COPD, MILD (HCC): Status: RESOLVED | Noted: 2019-07-18 | Resolved: 2021-09-14

## 2021-09-14 PROBLEM — J45.909 ASTHMATIC BRONCHITIS: Status: RESOLVED | Noted: 2020-08-11 | Resolved: 2021-09-14

## 2021-09-14 PROCEDURE — G8417 CALC BMI ABV UP PARAM F/U: HCPCS | Performed by: NURSE PRACTITIONER

## 2021-09-14 PROCEDURE — 1090F PRES/ABSN URINE INCON ASSESS: CPT | Performed by: NURSE PRACTITIONER

## 2021-09-14 PROCEDURE — G8399 PT W/DXA RESULTS DOCUMENT: HCPCS | Performed by: NURSE PRACTITIONER

## 2021-09-14 PROCEDURE — 1123F ACP DISCUSS/DSCN MKR DOCD: CPT | Performed by: NURSE PRACTITIONER

## 2021-09-14 PROCEDURE — G8427 DOCREV CUR MEDS BY ELIG CLIN: HCPCS | Performed by: NURSE PRACTITIONER

## 2021-09-14 PROCEDURE — 93000 ELECTROCARDIOGRAM COMPLETE: CPT | Performed by: NURSE PRACTITIONER

## 2021-09-14 PROCEDURE — 1036F TOBACCO NON-USER: CPT | Performed by: NURSE PRACTITIONER

## 2021-09-14 PROCEDURE — 4040F PNEUMOC VAC/ADMIN/RCVD: CPT | Performed by: NURSE PRACTITIONER

## 2021-09-14 PROCEDURE — 99214 OFFICE O/P EST MOD 30 MIN: CPT | Performed by: NURSE PRACTITIONER

## 2021-09-14 RX ORDER — CLONIDINE HYDROCHLORIDE 0.2 MG/1
0.1 TABLET ORAL 2 TIMES DAILY
COMMUNITY
End: 2021-09-14

## 2021-09-14 RX ORDER — CLONIDINE HYDROCHLORIDE 0.1 MG/1
0.1 TABLET ORAL 2 TIMES DAILY
COMMUNITY
End: 2021-10-13 | Stop reason: SDUPTHER

## 2021-09-14 NOTE — PATIENT INSTRUCTIONS
1. Call office if any issues  2.  If blood pressure elevated >170, ok to take extra dose of clonidine

## 2021-09-15 RX ORDER — MECLIZINE HCL 12.5 MG/1
TABLET ORAL
Qty: 270 TABLET | Refills: 1 | Status: SHIPPED | OUTPATIENT
Start: 2021-09-15 | End: 2022-02-11

## 2021-09-15 RX ORDER — IRBESARTAN 300 MG/1
TABLET ORAL
Qty: 90 TABLET | Refills: 1 | Status: SHIPPED | OUTPATIENT
Start: 2021-09-15 | End: 2022-02-04

## 2021-09-22 ENCOUNTER — NURSE TRIAGE (OUTPATIENT)
Dept: OTHER | Facility: CLINIC | Age: 86
End: 2021-09-22

## 2021-09-22 ENCOUNTER — TELEPHONE (OUTPATIENT)
Dept: FAMILY MEDICINE CLINIC | Age: 86
End: 2021-09-22

## 2021-09-22 ENCOUNTER — TELEPHONE (OUTPATIENT)
Dept: PULMONOLOGY | Age: 86
End: 2021-09-22

## 2021-09-22 RX ORDER — DOXYCYCLINE HYCLATE 100 MG
100 TABLET ORAL DAILY
Qty: 10 TABLET | Refills: 0 | Status: SHIPPED | OUTPATIENT
Start: 2021-09-22 | End: 2021-10-02

## 2021-09-22 NOTE — TELEPHONE ENCOUNTER
----- Message from Eleni Alvarado sent at 9/22/2021  8:15 AM EDT -----  Subject: Appointment Request    Reason for Call: Routine Return from RN Triage    QUESTIONS  Type of Appointment? Established Patient  Reason for appointment request? Available appointments did not meet   patient need  Additional Information for Provider? Pt is having elevated BP and is   needing an appt soon. ---------------------------------------------------------------------------  --------------  Sloan Blocker INFO  What is the best way for the office to contact you? OK to leave message on   voicemail  Preferred Call Back Phone Number? [phone]  ---------------------------------------------------------------------------  --------------  SCRIPT ANSWERS  Patient needs to be seen today? No  Patient needs to be seen today or tomorrow? No  Patient needs to be seen within 3 days? No  Patient needs to be seen within 5 days? No  Patient can be seen for a routine visit? Yes   Nurse Name? Jignesh  Have you been diagnosed with, awaiting test results for, or told that you   are suspected of having COVID-19 (Coronavirus)? (If patient has tested   negative or was tested as a requirement for work, school, or travel and   not based on symptoms, answer no)? No  Within the past two weeks have you developed any of the following symptoms   (answer no if symptoms have been present longer than 2 weeks or began   more than 2 weeks ago)? Fever or Chills, Cough, Shortness of breath or   difficulty breathing, Loss of taste or smell, Sore throat, Nasal   congestion, Sneezing or runny nose, Fatigue or generalized body aches   (answer no if pain is specific to a body part e.g. back pain), Diarrhea,   Headache? No  Have you had close contact with someone with COVID-19 in the last 14 days? No  (Service Expert  click yes below to proceed with [employer] As Usual   Scheduling)?  Yes

## 2021-09-22 NOTE — TELEPHONE ENCOUNTER
Received call from Matt at [facility] with Red Flag Complaint. Brief description of triage: Elevated BP. Triage indicates for patient to go to PCP office now. Caller stated that she needs to schedule transportation and will not be able to come in today. She stated that she wants to see her PCP , not [facility] or ER. Care advice provided, patient verbalizes understanding; denies any other questions or concerns; instructed to call back for any new or worsening symptoms. Writer provided warm transfer to [facility] at [facility] for appointment scheduling. Attention Provider: Thank you for allowing me to participate in the care of your patient. The patient was connected to triage in response to information provided to the [facility]. Please do not respond through this encounter as the response is not directed to a shared pool. Reason for Disposition   BP Systolic BP >= 635 OR Diastolic >= 90 and postpartum (from 0 to 6 weeks after delivery)    Answer Assessment - Initial Assessment Questions  1. BLOOD PRESSURE: \"What is the blood pressure? \" \"Did you take at least two measurements 5 minutes apart?\"      199/88 last night, then 154/600 at 0730 this morning    2. ONSET: \"When did you take your blood pressure?\"      0730    3. HOW: \"How did you obtain the blood pressure? \" (e.g., visiting nurse, automatic home BP monitor)      Home automatic bp cuff    4. HISTORY: \"Do you have a history of high blood pressure? \"      Yes    5. MEDICATIONS: Ebb Cheese you taking any medications for blood pressure? \" \"Have you missed any doses recently? \"      See chart    6. OTHER SYMPTOMS: \"Do you have any symptoms? \" (e.g., headache, chest pain, blurred vision, difficulty breathing, weakness)      Palpitations, headaches    7. PREGNANCY: \"Is there any chance you are pregnant? \" \"When was your last menstrual period? \"      N/a    Protocols used: HIGH BLOOD PRESSURE-ADULT-[place]

## 2021-09-22 NOTE — TELEPHONE ENCOUNTER
Doxycycline 100mg, 10 days
Last seen 9-8-21 Allergic to PCN and Sulfa
Prescription sent pt informed
HALIMA done no EV thrombus, DCCV performed by Dr. Wang, continue apixaban BID and follow up with EPS.

## 2021-09-25 ENCOUNTER — NURSE TRIAGE (OUTPATIENT)
Dept: OTHER | Facility: CLINIC | Age: 86
End: 2021-09-25

## 2021-09-25 NOTE — TELEPHONE ENCOUNTER
Received call from [facility] with Red Flag Complaint. Brief description of triage: Melisa Brantley family  sinus congestion, called physician a few days ago, was given doxicycline. no cough other than spitting up d/t drainage. tired and weak from feeling poorly. Has COPD and is on oxygen. Triage indicates for patient to see provider within 24 hours. Informed her to go to [facility] or Emergency room. Care advice provided, patient verbalizes understanding; denies any other questions or concerns; instructed to call back for any new or worsening symptoms. Writer provided warm transfer to Tenna Necessary at [facility] for info regarding urgent care in [place]. Attention Provider: Thank you for allowing me to participate in the care of your patient. The patient was connected to triage in response to information provided to the [facility]. Please do not respond through this encounter as the response is not directed to a shared pool. Reason for Disposition   [1] Taking antibiotic > 72 hours (3 days) AND [2] sinus pain not improved    Answer Assessment - Initial Assessment Questions  1. ANTIBIOTIC: \"What antibiotic are you receiving? \" \"How many times per day? \"      Doxycycline 100 mg twice a day, has been on it 4 days and it isn't helping much at all.   2. ONSET: \"When was the antibiotic started? \"      4 days ago  3. PAIN: \"How bad is the sinus pain? \"   (Scale 1-10; mild, moderate or severe)    - MILD (1-3): doesn't interfere with normal activities     - MODERATE (4-7): interferes with normal activities (e.g., work or school) or awakens from sleep    - SEVERE (8-10): excruciating pain and patient unable to do any normal activities         No pain but she is lightheaded and weak  4. FEVER: \"Do you have a fever? \" If so, ask: \"What is it, how was it measured, and when did it start? \"       no  5. SYMPTOMS: \"Are there any other symptoms you're concerned about? \" If so, ask: \"When did it start? \"      Chill once in awhile, no fever, lightheaded and weakness, had a flu shot about 2 weeks ago. 6. PREGNANCY: \"Is there any chance you are pregnant? \" \"When was your last menstrual period? \"      n/a    Protocols used: SINUS INFECTION ON ANTIBIOTIC FOLLOW-UP CALL-[facility]

## 2021-09-27 ENCOUNTER — TELEPHONE (OUTPATIENT)
Dept: FAMILY MEDICINE CLINIC | Age: 86
End: 2021-09-27

## 2021-09-27 ENCOUNTER — TELEPHONE (OUTPATIENT)
Dept: CARDIOLOGY CLINIC | Age: 86
End: 2021-09-27

## 2021-09-27 NOTE — TELEPHONE ENCOUNTER
Patient called with concerns that she needs to have surgery on her eyelids but is on blood thinners and is not sure what to do      Please advise and give her a  Callback

## 2021-09-27 NOTE — TELEPHONE ENCOUNTER
Please call pt to schedule for a pre-op appt. Make sure is within 30 days of surgery. Thanks.  WM will advise pt what to do at appt time

## 2021-09-27 NOTE — TELEPHONE ENCOUNTER
She needs to discuss this with the surgeon. Ok to hold for 2 days prior to surgery from our perspective.     Debbra Frankel, KATHY-CNP

## 2021-09-27 NOTE — TELEPHONE ENCOUNTER
Pt called stating she is having her eye lids pulled back so she can see better, she needs to know how long she should be off her ELIQUIS  before her surgery,.  surgery is not scheduled yet.     Pls advise thank you

## 2021-09-27 NOTE — TELEPHONE ENCOUNTER
Patient wants to know how long should she be off her blood thinner medication before her surgery    Please advise

## 2021-09-28 ENCOUNTER — TELEPHONE (OUTPATIENT)
Dept: FAMILY MEDICINE CLINIC | Age: 86
End: 2021-09-28

## 2021-09-28 NOTE — TELEPHONE ENCOUNTER
----- Message from [employer] sent at 9/28/2021  4:31 PM EDT -----  Subject: Message to Provider    QUESTIONS  Information for Provider? PT is asking for a prescription for her sinuses. It's ongoing for apprx 2 weeks. [facility] is [facility]. ---------------------------------------------------------------------------  --------------  Michael COWAN  What is the best way for the office to contact you? OK to leave message on   voicemail  Preferred Call Back Phone Number? [phone]  ---------------------------------------------------------------------------  --------------  SCRIPT ANSWERS  Relationship to Patient?  Self

## 2021-09-28 NOTE — TELEPHONE ENCOUNTER
Patient is already on an antibiotic from Dr. Carrillo Mark.   I believe her headache is secondary to high blood pressure

## 2021-09-29 DIAGNOSIS — E03.9 ACQUIRED HYPOTHYROIDISM: ICD-10-CM

## 2021-09-29 RX ORDER — LEVOTHYROXINE SODIUM 0.07 MG/1
TABLET ORAL
Qty: 90 TABLET | Refills: 0 | Status: SHIPPED | OUTPATIENT
Start: 2021-09-29 | End: 2021-12-07 | Stop reason: SDUPTHER

## 2021-09-29 NOTE — TELEPHONE ENCOUNTER
PT is requesting a refill on levothyroxine (SYNTHROID) 75 MCG tablet toplease be called into [facility] [phone]

## 2021-10-12 DIAGNOSIS — F51.01 PRIMARY INSOMNIA: ICD-10-CM

## 2021-10-12 DIAGNOSIS — F41.9 ANXIETY: ICD-10-CM

## 2021-10-12 DIAGNOSIS — M15.9 PRIMARY OSTEOARTHRITIS INVOLVING MULTIPLE JOINTS: ICD-10-CM

## 2021-10-12 RX ORDER — HYDROCODONE BITARTRATE AND ACETAMINOPHEN 5; 325 MG/1; MG/1
1 TABLET ORAL 4 TIMES DAILY PRN
Qty: 120 TABLET | Refills: 0 | Status: SHIPPED | OUTPATIENT
Start: 2021-10-12 | End: 2021-11-09 | Stop reason: SDUPTHER

## 2021-10-12 RX ORDER — LORAZEPAM 1 MG/1
TABLET ORAL
Qty: 60 TABLET | Refills: 0 | Status: SHIPPED | OUTPATIENT
Start: 2021-10-12 | End: 2021-11-09 | Stop reason: SDUPTHER

## 2021-10-12 NOTE — TELEPHONE ENCOUNTER
Medication:   Requested Prescriptions     Pending Prescriptions Disp Refills    HYDROcodone-acetaminophen (NORCO) 5-325 MG per tablet 120 tablet 0     Sig: Take 1 tablet by mouth 4 times daily as needed for Pain for up to 30 days.  LORazepam (ATIVAN) 1 MG tablet 60 tablet 2     Sig: TAKE 1/2 TABLET BY MOUTH TWICE DAILY AND 1 EVERY NIGHT AT BEDTIME AS NEEDED FOR ANXIETY      Last Filled:  9/13/2021    Patient Phone Number: [phone] (home)     Last appt: 8/25/2021   Next appt: 11/5/2021    Last OARRS:   RX Monitoring 3/20/2019   Attestation The Prescription Monitoring Report for this patient was reviewed today.    Periodic Controlled Substance Monitoring -     PDMP Monitoring:    Last PDMP [place] as Reviewed [facility]):  Review User Review Instant Review Result   Jhon MONTEZ 7/12/2021 12:57 PM Reviewed PDMP [1]     Preferred Pharmacy:   [facility], [address] [address]  Phone: [phone] Fax: [phone]    [address] [place] [address] [phone] - F [phone]  18 [facility]  [address] [id number]  Phone: [phone] Fax: [phone] [address] 6 [facility] [phone]  [address] [facility] [address]  Phone: [phone] Fax: [phone]  [address], [facility], [address] [address]  Phone: [phone] Fax: [phone]

## 2021-10-12 NOTE — TELEPHONE ENCOUNTER
HYDROcodone-acetaminophen (NORCO) 5-325 MG per tablet 120 tablet 0 9/13/2021 10/13/2021    Sig - Route:  Take 1 tablet by mouth 4 times daily as needed for Pain for up to 30 days. - Oral      LORazepam (ATIVAN) 1 MG tablet 60 tablet 2 7/15/2021 10/14/2021    Sig: TAKE 1/2 TABLET BY MOUTH TWICE DAILY AND 1 EVERY NIGHT AT BEDTIME AS NEEDED FOR ANXIETY          Provider out of the office

## 2021-10-13 RX ORDER — CLONIDINE HYDROCHLORIDE 0.1 MG/1
0.1 TABLET ORAL 2 TIMES DAILY
Qty: 180 TABLET | Refills: 1 | Status: SHIPPED | OUTPATIENT
Start: 2021-10-13 | End: 2021-10-19 | Stop reason: SDUPTHER

## 2021-10-13 NOTE — TELEPHONE ENCOUNTER
Patient is calling to see if  got the fax from [facility] [employer] regarding her clonidine 0.1MG. The reena supposed to fax over the request for the prescription. She said they wont send her the medication until they get the response. Deonna in chart      Please advise     Provider out of the office.

## 2021-10-13 NOTE — TELEPHONE ENCOUNTER
Medication:   Requested Prescriptions     Pending Prescriptions Disp Refills    cloNIDine (CATAPRES) 0.1 MG tablet 180 tablet 0     Sig: Take 1 tablet by mouth 2 times daily      Last Filled:      Patient Phone Number: [phone] (home)     Last appt: 8/25/2021   Next appt: 11/5/2021    Last OARRS:   RX Monitoring 3/20/2019   Attestation The Prescription Monitoring Report for this patient was reviewed today.    Periodic Controlled Substance Monitoring -     PDMP Monitoring:    Last PDMP [place] as Reviewed [facility]):  Review User Review Instant Review Result   Judge Tamera MONTEZ 7/12/2021 12:57 PM Reviewed PDMP [1]     Preferred Pharmacy:   [facility], [address] [address]  Phone: [phone] Fax: [phone]    [address] Mail Delivery - [place] [phone] - F [phone]  [address] [place] [address]  550 [facility] [address]  Phone: [phone] Fax: [phone] [address] 6 abida Arteaga [facility] [phone]  [address] [facility] [address]  Phone: [phone] Fax: [phone]  [address], [address] [address]  Phone: [phone] Fax: [phone]

## 2021-10-18 RX ORDER — ATORVASTATIN CALCIUM 80 MG/1
80 TABLET, FILM COATED ORAL NIGHTLY
Qty: 90 TABLET | Refills: 3 | Status: SHIPPED | OUTPATIENT
Start: 2021-10-18 | End: 2022-08-02

## 2021-10-18 NOTE — TELEPHONE ENCOUNTER
Received refill request for atorvastatin from [facility] mail order pharmacy.     Last ov:9/14/2021 NPSR    Last labs:cmp 2/25/2021    Last Refill:2/28/2021 #30 with 3 refills  Needing sent to mail order    Next appointment: on recall list with NPSR

## 2021-10-19 RX ORDER — CLONIDINE HYDROCHLORIDE 0.1 MG/1
0.1 TABLET ORAL 2 TIMES DAILY
Qty: 180 TABLET | Refills: 1 | Status: SHIPPED | OUTPATIENT
Start: 2021-10-19 | End: 2021-10-26

## 2021-10-21 ENCOUNTER — NURSE TRIAGE (OUTPATIENT)
Dept: OTHER | Facility: CLINIC | Age: 86
End: 2021-10-21

## 2021-10-21 ENCOUNTER — TELEPHONE (OUTPATIENT)
Dept: FAMILY MEDICINE CLINIC | Age: 86
End: 2021-10-21

## 2021-10-21 NOTE — TELEPHONE ENCOUNTER
Received call from [facility] at [facility] with [employer] Complaint. Brief description of triage: blood pressure is 88/45, 128/54, 110/54, worried about fluctuation,  Has no energy, feels it is her clonidine that is causing her fatigue     Triage indicates for patient to be seen today, states she will wait until next week when her appointment is already scheduled. Wants the doctor to change her clonidine. Care advice provided, patient verbalizes understanding; denies any other questions or concerns; instructed to call back for any new or worsening symptoms. Writer provided warm transfer to [address] [place] [address] at [facility] for appointment scheduling. Attention Provider: Thank you for allowing me to participate in the care of your patient. The patient was connected to triage in response to information provided to the ECC/PSC. Please do not respond through this encounter as the response is not directed to a shared pool. Reason for Disposition   Systolic BP < 90 and NOT dizzy, lightheaded or weak    Answer Assessment - Initial Assessment Questions  1. BLOOD PRESSURE: \"What is the blood pressure? \" \"Did you take at least two measurements 5 minutes apart? \"      Lowest today is 88/45, now up to 118/54    2. ONSET: \"When did you take your blood pressure? \"      several times today     3. HOW: \"How did you obtain the blood pressure? \" (e.g., visiting nurse, automatic home BP monitor)      Home cuff     4. HISTORY: \"Do you have a history of low blood pressure? \" \"What is your blood pressure normally? \"      Yes, but feels it is getting too low     5. MEDICATIONS: Sugar Leon you taking any medications for blood pressure? \" If yes: \"Have they been changed recently? \"      Clonidine     6. PULSE RATE: \"Do you know what your pulse rate is? \"       57    7. OTHER SYMPTOMS: Laverta Drech you been sick recently? \" \"Have you had a recent injury? \"      Fatigued, no energy     8. PREGNANCY: \"Is there any chance you are pregnant? \" \"When was your last menstrual period? \"      N/a    Protocols used: LOW BLOOD PRESSURE-ADULT-OH

## 2021-10-26 ENCOUNTER — OFFICE VISIT (OUTPATIENT)
Dept: FAMILY MEDICINE CLINIC | Age: 86
End: 2021-10-26
Payer: MEDICARE

## 2021-10-26 VITALS
SYSTOLIC BLOOD PRESSURE: 154 MMHG | BODY MASS INDEX: 30.91 KG/M2 | DIASTOLIC BLOOD PRESSURE: 72 MMHG | HEIGHT: 62 IN | HEART RATE: 70 BPM | WEIGHT: 168 LBS | TEMPERATURE: 97.3 F | OXYGEN SATURATION: 96 %

## 2021-10-26 DIAGNOSIS — I10 HTN (HYPERTENSION), BENIGN: ICD-10-CM

## 2021-10-26 DIAGNOSIS — H02.403 PTOSIS OF EYELID, BILATERAL: Primary | ICD-10-CM

## 2021-10-26 DIAGNOSIS — Z01.818 PREOP EXAMINATION: ICD-10-CM

## 2021-10-26 DIAGNOSIS — I48.0 PAF (PAROXYSMAL ATRIAL FIBRILLATION) (HCC): ICD-10-CM

## 2021-10-26 DIAGNOSIS — M75.52 BURSITIS OF LEFT SHOULDER: ICD-10-CM

## 2021-10-26 DIAGNOSIS — I25.119 CORONARY ARTERY DISEASE INVOLVING NATIVE CORONARY ARTERY OF NATIVE HEART WITH ANGINA PECTORIS (HCC): ICD-10-CM

## 2021-10-26 PROCEDURE — 96372 THER/PROPH/DIAG INJ SC/IM: CPT | Performed by: FAMILY MEDICINE

## 2021-10-26 PROCEDURE — 1090F PRES/ABSN URINE INCON ASSESS: CPT | Performed by: FAMILY MEDICINE

## 2021-10-26 PROCEDURE — G8399 PT W/DXA RESULTS DOCUMENT: HCPCS | Performed by: FAMILY MEDICINE

## 2021-10-26 PROCEDURE — G8484 FLU IMMUNIZE NO ADMIN: HCPCS | Performed by: FAMILY MEDICINE

## 2021-10-26 PROCEDURE — G8427 DOCREV CUR MEDS BY ELIG CLIN: HCPCS | Performed by: FAMILY MEDICINE

## 2021-10-26 PROCEDURE — 1123F ACP DISCUSS/DSCN MKR DOCD: CPT | Performed by: FAMILY MEDICINE

## 2021-10-26 PROCEDURE — 1036F TOBACCO NON-USER: CPT | Performed by: FAMILY MEDICINE

## 2021-10-26 PROCEDURE — 99214 OFFICE O/P EST MOD 30 MIN: CPT | Performed by: FAMILY MEDICINE

## 2021-10-26 PROCEDURE — 4040F PNEUMOC VAC/ADMIN/RCVD: CPT | Performed by: FAMILY MEDICINE

## 2021-10-26 PROCEDURE — G8417 CALC BMI ABV UP PARAM F/U: HCPCS | Performed by: FAMILY MEDICINE

## 2021-10-26 RX ORDER — TRIAMCINOLONE ACETONIDE 40 MG/ML
40 INJECTION, SUSPENSION INTRA-ARTICULAR; INTRAMUSCULAR ONCE
Status: COMPLETED | OUTPATIENT
Start: 2021-10-26 | End: 2021-10-26

## 2021-10-26 RX ORDER — CLONIDINE HYDROCHLORIDE 0.2 MG/1
0.2 TABLET ORAL 2 TIMES DAILY
Qty: 180 TABLET | Refills: 1 | Status: SHIPPED | OUTPATIENT
Start: 2021-10-26 | End: 2021-11-03

## 2021-10-26 RX ADMIN — TRIAMCINOLONE ACETONIDE 40 MG: 40 INJECTION, SUSPENSION INTRA-ARTICULAR; INTRAMUSCULAR at 14:31

## 2021-10-26 ASSESSMENT — PATIENT HEALTH QUESTIONNAIRE - PHQ9
SUM OF ALL RESPONSES TO PHQ QUESTIONS 1-9: 2
SUM OF ALL RESPONSES TO PHQ9 QUESTIONS 1 & 2: 2
1. LITTLE INTEREST OR PLEASURE IN DOING THINGS: 1
2. FEELING DOWN, DEPRESSED OR HOPELESS: 1
SUM OF ALL RESPONSES TO PHQ QUESTIONS 1-9: 2
SUM OF ALL RESPONSES TO PHQ QUESTIONS 1-9: 2

## 2021-10-26 NOTE — PATIENT INSTRUCTIONS
Patient Education        Cawthorne Exercises for Vertigo: Care Instructions  Your Care Instructions  Simple exercises can help you regain your balance when you have vertigo. If you have Ménière's disease, benign paroxysmal positional vertigo (BPPV), or another inner ear problem, you may have vertigo off and on. Do these exercises first thing in the morning and before you go to bed. You might get dizzy when you first start them. If this happens, try to do them for at least 5 minutes. Do a group of exercises at a time, starting at the top of the list. It may take several weeks before you can do all the exercises without feeling dizzy. Follow-up care is a key part of your treatment and safety. Be sure to make and go to all appointments, and call your doctor if you are having problems. It's also a good idea to know your test results and keep a list of the medicines you take. How can you care for yourself at home? Exercise 1  While sitting on the side of the bed and holding your head still:  · Look up as far as you can. · Look down as far as you can. · Look from side to side as far as you can. · Stretch your arm straight out in front of you. Focus on your index finger. Continue to focus on your finger while you bring it to your nose. Exercise 2  While sitting on the side of the bed:  · Bring your head as far back as you can. · Bring your head forward to touch your chin to your chest.  · Turn your head from side to side. · Do these exercises first with your eyes open. Then try with your eyes closed. Exercise 3  While sitting on the side of the bed:  · Shrug your shoulders straight upward, then relax them. · Bend over and try to touch the ground with your fingers. Then go back to a sitting position. · Toss a small ball from one hand to the other. Throw the ball higher than your eyes so you have to look up.   Exercise 4  While standing (with someone close by if you feel uncomfortable):  · Repeat Exercise 1. · Repeat Exercise 2.  · Pass a ball between your legs and above your head. · Sit down and then stand up. Repeat. Turn around in a [place] a different way each time you stand. · With someone close by to help you, try the above exercises with your eyes closed. Exercise 5  In a room that is cleared of obstacles:  · Walk to a corner of the room, turn to your right, and walk back to the starting point. Now, repeat and turn left. · Walk up and down a slope. Now try stairs. · While holding on to someone's arm, try these exercises with your eyes closed. When should you call for help? Watch closely for changes in your health, and be sure to contact your doctor if:    · You do not get better as expected. Where can you learn more? Go to [email or web address] org and sign in to your [employer] account. Enter P649 in the [place]yipai box to learn more about \"Cawthorne Exercises for Vertigo: Care Instructions. \"     If you do not have an account, please click on the \"Sign Up Now\" link. Current as of: December 2, 2020               Content Version: 13.0  © 0583-1344 [employer]. Care instructions adapted under license by [facility] ([facility]). If you have questions about a medical condition or this instruction, always ask your healthcare professional. [address] any warranty or liability for your use of this information. Patient Education        Vertigo: Exercises  Introduction  Here are some examples of exercises for you to try. The exercises may be suggested for a condition or for rehabilitation. Start each exercise slowly. Ease off the exercises if you start to have pain. You will be told when to start these exercises and which ones will work best for you. How to do the exercises  Exercise 1    1. Stand with a chair in front of you and a wall behind you. If you begin to fall, you may use them for support.   2. Stand with your feet together and your arms at your sides. 3. Move your head up and down 10 times. Exercise 2    1. Move your head side to side 10 times. Exercise 3    1. Move your head diagonally up and down 10 times. Exercise 4    1. Move your head diagonally up and down 10 times on the other side. Follow-up care is a key part of your treatment and safety. Be sure to make and go to all appointments, and call your doctor if you are having problems. It's also a good idea to know your test results and keep a list of the medicines you take. Where can you learn more? Go to [email or web address] org and sign in to your [employer] account. Enter F349 in the Revinate box to learn more about \"Vertigo: Exercises. \"     If you do not have an account, please click on the \"Sign Up Now\" link. Current as of: December 2, 2020               Content Version: 13.0  © 2006-2021 Healthwise, Incorporated. Care instructions adapted under license by [facility] ([facility]). If you have questions about a medical condition or this instruction, always ask your healthcare professional. [address] [address] any warranty or liability for your use of this information.

## 2021-10-26 NOTE — PROGRESS NOTES
Preoperative Consultation    Alfonso Kat  YOB: 1935    This patient presents to the office today for a preoperative consultation at the request of surgeon, Dr. Mery Ferrari, who plans on performing eye lid surgery- bilaterally on November 10 at  [address] in [facility] .       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, without long-term current use of insulin (HCC)    Restless legs syndrome    ANTHONY (obstructive sleep apnea)    Allergic rhinitis    Dizziness    Interstitial lung disease ([address]    Coronary artery disease involving native coronary artery of native heart with angina pectoris ([address]    PAF (paroxysmal atrial fibrillation) ([address]    Hypothyroid    Chronic respiratory failure with hypoxia (HCC)    SOB (shortness of breath)    Anxiety    Ataxia    HTN (hypertension), benign    Centrilobular emphysema ([address]    Pulmonary nodule     Past Surgical History:   Procedure Laterality Date    APPENDECTOMY      CHOLECYSTECTOMY, LAPAROSCOPIC  2/24/2013    COLONOSCOPY         Allergies   Allergen Reactions    Adhesive Tape Anaphylaxis    Cefaclor Nausea And Vomiting     Other reaction(s): Chest pain    Nsaids      Pt states she has hives and heart races   Other reaction(s): Tachycardia    Clinoril [Sulindac] Other (See Comments)     Stomach pain    Codeine      FEEL NUMB    Diclofenac     Diclofenac Sodium Hives    Diclofenac Sodium Hives    Hctz [Hydrochlorothiazide]      Sob    Ibuprofen Other (See Comments)     Other reaction(s): Tachycardia    Macrobid [Nitrofurantoin Macrocrystal]      nausea    Motrin [Ibuprofen Micronized] Other (See Comments)     Tachycardia      Pcn [Penicillins] Hives    Peach [Prunus Persica] Itching and Swelling     Cannot eat raw peaches    Prednisone      Causes elevated blood pressure     Sulfa Antibiotics      Nausea, diarrhea     Outpatient Medications Marked as Taking for the 10/26/21 encounter (Office Visit) with Dominic Don MD   Medication Sig Dispense Refill    cloNIDine (CATAPRES) 0.1 MG tablet Take 1 tablet by mouth 2 times daily 180 tablet 1    atorvastatin (LIPITOR) 80 MG tablet Take 1 tablet by mouth nightly 90 tablet 3    HYDROcodone-acetaminophen (NORCO) 5-325 MG per tablet Take 1 tablet by mouth 4 times daily as needed for Pain for up to 30 days.  120 tablet 0    LORazepam (ATIVAN) 1 MG tablet TAKE 1/2 TABLET BY MOUTH TWICE DAILY AND 1 EVERY NIGHT AT BEDTIME AS NEEDED FOR ANXIETY 60 tablet 0    dicyclomine (BENTYL) 10 MG capsule TAKE 1 CAPSULE BY MOUTH FOUR TIMES DAILY AS NEEDED FOR ABDOMINAL PAIN 360 capsule 0    levothyroxine (SYNTHROID) 75 MCG tablet TAKE 1 TABLET EVERY DAY 90 tablet 0    irbesartan (AVAPRO) 300 MG tablet TAKE 1 TABLET BY MOUTH EVERY NIGHT 90 tablet 1    meclizine (ANTIVERT) 12.5 MG tablet TAKE 1 TABLET THREE TIMES DAILY AS NEEDED 270 tablet 1    dilTIAZem (CARDIZEM CD) 240 MG extended release capsule TAKE 1 CAPSULE EVERY DAY 90 capsule 1    fluticasone (FLONASE) 50 MCG/ACT nasal spray USE 2 SPRAYS NASALLY EVERY DAY 48 g 3    hydrALAZINE (APRESOLINE) 50 MG tablet TAKE 1 TABLET BY MOUTH THREE TIMES DAILY 270 tablet 1    apixaban (ELIQUIS) 5 MG TABS tablet Take 1 tablet by mouth 2 times daily 180 tablet 1    bethanechol (URECHOLINE) 25 MG tablet Take 25 mg by mouth 3 times daily      Lancets MISC 1 each by Does not apply route 2 times daily 300 each 1    conjugated estrogens (PREMARIN) 0.625 MG/GM vaginal cream Place 1 g vaginally Twice a Week 1 Tube 3    ondansetron (ZOFRAN) 4 MG tablet Take 1 tablet by mouth daily as needed for Nausea or Vomiting 30 tablet 0    OXYGEN Inhale 2 L into the lungs      propafenone (RYTHMOL) 150 MG tablet Take 1 tablet by mouth every 8 hours 270 tablet 1    budesonide-formoterol (SYMBICORT) 160-4.5 MCG/ACT AERO INHALE 2 PUFFS INTO THE LUNGS TWICE DAILY 3 Inhaler 0    blood glucose monitor strips Test one time a day 100 strip 5    rOPINIRole (REQUIP) 2 MG tablet Take 1 tablet by mouth 3 times daily PRN      levalbuterol (XOPENEX) 0.63 MG/3ML nebulization INHALE CONTENTS OF 1 VIAL PER NEBULIZER EVERY 6 HOURS AS NEEDED FOR WHEEZING 1050 mL 3    Respiratory Therapy Supplies (NEBULIZER/TUBING/MOUTHPIECE) KIT 1 kit by Does not apply route 4 times daily as needed (shortness of breath) 1 kit 0    albuterol sulfate HFA (PROAIR HFA) 108 (90 Base) MCG/ACT inhaler Inhale 2 puffs into the lungs every 6 hours as needed for Wheezing 1 Inhaler 6       Social History     Tobacco Use    Smoking status: Former Smoker     Packs/day: 1.00     Years: 45.00     Pack years: 45.00     Types: Cigarettes     Start date: 9/15/1950     Quit date: 1995     Years since quittin.8    Smokeless tobacco: Never Used   Substance Use Topics    Alcohol use: No     Alcohol/week: 0.0 standard drinks     Family History   Problem Relation Age of Onset    High Blood Pressure Mother     Heart Attack Father     Heart Disease Father     Other Brother     Asthma Paternal Uncle        Review of Systems  A comprehensive review of systems was negative except for what was noted in the HPI. Physical Exam   BP (!) 154/72 (Site: Left Upper Arm, Position: Sitting, Cuff Size: Medium Adult)   Pulse 70   Temp 97.3 °F (36.3 °C) (Infrared)   Ht 5' 2\" (1.575 m)   Wt 168 lb (76.2 kg)   SpO2 96%   BMI 30.73 kg/m²   Weight: 168 lb (76.2 kg)   Constitutional: She is oriented to person, place, and time. She appears well-developed and well-nourished. No distress. HENT:   Head: Normocephalic and atraumatic.    Mouth/Throat: Uvula is midline, oropharynx is clear and moist and mucous membranes are normal.   Eyes: Conjunctivae and EOM are normal. Pupils are equal, round, and reactive to light. Neck: Trachea normal and normal range of motion. Neck supple. No JVD present. Carotid bruit is not present. No mass and no thyromegaly present. Cardiovascular: Normal rate, regular rhythm, normal heart sounds and intact distal pulses. Exam reveals no gallop and no friction rub. No murmur heard. Pulmonary/Chest: Effort normal and breath sounds normal. No respiratory distress. She has no wheezes. She has no rales. Abdominal: Soft. Normal aorta and bowel sounds are normal. She exhibits no distension and no mass. There is no hepatosplenomegaly. No tenderness. Musculoskeletal: She exhibits no edema and no tenderness. Neurological: She is alert and oriented to person, place, and time. She has normal strength. No cranial nerve deficit or sensory deficit. Coordination and gait normal.   Skin: Skin is warm and dry. No rash noted. No erythema. Lab Review Yes       Assessment:       Hallie Narayanan was seen today for pre-op exam.    Diagnoses and all orders for this visit:    Ptosis of eyelid, bilateral    Preop examination    HTN (hypertension), benign    Bursitis of left shoulder    Coronary artery disease involving native coronary artery of native heart with angina pectoris (HCC)    PAF (paroxysmal atrial fibrillation) ([facility]    Other orders  -     cloNIDine (CATAPRES) 0.2 MG tablet; Take 1 tablet by mouth 2 times daily  -     triamcinolone acetonide (KENALOG-40) injection 40 mg      80 y.o. patient  approved for Surgery         Plan:     1. Preoperative workup as follows: none  2. Change in medication regimen before surgery: Discontinue Eliquis blood thinner 4 days before surgery  3. No contraindications to planned surgery  4. Medical decision making of moderate complexity. Note electronically signed by provider.

## 2021-10-26 NOTE — PROGRESS NOTES
Medication given during visit:    Administrations This Visit     triamcinolone acetonide (KENALOG-40) injection 40 mg     Admin Date  10/26/2021  14:31 Action  Given Dose  40 mg Route  IntraMUSCular Site  Shoulder Left Administered By  Jb Vincent    Ordering Provider: Heena Juares MD    NDC: 5712-6593-51    Lot#: [id number]    : [employer] (PRIMARY CARE)    Patient Supplied?: No                Patient instructed to remain in clinic for 20 minutes after injection and was advised to report any adverse reaction to me immediately.

## 2021-10-29 ENCOUNTER — TELEPHONE (OUTPATIENT)
Dept: FAMILY MEDICINE CLINIC | Age: 86
End: 2021-10-29

## 2021-10-29 RX ORDER — PRAMIPEXOLE DIHYDROCHLORIDE 0.25 MG/1
TABLET ORAL
Qty: 90 TABLET | Refills: 0 | Status: SHIPPED | OUTPATIENT
Start: 2021-10-29 | End: 2022-01-28 | Stop reason: SDUPTHER

## 2021-10-29 RX ORDER — PRAMIPEXOLE DIHYDROCHLORIDE 0.25 MG/1
0.25 TABLET ORAL NIGHTLY
Qty: 30 TABLET | Refills: 3 | Status: SHIPPED | OUTPATIENT
Start: 2021-10-29 | End: 2021-10-29

## 2021-11-01 ENCOUNTER — TELEPHONE (OUTPATIENT)
Dept: FAMILY MEDICINE CLINIC | Age: 86
End: 2021-11-01

## 2021-11-02 NOTE — TELEPHONE ENCOUNTER
I would recommend that we check pulse and blood pressure twice daily for the next week and report back readings

## 2021-11-02 NOTE — TELEPHONE ENCOUNTER
Patient's daughter states patients heart rate keeps on going down in the 40's. She is concerned that her medication got adjusted again and her heart rate keeps dropping. She states every time her medication gets adjusted she ends up in the hospital. Patient is getting ready to have surgery soon and they are concerned.

## 2021-11-03 ENCOUNTER — TELEPHONE (OUTPATIENT)
Dept: FAMILY MEDICINE CLINIC | Age: 86
End: 2021-11-03

## 2021-11-03 RX ORDER — CLONIDINE HYDROCHLORIDE 0.1 MG/1
0.1 TABLET ORAL 2 TIMES DAILY
Qty: 180 TABLET | Refills: 0 | Status: SHIPPED | OUTPATIENT
Start: 2021-11-03 | End: 2022-04-15

## 2021-11-05 ENCOUNTER — TELEPHONE (OUTPATIENT)
Dept: FAMILY MEDICINE CLINIC | Age: 86
End: 2021-11-05

## 2021-11-05 NOTE — TELEPHONE ENCOUNTER
PT is requesting to know if she still takes the cloNIDine (CATAPRES) 0.1 MG tablet and if so how many a day, Or if Dr Sendy Yuan will change the medication? Please advise? ?

## 2021-11-08 RX ORDER — FLUTICASONE PROPIONATE 50 MCG
SPRAY, SUSPENSION (ML) NASAL
Qty: 48 G | Refills: 3 | Status: SHIPPED | OUTPATIENT
Start: 2021-11-08 | End: 2022-08-02

## 2021-11-08 NOTE — TELEPHONE ENCOUNTER
Medication:   Requested Prescriptions     Pending Prescriptions Disp Refills    fluticasone (FLONASE) 50 MCG/ACT nasal spray [Pharmacy Med Name: FLUTICASONE 50MCG NASAL SP (120) RX] 48 g 3     Sig: SHAKE LIQUID AND USE 2 SPRAYS IN EACH NOSTRIL DAILY      Last Filled:      Patient Phone Number: [phone] (home)     Last appt: 10/26/2021   Next appt: 12/7/2021    Last OARRS:   RX Monitoring 3/20/2019   Attestation The Prescription Monitoring Report for this patient was reviewed today.    Periodic Controlled Substance Monitoring -     PDMP Monitoring:    Last PDMP [place] as Reviewed [facility]):  Review User Review Instant Review Result   Callum MONTEZ 7/12/2021 12:57 PM Reviewed PDMP [1]     Preferred Pharmacy:   [facility], [address] [address]  Phone: [phone] Fax: [phone]    [address] [facility] Delivery - [address] [phone] - F [phone]  18 [facility]  [address] [address]  Phone: [phone] Fax: [phone] [address] 6 [facility] [phone]  [address] [facility] [address]  Phone: [phone] Fax: [phone]  [address] [address]  Phone: [phone] Fax: [phone]

## 2021-11-09 DIAGNOSIS — F41.9 ANXIETY: ICD-10-CM

## 2021-11-09 DIAGNOSIS — M15.9 PRIMARY OSTEOARTHRITIS INVOLVING MULTIPLE JOINTS: ICD-10-CM

## 2021-11-09 DIAGNOSIS — F51.01 PRIMARY INSOMNIA: ICD-10-CM

## 2021-11-09 RX ORDER — LORAZEPAM 1 MG/1
TABLET ORAL
Qty: 60 TABLET | Refills: 0 | Status: SHIPPED | OUTPATIENT
Start: 2021-11-09 | End: 2021-12-07 | Stop reason: SDUPTHER

## 2021-11-09 RX ORDER — HYDROCODONE BITARTRATE AND ACETAMINOPHEN 5; 325 MG/1; MG/1
1 TABLET ORAL 4 TIMES DAILY PRN
Qty: 120 TABLET | Refills: 0 | Status: SHIPPED | OUTPATIENT
Start: 2021-11-09 | End: 2021-12-07 | Stop reason: SDUPTHER

## 2021-11-09 NOTE — TELEPHONE ENCOUNTER
HYDROcodone-acetaminophen (NORCO) 5-325 MG per tablet 120 tablet 0 10/12/2021 11/11/2021    Sig - Route:  Take 1 tablet by mouth 4 times daily as needed for Pain for up to 30 days. - Oral      LORazepam (ATIVAN) 1 MG tablet 60 tablet 0 10/12/2021 1/11/2022    Sig: TAKE 1/2 TABLET BY MOUTH TWICE DAILY AND 1 EVERY NIGHT AT BEDTIME AS NEEDED FOR ANXIETY        [facility] in chart

## 2021-11-09 NOTE — TELEPHONE ENCOUNTER
Medication:   Requested Prescriptions     Pending Prescriptions Disp Refills    HYDROcodone-acetaminophen (NORCO) 5-325 MG per tablet 120 tablet 0     Sig: Take 1 tablet by mouth 4 times daily as needed for Pain for up to 30 days.  LORazepam (ATIVAN) 1 MG tablet 60 tablet 0     Sig: TAKE 1/2 TABLET BY MOUTH TWICE DAILY AND 1 EVERY NIGHT AT BEDTIME AS NEEDED FOR ANXIETY      Last Filled:  10/12/21    Patient Phone Number: [phone] (home)     Last appt: 10/26/2021   Next appt: 12/7/2021    Last OARRS:   RX Monitoring 3/20/2019   Attestation The Prescription Monitoring Report for this patient was reviewed today.    Periodic Controlled Substance Monitoring -     PDMP Monitoring:    Last PDMP Marylee Liter as Reviewed [facility]):  Review User Review Instant Review Result   Kun MONTEZ 7/12/2021 12:57 PM Reviewed PDMP [1]     Preferred Pharmacy:     [facility], [address]  [address] [place] [address]  Phone: [phone] Fax: [phone]

## 2021-11-25 NOTE — CARE COORDINATION
[facility] Transitions Follow Up Call    2019    Patient: Kenneth Wolfe  Patient : 1935   MRN: <[id number]>  Reason for Admission: PNA  Discharge Date: 19 RARS: Readmission Risk Score: 23         Spoke with: Kenneth Wolfe (patient)    Care Transitions Subsequent and Final Call    Schedule Follow Up Appointment with PCP:  Completed  Subsequent and Final Calls  Do you have any ongoing symptoms?:  Yes  Onset of Patient-reported symptoms:  Other  Patient-reported symptoms:  Other  Interventions for patient-reported symptoms:  Other  Have your medications changed?:  No  Do you have any questions related to your medications?:  No  Do you currently have any active services?:  No  Do you have any needs or concerns that I can assist you with?:  No  Identified Barriers:  None  Care Transitions Interventions  No Identified Needs  Other Interventions:          Reports her breathing is improved. Denies fever, chills. States she is occasionally \"anil dizzy\". No falls or injuries. Thinks it is her new diabetic medication. Monitoring BS. States Terrance Reeks are good\" but reports \"anil low yesterday\". Asked how low - states 72. States she is trying to learn a diabetic diet what she can and cannot eat. Her kids are also making recommendations. Per diet discussion, doesn't sound like she eats a lot of carbs to begin with but she is decreasing. States she will continue to monitor sugar. Has juice int he home. Knows to drink 4oz and check sugar if symptomatic. Reviewed s/s hypoglycemia. She plans to take glucometer and discuss with PCP at [facility] visit on . Care transition following.     Follow Up  Future Appointments   Date Time Provider Alissa Bustillo   2019  9:30 AM Guero Farooq MD [facility]   2019  4:00 PM [address], APRN - CNP PULM & CC [facility]   2019  3:00 PM KATHY Crandall CNP FF Cardio [facility]   2019  2:10 PM Guero Farooq MD [facility]       Krista Vasques
[[id number]]

## 2021-11-29 ENCOUNTER — HOSPITAL ENCOUNTER (OUTPATIENT)
Age: 86
Discharge: HOME | End: 2021-11-29
Payer: MEDICARE

## 2021-11-29 ENCOUNTER — TELEPHONE (OUTPATIENT)
Dept: PULMONOLOGY | Age: 86
End: 2021-11-29

## 2021-11-29 ENCOUNTER — HOSPITAL ENCOUNTER (OUTPATIENT)
Dept: GENERAL RADIOLOGY | Age: 86
Discharge: HOME | End: 2021-11-29
Payer: MEDICARE

## 2021-11-29 DIAGNOSIS — R05.9 COUGH: ICD-10-CM

## 2021-11-29 DIAGNOSIS — R05.9 COUGH: Primary | ICD-10-CM

## 2021-11-29 PROCEDURE — 71046 X-RAY EXAM CHEST 2 VIEWS: CPT

## 2021-11-29 RX ORDER — DOXYCYCLINE HYCLATE 100 MG
100 TABLET ORAL DAILY
Qty: 10 TABLET | Refills: 0 | Status: SHIPPED | OUTPATIENT
Start: 2021-11-29 | End: 2021-12-07

## 2021-11-29 NOTE — TELEPHONE ENCOUNTER
For 3 days pt has had a cough, spitting up yellow chunks with sinus drainage. No fever.   She uses [facility]     Ph # [phone]

## 2021-11-29 NOTE — TELEPHONE ENCOUNTER
Pt daughter called and said that her mom would like to get a cxr, she said it feels like she has an elephant on her chest.    Call daughter back at [phone]

## 2021-12-07 ENCOUNTER — OFFICE VISIT (OUTPATIENT)
Dept: FAMILY MEDICINE CLINIC | Age: 86
End: 2021-12-07
Payer: MEDICARE

## 2021-12-07 VITALS
OXYGEN SATURATION: 93 % | SYSTOLIC BLOOD PRESSURE: 120 MMHG | BODY MASS INDEX: 29.63 KG/M2 | WEIGHT: 162 LBS | TEMPERATURE: 97.1 F | HEART RATE: 66 BPM | DIASTOLIC BLOOD PRESSURE: 80 MMHG

## 2021-12-07 DIAGNOSIS — F51.01 PRIMARY INSOMNIA: ICD-10-CM

## 2021-12-07 DIAGNOSIS — E03.9 ACQUIRED HYPOTHYROIDISM: ICD-10-CM

## 2021-12-07 DIAGNOSIS — M15.9 PRIMARY OSTEOARTHRITIS INVOLVING MULTIPLE JOINTS: ICD-10-CM

## 2021-12-07 DIAGNOSIS — E11.22 CONTROLLED TYPE 2 DIABETES MELLITUS WITH STAGE 2 CHRONIC KIDNEY DISEASE, WITHOUT LONG-TERM CURRENT USE OF INSULIN (HCC): ICD-10-CM

## 2021-12-07 DIAGNOSIS — I10 HTN (HYPERTENSION), BENIGN: Primary | ICD-10-CM

## 2021-12-07 DIAGNOSIS — R61 NIGHT SWEATS: ICD-10-CM

## 2021-12-07 DIAGNOSIS — F41.9 ANXIETY: ICD-10-CM

## 2021-12-07 DIAGNOSIS — N18.2 CONTROLLED TYPE 2 DIABETES MELLITUS WITH STAGE 2 CHRONIC KIDNEY DISEASE, WITHOUT LONG-TERM CURRENT USE OF INSULIN (HCC): ICD-10-CM

## 2021-12-07 DIAGNOSIS — E78.5 HYPERLIPIDEMIA, UNSPECIFIED HYPERLIPIDEMIA TYPE: ICD-10-CM

## 2021-12-07 PROCEDURE — G8417 CALC BMI ABV UP PARAM F/U: HCPCS | Performed by: FAMILY MEDICINE

## 2021-12-07 PROCEDURE — 1036F TOBACCO NON-USER: CPT | Performed by: FAMILY MEDICINE

## 2021-12-07 PROCEDURE — 1090F PRES/ABSN URINE INCON ASSESS: CPT | Performed by: FAMILY MEDICINE

## 2021-12-07 PROCEDURE — 1123F ACP DISCUSS/DSCN MKR DOCD: CPT | Performed by: FAMILY MEDICINE

## 2021-12-07 PROCEDURE — G8427 DOCREV CUR MEDS BY ELIG CLIN: HCPCS | Performed by: FAMILY MEDICINE

## 2021-12-07 PROCEDURE — 4040F PNEUMOC VAC/ADMIN/RCVD: CPT | Performed by: FAMILY MEDICINE

## 2021-12-07 PROCEDURE — G8399 PT W/DXA RESULTS DOCUMENT: HCPCS | Performed by: FAMILY MEDICINE

## 2021-12-07 PROCEDURE — G8484 FLU IMMUNIZE NO ADMIN: HCPCS | Performed by: FAMILY MEDICINE

## 2021-12-07 PROCEDURE — 99214 OFFICE O/P EST MOD 30 MIN: CPT | Performed by: FAMILY MEDICINE

## 2021-12-07 RX ORDER — LORAZEPAM 1 MG/1
TABLET ORAL
Qty: 60 TABLET | Refills: 2 | Status: SHIPPED | OUTPATIENT
Start: 2021-12-07 | End: 2022-03-01 | Stop reason: SDUPTHER

## 2021-12-07 RX ORDER — HYDROCODONE BITARTRATE AND ACETAMINOPHEN 5; 325 MG/1; MG/1
1 TABLET ORAL 4 TIMES DAILY PRN
Qty: 120 TABLET | Refills: 0 | Status: SHIPPED | OUTPATIENT
Start: 2021-12-07 | End: 2022-01-11 | Stop reason: SDUPTHER

## 2021-12-07 RX ORDER — LEVOTHYROXINE SODIUM 0.07 MG/1
TABLET ORAL
Qty: 90 TABLET | Refills: 1 | Status: SHIPPED | OUTPATIENT
Start: 2021-12-07 | End: 2022-03-23 | Stop reason: SDUPTHER

## 2021-12-07 NOTE — PROGRESS NOTES
Subjective:      Patient ID: Neeta Butler is a 80 y.o. female. CC: Patient presents for re-evaluation of chronic health problems including hypertension, chest discomfort and wheezing and night sweats. Dougie HUMPHREY Patient presents for one month follow-up in accompaniment of daughter. Patient had a chest xray done last Monday and they would like to know what it says. She was having some chest heaviness and wheezing. She has been having sweating, headaches, and shaking. Patient did do a home covid test and it was negative. She has not been feeling well lately. She describes night sweats as she has now completed antibiotic therapy as of today. She was treated with 10 days of antibiotics per pulmonary for possible pneumonia. She feels her respiratory status is significantly improved. She was mainly to come in today for blood pressure check which has been better controlled at home. She does request a refill of pain medications and anxiety medications.     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, without long-term current use of insulin (HCC)    Restless legs syndrome    ANTHONY (obstructive sleep apnea)    Allergic rhinitis    Dizziness    Interstitial lung disease ([address]    Coronary artery disease involving native coronary artery of native heart with angina pectoris ([address]    PAF (paroxysmal atrial fibrillation) ([address]    Hypothyroid    Chronic respiratory failure with hypoxia (HCC)    SOB (shortness of breath)    Anxiety    Ataxia    HTN (hypertension), benign    Centrilobular emphysema ([address]    Pulmonary nodule    Ptosis of eyelid, bilateral       Outpatient Medications Marked as Taking for the 12/7/21 encounter (Office Visit) with Orlando Phillips MD   Medication Sig Dispense Refill   Claudia Coral HYDROcodone-acetaminophen (NORCO) 5-325 MG per tablet Take 1 tablet by mouth 4 times daily as needed for Pain for up to 30 days.  120 tablet 0    LORazepam (ATIVAN) 1 MG tablet TAKE 1/2 TABLET BY MOUTH TWICE DAILY AND 1 EVERY NIGHT AT BEDTIME AS NEEDED FOR ANXIETY 60 tablet 0    fluticasone (FLONASE) 50 MCG/ACT nasal spray SHAKE LIQUID AND USE 2 SPRAYS IN EACH NOSTRIL DAILY 48 g 3    cloNIDine (CATAPRES) 0.1 MG tablet Take 1 tablet by mouth 2 times daily 180 tablet 0    pramipexole (MIRAPEX) 0.25 MG tablet TAKE 1 TABLET BY MOUTH EVERY NIGHT 90 tablet 0    atorvastatin (LIPITOR) 80 MG tablet Take 1 tablet by mouth nightly 90 tablet 3    dicyclomine (BENTYL) 10 MG capsule TAKE 1 CAPSULE BY MOUTH FOUR TIMES DAILY AS NEEDED FOR ABDOMINAL PAIN 360 capsule 0    levothyroxine (SYNTHROID) 75 MCG tablet TAKE 1 TABLET EVERY DAY 90 tablet 0    irbesartan (AVAPRO) 300 MG tablet TAKE 1 TABLET BY MOUTH EVERY NIGHT 90 tablet 1    meclizine (ANTIVERT) 12.5 MG tablet TAKE 1 TABLET THREE TIMES DAILY AS NEEDED 270 tablet 1    dilTIAZem (CARDIZEM CD) 240 MG extended release capsule TAKE 1 CAPSULE EVERY DAY 90 capsule 1    hydrALAZINE (APRESOLINE) 50 MG tablet TAKE 1 TABLET BY MOUTH THREE TIMES DAILY 270 tablet 1    apixaban (ELIQUIS) 5 MG TABS tablet Take 1 tablet by mouth 2 times daily 180 tablet 1    bethanechol (URECHOLINE) 25 MG tablet Take 25 mg by mouth 3 times daily      Lancets MISC 1 each by Does not apply route 2 times daily 300 each 1    conjugated estrogens (PREMARIN) 0.625 MG/GM vaginal cream Place 1 g vaginally Twice a Week 1 Tube 3    ondansetron (ZOFRAN) 4 MG tablet Take 1 tablet by mouth daily as needed for Nausea or Vomiting 30 tablet 0    OXYGEN Inhale 2 L into the lungs      propafenone (RYTHMOL) 150 MG tablet Take 1 tablet by mouth every 8 hours 270 tablet 1    budesonide-formoterol (SYMBICORT) 160-4.5 MCG/ACT AERO INHALE 2 PUFFS INTO THE LUNGS TWICE DAILY 3 Inhaler 0    blood glucose monitor strips Test one time a day 100 strip 5    levalbuterol (XOPENEX) 0.63 MG/3ML nebulization INHALE CONTENTS OF 1 VIAL PER NEBULIZER EVERY 6 HOURS AS NEEDED FOR WHEEZING 1050 mL 3    Respiratory Therapy Supplies (NEBULIZER/TUBING/MOUTHPIECE) KIT 1 kit by Does not apply route 4 times daily as needed (shortness of breath) 1 kit 0    albuterol sulfate HFA (PROAIR HFA) 108 (90 Base) MCG/ACT inhaler Inhale 2 puffs into the lungs every 6 hours as needed for Wheezing 1 Inhaler 6       Allergies   Allergen Reactions    Adhesive Tape Anaphylaxis    Cefaclor Nausea And Vomiting     Other reaction(s): Chest pain    Nsaids      Pt states she has hives and heart races   Other reaction(s): Tachycardia    Clinoril [Sulindac] Other (See Comments)     Stomach pain    Codeine      FEEL NUMB    Diclofenac     Diclofenac Sodium Hives    Diclofenac Sodium Hives    Hctz [Hydrochlorothiazide]      Sob    Ibuprofen Other (See Comments)     Other reaction(s): Tachycardia    Macrobid [Nitrofurantoin Macrocrystal]      nausea    Motrin [Ibuprofen Micronized] Other (See Comments)     Tachycardia      Pcn [Penicillins] Hives    Peach [Prunus Persica] Itching and Swelling     Cannot eat raw peaches    Prednisone      Causes elevated blood pressure     Sulfa Antibiotics      Nausea, diarrhea       Social History     Tobacco Use    Smoking status: Former Smoker     Packs/day: 1.00     Years: 45.00     Pack years: 45.00     Types: Cigarettes     Start date: 9/15/1950     Quit date: 1995     Years since quittin.9    Smokeless tobacco: Never Used   Substance Use Topics    Alcohol use: No     Alcohol/week: 0.0 standard drinks       /80 (Site: Right Upper Arm, Position: Sitting, Cuff Size: Large Adult)   Pulse 66   Temp 97.1 °F (36.2 °C) (Infrared)   Wt 162 lb (73.5 kg)   SpO2 93%   BMI 29.63 kg/m²         Objective:   Physical Exam  Constitutional:       General: She is not in acute distress.      Appearance: DAY    Hyperlipidemia, unspecified hyperlipidemia type  -     Lipid Panel; Future  -     Comprehensive Metabolic Panel; Future    Controlled type 2 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 disease, without long-term current use of insulin (HCC)  -     Comprehensive Metabolic Panel; Future  -     Hemoglobin A1C; Future    Night sweats    OARRS report checked          Plan:      Maintain current blood pressure management    Proceed with laboratory testing    Night sweats may be secondary to resolution of illness and clinically she appears to be doing better from what she described    May continue with lorazepam on a as needed basis and pain medication as needed. Keep RTC appointment in January    Medical decision making of moderate complexity. Please note that this chart was generated using Dragon dictation software. Although every effort was made to ensure the accuracy of this automated transcription, some errors in transcription may have occurred.

## 2021-12-08 ENCOUNTER — TELEPHONE (OUTPATIENT)
Dept: FAMILY MEDICINE CLINIC | Age: 86
End: 2021-12-08

## 2021-12-08 DIAGNOSIS — J18.9 PNEUMONIA DUE TO INFECTIOUS ORGANISM, UNSPECIFIED LATERALITY, UNSPECIFIED PART OF LUNG: Primary | ICD-10-CM

## 2021-12-08 RX ORDER — LEVOFLOXACIN 500 MG/1
500 TABLET, FILM COATED ORAL DAILY
Qty: 7 TABLET | Refills: 0 | Status: SHIPPED | OUTPATIENT
Start: 2021-12-08 | End: 2022-01-14 | Stop reason: SDUPTHER

## 2021-12-08 NOTE — TELEPHONE ENCOUNTER
Pt advised of all information and will go to [id number] [facility] for chest xray. Order put in Alecia Matos.

## 2021-12-08 NOTE — TELEPHONE ENCOUNTER
Patient was seen yesterday. She states today that she can barely walk across the room and is still having trouble breathing.     Please advise

## 2021-12-08 NOTE — TELEPHONE ENCOUNTER
A prescription for Levaquin antibiotic was sent to the pharmacy but I also recommend a PA and lateral chest x-ray with a diagnosis of pneumonia.   If her breathing worsens she may need to go to the emergency room

## 2021-12-09 ENCOUNTER — HOSPITAL ENCOUNTER (OUTPATIENT)
Age: 86
Discharge: HOME | End: 2021-12-09
Payer: MEDICARE

## 2021-12-09 ENCOUNTER — HOSPITAL ENCOUNTER (OUTPATIENT)
Dept: GENERAL RADIOLOGY | Age: 86
Discharge: HOME | End: 2021-12-09
Payer: MEDICARE

## 2021-12-09 ENCOUNTER — TELEPHONE (OUTPATIENT)
Dept: FAMILY MEDICINE CLINIC | Age: 86
End: 2021-12-09

## 2021-12-09 ENCOUNTER — TELEPHONE (OUTPATIENT)
Dept: PULMONOLOGY | Age: 86
End: 2021-12-09

## 2021-12-09 DIAGNOSIS — N18.2 CONTROLLED TYPE 2 DIABETES MELLITUS WITH STAGE 2 CHRONIC KIDNEY DISEASE, WITHOUT LONG-TERM CURRENT USE OF INSULIN (HCC): ICD-10-CM

## 2021-12-09 DIAGNOSIS — E11.22 CONTROLLED TYPE 2 DIABETES MELLITUS WITH STAGE 2 CHRONIC KIDNEY DISEASE, WITHOUT LONG-TERM CURRENT USE OF INSULIN (HCC): ICD-10-CM

## 2021-12-09 DIAGNOSIS — E78.5 HYPERLIPIDEMIA, UNSPECIFIED HYPERLIPIDEMIA TYPE: ICD-10-CM

## 2021-12-09 DIAGNOSIS — I10 HTN (HYPERTENSION), BENIGN: ICD-10-CM

## 2021-12-09 DIAGNOSIS — J18.9 PNEUMONIA DUE TO INFECTIOUS ORGANISM, UNSPECIFIED LATERALITY, UNSPECIFIED PART OF LUNG: ICD-10-CM

## 2021-12-09 LAB
A/G RATIO: 1.3 (ref 1.1–2.2)
ALBUMIN SERPL-MCNC: 3.9 G/DL (ref 3.4–5)
ALP BLD-CCNC: 134 U/L (ref 40–129)
ALT SERPL-CCNC: 20 U/L (ref 10–40)
ANION GAP SERPL CALCULATED.3IONS-SCNC: 15 MMOL/L (ref 3–16)
AST SERPL-CCNC: 17 U/L (ref 15–37)
BILIRUB SERPL-MCNC: 0.3 MG/DL (ref 0–1)
BUN BLDV-MCNC: 20 MG/DL (ref 7–20)
CALCIUM SERPL-MCNC: 9.6 MG/DL (ref 8.3–10.6)
CHLORIDE BLD-SCNC: 103 MMOL/L (ref 99–110)
CHOLESTEROL, TOTAL: 127 MG/DL (ref 0–199)
CO2: 20 MMOL/L (ref 21–32)
CREAT SERPL-MCNC: 1 MG/DL (ref 0.6–1.2)
GFR AFRICAN AMERICAN: >60
GFR NON-AFRICAN AMERICAN: 53
GLUCOSE BLD-MCNC: 110 MG/DL (ref 70–99)
HCT VFR BLD CALC: 31.7 % (ref 36–48)
HDLC SERPL-MCNC: 48 MG/DL (ref 40–60)
HEMOGLOBIN: 10.5 G/DL (ref 12–16)
LDL CHOLESTEROL CALCULATED: 60 MG/DL
MCH RBC QN AUTO: 29.3 PG (ref 26–34)
MCHC RBC AUTO-ENTMCNC: 33.1 G/DL (ref 31–36)
MCV RBC AUTO: 88.4 FL (ref 80–100)
PDW BLD-RTO: 14.8 % (ref 12.4–15.4)
PLATELET # BLD: 174 K/UL (ref 135–450)
PMV BLD AUTO: 7.8 FL (ref 5–10.5)
POTASSIUM SERPL-SCNC: 4.2 MMOL/L (ref 3.5–5.1)
RBC # BLD: 3.58 M/UL (ref 4–5.2)
SODIUM BLD-SCNC: 138 MMOL/L (ref 136–145)
TOTAL PROTEIN: 6.8 G/DL (ref 6.4–8.2)
TRIGL SERPL-MCNC: 96 MG/DL (ref 0–150)
VLDLC SERPL CALC-MCNC: 19 MG/DL
WBC # BLD: 5 K/UL (ref 4–11)

## 2021-12-09 PROCEDURE — 85027 COMPLETE CBC AUTOMATED: CPT

## 2021-12-09 PROCEDURE — 71046 X-RAY EXAM CHEST 2 VIEWS: CPT

## 2021-12-09 PROCEDURE — 36415 COLL VENOUS BLD VENIPUNCTURE: CPT

## 2021-12-09 PROCEDURE — 80061 LIPID PANEL: CPT

## 2021-12-09 PROCEDURE — 80053 COMPREHEN METABOLIC PANEL: CPT

## 2021-12-09 PROCEDURE — 83036 HEMOGLOBIN GLYCOSYLATED A1C: CPT

## 2021-12-10 LAB
ESTIMATED AVERAGE GLUCOSE: 145.6 MG/DL
HBA1C MFR BLD: 6.7 %

## 2021-12-15 ENCOUNTER — TELEPHONE (OUTPATIENT)
Dept: FAMILY MEDICINE CLINIC | Age: 86
End: 2021-12-15

## 2021-12-15 NOTE — TELEPHONE ENCOUNTER
Patient states that she is unable to function and stay awake.   Patient has done this for 3 days      Patient doenst know if its her medicine      Please advise

## 2021-12-15 NOTE — TELEPHONE ENCOUNTER
Patient advised, she states she is feeling better and her blood pressure is doing ok. I advised her if she feels like that again that she might need to go to the ED depending on how she feels.

## 2021-12-15 NOTE — TELEPHONE ENCOUNTER
She is not able to function or stay awake she probably needs to go the emergency room. Certainly I would recommend we eliminate any sedate of medications which would be the pain medications and lorazepam.  She may take Tylenol for pain.

## 2021-12-22 RX ORDER — LORATADINE 10 MG/1
10 TABLET ORAL DAILY
Qty: 90 TABLET | Refills: 0 | Status: SHIPPED | OUTPATIENT
Start: 2021-12-22 | End: 2022-04-07

## 2021-12-22 RX ORDER — PROPAFENONE HYDROCHLORIDE 150 MG/1
150 TABLET, FILM COATED ORAL EVERY 8 HOURS SCHEDULED
Qty: 270 TABLET | Refills: 0 | Status: SHIPPED | OUTPATIENT
Start: 2021-12-22 | End: 2022-04-05

## 2021-12-22 NOTE — TELEPHONE ENCOUNTER
propafenone (RYTHMOL) 150 MG tablet [[id number]    [facility] DRUG STORE Rosana Woodall [address], [address] [facility] Hernán Moscoso [phone]   [address] [address]   Phone:  [phone]  Fax:  [phone]

## 2022-01-01 NOTE — TELEPHONE ENCOUNTER
Ok to hold and discuss with WM at visit  Tell her to check her BP twice daily and bring to her visit
Patient called into nurse triage for complaints of low blood pressure readings. Patient is currently taking Clonidine 0.1mg twice a day. Patient states that after she takes this she feels really tired and weak. This has been going on for about 3 weeks. She states that she goes to sleep for 2-3 hours after she takes it. She was calling to make us aware that she is going to stop taking the medication because she does not like the way it makes her feel. Advised that Dr. Manuel Contreras is out of the office but I am going to send this to the on call provider just to make sure that this is okay and if there are any other changes that needs to be made. Patient verbalizes understanding and in agreement. (See Nurse Triage note.)     Please advise. Does have an appointment schedule with Dr. Manuel Contreras on 10/26/21 as well.
Pt advise on 10/22/21
11.020

## 2022-01-03 RX ORDER — DICYCLOMINE HYDROCHLORIDE 10 MG/1
CAPSULE ORAL
Qty: 360 CAPSULE | Refills: 0 | Status: SHIPPED | OUTPATIENT
Start: 2022-01-03 | End: 2022-04-08

## 2022-01-11 DIAGNOSIS — M15.9 PRIMARY OSTEOARTHRITIS INVOLVING MULTIPLE JOINTS: ICD-10-CM

## 2022-01-11 RX ORDER — HYDROCODONE BITARTRATE AND ACETAMINOPHEN 5; 325 MG/1; MG/1
1 TABLET ORAL 4 TIMES DAILY PRN
Qty: 120 TABLET | Refills: 0 | Status: SHIPPED | OUTPATIENT
Start: 2022-01-11 | End: 2022-03-07 | Stop reason: SDUPTHER

## 2022-01-11 NOTE — TELEPHONE ENCOUNTER
HYDROcodone-acetaminophen ([address]) 5-325 MG per tablet [[id number]] [address] [facility], 8[address] [facility] Flip [facility] [phone]   [address] [address]   Phone:  [phone]  Fax:  [phone]

## 2022-01-11 NOTE — TELEPHONE ENCOUNTER
Medication:   Requested Prescriptions     Pending Prescriptions Disp Refills    HYDROcodone-acetaminophen (NORCO) 5-325 MG per tablet 120 tablet 0     Sig: Take 1 tablet by mouth 4 times daily as needed for Pain for up to 30 days. Last Filled:  12/7/21  Patient Phone Number: [phone] (home)     Last appt: 12/7/2021   Next appt: Visit date not found    Last OARRS:   RX Monitoring 3/20/2019   Attestation The Prescription Monitoring Report for this patient was reviewed today.    Periodic Controlled Substance Monitoring -     PDMP Monitoring:    Last PDMP Bill Cerda as Reviewed [facility]):  Review User Review Instant Review Result   Kirit MONTEZ 7/12/2021 12:57 PM Reviewed PDMP [1]     [facility]     [facility] [address], [address] [address]  Phone: [phone] Fax: [phone]

## 2022-01-14 ENCOUNTER — TELEPHONE (OUTPATIENT)
Dept: FAMILY MEDICINE CLINIC | Age: 87
End: 2022-01-14

## 2022-01-14 RX ORDER — LEVOFLOXACIN 500 MG/1
500 TABLET, FILM COATED ORAL DAILY
Qty: 7 TABLET | Refills: 0 | Status: SHIPPED | OUTPATIENT
Start: 2022-01-14 | End: 2022-01-14

## 2022-01-14 RX ORDER — DOXYCYCLINE HYCLATE 100 MG
100 TABLET ORAL 2 TIMES DAILY
Qty: 14 TABLET | Refills: 0 | Status: SHIPPED | OUTPATIENT
Start: 2022-01-14 | End: 2022-01-24 | Stop reason: SDUPTHER

## 2022-01-14 NOTE — TELEPHONE ENCOUNTER
Patient is calling stating that she has a sinus infection and she will not be able to come in for an appointment. She would like something called in.     Please advise

## 2022-01-14 NOTE — TELEPHONE ENCOUNTER
Patient is calling stating that this is too strong and she can't take it.     levoFLOXacin (LEVAQUIN) 500 MG tablet 7 tablet 0 1/14/2022 1/21/2022    Sig - Route:  Take 1 tablet by mouth daily for 7 days - Oral        Please advise

## 2022-01-18 ENCOUNTER — TELEPHONE (OUTPATIENT)
Dept: FAMILY MEDICINE CLINIC | Age: 87
End: 2022-01-18

## 2022-01-18 NOTE — TELEPHONE ENCOUNTER
PT is getting dizziness from the doxycycline hyclate (VIBRA-TABS) 100 MG tablet and is wanting to ask Dr Jeri Ceja if he can give her something else that won't cause dizziness. Please advise?

## 2022-01-18 NOTE — TELEPHONE ENCOUNTER
Pt stated that she meant meclizine is not working for her dizziness and wants to know if there is something else she can take or be prescribe?

## 2022-01-18 NOTE — TELEPHONE ENCOUNTER
This is not an antibiotic to cause his dizziness. The dizziness prior to that underlying sinus issues and would recommend she use antibiotic to eliminate to sinus problems.

## 2022-01-24 RX ORDER — DOXYCYCLINE HYCLATE 100 MG
100 TABLET ORAL 2 TIMES DAILY
Qty: 14 TABLET | Refills: 0 | Status: SHIPPED | OUTPATIENT
Start: 2022-01-24 | End: 2022-04-15 | Stop reason: SDUPTHER

## 2022-01-24 NOTE — TELEPHONE ENCOUNTER
Spoke with pt's daughter and she stated that pt's sx have not clear still have sinus issues, coughing phlegm

## 2022-01-24 NOTE — TELEPHONE ENCOUNTER
PT is requesting a refill on doxycycline hyclate (VIBRA-TABS) 100 MG tablet to please be called into [facility] [address] [address], [address] [place] [address]

## 2022-01-24 NOTE — TELEPHONE ENCOUNTER
PT is requesting a refill on  loratadine (CLARITIN) 10 MG tablet to please be called into Ferny Woodall [address], [address] Tim Castelan

## 2022-01-28 RX ORDER — PRAMIPEXOLE DIHYDROCHLORIDE 0.25 MG/1
TABLET ORAL
Qty: 90 TABLET | Refills: 1 | Status: SHIPPED | OUTPATIENT
Start: 2022-01-28 | End: 2022-02-15

## 2022-01-28 NOTE — TELEPHONE ENCOUNTER
Medication:   Requested Prescriptions     Pending Prescriptions Disp Refills    pramipexole (MIRAPEX) 0.25 MG tablet 90 tablet 1     Sig: TAKE 1 TABLET BY MOUTH EVERY NIGHT      Last Filled:     Patient Phone Number: [phone] (home)     Last appt: 12/7/2021   Next appt: 2/15/2022    Last OARRS:   RX Monitoring 3/20/2019   Attestation The Prescription Monitoring Report for this patient was reviewed today.    Periodic Controlled Substance Monitoring -     PDMP Monitoring:    Last PDMP [place] as Reviewed [facility]):  Review User Review Instant Review Result   Jan MONTEZ 7/12/2021 12:57 PM Reviewed PDMP [1]     Preferred Pharmacy:   [facility] DR[facility] STORE [facility] 992, 6941 [facility] [phone]  [address] [place] [address]  Phone: [phone] Fax: [phone]    [facility] - CeeGuerrero ramirez [phone] - f [phone]  [address] [place] [address]  Phone: [phone] Fax: [phone]

## 2022-02-01 ENCOUNTER — TELEPHONE (OUTPATIENT)
Dept: FAMILY MEDICINE CLINIC | Age: 87
End: 2022-02-01

## 2022-02-01 NOTE — TELEPHONE ENCOUNTER
PT is requesting a refill onapixaban (ELIQUIS) 5 MG TABS tablet to please be called into  Vicente  Tracee Jon Alaniz [address], [address] [address]

## 2022-02-01 NOTE — TELEPHONE ENCOUNTER
Medication:   Requested Prescriptions     Pending Prescriptions Disp Refills    apixaban (ELIQUIS) 5 MG TABS tablet 180 tablet 1     Sig: Take 1 tablet by mouth 2 times daily      Last Filled:      Patient Phone Number: [phone] (home)     Last appt: 12/7/2021   Next appt: 2/15/2022    Last OARRS:   RX Monitoring 3/20/2019   Attestation The Prescription Monitoring Report for this patient was reviewed today.    Periodic Controlled Substance Monitoring -     PDMP Monitoring:    Last PDMP [place] as Reviewed [facility]):  Review User Review Instant Review Result   Rg MONTEZ 7/12/2021 12:57 PM Reviewed PDMP [1]     Preferred Pharmacy:   [facility] DRUG STORE [facility] 611, 9136 [facility] [phone]  [address] [place] [address]  Phone: [phone] Fax: [phone]    [facility] - CeeGuerrero ramirez [phone] - f [phone]  [address] [place] [address]  Phone: [phone] Fax: [phone]

## 2022-02-04 RX ORDER — IRBESARTAN 300 MG/1
TABLET ORAL
Qty: 90 TABLET | Refills: 0 | Status: SHIPPED | OUTPATIENT
Start: 2022-02-04 | End: 2022-04-18

## 2022-02-04 NOTE — TELEPHONE ENCOUNTER
Medication:   Requested Prescriptions     Pending Prescriptions Disp Refills    irbesartan (AVAPRO) 300 MG tablet [Pharmacy Med Name: IRBESARTAN 300 MG Tablet] 90 tablet 1     Sig: TAKE 1 TABLET BY MOUTH EVERY NIGHT      Last Filled:      Patient Phone Number: [phone] (home)     Last appt: 12/7/2021   Next appt: 2/15/2022    Last OARRS:   RX Monitoring 3/20/2019   Attestation The Prescription Monitoring Report for this patient was reviewed today.    Periodic Controlled Substance Monitoring -     PDMP Monitoring:    Last PDMP [place] as Reviewed [facility]):  Review User Review Instant Review Result   Cheri MONTEZ 7/12/2021 12:57 PM Reviewed PDMP [1]     Preferred Pharmacy:   [facility] STORE [facility] 054, 8427 [facility] [phone]  [address] [place] [address]  Phone: [phone] Fax: [phone]    [facility] - Guerrero Cee [phone] - F [phone]  [address] [place] [address]  Phone: [phone] Fax: [phone]

## 2022-02-10 NOTE — TELEPHONE ENCOUNTER
Medication:   Requested Prescriptions     Pending Prescriptions Disp Refills    meclizine (ANTIVERT) 12.5 MG tablet [Pharmacy Med Name: Marciano Ramsay 12.5 MG Tablet] 270 tablet 1     Sig: TAKE 1 TABLET THREE TIMES DAILY AS NEEDED      Last Filled:     Patient Phone Number: [phone] (home)     Last appt: 12/7/2021   Next appt: 2/15/2022    Last OARRS:   RX Monitoring 3/20/2019   Attestation The Prescription Monitoring Report for this patient was reviewed today.    Periodic Controlled Substance Monitoring -     PDMP Monitoring:    Last PDMP Raven Romero as Reviewed [facility]):  Review User Review Instant Review Result   Arianna Wilkinson 7/12/2021 12:57 PM Reviewed PDMP [1]     Preferred Pharmacy:       [facility] Guerrero Cee [phone] - F [phone]  [address] [address] [place] [address]  Phone: [phone] Fax: [phone]

## 2022-02-11 RX ORDER — MECLIZINE HCL 12.5 MG/1
TABLET ORAL
Qty: 270 TABLET | Refills: 1 | Status: SHIPPED | OUTPATIENT
Start: 2022-02-11 | End: 2022-07-12

## 2022-02-11 RX ORDER — CONJUGATED ESTROGENS 0.62 MG/G
1 CREAM VAGINAL
Qty: 30 G | Refills: 2 | Status: SHIPPED | OUTPATIENT
Start: 2022-02-14 | End: 2022-05-17 | Stop reason: SDUPTHER

## 2022-02-11 NOTE — TELEPHONE ENCOUNTER
Medication:   Requested Prescriptions     Pending Prescriptions Disp Refills    conjugated estrogens (PREMARIN) 0.625 MG/GM vaginal cream       Sig: Place 1 g vaginally Twice a Week      Last Filled:      Patient Phone Number: [phone] (home)     Last appt: 2021   Next appt: 2/15/2022    Last OARRS:   RX Monitoring 3/20/2019   Attestation The Prescription Monitoring Report for this patient was reviewed today.    Periodic Controlled Substance Monitoring -     PDMP Monitoring:    Last PDMP Mirtha Wood as Reviewed [facility]):  Review User Review Instant Review Result   Cheri MONTEZ 2021 12:57 PM Reviewed PDMP [1]     Preferred Pharmacy:   [facility] Rosana Woodall [address], [address] [address]  Phone: [phone] Fax: [phone]

## 2022-02-14 RX ORDER — DILTIAZEM HYDROCHLORIDE 240 MG/1
CAPSULE, COATED, EXTENDED RELEASE ORAL
Qty: 90 CAPSULE | Refills: 0 | Status: SHIPPED | OUTPATIENT
Start: 2022-02-14 | End: 2022-04-15 | Stop reason: SDUPTHER

## 2022-02-14 NOTE — TELEPHONE ENCOUNTER
Medication:   Requested Prescriptions     Pending Prescriptions Disp Refills    dilTIAZem (CARDIZEM CD) 240 MG extended release capsule [Pharmacy Med Name: DILTIAZEM HYDROCHLORIDE  MG Capsule Extended Release 24 Hour] 90 capsule 1     Sig: TAKE 1 CAPSULE EVERY DAY      Last Filled:      Patient Phone Number: [phone] (home)     Last appt: 12/7/2021   Next appt: 2/15/2022    Last OARRS:   RX Monitoring 3/20/2019   Attestation The Prescription Monitoring Report for this patient was reviewed today.    Periodic Controlled Substance Monitoring -     PDMP Monitoring:    Last PDMP [place] as Reviewed [facility]):  Review User Review Instant Review Result   Yoselin MONTEZ 7/12/2021 12:57 PM Reviewed PDMP [1]     Preferred Pharmacy:   [facility] DRUG STORE [facility] Ariel [facility] 489, 9128 [facility] [phone]  [address] [place] [address]  Phone: [phone] Fax: [phone]    [facility] - [address] [phone] - F [phone]  [address] [place] [address]  Phone: [phone] Fax: [phone]

## 2022-02-15 ENCOUNTER — OFFICE VISIT (OUTPATIENT)
Dept: FAMILY MEDICINE CLINIC | Age: 87
End: 2022-02-15
Payer: MEDICARE

## 2022-02-15 VITALS
SYSTOLIC BLOOD PRESSURE: 120 MMHG | OXYGEN SATURATION: 96 % | HEART RATE: 68 BPM | TEMPERATURE: 97.2 F | WEIGHT: 160 LBS | BODY MASS INDEX: 29.26 KG/M2 | DIASTOLIC BLOOD PRESSURE: 70 MMHG

## 2022-02-15 DIAGNOSIS — F41.9 ANXIETY: ICD-10-CM

## 2022-02-15 DIAGNOSIS — I10 HTN (HYPERTENSION), BENIGN: Primary | ICD-10-CM

## 2022-02-15 DIAGNOSIS — E11.22 CONTROLLED TYPE 2 DIABETES MELLITUS WITH STAGE 2 CHRONIC KIDNEY DISEASE, WITHOUT LONG-TERM CURRENT USE OF INSULIN (HCC): ICD-10-CM

## 2022-02-15 DIAGNOSIS — N18.2 CONTROLLED TYPE 2 DIABETES MELLITUS WITH STAGE 2 CHRONIC KIDNEY DISEASE, WITHOUT LONG-TERM CURRENT USE OF INSULIN (HCC): ICD-10-CM

## 2022-02-15 DIAGNOSIS — G25.81 RESTLESS LEGS SYNDROME: ICD-10-CM

## 2022-02-15 DIAGNOSIS — M15.9 PRIMARY OSTEOARTHRITIS INVOLVING MULTIPLE JOINTS: ICD-10-CM

## 2022-02-15 DIAGNOSIS — J96.11 CHRONIC RESPIRATORY FAILURE WITH HYPOXIA (HCC): ICD-10-CM

## 2022-02-15 PROCEDURE — 99214 OFFICE O/P EST MOD 30 MIN: CPT | Performed by: FAMILY MEDICINE

## 2022-02-15 RX ORDER — ROPINIROLE 3 MG/1
3 TABLET, FILM COATED ORAL 3 TIMES DAILY PRN
COMMUNITY
End: 2022-06-13

## 2022-02-15 RX ORDER — ROPINIROLE 3 MG/1
TABLET, FILM COATED ORAL
Qty: 270 TABLET | Refills: 1 | Status: SHIPPED | OUTPATIENT
Start: 2022-02-15 | End: 2022-06-27

## 2022-02-15 RX ORDER — ROPINIROLE 3 MG/1
TABLET, FILM COATED ORAL
Qty: 270 TABLET | Refills: 1 | OUTPATIENT
Start: 2022-02-15

## 2022-02-15 ASSESSMENT — PATIENT HEALTH QUESTIONNAIRE - PHQ9
SUM OF ALL RESPONSES TO PHQ QUESTIONS 1-9: 0
2. FEELING DOWN, DEPRESSED OR HOPELESS: 0
SUM OF ALL RESPONSES TO PHQ9 QUESTIONS 1 & 2: 0
1. LITTLE INTEREST OR PLEASURE IN DOING THINGS: 0
SUM OF ALL RESPONSES TO PHQ QUESTIONS 1-9: 0

## 2022-02-15 NOTE — PROGRESS NOTES
Subjective:      Patient ID: Suman Hernandez is a 80 y.o. female. CC: Patient presents for re-evaluation of chronic health problems including restless leg syndrome, osteoarthritis, anxiety, hypertension, respiratory failure with hypoxia and diabetes mellitus. James HUMPHREY Patient presents today for a follow-up on chronic medications and medical conditions in accompaniment of daughter. Patient is having issues with restless legs. Patient also has still been having issues sleeping at night. She states she goes to sleep but wakes up about 3 hours later and can't go back to sleep. She apparently ran out of the Requip medication. She tried Mirapex but this did not seem to help her out much. She feels her pain symptoms are better controlled at this point time as her arthritis seems of settled down. She not requesting a refill of pain medication. She continues with anxiety and feels this is also better controlled and wants to maintain anxiety pills. Blood pressures been much better controlled at home as well although sometimes when she has discomfort her blood pressure will increase but most of time is ranging tween 1 20-1 30.     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, without long-term current use of insulin (HCC)    Restless legs syndrome    ANTHONY (obstructive sleep apnea)    Allergic rhinitis    Dizziness    Interstitial lung disease ([address]    Coronary artery disease involving native coronary artery of native heart with angina pectoris ([address]    PAF (paroxysmal atrial fibrillation) ([address]    Hypothyroid    Chronic respiratory failure with hypoxia (HCC)    SOB (shortness of breath)    Anxiety    Ataxia    HTN (hypertension), benign    Centrilobular emphysema ([address]    Pulmonary nodule    Ptosis of eyelid, bilateral Outpatient Medications Marked as Taking for the 2/15/22 encounter (Office Visit) with Brit Gutierrez MD   Medication Sig Dispense Refill    rOPINIRole (REQUIP) 3 MG tablet Take 3 mg by mouth 3 times daily as needed (restless legs)      dilTIAZem (CARDIZEM CD) 240 MG extended release capsule TAKE 1 CAPSULE EVERY DAY 90 capsule 0    meclizine (ANTIVERT) 12.5 MG tablet TAKE 1 TABLET THREE TIMES DAILY AS NEEDED 270 tablet 1    conjugated estrogens (PREMARIN) 0.625 MG/GM vaginal cream Place 1 g vaginally Twice a Week 30 g 2    irbesartan (AVAPRO) 300 MG tablet TAKE 1 TABLET BY MOUTH EVERY NIGHT 90 tablet 0    apixaban (ELIQUIS) 5 MG TABS tablet Take 1 tablet by mouth 2 times daily 180 tablet 1    pramipexole (MIRAPEX) 0.25 MG tablet TAKE 1 TABLET BY MOUTH EVERY NIGHT 90 tablet 1    dicyclomine (BENTYL) 10 MG capsule TAKE 1 CAPSULE BY MOUTH FOUR TIMES DAILY AS NEEDED FOR ABDOMINAL PAIN 360 capsule 0    propafenone (RYTHMOL) 150 MG tablet Take 1 tablet by mouth every 8 hours 270 tablet 0    loratadine (CLARITIN) 10 MG tablet Take 1 tablet by mouth daily 90 tablet 0    LORazepam (ATIVAN) 1 MG tablet TAKE 1/2 TABLET BY MOUTH TWICE DAILY AND 1 EVERY NIGHT AT BEDTIME AS NEEDED FOR ANXIETY 60 tablet 2    levothyroxine (SYNTHROID) 75 MCG tablet TAKE 1 TABLET EVERY DAY 90 tablet 1    fluticasone (FLONASE) 50 MCG/ACT nasal spray SHAKE LIQUID AND USE 2 SPRAYS IN EACH NOSTRIL DAILY 48 g 3    cloNIDine (CATAPRES) 0.1 MG tablet Take 1 tablet by mouth 2 times daily 180 tablet 0    atorvastatin (LIPITOR) 80 MG tablet Take 1 tablet by mouth nightly 90 tablet 3    hydrALAZINE (APRESOLINE) 50 MG tablet TAKE 1 TABLET BY MOUTH THREE TIMES DAILY 270 tablet 1    Lancets MISC 1 each by Does not apply route 2 times daily 300 each 1    ondansetron (ZOFRAN) 4 MG tablet Take 1 tablet by mouth daily as needed for Nausea or Vomiting 30 tablet 0    OXYGEN Inhale 2 L into the lungs      budesonide-formoterol (SYMBICORT) 160-4.5 MCG/ACT AERO INHALE 2 PUFFS INTO THE LUNGS TWICE DAILY 3 Inhaler 0    blood glucose monitor strips Test one time a day 100 strip 5    levalbuterol (XOPENEX) 0.63 MG/3ML nebulization INHALE CONTENTS OF 1 VIAL PER NEBULIZER EVERY 6 HOURS AS NEEDED FOR WHEEZING 1050 mL 3    Respiratory Therapy Supplies (NEBULIZER/TUBING/MOUTHPIECE) KIT 1 kit by Does not apply route 4 times daily as needed (shortness of breath) 1 kit 0    albuterol sulfate HFA (PROAIR HFA) 108 (90 Base) MCG/ACT inhaler Inhale 2 puffs into the lungs every 6 hours as needed for Wheezing 1 Inhaler 6       Allergies   Allergen Reactions    Adhesive Tape Anaphylaxis    Cefaclor Nausea And Vomiting     Other reaction(s): Chest pain    Nsaids      Pt states she has hives and heart races   Other reaction(s): Tachycardia    Clinoril [Sulindac] Other (See Comments)     Stomach pain    Codeine      FEEL NUMB    Diclofenac     Diclofenac Sodium Hives    Diclofenac Sodium Hives    Hctz [Hydrochlorothiazide]      Sob    Ibuprofen Other (See Comments)     Other reaction(s): Tachycardia    Macrobid [Nitrofurantoin Macrocrystal]      nausea    Motrin [Ibuprofen Micronized] Other (See Comments)     Tachycardia      Pcn [Penicillins] Hives    Peach [Prunus Persica] Itching and Swelling     Cannot eat raw peaches    Prednisone      Causes elevated blood pressure     Sulfa Antibiotics      Nausea, diarrhea       Social History     Tobacco Use    Smoking status: Former Smoker     Packs/day: 1.00     Years: 45.00     Pack years: 45.00     Types: Cigarettes     Start date: 9/15/1950     Quit date: 1995     Years since quittin.1    Smokeless tobacco: Never Used   Substance Use Topics    Alcohol use: No     Alcohol/week: 0.0 standard drinks       /70 (Site: Right Upper Arm, Position: Sitting, Cuff Size: Large Adult)   Pulse 68   Temp 97.2 °F (36.2 °C) (Infrared)   Wt 160 lb (72.6 kg)   SpO2 96%   BMI 29.26 kg/m² Objective:   Physical Exam  Vitals and nursing note reviewed. Constitutional:       General: She is not in acute distress. Appearance: Normal appearance. She is well-developed and well-groomed. Neck:      Vascular: No carotid bruit. Cardiovascular:      Rate and Rhythm: Normal rate and regular rhythm. Pulses:           Dorsalis pedis pulses are 2+ on the right side and 2+ on the left side. Posterior tibial pulses are 2+ on the right side and 2+ on the left side. Heart sounds: Normal heart sounds. No murmur heard. No friction rub. No gallop. Pulmonary:      Effort: Pulmonary effort is normal.      Breath sounds: Normal breath sounds. Musculoskeletal:         General: No tenderness. Normal range of motion. Right lower leg: No edema. Left lower leg: No edema. Neurological:      Mental Status: She is alert and oriented to person, place, and time. Sensory: Sensation is intact. Motor: Motor function is intact. Psychiatric:         Attention and Perception: Attention and perception normal.         Mood and Affect: Mood and affect normal.         Speech: Speech normal.         Behavior: Behavior normal. Behavior is cooperative. Thought Content: Thought content normal.         Cognition and Memory: Cognition and memory normal.         Judgment: Judgment normal.         Assessment:      Karl Aguila was seen today for follow-up. Diagnoses and all orders for this visit:    HTN (hypertension), benign    Restless legs syndrome    Controlled type 2 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 disease, without long-term current use of insulin (HCC)  -     Hemoglobin A1C; Future  -     Basic Metabolic Panel;  Future    Primary osteoarthritis involving multiple joints    Anxiety    Chronic respiratory failure with hypoxia (HCC)    Other orders  -     rOPINIRole (REQUIP) 3 MG tablet; TAKE 1 TABLET BY MOUTH THREE TIMES DAILY    OARRS report checked          Plan: Reviewed labs and test results with patient. Reviewed laboratory testing from December demonstrating very good diabetic management. Continue with current blood pressure management    Continue to try to minimize pain medication usage and anxiety pills on a as needed basis only. Restart Requip for restless leg syndrome. Patient received counseling on the following healthy behaviors: nutrition and exercise     Patient given educational materials     Health maintenance updated    Discussed use, benefit, and side effects of prescribed medications. Barriers to medication compliance addressed. All patient questions answered. Pt voiced understanding. Patient needs RTC in 3 months. Medical decision making of moderate complexity. Please note that this chart was generated using Dragon dictation software. Although every effort was made to ensure the accuracy of this automated transcription, some errors in transcription may have occurred.

## 2022-02-28 ENCOUNTER — TELEPHONE (OUTPATIENT)
Dept: FAMILY MEDICINE CLINIC | Age: 87
End: 2022-02-28

## 2022-02-28 NOTE — TELEPHONE ENCOUNTER
Patient is wanting to know if she can get a low dose of prednisone to help with the swelling and pain of her arthritis. She said the last time she thinks the dose was to high because it increased her Blood Pressure.       Mally

## 2022-02-28 NOTE — TELEPHONE ENCOUNTER
Received consult letter from [facility]. Scanned to encounter.

## 2022-03-01 DIAGNOSIS — F41.9 ANXIETY: ICD-10-CM

## 2022-03-01 DIAGNOSIS — F51.01 PRIMARY INSOMNIA: ICD-10-CM

## 2022-03-01 RX ORDER — PREDNISONE 10 MG/1
10 TABLET ORAL DAILY
Qty: 20 TABLET | Refills: 0 | Status: SHIPPED | OUTPATIENT
Start: 2022-03-01 | End: 2022-04-07

## 2022-03-01 RX ORDER — LORAZEPAM 1 MG/1
TABLET ORAL
Qty: 60 TABLET | Refills: 2 | Status: SHIPPED | OUTPATIENT
Start: 2022-03-01 | End: 2022-05-23 | Stop reason: SDUPTHER

## 2022-03-01 NOTE — TELEPHONE ENCOUNTER
LORazepam (ATIVAN) 1 MG tablet 60 tablet 2 12/7/2021 3/8/2022    Sig: TAKE 1/2 TABLET BY MOUTH TWICE DAILY AND 1 EVERY NIGHT AT BEDTIME AS NEEDED FOR [employer]

## 2022-03-01 NOTE — TELEPHONE ENCOUNTER
----- Message from Jina Duff sent at 2/28/2022  5:08 PM EST -----  Subject: Refill Request    QUESTIONS  Name of Medication? predniSONE (DELTASONE) 10 MG tablet  Patient-reported dosage and instructions? patient takes as needed for pain  How many days do you have left? 0  [id number]  Pharmacy phone number (if available)? [phone]  ---------------------------------------------------------------------------  --------------  Luci COWAN  What is the best way for the office to contact you? OK to leave message on   voicemail  Preferred Call Back Phone Number?  [phone]

## 2022-03-01 NOTE — TELEPHONE ENCOUNTER
Medication:   Requested Prescriptions     Pending Prescriptions Disp Refills    LORazepam (ATIVAN) 1 MG tablet 60 tablet 2     Sig: TAKE 1/2 TABLET BY MOUTH TWICE DAILY AND 1 EVERY NIGHT AT BEDTIME AS NEEDED FOR ANXIETY      Last Filled:  2/2022    Patient Phone Number: [phone] (home)     Last appt: 2/15/2022   Next appt: 5/20/2022    Last OARRS:   RX Monitoring 3/20/2019   Attestation The Prescription Monitoring Report for this patient was reviewed today.    Periodic Controlled Substance Monitoring -     PDMP Monitoring:    Last PDMP Paras Pyle as Reviewed [facility]):  Review User Review Instant Review Result   Isael Treasure 2/15/2022  3:12 PM Reviewed PDMP [1]     Preferred Pharmacy:   [facility] [address]  Phone: [phone] Fax: [phone]    [address] Mail Delivery - Guerrero Cee [phone] Govind [facility] [phone]  [address] [place] [address]  Phone: [phone] Fax: [phone]

## 2022-03-07 DIAGNOSIS — M15.9 PRIMARY OSTEOARTHRITIS INVOLVING MULTIPLE JOINTS: ICD-10-CM

## 2022-03-07 RX ORDER — HYDROCODONE BITARTRATE AND ACETAMINOPHEN 5; 325 MG/1; MG/1
1 TABLET ORAL 4 TIMES DAILY PRN
Qty: 120 TABLET | Refills: 0 | Status: SHIPPED | OUTPATIENT
Start: 2022-03-07 | End: 2022-04-29 | Stop reason: SDUPTHER

## 2022-03-07 NOTE — TELEPHONE ENCOUNTER
HYDROcodone-acetaminophen (NORCO) 5-325 MG per tablet 120 tablet 0 1/11/2022 2/10/2022    Sig - Route:  Take 1 tablet by mouth 4 times daily as needed for Pain for up to 30 days. - Oral          [facility]

## 2022-03-07 NOTE — TELEPHONE ENCOUNTER
Medication:   Requested Prescriptions     Pending Prescriptions Disp Refills    HYDROcodone-acetaminophen (NORCO) 5-325 MG per tablet 120 tablet 0     Sig: Take 1 tablet by mouth 4 times daily as needed for Pain for up to 30 days. Last Filled:  1/11/2022    Patient Phone Number: [phone] (home)     Last appt: 2/15/2022   Next appt: 5/20/2022    Last OARRS:   RX Monitoring 3/20/2019   Attestation The Prescription Monitoring Report for this patient was reviewed today.    Periodic Controlled Substance Monitoring -     PDMP Monitoring:    Last PDMP Trixie Marquez as Reviewed [facility]):  Review User Review Instant Review Result   Flavio Nguyen 2/15/2022  3:12 PM Reviewed PDMP [1]     Preferred Pharmacy:   [facility] [address] [phone]  Phone: [phone] Fax: [phone]    [address] Mail Delivery - [address] [phone] Curtis Adames [phone]  [address] [address]  Phone: [phone] Fax: [phone]

## 2022-03-08 ENCOUNTER — TELEPHONE (OUTPATIENT)
Dept: FAMILY MEDICINE CLINIC | Age: 87
End: 2022-03-08

## 2022-03-08 NOTE — TELEPHONE ENCOUNTER
PT is having pain on her right side of her abdomin and was wanting Dr Jade Neal to send her something that can help with her Diverticulitis flare up? Please Advise?

## 2022-03-09 ENCOUNTER — TELEPHONE (OUTPATIENT)
Dept: FAMILY MEDICINE CLINIC | Age: 87
End: 2022-03-09

## 2022-03-09 NOTE — TELEPHONE ENCOUNTER
Patient is calling stating that she is having an issue with her blood sugar.      Please advise     Provider out of the office

## 2022-03-09 NOTE — TELEPHONE ENCOUNTER
Patient states her blood sugar was elevated at the 250 and then she took it a little while ago and it was down to 130's so she is ok. She will call back if she has problems again.

## 2022-03-15 ENCOUNTER — OFFICE VISIT (OUTPATIENT)
Dept: PULMONOLOGY | Age: 87
End: 2022-03-15
Payer: MEDICAID

## 2022-03-15 VITALS — OXYGEN SATURATION: 99 % | HEART RATE: 81 BPM

## 2022-03-15 DIAGNOSIS — J84.9 INTERSTITIAL LUNG DISEASE (HCC): ICD-10-CM

## 2022-03-15 DIAGNOSIS — R06.02 SOB (SHORTNESS OF BREATH): Primary | ICD-10-CM

## 2022-03-15 DIAGNOSIS — J43.2 CENTRILOBULAR EMPHYSEMA (HCC): ICD-10-CM

## 2022-03-15 DIAGNOSIS — J96.11 CHRONIC RESPIRATORY FAILURE WITH HYPOXIA (HCC): ICD-10-CM

## 2022-03-15 PROCEDURE — 99214 OFFICE O/P EST MOD 30 MIN: CPT | Performed by: INTERNAL MEDICINE

## 2022-03-15 NOTE — PROGRESS NOTES
[facility]  Follow Up Note  Emmy Alvarado MD       Gregoria aPndyahaylee   YOB: 1935    Date of Visit:  3/15/2022    Assessment/Plan:  1. SOB  Stable  Good days and bad days. 2. COPD, mild/Emphysema/Bronchiectasis  CT Chest 3/20:    Impression   Emphysema.  Bilateral bronchiectasis with mucous plugging.  Scattered   bilateral airspace disease is partially improved as compared to exam dated   02/19/2020 with residual opacity at bilateral lung bases.       New 5 mm nodule or focus of mucous plugging within the right middle lobe. Additional pulmonary nodules are not significantly changed.  Continued   attention on CT follow-up recommended.       Sequela of granulomatous disease. I have independently reviewed pulmonary function testing. PFT reveals mild to moderate COPD with no bronchodilator response. Symbicort   Xopenex  She now has a good HHN    3. Bronchiectasis/PN  Recent Bronchitis    We called in Abx 9/3  Doxycycline 100mg, 10 days   She is improving and almost finished with the medication    4. Interstitial lung disease ([address]  Stable on CT 3/20    5. Gastroesophageal reflux disease, esophagitis presence not specified    6. Chronic hypoxemic Respirtory Failure  O2 sats are acceptable on supplemental O2. The patient benefits from the use of supplemental O2.    6 months    Chief Complaint   Patient presents with    Shortness of Breath     6 month       HPI  The patient presents with a chief complaint of moderate shortness of breath related to mild COPD of many years duration. He has mild associated cough. Exertion is a modifying factor. She has good days and bad days. Feels overall to be stable. Review of Systems  No Chest pain, Nausea or vomiting reported      History  I have reviewed past medical, surgical, social and family history. This is documented elsewhere in the medical record.      Physical Exam:  Well developed, well release capsule TAKE 1 CAPSULE EVERY DAY  Anjali Mackenzie MD   meclizine (ANTIVERT) 12.5 MG tablet TAKE 1 TABLET THREE TIMES DAILY AS NEEDED  Anjali Mackenzie MD   conjugated estrogens (PREMARIN) 0.625 MG/GM vaginal cream Place 1 g vaginally Twice a Week  Anjali Mackenzie MD   irbesartan (AVAPRO) 300 MG tablet TAKE 1 TABLET BY MOUTH EVERY NIGHT  Anjali Mackenzie MD   apixaban (ELIQUIS) 5 MG TABS tablet Take 1 tablet by mouth 2 times daily  Anjali Mackenzie MD   dicyclomine (BENTYL) 10 MG capsule TAKE 1 CAPSULE BY MOUTH FOUR TIMES DAILY AS NEEDED FOR ABDOMINAL PAIN  Anjali Mackenzie MD   propafenone (RYTHMOL) 150 MG tablet Take 1 tablet by mouth every 8 hours  Anjali Mackenzie MD   loratadine (CLARITIN) 10 MG tablet Take 1 tablet by mouth daily  Anjali Mackenzie MD   levothyroxine (SYNTHROID) 75 MCG tablet TAKE 1 TABLET EVERY DAY  Anjali Mackenzie MD   fluticasone (FLONASE) 50 MCG/ACT nasal spray SHAKE LIQUID AND USE 2 SPRAYS IN EACH NOSTRIL DAILY  Anjali Mackenzie MD   cloNIDine (CATAPRES) 0.1 MG tablet Take 1 tablet by mouth 2 times daily  Anjali Mackenzie MD   atorvastatin (LIPITOR) 80 MG tablet Take 1 tablet by mouth nightly  Vester Notch, APRN - CNP   hydrALAZINE (APRESOLINE) 50 MG tablet TAKE 1 TABLET BY MOUTH THREE TIMES DAILY  Anjali Mackenzie MD   Lancets MISC 1 each by Does not apply route 2 times daily  Anjali Mackenzie MD   ondansetron (ZOFRAN) 4 MG tablet Take 1 tablet by mouth daily as needed for Nausea or Vomiting  Jeffery Jane MD   OXYGEN Inhale 2 L into the lungs  Historical Provider, MD   budesonide-formoterol (SYMBICORT) 160-4.5 MCG/ACT AERO INHALE 2 PUFFS INTO THE LUNGS TWICE DAILY  Anjali Mackenzie MD   blood glucose monitor strips Test one time a day  Anjali Mackenzie MD   levalbuterol (XOPENEX) 0.63 MG/3ML nebulization INHALE CONTENTS OF 1 VIAL PER NEBULIZER EVERY 6 HOURS AS NEEDED FOR WHEEZING  Gene Dooley MD   Respiratory Therapy Supplies (NEBULIZER/TUBING/MOUTHPIECE) KIT 1 kit by Does not apply route 4 times daily as needed (shortness of breath)  Lang Chapa MD   albuterol sulfate HFA (PROAIR HFA) 108 (90 Base) MCG/ACT inhaler Inhale 2 puffs into the lungs every 6 hours as needed for Wheezing  Etienne Giles MD       Vitals:    03/15/22 1515   Pulse: 81   SpO2: 99%     There is no height or weight on file to calculate BMI.      Wt Readings from Last 3 Encounters:   02/15/22 160 lb (72.6 kg)   21 162 lb (73.5 kg)   10/26/21 168 lb (76.2 kg)     BP Readings from Last 3 Encounters:   02/15/22 120/70   21 120/80   10/26/21 (!) 154/72        Social History     Tobacco Use   Smoking Status Former Smoker    Packs/day: 1.00    Years: 45.00    Pack years: 45.00    Types: Cigarettes    Start date: 9/15/1950   [address] Street[address] Quit date: 1995    Years since quittin.2   Smokeless Tobacco Never Used

## 2022-03-22 ENCOUNTER — TELEPHONE (OUTPATIENT)
Dept: FAMILY MEDICINE CLINIC | Age: 87
End: 2022-03-22

## 2022-03-22 NOTE — TELEPHONE ENCOUNTER
Left message for pt to call- need to know if her scripts need to go to [address] has papers and knows

## 2022-03-23 DIAGNOSIS — N18.2 CONTROLLED TYPE 2 DIABETES MELLITUS WITH STAGE 2 CHRONIC KIDNEY DISEASE, WITHOUT LONG-TERM CURRENT USE OF INSULIN (HCC): Primary | ICD-10-CM

## 2022-03-23 DIAGNOSIS — R06.2 WHEEZING: ICD-10-CM

## 2022-03-23 DIAGNOSIS — E11.22 CONTROLLED TYPE 2 DIABETES MELLITUS WITH STAGE 2 CHRONIC KIDNEY DISEASE, WITHOUT LONG-TERM CURRENT USE OF INSULIN (HCC): Primary | ICD-10-CM

## 2022-03-23 DIAGNOSIS — E03.9 ACQUIRED HYPOTHYROIDISM: ICD-10-CM

## 2022-03-23 RX ORDER — BLOOD-GLUCOSE CONTROL, LOW
EACH MISCELLANEOUS
Qty: 1 EACH | Refills: 0 | Status: SHIPPED | OUTPATIENT
Start: 2022-03-23

## 2022-03-23 RX ORDER — BUDESONIDE AND FORMOTEROL FUMARATE DIHYDRATE 160; 4.5 UG/1; UG/1
AEROSOL RESPIRATORY (INHALATION)
Qty: 3 EACH | Refills: 1 | Status: SHIPPED | OUTPATIENT
Start: 2022-03-23 | End: 2022-04-08

## 2022-03-23 RX ORDER — GLUCOSAM/CHON-MSM1/C/MANG/BOSW 500-416.6
TABLET ORAL
Qty: 100 EACH | Refills: 3 | Status: SHIPPED | OUTPATIENT
Start: 2022-03-23

## 2022-03-23 RX ORDER — LEVOTHYROXINE SODIUM 0.07 MG/1
TABLET ORAL
Qty: 90 TABLET | Refills: 1 | Status: SHIPPED | OUTPATIENT
Start: 2022-03-23 | End: 2022-06-07

## 2022-03-23 RX ORDER — CALCIUM CITRATE/VITAMIN D3 200MG-6.25
1 TABLET ORAL DAILY
Qty: 100 EACH | Refills: 3 | Status: SHIPPED | OUTPATIENT
Start: 2022-03-23

## 2022-03-23 NOTE — TELEPHONE ENCOUNTER
Talked to patient and she would like meds to go to [facility] as long as its not one of her controlled meds.

## 2022-03-23 NOTE — TELEPHONE ENCOUNTER
Patient [phone] would like a call from the provider's nurse concerning 2 Rxs not being filled by [facility].

## 2022-03-23 NOTE — TELEPHONE ENCOUNTER
Medication:   Requested Prescriptions     Pending Prescriptions Disp Refills    apixaban (ELIQUIS) 5 MG TABS tablet 180 tablet 1     Sig: Take 1 tablet by mouth 2 times daily    levothyroxine (SYNTHROID) 75 MCG tablet 90 tablet 1     Sig: TAKE 1 TABLET EVERY DAY    budesonide-formoterol (SYMBICORT) 160-4.5 MCG/ACT AERO 3 each 1     Sig: INHALE 2 PUFFS INTO THE LUNGS TWICE DAILY    Blood Glucose Calibration (TRUE METRIX LEVEL 2) Normal SOLN 1 each 0     Sig: As needed    blood glucose test strips (TRUE METRIX BLOOD GLUCOSE TEST) strip 100 each 3     Si each by In Vitro route daily As needed.  TRUEplus Lancets 33G MISC 100 each 3     Si daily      Last Filled:      Patient Phone Number: [phone] (home)     Last appt: 2/15/2022   Next appt: 2022    Last OARRS:   RX Monitoring 3/20/2019   Attestation The Prescription Monitoring Report for this patient was reviewed today.    Periodic Controlled Substance Monitoring -     PDMP Monitoring:    Last PDMP [place] as Reviewed [facility]):  Review User Review Instant Review Result   Josefa Analisa 2/15/2022  3:12 PM Reviewed PDMP [1]     Preferred Pharmacy:   [facility], [address] [address]  Phone: [phone] Fax: [phone]    [facility] [phone] Antolin Velez [phone]  [address] [place] [address]  Phone: [phone] Fax: [phone]

## 2022-03-24 DIAGNOSIS — E11.22 CONTROLLED TYPE 2 DIABETES MELLITUS WITH STAGE 2 CHRONIC KIDNEY DISEASE, WITHOUT LONG-TERM CURRENT USE OF INSULIN (HCC): ICD-10-CM

## 2022-03-24 DIAGNOSIS — N18.2 CONTROLLED TYPE 2 DIABETES MELLITUS WITH STAGE 2 CHRONIC KIDNEY DISEASE, WITHOUT LONG-TERM CURRENT USE OF INSULIN (HCC): ICD-10-CM

## 2022-03-24 RX ORDER — BLOOD-GLUCOSE CONTROL, LOW
EACH MISCELLANEOUS
Qty: 1 EACH | Refills: 0 | Status: CANCELLED | OUTPATIENT
Start: 2022-03-24

## 2022-03-24 NOTE — TELEPHONE ENCOUNTER
Done yesterday. Speaking with initial message taker, they told her that they need the monitor Rx, not solution. Ok to send.

## 2022-04-05 ENCOUNTER — TELEPHONE (OUTPATIENT)
Dept: FAMILY MEDICINE CLINIC | Age: 87
End: 2022-04-05

## 2022-04-05 RX ORDER — PROPAFENONE HYDROCHLORIDE 150 MG/1
150 TABLET, FILM COATED ORAL EVERY 8 HOURS SCHEDULED
Qty: 270 TABLET | Refills: 0 | Status: SHIPPED | OUTPATIENT
Start: 2022-04-05 | End: 2022-07-08

## 2022-04-05 NOTE — TELEPHONE ENCOUNTER
Received plan of care/orders from [facility]. Provider signed orders and has been faxed to home care agency.

## 2022-04-05 NOTE — TELEPHONE ENCOUNTER
Patient [phone] called stating that the pharmacy sent a letter noting that they did not have authorization for Latex. Fax:  [phone]    Hard time understanding her and talking very fast, even when I asked her to repeat it. I was not able to find Latex on her med list, so I am not sure on this med name.       Please advise

## 2022-04-05 NOTE — TELEPHONE ENCOUNTER
Pt returning call. Pt states [facility] had called her stating they needed to contact us for pt blood glucose strips. Pt states she has plenty of test strips and will call [facility]. Please contact pt at [phone].

## 2022-04-07 ENCOUNTER — TELEPHONE (OUTPATIENT)
Dept: PULMONOLOGY | Age: 87
End: 2022-04-07

## 2022-04-07 DIAGNOSIS — J43.2 CENTRILOBULAR EMPHYSEMA (HCC): Primary | ICD-10-CM

## 2022-04-07 RX ORDER — FEXOFENADINE HYDROCHLORIDE 60 MG/1
120 TABLET, FILM COATED ORAL DAILY
Qty: 10 TABLET | Refills: 0 | Status: SHIPPED | OUTPATIENT
Start: 2022-04-07 | End: 2022-04-11 | Stop reason: ALTCHOICE

## 2022-04-07 RX ORDER — AZITHROMYCIN 250 MG/1
250 TABLET, FILM COATED ORAL SEE ADMIN INSTRUCTIONS
Qty: 6 TABLET | Refills: 0 | Status: SHIPPED | OUTPATIENT
Start: 2022-04-07 | End: 2022-04-12

## 2022-04-07 RX ORDER — PREDNISONE 20 MG/1
40 TABLET ORAL DAILY
Qty: 10 TABLET | Refills: 0 | Status: SHIPPED | OUTPATIENT
Start: 2022-04-07 | End: 2022-04-15 | Stop reason: SDUPTHER

## 2022-04-07 NOTE — TELEPHONE ENCOUNTER
Pt called and said for a couple of days now she has been coughing up grey mucus, runny nose. She uses [facility] on [address].     Ph # [phone]

## 2022-04-07 NOTE — TELEPHONE ENCOUNTER
I have sent a prescription for Z-Viral, 5 days of prednisone and 5 days of Allegra 120 mg p.o. nightly. She can take this medication and let me know if her symptoms fail to improve. Thanks.

## 2022-04-08 DIAGNOSIS — R06.2 WHEEZING: ICD-10-CM

## 2022-04-08 RX ORDER — BUDESONIDE AND FORMOTEROL FUMARATE DIHYDRATE 160; 4.5 UG/1; UG/1
AEROSOL RESPIRATORY (INHALATION)
Qty: 30.6 G | Refills: 5 | Status: ON HOLD | OUTPATIENT
Start: 2022-04-08 | End: 2022-08-23

## 2022-04-08 RX ORDER — DICYCLOMINE HYDROCHLORIDE 10 MG/1
CAPSULE ORAL
Qty: 360 CAPSULE | Refills: 0 | Status: SHIPPED | OUTPATIENT
Start: 2022-04-08 | End: 2022-07-07

## 2022-04-11 RX ORDER — LORATADINE 10 MG/1
10 TABLET ORAL DAILY
Qty: 90 TABLET | Refills: 1 | Status: SHIPPED | OUTPATIENT
Start: 2022-04-11 | End: 2022-08-09 | Stop reason: SDUPTHER

## 2022-04-11 NOTE — TELEPHONE ENCOUNTER
loratadine (CLARITIN) 10 MG tablet (Discontinued) 90 tablet 0 12/22/2021 4/7/2022    Sig - Route:  Take 1 tablet by mouth daily - [facility] [address], [address]   [address] [place] [address]   Phone:  [phone]  Fax:  [phone]

## 2022-04-15 ENCOUNTER — TELEPHONE (OUTPATIENT)
Dept: FAMILY MEDICINE CLINIC | Age: 87
End: 2022-04-15

## 2022-04-15 ENCOUNTER — NURSE TRIAGE (OUTPATIENT)
Dept: OTHER | Facility: CLINIC | Age: 87
End: 2022-04-15

## 2022-04-15 RX ORDER — DOXYCYCLINE HYCLATE 100 MG
100 TABLET ORAL 2 TIMES DAILY
Qty: 14 TABLET | Refills: 0 | Status: ON HOLD | OUTPATIENT
Start: 2022-04-15 | End: 2022-04-21 | Stop reason: HOSPADM

## 2022-04-15 RX ORDER — CLONIDINE HYDROCHLORIDE 0.1 MG/1
TABLET ORAL
Qty: 180 TABLET | Refills: 0 | Status: SHIPPED | OUTPATIENT
Start: 2022-04-15 | End: 2022-05-27

## 2022-04-15 RX ORDER — PREDNISONE 20 MG/1
40 TABLET ORAL DAILY
Qty: 10 TABLET | Refills: 0 | Status: ON HOLD | OUTPATIENT
Start: 2022-04-15 | End: 2022-04-21 | Stop reason: HOSPADM

## 2022-04-15 RX ORDER — DILTIAZEM HYDROCHLORIDE 240 MG/1
CAPSULE, COATED, EXTENDED RELEASE ORAL
Qty: 90 CAPSULE | Refills: 1 | Status: SHIPPED | OUTPATIENT
Start: 2022-04-15 | End: 2022-07-14 | Stop reason: SDUPTHER

## 2022-04-15 NOTE — TELEPHONE ENCOUNTER
----- Message from Darryl Buchanan sent at 4/15/2022 11:46 AM EDT -----  Subject: Message to Provider    QUESTIONS  Information for Provider? Patient has been coughing for a couple days, her   lung doctor gave her a Z-Pack and she feels as if the cough is getting   worse. Can you please give her a call.   ---------------------------------------------------------------------------  --------------  CALL BACK INFO  What is the best way for the office to contact you? OK to leave message on   voicemail  Preferred Call Back Phone Number? [phone]  ---------------------------------------------------------------------------  --------------  SCRIPT ANSWERS  Relationship to Patient?  Self

## 2022-04-15 NOTE — TELEPHONE ENCOUNTER
Received call from [address] at [facility] with Red Flag Complaint. Subjective: Caller states \"Cough\"     Current Symptoms: Has been taking a z-seb. Continues to cough and feeling dizzy. Onset: a few days ago; worsening    Associated Symptoms: NA    Pain Severity: 0/10; N/A; none    Temperature: denies fever    What has been tried: None    LMP: NA Pregnant: NA    Recommended disposition: See in Office Today    Care advice provided, patient verbalizes understanding; denies any other questions or concerns; instructed to call back for any new or worsening symptoms. Patient/Caller agrees with recommended disposition; writer provided warm transfer to [facility] for appointment scheduling     Attention Provider: Thank you for allowing me to participate in the care of your patient. The patient was connected to triage in response to information provided to the ECC/PSC. Please do not respond through this encounter as the response is not directed to a shared pool.     Reason for Disposition   Known COPD or other severe lung disease (i.e., bronchiectasis, cystic fibrosis, lung surgery) and worsening symptoms (i.e., increased sputum purulence or amount, increased breathing difficulty)    Protocols used: COUGH-ADULT-OH

## 2022-04-15 NOTE — TELEPHONE ENCOUNTER
Appears she just finished up Zithromax and if this is not helped her, I sent a prescription for doxycycline along with prednisone burst to the pharmacy.   She should continue with her nebulizer treatments 3-4 times a day

## 2022-04-18 RX ORDER — IRBESARTAN 300 MG/1
TABLET ORAL
Qty: 90 TABLET | Refills: 0 | Status: SHIPPED | OUTPATIENT
Start: 2022-04-18 | End: 2022-07-01

## 2022-04-18 NOTE — TELEPHONE ENCOUNTER
Medication:   Requested Prescriptions     Pending Prescriptions Disp Refills    irbesartan (AVAPRO) 300 MG tablet [Pharmacy Med Name: IRBESARTAN 300 MG Tablet] 90 tablet 0     Sig: TAKE 1 TABLET BY MOUTH EVERY NIGHT      Last Filled:      Patient Phone Number: [phone] (home)     Last appt: 2/15/2022   Next appt: 5/20/2022    Last OARRS:   RX Monitoring 3/20/2019   Attestation The Prescription Monitoring Report for this patient was reviewed today.    Periodic Controlled Substance Monitoring -     PDMP Monitoring:    Last PDMP Job Foots as Reviewed [facility]):  Review User Review Instant Review Result   Abhinav Izquierdo 2/15/2022  3:12 PM Reviewed PDMP [1]     Preferred Pharmacy:   [facility]  Phone: [phone] Fax: [phone]    [facility] - [address] [phone] [place] [phone]  [address] [place] [address]  Phone: [phone] Fax: [phone]

## 2022-04-19 ENCOUNTER — HOSPITAL ENCOUNTER (INPATIENT)
Age: 87
LOS: 2 days | Discharge: HOME | DRG: 191 | End: 2022-04-21
Attending: EMERGENCY MEDICINE | Admitting: INTERNAL MEDICINE
Payer: MEDICARE

## 2022-04-19 ENCOUNTER — APPOINTMENT (OUTPATIENT)
Dept: CT IMAGING | Age: 87
DRG: 191 | End: 2022-04-19
Payer: MEDICARE

## 2022-04-19 ENCOUNTER — APPOINTMENT (OUTPATIENT)
Dept: GENERAL RADIOLOGY | Age: 87
DRG: 191 | End: 2022-04-19
Payer: MEDICARE

## 2022-04-19 DIAGNOSIS — R91.1 PULMONARY NODULE, RIGHT: ICD-10-CM

## 2022-04-19 DIAGNOSIS — I10 ESSENTIAL HYPERTENSION: ICD-10-CM

## 2022-04-19 DIAGNOSIS — J44.1 COPD EXACERBATION (HCC): Primary | ICD-10-CM

## 2022-04-19 DIAGNOSIS — R73.9 HYPERGLYCEMIA: ICD-10-CM

## 2022-04-19 LAB
A/G RATIO: 2 (ref 1.1–2.2)
ALBUMIN SERPL-MCNC: 4.1 G/DL (ref 3.4–5)
ALP BLD-CCNC: 116 U/L (ref 40–129)
ALT SERPL-CCNC: 20 U/L (ref 10–40)
ANION GAP SERPL CALCULATED.3IONS-SCNC: 17 MMOL/L (ref 3–16)
AST SERPL-CCNC: 19 U/L (ref 15–37)
BASE EXCESS VENOUS: 0.7 MMOL/L (ref -3–3)
BASOPHILS ABSOLUTE: 0 K/UL (ref 0–0.2)
BASOPHILS RELATIVE PERCENT: 0 %
BILIRUB SERPL-MCNC: 0.3 MG/DL (ref 0–1)
BILIRUBIN URINE: NEGATIVE
BLOOD, URINE: NEGATIVE
BUN BLDV-MCNC: 31 MG/DL (ref 7–20)
CALCIUM SERPL-MCNC: 9.3 MG/DL (ref 8.3–10.6)
CARBOXYHEMOGLOBIN: 3.2 % (ref 0–1.5)
CHLORIDE BLD-SCNC: 97 MMOL/L (ref 99–110)
CLARITY: CLEAR
CO2: 18 MMOL/L (ref 21–32)
COLOR: YELLOW
CREAT SERPL-MCNC: 1 MG/DL (ref 0.6–1.2)
EOSINOPHILS ABSOLUTE: 0 K/UL (ref 0–0.6)
EOSINOPHILS RELATIVE PERCENT: 0 %
EPITHELIAL CELLS, UA: 5 /HPF (ref 0–5)
GFR AFRICAN AMERICAN: >60
GFR NON-AFRICAN AMERICAN: 53
GLUCOSE BLD-MCNC: 200 MG/DL (ref 70–99)
GLUCOSE BLD-MCNC: 252 MG/DL (ref 70–99)
GLUCOSE URINE: NEGATIVE MG/DL
HCO3 VENOUS: 24.6 MMOL/L (ref 23–29)
HCT VFR BLD CALC: 36.3 % (ref 36–48)
HEMOGLOBIN: 11.8 G/DL (ref 12–16)
HYALINE CASTS: 3 /LPF (ref 0–8)
INFLUENZA A: NOT DETECTED
INFLUENZA B: NOT DETECTED
KETONES, URINE: NEGATIVE MG/DL
LACTIC ACID: 2.1 MMOL/L (ref 0.4–2)
LEUKOCYTE ESTERASE, URINE: ABNORMAL
LYMPHOCYTES ABSOLUTE: 1.8 K/UL (ref 1–5.1)
LYMPHOCYTES RELATIVE PERCENT: 14 %
MCH RBC QN AUTO: 29.5 PG (ref 26–34)
MCHC RBC AUTO-ENTMCNC: 32.6 G/DL (ref 31–36)
MCV RBC AUTO: 90.5 FL (ref 80–100)
METHEMOGLOBIN VENOUS: 0.1 %
MICROSCOPIC EXAMINATION: YES
MONOCYTES ABSOLUTE: 0.6 K/UL (ref 0–1.3)
MONOCYTES RELATIVE PERCENT: 5 %
NEUTROPHILS ABSOLUTE: 10.4 K/UL (ref 1.7–7.7)
NEUTROPHILS RELATIVE PERCENT: 81 %
NITRITE, URINE: NEGATIVE
O2 CONTENT, VEN: 16 VOL %
O2 SAT, VEN: 100 %
O2 THERAPY: ABNORMAL
PCO2, VEN: 36.1 MMHG (ref 40–50)
PDW BLD-RTO: 15.4 % (ref 12.4–15.4)
PERFORMED ON: ABNORMAL
PH UA: 5.5 (ref 5–8)
PH VENOUS: 7.44 (ref 7.35–7.45)
PLATELET # BLD: 223 K/UL (ref 135–450)
PLATELET SLIDE REVIEW: ADEQUATE
PMV BLD AUTO: 8.7 FL (ref 5–10.5)
PO2, VEN: 200 MMHG (ref 25–40)
POTASSIUM REFLEX MAGNESIUM: 5.1 MMOL/L (ref 3.5–5.1)
PRO-BNP: 826 PG/ML (ref 0–449)
PROCALCITONIN: 0.05 NG/ML (ref 0–0.15)
PROTEIN UA: >=300 MG/DL
RBC # BLD: 4.01 M/UL (ref 4–5.2)
RBC # BLD: NORMAL 10*6/UL
RBC UA: 2 /HPF (ref 0–4)
REASON FOR REJECTION: NORMAL
REJECTED TEST: NORMAL
SARS-COV-2 RNA, RT PCR: NOT DETECTED
SLIDE REVIEW: ABNORMAL
SODIUM BLD-SCNC: 132 MMOL/L (ref 136–145)
SPECIFIC GRAVITY UA: 1.02 (ref 1–1.03)
TCO2 CALC VENOUS: 58 MMOL/L
TOTAL PROTEIN: 6.2 G/DL (ref 6.4–8.2)
TROPONIN: <0.01 NG/ML
URINE REFLEX TO CULTURE: YES
URINE TYPE: ABNORMAL
UROBILINOGEN, URINE: 0.2 E.U./DL
WBC # BLD: 12.9 K/UL (ref 4–11)
WBC UA: 17 /HPF (ref 0–5)

## 2022-04-19 PROCEDURE — 99285 EMERGENCY DEPT VISIT HI MDM: CPT

## 2022-04-19 PROCEDURE — 83605 ASSAY OF LACTIC ACID: CPT

## 2022-04-19 PROCEDURE — 6360000002 HC RX W HCPCS: Performed by: NURSE PRACTITIONER

## 2022-04-19 PROCEDURE — 2700000000 HC OXYGEN THERAPY PER DAY

## 2022-04-19 PROCEDURE — 84480 ASSAY TRIIODOTHYRONINE (T3): CPT

## 2022-04-19 PROCEDURE — 87636 SARSCOV2 & INF A&B AMP PRB: CPT

## 2022-04-19 PROCEDURE — 84443 ASSAY THYROID STIM HORMONE: CPT

## 2022-04-19 PROCEDURE — 6370000000 HC RX 637 (ALT 250 FOR IP): Performed by: NURSE PRACTITIONER

## 2022-04-19 PROCEDURE — 71045 X-RAY EXAM CHEST 1 VIEW: CPT

## 2022-04-19 PROCEDURE — 83880 ASSAY OF NATRIURETIC PEPTIDE: CPT

## 2022-04-19 PROCEDURE — 2060000000 HC ICU INTERMEDIATE R&B

## 2022-04-19 PROCEDURE — 84439 ASSAY OF FREE THYROXINE: CPT

## 2022-04-19 PROCEDURE — 80053 COMPREHEN METABOLIC PANEL: CPT

## 2022-04-19 PROCEDURE — 36415 COLL VENOUS BLD VENIPUNCTURE: CPT

## 2022-04-19 PROCEDURE — 94761 N-INVAS EAR/PLS OXIMETRY MLT: CPT

## 2022-04-19 PROCEDURE — 82803 BLOOD GASES ANY COMBINATION: CPT

## 2022-04-19 PROCEDURE — 81001 URINALYSIS AUTO W/SCOPE: CPT

## 2022-04-19 PROCEDURE — 87086 URINE CULTURE/COLONY COUNT: CPT

## 2022-04-19 PROCEDURE — 2580000003 HC RX 258: Performed by: INTERNAL MEDICINE

## 2022-04-19 PROCEDURE — 94640 AIRWAY INHALATION TREATMENT: CPT

## 2022-04-19 PROCEDURE — 6360000002 HC RX W HCPCS: Performed by: INTERNAL MEDICINE

## 2022-04-19 PROCEDURE — 96376 TX/PRO/DX INJ SAME DRUG ADON: CPT

## 2022-04-19 PROCEDURE — 87077 CULTURE AEROBIC IDENTIFY: CPT

## 2022-04-19 PROCEDURE — 96374 THER/PROPH/DIAG INJ IV PUSH: CPT

## 2022-04-19 PROCEDURE — 71250 CT THORAX DX C-: CPT

## 2022-04-19 PROCEDURE — 83036 HEMOGLOBIN GLYCOSYLATED A1C: CPT

## 2022-04-19 PROCEDURE — 85025 COMPLETE CBC W/AUTO DIFF WBC: CPT

## 2022-04-19 PROCEDURE — 6370000000 HC RX 637 (ALT 250 FOR IP): Performed by: INTERNAL MEDICINE

## 2022-04-19 PROCEDURE — 84484 ASSAY OF TROPONIN QUANT: CPT

## 2022-04-19 PROCEDURE — 87040 BLOOD CULTURE FOR BACTERIA: CPT

## 2022-04-19 PROCEDURE — 84145 PROCALCITONIN (PCT): CPT

## 2022-04-19 PROCEDURE — 93005 ELECTROCARDIOGRAM TRACING: CPT | Performed by: INTERNAL MEDICINE

## 2022-04-19 RX ORDER — ACETAMINOPHEN 325 MG/1
650 TABLET ORAL EVERY 6 HOURS PRN
Status: DISCONTINUED | OUTPATIENT
Start: 2022-04-19 | End: 2022-04-21 | Stop reason: HOSPADM

## 2022-04-19 RX ORDER — SODIUM CHLORIDE 9 MG/ML
INJECTION, SOLUTION INTRAVENOUS PRN
Status: DISCONTINUED | OUTPATIENT
Start: 2022-04-19 | End: 2022-04-21 | Stop reason: HOSPADM

## 2022-04-19 RX ORDER — SODIUM CHLORIDE 0.9 % (FLUSH) 0.9 %
5-40 SYRINGE (ML) INJECTION EVERY 12 HOURS SCHEDULED
Status: DISCONTINUED | OUTPATIENT
Start: 2022-04-19 | End: 2022-04-21 | Stop reason: HOSPADM

## 2022-04-19 RX ORDER — CLONIDINE HYDROCHLORIDE 0.1 MG/1
0.1 TABLET ORAL 2 TIMES DAILY
Status: DISCONTINUED | OUTPATIENT
Start: 2022-04-19 | End: 2022-04-21 | Stop reason: HOSPADM

## 2022-04-19 RX ORDER — SODIUM CHLORIDE 0.9 % (FLUSH) 0.9 %
5-40 SYRINGE (ML) INJECTION PRN
Status: DISCONTINUED | OUTPATIENT
Start: 2022-04-19 | End: 2022-04-21 | Stop reason: HOSPADM

## 2022-04-19 RX ORDER — HYDRALAZINE HYDROCHLORIDE 25 MG/1
50 TABLET, FILM COATED ORAL EVERY 8 HOURS SCHEDULED
Status: DISCONTINUED | OUTPATIENT
Start: 2022-04-19 | End: 2022-04-21 | Stop reason: HOSPADM

## 2022-04-19 RX ORDER — ACETAMINOPHEN 650 MG/1
650 SUPPOSITORY RECTAL EVERY 6 HOURS PRN
Status: DISCONTINUED | OUTPATIENT
Start: 2022-04-19 | End: 2022-04-21 | Stop reason: HOSPADM

## 2022-04-19 RX ORDER — NICOTINE POLACRILEX 4 MG
15 LOZENGE BUCCAL PRN
Status: DISCONTINUED | OUTPATIENT
Start: 2022-04-19 | End: 2022-04-21 | Stop reason: HOSPADM

## 2022-04-19 RX ORDER — LEVOTHYROXINE SODIUM 0.07 MG/1
75 TABLET ORAL DAILY
Status: DISCONTINUED | OUTPATIENT
Start: 2022-04-20 | End: 2022-04-21 | Stop reason: HOSPADM

## 2022-04-19 RX ORDER — METHYLPREDNISOLONE SODIUM SUCCINATE 40 MG/ML
40 INJECTION, POWDER, LYOPHILIZED, FOR SOLUTION INTRAMUSCULAR; INTRAVENOUS DAILY
Status: DISCONTINUED | OUTPATIENT
Start: 2022-04-19 | End: 2022-04-21 | Stop reason: HOSPADM

## 2022-04-19 RX ORDER — ALBUTEROL SULFATE 90 UG/1
2 AEROSOL, METERED RESPIRATORY (INHALATION) EVERY 6 HOURS PRN
Status: DISCONTINUED | OUTPATIENT
Start: 2022-04-19 | End: 2022-04-19

## 2022-04-19 RX ORDER — LORAZEPAM 0.5 MG/1
0.5 TABLET ORAL 2 TIMES DAILY PRN
Status: DISCONTINUED | OUTPATIENT
Start: 2022-04-19 | End: 2022-04-21 | Stop reason: HOSPADM

## 2022-04-19 RX ORDER — HYDRALAZINE HYDROCHLORIDE 20 MG/ML
10 INJECTION INTRAMUSCULAR; INTRAVENOUS ONCE
Status: COMPLETED | OUTPATIENT
Start: 2022-04-19 | End: 2022-04-19

## 2022-04-19 RX ORDER — PROPAFENONE HYDROCHLORIDE 150 MG/1
150 TABLET, FILM COATED ORAL EVERY 8 HOURS SCHEDULED
Status: DISCONTINUED | OUTPATIENT
Start: 2022-04-19 | End: 2022-04-21 | Stop reason: HOSPADM

## 2022-04-19 RX ORDER — ATORVASTATIN CALCIUM 80 MG/1
80 TABLET, FILM COATED ORAL NIGHTLY
Status: DISCONTINUED | OUTPATIENT
Start: 2022-04-19 | End: 2022-04-21 | Stop reason: HOSPADM

## 2022-04-19 RX ORDER — LOSARTAN POTASSIUM 100 MG/1
100 TABLET ORAL NIGHTLY
Status: DISCONTINUED | OUTPATIENT
Start: 2022-04-19 | End: 2022-04-20

## 2022-04-19 RX ORDER — DILTIAZEM HYDROCHLORIDE 240 MG/1
240 CAPSULE, COATED, EXTENDED RELEASE ORAL DAILY
Status: DISCONTINUED | OUTPATIENT
Start: 2022-04-20 | End: 2022-04-21 | Stop reason: HOSPADM

## 2022-04-19 RX ORDER — ONDANSETRON 4 MG/1
4 TABLET, ORALLY DISINTEGRATING ORAL EVERY 8 HOURS PRN
Status: DISCONTINUED | OUTPATIENT
Start: 2022-04-19 | End: 2022-04-21 | Stop reason: HOSPADM

## 2022-04-19 RX ORDER — IPRATROPIUM BROMIDE AND ALBUTEROL SULFATE 2.5; .5 MG/3ML; MG/3ML
1 SOLUTION RESPIRATORY (INHALATION) EVERY 4 HOURS PRN
Status: DISCONTINUED | OUTPATIENT
Start: 2022-04-19 | End: 2022-04-19

## 2022-04-19 RX ORDER — POLYETHYLENE GLYCOL 3350 17 G/17G
17 POWDER, FOR SOLUTION ORAL DAILY PRN
Status: DISCONTINUED | OUTPATIENT
Start: 2022-04-19 | End: 2022-04-21 | Stop reason: HOSPADM

## 2022-04-19 RX ORDER — NITROGLYCERIN 0.4 MG/1
0.4 TABLET SUBLINGUAL ONCE
Status: COMPLETED | OUTPATIENT
Start: 2022-04-19 | End: 2022-04-19

## 2022-04-19 RX ORDER — ALBUTEROL SULFATE 90 UG/1
2 AEROSOL, METERED RESPIRATORY (INHALATION) EVERY 4 HOURS PRN
Status: DISCONTINUED | OUTPATIENT
Start: 2022-04-19 | End: 2022-04-21 | Stop reason: HOSPADM

## 2022-04-19 RX ORDER — HYDROCODONE BITARTRATE AND ACETAMINOPHEN 5; 325 MG/1; MG/1
1 TABLET ORAL EVERY 6 HOURS PRN
Status: DISCONTINUED | OUTPATIENT
Start: 2022-04-19 | End: 2022-04-21 | Stop reason: HOSPADM

## 2022-04-19 RX ORDER — ONDANSETRON 2 MG/ML
4 INJECTION INTRAMUSCULAR; INTRAVENOUS EVERY 6 HOURS PRN
Status: DISCONTINUED | OUTPATIENT
Start: 2022-04-19 | End: 2022-04-21 | Stop reason: HOSPADM

## 2022-04-19 RX ORDER — IPRATROPIUM BROMIDE AND ALBUTEROL SULFATE 2.5; .5 MG/3ML; MG/3ML
1 SOLUTION RESPIRATORY (INHALATION) ONCE
Status: COMPLETED | OUTPATIENT
Start: 2022-04-19 | End: 2022-04-19

## 2022-04-19 RX ORDER — DEXTROSE MONOHYDRATE 50 MG/ML
100 INJECTION, SOLUTION INTRAVENOUS PRN
Status: DISCONTINUED | OUTPATIENT
Start: 2022-04-19 | End: 2022-04-21 | Stop reason: HOSPADM

## 2022-04-19 RX ORDER — IPRATROPIUM BROMIDE AND ALBUTEROL SULFATE 2.5; .5 MG/3ML; MG/3ML
1 SOLUTION RESPIRATORY (INHALATION) 2 TIMES DAILY
Status: DISCONTINUED | OUTPATIENT
Start: 2022-04-20 | End: 2022-04-21 | Stop reason: HOSPADM

## 2022-04-19 RX ORDER — LORAZEPAM 1 MG/1
1 TABLET ORAL NIGHTLY PRN
Status: DISCONTINUED | OUTPATIENT
Start: 2022-04-19 | End: 2022-04-21 | Stop reason: HOSPADM

## 2022-04-19 RX ADMIN — APIXABAN 5 MG: 5 TABLET, FILM COATED ORAL at 22:41

## 2022-04-19 RX ADMIN — LORAZEPAM 1 MG: 1 TABLET ORAL at 23:06

## 2022-04-19 RX ADMIN — INSULIN LISPRO 2 UNITS: 100 INJECTION, SOLUTION INTRAVENOUS; SUBCUTANEOUS at 22:52

## 2022-04-19 RX ADMIN — HYDRALAZINE HYDROCHLORIDE 10 MG: 20 INJECTION INTRAMUSCULAR; INTRAVENOUS at 18:57

## 2022-04-19 RX ADMIN — CLONIDINE HYDROCHLORIDE 0.1 MG: 0.1 TABLET ORAL at 22:42

## 2022-04-19 RX ADMIN — IPRATROPIUM BROMIDE AND ALBUTEROL SULFATE 1 AMPULE: .5; 3 SOLUTION RESPIRATORY (INHALATION) at 16:16

## 2022-04-19 RX ADMIN — ATORVASTATIN CALCIUM 80 MG: 80 TABLET, FILM COATED ORAL at 22:41

## 2022-04-19 RX ADMIN — NITROGLYCERIN 0.4 MG: 0.4 TABLET, ORALLY DISINTEGRATING SUBLINGUAL at 17:59

## 2022-04-19 RX ADMIN — Medication 10 ML: at 22:42

## 2022-04-19 RX ADMIN — Medication 2 PUFF: at 20:45

## 2022-04-19 RX ADMIN — LOSARTAN POTASSIUM 100 MG: 100 TABLET, FILM COATED ORAL at 22:42

## 2022-04-19 RX ADMIN — METHYLPREDNISOLONE SODIUM SUCCINATE 40 MG: 40 INJECTION, POWDER, FOR SOLUTION INTRAMUSCULAR; INTRAVENOUS at 22:42

## 2022-04-19 RX ADMIN — PROPAFENONE HYDROCHLORIDE 150 MG: 150 TABLET, FILM COATED ORAL at 22:41

## 2022-04-19 RX ADMIN — HYDRALAZINE HYDROCHLORIDE 50 MG: 25 TABLET, FILM COATED ORAL at 22:42

## 2022-04-19 RX ADMIN — HYDROCODONE BITARTRATE AND ACETAMINOPHEN 1 TABLET: 5; 325 TABLET ORAL at 22:47

## 2022-04-19 RX ADMIN — HYDRALAZINE HYDROCHLORIDE 10 MG: 20 INJECTION INTRAMUSCULAR; INTRAVENOUS at 16:09

## 2022-04-19 ASSESSMENT — PAIN SCALES - GENERAL
PAINLEVEL_OUTOF10: 8
PAINLEVEL_OUTOF10: 0

## 2022-04-19 ASSESSMENT — PAIN DESCRIPTION - PAIN TYPE: TYPE: ACUTE PAIN

## 2022-04-19 ASSESSMENT — PAIN DESCRIPTION - PROGRESSION
CLINICAL_PROGRESSION: GRADUALLY WORSENING
CLINICAL_PROGRESSION: GRADUALLY WORSENING

## 2022-04-19 ASSESSMENT — PAIN DESCRIPTION - FREQUENCY: FREQUENCY: CONTINUOUS

## 2022-04-19 ASSESSMENT — PAIN DESCRIPTION - DESCRIPTORS: DESCRIPTORS: ACHING

## 2022-04-19 ASSESSMENT — PAIN DESCRIPTION - ONSET: ONSET: GRADUAL

## 2022-04-19 ASSESSMENT — PAIN DESCRIPTION - LOCATION: LOCATION: HEAD

## 2022-04-19 ASSESSMENT — PAIN - FUNCTIONAL ASSESSMENT: PAIN_FUNCTIONAL_ASSESSMENT: PREVENTS OR INTERFERES SOME ACTIVE ACTIVITIES AND ADLS

## 2022-04-19 NOTE — TELEPHONE ENCOUNTER
Pt daughter called and said that pt is not getting any better. She said that pt's [employer] nurse called in some doxycycline and more prednisone on Friday. Pt is not doing any better. She said pt is now running a fever. She is worried that pt may have pneumonia.

## 2022-04-19 NOTE — ED NOTES
Assessment completed.  Patient resting in bed, will cont to monitor closely     Pablo Tabor RN  04/19/22 1527

## 2022-04-19 NOTE — ED NOTES
patient resting in bed, vss, call light with in reach. denies any needs or wants at this time.  Will continue to moniotor closely     Dwaine Peter, VINNY  04/19/22 1984

## 2022-04-19 NOTE — H&P
HOSPITALISTS HISTORY AND PHYSICAL    4/19/2022 7:06 PM    Patient Information:  Rafy Santos is a 80 y.o. female [id number]  PCP:  Quique Hernández MD (Tel: [phone] )    Chief complaint:    Chief Complaint   Patient presents with    Cough     Came in for c/o worsening coughing, congestion, some headaches, fatigue. Has been on zpack and doxycycline and prednisone. Denies fever. Symptoms been going on for 2 weeks now. History of Present Illness:  Jazmin Castellanos is a 80 y.o. female of breath with a nonproductive cough for the last couple weeks  Low-grade fever some chills some nausea no vomiting no diarrhea no other sick contacts. No chest pain no secondhand smoke exposure quit smoking when she was 60. Treated by pulmonologist outpatient with plan for steroids azithromycin and doxycycline    chronicaly on 2 liters      REVIEW OF SYSTEMS:   Constitutional: see above  ENT: Negative for rhinorrhea, epistaxis, hoarseness, sore throat. Respiratory: see above  Cardiovascular: Negative for chest pain, palpitations   Gastrointestinal:see above  Genitourinary: Negative for polyuria, dysuria   Hematologic/Lymphatic: Negative for bleeding tendency, easy bruising  Musculoskeletal: Negative for myalgias and arthralgias  Neurologic: Negative for confusion,dysarthria. Skin: Negative for itching,rash  Psychiatric: Negative for depression,anxiety, agitation. Endocrine: Negative for polydipsia,polyuria,heat /cold intolerance. Past Medical History:   has a past medical history of Arthritis, Atrial fibrillation ([address] Diabetes mellitus type II, controlled ([address] GERD (gastroesophageal reflux disease), HTN (hypertension), Hyperlipidemia, Hypothyroid, Insomnia, Lumbago, and SVT (supraventricular tachycardia) ([address]      Past Surgical History:   has a past surgical history that includes Appendectomy; Colonoscopy; and Cholecystectomy, laparoscopic (2/24/2013). Medications:  No current facility-administered medications on file prior to encounter. Current Outpatient Medications on File Prior to Encounter   Medication Sig Dispense Refill    irbesartan (AVAPRO) 300 MG tablet TAKE 1 TABLET BY MOUTH EVERY NIGHT 90 tablet 0    cloNIDine (CATAPRES) 0.1 MG tablet TAKE 1 TABLET BY MOUTH TWICE DAILY 180 tablet 0    dilTIAZem (CARDIZEM CD) 240 MG extended release capsule TAKE 1 CAPSULE EVERY DAY 90 capsule 1    doxycycline hyclate (VIBRA-TABS) 100 MG tablet Take 1 tablet by mouth 2 times daily for 7 days 14 tablet 0    predniSONE (DELTASONE) 20 MG tablet Take 2 tablets by mouth daily for 5 days 10 tablet 0    loratadine (CLARITIN) 10 MG tablet Take 1 tablet by mouth daily 90 tablet 1    budesonide-formoterol (SYMBICORT) 160-4.5 MCG/ACT AERO INHALE 2 PUFFS INTO THE LUNGS TWICE DAILY 30.6 g 5    dicyclomine (BENTYL) 10 MG capsule TAKE 1 CAPSULE BY MOUTH FOUR TIMES DAILY AS NEEDED FOR ABDOMINAL PAIN 360 capsule 0    propafenone (RYTHMOL) 150 MG tablet TAKE 1 TABLET BY MOUTH EVERY 8 HOURS 270 tablet 0    Blood Glucose Monitoring Suppl (TRUE METRIX METER) w/Device KIT To use to check sugars 4 times daily 1 kit 0    apixaban (ELIQUIS) 5 MG TABS tablet Take 1 tablet by mouth 2 times daily 180 tablet 1    levothyroxine (SYNTHROID) 75 MCG tablet TAKE 1 TABLET EVERY DAY 90 tablet 1    Blood Glucose Calibration (TRUE METRIX LEVEL 2) Normal SOLN As needed 1 each 0    blood glucose test strips (TRUE METRIX BLOOD GLUCOSE TEST) strip 1 each by In Vitro route daily As needed.  100 each 3    TRUEplus Lancets 33G MISC 1 daily 100 each 3    LORazepam (ATIVAN) 1 MG tablet TAKE 1/2 TABLET BY MOUTH TWICE DAILY AND 1 EVERY NIGHT AT BEDTIME AS NEEDED FOR ANXIETY 60 tablet 2    rOPINIRole (REQUIP) 3 MG tablet TAKE 1 TABLET BY MOUTH THREE TIMES DAILY 270 tablet 1    rOPINIRole (REQUIP) 3 MG tablet Take 3 mg by mouth 3 times daily as needed (restless legs)      meclizine (ANTIVERT) 12.5 MG tablet TAKE 1 TABLET THREE TIMES DAILY AS NEEDED 270 tablet 1    conjugated estrogens (PREMARIN) 0.625 MG/GM vaginal cream Place 1 g vaginally Twice a Week 30 g 2    fluticasone (FLONASE) 50 MCG/ACT nasal spray SHAKE LIQUID AND USE 2 SPRAYS IN EACH NOSTRIL DAILY 48 g 3    atorvastatin (LIPITOR) 80 MG tablet Take 1 tablet by mouth nightly 90 tablet 3    hydrALAZINE (APRESOLINE) 50 MG tablet TAKE 1 TABLET BY MOUTH THREE TIMES DAILY 270 tablet 1    Lancets MISC 1 each by Does not apply route 2 times daily 300 each 1    ondansetron (ZOFRAN) 4 MG tablet Take 1 tablet by mouth daily as needed for Nausea or Vomiting 30 tablet 0    OXYGEN Inhale 2 L into the lungs      blood glucose monitor strips Test one time a day 100 strip 5    levalbuterol (XOPENEX) 0.63 MG/3ML nebulization INHALE CONTENTS OF 1 VIAL PER NEBULIZER EVERY 6 HOURS AS NEEDED FOR WHEEZING 1050 mL 3    Respiratory Therapy Supplies (NEBULIZER/TUBING/MOUTHPIECE) KIT 1 kit by Does not apply route 4 times daily as needed (shortness of breath) 1 kit 0    albuterol sulfate HFA (PROAIR HFA) 108 (90 Base) MCG/ACT inhaler Inhale 2 puffs into the lungs every 6 hours as needed for Wheezing 1 Inhaler 6       Allergies:   Allergies   Allergen Reactions    Adhesive Tape Anaphylaxis    Cefaclor Nausea And Vomiting     Other reaction(s): Chest pain    Nsaids      Pt states she has hives and heart races   Other reaction(s): Tachycardia    Clinoril [Sulindac] Other (See Comments)     Stomach pain    Codeine      FEEL NUMB    Diclofenac     Diclofenac Sodium Hives    Diclofenac Sodium Hives    Hctz [Hydrochlorothiazide]      Sob    Ibuprofen Other (See Comments)     Other reaction(s): Tachycardia    Macrobid [Nitrofurantoin Macrocrystal]      nausea    Motrin [Ibuprofen Micronized] Other (See Comments)     Tachycardia      Pcn [Penicillins] Hives    Peach [Prunus Persica] Itching and Swelling     Cannot eat raw peaches    Prednisone      Causes elevated blood pressure     Sulfa Antibiotics      Nausea, diarrhea        Social History:  Patient Lives at home   reports that she quit smoking about 26 years ago. Her smoking use included cigarettes. She started smoking about 71 years ago. She has a 45.00 pack-year smoking history. She has never used smokeless tobacco. She reports that she does not drink alcohol and does not use drugs. Family History:  family history includes Asthma in her paternal uncle; Heart Attack in her father; Heart Disease in her father; High Blood Pressure in her mother; Other in her brother. ,     Physical Exam:  BP (!) 189/71   Pulse 66   Temp 97.9 °F (36.6 °C) (Oral)   Resp 15   Ht 5' 2\" (1.575 m)   Wt 164 lb (74.4 kg)   SpO2 99%   BMI 30.00 kg/m²     General appearance:  Appears comfortable. AAOx3  HEENT: atraumatic, Pupils equal, muscous membranes moist, no masses appreciated  Cardiovascular: Regular rate and rhythm no murmurs appreciated  Respiratory: trace expiratory wheezing  Gastrointestinal: Abdomen soft, non-tender, BS+  EXT: no edema  Neurology: no gross focal deficts  Psychiatry: Appropriate affect. Not agitated  Skin: Warm, dry, no rashes appreciated    Labs:  CBC:   Lab Results   Component Value Date    WBC 12.9 04/19/2022    RBC 4.01 04/19/2022    HGB 11.8 04/19/2022    HCT 36.3 04/19/2022    MCV 90.5 04/19/2022    MCH 29.5 04/19/2022    MCHC 32.6 04/19/2022    RDW 15.4 04/19/2022     04/19/2022    MPV 8.7 04/19/2022     BMP:    Lab Results   Component Value Date     04/19/2022    K 5.1 04/19/2022    CL 97 04/19/2022    CO2 18 04/19/2022    BUN 31 04/19/2022    CREATININE 1.0 04/19/2022    CALCIUM 9.3 04/19/2022    GFRAA >60 04/19/2022    GFRAA >60 02/27/2013    LABGLOM 53 04/19/2022    GLUCOSE 252 04/19/2022     CT CHEST WO CONTRAST   Final Result   1. No active pulmonary disease. 2. COPD.    3. Increase in size of the right lower lobe pulmonary nodule measuring 8.6 mm. RECOMMENDATIONS:   Fleischner Society guidelines for follow-up and management of incidentally   detected pulmonary nodules:      Single Solid Nodule:      Nodule size less than 6 mm   In a low-risk patient, no routine follow-up. In a high-risk patient, optional CT at 12 months. Nodule size equals 6-8 mm   In a low-risk patient, CT at 6-12 months, then consider CT at 18-24 months. In a high-risk patient, CT at 6-12 months, then CT at 18-24 months. Nodule size greater than 8 mm         In a low-risk patient, consider CT at 3 months, PET/CT, or tissue sampling. In a high-risk patient, consider CT at 3 months, PET/CT, or tissue sampling.      - Low risk patients include individuals with minimal or absent history of   smoking and other known risk factors. - High risk patients include individuals with a history or smoking or known   risk factors. Radiology 2017 http://pubs. rsna.org/doi/full/10.1148/radiol. 2916611400         XR CHEST PORTABLE   Final Result   Mild pulmonary vascular congestion. Recent imaging reviewed    Problem List  Principal Problem:    Acute exacerbation of chronic obstructive pulmonary disease (COPD) ([facility] [address]  Resolved Problems:    * No resolved hospital problems. *        Assessment/Plan:   Acute copd exacebation with chronic hypoxic resp failure on 2l  - iv steroids  - duonebs  - check procal  - pulm consult    8 mm lung nodule pulm consult    PAF: home meds on eliquis    Hyperglycemia: lispro and a1c      DVT prophylaxis lovenox  Code status full code        Admit as inpatient I anticipate hospitalization spanning more than two midnights for investigation and treatment of the above medically necessary diagnoses. Please note that some part of this chart was generated using Dragon dictation software.  Although every effort was made to ensure the accuracy of this automated transcription, some errors in transcription may have occurred inadvertently. If you may need any clarification, please do not hesitate to contact me through [facility].        Esvin Beard MD    4/19/2022 7:06 PM

## 2022-04-19 NOTE — ED PROVIDER NOTES
[facility]        Pt Name: Bin Bolivar  MRN: [id number]  [place] 1935  Date of evaluation: 4/19/2022  Provider: KATHY Rodriguez - CNP  PCP: Karol Jordan MD  Note Started: 1:25 PM EDT        I have seen and evaluated this patient with my supervising physician Dariela Dhillon, *. CHIEF COMPLAINT       Chief Complaint   Patient presents with    Cough     Came in for c/o worsening coughing, congestion, some headaches, fatigue. Has been on zpack and doxycycline and prednisone. Denies fever. Symptoms been going on for 2 weeks now. HISTORY OF PRESENT ILLNESS   (Location, Timing/Onset, Context/Setting, Quality, Duration, Modifying Factors, Severity, Associated Signs and Symptoms)  Note limiting factors. Chief Complaint: SOA, cough, chest congestion     Bin Bolivar is a 80 y.o. female medical history of arthritis, A. fib, DM type II, GERD, HTN, HLD, insomnia, hypothyroid, lumbalgia, SVT, appendectomy, cholecystectomy presents emergency department with complaints of chest congestion with productive cough for the past 2 weeks. She had been seen outpatient by Dr. Hollis Smith and was prescribed a Z-Viral and prednisone without relief of symptoms. She then returned was prescribed doxycycline which she said always works better for her and another round of steroids. This did not alleviate her symptoms which therefore presented to the emergency department. She continues to have short of air and dyspnea on exertion. She denies any associated rashes or fevers, chest pain, chest tightness, palpitations, nausea, vomiting, diarrhea, or leg swelling. She is up-to-date on her COVID-19 and influenza vaccines. Denies any known positive exposure to anyone is tested positive for COVID-19 or influenza a. She is anticoagulated with Eliquis.     Nursing Notes were all reviewed and agreed with or any disagreements were addressed in the HPI. REVIEW OF SYSTEMS    (2-9 systems for level 4, 10 or more for level 5)     Review of Systems    Positives and Pertinent negatives as per HPI. Except as noted above in the ROS, all other systems were reviewed and negative. PAST MEDICAL HISTORY     Past Medical History:   Diagnosis Date    Arthritis     Atrial fibrillation ([facility] [address]     Diabetes mellitus type II, controlled ([facility] [address]     GERD (gastroesophageal reflux disease)     HTN (hypertension)     Hyperlipidemia     Hypothyroid     Insomnia     Lumbago     SVT (supraventricular tachycardia) (HCC)          SURGICAL HISTORY     Past Surgical History:   Procedure Laterality Date    APPENDECTOMY      CHOLECYSTECTOMY, LAPAROSCOPIC  2/24/2013    COLONOSCOPY           CURRENTMEDICATIONS       Previous Medications    ALBUTEROL SULFATE HFA (PROAIR HFA) 108 (90 BASE) MCG/ACT INHALER    Inhale 2 puffs into the lungs every 6 hours as needed for Wheezing    APIXABAN (ELIQUIS) 5 MG TABS TABLET    Take 1 tablet by mouth 2 times daily    ATORVASTATIN (LIPITOR) 80 MG TABLET    Take 1 tablet by mouth nightly    BLOOD GLUCOSE CALIBRATION (TRUE METRIX LEVEL 2) NORMAL SOLN    As needed    BLOOD GLUCOSE MONITOR STRIPS    Test one time a day    BLOOD GLUCOSE MONITORING SUPPL (TRUE METRIX METER) W/DEVICE KIT    To use to check sugars 4 times daily    BLOOD GLUCOSE TEST STRIPS (TRUE METRIX BLOOD GLUCOSE TEST) STRIP    1 each by In Vitro route daily As needed.     BUDESONIDE-FORMOTEROL (SYMBICORT) 160-4.5 MCG/ACT AERO    INHALE 2 PUFFS INTO THE LUNGS TWICE DAILY    CLONIDINE (CATAPRES) 0.1 MG TABLET    TAKE 1 TABLET BY MOUTH TWICE DAILY    CONJUGATED ESTROGENS (PREMARIN) 0.625 MG/GM VAGINAL CREAM    Place 1 g vaginally Twice a Week    DICYCLOMINE (BENTYL) 10 MG CAPSULE    TAKE 1 CAPSULE BY MOUTH FOUR TIMES DAILY AS NEEDED FOR ABDOMINAL PAIN    DILTIAZEM (CARDIZEM CD) 240 MG EXTENDED RELEASE CAPSULE    TAKE 1 CAPSULE EVERY DAY DOXYCYCLINE HYCLATE (VIBRA-TABS) 100 MG TABLET    Take 1 tablet by mouth 2 times daily for 7 days    FLUTICASONE (FLONASE) 50 MCG/ACT NASAL SPRAY    SHAKE LIQUID AND USE 2 SPRAYS IN EACH NOSTRIL DAILY    HYDRALAZINE (APRESOLINE) 50 MG TABLET    TAKE 1 TABLET BY MOUTH THREE TIMES DAILY    IRBESARTAN (AVAPRO) 300 MG TABLET    TAKE 1 TABLET BY MOUTH EVERY NIGHT    LANCETS MISC    1 each by Does not apply route 2 times daily    LEVALBUTEROL (XOPENEX) 0.63 MG/3ML NEBULIZATION    INHALE CONTENTS OF 1 VIAL PER NEBULIZER EVERY 6 HOURS AS NEEDED FOR WHEEZING    LEVOTHYROXINE (SYNTHROID) 75 MCG TABLET    TAKE 1 TABLET EVERY DAY    LORATADINE (CLARITIN) 10 MG TABLET    Take 1 tablet by mouth daily    LORAZEPAM (ATIVAN) 1 MG TABLET    TAKE 1/2 TABLET BY MOUTH TWICE DAILY AND 1 EVERY NIGHT AT BEDTIME AS NEEDED FOR ANXIETY    MECLIZINE (ANTIVERT) 12.5 MG TABLET    TAKE 1 TABLET THREE TIMES DAILY AS NEEDED    ONDANSETRON (ZOFRAN) 4 MG TABLET    Take 1 tablet by mouth daily as needed for Nausea or Vomiting    OXYGEN    Inhale 2 L into the lungs    PREDNISONE (DELTASONE) 20 MG TABLET    Take 2 tablets by mouth daily for 5 days    PROPAFENONE (RYTHMOL) 150 MG TABLET    TAKE 1 TABLET BY MOUTH EVERY 8 HOURS    RESPIRATORY THERAPY SUPPLIES (NEBULIZER/TUBING/MOUTHPIECE) KIT    1 kit by Does not apply route 4 times daily as needed (shortness of breath)    ROPINIROLE (REQUIP) 3 MG TABLET    TAKE 1 TABLET BY MOUTH THREE TIMES DAILY    ROPINIROLE (REQUIP) 3 MG TABLET    Take 3 mg by mouth 3 times daily as needed (restless legs)    TRUEPLUS LANCETS 33G MISC    1 daily         ALLERGIES     Adhesive tape, Cefaclor, Nsaids, Clinoril [sulindac], Codeine, Diclofenac, Diclofenac sodium, Diclofenac sodium, Hctz [hydrochlorothiazide], Ibuprofen, Macrobid [nitrofurantoin macrocrystal], Motrin [ibuprofen micronized], Pcn [penicillins], Peach [prunus persica], Prednisone, and Sulfa antibiotics    FAMILYHISTORY       Family History   Problem Relation Right lower leg: No edema. Left lower leg: No edema. Skin:     General: Skin is warm and dry. Coloration: Skin is not pale. Neurological:      Mental Status: She is alert and oriented to person, place, and time. Psychiatric:         Behavior: Behavior normal. Behavior is cooperative.          DIAGNOSTIC RESULTS   LABS:    Labs Reviewed   CBC WITH AUTO DIFFERENTIAL - Abnormal; Notable for the following components:       Result Value    WBC 12.9 (*)     Hemoglobin 11.8 (*)     Neutrophils Absolute 10.4 (*)     All other components within normal limits    Narrative:     CALL  [facility] tel. [phone],  Rejected Test Name/Called to:bnpep cmpx trop/maddi turner rn, 04/19/2022  14:19, by Dnany Caceres   URINALYSIS WITH REFLEX TO CULTURE - Abnormal; Notable for the following components:    Protein, UA >=300 (*)     Leukocyte Esterase, Urine TRACE (*)     All other components within normal limits   LACTIC ACID - Abnormal; Notable for the following components:    Lactic Acid 2.1 (*)     All other components within normal limits   BLOOD GAS, VENOUS - Abnormal; Notable for the following components:    pCO2, Nadeem 36.1 (*)     pO2, Nadeem 200.0 (*)     Carboxyhemoglobin 3.2 (*)     All other components within normal limits   COMPREHENSIVE METABOLIC PANEL W/ REFLEX TO MG FOR LOW K - Abnormal; Notable for the following components:    Sodium 132 (*)     Chloride 97 (*)     CO2 18 (*)     Anion Gap 17 (*)     Glucose 252 (*)     BUN 31 (*)     GFR Non- 53 (*)     Total Protein 6.2 (*)     All other components within normal limits   BRAIN NATRIURETIC PEPTIDE - Abnormal; Notable for the following components:    Pro- (*)     All other components within normal limits   MICROSCOPIC URINALYSIS - Abnormal; Notable for the following components:    WBC, UA 17 (*)     All other components within normal limits   COVID-19 & INFLUENZA COMBO   CULTURE, URINE   CULTURE, BLOOD 2   CULTURE, BLOOD 1   SPECIMEN REJECTION Narrative:     CALL  [facility] tel. [phone],  Rejected Test Name/Called to:bnpep cmpx trop/maddi turner rn, 04/19/2022  14:19, by Rosa Elena Barriga   TROPONIN       When ordered only abnormal lab results are displayed. All other labs were within normal range or not returned as of this dictation. EKG: When ordered, EKG's are interpreted by the Emergency Department Physician in the absence of a cardiologist.  Please see their note for interpretation of EKG. RADIOLOGY:   Non-plain film images such as CT, Ultrasound and MRI are read by the radiologist. Plain radiographic images are visualized and preliminarily interpreted by the ED Provider with the below findings:        Interpretation per the Radiologist below, if available at the time of this note:    CT CHEST WO CONTRAST   Final Result   1. No active pulmonary disease. 2. COPD. 3. Increase in size of the right lower lobe pulmonary nodule measuring 8.6 mm. RECOMMENDATIONS:   Fleischner Society guidelines for follow-up and management of incidentally   detected pulmonary nodules:      Single Solid Nodule:      Nodule size less than 6 mm   In a low-risk patient, no routine follow-up. In a high-risk patient, optional CT at 12 months. Nodule size equals 6-8 mm   In a low-risk patient, CT at 6-12 months, then consider CT at 18-24 months. In a high-risk patient, CT at 6-12 months, then CT at 18-24 months. Nodule size greater than 8 mm         In a low-risk patient, consider CT at 3 months, PET/CT, or tissue sampling. In a high-risk patient, consider CT at 3 months, PET/CT, or tissue sampling.      - Low risk patients include individuals with minimal or absent history of   smoking and other known risk factors. - High risk patients include individuals with a history or smoking or known   risk factors. Radiology 2017 http://pubs. rsna.org/doi/full/10.1148/radiol. 8225305217         XR CHEST PORTABLE   Final Result   Mild pulmonary vascular congestion. No results found. PROCEDURES   Unless otherwise noted below, none     Procedures    CRITICAL CARE TIME       CONSULTS:  None      EMERGENCY DEPARTMENT COURSE and DIFFERENTIAL DIAGNOSIS/MDM:   Vitals:    Vitals:    04/19/22 1802 04/19/22 1812 04/19/22 1832 04/19/22 1842   BP: (!) 199/78 (!) 181/82 (!) 205/83 (!) 196/78   Pulse: 73 80 71 66   Resp: 16 15 21 15   Temp:       TempSrc:       SpO2: 95% 95% 99% 99%   Weight:       Height:           Patient was given the following medications:  Medications   hydrALAZINE (APRESOLINE) injection 10 mg (has no administration in time range)   hydrALAZINE (APRESOLINE) injection 10 mg (10 mg IntraVENous Given 4/19/22 1609)   ipratropium-albuterol (DUONEB) nebulizer solution 1 ampule (1 ampule Inhalation Given 4/19/22 1616)   nitroGLYCERIN (NITROSTAT) SL tablet 0.4 mg (0.4 mg SubLINGual Given 4/19/22 1759)         Care of this patient took place during the COVID-19 pandemic emergency. ED COURSE & MEDICAL DECISION MAKING    - The patient presented to the ER with complaints of  short of air, cough, chest congestion. Vital signs were reviewed. Exam well-developed, well-nourished female who appears uncomfortable. Peripheral IV placed. Labs, Imaging ordered. - Pertinent Labs & Imaging studies reviewed. (See chart for details)   -  Patient seen and evaluated in the emergency department. -  Triage and nursing notes reviewed and incorporated. -  Old chart records reviewed and incorporated.  -  Dr. Elizabeth Matute emergency department attending assessed the patient  -  Differential diagnosis includes: ACS, MI, costochondritis, pleurisy, aneurysm, dissection, bronchitis, pneumonia, pleural effusion, CHF, cardiomegaly, esophageal rupture, endocarditis, pericarditis, PE, pneumothorax, tamponade versus COVID-19  -  Work-up included:  See above  -  ED treatment included:    -  Results discussed with patient.      Wes Benjamin is an 80-year-old female criteria for severe sepsis or septic shock  [] Alternative explanation for abnormal labs and/or vitals (see MDM)  [] Viral etiology found or highly suspected (including COVID-19) without concomitant bacterial infection   Must meet 1:    [x] Lactate > 2       or   [] Signs of Organ Dysfunction:    - SBP < 90 or MAP < 65  - Altered mental status  - Creatinine > 2 or increased from      baseline  - Urine Output < 0.5 ml/kg/hr  - Bilirubin > 2  - INR > 1.5 (not anticoagulated)  - Platelets < 422,040  - Acute Respiratory Failure as     evidenced by new need for NIPPV     or mechanical ventilation        [] No criteria met for Severe Sepsis. Must meet 1:    [] Lactate > 4        or   [] SBP < 90 or MAP < 65 for at        least two readings in the first        hour after fluid bolus        administration      [] Vasopressors initiated (if hypotension persists after fluid resuscitation)                [x] No criteria met for Septic Shock.    Patient Vitals for the past 6 hrs:   BP Temp Pulse Resp SpO2 Height Weight Weight Method Percent Weight Change   04/19/22 1331 (!) 180/92 97.9 °F (36.6 °C) 68 20 98 % 5' 2\" (1.575 m) 164 lb (74.4 kg) Stated 0   04/19/22 1502 (!) 202/79 -- -- -- 95 % -- -- -- --   04/19/22 1609 (!) 201/78 -- -- -- -- -- -- -- --   04/19/22 1636 (!) 197/67 -- 68 15 -- -- -- -- --   04/19/22 1640 (!) 203/78 -- 68 16 -- -- -- -- --   04/19/22 1650 (!) 211/67 -- 64 16 -- -- -- -- --   04/19/22 1656 (!) 210/77 -- 66 15 -- -- -- -- --   04/19/22 1715 (!) 210/77 -- 66 14 -- -- -- -- --   04/19/22 1742 (!) 210/79 -- 69 17 95 % -- -- -- --   04/19/22 1752 (!) 193/81 -- 66 15 96 % -- -- -- --   04/19/22 1802 (!) 199/78 -- 73 16 95 % -- -- -- --   04/19/22 1812 (!) 181/82 -- 80 15 95 % -- -- -- --   04/19/22 1832 (!) 205/83 -- 71 21 99 % -- -- -- --   04/19/22 1842 (!) 196/78 -- 66 15 99 % -- -- -- --      Recent Labs     04/19/22  1401 04/19/22  1440   WBC 12.9*  --    LACTA  --  2.1*   CREATININE  --  1.0 BILITOT  --  0.3     --              CRITICAL CARE TIME   Total Critical Care time was 24 minutes, excluding separately reportable procedures. There was a high probability of clinically significant/life threatening deterioration in the patient's condition which required my urgent intervention. FINAL IMPRESSION      1. COPD exacerbation (HCC)    2. Pulmonary nodule, right    3. Essential hypertension    4.  Hyperglycemia          DISPOSITION/PLAN   DISPOSITION    Admission       (Please note that portions of this note were completed with a voice recognition program.  Efforts were made to edit the dictations but occasionally words are mis-transcribed.)    KATHY Staton CNP (electronically signed)            KATHY Staton CNP  04/19/22 2001

## 2022-04-19 NOTE — TELEPHONE ENCOUNTER
Dr Tara Coats called in a z-seb and 5 days of Prednisone 4-7-22 and then pt was prescribed Doxycycline and more Prednisone 4-15-22 by [facility] Nurse

## 2022-04-19 NOTE — ED NOTES
patient resting in bed, vss, call light with in reach. denies any needs or wants at this time.  Will continue to moniotor closely     Ni Dudley RN  04/19/22 1941

## 2022-04-19 NOTE — ED PROVIDER NOTES
[facility] Emergency Department      Pt Name: Jose Perez  MRN: [id number]  [place] 1935  Date of evaluation: 4/19/2022  Provider: Max Robbins MD  I independently performed a history and physical on Jose Perez. All diagnostic, treatment, and disposition decisions were made by myself in conjunction with the advanced practice provider. HPI: Jose Perez presented with   Chief Complaint   Patient presents with    Cough     Came in for c/o worsening coughing, congestion, some headaches, fatigue. Has been on zpack and doxycycline and prednisone. Denies fever. Symptoms been going on for 2 weeks now. Jose Perez has a past medical history of Arthritis, Atrial fibrillation ([facility] [address] Diabetes mellitus type II, controlled ([facility] [address] GERD (gastroesophageal reflux disease), HTN (hypertension), Hyperlipidemia, Hypothyroid, Insomnia, Lumbago, and SVT (supraventricular tachycardia) ([facility] [address] She has a past surgical history that includes Appendectomy; Colonoscopy; and Cholecystectomy, laparoscopic (2/24/2013). No current facility-administered medications on file prior to encounter.      Current Outpatient Medications on File Prior to Encounter   Medication Sig Dispense Refill    irbesartan (AVAPRO) 300 MG tablet TAKE 1 TABLET BY MOUTH EVERY NIGHT 90 tablet 0    cloNIDine (CATAPRES) 0.1 MG tablet TAKE 1 TABLET BY MOUTH TWICE DAILY 180 tablet 0    dilTIAZem (CARDIZEM CD) 240 MG extended release capsule TAKE 1 CAPSULE EVERY DAY 90 capsule 1    doxycycline hyclate (VIBRA-TABS) 100 MG tablet Take 1 tablet by mouth 2 times daily for 7 days 14 tablet 0    predniSONE (DELTASONE) 20 MG tablet Take 2 tablets by mouth daily for 5 days 10 tablet 0    loratadine (CLARITIN) 10 MG tablet Take 1 tablet by mouth daily 90 tablet 1    budesonide-formoterol (SYMBICORT) 160-4.5 MCG/ACT AERO INHALE 2 PUFFS INTO THE LUNGS TWICE DAILY 30.6 g 5    dicyclomine (BENTYL) 10 MG capsule TAKE 1 CAPSULE BY MOUTH FOUR TIMES DAILY AS NEEDED FOR ABDOMINAL PAIN 360 capsule 0    propafenone (RYTHMOL) 150 MG tablet TAKE 1 TABLET BY MOUTH EVERY 8 HOURS 270 tablet 0    Blood Glucose Monitoring Suppl (TRUE METRIX METER) w/Device KIT To use to check sugars 4 times daily 1 kit 0    apixaban (ELIQUIS) 5 MG TABS tablet Take 1 tablet by mouth 2 times daily 180 tablet 1    levothyroxine (SYNTHROID) 75 MCG tablet TAKE 1 TABLET EVERY DAY 90 tablet 1    Blood Glucose Calibration (TRUE METRIX LEVEL 2) Normal SOLN As needed 1 each 0    blood glucose test strips (TRUE METRIX BLOOD GLUCOSE TEST) strip 1 each by In Vitro route daily As needed.  100 each 3    TRUEplus Lancets 33G MISC 1 daily 100 each 3    LORazepam (ATIVAN) 1 MG tablet TAKE 1/2 TABLET BY MOUTH TWICE DAILY AND 1 EVERY NIGHT AT BEDTIME AS NEEDED FOR ANXIETY 60 tablet 2    rOPINIRole (REQUIP) 3 MG tablet TAKE 1 TABLET BY MOUTH THREE TIMES DAILY 270 tablet 1    rOPINIRole (REQUIP) 3 MG tablet Take 3 mg by mouth 3 times daily as needed (restless legs)      meclizine (ANTIVERT) 12.5 MG tablet TAKE 1 TABLET THREE TIMES DAILY AS NEEDED 270 tablet 1    conjugated estrogens (PREMARIN) 0.625 MG/GM vaginal cream Place 1 g vaginally Twice a Week 30 g 2    fluticasone (FLONASE) 50 MCG/ACT nasal spray SHAKE LIQUID AND USE 2 SPRAYS IN EACH NOSTRIL DAILY 48 g 3    atorvastatin (LIPITOR) 80 MG tablet Take 1 tablet by mouth nightly 90 tablet 3    hydrALAZINE (APRESOLINE) 50 MG tablet TAKE 1 TABLET BY MOUTH THREE TIMES DAILY 270 tablet 1    Lancets MISC 1 each by Does not apply route 2 times daily 300 each 1    ondansetron (ZOFRAN) 4 MG tablet Take 1 tablet by mouth daily as needed for Nausea or Vomiting 30 tablet 0    OXYGEN Inhale 2 L into the lungs      blood glucose monitor strips Test one time a day 100 strip 5    levalbuterol (XOPENEX) 0.63 MG/3ML nebulization INHALE CONTENTS OF 1 VIAL PER NEBULIZER EVERY 6 HOURS AS NEEDED FOR WHEEZING 1050 mL 3    Respiratory Therapy Supplies (NEBULIZER/TUBING/MOUTHPIECE) KIT 1 kit by Does not apply route 4 times daily as needed (shortness of breath) 1 kit 0    albuterol sulfate HFA (PROAIR HFA) 108 (90 Base) MCG/ACT inhaler Inhale 2 puffs into the lungs every 6 hours as needed for Wheezing 1 Inhaler 6     PHYSICAL EXAM  Vitals: BP (!) 189/71   Pulse 66   Temp 97.9 °F (36.6 °C) (Oral)   Resp 15   Ht 5' 2\" (1.575 m)   Wt 164 lb (74.4 kg)   SpO2 99%   BMI 30.00 kg/m²   Constitutional:  80 y.o. female alert, cooperative  HENT:  Atraumatic scalp, mucous membranes moist  Eyes:   Conjunctiva clear, no icterus  Neck:  Supple, no visible JVD, no signs of injury  Cardiovascular:  Regular, no rubs  Thorax & Lungs:  slight accessory muscle usage, wheezes present  Abdomen:  Soft, non distended, NT  Back:  No deformity  Genitalia:  Deferred  Rectal:  Deferred  Extremities:  No cyanosis, edema, adequate perfusion  Skin:  Warm, dry  Neurologic:  Alert, no slurred speech  Psychiatric:  Affect appropriate    Medical Decision Making and Plan:  Briefly, this is an 80 y. o.female who presented with shortness of breath. Worsening despite treatment by her pulmonologist, steroids, abx. Hx COPD, obesity, CAD, PAF. Diagnostic results below. Continued symptoms with treatment. Will also need pulmonology follow up for enlarging nodule in lung. Plan is to admit for further care.      For further details of [facility] Emergency Department encounter, please see documentation by advanced practice provider Lang Aleman CNP    Labs Reviewed   CBC WITH AUTO DIFFERENTIAL - Abnormal; Notable for the following components:       Result Value    WBC 12.9 (*)     Hemoglobin 11.8 (*)     Neutrophils Absolute 10.4 (*)     All other components within normal limits    Narrative:     CALL  [facility] tel. [phone],  Rejected Test Name/Called to:bnpep cmpx trop/maddi turner rn, 04/19/2022  14:19, by Tamara  Abnormal; Notable for the following components:    Protein, UA >=300 (*)     Leukocyte Esterase, Urine TRACE (*)     All other components within normal limits   LACTIC ACID - Abnormal; Notable for the following components:    Lactic Acid 2.1 (*)     All other components within normal limits   BLOOD GAS, VENOUS - Abnormal; Notable for the following components:    pCO2, Nadeem 36.1 (*)     pO2, Nadeem 200.0 (*)     Carboxyhemoglobin 3.2 (*)     All other components within normal limits   COMPREHENSIVE METABOLIC PANEL W/ REFLEX TO MG FOR LOW K - Abnormal; Notable for the following components:    Sodium 132 (*)     Chloride 97 (*)     CO2 18 (*)     Anion Gap 17 (*)     Glucose 252 (*)     BUN 31 (*)     GFR Non- 53 (*)     Total Protein 6.2 (*)     All other components within normal limits   BRAIN NATRIURETIC PEPTIDE - Abnormal; Notable for the following components:    Pro- (*)     All other components within normal limits   MICROSCOPIC URINALYSIS - Abnormal; Notable for the following components:    WBC, UA 17 (*)     All other components within normal limits   COVID-19 & INFLUENZA COMBO   CULTURE, URINE   CULTURE, BLOOD 2   CULTURE, BLOOD 1   SPECIMEN REJECTION    Narrative:     CALL  [facility] tel. [phone],  Rejected Test Name/Called to:bnpep cmpx trop/maddi turner rn, 04/19/2022  14:19, by TIPME   TROPONIN   HEMOGLOBIN A1C     RADIOLOGY:     Plain x-rays were viewed by me:   CT CHEST WO CONTRAST   Final Result   1. No active pulmonary disease. 2. COPD. 3. Increase in size of the right lower lobe pulmonary nodule measuring 8.6 mm. RECOMMENDATIONS:   Fleischner Society guidelines for follow-up and management of incidentally   detected pulmonary nodules:      Single Solid Nodule:      Nodule size less than 6 mm   In a low-risk patient, no routine follow-up. In a high-risk patient, optional CT at 12 months.       Nodule size equals 6-8 mm   In a low-risk patient, CT at 6-12 months, then 2034

## 2022-04-19 NOTE — ACP (ADVANCE CARE PLANNING)
Advanced Care Planning Note.     Purpose of Encounter: Advanced care planning in light of hospitalization  Parties In Attendance: Patient,    Decisional Capacity: Yes  Subjective: Patient  understand that this conversation is to address long term care goal  Objective: Admitted to hospital with acute COPD exacerbation chronic hypoxic respiratory failure on 2 L and history of A. fib  Goals of Care Determination: Patient CPR and intubation initially required unsure about long-term ventilation or tracheostomy want family to make decision at thhe time  Code Status: full code  Time spent on Advanced care Plannin minutes  [address] documented patient's wishes, would like Jose Mccrary  to make medical decisions if unable to make decisions    Hermila Bedolla MD  2022 7:08 PM

## 2022-04-20 LAB
ANION GAP SERPL CALCULATED.3IONS-SCNC: 12 MMOL/L (ref 3–16)
BASOPHILS ABSOLUTE: 0 K/UL (ref 0–0.2)
BASOPHILS RELATIVE PERCENT: 0.2 %
BUN BLDV-MCNC: 34 MG/DL (ref 7–20)
CALCIUM SERPL-MCNC: 9.3 MG/DL (ref 8.3–10.6)
CHLORIDE BLD-SCNC: 101 MMOL/L (ref 99–110)
CO2: 23 MMOL/L (ref 21–32)
CREAT SERPL-MCNC: 1.3 MG/DL (ref 0.6–1.2)
EKG ATRIAL RATE: 83 BPM
EKG DIAGNOSIS: NORMAL
EKG P AXIS: 29 DEGREES
EKG P-R INTERVAL: 154 MS
EKG Q-T INTERVAL: 374 MS
EKG QRS DURATION: 82 MS
EKG QTC CALCULATION (BAZETT): 439 MS
EKG R AXIS: 13 DEGREES
EKG T AXIS: 36 DEGREES
EKG VENTRICULAR RATE: 83 BPM
EOSINOPHILS ABSOLUTE: 0.1 K/UL (ref 0–0.6)
EOSINOPHILS RELATIVE PERCENT: 0.8 %
ESTIMATED AVERAGE GLUCOSE: 159.9 MG/DL
GFR AFRICAN AMERICAN: 47
GFR NON-AFRICAN AMERICAN: 39
GLUCOSE BLD-MCNC: 114 MG/DL (ref 70–99)
GLUCOSE BLD-MCNC: 129 MG/DL (ref 70–99)
GLUCOSE BLD-MCNC: 155 MG/DL (ref 70–99)
GLUCOSE BLD-MCNC: 166 MG/DL (ref 70–99)
GLUCOSE BLD-MCNC: 208 MG/DL (ref 70–99)
HBA1C MFR BLD: 7.2 %
HCT VFR BLD CALC: 32.7 % (ref 36–48)
HEMOGLOBIN: 10.7 G/DL (ref 12–16)
LYMPHOCYTES ABSOLUTE: 2.4 K/UL (ref 1–5.1)
LYMPHOCYTES RELATIVE PERCENT: 22.2 %
MCH RBC QN AUTO: 29.2 PG (ref 26–34)
MCHC RBC AUTO-ENTMCNC: 32.6 G/DL (ref 31–36)
MCV RBC AUTO: 89.4 FL (ref 80–100)
MONOCYTES ABSOLUTE: 0.7 K/UL (ref 0–1.3)
MONOCYTES RELATIVE PERCENT: 6.8 %
NEUTROPHILS ABSOLUTE: 7.5 K/UL (ref 1.7–7.7)
NEUTROPHILS RELATIVE PERCENT: 70 %
ORGANISM: ABNORMAL
PDW BLD-RTO: 15.4 % (ref 12.4–15.4)
PERFORMED ON: ABNORMAL
PLATELET # BLD: 168 K/UL (ref 135–450)
PMV BLD AUTO: 7.8 FL (ref 5–10.5)
POTASSIUM REFLEX MAGNESIUM: 4.1 MMOL/L (ref 3.5–5.1)
RBC # BLD: 3.66 M/UL (ref 4–5.2)
SODIUM BLD-SCNC: 136 MMOL/L (ref 136–145)
T3 TOTAL: 0.56 NG/ML (ref 0.8–2)
T4 FREE: 1.5 NG/DL (ref 0.9–1.8)
TSH REFLEX: 0.2 UIU/ML (ref 0.27–4.2)
URINE CULTURE, ROUTINE: ABNORMAL
URINE CULTURE, ROUTINE: ABNORMAL
WBC # BLD: 10.7 K/UL (ref 4–11)

## 2022-04-20 PROCEDURE — 6370000000 HC RX 637 (ALT 250 FOR IP): Performed by: INTERNAL MEDICINE

## 2022-04-20 PROCEDURE — 97116 GAIT TRAINING THERAPY: CPT

## 2022-04-20 PROCEDURE — 2060000000 HC ICU INTERMEDIATE R&B

## 2022-04-20 PROCEDURE — 97161 PT EVAL LOW COMPLEX 20 MIN: CPT

## 2022-04-20 PROCEDURE — 2580000003 HC RX 258: Performed by: INTERNAL MEDICINE

## 2022-04-20 PROCEDURE — 94640 AIRWAY INHALATION TREATMENT: CPT

## 2022-04-20 PROCEDURE — 97530 THERAPEUTIC ACTIVITIES: CPT

## 2022-04-20 PROCEDURE — 6360000002 HC RX W HCPCS: Performed by: INTERNAL MEDICINE

## 2022-04-20 PROCEDURE — 80048 BASIC METABOLIC PNL TOTAL CA: CPT

## 2022-04-20 PROCEDURE — 94761 N-INVAS EAR/PLS OXIMETRY MLT: CPT

## 2022-04-20 PROCEDURE — 2700000000 HC OXYGEN THERAPY PER DAY

## 2022-04-20 PROCEDURE — 99223 1ST HOSP IP/OBS HIGH 75: CPT | Performed by: INTERNAL MEDICINE

## 2022-04-20 PROCEDURE — 6370000000 HC RX 637 (ALT 250 FOR IP): Performed by: NURSE PRACTITIONER

## 2022-04-20 PROCEDURE — 6370000000 HC RX 637 (ALT 250 FOR IP): Performed by: PHYSICIAN ASSISTANT

## 2022-04-20 PROCEDURE — 36415 COLL VENOUS BLD VENIPUNCTURE: CPT

## 2022-04-20 PROCEDURE — 85025 COMPLETE CBC W/AUTO DIFF WBC: CPT

## 2022-04-20 PROCEDURE — 97165 OT EVAL LOW COMPLEX 30 MIN: CPT

## 2022-04-20 PROCEDURE — 93010 ELECTROCARDIOGRAM REPORT: CPT | Performed by: INTERNAL MEDICINE

## 2022-04-20 RX ORDER — LORAZEPAM 0.5 MG/1
0.5 TABLET ORAL ONCE
Status: COMPLETED | OUTPATIENT
Start: 2022-04-20 | End: 2022-04-20

## 2022-04-20 RX ORDER — ROPINIROLE 1 MG/1
3 TABLET, FILM COATED ORAL 3 TIMES DAILY
Status: DISCONTINUED | OUTPATIENT
Start: 2022-04-20 | End: 2022-04-21 | Stop reason: HOSPADM

## 2022-04-20 RX ORDER — LORAZEPAM 1 MG/1
1 TABLET ORAL EVERY EVENING
Status: DISCONTINUED | OUTPATIENT
Start: 2022-04-20 | End: 2022-04-21 | Stop reason: HOSPADM

## 2022-04-20 RX ORDER — DIPHENHYDRAMINE HCL 25 MG
25 TABLET ORAL ONCE
Status: COMPLETED | OUTPATIENT
Start: 2022-04-20 | End: 2022-04-20

## 2022-04-20 RX ADMIN — LEVOTHYROXINE SODIUM 75 MCG: 0.07 TABLET ORAL at 06:01

## 2022-04-20 RX ADMIN — ATORVASTATIN CALCIUM 80 MG: 80 TABLET, FILM COATED ORAL at 21:22

## 2022-04-20 RX ADMIN — DILTIAZEM HYDROCHLORIDE 240 MG: 240 CAPSULE, COATED, EXTENDED RELEASE ORAL at 08:29

## 2022-04-20 RX ADMIN — HYDRALAZINE HYDROCHLORIDE 50 MG: 25 TABLET, FILM COATED ORAL at 21:22

## 2022-04-20 RX ADMIN — PROPAFENONE HYDROCHLORIDE 150 MG: 150 TABLET, FILM COATED ORAL at 21:22

## 2022-04-20 RX ADMIN — ROPINIROLE HYDROCHLORIDE 3 MG: 1 TABLET, FILM COATED ORAL at 14:31

## 2022-04-20 RX ADMIN — APIXABAN 5 MG: 5 TABLET, FILM COATED ORAL at 21:22

## 2022-04-20 RX ADMIN — INSULIN LISPRO 1 UNITS: 100 INJECTION, SOLUTION INTRAVENOUS; SUBCUTANEOUS at 21:24

## 2022-04-20 RX ADMIN — PROPAFENONE HYDROCHLORIDE 150 MG: 150 TABLET, FILM COATED ORAL at 13:38

## 2022-04-20 RX ADMIN — LORAZEPAM 1 MG: 1 TABLET ORAL at 21:22

## 2022-04-20 RX ADMIN — Medication 2 PUFF: at 07:40

## 2022-04-20 RX ADMIN — IPRATROPIUM BROMIDE AND ALBUTEROL SULFATE 1 AMPULE: .5; 3 SOLUTION RESPIRATORY (INHALATION) at 20:07

## 2022-04-20 RX ADMIN — CLONIDINE HYDROCHLORIDE 0.1 MG: 0.1 TABLET ORAL at 21:23

## 2022-04-20 RX ADMIN — INSULIN LISPRO 4 UNITS: 100 INJECTION, SOLUTION INTRAVENOUS; SUBCUTANEOUS at 17:03

## 2022-04-20 RX ADMIN — LORAZEPAM 0.5 MG: 0.5 TABLET ORAL at 14:31

## 2022-04-20 RX ADMIN — METHYLPREDNISOLONE SODIUM SUCCINATE 40 MG: 40 INJECTION, POWDER, FOR SOLUTION INTRAMUSCULAR; INTRAVENOUS at 08:30

## 2022-04-20 RX ADMIN — ROPINIROLE HYDROCHLORIDE 3 MG: 1 TABLET, FILM COATED ORAL at 21:48

## 2022-04-20 RX ADMIN — Medication 2 PUFF: at 20:07

## 2022-04-20 RX ADMIN — DIPHENHYDRAMINE HCL 25 MG: 25 TABLET ORAL at 02:52

## 2022-04-20 RX ADMIN — Medication 1 SPRAY: at 21:29

## 2022-04-20 RX ADMIN — Medication 10 ML: at 21:23

## 2022-04-20 RX ADMIN — HYDRALAZINE HYDROCHLORIDE 50 MG: 25 TABLET, FILM COATED ORAL at 13:38

## 2022-04-20 RX ADMIN — IPRATROPIUM BROMIDE AND ALBUTEROL SULFATE 1 AMPULE: .5; 3 SOLUTION RESPIRATORY (INHALATION) at 07:40

## 2022-04-20 RX ADMIN — HYDRALAZINE HYDROCHLORIDE 50 MG: 25 TABLET, FILM COATED ORAL at 07:13

## 2022-04-20 RX ADMIN — APIXABAN 5 MG: 5 TABLET, FILM COATED ORAL at 08:29

## 2022-04-20 RX ADMIN — Medication 10 ML: at 08:30

## 2022-04-20 RX ADMIN — PROPAFENONE HYDROCHLORIDE 150 MG: 150 TABLET, FILM COATED ORAL at 06:01

## 2022-04-20 RX ADMIN — CLONIDINE HYDROCHLORIDE 0.1 MG: 0.1 TABLET ORAL at 08:29

## 2022-04-20 RX ADMIN — INSULIN LISPRO 2 UNITS: 100 INJECTION, SOLUTION INTRAVENOUS; SUBCUTANEOUS at 12:04

## 2022-04-20 ASSESSMENT — PAIN DESCRIPTION - PROGRESSION
CLINICAL_PROGRESSION: GRADUALLY WORSENING

## 2022-04-20 ASSESSMENT — PAIN SCALES - GENERAL
PAINLEVEL_OUTOF10: 0

## 2022-04-20 NOTE — Clinical Note
4/19/2022 nights- Pt from home with daughter. Came into ED with C/O SOB upon exertion and a persistent cough. Chest xray presents mild pulmonary congestion. CT of chest shows increase in size of rt pulmonary nodule, measuring 8.6 mm. Hypertensive in ED, BP meds and solumedrol ordered. Wears 2LPM at baseline. Currently resting with call light in place. SBA  Meds WWW   Alert and oriented x 4.    Continent

## 2022-04-20 NOTE — PROGRESS NOTES
04/19/22 2225   RT Protocol   History Pulmonary Disease 1   Respiratory pattern 2   Breath sounds 2   Cough 1   Bronchodilator Assessment Score 6

## 2022-04-20 NOTE — PROGRESS NOTES
Nutrition Note    RECOMMENDATIONS  1. PO Diet: Continue current diet  2. ONS: Ordered Ensure HP BID    NUTRITION ASSESSMENT   Pt triggered for MST 2 for report of unintentional wt loss and poor po intake d/t decreased appetite upon admission. Upon visiting pt this morning, reported about 10 to 15 lb wt loss in the last couple of months. If wt hx in EMR is accurate, pt has not had recent significant wt loss. Reported poor appetite for the past couple of weeks, consuming about 50% of what she used to. Said her appetite is still poor, but also said she had a \"great breakfast\" this morning. No documented meal intakes in chart. Drinks Ensure at home and wanting to receive nutritional supplement during current admission. RD will order Ensure HP BID and continue to monitor for adequate po intake.  Nutrition Related Findings: Lytes WNL today. LBM 4/19, BS+. No edema noted. HbA1c of 7.2% on 4/19. Glucose 129, 200 since admission.  Wounds: None   Nutrition Education:  Education not indicated    Nutrition Goals:   PO intake 50% or greater    MALNUTRITION ASSESSMENT   Context of Malnutrition: Acute Illness   Malnutrition Status: At risk for malnutrition (Comment)    NUTRITION DIAGNOSIS   · Inadequate oral intake related to inadequate protein-energy intake as evidenced by poor intake prior to admission    CURRENT NUTRITION THERAPIES  ADULT DIET; Regular; 4 carb choices (60 gm/meal); Low Fat/Low Chol/High Fiber/JAZMINE     PO Intake: Unable to assess   PO Supplement Intake:None Ordered    ANTHROPOMETRICS   Current Height: 5' 2\" (157.5 cm)   Current Weight: 160 lb (72.6 kg)     Admission weight: 160 lb (72.6 kg) (bed)   Ideal Body Weight (IBW): 110 lbs  (50 kg)         BMI: 29.3    COMPARATIVE STANDARDS  Energy (kcal):  7979-6709     Protein (g):         Fluid (ml/day):  9365-1395    The patient will be monitored per nutrition standards of care.  Consult dietitian if additional nutrition interventions are needed prior to RD reassessment.      Alicia Canseco RD, LD    Contact: [phone]

## 2022-04-20 NOTE — CONSULTS
PULMONARY AND CRITICAL CARE MEDICINE CONSULTATION NOTE    CONSULTING PHYSICIAN:  Shona Osgood, MD    ADMISSION DATE: 4/19/2022  ADMISSION DIAGNOSIS: Essential hypertension [I10]  Acute exacerbation of chronic obstructive pulmonary disease (COPD) (HCC) [J44.1]  Hyperglycemia [R73.9]  COPD exacerbation (HCC) [J44.1]  Pulmonary nodule, right [R91.1]    REASON FOR CONSULT:   Chief Complaint   Patient presents with    Cough     Came in for c/o worsening coughing, congestion, some headaches, fatigue. Has been on zpack and doxycycline and prednisone. Denies fever. Symptoms been going on for 2 weeks now. DATE OF CONSULT: 4/19/2022    HISTORY OF PRESENT ILLNESS: 80y.o. year old female with a past medical history significant for mild to moderate COPD, chronic hypoxic respiratory failure, hypertension, diabetes, atrial fibrillation who presented to the hospital with increased shortness of breath and cough for the last 2 weeks. Cough is mostly dry but occasionally productive of whitish expectoration. No fevers, discolored expectoration, hemoptysis, chest pain or chest tightness. She is well-known to our practice as she follows with my colleague Dr. Macho Day. Did receive a course of antibiotic and steroids but the symptoms did not get relief. At the time of interview sitting in bed in no apparent respiratory distress. Reports that her breathing has significantly improved. Continues to deny any discolored expectoration, chest pain, chest tightness. Has been regularly taking her inhaler therapy including Symbicort and Xopenex. Has also been using nebulized albuterol quite frequently in the last 2 weeks. Denies any sick contacts or recent travel. Does use oxygen at 2 L/min most of the day. Has not been smoking recently. REVIEW OF SYSTEMS:     CONSTITUTIONAL SYMPTOMS: The patient denies fever, fatigue, night sweats, weight loss or weight gain. HEENT: No vision changes.  No tinnitus, Denies sinus pain. No hoarseness, or dysphagia. NECK: Patient denies swelling in the neck. CARDIOVASCULAR: Denies chest pain, palpitation, syncope. RESPIRATORY: As per HPI. GASTROINTESTINAL: Denies nausea, abdominal pain or change in bowel function. GENITOURINARY: Denies obstructive symptoms. No history of incontinence. BREASTS: No masses or lumps in the breasts. SKIN: No rashes or itching. MUSCULOSKELETAL: Denies weakness or bone pain. NEUROLOGICAL: No headaches or seizures. PSYCHIATRIC: Denies mood swings or depression. ENDOCRINE: Denies heat or cold intolerance or excessive thirst.  HEMATOLOGIC/LYMPHATIC: Denies easy bruising or lymph node swelling. ALLERGIC/IMMUNOLOGIC: No environmental allergies. PAST MEDICAL HISTORY:   Past Medical History:   Diagnosis Date    Arthritis     [facility] [address]     Diabetes mellitus type II, controlled ([facility] [address]     GERD (gastroesophageal reflux disease)     HTN (hypertension)     Hyperlipidemia     Hypothyroid     Insomnia     Lumbago     SVT (supraventricular tachycardia) (HCC)        PAST SURGICAL HISTORY:   Past Surgical History:   Procedure Laterality Date    APPENDECTOMY      CHOLECYSTECTOMY, LAPAROSCOPIC  2013    COLONOSCOPY         SOCIAL HISTORY:   Social History     Tobacco Use    Smoking status: Former Smoker     Packs/day: 1.00     Years: 45.00     Pack years: 45.00     Types: Cigarettes     Start date: 9/15/1950     Quit date: 1995     Years since quittin.3    Smokeless tobacco: Never Used   Vaping Use    Vaping Use: Never used   Substance Use Topics    Alcohol use: No     Alcohol/week: 0.0 standard drinks    Drug use: No       FAMILY HISTORY:   Family History   Problem Relation Age of Onset    High Blood Pressure Mother     Heart Attack Father     Heart Disease Father     Other Brother     Asthma Paternal Uncle        MEDICATIONS:     No current facility-administered medications on file prior to encounter. Current Outpatient Medications on File Prior to Encounter   Medication Sig Dispense Refill    irbesartan (AVAPRO) 300 MG tablet TAKE 1 TABLET BY MOUTH EVERY NIGHT 90 tablet 0    cloNIDine (CATAPRES) 0.1 MG tablet TAKE 1 TABLET BY MOUTH TWICE DAILY 180 tablet 0    dilTIAZem (CARDIZEM CD) 240 MG extended release capsule TAKE 1 CAPSULE EVERY DAY 90 capsule 1    doxycycline hyclate (VIBRA-TABS) 100 MG tablet Take 1 tablet by mouth 2 times daily for 7 days 14 tablet 0    predniSONE (DELTASONE) 20 MG tablet Take 2 tablets by mouth daily for 5 days 10 tablet 0    loratadine (CLARITIN) 10 MG tablet Take 1 tablet by mouth daily 90 tablet 1    budesonide-formoterol (SYMBICORT) 160-4.5 MCG/ACT AERO INHALE 2 PUFFS INTO THE LUNGS TWICE DAILY 30.6 g 5    dicyclomine (BENTYL) 10 MG capsule TAKE 1 CAPSULE BY MOUTH FOUR TIMES DAILY AS NEEDED FOR ABDOMINAL PAIN 360 capsule 0    propafenone (RYTHMOL) 150 MG tablet TAKE 1 TABLET BY MOUTH EVERY 8 HOURS 270 tablet 0    Blood Glucose Monitoring Suppl (TRUE METRIX METER) w/Device KIT To use to check sugars 4 times daily 1 kit 0    apixaban (ELIQUIS) 5 MG TABS tablet Take 1 tablet by mouth 2 times daily 180 tablet 1    levothyroxine (SYNTHROID) 75 MCG tablet TAKE 1 TABLET EVERY DAY 90 tablet 1    Blood Glucose Calibration (TRUE METRIX LEVEL 2) Normal SOLN As needed 1 each 0    blood glucose test strips (TRUE METRIX BLOOD GLUCOSE TEST) strip 1 each by In Vitro route daily As needed.  100 each 3    TRUEplus Lancets 33G MISC 1 daily 100 each 3    LORazepam (ATIVAN) 1 MG tablet TAKE 1/2 TABLET BY MOUTH TWICE DAILY AND 1 EVERY NIGHT AT BEDTIME AS NEEDED FOR ANXIETY 60 tablet 2    rOPINIRole (REQUIP) 3 MG tablet TAKE 1 TABLET BY MOUTH THREE TIMES DAILY 270 tablet 1    rOPINIRole (REQUIP) 3 MG tablet Take 3 mg by mouth 3 times daily as needed (restless legs)      meclizine (ANTIVERT) 12.5 MG tablet TAKE 1 TABLET THREE TIMES DAILY AS NEEDED 270 tablet 1    conjugated estrogens (PREMARIN) 0.625 MG/GM vaginal cream Place 1 g vaginally Twice a Week 30 g 2    fluticasone (FLONASE) 50 MCG/ACT nasal spray SHAKE LIQUID AND USE 2 SPRAYS IN EACH NOSTRIL DAILY 48 g 3    atorvastatin (LIPITOR) 80 MG tablet Take 1 tablet by mouth nightly 90 tablet 3    hydrALAZINE (APRESOLINE) 50 MG tablet TAKE 1 TABLET BY MOUTH THREE TIMES DAILY 270 tablet 1    Lancets MISC 1 each by Does not apply route 2 times daily 300 each 1    ondansetron (ZOFRAN) 4 MG tablet Take 1 tablet by mouth daily as needed for Nausea or Vomiting 30 tablet 0    OXYGEN Inhale 2 L into the lungs      blood glucose monitor strips Test one time a day 100 strip 5    levalbuterol (XOPENEX) 0.63 MG/3ML nebulization INHALE CONTENTS OF 1 VIAL PER NEBULIZER EVERY 6 HOURS AS NEEDED FOR WHEEZING 1050 mL 3    Respiratory Therapy Supplies (NEBULIZER/TUBING/MOUTHPIECE) KIT 1 kit by Does not apply route 4 times daily as needed (shortness of breath) 1 kit 0    albuterol sulfate HFA (PROAIR HFA) 108 (90 Base) MCG/ACT inhaler Inhale 2 puffs into the lungs every 6 hours as needed for Wheezing 1 Inhaler 6        apixaban  5 mg Oral BID    atorvastatin  80 mg Oral Nightly    mometasone-formoterol  2 puff Inhalation BID    cloNIDine  0.1 mg Oral BID    dilTIAZem  240 mg Oral Daily    methylPREDNISolone  40 mg IntraVENous Daily    hydrALAZINE  50 mg Oral 3 times per day    losartan  100 mg Oral Nightly    levothyroxine  75 mcg Oral Daily    propafenone  150 mg Oral 3 times per day    insulin lispro  0-12 Units SubCUTAneous TID WC    insulin lispro  0-6 Units SubCUTAneous Nightly    sodium chloride flush  5-40 mL IntraVENous 2 times per day    ipratropium-albuterol  1 ampule Inhalation BID      sodium chloride      dextrose       sodium chloride flush, sodium chloride, ondansetron **OR** ondansetron, polyethylene glycol, acetaminophen **OR** acetaminophen, glucose, glucagon (rDNA), dextrose, dextrose bolus (hypoglycemia) **OR** dextrose bolus (hypoglycemia), HYDROcodone 5 mg - acetaminophen, albuterol sulfate HFA, LORazepam, LORazepam      ALLERGIES:   Allergies as of 04/19/2022 - Fully Reviewed 04/19/2022   Allergen Reaction Noted    Adhesive tape Anaphylaxis 07/31/2015    Cefaclor Nausea And Vomiting 10/15/2012    Nsaids  02/24/2013    Clinoril [sulindac] Other (See Comments) 10/15/2012    Codeine  10/15/2012    Diclofenac  04/03/2019    Diclofenac sodium Hives 10/15/2012    Diclofenac sodium Hives 10/15/2012    Hctz [hydrochlorothiazide]  09/15/2017    Ibuprofen Other (See Comments) 10/15/2012    Macrobid [nitrofurantoin macrocrystal]  05/06/2019    Motrin [ibuprofen micronized] Other (See Comments) 10/15/2012    Pcn [penicillins] Hives 10/15/2012    Peach [prunus persica] Itching and Swelling 12/10/2013    Prednisone  08/20/2021    Sulfa antibiotics  04/27/2021      OBJECTIVE:   height is 5' 2\" (1.575 m) and weight is 160 lb (72.6 kg). Her oral temperature is 97.4 °F (36.3 °C). Her blood pressure is 120/71 and her pulse is 52. Her respiration is 16 and oxygen saturation is 95%. No intake/output data recorded. PHYSICAL EXAM:    CONSTITUTIONAL: She is a 80y.o.-year-old who appears her stated age. She is alert and oriented x 3 and in no acute distress. HEENT: PERRLA, EOMI. No scleral icterus. No thrush, atraumatic, normocephalic. NECK: Supple, without cervical or supraclavicular lymphadenopathy:  CARDIOVASCULAR: S1 S2 RRR. Without murmer  RESPIRATORY & CHEST: Lungs are clear to auscultation and percussion. No wheezing, no crackles. Good air movement  GASTROINTESTINAL & ABDOMEN: Soft, nontender, positive bowel sounds in all quadrants, non-distended, without hepatosplenomegaly. GENITOURINARY: Deferred. MUSCULOSKELETAL: No tenderness to palpation of the axial skeleton. There is no clubbing. No cyanosis. No edema of the lower extremities. SKIN OF BODY: No rash or jaundice.    PSYCHIATRIC EVALUATION: Normal affect. Patient answers questions appropriately. HEMATOLOGIC/LYMPHATIC/ IMMUNOLOGIC: No palpable lymphadenopathy. NEUROLOGIC: Alert and oriented x 3. Groslly non-focal. Motor strength is 5+/5 in all muscle groups. The patient has a normal sensorium globally. LABS:  Recent Labs     04/19/22  1401 04/19/22  1440 04/20/22  0440   WBC 12.9*  --  10.7   HGB 11.8*  --  10.7*   HCT 36.3  --  32.7*     --  168   ALT  --  20  --    AST  --  19  --    NA  --  132* 136   K  --  5.1 4.1   CL  --  97* 101   CREATININE  --  1.0 1.3*   BUN  --  31* 34*   CO2  --  18* 23       Recent Labs     04/19/22  1440 04/20/22  0440   GLUCOSE 252* 114*   CALCIUM 9.3 9.3   * 136   K 5.1 4.1   CO2 18* 23   CL 97* 101   BUN 31* 34*   CREATININE 1.0 1.3*       No results for input(s): PHART, KTA0HQH, PO2ART, GPV8OHY, C7TTTMQC, BEART, Z7BIZPMJ in the last 72 hours. Lab Results   Component Value Date    INR 1.46 (H) 08/11/2021    INR 1.58 (H) 02/22/2021    INR 1.58 (H) 03/17/2020    PROTIME 16.8 (H) 08/11/2021    PROTIME 18.4 (H) 02/22/2021    PROTIME 18.4 (H) 03/17/2020     No results found for: AMYLASE   Lab Results   Component Value Date    LABA1C 7.2 04/19/2022     Lab Results   Component Value Date    .9 04/19/2022     Lab Results   Component Value Date    TSH 2.50 09/28/2020    T4FREE 1.2 02/19/2020     Lab Results   Component Value Date    CKTOTAL 80 02/22/2021    TROPONINI <0.01 04/19/2022      No results found for: CRP   No results found for: BNP   No results found for: DDIMER   No results found for: FERRITIN   Lab Results   Component Value Date    LACTA 2.1 (H) 04/19/2022           IMAGING:    Narrative   EXAMINATION:   CT OF THE CHEST WITHOUT CONTRAST 4/19/2022 5:29 pm           FINDINGS:   Mediastinum: There are few less than 1 cm mediastinal lymph nodes but no   lymphadenopathy.  The heart size is enlarged.  The thoracic aorta is not   aneurysmal.       Lungs/pleura:  The lung parenchyma demonstrates emphysematous changes. David Luke   is scarring within the lung apices right greater than left. David Luke is also   some linear scarring in the lingula.  There is a noncalcified nodule within   the right lower lobe measuring 8.6 mm and increased in size where previously   measured 6.5 mm.  No new airspace disease is seen.  No pneumothoraces are   noted.       Upper Abdomen: The visualized portions of the upper abdomen appear   unremarkable.       Soft Tissues/Bones: No acute bony abnormalities are noted.  There are   degenerative changes involving the spine.           Impression   1. No active pulmonary disease. 2. COPD. 3. Increase in size of the right lower lobe pulmonary nodule measuring 8.6 mm. IMPRESSION:     COPD in acute exacerbation  Pulmonary nodule  Chronic hypoxic respiratory failure, stable  Previous history of tobacco abuse    RECOMMENDATION:     Patient has presented to the hospital with acute exacerbation of her mild to moderate COPD. At this time her breathing has improved. Chest imaging did not show any focal consolidation. No leukocytosis, discolored expectoration and normal procalcitonin levels. No indications for antibiotics. Change Solu-Medrol to prednisone 40 mg p.o. daily. She can continue to get prednisone for 5 more days and then stop. [facility] can be changed to Symbicort 160/4.5 mcg 2 puff twice daily which is her home inhaler therapy. Additionally she can use Spiriva Respimat 2.5 mcg 2 puff daily to provide her with additional bronchodilation. She will continue to use albuterol as needed. Oxygen supplementation at 2 L/min to continue. This is her baseline requirement. Her right lower lobe pulm nodule is slowly growing. This may need assessment as an outpatient. I will set her up a follow-up appointment with Dr. Susy Gaxiola within 7 to 10 days of hospital discharge. From pulm standpoint she can be discharged back home today. Thank you for your consultation.  We will sign off. Jack Mckeon MD  Pulmonary Critical Care and Sleep Medicine  4/19/2022, 10:36 AM    This note was completed using dragon medical speech recognition software. Grammatical errors, random word insertions, pronoun errors and incomplete sentences are occasional consequences of this technology due to software limitations. If there are questions or concerns about the content of this note of information contained within the body of this dictation they should be addressed with the provider for clarification.

## 2022-04-20 NOTE — CARE COORDINATION
Discharge Planning Assessment    RN/SW discharge planner met with patient/ (and family member) to discuss reason for admission, current living situation, and potential needs at the time of discharge    Demographics/Insurance verified Yes/ [employer]    Current type of dwellin [place]  Into 2 level home  :  Living arrangements: daughter and EDDIE    Level of function/Support: Independent    PCP:MD Erin    DME: 4 wheeled walker, grab bars, home 02    Active with any community resources/agencies/skilled home care/ HD:  [place]    Medication compliance issues: no    Pharmacy/Financial issues that could impact healthcare: no    Transportation at the time of discharge: family  The Plan for Transition of Care is related to the following treatment goals: home care    The Patient  was provided with a choice of provider and agrees   with the discharge plan. [x] Yes [] No    Freedom of choice list was provided with basic dialogue that supports the patient's individualized plan of care/goals, treatment preferences and shares the quality data associated with the providers. [x] Yes [] No  Met with patient to offer assistance with discharge, and he has no home care agency preference. Patient referred to [facility], pending a home care order.

## 2022-04-20 NOTE — DISCHARGE INSTR - COC
Continuity of Care Form    Patient Name: Kaia Zavala   :  1935  MRN:  [id number]    Admit date:  2022  Discharge date:  2022    Code Status Order: Full Code   Advance Directives:      Admitting Physician:  Omayra Brown MD  PCP: Isaac Harrison MD    Discharging Nurse: [facility] Unit/Room#: 6MQ-0881/8323-90  Discharging Unit Phone Number: [phone]    Emergency Contact:   Extended Emergency Contact Information  Primary Emergency Contact: [address] Phone: [phone]  Relation: Child  Secondary Emergency Contact: [address] Phone: [phone]  Mobile Phone: [phone]  Relation: Child    Past Surgical History:  Past Surgical History:   Procedure Laterality Date    APPENDECTOMY      CHOLECYSTECTOMY, LAPAROSCOPIC  2013    COLONOSCOPY         Immunization History:   Immunization History   Administered Date(s) Administered    COVID-19, Pfizer Purple top, DILUTE for use, 12+ yrs, 30mcg/0.3mL dose 2021, 2021, 2021    Influenza Vaccine, unspecified formulation 10/24/2015    Influenza Virus Vaccine 09/10/2013, 2014    Influenza, High Dose (Fluzone 65 yrs and older) 2016, 2017, 2018    Influenza, Rolanda Finner, adjuvanted, 65 yrs +, IM, PF (Fluad) 10/22/2020, 2021    Influenza, Triv, inactivated, subunit, adjuvanted, IM (Fluad 65 yrs and older) 2019, 10/22/2020    Pneumococcal Conjugate 13-valent ([id number]) 2015    Pneumococcal Polysaccharide ([id number]) 09/10/2010, 2016    Td, unspecified formulation 2005    Tdap (Boostrix, Adacel) 2018       Active Problems:  Patient Active Problem List   Diagnosis Code    Hyperlipidemia E78.5    IBS (irritable bowel syndrome) K58.9    Overactive bladder N32.81    Osteoarthritis M19.90    Controlled type 2 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 disease, without long-term current use of insulin ([facility] [address] E11.22, N18.2    Restless legs syndrome G25.81    ANTHONY (obstructive sleep apnea) G47.33    Allergic rhinitis J30.9    Dizziness R42    Interstitial lung disease ([facility]) J84.9    Coronary artery disease involving native coronary artery of native heart with angina pectoris ([facility]) I25.119    PAF (paroxysmal atrial fibrillation) ([facility]) I48.0    Hypothyroid E03.9    Chronic respiratory failure with hypoxia ([facility]) J96.11    SOB (shortness of breath) R06.02    Anxiety F41.9    Ataxia R27.0    HTN (hypertension), benign I10    Centrilobular emphysema ([facility]) J43.2    Pulmonary nodule R91.1    Ptosis of eyelid, bilateral H02.403    Acute exacerbation of chronic obstructive pulmonary disease (COPD) ([facility]) J44.1       Isolation/Infection:   Isolation            No Isolation          Patient Infection Status       Infection Onset Added Last Indicated Last Indicated By Review Planned Expiration Resolved Resolved By    None active    Resolved    COVID-19 (Rule Out) 04/19/22 04/19/22 04/19/22 COVID-19 & Influenza Combo (Ordered)   04/19/22 Rule-Out Test Resulted    COVID-19 (Rule Out) 02/24/21 02/24/21 02/24/21 Respiratory Panel, Molecular, with COVID-19 (Restricted: peds pts or suitable admitted adults) (Ordered)   02/24/21 Rule-Out Test Resulted    COVID-19 (Rule Out) 02/22/21 02/22/21 02/22/21 COVID-19 (Ordered)   02/23/21 Rule-Out Test Resulted            Nurse Assessment:  Last Vital Signs: /67   Pulse 57   Temp 97.3 °F (36.3 °C) (Oral)   Resp 16   Ht 5' 2\" (1.575 m)   Wt 160 lb (72.6 kg)   SpO2 98%   BMI 29.26 kg/m²     Last documented pain score (0-10 scale): Pain Level: 0  Last Weight:   Wt Readings from Last 1 Encounters:   04/20/22 160 lb (72.6 kg)     Mental Status:  oriented and alert    IV Access:  - None    Nursing Mobility/ADLs:  Walking   Independent  Transfer  Independent  Bathing  Independent  Dressing  Independent  Toileting  Independent  Feeding  Independent  Med Admin  Independent  Med Delivery   whole    Wound Care Documentation and Therapy: Elimination:  Continence: Bowel: Yes  Bladder: Yes  Urinary Catheter: None   Colostomy/Ileostomy/Ileal Conduit: No       Date of Last BM:   No intake or output data in the 24 hours ending 04/20/22 1601  No intake/output data recorded. Safety Concerns:     None    Impairments/Disabilities:      None    Nutrition Therapy:  Current Nutrition Therapy:   - Oral Diet:  Carb Control 4 carbs/meal (1800kcals/day)    Routes of Feeding: Oral  Liquids: Thin Liquids  Daily Fluid Restriction: no  Last Modified Barium Swallow with Video (Video Swallowing Test): not done    Treatments at the Time of Hospital Discharge:   Respiratory Treatments:   Oxygen Therapy:  is on oxygen at 3 L/min per nasal cannula. Ventilator:    - No ventilator support    Rehab Therapies: SN, PT, OT  Weight Bearing Status/Restrictions: No weight bearing restrictions  Other Medical Equipment (for information only, NOT a DME order):     Other Treatments:   HOME HEALTH CARE: LEVEL 1 STANDARD     -Initial home health evaluation to occur within 24-48 hours, in patient home    -Home health agency to establish plan of care for patient over 60 day period    -Medication Reconciliation    -PCP Visit scheduled within seven days of discharge    -PT/OT to evaluate with goal of regaining prior level of functioning    -OT to evaluate if patient has [address] needs for personal care      Patient's personal belongings (please select all that are sent with patient):  None    RN SIGNATURE:  Electronically signed by Aneta Coles RN on 4/21/22 at 2:52 PM EDT    CASE MANAGEMENT/SOCIAL WORK SECTION    Inpatient Status Date: ***    Readmission Risk Assessment Score:  Readmission Risk              Risk of Unplanned Readmission:  20           Discharging to Facility/ Agency   Name: Cuca Thacker  will call for Appointment  Phone: [phone]  Fax: [phone]     Dialysis Facility (if applicable)     / signature: Electronically signed by FABIANO Gracia on 4/20/22 at 4:01 PM EDT    PHYSICIAN SECTION    Prognosis: Good    Condition at Discharge: Stable    Rehab Potential (if transferring to Rehab): Good    Recommended Labs or Other Treatments After Discharge: Follow up with Dr Abran Frias and PCP in 1 week    Physician Certification: I certify the above information and transfer of Neymar Sahu  is necessary for the continuing treatment of the diagnosis listed and that she requires [address] for less 30 days.      Update Admission H&P: No change in H&P    PHYSICIAN SIGNATURE:  Electronically signed by Jeimy Sanchez PA-C on 4/21/22 at 2:40 PM EDT

## 2022-04-20 NOTE — PROGRESS NOTES
[facility] PROGRESS NOTE    4/20/2022 9:09 AM        Name: Jazmin Castellanos . Admitted: 4/19/2022  Primary Care Provider: Quique Hernández MD (Tel: [phone])      Subjective:  . Patient feeling better, breathing back at baseline. No cp. Daughter at bedside. BP high thru the night.      Reviewed interval ancillary notes    Current Medications  apixaban (ELIQUIS) tablet 5 mg, BID  atorvastatin (LIPITOR) tablet 80 mg, Nightly  mometasone-formoterol (DULERA) 200-5 MCG/ACT inhaler 2 puff, BID  cloNIDine (CATAPRES) tablet 0.1 mg, BID  dilTIAZem (CARDIZEM CD) extended release capsule 240 mg, Daily  methylPREDNISolone sodium (SOLU-MEDROL) injection 40 mg, Daily  hydrALAZINE (APRESOLINE) tablet 50 mg, 3 times per day  losartan (COZAAR) tablet 100 mg, Nightly  levothyroxine (SYNTHROID) tablet 75 mcg, Daily  propafenone (RYTHMOL) tablet 150 mg, 3 times per day  insulin lispro (HUMALOG) injection vial 0-12 Units, TID WC  insulin lispro (HUMALOG) injection vial 0-6 Units, Nightly  sodium chloride flush 0.9 % injection 5-40 mL, 2 times per day  sodium chloride flush 0.9 % injection 5-40 mL, PRN  0.9 % sodium chloride infusion, PRN  ondansetron (ZOFRAN-ODT) disintegrating tablet 4 mg, Q8H PRN   Or  ondansetron (ZOFRAN) injection 4 mg, Q6H PRN  polyethylene glycol (GLYCOLAX) packet 17 g, Daily PRN  acetaminophen (TYLENOL) tablet 650 mg, Q6H PRN   Or  acetaminophen (TYLENOL) suppository 650 mg, Q6H PRN  glucose (GLUTOSE) 40 % oral gel 15 g, PRN  glucagon (rDNA) injection 1 mg, PRN  dextrose 5 % solution, PRN  dextrose bolus (hypoglycemia) 10% 125 mL, PRN   Or  dextrose bolus (hypoglycemia) 10% 250 mL, PRN  HYDROcodone-acetaminophen (NORCO) 5-325 MG per tablet 1 tablet, Q6H PRN  ipratropium-albuterol (DUONEB) nebulizer solution 1 ampule, BID  albuterol sulfate  (90 Base) MCG/ACT inhaler 2 puff, Q4H PRN  LORazepam (ATIVAN) tablet 0.5 mg, BID PRN  LORazepam (ATIVAN) tablet 1 mg, Nightly PRN        Objective:  /71   Pulse 52   Temp 97.4 °F (36.3 °C) (Oral)   Resp 16   Ht 5' 2\" (1.575 m)   Wt 160 lb (72.6 kg)   SpO2 95%   BMI 29.26 kg/m²   No intake or output data in the 24 hours ending 04/20/22 0909   Wt Readings from Last 3 Encounters:   04/19/22 160 lb (72.6 kg)   02/15/22 160 lb (72.6 kg)   12/07/21 162 lb (73.5 kg)       General appearance:  Appears comfortable  Eyes: Sclera clear. Pupils equal.  ENT: Moist oral mucosa. Trachea midline, no adenopathy. Cardiovascular: Regular rhythm, normal S1, S2. No murmur. No edema in lower extremities  Respiratory: Not using accessory muscles. Good inspiratory effort. Clear to auscultation bilaterally, no wheeze or crackles. GI: Abdomen soft, no tenderness, not distended, normal bowel sounds  Musculoskeletal: No cyanosis in digits, neck supple  Neurology: CN 2-12 grossly intact. No speech or motor deficits  Psych: Normal affect. Alert and oriented in time, place and person  Skin: Warm, dry, normal turgor    Labs and Tests:  CBC:   Recent Labs     04/19/22  1401 04/20/22  0440   WBC 12.9* 10.7   HGB 11.8* 10.7*    168     BMP:    Recent Labs     04/19/22  1440 04/20/22  0440   * 136   K 5.1 4.1   CL 97* 101   CO2 18* 23   BUN 31* 34*   CREATININE 1.0 1.3*   GLUCOSE 252* 114*     Hepatic:   Recent Labs     04/19/22  1440   AST 19   ALT 20   BILITOT 0.3   ALKPHOS 116       CT CHEST WO CONTRAST   Final Result   1. No active pulmonary disease. 2. COPD. 3. Increase in size of the right lower lobe pulmonary nodule measuring 8.6 mm.       RECOMMENDATIONS:   Fleischner Society guidelines for follow-up and management of incidentally   detected pulmonary nodules:       Single Solid Nodule:       Nodule size less than 6 mm   In a low-risk patient, no routine follow-up.    In a high-risk patient, optional CT at 12 months.       Nodule size equals 6-8 mm   In a low-risk patient, CT at 6-12 months, then consider CT at 18-24 months. In a high-risk patient, CT at 6-12 months, then CT at 18-24 months.       Nodule size greater than 8 mm           In a low-risk patient, consider CT at 3 months, PET/CT, or tissue sampling.       In a high-risk patient, consider CT at 3 months, PET/CT, or tissue sampling.       - Low risk patients include individuals with minimal or absent history of   smoking and other known risk factors.       - High risk patients include individuals with a history or smoking or known   risk factors. Problem List  Principal Problem:    Acute exacerbation of chronic obstructive pulmonary disease (COPD) ([facility] [address]  Resolved Problems:    * No resolved hospital problems. *       Assessment & Plan:   1. COPD exacerbation-has been on 2 courses of prednisone. Improved today. Continue solu medrol. Prednisone at dc  2. Hypertension-avapro on hold given mild bump in creat. Monitor BP today. If stable by tomorrow will dc. 3. Restless less-resume requip. 4. Anxiety-resume ativan. Diet: ADULT DIET; Regular; 4 carb choices (60 gm/meal);  Low Fat/Low Chol/High Fiber/JAZMINE  Code:Full Code  DVT PPX: freedom Krishnamurthy PA-C   4/20/2022 9:09 AM

## 2022-04-20 NOTE — PROGRESS NOTES
Occupational Therapy  Facility/Department: [address]  Occupational Therapy Initial Assessment    Name: Arun Tomlin  : 1935  MRN: [id number]  Date of Service: 2022    Discharge Recommendations:Cleo V[place] scored a 20/24 on the AM-PAC ADL Inpatient form. Current research shows that an AM-PAC score of 18 or greater is typically associated with a discharge to the patient's home setting. Based on the patient's AM-PAC score, and their current ADL deficits, it is recommended that the patient have 2-3 sessions per week of Occupational Therapy at d/c to increase the patient's independence. At this time, this patient demonstrates the endurance and safety to discharge home with home based OT (home vs OP services) and a follow up treatment frequency of 2-3x/wk. Please see assessment section for further patient specific details. If patient discharges prior to next session this note will serve as a discharge summary. Please see below for the latest assessment towards goals. HOME HEALTH CARE: LEVEL 1 STANDARD    - Initial home health evaluation to occur within 24-48 hours, in patient home   - Therapy to evaluate with goal of regaining prior level of functioning   - Therapy to evaluate if patient has [address] needs for personal care       OT Equipment Recommendations  Equipment Needed: No       Patient Diagnosis(es): The primary encounter diagnosis was COPD exacerbation ([address] Diagnoses of Pulmonary nodule, right, Essential hypertension, and Hyperglycemia were also pertinent to this visit. Past Medical History:  has a past medical history of Arthritis, Atrial fibrillation ([address] Diabetes mellitus type II, controlled ([address] GERD (gastroesophageal reflux disease), HTN (hypertension), Hyperlipidemia, Hypothyroid, Insomnia, Lumbago, and SVT (supraventricular tachycardia) ([address]   Past Surgical History:  has a past surgical history that includes Appendectomy; Colonoscopy; and Cholecystectomy, laparoscopic (2/24/2013). Assessment   Performance deficits / Impairments: Decreased endurance;Decreased safe awareness  Assessment: Pt presenting near her functional baseline with the above deficits associated with COPD exacerbation. Anticipate safe and successful return to PLOF at d/c. Continued OT indicated for remainder of admission in order to [address] and independence with all occupational persuits. Prognosis: Good  Decision Making: Low Complexity  Exam: as above  Assistance / Modification: SBA with RW  Activity Tolerance  Activity Tolerance: Patient Tolerated treatment well      Education: DC recommendations and safety awareness during all transitional movement. Pt/daughter verbalized understanding. Plan   Plan  Times per Week: 1-2  Times per Day: Daily  Current Treatment Recommendations: Balance training,Functional mobility training,Safety education & training,Patient/Caregiver education & training,Self-Care / ADL,Home management training     Restrictions  Restrictions/Precautions  Restrictions/Precautions: Fall Risk (Medium)  Position Activity Restriction  Other position/activity restrictions: Per H&P: \" Neymar Sahu is a 80 y.o. female of breath with a nonproductive cough for the last couple weeks. Low-grade fever some chills some nausea no vomiting no diarrhea no other sick contacts. No chest pain no secondhand smoke exposure quit smoking when she was 60. Treated by pulmonologist outpatient with plan for steroids azithromycin and doxycycline\"    Subjective   General  Chart Reviewed: Yes  Patient assessed for rehabilitation services?: Yes  Family / Caregiver Present: Yes  Diagnosis: Essential Hypertention; Acute exacerbation of COPD  Subjective  Subjective: Pt sitting in recliner upon arrival. She denies pain. Daughter present in room. She is pleasant and agreeable to PT/OT evaluations.   Pain Assessment  Pain Assessment: None - Denies Pain  Pain Level: 0  Vital Signs  Temp: 97.3 °F (36.3 °C)  Temp Source: Oral  Pulse: 57  Heart Rate Source: Monitor  Resp: 16  BP: 134/67  BP Location: Right upper arm  MAP (Calculated): 89.33  Patient Position: Up in chair  Level of Consciousness: Alert (0)  MEWS Score: 1  Oxygen Therapy  SpO2: 98 %  Pulse Oximeter Device Mode: Intermittent  Pulse Oximeter Device Location: Finger  O2 Device: Nasal cannula  O2 Flow Rate (L/min): 2 L/min  Social/Functional History  Social/Functional History  Lives With: Daughter (son in law)  Type of Home: House  Home Layout: Two level (Pt lives on main level; daughter lives in basement. Requires 5 steps from main level to access kitchen)  Home Access: Stairs to enter without rails  Entrance Stairs - Number of Steps: 2 NENA  Bathroom Shower/Tub: Tub/Shower unit,Shower chair with back (Pt does not use the shower chair.)  Bathroom Toilet: Standard  Bathroom Equipment: Grab bars in shower  Home Equipment: [address], 4 wheeled  Has the patient had two or more falls in the past year or any fall with injury in the past year?: No  ADL Assistance: [address] [place] [address] Avenue: Needs assistance  Homemaking Responsibilities: Yes (occasionally cooks.)  Ambulation Assistance: Independent  Transfer Assistance: Independent  Active : No  Mode of Transportation: Car  Occupation: Retired  Leisure & Hobbies: listen to music, go shopping  Additional Comments: sleeps in recliner/rocker most of the time.        Objective   Vision Exceptions: Wears glasses at all times  Hearing: Within functional limits       Observation/Palpation  Posture: Good  Observation: Pt on 2 L O2 and saturating WNL  Balance  Sitting Balance: Independent  Standing Balance: Stand by assistance  Standing Balance  Time: ~7-8 min  Activity: ambulation, transfers, ADL completion  Functional Mobility  Functional - Mobility Device: Rolling Walker  Activity: To/from bathroom  Assist Level: Stand by assistance  Functional Mobility Comments: Pt ambulated 12ft + 12 ft to/from bathroom + 375ft in unit hallway with RW at SBA on 2L No LOB or SOB noted. SpO2 remains WNL throughout all ambulation activities. Toilet Transfers  Toilet - Technique: Ambulating  Equipment Used: Standard toilet  Toilet Transfer: Stand by assistance  ADL  LE Dressing: Setup  LE Dressing Skilled Clinical Factors: pt able to shahid/doff socks in sitting independently; adjusting pants at waist with supervision in static stance. Toileting Skilled Clinical Factors: Pt declines need to toilet but able to demosntrate toilet txfer sit<>stand on standard commode SBA  Additional Comments: Pt politely declines need to address other ADLs as she has already changed her gown, brushed her teeth and used the bathroom. Activity Tolerance  Activity Tolerance:  (tx trial note)  Activity Tolerance Comments: No excerbation of symptoms during session. Mild fatigue at end of session. Bed mobility  Comment: Pt in recliner upon arrvial and departure. Transfers  Sit to stand: Contact guard assistance  Stand to sit: Stand by assistance  Transfer Comments: Pt progressing from CGA to SBA at recliner x3 trials; toilet x1 trial. Note: x1 minor LOB as pt performed initial sit>stand from recliner without realizing chair was not locked. Provided minor corrective action; however, pt with good righting reactions to correct herself.  Recliner locked and thus pt able to perform remaining sit<>stand transfers at recliner SBA     Cognition  Overall Cognitive Status: WNL  Perception  Overall Perceptual Status: WFL      LUE AROM (degrees)  LUE AROM : WFL  RUE PROM (degrees)  RUE PROM: WFL  RUE AROM (degrees)  RUE AROM : [facility]     AM-PAC Score        AM-PAC Inpatient Daily Activity Raw Score: 20 (04/20/22 1516)  AM-PAC Inpatient ADL T-Scale Score : 42.03 (04/20/22 1516)  ADL Inpatient CMS 0-100% Score: 38.32 (04/20/22 1516)  ADL Inpatient CMS G-Code Modifier : CJ (04/20/22 1516)    Goals  Short Term Goals  Time Frame for Short term goals: Discharge  Short Term Goal 1: Toileting ADLs Mod I  Short Term Goal 2: Bathing ADLs Mod I  Short Term Goal 3: Grooming ADLs in in stance Mod I  Patient Goals   Patient goals : Return to home       Therapy Time   Individual Concurrent Group Co-treatment   Time In       1141   Time Out       1221   Minutes       40   Timed Code Treatment Minutes:  25 Minutes    Total Treatment Minutes:  40 minutes    ANDER Bennett OTR/L  [id number]

## 2022-04-20 NOTE — PROGRESS NOTES
[facility] home care referral. Spoke with pt and re: home care plan of care/services. Agreeable. Demographic's verified. Will follow for home care.

## 2022-04-20 NOTE — RT PROTOCOL NOTE
RT Inhaler-Nebulizer Bronchodilator Protocol Note    There is a bronchodilator order in the chart from a provider indicating to follow the RT Bronchodilator Protocol and there is an Initiate RT Inhaler-Nebulizer Bronchodilator Protocol order as well (see protocol at bottom of note). CXR Findings:  XR CHEST PORTABLE    Result Date: 4/19/2022  Mild pulmonary vascular congestion. The findings from the last RT Protocol Assessment were as follows:   History Pulmonary Disease: Smoker 15 pack years or more  Respiratory Pattern: Dyspnea on exertion or RR 21-25 bpm  Breath Sounds: Slightly diminished and/or crackles  Cough: Strong, productive  Indication for Bronchodilator Therapy:    Bronchodilator Assessment Score: 6    Aerosolized bronchodilator medication orders have been revised according to the RT Inhaler-Nebulizer Bronchodilator Protocol below. Respiratory Therapist to perform RT Therapy Protocol Assessment initially then follow the protocol. Repeat RT Therapy Protocol Assessment PRN for score 0-3 or on second treatment, BID, and PRN for scores above 3. No Indications - adjust the frequency to every 6 hours PRN wheezing or bronchospasm, if no treatments needed after 48 hours then discontinue using Per Protocol order mode. If indication present, adjust the RT bronchodilator orders based on the Bronchodilator Assessment Score as indicated below. Use Inhaler orders unless patient has one or more of the following: on home nebulizer, not able to hold breath for 10 seconds, is not alert and oriented, cannot activate and use MDI correctly, or respiratory rate 25 breaths per minute or more, then use the equivalent nebulizer order(s) with same Frequency and PRN reasons based on the score. If a patient is on this medication at home then do not decrease Frequency below that used at home.     0-3 - enter or revise RT bronchodilator order(s) to equivalent RT Bronchodilator order with Frequency of every 4 hours PRN for wheezing or increased work of breathing using Per Protocol order mode. 4-6 - enter or revise RT Bronchodilator order(s) to two equivalent RT bronchodilator orders with one order with BID Frequency and one order with Frequency of every 4 hours PRN wheezing or increased work of breathing using Per Protocol order mode. 7-10 - enter or revise RT Bronchodilator order(s) to two equivalent RT bronchodilator orders with one order with TID Frequency and one order with Frequency of every 4 hours PRN wheezing or increased work of breathing using Per Protocol order mode. 11-13 - enter or revise RT Bronchodilator order(s) to one equivalent RT bronchodilator order with QID Frequency and an Albuterol order with Frequency of every 4 hours PRN wheezing or increased work of breathing using Per Protocol order mode. Greater than 13 - enter or revise RT Bronchodilator order(s) to one equivalent RT bronchodilator order with every 4 hours Frequency and an Albuterol order with Frequency of every 2 hours PRN wheezing or increased work of breathing using Per Protocol order mode. RT to enter RT Home Evaluation for COPD & MDI Assessment order using Per Protocol order mode.     Electronically signed by Aniyah Mcintosh RCP on 4/19/2022 at 10:26 PM

## 2022-04-20 NOTE — PROGRESS NOTES
Physical Therapy  Facility/Department: [address]  Physical Therapy Initial Assessment    Name: Orly Rogers  : 1935  MRN: [id number]  Date of Service: 2022    Discharge Recommendations:  Orly Rogers scored a 20/24 on the AM-PAC short mobility form. Current research shows that an AM-PAC score of 18 or greater is typically associated with a discharge to the patient's home setting. Based on the patient's AM-PAC score and their current functional mobility deficits, it is recommended that the patient have 2-3 sessions per week of Physical Therapy at d/c to increase the patient's independence. At this time, this patient demonstrates the endurance and safety to discharge home with HHPT with 24/hr supervision and a follow up treatment frequency of 2-3x/wk. Please see assessment section for further patient specific details. If patient discharges prior to next session this note will serve as a discharge summary. Please see below for the latest assessment towards goals. PT Equipment Recommendations  Equipment Needed: No  Other: no recommendations this date. Patient Diagnosis(es): The primary encounter diagnosis was COPD exacerbation ([address] Diagnoses of Pulmonary nodule, right, Essential hypertension, and Hyperglycemia were also pertinent to this visit. Past Medical History:  has a past medical history of Arthritis, Atrial fibrillation ([address] Diabetes mellitus type II, controlled ([address] GERD (gastroesophageal reflux disease), HTN (hypertension), Hyperlipidemia, Hypothyroid, Insomnia, Lumbago, and SVT (supraventricular tachycardia) ([address] Past Surgical History:  has a past surgical history that includes Appendectomy; Colonoscopy; and Cholecystectomy, laparoscopic (2013). Assessment   Body Structures, Functions, Activity Limitations Requiring Skilled Therapeutic Intervention: Decreased functional mobility   Assessment: Pt presents with COPD excerbation.  Pt at baseline is MOD I and ambulates with a rollator. Pt lives with daughter and recieves assistance from daughter and son-in-law. Pt this date presents below functional baseline: sit<>stand supervision and Ambulated 400' with RW SBA. Pt SpO2 maintained 94-95% during ambulation and was mildly fatigued at end of session. Pt is safe to return home this date. Recommendation pt have 24/hr supervision returning home. Pt would benefit from skilled therapy returning to [facility]. Treatment Diagnosis: Decreased functional mobility  Therapy Prognosis: Good  Decision Making: Low Complexity  Exam: MMT, sensation, functional mobility  Clinical Presentation: stable  Barriers to Learning: None  Requires PT Follow-Up: Yes  Activity Tolerance  Activity Tolerance: Patient tolerated evaluation without incident  Activity Tolerance Comments: No excerbation of symptoms during session. Mild fatigue at end of session. Pt Education: Pt and family educated on role of therapy, POC, transfer training, energy conservation, equipment. Education provided via verbal instruction. No barriers to learning identified. Pt and family verbalized understanding.     Plan   Plan  Plan: Other (see comment) (1-2 per week)  Current Treatment Recommendations: Strengthening,ROM,Balance training,Gait training,Equipment evaluation, education, & procurement,Functional mobility training,Stair training,Transfer training,Neuromuscular re-education,Home exercise program,ADL/Self-care training,Cognitive/Perceptual training,Endurance training,Therapeutic activities,Patient/Caregiver education & training,Safety education & training  Safety Devices  Type of Devices: Nurse notified,Left in chair,Patient at risk for falls,All fall risk precautions in place,Call light within reach,Gait belt  Restraints  Restraints Initially in Place: No     Restrictions  Restrictions/Precautions  Restrictions/Precautions: Fall Risk (Medium)  Position Activity Restriction  Other position/activity restrictions: Per H&P: \" Genaro Meredith is a 80 y.o. female of breath with a nonproductive cough for the last couple weeksLow-grade fever some chills some nausea no vomiting no diarrhea no other sick contacts. No chest pain no secondhand smoke exposure quit smoking when she was 60. Treated by pulmonologist outpatient with plan for steroids azithromycin and doxycycline\"     Subjective   General  Chart Reviewed: Yes  Patient assessed for rehabilitation services?: Yes  Family / Caregiver Present: Yes (Daughter)  Diagnosis: Acute exacerbation of chronic obstructive pulmonary disease  Follows Commands: Within Functional Limits  General Comment  Comments: Pt found sitting in chair upon arrival  Subjective  Subjective: Pt agreeable to PT/OT eval. Pt denies pain,,  Pain Screening  Read Only-Patient Currently in Pain: Denies           Social/Functional History  Social/Functional History  Lives With: Daughter (son in law)  Type of Home: House  Home Layout: Two level (Pt lives on main level; daughter lives in basement. Requires 5 steps from main level to access kitchen)  Home Access: Stairs to enter without rails  Entrance Stairs - Number of Steps: 2 NENA  Bathroom Shower/Tub: Tub/Shower unit,Shower chair with back (Pt does not use the shower chair.)  Bathroom Toilet: Standard  Bathroom Equipment: Grab bars in shower  Home Equipment: [address], 4 wheeled  Has the patient had two or more falls in the past year or any fall with injury in the past year?: No  ADL Assistance: [address] [place] [address] Avenue: Needs assistance  Homemaking Responsibilities: Yes (occasionally cooks.)  Ambulation Assistance: Independent  Transfer Assistance: Independent  Active : No  Mode of Transportation: Car  Occupation: Retired  Leisure & Hobbies: listen to music, go shopping  Additional Comments: sleeps in recliner/rocker most of the time.      Vision/Hearing  Vision Exceptions: Wears glasses at all times  Hearing: Within functional limits      Cognition   Orientation  Overall Orientation Status: Within Normal Limits  Cognition  Overall Cognitive Status: WNL     Objective      Observation/Palpation  Posture: Good        AROM RLE (degrees)  RLE AROM: WNL  AROM LLE (degrees)  LLE AROM : WNL     Strength RLE  Strength RLE: WFL  Comment: 5/5 Hip flex, Knee ext, DF, PF, hip ab, hip ad  Strength LLE  Strength LLE: WFL  Comment: 5/5 Hip flex, Knee ext, DF, PF, hip ab, hip ad              Bed mobility  Comment: Not assessed this date     Transfers  Sit to Stand: Supervision  Stand to sit: Supervision     Ambulation  Surface: level tile  Device: Rolling Walker  Other Apparatus: O2 (2 L)  Assistance: Stand by assistance  Quality of Gait: Forward flex posture, good denis, good step length, narrow MARCELO, decreased heel to toe contact. Distance: 400'  Comments: No LOB. Pt vitials during and after ambulation: SpO2; 94-95%. Pt ambulated in hallway. Pt took 2 30 second standing rest breaks for SpO2 assessement. Stairs/Curb  Stairs?: No        Balance  Posture: Good  Sitting - Static: Good  Sitting - Dynamic: Good  Standing - Static: Good  Standing - Dynamic: Good  Comments: Pt chair was unlocked and slide backward while standing causing pt to have a mod LOB but was able to self correct with CGA. Pt ambulated with RW CGA.            AM-PAC Score  AM-PAC Inpatient Mobility Raw Score : 20 (04/20/22 1349)  AM-PAC Inpatient T-Scale Score : 47.67 (04/20/22 1349)  Mobility Inpatient CMS 0-100% Score: 35.83 (04/20/22 1349)  Mobility Inpatient CMS G-Code Modifier : CJ (04/20/22 1349)          Goals  Long Term Goals  Time Frame for Long term goals : Upon D/C  Long term goal 1: Pt will perform Sit<>stand MOD I  Long term goal 2: Pt will perform Bed mobility Indep  Long term goal 3: Pt will ambulate with 400' with rollator Mod I  Long term goal 4: Pt will ascend/descend 2 steps w/o rails supevision  Patient Goals   Patient goals : Return home       Therapy Time   Individual Concurrent Group Co-treatment   Time In       1141   Time Out       1221   Minutes       40   Timed Code Treatment Minutes: 25 Minutes     Jasson Bill  Therapist was present, directed the patient's care, made skilled judgement, and was responsible for assessment and treatment of the patient.       Jocelyn Mills, PT, DPT #[id number]

## 2022-04-21 ENCOUNTER — TELEPHONE (OUTPATIENT)
Dept: FAMILY MEDICINE CLINIC | Age: 87
End: 2022-04-21

## 2022-04-21 VITALS
DIASTOLIC BLOOD PRESSURE: 65 MMHG | SYSTOLIC BLOOD PRESSURE: 154 MMHG | TEMPERATURE: 97.6 F | WEIGHT: 160.5 LBS | OXYGEN SATURATION: 93 % | RESPIRATION RATE: 16 BRPM | BODY MASS INDEX: 29.53 KG/M2 | HEART RATE: 62 BPM | HEIGHT: 62 IN

## 2022-04-21 LAB
ANION GAP SERPL CALCULATED.3IONS-SCNC: 15 MMOL/L (ref 3–16)
BUN BLDV-MCNC: 33 MG/DL (ref 7–20)
CALCIUM SERPL-MCNC: 9.6 MG/DL (ref 8.3–10.6)
CHLORIDE BLD-SCNC: 95 MMOL/L (ref 99–110)
CO2: 22 MMOL/L (ref 21–32)
CREAT SERPL-MCNC: 1.2 MG/DL (ref 0.6–1.2)
GFR AFRICAN AMERICAN: 52
GFR NON-AFRICAN AMERICAN: 43
GLUCOSE BLD-MCNC: 144 MG/DL (ref 70–99)
GLUCOSE BLD-MCNC: 148 MG/DL (ref 70–99)
GLUCOSE BLD-MCNC: 168 MG/DL (ref 70–99)
PERFORMED ON: ABNORMAL
PERFORMED ON: ABNORMAL
POTASSIUM REFLEX MAGNESIUM: 5 MMOL/L (ref 3.5–5.1)
SODIUM BLD-SCNC: 132 MMOL/L (ref 136–145)

## 2022-04-21 PROCEDURE — 94761 N-INVAS EAR/PLS OXIMETRY MLT: CPT

## 2022-04-21 PROCEDURE — 97110 THERAPEUTIC EXERCISES: CPT

## 2022-04-21 PROCEDURE — 94640 AIRWAY INHALATION TREATMENT: CPT

## 2022-04-21 PROCEDURE — 97116 GAIT TRAINING THERAPY: CPT

## 2022-04-21 PROCEDURE — 6370000000 HC RX 637 (ALT 250 FOR IP): Performed by: INTERNAL MEDICINE

## 2022-04-21 PROCEDURE — 6360000002 HC RX W HCPCS: Performed by: INTERNAL MEDICINE

## 2022-04-21 PROCEDURE — 6370000000 HC RX 637 (ALT 250 FOR IP): Performed by: PHYSICIAN ASSISTANT

## 2022-04-21 PROCEDURE — 2580000003 HC RX 258: Performed by: INTERNAL MEDICINE

## 2022-04-21 PROCEDURE — 36415 COLL VENOUS BLD VENIPUNCTURE: CPT

## 2022-04-21 PROCEDURE — 2700000000 HC OXYGEN THERAPY PER DAY

## 2022-04-21 PROCEDURE — 80048 BASIC METABOLIC PNL TOTAL CA: CPT

## 2022-04-21 RX ORDER — INSULIN LISPRO 100 [IU]/ML
0-12 INJECTION, SOLUTION INTRAVENOUS; SUBCUTANEOUS
Status: DISCONTINUED | OUTPATIENT
Start: 2022-04-21 | End: 2022-04-21 | Stop reason: HOSPADM

## 2022-04-21 RX ORDER — LOSARTAN POTASSIUM 100 MG/1
100 TABLET ORAL DAILY
Status: DISCONTINUED | OUTPATIENT
Start: 2022-04-21 | End: 2022-04-21 | Stop reason: HOSPADM

## 2022-04-21 RX ORDER — PREDNISONE 20 MG/1
40 TABLET ORAL DAILY
Qty: 8 TABLET | Refills: 0 | Status: SHIPPED | OUTPATIENT
Start: 2022-04-22 | End: 2022-06-29 | Stop reason: SDUPTHER

## 2022-04-21 RX ORDER — INSULIN LISPRO 100 [IU]/ML
0-6 INJECTION, SOLUTION INTRAVENOUS; SUBCUTANEOUS NIGHTLY
Status: DISCONTINUED | OUTPATIENT
Start: 2022-04-21 | End: 2022-04-21 | Stop reason: HOSPADM

## 2022-04-21 RX ORDER — MONTELUKAST SODIUM 10 MG/1
10 TABLET ORAL DAILY
Qty: 30 TABLET | Refills: 1 | Status: SHIPPED | OUTPATIENT
Start: 2022-04-21 | End: 2022-07-14

## 2022-04-21 RX ADMIN — POLYETHYLENE GLYCOL 3350 17 G: 17 POWDER, FOR SOLUTION ORAL at 04:26

## 2022-04-21 RX ADMIN — HYDRALAZINE HYDROCHLORIDE 50 MG: 25 TABLET, FILM COATED ORAL at 06:48

## 2022-04-21 RX ADMIN — INSULIN LISPRO 2 UNITS: 100 INJECTION, SOLUTION INTRAVENOUS; SUBCUTANEOUS at 12:22

## 2022-04-21 RX ADMIN — INSULIN LISPRO 2 UNITS: 100 INJECTION, SOLUTION INTRAVENOUS; SUBCUTANEOUS at 10:04

## 2022-04-21 RX ADMIN — Medication 10 ML: at 10:05

## 2022-04-21 RX ADMIN — CLONIDINE HYDROCHLORIDE 0.1 MG: 0.1 TABLET ORAL at 10:03

## 2022-04-21 RX ADMIN — DILTIAZEM HYDROCHLORIDE 240 MG: 240 CAPSULE, COATED, EXTENDED RELEASE ORAL at 10:03

## 2022-04-21 RX ADMIN — APIXABAN 5 MG: 5 TABLET, FILM COATED ORAL at 10:03

## 2022-04-21 RX ADMIN — SODIUM CHLORIDE, PRESERVATIVE FREE 10 ML: 5 INJECTION INTRAVENOUS at 04:27

## 2022-04-21 RX ADMIN — METHYLPREDNISOLONE SODIUM SUCCINATE 40 MG: 40 INJECTION, POWDER, FOR SOLUTION INTRAMUSCULAR; INTRAVENOUS at 10:03

## 2022-04-21 RX ADMIN — Medication 2 PUFF: at 08:13

## 2022-04-21 RX ADMIN — LEVOTHYROXINE SODIUM 75 MCG: 0.07 TABLET ORAL at 06:48

## 2022-04-21 RX ADMIN — ROPINIROLE HYDROCHLORIDE 3 MG: 1 TABLET, FILM COATED ORAL at 10:03

## 2022-04-21 RX ADMIN — PROPAFENONE HYDROCHLORIDE 150 MG: 150 TABLET, FILM COATED ORAL at 06:48

## 2022-04-21 RX ADMIN — IPRATROPIUM BROMIDE AND ALBUTEROL SULFATE 1 AMPULE: .5; 3 SOLUTION RESPIRATORY (INHALATION) at 08:13

## 2022-04-21 RX ADMIN — LOSARTAN POTASSIUM 100 MG: 100 TABLET, FILM COATED ORAL at 10:03

## 2022-04-21 RX ADMIN — ONDANSETRON 4 MG: 2 INJECTION INTRAMUSCULAR; INTRAVENOUS at 04:26

## 2022-04-21 ASSESSMENT — PAIN SCALES - GENERAL
PAINLEVEL_OUTOF10: 0
PAINLEVEL_OUTOF10: 5
PAINLEVEL_OUTOF10: 0

## 2022-04-21 ASSESSMENT — PAIN DESCRIPTION - LOCATION: LOCATION: ABDOMEN

## 2022-04-21 ASSESSMENT — PAIN DESCRIPTION - DESCRIPTORS: DESCRIPTORS: ACHING

## 2022-04-21 NOTE — PROGRESS NOTES
Data- discharge order received, pt verbalized agreement to discharge, needs for [facility] with [facility] for home health care needs, REYNALDO reviewed and signed by MD, to be completed by RN. Action- AVS prepared, discharge instructions prepared and given to patient and daughter, medication information packet given r/t NEW or CHANGED prescriptions, pt verbalized understanding further self-review. D/C instruction summary: Diet- Carb control, Activity- as tolerated, follow up with Primary Care Physician Gretchen Feliciano -[phone] appointment in 2 weeks , medications prescriptions to be filled [facility]. Response- Case Management/ reported faxing completed REYNALDO and AVS to needed HHC/DME services stated above. Pt belongings gathered, IV removed, pt dressed . Disposition is home with HHC/DME as stated above, transported with belongings, taken to lobby via w/c with staff, no complications. 1. WEIGHT: Admit Weight: 164 lb (74.4 kg) (04/19/22 1331)        Today  Weight: 160 lb 8 oz (72.8 kg) (04/21/22 0804)       2.  O2 SAT.: SpO2: 93 % (04/21/22 1000)

## 2022-04-21 NOTE — TELEPHONE ENCOUNTER
Sharona Cerrato from [facility] [address] [facility] [phone] called to say that the patient is coming home from the hospital (COPD) tomorrow. She needs verbal home orders for nursing, PT, and OT. Leave voice message on her secure line.       Provider out of the office    Please advise

## 2022-04-21 NOTE — DISCHARGE SUMMARY
[facility] DISCHARGE SUMMARY    Patient Demographics    Vic Cazares  Date of Birth. 1935  MRN. [id number]     Primary care provider. Gretchen Feliciano MD  (Tel: [phone])    Admit date: 4/19/2022    Discharge date (blank if same as Note Date): Note Date: 4/21/2022     Reason for Hospitalization. Chief Complaint   Patient presents with    Cough     Came in for c/o worsening coughing, congestion, some headaches, fatigue. Has been on zpack and doxycycline and prednisone. Denies fever. Symptoms been going on for 2 weeks now. Significant Findings. Principal Problem:    Acute exacerbation of chronic obstructive pulmonary disease (COPD) ([facility]  Resolved Problems:    * No resolved hospital problems. *       Problems and results from this hospitalization that need follow up. 1. Copd  2. Hypertension  3. Lung nodules    Significant test results and incidental findings. CT CHEST WO CONTRAST   Final Result   1. No active pulmonary disease. 2. COPD. 3. Increase in size of the right lower lobe pulmonary nodule measuring 8.6 mm.       RECOMMENDATIONS:   Fleischner Society guidelines for follow-up and management of incidentally   detected pulmonary nodules:       Single Solid Nodule:       Nodule size less than 6 mm   In a low-risk patient, no routine follow-up. In a high-risk patient, optional CT at 12 months.       Nodule size equals 6-8 mm   In a low-risk patient, CT at 6-12 months, then consider CT at 18-24 months.    In a high-risk patient, CT at 6-12 months, then CT at 18-24 months.       Nodule size greater than 8 mm           In a low-risk patient, consider CT at 3 months, PET/CT, or tissue sampling.       In a high-risk patient, consider CT at 3 months, PET/CT, or tissue sampling.       - Low risk patients include individuals with minimal or absent history of smoking and other known risk factors.       - High risk patients include individuals with a history or smoking or known   risk factors.          Invasive procedures and treatments. 1. None     Problem-based Hospital Course. Patient is a pleasant 63-year-old female with COPD who presented to hospital with increasing shortness of breath and wheezing. She has been treated with 2 courses of prednisone as well as a course of antibiotics in the past few weeks without improvement. She wears 2 L of oxygen at baseline and is followed by Dr. Susy Gaxiola. CTPA in the emergency room revealed a 8.6 mm right lower lobe nodule which is increasing in size but was otherwise unremarkable. Her blood pressure in the ED was 203/78 as well. Patient was admitted and given Solu-Medrol. Her antihypertensives were resumed. Her breathing improved. Her blood pressure was monitored overnight. At time of discharge her blood BP is 154/65. Will need close monitoring at discharge and may need titration of her meds. She will follow-up with pulmonary outpatient regarding lung nodule. Consults. IP CONSULT TO PULMONOLOGY  IP CONSULT TO SPIRITUAL SERVICES  IP CONSULT TO SPIRITUAL SERVICES    Physical examination on discharge day. BP (!) 154/65   Pulse 62   Temp 97.8 °F (36.6 °C) (Oral)   Resp 16   Ht 5' 2\" (1.575 m)   Wt 160 lb 8 oz (72.8 kg)   SpO2 93%   BMI 29.36 kg/m²   General appearance. Alert. Looks comfortable. HEENT. Sclera clear. Moist mucus membranes. Cardiovascular. Regular rate and rhythm, normal S1, S2. No murmur. Respiratory. Not using accessory muscles. Clear to auscultation bilaterally, no wheeze. Gastrointestinal. Abdomen soft, non-tender, not distended, normal bowel sounds  Neurology. Facial symmetry. No speech deficits. Moving all extremities equally. Extremities. No edema in lower extremities. Skin.  Warm, dry, normal turgor    Condition at time of discharge stable    Medication instructions provided to patient at discharge. Medication List      START taking these medications    montelukast 10 MG tablet  Commonly known as: SINGULAIR  Take 1 tablet by mouth daily     tiotropium 2.5 MCG/ACT Aers inhaler  Commonly known as: Spiriva Respimat  Inhale 2 puffs into the lungs daily        CONTINUE taking these medications    albuterol sulfate  (90 Base) MCG/ACT inhaler  Commonly known as: ProAir HFA  Inhale 2 puffs into the lungs every 6 hours as needed for Wheezing     apixaban 5 MG Tabs tablet  Commonly known as: Eliquis  Take 1 tablet by mouth 2 times daily     atorvastatin 80 MG tablet  Commonly known as: LIPITOR  Take 1 tablet by mouth nightly     * blood glucose test strips  Test one time a day     * True Metrix Blood Glucose Test strip  Generic drug: blood glucose test strips  1 each by In Vitro route daily As needed.      budesonide-formoterol 160-4.5 MCG/ACT Aero  Commonly known as: Symbicort  INHALE 2 PUFFS INTO THE LUNGS TWICE DAILY     cloNIDine 0.1 MG tablet  Commonly known as: CATAPRES  TAKE 1 TABLET BY MOUTH TWICE DAILY     dicyclomine 10 MG capsule  Commonly known as: BENTYL  TAKE 1 CAPSULE BY MOUTH FOUR TIMES DAILY AS NEEDED FOR ABDOMINAL PAIN     dilTIAZem 240 MG extended release capsule  Commonly known as: CARDIZEM CD  TAKE 1 CAPSULE EVERY DAY     fluticasone 50 MCG/ACT nasal spray  Commonly known as: FLONASE  SHAKE LIQUID AND USE 2 SPRAYS IN EACH NOSTRIL DAILY     hydrALAZINE 50 MG tablet  Commonly known as: APRESOLINE  TAKE 1 TABLET BY MOUTH THREE TIMES DAILY     irbesartan 300 MG tablet  Commonly known as: AVAPRO  TAKE 1 TABLET BY MOUTH EVERY NIGHT     * Lancets Misc  1 each by Does not apply route 2 times daily     * TRUEplus Lancets 33G Misc  1 daily     levalbuterol 0.63 MG/3ML nebulization  Commonly known as: XOPENEX  INHALE CONTENTS OF 1 VIAL PER NEBULIZER EVERY 6 HOURS AS NEEDED FOR WHEEZING     levothyroxine 75 MCG tablet  Commonly known as: SYNTHROID  TAKE 1 TABLET EVERY DAY loratadine 10 MG tablet  Commonly known as: CLARITIN  Take 1 tablet by mouth daily     LORazepam 1 MG tablet  Commonly known as: ATIVAN  TAKE 1/2 TABLET BY MOUTH TWICE DAILY AND 1 EVERY NIGHT AT BEDTIME AS NEEDED FOR ANXIETY     meclizine 12.5 MG tablet  Commonly known as: ANTIVERT  TAKE 1 TABLET THREE TIMES DAILY AS NEEDED     Nebulizer/Tubing/Mouthpiece Kit  1 kit by Does not apply route 4 times daily as needed (shortness of breath)     ondansetron 4 MG tablet  Commonly known as: ZOFRAN  Take 1 tablet by mouth daily as needed for Nausea or Vomiting     OXYGEN     predniSONE 20 MG tablet  Commonly known as: DELTASONE  Take 2 tablets by mouth daily for 4 days  Start taking on: April 22, 2022     Premarin 0.625 MG/GM vaginal cream  Generic drug: conjugated estrogens  Place 1 g vaginally Twice a Week     propafenone 150 MG tablet  Commonly known as: RYTHMOL  TAKE 1 TABLET BY MOUTH EVERY 8 HOURS     * Requip 3 MG tablet  Generic drug: rOPINIRole     * rOPINIRole 3 MG tablet  Commonly known as: REQUIP  TAKE 1 TABLET BY MOUTH THREE TIMES DAILY     True Metrix Level 2 Normal Soln  As needed     True Metrix Meter w/Device Kit  To use to check sugars 4 times daily         * This list has 6 medication(s) that are the same as other medications prescribed for you. Read the directions carefully, and ask your doctor or other care provider to review them with you. STOP taking these medications    doxycycline hyclate 100 MG tablet  Commonly known as: VIBRA-TABS           Where to Get Your Medications      These medications were sent to [facility] [address] [address]    Phone: [phone]   · montelukast 10 MG tablet  · predniSONE 20 MG tablet  · tiotropium 2.5 MCG/ACT Aers inhaler         Discharge recommendations given to patient. Follow Up. Pulmonary and PCP in 1 week   Disposition. home  Activity.  activity as tolerated  Diet: ADULT DIET; Regular; 4 carb choices (60 gm/meal); Low Fat/Low Chol/High Fiber/JAZMINE  ADULT ORAL NUTRITION SUPPLEMENT; Breakfast, Dinner; Low Calorie/High Protein Oral Supplement      Spent 34 minutes in discharge process. Signed:   Tiffanie Cuenca PA-C     4/21/2022 2:33 PM

## 2022-04-21 NOTE — PROGRESS NOTES
Physical Therapy  Facility/Department: [address]  Physical Therapy  Daily treatment note    Name: Wes Benjamin  : 1935  MRN: [id number]  Date of Service: 2022    Discharge Recommendations:Cleo Jones scored a 23/24 on the AM-PAC short mobility form. At this time, no further PT is recommended upon discharge due to nearing functional baseline status. Recommend patient returns to prior setting with prior services. Has needed equipment and know-how. PT Equipment Recommendations  Equipment Needed: No (has needed equipment.)      Patient Diagnosis(es): The primary encounter diagnosis was COPD exacerbation ([facility] Diagnoses of Pulmonary nodule, right, Essential hypertension, and Hyperglycemia were also pertinent to this visit. Past Medical History:  has a past medical history of Arthritis, Atrial fibrillation ([facility] Diabetes mellitus type II, controlled ([facility] GERD (gastroesophageal reflux disease), HTN (hypertension), Hyperlipidemia, Hypothyroid, Insomnia, Lumbago, and SVT (supraventricular tachycardia) ([facility] Past Surgical History:  has a past surgical history that includes Appendectomy; Colonoscopy; and Cholecystectomy, laparoscopic (2013). Assessment   Body Structures, Functions, Activity Limitations Requiring Skilled Therapeutic Intervention: Decreased functional mobility ; Decreased endurance  Assessment: Pt indepedent with bed mob, supervision transfers and amb with RW. Pt fatigued but tolerated activity well. Pt improving and and likely not needing further PT upon DC. Has needed equipment and demonstrates good understanding of O2 monitoring. Barriers to Learning: none  Requires PT Follow-Up: Yes  Activity Tolerance  Activity Tolerance: Patient tolerated evaluation without incident  Activity Tolerance Comments: No excerbation of symptoms during session. Mild fatigue at end of session.      Plan   Plan  Plan: Other (see comment) (1-2 per week)  Current Treatment Recommendations: Strengthening,ROM,Balance training,Gait training,Equipment evaluation, education, & procurement,Functional mobility training,Stair training,Transfer training,Neuromuscular re-education,Home exercise program,ADL/Self-care training,Cognitive/Perceptual training,Endurance training,Therapeutic activities,Patient/Caregiver education & training,Safety education & training  Safety Devices  Type of Devices: Nurse notified,Left in chair,Patient at risk for falls,All fall risk precautions in place,Call light within reach,Gait belt  Restraints  Restraints Initially in Place: No     Restrictions  Restrictions/Precautions  Restrictions/Precautions: Fall Risk (Medium)  Position Activity Restriction  Other position/activity restrictions: Per H&P: \" Joni Torres is a 80 y.o. female of breath with a nonproductive cough for the last couple weeks. Low-grade fever some chills some nausea no vomiting no diarrhea no other sick contacts. No chest pain no secondhand smoke exposure quit smoking when she was 60. Treated by pulmonologist outpatient with plan for steroids azithromycin and doxycycline\"     Subjective   General  Chart Reviewed: Yes  Response To Previous Treatment: Patient with no complaints from previous session. Family / Caregiver Present: Yes (Daughter)  Follows Commands: Within Functional Limits  General Comment  Comments: Pt supine in bed upon arrival. Agreeable to working with PT. Subjective  Subjective: Pt denies pain, very fatigued from not sleeping well. Social/Functional History  Social/Functional History  Lives With: Daughter (son in law)  Type of Home: House  Home Layout: Two level (Pt lives on main level; daughter lives in basement.  Requires 5 steps from main level to access kitchen)  Home Access: Stairs to enter without rails  Entrance Stairs - Number of Steps: 2 NENA  Bathroom Shower/Tub: Tub/Shower unit,Shower chair with back (Pt does not use the shower chair.)  Bathroom Toilet: Standard  Bathroom Equipment: Grab bars in shower  Home Equipment: Alert Button,Walker, 4 wheeled  Has the patient had two or more falls in the past year or any fall with injury in the past year?: No  ADL Assistance: [address]: Needs assistance  Homemaking Responsibilities: Yes (occasionally cooks.)  Ambulation Assistance: Independent  Transfer Assistance: Independent  Active : No  Mode of Transportation: Car  Occupation: Retired  Leisure & Hobbies: listen to music, go shopping  Additional Comments: sleeps in recliner/rocker most of the time. Vision/Hearing       Cognition         Objective                              Bed mobility  Supine to Sit: Independent  Scooting: Independent  Comment: Pt sat EOB with good sitting balance. Transfers  Sit to Stand: Supervision (Pt transfered from [facility] 2 times.)  Stand to sit: Supervision  Ambulation  Surface: level tile  Device: Rolling Walker  Other Apparatus: O2 (2LO2)  Assistance: Supervision  Quality of Gait: steady gait. Distance: Pt amb with RW and supervision for 300 ft. Comments: pt tolerated well. On 2L, O2 sats 95% at rest and 94% at end of amb.   More Ambulation?: No     Balance  Posture: Good  Sitting - Static: Good  Sitting - Dynamic: Good  Standing - Static: Good  Standing - Dynamic: Good  Comments: Pt choosing to use RW  A/AROM Exercises: AP x 20 bilat, seated marching x 10 bilat, LAQs x 10 bilat        OutComes Score                                                  AM-PAC Score  AM-PAC Inpatient Mobility Raw Score : 23 (04/21/22 1002)  AM-PAC Inpatient T-Scale Score : 56.93 (04/21/22 1002)  Mobility Inpatient CMS 0-100% Score: 11.2 (04/21/22 1002)  Mobility Inpatient CMS G-Code Modifier : CI (04/21/22 1002)          Goals  Long Term Goals  Time Frame for Long term goals : Upon D/C (no goals met in full)  Long term goal 1: Pt will perform Sit<>stand MOD I  Long term goal 2: Pt will perform Bed mobility Indep  Long term goal 3: Pt will ambulate with 400' with rollator Mod I  Long term goal 4: Pt will ascend/descend 2 steps w/o rails supevision  Patient Goals   Patient goals : Return home       Therapy Time   Individual Concurrent Group Co-treatment   Time In  [facility]         Time Out  0950         Minutes  25          treatment time: 25 min       Johnathan Piper, [id number]

## 2022-04-21 NOTE — PROGRESS NOTES
Awake and feeling sick to her stomach. Also c/o constipation. Zofran and Glycolax given. BP elevated. Sitting in the chair. Will re check BP.

## 2022-04-21 NOTE — PROGRESS NOTES
Aware of discharge with home care. Home care orders sent to [facility]. Discharge planner notified.

## 2022-04-22 ENCOUNTER — CARE COORDINATION (OUTPATIENT)
Dept: CASE MANAGEMENT | Age: 87
End: 2022-04-22

## 2022-04-22 DIAGNOSIS — J43.2 CENTRILOBULAR EMPHYSEMA (HCC): Primary | ICD-10-CM

## 2022-04-22 PROCEDURE — 1111F DSCHRG MED/CURRENT MED MERGE: CPT | Performed by: FAMILY MEDICINE

## 2022-04-22 NOTE — CARE COORDINATION
Edd 45 Transitions Initial Follow Up Call    Call within 2 business days of discharge: Yes    Patient: Josselin Quevedo Patient : 1935   MRN: [id number]  Reason for Admission:   Discharge Date: 22 RARS: Readmission Risk Score: 15.3 ( )      Last Discharge [facility]       Complaint Diagnosis Description Type Department Provider    22 Cough COPD exacerbation ([facility] [address] . .. ED to Hosp-Admission (Discharged) (ADMITTED) Johnathon Benz MD; Maame Lynn. .. Non-face-to-face services provided:  Obtained and reviewed discharge summary and/or continuity of care documents  1111F completed     Transitions of Care Initial Call    Was this an external facility discharge? No Discharge Facility:     Challenges to be reviewed by the provider   Additional needs identified to be addressed with provider: No  none             Method of communication with provider : none      Advance Care Planning:   Does patient have an Advance Directive: reviewed and current. Was this a readmission? No  Patient stated reason for admission: cough, weak  Patients top risk factors for readmission: medical condition-COPD, lung nodules    Care Transition Nurse (CTN) contacted the patient by telephone to perform post hospital discharge assessment. Verified name and  with patient as identifiers. Provided introduction to self, and explanation of the CTN role. CTN reviewed discharge instructions, medical action plan and red flags with patient who verbalized understanding. Patient given an opportunity to ask questions and does not have any further questions or concerns at this time. Were discharge instructions available to patient? Yes. Reviewed appropriate site of care based on symptoms and resources available to patient including: When to call 911. The patient agrees to contact the PCP office for questions related to their healthcare.      Medication reconciliation was performed with patient, who verbalizes understanding of administration of home medications. Advised obtaining a 90-day supply of all daily and as-needed medications. Reviewed and educated patient on any new and changed medications related to discharge diagnosis. Was patient discharged with a pulse oximeter? No Discussed and confirmed pulse oximeter discharge instructions and when to notify provider or seek emergency care. Pt states doing well, no issues or concerns just tired. Denies SOB, CP, cough, fever. HC will be out Monday. F/U appts listed below. Agreed to more CTC f/u calls. CTN provided contact information. Plan for follow-up call in 5-7 days based on severity of symptoms and risk factors.   Plan for next call: self management-COPD, lung nodules, HC, f/u appts    Care Transitions 24 Hour Call    Do you have a copy of your discharge instructions?: Yes  Do you have all of your prescriptions and are they filled?: Yes  Have you been contacted by a [address]?: No  Have you scheduled your follow up appointment?: Yes  How are you going to get to your appointment?: Car - family or friend to transport  [address]: [address] (Comment: [employer])  Patient Home Equipment: Oxygen  Do you have support at home?: Child  Do you feel like you have everything you need to keep you well at home?: Yes  Are you an active caregiver in your home?: No  Care Transitions Interventions  No Identified Needs         Follow Up  Future Appointments   Date Time Provider Alissa Bustillo   4/26/2022 11:00 AM MD [address] [facility]   5/20/2022  3:15 PM Abril Garza MD [address]   7/7/2022  2:30 PM KATHY Parra - CNP FF Cardio [facility]   9/6/2022  3:15 PM MD [address] [facility]       Morales Le, VNINY

## 2022-04-23 LAB
BLOOD CULTURE, ROUTINE: NORMAL
CULTURE, BLOOD 2: NORMAL

## 2022-04-25 ENCOUNTER — TELEPHONE (OUTPATIENT)
Dept: FAMILY MEDICINE CLINIC | Age: 87
End: 2022-04-25

## 2022-04-25 NOTE — TELEPHONE ENCOUNTER
----- Message from Tim Soriano sent at 4/25/2022  9:50 AM EDT -----  Subject: Message to Provider    QUESTIONS  Information for Provider? Wants to know if she needs to be seen sooner   then 5/20 she was seen in the hospital 4/19-4/22 please contact pt  ---------------------------------------------------------------------------  --------------  [address]  What is the best way for the office to contact you? OK to leave message on   voicemail  Preferred Call Back Phone Number? [phone]  ---------------------------------------------------------------------------  --------------  SCRIPT ANSWERS  Relationship to Patient?  Self

## 2022-04-25 NOTE — TELEPHONE ENCOUNTER
Juno Mendez [phone] with [facility] called to see if orders can be put in for patient to start home care tomorrow (4/26/2022) She went today but the daughter stated that the patient was sick and wanted she to come back tomorrow.

## 2022-04-26 ENCOUNTER — OFFICE VISIT (OUTPATIENT)
Dept: PULMONOLOGY | Age: 87
End: 2022-04-26
Payer: MEDICARE

## 2022-04-26 VITALS — OXYGEN SATURATION: 94 % | HEART RATE: 60 BPM

## 2022-04-26 DIAGNOSIS — J44.9 COPD, MODERATE (HCC): ICD-10-CM

## 2022-04-26 DIAGNOSIS — R06.02 SOB (SHORTNESS OF BREATH): Primary | ICD-10-CM

## 2022-04-26 DIAGNOSIS — J43.2 CENTRILOBULAR EMPHYSEMA (HCC): ICD-10-CM

## 2022-04-26 DIAGNOSIS — J96.11 CHRONIC RESPIRATORY FAILURE WITH HYPOXIA (HCC): ICD-10-CM

## 2022-04-26 DIAGNOSIS — R91.1 PULMONARY NODULE: ICD-10-CM

## 2022-04-26 PROCEDURE — 3023F SPIROM DOC REV: CPT | Performed by: INTERNAL MEDICINE

## 2022-04-26 PROCEDURE — G8427 DOCREV CUR MEDS BY ELIG CLIN: HCPCS | Performed by: INTERNAL MEDICINE

## 2022-04-26 PROCEDURE — 1036F TOBACCO NON-USER: CPT | Performed by: INTERNAL MEDICINE

## 2022-04-26 PROCEDURE — 1111F DSCHRG MED/CURRENT MED MERGE: CPT | Performed by: INTERNAL MEDICINE

## 2022-04-26 PROCEDURE — 4040F PNEUMOC VAC/ADMIN/RCVD: CPT | Performed by: INTERNAL MEDICINE

## 2022-04-26 PROCEDURE — 99214 OFFICE O/P EST MOD 30 MIN: CPT | Performed by: INTERNAL MEDICINE

## 2022-04-26 PROCEDURE — 1123F ACP DISCUSS/DSCN MKR DOCD: CPT | Performed by: INTERNAL MEDICINE

## 2022-04-26 PROCEDURE — G8417 CALC BMI ABV UP PARAM F/U: HCPCS | Performed by: INTERNAL MEDICINE

## 2022-04-26 PROCEDURE — 1090F PRES/ABSN URINE INCON ASSESS: CPT | Performed by: INTERNAL MEDICINE

## 2022-04-26 RX ORDER — LEVALBUTEROL INHALATION SOLUTION 0.63 MG/3ML
SOLUTION RESPIRATORY (INHALATION)
Qty: 1086 ML | Refills: 3 | Status: SHIPPED | OUTPATIENT
Start: 2022-04-26

## 2022-04-26 RX ORDER — LEVALBUTEROL INHALATION SOLUTION 0.63 MG/3ML
0.63 SOLUTION RESPIRATORY (INHALATION) EVERY 4 HOURS PRN
Qty: 120 EACH | Refills: 11 | Status: SHIPPED | OUTPATIENT
Start: 2022-04-26 | End: 2022-04-26

## 2022-04-26 RX ORDER — LEVALBUTEROL INHALATION SOLUTION 0.63 MG/3ML
SOLUTION RESPIRATORY (INHALATION)
Qty: 1080 ML | Refills: 5 | Status: SHIPPED | OUTPATIENT
Start: 2022-04-26 | End: 2022-04-26

## 2022-04-26 NOTE — PROGRESS NOTES
[facility]  Follow Up Note  Quique Delgado MD       Neymar Sahu   YOB: 1935    Date of Visit:  4/26/2022    Assessment/Plan:  1. SOB  Stable  Better since her hospitlaization    2. COPD, moderate/Emphysema/Bronchiectasis  CT Chest 3/20:    Impression   Emphysema.  Bilateral bronchiectasis with mucous plugging.  Scattered   bilateral airspace disease is partially improved as compared to exam dated   02/19/2020 with residual opacity at bilateral lung bases.       New 5 mm nodule or focus of mucous plugging within the right middle lobe. Additional pulmonary nodules are not significantly changed.  Continued   attention on CT follow-up recommended.       Sequela of granulomatous disease. I have independently reviewed pulmonary function testing. PFT reveals mild to moderate COPD with no bronchodilator response. Medication Management:  The patient benefits from the medical regimen and reports no complications. Symbicort   Now on Spiriva respimat  I showed her how to use this   Xopenex  She now has a good HHN    3. Bronchiectasis/PN  Recent Bronchitis    Off Abx  Finished Pred taper after DC    4. PN  I have independently reviewed radiographic images for this patient as part of this visit. CT shows slight enlargement of PN at the right lung base compared to 2020  Repeat CT in 6 motnhs    5. Gastroesophageal reflux disease, esophagitis presence not specified    6. Chronic hypoxemic Respirtory Failure  O2 sats are acceptable on supplemental O2. The patient benefits from the use of supplemental O2.    6 months    Chief Complaint   Patient presents with    Shortness of Breath     HFU Was given Spiriva Respimat while in and not sure if she should be using this. HPI  The patient presents with a chief complaint of moderate shortness of breath related to mild COPD of many years duration. He has mild associated cough.  Exertion is a modifying factor. She had recent hospitalization for COPD exacerbation. She is feeling better but still fatigued. She has O2 at home. She now also has Spiriva but did not know how to take it. Review of Systems  No Chest pain, Nausea or vomiting reported      History  I have reviewed past medical, surgical, social and family history. This is documented elsewhere in the medical record. Physical Exam:  Well developed, well nourished  Alert and oriented  Sclera is clear  No cervical adenopathy  No JVD. Chest examination is few basilar crackles. Cardiac examination reveals regular rate and rhythm without murmur, gallop or rub. The abdomen is soft, nontender and nondistended. There is no clubbing, cyanosis or edema of the extremities. There is no obvious skin rash. No focal neuro deficicts  Normal mood and affect    Allergies   Allergen Reactions    Adhesive Tape Anaphylaxis    Cefaclor Nausea And Vomiting     Other reaction(s): Chest pain    Nsaids      Pt states she has hives and heart races   Other reaction(s): Tachycardia    Clinoril [Sulindac] Other (See Comments)     Stomach pain    Codeine      FEEL NUMB    Diclofenac     Diclofenac Sodium Hives    Diclofenac Sodium Hives    Hctz [Hydrochlorothiazide]      Sob    Ibuprofen Other (See Comments)     Other reaction(s): Tachycardia    Macrobid [Nitrofurantoin Macrocrystal]      nausea    Motrin [Ibuprofen Micronized] Other (See Comments)     Tachycardia      Pcn [Penicillins] Hives    Peach [Prunus Persica] Itching and Swelling     Cannot eat raw peaches    Prednisone      Causes elevated blood pressure     Sulfa Antibiotics      Nausea, diarrhea     Prior to Visit Medications    Medication Sig Taking?  Authorizing Provider   levalbuterol (XOPENEX) 0.63 MG/3ML nebulization Take 3 mLs by nebulization every 4 hours as needed for Wheezing DX:COPD J44.9 Yes Svetlana García MD   predniSONE (DELTASONE) 20 MG tablet Take 2 tablets by mouth daily for 4 days  Aliya Coello PA-C   montelukast (SINGULAIR) 10 MG tablet Take 1 tablet by mouth daily  Shawna Alvarez PA-C   tiotropium (SPIRIVA RESPIMAT) 2.5 MCG/ACT AERS inhaler Inhale 2 puffs into the lungs daily  Shawna Alvarez PA-C   irbesartan (AVAPRO) 300 MG tablet TAKE 1 TABLET BY MOUTH EVERY NIGHT  Carolyn Rodriguez MD   cloNIDine (CATAPRES) 0.1 MG tablet TAKE 1 TABLET BY MOUTH TWICE DAILY  Carolyn Rodriguez MD   dilTIAZem (CARDIZEM CD) 240 MG extended release capsule TAKE 1 CAPSULE EVERY DAY  Yash Cortez APRN - CNP   loratadine (CLARITIN) 10 MG tablet Take 1 tablet by mouth daily  Carolyn Rodriguez MD   budesonide-formoterol (SYMBICORT) 160-4.5 MCG/ACT AERO INHALE 2 PUFFS INTO THE LUNGS TWICE DAILY  Yin Navarro MD   dicyclomine (BENTYL) 10 MG capsule TAKE 1 CAPSULE BY MOUTH FOUR TIMES DAILY AS NEEDED FOR ABDOMINAL PAIN  Carolyn Rodriguez MD   propafenone (RYTHMOL) 150 MG tablet TAKE 1 TABLET BY MOUTH EVERY 8 HOURS  Carolyn Rodriguez MD   Blood Glucose Monitoring Suppl (TRUE METRIX METER) w/Device KIT To use to check sugars 4 times daily  Andrew Rae MD   apixaban (ELIQUIS) 5 MG TABS tablet Take 1 tablet by mouth 2 times daily  Carolyn Rodriguez MD   levothyroxine (SYNTHROID) 75 MCG tablet TAKE 1 TABLET EVERY DAY  Carolyn Rodriguez MD   Blood Glucose Calibration (TRUE METRIX LEVEL 2) Normal SOLN As needed  Carolyn Rodriguez MD   blood glucose test strips (TRUE METRIX BLOOD GLUCOSE TEST) strip 1 each by In Vitro route daily As needed.   Carolyn Rodriguez MD   TRUEplus Lancets 33G MISC 1 daily  Carolyn Rodriguez MD   LORazepam (ATIVAN) 1 MG tablet TAKE 1/2 TABLET BY MOUTH TWICE DAILY AND 1 EVERY NIGHT AT BEDTIME AS NEEDED FOR ANXIETY  Carolyn Rodriguez MD   rOPINIRole (REQUIP) 3 MG tablet TAKE 1 TABLET BY MOUTH THREE TIMES DAILY  Carolyn Rodriguez MD   rOPINIRole (REQUIP) 3 MG tablet Take 3 mg by mouth 3 times daily as needed (restless legs)  Historical Provider, MD meclizine (ANTIVERT) 12.5 MG tablet TAKE 1 TABLET THREE TIMES DAILY AS NEEDED  Yuly Sheriff MD   conjugated estrogens (PREMARIN) 0.625 MG/GM vaginal cream Place 1 g vaginally Twice a Week  Yuly Sheriff MD   fluticasone (FLONASE) 50 MCG/ACT nasal spray SHAKE LIQUID AND USE 2 SPRAYS IN EACH NOSTRIL DAILY  Yuly Sheriff MD   atorvastatin (LIPITOR) 80 MG tablet Take 1 tablet by mouth nightly  KATHY Asencio CNP   hydrALAZINE (APRESOLINE) 50 MG tablet TAKE 1 TABLET BY MOUTH THREE TIMES DAILY  Yuly Sheriff MD   Lancets MISC 1 each by Does not apply route 2 times daily  Yuly Sheriff MD   ondansetron (ZOFRAN) 4 MG tablet Take 1 tablet by mouth daily as needed for Nausea or Vomiting  Clari Young MD   OXYGEN Inhale 2 L into the lungs  Historical Provider, MD   blood glucose monitor strips Test one time a day  Yuly Sheriff MD   levalbuterol (XOPENEX) 0.63 MG/3ML nebulization INHALE CONTENTS OF 1 VIAL PER NEBULIZER EVERY 6 HOURS AS NEEDED FOR WHEEZING  Roberto Morgan MD   Respiratory Therapy Supplies (NEBULIZER/TUBING/MOUTHPIECE) KIT 1 kit by Does not apply route 4 times daily as needed (shortness of breath)  Yuly Sheriff MD   albuterol sulfate HFA (PROAIR HFA) 108 (90 Base) MCG/ACT inhaler Inhale 2 puffs into the lungs every 6 hours as needed for Wheezing  Roberto Morgan MD       Vitals:    22 1130   Pulse: 60   SpO2: 94%     There is no height or weight on file to calculate BMI.      Wt Readings from Last 3 Encounters:   22 160 lb 8 oz (72.8 kg)   02/15/22 160 lb (72.6 kg)   21 162 lb (73.5 kg)     BP Readings from Last 3 Encounters:   22 (!) 154/65   02/15/22 120/70   21 120/80        Social History     Tobacco Use   Smoking Status Former Smoker    Packs/day: 1.00    Years: 45.00    Pack years: 45.00    Types: Cigarettes    Start date: 9/15/1950   [facility] Quit date: 1995    Years since quittin.3   Smokeless Tobacco Never Used

## 2022-04-26 NOTE — TELEPHONE ENCOUNTER
Patient's daughter states patient is being seen for an appointment with Dr Ray Marshall today for a hospital follow-up. They will keep the May 20 appointment and will call back if they need a sooner one.

## 2022-04-27 ENCOUNTER — TELEPHONE (OUTPATIENT)
Dept: FAMILY MEDICINE CLINIC | Age: 87
End: 2022-04-27

## 2022-04-27 NOTE — TELEPHONE ENCOUNTER
Would recommend increasing hydralazine to 100 mg 3 times daily and patient should have a hospital follow-up appointment

## 2022-04-28 ENCOUNTER — TELEPHONE (OUTPATIENT)
Dept: FAMILY MEDICINE CLINIC | Age: 87
End: 2022-04-28

## 2022-04-28 ENCOUNTER — CARE COORDINATION (OUTPATIENT)
Dept: CASE MANAGEMENT | Age: 87
End: 2022-04-28

## 2022-04-28 NOTE — CARE COORDINATION
Edd 45 Transitions Follow Up Call    2022    Patient: Richard Manning  Patient : 1935   MRN: [id number]  Reason for Admission:   Discharge Date: 22 RARS: Readmission Risk Score: 15.3 ( )    Follow up call attempted, left contact info on vm    Follow Up  Future Appointments   Date Time Provider Alissa Bustillo   2022  3:15 PM Abril Garza MD [address]   2022  2:30 PM KATHY Parra - CNP FF Cardio [facility]   2022  3:15 PM Estrella Church MD PULM & CC [facility]       Morales Le RN

## 2022-04-28 NOTE — TELEPHONE ENCOUNTER
Damon Gambino with [facility] ([phone]) called about patient. She saw her yesterday and noted bp in excess of 200/90. She was advised from Shauna Nguyen to increase dose of hydrALAZINE (APRESOLINE) 50 MG tablet but uncovered that patient is NOT actively taking it. Damon Gambino would like to know if she should advise patient to take AS prescribed or move forward with the increased dose. Also, Damon Gambino needs verbal orders for skilled nursing, PT, and OT. Please advise. Provider out of office.

## 2022-04-28 NOTE — TELEPHONE ENCOUNTER
Made follow up phone call to [facility]. Advised to just restart the hydralazine as ordered and monitor. Okay for home care.

## 2022-04-28 NOTE — TELEPHONE ENCOUNTER
Hospital Medication Detail     Dose Frequency Start End   hydrALAZINE (APRESOLINE) injection 10 mg 10 mg ONCE 4/19/2022 4/19/2022   Route: IntraVENous     See PCP note for suggested dosage    [address] [phone] - F [phone]    Provider out of the office

## 2022-04-28 NOTE — TELEPHONE ENCOUNTER
hydrALAZINE (APRESOLINE) injection 10 mg   [[id number]]  Order Details  Ordered Dose: 10 mg Route: IntraVENous Frequency: ONCE   Admin Dose: 10 mg      Scheduled Start Date/Time: 04/19/22 1600          Note from Dr. Leanna Ryder     Would recommend increasing hydralazine to 100 mg 3 times daily and patient should have a hospital follow-up appointment

## 2022-04-29 DIAGNOSIS — M15.9 PRIMARY OSTEOARTHRITIS INVOLVING MULTIPLE JOINTS: ICD-10-CM

## 2022-04-29 RX ORDER — DILTIAZEM HYDROCHLORIDE 240 MG/1
CAPSULE, COATED, EXTENDED RELEASE ORAL
Qty: 90 CAPSULE | Refills: 1 | OUTPATIENT
Start: 2022-04-29

## 2022-04-29 RX ORDER — HYDROCODONE BITARTRATE AND ACETAMINOPHEN 5; 325 MG/1; MG/1
1 TABLET ORAL 4 TIMES DAILY PRN
Qty: 120 TABLET | Refills: 0 | Status: SHIPPED | OUTPATIENT
Start: 2022-04-29 | End: 2022-06-28 | Stop reason: SDUPTHER

## 2022-04-29 NOTE — TELEPHONE ENCOUNTER
Medication:   Requested Prescriptions      No prescriptions requested or ordered in this encounter      Last Filled:  3/7/2022    Patient Phone Number: [phone] (home)     Last appt: 2/15/2022   Next appt: 5/20/2022    Last OARRS:   RX Monitoring 3/20/2019   Attestation The Prescription Monitoring Report for this patient was reviewed today.    Periodic Controlled Substance Monitoring -     PDMP Monitoring:    Last PDMP Betty Dodge as Reviewed [facility]):  Review User Review Instant Review Result   Isha Panchal 2/15/2022  3:12 PM Reviewed PDMP [1]     Preferred Pharmacy:   [facility], [address] [address]  Phone: [phone] Fax: [phone]    [facility] - [address] [phone] [facility] [phone]  [address] [place] [address]  Phone: [phone] Fax: [phone]

## 2022-04-29 NOTE — TELEPHONE ENCOUNTER
Dose Frequency Start End   HYDROcodone-acetaminophen (NORCO) 5-325 MG per tablet 1 tablet (Discontinued) 1 tablet EVERY 6 HOURS PRN 4/19/2022 4/21/2022   Admin Instructions: Maximum dose of acetaminophen is 4000 mg from all sources in 24 hours.      [facility] [address], [address] [phone]    Provider out of the office

## 2022-05-02 ENCOUNTER — CARE COORDINATION (OUTPATIENT)
Dept: CASE MANAGEMENT | Age: 87
End: 2022-05-02

## 2022-05-02 NOTE — CARE COORDINATION
Edd 45 Transitions Follow Up Call    2022    Patient: Joni Torres  Patient : 1935   MRN: [id number]  Reason for Admission: Cough  Discharge Date: 22 RARS: Readmission Risk Score: 15.3 ( )         Spoke with: [address] Transitions Follow Up Call    Needs to be reviewed by the provider   Additional needs identified to be addressed with provider: No  [facility] care-[facility]             Method of communication with provider : [facility]      Care Transition Nurse (CTN) contacted the patient by telephone to follow up after admission on 22. Verified name and  with patient as identifiers. Addressed changes since last contact: medications-Hyrdalazine to TID for hypertension  Discussed follow-up appointments. If no appointment was previously scheduled, appointment scheduling offered: Yes. Is follow up appointment scheduled within 7 days of discharge? Yes. Advance Care Planning:   Does patient have an Advance Directive: Not on file    CTN reviewed discharge instructions, medical action plan and red flags with patient and discussed any barriers to care and/or understanding of plan of care after discharge. Discussed appropriate site of care based on symptoms and resources available to patient including: PCP  Specialist  Urgent care clinics  [facility]  When to call [address] The patient agrees to contact the PCP office for questions related to their healthcare. Patients top risk factors for readmission: ineffective coping  Interventions to address risk factors: Obtained and reviewed discharge summary and/or continuity of care documents      Non-[facility] follow up appointment(s):     CTN provided contact information for future needs. Plan for follow-up call in 3-5 days based on severity of symptoms and risk factors.   Plan for next call: symptom management-Dizziness, Hypertension       This [facility] spoke with pt and pt stated that she is feeling better but have some light headedness that presented a couple of days ago. Pt recently had a change to her Hydralazine which was increased to TID due to hypertension reported by her [facility] nurse. Pt will continue to monitor and if it persists, she will contact her PCP. Pt stated that it is not debilitating and she is able to shower and do her daily ADLs. Pt stated that the [facility] nurse was out over the weekend and that her BP is stable and has decreased but did not provide a value today. Denied any other needs and concerns at this time. Will schedule f/u with pt in a couple of days to see if dizziness has subsided. Advised pt to immediately report any worsening symptoms to the PCP. Patient verbalized understanding and agreed. Gabriela Gordon LPN, [facility]  PH: [phone]            Care Transitions Subsequent and Final Call    Subsequent and Final Calls  Are you currently active with any services?: Home Health  Care Transitions Interventions  Other Interventions:            Follow Up  Future Appointments   Date Time Provider Alissa Bustillo   5/20/2022  3:15 PM MD [address]   7/7/2022  2:30 PM KATHY Hawkins - CNP FF Cardio [facility]   9/6/2022  3:15 PM Whitney Fitzgerald MD PULM & CC [facility]       Gabriela Gordon LPN

## 2022-05-05 ENCOUNTER — CARE COORDINATION (OUTPATIENT)
Dept: CASE MANAGEMENT | Age: 87
End: 2022-05-05

## 2022-05-05 NOTE — CARE COORDINATION
Edd 45 Transitions Follow Up Call    2022    Patient: Mirna Boudreaux  Patient : 1935   MRN: [id number]  Reason for Admission:   Discharge Date: 22 RARS: Readmission Risk Score: 15.3 ( )         Spoke with: [address] [facility] Transitions Subsequent and Final Call    Subsequent and Final Calls  Do you have any ongoing symptoms?: No  Have your medications changed?: No  Do you have any questions related to your medications?: No  Do you currently have any active services?: Yes  Are you currently active with any services?: Home Health  Do you have any needs or concerns that I can assist you with?: No  Identified Barriers: None  Care Transitions Interventions  No Identified Needs  Other Interventions:         Pt states doing well, no issues or concerns. [facility]. nurse and therapy continue to come out to the home. F/U appts listed below. Agreed to more CTC f/u calls.       Follow Up  Future Appointments   Date Time Provider Alissa Bustillo   2022  3:15 PM MD [address]   2022  2:30 PM KATHY Vickers - CNP FF Cardio [facility]   2022  3:15 PM Lai العراقي MD PULM & CC [facility]       Luz Marina Madrigal RN

## 2022-05-09 RX ORDER — PRAMIPEXOLE DIHYDROCHLORIDE 0.25 MG/1
TABLET ORAL
Qty: 90 TABLET | Refills: 1 | Status: ON HOLD | OUTPATIENT
Start: 2022-05-09 | End: 2022-10-04 | Stop reason: HOSPADM

## 2022-05-09 NOTE — TELEPHONE ENCOUNTER
Medication:   Requested Prescriptions     Pending Prescriptions Disp Refills    pramipexole (MIRAPEX) 0.25 MG tablet [Pharmacy Med Name: PRAMIPEXOLE 0.25MG TABLETS] 90 tablet 1     Sig: TAKE 1 TABLET BY MOUTH EVERY NIGHT      Last Filled:      Patient Phone Number: [phone] (home)     Last appt: 2/15/2022   Next appt: 5/20/2022    Last OARRS:   RX Monitoring 3/20/2019   Attestation The Prescription Monitoring Report for this patient was reviewed today.    Periodic Controlled Substance Monitoring -     PDMP Monitoring:    Last PDMP [employer] as Reviewed [facility]):  Review User Review Instant Review Result   Jeanne Ac 2/15/2022  3:12 PM Reviewed PDMP [1]     Preferred Pharmacy:   [facility] [address], [address] [phone]  Phone: [phone] Fax: [phone]    [facility] - [address] [phone] Mray Pablo [phone]  [address] [place] [address]  Phone: [phone] Fax: [phone]

## 2022-05-12 ENCOUNTER — CARE COORDINATION (OUTPATIENT)
Dept: CASE MANAGEMENT | Age: 87
End: 2022-05-12

## 2022-05-12 NOTE — CARE COORDINATION
Edd 45 Transitions Follow Up Call    2022    Patient: Arun Tomlin  Patient : 1935   MRN: [id number]  Reason for Admission:   Discharge Date: 22 RARS: Readmission Risk Score: 15.3 ( )         Spoke with: [address] [facility] Transitions Subsequent and Final Call    Subsequent and Final Calls  Do you have any ongoing symptoms?: No  Have your medications changed?: No  Do you have any questions related to your medications?: No  Do you currently have any active services?: Yes  Are you currently active with any services?: Home Health  Do you have any needs or concerns that I can assist you with?: No  Identified Barriers: None  Care Transitions Interventions  No Identified Needs  Other Interventions:         Pt states doing well, no issues or concerns. Denies SOB, CP, cough. F/U appts listed below. No need for further f/u CTC calls per pt she is feeling fine.        Follow Up  Future Appointments   Date Time Provider Alissa Bustillo   2022  3:15 PM Samuel Lloyd MD [address]   2022  2:30 PM Andrews Espinal APRN - CNP FF Cardio [facility]   2022  3:15 PM Bella Jose MD PULM & CC [facility]       Azael Gomes RN

## 2022-05-17 ENCOUNTER — HOSPITAL ENCOUNTER (OUTPATIENT)
Age: 87
Discharge: HOME | End: 2022-05-17
Payer: MEDICARE

## 2022-05-17 DIAGNOSIS — E11.22 CONTROLLED TYPE 2 DIABETES MELLITUS WITH STAGE 2 CHRONIC KIDNEY DISEASE, WITHOUT LONG-TERM CURRENT USE OF INSULIN (HCC): ICD-10-CM

## 2022-05-17 DIAGNOSIS — R30.0 DYSURIA: Primary | ICD-10-CM

## 2022-05-17 DIAGNOSIS — R30.0 DYSURIA: ICD-10-CM

## 2022-05-17 DIAGNOSIS — N18.2 CONTROLLED TYPE 2 DIABETES MELLITUS WITH STAGE 2 CHRONIC KIDNEY DISEASE, WITHOUT LONG-TERM CURRENT USE OF INSULIN (HCC): ICD-10-CM

## 2022-05-17 LAB
ANION GAP SERPL CALCULATED.3IONS-SCNC: 16 MMOL/L (ref 3–16)
BUN BLDV-MCNC: 25 MG/DL (ref 7–20)
CALCIUM SERPL-MCNC: 9.2 MG/DL (ref 8.3–10.6)
CHLORIDE BLD-SCNC: 102 MMOL/L (ref 99–110)
CO2: 21 MMOL/L (ref 21–32)
CREAT SERPL-MCNC: 1 MG/DL (ref 0.6–1.2)
GFR AFRICAN AMERICAN: >60
GFR NON-AFRICAN AMERICAN: 53
GLUCOSE BLD-MCNC: 101 MG/DL (ref 70–99)
POTASSIUM SERPL-SCNC: 4.4 MMOL/L (ref 3.5–5.1)
SODIUM BLD-SCNC: 139 MMOL/L (ref 136–145)

## 2022-05-17 PROCEDURE — 80048 BASIC METABOLIC PNL TOTAL CA: CPT

## 2022-05-17 PROCEDURE — 87077 CULTURE AEROBIC IDENTIFY: CPT

## 2022-05-17 PROCEDURE — 36415 COLL VENOUS BLD VENIPUNCTURE: CPT

## 2022-05-17 PROCEDURE — 83036 HEMOGLOBIN GLYCOSYLATED A1C: CPT

## 2022-05-17 PROCEDURE — 87086 URINE CULTURE/COLONY COUNT: CPT

## 2022-05-17 RX ORDER — CONJUGATED ESTROGENS 0.62 MG/G
1 CREAM VAGINAL
Qty: 30 G | Refills: 2 | Status: SHIPPED | OUTPATIENT
Start: 2022-05-18

## 2022-05-17 RX ORDER — PHENAZOPYRIDINE HYDROCHLORIDE 200 MG/1
200 TABLET, FILM COATED ORAL 3 TIMES DAILY PRN
Qty: 50 TABLET | Refills: 0 | Status: SHIPPED | OUTPATIENT
Start: 2022-05-17 | End: 2022-07-14

## 2022-05-17 NOTE — TELEPHONE ENCOUNTER
Patient called in stating that the in home nurse tested her and said she had a UTI but couldn't prescribe a medicine and that she needed to call the doctors office. Patient wants to know if a nurse can give her a call. Please advice!

## 2022-05-17 NOTE — TELEPHONE ENCOUNTER
Patient has been having dysuria and cloudy urine. Patient is requesting a medication for the burning and painful urination. Order put in [facility] for the lab for pt to go get a culture done.      Mally Rivera

## 2022-05-17 NOTE — TELEPHONE ENCOUNTER
Need to know why a urinalysis was performed? Is she having urinary symptoms? Last urine culture did not grow out any significant bacteria that require treatment.   If she is having symptoms I would recommend a urine culture be performed

## 2022-05-17 NOTE — TELEPHONE ENCOUNTER
She is hurting bad today and is wondering if she can use it more than 2x a week.       conjugated estrogens (PREMARIN) 0.625 MG/GM vaginal cream 30 g 2 2/14/2022     Sig - Route: Place 1 g vaginally Twice a Week - Vaginal    Sent to pharmacy as: Premarin 0.625 MG/GM Vaginal Cream (conjugated estrogens)      [facility]ORE [address], [address]  [place]ASH[place] [phone]

## 2022-05-18 LAB
ORGANISM: ABNORMAL
URINE CULTURE, ROUTINE: ABNORMAL

## 2022-05-19 ENCOUNTER — TELEPHONE (OUTPATIENT)
Dept: FAMILY MEDICINE CLINIC | Age: 87
End: 2022-05-19

## 2022-05-19 LAB
ESTIMATED AVERAGE GLUCOSE: 157.1 MG/DL
HBA1C MFR BLD: 7.1 %

## 2022-05-19 RX ORDER — CEPHALEXIN 500 MG/1
500 CAPSULE ORAL 3 TIMES DAILY
Qty: 15 CAPSULE | Refills: 0 | Status: SHIPPED | OUTPATIENT
Start: 2022-05-19 | End: 2022-05-24

## 2022-05-19 NOTE — TELEPHONE ENCOUNTER
Received consult letter from [facility]. Scanned to encounter.

## 2022-05-19 NOTE — TELEPHONE ENCOUNTER
Patient's daughter called stating that she saw the lab culture results had come back. How would PCP want to treat it?   [phone]    Please advise    Provider out of the office

## 2022-05-23 DIAGNOSIS — F51.01 PRIMARY INSOMNIA: ICD-10-CM

## 2022-05-23 DIAGNOSIS — F41.9 ANXIETY: ICD-10-CM

## 2022-05-23 RX ORDER — LORAZEPAM 1 MG/1
TABLET ORAL
Qty: 60 TABLET | Refills: 0 | Status: SHIPPED | OUTPATIENT
Start: 2022-05-23 | End: 2022-06-13 | Stop reason: SDUPTHER

## 2022-05-23 NOTE — TELEPHONE ENCOUNTER
Medication:   Requested Prescriptions     Pending Prescriptions Disp Refills    LORazepam (ATIVAN) 1 MG tablet 60 tablet 0     Sig: TAKE 1/2 TABLET BY MOUTH TWICE DAILY AND 1 EVERY NIGHT AT BEDTIME AS NEEDED FOR ANXIETY      Last Filled:      Patient Phone Number: [phone] (home)     Last appt: 2/15/2022   Next appt: 6/13/2022    Last OARRS:   RX Monitoring 3/20/2019   Attestation The Prescription Monitoring Report for this patient was reviewed today.    Periodic Controlled Substance Monitoring -     PDMP Monitoring:    Last PDMP [facility] as Reviewed [facility]):  Review User Review Instant Review Result   Donny Fletcheragustina 2/15/2022  3:12 PM Reviewed PDMP [1]     Preferred Pharmacy:   [facility], [address] [address]  Phone: [phone] Fax: [phone]    [facility] - [address] [phone] Alirio Mcgee [phone]  [address] [place] [address]  Phone: [phone] Fax: [phone]

## 2022-05-23 NOTE — TELEPHONE ENCOUNTER
LORazepam (ATIVAN) 1 MG tablet 60 tablet 2 3/1/2022 5/31/2022    Sig: TAKE 1/2 TABLET BY MOUTH TWICE DAILY AND 1 EVERY NIGHT AT BEDTIME AS NEEDED FOR ANXIETY    Sent to pharmacy as: LORazepam 1 MG Oral Tablet [facility])                Pharmacy    [id number] Ratna Reynolds, [address] [facility] [phone]   [address] [address]   Phone:  [phone]  Fax:  [phone]

## 2022-05-24 ENCOUNTER — TELEPHONE (OUTPATIENT)
Dept: FAMILY MEDICINE CLINIC | Age: 87
End: 2022-05-24

## 2022-05-24 NOTE — TELEPHONE ENCOUNTER
Nicol Early from [facility] [employer] [facility] called to report that the patient is having itching. Her pulse is 44. O2 was continuous 2 liters and 85%. Now it is 3.5 liters and 96 %. Nurse advised patient to skip the next dose of Keflex, thinking that it could be an allergic reaction.     [phone]    Please advise

## 2022-05-25 ENCOUNTER — TELEPHONE (OUTPATIENT)
Dept: FAMILY MEDICINE CLINIC | Age: 87
End: 2022-05-25

## 2022-05-25 DIAGNOSIS — R30.0 DYSURIA: Primary | ICD-10-CM

## 2022-05-25 DIAGNOSIS — N39.0 URINARY TRACT INFECTION WITHOUT HEMATURIA, SITE UNSPECIFIED: ICD-10-CM

## 2022-05-25 DIAGNOSIS — R35.0 URINARY FREQUENCY: ICD-10-CM

## 2022-05-25 NOTE — TELEPHONE ENCOUNTER
She has an appt scheduled on 6/13/22 to see you. Do we need to move this sooner.  Also ok to order a urinalysis or urine culture on her

## 2022-05-25 NOTE — TELEPHONE ENCOUNTER
Check on patient and see how she is doing this morning and whether she developed a rash.   If she does not I would recommend she restart the Keflex antibiotic

## 2022-05-25 NOTE — TELEPHONE ENCOUNTER
Left message for Arianna Сергей to call back- need to know where to fax the order to?  the order is in [address] currently.

## 2022-05-25 NOTE — TELEPHONE ENCOUNTER
Talked to patients daughter and patient is doing better. She states that patient was almost passing out and when they tried to talk to her she was out of it. She was also struggling to breath the past couple of days. Her daughter wants her to stay off the Cephalexin and maybe have the home nurse retest her urine to see if she still needs another antibiotic. She wants to make sure the uti is gone and she does not go into sepsis. She does not want her to have Cephalexin ever again.  Please advise

## 2022-05-25 NOTE — TELEPHONE ENCOUNTER
Advised patients daughter of information Patient is still having urinary frequency and she would like a urine culture done. Left message for Carlie Mandujano to call back from [facility].

## 2022-05-25 NOTE — TELEPHONE ENCOUNTER
Patient's daughter, Evelin Neal, calling to ask for a NEW order for a urine sample to give to the nurse Iris Escobedo, with [facility]. States she cannot test patient's urine unless there is a new order to make sure the antibiotic is working for patient's UTI. Evelin Neal can be reached at [phone].

## 2022-05-25 NOTE — TELEPHONE ENCOUNTER
Very concerned about her oxygen levels and this is probably causing her mental status changes. It appears that she saw Dr. Nury Dan after hospital stay and probably needs to follow-up with Dr. Nury Dan soon.   Also needs make appoint with me

## 2022-05-25 NOTE — TELEPHONE ENCOUNTER
Would recommend a repeat urine culture if she has symptoms such as burning with urination or urinary frequency.   If she is struggling with breathing I would recommend she have an appointment with lung specialist as soon as possible but keep an appointment with me in [place]

## 2022-05-25 NOTE — TELEPHONE ENCOUNTER
Payal Lyles returning phone call. She says that any information may be left on her secure voicemail as she will be in and out of home health visits.  [phone]

## 2022-05-26 ENCOUNTER — HOSPITAL ENCOUNTER (OUTPATIENT)
Age: 87
Setting detail: SPECIMEN
Discharge: HOME | End: 2022-05-26
Payer: MEDICARE

## 2022-05-26 LAB
BACTERIA: NORMAL /HPF
BILIRUBIN URINE: NEGATIVE
BLOOD, URINE: NEGATIVE
CLARITY: CLEAR
COLOR: YELLOW
EPITHELIAL CELLS, UA: 2 /HPF (ref 0–5)
GLUCOSE URINE: NEGATIVE MG/DL
HYALINE CASTS: 0 /LPF (ref 0–8)
KETONES, URINE: NEGATIVE MG/DL
LEUKOCYTE ESTERASE, URINE: ABNORMAL
MICROSCOPIC EXAMINATION: YES
NITRITE, URINE: NEGATIVE
PH UA: 7.5 (ref 5–8)
PROTEIN UA: 100 MG/DL
RBC UA: 1 /HPF (ref 0–4)
SPECIFIC GRAVITY UA: 1.01 (ref 1–1.03)
URINE REFLEX TO CULTURE: ABNORMAL
URINE TYPE: ABNORMAL
UROBILINOGEN, URINE: 0.2 E.U./DL
WBC UA: 3 /HPF (ref 0–5)

## 2022-05-26 PROCEDURE — 81001 URINALYSIS AUTO W/SCOPE: CPT

## 2022-05-27 RX ORDER — CLONIDINE HYDROCHLORIDE 0.1 MG/1
TABLET ORAL
Qty: 180 TABLET | Refills: 0 | Status: SHIPPED | OUTPATIENT
Start: 2022-05-27

## 2022-05-27 NOTE — TELEPHONE ENCOUNTER
Medication:   Requested Prescriptions     Pending Prescriptions Disp Refills    cloNIDine (CATAPRES) 0.1 MG tablet [Pharmacy Med Name: CLONIDINE HYDROCHLORIDE 0.1 MG Tablet] 180 tablet 0     Sig: TAKE 1 TABLET TWICE DAILY      Provider out of office    Patient Phone Number: [phone] (home)     Last appt: 2/15/2022   Next appt: 6/13/2022    Last OARRS:   RX Monitoring 3/20/2019   Attestation The Prescription Monitoring Report for this patient was reviewed today.    Periodic Controlled Substance Monitoring -     PDMP Monitoring:    Last PDMP Eva Donnelly as Reviewed [facility]):  Review User Review Instant Review Result   Raul Avalos 2/15/2022  3:12 PM Reviewed PDMP [1]     Preferred Pharmacy:   [facility] [address], [address] [address]  Phone: [phone] Fax: [phone]    [facility] - [address] [phone] Patricia Johnson [phone]  [address] [place] [address]  Phone: [phone] Fax: [phone]

## 2022-06-07 DIAGNOSIS — E03.9 ACQUIRED HYPOTHYROIDISM: ICD-10-CM

## 2022-06-07 RX ORDER — LEVOTHYROXINE SODIUM 0.07 MG/1
TABLET ORAL
Qty: 90 TABLET | Refills: 0 | Status: SHIPPED | OUTPATIENT
Start: 2022-06-07

## 2022-06-07 NOTE — TELEPHONE ENCOUNTER
Medication:   Requested Prescriptions     Pending Prescriptions Disp Refills    levothyroxine (SYNTHROID) 75 MCG tablet [Pharmacy Med Name: LEVOTHYROXINE 0.075MG (75MCG) TABS] 90 tablet 1     Sig: TAKE 1 TABLET BY MOUTH EVERY DAY      Last Filled:     Patient Phone Number: [phone] (home)     Last appt: 2/15/2022   Next appt: 6/13/2022    Last OARRS:   RX Monitoring 3/20/2019   Attestation The Prescription Monitoring Report for this patient was reviewed today.    Periodic Controlled Substance Monitoring -     PDMP Monitoring:    Last PDMP Caroline Qureshi as Reviewed [facility]):  Review User Review Instant Review Result   Vargas oNrman 2/15/2022  3:12 PM Reviewed PDMP [1]     Preferred Pharmacy:   [facility] [address], [address]  Phone: [phone] Fax: [phone]    [facility] - [address] [address] [phone] Mary Doyne [phone]  [address] [place] [address]  Phone: [phone] Fax: [phone]

## 2022-06-13 ENCOUNTER — OFFICE VISIT (OUTPATIENT)
Dept: FAMILY MEDICINE CLINIC | Age: 87
End: 2022-06-13
Payer: MEDICARE

## 2022-06-13 VITALS
HEART RATE: 58 BPM | RESPIRATION RATE: 16 BRPM | OXYGEN SATURATION: 98 % | TEMPERATURE: 97.3 F | BODY MASS INDEX: 28.9 KG/M2 | SYSTOLIC BLOOD PRESSURE: 140 MMHG | DIASTOLIC BLOOD PRESSURE: 70 MMHG | WEIGHT: 158 LBS

## 2022-06-13 DIAGNOSIS — N18.31 STAGE 3A CHRONIC KIDNEY DISEASE (HCC): ICD-10-CM

## 2022-06-13 DIAGNOSIS — I48.0 PAF (PAROXYSMAL ATRIAL FIBRILLATION) (HCC): ICD-10-CM

## 2022-06-13 DIAGNOSIS — F51.01 PRIMARY INSOMNIA: ICD-10-CM

## 2022-06-13 DIAGNOSIS — E11.22 CONTROLLED TYPE 2 DIABETES MELLITUS WITH STAGE 3 CHRONIC KIDNEY DISEASE, WITHOUT LONG-TERM CURRENT USE OF INSULIN (HCC): ICD-10-CM

## 2022-06-13 DIAGNOSIS — N30.01 ACUTE CYSTITIS WITH HEMATURIA: ICD-10-CM

## 2022-06-13 DIAGNOSIS — N18.30 CONTROLLED TYPE 2 DIABETES MELLITUS WITH STAGE 3 CHRONIC KIDNEY DISEASE, WITHOUT LONG-TERM CURRENT USE OF INSULIN (HCC): ICD-10-CM

## 2022-06-13 DIAGNOSIS — F41.9 ANXIETY: Primary | ICD-10-CM

## 2022-06-13 DIAGNOSIS — I25.119 CORONARY ARTERY DISEASE INVOLVING NATIVE CORONARY ARTERY OF NATIVE HEART WITH ANGINA PECTORIS (HCC): ICD-10-CM

## 2022-06-13 PROCEDURE — 1036F TOBACCO NON-USER: CPT | Performed by: FAMILY MEDICINE

## 2022-06-13 PROCEDURE — G8427 DOCREV CUR MEDS BY ELIG CLIN: HCPCS | Performed by: FAMILY MEDICINE

## 2022-06-13 PROCEDURE — 1123F ACP DISCUSS/DSCN MKR DOCD: CPT | Performed by: FAMILY MEDICINE

## 2022-06-13 PROCEDURE — 99214 OFFICE O/P EST MOD 30 MIN: CPT | Performed by: FAMILY MEDICINE

## 2022-06-13 PROCEDURE — 3051F HG A1C>EQUAL 7.0%<8.0%: CPT | Performed by: FAMILY MEDICINE

## 2022-06-13 PROCEDURE — G8417 CALC BMI ABV UP PARAM F/U: HCPCS | Performed by: FAMILY MEDICINE

## 2022-06-13 PROCEDURE — 1090F PRES/ABSN URINE INCON ASSESS: CPT | Performed by: FAMILY MEDICINE

## 2022-06-13 RX ORDER — LORAZEPAM 1 MG/1
TABLET ORAL
Qty: 60 TABLET | Refills: 2 | Status: SHIPPED | OUTPATIENT
Start: 2022-06-13 | End: 2022-09-12

## 2022-06-13 RX ORDER — CIPROFLOXACIN 500 MG/1
500 TABLET, FILM COATED ORAL 2 TIMES DAILY
Qty: 10 TABLET | Refills: 0 | Status: SHIPPED | OUTPATIENT
Start: 2022-06-13 | End: 2022-06-18

## 2022-06-13 NOTE — PROGRESS NOTES
Subjective:      Patient ID: Naina Borden is a 80 y.o. female. CC: Patient presents for re-evaluation of chronic health problems including anxiety, urinary tract infection, stage III kidney disease, paroxysmal atrial fibrillation, and diabetes mellitus. .    HPI pt is here for a follow up with daughter and she stated that patient was seen in an Urgent Care yesterday due to urinary symptoms, burning, frequency, once she woke up Saturday. Daughter stated that patient just had a UTI not too long ago. Patient was prescribed Macrobid and she feels this caused a lot of GI upset. She continues under pulmonology care and continues needing home oxygen. Breathing definitely restricts all her activities at this time. She was hospitalized April 19 through April 21 with exacerbation of COPD. Restless leg symptoms are better. Anxiety symptoms are controlled and pain medication taking twice daily for osteoarthritis symptoms.     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, without long-term current use of insulin (HCC)    Restless legs syndrome    ANTHONY (obstructive sleep apnea)    Allergic rhinitis    COPD, moderate (HCC)    Dizziness    Interstitial lung disease ([address]    Coronary artery disease involving native coronary artery of native heart with angina pectoris ([address]    PAF (paroxysmal atrial fibrillation) ([address]    Hypothyroid    Chronic respiratory failure with hypoxia (HCC)    SOB (shortness of breath)    Anxiety    Ataxia    HTN (hypertension), benign    Centrilobular emphysema (HCC)    Pulmonary nodule    Ptosis of eyelid, bilateral    Acute exacerbation of chronic obstructive pulmonary disease (COPD) ([address]       Outpatient Medications Marked as Taking for the 6/13/22 encounter (Office Visit) with Janette Simons MD Erin   Medication Sig Dispense Refill    levothyroxine (SYNTHROID) 75 MCG tablet TAKE 1 TABLET BY MOUTH EVERY DAY 90 tablet 0    cloNIDine (CATAPRES) 0.1 MG tablet TAKE 1 TABLET TWICE DAILY 180 tablet 0    LORazepam (ATIVAN) 1 MG tablet TAKE 1/2 TABLET BY MOUTH TWICE DAILY AND 1 EVERY NIGHT AT BEDTIME AS NEEDED FOR ANXIETY 60 tablet 0    conjugated estrogens (PREMARIN) 0.625 MG/GM vaginal cream Place 1 g vaginally three times a week 30 g 2    phenazopyridine (PYRIDIUM) 200 MG tablet Take 1 tablet by mouth 3 times daily as needed for Pain 50 tablet 0    pramipexole (MIRAPEX) 0.25 MG tablet TAKE 1 TABLET BY MOUTH EVERY NIGHT 90 tablet 1    levalbuterol (XOPENEX) 0.63 MG/3ML nebulization USE 1 VIAL PER NEBULIZER EVERY 6 HOURS.  MAX OF 4 TIMES PER 24 HOURS 1086 mL 3    montelukast (SINGULAIR) 10 MG tablet Take 1 tablet by mouth daily 30 tablet 1    tiotropium (SPIRIVA RESPIMAT) 2.5 MCG/ACT AERS inhaler Inhale 2 puffs into the lungs daily 1 each 1    irbesartan (AVAPRO) 300 MG tablet TAKE 1 TABLET BY MOUTH EVERY NIGHT 90 tablet 0    dilTIAZem (CARDIZEM CD) 240 MG extended release capsule TAKE 1 CAPSULE EVERY DAY 90 capsule 1    loratadine (CLARITIN) 10 MG tablet Take 1 tablet by mouth daily 90 tablet 1    budesonide-formoterol (SYMBICORT) 160-4.5 MCG/ACT AERO INHALE 2 PUFFS INTO THE LUNGS TWICE DAILY 30.6 g 5    dicyclomine (BENTYL) 10 MG capsule TAKE 1 CAPSULE BY MOUTH FOUR TIMES DAILY AS NEEDED FOR ABDOMINAL PAIN 360 capsule 0    propafenone (RYTHMOL) 150 MG tablet TAKE 1 TABLET BY MOUTH EVERY 8 HOURS 270 tablet 0    Blood Glucose Monitoring Suppl (TRUE METRIX METER) w/Device KIT To use to check sugars 4 times daily 1 kit 0    apixaban (ELIQUIS) 5 MG TABS tablet Take 1 tablet by mouth 2 times daily 180 tablet 1    Blood Glucose Calibration (TRUE METRIX LEVEL 2) Normal SOLN As needed 1 each 0    blood glucose test strips (TRUE METRIX BLOOD GLUCOSE TEST) strip 1 each by In Vitro route daily As needed.  100 each 3    TRUEplus Lancets 33G MISC 1 daily 100 each 3    rOPINIRole (REQUIP) 3 MG tablet TAKE 1 TABLET BY MOUTH THREE TIMES DAILY 270 tablet 1    meclizine (ANTIVERT) 12.5 MG tablet TAKE 1 TABLET THREE TIMES DAILY AS NEEDED 270 tablet 1    fluticasone (FLONASE) 50 MCG/ACT nasal spray SHAKE LIQUID AND USE 2 SPRAYS IN EACH NOSTRIL DAILY 48 g 3    atorvastatin (LIPITOR) 80 MG tablet Take 1 tablet by mouth nightly 90 tablet 3    hydrALAZINE (APRESOLINE) 50 MG tablet TAKE 1 TABLET BY MOUTH THREE TIMES DAILY 270 tablet 1    Lancets MISC 1 each by Does not apply route 2 times daily 300 each 1    ondansetron (ZOFRAN) 4 MG tablet Take 1 tablet by mouth daily as needed for Nausea or Vomiting 30 tablet 0    OXYGEN Inhale 2 L into the lungs      blood glucose monitor strips Test one time a day 100 strip 5    Respiratory Therapy Supplies (NEBULIZER/TUBING/MOUTHPIECE) KIT 1 kit by Does not apply route 4 times daily as needed (shortness of breath) 1 kit 0    albuterol sulfate HFA (PROAIR HFA) 108 (90 Base) MCG/ACT inhaler Inhale 2 puffs into the lungs every 6 hours as needed for Wheezing 1 Inhaler 6       Allergies   Allergen Reactions    Adhesive Tape Anaphylaxis    Cefaclor Nausea And Vomiting     Other reaction(s): Chest pain    Cephalexin Other (See Comments)     Struggling to breath, almost passing out/ very low oxygen and pulse    Nsaids      Pt states she has hives and heart races   Other reaction(s): Tachycardia    Clinoril [Sulindac] Other (See Comments)     Stomach pain    Codeine      FEEL NUMB    Diclofenac     Diclofenac Sodium Hives    Diclofenac Sodium Hives    Hctz [Hydrochlorothiazide]      Sob    Ibuprofen Other (See Comments)     Other reaction(s): Tachycardia    Macrobid [Nitrofurantoin Macrocrystal]      nausea    Motrin [Ibuprofen Micronized] Other (See Comments)     Tachycardia      Pcn [Penicillins] Hives    Peach [Prunus Persica] Itching and Swelling     Cannot eat raw peaches    Prednisone      Causes elevated blood pressure     Sulfa Antibiotics      Nausea, diarrhea       Social History     Tobacco Use    Smoking status: Former Smoker     Packs/day: 1.00     Years: 45.00     Pack years: 45.00     Types: Cigarettes     Start date: 9/15/1950     Quit date: 1995     Years since quittin.4    Smokeless tobacco: Never Used   Substance Use Topics    Alcohol use: No     Alcohol/week: 0.0 standard drinks       BP (!) 140/70 (Site: Right Upper Arm, Position: Sitting, Cuff Size: Medium Adult)   Pulse 58   Temp 97.3 °F (36.3 °C) (Infrared)   Resp 16   Wt 158 lb (71.7 kg)   SpO2 98% Comment: with 2L oxygen  BMI 28.90 kg/m²     Objective:   Physical Exam  Vitals and nursing note reviewed. Constitutional:       General: She is not in acute distress. Appearance: Normal appearance. She is well-developed and well-groomed. Neck:      Vascular: No carotid bruit. Cardiovascular:      Rate and Rhythm: Normal rate and regular rhythm. Pulses:           Dorsalis pedis pulses are 2+ on the right side and 2+ on the left side. Posterior tibial pulses are 2+ on the right side and 2+ on the left side. Heart sounds: Normal heart sounds. No murmur heard. No friction rub. No gallop. Pulmonary:      Effort: Pulmonary effort is normal.      Breath sounds: Normal breath sounds. Comments: Patient has a portable oxygen on  Musculoskeletal:         General: No tenderness. Normal range of motion. Right lower leg: No edema. Left lower leg: No edema. Right foot: Normal.      Left foot: Normal.   Neurological:      Mental Status: She is alert and oriented to person, place, and time. Sensory: Sensation is intact. Motor: Motor function is intact. Deep Tendon Reflexes: Reflexes are normal and symmetric.       Comments: 12 point monofilament test normal    Psychiatric:         Attention and Perception: Attention and perception normal. Mood and Affect: Mood and affect normal.         Speech: Speech normal.         Behavior: Behavior normal. Behavior is cooperative. Thought Content: Thought content normal.         Cognition and Memory: Cognition and memory normal.         Judgment: Judgment normal.         Assessment:      Lakisha Astorga was seen today for follow-up. Diagnoses and all orders for this visit:    Anxiety  -     LORazepam (ATIVAN) 1 MG tablet; TAKE 1/2 TABLET BY MOUTH TWICE DAILY AND 1 EVERY NIGHT AT BEDTIME AS NEEDED FOR ANXIETY    Primary insomnia  -     LORazepam (ATIVAN) 1 MG tablet; TAKE 1/2 TABLET BY MOUTH TWICE DAILY AND 1 EVERY NIGHT AT BEDTIME AS NEEDED FOR ANXIETY    Stage 3a chronic kidney disease ([facility])    PAF (paroxysmal atrial fibrillation) ([facility])    Coronary artery disease involving native coronary artery of native heart with angina pectoris ([facility])    Controlled type 2 diabetes mellitus with stage 3 chronic kidney disease, without long-term current use of insulin ([facility])  -     Hemoglobin A1C; Future  -     Basic Metabolic Panel; Future    Acute cystitis with hematuria    Other orders  -     ciprofloxacin (CIPRO) 500 mg tablet; Take 1 tablet by mouth 2 times daily for 5 days    OARRS report checked          Plan:      Reviewed labs and test results with patient. Change antibiotic therapy for urinary tract infection    Patient is currently not on any diabetic medication she prefer not to start any additional medication. Hemoglobin A1c is controlled at 7.1. Encourage patient to continue wearing her oxygen and continue with respiratory care. She may maintain pain medication and anxiety medication at the current dose. Patient received counseling on the following healthy behaviors: nutrition and exercise     Patient given educational materials     Health maintenance updated    Discussed use, benefit, and side effects of prescribed medications. Barriers to medication compliance addressed.   All patient questions answered. Pt voiced understanding. Patient needs RTC in 3 months. Medical decision making of moderate complexity. Please note that this chart was generated using Dragon dictation software. Although every effort was made to ensure the accuracy of this automated transcription, some errors in transcription may have occurred.

## 2022-06-21 ENCOUNTER — TELEPHONE (OUTPATIENT)
Dept: FAMILY MEDICINE CLINIC | Age: 87
End: 2022-06-21

## 2022-06-21 NOTE — TELEPHONE ENCOUNTER
Received plan of care/orders from [facility]. Provider signed orders and has been faxed to home care agency.

## 2022-06-27 RX ORDER — ROPINIROLE 3 MG/1
TABLET, FILM COATED ORAL
Qty: 270 TABLET | Refills: 1 | Status: SHIPPED | OUTPATIENT
Start: 2022-06-27

## 2022-06-27 NOTE — TELEPHONE ENCOUNTER
Medication:   Requested Prescriptions     Pending Prescriptions Disp Refills    rOPINIRole (REQUIP) 3 MG tablet [Pharmacy Med Name: ROPINIROLE HYDROCHLORIDE 3 MG Tablet] 270 tablet 1     Sig: TAKE 1 TABLET THREE TIMES DAILY      Last Filled:     Patient Phone Number: [phone] (home)     Last appt: 6/13/2022   Next appt: 9/16/2022    Last OARRS:   RX Monitoring 3/20/2019   Attestation The Prescription Monitoring Report for this patient was reviewed today.    Periodic Controlled Substance Monitoring -     PDMP Monitoring:    Last PDMP Jose R Mooney as Reviewed [facility]):  Review User Review Instant Review Result   Anjali Wing 6/13/2022 12:59 PM Reviewed PDMP [1]     Preferred Pharmacy:   [facility] [address], [address] [address]  Phone: [phone] Fax: [phone]    [facility] (Now [address] [facility] [facility] Mail Delivery) - [address] [address] [phone] - F [phone]  [address] [place] [address]  Phone: [phone] Fax: [phone]

## 2022-06-28 DIAGNOSIS — M15.9 PRIMARY OSTEOARTHRITIS INVOLVING MULTIPLE JOINTS: ICD-10-CM

## 2022-06-28 RX ORDER — HYDROCODONE BITARTRATE AND ACETAMINOPHEN 5; 325 MG/1; MG/1
1 TABLET ORAL 4 TIMES DAILY PRN
Qty: 120 TABLET | Refills: 0 | Status: SHIPPED | OUTPATIENT
Start: 2022-06-28 | End: 2022-07-22 | Stop reason: SDUPTHER

## 2022-06-28 NOTE — TELEPHONE ENCOUNTER
Pt requests hydrocodone-acetaminophen 5-325mg.  [facility] [address] [address] is correct pharmacy     Last OV 6/13/2022   Next OV 9/16/2022  Last filled 4/29/2022

## 2022-06-28 NOTE — TELEPHONE ENCOUNTER
Medication:   Requested Prescriptions     Pending Prescriptions Disp Refills    HYDROcodone-acetaminophen (NORCO) 5-325 MG per tablet 120 tablet 0     Sig: Take 1 tablet by mouth 4 times daily as needed for Pain for up to 30 days. Last Filled:  4/29/22  Patient Phone Number: [phone] (home)     Last appt: 6/13/2022   Next appt: 9/16/2022    Last OARRS:   RX Monitoring 3/20/2019   Attestation The Prescription Monitoring Report for this patient was reviewed today.    Periodic Controlled Substance Monitoring -     PDMP Monitoring:    Last PDMP Jameson Rogel as Reviewed [facility]):  Review User Review Instant Review Result   Huseyin Peterson 6/13/2022 12:59 PM Reviewed PDMP [1]     Preferred Pharmacy:     [facility] [id number]  Phone: [phone] Fax: [phone]

## 2022-06-29 ENCOUNTER — TELEPHONE (OUTPATIENT)
Dept: FAMILY MEDICINE CLINIC | Age: 87
End: 2022-06-29

## 2022-06-29 RX ORDER — PREDNISONE 20 MG/1
40 TABLET ORAL DAILY
Qty: 8 TABLET | Refills: 0 | Status: SHIPPED | OUTPATIENT
Start: 2022-06-29 | End: 2022-07-03

## 2022-06-29 NOTE — TELEPHONE ENCOUNTER
----- Message from Chirag Guzman sent at 6/29/2022 12:32 PM EDT -----  Subject: Refill Request    QUESTIONS  Name of Medication? predniSONE (DELTASONE) 20 MG tablet  Patient-reported dosage and instructions? 20 mg as needed   How many days do you have left? 0  Preferred Pharmacy? [id number]  Pharmacy phone number (if available)? [phone]  Additional Information for Provider? Pt stated she is having knee swelling   due to arthritis. Please contact pt asap with any questions.   ---------------------------------------------------------------------------  --------------  CALL BACK INFO  What is the best way for the office to contact you? OK to leave message on   voicemail  Preferred Call Back Phone Number? [phone]  ---------------------------------------------------------------------------  --------------  SCRIPT ANSWERS  Relationship to Patient?  Self

## 2022-07-01 RX ORDER — IRBESARTAN 300 MG/1
TABLET ORAL
Qty: 90 TABLET | Refills: 0 | Status: SHIPPED | OUTPATIENT
Start: 2022-07-01

## 2022-07-01 NOTE — TELEPHONE ENCOUNTER
Medication:   Requested Prescriptions     Pending Prescriptions Disp Refills    irbesartan (AVAPRO) 300 MG tablet [Pharmacy Med Name: IRBESARTAN 300 MG Tablet] 90 tablet 0     Sig: TAKE 1 TABLET BY MOUTH EVERY NIGHT      Last Filled:     Patient Phone Number: [phone] (home)     Last appt: 6/13/2022   Next appt: 9/16/2022    Last OARRS:   RX Monitoring 3/20/2019   Attestation The Prescription Monitoring Report for this patient was reviewed today.    Periodic Controlled Substance Monitoring -     PDMP Monitoring:    Last PDMP Chelsy Leon as Reviewed [facility]):  Review User Review Instant Review Result   Sadi Buchanan 6/13/2022 12:59 PM Reviewed PDMP [1]     Preferred Pharmacy:   [facility]ORE [address], [address] [address]  Phone: [phone] Fax: [phone]    [facility] (Now [address] [employer] [facility] [facility] Mail Delivery) - u[address]erre[address] Cee [phone] - F [phone]  [address] [facility] [address]  Phone: [phone] Fax: [phone]

## 2022-07-06 ENCOUNTER — TELEPHONE (OUTPATIENT)
Dept: FAMILY MEDICINE CLINIC | Age: 87
End: 2022-07-06

## 2022-07-07 RX ORDER — DICYCLOMINE HYDROCHLORIDE 10 MG/1
CAPSULE ORAL
Qty: 360 CAPSULE | Refills: 0 | Status: SHIPPED | OUTPATIENT
Start: 2022-07-07 | End: 2022-10-05

## 2022-07-07 NOTE — TELEPHONE ENCOUNTER
Medication:   Requested Prescriptions     Pending Prescriptions Disp Refills    dicyclomine (BENTYL) 10 MG capsule [Pharmacy Med Name: DICYCLOMINE 10MG CAPSULES] 360 capsule 0     Sig: TAKE 1 CAPSULE BY MOUTH FOUR TIMES DAILY AS NEEDED FOR ABDOMINAL PAIN      Last Filled:      Patient Phone Number: [phone] (home)     Last appt: 6/13/2022   Next appt: 9/16/2022    Last OARRS:   RX Monitoring 3/20/2019   Attestation The Prescription Monitoring Report for this patient was reviewed today.    Periodic Controlled Substance Monitoring -     PDMP Monitoring:    Last PDMP Abdulkadir Fried as Reviewed [facility]):  Review User Review Instant Review Result   Alisson Feldercaitlin 6/13/2022 12:59 PM Reviewed PDMP [1]     Preferred Pharmacy:   [facility] [address] [address]  Phone: [phone] Fax: [phone]

## 2022-07-08 RX ORDER — PROPAFENONE HYDROCHLORIDE 150 MG/1
150 TABLET, FILM COATED ORAL EVERY 8 HOURS SCHEDULED
Qty: 270 TABLET | Refills: 0 | Status: ON HOLD | OUTPATIENT
Start: 2022-07-08 | End: 2022-10-04 | Stop reason: HOSPADM

## 2022-07-08 NOTE — TELEPHONE ENCOUNTER
Medication:   Requested Prescriptions     Pending Prescriptions Disp Refills    propafenone (RYTHMOL) 150 MG tablet [Pharmacy Med Name: PROPAFENONE 150MG TABLETS] 270 tablet 0     Sig: TAKE 1 TABLET BY MOUTH EVERY 8 HOURS      Last Filled:      Patient Phone Number: [phone] (home)     Last appt: 6/13/2022   Next appt: 9/16/2022    Last OARRS:   RX Monitoring 3/20/2019   Attestation The Prescription Monitoring Report for this patient was reviewed today.    Periodic Controlled Substance Monitoring -     PDMP Monitoring:    Last PDMP Aliya Ashton as Reviewed [facility]):  Review User Review Instant Review Result   Vernadine Waco 6/13/2022 12:59 PM Reviewed PDMP [1]     Preferred Pharmacy:   [facility] [address], [address] [address]  Phone: [phone] Fax: [phone]    [facility] (Now [address] Mail Delivery) - [place] [phone] - F [phone]  [address] [facility]  [employer] [place] [address]  Phone: [phone] Fax: [phone]

## 2022-07-10 DIAGNOSIS — E11.9 CONTROLLED TYPE 2 DIABETES MELLITUS WITHOUT COMPLICATION, WITHOUT LONG-TERM CURRENT USE OF INSULIN (HCC): ICD-10-CM

## 2022-07-11 RX ORDER — CALCIUM CITRATE/VITAMIN D3 200MG-6.25
TABLET ORAL
Qty: 100 STRIP | Refills: 5 | Status: SHIPPED | OUTPATIENT
Start: 2022-07-11

## 2022-07-11 NOTE — TELEPHONE ENCOUNTER
Medication:   Requested Prescriptions     Pending Prescriptions Disp Refills    blood glucose test strips (TRUE METRIX BLOOD GLUCOSE TEST) strip [Pharmacy Med Name: TRUE METRIX BLOOD GLUCOSE TEST STRP] 100 strip 5     Sig: USE TO TEST BLOOD SUGAR DAILY      Last Filled:      Patient Phone Number: [phone] (home)     Last appt: 6/13/2022   Next appt: 9/16/2022    Last OARRS:   RX Monitoring 3/20/2019   Attestation The Prescription Monitoring Report for this patient was reviewed today.    Periodic Controlled Substance Monitoring -     PDMP Monitoring:    Last PDMP Paula Vail as Reviewed [facility]):  Review User Review Instant Review Result   Shin Barberamador 6/13/2022 12:59 PM Reviewed PDMP [1]     Preferred Pharmacy:   [facility] [address] [facility] [address]  [address] [place] [address] [place] [address]  Phone: [phone] Fax: [phone]

## 2022-07-12 RX ORDER — MECLIZINE HCL 12.5 MG/1
TABLET ORAL
Qty: 270 TABLET | Refills: 1 | Status: SHIPPED | OUTPATIENT
Start: 2022-07-12

## 2022-07-12 NOTE — TELEPHONE ENCOUNTER
Medication:   Requested Prescriptions     Pending Prescriptions Disp Refills    meclizine (ANTIVERT) 12.5 MG tablet [Pharmacy Med Name: Glogaby Shark 12.5 MG Tablet] 270 tablet 1     Sig: TAKE 1 TABLET THREE TIMES DAILY AS NEEDED      Last Filled:      Patient Phone Number: [phone] (home)     Last appt: 6/13/2022   Next appt: 9/16/2022    Last OARRS:   RX Monitoring 3/20/2019   Attestation The Prescription Monitoring Report for this patient was reviewed today.    Periodic Controlled Substance Monitoring -     PDMP Monitoring:    Last PDMP Mihir Meek as Reviewed [facility]):  Review User Review Instant Review Result   Kori Benjamin 6/13/2022 12:59 PM Reviewed PDMP [1]     Preferred Pharmacy:   [facility] [address] [id number]  Phone: [phone] Fax: [phone]    [facility] (Now [address] [facility] Mail Delivery) - [place] [phone] - F [phone]  [address] [place] [address]  Phone: [phone] Fax: [phone]

## 2022-07-13 ENCOUNTER — TELEPHONE (OUTPATIENT)
Dept: FAMILY MEDICINE CLINIC | Age: 87
End: 2022-07-13

## 2022-07-13 RX ORDER — DOXYCYCLINE HYCLATE 100 MG
100 TABLET ORAL 2 TIMES DAILY
Qty: 14 TABLET | Refills: 0 | Status: SHIPPED | OUTPATIENT
Start: 2022-07-13 | End: 2022-07-20

## 2022-07-13 NOTE — TELEPHONE ENCOUNTER
Patient calling to have something called in for a sinus infection. States her ears feel clogged. Advised she may need an appt. Stated understanding but does not have transportation for an appt. Call back number [phone].  Thank you

## 2022-07-14 ENCOUNTER — OFFICE VISIT (OUTPATIENT)
Dept: CARDIOLOGY CLINIC | Age: 87
End: 2022-07-14
Payer: MEDICARE

## 2022-07-14 ENCOUNTER — TELEPHONE (OUTPATIENT)
Dept: CARDIOLOGY CLINIC | Age: 87
End: 2022-07-14

## 2022-07-14 VITALS
DIASTOLIC BLOOD PRESSURE: 66 MMHG | WEIGHT: 161 LBS | HEIGHT: 62 IN | OXYGEN SATURATION: 88 % | SYSTOLIC BLOOD PRESSURE: 133 MMHG | BODY MASS INDEX: 29.63 KG/M2 | HEART RATE: 53 BPM

## 2022-07-14 DIAGNOSIS — I10 HTN (HYPERTENSION), BENIGN: ICD-10-CM

## 2022-07-14 DIAGNOSIS — J84.9 INTERSTITIAL LUNG DISEASE (HCC): ICD-10-CM

## 2022-07-14 DIAGNOSIS — R53.82 CHRONIC FATIGUE: ICD-10-CM

## 2022-07-14 DIAGNOSIS — I48.0 PAF (PAROXYSMAL ATRIAL FIBRILLATION) (HCC): Primary | ICD-10-CM

## 2022-07-14 DIAGNOSIS — I25.119 CORONARY ARTERY DISEASE INVOLVING NATIVE CORONARY ARTERY OF NATIVE HEART WITH ANGINA PECTORIS (HCC): ICD-10-CM

## 2022-07-14 DIAGNOSIS — J96.11 CHRONIC RESPIRATORY FAILURE WITH HYPOXIA (HCC): ICD-10-CM

## 2022-07-14 DIAGNOSIS — J44.9 COPD, MODERATE (HCC): ICD-10-CM

## 2022-07-14 DIAGNOSIS — G47.33 OSA (OBSTRUCTIVE SLEEP APNEA): ICD-10-CM

## 2022-07-14 PROBLEM — F41.9 ANXIETY: Status: RESOLVED | Noted: 2020-08-05 | Resolved: 2022-07-14

## 2022-07-14 PROCEDURE — 3023F SPIROM DOC REV: CPT | Performed by: NURSE PRACTITIONER

## 2022-07-14 PROCEDURE — 99214 OFFICE O/P EST MOD 30 MIN: CPT | Performed by: NURSE PRACTITIONER

## 2022-07-14 PROCEDURE — 1036F TOBACCO NON-USER: CPT | Performed by: NURSE PRACTITIONER

## 2022-07-14 PROCEDURE — G8417 CALC BMI ABV UP PARAM F/U: HCPCS | Performed by: NURSE PRACTITIONER

## 2022-07-14 PROCEDURE — 93000 ELECTROCARDIOGRAM COMPLETE: CPT | Performed by: NURSE PRACTITIONER

## 2022-07-14 PROCEDURE — 1123F ACP DISCUSS/DSCN MKR DOCD: CPT | Performed by: NURSE PRACTITIONER

## 2022-07-14 PROCEDURE — G8427 DOCREV CUR MEDS BY ELIG CLIN: HCPCS | Performed by: NURSE PRACTITIONER

## 2022-07-14 PROCEDURE — 1090F PRES/ABSN URINE INCON ASSESS: CPT | Performed by: NURSE PRACTITIONER

## 2022-07-14 RX ORDER — DILTIAZEM HYDROCHLORIDE 120 MG/1
120 CAPSULE, COATED, EXTENDED RELEASE ORAL DAILY
Qty: 90 CAPSULE | Refills: 1 | Status: ON HOLD | OUTPATIENT
Start: 2022-07-14 | End: 2022-10-04 | Stop reason: HOSPADM

## 2022-07-14 NOTE — PROGRESS NOTES
[address]   Electrophysiology  KATHY Reese-CNP  Attending EP: Dr. Della Baig    Date: 7/14/2022  I had the privilege of visiting Alfonso Kat in the office. Chief Complaint:   Chief Complaint   Patient presents with    Follow-up     Pt reports no cardiac    Atrial Fibrillation    Medication Refill     Atorvastatin, Diltiazem     History of Present Illness: History obtained from patient and medical record. Alfonso Kat is 80 y.o. female with a past medical history of HTN, HLD, DM, COPD, hypothyroid, and atrial fibrillation. S/p TRACEY/DCCV (8/31/18, Dr. Cherrie Oliva) and placed on propafenone and Xarelto without recurrence until now. In November of 2019, she was admitted for afib with RVR accompanied by sudden onset palpitations, fatigue, and SOB. She was also being treated for PNA. She developed bradycardia with IV cardizem. She converted to SR spontaneously. S/p DCCV (3/12/21)     -Interval history: Today, Alfonso Kat is being seen for routine follow up. Her daughter is present for the visit. Pt is present in a wheel chair on 2L of oxygen (baseline oxygen needs). She is currently in sinus rhythm on EKG. Pt denies any known recurrence of atrial fibrillation. She states she is generally very symptomatic with AF in the past. She states she is only taking eliquis every other day due to issues with bruising and bleeding when taking it BID. She states she feels very tired all the time. It has worsened since being started on clonidine several months ago. She occasionally notices her HR is in the 40s on her pulse oximeter, but it generally is in the 50s. We discussed Watchman as a possible option if she can undergo anesthesia. She has an appointment with Dr. Sagrario Castorena in September. She is curious if her thyroid meds should be adjusted. Denies having chest pain, palpitations, orthopnea/PND, cough, or dizziness at the time of this visit.     With regard to medication therapy the Bia Enrique MD   rOPINIRole (REQUIP) 3 MG tablet TAKE 1 TABLET THREE TIMES DAILY Yes Bia Enrique MD   LORazepam (ATIVAN) 1 MG tablet TAKE 1/2 TABLET BY MOUTH TWICE DAILY AND 1 EVERY NIGHT AT BEDTIME AS NEEDED FOR ANXIETY Yes Bia Enrique MD   levothyroxine (SYNTHROID) 75 MCG tablet TAKE 1 TABLET BY MOUTH EVERY DAY Yes Bia Enrique MD   cloNIDine (CATAPRES) 0.1 MG tablet TAKE 1 TABLET TWICE DAILY Yes Claudia Antunez MD   conjugated estrogens (PREMARIN) 0.625 MG/GM vaginal cream Place 1 g vaginally three times a week Yes Bia Enrique MD   levalbuterol (XOPENEX) 0.63 MG/3ML nebulization USE 1 VIAL PER NEBULIZER EVERY 6 HOURS. MAX OF 4 TIMES PER 24 HOURS Yes Edith Banegas MD   tiotropium (SPIRIVA RESPIMAT) 2.5 MCG/ACT AERS inhaler Inhale 2 puffs into the lungs daily Yes Shawna Alvarez PA-C   dilTIAZem (CARDIZEM CD) 240 MG extended release capsule TAKE 1 CAPSULE EVERY DAY Yes KATHY Serrano - CNP   loratadine (CLARITIN) 10 MG tablet Take 1 tablet by mouth daily Yes Bia Enrique MD   budesonide-formoterol (SYMBICORT) 160-4.5 MCG/ACT AERO INHALE 2 PUFFS INTO THE LUNGS TWICE DAILY Yes Edith Banegas MD   Blood Glucose Monitoring Suppl (TRUE METRIX METER) w/Device KIT To use to check sugars 4 times daily Yes Claudia Antunez MD   apixaban (ELIQUIS) 5 MG TABS tablet Take 1 tablet by mouth 2 times daily  Patient taking differently: Take 5 mg by mouth 2 times daily Pt take 1 every other day due to brusing & bleeding. Yes Bia Enrique MD   Blood Glucose Calibration (TRUE METRIX LEVEL 2) Normal SOLN As needed Yes Bia Enrique MD   blood glucose test strips (TRUE METRIX BLOOD GLUCOSE TEST) strip 1 each by In Vitro route daily As needed.  Yes Bia Enrique MD   TRUEplus Lancets 33G MISC 1 daily Yes Bia Enrique MD   fluticasone (FLONASE) 50 MCG/ACT nasal spray SHAKE LIQUID AND USE 2 SPRAYS IN EACH NOSTRIL DAILY Yes Bia Enrique MD   atorvastatin (LIPITOR) 80 MG tablet Take 1 tablet by mouth nightly Yes KATHY Reese CNP   Lancets MISC 1 each by Does not apply route 2 times daily Yes Hansel Beard MD   ondansetron (ZOFRAN) 4 MG tablet Take 1 tablet by mouth daily as needed for Nausea or Vomiting Yes Jareth Owens MD   OXYGEN Inhale 2 L into the lungs Yes Historical Provider, MD   Respiratory Therapy Supplies (NEBULIZER/TUBING/MOUTHPIECE) KIT 1 kit by Does not apply route 4 times daily as needed (shortness of breath) Yes Hansel Beard MD   albuterol sulfate HFA (PROAIR HFA) 108 (90 Base) MCG/ACT inhaler Inhale 2 puffs into the lungs every 6 hours as needed for Wheezing Yes Farzana Keene MD   phenazopyridine (PYRIDIUM) 200 MG tablet Take 1 tablet by mouth 3 times daily as needed for Pain  Patient not taking: Reported on 7/14/2022  Hansel Beard MD   pramipexole (MIRAPEX) 0.25 MG tablet TAKE 1 TABLET BY MOUTH EVERY NIGHT  Hansel Beard MD   montelukast (SINGULAIR) 10 MG tablet Take 1 tablet by mouth daily  Patient not taking: Reported on 7/14/2022  Chente Borden PA-C   hydrALAZINE (APRESOLINE) 50 MG tablet TAKE 1 TABLET BY MOUTH THREE TIMES DAILY  Patient not taking: Reported on 7/14/2022  Hansel Beard MD      Past Medical History:  Past Medical History:   Diagnosis Date    Arthritis     [facility] ([facility]     Diabetes mellitus type II, controlled ([facility]     GERD (gastroesophageal reflux disease)     HTN (hypertension)     Hyperlipidemia     Hypothyroid     Insomnia     Lumbago     SVT (supraventricular tachycardia) (HCC)      Past Surgical History:    has a past surgical history that includes Appendectomy; Colonoscopy; and Cholecystectomy, laparoscopic (2/24/2013). Social History:  Reviewed. reports that she quit smoking about 26 years ago. Her smoking use included cigarettes. She started smoking about 71 years ago. She has a 45.00 pack-year smoking history.  She has never used smokeless tobacco. She reports that she does not drink alcohol and does not use drugs. Family History:  Reviewed. family history includes Asthma in her paternal uncle; Heart Attack in her father; Heart Disease in her father; High Blood Pressure in her mother; Other in her brother. Review of System:  · Constitutional: Positive for fatigue. Negative for fever, night sweats, chills, weight changes, or weakness  · Skin: Negative for rash, dry skin, pruritus, bruising, bleeding, blood clots, or changes in skin pigment  · HEENT: Negative for vision changes, ringing in the ears, sore throat, dysphagia, or swollen lymph nodes  · Respiratory: Positive for SOB/CHIN (chronic at baseline)  · Cardiovascular: Reviewed in HPI  · Gastrointestinal: Negative for abdominal pain, N/V/D, constipation, or black/tarry stools  · Genito-Urinary: Negative for dysuria, incontinence, urgency, or hematuria  · Musculoskeletal: Negative for joint swelling, muscle pain, or injuries  · Neurological/Psych: Negative for confusion, seizures, dizziness, headaches, balance issues or TIA-like symptoms. No anxiety, depression, or insomnia    Physical Examination:  Vitals:    07/14/22 1439   BP: 133/66   Pulse: 53   SpO2: (!) 88%      Wt Readings from Last 3 Encounters:   07/14/22 161 lb (73 kg)   06/13/22 158 lb (71.7 kg)   04/21/22 160 lb 8 oz (72.8 kg)     Constitutional: Cooperative and in no apparent distress, and appears stated age  Skin: Warm and pink; no pallor, cyanosis, bruising, or clubbing  HEENT: Symmetric and normocephalic. PERRL, EOM intact. Conjunctiva pink with clear sclera. Mucus membranes pink and moist. Teeth intact. Thyroid smooth without nodules or goiter  Respiratory: Respirations symmetric and unlabored. Lungs diminished to auscultation bilaterally, BLL fine crackles (likely pulmonary fibrosis). No wheezing, rhonchi. On 2L of oxygen (chronic)  Cardiovascular: Bradycardic rate and regular rhythm.  S1/S2 present without murmurs, rubs, or gallops. Peripheral pulses 2+, capillary refill < 3 seconds. No elevation of JVP. No peripheral edema  Gastrointestinal: Abdomen soft and round. Bowel sounds normoactive in all quadrants without tenderness or masses. Musculoskeletal: Bilateral upper and lower extremity strength 5/5 with full ROM. In wheel chair  Neurological/Psych: Awake and orientated to person, place and time. Calm affect, appropriate mood. Pertinent labs, diagnostic, device, and imaging results reviewed as a part of this visit    LABS    CBC:   Lab Results   Component Value Date    WBC 10.7 2022    HGB 10.7 (L) 2022    HCT 32.7 (L) 2022    MCV 89.4 2022     2022     BMP:   Lab Results   Component Value Date    CREATININE 1.0 2022    BUN 25 (H) 2022     2022    K 4.4 2022     2022    CO2 21 2022     Estimated Creatinine Clearance: 38 mL/min (based on SCr of 1 mg/dL). Thyroid:   Lab Results   Component Value Date    TSH 2.50 2020    K3MGOWV 0.56 (L) 2022     Lipid Panel:   Lab Results   Component Value Date/Time    CHOL 127 2021 09:49 AM    HDL 48 2021 09:49 AM    HDL 32 2011 06:45 AM    TRIG 96 2021 09:49 AM     LFTs:  Lab Results   Component Value Date    ALT 20 2022    AST 19 2022     (H) 2021    ALKPHOS 116 2022    BILITOT 0.3 2022     Coags:   Lab Results   Component Value Date    PROTIME 16.8 (H) 2021    INR 1.46 (H) 2021    APTT 41.2 (H) 2020       EC2022  - Sinus bradycardia. Rate 52, QRS 96, QTc 428    Echo:   Left ventricle - normal size, thickness and function with EF of 60%  Aortic valve - sclerotic, mild regurgitation    GXT:   Normal LV function and no evidence of ischemia    LHC: 2004  Nonobstructive CAD    Assessment:    1.  Paroxysmal Atrial Fibrillation  - S/p TRACEY/DCCV (); DCCV (3/12/21)  - Currently in SB  - Continue cardizem; reduce to 120 mg daily given bradycardia  - On propafenone 150 mg TID    ~ QRS 96  - Poor candidate for amiodarone due to COPD/ILD    - [id number] score:high ; [id number] Vasc score and anticoagulation discussed. High risk for stroke and thromboembolism. Anticoagulation is recommended. Risk of bleeding was discussed.  ~ On eliquis 5 mg BID. Pt taking every other day. Discussed risks of not taking it as prescribed due to risk of silent AF and thromboembolism/stroke, pt/daughter VU    - Afib risk factors including age, HTN, obesity, inactivity and ANTHONY were discussed with patient. Risk factor modification recommended   ~ TSH 3.96 (9/19)      - Treatment options including cardioversion, rate control strategy, antiarrhythmics, anticoagulation and possible ablation were discussed with patient. Risks, benefits and alternative of each treatment options were explained. All questions answered    ~ Poor candidate for ablation due to her age and co-morbidities     - I discussed left atrial appendage closure with watchman device. Alternatives including surgical ligation of EFFIE also discussed. I explained to the patient that most ischemic stroke in patient with atrial fibrillation are due to a thrombus/clot in the left atrial appendage. However, some patients do have a stroke with different reasons including hemorrhage. Watchman device only protects against the stroke associated with left atrial appendage related clots/thrombus. The procedure has been discussed in detail with patient and family members along with the risk and benefits.     ------> Patient will discuss with her pulmonologist if she would be able to undergo anesthesia for the procedure. If she can, will arrange follow up with Dr. Dasia Norman to discuss it further    2. Bradycardia   - Will reduce cardizem dose to 120 mg daily   - Instructed to monitor pulse/HR at home. Call if consistently <55, will need to consider stopping clonidine or reducing propafenone dose. Will also consider doing 48 hour holter. If she has recurrent AF with RVR with reduction of her meds, then will need to consider PPM    3. Fatigue   - Likely due to bradycardia, although her recent TSH was very low in April   - Reduce CCB dose   - Call if HR remains low   - Follow up with PCP for thyroid function testing    4. HTN  - Controlled: Goal <140/80 (Given age and fall risk)  - Continue current medications  - Discussed importance of low sodium diet, weight control and exercise    5. Hyperlipidemia  - Controlled. Goal: LDL <100   ~ LDL 60 (12/21)  - On atorvastatin 80 mg QD  - Discussed diet, weight loss, and exercise needs  - Followed per PCP    6. PAD   - On Xarelto, Statin    7. COPD, Chronic respiratory failure  - Stable, wears 2L oxygen baseline  - Continue medical therapy and inhalers  - Followed by pulmonary    Plan:  1. Reduce cardizem to 120 mg daily  2. Start taking eliquis twice per day  3. Call if heart/pulse rate remain <55 consistently  4. Follow up with PCP for labs  5. Discuss if you can undergo anesthesia with Dr. Sachin Vo for Watchman    F/U: Follow-up with Dr. Laura Jang in 3 months to discuss possible watchman  -Call [facility] at [phone] with any questions    Diet & Exercise:   The patient is counseled to follow a low salt diet to assure blood pressure remains controlled for cardiovascular risk factor modification   The patient is counseled to avoid excess caffeine, and energy drinks as this may exacerbated ectopy and arrhythmia   The patient is counseled to lose weight to control cardiovascular risk factors   Exercise program discussed: To improve overall cardiovascular health, the patient is instructed to increase cardiovascular related activities with a goal of 150 min/week of moderate level activity or 10,000 steps per day.  Encouraged to perform as much activity as tolerated    I have addressed the patient's cardiac risk factors and adjusted pharmacologic treatment as needed. In addition, I have reinforced the need for patient directed risk factor modification. I independently reviewed the ECG    All questions and concerns were addressed with the patient. Alternatives to treatment were discussed. Thank you for allowing to us to participate in the care of Lord Nieto. Time  34 minutes spent preparing to see patient including reviewing patient history/prior tests/prior consults, performing a medical exam, counseling and educating patient/family/caregiver, ordering medications/tests/procedures, referring and communicating with PCPs and other pertinent consultants, documenting information in the EMR, independently interpreting results and communicating to family and coordination of patient care.     KATHY Monsivais-CNP  [facility]   Office: [phone]

## 2022-07-14 NOTE — PATIENT INSTRUCTIONS
1. Reduce cardizem to 120 mg daily  2. Start taking eliquis twice per day  3. Call if heart/pulse rate remain <55 consistently  4.  Follow up with PCP for labs

## 2022-07-14 NOTE — TELEPHONE ENCOUNTER
Daughter states they missed pt's 7/7/22 appt asking to denise. Where can pt be added? Please advise.

## 2022-07-22 ENCOUNTER — TELEPHONE (OUTPATIENT)
Dept: FAMILY MEDICINE CLINIC | Age: 87
End: 2022-07-22

## 2022-07-22 DIAGNOSIS — M15.9 PRIMARY OSTEOARTHRITIS INVOLVING MULTIPLE JOINTS: ICD-10-CM

## 2022-07-22 RX ORDER — HYDROCODONE BITARTRATE AND ACETAMINOPHEN 5; 325 MG/1; MG/1
1 TABLET ORAL 4 TIMES DAILY PRN
Qty: 120 TABLET | Refills: 0 | Status: ON HOLD | OUTPATIENT
Start: 2022-07-22 | End: 2022-08-22 | Stop reason: HOSPADM

## 2022-07-22 NOTE — TELEPHONE ENCOUNTER
She is not able to take anti-inflammatory medication because of side effects in the past.  I would recommend she take her pain pills up to 4 times a day and a new prescription was sent to the pharmacy.

## 2022-07-22 NOTE — TELEPHONE ENCOUNTER
Patient calling to advise Dr. Gretchen Simmons that her arthritis is bad today and was wanting something prescribed for it. [facility]acy is [facility] [address]. [phone].     Please advise

## 2022-08-01 ENCOUNTER — TELEPHONE (OUTPATIENT)
Dept: CARDIOLOGY CLINIC | Age: 87
End: 2022-08-01

## 2022-08-01 RX ORDER — AMLODIPINE BESYLATE 5 MG/1
5 TABLET ORAL DAILY
Qty: 30 TABLET | Refills: 5 | Status: ON HOLD | OUTPATIENT
Start: 2022-08-01 | End: 2022-08-22 | Stop reason: HOSPADM

## 2022-08-01 NOTE — TELEPHONE ENCOUNTER
Pt dtg called back and she states that pt is on 120 mg Cardizem and pt hr is 71 but BP is 175/83. Dtg took it while I was on phone with her.  Please advise

## 2022-08-01 NOTE — TELEPHONE ENCOUNTER
Spoke to pt's daughter, she v/u and will be picking up pt's rx soon. She will also call office back with any questions. Call complete.

## 2022-08-01 NOTE — TELEPHONE ENCOUNTER
Have her stop diltiazem if she is taking 120 mg daily. If she has been taking 240 mg daily then she needs to reduce to 120 mg daily. See what hr BP has been and we can give alternative medication to control her BP.       KATHY Romero-CNP

## 2022-08-01 NOTE — TELEPHONE ENCOUNTER
Daughter states pt's HR is staying low in the 40's -50's. This morning it is 54. Please call to advise.

## 2022-08-01 NOTE — TELEPHONE ENCOUNTER
Have her start amlodipine 5 mg daily for better BP control. Please ensure that she does not take the 240 mg cardizem dose; she should only take the 120 mg daily. If her HR continues to drop <50, she should reduce the propafenone to 150 mg BID.     Guy Valverde, KATHY-CNP

## 2022-08-01 NOTE — TELEPHONE ENCOUNTER
Spoke to pt's daughter. Pt daughter reports  HR's of 40, 52, and 52 yesterday, so far today HR was at 54. Reports Diltiazem 240mg makes her brings her HR up but lowers bp  and 120mg makes lowers her HR but makes her bp high. Please see and advise.

## 2022-08-02 RX ORDER — FLUTICASONE PROPIONATE 50 MCG
SPRAY, SUSPENSION (ML) NASAL
Qty: 48 G | Refills: 1 | Status: SHIPPED | OUTPATIENT
Start: 2022-08-02

## 2022-08-02 RX ORDER — ATORVASTATIN CALCIUM 80 MG/1
TABLET, FILM COATED ORAL
Qty: 90 TABLET | Refills: 3 | Status: SHIPPED | OUTPATIENT
Start: 2022-08-02

## 2022-08-02 NOTE — TELEPHONE ENCOUNTER
Received refill request for Atorvastatin from [facility] : 7/14/22 NPSR    Last Labs : 5/17/22 BMP, 12.9/21 LIPID    Last Refill : 10/18/21 90 w/3 refills    Next OV : 10/11/22 RMM

## 2022-08-09 RX ORDER — LORATADINE 10 MG/1
10 TABLET ORAL DAILY
Qty: 90 TABLET | Refills: 0 | Status: SHIPPED | OUTPATIENT
Start: 2022-08-09

## 2022-08-09 NOTE — TELEPHONE ENCOUNTER
loratadine (CLARITIN) 10 MG tablet [[id number]]     Order Details  Dose: 10 mg Route: Oral Frequency: DAILY   Dispense Quantity: 90 tablet Refills: 1          Sig: Take 1 tablet by mouth daily     [address] [facility], [address]   [address] [address]   Phone:  [phone]  Fax:  [phone]

## 2022-08-10 ENCOUNTER — TELEPHONE (OUTPATIENT)
Dept: FAMILY MEDICINE CLINIC | Age: 87
End: 2022-08-10

## 2022-08-10 NOTE — TELEPHONE ENCOUNTER
Received consult letter from [facility]. Scanned to encounter.

## 2022-08-15 ENCOUNTER — TELEPHONE (OUTPATIENT)
Dept: FAMILY MEDICINE CLINIC | Age: 87
End: 2022-08-15

## 2022-08-15 NOTE — TELEPHONE ENCOUNTER
Some people have a reaction to the vaccine which can last up to 7 days or longer.   Treatment is symptomatic including Tylenol and rest

## 2022-08-15 NOTE — TELEPHONE ENCOUNTER
Pt called stated that she has been feeling weak with chills after getting her vaccine shot 1 week ago and wanted to know is she suppose to be feeling like this and wanted to know is there anything she can take .

## 2022-08-17 ENCOUNTER — TELEPHONE (OUTPATIENT)
Dept: FAMILY MEDICINE CLINIC | Age: 87
End: 2022-08-17

## 2022-08-17 NOTE — TELEPHONE ENCOUNTER
Pt called stated that she has been having pain for about 3 weeks now and was wondering can she get some prednisone pt stated that she can not come in duet o her not having a rde

## 2022-08-19 ENCOUNTER — OFFICE VISIT (OUTPATIENT)
Dept: FAMILY MEDICINE CLINIC | Age: 87
End: 2022-08-19
Payer: MEDICARE

## 2022-08-19 VITALS
RESPIRATION RATE: 16 BRPM | WEIGHT: 157.5 LBS | TEMPERATURE: 97 F | OXYGEN SATURATION: 98 % | BODY MASS INDEX: 28.81 KG/M2 | SYSTOLIC BLOOD PRESSURE: 136 MMHG | DIASTOLIC BLOOD PRESSURE: 90 MMHG | HEART RATE: 68 BPM

## 2022-08-19 DIAGNOSIS — M19.012 LOCALIZED OSTEOARTHRITIS OF LEFT SHOULDER: Primary | ICD-10-CM

## 2022-08-19 PROCEDURE — 1090F PRES/ABSN URINE INCON ASSESS: CPT | Performed by: FAMILY MEDICINE

## 2022-08-19 PROCEDURE — 99213 OFFICE O/P EST LOW 20 MIN: CPT | Performed by: FAMILY MEDICINE

## 2022-08-19 PROCEDURE — 1036F TOBACCO NON-USER: CPT | Performed by: FAMILY MEDICINE

## 2022-08-19 PROCEDURE — 1123F ACP DISCUSS/DSCN MKR DOCD: CPT | Performed by: FAMILY MEDICINE

## 2022-08-19 PROCEDURE — 20610 DRAIN/INJ JOINT/BURSA W/O US: CPT | Performed by: FAMILY MEDICINE

## 2022-08-19 PROCEDURE — G8417 CALC BMI ABV UP PARAM F/U: HCPCS | Performed by: FAMILY MEDICINE

## 2022-08-19 PROCEDURE — G8428 CUR MEDS NOT DOCUMENT: HCPCS | Performed by: FAMILY MEDICINE

## 2022-08-19 RX ORDER — TRIAMCINOLONE ACETONIDE 40 MG/ML
40 INJECTION, SUSPENSION INTRA-ARTICULAR; INTRAMUSCULAR ONCE
Status: COMPLETED | OUTPATIENT
Start: 2022-08-19 | End: 2022-08-19

## 2022-08-19 RX ADMIN — TRIAMCINOLONE ACETONIDE 40 MG: 40 INJECTION, SUSPENSION INTRA-ARTICULAR; INTRAMUSCULAR at 11:56

## 2022-08-19 NOTE — PROGRESS NOTES
Arthrocentesis Procedure Note    Pre-operative Diagnosis:   1. Localized osteoarthritis of left shoulder        Post-operative Diagnosis: same    Locations:left shoulder    Procedure Details   After consent was obtained, using sterile technique the     left shoulder aspect of the subacromial bursa was prepped and surrounding area with a sterile prep using chloraprep and draped in the usual sterile fashion. .  Kenalog  40 mg and 1 ml 0.5% Marcaine was then injected and the needle withdrawn. The procedure was well tolerated. Sterile dressing applied to site. Complications: none. Plan:  1. The patient was instructed to continue to rest the joint for a few more days before resuming regular activities. It may be more painful for the first 1-2 days. 2. Watch for fever, or increased swelling or persistent pain in the joint. 3. Call or return to clinic prn if such symptoms occur or there is failure to improve as anticipated. 4. Recommended that the patient use OTC analgesics as needed for pain.

## 2022-08-19 NOTE — PROGRESS NOTES
Medication given during visit:    Administrations This Visit       triamcinolone acetonide (KENALOG-40) injection 40 mg       Admin Date  08/19/2022  11:56 Action  Given Dose  40 mg Route  Intra-artICUlar Site  Shoulder Left Administered By  Izabel Parham    Ordering Provider: Orlando Phillips MD    NDC: 4084-1372-55    Lot#: [id number]    : [employer] [place] (PRIMARY CARE)    Patient Supplied?: No                    Patient instructed to remain in clinic for 20 minutes after injection and was advised to report any adverse reaction to me immediately.

## 2022-08-19 NOTE — PROGRESS NOTES
Subjective:      Patient ID: Rocoi Horowitz is a 80 y.o. female. CC: Patient presents for acute medical problem-left shoulder pain. Medical assistant notes reviewed. Shoulder Pain   The pain is present in the neck, left shoulder and left arm. This is a new problem. Episode onset: 2 weeks. There has been no history of extremity trauma. The problem occurs constantly. The problem has been rapidly worsening. The quality of the pain is described as pounding, sharp and aching. The pain is at a severity of 6/10. The pain is severe. Associated symptoms include an inability to bear weight, a limited range of motion and stiffness. She has tried nothing for the symptoms. Patient states she is actually having issues for over 2 months. It became much worse in the last several weeks. She has difficulty sleeping on that shoulder and using the shoulder in general.  She has discomfort through the entire shoulder. Review of Systems   Musculoskeletal:  Positive for stiffness.    Allergies   Allergen Reactions    Adhesive Tape Anaphylaxis    Cefaclor Nausea And Vomiting     Other reaction(s): Chest pain    Cephalexin Other (See Comments)     Struggling to breath, almost passing out/ very low oxygen and pulse    Nsaids      Pt states she has hives and heart races   Other reaction(s): Tachycardia    Clinoril [Sulindac] Other (See Comments)     Stomach pain    Codeine      FEEL NUMB    Diclofenac     Diclofenac Sodium Hives    Diclofenac Sodium Hives    Hctz [Hydrochlorothiazide]      Sob    Ibuprofen Other (See Comments)     Other reaction(s): Tachycardia    Macrobid [Nitrofurantoin Macrocrystal]      nausea    Motrin [Ibuprofen Micronized] Other (See Comments)     Tachycardia      Pcn [Penicillins] Hives    Peach [Prunus Persica] Itching and Swelling     Cannot eat raw peaches    Prednisone      Causes elevated blood pressure     Sulfa Antibiotics      Nausea, diarrhea      Objective:   Physical Exam  Vitals and nursing

## 2022-08-21 ENCOUNTER — HOSPITAL ENCOUNTER (INPATIENT)
Age: 87
LOS: 2 days | Discharge: HOME HEALTH | DRG: 149 | End: 2022-08-23
Attending: INTERNAL MEDICINE | Admitting: INTERNAL MEDICINE
Payer: MEDICARE

## 2022-08-21 ENCOUNTER — APPOINTMENT (OUTPATIENT)
Dept: GENERAL RADIOLOGY | Age: 87
DRG: 149 | End: 2022-08-21
Payer: MEDICARE

## 2022-08-21 ENCOUNTER — APPOINTMENT (OUTPATIENT)
Dept: CT IMAGING | Age: 87
DRG: 149 | End: 2022-08-21
Payer: MEDICARE

## 2022-08-21 DIAGNOSIS — R55 NEAR SYNCOPE: Primary | ICD-10-CM

## 2022-08-21 PROBLEM — I16.0 HYPERTENSIVE URGENCY, MALIGNANT: Status: ACTIVE | Noted: 2022-08-21

## 2022-08-21 LAB
A/G RATIO: 1.8 (ref 1.1–2.2)
ALBUMIN SERPL-MCNC: 4.5 G/DL (ref 3.4–5)
ALP BLD-CCNC: 143 U/L (ref 40–129)
ALT SERPL-CCNC: 11 U/L (ref 10–40)
ANION GAP SERPL CALCULATED.3IONS-SCNC: 10 MMOL/L (ref 3–16)
AST SERPL-CCNC: 13 U/L (ref 15–37)
BASOPHILS ABSOLUTE: 0 K/UL (ref 0–0.2)
BASOPHILS RELATIVE PERCENT: 0.3 %
BILIRUB SERPL-MCNC: <0.2 MG/DL (ref 0–1)
BUN BLDV-MCNC: 34 MG/DL (ref 7–20)
CALCIUM SERPL-MCNC: 9.7 MG/DL (ref 8.3–10.6)
CHLORIDE BLD-SCNC: 103 MMOL/L (ref 99–110)
CO2: 26 MMOL/L (ref 21–32)
CREAT SERPL-MCNC: 0.9 MG/DL (ref 0.6–1.2)
EOSINOPHILS ABSOLUTE: 0.1 K/UL (ref 0–0.6)
EOSINOPHILS RELATIVE PERCENT: 0.7 %
GFR AFRICAN AMERICAN: >60
GFR NON-AFRICAN AMERICAN: 59
GLUCOSE BLD-MCNC: 124 MG/DL (ref 70–99)
GLUCOSE BLD-MCNC: 147 MG/DL (ref 70–99)
GLUCOSE BLD-MCNC: 179 MG/DL (ref 70–99)
HCT VFR BLD CALC: 33.8 % (ref 36–48)
HEMOGLOBIN: 11.2 G/DL (ref 12–16)
LYMPHOCYTES ABSOLUTE: 1.5 K/UL (ref 1–5.1)
LYMPHOCYTES RELATIVE PERCENT: 20.8 %
MCH RBC QN AUTO: 29.2 PG (ref 26–34)
MCHC RBC AUTO-ENTMCNC: 33.2 G/DL (ref 31–36)
MCV RBC AUTO: 87.9 FL (ref 80–100)
MONOCYTES ABSOLUTE: 0.5 K/UL (ref 0–1.3)
MONOCYTES RELATIVE PERCENT: 7.1 %
NEUTROPHILS ABSOLUTE: 5.1 K/UL (ref 1.7–7.7)
NEUTROPHILS RELATIVE PERCENT: 71.1 %
PDW BLD-RTO: 14.9 % (ref 12.4–15.4)
PERFORMED ON: ABNORMAL
PERFORMED ON: ABNORMAL
PLATELET # BLD: 151 K/UL (ref 135–450)
PMV BLD AUTO: 8.4 FL (ref 5–10.5)
POTASSIUM SERPL-SCNC: 4.3 MMOL/L (ref 3.5–5.1)
PRO-BNP: 1260 PG/ML (ref 0–449)
RBC # BLD: 3.85 M/UL (ref 4–5.2)
SODIUM BLD-SCNC: 139 MMOL/L (ref 136–145)
TOTAL PROTEIN: 7 G/DL (ref 6.4–8.2)
TROPONIN: <0.01 NG/ML
WBC # BLD: 7.1 K/UL (ref 4–11)

## 2022-08-21 PROCEDURE — 6370000000 HC RX 637 (ALT 250 FOR IP): Performed by: INTERNAL MEDICINE

## 2022-08-21 PROCEDURE — 70450 CT HEAD/BRAIN W/O DYE: CPT

## 2022-08-21 PROCEDURE — 80053 COMPREHEN METABOLIC PANEL: CPT

## 2022-08-21 PROCEDURE — 83036 HEMOGLOBIN GLYCOSYLATED A1C: CPT

## 2022-08-21 PROCEDURE — 6360000002 HC RX W HCPCS: Performed by: INTERNAL MEDICINE

## 2022-08-21 PROCEDURE — 2060000000 HC ICU INTERMEDIATE R&B

## 2022-08-21 PROCEDURE — 2500000003 HC RX 250 WO HCPCS: Performed by: PHYSICIAN ASSISTANT

## 2022-08-21 PROCEDURE — 85025 COMPLETE CBC W/AUTO DIFF WBC: CPT

## 2022-08-21 PROCEDURE — 99285 EMERGENCY DEPT VISIT HI MDM: CPT

## 2022-08-21 PROCEDURE — 2580000003 HC RX 258: Performed by: INTERNAL MEDICINE

## 2022-08-21 PROCEDURE — 93005 ELECTROCARDIOGRAM TRACING: CPT | Performed by: PHYSICIAN ASSISTANT

## 2022-08-21 PROCEDURE — 94761 N-INVAS EAR/PLS OXIMETRY MLT: CPT

## 2022-08-21 PROCEDURE — 83880 ASSAY OF NATRIURETIC PEPTIDE: CPT

## 2022-08-21 PROCEDURE — 71045 X-RAY EXAM CHEST 1 VIEW: CPT

## 2022-08-21 PROCEDURE — 36415 COLL VENOUS BLD VENIPUNCTURE: CPT

## 2022-08-21 PROCEDURE — 94640 AIRWAY INHALATION TREATMENT: CPT

## 2022-08-21 PROCEDURE — 96374 THER/PROPH/DIAG INJ IV PUSH: CPT

## 2022-08-21 PROCEDURE — 84484 ASSAY OF TROPONIN QUANT: CPT

## 2022-08-21 RX ORDER — ATORVASTATIN CALCIUM 80 MG/1
80 TABLET, FILM COATED ORAL NIGHTLY
Status: DISCONTINUED | OUTPATIENT
Start: 2022-08-21 | End: 2022-08-23 | Stop reason: HOSPADM

## 2022-08-21 RX ORDER — POLYETHYLENE GLYCOL 3350 17 G/17G
17 POWDER, FOR SOLUTION ORAL DAILY PRN
Status: DISCONTINUED | OUTPATIENT
Start: 2022-08-21 | End: 2022-08-23 | Stop reason: HOSPADM

## 2022-08-21 RX ORDER — HYDRALAZINE HYDROCHLORIDE 20 MG/ML
10 INJECTION INTRAMUSCULAR; INTRAVENOUS EVERY 6 HOURS PRN
Status: DISCONTINUED | OUTPATIENT
Start: 2022-08-21 | End: 2022-08-23 | Stop reason: HOSPADM

## 2022-08-21 RX ORDER — ONDANSETRON 2 MG/ML
4 INJECTION INTRAMUSCULAR; INTRAVENOUS EVERY 6 HOURS PRN
Status: DISCONTINUED | OUTPATIENT
Start: 2022-08-21 | End: 2022-08-23 | Stop reason: HOSPADM

## 2022-08-21 RX ORDER — LEVOTHYROXINE SODIUM 0.07 MG/1
75 TABLET ORAL DAILY
Status: DISCONTINUED | OUTPATIENT
Start: 2022-08-21 | End: 2022-08-23 | Stop reason: HOSPADM

## 2022-08-21 RX ORDER — HYDRALAZINE HYDROCHLORIDE 25 MG/1
25 TABLET, FILM COATED ORAL EVERY 8 HOURS SCHEDULED
Status: DISCONTINUED | OUTPATIENT
Start: 2022-08-21 | End: 2022-08-22

## 2022-08-21 RX ORDER — INSULIN LISPRO 100 [IU]/ML
0-4 INJECTION, SOLUTION INTRAVENOUS; SUBCUTANEOUS NIGHTLY
Status: DISCONTINUED | OUTPATIENT
Start: 2022-08-21 | End: 2022-08-23 | Stop reason: HOSPADM

## 2022-08-21 RX ORDER — LABETALOL HYDROCHLORIDE 5 MG/ML
10 INJECTION, SOLUTION INTRAVENOUS ONCE
Status: COMPLETED | OUTPATIENT
Start: 2022-08-21 | End: 2022-08-21

## 2022-08-21 RX ORDER — AMLODIPINE BESYLATE 5 MG/1
5 TABLET ORAL DAILY
Status: DISCONTINUED | OUTPATIENT
Start: 2022-08-21 | End: 2022-08-21 | Stop reason: ALTCHOICE

## 2022-08-21 RX ORDER — OXYCODONE HYDROCHLORIDE AND ACETAMINOPHEN 5; 325 MG/1; MG/1
1 TABLET ORAL EVERY 6 HOURS PRN
Status: DISCONTINUED | OUTPATIENT
Start: 2022-08-21 | End: 2022-08-21

## 2022-08-21 RX ORDER — LOSARTAN POTASSIUM 100 MG/1
100 TABLET ORAL DAILY
Status: DISCONTINUED | OUTPATIENT
Start: 2022-08-21 | End: 2022-08-23 | Stop reason: HOSPADM

## 2022-08-21 RX ORDER — FLUTICASONE PROPIONATE 50 MCG
1 SPRAY, SUSPENSION (ML) NASAL DAILY
Status: DISCONTINUED | OUTPATIENT
Start: 2022-08-21 | End: 2022-08-23 | Stop reason: HOSPADM

## 2022-08-21 RX ORDER — ROPINIROLE 1 MG/1
3 TABLET, FILM COATED ORAL 3 TIMES DAILY
Status: DISCONTINUED | OUTPATIENT
Start: 2022-08-21 | End: 2022-08-23 | Stop reason: HOSPADM

## 2022-08-21 RX ORDER — PRAMIPEXOLE DIHYDROCHLORIDE 0.25 MG/1
0.25 TABLET ORAL NIGHTLY
Status: DISCONTINUED | OUTPATIENT
Start: 2022-08-21 | End: 2022-08-23 | Stop reason: HOSPADM

## 2022-08-21 RX ORDER — ALBUTEROL SULFATE 90 UG/1
2 AEROSOL, METERED RESPIRATORY (INHALATION) EVERY 6 HOURS PRN
Status: DISCONTINUED | OUTPATIENT
Start: 2022-08-21 | End: 2022-08-23 | Stop reason: HOSPADM

## 2022-08-21 RX ORDER — ACETAMINOPHEN 650 MG/1
650 SUPPOSITORY RECTAL EVERY 6 HOURS PRN
Status: DISCONTINUED | OUTPATIENT
Start: 2022-08-21 | End: 2022-08-23 | Stop reason: HOSPADM

## 2022-08-21 RX ORDER — ONDANSETRON 4 MG/1
4 TABLET, ORALLY DISINTEGRATING ORAL EVERY 8 HOURS PRN
Status: DISCONTINUED | OUTPATIENT
Start: 2022-08-21 | End: 2022-08-23 | Stop reason: HOSPADM

## 2022-08-21 RX ORDER — INSULIN LISPRO 100 [IU]/ML
0-4 INJECTION, SOLUTION INTRAVENOUS; SUBCUTANEOUS
Status: DISCONTINUED | OUTPATIENT
Start: 2022-08-21 | End: 2022-08-23 | Stop reason: HOSPADM

## 2022-08-21 RX ORDER — ROPINIROLE 1 MG/1
TABLET, FILM COATED ORAL
Status: DISPENSED
Start: 2022-08-21 | End: 2022-08-22

## 2022-08-21 RX ORDER — SODIUM CHLORIDE 0.9 % (FLUSH) 0.9 %
10 SYRINGE (ML) INJECTION PRN
Status: DISCONTINUED | OUTPATIENT
Start: 2022-08-21 | End: 2022-08-23 | Stop reason: HOSPADM

## 2022-08-21 RX ORDER — ENOXAPARIN SODIUM 100 MG/ML
40 INJECTION SUBCUTANEOUS DAILY
Status: DISCONTINUED | OUTPATIENT
Start: 2022-08-21 | End: 2022-08-23 | Stop reason: HOSPADM

## 2022-08-21 RX ORDER — DEXTROSE MONOHYDRATE 100 MG/ML
INJECTION, SOLUTION INTRAVENOUS CONTINUOUS PRN
Status: DISCONTINUED | OUTPATIENT
Start: 2022-08-21 | End: 2022-08-23 | Stop reason: HOSPADM

## 2022-08-21 RX ORDER — CETIRIZINE HYDROCHLORIDE 10 MG/1
10 TABLET ORAL DAILY
Status: DISCONTINUED | OUTPATIENT
Start: 2022-08-21 | End: 2022-08-23 | Stop reason: HOSPADM

## 2022-08-21 RX ORDER — ACETAMINOPHEN 325 MG/1
650 TABLET ORAL EVERY 6 HOURS PRN
Status: DISCONTINUED | OUTPATIENT
Start: 2022-08-21 | End: 2022-08-23 | Stop reason: HOSPADM

## 2022-08-21 RX ORDER — PROPAFENONE HYDROCHLORIDE 150 MG/1
150 TABLET, FILM COATED ORAL EVERY 8 HOURS SCHEDULED
Status: DISCONTINUED | OUTPATIENT
Start: 2022-08-21 | End: 2022-08-23 | Stop reason: HOSPADM

## 2022-08-21 RX ORDER — DILTIAZEM HYDROCHLORIDE 120 MG/1
120 CAPSULE, COATED, EXTENDED RELEASE ORAL DAILY
Status: DISCONTINUED | OUTPATIENT
Start: 2022-08-21 | End: 2022-08-23 | Stop reason: HOSPADM

## 2022-08-21 RX ORDER — CLONIDINE HYDROCHLORIDE 0.1 MG/1
0.1 TABLET ORAL 2 TIMES DAILY
Status: DISCONTINUED | OUTPATIENT
Start: 2022-08-21 | End: 2022-08-23 | Stop reason: HOSPADM

## 2022-08-21 RX ORDER — LORAZEPAM 0.5 MG/1
0.5 TABLET ORAL EVERY 8 HOURS PRN
Status: DISCONTINUED | OUTPATIENT
Start: 2022-08-21 | End: 2022-08-23 | Stop reason: HOSPADM

## 2022-08-21 RX ORDER — LEVALBUTEROL INHALATION SOLUTION 0.63 MG/3ML
0.63 SOLUTION RESPIRATORY (INHALATION) EVERY 4 HOURS PRN
Status: DISCONTINUED | OUTPATIENT
Start: 2022-08-21 | End: 2022-08-23 | Stop reason: HOSPADM

## 2022-08-21 RX ORDER — SODIUM CHLORIDE 9 MG/ML
INJECTION, SOLUTION INTRAVENOUS PRN
Status: DISCONTINUED | OUTPATIENT
Start: 2022-08-21 | End: 2022-08-23 | Stop reason: HOSPADM

## 2022-08-21 RX ORDER — SODIUM CHLORIDE 0.9 % (FLUSH) 0.9 %
10 SYRINGE (ML) INJECTION EVERY 12 HOURS SCHEDULED
Status: DISCONTINUED | OUTPATIENT
Start: 2022-08-21 | End: 2022-08-23 | Stop reason: HOSPADM

## 2022-08-21 RX ADMIN — HYDRALAZINE HYDROCHLORIDE 10 MG: 20 INJECTION INTRAMUSCULAR; INTRAVENOUS at 16:50

## 2022-08-21 RX ADMIN — PROPAFENONE HYDROCHLORIDE 150 MG: 150 TABLET, FILM COATED ORAL at 16:45

## 2022-08-21 RX ADMIN — ROPINIROLE HYDROCHLORIDE 3 MG: 1 TABLET, FILM COATED ORAL at 16:46

## 2022-08-21 RX ADMIN — PRAMIPEXOLE DIHYDROCHLORIDE 0.25 MG: 0.25 TABLET ORAL at 21:09

## 2022-08-21 RX ADMIN — ACETAMINOPHEN 650 MG: 325 TABLET ORAL at 16:49

## 2022-08-21 RX ADMIN — POLYETHYLENE GLYCOL 3350 17 G: 17 POWDER, FOR SOLUTION ORAL at 22:00

## 2022-08-21 RX ADMIN — SODIUM CHLORIDE, PRESERVATIVE FREE 10 ML: 5 INJECTION INTRAVENOUS at 21:16

## 2022-08-21 RX ADMIN — ROPINIROLE HYDROCHLORIDE 3 MG: 1 TABLET, FILM COATED ORAL at 21:09

## 2022-08-21 RX ADMIN — HYDRALAZINE HYDROCHLORIDE 25 MG: 25 TABLET, FILM COATED ORAL at 16:45

## 2022-08-21 RX ADMIN — PROPAFENONE HYDROCHLORIDE 150 MG: 150 TABLET, FILM COATED ORAL at 21:10

## 2022-08-21 RX ADMIN — LABETALOL HYDROCHLORIDE 10 MG: 5 INJECTION, SOLUTION INTRAVENOUS at 12:29

## 2022-08-21 RX ADMIN — ACETAMINOPHEN 650 MG: 325 TABLET ORAL at 22:44

## 2022-08-21 RX ADMIN — Medication 2 PUFF: at 21:25

## 2022-08-21 RX ADMIN — LOSARTAN POTASSIUM 100 MG: 100 TABLET, FILM COATED ORAL at 16:45

## 2022-08-21 RX ADMIN — CLONIDINE HYDROCHLORIDE 0.1 MG: 0.1 TABLET ORAL at 21:10

## 2022-08-21 RX ADMIN — LORAZEPAM 0.5 MG: 0.5 TABLET ORAL at 21:21

## 2022-08-21 RX ADMIN — DILTIAZEM HYDROCHLORIDE 120 MG: 120 CAPSULE, COATED, EXTENDED RELEASE ORAL at 16:45

## 2022-08-21 RX ADMIN — HYDRALAZINE HYDROCHLORIDE 25 MG: 25 TABLET, FILM COATED ORAL at 21:10

## 2022-08-21 RX ADMIN — ATORVASTATIN CALCIUM 80 MG: 80 TABLET, FILM COATED ORAL at 21:08

## 2022-08-21 RX ADMIN — ENOXAPARIN SODIUM 40 MG: 100 INJECTION SUBCUTANEOUS at 16:47

## 2022-08-21 ASSESSMENT — PAIN SCALES - WONG BAKER: WONGBAKER_NUMERICALRESPONSE: 0

## 2022-08-21 ASSESSMENT — PAIN SCALES - GENERAL
PAINLEVEL_OUTOF10: 0
PAINLEVEL_OUTOF10: 3

## 2022-08-21 ASSESSMENT — PAIN - FUNCTIONAL ASSESSMENT: PAIN_FUNCTIONAL_ASSESSMENT: NONE - DENIES PAIN

## 2022-08-21 NOTE — PROGRESS NOTES
08/21/22 1602   RT Protocol   History Pulmonary Disease 2   Respiratory pattern 0   Breath sounds 0   Cough 0   Bronchodilator Assessment Score 2

## 2022-08-21 NOTE — ED NOTES
Unable to draw blood with start of IV, straight stuck for ordered blood work. Specimens sent to lab.      Stephanie Ruiz RN  08/21/22 2166

## 2022-08-21 NOTE — RT PROTOCOL NOTE
RT Inhaler-Nebulizer Bronchodilator Protocol Note    There is a bronchodilator order in the chart from a provider indicating to follow the RT Bronchodilator Protocol and there is an Initiate RT Inhaler-Nebulizer Bronchodilator Protocol order as well (see protocol at bottom of note). CXR Findings:  XR CHEST PORTABLE    Result Date: 8/21/2022  No acute process. The findings from the last RT Protocol Assessment were as follows:   History Pulmonary Disease: Chronic pulmonary disease  Respiratory Pattern: Regular pattern and RR 12-20 bpm  Breath Sounds: Clear breath sounds  Cough: Strong, spontaneous, non-productive  Indication for Bronchodilator Therapy:    Bronchodilator Assessment Score: 2    Aerosolized bronchodilator medication orders have been revised according to the RT Inhaler-Nebulizer Bronchodilator Protocol below. Respiratory Therapist to perform RT Therapy Protocol Assessment initially then follow the protocol. Repeat RT Therapy Protocol Assessment PRN for score 0-3 or on second treatment, BID, and PRN for scores above 3. No Indications - adjust the frequency to every 6 hours PRN wheezing or bronchospasm, if no treatments needed after 48 hours then discontinue using Per Protocol order mode. If indication present, adjust the RT bronchodilator orders based on the Bronchodilator Assessment Score as indicated below. Use Inhaler orders unless patient has one or more of the following: on home nebulizer, not able to hold breath for 10 seconds, is not alert and oriented, cannot activate and use MDI correctly, or respiratory rate 25 breaths per minute or more, then use the equivalent nebulizer order(s) with same Frequency and PRN reasons based on the score. If a patient is on this medication at home then do not decrease Frequency below that used at home.     0-3 - enter or revise RT bronchodilator order(s) to equivalent RT Bronchodilator order with Frequency of every 4 hours PRN for wheezing or increased work of breathing using Per Protocol order mode. 4-6 - enter or revise RT Bronchodilator order(s) to two equivalent RT bronchodilator orders with one order with BID Frequency and one order with Frequency of every 4 hours PRN wheezing or increased work of breathing using Per Protocol order mode. 7-10 - enter or revise RT Bronchodilator order(s) to two equivalent RT bronchodilator orders with one order with TID Frequency and one order with Frequency of every 4 hours PRN wheezing or increased work of breathing using Per Protocol order mode. 11-13 - enter or revise RT Bronchodilator order(s) to one equivalent RT bronchodilator order with QID Frequency and an Albuterol order with Frequency of every 4 hours PRN wheezing or increased work of breathing using Per Protocol order mode. Greater than 13 - enter or revise RT Bronchodilator order(s) to one equivalent RT bronchodilator order with every 4 hours Frequency and an Albuterol order with Frequency of every 2 hours PRN wheezing or increased work of breathing using Per Protocol order mode. RT to enter RT Home Evaluation for COPD & MDI Assessment order using Per Protocol order mode.     Electronically signed by Sarah Hutchison on 8/21/2022 at 4:02 PM

## 2022-08-21 NOTE — H&P
Hospital Medicine History & Physical      PCP: Ruth Resendiz MD    Date of Admission: 8/21/2022    Date of Service: Pt seen/examined on 8/21/2022 10:26 AM and   Admitted to Inpatient with expected LOS greater than two midnights due to medical therapy. Chief Complaint: Lightheadedness    History Of Present Illness:    80 y.o. female with PMHx significant for hypertension, hyperlipidemia, chronic respiratory failure secondary to COPD on 2 L oxygen  Paroxysmal atrial fibrillation with what probably looks like tachybradycardia syndrome on Eliquis admitted to the hospital complaining of lightheadedness  Patient reports that a couple weeks ago she saw her cardiologist her dose of diltiazem was decreased on the 19th she had her shoulder injected for osteoarthritis since this morning every time she stands up she feels extremely lightheaded to the point that she feels like she is going to pass out and she has to hold onto things  She denies any loss of consciousness  No chest pain  No shortness of breath  No leg swelling  No increase in oxygen requirement  No dysuria  She has taken all her blood pressure medications when she came to the ED blood pressure in 533N systolic  Given 1 dose of IV labetalol        Past Medical History:          Diagnosis Date    Arthritis     Atrial fibrillation ([address]     Diabetes mellitus type II, controlled ([address]     GERD (gastroesophageal reflux disease)     HTN (hypertension)     Hyperlipidemia     Hypothyroid     Insomnia     Lumbago     SVT (supraventricular tachycardia) ([address]        Past Surgical History:          Procedure Laterality Date    APPENDECTOMY      CHOLECYSTECTOMY, LAPAROSCOPIC  2/24/2013    COLONOSCOPY         Medications Prior to Admission:      Prior to Admission medications    Medication Sig Start Date End Date Taking?  Authorizing Provider   HYDROcodone-acetaminophen (NORCO) 5-325 MG per tablet Take 1 tablet by mouth 4 times daily as needed for Pain for up to MOUTH EVERY NIGHT 5/9/22   Aicha Cormier MD   levalbuterol (XOPENEX) 0.63 MG/3ML nebulization USE 1 VIAL PER NEBULIZER EVERY 6 HOURS. MAX OF 4 TIMES PER 24 HOURS 4/26/22   Griselda Cox MD   tiotropium [facility] RESPIMAT) 2.5 MCG/ACT AERS inhaler Inhale 2 puffs into the lungs daily 4/21/22   Jace Montano PA-C   budesonide-formoterol (SYMBICORT) 160-4.5 MCG/ACT AERO INHALE 2 PUFFS INTO THE LUNGS TWICE DAILY 4/8/22   Griselda Cox MD   Blood Glucose Monitoring Suppl (TRUE METRIX METER) w/Device KIT To use to check sugars 4 times daily 3/24/22   Misael Gonzalez MD   Blood Glucose Calibration (TRUE METRIX LEVEL 2) Normal SOLN As needed 3/23/22   Aicha Cormier MD   blood glucose test strips (TRUE METRIX BLOOD GLUCOSE TEST) strip 1 each by In Vitro route daily As needed.  3/23/22   Aicha Cormier MD   TRUEplus Lancets 33G MISC 1 daily 3/23/22   Aicha Cormier MD   Lancets MISC 1 each by Does not apply route 2 times daily 6/30/21   Aicha Cormier MD   ondansetron [facility]) 4 MG tablet Take 1 tablet by mouth daily as needed for Nausea or Vomiting 4/5/32   Berhane Bacon MD   OXYGEN Inhale 2 L into the lungs    Historical Provider, MD   Respiratory Therapy Supplies (NEBULIZER/TUBING/MOUTHPIECE) KIT 1 kit by Does not apply route 4 times daily as needed (shortness of breath) 3/26/20   Aciha Cormier MD   albuterol sulfate HFA (PROAIR HFA) 108 (90 Base) MCG/ACT inhaler Inhale 2 puffs into the lungs every 6 hours as needed for Wheezing 10/25/19   Griselda Cox MD       Allergies:  Adhesive tape, Cefaclor, Cephalexin, Nsaids, Clinoril [sulindac], Codeine, Diclofenac, Diclofenac sodium, Diclofenac sodium, Hctz [hydrochlorothiazide], Ibuprofen, Macrobid [nitrofurantoin macrocrystal], Motrin [ibuprofen micronized], Pcn [penicillins], Peach [prunus persica], Prednisone, and Sulfa antibiotics    Social History:      The patient currently lives at home     TOBACCO:   reports that she quit smoking about 26 years ago. Her smoking use included cigarettes. She started smoking about 71 years ago. She has a 45.00 pack-year smoking history. She has never used smokeless tobacco.  ETOH:   reports no history of alcohol use. Family History:      Reviewed in detail and negative for DM, CAD, Cancer, CVA. Positive as follows:        Problem Relation Age of Onset    High Blood Pressure Mother     Heart Attack Father     Heart Disease Father     Other Brother     Asthma Paternal Uncle        REVIEW OF SYSTEMS:   Pertinent positives as noted in the HPI. All other systems reviewed and negative. PHYSICAL EXAM:    BP (!) 202/71   Pulse 65   Temp 97.7 °F (36.5 °C) (Oral)   Resp 17   Ht 5' 2\" (1.575 m)   Wt 159 lb (72.1 kg)   SpO2 100%   BMI 29.08 kg/m²     General appearance:  No apparent distress, appears stated age and cooperative. HEENT:  Normal cephalic, atraumatic without obvious deformity. Pupils equal, round, and reactive to light. Extra ocular muscles intact. Conjunctivae/corneas clear. Neck: Supple, with full range of motion. No jugular venous distention. Trachea midline. Respiratory:  Normal respiratory effort. Clear to auscultation, bilaterally without RALES/WHEEZES/RHONCHI. Cardiovascular:  Regular rate and rhythm with normal S1/S2 without MURMUR, rubs or gallops. Abdomen: Soft, non-tender, non-distended with normal bowel sounds. Musculoskeletal:  No clubbing, cyanosis or EDEMA bilaterally. Full range of motion without deformity. Skin: Skin color, texture, turgor normal.  No rashes or lesions. Neurologic:  Neurovascularly intact without any focal sensory/motor deficits.  Cranial nerves: II-XII intact, grossly non-focal.  Psychiatric:  Alert and oriented, thought content appropriate, normal insight  Capillary Refill: Brisk,< 3 seconds   Peripheral Pulses: +2 palpable, equal bilaterally       Labs:     Recent Labs     08/21/22  1117   WBC 7.1   HGB 11.2*   HCT 33.8*        Recent Labs     08/21/22  1117      K 4.3      CO2 26   BUN 34*   CREATININE 0.9   CALCIUM 9.7     Recent Labs     08/21/22  1117   AST 13*   ALT 11   BILITOT <0.2   ALKPHOS 143*     No results for input(s): INR in the last 72 hours. Recent Labs     08/21/22  1117   TROPONINI <0.01       Urinalysis:      Lab Results   Component Value Date/Time    NITRU Negative 05/26/2022 03:45 PM    [address] 05/26/2022 03:45 PM    BACTERIA None Seen 05/26/2022 03:45 PM    RBCUA 1 05/26/2022 03:45 PM    BLOODU Negative 05/26/2022 03:45 PM    SPECGRAV 1.010 05/26/2022 03:45 PM    GLUCOSEU Negative 05/26/2022 03:45 PM       Radiology:     CXR: I have reviewed the CXR with the following interpretation: nad  EKG:  I have reviewed the EKG with the following interpretation: nsr nil acute     CT HEAD WO CONTRAST   Final Result   No acute intracranial abnormality. XR CHEST PORTABLE   Final Result   No acute process.              ASSESSMENT/PLAN:    Active Hospital Problems    Diagnosis Date Noted    Hypertensive urgency, malignant [I16.0] 08/21/2022     Priority: Medium       Acute Medical Issues Being Addressed:    63-year-old admitted to the hospital complaining of lightheadedness    Presyncope particularly on standing  Her symptoms sound like orthostatic however we need to ensure that this is not heart rate which is contributing to her symptoms  Will get orthostatic blood pressures  Currently she her blood pressure is in 682W systolic  We will keep her on a cardiac monitor  Will get cardiology to see patient  In the meantime I am going to hold her Eliquis in case she needs a pacemaker    Malignant hypertension  She has been given labetalol  See how she responds to her blood pressure  We will put her on intermediate care  I have added in hydralazine for now  This may be a response to her recent intra-articular steroid injection  If her blood pressure remains uncontrolled in 200s then will transfer her to ICU and start her on a

## 2022-08-21 NOTE — ED PROVIDER NOTES
EKG:  Read by me in the absence of a cardiologist shows: Sinus rhythm, rate 71, premature atrial beats, QRS duration normal, axis normal, delayed R wave progression, comparison with prior study shows faster heart rate when compared to 7/14/2022    I did not perform face-to-face evaluation and was not otherwise involved in this patient's care. Please see PA note for further information on this visit.         Isaac Ramirez MD  08/21/22 3888

## 2022-08-21 NOTE — PROGRESS NOTES
Pt admitted to room 3364. Here for dizziness and hypertension. Scores as a high fall risk, non slip socks and fall bracelet placed. Plan of care discussed with daughter Reynolds Lefort at the bedside. All questions answered. Call light in reach, bed alarm engaged.

## 2022-08-21 NOTE — ED NOTES
Jean-Claude Tijerina RN @ bedside. Report given. Pt transferred to floor via wheelchair with O2 @ 2L with portable tank.      Reina Puente RN  08/21/22 [id number]

## 2022-08-21 NOTE — ED PROVIDER NOTES
DIVYA. I have evaluated this patient. My supervising physician was available for consultation. Chief Complaint:   Chief Complaint   Patient presents with    Dizziness     Pt c/o dizziness after being at a birthday party and hearing loud music. Pt states the music was so loud. ED Course & Medical Decision Making  80 y.o. female presenting with lightheadedness with standing. -CBC/CHEM: Baseline  Troponin negative x1  -BNP: 1260  -Head CT: No acute intracranial process  -Chest x-ray lungs clear  -EKG: PAC, LVH    MDM: This 80-year-old female presents with lightheadedness, and hypertension. She reports compliance with all of her hypertension medications, but still has a blood pressure in the 200s. She reports dizziness that started after her cardiologist changed her blood pressure medication. That was exacerbated by the steroid injection that she received for chronic bursitis. Given her elevated BNP, and her lightheadedness she needs to be admitted for hypertensive emergency versus worsening heart failure. Final Clinical Impression & Plan:  Hypertensive emergency  -Admit      ---------------------------------------------------------------------------------------------------------------  HPI:  PMH significant for A. fib, hypertension, CKD, COPD    Presenting with lightheadedness, started 2 weeks prior. Worse starting yesterday. Blames loud music at birthday party for exacerbating her symptoms. 2 weeks ago got her hypertension medications modified by her cardiologist.  2 or 3 days ago got a steroid injection for bursitis. Reports lightheaded symptoms when she stands. Feels like she is going to pass out. Has not passed out or fallen yet. No chest pain, no shortness of breath, no belly pain, no back pain. No fevers or chills. No cough or sore throat. Is this patient to be included in the SEP-1 Core Measure due to severe sepsis or septic shock?    No   Exclusion criteria - the patient is NOT to be included for SEP-1 Core Measure due to: Infection is not suspected      Medical Hx: Past medical history reviewed, and pertinent for:     Past Medical History:   Diagnosis Date    Arthritis     Atrial fibrillation ([address]     Diabetes mellitus type II, controlled ([address]     GERD (gastroesophageal reflux disease)     HTN (hypertension)     Hyperlipidemia     Hypothyroid     Insomnia     Lumbago     SVT (supraventricular tachycardia) ([facility])        Patient Active Problem List   Diagnosis    Hyperlipidemia    IBS (irritable bowel syndrome)    Overactive bladder    Osteoarthritis    Controlled type 2 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 disease, without long-term current use of insulin (HCC)    Restless legs syndrome    ANTHONY (obstructive sleep apnea)    Allergic rhinitis    COPD, moderate (HCC)    Dizziness    Interstitial lung disease ([address]    Coronary artery disease involving native coronary artery of native heart with angina pectoris ([facility])    PAF (paroxysmal atrial fibrillation) ([facility])    Hypothyroid    Chronic respiratory failure with hypoxia ([facility])    SOB (shortness of breath)    Ataxia    HTN (hypertension), benign    Centrilobular emphysema ([facility])    Pulmonary nodule    Ptosis of eyelid, bilateral    Acute exacerbation of chronic obstructive pulmonary disease (COPD) ([facility]    Chronic renal disease, stage III ([facility]) [[id number]]    Chronic fatigue         Surgical Hx:   Past surgical history reviewed, and pertinent for:      Past Surgical History:   Procedure Laterality Date    APPENDECTOMY      CHOLECYSTECTOMY, LAPAROSCOPIC  2/24/2013    COLONOSCOPY         Social Hx:       Social History     Socioeconomic History    Marital status:       Spouse name: Not on file    Number of children: 7    Years of education: Not on file    Highest education level: Not on file   Occupational History    Not on file   Tobacco Use    Smoking status: Former     Packs/day: 1.00     Years: 45.00     Pack years: 45.00 Types: Cigarettes     Start date: 9/15/1950     Quit date: 1995     Years since quittin.6    Smokeless tobacco: Never   Vaping Use    Vaping Use: Never used   Substance and Sexual Activity    Alcohol use: No     Alcohol/week: 0.0 standard drinks    Drug use: No    Sexual activity: Not Currently     Partners: Male   Other Topics Concern    Not on file   Social History Narrative    Not on file     Social Determinants of Health     Financial Resource Strain: Not on file   Food Insecurity: Not on file   Transportation Needs: Not on file   Physical Activity: Not on file   Stress: Not on file   Social Connections: Not on file   Intimate Partner Violence: Not on file   Housing Stability: Not on file         Medications:  Previous Medications    ALBUTEROL SULFATE HFA (PROAIR HFA) 108 (90 BASE) MCG/ACT INHALER    Inhale 2 puffs into the lungs every 6 hours as needed for Wheezing    AMLODIPINE (NORVASC) 5 MG TABLET    Take 1 tablet by mouth in the morning. APIXABAN (ELIQUIS) 5 MG TABS TABLET    Take 1 tablet by mouth 2 times daily    ATORVASTATIN (LIPITOR) 80 MG TABLET    TAKE 1 TABLET EVERY NIGHT    BLOOD GLUCOSE CALIBRATION (TRUE METRIX LEVEL 2) NORMAL SOLN    As needed    BLOOD GLUCOSE MONITORING SUPPL (TRUE METRIX METER) W/DEVICE KIT    To use to check sugars 4 times daily    BLOOD GLUCOSE TEST STRIPS (TRUE METRIX BLOOD GLUCOSE TEST) STRIP    1 each by In Vitro route daily As needed.     BLOOD GLUCOSE TEST STRIPS (TRUE METRIX BLOOD GLUCOSE TEST) STRIP    USE TO TEST BLOOD SUGAR DAILY    BUDESONIDE-FORMOTEROL (SYMBICORT) 160-4.5 MCG/ACT AERO    INHALE 2 PUFFS INTO THE LUNGS TWICE DAILY    CLONIDINE (CATAPRES) 0.1 MG TABLET    TAKE 1 TABLET TWICE DAILY    CONJUGATED ESTROGENS (PREMARIN) 0.625 MG/GM VAGINAL CREAM    Place 1 g vaginally three times a week    DICYCLOMINE (BENTYL) 10 MG CAPSULE    TAKE 1 CAPSULE BY MOUTH FOUR TIMES DAILY AS NEEDED FOR ABDOMINAL PAIN    DILTIAZEM (CARDIZEM CD) 120 MG EXTENDED noted in HPI     Imaging:  No orders to display       Labs:  No results found for this visit on 08/21/22. Screenings:                    Physical Exam:  Vitals: BP (!) 216/88   Pulse 74   Temp 97.7 °F (36.5 °C) (Oral)   Resp 16   Ht 5' 2\" (1.575 m)   Wt 159 lb (72.1 kg)   SpO2 99%   BMI 29.08 kg/m²    General: awake, alert, no apparent distress  Pupils: equal, reactive  Eyes: EOM intact, conjunctiva clear, no discharge  Head: Non-traumatic  Neck: Supple  Mouth: Moist, no oral lesions, no tonsillar enlargement  Heart: Rate as noted, regular rhythm, no murmur or rubs. Chest/Lungs: CTAB, no wheezes or crackles  Abdomen: soft, nondistended, no tenderness to palpation   Back: No midline tenderness. No CVA tenderness  Extremities:  cap refill <2 UE/LE, no tenderness of calves, no edema  Neuro: Moving all extremities, no facial droop, no slurred speech, answers questions appropriately. Skin: Warm.  No visible rash, lesions, or bruising           JESSICA Hoover        [place], [place]  08/21/22 [id number]

## 2022-08-22 ENCOUNTER — APPOINTMENT (OUTPATIENT)
Dept: MRI IMAGING | Age: 87
DRG: 149 | End: 2022-08-22
Payer: MEDICARE

## 2022-08-22 PROBLEM — H91.93 BILATERAL HEARING LOSS: Status: ACTIVE | Noted: 2022-08-22

## 2022-08-22 PROBLEM — H69.83 ACUTE DYSFUNCTION OF EUSTACHIAN TUBE, BILATERAL: Status: ACTIVE | Noted: 2022-08-22

## 2022-08-22 PROBLEM — R42 DIZZINESS: Status: ACTIVE | Noted: 2022-08-22

## 2022-08-22 PROBLEM — H61.21 IMPACTED CERUMEN OF RIGHT EAR: Status: ACTIVE | Noted: 2022-08-22

## 2022-08-22 PROBLEM — H69.93 ACUTE DYSFUNCTION OF EUSTACHIAN TUBE, BILATERAL: Status: ACTIVE | Noted: 2022-08-22

## 2022-08-22 LAB
A/G RATIO: 1.7 (ref 1.1–2.2)
ALBUMIN SERPL-MCNC: 4.2 G/DL (ref 3.4–5)
ALP BLD-CCNC: 141 U/L (ref 40–129)
ALT SERPL-CCNC: 11 U/L (ref 10–40)
ANION GAP SERPL CALCULATED.3IONS-SCNC: 11 MMOL/L (ref 3–16)
AST SERPL-CCNC: 13 U/L (ref 15–37)
BASOPHILS ABSOLUTE: 0 K/UL (ref 0–0.2)
BASOPHILS RELATIVE PERCENT: 0.5 %
BILIRUB SERPL-MCNC: 0.3 MG/DL (ref 0–1)
BUN BLDV-MCNC: 36 MG/DL (ref 7–20)
CALCIUM SERPL-MCNC: 9.9 MG/DL (ref 8.3–10.6)
CHLORIDE BLD-SCNC: 103 MMOL/L (ref 99–110)
CHOLESTEROL, TOTAL: 148 MG/DL (ref 0–199)
CO2: 25 MMOL/L (ref 21–32)
CREAT SERPL-MCNC: 1.1 MG/DL (ref 0.6–1.2)
EKG ATRIAL RATE: 71 BPM
EKG DIAGNOSIS: NORMAL
EKG P AXIS: 85 DEGREES
EKG P-R INTERVAL: 166 MS
EKG Q-T INTERVAL: 396 MS
EKG QRS DURATION: 88 MS
EKG QTC CALCULATION (BAZETT): 430 MS
EKG R AXIS: -3 DEGREES
EKG T AXIS: 21 DEGREES
EKG VENTRICULAR RATE: 71 BPM
EOSINOPHILS ABSOLUTE: 0.1 K/UL (ref 0–0.6)
EOSINOPHILS RELATIVE PERCENT: 1.3 %
ESTIMATED AVERAGE GLUCOSE: 145.6 MG/DL
GFR AFRICAN AMERICAN: 57
GFR NON-AFRICAN AMERICAN: 47
GLUCOSE BLD-MCNC: 117 MG/DL (ref 70–99)
GLUCOSE BLD-MCNC: 139 MG/DL (ref 70–99)
GLUCOSE BLD-MCNC: 145 MG/DL (ref 70–99)
GLUCOSE BLD-MCNC: 161 MG/DL (ref 70–99)
GLUCOSE BLD-MCNC: 237 MG/DL (ref 70–99)
HBA1C MFR BLD: 6.7 %
HCT VFR BLD CALC: 35.2 % (ref 36–48)
HDLC SERPL-MCNC: 52 MG/DL (ref 40–60)
HEMOGLOBIN: 11.7 G/DL (ref 12–16)
INR BLD: 1.18 (ref 0.87–1.14)
LDL CHOLESTEROL CALCULATED: 66 MG/DL
LV EF: 58 %
LVEF MODALITY: NORMAL
LYMPHOCYTES ABSOLUTE: 1.9 K/UL (ref 1–5.1)
LYMPHOCYTES RELATIVE PERCENT: 25.5 %
MCH RBC QN AUTO: 29.3 PG (ref 26–34)
MCHC RBC AUTO-ENTMCNC: 33.3 G/DL (ref 31–36)
MCV RBC AUTO: 88.2 FL (ref 80–100)
MONOCYTES ABSOLUTE: 0.5 K/UL (ref 0–1.3)
MONOCYTES RELATIVE PERCENT: 6.3 %
NEUTROPHILS ABSOLUTE: 4.9 K/UL (ref 1.7–7.7)
NEUTROPHILS RELATIVE PERCENT: 66.4 %
PDW BLD-RTO: 15.2 % (ref 12.4–15.4)
PERFORMED ON: ABNORMAL
PLATELET # BLD: 160 K/UL (ref 135–450)
PMV BLD AUTO: 8 FL (ref 5–10.5)
POTASSIUM REFLEX MAGNESIUM: 4.7 MMOL/L (ref 3.5–5.1)
PROTHROMBIN TIME: 14.9 SEC (ref 11.7–14.5)
RBC # BLD: 3.99 M/UL (ref 4–5.2)
SODIUM BLD-SCNC: 139 MMOL/L (ref 136–145)
TOTAL PROTEIN: 6.7 G/DL (ref 6.4–8.2)
TRIGL SERPL-MCNC: 150 MG/DL (ref 0–150)
TROPONIN: <0.01 NG/ML
VLDLC SERPL CALC-MCNC: 30 MG/DL
WBC # BLD: 7.4 K/UL (ref 4–11)

## 2022-08-22 PROCEDURE — 93010 ELECTROCARDIOGRAM REPORT: CPT | Performed by: INTERNAL MEDICINE

## 2022-08-22 PROCEDURE — 2700000000 HC OXYGEN THERAPY PER DAY

## 2022-08-22 PROCEDURE — 6360000002 HC RX W HCPCS: Performed by: INTERNAL MEDICINE

## 2022-08-22 PROCEDURE — 36415 COLL VENOUS BLD VENIPUNCTURE: CPT

## 2022-08-22 PROCEDURE — 94640 AIRWAY INHALATION TREATMENT: CPT

## 2022-08-22 PROCEDURE — 6370000000 HC RX 637 (ALT 250 FOR IP): Performed by: INTERNAL MEDICINE

## 2022-08-22 PROCEDURE — 80053 COMPREHEN METABOLIC PANEL: CPT

## 2022-08-22 PROCEDURE — 97530 THERAPEUTIC ACTIVITIES: CPT

## 2022-08-22 PROCEDURE — 94761 N-INVAS EAR/PLS OXIMETRY MLT: CPT

## 2022-08-22 PROCEDURE — 93306 TTE W/DOPPLER COMPLETE: CPT

## 2022-08-22 PROCEDURE — 83036 HEMOGLOBIN GLYCOSYLATED A1C: CPT

## 2022-08-22 PROCEDURE — 93880 EXTRACRANIAL BILAT STUDY: CPT

## 2022-08-22 PROCEDURE — 2060000000 HC ICU INTERMEDIATE R&B

## 2022-08-22 PROCEDURE — 80061 LIPID PANEL: CPT

## 2022-08-22 PROCEDURE — 85610 PROTHROMBIN TIME: CPT

## 2022-08-22 PROCEDURE — 97116 GAIT TRAINING THERAPY: CPT

## 2022-08-22 PROCEDURE — 99223 1ST HOSP IP/OBS HIGH 75: CPT | Performed by: PSYCHIATRY & NEUROLOGY

## 2022-08-22 PROCEDURE — 85025 COMPLETE CBC W/AUTO DIFF WBC: CPT

## 2022-08-22 PROCEDURE — 97161 PT EVAL LOW COMPLEX 20 MIN: CPT

## 2022-08-22 PROCEDURE — 84484 ASSAY OF TROPONIN QUANT: CPT

## 2022-08-22 PROCEDURE — 99221 1ST HOSP IP/OBS SF/LOW 40: CPT | Performed by: STUDENT IN AN ORGANIZED HEALTH CARE EDUCATION/TRAINING PROGRAM

## 2022-08-22 PROCEDURE — 2580000003 HC RX 258: Performed by: INTERNAL MEDICINE

## 2022-08-22 PROCEDURE — 99223 1ST HOSP IP/OBS HIGH 75: CPT | Performed by: INTERNAL MEDICINE

## 2022-08-22 RX ORDER — HYDRALAZINE HYDROCHLORIDE 50 MG/1
50 TABLET, FILM COATED ORAL EVERY 8 HOURS SCHEDULED
Qty: 90 TABLET | Refills: 3 | Status: SHIPPED | OUTPATIENT
Start: 2022-08-22 | End: 2022-08-23 | Stop reason: HOSPADM

## 2022-08-22 RX ORDER — HYDRALAZINE HYDROCHLORIDE 25 MG/1
75 TABLET, FILM COATED ORAL EVERY 8 HOURS SCHEDULED
Status: DISCONTINUED | OUTPATIENT
Start: 2022-08-22 | End: 2022-08-22

## 2022-08-22 RX ORDER — DIAZEPAM 2 MG/1
2 TABLET ORAL ONCE
Status: COMPLETED | OUTPATIENT
Start: 2022-08-22 | End: 2022-08-22

## 2022-08-22 RX ORDER — HYDRALAZINE HYDROCHLORIDE 25 MG/1
50 TABLET, FILM COATED ORAL EVERY 8 HOURS SCHEDULED
Status: DISCONTINUED | OUTPATIENT
Start: 2022-08-22 | End: 2022-08-22

## 2022-08-22 RX ORDER — HYDRALAZINE HYDROCHLORIDE 25 MG/1
50 TABLET, FILM COATED ORAL EVERY 8 HOURS SCHEDULED
Status: DISCONTINUED | OUTPATIENT
Start: 2022-08-22 | End: 2022-08-23 | Stop reason: HOSPADM

## 2022-08-22 RX ORDER — MECLIZINE HCL 12.5 MG/1
25 TABLET ORAL 3 TIMES DAILY
Status: DISCONTINUED | OUTPATIENT
Start: 2022-08-22 | End: 2022-08-23 | Stop reason: HOSPADM

## 2022-08-22 RX ADMIN — POLYETHYLENE GLYCOL 3350 17 G: 17 POWDER, FOR SOLUTION ORAL at 08:40

## 2022-08-22 RX ADMIN — MECLIZINE 25 MG: 12.5 TABLET ORAL at 15:47

## 2022-08-22 RX ADMIN — HYDRALAZINE HYDROCHLORIDE 25 MG: 25 TABLET, FILM COATED ORAL at 04:54

## 2022-08-22 RX ADMIN — DIAZEPAM 2 MG: 2 TABLET ORAL at 15:47

## 2022-08-22 RX ADMIN — HYDRALAZINE HYDROCHLORIDE 50 MG: 25 TABLET, FILM COATED ORAL at 13:39

## 2022-08-22 RX ADMIN — PROPAFENONE HYDROCHLORIDE 150 MG: 150 TABLET, FILM COATED ORAL at 20:46

## 2022-08-22 RX ADMIN — SODIUM CHLORIDE, PRESERVATIVE FREE 10 ML: 5 INJECTION INTRAVENOUS at 11:28

## 2022-08-22 RX ADMIN — ROPINIROLE HYDROCHLORIDE 3 MG: 1 TABLET, FILM COATED ORAL at 20:50

## 2022-08-22 RX ADMIN — ATORVASTATIN CALCIUM 80 MG: 80 TABLET, FILM COATED ORAL at 20:46

## 2022-08-22 RX ADMIN — ENOXAPARIN SODIUM 40 MG: 100 INJECTION SUBCUTANEOUS at 07:49

## 2022-08-22 RX ADMIN — PRAMIPEXOLE DIHYDROCHLORIDE 0.25 MG: 0.25 TABLET ORAL at 20:46

## 2022-08-22 RX ADMIN — ROPINIROLE HYDROCHLORIDE 3 MG: 1 TABLET, FILM COATED ORAL at 13:38

## 2022-08-22 RX ADMIN — LORAZEPAM 0.5 MG: 0.5 TABLET ORAL at 23:27

## 2022-08-22 RX ADMIN — SODIUM CHLORIDE, PRESERVATIVE FREE 10 ML: 5 INJECTION INTRAVENOUS at 07:50

## 2022-08-22 RX ADMIN — LEVOTHYROXINE SODIUM 75 MCG: 75 TABLET ORAL at 07:51

## 2022-08-22 RX ADMIN — CLONIDINE HYDROCHLORIDE 0.1 MG: 0.1 TABLET ORAL at 07:49

## 2022-08-22 RX ADMIN — MECLIZINE 25 MG: 12.5 TABLET ORAL at 20:46

## 2022-08-22 RX ADMIN — ROPINIROLE HYDROCHLORIDE 3 MG: 1 TABLET, FILM COATED ORAL at 07:51

## 2022-08-22 RX ADMIN — MECLIZINE 25 MG: 12.5 TABLET ORAL at 08:40

## 2022-08-22 RX ADMIN — FLUTICASONE PROPIONATE 1 SPRAY: 50 SPRAY, METERED NASAL at 07:49

## 2022-08-22 RX ADMIN — LOSARTAN POTASSIUM 100 MG: 100 TABLET, FILM COATED ORAL at 07:50

## 2022-08-22 RX ADMIN — ACETAMINOPHEN 650 MG: 325 TABLET ORAL at 20:50

## 2022-08-22 RX ADMIN — Medication 2 PUFF: at 20:12

## 2022-08-22 RX ADMIN — HYDRALAZINE HYDROCHLORIDE 50 MG: 25 TABLET, FILM COATED ORAL at 20:46

## 2022-08-22 RX ADMIN — ONDANSETRON 4 MG: 2 INJECTION INTRAMUSCULAR; INTRAVENOUS at 22:36

## 2022-08-22 RX ADMIN — SODIUM CHLORIDE, PRESERVATIVE FREE 10 ML: 5 INJECTION INTRAVENOUS at 20:47

## 2022-08-22 RX ADMIN — PROPAFENONE HYDROCHLORIDE 150 MG: 150 TABLET, FILM COATED ORAL at 04:54

## 2022-08-22 RX ADMIN — DILTIAZEM HYDROCHLORIDE 120 MG: 120 CAPSULE, COATED, EXTENDED RELEASE ORAL at 07:50

## 2022-08-22 RX ADMIN — CETIRIZINE HYDROCHLORIDE 10 MG: 10 TABLET, FILM COATED ORAL at 07:50

## 2022-08-22 RX ADMIN — CLONIDINE HYDROCHLORIDE 0.1 MG: 0.1 TABLET ORAL at 20:46

## 2022-08-22 RX ADMIN — PROPAFENONE HYDROCHLORIDE 150 MG: 150 TABLET, FILM COATED ORAL at 13:39

## 2022-08-22 RX ADMIN — HYDRALAZINE HYDROCHLORIDE 10 MG: 20 INJECTION INTRAMUSCULAR; INTRAVENOUS at 11:28

## 2022-08-22 ASSESSMENT — PAIN SCALES - GENERAL
PAINLEVEL_OUTOF10: 0
PAINLEVEL_OUTOF10: 0
PAINLEVEL_OUTOF10: 8

## 2022-08-22 ASSESSMENT — PAIN DESCRIPTION - DESCRIPTORS: DESCRIPTORS: ACHING

## 2022-08-22 ASSESSMENT — PAIN DESCRIPTION - LOCATION: LOCATION: NECK

## 2022-08-22 ASSESSMENT — PAIN SCALES - WONG BAKER: WONGBAKER_NUMERICALRESPONSE: 0

## 2022-08-22 ASSESSMENT — PAIN DESCRIPTION - PAIN TYPE: TYPE: ACUTE PAIN

## 2022-08-22 NOTE — CONSULTS
[facility]      Patient Name: Rocio Horowitz  Medical Record Number:  [id number]  Primary Care Physician:  Theresa Roca MD  Date of Consultation: 8/22/2022    Chief Complaint: Pressure in the ears      [place] is a(n) 80 y.o. female who presents with pressure in both of her ears that started yesterday. Right ear feels more pressure than the left. She has chronic worsening hearing over the years. Her right ear feels a little bit swollen. Denies otorrhea. She takes Claritin and Flonase at home for allergies. She had an episode of 10-minute vertigo this morning. No history of vertigo in the past.  No recent sick contacts or no recent illnesses. Denies recurrent sinus infections. Endorses frequent watery runny nose. No history of sinonasal or otologic surgery.       Patient Active Problem List   Diagnosis    Hyperlipidemia    IBS (irritable bowel syndrome)    Overactive bladder    Osteoarthritis    Controlled type 2 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 disease, without long-term current use of insulin (HCC)    Restless legs syndrome    ANTHONY (obstructive sleep apnea)    Allergic rhinitis    COPD, moderate (HCC)    Dizziness    Interstitial lung disease ([address]    Coronary artery disease involving native coronary artery of native heart with angina pectoris (HCC)    PAF (paroxysmal atrial fibrillation) (HCC)    Hypothyroid    Chronic respiratory failure with hypoxia (HCC)    SOB (shortness of breath)    Ataxia    HTN (hypertension), benign    Centrilobular emphysema (HCC)    Pulmonary nodule    Ptosis of eyelid, bilateral    Acute exacerbation of chronic obstructive pulmonary disease (COPD) ([address]    Chronic renal disease, stage III ([address] [[id number]]    Chronic fatigue    Hypertensive urgency, malignant     Past Surgical History:   Procedure Laterality Date    APPENDECTOMY      CHOLECYSTECTOMY, LAPAROSCOPIC 2013    COLONOSCOPY       Family History   Problem Relation Age of Onset    High Blood Pressure Mother     Heart Attack Father     Heart Disease Father     Other Brother     Asthma Paternal Uncle      Social History     Socioeconomic History    Marital status:      Spouse name: Not on file    Number of children: 7    Years of education: Not on file    Highest education level: Not on file   Occupational History    Not on file   Tobacco Use    Smoking status: Former     Packs/day: 1.00     Years: 45.00     Pack years: 45.00     Types: Cigarettes     Start date: 9/15/1950     Quit date: 1995     Years since quittin.6    Smokeless tobacco: Never   Vaping Use    Vaping Use: Never used   Substance and Sexual Activity    Alcohol use: No     Alcohol/week: 0.0 standard drinks    Drug use: No    Sexual activity: Not Currently     Partners: Male   Other Topics Concern    Not on file   Social History Narrative    Not on file     Social Determinants of Health     Financial Resource Strain: Not on file   Food Insecurity: Not on file   Transportation Needs: Not on file   Physical Activity: Not on file   Stress: Not on file   Social Connections: Not on file   Intimate Partner Violence: Not on file   Housing Stability: Not on file       DRUG/FOOD ALLERGIES: Adhesive tape, Cefaclor, Cephalexin, Nsaids, Clinoril [sulindac], Codeine, Diclofenac, Diclofenac sodium, Diclofenac sodium, Hctz [hydrochlorothiazide], Ibuprofen, Macrobid [nitrofurantoin macrocrystal], Motrin [ibuprofen micronized], Pcn [penicillins], Peach [prunus persica], Prednisone, and Sulfa antibiotics    CURRENT MEDICATIONS  Prior to Admission medications    Medication Sig Start Date End Date Taking? Authorizing Provider   loratadine (CLARITIN) 10 MG tablet Take 1 tablet by mouth in the morning.  22   Sherryle Divine, MD   fluticasone (FLONASE) 50 MCG/ACT nasal spray USE 2 SPRAYS NASALLY EVERY DAY 22   Sherryle Divine, MD   atorvastatin (LIPITOR) 80 MG tablet TAKE 1 TABLET EVERY NIGHT 8/2/22   KATHY Charles - CNP   amLODIPine (NORVASC) 5 MG tablet Take 1 tablet by mouth in the morning. 8/1/22   KATHY Charles - MU   dilTIAZem (CARDIZEM CD) 120 MG extended release capsule Take 1 capsule by mouth daily 7/14/22   KATHY Charles - CNP   apixaban (ELIQUIS) 5 MG TABS tablet Take 1 tablet by mouth 2 times daily 7/14/22   KATHY Charles - CNP   meclizine (ANTIVERT) 12.5 MG tablet TAKE 1 TABLET THREE TIMES DAILY AS NEEDED 7/12/22   Aicha Cormier MD   blood glucose test strips (TRUE METRIX BLOOD GLUCOSE TEST) strip USE TO TEST BLOOD SUGAR DAILY 7/11/22   Aicha Cormier MD   propafenone (RYTHMOL) 150 MG tablet TAKE 1 TABLET BY MOUTH EVERY 8 HOURS 7/8/22   Aicha Cormier MD   dicyclomine (BENTYL) 10 MG capsule TAKE 1 CAPSULE BY MOUTH FOUR TIMES DAILY AS NEEDED FOR ABDOMINAL PAIN 7/7/22   KATHY Cavazos - CNP   irbesartan (AVAPRO) 300 MG tablet TAKE 1 TABLET BY MOUTH EVERY NIGHT 7/1/22   Aicha Cormier MD   rOPINIRole (REQUIP) 3 MG tablet TAKE 1 TABLET THREE TIMES DAILY 6/27/22   Aicha Cormier MD   LORazepam (ATIVAN) 1 MG tablet TAKE 1/2 TABLET BY MOUTH TWICE DAILY AND 1 EVERY NIGHT AT BEDTIME AS NEEDED FOR ANXIETY 6/13/22 9/12/22  Aicha Cormier MD   levothyroxine (SYNTHROID) 75 MCG tablet TAKE 1 TABLET BY MOUTH EVERY DAY 6/7/22   Aicha Cormier MD   cloNIDine (CATAPRES) 0.1 MG tablet TAKE 1 TABLET TWICE DAILY 5/27/22   Misael Gonzalez MD   conjugated estrogens (PREMARIN) 0.625 MG/GM vaginal cream Place 1 g vaginally three times a week 5/18/22   Aicha Cormier MD   pramipexole (MIRAPEX) 0.25 MG tablet TAKE 1 TABLET BY MOUTH EVERY NIGHT 5/9/22   Aicha Cormier MD   levalbuterol (XOPENEX) 0.63 MG/3ML nebulization USE 1 VIAL PER NEBULIZER EVERY 6 HOURS.  MAX OF 4 TIMES PER 24 HOURS 4/26/22   Griselda Cox MD   tiotropium (SPIRIVA RESPIMAT) 2.5 MCG/ACT AERS inhaler Inhale 2 puffs into the lungs daily 4/21/22   Eric Suarez PA-C   budesonide-formoterol (SYMBICORT) 160-4.5 MCG/ACT AERO INHALE 2 PUFFS INTO THE LUNGS TWICE DAILY 4/8/22   Dionna Pat MD   Blood Glucose Monitoring Suppl (TRUE METRIX METER) w/Device KIT To use to check sugars 4 times daily 3/24/22   Venkatesh Marina MD   Blood Glucose Calibration (TRUE METRIX LEVEL 2) Normal SOLN As needed 3/23/22   Manford Denver, MD   blood glucose test strips (TRUE METRIX BLOOD GLUCOSE TEST) strip 1 each by In Vitro route daily As needed.  3/23/22   Manford Denver, MD   TRUEplus Lancets 33G MISC 1 daily 3/23/22   Manford Denver, MD   Lancets MISC 1 each by Does not apply route 2 times daily 6/30/21   Manford Denver, MD   ondansetron [facility] 4 MG tablet Take 1 tablet by mouth daily as needed for Nausea or Vomiting 3/4/07   Joey Amor MD   OXYGEN Inhale 2 L into the lungs    Historical Provider, MD   Respiratory Therapy Supplies (NEBULIZER/TUBING/MOUTHPIECE) KIT 1 kit by Does not apply route 4 times daily as needed (shortness of breath) 3/26/20   Manford Denver, MD   albuterol sulfate HFA (PROAIR HFA) 108 (90 Base) MCG/ACT inhaler Inhale 2 puffs into the lungs every 6 hours as needed for Wheezing 10/25/19   Dionna Pat MD       REVIEW OF SYSTEMS  The following systems were reviewed and revealed the following in addition to any already discussed in the HPI:    CONSTITUTIONAL: no weight loss, no fever, no night sweats, no chills  EYES: no vision changes, no blurry vision  EARS: + Chronic changes in hearing, + right otalgia  NOSE: no epistaxis, no rhinorrhea  RESPIRATORY: no difficulty breathing, no shortness of breath  CV: no chest pain, no lower extremity swelling  HEME: no coagulation disorder, no known bleeding disorders  NEURO: no TIA or stroke-like symptoms  SKIN: no new rashes in the head and neck, no recently diagnosed skin cancers  MOUTH: no new oral ulcers, no recent tooth infections  GASTROINTESTINAL: no diarrhea, no stomach pain  PSYCH: no anxiety, no depression    PHYSICAL EXAM  BP (!) 179/70   Pulse 61   Temp 97.2 °F (36.2 °C) (Temporal)   Resp 16   Ht 5' 2\" (1.575 m)   Wt 158 lb 4.8 oz (71.8 kg)   SpO2 94%   BMI 28.95 kg/m²     GENERAL: No Acute Distress, Alert and Oriented, no hoarseness  EYES: EOMI, Anti-icteric  NOSE: No epistaxis, nasal mucosa within normal limits, no purulent drainage. EARS: No periauricular edema or erythema, no proptosis. External ear within normal limits. Normal external canal appearance, EAC patent bilaterally. On otoscopy:  As: EAC clear, tympanic membrane intact. No evidence of middle ear effusion. Tympanic membrane appears mildly retracted. No perforations. No otorrhea. Ad: There is cerumen impaction along the inferior half of the tympanic membrane precluding visualization of this part of the tympanic membrane. Superior portion of the tympanic membrane appears mildly retracted. No evidence of effusion. No perforations of visualized portions of tympanic membrane were noted. No otorrhea. FACE: HB 1/6 bilaterally, symmetric appearing, sensation equal bilaterally  ORAL CAVITY: No masses or lesions, no evidence of inflammation. Uvula midline, moist mucous membranes, upper and lower dentures in place  NECK: Normal range of motion, no thyromegaly, trachea is midline, no palpable lymphadenopathy or neck masses, no crepitus  CHEST: Normal respiratory effort, breathing comfortably, no retractions. On supplemental oxygen via nasal cannula. SKIN: No rashes, normal appearing skin, no evidence of skin lesions/tumors  NEURO: Cranial Nerves 2, 3, 4, 5, 6, 7, 11, 12 intact bilaterally     I have performed a head and neck physical exam personally or was physically present during the key or critical portions of the service.     LABS  Lab Results   Component Value Date    WBC 7.4 08/22/2022    HGB 11.7 (L) 08/22/2022    HCT 35.2 (L) 08/22/2022    MCV 88.2 08/22/2022     08/22/2022           RADIOLOGY  EXAMINATION:   CT OF THE HEAD WITHOUT CONTRAST  8/21/2022 11:30 am       TECHNIQUE:   CT of the head was performed without the administration of intravenous   contrast. Automated exposure control, iterative reconstruction, and/or weight   based adjustment of the mA/kV was utilized to reduce the radiation dose to as   low as reasonably achievable. COMPARISON:   08/11/2021       HISTORY:   ORDERING SYSTEM PROVIDED HISTORY: Right ear pain, light headed. TECHNOLOGIST PROVIDED HISTORY:   Reason for exam:->Right ear pain, light headed. Has a \"code stroke\" or \"stroke alert\" been called? ->No   Decision Support Exception - unselect if not a suspected or confirmed   emergency medical condition->Emergency Medical Condition (MA)   Reason for Exam:  Right ear pain, light headed       FINDINGS:   BRAIN/VENTRICLES: There is no acute intracranial hemorrhage, mass effect or   midline shift. No abnormal extra-axial fluid collection. The gray-white   differentiation is maintained without evidence of an acute infarct. There is   prominence of the ventricles and sulci due to global parenchymal volume loss. There are nonspecific areas of hypoattenuation within the periventricular and   subcortical white matter, which likely represent chronic microvascular   ischemic change. ORBITS: The visualized portion of the orbits demonstrate no acute abnormality. SINUSES: The visualized paranasal sinuses and mastoid air cells demonstrate   no acute abnormality. SOFT TISSUES/SKULL: No acute abnormality of the visualized skull or soft   tissues. Impression   No acute intracranial abnormality. Images from CT head without contrast performed 9/21/2022 independent reviewed. This demonstrates hypopneumatized right mastoid with aeration of the right middle ear space, right ossicular chain appears intact. Well-pneumatized left mastoid with scattered mastoid air cell effusion. Left middle ear space appears well aerated with left ossicular chain intact. ASSESSMENT/PLAN  [facility] [place] is a very pleasant 80 y.o. female with bilateral ear fullness, vertigo. Bilateral eustachian tube dysfunction  Bilateral hearing loss, unspecified  -No evidence of otitis or mastoiditis on examination.  -Suspect underlying ear pressure likely secondary to eustachian tube dysfunction. Continue Flonase and Claritin daily  -Would benefit from comprehensive audiological evaluation as an outpatient. Right cerumen impaction  -Follow-up as outpatient for cerumen debridement under otomicroscopy    Vertigo  -Isolated episode of vertigo this morning that lasted for approximately 10 minutes. Suspect BPPV versus possible early onset vestibular neuronitis. Currently vertigo has resolved. If she becomes vertiginous again would recommend PT referral for vestibular rehab. -Recommend meclizine only as needed for severe vertigo  -Can be worked up as an outpatient as well. May need VNG in the future if vertigo returns. Dr. Darlene Santiago [place] [id number]  Department of Otolaryngology/Head and Neck Surgery      Medical Decision Making: The following items were considered in medical decision making:  Independent review of images  Review / order clinical lab tests  Review / order radiology tests  Decision to obtain old records      This note was generated completely or in part utilizing Dragon dictation speech recognition software. Occasionally, words are mistranscribed and despite editing, the text may contain inaccuracies due to incorrect word recognition. If further clarification is needed please contact the office at [phone].

## 2022-08-22 NOTE — PROGRESS NOTES
CLINICAL PHARMACY NOTE: MEDS TO BEDS    Total # of Prescriptions Filled: 1   The following medications were delivered to the patient:  Hydralazine 50 mg    Additional Documentation:  Delivered to patient daughter=signed  Ok to be delivered per [employer] CPhT

## 2022-08-22 NOTE — CONSULTS
[facility]   Electrophysiology Consultation   Date: 8/22/2022  Reason for Consultation: Pre-Syncope   Consult Requesting Physician: Chapis Mckeon MD   Chief Complaint   Patient presents with    Dizziness     Pt c/o dizziness after being at a birthday party and hearing loud music. Pt states the music was so loud. CC: Dizziness   HPI: Alfonso Kat is a 80 y.o. female who has presented to the hospital with dizziness. She has had intermittent dizziness for a while. Came to the hospital and found to have hypertensive urgency. Reports no syncope. She states that his morning, when she woke up, she felt the whole room was spinning around. She gets nauseated. She also has tinnitus on her right ear and also difficulty hearing. History of atrial fibrillation s/p cardioversion in 2018 and 3/2021. On Eliquis and propafenone and is compliant with it. She also has HTN, HLD, DM, hypothyroid and COPD. She is on Oxygen at home. Assessment:   Hypertensive urgency  Dizziness / Vertigo   Paroxysmal Afib  DM  COPD on oxygen       Plan:   - She has vertigo, tinnitus and also difficult hearing. This morning had an spell of vertigo, rhythm remained sinus. Her dizziness of not cardiac related. - Hospitalist team is evaluating her for other causes. - Currently in sinus rhythm. - Cardizem; 20 mg daily   - Propafenone 150 mg TID  - Poor candidate for amiodarone due to COPD/ILD  - [id number] score:high risk for thromboembolism   - Eliquis  5 mg bid. Resume it after neurology evaluation is complete with CT/MRI. - Not a good candidate for ablation.   - BP better controlled. - Controlled: Goal <140/80   - Echo ordered. - COPD Stable, On 2L oxygen     If Echo is unremarkable, no further cardiac workup is recommended. Discussed with nursing staff. Discussed with Dr. Susy Ugarte.      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 Echo: 2019:   Left ventricle - normal size, thickness and function with EF of 60%  Aortic valve - sclerotic, mild regurgitation    Myoview: 2006: normal.   Pro-BNP: 1260   Troponin negative   I independently reviewed the cardiac diagnostic studies, ECG and relevant imaging studies. Lab Results   Component Value Date    LVEF 60 2019    LVEFMODE Echo 2019     Lab Results   Component Value Date    TSH 2.50 2020       Physical Examination:  Vitals:    22 0733   BP:    Pulse: 63   Resp:    Temp:    SpO2:       In: 565 [P.O.:325; I.V.:240]  Out: 420    Wt Readings from Last 3 Encounters:   22 158 lb 4.8 oz (71.8 kg)   22 157 lb 8 oz (71.4 kg)   22 161 lb (73 kg)     Temp  Av.9 °F (36.6 °C)  Min: 97.2 °F (36.2 °C)  Max: 98.4 °F (36.9 °C)  Pulse  Av.7  Min: 56  Max: 74  BP  Min: 160/68  Max: 217/105  SpO2  Av %  Min: 96 %  Max: 100 %    Intake/Output Summary (Last 24 hours) at 2022 0756  Last data filed at 2022 0749  Gross per 24 hour   Intake 565 ml   Output 420 ml   Net 145 ml         I independently reviewed all cardiac tracing from cardiac telemetry. Constitutional: Oriented. No distress. Head: Normocephalic and atraumatic. Mouth/Throat: Oropharynx is clear and moist.   Eyes: Conjunctivae normal. EOM are normal.   Neck: Neck supple. No JVD present. Cardiovascular: Normal rate, regular rhythm, S1&S2. Pulmonary/Chest: Bilateral respiratory sounds. No rhonchi. Abdominal: Soft. No tenderness. Musculoskeletal: No tenderness. No edema    Lymphadenopathy: Has no cervical adenopathy. Neurological: Alert and oriented. Follows command, + hard of hearing   Skin: Skin is warm, No rash noted.    Psychiatric: Has a normal behavior       Scheduled Meds:   hydrALAZINE  50 mg Oral 3 times per day    atorvastatin  80 mg Oral Nightly    mometasone-formoterol  2 puff Inhalation BID cloNIDine  0.1 mg Oral BID    dilTIAZem  120 mg Oral Daily    fluticasone  1 spray Each Nostril Daily    losartan  100 mg Oral Daily    levothyroxine  75 mcg Oral Daily    cetirizine  10 mg Oral Daily    pramipexole  0.25 mg Oral Nightly    propafenone  150 mg Oral 3 times per day    rOPINIRole  3 mg Oral TID    sodium chloride flush  10 mL IntraVENous 2 times per day    enoxaparin  40 mg SubCUTAneous Daily    insulin lispro  0-4 Units SubCUTAneous TID WC    insulin lispro  0-4 Units SubCUTAneous Nightly     Continuous Infusions:   sodium chloride      dextrose       PRN Meds:.albuterol sulfate HFA, levalbuterol, LORazepam, sodium chloride flush, sodium chloride, ondansetron **OR** ondansetron, polyethylene glycol, acetaminophen **OR** acetaminophen, hydrALAZINE, dextrose bolus **OR** dextrose bolus, glucagon (rDNA), dextrose     Review of System:  [x] Full ROS obtained and negative except as mentioned in HPI    Prior to Admission medications    Medication Sig Start Date End Date Taking? Authorizing Provider   loratadine (CLARITIN) 10 MG tablet Take 1 tablet by mouth in the morning. 8/9/22   Josselyn Phelps MD   fluticasone (FLONASE) 50 MCG/ACT nasal spray USE 2 SPRAYS NASALLY EVERY DAY 8/2/22   Josselyn Phelps MD   atorvastatin (LIPITOR) 80 MG tablet TAKE 1 TABLET EVERY NIGHT 8/2/22   KATHY Mcgowan CNP   amLODIPine (NORVASC) 5 MG tablet Take 1 tablet by mouth in the morning.  8/1/22   KATHY Mcgowan CNP   dilTIAZem (CARDIZEM CD) 120 MG extended release capsule Take 1 capsule by mouth daily 7/14/22   KATHY Mcgowan CNP   apixaban (ELIQUIS) 5 MG TABS tablet Take 1 tablet by mouth 2 times daily 7/14/22   KATHY Mcgowan CNP   meclizine (ANTIVERT) 12.5 MG tablet TAKE 1 TABLET THREE TIMES DAILY AS NEEDED 7/12/22   Josselyn Phelps MD   blood glucose test strips (TRUE METRIX BLOOD GLUCOSE TEST) strip USE TO TEST BLOOD SUGAR DAILY 7/11/22   Josselyn Phelps MD   propafenone (RYTHMOL) 150 MG tablet TAKE 1 TABLET BY MOUTH EVERY 8 HOURS 7/8/22   Dominic Don MD   dicyclomine (BENTYL) 10 MG capsule TAKE 1 CAPSULE BY MOUTH FOUR TIMES DAILY AS NEEDED FOR ABDOMINAL PAIN 7/7/22   KATHY Begum - CNP   irbesartan (AVAPRO) 300 MG tablet TAKE 1 TABLET BY MOUTH EVERY NIGHT 7/1/22   Dominic Don MD   rOPINIRole (REQUIP) 3 MG tablet TAKE 1 TABLET THREE TIMES DAILY 6/27/22   Dominic Don MD   LORazepam (ATIVAN) 1 MG tablet TAKE 1/2 TABLET BY MOUTH TWICE DAILY AND 1 EVERY NIGHT AT BEDTIME AS NEEDED FOR ANXIETY 6/13/22 9/12/22  Dominic Don MD   levothyroxine (SYNTHROID) 75 MCG tablet TAKE 1 TABLET BY MOUTH EVERY DAY 6/7/22   Dominic Don MD   cloNIDine (CATAPRES) 0.1 MG tablet TAKE 1 TABLET TWICE DAILY 5/27/22   Chapito Nugent MD   conjugated estrogens (PREMARIN) 0.625 MG/GM vaginal cream Place 1 g vaginally three times a week 5/18/22   Dominic Don MD   pramipexole (MIRAPEX) 0.25 MG tablet TAKE 1 TABLET BY MOUTH EVERY NIGHT 5/9/22   Dominic Don MD   levalbuterol (XOPENEX) 0.63 MG/3ML nebulization USE 1 VIAL PER NEBULIZER EVERY 6 HOURS. MAX OF 4 TIMES PER 24 HOURS 4/26/22   Miriam Fermin MD   tiotropium [facility] RESPIMAT) 2.5 MCG/ACT AERS inhaler Inhale 2 puffs into the lungs daily 4/21/22   Katya Dangelo PA-C   budesonide-formoterol (SYMBICORT) 160-4.5 MCG/ACT AERO INHALE 2 PUFFS INTO THE LUNGS TWICE DAILY 4/8/22   Miriam Fermin MD   Blood Glucose Monitoring Suppl (TRUE METRIX METER) w/Device KIT To use to check sugars 4 times daily 3/24/22   Chapito Nugent MD   Blood Glucose Calibration (TRUE METRIX LEVEL 2) Normal SOLN As needed 3/23/22   Dominic Don MD   blood glucose test strips (TRUE METRIX BLOOD GLUCOSE TEST) strip 1 each by In Vitro route daily As needed.  3/23/22   Dominic Don MD   TRUEplus Lancets 33G MISC 1 daily 3/23/22   Dominic Don MD   Lancets MISC 1 each by Does not apply route 2 times daily 6/30/21 Calin Fonseca MD   ondansetron [facility]) 4 MG tablet Take 1 tablet by mouth daily as needed for Nausea or Vomiting 5/3/20   Alie Rivera MD   OXYGEN Inhale 2 L into the lungs    Historical Provider, MD   Respiratory Therapy Supplies (NEBULIZER/TUBING/MOUTHPIECE) KIT 1 kit by Does not apply route 4 times daily as needed (shortness of breath) 3/26/20   Calin Fonseca MD   albuterol sulfate HFA (PROAIR HFA) 108 (90 Base) MCG/ACT inhaler Inhale 2 puffs into the lungs every 6 hours as needed for Wheezing 10/25/19   Herson Anderson MD       Past Medical History:   Diagnosis Date    Arthritis     Atrial fibrillation ([address]     Diabetes mellitus type II, controlled ([facility] [address] [address]     GERD (gastroesophageal reflux disease)     HTN (hypertension)     Hyperlipidemia     Hypothyroid     Insomnia     Lumbago     SVT (supraventricular tachycardia) ([address]         Past Surgical History:   Procedure Laterality Date    APPENDECTOMY      CHOLECYSTECTOMY, LAPAROSCOPIC  2/24/2013    COLONOSCOPY         Allergies   Allergen Reactions    Adhesive Tape Anaphylaxis    Cefaclor Nausea And Vomiting     Other reaction(s): Chest pain    Cephalexin Other (See Comments)     Struggling to breath, almost passing out/ very low oxygen and pulse    Nsaids      Pt states she has hives and heart races   Other reaction(s): Tachycardia    Clinoril [Sulindac] Other (See Comments)     Stomach pain    Codeine      FEEL NUMB    Diclofenac     Diclofenac Sodium Hives    Diclofenac Sodium Hives    Hctz [Hydrochlorothiazide]      Sob    Ibuprofen Other (See Comments)     Other reaction(s): Tachycardia    Macrobid [Nitrofurantoin Macrocrystal]      nausea    Motrin [Ibuprofen Micronized] Other (See Comments)     Tachycardia      Pcn [Penicillins] Hives    Peach [Prunus Persica] Itching and Swelling     Cannot eat raw peaches    Prednisone      Causes elevated blood pressure     Sulfa Antibiotics      Nausea, diarrhea       Social History:  Reviewed. reports that she quit smoking about 26 years ago. Her smoking use included cigarettes. She started smoking about 71 years ago. She has a 45.00 pack-year smoking history. She has never used smokeless tobacco. She reports that she does not drink alcohol and does not use drugs. Family History:  Reviewed. Reviewed. No family history of SCD. Relevant and available labs, and cardiovascular diagnostics reviewed. Reviewed. Recent Labs     08/21/22  1117 08/22/22  0517    139   K 4.3 4.7    103   CO2 26 25   BUN 34* 36*   CREATININE 0.9 1.1     Recent Labs     08/21/22  1117 08/22/22  0517   WBC 7.1 7.4   HGB 11.2* 11.7*   HCT 33.8* 35.2*   MCV 87.9 88.2    160     Estimated Creatinine Clearance: 34 mL/min (based on SCr of 1.1 mg/dL). No results found for: BNP    I independently reviewed all cardiac tracing from cardiac telemetry. I independently reviewed relevant and available cardiac diagnostic tests ECG, CXR, Echo, Stress test, Device interrogation, Holter, CT scan. Outside medical records via Care everywhere reviewed and summarized in H&P above. Complex medical condition with multiple medical problems affecting prognosis and outcome of EP interventions  Severe exacerbation of underlying medical condition requiring hospitalization and at risk of decompensation. All questions and concerns were addressed to the patient/family. Alternatives to my treatment were discussed. I have discussed the above stated plan and the patient verbalized understanding and agreed with the plan. NOTE: This report was transcribed using voice recognition software. Every effort was made to ensure accuracy, however, inadvertent computerized transcription errors may be present.      Valentina Orozco MD, MPH  [facility]   Office: [phone]  Fax: [phone]

## 2022-08-22 NOTE — PROGRESS NOTES
Hospitalist Progress Note      PCP: Sherryle Divine, MD    Date of Admission: 8/21/2022    Chief Complaint: [facility]:   Patient had some vertigo this morning complained of a spinning sensation  No acute events overnight  No chest pain no shortness of breath  On baseline oxygen requirements        Medications:  Reviewed      Exam:    BP (!) 160/71   Pulse 59   Temp (!) 96.4 °F (35.8 °C) (Temporal)   Resp 18   Ht 5' 2\" (1.575 m)   Wt 158 lb 4.8 oz (71.8 kg)   SpO2 98%   BMI 28.95 kg/m²     General appearance: No apparent distress, appears stated age and cooperative. HEENT: Pupils equal, round, and reactive to light. Conjunctivae/corneas clear. Neck: Supple, with full range of motion. No jugular venous distention. Trachea midline. Respiratory:  Normal respiratory effort. Clear to auscultation, bilaterally without RALES/WHEEZES/Rhonchi. Cardiovascular: Regular rate and rhythm with normal S1/S2 without MURMURS, rubs or gallops. Abdomen: Soft, non-tender, non-distended with normal bowel sounds. Musculoskeletal: No clubbing, cyanosis or EDEMA bilaterally. Full range of motion without deformity. Skin: Skin color, texture, turgor normal.  No rashes or lesions. Neurologic:  Neurovascularly intact without any focal sensory/motor deficits.  Cranial nerves: II-XII intact, grossly non-focal.        Labs:   Recent Labs     08/21/22  1117 08/22/22  0517   WBC 7.1 7.4   HGB 11.2* 11.7*   HCT 33.8* 35.2*    160     Recent Labs     08/21/22 1117 08/22/22  0517    139   K 4.3 4.7    103   CO2 26 25   BUN 34* 36*   CREATININE 0.9 1.1   CALCIUM 9.7 9.9     Recent Labs     08/21/22  1117 08/22/22  0517   AST 13* 13*   ALT 11 11   BILITOT <0.2 0.3   ALKPHOS 143* 141*     Recent Labs     08/22/22  0517   INR 1.18*     Recent Labs     08/21/22  1626 08/21/22  2139 08/22/22  0517   TROPONINI <0.01 <0.01 <0.01       Urinalysis:      Lab Results   Component Value Date/Time

## 2022-08-22 NOTE — CARE COORDINATION
Met with patient's family to inform them of therapy's recommendations for home care. They were agreeable with a referral to [facility].

## 2022-08-22 NOTE — PROGRESS NOTES
[facility] Department   Phone: [phone]    Physical Therapy    [x] Initial Evaluation            [] Daily Treatment Note         [] Discharge Summary      Patient: Albertina Root   : 1935   MRN: [id number]   Date of Service:  2022  Admitting Diagnosis: Hypertensive urgency, malignant  Current Admission Summary: Albertina Root is a 80 y.o. female who has presented to the hospital with dizziness. She has had intermittent dizziness for a while. Came to the hospital and found to have hypertensive urgency. Reports no syncope. She states that his morning, when she woke up, she felt the whole room was spinning around. She gets nauseated. She also has tinnitus on her right ear and also difficulty hearing. History of atrial fibrillation s/p cardioversion in  and 3/2021. On Eliquis and propafenone and is compliant with it. She also has HTN, HLD, DM, hypothyroid and COPD. She is on Oxygen at home. Past Medical History:  has a past medical history of Arthritis, Atrial fibrillation ([address] Diabetes mellitus type II, controlled ([address] GERD (gastroesophageal reflux disease), HTN (hypertension), Hyperlipidemia, Hypothyroid, Insomnia, Lumbago, and SVT (supraventricular tachycardia) ([address] Past Surgical History:  has a past surgical history that includes Appendectomy; Colonoscopy; and Cholecystectomy, laparoscopic (2013). Discharge Recommendations: Albertina Root scored a 20/24 on the AM-PAC short mobility form. Current research shows that an AM-PAC score of 18 or greater is typically associated with a discharge to the patient's home setting. Based on the patient's AM-PAC score and their current functional mobility deficits, it is recommended that the patient have 2-3 sessions per week of Physical Therapy at d/c to increase the patient's independence.   At this time, this patient demonstrates the endurance and safety to discharge home with Outpatient PT for Vestibular Rehab and a follow up treatment frequency of 2-3x/wk. Please see assessment section for further patient specific details. If patient discharges prior to next session this note will serve as a discharge summary. Please see below for the latest assessment towards goals. DME Required For Discharge: no DME required at discharge  Precautions/Restrictions: high fall risk  Positional Restrictions:no positional restrictions    Pre-Admission Information   Lives With: daughter, daughter works full time; pt states that her daughter will stay home with her if she is still having symptoms   Type of Home: house  Home Layout: two level-bi-level  Home Access: 1 step to enter without HR; once in home, 7 steps up to main level of home; laundry in lower level  Bathroom Layout: wheelchair accessible, walk in shower  Bathroom Equipment: grab bars in shower, shower chair, hand held shower head  Toilet Height: elevated height  Home Equipment: rollator - 4 wheeled walker, manual wheelchair, oxygen, alert button  Transfer Assistance: Independent without use of device  Ambulation Assistance:Independent without use of device, occasional use of 4WW when pt feels dizzy  ADL Assistance: independent with all ADL's  IADL Assistance: requires assistance with all homemaking tasks, pt does simple meal prep  Active :        [] Yes  [x] No, daughter drives  Hand Dominance: [] Left  [] Right  Current Employment: retired. Occupation: worked at [employer] for 30 years   Recent Falls: No falls    Examination   Vision:   Vision Gross Assessment: Impaired and Vision Corrective Device: wears glasses at all times  Hearing:   hard of hearing, no hearing aid  Observation:   Cervical AROM: WFLs  VOR and Smooth pursuits with no onset of room spinning; feeling of ear \"fullness and pressure\" remains throughout evaluation  Posture:    Forward head, rounded shoulders  Sensation:   reports numbness and tingling in (B) LE, neuropathy in B feet  ROM:   (B) LE AROM WFL  Strength:   (B) LE strength grossly WFL  Decision Making: medium complexity  Clinical Presentation: evolving      Subjective  General: Pt seated in chair upon arrival on 2L O2; agreeable to PT; pt takes daily medication for OA at home; pt feeling dizzy, lightheaded, pressure currently; \"I think I have an inner ear infection\", pt has history of vertigo, and takes medication prn for vertigo   Pain: 5/10. Location: B ears, \"pressure\"  Pain Interventions: RN notified       Functional Mobility  Bed Mobility  Bed mobility not completed on this date. Comments:  Transfers  Sit to stand transfer: stand by assistance  Stand to sit transfer: stand by assistance  Comments:  Ambulation  Assistive Device: rolling walker  Assistance: stand by assistance  Distance: 30 ft  Gait Mechanics: Decreased step lengths and denis  Comments:  No increase in symptoms with standing and ambulation  Stair Mobility  Stair mobility not completed on this date.   Comments:  Balance  Static Sitting Balance: fair (+): maintains balance at SBA/supervision without use of UE support  Dynamic Sitting Balance: fair (+): maintains balance at SBA/supervision without use of UE support  Static Standing Balance: fair (-): maintains balance at SBA with use of UE support  Dynamic Standing Balance: fair (-): maintains balance at SBA with use of UE support  Comments:    Other Therapeutic Interventions    Functional Outcomes  AM-PAC Inpatient Mobility Raw Score : 20              Cognition  WFL  Orientation:    alert and oriented x 4  Command Following:   [employer]    Education  Barriers To Learning: hearing  Patient Education: patient educated on PT role and benefits  Learning Assessment:  patient verbalizes and demonstrates understanding    Assessment  Activity Tolerance: rest in chair on 2L 96%, HR 67, Pt seated BN=548/70, standing=149/69  Impairments Requiring Therapeutic Intervention: decreased functional mobility, decreased balance, vestibular impairment  Prognosis: good  Clinical Assessment:  Pt is limited by the above deficits and would benefit from skilled PT services to maximize pt functional mobility and safety prior to discharge. Recommend pt discharge with 24/7 assist if symptoms remain, and use of RW. Pt would benefit from Outpatient PT Vestibular Rehab if vertigo continues.      Safety Interventions: patient left in chair, chair alarm in place, call light within reach, gait belt, patient at risk for falls, and nurse notified    Plan  Frequency: 3-5 x/per week  Current Treatment Recommendations: balance training, functional mobility training, transfer training, gait training, stair training, safety education, and vestibular rehab    Goals  Patient Goals: Discharge home with daughter   Short Term Goals:  Time Frame: discharge  Patient will complete bed mobility at modified independent   Patient will complete transfers at [facility]   Patient will ambulate 100 ft with use of LRAD at supervision  Patient will ascend/descend 7 stairs with (L) ascending handrail at supervision    Therapy Session Time      Individual Group Co-treatment   Time In 1147       Time Out 1243       Minutes 56         Timed Code Treatment Minutes:  41 Minutes  Total Treatment Minutes:  56       Electronically Signed By: Zion Menchaca, PT

## 2022-08-22 NOTE — PLAN OF CARE
Problem: Discharge Planning  Goal: Discharge to home or other facility with appropriate resources  Outcome: Progressing  Flowsheets  Taken 8/22/2022 0000 by Mika Thornton RN  Discharge to home or other facility with appropriate resources: Identify barriers to discharge with patient and caregiver  Taken 8/21/2022 1912 by Mika Thornton RN  Discharge to home or other facility with appropriate resources: Identify barriers to discharge with patient and caregiver     Problem: Safety - Adult  Goal: Free from fall injury  Outcome: Progressing     Problem: ABCDS Injury Assessment  Goal: Absence of physical injury  Outcome: Progressing

## 2022-08-22 NOTE — CONSULTS
In patient Neurology consult        [facility] Neurology      MD Irina Patel  1935    Date of Service: 2022    Referring Physician: Dionna Hahn MD      Reason for the consult and CC: New onset dizziness    HPI:   The patient is a 80y.o.  years old female with history of hypertension, respiratory failure, A. fib and hyperlipidemia who was admitted to the hospital yesterday with new onset dizziness. Symptoms started hours prior to admission. Description sudden lightheadedness and dizziness upon standing and early morning. Degree was severe. Duration was persistent. No falling or injury. No other relieving or aggravating factor chest pain or headache. No clear triggers. Symptoms persisted and she came to the ED where her blood pressure was above 200. Initial imaging with CT head showed no acute findings. She was admitted. Today she feels better. Blood pressure still in the 170 systolic. No headache or chest pain. Other review of system was unremarkable.       Family History   Problem Relation Age of Onset    High Blood Pressure Mother     Heart Attack Father     Heart Disease Father     Other Brother     Asthma Paternal Uncle      Past Surgical History:   Procedure Laterality Date    APPENDECTOMY      CHOLECYSTECTOMY, LAPAROSCOPIC  2013    COLONOSCOPY          Past Medical History:   Diagnosis Date    Arthritis     Atrial fibrillation ([address]     Diabetes mellitus type II, controlled ([address]     GERD (gastroesophageal reflux disease)     HTN (hypertension)     Hyperlipidemia     Hypothyroid     Insomnia     Lumbago     SVT (supraventricular tachycardia) ([address]      Social History     Tobacco Use    Smoking status: Former     Packs/day: 1.00     Years: 45.00     Pack years: 45.00     Types: Cigarettes     Start date: 9/15/1950     Quit date: 1995     Years since quittin.6    Smokeless tobacco: Never   Vaping Use    Vaping Use: Never used   Substance Use Topics    Alcohol use: No     Alcohol/week: 0.0 standard drinks    Drug use: No     Allergies   Allergen Reactions    Adhesive Tape Anaphylaxis    Cefaclor Nausea And Vomiting     Other reaction(s): Chest pain    Cephalexin Other (See Comments)     Struggling to breath, almost passing out/ very low oxygen and pulse    Nsaids      Pt states she has hives and heart races   Other reaction(s): Tachycardia    Clinoril [Sulindac] Other (See Comments)     Stomach pain    Codeine      FEEL NUMB    Diclofenac     Diclofenac Sodium Hives    Diclofenac Sodium Hives    Hctz [Hydrochlorothiazide]      Sob    Ibuprofen Other (See Comments)     Other reaction(s): Tachycardia    Macrobid [Nitrofurantoin Macrocrystal]      nausea    Motrin [Ibuprofen Micronized] Other (See Comments)     Tachycardia      Pcn [Penicillins] Hives    Peach [Prunus Persica] Itching and Swelling     Cannot eat raw peaches    Prednisone      Causes elevated blood pressure     Sulfa Antibiotics      Nausea, diarrhea     Current Facility-Administered Medications   Medication Dose Route Frequency Provider Last Rate Last Admin    hydrALAZINE (APRESOLINE) tablet 50 mg  50 mg Oral 3 times per day Stefano Juarez MD        meclizine (ANTIVERT) tablet 25 mg  25 mg Oral TID Stefano Juarez MD   25 mg at 08/22/22 0840    diazePAM (VALIUM) tablet 2 mg  2 mg Oral Once Stefano Juarez MD        albuterol sulfate HFA (PROVENTIL;VENTOLIN;PROAIR) 108 (90 Base) MCG/ACT inhaler 2 puff  2 puff Inhalation Q6H PRN Stefano Juarez MD        atorvastatin (LIPITOR) tablet 80 mg  80 mg Oral Nightly Stefano Juarez MD   80 mg at 08/21/22 2108    mometasone-formoterol (DULERA) 100-5 MCG/ACT inhaler 2 puff  2 puff Inhalation BID Stefano Juarez MD   2 puff at 08/21/22 2125    cloNIDine (CATAPRES) tablet 0.1 mg  0.1 mg Oral BID Stefano Juarez MD   0.1 mg at 08/22/22 0778    dilTIAZem (CARDIZEM CD) extended release capsule 120 mg  120 mg Oral Daily Stefano Juarez MD   120 mg at 08/22/22 0275 fluticasone (FLONASE) 50 MCG/ACT nasal spray 1 spray  1 spray Each Nostril Daily Shai Jhaveri MD   1 spray at 08/22/22 0749    losartan (COZAAR) tablet 100 mg  100 mg Oral Daily Shai Jhaveri MD   100 mg at 08/22/22 0750    levalbuterol (XOPENEX) nebulization 0.63 mg  0.63 mg Nebulization Q4H PRN Shai Jhaveri MD        levothyroxine (SYNTHROID) tablet 75 mcg  75 mcg Oral Daily Shai Jhaveri MD   75 mcg at 08/22/22 0751    cetirizine (ZYRTEC) tablet 10 mg  10 mg Oral Daily Shai Jhaveri MD   10 mg at 08/22/22 0750    LORazepam (ATIVAN) tablet 0.5 mg  0.5 mg Oral Q8H PRN Shai Jhaveri MD   0.5 mg at 08/21/22 2121    pramipexole (MIRAPEX) tablet 0.25 mg  0.25 mg Oral Nightly Shai Jhaveri MD   0.25 mg at 08/21/22 2109    propafenone (RYTHMOL) tablet 150 mg  150 mg Oral 3 times per day Shai Jhaveri MD   150 mg at 08/22/22 0454    rOPINIRole (REQUIP) tablet 3 mg  3 mg Oral TID Shai Jhaveri MD   3 mg at 08/22/22 0751    sodium chloride flush 0.9 % injection 10 mL  10 mL IntraVENous 2 times per day Shai Jhaveri MD   10 mL at 08/22/22 0750    sodium chloride flush 0.9 % injection 10 mL  10 mL IntraVENous PRN Shai Jhaveri MD   10 mL at 08/22/22 1128    0.9 % sodium chloride infusion   IntraVENous PRN Shai Jhaveri MD        enoxaparin (LOVENOX) injection 40 mg  40 mg SubCUTAneous Daily Shai Jhaveri MD   40 mg at 08/22/22 0749    ondansetron (ZOFRAN-ODT) disintegrating tablet 4 mg  4 mg Oral Q8H PRN Shai Jhaveri MD        Or    ondansetron [facility]) injection 4 mg  4 mg IntraVENous Q6H PRN Shai Jhaveri MD        polyethylene glycol (GLYCOLAX) packet 17 g  17 g Oral Daily PRN Shai Jhaveri MD   17 g at 08/22/22 0840    acetaminophen (TYLENOL) tablet 650 mg  650 mg Oral Q6H PRN Shai Jhaveri MD   650 mg at 08/21/22 2244    Or    acetaminophen (TYLENOL) suppository 650 mg  650 mg Rectal Q6H PRN Shai Jhaveri MD        hydrALAZINE (APRESOLINE) injection 10 mg  10 mg IntraVENous Q6H PRN Shai Jhaveri MD   10 mg at 08/22/22 today.  Able to move arms and legs spontaneously. Poor effort. Tone: Normal tone. Reflexes: Symmetric 2+ in the arms and 1 in the legs   Planters: flexor bilaterally. Coordination: no pronator drift, no dysmetria with FNF in upper extremities. Normal REM. Sensation: normal to all modalities in both arms and legs. Gait/Posture: steady gait and normal posturing and station.      Data:  LABS:   Lab Results   Component Value Date/Time     08/22/2022 05:17 AM    K 4.7 08/22/2022 05:17 AM     08/22/2022 05:17 AM    CO2 25 08/22/2022 05:17 AM    BUN 36 08/22/2022 05:17 AM    CREATININE 1.1 08/22/2022 05:17 AM    GFRAA 57 08/22/2022 05:17 AM    GFRAA >60 02/27/2013 05:18 AM    LABGLOM 47 08/22/2022 05:17 AM    GLUCOSE 145 08/22/2022 05:17 AM    PHOS 3.0 02/28/2021 05:08 AM    MG 2.00 02/28/2021 05:08 AM    CALCIUM 9.9 08/22/2022 05:17 AM     Lab Results   Component Value Date/Time    WBC 7.4 08/22/2022 05:17 AM    RBC 3.99 08/22/2022 05:17 AM    HGB 11.7 08/22/2022 05:17 AM    HCT 35.2 08/22/2022 05:17 AM    MCV 88.2 08/22/2022 05:17 AM    RDW 15.2 08/22/2022 05:17 AM     08/22/2022 05:17 AM     Lab Results   Component Value Date    INR 1.18 (H) 08/22/2022    PROTIME 14.9 (H) 08/22/2022       Neuroimaging was independently reviewed by myself and discussed results with the patient and/or family  Reviewed notes from different physicians  Reviewed lab and blood testing    Impression:  New onset dizziness in setting of hypertensive urgency which could be the cause of her symptoms  Hypertension, not controlled  Hyperlipidemia  A. fib      Recommendation:  Blood pressure control and monitor  Goal inpatient 160/90  Echo  Carotid Doppler  Telemetry  DVT and GI prophylaxis  Restart anticoagulation once medically stable and MRI showed no large ischemic stroke although exam is nonfocal.  PT and OT  Telemetry  DVT and GI prophylaxis  Insulin sliding scale  A1c  Lipid panel  Aspirin        Thank you for referring such patient. If you have any questions regarding my consult note, please don't hesitate to call me. Néstor Barraza MD  [phone]    This dictation was generated by voice recognition computer software.  Although all attempts are made to edit the dictation for accuracy, there may be errors in the  transcription that are not intended

## 2022-08-23 ENCOUNTER — TELEPHONE (OUTPATIENT)
Dept: FAMILY MEDICINE CLINIC | Age: 87
End: 2022-08-23

## 2022-08-23 ENCOUNTER — APPOINTMENT (OUTPATIENT)
Dept: MRI IMAGING | Age: 87
DRG: 149 | End: 2022-08-23
Payer: MEDICARE

## 2022-08-23 ENCOUNTER — APPOINTMENT (OUTPATIENT)
Dept: CT IMAGING | Age: 87
DRG: 149 | End: 2022-08-23
Payer: MEDICARE

## 2022-08-23 VITALS
TEMPERATURE: 98 F | HEART RATE: 60 BPM | BODY MASS INDEX: 29.13 KG/M2 | HEIGHT: 62 IN | OXYGEN SATURATION: 98 % | WEIGHT: 158.3 LBS | DIASTOLIC BLOOD PRESSURE: 70 MMHG | RESPIRATION RATE: 18 BRPM | SYSTOLIC BLOOD PRESSURE: 122 MMHG

## 2022-08-23 DIAGNOSIS — R06.2 WHEEZING: ICD-10-CM

## 2022-08-23 LAB
ANION GAP SERPL CALCULATED.3IONS-SCNC: 10 MMOL/L (ref 3–16)
BASOPHILS ABSOLUTE: 0 K/UL (ref 0–0.2)
BASOPHILS RELATIVE PERCENT: 0.6 %
BUN BLDV-MCNC: 34 MG/DL (ref 7–20)
CALCIUM SERPL-MCNC: 9.3 MG/DL (ref 8.3–10.6)
CHLORIDE BLD-SCNC: 102 MMOL/L (ref 99–110)
CO2: 23 MMOL/L (ref 21–32)
CREAT SERPL-MCNC: 1.1 MG/DL (ref 0.6–1.2)
EOSINOPHILS ABSOLUTE: 0.1 K/UL (ref 0–0.6)
EOSINOPHILS RELATIVE PERCENT: 1.8 %
ESTIMATED AVERAGE GLUCOSE: 145.6 MG/DL
GFR AFRICAN AMERICAN: 57
GFR NON-AFRICAN AMERICAN: 47
GLUCOSE BLD-MCNC: 130 MG/DL (ref 70–99)
GLUCOSE BLD-MCNC: 142 MG/DL (ref 70–99)
GLUCOSE BLD-MCNC: 166 MG/DL (ref 70–99)
HBA1C MFR BLD: 6.7 %
HCT VFR BLD CALC: 34.3 % (ref 36–48)
HEMOGLOBIN: 11.3 G/DL (ref 12–16)
LYMPHOCYTES ABSOLUTE: 1.7 K/UL (ref 1–5.1)
LYMPHOCYTES RELATIVE PERCENT: 25.6 %
MCH RBC QN AUTO: 29.1 PG (ref 26–34)
MCHC RBC AUTO-ENTMCNC: 32.9 G/DL (ref 31–36)
MCV RBC AUTO: 88.5 FL (ref 80–100)
MONOCYTES ABSOLUTE: 0.5 K/UL (ref 0–1.3)
MONOCYTES RELATIVE PERCENT: 7.2 %
NEUTROPHILS ABSOLUTE: 4.3 K/UL (ref 1.7–7.7)
NEUTROPHILS RELATIVE PERCENT: 64.8 %
PDW BLD-RTO: 15.4 % (ref 12.4–15.4)
PERFORMED ON: ABNORMAL
PERFORMED ON: ABNORMAL
PLATELET # BLD: 158 K/UL (ref 135–450)
PMV BLD AUTO: 8.1 FL (ref 5–10.5)
POTASSIUM SERPL-SCNC: 4.6 MMOL/L (ref 3.5–5.1)
RBC # BLD: 3.88 M/UL (ref 4–5.2)
SODIUM BLD-SCNC: 135 MMOL/L (ref 136–145)
WBC # BLD: 6.6 K/UL (ref 4–11)

## 2022-08-23 PROCEDURE — 6360000002 HC RX W HCPCS: Performed by: INTERNAL MEDICINE

## 2022-08-23 PROCEDURE — 99233 SBSQ HOSP IP/OBS HIGH 50: CPT | Performed by: NURSE PRACTITIONER

## 2022-08-23 PROCEDURE — 2580000003 HC RX 258: Performed by: INTERNAL MEDICINE

## 2022-08-23 PROCEDURE — 97116 GAIT TRAINING THERAPY: CPT

## 2022-08-23 PROCEDURE — 6370000000 HC RX 637 (ALT 250 FOR IP): Performed by: INTERNAL MEDICINE

## 2022-08-23 PROCEDURE — 70551 MRI BRAIN STEM W/O DYE: CPT

## 2022-08-23 PROCEDURE — 80048 BASIC METABOLIC PNL TOTAL CA: CPT

## 2022-08-23 PROCEDURE — 85025 COMPLETE CBC W/AUTO DIFF WBC: CPT

## 2022-08-23 PROCEDURE — 94761 N-INVAS EAR/PLS OXIMETRY MLT: CPT

## 2022-08-23 PROCEDURE — 2700000000 HC OXYGEN THERAPY PER DAY

## 2022-08-23 PROCEDURE — 36415 COLL VENOUS BLD VENIPUNCTURE: CPT

## 2022-08-23 PROCEDURE — 94640 AIRWAY INHALATION TREATMENT: CPT

## 2022-08-23 PROCEDURE — 70450 CT HEAD/BRAIN W/O DYE: CPT

## 2022-08-23 RX ORDER — BUDESONIDE AND FORMOTEROL FUMARATE DIHYDRATE 160; 4.5 UG/1; UG/1
AEROSOL RESPIRATORY (INHALATION)
Qty: 3 EACH | Refills: 0 | Status: SHIPPED | OUTPATIENT
Start: 2022-08-23

## 2022-08-23 RX ORDER — HYDRALAZINE HYDROCHLORIDE 50 MG/1
50 TABLET, FILM COATED ORAL EVERY 8 HOURS SCHEDULED
Qty: 90 TABLET | Refills: 3 | Status: SHIPPED | OUTPATIENT
Start: 2022-08-23 | End: 2022-10-11

## 2022-08-23 RX ADMIN — LORAZEPAM 0.5 MG: 0.5 TABLET ORAL at 07:22

## 2022-08-23 RX ADMIN — HYDRALAZINE HYDROCHLORIDE 50 MG: 25 TABLET, FILM COATED ORAL at 13:59

## 2022-08-23 RX ADMIN — CETIRIZINE HYDROCHLORIDE 10 MG: 10 TABLET, FILM COATED ORAL at 10:06

## 2022-08-23 RX ADMIN — CLONIDINE HYDROCHLORIDE 0.1 MG: 0.1 TABLET ORAL at 10:07

## 2022-08-23 RX ADMIN — Medication 2 PUFF: at 09:00

## 2022-08-23 RX ADMIN — FLUTICASONE PROPIONATE 1 SPRAY: 50 SPRAY, METERED NASAL at 10:07

## 2022-08-23 RX ADMIN — ROPINIROLE HYDROCHLORIDE 3 MG: 1 TABLET, FILM COATED ORAL at 13:58

## 2022-08-23 RX ADMIN — ENOXAPARIN SODIUM 40 MG: 100 INJECTION SUBCUTANEOUS at 10:08

## 2022-08-23 RX ADMIN — PROPAFENONE HYDROCHLORIDE 150 MG: 150 TABLET, FILM COATED ORAL at 06:19

## 2022-08-23 RX ADMIN — HYDRALAZINE HYDROCHLORIDE 50 MG: 25 TABLET, FILM COATED ORAL at 06:19

## 2022-08-23 RX ADMIN — MECLIZINE 25 MG: 12.5 TABLET ORAL at 10:06

## 2022-08-23 RX ADMIN — LOSARTAN POTASSIUM 100 MG: 100 TABLET, FILM COATED ORAL at 10:06

## 2022-08-23 RX ADMIN — ROPINIROLE HYDROCHLORIDE 3 MG: 1 TABLET, FILM COATED ORAL at 10:06

## 2022-08-23 RX ADMIN — DILTIAZEM HYDROCHLORIDE 120 MG: 120 CAPSULE, COATED, EXTENDED RELEASE ORAL at 10:07

## 2022-08-23 RX ADMIN — ONDANSETRON 4 MG: 2 INJECTION INTRAMUSCULAR; INTRAVENOUS at 10:04

## 2022-08-23 RX ADMIN — MECLIZINE 25 MG: 12.5 TABLET ORAL at 13:59

## 2022-08-23 RX ADMIN — PROPAFENONE HYDROCHLORIDE 150 MG: 150 TABLET, FILM COATED ORAL at 13:59

## 2022-08-23 RX ADMIN — SODIUM CHLORIDE, PRESERVATIVE FREE 10 ML: 5 INJECTION INTRAVENOUS at 10:04

## 2022-08-23 RX ADMIN — LEVOTHYROXINE SODIUM 75 MCG: 75 TABLET ORAL at 10:06

## 2022-08-23 NOTE — PROGRESS NOTES
[facility]   Phone: [phone]    Physical Therapy    [] Initial Evaluation            [x] Daily Treatment Note         [] Discharge Summary      Patient: Rocio Horowitz   : 1935   MRN: [id number]   Date of Service:  2022  Admitting Diagnosis: Hypertensive urgency, malignant  Current Admission Summary: Rocio Horowitz is a 80 y.o. female who has presented to the hospital with dizziness. She has had intermittent dizziness for a while. Came to the hospital and found to have hypertensive urgency. Reports no syncope. She states that his morning, when she woke up, she felt the whole room was spinning around. She gets nauseated. She also has tinnitus on her right ear and also difficulty hearing. History of atrial fibrillation s/p cardioversion in  and 3/2021. On Eliquis and propafenone and is compliant with it. She also has HTN, HLD, DM, hypothyroid and COPD. She is on Oxygen at home. CT scan and MRI of brain  no acute infarct. Past Medical History:  has a past medical history of Arthritis, Atrial fibrillation ([address] Diabetes mellitus type II, controlled ([address] GERD (gastroesophageal reflux disease), HTN (hypertension), Hyperlipidemia, Hypothyroid, Insomnia, Lumbago, and SVT (supraventricular tachycardia) ([address] Past Surgical History:  has a past surgical history that includes Appendectomy; Colonoscopy; and Cholecystectomy, laparoscopic (2013). Discharge Recommendations: Rocio Horowitz scored a 20/24 on the AM-PAC short mobility form. Current research shows that an AM-PAC score of 18 or greater is typically associated with a discharge to the patient's home setting. Based on the patient's AM-PAC score and their current functional mobility deficits, it is recommended that the patient have 2-3 sessions per week of Physical Therapy at d/c to increase the patient's independence.   At this time, this patient demonstrates Dizziness has almost gone. Pt. Would prefer d/c home with Home PT.   Safety Interventions: patient left in bed, bed alarm in place, call light within reach, gait belt, patient at risk for falls, and nurse notified    Plan  Frequency: 3-5 x/per week  Current Treatment Recommendations: balance training, functional mobility training, transfer training, gait training, stair training, safety education, and vestibular rehab    Goals  Patient Goals: Discharge home with daughter   Short Term Goals:  Time Frame: discharge  Patient will complete bed mobility at modified independent   Patient will complete transfers at [facility]   Patient will ambulate 100 ft with use of LRAD at supervision  Patient will ascend/descend 7 stairs with (L) ascending handrail at supervision    Therapy Session Time      Individual Group Co-treatment   Time In 1135       Time Out 1150       Minutes 15         Timed Code Treatment Minutes:  15 Minutes  Total Treatment Minutes:  15       Electronically Signed By: Rani Newton, [id number]

## 2022-08-23 NOTE — DISCHARGE INSTRUCTIONS
Your information:  Name: Neeta Butler  : 1935    Your Discharge Instructions    What to do after you leave the hospital:    Read, review and familiarize yourself with the information provided below and in a separate packet on   Vertigo; Dizziness; Hypertension: Emergency or Urgency; Earwax Blockage    Diet: Diabetic    Recommended activity:   as tolerated  Avoid strenuous activity until instructed by your physician to resume; balance rest with periods of light to normal activity. If you experience any of the following: Unusual or inadequately controlled pain; unusual transient shortness of breath; recurrent or persistent nausea, heartburn, palpitations or lightheadedness; increased swelling; increased fatigue; fever >100; please follow up with @PCP@ [unfilled] or go to the Emergency Room. Home Health/ Outpatient Services: [address] [facility] will call for Appointment                                                              Phone: [phone]      Information obtained by:  By signing below, I understand and acknowledge receipt of the instructions indicated above, and I understand that if any problems occur once I leave the hospital I am to contact [email or web address]@.

## 2022-08-23 NOTE — TELEPHONE ENCOUNTER
Medication:   Requested Prescriptions     Pending Prescriptions Disp Refills    budesonide-formoterol (SYMBICORT) 160-4.5 MCG/ACT [facility] Name: Pietro King 160-4.5 MCG/ACT Aerosol] 3 each      Sig: INHALE 2 PUFFS TWICE DAILY          Patient Phone Number: [phone] (home)     Last appt: 8/19/2022   Next appt: 10/28/2022    Last OARRS:   RX Monitoring 3/20/2019   Attestation The Prescription Monitoring Report for this patient was reviewed today.    Periodic Controlled Substance Monitoring -     PDMP Monitoring:    Last PDMP Chau Marcelino as Reviewed [facility]):  Review User Review Instant Review Result   Angel Fierro 6/13/2022 12:59 PM Reviewed PDMP [1]     Preferred Pharmacy:   [facility] [address]  Phone: [phone] Fax: [phone]    [facility] (Now [address] [facility] Mail Delivery) - [facility] [phone] - F [phone]  [address] [facility] [address]  Phone: [phone] Fax: [phone]

## 2022-08-23 NOTE — PROGRESS NOTES
Patients head to toe assessment completed. Vital signs WNL. Bed alarm engaged, call light within reach. Scheduled medications given per MAR. Patient complain of neck pan, rates pain 8/10. PRN Tylenol given. Patient resting in bed. Will continue to monitor.

## 2022-08-23 NOTE — DISCHARGE INSTR - COC
8  Last Weight:   Wt Readings from Last 1 Encounters:   08/22/22 158 lb 4.8 oz (71.8 kg)     Mental Status:  oriented and alert    IV Access:  - None    Nursing Mobility/ADLs:  Walking   Assisted  Transfer  Assisted  Bathing  Assisted  Dressing  Assisted  Toileting  Assisted  Feeding  Assisted  Med Admin  Assisted  Med Delivery   whole    Wound Care Documentation and Therapy:        Elimination:  Continence: Bowel: Yes  Bladder: Yes  Urinary Catheter: None   Colostomy/Ileostomy/Ileal Conduit: No       Date of Last BM: 8/23/2022    Intake/Output Summary (Last 24 hours) at 8/23/2022 1108  Last data filed at 8/23/2022 0417  Gross per 24 hour   Intake 350 ml   Output --   Net 350 ml     I/O last 3 completed shifts: In: 9384 [P.O.:3315; I.V.:250]  Out: 5 [Urine:420]    Safety Concerns: At Risk for Falls    Impairments/Disabilities:      None    Nutrition Therapy:  Current Nutrition Therapy:   - Oral Diet:  Carb Control 4 carbs/meal (1800kcals/day)    Routes of Feeding: Oral  Liquids: No Restrictions  Daily Fluid Restriction: no  Last Modified Barium Swallow with Video (Video Swallowing Test): not done    Treatments at the Time of Hospital Discharge:   Respiratory Treatments: inhaler  Oxygen Therapy:  is on oxygen at 2 L/min per nasal cannula.   Ventilator:    - No ventilator support    Rehab Therapies: SN,PT,OT  Weight Bearing Status/Restrictions: No weight bearing restrictions  Other Medical Equipment (for information only, NOT a DME order):  walker  Other Treatments:   5 [facility]: LEVEL 1 STANDARD     -Initial home health evaluation to occur within 24-48 hours, in patient home    -Home health agency to establish plan of care for patient over 60 day period    -Medication Reconciliation    -PCP Visit scheduled within seven days of discharge    -PT/OT to evaluate with goal of regaining prior level of functioning    -OT to evaluate if patient has [address] needs for personal care -Telehealth if willing or able  to participate    Patient's personal belongings (please select all that are sent with patient):  Glasses    RN SIGNATURE:  Electronically signed by Antwon Calix RN on 8/23/22 at 2:14 PM EDT    CASE MANAGEMENT/SOCIAL WORK SECTION    Inpatient Status Date: ***    Readmission Risk Assessment Score:  Readmission Risk              Risk of Unplanned Readmission:  23           Discharging to Facility/ Agency   Name: [facility] will call for Appointment  Phone: [phone]  Fax: [phone]     Dialysis Facility (if applicable)   Name:  Address:  Dialysis Schedule:  Phone:  Fax:    / signature: {Esignature:[id number]}    PHYSICIAN SECTION    Prognosis: Good    Condition at Discharge: Stable    Rehab Potential (if transferring to Rehab): Good    Recommended Labs or Other Treatments After Discharge: none     Physician Certification: I certify the above information and transfer of Nahed Bedolla  is necessary for the continuing treatment of the diagnosis listed and that she requires Home Care for greater 30 days.      Update Admission H&P: No change in H&P    PHYSICIAN SIGNATURE:  Electronically signed by Ashley Palmer MD on 8/23/22 at 11:08 AM EDT

## 2022-08-23 NOTE — PROGRESS NOTES
[address]   Electrophysiology Progress Note   Date: 8/23/2022  Admit Date: 8/21/2022     Reason for follow up: dizziness    Chief Complaint:   Chief Complaint   Patient presents with    Dizziness     Pt c/o dizziness after being at a birthday party and hearing loud music. Pt states the music was so loud. History of Present Illness: History obtained from patient and medical record. Kalpesh Cano is a 80 y.o. female with a past medical history of hypertension, hyperlipidemia, hypothyroidism, SVT, DM, and atrial fibrillation who presented with acute on set dizziness. Report that she woke up to feel the room spinning accompanied by nausea. Complains of tinnitus in her right ear. Interval Hx: Today, she is being seen for follow up. S/p echo which showed normal EF and stable valvular disease. Continues with tinnitus in right ear. Denies recurrent dizzy spells. No new complaints today. No major events overnight. Denies having chest pain, palpitations, shortness of breath, or dizziness. Patient seen and examined. Clinical notes reviewed. Telemetry reviewed. Assessment and Plan:  Hypertensive Urgency   - Improved   - Continue current medications  Dizziness   - due to vertigo and not cardiac related   - Echo stable with normal EF and mild valvular disease  PAF   - Continue propafenone and diltiazem   - [id number] high. Continue Eliquis 5 mg bid    - Poor candidate for invasive EP procedures  DM  COPD   - Stable on 2 lpm    - Echo unremarkable, no new symptoms today or arrhythmia  - Ok for discharge from EP perspective, will sign off    All pertinent information and plan of care discussed with the EP physician. Multiple medical conditions with risk of decompensation.      Problem List:   Patient Active Problem List    Diagnosis Date Noted    ANTHONY (obstructive sleep apnea)     Acute dysfunction of Eustachian tube, bilateral 08/22/2022    Bilateral hearing loss 08/22/2022 Impacted cerumen of right ear 08/22/2022    Dizziness 08/22/2022    Hypertensive urgency, malignant 08/21/2022    Chronic fatigue 07/14/2022    Chronic renal disease, stage III [facility]) [[id number]] 06/13/2022    Acute exacerbation of chronic obstructive pulmonary disease (COPD) ([address] 04/19/2022    Ptosis of eyelid, bilateral 10/26/2021    Centrilobular emphysema ([address] 09/08/2021    Pulmonary nodule 09/08/2021    HTN (hypertension), benign     Ataxia 11/12/2020    SOB (shortness of breath) 02/25/2020    PAF (paroxysmal atrial fibrillation) ([address] 02/19/2020    Hypothyroid 02/19/2020    Chronic respiratory failure with hypoxia ([address] 02/19/2020    Coronary artery disease involving native coronary artery of native heart with angina pectoris ([address] 02/18/2020    Interstitial lung disease ([address] 09/20/2019    Vertigo 07/31/2019    COPD, moderate ([address] 03/23/2019    Allergic rhinitis 01/09/2019    Controlled type 2 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 disease, without long-term current use of insulin ([address] 01/18/2017    Restless legs syndrome 01/18/2017    Osteoarthritis 06/18/2015    Overactive bladder 12/11/2013    IBS (irritable bowel syndrome) 05/02/2013    Hypertensive urgency     Hyperlipidemia       Allergies:   Allergies   Allergen Reactions    Adhesive Tape Anaphylaxis    Cefaclor Nausea And Vomiting     Other reaction(s): Chest pain    Cephalexin Other (See Comments)     Struggling to breath, almost passing out/ very low oxygen and pulse    Nsaids      Pt states she has hives and heart races   Other reaction(s): Tachycardia    Clinoril [Sulindac] Other (See Comments)     Stomach pain    Codeine      FEEL NUMB    Diclofenac     Diclofenac Sodium Hives    Diclofenac Sodium Hives    Hctz [Hydrochlorothiazide]      Sob    Ibuprofen Other (See Comments)     Other reaction(s): Tachycardia    Macrobid [Nitrofurantoin Macrocrystal]      nausea    Motrin [Ibuprofen Micronized] Other (See Comments)     Tachycardia      Pcn [Penicillins] Hives    Peach [Prunus Persica] Itching and Swelling     Cannot eat raw peaches    Prednisone      Causes elevated blood pressure     Sulfa Antibiotics      Nausea, diarrhea     Home Meds:  Prior to Visit Medications    Medication Sig Taking? Authorizing Provider   hydrALAZINE (APRESOLINE) 50 MG tablet Take 1 tablet by mouth every 8 hours Yes Ashley Bautista MD   loratadine (CLARITIN) 10 MG tablet Take 1 tablet by mouth in the morning. Ruth Resendiz MD   fluticasone (FLONASE) 50 MCG/ACT nasal spray USE 2 SPRAYS NASALLY EVERY DAY  Ruth Resendiz MD   atorvastatin (LIPITOR) 80 MG tablet TAKE 1 TABLET EVERY NIGHT  Elis Hidden, APRN - MU   amLODIPine (NORVASC) 5 MG tablet Take 1 tablet by mouth in the morning.   Elis Hidden, APRMONA - MU   dilTIAZem (CARDIZEM CD) 120 MG extended release capsule Take 1 capsule by mouth daily  Elis Hidden, APRMONA - CNP   apixaban (ELIQUIS) 5 MG TABS tablet Take 1 tablet by mouth 2 times daily  Elis Hidden, APRN - MU   meclizine (ANTIVERT) 12.5 MG tablet TAKE 1 TABLET THREE TIMES DAILY AS NEEDED  Ruth Resendiz MD   blood glucose test strips (TRUE METRIX BLOOD GLUCOSE TEST) strip USE TO TEST BLOOD SUGAR DAILY  Ruth Resendiz MD   propafenone (RYTHMOL) 150 MG tablet TAKE 1 TABLET BY MOUTH EVERY 8 HOURS  Ruth Resendiz MD   dicyclomine (BENTYL) 10 MG capsule TAKE 1 CAPSULE BY MOUTH FOUR TIMES DAILY AS NEEDED FOR ABDOMINAL PAIN  Jenfier Galvan APRN - CNP   irbesartan (AVAPRO) 300 MG tablet TAKE 1 TABLET BY MOUTH EVERY NIGHT  Ruth Resendiz MD   rOPINIRole (REQUIP) 3 MG tablet TAKE 1 TABLET THREE TIMES DAILY  Ruth Resendiz MD   LORazepam (ATIVAN) 1 MG tablet TAKE 1/2 TABLET BY MOUTH TWICE DAILY AND 1 EVERY NIGHT AT BEDTIME AS NEEDED FOR ANXIETY  Ruth Resendiz MD   levothyroxine (SYNTHROID) 75 MCG tablet TAKE 1 TABLET BY MOUTH EVERY DAY  Ruth Resendiz MD   cloNIDine (CATAPRES) 0.1 MG tablet TAKE 1 TABLET TWICE DAILY  Johnny Chandelr Elisabeth Young MD   conjugated estrogens (PREMARIN) 0.625 MG/GM vaginal cream Place 1 g vaginally three times a week  Katarzyna Park MD   pramipexole (MIRAPEX) 0.25 MG tablet TAKE 1 TABLET BY MOUTH EVERY NIGHT  Katarzyna Park MD   levalbuterol (XOPENEX) 0.63 MG/3ML nebulization USE 1 VIAL PER NEBULIZER EVERY 6 HOURS. MAX OF 4 TIMES PER 24 HOURS  Nafi Mcrae MD   tiotropium (SPIRIVA RESPIMAT) 2.5 MCG/ACT AERS inhaler Inhale 2 puffs into the lungs daily  Kalpana Padgett PA-C   budesonide-formoterol (SYMBICORT) 160-4.5 MCG/ACT AERO INHALE 2 PUFFS INTO THE LUNGS TWICE DAILY  Naif Mcrae MD   Blood Glucose Monitoring Suppl (TRUE METRIX METER) w/Device KIT To use to check sugars 4 times daily  Nicol Wood MD   Blood Glucose Calibration (TRUE METRIX LEVEL 2) Normal SOLN As needed  Katarzyna Park MD   blood glucose test strips (TRUE METRIX BLOOD GLUCOSE TEST) strip 1 each by In Vitro route daily As needed.   Katarzyna Park MD   TRUEplus Lancets 33G MISC 1 daily  Katarzyna Park MD   Lancets MISC 1 each by Does not apply route 2 times daily  Katarzyna Park MD   ondansetron (ZOFRAN) 4 MG tablet Take 1 tablet by mouth daily as needed for Nausea or Vomiting  Yousuf Mathews MD   OXYGEN Inhale 2 L into the lungs  Historical Provider, MD   Respiratory Therapy Supplies (NEBULIZER/TUBING/MOUTHPIECE) KIT 1 kit by Does not apply route 4 times daily as needed (shortness of breath)  Katarzyna Park MD   albuterol sulfate HFA (PROAIR HFA) 108 (90 Base) MCG/ACT inhaler Inhale 2 puffs into the lungs every 6 hours as needed for Wheezing  Naif Mcrae MD      Scheduled Meds:   meclizine  25 mg Oral TID    hydrALAZINE  50 mg Oral 3 times per day    atorvastatin  80 mg Oral Nightly    mometasone-formoterol  2 puff Inhalation BID    cloNIDine  0.1 mg Oral BID    dilTIAZem  120 mg Oral Daily    fluticasone  1 spray Each Nostril Daily    losartan  100 mg Oral Daily    levothyroxine  75 mcg Oral Daily cetirizine  10 mg Oral Daily    pramipexole  0.25 mg Oral Nightly    propafenone  150 mg Oral 3 times per day    rOPINIRole  3 mg Oral TID    sodium chloride flush  10 mL IntraVENous 2 times per day    enoxaparin  40 mg SubCUTAneous Daily    insulin lispro  0-4 Units SubCUTAneous TID WC    insulin lispro  0-4 Units SubCUTAneous Nightly     Continuous Infusions:   sodium chloride      dextrose       PRN Meds:albuterol sulfate HFA, levalbuterol, LORazepam, sodium chloride flush, sodium chloride, ondansetron **OR** ondansetron, polyethylene glycol, acetaminophen **OR** acetaminophen, hydrALAZINE, dextrose bolus **OR** dextrose bolus, glucagon (rDNA), dextrose   Past Medical History:  Past Medical History:   Diagnosis Date    Arthritis     Atrial fibrillation [facility]     Diabetes mellitus type II, controlled [facility]     GERD (gastroesophageal reflux disease)     HTN (hypertension)     Hyperlipidemia     Hypothyroid     Insomnia     Lumbago     SVT (supraventricular tachycardia) ([facility]         Past Surgical History:    has a past surgical history that includes Appendectomy; Colonoscopy; and Cholecystectomy, laparoscopic (2/24/2013). Social History:  Reviewed. reports that she quit smoking about 26 years ago. Her smoking use included cigarettes. She started smoking about 71 years ago. She has a 45.00 pack-year smoking history. She has never used smokeless tobacco. She reports that she does not drink alcohol and does not use drugs. Family History:  Reviewed. family history includes Asthma in her paternal uncle; Heart Attack in her father; Heart Disease in her father; High Blood Pressure in her mother; Other in her brother.    Denies family history of sudden cardiac death, arrhythmia, premature CAD    Review of Systems:  Constitutional: Negative for fever, weight changes, or weakness  Skin: Negative for bruising, bleeding, blood clots, or changes in skin pigment  HEENT: Negative for vision changes or dysphagia  Respiratory: Reviewed in HPI  Cardiovascular: Reviewed in HPI  Gastrointestinal: Negative for abdominal pain, N/V/D, constipation, or black/tarry stools  Genito-Urinary: Negative for hematuria  Musculoskeletal: No focal weakness  Neurological/Psych: Negative for confusion or TIA-like symptoms. No anxiety, depression, or insomnia    Physical Examination:  Vitals:    08/23/22 0900   BP:    Pulse: 62   Resp: 16   Temp:    SpO2: 97%      In: 3240 [P.O.:2990; I.V.:250]  Out: -    Wt Readings from Last 3 Encounters:   08/22/22 158 lb 4.8 oz (71.8 kg)   08/19/22 157 lb 8 oz (71.4 kg)   07/14/22 161 lb (73 kg)       Intake/Output Summary (Last 24 hours) at 8/23/2022 1116  Last data filed at 8/23/2022 0417  Gross per 24 hour   Intake 350 ml   Output --   Net 350 ml     Telemetry: Personally Reviewed Normal sinus rhythm  Constitutional: Cooperative and in no apparent distress, and appears well nourished  Skin: Warm and pink; no cyanosis, bruising, or clubbing  HEENT: Symmetric and normocephalic. Conjunctiva pink with clear sclera. Mucus membranes pink and moist.   Cardiovascular: regular rate and rhythm. S1 & S2, negative for murmurs. Peripheral pulses 2+, capillary refill < 3 seconds. negative elevation of JVP. Respiratory: Respirations symmetric and unlabored. Lungs clear to auscultation bilaterally, no wheezing, crackles, or rhonchi  Gastrointestinal: Abdomen soft and round. Bowel sounds normoactive in all quadrants. Musculoskeletal: No focal weakness. Neurologic/Psych: Awake and orientated to person, place and time. Calm affect, appropriate mood    Pertinent labs, diagnostic, device, and imaging results reviewed as a part of this visit    Labs:    BMP:   Recent Labs     08/21/22  1117 08/22/22  0517 08/23/22  0440    139 135*   K 4.3 4.7 4.6    103 102   CO2 26 25 23   BUN 34* 36* 34*   CREATININE 0.9 1.1 1.1     Estimated Creatinine Clearance: 34 mL/min (based on SCr of 1.1 mg/dL).    CBC: Recent Labs     22  1117 22  0517 22  0440   WBC 7.1 7.4 6.6   HGB 11.2* 11.7* 11.3*   HCT 33.8* 35.2* 34.3*   MCV 87.9 88.2 88.5    160 158     Thyroid:   Lab Results   Component Value Date/Time    TSH 2.50 2020 09:49 AM    F5ILGHH 0.56 2022 03:20 PM     Lipids:   Lab Results   Component Value Date/Time    CHOL 148 2022 05:17 AM    HDL 52 2022 05:17 AM    HDL 32 2011 06:45 AM    TRIG 150 2022 05:17 AM     LFTS:   Lab Results   Component Value Date/Time    ALT 11 2022 05:17 AM    AST 13 2022 05:17 AM     2021 05:19 AM    ALKPHOS 141 2022 05:17 AM    PROT 6.7 2022 05:17 AM    PROT 6.9 2013 01:48 PM    AGRATIO 1.7 2022 05:17 AM    BILITOT 0.3 2022 05:17 AM     Cardiac Enzymes:   Lab Results   Component Value Date/Time    CKTOTAL 80 2021 04:29 PM    TROPONINI <0.01 2022 05:17 AM    TROPONINI <0.01 2022 09:39 PM    TROPONINI <0.01 2022 04:26 PM     Coags:   Lab Results   Component Value Date/Time    PROTIME 14.9 2022 05:17 AM    INR 1.18 2022 05:17 AM     EC2022: Sinus rhythm with Premature supraventricular complexesMinimal voltage criteria for LVH, may be normal variantCannot rule out Anterior infarct , age undetermined    ECHO:  2022   Summary   Left ventricular systolic function is normal with ejection fraction   estimated at 55-60 %. No regional wall motion abnormalities are noted. There is mild concentric left ventricular hypertrophy. There is reversal of E/A inflow velocities across the mitral valve. Mild-to-moderate aortic regurgitation is present. Mitral annular calcification is present. Mild mitral & tricuspid regurgitation. Stress Test: none recent    Cath: osman lombardi    All questions and concerns were addressed to the patient. Alternatives to my treatment were discussed.  I have discussed the above stated plan with patient and the nurse. The patient verbalized understanding and agreed with the plan. Thank you for allowing to us to participate in the care of Willie Rosario.     KATHY Novak-CNP  [address]   Office: [phone]

## 2022-08-23 NOTE — CARE COORDINATION
[facility] home care referral. Spoke with pt 's daughter and re: home care plan of care/services. Agreeable. Demographic's verified. Aware of discharge with home care. Home care orders sent to [facility].

## 2022-08-24 ENCOUNTER — CARE COORDINATION (OUTPATIENT)
Dept: CASE MANAGEMENT | Age: 87
End: 2022-08-24

## 2022-08-24 DIAGNOSIS — R42 DIZZINESS: Primary | ICD-10-CM

## 2022-08-24 PROCEDURE — 1111F DSCHRG MED/CURRENT MED MERGE: CPT | Performed by: FAMILY MEDICINE

## 2022-08-24 NOTE — CARE COORDINATION
[address] Transitions Initial Follow Up Call    Call within 2 business days of discharge: Yes    Patient: Rocio Horowitz Patient : 1935   MRN: [id number]  Reason for Admission:   Discharge Date: 22 RARS: Readmission Risk Score: 14.2      Last Discharge [facility]       Date Complaint Diagnosis Description Type Department Provider    22 Dizziness  ED to Hosp-Admission (Discharged) (ADMITTED) Aaliyah Seo MD             Spoke with: pt's daughter, HIPAA verified    Non-face-to-face services provided:  Obtained and reviewed discharge summary and/or continuity of care documents  [id number] completed  Transitions of Care Initial Call    Was this an external facility discharge? No Discharge Facility:     Challenges to be reviewed by the provider   Additional needs identified to be addressed with provider: No  none             Method of communication with provider : none    Advance Care Planning:   Does patient have an Advance Directive: reviewed and current. Care Transition Nurse contacted the family by telephone to perform post hospital discharge assessment. Verified name and  with family as identifiers. Provided introduction to self, and explanation of the CTN role. CTN reviewed discharge instructions, medical action plan and red flags with family who verbalized understanding. Family given an opportunity to ask questions and does not have any further questions or concerns at this time. Were discharge instructions available to patient? Yes. Reviewed appropriate site of care based on symptoms and resources available to patient including: When to call 911. The family agrees to contact the PCP office for questions related to their healthcare. Medication reconciliation was performed with family, who verbalizes understanding of administration of home medications. Advised obtaining a 90-day supply of all daily and as-needed medications.      Was patient discharged with a pulse oximeter? no    Pt's daughter states pt is doing ok, still having episodes of dizziness. She scheduled an ENT visit, it is in September. Other F/U appts listed below. Agreed to more CTC f/u calls. CTN provided contact information. Plan for follow-up call in 5-7 days based on severity of symptoms and risk factors.   Plan for next call: self management-dizziness, f/u appts    Care Transitions 24 Hour Call    Do you have a copy of your discharge instructions?: Yes  Do you have all of your prescriptions and are they filled?: Yes  Have you been contacted by a [address]?: No  Have you scheduled your follow up appointment?: Yes  How are you going to get to your appointment?: Car - family or friend to transport  [address]: [address] (Comment: [employer])  Patient Home Equipment: Oxygen  Do you have support at home?: Child  Do you feel like you have everything you need to keep you well at home?: Yes  Are you an active caregiver in your home?: No  Care Transitions Interventions  No Identified Needs         Follow Up  Future Appointments   Date Time Provider Alissa Bustillo   9/2/2022  4:00 PM MD [address] [facility]   9/20/2022  8:30 AM Zay Macario AUDIO [facility]   9/20/2022  9:20 AM Glory Cazares DO FF ENT [facility]   10/11/2022  3:15 PM Viva Osgood, MD FF Cardio [facility]   10/28/2022  3:00 PM Bettyjo Gottron, MD [place], RN

## 2022-08-24 NOTE — DISCHARGE SUMMARY
Patient: Gabriela Odom     Gender: female  : 1935   Age: 80 y.o.   MRN: [id number]    Admitting Physician: Shai Jhaveri MD  Discharge Physician: Shai Jhaveri MD     Code Status: Prior     Admit Date: 2022   Discharge Date: 2022      Disposition:  Home    Discharge Diagnoses:  Presyncope/vertigo I suspect this is peripheral vertigo  Paroxysmal atrial fibrillation  Hypertension uncontrolled    Active Hospital Problems    Diagnosis Date Noted    Acute dysfunction of Eustachian tube, bilateral [H69.83] 2022     Priority: Medium    Bilateral hearing loss [H91.93] 2022     Priority: Medium    Impacted cerumen of right ear [H61.21] 2022     Priority: Medium    Dizziness [R42] 2022     Priority: Medium    Hypertensive urgency, malignant [I16.0] 2022     Priority: Medium    Vertigo [R42] 2019    Hypertensive urgency [I16.0]        Follow-up appointments:  one week    Outpatient to do list: Will need comprehensive audiological examination and follow-up with ENT    Condition at Discharge:  Mildred Sanchez Course:   80year-old admitted to the hospital complaining of lightheadedness/vertigo    Presyncope versus vertigo probably peripheral vertigo  Some of the symptoms sound very vertiginous  I strongly suspect this is peripheral however given elevated blood pressure will need a stroke work-up  Echocardiogram showed moderate AR  EKG troponins negative  Carotid Dopplers bilateral 50-65 9% reduction in diameter with stenosis  MRI of the brain was negative        Paroxysmal atrial fibrillation  On Cardizem Rythmol  Seen by electrophysiology  They do not think that her symptoms are cardiac  Echocardiogram noted    Hypertension malignant  All home medications have been continued  Started hydralazine dose increased to 50 mg 3 times a day    At the time of discharge patient blood pressure was better controlled she was feeling much better she was also seen by ENT felt that she had bilateral eustachian tube dysfunction with bilateral hearing loss recommended comprehensive audiological evaluation she was also started on meclizine      Discharge Medications:   Discharge Medication List as of 8/23/2022  2:38 PM        Discharge Medication List as of 8/23/2022  2:38 PM        CONTINUE these medications which have CHANGED    Details   hydrALAZINE (APRESOLINE) 50 MG tablet Take 1 tablet by mouth every 8 hours, Disp-90 tablet, R-3Normal           Discharge Medication List as of 8/23/2022  2:38 PM        CONTINUE these medications which have NOT CHANGED    Details   budesonide-formoterol (SYMBICORT) 160-4.5 MCG/ACT AERO INHALE 2 PUFFS TWICE DAILY, Disp-3 each, R-0Normal      loratadine (CLARITIN) 10 MG tablet Take 1 tablet by mouth in the morning., Disp-90 tablet, R-0Normal      fluticasone (FLONASE) 50 MCG/ACT nasal spray USE 2 SPRAYS NASALLY EVERY DAY, Disp-48 g, R-1Normal      atorvastatin (LIPITOR) 80 MG tablet TAKE 1 TABLET EVERY NIGHT, Disp-90 tablet, R-3Normal      dilTIAZem (CARDIZEM CD) 120 MG extended release capsule Take 1 capsule by mouth daily, Disp-90 capsule, R-1Normal      apixaban (ELIQUIS) 5 MG TABS tablet Take 1 tablet by mouth 2 times daily, Disp-180 tablet, R-0Adjust Sig      meclizine (ANTIVERT) 12.5 MG tablet TAKE 1 TABLET THREE TIMES DAILY AS NEEDED, Disp-270 tablet, R-1Normal      !! blood glucose test strips (TRUE METRIX BLOOD GLUCOSE TEST) strip USE TO TEST BLOOD SUGAR DAILY, Disp-100 strip, R-5Normal      propafenone (RYTHMOL) 150 MG tablet TAKE 1 TABLET BY MOUTH EVERY 8 HOURS, Disp-270 tablet, R-0Normal      dicyclomine (BENTYL) 10 MG capsule TAKE 1 CAPSULE BY MOUTH FOUR TIMES DAILY AS NEEDED FOR ABDOMINAL PAIN, Disp-360 capsule, R-0Normal      irbesartan (AVAPRO) 300 MG tablet TAKE 1 TABLET BY MOUTH EVERY NIGHT, Disp-90 tablet, R-0Normal      rOPINIRole (REQUIP) 3 MG tablet TAKE 1 TABLET THREE TIMES DAILY, Disp-270 tablet, R-1Normal      LORazepam (ATIVAN) 1 MG tablet TAKE 1/2 TABLET BY MOUTH TWICE DAILY AND 1 EVERY NIGHT AT BEDTIME AS NEEDED FOR ANXIETY, Disp-60 tablet, R-2Normal      levothyroxine (SYNTHROID) 75 MCG tablet TAKE 1 TABLET BY MOUTH EVERY DAY, Disp-90 tablet, R-0Normal      cloNIDine (CATAPRES) 0.1 MG tablet TAKE 1 TABLET TWICE DAILY, Disp-180 tablet, R-0Normal      conjugated estrogens (PREMARIN) 0.625 MG/GM vaginal cream Place 1 g vaginally three times a week, Vaginal, THREE TIMES WEEKLY Starting Wed 5/18/2022, Disp-30 g, R-2, Normal      pramipexole (MIRAPEX) 0.25 MG tablet TAKE 1 TABLET BY MOUTH EVERY NIGHT, Disp-90 tablet, R-1Normal      levalbuterol (XOPENEX) 0.63 MG/3ML nebulization USE 1 VIAL PER NEBULIZER EVERY 6 HOURS. MAX OF 4 TIMES PER 24 HOURS, Disp-1086 mL, R-3**Patient requests 90 days supply**Normal      tiotropium (SPIRIVA RESPIMAT) 2.5 MCG/ACT AERS inhaler Inhale 2 puffs into the lungs daily, Disp-1 each, R-1Normal      Blood Glucose Monitoring Suppl (TRUE METRIX METER) w/Device KIT To use to check sugars 4 times daily, Disp-1 kit, R-0Normal      Blood Glucose Calibration (TRUE METRIX LEVEL 2) Normal SOLN As needed, Disp-1 each, R-0Normal      !! blood glucose test strips (TRUE METRIX BLOOD GLUCOSE TEST) strip 1 each by In Vitro route daily As needed. , Disp-100 each, R-3Normal      !! TRUEplus Lancets 33G MISC Disp-100 each, R-3, Normal1 daily      !!  Lancets MISC 2 TIMES DAILY Starting Wed 6/30/2021, Disp-300 each, R-1, Normal      ondansetron (ZOFRAN) 4 MG tablet Take 1 tablet by mouth daily as needed for Nausea or Vomiting, Disp-30 tablet, R-0Print      OXYGEN Inhale 2 L into the lungsHistorical Med      Respiratory Therapy Supplies (NEBULIZER/TUBING/MOUTHPIECE) KIT 4 TIMES DAILY PRN Starting Thu 3/26/2020, Disp-1 kit, R-0, Normal      albuterol sulfate HFA (PROAIR HFA) 108 (90 Base) MCG/ACT inhaler Inhale 2 puffs into the lungs every 6 hours as needed for Wheezing, Disp-1 Inhaler, R-6Normal       !! - Potential duplicate medications found. Please discuss with provider. Discharge Medication List as of 8/23/2022  2:38 PM        STOP taking these medications       amLODIPine (NORVASC) 5 MG tablet Comments:   Reason for Stopping:         HYDROcodone-acetaminophen ([address]) 5-325 MG per tablet Comments:   Reason for Stopping:               Discharge ROS:  A complete review of systems was asked and negative     Discharge Exam:    /70   Pulse 60   Temp 98 °F (36.7 °C) (Oral)   Resp 18   Ht 5' 2\" (1.575 m)   Wt 158 lb 4.8 oz (71.8 kg)   SpO2 98%   BMI 28.95 kg/m²   General appearance:  NAD  HEENT:   Normal cephalic, atraumatic, moist mucous membranes, no oropharyngeal erythema or exudate  Heart[de-identified] Normal s1/s2, RRR, no murmurs, gallops, or rubs. no leg edema  Lungs:  Normal respiratory effort. Clear to auscultation, bilaterally without Rales/Wheezes/Rhonchi. Abdomen: Soft, non-tender, non-distended, bowel sounds present, no masses  Musculoskeletal:  No clubbing, no cyanosis, *  Neurologic:  Neurovascularly intact without any focal sensory/motor deficits. Cranial nerves: II-XII intact, grossly non-focal.    Labs:  For convenience and continuity at follow-up the following most recent labs are provided:    Lab Results   Component Value Date/Time    WBC 6.6 08/23/2022 04:40 AM    HGB 11.3 08/23/2022 04:40 AM    HCT 34.3 08/23/2022 04:40 AM    MCV 88.5 08/23/2022 04:40 AM     08/23/2022 04:40 AM     08/23/2022 04:40 AM    K 4.6 08/23/2022 04:40 AM    K 4.7 08/22/2022 05:17 AM     08/23/2022 04:40 AM    CO2 23 08/23/2022 04:40 AM    BUN 34 08/23/2022 04:40 AM    CREATININE 1.1 08/23/2022 04:40 AM    CALCIUM 9.3 08/23/2022 04:40 AM    PHOS 3.0 02/28/2021 05:08 AM    ALKPHOS 141 08/22/2022 05:17 AM    ALT 11 08/22/2022 05:17 AM    AST 13 08/22/2022 05:17 AM    BILITOT 0.3 08/22/2022 05:17 AM    BILIDIR <0.2 06/08/2021 08:15 AM    LABALBU 4.2 08/22/2022 05:17 AM    LDLCALC 66 08/22/2022 05:17 AM    TRIG 150 08/22/2022 05:17

## 2022-08-25 NOTE — TELEPHONE ENCOUNTER
Medication:   Requested Prescriptions     Pending Prescriptions Disp Refills    ELIQUIS 5 MG TABS tablet [Pharmacy Med Name: ELIQUIS 5 MG Tablet] 180 tablet 0     Sig: TAKE 1 TABLET TWICE DAILY      Last Filled:      Patient Phone Number: [phone] (home)     Last appt: 8/19/2022   Next appt: 10/28/2022    Last OARRS:   RX Monitoring 3/20/2019   Attestation The Prescription Monitoring Report for this patient was reviewed today.    Periodic Controlled Substance Monitoring -     PDMP Monitoring:    Last PDMP Princess Vincent as Reviewed [facility]):  Review User Review Instant Review Result   Samm Calvo 6/13/2022 12:59 PM Reviewed PDMP [1]     Preferred Pharmacy:   [facility] [address] [address]  Phone: [phone] Fax: [phone]    [facility] (Now [address] [facility] [facility]) - [place] [phone] - F [phone]  [address] [place] [address]  Phone: [phone] Fax: [phone]

## 2022-08-26 ENCOUNTER — TELEPHONE (OUTPATIENT)
Dept: FAMILY MEDICINE CLINIC | Age: 87
End: 2022-08-26

## 2022-08-26 RX ORDER — APIXABAN 5 MG/1
TABLET, FILM COATED ORAL
Qty: 180 TABLET | Refills: 0 | Status: SHIPPED | OUTPATIENT
Start: 2022-08-26

## 2022-08-26 NOTE — TELEPHONE ENCOUNTER
Pt was calling to inform WM that she was DC from [facility] on Tuesday from having some dizziness and she also stated that she had a mini stroke. Pt stated to me that she doesn't need an follow-up appointment due to the fact that she feels fine. I stated to Pt that she should schedule and follow-up appointment , but she said no.     Please advise any questions call  [phone]

## 2022-08-29 ENCOUNTER — TELEPHONE (OUTPATIENT)
Dept: FAMILY MEDICINE CLINIC | Age: 87
End: 2022-08-29

## 2022-08-29 NOTE — TELEPHONE ENCOUNTER
Spoke with pt's daughter and she stated that if the 50 mg dose 3 times a day is not keeping pt's BP down why are we decreasing it. She stated she doesn't understand that though process there. She states that she is trying to understand. Daughter check BP while on the phone and it was 145/64. Pt has had only her morning pills. She ask pt is she had any diarrhea today and pt stated that not today, but her stomach was hurting.   Please advise

## 2022-08-29 NOTE — TELEPHONE ENCOUNTER
Her blood pressure was extremely elevated in the hospital and she definitely needs additional treatment. If she is having diarrhea from the hydralazine this medication can be increased but she could increase the clonidine medication to 0.2 mg 3 times a day.   She should have a repeat blood pressure evaluation in next 2 weeks with Lelia or Ralf Parker

## 2022-08-29 NOTE — TELEPHONE ENCOUNTER
Spoke with daughter and she stated that pt doesn't want to increase the medication as this is already making her have diarrhea, she also stated that the patient cant take the clonidine at 0.2 because that stops her heart. Daughter stated that this is the problem if they get BP under control then her heart rate gets really low and they control the heart rate then her BP goes up. Pt's daughter stated that they already spoke to cardiology and they advise to call PCP as she is getting this medication form us.  Daughter stated that this needs to figure out really soon

## 2022-08-29 NOTE — TELEPHONE ENCOUNTER
My last note should have said that the hydralazine medication could be discontinued or we could try her on a lower dose of hydralazine at 25 mg 3 times a day since she is not able to take higher dose of clonidine medication.

## 2022-08-29 NOTE — TELEPHONE ENCOUNTER
Pt's daughter advise and she stated that they will try the clonidine medication as 0.1 mg in the am, 0.5 mg in the afternoon, and 0.1 mg in the evening. Daughter stated they will call if there is any other concern or question.  She did schedule a hospital follow up with BISHOP on 9/12/22

## 2022-08-29 NOTE — TELEPHONE ENCOUNTER
Pt mother's called stating that the medication (Hydralazine) that her mother is taking is causing her to have diarrhea, She also stated that her Blood pressure went from 217 to 164, so she wanted to know what can be done or should she continue this medication.     Please advise any questions call   [phone]

## 2022-08-31 ENCOUNTER — CARE COORDINATION (OUTPATIENT)
Dept: CASE MANAGEMENT | Age: 87
End: 2022-08-31

## 2022-08-31 NOTE — CARE COORDINATION
This [facility] spoke with pt and pt stated that she is doing well. Patient denied any worsening symptoms. Denied fever, chills, N/V and any difficulty breathing at this time. Pt does have some dizziness when she stands up,not worsening. Pt's meds have been adjusted to help combat this. Pt now taking the clonidine medication as 0.1 mg in the am, 0.5 mg in the afternoon, and 0.1 mg in the evening as of 22. Pt had an appt on yesterday, per pt and was told that she is now a full blown diabetic. Pt stated that her BP is 170/68 at this time. Pt has not taken her BP med as of yet today. Pt will take her meds after the call ends. Pt's BS is 150 at this time. Denied chest pain and SOB. Denied difficulty with urination, BMs or appetite. Denied any needs and concerns at this time. Advised pt to immediately report any worsening symptoms to the PCP. Patient verbalized understanding and agreed. Man Nicolas LPN, [facility]  PH: [phone]    UPDATE:  Niyah Valenzuela, pt's daughter, called back and stated that pt has in fact, been given her BP meds and that pt does not know how to properly use the BP Machine and that the [facility] Nurse had been out and pt's BP is fine. Daughter has changed pt's PCP to Dr. Lesvia Guillermo and daughter stated that they are on top of her BP and meds. Man Nicolas LPN, [facility]  PH: [phone] Transitions Follow Up Call    2022    Patient: Tete Dominguez  Patient : 1935   MRN: [id number]  Reason for Admission: Dizziness  Discharge Date: 22 RARS: Readmission Risk Score: 14.2         Spoke with: Sulma1 Patterson [place] Transitions Follow Up Call    Needs to be reviewed by the provider   Additional needs identified to be addressed with provider: No  none             Method of communication with provider : none      [facility] Care Coordinator contacted the patient by telephone to follow up after admission on 22.  Verified name and  with patient as identifiers. Addressed changes since last contact: medications-See note  Discussed follow-up appointments. If no appointment was previously scheduled, appointment scheduling offered: Yes. Is follow up appointment scheduled within 7 days of discharge? Yes. Advance Care Planning:   Does patient have an Advance Directive: not on file. LPN CC reviewed discharge instructions, medical action plan and red flags with patient and discussed any barriers to care and/or understanding of plan of care after discharge. Discussed appropriate site of care based on symptoms and resources available to patient including: PCP  Specialist  Urgent care clinics  When to call [address] The patient agrees to contact the PCP office for questions related to their healthcare. Patients top risk factors for readmission: ineffective coping  Interventions to address risk factors: Obtained and reviewed discharge summary and/or continuity of care documents      Non-[facility] follow up appointment(s):     LPN CC provided contact information for future needs. Plan for follow-up call in 5-7 days based on severity of symptoms and risk factors. Plan for next call: symptom management-Hypertension and Dizziness          Care Transitions Subsequent and Final Call    Schedule Follow Up Appointment with PCP: Completed  Subsequent and Final Calls  Do you have any ongoing symptoms?: Yes  Patient-reported symptoms: Other  Interventions for patient-reported symptoms: Other  Have your medications changed?: Yes  Patient Reports: See note  Do you have any questions related to your medications?: No  Do you currently have any active services?: No  Are you currently active with any services?: Home Health  Do you have any needs or concerns that I can assist you with?: No  Identified Barriers: None  Care Transitions Interventions  No Identified Needs  Other Interventions:              Follow Up  Future Appointments   Date Time Provider Department [place]   9/12/2022  4:00 PM Aydee De Los Santos, APRN - NP [id number] - DYD   9/20/2022  8:30 AM Zay Bland AUDIO [facility]   9/20/2022  9:20 AM Soo Conte DO FF ENT [facility]   10/11/2022  3:15 PM Veronica Hodge MD FF Cardio [facility]   10/28/2022  3:00 PM Manford Denver, MD [address]   11/1/2022  9:15 AM Dionna Pat MD PULM & CC [facility]       Robyn Medrano LPN

## 2022-09-07 ENCOUNTER — CARE COORDINATION (OUTPATIENT)
Dept: CASE MANAGEMENT | Age: 87
End: 2022-09-07

## 2022-09-07 NOTE — CARE COORDINATION
This [facility] spoke with, Gabriela Velez, pt's daughter, and she stated that pt is doing well and denied any worsening symptoms. Denied fever, chills, N/V and any difficulty breathing at this time. Denied chest pain and SOB. Denied difficulty with urination, BMs or appetite. Gabriela Velez stated that pt is now with a new, [facility] PCP and that he's increased her Hydralazine which has made her BP to be more stable. Gabriela Velez did not provide a value today for BP and BS, just reiterated that pt is doing well. Denied any needs and concerns at this time. Advised pt to immediately report any worsening symptoms to the PCP. Patient verbalized understanding and agreed. Luis Alberto Owens LPN, [facility]  PH: [phone] Transitions Follow Up Call    2022    Patient: Asha Flaherty  Patient : 1935   MRN: [id number]  Reason for Admission: DIzziness  Discharge Date: 22 RARS: Readmission Risk Score: 14.2         Spoke with: Kale Patricia, Daughter  Care Transitions Follow Up Call    Needs to be reviewed by the provider   Additional needs identified to be addressed with provider: No  home health care-[facility], PT and OT             Method of communication with provider : none      LPN Care Coordinator contacted the family by telephone to follow up after admission on 22. Verified name and  with family as identifiers. Addressed changes since last contact: medications-Reports increase in Hydralazine, new PCP  Discussed follow-up appointments. If no appointment was previously scheduled, appointment scheduling offered: Yes. Is follow up appointment scheduled within 7 days of discharge? Yes. Advance Care Planning:   Does patient have an Advance Directive: not on file. LPN CC reviewed discharge instructions, medical action plan and red flags with family and discussed any barriers to care and/or understanding of plan of care after discharge.  Discussed appropriate site of care based on symptoms and resources available to patient including: PCP  Specialist  Urgent care clinics  Home health  When to call [address] The family agrees to contact the PCP office for questions related to their healthcare. Patients top risk factors for readmission: ineffective coping  medical condition-Hypertension and Diabetes  Interventions to address risk factors: Obtained and reviewed discharge summary and/or continuity of care documents      Non-[facility] follow up appointment(s):     LPN CC provided contact information for future needs. Plan for follow-up call in 5-7 days based on severity of symptoms and risk factors. Plan for next call: self management-Diabetes Management          Care Transitions Subsequent and Final Call    Schedule Follow Up Appointment with PCP: Completed  Subsequent and Final Calls  Do you have any ongoing symptoms?: No  Have your medications changed?: Yes  Patient Reports: Hydralazine increased  Do you have any questions related to your medications?: No  Do you currently have any active services?: Yes  Are you currently active with any services?: Home Health  Do you have any needs or concerns that I can assist you with?: No  Identified Barriers: None  Care Transitions Interventions   Home Care Waiver: Completed Physical Therapy: Completed     Occupational Therapy: Completed     Other Interventions:              Follow Up  Future Appointments   Date Time Provider Alissa Bustillo   10/11/2022  3:15 PM Vonda Arora MD [facility] Cardio [facility]   11/1/2022  9:15 AM Meri Prescott MD PULM & CC CHARLES Swanson LPN

## 2022-09-14 ENCOUNTER — CARE COORDINATION (OUTPATIENT)
Dept: CASE MANAGEMENT | Age: 87
End: 2022-09-14

## 2022-09-14 NOTE — CARE COORDINATION
[facility] Transitions Follow Up Call    2022    Patient: Delores Cool  Patient : 1935   MRN: <[id number]>  Reason for Admission: Dizziness  Discharge Date: 22 RARS: Readmission Risk Score: 14.2         Spoke with: Angel Clinton  Patients daughter reports she is doing fine. She received her hearing aides yesterday. Appetite and fluid intake is good. No problem with urination. She had loose stool with some blood from hemorrhoids. She denies patient has fever, chills, sob, nv, cp or fatigue. She is at her baseline for dizziness and weakness. B/P and BS are good. FBS today was 102. No new or worsening symptoms. No questions, needs or concerns at this time. Will continue to follow. Care Transitions Follow Up Call    Needs to be reviewed by the provider   Additional needs identified to be addressed with provider: No  none             Method of communication with provider : none      Care Transition Nurse (CTN) contacted the family by telephone to follow up after admission on 2022. Verified name and  with family as identifiers. Addressed changes since last contact: none  Discussed follow-up appointments. If no appointment was previously scheduled, appointment scheduling offered: NA. Is follow up appointment scheduled within 7 days of discharge? NA. Advance Care Planning:   Does patient have an Advance Directive: not on file. CTN reviewed discharge instructions, medical action plan and red flags with family and discussed any barriers to care and/or understanding of plan of care after discharge. Discussed appropriate site of care based on symptoms and resources available to patient including: PCP  Specialist  When to call 911. The family agrees to contact the PCP office for questions related to their healthcare.      Patients top risk factors for readmission: ineffective coping  medical condition-DM, Copd, HTN, Afib, CKD3  Interventions to address risk factors: Education of

## 2022-09-21 ENCOUNTER — CARE COORDINATION (OUTPATIENT)
Dept: CASE MANAGEMENT | Age: 87
End: 2022-09-21

## 2022-09-21 NOTE — CARE COORDINATION
[facility] Transitions Follow Up Call    2022    Patient: Kalpesh Cano  Patient : 1935   MRN: [id number]  Reason for Admission: Dizziness/UTI Discharge Date: 22 RARS: Readmission Risk Score: 14.2         Spoke with: Morena Morel (daughter) who reports that patient had to return to the doctor because she was having burning with urination, chills and AMS. Daughter states that the doctor changed her ATB to Cipro. She states that patient is now starting to feel better. She states that it has only been a couple days but she can tell she is getting better. Morena Morel denies cp, sob, cough, dizziness, headache, n/v, diarrhea, abdominal pains, fever. Patient report that appetite and fluid intake is good and denies any problems with bowel or bladder. Patient is taking all medications as ordered. Denies any needs at this time. Patient instructed to continue to monitor s/s, reporting any that may present to MD immediately for early intervention. Agreeable to f/u calls. Since change of  ATB for UTI will keep patient another week to follow up on effectiveness of the new ATB. Care Transitions Subsequent and Final Call    Subsequent and Final Calls  Do you have any ongoing symptoms?: Yes  Onset of Patient-reported symptoms: Other  Patient-reported symptoms: Other  Interventions for patient-reported symptoms: Other  Have your medications changed?: Yes  Patient Reports: ADDED CIPRO  Do you have any questions related to your medications?: No  Do you currently have any active services?: No  Are you currently active with any services?: Home Health  Do you have any needs or concerns that I can assist you with?: No  Identified Barriers: None  Care Transitions Interventions   Home Care Waiver: Completed Physical Therapy: Completed     Occupational Therapy: Completed     Other Interventions:              Follow Up  Future Appointments   Date Time Provider Alissa Bustillo   2022  9:15 AM Rosalee Storey MD PULM & CC [facility]   11/1/2022  3:15 PM Robin Lal MD FF Cardio [facility]       Derrell Brantley LPN

## 2022-09-28 ENCOUNTER — CARE COORDINATION (OUTPATIENT)
Dept: CASE MANAGEMENT | Age: 87
End: 2022-09-28

## 2022-09-28 NOTE — CARE COORDINATION
[facility] Care Transitions Follow Up Call    Care Transition Nurse contacted the patient by telephone to follow up after admission on . Verified name and  with family as identifiers. Patient: Asha Flaherty  Patient : 1935   MRN: [id number]  Reason for Admission:   Discharge Date: 22 RARS: Readmission Risk Score: 14.2      Needs to be reviewed by the provider   Additional needs identified to be addressed with provider: No  none             Method of communication with provider: none. Pt's daughter states pt is doing well, no issues or concerns. Feeling better from UTI. [facility]. nurse was out to the home today. No need for further f/u CTC calls. Addressed changes since last contact:  none  Discussed follow-up appointments. If no appointment was previously scheduled, appointment scheduling offered: No.   Is follow up appointment scheduled within 7 days of discharge? Yes. Follow Up  Future Appointments   Date Time Provider Alissa Bustillo   2022  9:15 AM Merlin Braver, MD [facility]   2022  3:15 PM Al Prescott MD [facility]     Care Transitions Subsequent and Final Call    Subsequent and Final Calls  Do you have any ongoing symptoms?: No  Have your medications changed?: No  Do you have any questions related to your medications?: No  Do you currently have any active services?: Yes  Are you currently active with any services?: Home Health  Do you have any needs or concerns that I can assist you with?: No  Identified Barriers: None  Care Transitions Interventions  No Identified Needs  Other Interventions:             Care Transition Nurse provided contact information for future needs. No further follow-up call indicated based on severity of symptoms and risk factors.   Plan for next call:  n/a    Tulio Lepe RN

## 2022-09-30 ENCOUNTER — APPOINTMENT (OUTPATIENT)
Dept: GENERAL RADIOLOGY | Age: 87
DRG: 308 | End: 2022-09-30
Payer: MEDICARE

## 2022-09-30 ENCOUNTER — HOSPITAL ENCOUNTER (INPATIENT)
Age: 87
LOS: 4 days | Discharge: HOME HEALTH | DRG: 308 | End: 2022-10-04
Attending: INTERNAL MEDICINE | Admitting: INTERNAL MEDICINE
Payer: MEDICARE

## 2022-09-30 DIAGNOSIS — I48.91 ATRIAL FIBRILLATION WITH RVR (HCC): Primary | ICD-10-CM

## 2022-09-30 DIAGNOSIS — E87.1 HYPONATREMIA: ICD-10-CM

## 2022-09-30 LAB
A/G RATIO: 1.4 (ref 1.1–2.2)
ALBUMIN SERPL-MCNC: 3.9 G/DL (ref 3.4–5)
ALP BLD-CCNC: 122 U/L (ref 40–129)
ALT SERPL-CCNC: 22 U/L (ref 10–40)
ANION GAP SERPL CALCULATED.3IONS-SCNC: 15 MMOL/L (ref 3–16)
ANISOCYTOSIS: ABNORMAL
APTT: 48.4 SEC (ref 23–34.3)
AST SERPL-CCNC: 21 U/L (ref 15–37)
ATYPICAL LYMPHOCYTE RELATIVE PERCENT: 6 % (ref 0–6)
BACTERIA: ABNORMAL /HPF
BASOPHILS ABSOLUTE: 0 K/UL (ref 0–0.2)
BASOPHILS RELATIVE PERCENT: 0 %
BILIRUB SERPL-MCNC: 0.6 MG/DL (ref 0–1)
BILIRUBIN URINE: NEGATIVE
BLOOD, URINE: NEGATIVE
BUN BLDV-MCNC: 32 MG/DL (ref 7–20)
CALCIUM SERPL-MCNC: 9 MG/DL (ref 8.3–10.6)
CHLORIDE BLD-SCNC: 93 MMOL/L (ref 99–110)
CLARITY: CLEAR
CO2: 18 MMOL/L (ref 21–32)
COLOR: YELLOW
CREAT SERPL-MCNC: 1.2 MG/DL (ref 0.6–1.2)
EOSINOPHILS ABSOLUTE: 0 K/UL (ref 0–0.6)
EOSINOPHILS RELATIVE PERCENT: 0 %
EPITHELIAL CELLS, UA: 2 /HPF (ref 0–5)
GFR AFRICAN AMERICAN: 51
GFR NON-AFRICAN AMERICAN: 43
GLUCOSE BLD-MCNC: 112 MG/DL (ref 70–99)
GLUCOSE BLD-MCNC: 119 MG/DL (ref 70–99)
GLUCOSE URINE: NEGATIVE MG/DL
HCT VFR BLD CALC: 30.2 % (ref 36–48)
HEMOGLOBIN: 10 G/DL (ref 12–16)
HYALINE CASTS: 0 /LPF (ref 0–8)
INR BLD: 1.71 (ref 0.87–1.14)
KETONES, URINE: ABNORMAL MG/DL
LEUKOCYTE ESTERASE, URINE: ABNORMAL
LYMPHOCYTES ABSOLUTE: 0.9 K/UL (ref 1–5.1)
LYMPHOCYTES RELATIVE PERCENT: 8 %
MAGNESIUM: 1.9 MG/DL (ref 1.8–2.4)
MCH RBC QN AUTO: 29.3 PG (ref 26–34)
MCHC RBC AUTO-ENTMCNC: 33.1 G/DL (ref 31–36)
MCV RBC AUTO: 88.5 FL (ref 80–100)
MICROSCOPIC EXAMINATION: YES
MONOCYTES ABSOLUTE: 0.2 K/UL (ref 0–1.3)
MONOCYTES RELATIVE PERCENT: 4 %
NEUTROPHILS ABSOLUTE: 5 K/UL (ref 1.7–7.7)
NEUTROPHILS RELATIVE PERCENT: 82 %
NITRITE, URINE: NEGATIVE
PDW BLD-RTO: 16 % (ref 12.4–15.4)
PERFORMED ON: ABNORMAL
PH UA: 5.5 (ref 5–8)
PLATELET # BLD: 136 K/UL (ref 135–450)
PLATELET SLIDE REVIEW: ADEQUATE
PMV BLD AUTO: 8 FL (ref 5–10.5)
POTASSIUM SERPL-SCNC: 4.5 MMOL/L (ref 3.5–5.1)
PRO-BNP: 3785 PG/ML (ref 0–449)
PROTEIN UA: 30 MG/DL
PROTHROMBIN TIME: 20 SEC (ref 11.7–14.5)
RBC # BLD: 3.42 M/UL (ref 4–5.2)
RBC UA: 0 /HPF (ref 0–4)
SLIDE REVIEW: ABNORMAL
SODIUM BLD-SCNC: 126 MMOL/L (ref 136–145)
SODIUM BLD-SCNC: 126 MMOL/L (ref 136–145)
SPECIFIC GRAVITY UA: 1.01 (ref 1–1.03)
TOTAL PROTEIN: 6.6 G/DL (ref 6.4–8.2)
TROPONIN: <0.01 NG/ML
TROPONIN: <0.01 NG/ML
URINE REFLEX TO CULTURE: ABNORMAL
URINE TYPE: ABNORMAL
UROBILINOGEN, URINE: 0.2 E.U./DL
WBC # BLD: 6.1 K/UL (ref 4–11)
WBC UA: 7 /HPF (ref 0–5)

## 2022-09-30 PROCEDURE — 6370000000 HC RX 637 (ALT 250 FOR IP): Performed by: NURSE PRACTITIONER

## 2022-09-30 PROCEDURE — 84300 ASSAY OF URINE SODIUM: CPT

## 2022-09-30 PROCEDURE — 85730 THROMBOPLASTIN TIME PARTIAL: CPT

## 2022-09-30 PROCEDURE — 85610 PROTHROMBIN TIME: CPT

## 2022-09-30 PROCEDURE — 6370000000 HC RX 637 (ALT 250 FOR IP): Performed by: INTERNAL MEDICINE

## 2022-09-30 PROCEDURE — 71045 X-RAY EXAM CHEST 1 VIEW: CPT

## 2022-09-30 PROCEDURE — 36415 COLL VENOUS BLD VENIPUNCTURE: CPT

## 2022-09-30 PROCEDURE — 81001 URINALYSIS AUTO W/SCOPE: CPT

## 2022-09-30 PROCEDURE — 83930 ASSAY OF BLOOD OSMOLALITY: CPT

## 2022-09-30 PROCEDURE — 96365 THER/PROPH/DIAG IV INF INIT: CPT

## 2022-09-30 PROCEDURE — 84484 ASSAY OF TROPONIN QUANT: CPT

## 2022-09-30 PROCEDURE — 99285 EMERGENCY DEPT VISIT HI MDM: CPT

## 2022-09-30 PROCEDURE — 84443 ASSAY THYROID STIM HORMONE: CPT

## 2022-09-30 PROCEDURE — 2580000003 HC RX 258: Performed by: INTERNAL MEDICINE

## 2022-09-30 PROCEDURE — 84439 ASSAY OF FREE THYROXINE: CPT

## 2022-09-30 PROCEDURE — 83735 ASSAY OF MAGNESIUM: CPT

## 2022-09-30 PROCEDURE — 93005 ELECTROCARDIOGRAM TRACING: CPT | Performed by: PHYSICIAN ASSISTANT

## 2022-09-30 PROCEDURE — 2060000000 HC ICU INTERMEDIATE R&B

## 2022-09-30 PROCEDURE — 85025 COMPLETE CBC W/AUTO DIFF WBC: CPT

## 2022-09-30 PROCEDURE — 80053 COMPREHEN METABOLIC PANEL: CPT

## 2022-09-30 PROCEDURE — 96375 TX/PRO/DX INJ NEW DRUG ADDON: CPT

## 2022-09-30 PROCEDURE — 83935 ASSAY OF URINE OSMOLALITY: CPT

## 2022-09-30 PROCEDURE — 83880 ASSAY OF NATRIURETIC PEPTIDE: CPT

## 2022-09-30 PROCEDURE — 2500000003 HC RX 250 WO HCPCS: Performed by: PHYSICIAN ASSISTANT

## 2022-09-30 PROCEDURE — 84295 ASSAY OF SERUM SODIUM: CPT

## 2022-09-30 RX ORDER — LOSARTAN POTASSIUM 100 MG/1
100 TABLET ORAL NIGHTLY
Status: DISCONTINUED | OUTPATIENT
Start: 2022-09-30 | End: 2022-10-04 | Stop reason: HOSPADM

## 2022-09-30 RX ORDER — ONDANSETRON 4 MG/1
4 TABLET, ORALLY DISINTEGRATING ORAL EVERY 8 HOURS PRN
Status: DISCONTINUED | OUTPATIENT
Start: 2022-09-30 | End: 2022-10-04 | Stop reason: HOSPADM

## 2022-09-30 RX ORDER — DILTIAZEM HYDROCHLORIDE 120 MG/1
120 CAPSULE, COATED, EXTENDED RELEASE ORAL DAILY
Status: DISCONTINUED | OUTPATIENT
Start: 2022-10-01 | End: 2022-10-01

## 2022-09-30 RX ORDER — ALBUTEROL SULFATE 90 UG/1
2 AEROSOL, METERED RESPIRATORY (INHALATION) EVERY 6 HOURS PRN
Status: DISCONTINUED | OUTPATIENT
Start: 2022-09-30 | End: 2022-10-04 | Stop reason: HOSPADM

## 2022-09-30 RX ORDER — ACETAMINOPHEN 325 MG/1
650 TABLET ORAL EVERY 6 HOURS PRN
Status: DISCONTINUED | OUTPATIENT
Start: 2022-09-30 | End: 2022-10-04 | Stop reason: HOSPADM

## 2022-09-30 RX ORDER — DILTIAZEM HYDROCHLORIDE 5 MG/ML
10 INJECTION INTRAVENOUS ONCE
Status: COMPLETED | OUTPATIENT
Start: 2022-09-30 | End: 2022-09-30

## 2022-09-30 RX ORDER — LORAZEPAM 1 MG/1
1 TABLET ORAL NIGHTLY
COMMUNITY

## 2022-09-30 RX ORDER — SODIUM CHLORIDE 9 MG/ML
INJECTION, SOLUTION INTRAVENOUS PRN
Status: DISCONTINUED | OUTPATIENT
Start: 2022-09-30 | End: 2022-10-04 | Stop reason: HOSPADM

## 2022-09-30 RX ORDER — LEVOTHYROXINE SODIUM 0.07 MG/1
1 TABLET ORAL DAILY
Status: DISCONTINUED | OUTPATIENT
Start: 2022-10-01 | End: 2022-09-30 | Stop reason: SDUPTHER

## 2022-09-30 RX ORDER — TRAZODONE HYDROCHLORIDE 50 MG/1
50 TABLET ORAL NIGHTLY PRN
Status: DISCONTINUED | OUTPATIENT
Start: 2022-09-30 | End: 2022-10-04 | Stop reason: HOSPADM

## 2022-09-30 RX ORDER — SODIUM CHLORIDE 0.9 % (FLUSH) 0.9 %
5-40 SYRINGE (ML) INJECTION EVERY 12 HOURS SCHEDULED
Status: DISCONTINUED | OUTPATIENT
Start: 2022-09-30 | End: 2022-10-04 | Stop reason: HOSPADM

## 2022-09-30 RX ORDER — ONDANSETRON 2 MG/ML
4 INJECTION INTRAMUSCULAR; INTRAVENOUS EVERY 6 HOURS PRN
Status: DISCONTINUED | OUTPATIENT
Start: 2022-09-30 | End: 2022-10-04 | Stop reason: HOSPADM

## 2022-09-30 RX ORDER — SODIUM CHLORIDE 0.9 % (FLUSH) 0.9 %
5-40 SYRINGE (ML) INJECTION PRN
Status: DISCONTINUED | OUTPATIENT
Start: 2022-09-30 | End: 2022-10-04 | Stop reason: HOSPADM

## 2022-09-30 RX ORDER — SODIUM CHLORIDE 9 MG/ML
INJECTION, SOLUTION INTRAVENOUS CONTINUOUS
Status: DISCONTINUED | OUTPATIENT
Start: 2022-09-30 | End: 2022-10-01

## 2022-09-30 RX ORDER — TRAZODONE HYDROCHLORIDE 50 MG/1
50 TABLET ORAL NIGHTLY
COMMUNITY
End: 2022-11-01 | Stop reason: ALTCHOICE

## 2022-09-30 RX ORDER — POLYETHYLENE GLYCOL 3350 17 G/17G
17 POWDER, FOR SOLUTION ORAL DAILY PRN
Status: DISCONTINUED | OUTPATIENT
Start: 2022-09-30 | End: 2022-10-04 | Stop reason: HOSPADM

## 2022-09-30 RX ORDER — LORAZEPAM 0.5 MG/1
0.5 TABLET ORAL 2 TIMES DAILY
COMMUNITY

## 2022-09-30 RX ORDER — DILTIAZEM HCL/D5W 125 MG/125
PLASTIC BAG, INJECTION (ML) INTRAVENOUS
Status: DISCONTINUED
Start: 2022-09-30 | End: 2022-09-30 | Stop reason: WASHOUT

## 2022-09-30 RX ORDER — LEVOTHYROXINE SODIUM 0.07 MG/1
75 TABLET ORAL
Status: DISCONTINUED | OUTPATIENT
Start: 2022-10-01 | End: 2022-10-04 | Stop reason: HOSPADM

## 2022-09-30 RX ORDER — INSULIN LISPRO 100 [IU]/ML
0-4 INJECTION, SOLUTION INTRAVENOUS; SUBCUTANEOUS NIGHTLY
Status: DISCONTINUED | OUTPATIENT
Start: 2022-09-30 | End: 2022-10-04 | Stop reason: HOSPADM

## 2022-09-30 RX ORDER — ACETAMINOPHEN 650 MG/1
650 SUPPOSITORY RECTAL EVERY 6 HOURS PRN
Status: DISCONTINUED | OUTPATIENT
Start: 2022-09-30 | End: 2022-10-04 | Stop reason: HOSPADM

## 2022-09-30 RX ORDER — DILTIAZEM HCL/D5W 125 MG/125
2.5-15 PLASTIC BAG, INJECTION (ML) INTRAVENOUS CONTINUOUS
Status: DISCONTINUED | OUTPATIENT
Start: 2022-09-30 | End: 2022-09-30 | Stop reason: HOSPADM

## 2022-09-30 RX ORDER — LORAZEPAM 0.5 MG/1
0.5 TABLET ORAL 2 TIMES DAILY PRN
Status: DISCONTINUED | OUTPATIENT
Start: 2022-09-30 | End: 2022-10-04 | Stop reason: HOSPADM

## 2022-09-30 RX ORDER — INSULIN LISPRO 100 [IU]/ML
0-8 INJECTION, SOLUTION INTRAVENOUS; SUBCUTANEOUS
Status: DISCONTINUED | OUTPATIENT
Start: 2022-10-01 | End: 2022-10-04 | Stop reason: HOSPADM

## 2022-09-30 RX ADMIN — LOSARTAN POTASSIUM 100 MG: 100 TABLET, FILM COATED ORAL at 23:37

## 2022-09-30 RX ADMIN — Medication 5 MG/HR: at 17:28

## 2022-09-30 RX ADMIN — DILTIAZEM HYDROCHLORIDE 10 MG: 5 INJECTION, SOLUTION INTRAVENOUS at 17:20

## 2022-09-30 RX ADMIN — LORAZEPAM 0.5 MG: 0.5 TABLET ORAL at 23:37

## 2022-09-30 RX ADMIN — SODIUM CHLORIDE: 9 INJECTION, SOLUTION INTRAVENOUS at 18:17

## 2022-09-30 RX ADMIN — TRAZODONE HYDROCHLORIDE 50 MG: 50 TABLET ORAL at 23:37

## 2022-09-30 RX ADMIN — APIXABAN 5 MG: 5 TABLET, FILM COATED ORAL at 23:37

## 2022-09-30 ASSESSMENT — PAIN - FUNCTIONAL ASSESSMENT: PAIN_FUNCTIONAL_ASSESSMENT: NONE - DENIES PAIN

## 2022-09-30 ASSESSMENT — ENCOUNTER SYMPTOMS
VOMITING: 0
CONSTIPATION: 0
STRIDOR: 0
BACK PAIN: 0
DIARRHEA: 0
SHORTNESS OF BREATH: 0
ABDOMINAL PAIN: 0
COLOR CHANGE: 0
WHEEZING: 0
NAUSEA: 0
COUGH: 0

## 2022-09-30 ASSESSMENT — PAIN SCALES - GENERAL: PAINLEVEL_OUTOF10: 0

## 2022-09-30 NOTE — H&P
HOSPITALISTS HISTORY AND PHYSICAL    9/30/2022 6:03 PM    Patient Information:  Annette Roberts is a 80 y.o. female [id number]  PCP:  Khurram Pitt MD (Tel: [phone] )    Chief complaint:    Chief Complaint   Patient presents with    Atrial Fibrillation     History of Present Illness:  Williams Mcfadden is a 80 y.o. female morning 1 AM with her heart racing and some chest pressure felt like she was back in her A. fib denies any nausea vomiting chest pain fevers has had some chills no sick contacts has been compliant with all her medication. REVIEW OF SYSTEMS:   Constitutional: Negative for fever,chills or night sweats  ENT: Negative for rhinorrhea, epistaxis, hoarseness, sore throat. Respiratory: Negative for shortness of breath,wheezing  Cardiovascular: see aboe  Gastrointestinal: Negative for nausea, vomiting, diarrhea  Genitourinary: Negative for polyuria, dysuria   Hematologic/Lymphatic: Negative for bleeding tendency, easy bruising  Musculoskeletal: Negative for myalgias and arthralgias  Neurologic: Negative for confusion,dysarthria. Skin: Negative for itching,rash  Psychiatric: Negative for depression,anxiety, agitation. Endocrine: Negative for polydipsia,polyuria,heat /cold intolerance. Past Medical History:   has a past medical history of Arthritis, Atrial fibrillation ([address] Diabetes mellitus type II, controlled ([address] GERD (gastroesophageal reflux disease), HTN (hypertension), Hyperlipidemia, Hypothyroid, Insomnia, Lumbago, and SVT (supraventricular tachycardia) ([address] Past Surgical History:   has a past surgical history that includes Appendectomy; Colonoscopy; and Cholecystectomy, laparoscopic (2/24/2013). Medications:  No current facility-administered medications on file prior to encounter.      Current Outpatient Medications on File Prior to Encounter Medication Sig Dispense Refill    ELIQUIS 5 MG TABS tablet TAKE 1 TABLET TWICE DAILY 180 tablet 0    budesonide-formoterol (SYMBICORT) 160-4.5 MCG/ACT AERO INHALE 2 PUFFS TWICE DAILY 3 each 0    hydrALAZINE (APRESOLINE) 50 MG tablet Take 1 tablet by mouth every 8 hours 90 tablet 3    loratadine (CLARITIN) 10 MG tablet Take 1 tablet by mouth in the morning. 90 tablet 0    fluticasone (FLONASE) 50 MCG/ACT nasal spray USE 2 SPRAYS NASALLY EVERY DAY 48 g 1    atorvastatin (LIPITOR) 80 MG tablet TAKE 1 TABLET EVERY NIGHT 90 tablet 3    [DISCONTINUED] amLODIPine (NORVASC) 5 MG tablet Take 1 tablet by mouth in the morning. 30 tablet 5    dilTIAZem (CARDIZEM CD) 120 MG extended release capsule Take 1 capsule by mouth daily 90 capsule 1    meclizine (ANTIVERT) 12.5 MG tablet TAKE 1 TABLET THREE TIMES DAILY AS NEEDED 270 tablet 1    blood glucose test strips (TRUE METRIX BLOOD GLUCOSE TEST) strip USE TO TEST BLOOD SUGAR DAILY 100 strip 5    propafenone (RYTHMOL) 150 MG tablet TAKE 1 TABLET BY MOUTH EVERY 8 HOURS 270 tablet 0    dicyclomine (BENTYL) 10 MG capsule TAKE 1 CAPSULE BY MOUTH FOUR TIMES DAILY AS NEEDED FOR ABDOMINAL PAIN 360 capsule 0    irbesartan (AVAPRO) 300 MG tablet TAKE 1 TABLET BY MOUTH EVERY NIGHT 90 tablet 0    rOPINIRole (REQUIP) 3 MG tablet TAKE 1 TABLET THREE TIMES DAILY 270 tablet 1    levothyroxine (SYNTHROID) 75 MCG tablet TAKE 1 TABLET BY MOUTH EVERY DAY 90 tablet 0    cloNIDine (CATAPRES) 0.1 MG tablet TAKE 1 TABLET TWICE DAILY 180 tablet 0    conjugated estrogens (PREMARIN) 0.625 MG/GM vaginal cream Place 1 g vaginally three times a week 30 g 2    pramipexole (MIRAPEX) 0.25 MG tablet TAKE 1 TABLET BY MOUTH EVERY NIGHT 90 tablet 1    levalbuterol (XOPENEX) 0.63 MG/3ML nebulization USE 1 VIAL PER NEBULIZER EVERY 6 HOURS.  MAX OF 4 TIMES PER 24 HOURS 1086 mL 3    tiotropium (SPIRIVA RESPIMAT) 2.5 MCG/ACT AERS inhaler Inhale 2 puffs into the lungs daily 1 each 1    Blood Glucose Monitoring Suppl (TRUE METRIX METER) w/Device KIT To use to check sugars 4 times daily 1 kit 0    Blood Glucose Calibration (TRUE METRIX LEVEL 2) Normal SOLN As needed 1 each 0    blood glucose test strips (TRUE METRIX BLOOD GLUCOSE TEST) strip 1 each by In Vitro route daily As needed. 100 each 3    TRUEplus Lancets 33G MISC 1 daily 100 each 3    Lancets MISC 1 each by Does not apply route 2 times daily 300 each 1    ondansetron (ZOFRAN) 4 MG tablet Take 1 tablet by mouth daily as needed for Nausea or Vomiting 30 tablet 0    OXYGEN Inhale 2 L into the lungs      Respiratory Therapy Supplies (NEBULIZER/TUBING/MOUTHPIECE) KIT 1 kit by Does not apply route 4 times daily as needed (shortness of breath) 1 kit 0    albuterol sulfate HFA (PROAIR HFA) 108 (90 Base) MCG/ACT inhaler Inhale 2 puffs into the lungs every 6 hours as needed for Wheezing 1 Inhaler 6       Allergies: Allergies   Allergen Reactions    Adhesive Tape Anaphylaxis    Cefaclor Nausea And Vomiting     Other reaction(s): Chest pain    Cephalexin Other (See Comments)     Struggling to breath, almost passing out/ very low oxygen and pulse    Nsaids      Pt states she has hives and heart races   Other reaction(s): Tachycardia    Clinoril [Sulindac] Other (See Comments)     Stomach pain    Codeine      FEEL NUMB    Diclofenac     Diclofenac Sodium Hives    Diclofenac Sodium Hives    Hctz [Hydrochlorothiazide]      Sob    Ibuprofen Other (See Comments)     Other reaction(s): Tachycardia    Macrobid [Nitrofurantoin Macrocrystal]      nausea    Motrin [Ibuprofen Micronized] Other (See Comments)     Tachycardia      Pcn [Penicillins] Hives    Peach [Prunus Persica] Itching and Swelling     Cannot eat raw peaches    Prednisone      Causes elevated blood pressure     Sulfa Antibiotics      Nausea, diarrhea        Social History:  Patient Lives at home   reports that she quit smoking about 26 years ago. Her smoking use included cigarettes. She started smoking about 72 years ago.  She has a 45.00 pack-year smoking history. She has never used smokeless tobacco. She reports that she does not drink alcohol and does not use drugs. Family History:  family history includes Asthma in her paternal uncle; Heart Attack in her father; Heart Disease in her father; High Blood Pressure in her mother; Other in her brother. ,     Physical Exam:  BP (!) 147/89   Pulse (!) 114   Temp 98.2 °F (36.8 °C) (Oral)   Resp 20   Wt 159 lb (72.1 kg)   SpO2 95%   BMI 29.08 kg/m²     General appearance:  Appears comfortable. AAOx3  HEENT: atraumatic, Pupils equal, muscous membranes moist, no masses appreciated  Cardiovascular: irregulary irregular no murmurs appreciated  Respiratory: CTAB no wheezing  Gastrointestinal: Abdomen soft, non-tender, BS+  EXT: no edema  Neurology: no gross focal deficts  Psychiatry: Appropriate affect. Not agitated  Skin: Warm, dry, no rashes appreciated    Labs:  CBC:   Lab Results   Component Value Date/Time    WBC 6.1 09/30/2022 04:40 PM    RBC 3.42 09/30/2022 04:40 PM    HGB 10.0 09/30/2022 04:40 PM    HCT 30.2 09/30/2022 04:40 PM    MCV 88.5 09/30/2022 04:40 PM    MCH 29.3 09/30/2022 04:40 PM    MCHC 33.1 09/30/2022 04:40 PM    RDW 16.0 09/30/2022 04:40 PM     09/30/2022 04:40 PM    MPV 8.0 09/30/2022 04:40 PM     BMP:    Lab Results   Component Value Date/Time     09/30/2022 04:40 PM    K 4.5 09/30/2022 04:40 PM    K 4.7 08/22/2022 05:17 AM    CL 93 09/30/2022 04:40 PM    CO2 18 09/30/2022 04:40 PM    BUN 32 09/30/2022 04:40 PM    CREATININE 1.2 09/30/2022 04:40 PM    CALCIUM 9.0 09/30/2022 04:40 PM    GFRAA 51 09/30/2022 04:40 PM    GFRAA >60 02/27/2013 05:18 AM    LABGLOM 43 09/30/2022 04:40 PM    GLUCOSE 119 09/30/2022 04:40 PM     XR CHEST PORTABLE   Final Result   1. No definite radiographic evidence of acute cardiopulmonary disease.    2. Chronic right apical soft tissue fullness corresponding to dense band of   scarring on prior CT chest.   3. Pulmonary sequela typical of that seen with smoking, including possible   COPD; correlate with clinical history. 4. Calcific atherosclerotic disease aorta. Recent imaging reviewed    Problem List  Principal Problem:    PAF (paroxysmal atrial fibrillation) ([address]  Resolved Problems:    * No resolved hospital problems. *        Assessment/Plan:   PAF with RVR HR up to 140s  - dilt bolus and gtt  - eliquis for ac  - check tsh  - reviewed recent echo, moderate as  - cards consult    Hyponatremia: urine studies, ivf, nephro consult repeat labs in am    Htn  ; home meds    Dm2: SSI   DVT prophylaxis eliquis  Code status full code    I Spent 33 minutes providing critical care services to this patient thus far to        Admit as inpatient I anticipate hospitalization spanning more than two midnights for investigation and treatment of the above medically necessary diagnoses. Please note that some part of this chart was generated using Dragon dictation software. Although every effort was made to ensure the accuracy of this automated transcription, some errors in transcription may have occurred inadvertently. If you may need any clarification, please do not hesitate to contact me through [facility].        Minnie Diez MD    9/30/2022 6:03 PM

## 2022-09-30 NOTE — ED NOTES
Pt arrives to ED from home for chest pressure and afib. Pt has has hx of afib. Pt reports she noticed some chest pressure @ home and infomred her doctor. Pt took one diltiazem pill but is unsure of the dosage and reports she noticed it was  by 1yr. Pt denies chest pain and states no other symptoms. A&Ox4.       Romana Breaux RN  [phone]

## 2022-09-30 NOTE — ACP (ADVANCE CARE PLANNING)
Advanced Care Planning Note. Purpose of Encounter: Advanced care planning in light of hospitalization  Parties In Attendance: Patient,    Decisional Capacity: Yes  Subjective: Patient  understand that this conversation is to address long term care goal  Objective: Admitted to hospital with A. fib with RVR  Goals of Care Determination: Patient would pursue CPR and Intubation if required.  Unsure about long term ventilation/tracheostomy, would want family to make the decision at the time if required  Code Status: full code  Time spent on Advanced care Plannin minutes  Advanced Care Planning Documents: documented patient's wishes, would like Ronald Oliveira to make medical decisions if unable to make decisions

## 2022-09-30 NOTE — ED NOTES
Handoff given to [facility] RN.       Laura Rios RN  09/30/22 1925

## 2022-09-30 NOTE — ED PROVIDER NOTES
The Ekg interpreted by me in the absence of a cardiologist shows. A. fib with RVR, ventricular rate of 129. Axis normal.  QTc appropriate. No specific ST or T wave abnormality. When compared to prior 8-, A. fib with RVR has replaced sinus rhythm with PACs. I only performed EKG interpretation and was not otherwise involved in the care of this patient.        Kori Corona MD  09/30/22 [id number]

## 2022-09-30 NOTE — ED PROVIDER NOTES
[facility]        Pt Name: Williams Mcfadden  MRN: [id number]  [place] 1935  Date of evaluation: 9/30/2022  Provider: Hector Elias PA-C  PCP: Khurram Pitt MD  Note Started: 4:21 PM EDT       DIVYA. I have evaluated this patient. My supervising physician was available for consultation. CHIEF COMPLAINT       Chief Complaint   Patient presents with    Atrial Fibrillation       HISTORY OF PRESENT ILLNESS   (Location, Timing/Onset, Context/Setting, Quality, Duration, Modifying Factors, Severity, Associated Signs and Symptoms)  Note limiting factors. Chief Complaint: Palpitations    Williams Mcfadden is a 80 y.o. female who presents to the emergency department complaining of palpitations starting this early evening. Denies chest pain, shortness of breath, cough, congestion, fever, chills, headache or dizziness. Denies pain or swelling in extremities, unexpected weight gain or weight loss. She does have history of atrial fibrillation and has medication at home to treat this. However states that she ran out of this medication recently. Nursing Notes were all reviewed and agreed with or any disagreements were addressed in the HPI. REVIEW OF SYSTEMS    (2-9 systems for level 4, 10 or more for level 5)     Review of Systems   Constitutional:  Negative for chills and fever. HENT: Negative. Eyes:  Negative for visual disturbance. Respiratory:  Negative for cough, shortness of breath, wheezing and stridor. Cardiovascular:  Positive for palpitations. Negative for chest pain and leg swelling. Gastrointestinal:  Negative for abdominal pain, constipation, diarrhea, nausea and vomiting. Genitourinary: Negative. Musculoskeletal:  Negative for back pain, neck pain and neck stiffness. Skin:  Negative for color change, pallor, rash and wound. Neurological: Negative.     Psychiatric/Behavioral: Negative for confusion. All other systems reviewed and are negative. Positives and Pertinent negatives as per HPI. Except as noted above in the ROS, all other systems were reviewed and negative. PAST MEDICAL HISTORY     Past Medical History:   Diagnosis Date    Arthritis     Atrial fibrillation ([facility]     Diabetes mellitus type II, controlled ([facility]     GERD (gastroesophageal reflux disease)     HTN (hypertension)     Hyperlipidemia     Hypothyroid     Insomnia     Lumbago     SVT (supraventricular tachycardia) (HCC)          SURGICAL HISTORY     Past Surgical History:   Procedure Laterality Date    APPENDECTOMY      CHOLECYSTECTOMY, LAPAROSCOPIC  2/24/2013    COLONOSCOPY           CURRENTMEDICATIONS       Previous Medications    ALBUTEROL SULFATE HFA (PROAIR HFA) 108 (90 BASE) MCG/ACT INHALER    Inhale 2 puffs into the lungs every 6 hours as needed for Wheezing    ATORVASTATIN (LIPITOR) 80 MG TABLET    TAKE 1 TABLET EVERY NIGHT    BLOOD GLUCOSE CALIBRATION (TRUE METRIX LEVEL 2) NORMAL SOLN    As needed    BLOOD GLUCOSE MONITORING SUPPL (TRUE METRIX METER) W/DEVICE KIT    To use to check sugars 4 times daily    BLOOD GLUCOSE TEST STRIPS (TRUE METRIX BLOOD GLUCOSE TEST) STRIP    1 each by In Vitro route daily As needed.     BLOOD GLUCOSE TEST STRIPS (TRUE METRIX BLOOD GLUCOSE TEST) STRIP    USE TO TEST BLOOD SUGAR DAILY    BUDESONIDE-FORMOTEROL (SYMBICORT) 160-4.5 MCG/ACT AERO    INHALE 2 PUFFS TWICE DAILY    CLONIDINE (CATAPRES) 0.1 MG TABLET    TAKE 1 TABLET TWICE DAILY    CONJUGATED ESTROGENS (PREMARIN) 0.625 MG/GM VAGINAL CREAM    Place 1 g vaginally three times a week    DICYCLOMINE (BENTYL) 10 MG CAPSULE    TAKE 1 CAPSULE BY MOUTH FOUR TIMES DAILY AS NEEDED FOR ABDOMINAL PAIN    DILTIAZEM (CARDIZEM CD) 120 MG EXTENDED RELEASE CAPSULE    Take 1 capsule by mouth daily    ELIQUIS 5 MG TABS TABLET    TAKE 1 TABLET TWICE DAILY    FLUTICASONE (FLONASE) 50 MCG/ACT NASAL SPRAY    USE 2 SPRAYS NASALLY EVERY DAY HYDRALAZINE (APRESOLINE) 50 MG TABLET    Take 1 tablet by mouth every 8 hours    IRBESARTAN (AVAPRO) 300 MG TABLET    TAKE 1 TABLET BY MOUTH EVERY NIGHT    LANCETS MISC    1 each by Does not apply route 2 times daily    LEVALBUTEROL (XOPENEX) 0.63 MG/3ML NEBULIZATION    USE 1 VIAL PER NEBULIZER EVERY 6 HOURS. MAX OF 4 TIMES PER 24 HOURS    LEVOTHYROXINE (SYNTHROID) 75 MCG TABLET    TAKE 1 TABLET BY MOUTH EVERY DAY    LORATADINE (CLARITIN) 10 MG TABLET    Take 1 tablet by mouth in the morning.     MECLIZINE (ANTIVERT) 12.5 MG TABLET    TAKE 1 TABLET THREE TIMES DAILY AS NEEDED    ONDANSETRON (ZOFRAN) 4 MG TABLET    Take 1 tablet by mouth daily as needed for Nausea or Vomiting    OXYGEN    Inhale 2 L into the lungs    PRAMIPEXOLE (MIRAPEX) 0.25 MG TABLET    TAKE 1 TABLET BY MOUTH EVERY NIGHT    PROPAFENONE (RYTHMOL) 150 MG TABLET    TAKE 1 TABLET BY MOUTH EVERY 8 HOURS    RESPIRATORY THERAPY SUPPLIES (NEBULIZER/TUBING/MOUTHPIECE) KIT    1 kit by Does not apply route 4 times daily as needed (shortness of breath)    ROPINIROLE (REQUIP) 3 MG TABLET    TAKE 1 TABLET THREE TIMES DAILY    TIOTROPIUM (SPIRIVA RESPIMAT) 2.5 MCG/ACT AERS INHALER    Inhale 2 puffs into the lungs daily    TRUEPLUS LANCETS 33G MISC    1 daily         ALLERGIES     Adhesive tape, Cefaclor, Cephalexin, Nsaids, Clinoril [sulindac], Codeine, Diclofenac, Diclofenac sodium, Diclofenac sodium, Hctz [hydrochlorothiazide], Ibuprofen, Macrobid [nitrofurantoin macrocrystal], Motrin [ibuprofen micronized], Pcn [penicillins], Peach [prunus persica], Prednisone, and Sulfa antibiotics    FAMILYHISTORY       Family History   Problem Relation Age of Onset    High Blood Pressure Mother     Heart Attack Father     Heart Disease Father     Other Brother     Asthma Paternal Uncle           SOCIAL HISTORY       Social History     Tobacco Use    Smoking status: Former     Packs/day: 1.00     Years: 45.00     Pack years: 45.00     Types: Cigarettes     Start date: 9/15/1950     Quit date: 1995     Years since quittin.7    Smokeless tobacco: Never   Vaping Use    Vaping Use: Never used   Substance Use Topics    Alcohol use: No     Alcohol/week: 0.0 standard drinks    Drug use: No       SCREENINGS    [facility] Coma Scale  Eye Opening: Spontaneous  Best Verbal Response: Oriented  Best Motor Response: Obeys commands  [facility] [place] Coma Scale Score: 15        PHYSICAL EXAM    (up to 7 for level 4, 8 or more for level 5)     ED Triage Vitals [22 1617]   BP Temp Temp Source Heart Rate Resp SpO2 Height Weight   (!) 147/86 98.2 °F (36.8 °C) Oral (!) 130 22 99 % -- 159 lb (72.1 kg)       Physical Exam  Vitals and nursing note reviewed. Constitutional:       Appearance: Normal appearance. She is well-developed. She is not diaphoretic. HENT:      Head: Normocephalic and atraumatic. Right Ear: External ear normal.      Left Ear: External ear normal.      Nose: Nose normal.      Mouth/Throat:      Mouth: Mucous membranes are moist.      Pharynx: Oropharynx is clear. Eyes:      General:         Right eye: No discharge. Left eye: No discharge. Cardiovascular:      Rate and Rhythm: Tachycardia present. Rhythm irregular. Pulmonary:      Effort: Pulmonary effort is normal.      Breath sounds: Normal breath sounds. Abdominal:      General: Bowel sounds are normal.      Palpations: Abdomen is soft. Tenderness: no abdominal tenderness   Musculoskeletal:         General: Normal range of motion. Cervical back: Normal range of motion. Comments: No acute extremity edema, posterior calf or thigh tenderness, palpable cord, discoloration. Negative homans. Skin:     General: Skin is warm and dry. Coloration: Skin is not jaundiced or pale. Findings: No bruising, erythema, lesion or rash. Neurological:      General: No focal deficit present. Mental Status: She is alert and oriented to person, place, and time.    Psychiatric: Behavior: Behavior normal.       DIAGNOSTIC RESULTS   LABS:    Labs Reviewed   CBC WITH AUTO DIFFERENTIAL - Abnormal; Notable for the following components:       Result Value    RBC 3.42 (*)     Hemoglobin 10.0 (*)     Hematocrit 30.2 (*)     RDW 16.0 (*)     All other components within normal limits   COMPREHENSIVE METABOLIC PANEL - Abnormal; Notable for the following components:    Sodium 126 (*)     Chloride 93 (*)     CO2 18 (*)     Glucose 119 (*)     BUN 32 (*)     GFR Non- 43 (*)     GFR  51 (*)     All other components within normal limits   BRAIN NATRIURETIC PEPTIDE - Abnormal; Notable for the following components:    Pro-BNP 3,785 (*)     All other components within normal limits   PROTIME-INR - Abnormal; Notable for the following components:    Protime 20.0 (*)     INR 1.71 (*)     All other components within normal limits   APTT - Abnormal; Notable for the following components:    aPTT 48.4 (*)     All other components within normal limits   TROPONIN   MAGNESIUM   URINALYSIS WITH REFLEX TO CULTURE   T4, FREE   TSH WITH REFLEX       When ordered only abnormal lab results are displayed. All other labs were within normal range or not returned as of this dictation. EKG: When ordered, EKG's are interpreted by the Emergency Department Physician in the absence of a cardiologist.  Please see their note for interpretation of EKG. RADIOLOGY:   Non-plain film images such as CT, Ultrasound and MRI are read by the radiologist. Plain radiographic images are visualized and preliminarily interpreted by the ED Provider with the below findings:        Interpretation per the Radiologist below, if available at the time of this note:    XR CHEST PORTABLE   Final Result   1. No definite radiographic evidence of acute cardiopulmonary disease.    2. Chronic right apical soft tissue fullness corresponding to dense band of   scarring on prior CT chest.   3. Pulmonary sequela typical of that seen with smoking, including possible   COPD; correlate with clinical history. 4. Calcific atherosclerotic disease aorta. PROCEDURES   Unless otherwise noted below, none     Procedures    CRITICAL CARE TIME   There was a high probability of life-threatening clinical deterioration in the patient's condition requiring my urgent intervention. I personally saw the patient and independently provided 55 minutes of non-concurrent critical care out of the total shared critical care time provided, excluding separately reportable procedures. CONSULTS:  IP CONSULT TO HOSPITALIST      EMERGENCY DEPARTMENT COURSE and DIFFERENTIAL DIAGNOSIS/MDM:   Vitals:    Vitals:    09/30/22 1617 09/30/22 1707   BP: (!) 147/86 (!) 147/89   Pulse: (!) 130 (!) 134   Resp: 22 18   Temp: 98.2 °F (36.8 °C)    TempSrc: Oral    SpO2: 99% 96%   Weight: 159 lb (72.1 kg)        Patient was given the following medications:  Medications   dilTIAZem injection 10 mg (10 mg IntraVENous Given 9/30/22 1720)     Followed by   dilTIAZem (CARDIZEM) 125 mg in dextrose 5% 125 mL infusion (premix) (5 mg/hr IntraVENous New Bag 9/30/22 1728)         Is this patient to be included in the SEP-1 Core Measure due to severe sepsis or septic shock? No   Exclusion criteria - the patient is NOT to be included for SEP-1 Core Measure due to: Infection is not suspected    This patient presents to the emergency department with palpitations. Chest x-ray appears stable in comparison to prior CT. BNP is elevated and patient does have history of CHF. She is hyponatremic. Renal function preserved. EKG reveals atrial fibrillation with rapid ventricular response, therefore I did order Cardizem bolus and infusion. Patient understands and agrees with plan for admission. Denies chest pain or shortness of breath at this time. FINAL IMPRESSION      1. Atrial fibrillation with RVR ([address]    2.  Hyponatremia          DISPOSITION/PLAN   DISPOSITION Decision To Admit 09/30/2022 05:40:16 PM      PATIENT REFERRED TO:  No follow-up provider specified.     DISCHARGE MEDICATIONS:  New Prescriptions    No medications on file       DISCONTINUED MEDICATIONS:  Discontinued Medications    No medications on file              (Please note that portions of this note were completed with a voice recognition program.  Efforts were made to edit the dictations but occasionally words are mis-transcribed.)    Eric Faria PA-C (electronically signed)            Eric Faria PA-C  09/30/22 9289

## 2022-10-01 PROBLEM — I50.33 ACUTE ON CHRONIC DIASTOLIC HEART FAILURE (HCC): Status: ACTIVE | Noted: 2022-10-01

## 2022-10-01 LAB
ANION GAP SERPL CALCULATED.3IONS-SCNC: 10 MMOL/L (ref 3–16)
BASOPHILS ABSOLUTE: 0 K/UL (ref 0–0.2)
BASOPHILS RELATIVE PERCENT: 0.7 %
BUN BLDV-MCNC: 22 MG/DL (ref 7–20)
CALCIUM SERPL-MCNC: 9 MG/DL (ref 8.3–10.6)
CHLORIDE BLD-SCNC: 103 MMOL/L (ref 99–110)
CO2: 20 MMOL/L (ref 21–32)
CREAT SERPL-MCNC: 1 MG/DL (ref 0.6–1.2)
EKG ATRIAL RATE: 153 BPM
EKG DIAGNOSIS: NORMAL
EKG Q-T INTERVAL: 294 MS
EKG QRS DURATION: 88 MS
EKG QTC CALCULATION (BAZETT): 430 MS
EKG R AXIS: -4 DEGREES
EKG T AXIS: 61 DEGREES
EKG VENTRICULAR RATE: 129 BPM
EOSINOPHILS ABSOLUTE: 0.1 K/UL (ref 0–0.6)
EOSINOPHILS RELATIVE PERCENT: 1.4 %
GFR AFRICAN AMERICAN: >60
GFR NON-AFRICAN AMERICAN: 52
GLUCOSE BLD-MCNC: 124 MG/DL (ref 70–99)
GLUCOSE BLD-MCNC: 164 MG/DL (ref 70–99)
GLUCOSE BLD-MCNC: 194 MG/DL (ref 70–99)
GLUCOSE BLD-MCNC: 203 MG/DL (ref 70–99)
HCT VFR BLD CALC: 28.2 % (ref 36–48)
HEMOGLOBIN: 9.6 G/DL (ref 12–16)
LYMPHOCYTES ABSOLUTE: 1 K/UL (ref 1–5.1)
LYMPHOCYTES RELATIVE PERCENT: 17.1 %
MCH RBC QN AUTO: 29.6 PG (ref 26–34)
MCHC RBC AUTO-ENTMCNC: 34.1 G/DL (ref 31–36)
MCV RBC AUTO: 86.9 FL (ref 80–100)
MONOCYTES ABSOLUTE: 0.6 K/UL (ref 0–1.3)
MONOCYTES RELATIVE PERCENT: 10.6 %
NEUTROPHILS ABSOLUTE: 4.2 K/UL (ref 1.7–7.7)
NEUTROPHILS RELATIVE PERCENT: 70.2 %
OSMOLALITY URINE: 372 MOSM/KG (ref 390–1070)
OSMOLALITY: 273 MOSM/KG (ref 280–301)
PDW BLD-RTO: 16.2 % (ref 12.4–15.4)
PERFORMED ON: ABNORMAL
PLATELET # BLD: 147 K/UL (ref 135–450)
PMV BLD AUTO: 7.8 FL (ref 5–10.5)
POTASSIUM REFLEX MAGNESIUM: 4 MMOL/L (ref 3.5–5.1)
RBC # BLD: 3.25 M/UL (ref 4–5.2)
SODIUM BLD-SCNC: 133 MMOL/L (ref 136–145)
SODIUM URINE: 56 MMOL/L
T4 FREE: 1.6 NG/DL (ref 0.9–1.8)
TSH REFLEX: 2.58 UIU/ML (ref 0.27–4.2)
TSH REFLEX: 3.8 UIU/ML (ref 0.27–4.2)
WBC # BLD: 5.9 K/UL (ref 4–11)

## 2022-10-01 PROCEDURE — 2580000003 HC RX 258: Performed by: INTERNAL MEDICINE

## 2022-10-01 PROCEDURE — 6370000000 HC RX 637 (ALT 250 FOR IP): Performed by: INTERNAL MEDICINE

## 2022-10-01 PROCEDURE — 93010 ELECTROCARDIOGRAM REPORT: CPT | Performed by: INTERNAL MEDICINE

## 2022-10-01 PROCEDURE — 99223 1ST HOSP IP/OBS HIGH 75: CPT | Performed by: INTERNAL MEDICINE

## 2022-10-01 PROCEDURE — 94761 N-INVAS EAR/PLS OXIMETRY MLT: CPT

## 2022-10-01 PROCEDURE — 6370000000 HC RX 637 (ALT 250 FOR IP): Performed by: NURSE PRACTITIONER

## 2022-10-01 PROCEDURE — 2700000000 HC OXYGEN THERAPY PER DAY

## 2022-10-01 PROCEDURE — 85025 COMPLETE CBC W/AUTO DIFF WBC: CPT

## 2022-10-01 PROCEDURE — 94640 AIRWAY INHALATION TREATMENT: CPT

## 2022-10-01 PROCEDURE — 94760 N-INVAS EAR/PLS OXIMETRY 1: CPT

## 2022-10-01 PROCEDURE — 6360000002 HC RX W HCPCS: Performed by: INTERNAL MEDICINE

## 2022-10-01 PROCEDURE — 2060000000 HC ICU INTERMEDIATE R&B

## 2022-10-01 PROCEDURE — 80048 BASIC METABOLIC PNL TOTAL CA: CPT

## 2022-10-01 PROCEDURE — 36415 COLL VENOUS BLD VENIPUNCTURE: CPT

## 2022-10-01 RX ORDER — FUROSEMIDE 10 MG/ML
20 INJECTION INTRAMUSCULAR; INTRAVENOUS DAILY
Status: DISCONTINUED | OUTPATIENT
Start: 2022-10-01 | End: 2022-10-04 | Stop reason: HOSPADM

## 2022-10-01 RX ORDER — ROPINIROLE 1 MG/1
2 TABLET, FILM COATED ORAL 3 TIMES DAILY
Status: DISCONTINUED | OUTPATIENT
Start: 2022-10-01 | End: 2022-10-04 | Stop reason: HOSPADM

## 2022-10-01 RX ORDER — DILTIAZEM HYDROCHLORIDE 180 MG/1
180 CAPSULE, COATED, EXTENDED RELEASE ORAL DAILY
Status: DISCONTINUED | OUTPATIENT
Start: 2022-10-01 | End: 2022-10-04 | Stop reason: HOSPADM

## 2022-10-01 RX ORDER — HYDRALAZINE HYDROCHLORIDE 100 MG/1
100 TABLET, FILM COATED ORAL EVERY 8 HOURS SCHEDULED
Status: DISCONTINUED | OUTPATIENT
Start: 2022-10-01 | End: 2022-10-01

## 2022-10-01 RX ORDER — HYDRALAZINE HYDROCHLORIDE 100 MG/1
100 TABLET, FILM COATED ORAL EVERY 8 HOURS SCHEDULED
Status: DISCONTINUED | OUTPATIENT
Start: 2022-10-01 | End: 2022-10-04 | Stop reason: HOSPADM

## 2022-10-01 RX ORDER — CLONIDINE HYDROCHLORIDE 0.1 MG/1
0.1 TABLET ORAL 2 TIMES DAILY
Status: DISCONTINUED | OUTPATIENT
Start: 2022-10-01 | End: 2022-10-04 | Stop reason: HOSPADM

## 2022-10-01 RX ORDER — DILTIAZEM HYDROCHLORIDE 5 MG/ML
10 INJECTION INTRAVENOUS ONCE
Status: DISCONTINUED | OUTPATIENT
Start: 2022-10-01 | End: 2022-10-01

## 2022-10-01 RX ORDER — LEVALBUTEROL INHALATION SOLUTION 0.63 MG/3ML
0.63 SOLUTION RESPIRATORY (INHALATION) 2 TIMES DAILY
Status: DISCONTINUED | OUTPATIENT
Start: 2022-10-01 | End: 2022-10-04 | Stop reason: HOSPADM

## 2022-10-01 RX ADMIN — SODIUM CHLORIDE, PRESERVATIVE FREE 10 ML: 5 INJECTION INTRAVENOUS at 21:19

## 2022-10-01 RX ADMIN — SODIUM CHLORIDE: 9 INJECTION, SOLUTION INTRAVENOUS at 05:57

## 2022-10-01 RX ADMIN — ROPINIROLE HYDROCHLORIDE 2 MG: 1 TABLET, FILM COATED ORAL at 13:47

## 2022-10-01 RX ADMIN — ROPINIROLE HYDROCHLORIDE 2 MG: 1 TABLET, FILM COATED ORAL at 03:25

## 2022-10-01 RX ADMIN — APIXABAN 5 MG: 5 TABLET, FILM COATED ORAL at 08:53

## 2022-10-01 RX ADMIN — LEVOTHYROXINE SODIUM 75 MCG: 75 TABLET ORAL at 05:59

## 2022-10-01 RX ADMIN — DILTIAZEM HYDROCHLORIDE 30 MG: 30 TABLET, FILM COATED ORAL at 02:58

## 2022-10-01 RX ADMIN — APIXABAN 5 MG: 5 TABLET, FILM COATED ORAL at 21:15

## 2022-10-01 RX ADMIN — CLONIDINE HYDROCHLORIDE 0.1 MG: 0.1 TABLET ORAL at 10:06

## 2022-10-01 RX ADMIN — Medication 2 PUFF: at 21:13

## 2022-10-01 RX ADMIN — HYDRALAZINE HYDROCHLORIDE 100 MG: 100 TABLET, FILM COATED ORAL at 10:06

## 2022-10-01 RX ADMIN — DILTIAZEM HYDROCHLORIDE 180 MG: 180 CAPSULE, COATED, EXTENDED RELEASE ORAL at 08:53

## 2022-10-01 RX ADMIN — LORAZEPAM 0.5 MG: 0.5 TABLET ORAL at 22:48

## 2022-10-01 RX ADMIN — LOSARTAN POTASSIUM 100 MG: 100 TABLET, FILM COATED ORAL at 21:15

## 2022-10-01 RX ADMIN — CLONIDINE HYDROCHLORIDE 0.1 MG: 0.1 TABLET ORAL at 21:15

## 2022-10-01 RX ADMIN — HYDRALAZINE HYDROCHLORIDE 100 MG: 100 TABLET, FILM COATED ORAL at 21:15

## 2022-10-01 RX ADMIN — FUROSEMIDE 20 MG: 10 INJECTION, SOLUTION INTRAMUSCULAR; INTRAVENOUS at 21:13

## 2022-10-01 RX ADMIN — ROPINIROLE HYDROCHLORIDE 2 MG: 1 TABLET, FILM COATED ORAL at 08:53

## 2022-10-01 RX ADMIN — Medication 2 PUFF: at 10:00

## 2022-10-01 RX ADMIN — ROPINIROLE HYDROCHLORIDE 2 MG: 1 TABLET, FILM COATED ORAL at 21:15

## 2022-10-01 RX ADMIN — TRAZODONE HYDROCHLORIDE 50 MG: 50 TABLET ORAL at 22:47

## 2022-10-01 ASSESSMENT — PAIN SCALES - GENERAL
PAINLEVEL_OUTOF10: 1
PAINLEVEL_OUTOF10: 0

## 2022-10-01 NOTE — ED NOTES
Report given to 3T, pt to be taken up in bed on tele in stable condition     Raven Nielsen, VINNY  09/30/22 2057

## 2022-10-01 NOTE — RT PROTOCOL NOTE
RT Inhaler-Nebulizer Bronchodilator Protocol Note    There is a bronchodilator order in the chart from a provider indicating to follow the RT Bronchodilator Protocol and there is an Initiate RT Inhaler-Nebulizer Bronchodilator Protocol order as well (see protocol at bottom of note). CXR Findings:  XR CHEST PORTABLE    Result Date: 9/30/2022  1. No definite radiographic evidence of acute cardiopulmonary disease. 2. Chronic right apical soft tissue fullness corresponding to dense band of scarring on prior CT chest. 3. Pulmonary sequela typical of that seen with smoking, including possible COPD; correlate with clinical history. 4. Calcific atherosclerotic disease aorta. The findings from the last RT Protocol Assessment were as follows:   History Pulmonary Disease: Chronic pulmonary disease  Respiratory Pattern: Regular pattern and RR 12-20 bpm  Breath Sounds: Clear breath sounds  Cough: Strong, spontaneous, non-productive  Indication for Bronchodilator Therapy:    Bronchodilator Assessment Score: 2    Aerosolized bronchodilator medication orders have been revised according to the RT Inhaler-Nebulizer Bronchodilator Protocol below. Respiratory Therapist to perform RT Therapy Protocol Assessment initially then follow the protocol. Repeat RT Therapy Protocol Assessment PRN for score 0-3 or on second treatment, BID, and PRN for scores above 3. No Indications - adjust the frequency to every 6 hours PRN wheezing or bronchospasm, if no treatments needed after 48 hours then discontinue using Per Protocol order mode. If indication present, adjust the RT bronchodilator orders based on the Bronchodilator Assessment Score as indicated below.   Use Inhaler orders unless patient has one or more of the following: on home nebulizer, not able to hold breath for 10 seconds, is not alert and oriented, cannot activate and use MDI correctly, or respiratory rate 25 breaths per minute or more, then use the equivalent nebulizer order(s) with same Frequency and PRN reasons based on the score. If a patient is on this medication at home then do not decrease Frequency below that used at home. 0-3 - enter or revise RT bronchodilator order(s) to equivalent RT Bronchodilator order with Frequency of every 4 hours PRN for wheezing or increased work of breathing using Per Protocol order mode. 4-6 - enter or revise RT Bronchodilator order(s) to two equivalent RT bronchodilator orders with one order with BID Frequency and one order with Frequency of every 4 hours PRN wheezing or increased work of breathing using Per Protocol order mode. 7-10 - enter or revise RT Bronchodilator order(s) to two equivalent RT bronchodilator orders with one order with TID Frequency and one order with Frequency of every 4 hours PRN wheezing or increased work of breathing using Per Protocol order mode. 11-13 - enter or revise RT Bronchodilator order(s) to one equivalent RT bronchodilator order with QID Frequency and an Albuterol order with Frequency of every 4 hours PRN wheezing or increased work of breathing using Per Protocol order mode. Greater than 13 - enter or revise RT Bronchodilator order(s) to one equivalent RT bronchodilator order with every 4 hours Frequency and an Albuterol order with Frequency of every 2 hours PRN wheezing or increased work of breathing using Per Protocol order mode. RT to enter RT Home Evaluation for COPD & MDI Assessment order using Per Protocol order mode.     Electronically signed by Med Stubbs RCP on 10/1/2022 at 4:45 AM

## 2022-10-01 NOTE — PROGRESS NOTES
Notified Williams Mcfadden NP HR is now going up into 120-140's afib on telemetry. Awaiting a response.

## 2022-10-01 NOTE — PROGRESS NOTES
Sodium 126 on 9/30. Sodium today is 133. Pt is currently on continuous  cc/hr. Pt has high BP and fine crackles in bases. Nephrologist notified.

## 2022-10-01 NOTE — PROGRESS NOTES
Hospitalist (Dr. Carlos Munoz) notified of consistent high BP. No PRN anti-hypertensive or patient's home anti-hypertensive medication have been ordered.

## 2022-10-01 NOTE — PROGRESS NOTES
[facility] PROGRESS NOTE    10/1/2022 11:28 AM        Name: Lai Viramontes . Admitted: 9/30/2022  Primary Care Provider: Donny Miller MD (Tel: [phone])      Subjective: In bed denies any chest pain or shortness of breath still having A. fib starting to have goes up to the 110s 120s.     Reviewed interval ancillary notes    Current Medications  dilTIAZem (CARDIZEM CD) extended release capsule 180 mg, Daily  rOPINIRole (REQUIP) tablet 2 mg, TID  cloNIDine (CATAPRES) tablet 0.1 mg, BID  hydrALAZINE (APRESOLINE) tablet 100 mg, 3 times per day  albuterol sulfate HFA (PROVENTIL;VENTOLIN;PROAIR) 108 (90 Base) MCG/ACT inhaler 2 puff, Q6H PRN  mometasone-formoterol (DULERA) 200-5 MCG/ACT inhaler 2 puff, BID  apixaban (ELIQUIS) tablet 5 mg, BID  losartan (COZAAR) tablet 100 mg, Nightly  sodium chloride flush 0.9 % injection 5-40 mL, 2 times per day  sodium chloride flush 0.9 % injection 5-40 mL, PRN  0.9 % sodium chloride infusion, PRN  ondansetron (ZOFRAN-ODT) disintegrating tablet 4 mg, Q8H PRN   Or  ondansetron (ZOFRAN) injection 4 mg, Q6H PRN  polyethylene glycol (GLYCOLAX) packet 17 g, Daily PRN  acetaminophen (TYLENOL) tablet 650 mg, Q6H PRN   Or  acetaminophen (TYLENOL) suppository 650 mg, Q6H PRN  insulin lispro (HUMALOG) injection vial 0-8 Units, TID WC  insulin lispro (HUMALOG) injection vial 0-4 Units, Nightly  levothyroxine (SYNTHROID) tablet 75 mcg, QAM AC  LORazepam (ATIVAN) tablet 0.5 mg, BID PRN  traZODone (DESYREL) tablet 50 mg, Nightly PRN        Objective:  BP (!) 165/78   Pulse 91   Temp 97 °F (36.1 °C) (Axillary)   Resp 16   Ht 5' 2\" (1.575 m)   Wt 159 lb 1.6 oz (72.2 kg)   SpO2 94%   BMI 29.10 kg/m²     Intake/Output Summary (Last 24 hours) at 10/1/2022 1128  Last data filed at 10/1/2022 0957  Gross per 24 hour   Intake 360 ml   Output 250 ml   Net 110 ml      Wt Readings from Last 3 Encounters:   10/01/22 159 lb 1.6 oz (72.2 kg)   08/22/22 158 lb 4.8 oz (71.8 kg)   08/19/22 157 lb 8 oz (71.4 kg)     General appearance:  Appears comfortable. AAOx3  HEENT: atraumatic, Pupils equal, muscous membranes moist, no masses appreciated  Cardiovascular: irregulary irregular no murmurs appreciated  Respiratory: CTAB no wheezing  Gastrointestinal: Abdomen soft, non-tender, BS+  EXT: no edema  Neurology: no gross focal deficts  Psychiatry: Appropriate affect. Not agitated  Skin: Warm, dry, no rashes appreciated    Labs and Tests:  CBC:   Recent Labs     09/30/22  1640 10/01/22  0547   WBC 6.1 5.9   HGB 10.0* 9.6*    147     BMP:    Recent Labs     09/30/22  1640 09/30/22  2207 10/01/22  0547   * 126* 133*   K 4.5  --  4.0   CL 93*  --  103   CO2 18*  --  20*   BUN 32*  --  22*   CREATININE 1.2  --  1.0   GLUCOSE 119*  --  164*     Hepatic:   Recent Labs     09/30/22  1640   AST 21   ALT 22   BILITOT 0.6   ALKPHOS 122     XR CHEST PORTABLE   Final Result   1. No definite radiographic evidence of acute cardiopulmonary disease. 2. Chronic right apical soft tissue fullness corresponding to dense band of   scarring on prior CT chest.   3. Pulmonary sequela typical of that seen with smoking, including possible   COPD; correlate with clinical history. 4. Calcific atherosclerotic disease aorta. Recent imaging reviewed    Problem List  Principal Problem:    PAF (paroxysmal atrial fibrillation) ([facility]  Resolved Problems:    * No resolved hospital problems.  *       Assessment/Plan:   PAF with RVR   - resume home meds will discuss changes with cards  - eliquis for ac  - check tsh pending  - reviewed recent echo, moderate as  - cards consult penidng     Hyponatremia: improved stop fluids recheck in am nephro on boar    Htn  ; home meds     Dm2: SSI   DVT prophylaxis eliquis  Code status full code      Guille Donnelly MD   10/1/2022 11:28 AM

## 2022-10-01 NOTE — CONSULTS
[address] [facility]  [phone]      Chief Complaint   Patient presents with    Atrial Fibrillation        Reason for consult:  AF    ASSESSMENT AND PLAN:    Paroxysmal AF with recurrence and with RVR  Acute on chronic diastolic heart failue    Start gentle diuresis with IV lasix 20 daily    Rate now controlled on oral meds  On NOAC  She is very symptomatic with AF and responded well to cardioversion in 2021  Will plan to keep patient in-house for possible cardioversion Monday pending EP consult. Please make NPO at midnight tomorrow night. History of Present Illness:  Madison Melchor is a 80 y.o. patient who presented to the hospital with complaints of feeling fast heart beats and thinking she was back in atrial fib. I have been asked to provide consultation regarding further management and testing. She was in AF at admission. Rate controlled with dilt and changed to oral dilt. On NOAC. Has been taking NOAC at home without trouble. Had a cardioversion about 1 year ago by Dr. Cici Doyle and was hoping to be cardioverted again. Now that rate is controlled she can't tell she is in AF. Past Medical History:   has a past medical history of Arthritis, Atrial fibrillation ([address] Diabetes mellitus type II, controlled ([address] GERD (gastroesophageal reflux disease), HTN (hypertension), Hyperlipidemia, Hypothyroid, Insomnia, Lumbago, and SVT (supraventricular tachycardia) ([address] Surgical History:   has a past surgical history that includes Appendectomy; Colonoscopy; and Cholecystectomy, laparoscopic (2/24/2013). Social History:   reports that she quit smoking about 26 years ago. Her smoking use included cigarettes. She started smoking about 72 years ago. She has a 45.00 pack-year smoking history. She has never used smokeless tobacco. She reports that she does not drink alcohol and does not use drugs.      Family History:  No family history of premature coronary artery disease, aortic disease, or valve disease. Home Medications:  Were reviewed and are listed in nursing record. and/or listed below  Prior to Admission medications    Medication Sig Start Date End Date Taking? Authorizing Provider   LORazepam (ATIVAN) 1 MG tablet Take 1 mg by mouth at bedtime. Yes Historical Provider, MD   SITagliptin (JANUVIA) 100 MG tablet Take 100 mg by mouth daily   Yes Historical Provider, MD   traZODone (DESYREL) 50 MG tablet Take 50 mg by mouth nightly   Yes Historical Provider, MD   LORazepam (ATIVAN) 0.5 MG tablet Take 0.5 mg by mouth in the morning and at bedtime. In the morning and afternoon   Yes Historical Provider, MD   ELIQUIS 5 MG TABS tablet TAKE 1 TABLET TWICE DAILY 8/26/22   Millie Perry MD   budesonide-formoterol [facility]) 160-4.5 MCG/ACT AERO INHALE 2 PUFFS TWICE DAILY 8/23/22   Millie Perry MD   hydrALAZINE (APRESOLINE) 50 MG tablet Take 1 tablet by mouth every 8 hours  Patient taking differently: Take 100 mg by mouth every 8 hours 8/23/22   Kalpana Montana MD   loratadine (CLARITIN) 10 MG tablet Take 1 tablet by mouth in the morning. 8/9/22   Millie Perry MD   fluticasone (FLONASE) 50 MCG/ACT nasal spray USE 2 SPRAYS NASALLY EVERY DAY 8/2/22   Millie Perry MD   atorvastatin (LIPITOR) 80 MG tablet TAKE 1 TABLET EVERY NIGHT 8/2/22   KATHY Lazaro CNP   amLODIPine (NORVASC) 5 MG tablet Take 1 tablet by mouth in the morning.  8/1/22 8/22/22  KATHY Lazaro CNP   dilTIAZem (CARDIZEM CD) 120 MG extended release capsule Take 1 capsule by mouth daily 7/14/22   KATHY Lazaro CNP   meclizine (ANTIVERT) 12.5 MG tablet TAKE 1 TABLET THREE TIMES DAILY AS NEEDED 7/12/22   Millie Perry MD   blood glucose test strips (TRUE METRIX BLOOD GLUCOSE TEST) strip USE TO TEST BLOOD SUGAR DAILY 7/11/22   Millie Perry MD   propafenone (RYTHMOL) 150 MG tablet TAKE 1 TABLET BY MOUTH EVERY 8 HOURS  Patient taking differently: Take 150 mg by mouth every 8 hours Pt takes twice a day 7/8/22   Emanuel Stallings MD   dicyclomine (BENTYL) 10 MG capsule TAKE 1 CAPSULE BY MOUTH FOUR TIMES DAILY AS NEEDED FOR ABDOMINAL PAIN 7/7/22   KATHY Pierson CNP   irbesartan (AVAPRO) 300 MG tablet TAKE 1 TABLET BY MOUTH EVERY NIGHT 7/1/22   Emanuel Stallings MD   rOPINIRole (REQUIP) 3 MG tablet TAKE 1 TABLET THREE TIMES DAILY 6/27/22   Emanuel Stallings MD   levothyroxine (SYNTHROID) 75 MCG tablet TAKE 1 TABLET BY MOUTH EVERY DAY 6/7/22   Emanuel Stallings MD   cloNIDine (CATAPRES) 0.1 MG tablet TAKE 1 TABLET TWICE DAILY 5/27/22   Sherif Real MD   conjugated estrogens (PREMARIN) 0.625 MG/GM vaginal cream Place 1 g vaginally three times a week  Patient taking differently: Place 1 g vaginally Twice a Week Monday and Friday 5/18/22   Emanuel Stallings MD   pramipexole (MIRAPEX) 0.25 MG tablet TAKE 1 TABLET BY MOUTH EVERY NIGHT  Patient not taking: No sig reported 5/9/22   Emanuel Stallings MD   levalbuterol (XOPENEX) 0.63 MG/3ML nebulization USE 1 VIAL PER NEBULIZER EVERY 6 HOURS. MAX OF 4 TIMES PER 24 HOURS 4/26/22   Joseph Ray MD   tiotropium [facility]) 2.5 MCG/ACT AERS inhaler Inhale 2 puffs into the lungs daily 4/21/22   Kendell Matute PA-C   Blood Glucose Monitoring Suppl (TRUE METRIX METER) w/Device KIT To use to check sugars 4 times daily 3/24/22   Sherif Real MD   Blood Glucose Calibration (TRUE METRIX LEVEL 2) Normal SOLN As needed 3/23/22   Emanuel Stallings MD   blood glucose test strips (TRUE METRIX BLOOD GLUCOSE TEST) strip 1 each by In Vitro route daily As needed.  3/23/22   Emanuel Stallings MD   TRUEplus Lancets 33G MISC 1 daily 3/23/22   Emanuel Stallings MD   Lancets MISC 1 each by Does not apply route 2 times daily 6/30/21   Emanuel Stallings MD   [facility]) 4 MG tablet Take 1 tablet by mouth daily as needed for Nausea or Vomiting 7/8/97   Jack Field MD   OXYGEN Inhale 2 L into the lungs    Historical Provider, MD   Respiratory Therapy Supplies (NEBULIZER/TUBING/MOUTHPIECE) KIT 1 kit by Does not apply route 4 times daily as needed (shortness of breath) 3/26/20   Gwendolyn Kat MD   albuterol sulfate HFA (PROAIR HFA) 108 (90 Base) MCG/ACT inhaler Inhale 2 puffs into the lungs every 6 hours as needed for Wheezing  Patient taking differently: Inhale 2 puffs into the lungs in the morning and at bedtime 10/25/19   Susana Joshua MD        Current Medications:  Current Facility-Administered Medications   Medication Dose Route Frequency Provider Last Rate Last Admin    dilTIAZem (CARDIZEM CD) extended release capsule 180 mg  180 mg Oral Daily Alis KATHY Mcgovern - CNP   180 mg at 10/01/22 0853    rOPINIRole (REQUIP) tablet 2 mg  2 mg Oral TID Alis GLORY McgovernN - CNP   2 mg at 10/01/22 1347    cloNIDine (CATAPRES) tablet 0.1 mg  0.1 mg Oral BID Ben Matthews MD   0.1 mg at 10/01/22 1006    hydrALAZINE (APRESOLINE) tablet 100 mg  100 mg Oral 3 times per day Ben Matthews MD   100 mg at 10/01/22 1006    albuterol sulfate HFA (PROVENTIL;VENTOLIN;PROAIR) 108 (90 Base) MCG/ACT inhaler 2 puff  2 puff Inhalation Q6H PRN Ben Matthews MD        mometasone-formoterol (DULERA) 200-5 MCG/ACT inhaler 2 puff  2 puff Inhalation BID Ben Matthews MD   2 puff at 10/01/22 1000    apixaban (ELIQUIS) tablet 5 mg  5 mg Oral BID Ben Matthews MD   5 mg at 10/01/22 0853    losartan (COZAAR) tablet 100 mg  100 mg Oral Nightly Ben Matthews MD   100 mg at 09/30/22 2337    sodium chloride flush 0.9 % injection 5-40 mL  5-40 mL IntraVENous 2 times per day Ben Matthews MD        sodium chloride flush 0.9 % injection 5-40 mL  5-40 mL IntraVENous PRN Ben Matthews MD        0.9 % sodium chloride infusion   IntraVENous PRN Ben Matthews MD        ondansetron (ZOFRAN-ODT) disintegrating tablet 4 mg  4 mg Oral Q8H PRN Ben Matthews MD        Or    ondansetron (ZOFRAN) injection 4 mg  4 mg IntraVENous Q6H PRN Jaron Navarro MD        polyethylene glycol (GLYCOLAX) packet 17 g  17 g Oral Daily PRN Jaron Navarro MD        acetaminophen (TYLENOL) tablet 650 mg  650 mg Oral Q6H PRN Jaron Navarro MD        Or    acetaminophen (TYLENOL) suppository 650 mg  650 mg Rectal Q6H PRN Jaron Navarro MD        insulin lispro (HUMALOG) injection vial 0-8 Units  0-8 Units SubCUTAneous TID WC Jaron Navarro MD        insulin lispro (HUMALOG) injection vial 0-4 Units  0-4 Units SubCUTAneous Nightly Jaron Navarro MD        levothyroxine (SYNTHROID) tablet 75 mcg  75 mcg Oral QAM AC Jaron Navarro MD   75 mcg at 10/01/22 0559    LORazepam (ATIVAN) tablet 0.5 mg  0.5 mg Oral BID PRN Alis Minick, APRN - CNP   0.5 mg at 09/30/22 2337    traZODone (DESYREL) tablet 50 mg  50 mg Oral Nightly PRN Alis Minick, APRN - CNP   50 mg at 09/30/22 2337        Allergies:  Adhesive tape, Cefaclor, Cephalexin, Nsaids, Clinoril [sulindac], Codeine, Diclofenac, Diclofenac sodium, Diclofenac sodium, Hctz [hydrochlorothiazide], Ibuprofen, Macrobid [nitrofurantoin macrocrystal], Motrin [ibuprofen micronized], Pcn [penicillins], Peach [prunus persica], Prednisone, and Sulfa antibiotics     Review of Systems:     All systems reviewed and negative except as stated above. Physical Examination:    Vitals:    10/01/22 1206   BP: (!) 142/68   Pulse: 98   Resp: 17   Temp: 98.5 °F (36.9 °C)   SpO2: 96%    Weight: 159 lb 1.6 oz (72.2 kg)       Body mass index is 29.1 kg/m².     General Appearance:  Alert, cooperative, no distress, appears stated age   Head:  Normocephalic, without obvious abnormality, atraumatic   Eyes:  PERRL, conjunctiva/corneas clear   Nose: Nares normal, no drainage or sinus tenderness   Throat: Lips, mucosa, and tongue normal   Neck: Supple, symmetrical, trachea midline, no adenopathy, thyroid: not enlarged, symmetric, no tenderness/mass/nodules, no carotid bruit or JVD   Lungs:   Clear to auscultation bilaterally, respirations unlabored   Chest Wall:  No tenderness or deformity   Heart:  Irregular rate and rhythm, S1 variable, S2 normal, no murmur, rub or gallop   Abdomen:   Soft, non-tender, bowel sounds active all four quadrants,  no masses, no organomegaly   Extremities: Extremities normal, atraumatic, no cyanosis or edema   Pulses: 2+ and symmetric   Skin: Skin color, texture, turgor normal, no rashes or lesions   Psych: Normal mood and affect   Neurologic: CN II-XII grossly intact        Labs  Lab Results   Component Value Date/Time    PROBNP 3,785 09/30/2022 04:40 PM    PROBNP 1,260 08/21/2022 11:17 AM    PROBNP 826 04/19/2022 02:40 PM         Lab Results   Component Value Date/Time    CHOL 148 08/22/2022 05:17 AM    TRIG 150 08/22/2022 05:17 AM    HDL 52 08/22/2022 05:17 AM    HDL 32 06/23/2011 06:45 AM    LDLCALC 66 08/22/2022 05:17 AM    LABVLDL 30 08/22/2022 05:17 AM         Troponin   Date/Time Value Ref Range Status   09/30/2022 10:07 PM <0.01 <0.01 ng/mL Final     Comment:     Methodology by Troponin T   09/30/2022 04:40 PM <0.01 <0.01 ng/mL Final     Comment:     Methodology by Troponin T   08/22/2022 05:17 AM <0.01 <0.01 ng/mL Final     Comment:     Methodology by Troponin T           CBC:   Lab Results   Component Value Date/Time    WBC 5.9 10/01/2022 05:47 AM    RBC 3.25 10/01/2022 05:47 AM    HGB 9.6 10/01/2022 05:47 AM    HCT 28.2 10/01/2022 05:47 AM    MCV 86.9 10/01/2022 05:47 AM    RDW 16.2 10/01/2022 05:47 AM     10/01/2022 05:47 AM     CMP:    Lab Results   Component Value Date/Time     10/01/2022 05:47 AM    K 4.0 10/01/2022 05:47 AM     10/01/2022 05:47 AM    CO2 20 10/01/2022 05:47 AM    BUN 22 10/01/2022 05:47 AM    CREATININE 1.0 10/01/2022 05:47 AM    GFRAA >60 10/01/2022 05:47 AM    GFRAA >60 02/27/2013 05:18 AM    AGRATIO 1.4 09/30/2022 04:40 PM    LABGLOM 52 10/01/2022 05:47 AM    GLUCOSE 164 10/01/2022 05:47 AM    PROT 6.6 09/30/2022 04:40 PM    PROT 6.9 02/21/2013 01:48 PM    CALCIUM 9.0 10/01/2022 05:47 AM    BILITOT 0.6 09/30/2022 04:40 PM    ALKPHOS 122 09/30/2022 04:40 PM    AST 21 09/30/2022 04:40 PM    ALT 22 09/30/2022 04:40 PM     PT/INR:  No results found for: PTINR  Lab Results   Component Value Date    CKTOTAL 80 02/22/2021    TROPONINI <0.01 09/30/2022       EKG:  I have reviewed EKG with the following interpretation:  Impression:      Atrial fibrillation with rapid ventricular response with premature ventricular or aberrantly conducted complexes  Anterior infarct , age undetermined  Abnormal ECG    Echo:   Left ventricular systolic function is normal with ejection fraction estimated at 55-60 %. No regional wall motion abnormalities are noted. There is mild concentric left ventricular hypertrophy. There is reversal of E/A inflow velocities across the mitral valve. Mild-to-moderate aortic regurgitation is present. Mitral annular calcification is present. Mild mitral & tricuspid regurgitation. MRI/EP/Other: [facility] 3/12/2021    Old notes reviewed  Telemetry reviewd  Ekg personally reviewed  Chest xray personally reviewed  Echo, stress, cath, and/or other cardiac testing reviewed in detail   Medications and labs reviewed    High complexity/medical decision making due to extensive data review, extensive history review, independent review of data    High risk due to acute illness, evaluation of drug-drug interactions, medication management and diagnostic interventions    I will address the patient's cardiac risk factors and adjusted pharmacologic treatment as needed. In addition, I have reinforced the need for patient directed risk factor modification. Tobacco use was discussed with the patient and educated on the negative effects. I have asked the patient to not utilize these agents. Thank you for allowing to us to participate in the care or Dominique Cohen.  Further evaluation will be based upon the patient's clinical course and testing results. All questions and concerns were addressed to the patient/family. Alternatives to my treatment were discussed. The note was completed using EMR. Every effort was made to ensure accuracy; however, inadvertent computerized transcription errors may be present.       Honey Lan MD, MD 10/1/2022 5:33 PM

## 2022-10-01 NOTE — CARE COORDINATION
Discharge Planning Assessment     discharge planner met with patient to discuss reason for admission, current living situation, and potential needs at the time of discharge    Demographics/Insurance verified Yes- [employer]    Current type of dwelling: Patient reported she lives in a house with 7 steps to get inside. Patient from ECF/SW confirmed with: N/A. Living arrangements: Patient reported she lives with her daughter, Deondre Randle. Level of function/Support: Patient reported she is mostly independent at home. PCP: Dr. Lanette Blancas    Last Visit to PCP: Patient reported her last visit was a couple of weeks ago and that her next visit is 10/10/2022. DME: Patient reported that at home she has a 4-walker that she uses sometimes. She reported she also has a shower chair that she uses. Active with any community resources/agencies/skilled home care: Patient reported she is active with [facility]. Patient reported a nurse from [facility] come to her home once or twice a week to check her vitals. SW asked if this was [address] [place] [address] however, Patient did not know just knew the name had [facility] in it. Patient is on 2L [place] and receives her oxygen through [employer]. Medication compliance issues: Patient reported no medication issues. Patient reported her daughter organizes her medications for her into a pill organizer for her to take daily. Financial issues that could impact healthcare: None reported. Tentative discharge plan: Patient reported she wants to go home. Patient reported her daughter is a big support for her. Transportation at the time of discharge: Patient reported her daughter will be transporting her home. PT/OT pending for Patient.        [address] [place] [address]  [phone]

## 2022-10-01 NOTE — PROGRESS NOTES
Pt admitted to [facility]. Pt alert and oriented x4. Telemetry on. BP elevated but all other vital signs stable. Pt denies any pain at this time. Pt given snack and drink upon request. SBA to bathroom. Pt unable to verify medications, will call daughter to bring medication list in am. Bed alarm on and call light within reach.

## 2022-10-01 NOTE — CONSULTS
Office : [phone]     Fax :[phone]       Nephrology Consult Note      Patient's Name: Ester Chopra  9:12 AM  10/1/2022    Reason for Consult:  hyponatremia       Requesting Physician:  Montserrat Worthington MD      Chief Complaint:    Chief Complaint   Patient presents with    Atrial Fibrillation       A/p     Hyponatremia   A. Fib with RVR on cardizem   Volume overload . Has base crackles. Will stop iv fluids     Urine studies   Dc IV fluids   Lasix prn bases           History of Present Ilness:    Ester Chopra is a 80 y.o. female with prior history of atrial fib, GERD, HTN presented with c/o palpitation . Was found to be in a. Fib with RVR. Sodium level low at 126       I/O last 3 completed shifts: In: 12 [P.O.:240]  Out: 250 [Urine:250]    Past Medical History:   Diagnosis Date    Arthritis     Atrial fibrillation ([facility]     Diabetes mellitus type II, controlled ([address]     GERD (gastroesophageal reflux disease)     HTN (hypertension)     Hyperlipidemia     Hypothyroid     Insomnia     Lumbago     SVT (supraventricular tachycardia) (HCC)        Past Surgical History:   Procedure Laterality Date    APPENDECTOMY      CHOLECYSTECTOMY, LAPAROSCOPIC  2/24/2013    COLONOSCOPY         Family History   Problem Relation Age of Onset    High Blood Pressure Mother     Heart Attack Father     Heart Disease Father     Other Brother     Asthma Paternal Uncle         reports that she quit smoking about 26 years ago. Her smoking use included cigarettes. She started smoking about 72 years ago. She has a 45.00 pack-year smoking history. She has never used smokeless tobacco. She reports that she does not drink alcohol and does not use drugs.         Allergies:  Adhesive tape, Cefaclor, Cephalexin, Nsaids, Clinoril [sulindac], Codeine, Diclofenac, Diclofenac sodium, Diclofenac sodium, Hctz [hydrochlorothiazide], Ibuprofen, Macrobid [nitrofurantoin macrocrystal], Motrin [ibuprofen micronized], Pcn [penicillins], Peach [prunus persica], Prednisone, and Sulfa antibiotics    Current Medications:    dilTIAZem (CARDIZEM CD) extended release capsule 180 mg, Daily  rOPINIRole (REQUIP) tablet 2 mg, TID  albuterol sulfate HFA (PROVENTIL;VENTOLIN;PROAIR) 108 (90 Base) MCG/ACT inhaler 2 puff, Q6H PRN  mometasone-formoterol (DULERA) 200-5 MCG/ACT inhaler 2 puff, BID  apixaban (ELIQUIS) tablet 5 mg, BID  losartan (COZAAR) tablet 100 mg, Nightly  sodium chloride flush 0.9 % injection 5-40 mL, 2 times per day  sodium chloride flush 0.9 % injection 5-40 mL, PRN  0.9 % sodium chloride infusion, PRN  ondansetron (ZOFRAN-ODT) disintegrating tablet 4 mg, Q8H PRN   Or  ondansetron (ZOFRAN) injection 4 mg, Q6H PRN  polyethylene glycol (GLYCOLAX) packet 17 g, Daily PRN  acetaminophen (TYLENOL) tablet 650 mg, Q6H PRN   Or  acetaminophen (TYLENOL) suppository 650 mg, Q6H PRN  insulin lispro (HUMALOG) injection vial 0-8 Units, TID WC  insulin lispro (HUMALOG) injection vial 0-4 Units, Nightly  0.9 % sodium chloride infusion, Continuous  levothyroxine (SYNTHROID) tablet 75 mcg, QAM AC  LORazepam (ATIVAN) tablet 0.5 mg, BID PRN  traZODone (DESYREL) tablet 50 mg, Nightly PRN        Review of Systems:   14 point ROS obtained but were negative except mentioned in HPI      Physical exam:     Vitals:  BP (!) 175/80   Pulse 91   Temp 97 °F (36.1 °C) (Axillary)   Resp 16   Ht 5' 2\" (1.575 m)   Wt 159 lb 1.6 oz (72.2 kg)   SpO2 95%   BMI 29.10 kg/m²   Constitutional:  OAA X3 NAD  Skin: no rash, turgor wnl  Heent:  eomi, mmm  Neck: no bruits or jvd noted  Cardiovascular:  S1, S2 without m/r/g  Respiratory: b/l base crackles   Abdomen:  +bs, soft, nt, nd  Ext: no lower extremity edema  Psychiatric: mood and affect appropriate  Musculoskeletal:  Rom, muscular strength intact    Labs:  CBC:   Recent Labs     09/30/22  1640 10/01/22  0547   WBC 6.1 5.9   HGB 10.0* 9.6*    147     BMP:    Recent Labs     09/30/22  1640 09/30/22  2207 10/01/22  0547   * 126* 133*   K 4.5  --  4.0   CL 93*  --  103   CO2 18*  --  20*   BUN 32*  --  22*   CREATININE 1.2  --  1.0   GLUCOSE 119*  --  164*     Ca/Mg/Phos:   Recent Labs     09/30/22  1640 10/01/22  0547   CALCIUM 9.0 9.0   MG 1.90  --      Hepatic:   Recent Labs     09/30/22 1640   AST 21   ALT 22   BILITOT 0.6   ALKPHOS 122     Troponin:   Recent Labs     09/30/22  1640 09/30/22 2207   TROPONINI <0.01 <0.01     BNP: No results for input(s): BNP in the last 72 hours. Lipids: No results for input(s): CHOL, TRIG, HDL, LDLCALC, LABVLDL in the last 72 hours. ABGs: No results for input(s): PHART, PO2ART, LJU2RLH in the last 72 hours. INR:   Recent Labs     09/30/22 1640   INR 1.71*     UA:  Recent Labs     09/30/22 1715   COLORU Yellow   CLARITYU Clear   GLUCOSEU Negative   BILIRUBINUR Negative   KETUA TRACE*   SPECGRAV 1.010   BLOODU Negative   PHUR 5.5   PROTEINU 30*   UROBILINOGEN 0.2   NITRU Negative   LEUKOCYTESUR TRACE*   LABMICR YES   URINETYPE NotGiven      Urine Microscopic:   Recent Labs     09/30/22  1715   BACTERIA None Seen   HYALCAST 0   WBCUA 7*   RBCUA 0   EPIU 2     Urine Culture: No results for input(s): LABURIN in the last 72 hours. Urine Chemistry:   Recent Labs     09/30/22  1715   NAUR 56                IMAGING:  XR CHEST PORTABLE   Final Result   1. No definite radiographic evidence of acute cardiopulmonary disease. 2. Chronic right apical soft tissue fullness corresponding to dense band of   scarring on prior CT chest.   3. Pulmonary sequela typical of that seen with smoking, including possible   COPD; correlate with clinical history. 4. Calcific atherosclerotic disease aorta.                D/w primary team      Thank you for allowing us to participate in care of Antonieta Media         Electronically signed by: Yohana Neal MD, 10/1/2022, [facility]      Ne[facility] associates of [address]  Office : [phone]  Fax :[phone]

## 2022-10-02 LAB
ANION GAP SERPL CALCULATED.3IONS-SCNC: 12 MMOL/L (ref 3–16)
BASOPHILS ABSOLUTE: 0 K/UL (ref 0–0.2)
BASOPHILS RELATIVE PERCENT: 0.6 %
BUN BLDV-MCNC: 24 MG/DL (ref 7–20)
CALCIUM SERPL-MCNC: 8.5 MG/DL (ref 8.3–10.6)
CHLORIDE BLD-SCNC: 103 MMOL/L (ref 99–110)
CO2: 18 MMOL/L (ref 21–32)
CREAT SERPL-MCNC: 1.2 MG/DL (ref 0.6–1.2)
EOSINOPHILS ABSOLUTE: 0.1 K/UL (ref 0–0.6)
EOSINOPHILS RELATIVE PERCENT: 1.9 %
GFR AFRICAN AMERICAN: 51
GFR NON-AFRICAN AMERICAN: 43
GLUCOSE BLD-MCNC: 129 MG/DL (ref 70–99)
GLUCOSE BLD-MCNC: 136 MG/DL (ref 70–99)
GLUCOSE BLD-MCNC: 140 MG/DL (ref 70–99)
GLUCOSE BLD-MCNC: 141 MG/DL (ref 70–99)
GLUCOSE BLD-MCNC: 150 MG/DL (ref 70–99)
HCT VFR BLD CALC: 28.9 % (ref 36–48)
HEMOGLOBIN: 9.4 G/DL (ref 12–16)
LYMPHOCYTES ABSOLUTE: 1.2 K/UL (ref 1–5.1)
LYMPHOCYTES RELATIVE PERCENT: 24.8 %
MCH RBC QN AUTO: 29.3 PG (ref 26–34)
MCHC RBC AUTO-ENTMCNC: 32.5 G/DL (ref 31–36)
MCV RBC AUTO: 90 FL (ref 80–100)
MONOCYTES ABSOLUTE: 0.5 K/UL (ref 0–1.3)
MONOCYTES RELATIVE PERCENT: 10.6 %
NEUTROPHILS ABSOLUTE: 2.9 K/UL (ref 1.7–7.7)
NEUTROPHILS RELATIVE PERCENT: 62.1 %
PDW BLD-RTO: 16.4 % (ref 12.4–15.4)
PERFORMED ON: ABNORMAL
PLATELET # BLD: 147 K/UL (ref 135–450)
PMV BLD AUTO: 7.2 FL (ref 5–10.5)
POTASSIUM REFLEX MAGNESIUM: 4 MMOL/L (ref 3.5–5.1)
RBC # BLD: 3.2 M/UL (ref 4–5.2)
SODIUM BLD-SCNC: 133 MMOL/L (ref 136–145)
WBC # BLD: 4.7 K/UL (ref 4–11)

## 2022-10-02 PROCEDURE — 97165 OT EVAL LOW COMPLEX 30 MIN: CPT

## 2022-10-02 PROCEDURE — 2060000000 HC ICU INTERMEDIATE R&B

## 2022-10-02 PROCEDURE — 6360000002 HC RX W HCPCS: Performed by: INTERNAL MEDICINE

## 2022-10-02 PROCEDURE — 6370000000 HC RX 637 (ALT 250 FOR IP): Performed by: NURSE PRACTITIONER

## 2022-10-02 PROCEDURE — 99233 SBSQ HOSP IP/OBS HIGH 50: CPT | Performed by: NURSE PRACTITIONER

## 2022-10-02 PROCEDURE — 6370000000 HC RX 637 (ALT 250 FOR IP): Performed by: INTERNAL MEDICINE

## 2022-10-02 PROCEDURE — 97161 PT EVAL LOW COMPLEX 20 MIN: CPT

## 2022-10-02 PROCEDURE — 97116 GAIT TRAINING THERAPY: CPT

## 2022-10-02 PROCEDURE — 85025 COMPLETE CBC W/AUTO DIFF WBC: CPT

## 2022-10-02 PROCEDURE — 94761 N-INVAS EAR/PLS OXIMETRY MLT: CPT

## 2022-10-02 PROCEDURE — 80048 BASIC METABOLIC PNL TOTAL CA: CPT

## 2022-10-02 PROCEDURE — 2580000003 HC RX 258: Performed by: INTERNAL MEDICINE

## 2022-10-02 PROCEDURE — 97530 THERAPEUTIC ACTIVITIES: CPT

## 2022-10-02 PROCEDURE — 36415 COLL VENOUS BLD VENIPUNCTURE: CPT

## 2022-10-02 PROCEDURE — 94640 AIRWAY INHALATION TREATMENT: CPT

## 2022-10-02 PROCEDURE — 99233 SBSQ HOSP IP/OBS HIGH 50: CPT | Performed by: INTERNAL MEDICINE

## 2022-10-02 RX ORDER — LORAZEPAM 1 MG/1
1 TABLET ORAL NIGHTLY
Status: DISCONTINUED | OUTPATIENT
Start: 2022-10-02 | End: 2022-10-04 | Stop reason: HOSPADM

## 2022-10-02 RX ORDER — LORAZEPAM 0.5 MG/1
0.5 TABLET ORAL 2 TIMES DAILY
Status: DISCONTINUED | OUTPATIENT
Start: 2022-10-02 | End: 2022-10-04 | Stop reason: HOSPADM

## 2022-10-02 RX ORDER — POLYVINYL ALCOHOL 14 MG/ML
1 SOLUTION/ DROPS OPHTHALMIC EVERY 6 HOURS PRN
Status: DISCONTINUED | OUTPATIENT
Start: 2022-10-02 | End: 2022-10-04 | Stop reason: HOSPADM

## 2022-10-02 RX ADMIN — LORAZEPAM 1 MG: 1 TABLET ORAL at 21:45

## 2022-10-02 RX ADMIN — Medication 2 PUFF: at 20:52

## 2022-10-02 RX ADMIN — FUROSEMIDE 20 MG: 10 INJECTION, SOLUTION INTRAMUSCULAR; INTRAVENOUS at 08:57

## 2022-10-02 RX ADMIN — TRAZODONE HYDROCHLORIDE 50 MG: 50 TABLET ORAL at 21:44

## 2022-10-02 RX ADMIN — LEVOTHYROXINE SODIUM 75 MCG: 75 TABLET ORAL at 07:07

## 2022-10-02 RX ADMIN — APIXABAN 5 MG: 5 TABLET, FILM COATED ORAL at 08:57

## 2022-10-02 RX ADMIN — POLYVINYL ALCOHOL 1 DROP: 14 SOLUTION/ DROPS OPHTHALMIC at 18:58

## 2022-10-02 RX ADMIN — CLONIDINE HYDROCHLORIDE 0.1 MG: 0.1 TABLET ORAL at 08:57

## 2022-10-02 RX ADMIN — ROPINIROLE HYDROCHLORIDE 2 MG: 1 TABLET, FILM COATED ORAL at 08:57

## 2022-10-02 RX ADMIN — LORAZEPAM 0.5 MG: 0.5 TABLET ORAL at 14:32

## 2022-10-02 RX ADMIN — HYDRALAZINE HYDROCHLORIDE 100 MG: 100 TABLET, FILM COATED ORAL at 07:07

## 2022-10-02 RX ADMIN — APIXABAN 5 MG: 5 TABLET, FILM COATED ORAL at 21:44

## 2022-10-02 RX ADMIN — LEVALBUTEROL 0.63 MG: 0.63 SOLUTION RESPIRATORY (INHALATION) at 19:39

## 2022-10-02 RX ADMIN — ROPINIROLE HYDROCHLORIDE 2 MG: 1 TABLET, FILM COATED ORAL at 14:32

## 2022-10-02 RX ADMIN — ACETAMINOPHEN 650 MG: 325 TABLET ORAL at 13:58

## 2022-10-02 RX ADMIN — ROPINIROLE HYDROCHLORIDE 2 MG: 1 TABLET, FILM COATED ORAL at 21:45

## 2022-10-02 RX ADMIN — POLYVINYL ALCOHOL 1 DROP: 14 SOLUTION/ DROPS OPHTHALMIC at 21:46

## 2022-10-02 RX ADMIN — SODIUM CHLORIDE, PRESERVATIVE FREE 10 ML: 5 INJECTION INTRAVENOUS at 21:45

## 2022-10-02 RX ADMIN — LEVALBUTEROL 0.63 MG: 0.63 SOLUTION RESPIRATORY (INHALATION) at 09:33

## 2022-10-02 RX ADMIN — Medication 2 PUFF: at 09:35

## 2022-10-02 RX ADMIN — CLONIDINE HYDROCHLORIDE 0.1 MG: 0.1 TABLET ORAL at 23:30

## 2022-10-02 RX ADMIN — SODIUM CHLORIDE, PRESERVATIVE FREE 10 ML: 5 INJECTION INTRAVENOUS at 08:57

## 2022-10-02 RX ADMIN — LOSARTAN POTASSIUM 100 MG: 100 TABLET, FILM COATED ORAL at 21:45

## 2022-10-02 RX ADMIN — DILTIAZEM HYDROCHLORIDE 180 MG: 180 CAPSULE, COATED, EXTENDED RELEASE ORAL at 08:57

## 2022-10-02 ASSESSMENT — PAIN DESCRIPTION - LOCATION: LOCATION: BACK

## 2022-10-02 ASSESSMENT — PAIN SCALES - GENERAL: PAINLEVEL_OUTOF10: 3

## 2022-10-02 ASSESSMENT — PAIN DESCRIPTION - PAIN TYPE: TYPE: ACUTE PAIN

## 2022-10-02 ASSESSMENT — PAIN DESCRIPTION - DESCRIPTORS: DESCRIPTORS: ACHING;DULL;DISCOMFORT

## 2022-10-02 ASSESSMENT — PAIN - FUNCTIONAL ASSESSMENT: PAIN_FUNCTIONAL_ASSESSMENT: ACTIVITIES ARE NOT PREVENTED

## 2022-10-02 ASSESSMENT — PAIN DESCRIPTION - ORIENTATION: ORIENTATION: LOWER

## 2022-10-02 NOTE — PLAN OF CARE
Problem: Discharge Planning  Goal: Discharge to home or other facility with appropriate resources  10/1/2022 2305 by Abe Alejandre RN  Outcome: Progressing  10/1/2022 1121 by Radha Marquez RN  Outcome: Progressing     Problem: Safety - Adult  Goal: Free from fall injury  10/1/2022 2305 by Abe Alejandre RN  Outcome: Progressing  10/1/2022 1121 by Radha Marquez RN  Outcome: Progressing     Problem: ABCDS Injury Assessment  Goal: Absence of physical injury  10/1/2022 2305 by Abe Alejandre RN  Outcome: Progressing  10/1/2022 1121 by Radha Marquez RN  Outcome: Progressing     Problem: Respiratory - Adult  Goal: Achieves optimal ventilation and oxygenation  10/1/2022 2305 by Abe Alejandre RN  Outcome: Progressing  10/1/2022 1121 by Radha Marquez RN  Outcome: Progressing     Problem: Cardiovascular - Adult  Goal: Maintains optimal cardiac output and hemodynamic stability  10/1/2022 2305 by Abe Alejandre RN  Outcome: Progressing  10/1/2022 1121 by Radha Marquez RN  Outcome: Progressing  Goal: Absence of cardiac dysrhythmias or at baseline  10/1/2022 2305 by Abe Alejandre RN  Outcome: Progressing  10/1/2022 1121 by Radha Marquez RN  Outcome: Progressing

## 2022-10-02 NOTE — PROGRESS NOTES
[facility] PROGRESS NOTE    10/2/2022 12:02 PM        Name: Audrey Mark . Admitted: 9/30/2022  Primary Care Provider: Douglas Gee MD (Tel: [phone])      Subjective:     In bed denies any chest pain fevers or chills does feel like her heart is still racing  Reviewed interval ancillary notes    Current Medications  polyvinyl alcohol (LIQUIFILM TEARS) 1.4 % ophthalmic solution 1 drop, Q6H PRN  LORazepam (ATIVAN) tablet 0.5 mg, BID  LORazepam (ATIVAN) tablet 1 mg, Nightly  dilTIAZem (CARDIZEM CD) extended release capsule 180 mg, Daily  rOPINIRole (REQUIP) tablet 2 mg, TID  cloNIDine (CATAPRES) tablet 0.1 mg, BID  hydrALAZINE (APRESOLINE) tablet 100 mg, 3 times per day  levalbuterol (XOPENEX) nebulization 0.63 mg, BID  furosemide (LASIX) injection 20 mg, Daily  albuterol sulfate HFA (PROVENTIL;VENTOLIN;PROAIR) 108 (90 Base) MCG/ACT inhaler 2 puff, Q6H PRN  mometasone-formoterol (DULERA) 200-5 MCG/ACT inhaler 2 puff, BID  apixaban (ELIQUIS) tablet 5 mg, BID  losartan (COZAAR) tablet 100 mg, Nightly  sodium chloride flush 0.9 % injection 5-40 mL, 2 times per day  sodium chloride flush 0.9 % injection 5-40 mL, PRN  0.9 % sodium chloride infusion, PRN  ondansetron (ZOFRAN-ODT) disintegrating tablet 4 mg, Q8H PRN   Or  ondansetron (ZOFRAN) injection 4 mg, Q6H PRN  polyethylene glycol (GLYCOLAX) packet 17 g, Daily PRN  acetaminophen (TYLENOL) tablet 650 mg, Q6H PRN   Or  acetaminophen (TYLENOL) suppository 650 mg, Q6H PRN  insulin lispro (HUMALOG) injection vial 0-8 Units, TID WC  insulin lispro (HUMALOG) injection vial 0-4 Units, Nightly  levothyroxine (SYNTHROID) tablet 75 mcg, QAM AC  LORazepam (ATIVAN) tablet 0.5 mg, BID PRN  traZODone (DESYREL) tablet 50 mg, Nightly PRN      Objective:  BP (!) 100/51   Pulse 95   Temp 97.2 °F (36.2 °C) (Oral)   Resp 20   Ht 5' 2\" (1.575 m)   Wt 159 lb 1.6 oz (72.2 kg)   SpO2 97%   BMI 29.10 kg/m²     Intake/Output Summary (Last 24 hours) at 10/2/2022 1202  Last data filed at 10/2/2022 1071  Gross per 24 hour   Intake 300 ml   Output 350 ml   Net -50 ml        Wt Readings from Last 3 Encounters:   10/01/22 159 lb 1.6 oz (72.2 kg)   08/22/22 158 lb 4.8 oz (71.8 kg)   08/19/22 157 lb 8 oz (71.4 kg)     General appearance:  Appears comfortable. AAOx3  HEENT: atraumatic, Pupils equal, muscous membranes moist, no masses appreciated  Cardiovascular: irregulary irregular no murmurs appreciated  Respiratory: CTAB no wheezing  Gastrointestinal: Abdomen soft, non-tender, BS+  EXT: no edema  Neurology: no gross focal deficts  Psychiatry: Appropriate affect. Not agitated  Skin: Warm, dry, no rashes appreciated    Labs and Tests:  CBC:   Recent Labs     09/30/22  1640 10/01/22  0547 10/02/22  0455   WBC 6.1 5.9 4.7   HGB 10.0* 9.6* 9.4*    147 147       BMP:    Recent Labs     09/30/22  1640 09/30/22  2207 10/01/22  0547 10/02/22  0455   * 126* 133* 133*   K 4.5  --  4.0 4.0   CL 93*  --  103 103   CO2 18*  --  20* 18*   BUN 32*  --  22* 24*   CREATININE 1.2  --  1.0 1.2   GLUCOSE 119*  --  164* 129*       Hepatic:   Recent Labs     09/30/22  1640   AST 21   ALT 22   BILITOT 0.6   ALKPHOS 122       XR CHEST PORTABLE   Final Result   1. No definite radiographic evidence of acute cardiopulmonary disease. 2. Chronic right apical soft tissue fullness corresponding to dense band of   scarring on prior CT chest.   3. Pulmonary sequela typical of that seen with smoking, including possible   COPD; correlate with clinical history. 4. Calcific atherosclerotic disease aorta. Recent imaging reviewed    Problem List  Principal Problem:    PAF (paroxysmal atrial fibrillation) ([facility])  Active Problems:    Acute on chronic diastolic heart failure (HCC)    Atrial fibrillation with RVR ([address]  Resolved Problems:    * No resolved hospital problems.  *       Assessment/Plan:   PAF with RVR   - resume home meds   - eliquis for ac  -cardioversion on monday  - reviewed recent echo, moderate as    Acute on chronic diastolic chf : iv lasix 06A g       Hyponatremia: SIADH: hold rythmol repeat labs in am    Htn  ; home meds     Dm2: SSI   DVT prophylaxis eliquis  Code status full code      Aaron Babinski, MD   10/2/2022 12:02 PM

## 2022-10-02 NOTE — PROGRESS NOTES
[facility] Department   Phone: [phone]    Physical Therapy    [x] Initial Evaluation            [] Daily Treatment Note         [] Discharge Summary      Patient: Bipin Cespedes   : 1935   MRN: [id number]   Date of Service:  10/2/2022  Admitting Diagnosis: PAF (paroxysmal atrial fibrillation) ([facility]  Current Admission Summary: Bipin Cespedes is a 80 y.o. female morning 1 AM with her heart racing and some chest pressure felt like she was back in her A. fib denies any nausea vomiting chest pain fevers has had some chills no sick contacts has been compliant with all her medication. Past Medical History:  has a past medical history of Arthritis, Atrial fibrillation ([facility] Diabetes mellitus type II, controlled ([facility] GERD (gastroesophageal reflux disease), HTN (hypertension), Hyperlipidemia, Hypothyroid, Insomnia, Lumbago, and SVT (supraventricular tachycardia) ([facility] Past Surgical History:  has a past surgical history that includes Appendectomy; Colonoscopy; and Cholecystectomy, laparoscopic (2013). Discharge Recommendations: Bipin Cespedes scored a 19/24 on the AM-PAC short mobility form. Current research shows that an AM-PAC score of 18 or greater is typically associated with a discharge to the patient's home setting. Based on the patient's AM-PAC score and their current functional mobility deficits, it is recommended that the patient have 2-3 sessions per week of Physical Therapy at d/c to increase the patient's independence. At this time, this patient demonstrates the endurance and safety to discharge home with HHPT and a follow up treatment frequency of 2-3x/wk. Please see assessment section for further patient specific details. If patient discharges prior to next session this note will serve as a discharge summary. Please see below for the latest assessment towards goals.     DME Required For Discharge: patient has all required DME for discharge  Precautions/Restrictions: medium fall risk, up as tolerated  Weight Bearing Restrictions: no restrictions     Required Braces/Orthotics: no braces required  Positional Restrictions:no positional restrictions    Pre-Admission Information     Lives With: daughter               Type of Home: house  Home Layout: two level  Home Access:  1 step to enter without rails , Pt has 7 steps to first floor with HR on B sides  Bathroom Layout: walk in shower, handicap height toilet  Bathroom Equipment: grab bars in shower, shower chair  Home Equipment: rollator - 4 wheeled walker, oxygen  2L   Transfer Assistance: Independent without use of device  Ambulation Assistance:modified independent with use of 4WW  ADL Assistance: independent with all ADL's  IADL Assistance:  Pt cleans room, and cooks occasionally. Daughter completes additional IADLs   Active : [] yes             [x]no  Current Employment: retired. Occupation: [employer]   Hobbies: spend time with family, dance   Recent Falls: Pt reports no recent falls     Examination   Vision:   Vision Gross Assessment: Impaired and Vision Corrective Device: wears glasses at all times  Hearing:   left hearing aid, right hearing aid  Observation:   General Observation:  Pt on 2L O2 via nasal cannula on arrival  Posture:   Mild forward head, rounded shoulders, and increased thoracic kyphosis in sitting and standing  Sensation:   WFL  ROM:   (B) LE AROM WFL  Strength:   (B) LE strength grossly 4/5  Decision Making: low complexity  Clinical Presentation: evolving      Subjective  General: Pt supine in bed on arrival, agreeable to participate in PT/OT initial evaluation. Daughter present at the end of the session. Pain: 0/10  Pain Interventions: not applicable     Functional Mobility  Bed Mobility  Bed mobility not completed on this date. Comments: Pt seated in recliner at the beginning and end of the initial evaluation.   Transfers  Sit to stand transfer: stand by assistance  Stand to sit transfer: stand by assistance  Comments: Up to RW  Ambulation  Surface:level surface  Assistive Device: rolling walker  Assistance: stand by assistance  Distance: 35'  Gait Mechanics: normal speed, slightly decreased step lengths/heights, steady with no losses of balance  Comments:    Stair Mobility  Stair mobility not completed on this date. Comments:  Wheelchair Mobility:  No w/c mobility completed on this date. Comments:  Balance  Static Standing Balance: fair (-): maintains balance at SBA with use of UE support  Dynamic Standing Balance: fair (-): maintains balance at SBA with use of UE support  Comments:    Other Therapeutic Interventions    Functional Outcomes  AM-PAC Inpatient Mobility Raw Score : 19              Cognition  WFL- slightly impulsive  Orientation:    alert and oriented x 4  Command Following:   [facility]    Education  Barriers To Learning: hearing  Patient Education: patient educated on goals, PT role and benefits, plan of care, functional mobility training, discharge recommendations  Learning Assessment:  patient verbalizes and demonstrates understanding    Assessment  Activity Tolerance: After ambulation: SpO2- 94%, HR- fluctuating between 120-140  Impairments Requiring Therapeutic Intervention: decreased functional mobility, decreased strength, decreased endurance, decreased balance  Prognosis: good  Clinical Assessment: Pt is an 81 y/o female who presents to the hospital with A. Fib. Prior to admission to the hospital, the pt was modified independent with the use of a rollator walker. Today, the pt required SBA for performance of functional mobility tasks and demonstrated slight impulsivity. Pt will benefit from skilled PT to facilitate return to PLOF and to promote independence.    Safety Interventions: patient left in chair, call light within reach, gait belt, and family/caregiver present    Plan  Frequency: 3-5 x/per week  Current Treatment Recommendations: strengthening, balance training, functional mobility training, transfer training, gait training, stair training, endurance training, patient/caregiver education, home exercise program, and safety education    Goals  Patient Goals:  To return home  Short Term Goals:  Time Frame: By discharge  Patient will complete bed mobility at Independent   Patient will complete transfers at modified independent   Patient will ambulate at least 100 ft with use of rolling walker at modified independent  Patient will ascend/descend 7 stairs with (B) handrail at stand by assistance    Therapy Session Time      Individual Group Co-treatment   Time In     1028   Time Out     1051   Minutes     23     Timed Code Treatment Minutes: 8 Minutes  Total Treatment Minutes: 23 Minutes         Electronically Signed By: Jai Carbone, PT  Leland Hanson PT, DPT [id number]

## 2022-10-02 NOTE — PLAN OF CARE
Problem: Discharge Planning  Goal: Discharge to home or other facility with appropriate resources  10/2/2022 0941 by Jaspal Zapien RN  Outcome: Progressing     Problem: Safety - Adult  Goal: Free from fall injury  10/2/2022 0941 by Jaspal Zapien RN  Outcome: Progressing     Problem: ABCDS Injury Assessment  Goal: Absence of physical injury  10/2/2022 0941 by Jaspal Zapien RN  Outcome: Progressing     Problem: Respiratory - Adult  Goal: Achieves optimal ventilation and oxygenation  10/2/2022 0941 by Jaspal Zapien RN  Outcome: Progressing     Problem: Cardiovascular - Adult  Goal: Maintains optimal cardiac output and hemodynamic stability  10/2/2022 0941 by Jaspal Zapien RN  Outcome: Progressing     Problem: Cardiovascular - Adult  Goal: Absence of cardiac dysrhythmias or at baseline  10/2/2022 0941 by Jaspal Zapien RN  Outcome: Progressing

## 2022-10-02 NOTE — PROGRESS NOTES
[facility] Department   Phone: [phone]    Occupational Therapy    [x] Initial Evaluation            [] Daily Treatment Note         [] Discharge Summary      Patient: Blake Edwards   : 1935   MRN: [id number]   Date of Service:  10/2/2022    Admitting Diagnosis:  PAF (paroxysmal atrial fibrillation) ([facility]  Current Admission Summary: per H&P \"80 y.o. female with prior history of atrial fib, GERD, HTN presented with c/o palpitation . Was found to be in a. Fib with RVR. Sodium level low at 126 \"  Past Medical History:  has a past medical history of Arthritis, Atrial fibrillation ([facility] Diabetes [facility] type II, [facility] GERD (gastroesophageal reflux disease), HTN (hypertension), Hyperlipidemia, Hypothyroid, Insomnia, Lumbago, and SVT (supraventricular tachycardia) ([facility] Past Surgical History:  has a past surgical history that includes Appendectomy; Colonoscopy; and Cholecystectomy, laparoscopic (2013). Discharge Recommendations: Blake Edwards scored a 21/ on the AM-PAC ADL Inpatient form. Current research shows that an AM-PAC score of 18 or greater is typically associated with a discharge to the patient's home setting. Based on the patient's AM-PAC score, and their current ADL deficits, it is recommended that the patient have 2-3 sessions per week of Occupational Therapy at d/c to increase the patient's independence. At this time, this patient demonstrates the endurance and safety to discharge home with home health OT services (home vs OP services) and a follow up treatment frequency of 2-3x/wk. Please see assessment section for further patient specific details. If patient discharges prior to next session this note will serve as a discharge summary. Please see below for the latest assessment towards goals.       HOME HEALTH CARE: LEVEL 1 STANDARD    - Initial home health evaluation to occur within 24-48 hours, in patient home - Therapy to evaluate with goal of regaining prior level of functioning   - Therapy to evaluate if patient has [id number] needs for personal care      DME Required For Discharge: patient has all required DME for discharge    Precautions/Restrictions: medium fall risk  Weight Bearing Restrictions: no restrictions  [] Right Upper Extremity  [] Left Upper Extremity [] Right Lower Extremity  [] Left Lower Extremity     Required Braces/Orthotics: no braces required   [] Right  [] Left  Positional Restrictions:no positional restrictions      Pre-Admission Information     Lives With: daughter    Type of Home: house  Home Layout: two level  Home Access:  1 step to enter without rails , Pt has 7 steps to first floor with HR on B sides  Bathroom Layout: walk in shower, handicap height toilet  Bathroom Equipment: grab bars in shower, shower chair  Home Equipment: rollator - 4 wheeled walker, oxygen  2L   Transfer Assistance: Independent without use of device  Ambulation Assistance:modified independent with use of 4WW  ADL Assistance: independent with all ADL's  IADL Assistance:  Pt cleans room, and cooks occasionally. Daughter completes additional IADLs   Active : [] yes  [x]no  Current Employment: retired.   Occupation: [employer]   Hobbies: spend time with family, dance   Recent Falls: Pt reports no recent falls   Comments:     Examination     Vision:   Vision Gross Assessment: Impaired and Vision Corrective Device: wears glasses at all times  Hearing:   left hearing aid, right hearing aid  Perception:   WFL    Posture:   rounded shoulders, slightly forward flexed  Sensation:   WFL  Proprioception:    WFL  Tone:   Normotonic  Coordination Testing:   WFL    ROM:   (B) UE AROM WFL  Strength:   (B) UE strength grossly WFL    Decision Making: low complexity  Clinical Presentation: stable      Subjective    General: pt seated in recliner chair upon arrival, pleasant and agreeable to OT evaluation and treat  Pain: 0/10  Pain Interventions: not applicable           Activities of Daily Living    Basic Activities of Daily Living  General Comments: pt declined ADL participation stating she completed prior to the start of therapy. Pt did report decreased endurance and increased time needed to complete ADLs  Instrumental Activities of Daily Living  No IADL completed on this date. Functional Mobility    Bed Mobility  Bed mobility not completed on this date. Comments: pt in recliner chair at [facility] to stand transfer:stand by assistance  Stand to sit transfer: stand by assistance  Comments:  Functional Mobility:  Sitting Balance: supervision. Standing Balance: stand by assistance. Functional Mobility: .  stand by assistance  Functional Mobility Activity: 35' in room  Functional Mobility Device Use: rolling walker  Functional Mobility Comment: impulsive with ambulation requiring verbal cues for safety. Decreased endurance noted with ambulation as evidenced by SOB. Other Therapeutic Interventions      Functional Outcomes  AM-PAC Inpatient Daily Activity Raw Score: 21      Cognition  WFL, some safety cueing needed  Orientation:    alert and oriented x 4  Command Following:   [facility]     Education    Barriers To Learning: none  Patient Education: patient educated on goals, OT role and benefits, plan of care, discharge recommendations  Learning Assessment:  patient verbalizes and demonstrates understanding    Assessment    Activity Tolerance: Pt limited by decreased endurance, seated rest breaks utilized throughout. HR fluctuating between 120-140 after ambulation, HR at 103 before leaving room and RN notified and aware.    Impairments Requiring Therapeutic Intervention: decreased functional mobility, decreased ADL status, decreased safety awareness, decreased endurance  Prognosis: good  Clinical Assessment: pt is currently functioning below occupational baseline and demo the deficits listed above, pt would benefit from continued skilled OT services to address these deficits and increase IND, safety, and ease with all occupational pursuits.    Safety Interventions: patient left in chair, call light within reach, and daughter in room       Plan    Frequency: 3-5 x/per week  Current Treatment Recommendations: strengthening, functional mobility training, transfer training, endurance training, and ADL/self-care training    Goals    Patient Goals: to return home     Short Term Goals:  Time Frame: by d/c  Patient will complete upper body ADL at [facility]   Patient will complete lower body ADL at [facility]   Patient will complete toileting at modified independent   Patient will complete functional transfers at [facility]   Patient will complete functional mobility at [facility]        Therapy Session Time     Individual Group Co-treatment   Time In    1028   Time Out    1051   Minutes    23        Timed Code Treatment Minutes:  Timed Code Treatment Minutes: 8 Minutes    Total Treatment Minutes:  23 minutes total    Electronically Signed By: Byron Vigil OT, Byron Vigil, OTR/L [id number]

## 2022-10-02 NOTE — PROGRESS NOTES
Office : [phone]     Fax :[phone]       Nephrology progress  Note      Patient's Name: Mike Henry  9:06 AM  10/2/2022    Reason for Consult:  hyponatremia       Requesting Physician:  Marina Prasad MD      Chief Complaint:    Chief Complaint   Patient presents with    Atrial Fibrillation               History of Present Ilness:    Mike Henry is a 80 y.o. female with prior history of atrial fib, GERD, HTN presented with c/o palpitation . Was found to be in a. Fib with RVR. Sodium level low at 126     Interval hx     Sodium level improved at 133   HR better controlled at 89       I/O last 3 completed shifts: In: 5 [P.O.:360]  Out: 250 [Urine:250]    Past Medical History:   Diagnosis Date    Arthritis     Atrial fibrillation ([address]     Diabetes mellitus type II, controlled ([address]     GERD (gastroesophageal reflux disease)     HTN (hypertension)     Hyperlipidemia     Hypothyroid     Insomnia     Lumbago     SVT (supraventricular tachycardia) (HCC)        Past Surgical History:   Procedure Laterality Date    APPENDECTOMY      CHOLECYSTECTOMY, LAPAROSCOPIC  2/24/2013    COLONOSCOPY         Family History   Problem Relation Age of Onset    High Blood Pressure Mother     Heart Attack Father     Heart Disease Father     Other Brother     Asthma Paternal Uncle         reports that she quit smoking about 26 years ago. Her smoking use included cigarettes. She started smoking about 72 years ago. She has a 45.00 pack-year smoking history. She has never used smokeless tobacco. She reports that she does not drink alcohol and does not use drugs.         Allergies:  Adhesive tape, Cefaclor, Cephalexin, Nsaids, Clinoril [sulindac], Codeine, Diclofenac, Diclofenac sodium, Diclofenac sodium, Hctz [hydrochlorothiazide], Ibuprofen, Macrobid [nitrofurantoin macrocrystal], Motrin [ibuprofen micronized], Pcn [penicillins], Peach [prunus persica], Prednisone, and Sulfa antibiotics    Current Medications:    dilTIAZem (CARDIZEM CD) extended release capsule 180 mg, Daily  rOPINIRole (REQUIP) tablet 2 mg, TID  cloNIDine (CATAPRES) tablet 0.1 mg, BID  hydrALAZINE (APRESOLINE) tablet 100 mg, 3 times per day  levalbuterol (XOPENEX) nebulization 0.63 mg, BID  furosemide (LASIX) injection 20 mg, Daily  albuterol sulfate HFA (PROVENTIL;VENTOLIN;PROAIR) 108 (90 Base) MCG/ACT inhaler 2 puff, Q6H PRN  mometasone-formoterol (DULERA) 200-5 MCG/ACT inhaler 2 puff, BID  apixaban (ELIQUIS) tablet 5 mg, BID  losartan (COZAAR) tablet 100 mg, Nightly  sodium chloride flush 0.9 % injection 5-40 mL, 2 times per day  sodium chloride flush 0.9 % injection 5-40 mL, PRN  0.9 % sodium chloride infusion, PRN  ondansetron (ZOFRAN-ODT) disintegrating tablet 4 mg, Q8H PRN   Or  ondansetron (ZOFRAN) injection 4 mg, Q6H PRN  polyethylene glycol (GLYCOLAX) packet 17 g, Daily PRN  acetaminophen (TYLENOL) tablet 650 mg, Q6H PRN   Or  acetaminophen (TYLENOL) suppository 650 mg, Q6H PRN  insulin lispro (HUMALOG) injection vial 0-8 Units, TID WC  insulin lispro (HUMALOG) injection vial 0-4 Units, Nightly  levothyroxine (SYNTHROID) tablet 75 mcg, QAM AC  LORazepam (ATIVAN) tablet 0.5 mg, BID PRN  traZODone (DESYREL) tablet 50 mg, Nightly PRN      Review of Systems:   14 point ROS obtained but were negative except mentioned in HPI      Physical exam:     Vitals:  BP (!) 165/76   Pulse 89   Temp 97.7 °F (36.5 °C) (Oral)   Resp 20   Ht 5' 2\" (1.575 m)   Wt 159 lb 1.6 oz (72.2 kg)   SpO2 94%   BMI 29.10 kg/m²   Constitutional:  OAA X3 NAD  Skin: no rash, turgor wnl  Heent:  eomi, mmm  Neck: no bruits or jvd noted  Cardiovascular:  S1, S2 without m/r/g  Respiratory: b/l base crackles   Abdomen:  +bs, soft, nt, nd  Ext: no lower extremity edema  Psychiatric: mood and affect appropriate  Musculoskeletal:  Rom, muscular strength intact    Labs:  CBC:   Recent Labs     09/30/22  1640 10/01/22  0547 10/02/22  0455   WBC 6.1 5.9 4.7   HGB 10.0* 9.6* 9.4*    147 147     BMP:    Recent Labs     09/30/22  1640 09/30/22 2207 10/01/22  0547 10/02/22  0455   * 126* 133* 133*   K 4.5  --  4.0 4.0   CL 93*  --  103 103   CO2 18*  --  20* 18*   BUN 32*  --  22* 24*   CREATININE 1.2  --  1.0 1.2   GLUCOSE 119*  --  164* 129*     Ca/Mg/Phos:   Recent Labs     09/30/22  1640 10/01/22  0547 10/02/22  0455   CALCIUM 9.0 9.0 8.5   MG 1.90  --   --      Hepatic:   Recent Labs     09/30/22  1640   AST 21   ALT 22   BILITOT 0.6   ALKPHOS 122     Troponin:   Recent Labs     09/30/22  1640 09/30/22 2207   TROPONINI <0.01 <0.01     BNP: No results for input(s): BNP in the last 72 hours. Lipids: No results for input(s): CHOL, TRIG, HDL, LDLCALC, LABVLDL in the last 72 hours. ABGs: No results for input(s): PHART, PO2ART, [id number] in the last 72 hours. INR:   Recent Labs     09/30/22  1640   INR 1.71*     UA:  Recent Labs     09/30/22  1715   COLORU Yellow   CLARITYU Clear   GLUCOSEU Negative   BILIRUBINUR Negative   KETUA TRACE*   SPECGRAV 1.010   BLOODU Negative   PHUR 5.5   PROTEINU 30*   UROBILINOGEN 0.2   NITRU Negative   LEUKOCYTESUR TRACE*   LABMICR YES   URINETYPE NotGiven      Urine Microscopic:   Recent Labs     09/30/22  1715   BACTERIA None Seen   HYALCAST 0   WBCUA 7*   RBCUA 0   EPIU 2     Urine Culture: No results for input(s): LABURIN in the last 72 hours. Urine Chemistry:   Recent Labs     09/30/22  1715   NAUR 56                IMAGING:  XR CHEST PORTABLE   Final Result   1. No definite radiographic evidence of acute cardiopulmonary disease.    2. Chronic right apical soft tissue fullness corresponding to dense band of   scarring on prior CT chest.   3. Pulmonary sequela typical of that seen with smoking, including possible   COPD; correlate with clinical history. 4. Calcific atherosclerotic disease aorta. A/p     Hyponatremia   Improved       A. Fib with RVR on cardizem     Volume overload . Has base crackles. 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      Dced  IV fluids   Lasix prn bases     D/w primary team      Thank you for allowing us to participate in care of Ozzie Goldstein         Electronically signed by: Cheri Sanz MD, 10/2/2022, 9:06 AM      [facility] [address]  Office : [phone]  Fax :[phone]

## 2022-10-02 NOTE — PROGRESS NOTES
[address]  HEART FAILURE  Progress Note      Admit Date 9/30/2022     Reason for Consult:      Reason for Consultation/Chief Complaint: palpitations    HPI:    Antonieta Michelle is a 80 y.o. female with PMH AF, s/p DCCV 3/21, HFpEF, DM, HTN, SVT admitted with AF/RVR and volume overload. She has diuresed a little, HR still running over 120 for the most part, up to 170 with activity. She has been compliant with AC, no missed doses prior to admit      Subjective:  Patient is being seen for AF/RVR. There were no acute overnight cardiac events. Today Ms. Sanchez Jeronimo denies chest pain or palpitations but c/o CHIN and fatigue with any activity shortness of breath, palpitations      Review of Systems - General ROS: positive for  - fatigue  Hematological and Lymphatic ROS: negative  Respiratory ROS: positive for - shortness of breath  Cardiovascular ROS: negative  Gastrointestinal ROS: no abdominal pain, change in bowel habits, or black or bloody stools  Musculoskeletal ROS: negative  Neurological ROS: no TIA or stroke symptoms       Wt Readings from Last 3 Encounters:   10/01/22 159 lb 1.6 oz (72.2 kg)   08/22/22 158 lb 4.8 oz (71.8 kg)   08/19/22 157 lb 8 oz (71.4 kg)         Cardiac Testing:   ECHO 8/21/22:   Left ventricular systolic function is normal with ejection fraction estimated at 55-60 %. No regional wall motion abnormalities are noted. There is mild concentric left ventricular hypertrophy. There is reversal of E/A inflow velocities across the mitral valve. Mild-to-moderate aortic regurgitation is present. Mitral annular calcification is present. Mild mitral & tricuspid regurgitation.       NYHA Class III      Objective:   BP (!) 165/76   Pulse 89   Temp 97.7 °F (36.5 °C) (Oral)   Resp 20   Ht 5' 2\" (1.575 m)   Wt 159 lb 1.6 oz (72.2 kg)   SpO2 94%   BMI 29.10 kg/m²     Intake/Output Summary (Last 24 hours) at 10/2/2022 1032  Last data filed at 10/2/2022 0937  Gross per 24 hour   Intake 300 ml   Output --   Net 300 ml      In: 420 [P.O.:420]  Out: -     TELEMETRY: AF    Physical Exam:  General Appearance:  Non-obese/Well Nourished  Respiratory:  Resp Auscultation: Normal breath sounds without dullness  Cardiovascular:   Auscultation: irregular, tachy rate and rhythm, normal S1S2, no m/g/r/c  Palpation: Normal    Pedal Pulses: 2+ and equal   Abdomen:  Soft, NT, ND, + bs  Extremities:  No Cyanosis or Clubbing  Extremities: no edema  Neurological/Psychiatric:  Oriented to time, place, and person  Non-anxious    MEDICATIONS:   Scheduled Meds:   Scheduled Meds:   LORazepam  0.5 mg Oral BID    LORazepam  1 mg Oral Nightly    dilTIAZem  180 mg Oral Daily    rOPINIRole  2 mg Oral TID    cloNIDine  0.1 mg Oral BID    hydrALAZINE  100 mg Oral 3 times per day    levalbuterol  0.63 mg Nebulization BID    furosemide  20 mg IntraVENous Daily    mometasone-formoterol  2 puff Inhalation BID    apixaban  5 mg Oral BID    losartan  100 mg Oral Nightly    sodium chloride flush  5-40 mL IntraVENous 2 times per day    insulin lispro  0-8 Units SubCUTAneous TID WC    insulin lispro  0-4 Units SubCUTAneous Nightly    levothyroxine  75 mcg Oral QAM AC     Continuous Infusions:   sodium chloride       PRN Meds:.polyvinyl alcohol, albuterol sulfate HFA, sodium chloride flush, sodium chloride, ondansetron **OR** ondansetron, polyethylene glycol, acetaminophen **OR** acetaminophen, LORazepam, traZODone  Continuous Infusions:   sodium chloride         Intake/Output Summary (Last 24 hours) at 10/2/2022 1032  Last data filed at 10/2/2022 3198  Gross per 24 hour   Intake 300 ml   Output --   Net 300 ml       Lab Data:  CBC:   Lab Results   Component Value Date/Time    WBC 4.7 10/02/2022 04:55 AM    HGB 9.4 10/02/2022 04:55 AM     10/02/2022 04:55 AM     BMP:  Lab Results   Component Value Date/Time     10/02/2022 04:55 AM    K 4.0 10/02/2022 04:55 AM     10/02/2022 04:55 AM    CO2 18 10/02/2022 04:55 AM    BUN 24 10/02/2022 04:55 AM    CREATININE 1.2 10/02/2022 04:55 AM    GLUCOSE 129 10/02/2022 04:55 AM     INR:   Lab Results   Component Value Date/Time    INR 1.71 09/30/2022 04:40 PM    INR 1.18 08/22/2022 05:17 AM    INR 1.46 08/11/2021 03:26 PM        CARDIAC LABS  ENZYMES:  Recent Labs     09/30/22  1640 09/30/22  2207   TROPONINI <0.01 <0.01     FASTING LIPID PANEL:  Lab Results   Component Value Date/Time    HDL 52 08/22/2022 05:17 AM    HDL 32 06/23/2011 06:45 AM    LDLCALC 66 08/22/2022 05:17 AM    TRIG 150 08/22/2022 05:17 AM    TSH 2.50 09/28/2020 09:49 AM     LIVER PROFILE:  Lab Results   Component Value Date/Time    AST 21 09/30/2022 04:40 PM    AST 13 08/22/2022 05:17 AM    ALT 22 09/30/2022 04:40 PM    ALT 11 08/22/2022 05:17 AM     BNP:   Lab Results   Component Value Date/Time    PROBNP 3,785 09/30/2022 04:40 PM    PROBNP 1,260 08/21/2022 11:17 AM    PROBNP 826 04/19/2022 02:40 PM     Iron Studies:  No results found for: FERRITIN  Lab Results   Component Value Date    IRON 59 07/20/2021    TIBC 300 07/20/2021          1. WEIGHT: Admit Weight: 159 lb (72.1 kg)      Today  Weight: 159 lb 1.6 oz (72.2 kg)   2. I/O   Intake/Output Summary (Last 24 hours) at 10/2/2022 1032  Last data filed at 10/2/2022 3005  Gross per 24 hour   Intake 300 ml   Output --   Net 300 ml           Assessment/Plan:     AHF- continue IV lasix today  AF/RVR - continued, EP tomorrow to determine DCCV, continue Cardizem - increased dose and AC, NPO after MN  HTN- improved, may need to decrease antihypertensives during diuresis to allow for better rate control and avoid hypotension        The patient was seen for >35 minutes. I reviewed interval history, physical exam, review of data including labs, imaging, development and implementation of treatment plan and coordination of complex care.  More than 50% of the time was devoted to counseling the patient on their diagnoses/treatments, as well as coordination of care with the other care teams    I appreciate the opportunity of cooperating in the care of this individual.    Becky Crespo, KATHY - CNP, ACNP, AGPCNP 10/2/2022, 10:32 AM  Heart Failure  The [address], [address]  Ph: [phone]      Core Measures:   Discharge instructions:   LVEF documented:   ACEI for LV dysfunction:   Smoking Cessation:

## 2022-10-03 PROBLEM — J44.9 CHRONIC OBSTRUCTIVE PULMONARY DISEASE (HCC): Status: ACTIVE | Noted: 2022-04-19

## 2022-10-03 PROBLEM — D64.9 ACUTE ANEMIA: Status: ACTIVE | Noted: 2022-10-03

## 2022-10-03 PROBLEM — E87.1 HYPONATREMIA: Status: ACTIVE | Noted: 2022-10-03

## 2022-10-03 LAB
ANION GAP SERPL CALCULATED.3IONS-SCNC: 12 MMOL/L (ref 3–16)
BASOPHILS ABSOLUTE: 0 K/UL (ref 0–0.2)
BASOPHILS RELATIVE PERCENT: 0.8 %
BUN BLDV-MCNC: 30 MG/DL (ref 7–20)
CALCIUM SERPL-MCNC: 8.8 MG/DL (ref 8.3–10.6)
CHLORIDE BLD-SCNC: 103 MMOL/L (ref 99–110)
CO2: 21 MMOL/L (ref 21–32)
CREAT SERPL-MCNC: 1.3 MG/DL (ref 0.6–1.2)
EOSINOPHILS ABSOLUTE: 0.1 K/UL (ref 0–0.6)
EOSINOPHILS RELATIVE PERCENT: 2.7 %
FERRITIN: 141.9 NG/ML (ref 15–150)
GFR AFRICAN AMERICAN: 47
GFR NON-AFRICAN AMERICAN: 39
GLUCOSE BLD-MCNC: 114 MG/DL (ref 70–99)
GLUCOSE BLD-MCNC: 136 MG/DL (ref 70–99)
GLUCOSE BLD-MCNC: 138 MG/DL (ref 70–99)
GLUCOSE BLD-MCNC: 170 MG/DL (ref 70–99)
GLUCOSE BLD-MCNC: 190 MG/DL (ref 70–99)
HCT VFR BLD CALC: 24.2 % (ref 36–48)
HCT VFR BLD CALC: 28.3 % (ref 36–48)
HEMOGLOBIN: 8.1 G/DL (ref 12–16)
HEMOGLOBIN: 9.2 G/DL (ref 12–16)
IRON SATURATION: 15 % (ref 15–50)
IRON: 32 UG/DL (ref 37–145)
LYMPHOCYTES ABSOLUTE: 0.9 K/UL (ref 1–5.1)
LYMPHOCYTES RELATIVE PERCENT: 24.3 %
MCH RBC QN AUTO: 29.6 PG (ref 26–34)
MCHC RBC AUTO-ENTMCNC: 33.5 G/DL (ref 31–36)
MCV RBC AUTO: 88.3 FL (ref 80–100)
MONOCYTES ABSOLUTE: 0.4 K/UL (ref 0–1.3)
MONOCYTES RELATIVE PERCENT: 10.3 %
NEUTROPHILS ABSOLUTE: 2.4 K/UL (ref 1.7–7.7)
NEUTROPHILS RELATIVE PERCENT: 61.9 %
OCCULT BLOOD DIAGNOSTIC: NORMAL
PDW BLD-RTO: 16.2 % (ref 12.4–15.4)
PERFORMED ON: ABNORMAL
PLATELET # BLD: 148 K/UL (ref 135–450)
PMV BLD AUTO: 7.4 FL (ref 5–10.5)
POTASSIUM REFLEX MAGNESIUM: 4.2 MMOL/L (ref 3.5–5.1)
RBC # BLD: 2.75 M/UL (ref 4–5.2)
SODIUM BLD-SCNC: 136 MMOL/L (ref 136–145)
TOTAL IRON BINDING CAPACITY: 211 UG/DL (ref 260–445)
WBC # BLD: 3.8 K/UL (ref 4–11)

## 2022-10-03 PROCEDURE — 6360000002 HC RX W HCPCS: Performed by: INTERNAL MEDICINE

## 2022-10-03 PROCEDURE — 82270 OCCULT BLOOD FECES: CPT

## 2022-10-03 PROCEDURE — 6370000000 HC RX 637 (ALT 250 FOR IP): Performed by: NURSE PRACTITIONER

## 2022-10-03 PROCEDURE — 36415 COLL VENOUS BLD VENIPUNCTURE: CPT

## 2022-10-03 PROCEDURE — 6370000000 HC RX 637 (ALT 250 FOR IP): Performed by: INTERNAL MEDICINE

## 2022-10-03 PROCEDURE — 85018 HEMOGLOBIN: CPT

## 2022-10-03 PROCEDURE — 83540 ASSAY OF IRON: CPT

## 2022-10-03 PROCEDURE — 82728 ASSAY OF FERRITIN: CPT

## 2022-10-03 PROCEDURE — 99232 SBSQ HOSP IP/OBS MODERATE 35: CPT | Performed by: NURSE PRACTITIONER

## 2022-10-03 PROCEDURE — 94640 AIRWAY INHALATION TREATMENT: CPT

## 2022-10-03 PROCEDURE — 99222 1ST HOSP IP/OBS MODERATE 55: CPT | Performed by: NURSE PRACTITIONER

## 2022-10-03 PROCEDURE — 85025 COMPLETE CBC W/AUTO DIFF WBC: CPT

## 2022-10-03 PROCEDURE — 99233 SBSQ HOSP IP/OBS HIGH 50: CPT | Performed by: INTERNAL MEDICINE

## 2022-10-03 PROCEDURE — 85014 HEMATOCRIT: CPT

## 2022-10-03 PROCEDURE — 94761 N-INVAS EAR/PLS OXIMETRY MLT: CPT

## 2022-10-03 PROCEDURE — 80048 BASIC METABOLIC PNL TOTAL CA: CPT

## 2022-10-03 PROCEDURE — 97116 GAIT TRAINING THERAPY: CPT

## 2022-10-03 PROCEDURE — 83550 IRON BINDING TEST: CPT

## 2022-10-03 PROCEDURE — 2580000003 HC RX 258: Performed by: INTERNAL MEDICINE

## 2022-10-03 PROCEDURE — 2060000000 HC ICU INTERMEDIATE R&B

## 2022-10-03 PROCEDURE — 97110 THERAPEUTIC EXERCISES: CPT

## 2022-10-03 PROCEDURE — 2700000000 HC OXYGEN THERAPY PER DAY

## 2022-10-03 RX ORDER — FLECAINIDE ACETATE 100 MG/1
100 TABLET ORAL EVERY 12 HOURS SCHEDULED
Status: DISCONTINUED | OUTPATIENT
Start: 2022-10-03 | End: 2022-10-04 | Stop reason: HOSPADM

## 2022-10-03 RX ORDER — PROPAFENONE HYDROCHLORIDE 150 MG/1
150 TABLET, FILM COATED ORAL EVERY 8 HOURS SCHEDULED
Status: DISCONTINUED | OUTPATIENT
Start: 2022-10-03 | End: 2022-10-03

## 2022-10-03 RX ADMIN — APIXABAN 5 MG: 5 TABLET, FILM COATED ORAL at 20:19

## 2022-10-03 RX ADMIN — ROPINIROLE HYDROCHLORIDE 2 MG: 1 TABLET, FILM COATED ORAL at 16:08

## 2022-10-03 RX ADMIN — LOSARTAN POTASSIUM 100 MG: 100 TABLET, FILM COATED ORAL at 20:19

## 2022-10-03 RX ADMIN — Medication 2 PUFF: at 21:06

## 2022-10-03 RX ADMIN — FUROSEMIDE 20 MG: 10 INJECTION, SOLUTION INTRAMUSCULAR; INTRAVENOUS at 10:55

## 2022-10-03 RX ADMIN — LORAZEPAM 0.5 MG: 0.5 TABLET ORAL at 16:08

## 2022-10-03 RX ADMIN — HYDRALAZINE HYDROCHLORIDE 100 MG: 100 TABLET, FILM COATED ORAL at 16:08

## 2022-10-03 RX ADMIN — ROPINIROLE HYDROCHLORIDE 2 MG: 1 TABLET, FILM COATED ORAL at 10:53

## 2022-10-03 RX ADMIN — SODIUM CHLORIDE, PRESERVATIVE FREE 10 ML: 5 INJECTION INTRAVENOUS at 10:56

## 2022-10-03 RX ADMIN — LORAZEPAM 1 MG: 1 TABLET ORAL at 20:18

## 2022-10-03 RX ADMIN — FLECAINIDE ACETATE 100 MG: 100 TABLET ORAL at 20:20

## 2022-10-03 RX ADMIN — ROPINIROLE HYDROCHLORIDE 2 MG: 1 TABLET, FILM COATED ORAL at 20:19

## 2022-10-03 RX ADMIN — LEVALBUTEROL 0.63 MG: 0.63 SOLUTION RESPIRATORY (INHALATION) at 09:45

## 2022-10-03 RX ADMIN — CLONIDINE HYDROCHLORIDE 0.1 MG: 0.1 TABLET ORAL at 20:19

## 2022-10-03 RX ADMIN — Medication 2 PUFF: at 09:45

## 2022-10-03 RX ADMIN — FLECAINIDE ACETATE 100 MG: 100 TABLET ORAL at 10:59

## 2022-10-03 RX ADMIN — SODIUM CHLORIDE, PRESERVATIVE FREE 10 ML: 5 INJECTION INTRAVENOUS at 20:19

## 2022-10-03 RX ADMIN — DILTIAZEM HYDROCHLORIDE 180 MG: 180 CAPSULE, COATED, EXTENDED RELEASE ORAL at 10:54

## 2022-10-03 RX ADMIN — APIXABAN 5 MG: 5 TABLET, FILM COATED ORAL at 10:59

## 2022-10-03 RX ADMIN — LEVALBUTEROL 0.63 MG: 0.63 SOLUTION RESPIRATORY (INHALATION) at 21:06

## 2022-10-03 RX ADMIN — LORAZEPAM 0.5 MG: 0.5 TABLET ORAL at 10:53

## 2022-10-03 RX ADMIN — CLONIDINE HYDROCHLORIDE 0.1 MG: 0.1 TABLET ORAL at 10:54

## 2022-10-03 NOTE — CONSULTS
Rosana Shahid [address] 1935    History:  Past Medical History:   Diagnosis Date    Arthritis     Atrial fibrillation ([facility]     Diabetes mellitus type II, controlled ([facility]     GERD (gastroesophageal reflux disease)     HTN (hypertension)     Hyperlipidemia     Hypothyroid     Insomnia     Lumbago     SVT (supraventricular tachycardia) ([facility])        ECHO:   Summary   Left ventricular systolic function is normal with ejection fraction   estimated at 55-60 %. No regional wall motion abnormalities are noted. There is mild concentric left ventricular hypertrophy. There is reversal of E/A inflow velocities across the mitral valve. Mild-to-moderate aortic regurgitation is present. Mitral annular calcification is present. Mild mitral & tricuspid regurgitation. Signature      ------------------------------------------------------------------   Electronically signed by Mayelin Miller MD, [facility], [facility]   (Interpreting physician) on 08/22/2022 at 04:41 PM   -------------------------------------------------------------    ACE/ARB/ARNi: Losartan 100 mg nightly   BB: No beta blocker   Aldosterone Antagonist: No aldosterone antagonist   SGLT2: No SGLT2 inhibitor     History of sleep apnea: No    [place] Screen ordered: Yes    DM History: Yes  Consult for DM educator: No. HgA1C is 6.7%    Last Hospital Admission: 8/23/22 for hypertensive urgency  Code Status: Full   Discharge plans:Patient lives at home. May benefit from home health    Family Present:     Ms. Suleman Valverde was seen for heart failure. She presented in atrial fibrillation and found to be in fluid overload. She stated her heart was racing and complaints of chest pressure. She does not weigh daily, but is able. The importance of doing so was discussed. A low sodium and fluid restriction was taught. She adds salt to some of her food and asked for a suitable salt substitute.  Her vegetables are frozen and she avoids processed and does not eat out often. Medications and activity discussed. Patient provided with both written and verbal education on CHF signs/ symptoms, causes, discharge medications, daily weights, low sodium diet, activity, and follow-up. Pt to call if gains 3 pounds in one day or 5 pounds in one week. Mutually agreed upon goals were discussed such as calling the MD as soon as they recognize symptoms and weight gain, maintaining proper diet, taking medications as prescribed, joining cardiac rehab when able. Also reviewed importance of risk factor reduction. Patient provided with CHF Zone Management tool and CHF symptoms magnet. Discussed importance of lifestyle changes: daily weights, low sodium and fluid restriction diet. PATIENT/CAREGIVER TEACHING:    Level of patient/caregiver understanding able to:   [ x Verbalize understanding [ ] Demonstrate understanding [ ] Teach back   [ ] Needs reinforcement [ ] Other:       Time spent teachin minutes    1. WEIGHT: Admit Weight: 159 lb (72.1 kg)      Today  Weight: 159 lb 3.2 oz (72.2 kg)   2. I/O   Intake/Output Summary (Last 24 hours) at 10/3/2022 1526  Last data filed at 10/2/2022 2144  Gross per 24 hour   Intake 250 ml   Output --   Net 250 ml       Recommendations:   1. Patient will need a follow up appointment   2. Educate further on fluid restriction 48 oz- 64 oz during inpatient stay so he can understand how to measure intake at home. 3. Continue to educate on S/S.   4. Emphasize daily weights, diet, and knowing when and who to call  5. Provided patient with CHF Resource Line for questions and concerns.        Raheem Ortiz RN 10/3/2022 3:26 PM

## 2022-10-03 NOTE — PROGRESS NOTES
Office : [phone]     Fax :[phone]       Nephrology progress  Note      Patient's Name: Andrew Espinoza  8:34 AM  10/3/2022    Reason for Consult:  hyponatremia       Requesting Physician:  Mariel Whitehead MD      Chief Complaint:    Chief Complaint   Patient presents with    Atrial Fibrillation         History of Present iIlness:    Andrew Espinoza is a 80 y.o. female with prior history of atrial fib, GERD, HTN presented with c/o palpitation . Was found to be in a. Fib with RVR. Sodium level low at 126     Interval hx     Sodium level improved at 133 --> 136   HR better controlled at 85       I/O last 3 completed shifts: In: 750 [P.O.:740; I.V.:10]  Out: 350 [Urine:350]    Past Medical History:   Diagnosis Date    Arthritis     Atrial fibrillation (HCC)     Diabetes mellitus type II, controlled ([address]     GERD (gastroesophageal reflux disease)     HTN (hypertension)     Hyperlipidemia     Hypothyroid     Insomnia     Lumbago     SVT (supraventricular tachycardia) (HCC)        Past Surgical History:   Procedure Laterality Date    APPENDECTOMY      CHOLECYSTECTOMY, LAPAROSCOPIC  2/24/2013    COLONOSCOPY         Family History   Problem Relation Age of Onset    High Blood Pressure Mother     Heart Attack Father     Heart Disease Father     Other Brother     Asthma Paternal Uncle         reports that she quit smoking about 26 years ago. Her smoking use included cigarettes. She started smoking about 72 years ago. She has a 45.00 pack-year smoking history. She has never used smokeless tobacco. She reports that she does not drink alcohol and does not use drugs.         Allergies:  Adhesive tape, Cefaclor, Cephalexin, Nsaids, Clinoril [sulindac], Codeine, Diclofenac, Diclofenac sodium, Diclofenac sodium, Hctz [hydrochlorothiazide], Ibuprofen, Macrobid [nitrofurantoin macrocrystal], Motrin [ibuprofen micronized], Pcn [penicillins], Peach [prunus persica], Prednisone, and Sulfa antibiotics    Current Medications:    polyvinyl alcohol (LIQUIFILM TEARS) 1.4 % ophthalmic solution 1 drop, Q6H PRN  LORazepam (ATIVAN) tablet 0.5 mg, BID  LORazepam (ATIVAN) tablet 1 mg, Nightly  dilTIAZem (CARDIZEM CD) extended release capsule 180 mg, Daily  rOPINIRole (REQUIP) tablet 2 mg, TID  cloNIDine (CATAPRES) tablet 0.1 mg, BID  hydrALAZINE (APRESOLINE) tablet 100 mg, 3 times per day  levalbuterol (XOPENEX) nebulization 0.63 mg, BID  furosemide (LASIX) injection 20 mg, Daily  albuterol sulfate HFA (PROVENTIL;VENTOLIN;PROAIR) 108 (90 Base) MCG/ACT inhaler 2 puff, Q6H PRN  mometasone-formoterol (DULERA) 200-5 MCG/ACT inhaler 2 puff, BID  apixaban (ELIQUIS) tablet 5 mg, BID  losartan (COZAAR) tablet 100 mg, Nightly  sodium chloride flush 0.9 % injection 5-40 mL, 2 times per day  sodium chloride flush 0.9 % injection 5-40 mL, PRN  0.9 % sodium chloride infusion, PRN  ondansetron (ZOFRAN-ODT) disintegrating tablet 4 mg, Q8H PRN   Or  ondansetron (ZOFRAN) injection 4 mg, Q6H PRN  polyethylene glycol (GLYCOLAX) packet 17 g, Daily PRN  acetaminophen (TYLENOL) tablet 650 mg, Q6H PRN   Or  acetaminophen (TYLENOL) suppository 650 mg, Q6H PRN  insulin lispro (HUMALOG) injection vial 0-8 Units, TID WC  insulin lispro (HUMALOG) injection vial 0-4 Units, Nightly  levothyroxine (SYNTHROID) tablet 75 mcg, QAM AC  LORazepam (ATIVAN) tablet 0.5 mg, BID PRN  traZODone (DESYREL) tablet 50 mg, Nightly PRN      Review of Systems:   14 point ROS obtained but were negative except mentioned in HPI      Physical exam:     Vitals:  /71   Pulse 91   Temp 97.6 °F (36.4 °C) (Oral)   Resp 20   Ht 5' 2\" (1.575 m)   Wt 159 lb 3.2 oz (72.2 kg)   SpO2 97%   BMI 29.12 kg/m²   Constitutional:  OAA X3 NAD  Skin: no rash, turgor wnl  Heent:  eomi, mmm  Neck: no bruits or jvd noted  Cardiovascular:  S1, S2 without m/r/g  Respiratory: b/l base crackles decreased   Abdomen:  +bs, soft, nt, nd  Ext: no lower extremity edema  Psychiatric: mood and affect appropriate  Musculoskeletal:  Rom, muscular strength intact    Labs:  CBC:   Recent Labs     10/01/22  0547 10/02/22  0455 10/03/22  0420   WBC 5.9 4.7 3.8*   HGB 9.6* 9.4* 8.1*    147 148     BMP:    Recent Labs     10/01/22  0547 10/02/22  0455 10/03/22  0420   * 133* 136   K 4.0 4.0 4.2    103 103   CO2 20* 18* 21   BUN 22* 24* 30*   CREATININE 1.0 1.2 1.3*   GLUCOSE 164* 129* 170*     Ca/Mg/Phos:   Recent Labs     09/30/22  1640 10/01/22  0547 10/02/22  0455 10/03/22  0420   CALCIUM 9.0 9.0 8.5 8.8   MG 1.90  --   --   --      Hepatic:   Recent Labs     09/30/22  1640   AST 21   ALT 22   BILITOT 0.6   ALKPHOS 122     Troponin:   Recent Labs     09/30/22  1640 09/30/22  2207   TROPONINI <0.01 <0.01     BNP: No results for input(s): BNP in the last 72 hours. Lipids: No results for input(s): CHOL, TRIG, HDL, LDLCALC, LABVLDL in the last 72 hours. ABGs: No results for input(s): PHART, PO2ART, DWQ4FLA in the last 72 hours. INR:   Recent Labs     09/30/22  1640   INR 1.71*     UA:  Recent Labs     09/30/22  1715   COLORU Yellow   CLARITYU Clear   GLUCOSEU Negative   BILIRUBINUR Negative   KETUA TRACE*   SPECGRAV 1.010   BLOODU Negative   PHUR 5.5   PROTEINU 30*   UROBILINOGEN 0.2   NITRU Negative   LEUKOCYTESUR TRACE*   LABMICR YES   URINETYPE NotGiven      Urine Microscopic:   Recent Labs     09/30/22  1715   BACTERIA None Seen   HYALCAST 0   WBCUA 7*   RBCUA 0   EPIU 2     Urine Culture: No results for input(s): LABURIN in the last 72 hours. Urine Chemistry:   Recent Labs     09/30/22  1715   NAUR 56                IMAGING:  XR CHEST PORTABLE   Final Result   1. No definite radiographic evidence of acute cardiopulmonary disease.    2. Chronic right apical soft tissue fullness corresponding to dense band of   scarring on prior CT chest.   3. Pulmonary sequela typical of that seen with smoking, including possible   COPD; correlate with clinical history. 4. Calcific atherosclerotic disease aorta. A/p     Hyponatremia   Improved   Monitor     A. Fib with RVR on cardizem     Volume overload . Has base crackles. Will stop iv fluids     4. CKD 3 b. 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      Dced  IV fluids   Lasix prn bases     D/w primary team      Thank you for allowing us to participate in care of Suleman Gunn         Electronically signed by: Maximiliano Berry MD, 10/3/2022, 8:34 AM      [facility] [address]  Office : [phone]  Fax :[phone]

## 2022-10-03 NOTE — PLAN OF CARE
Problem: Discharge Planning  Goal: Discharge to home or other facility with appropriate resources  Outcome: Progressing  Flowsheets (Taken 10/3/2022 0753)  Discharge to home or other facility with appropriate resources:   Identify barriers to discharge with patient and caregiver   Arrange for needed discharge resources and transportation as appropriate   Identify discharge learning needs (meds, wound care, etc)   Refer to discharge planning if patient needs post-hospital services based on physician order or complex needs related to functional status, cognitive ability or social support system     Problem: Safety - Adult  Goal: Free from fall injury  Outcome: Progressing     Problem: ABCDS Injury Assessment  Goal: Absence of physical injury  Outcome: Progressing     Problem: Respiratory - Adult  Goal: Achieves optimal ventilation and oxygenation  Outcome: Progressing     Problem: Cardiovascular - Adult  Goal: Maintains optimal cardiac output and hemodynamic stability  Outcome: Progressing  Goal: Absence of cardiac dysrhythmias or at baseline  Outcome: Progressing

## 2022-10-03 NOTE — DISCHARGE INSTR - COC
Continuity of Care Form  [facility]      _x_ Daily Visits x 3     _x_Cardiovascular Assessment.  Titrate O2 to keep SaO2 greater than 90%    _x_ Daily Weights- Baseline Wt:159 lb   Call MD if:   3 pound weight gain or loss in one day OR 5 pound weight gain in one week      _x_ Med List attached:   Hold Coreg/Metoprolol if HR less than 45 or patient symptomatic*   Hold ACE/ARB if SBP less than 85 or patient symptomatic*   Do not hold Spironolactone (aldactone) for hypotension/bradycardia   Call MD for questions: [facility]: [phone]    _x_Follow up appointment with cardiology: date/time: ***        Patient Name: Christina Martinez   :  1935  MRN:  [id number]    Admit date:  2022  Discharge date:  10/4/2022      Code Status Order: Full Code   Advance Directives:     Admitting Physician:  Ellen Cabrera MD  PCP: Andrew To MD    Discharging Nurse: Faiza Mayer RN    [address] Unit/Room#: 4JN-6354/9328-01  Discharging Unit Phone Number: [phone]      Emergency Contact:   Extended Emergency Contact Information  Primary Emergency Contact: Elia0 [facility] Phone: [phone]  Relation: Child  Secondary Emergency Contact: [facility]  Home Phone: [phone]  Mobile Phone: [phone]  Relation: Child    Past Surgical History:  Past Surgical History:   Procedure Laterality Date    APPENDECTOMY      CHOLECYSTECTOMY, LAPAROSCOPIC  2013    COLONOSCOPY         Immunization History:   Immunization History   Administered Date(s) Administered    COVID-19, PFIZER GRAY top, DO NOT Dilute, (age 15 y+), IM, 30 mcg/0.3 mL 2022    COVID-19, PFIZER PURPLE top, DILUTE for use, (age 15 y+), 30mcg/0.3mL 2021, 2021, 2021    Influenza Vaccine, unspecified formulation 10/24/2015    Influenza Virus Vaccine 09/10/2013, 2014    Influenza, FLUAD, (age 72 y+), Adjuvanted, 0.5mL 10/22/2020, 2021    Influenza, High Dose (Fluzone 65 yrs and older) 09/22/2016, 09/21/2017, 11/26/2018    Influenza, Triv, inactivated, subunit, adjuvanted, IM (Fluad 65 yrs and older) 09/28/2019, 10/22/2020    Pneumococcal Conjugate 13-valent (Zrvpcrz32) 06/18/2015    Pneumococcal Polysaccharide ([id number]) 09/10/2010, 06/27/2016    Td, unspecified formulation 06/01/2005    Tdap (Boostrix, Adacel) 04/18/2018       Active Problems:  Patient Active Problem List   Diagnosis Code    Hypertensive urgency I16.0    Hyperlipidemia E78.5    IBS (irritable bowel syndrome) K58.9    Atrial fibrillation with RVR ([facility]) I48.91    Overactive bladder N32.81    Osteoarthritis M19.90    Controlled type 2 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 disease, without long-term current use of insulin ([facility]) E11.22, N18.2    Restless legs syndrome G25.81    ANTHONY (obstructive sleep apnea) G47.33    Allergic rhinitis J30.9    COPD, moderate ([facility]) J44.9    Vertigo R42    Interstitial lung disease ([facility]) J84.9    Coronary artery disease involving native coronary artery of native heart with angina pectoris ([facility]) I25.119    PAF (paroxysmal atrial fibrillation) ([facility]) I48.0    Hypothyroid E03.9    Chronic respiratory failure with hypoxia ([facility]) J96.11    SOB (shortness of breath) R06.02    Ataxia R27.0    Primary hypertension I10    Centrilobular emphysema ([facility]) J43.2    Pulmonary nodule R91.1    Ptosis of eyelid, bilateral H02.403    Chronic obstructive pulmonary disease ([address] J44.9    Chronic renal disease, stage III ([address] [191189] N18.30    Chronic fatigue R53.82    Hypertensive urgency, malignant I16.0    Acute dysfunction of Eustachian tube, bilateral H69.83    Bilateral hearing loss H91.93    Impacted cerumen of right ear H61.21    Dizziness R42    Acute on chronic diastolic (congestive) heart failure ([facility]) I50.33    Acute anemia D64.9    Hyponatremia E87.1       Isolation/Infection:   Isolation            No Isolation          Patient Infection Status       Infection Onset Added Last Indicated Last Indicated By Review Planned Expiration Resolved Resolved By    None active    Resolved    COVID-19 (Rule Out) 04/19/22 04/19/22 04/19/22 COVID-19 & Influenza Combo (Ordered)   04/19/22 Rule-Out Test Resulted    COVID-19 (Rule Out) 02/24/21 02/24/21 02/24/21 Respiratory Panel, Molecular, with COVID-19 (Restricted: peds pts or suitable admitted adults) (Ordered)   02/24/21 Rule-Out Test Resulted    COVID-19 (Rule Out) 02/22/21 02/22/21 02/22/21 COVID-19 (Ordered)   02/23/21 Rule-Out Test Resulted            Nurse Assessment:  Last Vital Signs: /71   Pulse 85   Temp 97.6 °F (36.4 °C) (Oral)   Resp 20   Ht 5' 2\" (1.575 m)   Wt 159 lb 3.2 oz (72.2 kg)   SpO2 98%   BMI 29.12 kg/m²     Last documented pain score (0-10 scale): Pain Level: 3  Last Weight:   Wt Readings from Last 1 Encounters:   10/03/22 159 lb 3.2 oz (72.2 kg)     Mental Status:  oriented, alert, coherent, logical, thought processes intact, and able to concentrate and follow conversation    IV Access:  - None    Nursing Mobility/ADLs:  Walking   Assisted  Transfer  Assisted  Bathing  Assisted  Dressing  Independent  Toileting  Assisted  Feeding  [address]  Independent  Med Delivery   whole    Wound Care Documentation and Therapy:        Elimination:  Continence: Bowel: Yes  Bladder: Yes  Urinary Catheter: None   Colostomy/Ileostomy/Ileal Conduit: No       Date of Last BM: 10/4/2022      Intake/Output Summary (Last 24 hours) at 10/3/2022 1041  Last data filed at 10/2/2022 2144  Gross per 24 hour   Intake 450 ml   Output --   Net 450 ml     I/O last 3 completed shifts: In: 750 [P.O.:740; I.V.:10]  Out: 350 [Urine:350]    Safety Concerns: At Risk for Falls    Impairments/Disabilities:      Vision and Hearing    Nutrition Therapy:  Current Nutrition Therapy:   - Oral Diet:  Cardiac and Low Sodium (2gm)    Routes of Feeding: Oral  Liquids:  Thin Liquids  Daily Fluid Restriction: no  Last Modified Barium Swallow with Video (Video Swallowing Test): not done    Treatments at the Time of Hospital Discharge:   Respiratory Treatments:     Oxygen Therapy:  is on oxygen at 2 L/min per nasal cannula. Ventilator:    - No ventilator support    Rehab Therapies: SN, PT,OT,HHA  Weight Bearing Status/Restrictions: No weight bearing restrictions  Other Medical Equipment (for information only, NOT a DME order):  {EQUIPMENT:[id number]}  Other Treatments:   [facility]: LEVEL 1 STANDARD     -Initial home health evaluation to occur within 24-48 hours, in patient home    -Home health agency to establish plan of care for patient over 60 day period    -Medication Reconciliation    -PCP Visit scheduled within seven days of discharge    -PT/OT to evaluate with goal of regaining prior level of functioning    -OT to evaluate if patient has [address] needs for personal care        Patient's personal belongings (please select all that are sent with patient):  Glasses, Hearing Aides bilateral    RN SIGNATURE:  Electronically signed by Page Vaqsuez RN on 10/4/22 at 2:32 PM EDT    CASE MANAGEMENT/SOCIAL WORK SECTION    Inpatient Status Date: ***    Readmission Risk Assessment Score:  Readmission Risk              Risk of Unplanned Readmission:  33           Discharging to Facility/ Agency   Name: Cuca Duran will call for Appointment  Phone: [phone]  Fax: [phone]     Dialysis Facility (if applicable)   Name:  Address:  Dialysis Schedule:  Phone:  Fax:    / signature: {Esignature:[id number]}    PHYSICIAN SECTION    Prognosis: Good    Condition at Discharge: Stable    Rehab Potential (if transferring to Rehab): Fair    Recommended Labs or Other Treatments After Discharge: ***    Physician Certification: I certify the above information and transfer of Winnie Ramirez  is necessary for the continuing treatment of the diagnosis listed and that she requires Home Care for greater 30 days.      Update Admission H&P: No change in H&P    PHYSICIAN SIGNATURE:  Talat Caban MD on 10/04/22

## 2022-10-03 NOTE — PROGRESS NOTES
The [place] Sleepiness Scale       The [place] Sleepiness Scale is widely used in the field of sleep medicine as a subjective measure of a patient's sleepiness. The test is a list of eight situations in which you rate your tendency to become sleepy on a scale of 0, no chance to 3, high chance of dozing. Your score is based on a scale of 0 to 24. The scale estimates whether you are experiencing excessive sleepiness that possibly requires medical attention. How Sleepy Are You? How sleepy are you to doze off or fall asleep in the following situations? You should rate your chances of dozing off, not just feeling tired. Even if you have not done some of these things recently try to determine how they would have affected you.  For each situation, decide whether or not you would have:     0 = No chance of dozing 1 = Slight chance of dozing   2 = Moderate chance of  dozing 3 = High change of dozing       Situation                                                                                     Chance of Dozing    Sitting and reading  0 =  [x]  1 =    [] 2 =    [] 3 =    []    Watching TV  0 =  [x]  1 =    [] 2 =    [] 3 =    []      Sitting inactive in public place (e.g., a theater or a meeting)  0 =  [x]  1 =    [] 2 =    [] 3 =    []    As a passenger in a car for an hour without a break          0  =  [x]  1 =    [] 2 =    [] 3 =    []    Lying down to rest in the afternoon when circumstances permit    0 =  [x]  1 =    [] 2 =    [] 3 =    []    Sitting and talking to someone  0 =  [x]  1 =    [] 2 =    [] 3 =    []      Sitting quietly after a lunch without alcohol  0 =  [x]  1 =    [] 2 =    [] 3 =    []    In a car, while stopped for a few minutes in traffic                                                                      0 =  [x]  1 =    [] 2 =    [] 3 =    []    Total Score = 0    If your total score is 10 or greater, you are experiencing excessive sleepiness and should consider seeking a medical follow-up. Take a copy of this screening test to your primary care physician on your next office visit. Interpretation:      0 -   7: It is unlikely that you are abnormally sleepy. 8 -   9: You have an average amount of daytime sleepiness. 10 - 15: You may be excessively sleepy depending on the situation. You may want to consider seeking medical attention. 16 - 24: You are excessively sleepy and should consider seeking medical attention.       Electronically signed by Jesenia Bullard RCP on 10/3/2022 at 4:51 PM

## 2022-10-03 NOTE — CARE COORDINATION
[address] [phone] was active with this pt prior to admit. Discharge planner notified. Will follow.

## 2022-10-03 NOTE — PROGRESS NOTES
Shift assessment complete. Telemetry on. Controlled afib on telemetry. Vital signs stable. Pt requested to hold BP medications since BP was good and recheck and give at midnight if BP was higher since BP was low during the day. Pt walked to the bathroom SBA and had bowel movement. Snack given. Pt educated she will be NPO at midnight for possible cardioversion in the AM. Call light within reach and bed alarm on.

## 2022-10-03 NOTE — PROGRESS NOTES
[facility] PROGRESS NOTE    10/3/2022 10:55 AM        Name: Guillaume Stewart .               Admitted: 9/30/2022  Primary Care Provider: Jaycob Diaz MD (Tel: [phone])      Subjective:    Lying in bed denies any chest pain no fevers no chills no shortness of breath today  Reviewed interval ancillary notes    Current Medications  flecainide (TAMBOCOR) tablet 100 mg, 2 times per day  polyvinyl alcohol (LIQUIFILM TEARS) 1.4 % ophthalmic solution 1 drop, Q6H PRN  LORazepam (ATIVAN) tablet 0.5 mg, BID  LORazepam (ATIVAN) tablet 1 mg, Nightly  dilTIAZem (CARDIZEM CD) extended release capsule 180 mg, Daily  rOPINIRole (REQUIP) tablet 2 mg, TID  cloNIDine (CATAPRES) tablet 0.1 mg, BID  hydrALAZINE (APRESOLINE) tablet 100 mg, 3 times per day  levalbuterol (XOPENEX) nebulization 0.63 mg, BID  furosemide (LASIX) injection 20 mg, Daily  albuterol sulfate HFA (PROVENTIL;VENTOLIN;PROAIR) 108 (90 Base) MCG/ACT inhaler 2 puff, Q6H PRN  mometasone-formoterol (DULERA) 200-5 MCG/ACT inhaler 2 puff, BID  apixaban (ELIQUIS) tablet 5 mg, BID  losartan (COZAAR) tablet 100 mg, Nightly  sodium chloride flush 0.9 % injection 5-40 mL, 2 times per day  sodium chloride flush 0.9 % injection 5-40 mL, PRN  0.9 % sodium chloride infusion, PRN  ondansetron (ZOFRAN-ODT) disintegrating tablet 4 mg, Q8H PRN   Or  ondansetron (ZOFRAN) injection 4 mg, Q6H PRN  polyethylene glycol (GLYCOLAX) packet 17 g, Daily PRN  acetaminophen (TYLENOL) tablet 650 mg, Q6H PRN   Or  acetaminophen (TYLENOL) suppository 650 mg, Q6H PRN  insulin lispro (HUMALOG) injection vial 0-8 Units, TID WC  insulin lispro (HUMALOG) injection vial 0-4 Units, Nightly  levothyroxine (SYNTHROID) tablet 75 mcg, QAM AC  LORazepam (ATIVAN) tablet 0.5 mg, BID PRN  traZODone (DESYREL) tablet 50 mg, Nightly PRN      Objective:  /71   Pulse 85   Temp 97.6 °F (36.4 °C) (Oral)   Resp 20 Ht 5' 2\" (1.575 m)   Wt 159 lb 3.2 oz (72.2 kg)   SpO2 98%   BMI 29.12 kg/m²     Intake/Output Summary (Last 24 hours) at 10/3/2022 1055  Last data filed at 10/2/2022 2144  Gross per 24 hour   Intake 450 ml   Output --   Net 450 ml        Wt Readings from Last 3 Encounters:   10/03/22 159 lb 3.2 oz (72.2 kg)   08/22/22 158 lb 4.8 oz (71.8 kg)   08/19/22 157 lb 8 oz (71.4 kg)     General appearance:  Appears comfortable. AAOx3  HEENT: atraumatic, Pupils equal, muscous membranes moist, no masses appreciated  Cardiovascular: irregulary irregular no murmurs appreciated  Respiratory: CTAB no wheezing  Gastrointestinal: Abdomen soft, non-tender, BS+  EXT: no edema  Neurology: no gross focal deficts  Psychiatry: Appropriate affect. Not agitated  Skin: Warm, dry, no rashes appreciated    Labs and Tests:  CBC:   Recent Labs     10/01/22  0547 10/02/22  0455 10/03/22  0420   WBC 5.9 4.7 3.8*   HGB 9.6* 9.4* 8.1*    147 148       BMP:    Recent Labs     10/01/22  0547 10/02/22  0455 10/03/22  0420   * 133* 136   K 4.0 4.0 4.2    103 103   CO2 20* 18* 21   BUN 22* 24* 30*   CREATININE 1.0 1.2 1.3*   GLUCOSE 164* 129* 170*       Hepatic:   Recent Labs     09/30/22  1640   AST 21   ALT 22   BILITOT 0.6   ALKPHOS 122       XR CHEST PORTABLE   Final Result   1. No definite radiographic evidence of acute cardiopulmonary disease. 2. Chronic right apical soft tissue fullness corresponding to dense band of   scarring on prior CT chest.   3. Pulmonary sequela typical of that seen with smoking, including possible   COPD; correlate with clinical history. 4. Calcific atherosclerotic disease aorta.              Recent imaging reviewed    Problem List  Principal Problem:    PAF (paroxysmal atrial fibrillation) (HCC)  Active Problems:    Chronic obstructive pulmonary disease (HCC)    Acute on chronic diastolic (congestive) heart failure (HCC)    Acute anemia    Hyponatremia    Atrial fibrillation with RVR ([address] Primary hypertension  Resolved Problems:    * No resolved hospital problems.  *       Assessment/Plan:   PAF with RVR   - resume home meds   - eliquis for ac  - reviewed recent echo, moderate as    Acute on chronic diastolic chf : iv lasix today repeat bmp in am    ?gi bleed repeat hemoglobin stat and check guaic stool     Hyponatremia: SIADH: hold rythmol     Htn  ; home meds     Dm2: SSI   DVT prophylaxis eliquis  Code status full code      Cortney Yepez MD   10/3/2022 10:55 AM

## 2022-10-03 NOTE — DISCHARGE INSTRUCTIONS
The [place] Sleepiness Scale        The [place] Sleepiness Scale is widely used in the field of sleep medicine as a subjective measure of a patient's sleepiness. The test is a list of eight situations in which you rate your tendency to become sleepy on a scale of 0, no chance to 3, high chance of dozing. Your score is based on a scale of 0 to 24. The scale estimates whether you are experiencing excessive sleepiness that possibly requires medical attention. How Sleepy Are You? How sleepy are you to doze off or fall asleep in the following situations? You should rate your chances of dozing off, not just feeling tired. Even if you have not done some of these things recently try to determine how they would have affected you.  For each situation, decide whether or not you would have:      0 = No chance of dozing         1 = Slight chance of dozing      2 = Moderate chance of  dozing         3 = High change of dozing                  Situation                                                                                                                                                                                                                                                       Chance of Dozing    Sitting and reading                                                                                                                            0 =  [x]  1 =    [] 2 =    [] 3 =    []     Watching TV                                                                                                                                     0 =  [x]  1 =    [] 2 =    [] 3 =    []                                                                                                                                                              Sitting inactive in public place (e.g., a theater or a meeting)                                                            0 =  [x]  1 =    [] 2 =    [] 3 =    []     As a passenger in a car for an hour without a break                                                                        0  =  [x]  1 =    [] 2 =    [] 3 =    []     Lying down to rest in the afternoon when circumstances permit                                                      0 =  [x]  1 =    [] 2 =    [] 3 =    []     Sitting and talking to someone                                                                                                          0 =  [x]  1 =    [] 2 =    [] 3 =    []                            Sitting quietly after a lunch without alcohol                                                                                       0 =  [x]  1 =    [] 2 =    [] 3 =    []     In a car, while stopped for a few minutes in traffic                                                                            0 =  [x]  1 =    [] 2 =    [] 3 =    []     Total Score = 0     If your total score is 10 or greater, you are experiencing excessive sleepiness and should consider seeking a medical follow-up. Take a copy of this screening test to your primary care physician on your next office visit. Interpretation:       0 -   7: It is unlikely that you are abnormally sleepy. 8 -   9: You have an average amount of daytime sleepiness. 10 - 15: You may be excessively sleepy depending on the situation. You may want to consider seeking medical attention. 16 - 24: You are excessively sleepy and should consider seeking medical attention.        Electronically signed by Braulio Stroud RCP on 10/3/2022 at 4:51 PM

## 2022-10-03 NOTE — CONSULTS
[address]   Electrophysiology Nurse Practitioner  Consult    Date: 10/3/2022  Date of admission: 9/30/2022  4:10 PM  Reason for Admission: Hyponatremia [E87.1]  PAF (paroxysmal atrial fibrillation) ([facility] [address] [I48.0]  Atrial fibrillation with RVR ([facility] [address] [I48.91]    Consult Requesting Physician: Debby Pedro MD    -Reason for Consultation: Atrial fibrillation    Chief Complaint   Patient presents with    Atrial Fibrillation       HISTORY OF PRESENT ILLNESS: History obtained from patient and medical record. Blake Edwards is a 80 y.o. female with a past medical history of  HTN, HLD, DM, COPD, hypothyroid, and atrial fibrillation. S/p TRACEY/DCCV (8/31/18, Dr. Rhoda Burroughs) and placed on propafenone and Xarelto without recurrence until now. In November of 2019, she was admitted for afib with RVR accompanied by sudden onset palpitations, fatigue, and SOB. She was also being treated for PNA. She developed bradycardia with IV cardizem. She converted to SR spontaneously. S/p DCCV (3/12/21). Pt presented to hospital with palpitations, SOB, and leg swelling. She was found to be in atrial fibrillation and fluid overloaded. She has been diuresed with IV Lasix. She remains in atrial fibrillation and her main complaint is palpitations/heart racing. Her propafenone has been held since admission due to concerns for causing SIADH. Denies having chest pain, orthopnea, cough, or dizziness at the time of this visit. Her hemoglobin has been trending down this admission and is now down to 8.1 this AM. She states she has noted dark colored stools at home for quite some time. Allergies:   Allergies   Allergen Reactions    Adhesive Tape Anaphylaxis    Cefaclor Nausea And Vomiting     Other reaction(s): Chest pain    Cephalexin Other (See Comments)     Struggling to breath, almost passing out/ very low oxygen and pulse    Nsaids      Pt states she has hives and heart races   Other reaction(s): Tachycardia    Clinoril [Sulindac] Other (See Comments)     Stomach pain    Codeine      FEEL NUMB    Diclofenac     Diclofenac Sodium Hives    Diclofenac Sodium Hives    Hctz [Hydrochlorothiazide]      Sob    Ibuprofen Other (See Comments)     Other reaction(s): Tachycardia    Macrobid [Nitrofurantoin Macrocrystal]      nausea    Motrin [Ibuprofen Micronized] Other (See Comments)     Tachycardia      Pcn [Penicillins] Hives    Peach [Prunus Persica] Itching and Swelling     Cannot eat raw peaches    Prednisone      Causes elevated blood pressure     Sulfa Antibiotics      Nausea, diarrhea     Home Meds:  Prior to Visit Medications    Medication Sig Taking? Authorizing Provider   LORazepam (ATIVAN) 1 MG tablet Take 1 mg by mouth at bedtime. Yes Historical Provider, MD   SITagliptin (JANUVIA) 100 MG tablet Take 100 mg by mouth daily Yes Historical Provider, MD   traZODone (DESYREL) 50 MG tablet Take 50 mg by mouth nightly Yes Historical Provider, MD   LORazepam (ATIVAN) 0.5 MG tablet Take 0.5 mg by mouth in the morning and at bedtime. In the morning and afternoon Yes Historical Provider, MD   ELIQUIS 5 MG TABS tablet TAKE 1 TABLET TWICE DAILY  Moises Emmanuel MD   budesonide-formoterol (SYMBICORT) 160-4.5 MCG/ACT AERO INHALE 2 PUFFS TWICE DAILY  Moises Emmanuel MD   hydrALAZINE (APRESOLINE) 50 MG tablet Take 1 tablet by mouth every 8 hours  Patient taking differently: Take 100 mg by mouth every 8 hours  Val Mayer MD   loratadine (CLARITIN) 10 MG tablet Take 1 tablet by mouth in the morning. Moises Emmanuel MD   fluticasone (FLONASE) 50 MCG/ACT nasal spray USE 2 SPRAYS NASALLY EVERY DAY  Moises Emmanuel MD   atorvastatin (LIPITOR) 80 MG tablet TAKE 1 TABLET EVERY NIGHT  KATHY Rodriguez CNP   amLODIPine (NORVASC) 5 MG tablet Take 1 tablet by mouth in the morning.   KATHY Rodriguez CNP   dilTIAZem (CARDIZEM CD) 120 MG extended release capsule Take 1 capsule by mouth daily  KATHY Rodriguez CNP   meclizine (ANTIVERT) 12.5 MG tablet TAKE 1 TABLET THREE TIMES DAILY AS NEEDED  Kaushik Melara MD   blood glucose test strips (TRUE METRIX BLOOD GLUCOSE TEST) strip USE TO TEST BLOOD SUGAR DAILY  Kaushik Melara MD   propafenone (RYTHMOL) 150 MG tablet TAKE 1 TABLET BY MOUTH EVERY 8 HOURS  Patient taking differently: Take 150 mg by mouth every 8 hours Pt takes twice a day  Kaushik Melara MD   dicyclomine (BENTYL) 10 MG capsule TAKE 1 CAPSULE BY MOUTH FOUR TIMES DAILY AS NEEDED FOR ABDOMINAL PAIN  Sofie Graham APRN - CNP   irbesartan (AVAPRO) 300 MG tablet TAKE 1 TABLET BY MOUTH EVERY NIGHT  Kaushik Melara MD   rOPINIRole (REQUIP) 3 MG tablet TAKE 1 TABLET THREE TIMES DAILY  Kaushik Melara MD   levothyroxine (SYNTHROID) 75 MCG tablet TAKE 1 TABLET BY MOUTH EVERY DAY  Kaushik Melara MD   cloNIDine (CATAPRES) 0.1 MG tablet TAKE 1 TABLET TWICE DAILY  Husam Rogers MD   conjugated estrogens (PREMARIN) 0.625 MG/GM vaginal cream Place 1 g vaginally three times a week  Patient taking differently: Place 1 g vaginally Twice a Week Monday and Friday  Kaushik Melara MD   pramipexole (MIRAPEX) 0.25 MG tablet TAKE 1 TABLET BY MOUTH EVERY NIGHT  Patient not taking: No sig reported  Kaushik Melara MD   levalbuterol (XOPENEX) 0.63 MG/3ML nebulization USE 1 VIAL PER NEBULIZER EVERY 6 HOURS. MAX OF 4 TIMES PER 24 HOURS  Deepa Boss MD   tiotropium (SPIRIVA RESPIMAT) 2.5 MCG/ACT AERS inhaler Inhale 2 puffs into the lungs daily  Megha Waddell PA-C   Blood Glucose Monitoring Suppl (TRUE METRIX METER) w/Device KIT To use to check sugars 4 times daily  Husam Rogers MD   Blood Glucose Calibration (TRUE METRIX LEVEL 2) Normal SOLN As needed  Kaushik Melara MD   blood glucose test strips (TRUE METRIX BLOOD GLUCOSE TEST) strip 1 each by In Vitro route daily As needed.   Kaushik Melara MD   TRUEplus Lancets 33G MISC 1 daily  Kaushik Melara MD   Lancets MISC 1 each by Does not apply route 2 times daily  Dayton Duffy MD   ondansetron (ZOFRAN) 4 MG tablet Take 1 tablet by mouth daily as needed for Nausea or Vomiting  Volodymyr Blackwell MD   OXYGEN Inhale 2 L into the lungs  Historical Provider, MD   Respiratory Therapy Supplies (NEBULIZER/TUBING/MOUTHPIECE) KIT 1 kit by Does not apply route 4 times daily as needed (shortness of breath)  Dayton Duffy MD   albuterol sulfate HFA (PROAIR HFA) 108 (90 Base) MCG/ACT inhaler Inhale 2 puffs into the lungs every 6 hours as needed for Wheezing  Patient taking differently: Inhale 2 puffs into the lungs in the morning and at bedtime  Fernando James MD      Past Medical History:  Past Medical History:   Diagnosis Date    Arthritis     Atrial fibrillation ([facility] [address]     Diabetes mellitus type II, controlled ([facility] [address]     GERD (gastroesophageal reflux disease)     HTN (hypertension)     Hyperlipidemia     Hypothyroid     Insomnia     Lumbago     SVT (supraventricular tachycardia) (HCC)       Past Surgical History:    has a past surgical history that includes Appendectomy; Colonoscopy; and Cholecystectomy, laparoscopic (2/24/2013). Social History:  Reviewed. reports that she quit smoking about 26 years ago. Her smoking use included cigarettes. She started smoking about 72 years ago. She has a 45.00 pack-year smoking history. She has never used smokeless tobacco. She reports that she does not drink alcohol and does not use drugs. Family History:  Reviewed. family history includes Asthma in her paternal uncle; Heart Attack in her father; Heart Disease in her father; High Blood Pressure in her mother; Other in her brother. Review of System:  Constitutional: Positive for fatigue.  Negative for fever, night sweats, chills, weight changes, or weakness  Skin: Negative for rash, dry skin, pruritus, bruising, bleeding, blood clots, or changes in skin pigment  HEENT: Negative for vision changes, ringing in the ears, sore throat, dysphagia, or swollen lymph nodes  Respiratory: Positive for SOB/CHIN  Cardiovascular: Reviewed in HPI  Gastrointestinal: Negative for abdominal pain, N/V/D, constipation, or black/tarry stools  Genito-Urinary: Negative for dysuria, incontinence, urgency, or hematuria  Musculoskeletal: Negative for joint swelling, muscle pain, or injuries  Neurological/Psych: Negative for confusion, seizures, headaches, balance issues or TIA-like symptoms. No anxiety, depression, or insomnia    Physical Examination:  Vitals:    10/03/22 0753   BP: 126/71   Pulse: 91   Resp: 20   Temp: 97.6 °F (36.4 °C)   SpO2: 97%      In: 750 [P.O.:740; I.V.:10]  Out: 350    Wt Readings from Last 3 Encounters:   10/03/22 159 lb 3.2 oz (72.2 kg)   08/22/22 158 lb 4.8 oz (71.8 kg)   08/19/22 157 lb 8 oz (71.4 kg)       Telemetry: Personally Reviewed  - Atrial fibrillation. Rate 90-110s  Constitutional: Cooperative and in no apparent distress, and appears stated age  Skin: Warm and pink; no pallor, cyanosis, bruising, or clubbing  HEENT: Symmetric and normocephalic. PERRL, EOM intact. Conjunctiva pink with clear sclera. Mucus membranes pink and moist. Teeth intact. Thyroid smooth without nodules or goiter. Cardiovascular: Regular rate and irregular rhythm. S1/S2 present without murmurs, rubs, or gallops. Peripheral pulses 2+, capillary refill < 3 seconds. No peripheral edema, no elevation of JVP  Respiratory: Respirations symmetric and unlabored. Lungs clear diminished to auscultation bilaterally, no wheezing, crackles, or rhonchi. On 2L of oxygen (baseline)  Gastrointestinal: Abdomen soft and rotund. Bowel sounds normoactive in all quadrants without tenderness or masses. Musculoskeletal: Bilateral upper and lower extremity strength 5/5 with full ROM  Neurologic/Psych: Awake and orientated to person, place and time.  Calm affect, appropriate mood    Pertinent labs, diagnostic, device, and imaging results reviewed as a part of this visit    Labs:  BMP:   Recent Labs 22  1640 22  2207 10/01/22  0547 10/02/22  0455 10/03/22  0420   *   < > 133* 133* 136   K 4.5  --  4.0 4.0 4.2   CL 93*  --  103 103 103   CO2 18*  --  20* 18* 21   BUN 32*  --  22* 24* 30*   CREATININE 1.2  --  1.0 1.2 1.3*   MG 1.90  --   --   --   --     < > = values in this interval not displayed. Estimated Creatinine Clearance: 29 mL/min (A) (based on SCr of 1.3 mg/dL (H)). CBC:   Recent Labs     10/01/22  0547 10/02/22  0455 10/03/22  0420   WBC 5.9 4.7 3.8*   HGB 9.6* 9.4* 8.1*   HCT 28.2* 28.9* 24.2*   MCV 86.9 90.0 88.3    147 148     Thyroid:   Lab Results   Component Value Date/Time    TSH 2.50 2020 09:49 AM    Y4QEZIB 0.56 2022 03:20 PM     Lipids:  Lab Results   Component Value Date/Time    CHOL 148 2022 05:17 AM    HDL 52 2022 05:17 AM    HDL 32 2011 06:45 AM    TRIG 150 2022 05:17 AM     LFTS:   Lab Results   Component Value Date/Time    ALT 22 2022 04:40 PM    AST 21 2022 04:40 PM     2021 05:19 AM    ALKPHOS 122 2022 04:40 PM    PROT 6.6 2022 04:40 PM    PROT 6.9 2013 01:48 PM    AGRATIO 1.4 2022 04:40 PM    BILITOT 0.6 2022 04:40 PM     Cardiac Enzymes:   Lab Results   Component Value Date/Time    CKTOTAL 80 2021 04:29 PM    TROPONINI <0.01 2022 10:07 PM    TROPONINI <0.01 2022 04:40 PM    TROPONINI <0.01 2022 05:17 AM     Coags:   Lab Results   Component Value Date/Time    PROTIME 20.0 2022 04:40 PM    INR 1.71 2022 04:40 PM       EC22  Atrial fibrillation with RVR    ECHO:    Left ventricular systolic function is normal with ejection fraction estimated at 55-60 %. No regional wall motion abnormalities are noted. There is mild concentric left ventricular hypertrophy. There is reversal of E/A inflow velocities across the mitral valve. Mild-to-moderate aortic regurgitation is present. Mitral annular calcification is present. Mild mitral & tricuspid regurgitation. GXT: 2006  Normal LV function and no evidence of ischemia     LHC: 1/19/2004  Nonobstructive CAD    Problem List:   Patient Active Problem List    Diagnosis Date Noted    ANTHONY (obstructive sleep apnea)     Acute on chronic diastolic heart failure ([address] 10/01/2022    Acute dysfunction of Eustachian tube, bilateral 08/22/2022    Bilateral hearing loss 08/22/2022    Impacted cerumen of right ear 08/22/2022    Dizziness 08/22/2022    Hypertensive urgency, malignant 08/21/2022    Chronic fatigue 07/14/2022    Chronic renal disease, stage III [facility]) [847824] 06/13/2022    Acute exacerbation of chronic obstructive pulmonary disease (COPD) ([address] 04/19/2022    Ptosis of eyelid, bilateral 10/26/2021    Centrilobular emphysema ([address] 09/08/2021    Pulmonary nodule 09/08/2021    Primary hypertension     Ataxia 11/12/2020    SOB (shortness of breath) 02/25/2020    PAF (paroxysmal atrial fibrillation) ([address] 02/19/2020    Hypothyroid 02/19/2020    Chronic respiratory failure with hypoxia ([address] 02/19/2020    Coronary artery disease involving native coronary artery of native heart with angina pectoris ([address] 02/18/2020    Interstitial lung disease ([address] 09/20/2019    Vertigo 07/31/2019    COPD, moderate ([address] 03/23/2019    Allergic rhinitis 01/09/2019    Controlled type 2 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 disease, without long-term current use of insulin ([address] 01/18/2017    Restless legs syndrome 01/18/2017    Osteoarthritis 06/18/2015    Atrial fibrillation with RVR ([address] 12/11/2013    Overactive bladder 12/11/2013    IBS (irritable bowel syndrome) 05/02/2013    Hypertensive urgency     Hyperlipidemia         Assessment and Plan: 1. Paroxysmal Atrial Fibrillation  - Currently in AF, rate 90-110s  - Continue cardizem 180 mg QD (will cut back dose if flecainide addition causes bradycardia)  - Limited AAD options due to her COPD.  Her propafenone was stopped on admission due to concerns for causing SIADH. Will start flecainide 100 mg BID for rate/rhythm control   ~ Poor candidate for amiodarone due to COPD/ILD    - [id number] score:high ; [id number] Vasc score and anticoagulation discussed. High risk for stroke and thromboembolism. Anticoagulation is recommended. Risk of bleeding was discussed.  ~ On Eliquis 5 mg BID      - Treatment options including cardioversion, rate control strategy, antiarrhythmics, anticoagulation and possible ablation were discussed with patient. Risks, benefits and alternative of each treatment options were explained. All questions answered    ~ Ideally we would proceed with cardioversion, however, her H/H is trending down and this needs to be investigated further for possible GIB before proceeding with CV. If her work up is negative, then we can proceed with cardioversion, but would like to avoid having to stop her eliquis in first 30 days after cardioversion due to the risks as I am worried her H/H is going to drop further     - Poor candidate for ablation given her age and lung function. Watchman may need to be considered if she is unable to tolerate [facility] and has further anemia/GIB, but this would need to be discussed further with Dr. Mary Kay oBwles and likely Dr. Ismael Armenta given her for general anesthesia to complete procedure    2. Acute on Chronic diastolic heart failure (NYHA Class II)  - Appears compensated   ~ EF 55% per echo  - On IV lasix  - Continue with losartan 100 mg QD  - Monitor I&O's, Daily weights    3. Acute Anemia   - H/H trending down. 8.1/24.2 this AM   - Pt notes dark stools at home   - Add on iron studies to AM labs   - Check occult stool. If positive or H/H trends down further, likely needs GI consult    4. HTN  - Controlled: Goal <140/80  - Continue current medications    5.  Chronic Respiratory Failure, COPD   - Stable, on 2L of oxygen (baseline)   - On inhalers    All pertinent information and plan of care discussed with the EP physician,  Luna Charles    All questions and concerns were addressed to the patient/daughte. Alternatives to my treatment were discussed. I have discussed the above stated plan and the patient verbalized understanding and agreed with the plan. Discussed plan with patient and family and nurse.     Thank you for allowing to us to participate in the care of Dahlia Wills, [address]   Office: [phone]

## 2022-10-03 NOTE — PROGRESS NOTES
[address]  HEART FAILURE  Progress Note      Admit Date 9/30/2022     Reason for Consult:      Reason for Consultation/Chief Complaint: palpitations    HPI:    Andrew Espinoza is a 80 y.o. female with PMH AF, s/p DCCV 3/21, HFpEF, DM, HTN, SVT admitted with AF/RVR and volume overload. She has diuresed a little, HR still elevated. She has been compliant with AC, no missed doses prior to admit      Subjective:  Patient is being seen for CHF. There were no acute overnight cardiac events. Today Ms. Maryellen Miller denies chest pain or palpitations but c/o CHIN and fatigue with any activity shortness of breath, palpitations, feels about the same as yesterday      Review of Systems - General ROS: positive for  - fatigue  Hematological and Lymphatic ROS: negative  Respiratory ROS: positive for - shortness of breath  Cardiovascular ROS: negative  Gastrointestinal ROS: no abdominal pain, change in bowel habits, or black or bloody stools  Musculoskeletal ROS: negative  Neurological ROS: no TIA or stroke symptoms       Wt Readings from Last 3 Encounters:   10/03/22 159 lb 3.2 oz (72.2 kg)   08/22/22 158 lb 4.8 oz (71.8 kg)   08/19/22 157 lb 8 oz (71.4 kg)         Cardiac Testing:   ECHO 8/21/22:   Left ventricular systolic function is normal with ejection fraction estimated at 55-60 %. No regional wall motion abnormalities are noted. There is mild concentric left ventricular hypertrophy. There is reversal of E/A inflow velocities across the mitral valve. Mild-to-moderate aortic regurgitation is present. Mitral annular calcification is present. Mild mitral & tricuspid regurgitation.       NYHA Class III      Objective:   /71   Pulse 91   Temp 97.6 °F (36.4 °C) (Oral)   Resp 20   Ht 5' 2\" (1.575 m)   Wt 159 lb 3.2 oz (72.2 kg)   SpO2 97%   BMI 29.12 kg/m²     Intake/Output Summary (Last 24 hours) at 10/3/2022 1914  Last data filed at 10/2/2022 2144  Gross per 24 hour   Intake 750 ml   Output 350 ml Net 400 ml      In: 750 [P.O.:740; I.V.:10]  Out: 350     TELEMETRY: AF    Physical Exam:  General Appearance:  Non-obese/Well Nourished  Respiratory:  Resp Auscultation: Normal breath sounds without dullness  Cardiovascular:   Auscultation: irregular, tachy rate and rhythm, normal S1S2, no m/g/r/c  Palpation: Normal    Pedal Pulses: 2+ and equal   Abdomen:  Soft, NT, ND, + bs  Extremities:  No Cyanosis or Clubbing  Extremities: no edema  Neurological/Psychiatric:  Oriented to time, place, and person  Non-anxious    MEDICATIONS:   Scheduled Meds:   Scheduled Meds:   LORazepam  0.5 mg Oral BID    LORazepam  1 mg Oral Nightly    dilTIAZem  180 mg Oral Daily    rOPINIRole  2 mg Oral TID    cloNIDine  0.1 mg Oral BID    hydrALAZINE  100 mg Oral 3 times per day    levalbuterol  0.63 mg Nebulization BID    furosemide  20 mg IntraVENous Daily    mometasone-formoterol  2 puff Inhalation BID    apixaban  5 mg Oral BID    losartan  100 mg Oral Nightly    sodium chloride flush  5-40 mL IntraVENous 2 times per day    insulin lispro  0-8 Units SubCUTAneous TID WC    insulin lispro  0-4 Units SubCUTAneous Nightly    levothyroxine  75 mcg Oral QAM AC     Continuous Infusions:   sodium chloride       PRN Meds:.polyvinyl alcohol, albuterol sulfate HFA, sodium chloride flush, sodium chloride, ondansetron **OR** ondansetron, polyethylene glycol, acetaminophen **OR** acetaminophen, LORazepam, traZODone  Continuous Infusions:   sodium chloride         Intake/Output Summary (Last 24 hours) at 10/3/2022 1763  Last data filed at 10/2/2022 2144  Gross per 24 hour   Intake 750 ml   Output 350 ml   Net 400 ml       Lab Data:  CBC:   Lab Results   Component Value Date/Time    WBC 3.8 10/03/2022 04:20 AM    HGB 8.1 10/03/2022 04:20 AM     10/03/2022 04:20 AM     BMP:  Lab Results   Component Value Date/Time     10/03/2022 04:20 AM    K 4.2 10/03/2022 04:20 AM     10/03/2022 04:20 AM    CO2 21 10/03/2022 04:20 AM    BUN 30 10/03/2022 04:20 AM    CREATININE 1.3 10/03/2022 04:20 AM    GLUCOSE 170 10/03/2022 04:20 AM     INR:   Lab Results   Component Value Date/Time    INR 1.71 09/30/2022 04:40 PM    INR 1.18 08/22/2022 05:17 AM    INR 1.46 08/11/2021 03:26 PM        CARDIAC LABS  ENZYMES:  Recent Labs     09/30/22  1640 09/30/22  2207   TROPONINI <0.01 <0.01     FASTING LIPID PANEL:  Lab Results   Component Value Date/Time    HDL 52 08/22/2022 05:17 AM    HDL 32 06/23/2011 06:45 AM    LDLCALC 66 08/22/2022 05:17 AM    TRIG 150 08/22/2022 05:17 AM    TSH 2.50 09/28/2020 09:49 AM     LIVER PROFILE:  Lab Results   Component Value Date/Time    AST 21 09/30/2022 04:40 PM    AST 13 08/22/2022 05:17 AM    ALT 22 09/30/2022 04:40 PM    ALT 11 08/22/2022 05:17 AM     BNP:   Lab Results   Component Value Date/Time    PROBNP 3,785 09/30/2022 04:40 PM    PROBNP 1,260 08/21/2022 11:17 AM    PROBNP 826 04/19/2022 02:40 PM     Iron Studies:  No results found for: FERRITIN  Lab Results   Component Value Date    IRON 59 07/20/2021    TIBC 300 07/20/2021        1. WEIGHT: Admit Weight: 159 lb (72.1 kg)      Today  Weight: 159 lb 3.2 oz (72.2 kg)   2.  I/O   Intake/Output Summary (Last 24 hours) at 10/3/2022 5735  Last data filed at 10/2/2022 2144  Gross per 24 hour   Intake 750 ml   Output 350 ml   Net 400 ml       Assessment/Plan:     AHF- continue IV lasix today, check BNP tomorrow  AF/RVR - continued, EP todayto determine DCCV, continue Cardizem - increased dose and AC, pt is NPO this am  HTN- improved, holding some antihypertensives during diuresis to allow for better rate control and avoid hypotension        I appreciate the opportunity of cooperating in the care of this individual.    Violet Ibrahim, APRN - CNP, ACNP, [address] 10/3/2022, 9:22 AM  Heart Failure  The [address], [address]  Ph: [phone]      Core Measures:   Discharge instructions:   LVEF documented:   ACEI for LV dysfunction: Smoking Cessation:

## 2022-10-03 NOTE — PROGRESS NOTES
[facility] [address]   Phone: [phone]    Physical Therapy    [] Initial Evaluation            [x] Daily Treatment Note         [] Discharge Summary      Patient: Madison Melchor   : 1935   MRN: [id number]   Date of Service:  10/3/2022  Admitting Diagnosis: PAF (paroxysmal atrial fibrillation) ([facility] [address]  Current Admission Summary: Madison Melchor is a 80 y.o. female morning 1 AM with her heart racing and some chest pressure felt like she was back in her A. fib denies any nausea vomiting chest pain fevers has had some chills no sick contacts has been compliant with all her medication. Past Medical History:  has a past medical history of Arthritis, Atrial fibrillation ([facility] [address] Diabetes mellitus type II, controlled ([facility] [address] GERD (gastroesophageal reflux disease), HTN (hypertension), Hyperlipidemia, Hypothyroid, Insomnia, Lumbago, and SVT (supraventricular tachycardia) ([facility] [address] Past Surgical History:  has a past surgical history that includes Appendectomy; Colonoscopy; and Cholecystectomy, laparoscopic (2013). Discharge Recommendations: Madison Melchor scored a 20/24 on the AM-PAC short mobility form. Current research shows that an AM-PAC score of 18 or greater is typically associated with a discharge to the patient's home setting. Based on the patient's AM-PAC score and their current functional mobility deficits, it is recommended that the patient have 2-3 sessions per week of Physical Therapy at d/c to increase the patient's independence. At this time, this patient demonstrates the endurance and safety to discharge home with HHPT and a follow up treatment frequency of 2-3x/wk. Please see assessment section for further patient specific details.   HOME HEALTH CARE: LEVEL 1 STANDARD    - Initial home health evaluation to occur within 24-48 hours, in patient home   - Therapy to evaluate with goal of regaining prior level of functioning   - Therapy to evaluate if patient has [address] needs for personal care   If patient discharges prior to next session this note will serve as a discharge summary. Please see below for the latest assessment towards goals. DME Required For Discharge: patient has all required DME for discharge  Precautions/Restrictions: medium fall risk, up as tolerated  Weight Bearing Restrictions: no restrictions     Required Braces/Orthotics: no braces required  Positional Restrictions:no positional restrictions    Pre-Admission Information     Lives With: daughter               Type of Home: house  Home Layout: two level  Home Access:  1 step to enter without rails , Pt has 7 steps to first floor with HR on B sides  Bathroom Layout: walk in shower, handicap height toilet  Bathroom Equipment: grab bars in shower, shower chair  Home Equipment: rollator - 4 wheeled walker, oxygen  2L   Transfer Assistance: Independent without use of device  Ambulation Assistance:modified independent with use of 4WW  ADL Assistance: independent with all ADL's  IADL Assistance:  Pt cleans room, and cooks occasionally. Daughter completes additional IADLs   Active : [] yes             [x]no  Current Employment: retired. Occupation: Advisor [employer]   Hobbies: spend time with family, dance   Recent Falls: Pt reports no recent falls     Examination   Vision:   Vision Gross Assessment: Impaired and Vision Corrective Device: wears glasses at all times  Hearing:   left hearing aid, right hearing aid  Observation:   General Observation:  Pt on 2L O2 via nasal cannula on arrival  Posture:   Mild forward head, rounded shoulders, and increased thoracic kyphosis in sitting and standing  Sensation:   WFL  ROM:   (B) LE AROM WFL  Strength:   (B) LE strength grossly 4/5  Decision Making: low complexity  Clinical Presentation: evolving      Subjective  General: Pt seated in chair on arrival, agreeable to participate in PT. Daughter present.  SOB improving but still limiting of activity. Pain: 0/10  Pain Interventions: not applicable     Functional Mobility  Bed Mobility  Bed mobility not completed on this date. Comments: Pt seated in recliner at the beginning and end of session. Pt able to shahid/doff shoes and socks independently. Transfers  Sit to stand transfer: stand by assistance  Stand to sit transfer: stand by assistance  Comments: Up to RW, 2 transfers from recliner chair. Ambulation  Surface:level surface  Assistive Device: rolling walker  Assistance: stand by assistance  Distance: 125'  Gait Mechanics: normal speed, slightly decreased step lengths/heights, steady with no losses of balance  Comments:  SOB with amb. Pt on 2LO2, 98% before amb and after amb. HR 80s at rest, 130s with activity. After resting 2 min, pt back to upper 80s for HR. Stair Mobility  Stair mobility not completed on this date. Comments:  Wheelchair Mobility:  No w/c mobility completed on this date. Comments:  Balance  Static Sitting Balance: good: independent with functional balance in unsupported position  Dynamic Sitting Balance: good: independent with functional balance in unsupported position  Static Standing Balance: fair (-): maintains balance at SBA with use of UE support  Dynamic Standing Balance: fair (-): maintains balance at SBA with use of UE support  Comments: RW in standing. Other Therapeutic Interventions  Pt performed seated there ex including HR/TR x 10 bilat, LAQs X 10 bilat, marching x 10 bilat. Pt positioned in chair for comfort, needs in reach. Discussed DC recommendations with pt and daughter. Pt [place] and no hearing aides, addition time needed for  repeating communication.    Functional Outcomes  AM-PAC Inpatient Mobility Raw Score : 20              Cognition  WFL- slightly impulsive  Orientation:    alert and oriented x 4  Command Following:   [facility]    Education  Barriers To Learning: hearing  Patient Education: patient educated on goals, PT role and benefits, plan of care,

## 2022-10-04 VITALS
DIASTOLIC BLOOD PRESSURE: 74 MMHG | HEART RATE: 68 BPM | TEMPERATURE: 97.8 F | BODY MASS INDEX: 29.35 KG/M2 | SYSTOLIC BLOOD PRESSURE: 121 MMHG | HEIGHT: 62 IN | OXYGEN SATURATION: 93 % | WEIGHT: 159.5 LBS | RESPIRATION RATE: 16 BRPM

## 2022-10-04 LAB
ANION GAP SERPL CALCULATED.3IONS-SCNC: 11 MMOL/L (ref 3–16)
BASOPHILS ABSOLUTE: 0 K/UL (ref 0–0.2)
BASOPHILS RELATIVE PERCENT: 0.5 %
BUN BLDV-MCNC: 26 MG/DL (ref 7–20)
CALCIUM SERPL-MCNC: 9.3 MG/DL (ref 8.3–10.6)
CHLORIDE BLD-SCNC: 100 MMOL/L (ref 99–110)
CO2: 22 MMOL/L (ref 21–32)
CREAT SERPL-MCNC: 1 MG/DL (ref 0.6–1.2)
EOSINOPHILS ABSOLUTE: 0.1 K/UL (ref 0–0.6)
EOSINOPHILS RELATIVE PERCENT: 2.7 %
GFR AFRICAN AMERICAN: >60
GFR NON-AFRICAN AMERICAN: 52
GLUCOSE BLD-MCNC: 119 MG/DL (ref 70–99)
GLUCOSE BLD-MCNC: 135 MG/DL (ref 70–99)
GLUCOSE BLD-MCNC: 149 MG/DL (ref 70–99)
HCT VFR BLD CALC: 25.3 % (ref 36–48)
HEMOGLOBIN: 8.2 G/DL (ref 12–16)
LYMPHOCYTES ABSOLUTE: 1.1 K/UL (ref 1–5.1)
LYMPHOCYTES RELATIVE PERCENT: 19.6 %
MCH RBC QN AUTO: 29.1 PG (ref 26–34)
MCHC RBC AUTO-ENTMCNC: 32.5 G/DL (ref 31–36)
MCV RBC AUTO: 89.3 FL (ref 80–100)
MONOCYTES ABSOLUTE: 0.5 K/UL (ref 0–1.3)
MONOCYTES RELATIVE PERCENT: 9.1 %
NEUTROPHILS ABSOLUTE: 3.7 K/UL (ref 1.7–7.7)
NEUTROPHILS RELATIVE PERCENT: 68.1 %
PDW BLD-RTO: 16.3 % (ref 12.4–15.4)
PERFORMED ON: ABNORMAL
PERFORMED ON: ABNORMAL
PLATELET # BLD: 169 K/UL (ref 135–450)
PMV BLD AUTO: 7.2 FL (ref 5–10.5)
POTASSIUM REFLEX MAGNESIUM: 4.5 MMOL/L (ref 3.5–5.1)
PRO-BNP: 3465 PG/ML (ref 0–449)
RBC # BLD: 2.83 M/UL (ref 4–5.2)
SODIUM BLD-SCNC: 133 MMOL/L (ref 136–145)
WBC # BLD: 5.4 K/UL (ref 4–11)

## 2022-10-04 PROCEDURE — 80048 BASIC METABOLIC PNL TOTAL CA: CPT

## 2022-10-04 PROCEDURE — 85025 COMPLETE CBC W/AUTO DIFF WBC: CPT

## 2022-10-04 PROCEDURE — 83880 ASSAY OF NATRIURETIC PEPTIDE: CPT

## 2022-10-04 PROCEDURE — 6360000002 HC RX W HCPCS: Performed by: INTERNAL MEDICINE

## 2022-10-04 PROCEDURE — 2500000003 HC RX 250 WO HCPCS

## 2022-10-04 PROCEDURE — 2700000000 HC OXYGEN THERAPY PER DAY

## 2022-10-04 PROCEDURE — 6370000000 HC RX 637 (ALT 250 FOR IP): Performed by: NURSE PRACTITIONER

## 2022-10-04 PROCEDURE — 2580000003 HC RX 258: Performed by: INTERNAL MEDICINE

## 2022-10-04 PROCEDURE — 93005 ELECTROCARDIOGRAM TRACING: CPT | Performed by: INTERNAL MEDICINE

## 2022-10-04 PROCEDURE — 7100000010 HC PHASE II RECOVERY - FIRST 15 MIN

## 2022-10-04 PROCEDURE — 99232 SBSQ HOSP IP/OBS MODERATE 35: CPT | Performed by: INTERNAL MEDICINE

## 2022-10-04 PROCEDURE — 94640 AIRWAY INHALATION TREATMENT: CPT

## 2022-10-04 PROCEDURE — 6370000000 HC RX 637 (ALT 250 FOR IP): Performed by: INTERNAL MEDICINE

## 2022-10-04 PROCEDURE — 92960 CARDIOVERSION ELECTRIC EXT: CPT | Performed by: INTERNAL MEDICINE

## 2022-10-04 PROCEDURE — 94761 N-INVAS EAR/PLS OXIMETRY MLT: CPT

## 2022-10-04 PROCEDURE — 36415 COLL VENOUS BLD VENIPUNCTURE: CPT

## 2022-10-04 PROCEDURE — 5A2204Z RESTORATION OF CARDIAC RHYTHM, SINGLE: ICD-10-PCS | Performed by: INTERNAL MEDICINE

## 2022-10-04 PROCEDURE — 92960 CARDIOVERSION ELECTRIC EXT: CPT

## 2022-10-04 RX ORDER — FUROSEMIDE 40 MG/1
20 TABLET ORAL DAILY
Qty: 60 TABLET | Refills: 3 | Status: SHIPPED | OUTPATIENT
Start: 2022-10-04 | End: 2022-11-01

## 2022-10-04 RX ORDER — DILTIAZEM HYDROCHLORIDE 180 MG/1
180 CAPSULE, COATED, EXTENDED RELEASE ORAL DAILY
Qty: 30 CAPSULE | Refills: 3 | Status: SHIPPED | OUTPATIENT
Start: 2022-10-05 | End: 2022-11-04 | Stop reason: SDUPTHER

## 2022-10-04 RX ORDER — FLECAINIDE ACETATE 100 MG/1
100 TABLET ORAL EVERY 12 HOURS SCHEDULED
Qty: 60 TABLET | Refills: 3 | Status: SHIPPED | OUTPATIENT
Start: 2022-10-04 | End: 2022-11-01 | Stop reason: SINTOL

## 2022-10-04 RX ADMIN — SODIUM CHLORIDE, PRESERVATIVE FREE 10 ML: 5 INJECTION INTRAVENOUS at 08:49

## 2022-10-04 RX ADMIN — ROPINIROLE HYDROCHLORIDE 2 MG: 1 TABLET, FILM COATED ORAL at 09:08

## 2022-10-04 RX ADMIN — LORAZEPAM 0.5 MG: 0.5 TABLET ORAL at 08:45

## 2022-10-04 RX ADMIN — APIXABAN 5 MG: 5 TABLET, FILM COATED ORAL at 08:45

## 2022-10-04 RX ADMIN — Medication 2 PUFF: at 10:20

## 2022-10-04 RX ADMIN — FUROSEMIDE 20 MG: 10 INJECTION, SOLUTION INTRAMUSCULAR; INTRAVENOUS at 08:44

## 2022-10-04 RX ADMIN — LEVALBUTEROL 0.63 MG: 0.63 SOLUTION RESPIRATORY (INHALATION) at 10:20

## 2022-10-04 RX ADMIN — DILTIAZEM HYDROCHLORIDE 180 MG: 180 CAPSULE, COATED, EXTENDED RELEASE ORAL at 08:45

## 2022-10-04 RX ADMIN — CLONIDINE HYDROCHLORIDE 0.1 MG: 0.1 TABLET ORAL at 08:44

## 2022-10-04 RX ADMIN — LEVOTHYROXINE SODIUM 75 MCG: 75 TABLET ORAL at 09:09

## 2022-10-04 RX ADMIN — FLECAINIDE ACETATE 100 MG: 100 TABLET ORAL at 08:46

## 2022-10-04 NOTE — PROCEDURES
[address]     Electrophysiology Procedure Note       Date of Procedure: 10/4/2022  Patient's Name: Milena Sommer  YOB: 1935   Medical Record Number: [id number]  Procedure Performed by: Christal Lesches, MD    Procedures performed:  IV sedation. External Electrical cardioversion   Mallampati3  ASA 3    Indication of the procedure: Persistent atrial fibrillation      Details of procedure: The patient was brought to the cath lab area in a fasting and non-sedated state. The risks, benefits and alternatives of the procedure were discussed with the patient. The patient opted to proceed with the procedure. Written informed consent was signed and placed in the chart. A timeout protocol was completed to identify the patient and the procedure being performed. I pushed 40 mg Brevital.   We monitored the patient's level of consciousness and vital signs/physiologic status throughout the procedure duration (see start and stop times below). Sedation:     Sedation start: 0809  Sedation stop: 0830     Patient is on chronic anticoagulation therapy. Then we used Brevital for sedation and electrical DC cardioversion was perfomred using 200J, synchronized shock. Patient was converted to sinus rhythm at 72 bpm. The patient tolerated the procedure well and there were no complications. Conclusion:   Successful external DC cardioversion of atrial fibrillation     Plan: Will continue with medical therapy. If patient needs any procedure in the next 3 weeks, she will need to be bridged with either heparin or Lovenox.     '

## 2022-10-04 NOTE — DISCHARGE SUMMARY
Hospital Medicine Discharge Summary    Patient: Renae Ace     Gender: female  : 1935   Age: 80 y.o. MRN: [id number]    Admitting Physician: Rukhsana Sanchez MD  Discharge Physician: Rukhsana Sanchez MD     Code Status: Full Code     Admit Date: 2022   Discharge Date:   10/4/2022    Disposition:  Home  Time spent arranging discharge: 35 minutes    Discharge Diagnoses: Active Hospital Problems    Diagnosis Date Noted    Acute anemia [D64.9] 10/03/2022     Priority: Medium    Hyponatremia [E87.1] 10/03/2022     Priority: Medium    Acute on chronic diastolic (congestive) heart failure ([facility] [address] [I50.33] 10/01/2022     Priority: Medium    Chronic obstructive pulmonary disease ([facility] [address] [J44.9] 2022     Priority: Medium    Primary hypertension [I10]     PAF (paroxysmal atrial fibrillation) ([facility] [address] [I48.0] 2020    [facility] [I48.91] 2013       Recommendations:     Condition at Discharge:  Stable    Hospital Course:   Admitted to hospital with A. fib with RVR was cardioverted and converted back to sinus rhythm discharged with follow-up with cardiology outpatient patient had acute on chronic diastolic heart failure was given IV Lasix discharged on 20 mg Lasix daily. 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 Per nephro  Concern for possible GI bleed occult stool was negative hemoglobin remained stable. Discharge Exam:    /74   Pulse 68   Temp 97.8 °F (36.6 °C) (Oral)   Resp 16   Ht 5' 2\" (1.575 m)   Wt 159 lb 8 oz (72.3 kg)   SpO2 93%   BMI 29.17 kg/m²   General appearance:  Appears comfortable.  AAOx3  HEENT: atraumatic, Pupils equal, muscous membranes moist, no masses appreciated  Cardiovascular: Regular rate and rhythm no murmurs appreciated  Respiratory: CTAB no wheezing  Gastrointestinal: Abdomen soft, non-tender, BS+  EXT: no edema  Neurology: no gross focal deficts  Psychiatry: Appropriate affect. Not agitated  Skin: Warm, dry, no rashes appreciated    Discharge Medications:   Current Discharge Medication List        START taking these medications    Details   flecainide (TAMBOCOR) 100 MG tablet Take 1 tablet by mouth every 12 hours  Qty: 60 tablet, Refills: 3      furosemide (LASIX) 40 MG tablet Take 0.5 tablets by mouth daily  Qty: 60 tablet, Refills: 3           Current Discharge Medication List        CONTINUE these medications which have CHANGED    Details   dilTIAZem (CARDIZEM CD) 180 MG extended release capsule Take 1 capsule by mouth daily  Qty: 30 capsule, Refills: 3           Current Discharge Medication List        CONTINUE these medications which have NOT CHANGED    Details   !! LORazepam (ATIVAN) 1 MG tablet Take 1 mg by mouth at bedtime. SITagliptin (JANUVIA) 100 MG tablet Take 100 mg by mouth daily      traZODone (DESYREL) 50 MG tablet Take 50 mg by mouth nightly      !! LORazepam (ATIVAN) 0.5 MG tablet Take 0.5 mg by mouth in the morning and at bedtime. In the morning and afternoon      ELIQUIS 5 MG TABS tablet TAKE 1 TABLET TWICE DAILY  Qty: 180 tablet, Refills: 0      budesonide-formoterol (SYMBICORT) 160-4.5 MCG/ACT AERO INHALE 2 PUFFS TWICE DAILY  Qty: 3 each, Refills: 0    Associated Diagnoses: Wheezing      hydrALAZINE (APRESOLINE) 50 MG tablet Take 1 tablet by mouth every 8 hours  Qty: 90 tablet, Refills: 3      loratadine (CLARITIN) 10 MG tablet Take 1 tablet by mouth in the morning.   Qty: 90 tablet, Refills: 0      fluticasone (FLONASE) 50 MCG/ACT nasal spray USE 2 SPRAYS NASALLY EVERY DAY  Qty: 48 g, Refills: 1      atorvastatin (LIPITOR) 80 MG tablet TAKE 1 TABLET EVERY NIGHT  Qty: 90 tablet, Refills: 3      meclizine (ANTIVERT) 12.5 MG tablet TAKE 1 TABLET THREE TIMES DAILY AS NEEDED  Qty: 270 tablet, Refills: 1      !! blood glucose test strips (TRUE METRIX BLOOD GLUCOSE TEST) strip USE TO TEST BLOOD SUGAR DAILY  Qty: 100 strip, Refills: 5    Associated Diagnoses: Controlled type 2 diabetes mellitus without complication, without long-term current use of insulin ([facility])      dicyclomine (BENTYL) 10 MG capsule TAKE 1 CAPSULE BY MOUTH FOUR TIMES DAILY AS NEEDED FOR ABDOMINAL PAIN  Qty: 360 capsule, Refills: 0      irbesartan (AVAPRO) 300 MG tablet TAKE 1 TABLET BY MOUTH EVERY NIGHT  Qty: 90 tablet, Refills: 0      rOPINIRole (REQUIP) 3 MG tablet TAKE 1 TABLET THREE TIMES DAILY  Qty: 270 tablet, Refills: 1      levothyroxine (SYNTHROID) 75 MCG tablet TAKE 1 TABLET BY MOUTH EVERY DAY  Qty: 90 tablet, Refills: 0    Associated Diagnoses: Acquired hypothyroidism      cloNIDine (CATAPRES) 0.1 MG tablet TAKE 1 TABLET TWICE DAILY  Qty: 180 tablet, Refills: 0      conjugated estrogens (PREMARIN) 0.625 MG/GM vaginal cream Place 1 g vaginally three times a week  Qty: 30 g, Refills: 2      levalbuterol (XOPENEX) 0.63 MG/3ML nebulization USE 1 VIAL PER NEBULIZER EVERY 6 HOURS. MAX OF 4 TIMES PER 24 HOURS  Qty: 1086 mL, Refills: 3    Comments: **Patient requests 90 days supply**      tiotropium (SPIRIVA RESPIMAT) 2.5 MCG/ACT AERS inhaler Inhale 2 puffs into the lungs daily  Qty: 1 each, Refills: 1      Blood Glucose Monitoring Suppl (TRUE METRIX METER) w/Device KIT To use to check sugars 4 times daily  Qty: 1 kit, Refills: 0    Associated Diagnoses: Controlled type 2 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 disease, without long-term current use of insulin ([facility])      Blood Glucose Calibration (TRUE METRIX LEVEL 2) Normal SOLN As needed  Qty: 1 each, Refills: 0    Associated Diagnoses: Controlled type 2 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 disease, without long-term current use of insulin ([address]      ! ! blood glucose test strips (TRUE METRIX BLOOD GLUCOSE TEST) strip 1 each by In Vitro route daily As needed.   Qty: 100 each, Refills: 3    Associated Diagnoses: Controlled type 2 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 disease, without long-term current use of insulin ([address] 09/30/2022 04:40 PM    AST 21 09/30/2022 04:40 PM    BILITOT 0.6 09/30/2022 04:40 PM    BILIDIR <0.2 06/08/2021 08:15 AM    LABALBU 3.9 09/30/2022 04:40 PM    LDLCALC 66 08/22/2022 05:17 AM    TRIG 150 08/22/2022 05:17 AM     Lab Results   Component Value Date    INR 1.71 (H) 09/30/2022    INR 1.18 (H) 08/22/2022    INR 1.46 (H) 08/11/2021       Radiology:  XR CHEST PORTABLE    Result Date: 9/30/2022  EXAMINATION: ONE XRAY VIEW OF THE CHEST 9/30/2022 4:20 pm COMPARISON: Chest 08/21/2022 and chest CT 04/19/2022 HISTORY: ORDERING SYSTEM PROVIDED HISTORY: palpitations: Atrial Fibrillation FINDINGS: Calcific atherosclerotic disease aorta. The cardiomediastinal and hilar silhouettes appear otherwise unremarkable. Chronic appearing coarse interstitial densities predominate perihilar regions and lung bases. Nonspecific tapering of the vasculature and increased lucency both upper lungs. Chronic right apical soft tissue fullness corresponding to dense band of scarring on prior CT chest.  The lungs appear otherwise clear. No pleural effusion evident. No pneumothorax is seen. No acute osseous abnormality is identified. 1. No definite radiographic evidence of acute cardiopulmonary disease. 2. Chronic right apical soft tissue fullness corresponding to dense band of scarring on prior CT chest. 3. Pulmonary sequela typical of that seen with smoking, including possible COPD; correlate with clinical history. 4. Calcific atherosclerotic disease aorta.          Signed:    Ben Matthews MD   10/4/2022

## 2022-10-04 NOTE — PROGRESS NOTES
Physical Therapy/Occupational Therapy  Nadege Matos PT/OT today per nursing. Pt had cardioversion procedure this AM and unable to participate today. Will try back at later date as pt able to participate.    Bren Merchant [id number]  America Luz, OTR/L, [id number]

## 2022-10-04 NOTE — PROGRESS NOTES
Office : [phone]     Fax :[phone]       Nephrology progress  Note      Patient's Name: Mike Henry  8:16 AM  10/4/2022    Reason for Consult:  hyponatremia       Requesting Physician:  Marina Prasad MD      Chief Complaint:    Chief Complaint   Patient presents with    Atrial Fibrillation         History of Present iIlness:    Mike Henry is a 80 y.o. female with prior history of atrial fib, GERD, HTN presented with c/o palpitation . Was found to be in a. Fib with RVR. Sodium level low at 126     Interval hx     Sodium level improved at 133 --> 136 ---> 133    HR better controlled at 85       I/O last 3 completed shifts: In: 10 [I.V.:10]  Out: -     Past Medical History:   Diagnosis Date    Arthritis     Atrial fibrillation ([address]     Diabetes mellitus type II, controlled ([address]     GERD (gastroesophageal reflux disease)     HTN (hypertension)     Hyperlipidemia     Hypothyroid     Insomnia     Lumbago     SVT (supraventricular tachycardia) (H[facility])        Past Surgical History:   Procedure Laterality Date    APPENDECTOMY      CHOLECYSTECTOMY, LAPAROSCOPIC  2/24/2013    COLONOSCOPY         Family History   Problem Relation Age of Onset    High Blood Pressure Mother     Heart Attack Father     Heart Disease Father     Other Brother     Asthma Paternal Uncle         reports that she quit smoking about 26 years ago. Her smoking use included cigarettes. She started smoking about 72 years ago. She has a 45.00 pack-year smoking history. She has never used smokeless tobacco. She reports that she does not drink alcohol and does not use drugs.         Allergies:  Adhesive tape, Cefaclor, Cephalexin, Nsaids, Clinoril [sulindac], Codeine, Diclofenac, Diclofenac sodium, Diclofenac sodium, Hctz [hydrochlorothiazide], Ibuprofen, Macrobid [nitrofurantoin macrocrystal], Motrin [ibuprofen micronized], Pcn [penicillins], Peach [prunus persica], Prednisone, and Sulfa antibiotics    Current Medications:    flecainide (TAMBOCOR) tablet 100 mg, 2 times per day  polyvinyl alcohol (LIQUIFILM TEARS) 1.4 % ophthalmic solution 1 drop, Q6H PRN  LORazepam (ATIVAN) tablet 0.5 mg, BID  LORazepam (ATIVAN) tablet 1 mg, Nightly  dilTIAZem (CARDIZEM CD) extended release capsule 180 mg, Daily  rOPINIRole (REQUIP) tablet 2 mg, TID  cloNIDine (CATAPRES) tablet 0.1 mg, BID  hydrALAZINE (APRESOLINE) tablet 100 mg, 3 times per day  levalbuterol (XOPENEX) nebulization 0.63 mg, BID  furosemide (LASIX) injection 20 mg, Daily  albuterol sulfate HFA (PROVENTIL;VENTOLIN;PROAIR) 108 (90 Base) MCG/ACT inhaler 2 puff, Q6H PRN  mometasone-formoterol (DULERA) 200-5 MCG/ACT inhaler 2 puff, BID  apixaban (ELIQUIS) tablet 5 mg, BID  losartan (COZAAR) tablet 100 mg, Nightly  sodium chloride flush 0.9 % injection 5-40 mL, 2 times per day  sodium chloride flush 0.9 % injection 5-40 mL, PRN  0.9 % sodium chloride infusion, PRN  ondansetron (ZOFRAN-ODT) disintegrating tablet 4 mg, Q8H PRN   Or  ondansetron (ZOFRAN) injection 4 mg, Q6H PRN  polyethylene glycol (GLYCOLAX) packet 17 g, Daily PRN  acetaminophen (TYLENOL) tablet 650 mg, Q6H PRN   Or  acetaminophen (TYLENOL) suppository 650 mg, Q6H PRN  insulin lispro (HUMALOG) injection vial 0-8 Units, TID WC  insulin lispro (HUMALOG) injection vial 0-4 Units, Nightly  levothyroxine (SYNTHROID) tablet 75 mcg, QAM AC  LORazepam (ATIVAN) tablet 0.5 mg, BID PRN  traZODone (DESYREL) tablet 50 mg, Nightly PRN      Review of Systems:   14 point ROS obtained but were negative except mentioned in HPI      Physical exam:     Vitals:  /78   Pulse 81   Temp 97.6 °F (36.4 °C) (Oral)   Resp 18   Ht 5' 2\" (1.575 m)   Wt 159 lb 3.2 oz (72.2 kg)   SpO2 98%   BMI 29.12 kg/m² Constitutional:  OAA X3 NAD  Skin: no rash, turgor wnl  Heent:  eomi, mmm  Neck: no bruits or jvd noted  Cardiovascular:  S1, S2 without m/r/g  Respiratory: b/l base crackles decreased   Abdomen:  +bs, soft, nt, nd  Ext: no lower extremity edema  Psychiatric: mood and affect appropriate  Musculoskeletal:  Rom, muscular strength intact    Labs:  CBC:   Recent Labs     10/02/22  0455 10/03/22  0420 10/03/22  1048 10/04/22  0422   WBC 4.7 3.8*  --  5.4   HGB 9.4* 8.1* 9.2* 8.2*    148  --  169     BMP:    Recent Labs     10/02/22  0455 10/03/22  0420 10/04/22  0422   * 136 133*   K 4.0 4.2 4.5    103 100   CO2 18* 21 22   BUN 24* 30* 26*   CREATININE 1.2 1.3* 1.0   GLUCOSE 129* 170* 135*     Ca/Mg/Phos:   Recent Labs     10/02/22  0455 10/03/22  0420 10/04/22  0422   CALCIUM 8.5 8.8 9.3     Hepatic:   No results for input(s): AST, ALT, ALB, BILITOT, ALKPHOS in the last 72 hours. Troponin:   No results for input(s): TROPONINI in the last 72 hours. BNP: No results for input(s): BNP in the last 72 hours. Lipids: No results for input(s): CHOL, TRIG, HDL, LDLCALC, LABVLDL in the last 72 hours. ABGs: No results for input(s): PHART, PO2ART, MPI5IVK in the last 72 hours. INR:   No results for input(s): INR in the last 72 hours. UA:  No results for input(s): Emily Schiller, GLUCOSEU, BILIRUBINUR, KETUA, SPECGRAV, BLOODU, PHUR, PROTEINU, UROBILINOGEN, NITRU, LEUKOCYTESUR, Tyrone Torres in the last 72 hours. Urine Microscopic:   No results for input(s): LABCAST, BACTERIA, COMU, HYALCAST, WBCUA, RBCUA, EPIU in the last 72 hours. Urine Culture: No results for input(s): LABURIN in the last 72 hours. Urine Chemistry:   No results for input(s): [place], PROTEINUR, NAUR in the last 72 hours. IMAGING:  XR CHEST PORTABLE   Final Result   1. No definite radiographic evidence of acute cardiopulmonary disease.    2. Chronic right apical soft tissue fullness corresponding to dense band of   scarring on prior CT chest.   3. Pulmonary sequela typical of that seen with smoking, including possible   COPD; correlate with clinical history. 4. Calcific atherosclerotic disease aorta. A/p     Hyponatremia   Improved   Monitor     A. Fib with RVR on cardizem   EP seeing   Cardioversion today     Volume overload . Has base crackles. Will stop iv fluids     4. CKD 3 b. 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      Dced  IV fluids           Continue lasix         D/w primary team      Thank you for allowing us to participate in care of Charlie Has         Electronically signed by: Kandy Araiza MD, 10/4/2022, 8:16 AM      [facility] [address]  Office : [phone]  Fax :[phone]

## 2022-10-04 NOTE — PROGRESS NOTES
Patient discharged home with daughter and home care. IV removed without complications and dressing applied. Discharge instructions explained and all questions answered. New medication education done. Patient will go home with daughter. Patient transported to [facility] via wheel chair by daughter.

## 2022-10-04 NOTE — PRE SEDATION
Sedation Pre-Procedure Note    Patient Name: Cristopher Wilson   YOB: 1935  Room/Bed: [facility]-4199/9035-85  Medical Record Number: [id number]  Date: 10/4/2022   Time: 8:08 AM       Indication:  Atrial fibrillation     Consent: I have discussed with the patient and/or the patient representative the indication, alternatives, and the possible risks and/or complications of the planned procedure and the anesthesia methods. The patient and/or patient representative appear to understand and agree to proceed. Vital Signs:   Vitals:    10/04/22 0315   BP: 139/78   Pulse: 81   Resp:    Temp: 97.6 °F (36.4 °C)   SpO2:        Past Medical History:   has a past medical history of Arthritis, Atrial fibrillation ([facility] [address] Diabetes mellitus type II, controlled ([facility] [address] GERD (gastroesophageal reflux disease), HTN (hypertension), Hyperlipidemia, Hypothyroid, Insomnia, Lumbago, and SVT (supraventricular tachycardia) ([facility] [address] Past Surgical History:   has a past surgical history that includes Appendectomy; Colonoscopy; and Cholecystectomy, laparoscopic (2/24/2013).     Medications:   Scheduled Meds:    flecainide  100 mg Oral 2 times per day    LORazepam  0.5 mg Oral BID    LORazepam  1 mg Oral Nightly    dilTIAZem  180 mg Oral Daily    rOPINIRole  2 mg Oral TID    cloNIDine  0.1 mg Oral BID    hydrALAZINE  100 mg Oral 3 times per day    levalbuterol  0.63 mg Nebulization BID    furosemide  20 mg IntraVENous Daily    mometasone-formoterol  2 puff Inhalation BID    apixaban  5 mg Oral BID    losartan  100 mg Oral Nightly    sodium chloride flush  5-40 mL IntraVENous 2 times per day    insulin lispro  0-8 Units SubCUTAneous TID WC    insulin lispro  0-4 Units SubCUTAneous Nightly    levothyroxine  75 mcg Oral QAM AC     Continuous Infusions:    sodium chloride       PRN Meds: polyvinyl alcohol, albuterol sulfate HFA, sodium chloride flush, sodium chloride, ondansetron **OR** ondansetron, polyethylene glycol, acetaminophen **OR** acetaminophen, LORazepam, traZODone  Home Meds:   Prior to Admission medications    Medication Sig Start Date End Date Taking? Authorizing Provider   LORazepam (ATIVAN) 1 MG tablet Take 1 mg by mouth at bedtime. Yes Historical Provider, MD   SITagliptin (JANUVIA) 100 MG tablet Take 100 mg by mouth daily   Yes Historical Provider, MD   traZODone (DESYREL) 50 MG tablet Take 50 mg by mouth nightly   Yes Historical Provider, MD   LORazepam (ATIVAN) 0.5 MG tablet Take 0.5 mg by mouth in the morning and at bedtime. In the morning and afternoon   Yes Historical Provider, MD   ELIQUIS 5 MG TABS tablet TAKE 1 TABLET TWICE DAILY 8/26/22   Rosa Henry MD   budesonide-formoterol [facility]) 160-4.5 MCG/ACT AERO INHALE 2 PUFFS TWICE DAILY 8/23/22   Rosa Henry MD   hydrALAZINE (APRESOLINE) 50 MG tablet Take 1 tablet by mouth every 8 hours  Patient taking differently: Take 100 mg by mouth every 8 hours 8/23/22   Obinna Kevin MD   loratadine (CLARITIN) 10 MG tablet Take 1 tablet by mouth in the morning. 8/9/22   Rosa Henry MD   fluticasone (FLONASE) 50 MCG/ACT nasal spray USE 2 SPRAYS NASALLY EVERY DAY 8/2/22   Rosa Henry MD   atorvastatin (LIPITOR) 80 MG tablet TAKE 1 TABLET EVERY NIGHT 8/2/22   KATHY Fairbanks CNP   amLODIPine (NORVASC) 5 MG tablet Take 1 tablet by mouth in the morning.  8/1/22 8/22/22  KATHY Fairbanks CNP   dilTIAZem (CARDIZEM CD) 120 MG extended release capsule Take 1 capsule by mouth daily 7/14/22   KATHY Fairbanks CNP   meclizine (ANTIVERT) 12.5 MG tablet TAKE 1 TABLET THREE TIMES DAILY AS NEEDED 7/12/22   Rosa Henry MD   blood glucose test strips (TRUE METRIX BLOOD GLUCOSE TEST) strip USE TO TEST BLOOD SUGAR DAILY 7/11/22   Rosa Henry MD   propafenone (RYTHMOL) 150 MG tablet TAKE 1 TABLET BY MOUTH EVERY 8 HOURS  Patient taking differently: Take 150 mg by mouth every 8 hours Pt takes twice a day 7/8/22   Rosa Henry MD dicyclomine (BENTYL) 10 MG capsule TAKE 1 CAPSULE BY MOUTH FOUR TIMES DAILY AS NEEDED FOR ABDOMINAL PAIN 7/7/22   KATHY De León - CNP   irbesartan (AVAPRO) 300 MG tablet TAKE 1 TABLET BY MOUTH EVERY NIGHT 7/1/22   Estrellita Evans MD   rOPINIRole (REQUIP) 3 MG tablet TAKE 1 TABLET THREE TIMES DAILY 6/27/22   Estrellita Evans MD   levothyroxine (SYNTHROID) 75 MCG tablet TAKE 1 TABLET BY MOUTH EVERY DAY 6/7/22   Estrellita Evans MD   cloNIDine (CATAPRES) 0.1 MG tablet TAKE 1 TABLET TWICE DAILY 5/27/22   Louie Le MD   conjugated estrogens (PREMARIN) 0.625 MG/GM vaginal cream Place 1 g vaginally three times a week  Patient taking differently: Place 1 g vaginally Twice a Week Monday and Friday 5/18/22   Estrellita Evans MD   pramipexole (MIRAPEX) 0.25 MG tablet TAKE 1 TABLET BY MOUTH EVERY NIGHT  Patient not taking: No sig reported 5/9/22   Estrellita Evans MD   levalbuterol (XOPENEX) 0.63 MG/3ML nebulization USE 1 VIAL PER NEBULIZER EVERY 6 HOURS. MAX OF 4 TIMES PER 24 HOURS 4/26/22   Morales Mendez MD   tiotropium [facility]) 2.5 MCG/ACT AERS inhaler Inhale 2 puffs into the lungs daily 4/21/22   Thai Francois PA-C   Blood Glucose Monitoring Suppl (TRUE METRIX METER) w/Device KIT To use to check sugars 4 times daily 3/24/22   Louie Le MD   Blood Glucose Calibration (TRUE METRIX LEVEL 2) Normal SOLN As needed 3/23/22   Estrellita Evnas MD   blood glucose test strips (TRUE METRIX BLOOD GLUCOSE TEST) strip 1 each by In Vitro route daily As needed.  3/23/22   Estrellita Evans MD   TRUEplus Lancets 33G MISC 1 daily 3/23/22   Estrellita Evans MD   Lancets MISC 1 each by Does not apply route 2 times daily 6/30/21   Estrellita Evans MD   [facility] 4 MG tablet Take 1 tablet by mouth daily as needed for Nausea or Vomiting 2/5/83   Adair Denson MD   OXYGEN Inhale 2 L into the lungs    Historical Provider, MD   Respiratory Therapy Supplies (NEBULIZER/TUBING/MOUTHPIECE) KIT 1 kit by Does not apply route 4 times daily as needed (shortness of breath) 3/26/20   Vannesa Garcia MD   albuterol sulfate HFA (PROAIR HFA) 108 (90 Base) MCG/ACT inhaler Inhale 2 puffs into the lungs every 6 hours as needed for Wheezing  Patient taking differently: Inhale 2 puffs into the lungs in the morning and at bedtime 10/25/19   Geneva Joe MD     Coumadin Use Last 7 Days:  no  Antiplatelet drug therapy use last 7 days: yes -   Other anticoagulant use last 7 days: yes -   Additional Medication Information:        Pre-Sedation Documentation and Exam:   I have personally completed a history, physical exam & review of systems for this patient (see notes).     Mallampati Airway Assessment:  Mallampati Class II - (soft palate, fauces & uvula are visible)    Prior History of Anesthesia Complications:   none    ASA Classification:  Class 2 - A normal healthy patient with mild systemic disease    Sedation/ Anesthesia Plan:   intravenous sedation    Medications Planned:   Brevital    Patient is an appropriate candidate for plan of sedation: yes    Electronically signed by Geraldine Rebolledo MD on 10/4/2022 at 8:08 AM

## 2022-10-04 NOTE — CARE COORDINATION
[facility] home care referral. Spoke with pt and re: home care plan of care/services. Agreeable to resume home care. Demographic's verified. Aware of discharge with home care. Home care orders sent to [facility]. Discharge planner notified. Sarah Martinez

## 2022-10-04 NOTE — CARE COORDINATION
Patient discharged 104-22 home with daughter and resume [address]  Pending a home care order.   All discharge needs met per case management

## 2022-10-04 NOTE — PROGRESS NOTES
CLINICAL PHARMACY NOTE: MEDS TO BEDS    Total # of Prescriptions Filled: 3   The following medications were delivered to the patient:  Flecainide 100 mg  Lasix 40 mg  Diltiazem     Additional Documentation:  Delivered to Patient son=Signed  Ok to be delivered per Tamy Sylvester CP

## 2022-10-05 ENCOUNTER — TELEPHONE (OUTPATIENT)
Dept: OTHER | Age: 87
End: 2022-10-05

## 2022-10-05 ENCOUNTER — CARE COORDINATION (OUTPATIENT)
Dept: CASE MANAGEMENT | Age: 87
End: 2022-10-05

## 2022-10-05 LAB
EKG ATRIAL RATE: 81 BPM
EKG DIAGNOSIS: NORMAL
EKG P AXIS: 26 DEGREES
EKG P-R INTERVAL: 168 MS
EKG Q-T INTERVAL: 378 MS
EKG QRS DURATION: 90 MS
EKG QTC CALCULATION (BAZETT): 439 MS
EKG R AXIS: 9 DEGREES
EKG T AXIS: 47 DEGREES
EKG VENTRICULAR RATE: 81 BPM

## 2022-10-05 PROCEDURE — 93010 ELECTROCARDIOGRAM REPORT: CPT | Performed by: INTERNAL MEDICINE

## 2022-10-05 RX ORDER — DICYCLOMINE HYDROCHLORIDE 10 MG/1
CAPSULE ORAL
Qty: 120 CAPSULE | Refills: 0 | Status: SHIPPED | OUTPATIENT
Start: 2022-10-05

## 2022-10-05 NOTE — TELEPHONE ENCOUNTER
Medication:   Requested Prescriptions     Pending Prescriptions Disp Refills    dicyclomine (BENTYL) 10 MG capsule [Pharmacy Med Name: DICYCLOMINE 10MG CAPSULES] 120 capsule 0     Sig: TAKE 1 CAPSULE BY MOUTH FOUR TIMES DAILY AS NEEDED FOR ABDOMINAL PAIN      Last Filled:      Patient Phone Number: [phone] (home)     Last appt: 8/19/2022   Next appt: Visit date not found    Last OARRS:   RX Monitoring 3/20/2019   Attestation The Prescription Monitoring Report for this patient was reviewed today.    Periodic Controlled Substance Monitoring -     PDMP Monitoring:    Last PDMP Sunday Mukul as Reviewed [facility]):  Review User Review Instant Review Result   Tobyeufemia Malia 6/13/2022 12:59 PM Reviewed PDMP [1]     Preferred Pharmacy:   [facility] [address], [address] [address]  Phone: [phone] Fax: [phone]    [facility] Mail Delivery - [address] [phone] Kamala Barakat [phone]  [address] [place] [address]  Phone: [phone] Fax: [phone]

## 2022-10-05 NOTE — CARE COORDINATION
[facility] Care Transitions Initial Follow Up Call    Call within 2 business days of discharge: Yes    Patient: Ozzie Goldstein Patient : 1935   MRN: [id number]  Reason for Admission:   Discharge Date: 10/4/22 RARS: Readmission Risk Score: 19.8      Last Discharge  Street       Date Complaint Diagnosis Description Type Department Provider    22 Atrial Fibrillation Atrial fibrillation with RVR ([facility] [address] . .. ED to Hosp-Admission (Discharged) (ADMITTED) [facility] [address]T Eveline Sparrow MD          Initial 24 hr call attempted, contact info  could not be left, no vm. [facility] is a resumption of care.          Follow Up  Future Appointments   Date Time Provider Alissa Bustillo   2022  9:15 AM Tisha Arzate MD PULM & CC [facility]   2022  3:15 PM Dionne Garsia MD FF Cardio MMA       Olivia Dickens RN

## 2022-10-06 ENCOUNTER — TELEPHONE (OUTPATIENT)
Dept: OTHER | Age: 87
End: 2022-10-06

## 2022-10-06 ENCOUNTER — CARE COORDINATION (OUTPATIENT)
Dept: CASE MANAGEMENT | Age: 87
End: 2022-10-06

## 2022-10-06 ENCOUNTER — TELEPHONE (OUTPATIENT)
Dept: FAMILY MEDICINE CLINIC | Age: 87
End: 2022-10-06

## 2022-10-06 ENCOUNTER — TELEPHONE (OUTPATIENT)
Dept: CARDIOLOGY CLINIC | Age: 87
End: 2022-10-06

## 2022-10-06 DIAGNOSIS — I48.91 ATRIAL FIBRILLATION WITH RVR (HCC): Primary | ICD-10-CM

## 2022-10-06 PROCEDURE — 1111F DSCHRG MED/CURRENT MED MERGE: CPT | Performed by: INTERNAL MEDICINE

## 2022-10-06 NOTE — TELEPHONE ENCOUNTER
[facility] FAILURE PROGRAM  TELEPHONE ENCOUNTER FORM    Rose Boothe 1935    ASSESSMENT:   Baseline weight: 159 lbs  Current weight: has not weighed  Patient weighing daily: No  What are your symptoms of heart failure: chest pain, dyspnea, edema  Are you having any symptoms:  No  Patient knows who to call with symptoms: Yes  Diet history:      Patient states sodium limitation is : 3000 mg      Patient states fluid limitation is 64 oz  Patient following diet as instructed: Yes  Medication history: taking medications as instructed Yes; medication side effects noted No  Patient is being active at home: Yes  Patient knows date and time of follow up appointment: Yes   Patient has transportation to appointments: Yes    RECOMMENDATIONS:   Medication: taking as prescribed  Diet: no added salt diet  MD/ Clinic appointment: needs to call for cardiology follow up   Other: Doing well. UP all night in bathroom. Realized was taking whole tablet of lasix instead of half (40 mg not 20mg) Encouraged patient to call with any questions or concerns.

## 2022-10-06 NOTE — TELEPHONE ENCOUNTER
Jennifer Armstrong from [facility] called stating pt got home from [facility] the other day and her lasix dosage was changed. She was sent home with 40mg tablets and instructions said to take 1/2 tablet daily which pt did not read label and pt has been taking 40mg of lasix for the last 2 days instead of 20mg. Jennifer Armstrong wants to know if pt needs any labs done or any other concerns. Please advise.

## 2022-10-06 NOTE — TELEPHONE ENCOUNTER
Pt's Nurse called in stating that he need some clarification the pt's med's as some things were changed in the hospital.The Nurse also stated that while the pt was in the hospital she had low levels, so the family is wanting to know if the pt's PT and APTT should be rechecked now that she is at home.     The nurse also stated pt has had a 3lb weight loss since being home and taking 40mg of lasix    Damon Paulino can be reached [phone]

## 2022-10-06 NOTE — CARE COORDINATION
[facility] Care Transitions Initial Follow Up Call    Call within 2 business days of discharge: Yes    Care Transition Nurse contacted the patient by telephone to perform post hospital discharge assessment. Verified name and  with patient as identifiers. Provided introduction to self, and explanation of the Care Transition Nurse role. Patient: Rose Boothe Patient : 1935   MRN: [id number]  Reason for Admission:   Discharge Date: 10/4/22 RARS: Readmission Risk Score: 19.8      Last Discharge  Street       Date Complaint Diagnosis Description Type Department Provider    22 Atrial Fibrillation Atrial fibrillation with RVR ([facility] [address] . .. ED to Hosp-Admission (Discharged) (Maisha Christianson) Tesfaye Cai MD            Was this an external facility discharge? No Discharge Facility:     Challenges to be reviewed by the provider   Additional needs identified to be addressed with provider: No  none               Method of communication with provider: none. Pt states doing well, no issues or concerns. Denies SOB, CP. Weight stable. [facility]. nurse was out, the nurse spoke to MD office regarding lasix dose, it was adjusted appropriately. F/U appts listed below. Agreed to more CTC f/u calls. Care Transition Nurse reviewed discharge instructions with patient who verbalized understanding. The patient was given an opportunity to ask questions and does not have any further questions or concerns at this time. Were discharge instructions available to patient? Yes. Reviewed appropriate site of care based on symptoms and resources available to patient including: When to call 911. The patient agrees to contact the PCP office for questions related to their healthcare. Advance Care Planning:   Does patient have an Advance Directive:reviewed and current    Medication reconciliation was performed with patient, who verbalizes understanding of administration of home medications.  Medications reviewed, 1111F entered: yes    Was patient discharged with a pulse oximeter? no    Non-face-to-face services provided:  Obtained and reviewed discharge summary and/or continuity of care documents    Offered patient enrollment in the Remote Patient Monitoring (RPM) program for in-home monitoring:  will discuss on next call. .    Care Transitions 24 Hour Call    Do you have a copy of your discharge instructions?: Yes  Do you have all of your prescriptions and are they filled?: Yes  Have you been contacted by a [address]?: No  Have you scheduled your follow up appointment?: Yes  How are you going to get to your appointment?: Car - family or friend to transport  [address]: [facility] (Comment: [facility])  Patient Home Equipment: Oxygen  Do you have support at home?: Child  Do you feel like you have everything you need to keep you well at home?: Yes  Are you an active caregiver in your home?: No  Care Transitions Interventions  No Identified Needs         Follow Up  Future Appointments   Date Time Provider Alissa Bustillo   11/1/2022  9:15 AM Morales Mendez MD PULM & CC MMA   11/1/2022  3:15 PM Kendell Rogers MD  Cardio [facility]       Care Transition Nurse provided contact information. Plan for follow-up call in 5-7 days based on severity of symptoms and risk factors.   Plan for next call: self management-CHF, HC, F/U richard Worthy RN

## 2022-10-06 NOTE — TELEPHONE ENCOUNTER
Called and spoke with Jamin Odonnell at Dr. Ahmad Spurling office, daughter switched Zhanna Pena to his office, she is no longer seeing Dr. Margie Myers. She did see Dr. Rigoberto Briones on 8/30/22, 09/19/22, and is scheduled again on 10/11/22. Called Flori at [employer] and asked she send orders to his office.

## 2022-10-11 ENCOUNTER — TELEPHONE (OUTPATIENT)
Dept: CARDIOLOGY CLINIC | Age: 87
End: 2022-10-11

## 2022-10-11 NOTE — TELEPHONE ENCOUNTER
Have her reduce the irbesartan to 150 mg daily (1/2 tablet of her current 300 mg pill) and stop the lasix for the next 3 days to see if her dizziness improves.  If her dizziness improves, would keep her off the lasix    Mihir Chang, APRN-CNP

## 2022-10-11 NOTE — TELEPHONE ENCOUNTER
She has multiple BP medications, which are likely contributing. Recommend reducing her hydralazine dose (please see what dose she is taking. If 100 mg TID, reduce to 50 mg TID. If she is currently on 50 mg TID, reduce to 25 mg TID).  If she continues to have orthostatic issues, she should get in to see her PCP soon    KATHY Rivera-CNP

## 2022-10-11 NOTE — TELEPHONE ENCOUNTER
Spoke to pt's daughter Roxanna Loera and advised her of message below, she v/u and will call office Friday with report on pt symptoms.

## 2022-10-11 NOTE — TELEPHONE ENCOUNTER
Pt's daughter states pt is not taking Hydralazine, pt started having sx after starting Tambocor and Lasix prescribed by Dr. Katerine Vargas. Should pt contact him or PCP since EP is not the rx prescriber of those meds? Please see and advise.

## 2022-10-11 NOTE — TELEPHONE ENCOUNTER
[facility] states there nurse was with patient and states pt had orthostatic hypertension. Pt's BP was  110/64 pulse  68, 91/52 59  Pt walked 20 ft and felt like passing out . Asking if possible med related. Requesting for her to be seen sooner than 11/1/22. [facility] is asking we call pt's daughter Paul Taylor  at [phone] to advise.

## 2022-10-13 ENCOUNTER — CARE COORDINATION (OUTPATIENT)
Dept: CASE MANAGEMENT | Age: 87
End: 2022-10-13

## 2022-10-13 NOTE — CARE COORDINATION
[facility] Care Transitions Follow Up Call  Patient: Ester Chopra  Patient : 1935   MRN: [id number]  Reason for Admission:   Discharge Date: 10/4/22 RARS: Readmission Risk Score: 19.8      Follow up call attempted, left contact info on .       Follow Up  Future Appointments   Date Time Provider Alissa Bustillo   2022  9:15 AM Felix Braga MD PULM & CC [facility]   2022  3:15 PM Isabel Norman MD FF Cardio [facility]       William Monaco RN

## 2022-10-17 ENCOUNTER — CARE COORDINATION (OUTPATIENT)
Dept: CASE MANAGEMENT | Age: 87
End: 2022-10-17

## 2022-10-17 NOTE — CARE COORDINATION
[facility] Care Transitions Follow Up Call    Care Transition Nurse contacted the patient by telephone to follow up after admission. Verified name and  with patient as identifiers. Patient: Antonieta Michelle  Patient : 1935   MRN: [id number]  Reason for Admission:   Discharge Date: 10/4/22 RARS: Readmission Risk Score: 19.8      Needs to be reviewed by the provider   Additional needs identified to be addressed with provider: No  none             Method of communication with provider: none. Pt states doing well, no issues or concerns. Denies SOB, CP. Weight holding steady. HC continues to come out. F/U appts listed below. Agreed to more CTC f/u calls. Addressed changes since last contact:  none  Discussed follow-up appointments. If no appointment was previously scheduled, appointment scheduling offered: No.   Is follow up appointment scheduled within 7 days of discharge? Yes. Follow Up  Future Appointments   Date Time Provider Alissa Bustillo   2022  9:15 AM Marilia Benton MD PULM & CC MMA   2022  3:15 PM Cintia Beckwith MD FF Cardio MMA     Non-[facility] follow up appointment(s):     Care Transition Nurse reviewed medical action plan with patient and discussed any barriers to care and/or understanding of plan of care after discharge. Discussed appropriate site of care based on symptoms and resources available to patient including: When to call 911. The patient agrees to contact the PCP office for questions related to their healthcare. Advance Care Planning:   reviewed and current. Patients top risk factors for readmission: medical condition-A fib, CHF  Interventions to address risk factors: Assessment and support for treatment adherence and medication management-Afib, CHF    Offered patient enrollment in the Remote Patient Monitoring (RPM) program for in-home monitoring: Patient declined.      Care Transitions Subsequent and Final Call    Subsequent and Final Calls  Do you have any ongoing symptoms?: No  Have your medications changed?: No  Do you have any questions related to your medications?: No  Do you currently have any active services?: Yes  Are you currently active with any services?: Home Health  Do you have any needs or concerns that I can assist you with?: No  Identified Barriers: None  Care Transitions Interventions  No Identified Needs  Other Interventions:             Care Transition Nurse provided contact information for future needs. Plan for follow-up call in 5-7 days based on severity of symptoms and risk factors.   Plan for next call: self management-Afib, CHF, f/u appts,HC    Blanquita Larkin RN

## 2022-10-24 ENCOUNTER — CARE COORDINATION (OUTPATIENT)
Dept: CASE MANAGEMENT | Age: 87
End: 2022-10-24

## 2022-10-24 NOTE — CARE COORDINATION
[facility] Transitions Follow Up Call    10/24/2022    Patient: Mortimer Laud  Patient : 1935   MRN: <[id number]>  Reason for Admission: PAF (paroxysmal atrial fibrillation)  Discharge Date: 10/4/22 RARS: Readmission Risk Score: 19.8         Spoke with: Maxine(daughter)  Patients daughter reports patient had a rough weekend. She is better today. She had diarrhea and low heart rate. She had to hold her medication. She has an an appointment today with her PCP. Will continue to follow. Care Transitions Follow Up Call    Needs to be reviewed by the provider   Additional needs identified to be addressed with provider: No  none             Method of communication with provider : none      Care Transition Nurse (CTN) contacted the family by telephone to follow up after admission on 2022. Verified name and  with family as identifiers. Addressed changes since last contact: none  Discussed follow-up appointments. If no appointment was previously scheduled, appointment scheduling offered: No.   Is follow up appointment scheduled within 7 days of discharge? No.    Advance Care Planning:   Does patient have an Advance Directive: not on file. Patients top risk factors for readmission: ineffective coping  medical condition-Afib, CHF, COPD,HTN  Interventions to address risk factors: Education of patient/family/caregiver/guardian to support self-management-       Non-[facility] follow up appointment(s):     CTN provided contact information for future needs. Plan for follow-up call in 3-5 days based on severity of symptoms and risk factors. Plan for next call: symptom management-diarrhea, low heart rate  self management-Afib, chf  follow up appointment-results of visit          Care Transitions Subsequent and Final Call    Subsequent and Final Calls  Do you have any ongoing symptoms?: Yes  Onset of Patient-reported symptoms:  In the past 7 days  Patient-reported symptoms: Other  Interventions for patient-reported symptoms: Notified PCP/Physician  Have your medications changed?: No  Do you have any questions related to your medications?: No  Do you currently have any active services?: Yes  Are you currently active with any services?: Home Health  Do you have any needs or concerns that I can assist you with?: No  Identified Barriers: None  Care Transitions Interventions   Home Care Waiver: Completed Physical Therapy: Completed     Occupational Therapy: Completed     Other Interventions:              Follow Up  Future Appointments   Date Time Provider Alissa Bustillo   11/1/2022  9:15 AM Lukas Salvador MD PULM & CC [facility]   11/1/2022  3:15 PM Stewart Gonzalez MD FF Cardio [facility]       Gilford Hawk, LPN

## 2022-10-26 NOTE — PROGRESS NOTES
[address]   Electrophysiology Follow up   Date: 11/1/2022  I had the privilege of visiting Charlie Short in the office. CC: Afib    HPI: Charlie Short is a 80 y.o. female  w/PMHx PAF, HTN, HLD, DM, hypothyroidism and Complex atherosclerotic plaque in descending aorta noted on TRACEY-evaluated by vascular      She initially presented to the hospital after sudden onset of symptoms- palpitations, shortness of breath, fatigue, and weakness. Found to be in AF w/RVR. Started on IV Cardizem admitted to the ICU. Wasn't on [address]. She has past medical history of paroxysmal atrial fibrillation and used to be on verapamil which was previously discontinued. Started on [address] and propafenone,S/p TRACEY/DCCV (8/31/18)      Admitted on 11/13/19 for afib with RVR accompanied by sudden on set palpitations, fatigue, and SOB. She was also being treated for PNA. She developed bradycardia with IV cardizem. She converted to SR spontaneously. 2/15/19 came to the ED with atrial fib with RVR and was cardioverted and discharged. She returned to the ED on 2/19/20 again with AF with RVR. She was admitted and cardioverted. , later discharged on Rythmol and cardizem. Ablation was discussed. 02/22/2021- admitted with Pneumonia and COPD exacerbation. Discharged home on O2 2L. DCCV 3/12/2021    8/21/2022 presented to [facility] ED with complaints of dizziness. Dx with vertigo. Echo was unremarkable    9/30/2022 presented again to [facility] ED with complaints of palpitations, shortness of breath and LE Edema. She was diuresed and underwent DCCV 10/4/2022. Propafenone was discontinued d/t possibly causing SIADH and she was started on Flecainide. Nicol Weaver presents to the office in follow up. Complains of issues since starting her flecainide. Complains of having HR in the 30's whenever she took flecainide and she has stopped this. Denies any recurrent atrial fibrillation since her cardioversion.  Discussed options in detail including continuing cardizem for rate control and possibly implanting a pacemaker to allow for more medication management. Assessment and plan:   -Paroxysmal Atrial fibrillation   Now in sinus rhythm. Could not tolerate flecainide and propafenone. Dicussed pacemaker implantation. The risks, benefits and alternatives of the procedure were discussed with the patient. The risks including, but not limited to, the risks of bleeding, infection, pain, device malfunction, lead dislodgement, radiation exposure, injury to cardiac and surrounding structures (including pneumothorax), stroke, cardiac perforation, tamponade, need for emergent heart surgery, myocardial infarction and death were discussed in detail. Dual vs single chamber device, including risks and benefits were discussed with patient. Patient is not interested in PPM implantation. She declines. However, she understand that if she had episodes of Afib with RVR and then bradycardia, pacemaker would be her only option. ECG today shows Sinus 431 QTcH, 81 QRS   Patient has a [id number] Score of 6 ( HTN, Age3, Female, DM, CAD), continue Eliquis 5 mg BID     Continues cardizem 180 mg daily for rate control and diltiazem 30 mg as needed for episodes of afib   Multiple DCCV, last 10/4/2022 (Megan Rand)    -Tachy-Lewis   Worsened when on antiarrhythmic therapy    She declines pacemaker implantation now. Would recommend implantation of pacemaker for better medication management if she has recurrent episodes of atrial fibrillation     -Hyperlipidemia               On Lipitor 80 mg      -PAD and complex Aortic atherosclerosis              On High intensity statin, lipitor 80 mg daily     -COPD              Follows with Dr. Naveen Everett              Has been on  Home O2 for a year    -HTN  Not well controlled: 140/70  BP goal <130/80  Home BP monitoring encouraged, printed information provided on how to accurately measure BP at home.   Counseled to follow a low salt diet to assure blood pressure remains controlled for cardiovascular risk factor modification. The patient is counseled to get regular exercise 3-5 times per week and maintain a healthy weight reduce cardiovascular risk factors.         Patient Active Problem List    Diagnosis Date Noted    ANTHONY (obstructive sleep apnea)     Acute anemia 10/03/2022    Hyponatremia 10/03/2022    Acute on chronic diastolic (congestive) heart failure ([address] 10/01/2022    Acute dysfunction of Eustachian tube, bilateral 08/22/2022    Bilateral hearing loss 08/22/2022    Impacted cerumen of right ear 08/22/2022    Dizziness 08/22/2022    Hypertensive urgency, malignant 08/21/2022    Chronic fatigue 07/14/2022    Chronic renal disease, stage III [facility]) [[id number]] 06/13/2022    Chronic obstructive pulmonary disease ([address] 04/19/2022    Controlled type 2 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 disease, without long-term current use of insulin ([address] 01/18/2017    Restless legs syndrome 01/18/2017    Osteoarthritis 06/18/2015    Atrial fibrillation with RVR ([address] 12/11/2013    Overactive bladder 12/11/2013    IBS (irritable bowel syndrome) 05/02/2013    Hypertensive urgency     Hyperlipidemia     Ptosis of eyelid, bilateral 10/26/2021    Centrilobular emphysema ([address] 09/08/2021    Pulmonary nodule 09/08/2021    Primary hypertension     Ataxia 11/12/2020    SOB (shortness of breath) 02/25/2020    PAF (paroxysmal atrial fibrillation) ([address] 02/19/2020    Hypothyroid 02/19/2020    Chronic respiratory failure with hypoxia ([address] 02/19/2020    Coronary artery disease involving native coronary artery of native heart with angina pectoris ([address] 02/18/2020    Interstitial lung disease ([address] 09/20/2019    Vertigo 07/31/2019    COPD, moderate ([address] 03/23/2019    Allergic rhinitis 01/09/2019     Diagnostic studies:   ECG 11/1/22  Sinus Rhythm   431 QTcH, 81 QRS     ECHO:  8/22/2022   Summary   Left ventricular systolic function is normal with ejection fraction   estimated at 55-60 %. No regional wall motion abnormalities are noted. There is mild concentric left ventricular hypertrophy. There is reversal of E/A inflow velocities across the mitral valve. Mild-to-moderate aortic regurgitation is present. Mitral annular calcification is present. Mild mitral & tricuspid regurgitation    Echo:  8/20/2019  Summary   Left ventricle - normal size, thickness and function with EF of 60%   Aortic valve - sclerotic, mild regurgitation     TRACEY: 8/31/2018   Summary   Normal left ventricular cavity size and wall thickness. Global left   ventricular function is normal with ejection fraction estimated from 55 % to   60 %. No regional wall motion abnormalities noted. Mild aortic regurgitation. Multiple Large (> 4 mm), ulcerated plaque with mobile component suggestive   of possible thrombus was seen in the descending aorta. Echo 8/29/2018    Summary   -Normal global systolic function with an ejection fraction estimated at 65%. -No regional wall motion abnormalities noted.   -Aortic valve appears sclerotic but opens adequately. -Mild aortic regurgitation.   -There is mild-to-moderate tricuspid regurgitation with a RVSP estimation of   49 mmHg. -Indeterminate diastolic function      Left heart Cath 01/19/2004  Luminal irregularities pLAD and pRCA  I independently reviewed the cardiac diagnostic studies, ECG and relevant imaging studies. Lab Results   Component Value Date    LVEF 58 08/22/2022    LVEFMODE Echo 08/20/2019     Lab Results   Component Value Date    TSH 2.50 09/28/2020         Physical Examination:  Vitals:    11/01/22 1509   BP: (!) 140/70   Pulse: 55   SpO2: 99%      Wt Readings from Last 3 Encounters:   11/01/22 158 lb (71.7 kg)   10/04/22 159 lb 8 oz (72.3 kg)   08/22/22 158 lb 4.8 oz (71.8 kg)       Constitutional: Oriented. No distress. Head: Normocephalic and atraumatic.    Mouth/Throat: Oropharynx is clear and moist. Eyes: Conjunctivae normal. EOM are normal.   Neck: Neck supple. No JVD present. Cardiovascular: Normal rate, regular rhythm, S1&S2. Pulmonary/Chest: Bilateral respiratory sounds. No rhonchi. Abdominal: Soft. No tenderness. Musculoskeletal: No tenderness. No edema    Lymphadenopathy: Has no cervical adenopathy. Neurological: Alert and oriented. Follows command, No Gross deficit   Skin: Skin is warm, No rash noted. Psychiatric: Has a normal behavior       Review of System:  [x] Full ROS obtained and negative except as mentioned in HPI    Prior to Admission medications    Medication Sig Start Date End Date Taking? Authorizing Provider   dilTIAZem (CARDIZEM) 30 MG tablet As needed for episodes of tachycardia 11/1/22  Yes Stewart Gonzalez MD   dicyclomine (BENTYL) 10 MG capsule TAKE 1 CAPSULE BY MOUTH FOUR TIMES DAILY AS NEEDED FOR ABDOMINAL PAIN 10/5/22  Yes Hiral Vo MD   dilTIAZem (CARDIZEM CD) 180 MG extended release capsule Take 1 capsule by mouth daily 10/5/22  Yes Anna Jalloh MD   LORazepam (ATIVAN) 1 MG tablet Take 1 mg by mouth at bedtime. Yes Historical Provider, MD   SITagliptin (JANUVIA) 100 MG tablet Take 100 mg by mouth daily   Yes Historical Provider, MD   LORazepam (ATIVAN) 0.5 MG tablet Take 0.5 mg by mouth in the morning and at bedtime. In the morning and afternoon   Yes Historical Provider, MD   ELIQUIS 5 MG TABS tablet TAKE 1 TABLET TWICE DAILY 8/26/22  Yes Hiral Vo MD   budesonide-formoterol [facility]) 160-4.5 MCG/ACT AERO INHALE 2 PUFFS TWICE DAILY 8/23/22  Yes Hiral Vo MD   loratadine (CLARITIN) 10 MG tablet Take 1 tablet by mouth in the morning.  8/9/22  Yes Hiral Vo MD   atorvastatin (LIPITOR) 80 MG tablet TAKE 1 TABLET EVERY NIGHT 8/2/22  Yes KATHY Hackett - CNP   meclizine (ANTIVERT) 12.5 MG tablet TAKE 1 TABLET THREE TIMES DAILY AS NEEDED 7/12/22  Yes Hiral Vo MD   irbesartan (AVAPRO) 300 MG tablet TAKE 1 TABLET BY MOUTH EVERY NIGHT  Patient taking differently: 150 mg TAKE 1 TABLET BY MOUTH EVERY NIGHT 7/1/22  Yes Marissa Hector MD   rOPINIRole (REQUIP) 3 MG tablet TAKE 1 TABLET THREE TIMES DAILY 6/27/22  Yes Marissa Hector MD   levothyroxine (SYNTHROID) 75 MCG tablet TAKE 1 TABLET BY MOUTH EVERY DAY 6/7/22  Yes Marissa Hector MD   cloNIDine (CATAPRES) 0.1 MG tablet TAKE 1 TABLET TWICE DAILY 5/27/22  Yes Lauryn Mahoney MD   conjugated estrogens (PREMARIN) 0.625 MG/GM vaginal cream Place 1 g vaginally three times a week  Patient taking differently: Place 1 g vaginally Twice a Week Monday and Friday 5/18/22  Yes Marissa Hector MD   levalbuterol (XOPENEX) 0.63 MG/3ML nebulization USE 1 VIAL PER NEBULIZER EVERY 6 HOURS. MAX OF 4 TIMES PER 24 HOURS 4/26/22  Yes Korina Grey MD   tiotropium (SPIRIVA RESPIMAT) 2.5 MCG/ACT AERS inhaler Inhale 2 puffs into the lungs daily 4/21/22  Yes Felix Guidry PA-C   ondansetron (ZOFRAN) 4 MG tablet Take 1 tablet by mouth daily as needed for Nausea or Vomiting 6/2/27  Yes Sandra Reynolds MD   OXYGEN Inhale 2 L into the lungs   Yes Historical Provider, MD   Respiratory Therapy Supplies (NEBULIZER/TUBING/MOUTHPIECE) KIT 1 kit by Does not apply route 4 times daily as needed (shortness of breath) 3/26/20  Yes Marissa Hector MD   albuterol sulfate HFA (PROAIR HFA) 108 (90 Base) MCG/ACT inhaler Inhale 2 puffs into the lungs every 6 hours as needed for Wheezing  Patient taking differently: Inhale 2 puffs into the lungs in the morning and at bedtime 10/25/19  Yes Korina Grey MD   fluticasone (FLONASE) 50 MCG/ACT nasal spray USE 2 SPRAYS NASALLY EVERY DAY 8/2/22   Marissa Hector MD   amLODIPine (NORVASC) 5 MG tablet Take 1 tablet by mouth in the morning.  8/1/22 8/22/22  KATHY Mello CNP   blood glucose test strips (TRUE METRIX BLOOD GLUCOSE TEST) strip USE TO TEST BLOOD SUGAR DAILY 7/11/22   Marissa Hector MD   Blood Glucose Monitoring Suppl (TRUE METRIX METER) w/Device KIT To use to check sugars 4 times daily 3/24/22   Jada Stanley MD   Blood Glucose Calibration (TRUE METRIX LEVEL 2) Normal SOLN As needed 3/23/22   Qian Tejada MD   blood glucose test strips (TRUE METRIX BLOOD GLUCOSE TEST) strip 1 each by In Vitro route daily As needed. 3/23/22   Qian Tejada MD   TRUEplus Lancets 33G MISC 1 daily 3/23/22   Qian Tejada MD   Lancets MISC 1 each by Does not apply route 2 times daily 6/30/21   Qian Tejada MD       Allergies   Allergen Reactions    Adhesive Tape Anaphylaxis    Cefaclor Nausea And Vomiting     Other reaction(s): Chest pain    Cephalexin Other (See Comments)     Struggling to breath, almost passing out/ very low oxygen and pulse    Nsaids      Pt states she has hives and heart races   Other reaction(s): Tachycardia    Clinoril [Sulindac] Other (See Comments)     Stomach pain    Codeine      FEEL NUMB    Diclofenac     Diclofenac Sodium Hives    Diclofenac Sodium Hives    Hctz [Hydrochlorothiazide]      Sob    Ibuprofen Other (See Comments)     Other reaction(s): Tachycardia    Macrobid [Nitrofurantoin Macrocrystal]      nausea    Motrin [Ibuprofen Micronized] Other (See Comments)     Tachycardia      Pcn [Penicillins] Hives    Peach [Prunus Persica] Itching and Swelling     Cannot eat raw peaches    Prednisone      Causes elevated blood pressure     Sulfa Antibiotics      Nausea, diarrhea       Social History:  Reviewed. reports that she quit smoking about 26 years ago. Her smoking use included cigarettes. She started smoking about 72 years ago. She has a 45.00 pack-year smoking history. She has never used smokeless tobacco. She reports that she does not drink alcohol and does not use drugs. Family History:  Reviewed. Reviewed. No family history of SCD. Relevant and available labs, and cardiovascular diagnostics reviewed. Reviewed.      I independently reviewed relevant and available cardiac diagnostic tests ECG, CXR, Echo, Stress test, Device interrogation, Holter, CT scan. Outside medical records via Care everywhere reviewed and summarized in H&P above. Complex medical condition with multiple medical problems affecting prognosis and outcome of EP interventions    All questions and concerns were addressed to the patient/family. Alternatives to my treatment were discussed. I have discussed the above stated plan and the patient verbalized understanding and agreed with the plan. Scribe attestation: This note was scribed in the presence of Christina Vicente MD by Randal Regan RN    Physician Attestation: I, Dr. Christina Vicente, confirm that the scribe's documentation has been prepared under my direction and personally reviewed by me in its entirety. I also confirm that the note above accurately reflects all work, treatment, procedures, and medical decision making performed by me. NOTE: This report was transcribed using voice recognition software. Every effort was made to ensure accuracy, however, inadvertent computerized transcription errors may be present.      Christina Vicente MD, MPH  [facility]   Office: [phone]  Fax: [phone]

## 2022-10-28 ENCOUNTER — CARE COORDINATION (OUTPATIENT)
Dept: CASE MANAGEMENT | Age: 87
End: 2022-10-28

## 2022-10-28 NOTE — CARE COORDINATION
[address] Transitions Follow Up Call    10/28/2022    Patient: Rose Boothe  Patient : 1935   MRN: <[id number]>  Reason for Admission: PAF (paroxysmal atrial fibrillation)  Discharge Date: 10/4/22 RARS: Readmission Risk Score: 19.8       Attempted to contact patient for follow up transition call. Left voicemail message to return call with an update on condition since discharge. Contact information provided. Will continue to follow up. Care Transitions Subsequent and Final Call    Subsequent and Final Calls  Are you currently active with any services?: Home Health  Care Transitions Interventions   Home Care Waiver: Completed Physical Therapy: Completed     Occupational Therapy: Completed     Other Interventions:              Follow Up  Future Appointments   Date Time Provider Alissa Bustillo   2022  3:15 PM Krystal Pozo MD [facility]   2022  2:30 PM MD [address] [facility]       Lam Fried LPN

## 2022-10-31 ENCOUNTER — TELEPHONE (OUTPATIENT)
Dept: CARDIOLOGY CLINIC | Age: 87
End: 2022-10-31

## 2022-10-31 NOTE — TELEPHONE ENCOUNTER
Patient has a followup appointment tomorrow. Patient has bp of 177/82 at rest and also reporting symptoms of slight headache and dizziness. Patient has been having these symptoms since receiving the covid and the flus vaccine last week. Please call and adivse.   aminata

## 2022-11-01 ENCOUNTER — OFFICE VISIT (OUTPATIENT)
Dept: CARDIOLOGY CLINIC | Age: 87
End: 2022-11-01
Payer: MEDICARE

## 2022-11-01 VITALS
HEART RATE: 55 BPM | OXYGEN SATURATION: 99 % | BODY MASS INDEX: 29.08 KG/M2 | WEIGHT: 158 LBS | SYSTOLIC BLOOD PRESSURE: 140 MMHG | DIASTOLIC BLOOD PRESSURE: 70 MMHG | HEIGHT: 62 IN

## 2022-11-01 DIAGNOSIS — I48.0 PAF (PAROXYSMAL ATRIAL FIBRILLATION) (HCC): Primary | ICD-10-CM

## 2022-11-01 DIAGNOSIS — J44.9 CHRONIC OBSTRUCTIVE PULMONARY DISEASE, UNSPECIFIED COPD TYPE (HCC): ICD-10-CM

## 2022-11-01 DIAGNOSIS — I10 PRIMARY HYPERTENSION: ICD-10-CM

## 2022-11-01 DIAGNOSIS — G47.33 OSA (OBSTRUCTIVE SLEEP APNEA): ICD-10-CM

## 2022-11-01 PROCEDURE — 99214 OFFICE O/P EST MOD 30 MIN: CPT | Performed by: INTERNAL MEDICINE

## 2022-11-01 PROCEDURE — G8427 DOCREV CUR MEDS BY ELIG CLIN: HCPCS | Performed by: INTERNAL MEDICINE

## 2022-11-01 PROCEDURE — G8417 CALC BMI ABV UP PARAM F/U: HCPCS | Performed by: INTERNAL MEDICINE

## 2022-11-01 PROCEDURE — 1036F TOBACCO NON-USER: CPT | Performed by: INTERNAL MEDICINE

## 2022-11-01 PROCEDURE — 93000 ELECTROCARDIOGRAM COMPLETE: CPT | Performed by: INTERNAL MEDICINE

## 2022-11-01 PROCEDURE — 1123F ACP DISCUSS/DSCN MKR DOCD: CPT | Performed by: INTERNAL MEDICINE

## 2022-11-01 PROCEDURE — 1111F DSCHRG MED/CURRENT MED MERGE: CPT | Performed by: INTERNAL MEDICINE

## 2022-11-01 PROCEDURE — G8484 FLU IMMUNIZE NO ADMIN: HCPCS | Performed by: INTERNAL MEDICINE

## 2022-11-01 PROCEDURE — 3023F SPIROM DOC REV: CPT | Performed by: INTERNAL MEDICINE

## 2022-11-01 PROCEDURE — 1090F PRES/ABSN URINE INCON ASSESS: CPT | Performed by: INTERNAL MEDICINE

## 2022-11-02 ENCOUNTER — TELEPHONE (OUTPATIENT)
Dept: CARDIOLOGY CLINIC | Age: 87
End: 2022-11-02

## 2022-11-02 DIAGNOSIS — E03.9 ACQUIRED HYPOTHYROIDISM: ICD-10-CM

## 2022-11-02 RX ORDER — LEVOTHYROXINE SODIUM 0.07 MG/1
TABLET ORAL
Qty: 90 TABLET | Refills: 0 | OUTPATIENT
Start: 2022-11-02

## 2022-11-02 RX ORDER — CLONIDINE HYDROCHLORIDE 0.1 MG/1
TABLET ORAL
Qty: 180 TABLET | Refills: 0 | OUTPATIENT
Start: 2022-11-02

## 2022-11-02 NOTE — TELEPHONE ENCOUNTER
Calling to get clarification regarding prescription for Diltiazem 30 mg. Directions are as needed for episodes for tachycardia. For insurance purposes needs specific directions to figure out supply. 1 every 4 hours as needed for tachycardia. Please call.   aminata

## 2022-11-02 NOTE — TELEPHONE ENCOUNTER
Returned call to [facility] and clarified order.

## 2022-11-03 ENCOUNTER — CARE COORDINATION (OUTPATIENT)
Dept: CASE MANAGEMENT | Age: 87
End: 2022-11-03

## 2022-11-03 NOTE — TELEPHONE ENCOUNTER
Medication Refill    Medication needing refilled: dilTIAZem (CARDIZEM CD)       Dosage of the medication: 180mg    How are you taking this medication (QD, BID, TID, QID, PRN):  Take 1 capsule by mouth daily    30 or 90 day supply called in: 90    When will you run out of your medication:  11/03    Which Pharmacy are we sending the medication to?:     [facility] [facility] [phone]   [address] [place] [id number]   Phone:  [phone]  Fax:  [phone]

## 2022-11-03 NOTE — TELEPHONE ENCOUNTER
Refill was sent 11/1/22 with confirmation from pharmacy. Pt did not contact pharmacy before sending request to office.

## 2022-11-03 NOTE — CARE COORDINATION
[address] Transitions Follow Up Call    11/3/2022    Patient: Mateusz Agustin  Patient : 1935   MRN: <[id number]>  Reason for Admission:   Discharge Date: 10/4/22 RARS: Readmission Risk Score: 19.8         Spoke with: [address])  Spoke to patients daughter. She was at work and unable to talk. She stated patient was home and she has a hard time hearing on the phone. I called patient, she reports she was feeling sluggish today. She didn't get enough sleep. Eating and drinking good. No urinary problems. At times has constipation. Takes Miralax for this. She denies sob, chills, fever, nv, weakness or fatigue. Patient informed this is the final call. Contact her PCP for any medical issues. No questions or concerns. No further outreach scheduled. Care Transitions Follow Up Call    Needs to be reviewed by the provider   Additional needs identified to be addressed with provider: No  none             Method of communication with provider : none      Care Transition Nurse (CTN) contacted the patient by telephone to follow up after admission on 2022. Verified name and  with patient as identifiers. Addressed changes since last contact: none  Discussed follow-up appointments. If no appointment was previously scheduled, appointment scheduling offered: No.   Is follow up appointment scheduled within 7 days of discharge? na.    Advance Care Planning:   Does patient have an Advance Directive: not on file. Non-[facility] follow up appointment(s):     CTN provided contact information for future needs. No further follow-up call indicated based on severity of symptoms and risk factors.         Care Transitions Subsequent and Final Call    Subsequent and Final Calls  Do you have any ongoing symptoms?: No  Have your medications changed?: No  Do you have any questions related to your medications?: No  Do you currently have any active services?: Yes  Are you currently active with any services?: Home Health  Do you have any needs or concerns that I can assist you with?: No  Identified Barriers: None  Care Transitions Interventions   Home Care Waiver: Completed Physical Therapy: Completed     Occupational Therapy: Completed     Other Interventions:              Follow Up  Future Appointments   Date Time Provider Alissa Iwona   11/9/2022  2:30 PM Deepa Boss MD [facility]   5/2/2023  3:15 PM Brook Avery MD [facility]       Jacek Preciado LPN

## 2022-11-03 NOTE — TELEPHONE ENCOUNTER
Pt daughter stated that the received the 30 mg and now her mother still needs the 180 Mg sent over tot e pharmacy.  The 180Mg is taken daily and pt is now currently out of it    [address], [address]   [address] [place] [id number]   Phone:  [phone]  Fax:  [phone]       Per pt request she is currently out and need this to be sent over today

## 2022-11-04 RX ORDER — DICYCLOMINE HYDROCHLORIDE 10 MG/1
CAPSULE ORAL
Qty: 120 CAPSULE | Refills: 0 | OUTPATIENT
Start: 2022-11-04

## 2022-11-04 RX ORDER — DILTIAZEM HYDROCHLORIDE 180 MG/1
180 CAPSULE, COATED, EXTENDED RELEASE ORAL DAILY
Qty: 90 CAPSULE | Refills: 3 | Status: SHIPPED | OUTPATIENT
Start: 2022-11-04

## 2022-11-09 ENCOUNTER — OFFICE VISIT (OUTPATIENT)
Dept: PULMONOLOGY | Age: 87
End: 2022-11-09
Payer: MEDICARE

## 2022-11-09 VITALS — OXYGEN SATURATION: 94 % | HEART RATE: 60 BPM

## 2022-11-09 DIAGNOSIS — J47.9 BRONCHIECTASIS WITHOUT COMPLICATION (HCC): ICD-10-CM

## 2022-11-09 DIAGNOSIS — R91.1 PULMONARY NODULE: ICD-10-CM

## 2022-11-09 DIAGNOSIS — J43.2 CENTRILOBULAR EMPHYSEMA (HCC): ICD-10-CM

## 2022-11-09 DIAGNOSIS — J44.9 COPD, MODERATE (HCC): Primary | ICD-10-CM

## 2022-11-09 DIAGNOSIS — J96.11 CHRONIC RESPIRATORY FAILURE WITH HYPOXIA (HCC): ICD-10-CM

## 2022-11-09 PROCEDURE — 1090F PRES/ABSN URINE INCON ASSESS: CPT | Performed by: INTERNAL MEDICINE

## 2022-11-09 PROCEDURE — G8484 FLU IMMUNIZE NO ADMIN: HCPCS | Performed by: INTERNAL MEDICINE

## 2022-11-09 PROCEDURE — 1036F TOBACCO NON-USER: CPT | Performed by: INTERNAL MEDICINE

## 2022-11-09 PROCEDURE — G8417 CALC BMI ABV UP PARAM F/U: HCPCS | Performed by: INTERNAL MEDICINE

## 2022-11-09 PROCEDURE — 99214 OFFICE O/P EST MOD 30 MIN: CPT | Performed by: INTERNAL MEDICINE

## 2022-11-09 PROCEDURE — 3023F SPIROM DOC REV: CPT | Performed by: INTERNAL MEDICINE

## 2022-11-09 PROCEDURE — G8427 DOCREV CUR MEDS BY ELIG CLIN: HCPCS | Performed by: INTERNAL MEDICINE

## 2022-11-09 PROCEDURE — 1123F ACP DISCUSS/DSCN MKR DOCD: CPT | Performed by: INTERNAL MEDICINE

## 2022-11-09 NOTE — PROGRESS NOTES
[facility]  Follow Up Note  Lukas Salvador MD       Soumya Weber   YOB: 1935    Date of Visit:  11/9/2022    Assessment/Plan:  1. SOB  Stable    2. COPD, moderate/Emphysema/Bronchiectasis  CT Chest 3/20:    Impression   Emphysema. Bilateral bronchiectasis with mucous plugging. Scattered   bilateral airspace disease is partially improved as compared to exam dated   02/19/2020 with residual opacity at bilateral lung bases. New 5 mm nodule or focus of mucous plugging within the right middle lobe. Additional pulmonary nodules are not significantly changed. Continued   attention on CT follow-up recommended. Sequela of granulomatous disease. I have independently reviewed pulmonary function testing. PFT reveals mild to moderate COPD with no bronchodilator response. Medication Management:  The patient benefits from the medical regimen and reports no complications. Symbicort   Spiriva respimat  Xopenex  She now has a good HHN    3. Bronchiectasis/PN  Stable    4. PN  CT Chest 4/22  Impression   1. No active pulmonary disease. 2. COPD. 3. Increase in size of the right lower lobe pulmonary nodule measuring 8.6 mm. Repeat CT chest around 4/23    5. Gastroesophageal reflux disease, esophagitis presence not specified    6. Chronic hypoxemic Respirtory Failure  O2 sats are acceptable on supplemental O2. The patient benefits from the use of supplemental O2.    6 months    Chief Complaint   Patient presents with    Shortness of Breath     6 month Was in hospital for A-fib but doing pretty good now       HPI  The patient presents with a chief complaint of moderate shortness of breath related to mild COPD of many years duration. He has mild associated cough. Exertion is a modifying factor. She had recent hospitalization for COPD exacerbation. She is feeling better but still fatigued. She has O2 at home.  She now also has Spiriva but did not know how to take it. Review of Systems  No Chest pain, Nausea or vomiting reported      History  I have reviewed past medical, surgical, social and family history. This is documented elsewhere in the medical record. Physical Exam:  Well developed, well nourished  Alert and oriented  Sclera is clear  No cervical adenopathy  No JVD. Chest examination is few basilar crackles. Cardiac examination reveals regular rate and rhythm without murmur, gallop or rub. The abdomen is soft, nontender and nondistended. There is no clubbing, cyanosis or edema of the extremities. There is no obvious skin rash. No focal neuro deficicts  Normal mood and affect    Allergies   Allergen Reactions    Adhesive Tape Anaphylaxis    Cefaclor Nausea And Vomiting     Other reaction(s): Chest pain    Cephalexin Other (See Comments)     Struggling to breath, almost passing out/ very low oxygen and pulse    Nsaids      Pt states she has hives and heart races   Other reaction(s): Tachycardia    Clinoril [Sulindac] Other (See Comments)     Stomach pain    Codeine      FEEL NUMB    Diclofenac     Diclofenac Sodium Hives    Diclofenac Sodium Hives    Hctz [Hydrochlorothiazide]      Sob    Ibuprofen Other (See Comments)     Other reaction(s): Tachycardia    Macrobid [Nitrofurantoin Macrocrystal]      nausea    Motrin [Ibuprofen Micronized] Other (See Comments)     Tachycardia      Pcn [Penicillins] Hives    Peach [Prunus Persica] Itching and Swelling     Cannot eat raw peaches    Prednisone      Causes elevated blood pressure     Sulfa Antibiotics      Nausea, diarrhea     Prior to Visit Medications    Medication Sig Taking?  Authorizing Provider   dilTIAZem (CARDIZEM CD) 180 MG extended release capsule Take 1 capsule by mouth daily  Lisha Arias MD   dilTIAZem (CARDIZEM) 30 MG tablet As needed for episodes of tachycardia  Lisha Arias MD   dicyclomine (BENTYL) 10 MG capsule TAKE 1 CAPSULE BY MOUTH FOUR TIMES DAILY AS NEEDED FOR ABDOMINAL PAIN  Millie Perry MD   LORazepam (ATIVAN) 1 MG tablet Take 1 mg by mouth at bedtime. Historical Provider, MD   SITagliptin (JANUVIA) 100 MG tablet Take 100 mg by mouth daily  Historical Provider, MD   LORazepam (ATIVAN) 0.5 MG tablet Take 0.5 mg by mouth in the morning and at bedtime. In the morning and afternoon  Historical Provider, MD   ELIQUIS 5 MG TABS tablet TAKE 1 TABLET TWICE DAILY  Millie Perry MD   budesonide-formoterol (SYMBICORT) 160-4.5 MCG/ACT AERO INHALE 2 PUFFS TWICE DAILY  Millie Perry MD   loratadine (CLARITIN) 10 MG tablet Take 1 tablet by mouth in the morning. Millie Perry MD   fluticasone (FLONASE) 50 MCG/ACT nasal spray USE 2 SPRAYS NASALLY EVERY DAY  Millie Perry MD   atorvastatin (LIPITOR) 80 MG tablet TAKE 1 TABLET EVERY NIGHT  KATHY Lazaro CNP   amLODIPine (NORVASC) 5 MG tablet Take 1 tablet by mouth in the morning. KATHY Lazaro CNP   meclizine (ANTIVERT) 12.5 MG tablet TAKE 1 TABLET THREE TIMES DAILY AS NEEDED  Millie Perry MD   blood glucose test strips (TRUE METRIX BLOOD GLUCOSE TEST) strip USE TO TEST BLOOD SUGAR DAILY  Millie Perry MD   irbesartan (AVAPRO) 300 MG tablet TAKE 1 TABLET BY MOUTH EVERY NIGHT  Patient taking differently: 150 mg TAKE 1 TABLET BY MOUTH EVERY NIGHT  Millie Perry MD   rOPINIRole (REQUIP) 3 MG tablet TAKE 1 TABLET THREE TIMES DAILY  Millie Perry MD   levothyroxine (SYNTHROID) 75 MCG tablet TAKE 1 TABLET BY MOUTH EVERY DAY  Millie Perry MD   cloNIDine (CATAPRES) 0.1 MG tablet TAKE 1 TABLET TWICE DAILY  Kendall Fallon MD   conjugated estrogens (PREMARIN) 0.625 MG/GM vaginal cream Place 1 g vaginally three times a week  Patient taking differently: Place 1 g vaginally Twice a Week Monday and Friday  Millie Perry MD   levalbuterol (XOPENEX) 0.63 MG/3ML nebulization USE 1 VIAL PER NEBULIZER EVERY 6 HOURS.  MAX OF 4 TIMES PER 24 HOURS  Herman Jacob MD   tiotropium (SPIRIVA RESPIMAT) 2.5 MCG/ACT AERS inhaler Inhale 2 puffs into the lungs daily  Megha Waddell PA-C   Blood Glucose Monitoring Suppl (TRUE METRIX METER) w/Device KIT To use to check sugars 4 times daily  Husam Rogers MD   Blood Glucose Calibration (TRUE METRIX LEVEL 2) Normal SOLN As needed  Kaushik Melara MD   blood glucose test strips (TRUE METRIX BLOOD GLUCOSE TEST) strip 1 each by In Vitro route daily As needed. Kaushik Melara MD   TRUEplus Lancets 33G MISC 1 daily  Kaushik Melara MD   Lancets MISC 1 each by Does not apply route 2 times daily  Kaushik Melara MD   ondansetron (ZOFRAN) 4 MG tablet Take 1 tablet by mouth daily as needed for Nausea or Vomiting  Bebo Shine MD   OXYGEN Inhale 2 L into the lungs  Historical Provider, MD   Respiratory Therapy Supplies (NEBULIZER/TUBING/MOUTHPIECE) KIT 1 kit by Does not apply route 4 times daily as needed (shortness of breath)  Kaushik Melara MD   albuterol sulfate HFA (PROAIR HFA) 108 (90 Base) MCG/ACT inhaler Inhale 2 puffs into the lungs every 6 hours as needed for Wheezing  Patient taking differently: Inhale 2 puffs into the lungs in the morning and at bedtime  Deepa Boss MD       Vitals:    22 1604   Pulse: 60   SpO2: 94%     There is no height or weight on file to calculate BMI.      Wt Readings from Last 3 Encounters:   22 158 lb (71.7 kg)   10/04/22 159 lb 8 oz (72.3 kg)   22 158 lb 4.8 oz (71.8 kg)     BP Readings from Last 3 Encounters:   22 (!) 140/70   10/04/22 121/74   22 122/70        Social History     Tobacco Use   Smoking Status Former    Packs/day: 1.00    Years: 45.00    Pack years: 45.00    Types: Cigarettes    Start date: 9/15/1950    Quit date: 1995    Years since quittin.8   Smokeless Tobacco Never

## 2022-11-15 RX ORDER — IRBESARTAN 300 MG/1
TABLET ORAL
Qty: 90 TABLET | Refills: 0 | OUTPATIENT
Start: 2022-11-15

## 2022-11-17 ENCOUNTER — TELEPHONE (OUTPATIENT)
Dept: PULMONOLOGY | Age: 87
End: 2022-11-17

## 2022-11-17 NOTE — TELEPHONE ENCOUNTER
Pt's daughter called and said for about 4 days now pt is coughing and spitting up yellow mucus. Chest is congested.     [facility] on [facility] # [phone]

## 2022-11-28 RX ORDER — LORATADINE 10 MG/1
10 TABLET ORAL DAILY
Qty: 90 TABLET | Refills: 0 | OUTPATIENT
Start: 2022-11-28

## 2022-12-06 ENCOUNTER — TELEPHONE (OUTPATIENT)
Dept: PULMONOLOGY | Age: 87
End: 2022-12-06

## 2022-12-06 NOTE — TELEPHONE ENCOUNTER
Patients child called and needs a new tube connector to nebulizer said the little plastic thing that goes around the plug broke off and wont stay in to allow medication to go through .     [address]: [phone]

## 2022-12-06 NOTE — TELEPHONE ENCOUNTER
Nebulizer was ordered by PCP thru [facility] and spoke with Scott Hicks They do have pt under their service Per Scott Hicks sent order for supplies and they will ship to pt Pt informed

## 2022-12-07 RX ORDER — IRBESARTAN 300 MG/1
TABLET ORAL
Qty: 90 TABLET | Refills: 0 | OUTPATIENT
Start: 2022-12-07

## 2023-01-09 ENCOUNTER — TELEPHONE (OUTPATIENT)
Dept: PULMONOLOGY | Age: 88
End: 2023-01-09

## 2023-01-09 NOTE — TELEPHONE ENCOUNTER
Pt has had a cough for 2 weeks, had covid a few weeks ago. Now cough is deep and brown phlegm. Tested negative for covid.     [facility] [address]

## 2023-01-18 DIAGNOSIS — R06.2 WHEEZING: ICD-10-CM

## 2023-01-18 RX ORDER — BUDESONIDE AND FORMOTEROL FUMARATE DIHYDRATE 160; 4.5 UG/1; UG/1
AEROSOL RESPIRATORY (INHALATION)
Qty: 3 EACH | Refills: 0 | OUTPATIENT
Start: 2023-01-18

## 2023-01-23 ENCOUNTER — TELEPHONE (OUTPATIENT)
Dept: CARDIOLOGY CLINIC | Age: 88
End: 2023-01-23

## 2023-01-23 NOTE — TELEPHONE ENCOUNTER
Discussed with daughter.  They will have performing physician send request to stop [facility]

## 2023-01-23 NOTE — TELEPHONE ENCOUNTER
Huber Luna, daughter called to ask RMM is it would be ok for the Pt to get an epidermal injection in her neck for pain for her arthritis. Please call to discuss.   Please advise

## 2023-01-26 ENCOUNTER — APPOINTMENT (OUTPATIENT)
Dept: CT IMAGING | Age: 88
DRG: 194 | End: 2023-01-26
Payer: MEDICARE

## 2023-01-26 ENCOUNTER — HOSPITAL ENCOUNTER (INPATIENT)
Age: 88
LOS: 2 days | Discharge: HOME | DRG: 194 | End: 2023-01-28
Attending: EMERGENCY MEDICINE | Admitting: HOSPITALIST
Payer: MEDICARE

## 2023-01-26 ENCOUNTER — APPOINTMENT (OUTPATIENT)
Dept: GENERAL RADIOLOGY | Age: 88
DRG: 194 | End: 2023-01-26
Payer: MEDICARE

## 2023-01-26 DIAGNOSIS — R53.1 GENERAL WEAKNESS: ICD-10-CM

## 2023-01-26 DIAGNOSIS — R68.89 INCREASED OXYGEN DEMAND: ICD-10-CM

## 2023-01-26 DIAGNOSIS — R06.2 WHEEZING: ICD-10-CM

## 2023-01-26 DIAGNOSIS — J18.9 PNEUMONIA DUE TO INFECTIOUS ORGANISM, UNSPECIFIED LATERALITY, UNSPECIFIED PART OF LUNG: Primary | ICD-10-CM

## 2023-01-26 PROBLEM — J15.9 BACTERIAL PNEUMONIA: Status: ACTIVE | Noted: 2023-01-26

## 2023-01-26 LAB
A/G RATIO: 1.3 (ref 1.1–2.2)
ALBUMIN SERPL-MCNC: 3.5 G/DL (ref 3.4–5)
ALP BLD-CCNC: 122 U/L (ref 40–129)
ALT SERPL-CCNC: 15 U/L (ref 10–40)
ANION GAP SERPL CALCULATED.3IONS-SCNC: 10 MMOL/L (ref 3–16)
AST SERPL-CCNC: 16 U/L (ref 15–37)
BACTERIA: NORMAL /HPF
BASOPHILS ABSOLUTE: 0 K/UL (ref 0–0.2)
BASOPHILS RELATIVE PERCENT: 0.8 %
BILIRUB SERPL-MCNC: 0.3 MG/DL (ref 0–1)
BILIRUBIN URINE: NEGATIVE
BLOOD, URINE: NEGATIVE
BUN BLDV-MCNC: 30 MG/DL (ref 7–20)
CALCIUM SERPL-MCNC: 9.3 MG/DL (ref 8.3–10.6)
CHLORIDE BLD-SCNC: 101 MMOL/L (ref 99–110)
CLARITY: CLEAR
CO2: 26 MMOL/L (ref 21–32)
COLOR: YELLOW
CREAT SERPL-MCNC: 1.2 MG/DL (ref 0.6–1.2)
EKG ATRIAL RATE: 56 BPM
EKG DIAGNOSIS: NORMAL
EKG P AXIS: 49 DEGREES
EKG P-R INTERVAL: 178 MS
EKG Q-T INTERVAL: 468 MS
EKG QRS DURATION: 86 MS
EKG QTC CALCULATION (BAZETT): 451 MS
EKG R AXIS: 54 DEGREES
EKG T AXIS: 16 DEGREES
EKG VENTRICULAR RATE: 56 BPM
EOSINOPHILS ABSOLUTE: 0.2 K/UL (ref 0–0.6)
EOSINOPHILS RELATIVE PERCENT: 5.1 %
EPITHELIAL CELLS, UA: 0 /HPF (ref 0–5)
GFR SERPL CREATININE-BSD FRML MDRD: 44 ML/MIN/{1.73_M2}
GLUCOSE BLD-MCNC: 102 MG/DL (ref 70–99)
GLUCOSE BLD-MCNC: 106 MG/DL (ref 70–99)
GLUCOSE BLD-MCNC: 128 MG/DL (ref 70–99)
GLUCOSE URINE: NEGATIVE MG/DL
HCT VFR BLD CALC: 28.8 % (ref 36–48)
HEMOGLOBIN: 9.3 G/DL (ref 12–16)
HYALINE CASTS: 0 /LPF (ref 0–8)
KETONES, URINE: NEGATIVE MG/DL
LACTIC ACID: 0.7 MMOL/L (ref 0.4–2)
LEUKOCYTE ESTERASE, URINE: NEGATIVE
LIPASE: 17 U/L (ref 13–60)
LYMPHOCYTES ABSOLUTE: 1.5 K/UL (ref 1–5.1)
LYMPHOCYTES RELATIVE PERCENT: 33.3 %
MCH RBC QN AUTO: 28.3 PG (ref 26–34)
MCHC RBC AUTO-ENTMCNC: 32.4 G/DL (ref 31–36)
MCV RBC AUTO: 87.4 FL (ref 80–100)
MICROSCOPIC EXAMINATION: YES
MONOCYTES ABSOLUTE: 0.4 K/UL (ref 0–1.3)
MONOCYTES RELATIVE PERCENT: 8.3 %
NEUTROPHILS ABSOLUTE: 2.4 K/UL (ref 1.7–7.7)
NEUTROPHILS RELATIVE PERCENT: 52.5 %
NITRITE, URINE: NEGATIVE
PDW BLD-RTO: 16 % (ref 12.4–15.4)
PERFORMED ON: ABNORMAL
PERFORMED ON: ABNORMAL
PH UA: 6.5 (ref 5–8)
PLATELET # BLD: 135 K/UL (ref 135–450)
PMV BLD AUTO: 8.2 FL (ref 5–10.5)
POTASSIUM REFLEX MAGNESIUM: 4.4 MMOL/L (ref 3.5–5.1)
PROTEIN UA: ABNORMAL MG/DL
RBC # BLD: 3.3 M/UL (ref 4–5.2)
RBC UA: 0 /HPF (ref 0–4)
SODIUM BLD-SCNC: 137 MMOL/L (ref 136–145)
SPECIFIC GRAVITY UA: 1.02 (ref 1–1.03)
TOTAL PROTEIN: 6.3 G/DL (ref 6.4–8.2)
TROPONIN: <0.01 NG/ML
URINE REFLEX TO CULTURE: ABNORMAL
URINE TYPE: ABNORMAL
UROBILINOGEN, URINE: 0.2 E.U./DL
WBC # BLD: 4.6 K/UL (ref 4–11)
WBC UA: 0 /HPF (ref 0–5)

## 2023-01-26 PROCEDURE — 85025 COMPLETE CBC W/AUTO DIFF WBC: CPT

## 2023-01-26 PROCEDURE — 83690 ASSAY OF LIPASE: CPT

## 2023-01-26 PROCEDURE — 87040 BLOOD CULTURE FOR BACTERIA: CPT

## 2023-01-26 PROCEDURE — 93005 ELECTROCARDIOGRAM TRACING: CPT | Performed by: PHYSICIAN ASSISTANT

## 2023-01-26 PROCEDURE — 94760 N-INVAS EAR/PLS OXIMETRY 1: CPT

## 2023-01-26 PROCEDURE — 71045 X-RAY EXAM CHEST 1 VIEW: CPT

## 2023-01-26 PROCEDURE — 6370000000 HC RX 637 (ALT 250 FOR IP): Performed by: HOSPITALIST

## 2023-01-26 PROCEDURE — 74177 CT ABD & PELVIS W/CONTRAST: CPT

## 2023-01-26 PROCEDURE — 83036 HEMOGLOBIN GLYCOSYLATED A1C: CPT

## 2023-01-26 PROCEDURE — 6370000000 HC RX 637 (ALT 250 FOR IP): Performed by: NURSE PRACTITIONER

## 2023-01-26 PROCEDURE — 93010 ELECTROCARDIOGRAM REPORT: CPT | Performed by: INTERNAL MEDICINE

## 2023-01-26 PROCEDURE — 94640 AIRWAY INHALATION TREATMENT: CPT

## 2023-01-26 PROCEDURE — 2580000003 HC RX 258: Performed by: HOSPITALIST

## 2023-01-26 PROCEDURE — 83605 ASSAY OF LACTIC ACID: CPT

## 2023-01-26 PROCEDURE — 6360000004 HC RX CONTRAST MEDICATION: Performed by: PHYSICIAN ASSISTANT

## 2023-01-26 PROCEDURE — 6370000000 HC RX 637 (ALT 250 FOR IP): Performed by: EMERGENCY MEDICINE

## 2023-01-26 PROCEDURE — 96375 TX/PRO/DX INJ NEW DRUG ADDON: CPT

## 2023-01-26 PROCEDURE — 80053 COMPREHEN METABOLIC PANEL: CPT

## 2023-01-26 PROCEDURE — 1200000000 HC SEMI PRIVATE

## 2023-01-26 PROCEDURE — 6360000002 HC RX W HCPCS: Performed by: PHYSICIAN ASSISTANT

## 2023-01-26 PROCEDURE — 6360000002 HC RX W HCPCS: Performed by: EMERGENCY MEDICINE

## 2023-01-26 PROCEDURE — 99285 EMERGENCY DEPT VISIT HI MDM: CPT

## 2023-01-26 PROCEDURE — 96374 THER/PROPH/DIAG INJ IV PUSH: CPT

## 2023-01-26 PROCEDURE — 81001 URINALYSIS AUTO W/SCOPE: CPT

## 2023-01-26 PROCEDURE — 84484 ASSAY OF TROPONIN QUANT: CPT

## 2023-01-26 RX ORDER — HYDROMORPHONE HYDROCHLORIDE 1 MG/ML
0.5 INJECTION, SOLUTION INTRAMUSCULAR; INTRAVENOUS; SUBCUTANEOUS ONCE
Status: COMPLETED | OUTPATIENT
Start: 2023-01-26 | End: 2023-01-26

## 2023-01-26 RX ORDER — ATORVASTATIN CALCIUM 80 MG/1
80 TABLET, FILM COATED ORAL DAILY
Status: DISCONTINUED | OUTPATIENT
Start: 2023-01-26 | End: 2023-01-28 | Stop reason: HOSPADM

## 2023-01-26 RX ORDER — MORPHINE SULFATE 2 MG/ML
2 INJECTION, SOLUTION INTRAMUSCULAR; INTRAVENOUS ONCE
Status: COMPLETED | OUTPATIENT
Start: 2023-01-26 | End: 2023-01-26

## 2023-01-26 RX ORDER — ONDANSETRON 2 MG/ML
4 INJECTION INTRAMUSCULAR; INTRAVENOUS ONCE
Status: COMPLETED | OUTPATIENT
Start: 2023-01-26 | End: 2023-01-26

## 2023-01-26 RX ORDER — ONDANSETRON 4 MG/1
4 TABLET, ORALLY DISINTEGRATING ORAL EVERY 8 HOURS PRN
Status: DISCONTINUED | OUTPATIENT
Start: 2023-01-26 | End: 2023-01-28 | Stop reason: HOSPADM

## 2023-01-26 RX ORDER — IPRATROPIUM BROMIDE AND ALBUTEROL SULFATE 2.5; .5 MG/3ML; MG/3ML
1 SOLUTION RESPIRATORY (INHALATION) ONCE
Status: COMPLETED | OUTPATIENT
Start: 2023-01-26 | End: 2023-01-26

## 2023-01-26 RX ORDER — INSULIN LISPRO 100 [IU]/ML
0-8 INJECTION, SOLUTION INTRAVENOUS; SUBCUTANEOUS
Status: DISCONTINUED | OUTPATIENT
Start: 2023-01-26 | End: 2023-01-28 | Stop reason: HOSPADM

## 2023-01-26 RX ORDER — IPRATROPIUM BROMIDE AND ALBUTEROL SULFATE 2.5; .5 MG/3ML; MG/3ML
1 SOLUTION RESPIRATORY (INHALATION)
Status: DISCONTINUED | OUTPATIENT
Start: 2023-01-26 | End: 2023-01-27

## 2023-01-26 RX ORDER — SODIUM CHLORIDE 9 MG/ML
INJECTION, SOLUTION INTRAVENOUS PRN
Status: DISCONTINUED | OUTPATIENT
Start: 2023-01-26 | End: 2023-01-28 | Stop reason: HOSPADM

## 2023-01-26 RX ORDER — INSULIN LISPRO 100 [IU]/ML
0-4 INJECTION, SOLUTION INTRAVENOUS; SUBCUTANEOUS NIGHTLY
Status: DISCONTINUED | OUTPATIENT
Start: 2023-01-26 | End: 2023-01-28 | Stop reason: HOSPADM

## 2023-01-26 RX ORDER — DILTIAZEM HYDROCHLORIDE 180 MG/1
180 CAPSULE, COATED, EXTENDED RELEASE ORAL DAILY
Status: DISCONTINUED | OUTPATIENT
Start: 2023-01-27 | End: 2023-01-28 | Stop reason: HOSPADM

## 2023-01-26 RX ORDER — TRAZODONE HYDROCHLORIDE 50 MG/1
50 TABLET ORAL NIGHTLY
Status: ON HOLD | COMMUNITY
End: 2023-01-28 | Stop reason: HOSPADM

## 2023-01-26 RX ORDER — LEVOFLOXACIN 5 MG/ML
500 INJECTION, SOLUTION INTRAVENOUS
Status: DISCONTINUED | OUTPATIENT
Start: 2023-01-28 | End: 2023-01-27

## 2023-01-26 RX ORDER — TRAMADOL HYDROCHLORIDE 50 MG/1
50 TABLET ORAL EVERY 6 HOURS PRN
Status: DISCONTINUED | OUTPATIENT
Start: 2023-01-26 | End: 2023-01-28

## 2023-01-26 RX ORDER — POLYETHYLENE GLYCOL 3350 17 G/17G
17 POWDER, FOR SOLUTION ORAL DAILY PRN
Status: DISCONTINUED | OUTPATIENT
Start: 2023-01-26 | End: 2023-01-28 | Stop reason: HOSPADM

## 2023-01-26 RX ORDER — ACETAMINOPHEN 650 MG/1
650 SUPPOSITORY RECTAL EVERY 6 HOURS PRN
Status: DISCONTINUED | OUTPATIENT
Start: 2023-01-26 | End: 2023-01-28

## 2023-01-26 RX ORDER — LEVOFLOXACIN 5 MG/ML
750 INJECTION, SOLUTION INTRAVENOUS ONCE
Status: COMPLETED | OUTPATIENT
Start: 2023-01-26 | End: 2023-01-26

## 2023-01-26 RX ORDER — ACETAMINOPHEN 325 MG/1
650 TABLET ORAL EVERY 6 HOURS PRN
Status: DISCONTINUED | OUTPATIENT
Start: 2023-01-26 | End: 2023-01-28

## 2023-01-26 RX ORDER — CLONIDINE HYDROCHLORIDE 0.1 MG/1
0.1 TABLET ORAL 2 TIMES DAILY
Status: DISCONTINUED | OUTPATIENT
Start: 2023-01-26 | End: 2023-01-28 | Stop reason: HOSPADM

## 2023-01-26 RX ORDER — DEXTROSE MONOHYDRATE 100 MG/ML
INJECTION, SOLUTION INTRAVENOUS CONTINUOUS PRN
Status: DISCONTINUED | OUTPATIENT
Start: 2023-01-26 | End: 2023-01-28 | Stop reason: HOSPADM

## 2023-01-26 RX ORDER — SODIUM CHLORIDE 0.9 % (FLUSH) 0.9 %
5-40 SYRINGE (ML) INJECTION EVERY 12 HOURS SCHEDULED
Status: DISCONTINUED | OUTPATIENT
Start: 2023-01-26 | End: 2023-01-28 | Stop reason: HOSPADM

## 2023-01-26 RX ORDER — ONDANSETRON 2 MG/ML
4 INJECTION INTRAMUSCULAR; INTRAVENOUS EVERY 6 HOURS PRN
Status: DISCONTINUED | OUTPATIENT
Start: 2023-01-26 | End: 2023-01-28 | Stop reason: HOSPADM

## 2023-01-26 RX ORDER — LEVOTHYROXINE SODIUM 0.07 MG/1
75 TABLET ORAL DAILY
Status: DISCONTINUED | OUTPATIENT
Start: 2023-01-27 | End: 2023-01-28 | Stop reason: HOSPADM

## 2023-01-26 RX ORDER — SODIUM CHLORIDE 0.9 % (FLUSH) 0.9 %
5-40 SYRINGE (ML) INJECTION PRN
Status: DISCONTINUED | OUTPATIENT
Start: 2023-01-26 | End: 2023-01-28 | Stop reason: HOSPADM

## 2023-01-26 RX ADMIN — HYDROMORPHONE HYDROCHLORIDE 0.5 MG: 1 INJECTION, SOLUTION INTRAMUSCULAR; INTRAVENOUS; SUBCUTANEOUS at 12:04

## 2023-01-26 RX ADMIN — ONDANSETRON 4 MG: 4 TABLET, ORALLY DISINTEGRATING ORAL at 20:56

## 2023-01-26 RX ADMIN — ATORVASTATIN CALCIUM 80 MG: 80 TABLET, FILM COATED ORAL at 18:16

## 2023-01-26 RX ADMIN — HYDROMORPHONE HYDROCHLORIDE 0.5 MG: 1 INJECTION, SOLUTION INTRAMUSCULAR; INTRAVENOUS; SUBCUTANEOUS at 16:33

## 2023-01-26 RX ADMIN — IPRATROPIUM BROMIDE AND ALBUTEROL SULFATE 1 AMPULE: 2.5; .5 SOLUTION RESPIRATORY (INHALATION) at 13:24

## 2023-01-26 RX ADMIN — APIXABAN 5 MG: 5 TABLET, FILM COATED ORAL at 20:56

## 2023-01-26 RX ADMIN — LEVOFLOXACIN 750 MG: 5 INJECTION, SOLUTION INTRAVENOUS at 15:01

## 2023-01-26 RX ADMIN — ACETAMINOPHEN 650 MG: 325 TABLET ORAL at 23:52

## 2023-01-26 RX ADMIN — TRAMADOL HYDROCHLORIDE 50 MG: 50 TABLET ORAL at 20:56

## 2023-01-26 RX ADMIN — Medication 10 ML: at 20:58

## 2023-01-26 RX ADMIN — IOPAMIDOL 75 ML: 755 INJECTION, SOLUTION INTRAVENOUS at 10:31

## 2023-01-26 RX ADMIN — CLONIDINE HYDROCHLORIDE 0.1 MG: 0.1 TABLET ORAL at 20:56

## 2023-01-26 RX ADMIN — ONDANSETRON 4 MG: 2 INJECTION INTRAMUSCULAR; INTRAVENOUS at 10:11

## 2023-01-26 RX ADMIN — ONDANSETRON 4 MG: 2 INJECTION INTRAMUSCULAR; INTRAVENOUS at 12:04

## 2023-01-26 RX ADMIN — POLYETHYLENE GLYCOL 3350 17 G: 17 POWDER, FOR SOLUTION ORAL at 20:56

## 2023-01-26 RX ADMIN — MORPHINE SULFATE 2 MG: 2 INJECTION, SOLUTION INTRAMUSCULAR; INTRAVENOUS at 10:10

## 2023-01-26 ASSESSMENT — ENCOUNTER SYMPTOMS
CHEST TIGHTNESS: 0
DIARRHEA: 0
VOMITING: 0
SHORTNESS OF BREATH: 0
RESPIRATORY NEGATIVE: 1
NAUSEA: 1
ABDOMINAL PAIN: 1
COUGH: 0
COLOR CHANGE: 0
BACK PAIN: 1
CONSTIPATION: 0

## 2023-01-26 ASSESSMENT — PAIN SCALES - GENERAL
PAINLEVEL_OUTOF10: 8
PAINLEVEL_OUTOF10: 5
PAINLEVEL_OUTOF10: 5
PAINLEVEL_OUTOF10: 8
PAINLEVEL_OUTOF10: 10
PAINLEVEL_OUTOF10: 9
PAINLEVEL_OUTOF10: 8
PAINLEVEL_OUTOF10: 8

## 2023-01-26 ASSESSMENT — PAIN DESCRIPTION - ORIENTATION
ORIENTATION: RIGHT
ORIENTATION: RIGHT;LOWER
ORIENTATION: RIGHT
ORIENTATION: RIGHT;LEFT
ORIENTATION: RIGHT
ORIENTATION: RIGHT;LOWER
ORIENTATION: RIGHT

## 2023-01-26 ASSESSMENT — PAIN DESCRIPTION - DESCRIPTORS
DESCRIPTORS: DISCOMFORT
DESCRIPTORS: ACHING;THROBBING
DESCRIPTORS: DISCOMFORT
DESCRIPTORS: ACHING;THROBBING
DESCRIPTORS: THROBBING

## 2023-01-26 ASSESSMENT — LIFESTYLE VARIABLES
HOW OFTEN DO YOU HAVE A DRINK CONTAINING ALCOHOL: NEVER
HOW MANY STANDARD DRINKS CONTAINING ALCOHOL DO YOU HAVE ON A TYPICAL DAY: PATIENT DOES NOT DRINK
HOW OFTEN DO YOU HAVE A DRINK CONTAINING ALCOHOL: NEVER

## 2023-01-26 ASSESSMENT — PAIN DESCRIPTION - FREQUENCY
FREQUENCY: CONTINUOUS
FREQUENCY: CONTINUOUS

## 2023-01-26 ASSESSMENT — PAIN - FUNCTIONAL ASSESSMENT
PAIN_FUNCTIONAL_ASSESSMENT: ACTIVITIES ARE NOT PREVENTED
PAIN_FUNCTIONAL_ASSESSMENT: ACTIVITIES ARE NOT PREVENTED
PAIN_FUNCTIONAL_ASSESSMENT: PREVENTS OR INTERFERES WITH MANY ACTIVE NOT PASSIVE ACTIVITIES
PAIN_FUNCTIONAL_ASSESSMENT: 0-10

## 2023-01-26 ASSESSMENT — PAIN DESCRIPTION - PAIN TYPE
TYPE: ACUTE PAIN

## 2023-01-26 ASSESSMENT — PAIN DESCRIPTION - LOCATION
LOCATION: ABDOMEN;BACK
LOCATION: BACK;ABDOMEN
LOCATION: ABDOMEN;BACK
LOCATION: ABDOMEN;BACK
LOCATION: ABDOMEN

## 2023-01-26 NOTE — PROGRESS NOTES
.4 Eyes Skin Assessment     NAME:  Lord Nieto  YOB: 1935  MEDICAL RECORD NUMBER:  [id number]    The patient is being assessed for  Admission    I agree that One RN have performed a thorough Head to Toe Skin Assessment on the patient. ALL assessment sites listed below have been assessed. Areas assessed by both nurses:    Head, Face, Ears, Shoulders, Back, Chest, Arms, Elbows, Hands, Sacrum. Buttock, Coccyx, Ischium, Legs. Feet and Heels, and Other          Does the Patient have a Wound?  No noted wound(s)       Cecilio Prevention initiated by RN: Yes   Wound Care Orders initiated by RN: NA    Pressure Injury (Stage 3,4, Unstageable, DTI, NWPT, and Complex wounds) if present place referral order by RN under : No    New and Established Ostomies, if present place, referral order under : No      Nurse 1 eSignature: Electronically signed by Karuna Conde RN on 1/26/23 at 6:52 PM EST    **SHARE this note so that the co-signing nurse is able to place an eSignature**    Nurse 2 eSignature: Electronically signed by Maximiliano More RN on 1/26/23 at 6:53 PM EST

## 2023-01-26 NOTE — PROGRESS NOTES
Patient seen in ED, room 7. Admission completed except for: 4 Eyes Assessment, Immunizations, Covid Vaccines, Rights and Responsibilities, Orientation to room, Plan of Care, education, white board, height and weight, pain assessment and head to toe assessment. Patient is alert and oriented X 4. Patient lives in a bi-level with her daughter and family and is being admitted for bacterial pneumonia. Home Medication Reconciliation was completed by pharmacy staff. All questions answered.

## 2023-01-26 NOTE — PROGRESS NOTES
Admitted to 5T for bacterial PNA. Patient has hx of A fib, diabetes and hypertension. Alert and oriented to room and call light. Family at bedside assisting. Patient CGA with using the bed side commode. Takes medications whole with thin liquids. Oxygen on at 2 liters per n/c, no c/o shortness of breath at this time.

## 2023-01-26 NOTE — ED PROVIDER NOTES
[facility] Emergency Department      Pt Name: Radha Brush  MRN: [id number]  [place] 1935  Date of evaluation: 1/26/2023  Provider: Angel Avilez MD  I independently performed a history and physical on Radha Brush. All diagnostic, treatment, and disposition decisions were made by myself in conjunction with the advanced practice provider. HPI: Radha Brush presented with   Chief Complaint   Patient presents with    Abdominal Pain     Pt arrived via squad from home. Abdominal pain with nausea for 3 days. Radha Brush has a past medical history of Arthritis, Atrial fibrillation ([address] Bursitis, Diabetes mellitus type II, controlled ([address] GERD (gastroesophageal reflux disease), HTN (hypertension), Hyperlipidemia, Hypothyroid, Insomnia, Lumbago, Parkinson disease ([address] Restless legs syndrome (RLS), and SVT (supraventricular tachycardia) ([address] She has a past surgical history that includes Appendectomy; Colonoscopy; and Cholecystectomy, laparoscopic (2/24/2013). No current facility-administered medications on file prior to encounter. Current Outpatient Medications on File Prior to Encounter   Medication Sig Dispense Refill    traZODone (DESYREL) 50 MG tablet Take 50 mg by mouth nightly      dilTIAZem (CARDIZEM CD) 180 MG extended release capsule Take 1 capsule by mouth daily 90 capsule 3    dilTIAZem (CARDIZEM) 30 MG tablet As needed for episodes of tachycardia (Patient taking differently: Take 30 mg by mouth as needed As needed for episodes of tachycardia) 120 tablet 3    dicyclomine (BENTYL) 10 MG capsule TAKE 1 CAPSULE BY MOUTH FOUR TIMES DAILY AS NEEDED FOR ABDOMINAL PAIN (Patient taking differently: Take 10 mg by mouth as needed) 120 capsule 0    LORazepam (ATIVAN) 1 MG tablet Take 1 mg by mouth at bedtime.       SITagliptin (JANUVIA) 100 MG tablet Take 100 mg by mouth daily      LORazepam (ATIVAN) 0.5 MG tablet Take 0.5 mg by mouth in the morning and at bedtime. In the morning and afternoon      ELIQUIS 5 MG TABS tablet TAKE 1 TABLET TWICE DAILY 180 tablet 0    budesonide-formoterol (SYMBICORT) 160-4.5 MCG/ACT AERO INHALE 2 PUFFS TWICE DAILY 3 each 0    fluticasone (FLONASE) 50 MCG/ACT nasal spray USE 2 SPRAYS NASALLY EVERY DAY (Patient taking differently: 2 sprays by Nasal route as needed) 48 g 1    atorvastatin (LIPITOR) 80 MG tablet TAKE 1 TABLET EVERY NIGHT 90 tablet 3    [DISCONTINUED] amLODIPine (NORVASC) 5 MG tablet Take 1 tablet by mouth in the morning. 30 tablet 5    meclizine (ANTIVERT) 12.5 MG tablet TAKE 1 TABLET THREE TIMES DAILY AS NEEDED 270 tablet 1    blood glucose test strips (TRUE METRIX BLOOD GLUCOSE TEST) strip USE TO TEST BLOOD SUGAR DAILY 100 strip 5    irbesartan (AVAPRO) 300 MG tablet TAKE 1 TABLET BY MOUTH EVERY NIGHT (Patient taking differently: 150 mg TAKE 1 TABLET BY MOUTH EVERY NIGHT) 90 tablet 0    rOPINIRole (REQUIP) 3 MG tablet TAKE 1 TABLET THREE TIMES DAILY 270 tablet 1    levothyroxine (SYNTHROID) 75 MCG tablet TAKE 1 TABLET BY MOUTH EVERY DAY 90 tablet 0    cloNIDine (CATAPRES) 0.1 MG tablet TAKE 1 TABLET TWICE DAILY 180 tablet 0    conjugated estrogens (PREMARIN) 0.625 MG/GM vaginal cream Place 1 g vaginally three times a week (Patient taking differently: Place 1 g vaginally Twice a Week Monday and Friday) 30 g 2    levalbuterol (XOPENEX) 0.63 MG/3ML nebulization USE 1 VIAL PER NEBULIZER EVERY 6 HOURS.  MAX OF 4 TIMES PER 24 HOURS (Patient taking differently: Take 1 ampule by nebulization every 6 hours as needed) 1086 mL 3    tiotropium (SPIRIVA RESPIMAT) 2.5 MCG/ACT AERS inhaler Inhale 2 puffs into the lungs daily (Patient not taking: Reported on 1/26/2023) 1 each 1    Blood Glucose Monitoring Suppl (TRUE METRIX METER) w/Device KIT To use to check sugars 4 times daily 1 kit 0    Blood Glucose Calibration (TRUE METRIX LEVEL 2) Normal SOLN As needed 1 each 0    blood glucose test strips (TRUE METRIX BLOOD GLUCOSE TEST) strip 1 each by In Vitro route daily As needed. 100 each 3    TRUEplus Lancets 33G MISC 1 daily 100 each 3    Lancets MISC 1 each by Does not apply route 2 times daily 300 each 1    OXYGEN Inhale 2 L into the lungs      Respiratory Therapy Supplies (NEBULIZER/TUBING/MOUTHPIECE) KIT 1 kit by Does not apply route 4 times daily as needed (shortness of breath) 1 kit 0    albuterol sulfate HFA (PROAIR HFA) 108 (90 Base) MCG/ACT inhaler Inhale 2 puffs into the lungs every 6 hours as needed for Wheezing (Patient taking differently: Inhale 2 puffs into the lungs in the morning and at bedtime) 1 Inhaler 6     PHYSICAL EXAM  Vitals: BP (!) 159/60   Pulse 53   Temp 97.2 °F (36.2 °C) (Oral)   Resp 15   Ht 5' 2\" (1.575 m)   Wt 157 lb (71.2 kg)   SpO2 92%   BMI 28.72 kg/m²   Constitutional:  80 y.o. female alert  HENT:  Atraumatic, oral mucosa moist  Neck:  No visible JVD, supple  Chest/Lungs:  Respiratory effort normal, bibasilar rales  Abdomen:  Non-distended, soft, NT  Back:  No gross deformity, no rash  Extremities:  Normal tone and perfusion  Neurologic:  Alert, speech normal, mentation normal, pupils equal, normal coordination of upper extremities, no facial asymmetry, gait not tested    Medical Decision Making: Briefly, this is an 80 y. o.female who presented with right flank pain. Woke up with pain. Denies any known injury but now thinks she could have pulled a muscle. She has chronic hypoxia, wears 2L at home. Slightly increased oxygen requirement. Admits to increased cough for several days. Had COVID several weeks ago. Daughter reports her laying in bed, very fatigued and weak. She could not get up this morning. Etiology for her back pain felt to be msk in nature. I discussed the case in full with the admitting physician. For further details of [facility] encounter, please see documentation by advanced practice provider JESSICA Pereira.     Labs Reviewed   CBC WITH AUTO DIFFERENTIAL - Abnormal; Notable for the following components:       Result Value    RBC 3.30 (*)     Hemoglobin 9.3 (*)     Hematocrit 28.8 (*)     RDW 16.0 (*)     All other components within normal limits   COMPREHENSIVE METABOLIC PANEL W/ REFLEX TO MG FOR LOW K - Abnormal; Notable for the following components:    Glucose 106 (*)     BUN 30 (*)     Est, Glom Filt Rate 44 (*)     Total Protein 6.3 (*)     All other components within normal limits   URINALYSIS WITH REFLEX TO CULTURE - Abnormal; Notable for the following components:    Protein, UA TRACE (*)     All other components within normal limits   CULTURE, BLOOD 1   CULTURE, BLOOD 2   LIPASE   LACTIC ACID   TROPONIN   MICROSCOPIC URINALYSIS   HEMOGLOBIN A1C   POCT GLUCOSE     Previous HGB:    Hemoglobin   Date/Time Value Ref Range Status   01/26/2023 09:28 AM 9.3 (L) 12.0 - 16.0 g/dL Final   10/04/2022 04:22 AM 8.2 (L) 12.0 - 16.0 g/dL Final   10/03/2022 10:48 AM 9.2 (L) 12.0 - 16.0 g/dL Final     RADIOLOGY:   Plain x-rays were viewed by me:   CT ABDOMEN PELVIS W IV CONTRAST Additional Contrast? None   Final Result   1. New left basilar airspace disease concerning for developing pneumonia. Recommend chest radiograph in 8 weeks to confirm resolution. 2. New 1.0 cm complex left upper pole renal cyst.  Recommend nonemergent MRI   of the abdomen with without contrast using renal mass protocol. XR CHEST PORTABLE   Final Result   No acute cardiopulmonary findings.            EKG:  Read by me in the absence of a cardiologist shows: Sinus bradycardia, rate 56, intervals normal, axis 54 degrees, no acute injury pattern, minimal change and actually looks improved when compared to 4 October 2022    Medications administered (list can include non ED meds ordered by other providers for patients that are admitted):  Medications   atorvastatin (LIPITOR) tablet 80 mg (has no administration in time range)   mometasone-formoterol (DULERA) 200-5 MCG/ACT inhaler 2 puff (has no administration in time range)   cloNIDine (CATAPRES) tablet 0.1 mg (has no administration in time range)   dilTIAZem (CARDIZEM CD) extended release capsule 180 mg (has no administration in time range)   apixaban (ELIQUIS) tablet 5 mg (has no administration in time range)   levothyroxine (SYNTHROID) tablet 75 mcg (has no administration in time range)   ipratropium-albuterol (DUONEB) nebulizer solution 1 ampule (has no administration in time range)   levoFLOXacin (LEVAQUIN) 500 MG/100ML infusion 500 mg (has no administration in time range)   dextrose bolus 10% 125 mL (has no administration in time range)     Or   dextrose bolus 10% 250 mL (has no administration in time range)   glucagon (rDNA) injection 1 mg (has no administration in time range)   dextrose 10 % infusion (has no administration in time range)   sodium chloride flush 0.9 % injection 5-40 mL (has no administration in time range)   sodium chloride flush 0.9 % injection 5-40 mL (has no administration in time range)   0.9 % sodium chloride infusion (has no administration in time range)   ondansetron (ZOFRAN-ODT) disintegrating tablet 4 mg (has no administration in time range)     Or   ondansetron (ZOFRAN) injection 4 mg (has no administration in time range)   polyethylene glycol (GLYCOLAX) packet 17 g (has no administration in time range)   acetaminophen (TYLENOL) tablet 650 mg (has no administration in time range)     Or   acetaminophen (TYLENOL) suppository 650 mg (has no administration in time range)   insulin lispro (HUMALOG) injection vial 0-8 Units (has no administration in time range)   insulin lispro (HUMALOG) injection vial 0-4 Units (has no administration in time range)   morphine (PF) injection 2 mg (2 mg IntraVENous Given 1/26/23 1010)   ondansetron (ZOFRAN) injection 4 mg (4 mg IntraVENous Given 1/26/23 1011)   iopamidol (ISOVUE-370) 76 % injection 75 mL (75 mLs IntraVENous Given 1/26/23 1031)   HYDROmorphone HCl PF (DILAUDID) injection 0.5 mg (0.5 mg IntraVENous Given 1/26/23 1204)   ondansetron (ZOFRAN) injection 4 mg (4 mg IntraVENous Given 1/26/23 1204)   ipratropium-albuterol (DUONEB) nebulizer solution 1 ampule (1 ampule Inhalation Given 1/26/23 1324)   levoFLOXacin (LEVAQUIN) 750 MG/150ML infusion 750 mg (0 mg IntraVENous Stopped 1/26/23 1639)   HYDROmorphone HCl PF (DILAUDID) injection 0.5 mg (0.5 mg IntraVENous Given 1/26/23 1633)     Vitals:    01/26/23 1450 01/26/23 1515 01/26/23 1559 01/26/23 1629   BP: (!) 128/54 131/67 (!) 156/76 (!) 129/54   Pulse: 60 69 60 58   Resp: 18 16 17 15   Temp:       TempSrc:       SpO2: 95% 93% 95% 93%   Weight:       Height:         History from : Patient  Limitations to history : None  Chronic Conditions:  has a past medical history of Arthritis, Atrial fibrillation ([facility] [address] Bursitis, Diabetes mellitus type II, controlled ([address] GERD (gastroesophageal reflux disease), HTN (hypertension), Hyperlipidemia, Hypothyroid, Insomnia, Lumbago, Parkinson disease ([facility]), Restless legs syndrome (RLS), and SVT (supraventricular tachycardia) ([address] None  Social Determinants : None  Records Reviewed : Outpatient Notes    Disposition Considerations (Tests not ordered but considered, Shared Decision Making, Pt Expectation of Test or Tx.):    MRI not deemed clinically necessary in the ED setting    FINAL IMPRESSION:    1. Pneumonia due to infectious organism, unspecified laterality, unspecified part of lung    2. Increased oxygen demand    3.  General weakness       DISPOSITION Admitted 01/26/2023 01:57:33 PM         Charlene Dwane Cranker, MD  01/26/23 1806

## 2023-01-26 NOTE — ED PROVIDER NOTES
[address] [facility]        Pt Name: June Pierre  MRN: [id number]  [place] 1935  Date of evaluation: 1/26/2023  Provider: JESSICA Tavarez  PCP: Hyacinth Marin MD  Note Started: 10:22 AM EST 1/26/23       I have seen and evaluated this patient with my supervising physician [address] [place] [address]       Chief Complaint   Patient presents with    Abdominal Pain     Pt arrived via squad from home. Abdominal pain with nausea for 3 days. HISTORY OF PRESENT ILLNESS: 1 or more Elements     History From: Patient  Limitations to history : None    June Pierre is a 80 y.o. female with past medical history of obstructive sleep apnea, chronic kidney disease, hypertension, CHF, hyperlipidemia, IBS, atrial fibrillation anticoagulated on Eliquis, COPD on chronic nasal cannula oxygen at 2 L, CAD who presents the ED with complaint of abdominal pain. For the past 3 days has had pain to her right lower back/flank that radiates into the right lower abdomen. She states she has had some nausea. Denies any vomiting. Denies decreased oral intake. She has had some dysuria but denies any frequency urgency. Denies any hematuria. Denies any changes in bowel movements. Denies vaginal bleeding or discharge. Denies any chest pain. Has chronic shortness of breath from her COPD but denies any worsening shortness of breath this time. Reports he has had chronic cough as well but denies any worsening cough at this time. Cough is nonproductive. She is on 2 L nasal cannula oxygen this time and she states this is chronic. She denies any increased oxygen requirement. She reports she has had past surgical history of appendectomy as a child and cholecystectomy. Denies any other surgeries to the abdomen the past.  Denies taking any over-the-counter medication for symptom control.   Describes pain as an 8/10 and became concerned and came to the ED by EMS for further evaluation treatment. Nursing Notes were all reviewed and agreed with or any disagreements were addressed in the HPI. REVIEW OF SYSTEMS :      Review of Systems   Constitutional:  Negative for activity change, appetite change, chills, diaphoresis, fatigue and fever. Respiratory: Negative. Negative for cough, chest tightness and shortness of breath. Cardiovascular: Negative. Negative for chest pain, palpitations and leg swelling. Gastrointestinal:  Positive for abdominal pain and nausea. Negative for constipation, diarrhea and vomiting. Genitourinary:  Positive for dysuria and flank pain. Negative for decreased urine volume, difficulty urinating, frequency, genital sores, hematuria, menstrual problem, pelvic pain, urgency, vaginal bleeding, vaginal discharge and vaginal pain. Musculoskeletal:  Positive for back pain. Negative for arthralgias, myalgias, neck pain and neck stiffness. Skin:  Negative for color change, pallor, rash and wound. Neurological:  Negative for dizziness, light-headedness and headaches. Positives and Pertinent negatives as per HPI. SURGICAL HISTORY     Past Surgical History:   Procedure Laterality Date    APPENDECTOMY      CHOLECYSTECTOMY, LAPAROSCOPIC  2/24/2013    COLONOSCOPY         CURRENTMEDICATIONS       Previous Medications    ALBUTEROL SULFATE HFA (PROAIR HFA) 108 (90 BASE) MCG/ACT INHALER    Inhale 2 puffs into the lungs every 6 hours as needed for Wheezing    ATORVASTATIN (LIPITOR) 80 MG TABLET    TAKE 1 TABLET EVERY NIGHT    BLOOD GLUCOSE CALIBRATION (TRUE METRIX LEVEL 2) NORMAL SOLN    As needed    BLOOD GLUCOSE MONITORING SUPPL (TRUE METRIX METER) W/DEVICE KIT    To use to check sugars 4 times daily    BLOOD GLUCOSE TEST STRIPS (TRUE METRIX BLOOD GLUCOSE TEST) STRIP    1 each by In Vitro route daily As needed.     BLOOD GLUCOSE TEST STRIPS (TRUE METRIX BLOOD GLUCOSE TEST) STRIP    USE TO TEST BLOOD SUGAR DAILY BUDESONIDE-FORMOTEROL (SYMBICORT) 160-4.5 MCG/ACT AERO    INHALE 2 PUFFS TWICE DAILY    CLONIDINE (CATAPRES) 0.1 MG TABLET    TAKE 1 TABLET TWICE DAILY    CONJUGATED ESTROGENS (PREMARIN) 0.625 MG/GM VAGINAL CREAM    Place 1 g vaginally three times a week    DICYCLOMINE (BENTYL) 10 MG CAPSULE    TAKE 1 CAPSULE BY MOUTH FOUR TIMES DAILY AS NEEDED FOR ABDOMINAL PAIN    DILTIAZEM (CARDIZEM CD) 180 MG EXTENDED RELEASE CAPSULE    Take 1 capsule by mouth daily    DILTIAZEM (CARDIZEM) 30 MG TABLET    As needed for episodes of tachycardia    ELIQUIS 5 MG TABS TABLET    TAKE 1 TABLET TWICE DAILY    FLUTICASONE (FLONASE) 50 MCG/ACT NASAL SPRAY    USE 2 SPRAYS NASALLY EVERY DAY    IRBESARTAN (AVAPRO) 300 MG TABLET    TAKE 1 TABLET BY MOUTH EVERY NIGHT    LANCETS MISC    1 each by Does not apply route 2 times daily    LEVALBUTEROL (XOPENEX) 0.63 MG/3ML NEBULIZATION    USE 1 VIAL PER NEBULIZER EVERY 6 HOURS. MAX OF 4 TIMES PER 24 HOURS    LEVOTHYROXINE (SYNTHROID) 75 MCG TABLET    TAKE 1 TABLET BY MOUTH EVERY DAY    LORAZEPAM (ATIVAN) 0.5 MG TABLET    Take 0.5 mg by mouth in the morning and at bedtime. In the morning and afternoon    LORAZEPAM (ATIVAN) 1 MG TABLET    Take 1 mg by mouth at bedtime.     MECLIZINE (ANTIVERT) 12.5 MG TABLET    TAKE 1 TABLET THREE TIMES DAILY AS NEEDED    OXYGEN    Inhale 2 L into the lungs    RESPIRATORY THERAPY SUPPLIES (NEBULIZER/TUBING/MOUTHPIECE) KIT    1 kit by Does not apply route 4 times daily as needed (shortness of breath)    ROPINIROLE (REQUIP) 3 MG TABLET    TAKE 1 TABLET THREE TIMES DAILY    SITAGLIPTIN (JANUVIA) 100 MG TABLET    Take 100 mg by mouth daily    TIOTROPIUM (SPIRIVA RESPIMAT) 2.5 MCG/ACT AERS INHALER    Inhale 2 puffs into the lungs daily    TRAZODONE (DESYREL) 50 MG TABLET    Take 50 mg by mouth nightly    TRUEPLUS LANCETS 33G MISC    1 daily       ALLERGIES     Adhesive tape, Cefaclor, Cephalexin, Nsaids, Penicillin g, Codeine, Diclofenac, Diclofenac sodium, Diclofenac sodium, Diclofenac sodium, Hydrochlorothiazide, Ibuprofen, Macrobid [nitrofurantoin macrocrystal], Motrin [ibuprofen micronized], Nitrofurantoin, Pcn [penicillins], Peach [prunus persica], Prednisone, Sulfa antibiotics, and Sulindac    FAMILYHISTORY       Family History   Problem Relation Age of Onset    High Blood Pressure Mother     Heart Attack Father     Heart Disease Father     Other Brother     Asthma Paternal Uncle         SOCIAL HISTORY       Social History     Tobacco Use    Smoking status: Former     Packs/day: 1.00     Years: 45.00     Pack years: 45.00     Types: Cigarettes     Start date: 9/15/1950     Quit date: 1995     Years since quittin.0    Smokeless tobacco: Never   Vaping Use    Vaping Use: Never used   Substance Use Topics    Alcohol use: No     Alcohol/week: 0.0 standard drinks    Drug use: No       SCREENINGS        Desire Coma Scale  Eye Opening: Spontaneous  Best Verbal Response: Oriented  Best Motor Response: Obeys commands  [place] Coma Scale Score: 15                CIWA Assessment  BP: (!) 161/56  Heart Rate: 54           PHYSICAL EXAM  1 or more Elements     ED Triage Vitals [23 0857]   BP Temp Temp Source Heart Rate Resp SpO2 Height Weight   (!) 181/81 97.2 °F (36.2 °C) Oral 56 18 95 % 5' 2\" (1.575 m) 157 lb (71.2 kg)       Physical Exam  Constitutional:       General: She is not in acute distress. Appearance: She is well-developed. She is not ill-appearing, toxic-appearing or diaphoretic. HENT:      Head: Normocephalic and atraumatic. Right Ear: External ear normal.      Left Ear: External ear normal.   Eyes:      General:         Right eye: No discharge. Left eye: No discharge. Cardiovascular:      Rate and Rhythm: Normal rate and regular rhythm. Pulses: Normal pulses. Heart sounds: Normal heart sounds. No murmur heard. No friction rub. No gallop. Comments: 2+ radial pulses bilaterally. No pedal edema. No calf tenderness. No JVD. Pulmonary:      Effort: Pulmonary effort is normal. No respiratory distress. Breath sounds: Normal breath sounds. No stridor. No wheezing, rhonchi or rales. Comments: 96% on home 2 L nasal cannula oxygen at this time. There is no conversational dyspnea. No accessory muscle use is noted. Chest:      Chest wall: No tenderness. Abdominal:      General: Abdomen is flat. Bowel sounds are normal. There is no distension. Palpations: Abdomen is soft. There is no mass. Tenderness: There is abdominal tenderness in the right lower quadrant and suprapubic area. There is no right CVA tenderness, left CVA tenderness, guarding or rebound. Negative signs include Gomez's sign, Rovsing's sign and McBurney's sign. Hernia: No hernia is present. Musculoskeletal:         General: Normal range of motion. Cervical back: Normal range of motion and neck supple. Skin:     General: Skin is warm and dry. Coloration: Skin is not pale. Findings: No erythema or rash. Neurological:      Mental Status: She is alert and oriented to person, place, and time.    Psychiatric:         Behavior: Behavior normal.           DIAGNOSTIC RESULTS   LABS:    Labs Reviewed   CBC WITH AUTO DIFFERENTIAL - Abnormal; Notable for the following components:       Result Value    RBC 3.30 (*)     Hemoglobin 9.3 (*)     Hematocrit 28.8 (*)     RDW 16.0 (*)     All other components within normal limits   COMPREHENSIVE METABOLIC PANEL W/ REFLEX TO MG FOR LOW K - Abnormal; Notable for the following components:    Glucose 106 (*)     BUN 30 (*)     Est, Glom Filt Rate 44 (*)     Total Protein 6.3 (*)     All other components within normal limits   URINALYSIS WITH REFLEX TO CULTURE - Abnormal; Notable for the following components:    Protein, UA TRACE (*)     All other components within normal limits   CULTURE, BLOOD 1   CULTURE, BLOOD 2   LIPASE   LACTIC ACID   TROPONIN   MICROSCOPIC URINALYSIS       When ordered only abnormal lab results are displayed. All other labs were within normal range or not returned as of this dictation. EKG: When ordered, EKG's are interpreted by the Emergency Department Physician in the absence of a cardiologist.  Please see their note for interpretation of EKG. RADIOLOGY:   Non-plain film images such as CT, Ultrasound and MRI are read by the radiologist. Plain radiographic images are visualized and preliminarily interpreted by the ED Provider with the below findings:        Interpretation per the Radiologist below, if available at the time of this note:    CT ABDOMEN PELVIS W IV CONTRAST Additional Contrast? None   Final Result   1. New left basilar airspace disease concerning for developing pneumonia. Recommend chest radiograph in 8 weeks to confirm resolution. 2. New 1.0 cm complex left upper pole renal cyst.  Recommend nonemergent MRI   of the abdomen with without contrast using renal mass protocol. XR CHEST PORTABLE   Final Result   No acute cardiopulmonary findings. XR CHEST PORTABLE    Result Date: 1/26/2023  EXAMINATION: ONE XRAY VIEW OF THE CHEST 1/26/2023 9:51 am COMPARISON: Chest x-ray dated 30 September 2022 HISTORY: ORDERING SYSTEM PROVIDED HISTORY: cough TECHNOLOGIST PROVIDED HISTORY: Reason for exam:->cough Reason for Exam: cough FINDINGS: No acute airspace infiltrate. No pneumothorax or pleural effusion. Stable cardiomediastinal silhouette     No acute cardiopulmonary findings. No results found. PROCEDURES   Unless otherwise noted below, none     Procedures    CRITICAL CARE TIME (.cctime)       PAST MEDICAL HISTORY      has a past medical history of Arthritis, Atrial fibrillation ([address] Diabetes mellitus type II, controlled ([address] GERD (gastroesophageal reflux disease), HTN (hypertension), Hyperlipidemia, Hypothyroid, Insomnia, Lumbago, and SVT (supraventricular tachycardia) ([address]      EMERGENCY DEPARTMENT COURSE and DIFFERENTIAL DIAGNOSIS/MDM: Vitals:    Vitals:    01/26/23 1317 01/26/23 1318 01/26/23 1319 01/26/23 1325   BP:       Pulse: 57 55 58 54   Resp: 17 16 15 18   Temp:       TempSrc:       SpO2: 100% 94% 94% 93%   Weight:       Height:           Patient was given the following medications:  Medications   levoFLOXacin (LEVAQUIN) 750 MG/150ML infusion 750 mg (has no administration in time range)   morphine (PF) injection 2 mg (2 mg IntraVENous Given 1/26/23 1010)   ondansetron (ZOFRAN) injection 4 mg (4 mg IntraVENous Given 1/26/23 1011)   iopamidol (ISOVUE-370) 76 % injection 75 mL (75 mLs IntraVENous Given 1/26/23 1031)   HYDROmorphone HCl PF (DILAUDID) injection 0.5 mg (0.5 mg IntraVENous Given 1/26/23 1204)   ondansetron (ZOFRAN) injection 4 mg (4 mg IntraVENous Given 1/26/23 1204)   ipratropium-albuterol (DUONEB) nebulizer solution 1 ampule (1 ampule Inhalation Given 1/26/23 1324)             Is this patient to be included in the SEP-1 Core Measure due to severe sepsis or septic shock? No   Exclusion criteria - the patient is NOT to be included for SEP-1 Core Measure due to:  2+ SIRS criteria are not met    Chronic Conditions affecting care:    has a past medical history of Arthritis, Atrial fibrillation ([address] Diabetes mellitus type II, controlled ([address] GERD (gastroesophageal reflux disease), HTN (hypertension), Hyperlipidemia, Hypothyroid, Insomnia, Lumbago, and SVT (supraventricular tachycardia) ([address] CONSULTS: (Who and What was discussed)  None      Social Determinants : None    Records Reviewed (Source): none    CC/HPI Summary, DDx, ED Course, and Reassessment: Patient is an 59-year-old female who presents the ED with complaint of right-sided back pain that radiates into the right sided abdomen. She reports has had some nausea. Reports associated dysuria. She came to the ED for further evaluation and treatment. She has underlying COPD is on 2 L nasal cannula oxygen chronically.   On exam her oxygen saturation is about 92% on her home oxygen. She does report that she has some chronic shortness of breath but not worse than normal.  Has chronic cough although slightly more persistent. IV was established and blood work obtained. CBC showed no white count and platelets. Hemoglobin 9.3. CMP relatively unremarkable. Lipase was normal.  Urinalysis obtained showed no acute abnormality. Lactic acid was normal.  Troponin was normal.  CT of the abdomen pelvis obtained given complaint of flank pain that radiates to the right lower quadrant. CT showed a left upper pole renal cyst which she was informed of here in the emergency department. She does have left basilar airspace disease concerning for developing pneumonia. Do believe patient may be suffering from pneumonia given her oxygen saturation is only 92% on her home oxygen with her reported increased cough. She was ordered Levaquin 750 mg IV here in the emergency department. Initially ordered morphine 2 mg IV with Zofran 4 mg IV. She had no significant improvement in symptoms with this. Ordered Dilaudid 0.5 mg IV here in the ED with another dose of Zofran 4 mg IV and she had significant provement of pain. States pain has resolved. Symptoms could be musculoskeletal.  She now apparently told attending provider she may have strained her back. She has reassuring work-up otherwise and unclear etiology behind the right flank pain other than may be musculoskeletal.  Reassuring work-up and doubt acute cystitis, pyelonephritis, nephrolithiasis or surgical abdomen at this time. Daughter arrived at bedside and reported that patient oxygen has been \"all over the place\". Daughter reports that patient had oxygen saturation at 87% on one-point here in the emergency department. She is currently 90 to 93% on her home oxygen. Ordered DuoNeb treatment but believe would benefit from mission for further evaluation and treatment. Case discussed with hospital service for admission.   EKG interpreted by attending and chest x-ray is unremarkable. I am the Primary Clinician of Record. FINAL IMPRESSION      1. Pneumonia due to infectious organism, unspecified laterality, unspecified part of lung    2. Increased oxygen demand    3. General weakness          DISPOSITION/PLAN     DISPOSITION Admitted 01/26/2023 01:57:33 PM      PATIENT REFERRED TO:  No follow-up provider specified. DISCHARGE MEDICATIONS:  New Prescriptions    No medications on file       DISCONTINUED MEDICATIONS:  Discontinued Medications    LORATADINE (CLARITIN) 10 MG TABLET    Take 1 tablet by mouth in the morning.     ONDANSETRON (ZOFRAN) 4 MG TABLET    Take 1 tablet by mouth daily as needed for Nausea or Vomiting              (Please note that portions of this note were completed with a voice recognition program.  Efforts were made to edit the dictations but occasionally words are mis-transcribed.)    JESSICA Soler (electronically signed)       JESSICA Caceres  01/26/23 [id number]

## 2023-01-26 NOTE — H&P
HOSPITALISTS HISTORY AND PHYSICAL    1/26/2023 1:57 PM    Patient Information:  Ellen Pendleton is a 80 y.o. female [id number]  PCP:  Tolu Deluna MD (Tel: [phone] )    Chief complaint:    Chief Complaint   Patient presents with    Abdominal Pain     Pt arrived via squad from home. Abdominal pain with nausea for 3 days. History of Present Illness:  Rocio Horowitz is a 80 y.o. female who presented with complaints of heart pounding while at home. Patient was brought in by daughter with concern for patient sleeping excessively and acting herself. Patient recently having some right flank pain. Difficulty moving around. Recently diagnosed with COVID infections. Patient has noted increased cough and shortness of breath. Patient normally wears 2 days of nasal cannula. Daughter is concerned based on weakness and excessive sleepiness she is unable to move despite having pain. Patient arrival to ER was found to hypoxic in the 80s despite being intubated. Work-up otherwise revealing for pneumonia  REVIEW OF SYSTEMS:   Constitutional: Negative for fever,chills or night sweats  ENT: Negative for rhinorrhea, epistaxis, hoarseness, sore throat. Respiratory: Negative for shortness of breath,wheezing  Cardiovascular: Negative for chest pain, palpitations   Gastrointestinal: Negative for nausea, vomiting, diarrhea  Genitourinary: Negative for polyuria, dysuria   Hematologic/Lymphatic: Negative for bleeding tendency, easy bruising  Musculoskeletal: Negative for myalgias and arthralgias  Neurologic: Negative for confusion,dysarthria. Skin: Negative for itching,rash, good capillary refill. Psychiatric: Negative for depression,anxiety, agitation. Endocrine: Negative for polydipsia,polyuria,heat /cold intolerance.     Past Medical History:   has a past medical history of Arthritis, Atrial fibrillation ([facility] [address] Diabetes mellitus type II, controlled ([facility] [address] GERD (gastroesophageal reflux disease), HTN (hypertension), Hyperlipidemia, Hypothyroid, Insomnia, Lumbago, and SVT (supraventricular tachycardia) ([facility] [address] Past Surgical History:   has a past surgical history that includes Appendectomy; Colonoscopy; and Cholecystectomy, laparoscopic (2/24/2013). Medications:  No current facility-administered medications on file prior to encounter. Current Outpatient Medications on File Prior to Encounter   Medication Sig Dispense Refill    dilTIAZem (CARDIZEM CD) 180 MG extended release capsule Take 1 capsule by mouth daily 90 capsule 3    dilTIAZem (CARDIZEM) 30 MG tablet As needed for episodes of tachycardia 120 tablet 3    dicyclomine (BENTYL) 10 MG capsule TAKE 1 CAPSULE BY MOUTH FOUR TIMES DAILY AS NEEDED FOR ABDOMINAL PAIN 120 capsule 0    LORazepam (ATIVAN) 1 MG tablet Take 1 mg by mouth at bedtime. SITagliptin (JANUVIA) 100 MG tablet Take 100 mg by mouth daily      LORazepam (ATIVAN) 0.5 MG tablet Take 0.5 mg by mouth in the morning and at bedtime. In the morning and afternoon      ELIQUIS 5 MG TABS tablet TAKE 1 TABLET TWICE DAILY 180 tablet 0    budesonide-formoterol (SYMBICORT) 160-4.5 MCG/ACT AERO INHALE 2 PUFFS TWICE DAILY 3 each 0    loratadine (CLARITIN) 10 MG tablet Take 1 tablet by mouth in the morning. 90 tablet 0    fluticasone (FLONASE) 50 MCG/ACT nasal spray USE 2 SPRAYS NASALLY EVERY DAY 48 g 1    atorvastatin (LIPITOR) 80 MG tablet TAKE 1 TABLET EVERY NIGHT 90 tablet 3    [DISCONTINUED] amLODIPine (NORVASC) 5 MG tablet Take 1 tablet by mouth in the morning.  30 tablet 5    meclizine (ANTIVERT) 12.5 MG tablet TAKE 1 TABLET THREE TIMES DAILY AS NEEDED 270 tablet 1    blood glucose test strips (TRUE METRIX BLOOD GLUCOSE TEST) strip USE TO TEST BLOOD SUGAR DAILY 100 strip 5    irbesartan (AVAPRO) 300 MG tablet TAKE 1 TABLET BY MOUTH EVERY NIGHT (Patient taking differently: 150 mg TAKE 1 TABLET BY MOUTH EVERY NIGHT) 90 tablet 0    rOPINIRole (REQUIP) 3 MG tablet TAKE 1 TABLET THREE TIMES DAILY 270 tablet 1    levothyroxine (SYNTHROID) 75 MCG tablet TAKE 1 TABLET BY MOUTH EVERY DAY 90 tablet 0    cloNIDine (CATAPRES) 0.1 MG tablet TAKE 1 TABLET TWICE DAILY 180 tablet 0    conjugated estrogens (PREMARIN) 0.625 MG/GM vaginal cream Place 1 g vaginally three times a week (Patient taking differently: Place 1 g vaginally Twice a Week Monday and Friday) 30 g 2    levalbuterol (XOPENEX) 0.63 MG/3ML nebulization USE 1 VIAL PER NEBULIZER EVERY 6 HOURS. MAX OF 4 TIMES PER 24 HOURS 1086 mL 3    tiotropium (SPIRIVA RESPIMAT) 2.5 MCG/ACT AERS inhaler Inhale 2 puffs into the lungs daily 1 each 1    Blood Glucose Monitoring Suppl (TRUE METRIX METER) w/Device KIT To use to check sugars 4 times daily 1 kit 0    Blood Glucose Calibration (TRUE METRIX LEVEL 2) Normal SOLN As needed 1 each 0    blood glucose test strips (TRUE METRIX BLOOD GLUCOSE TEST) strip 1 each by In Vitro route daily As needed. 100 each 3    TRUEplus Lancets 33G MISC 1 daily 100 each 3    Lancets MISC 1 each by Does not apply route 2 times daily 300 each 1    ondansetron (ZOFRAN) 4 MG tablet Take 1 tablet by mouth daily as needed for Nausea or Vomiting 30 tablet 0    OXYGEN Inhale 2 L into the lungs      Respiratory Therapy Supplies (NEBULIZER/TUBING/MOUTHPIECE) KIT 1 kit by Does not apply route 4 times daily as needed (shortness of breath) 1 kit 0    albuterol sulfate HFA (PROAIR HFA) 108 (90 Base) MCG/ACT inhaler Inhale 2 puffs into the lungs every 6 hours as needed for Wheezing (Patient taking differently: Inhale 2 puffs into the lungs in the morning and at bedtime) 1 Inhaler 6       Allergies:   Allergies   Allergen Reactions    Adhesive Tape Anaphylaxis    Cefaclor Nausea And Vomiting     Other reaction(s): Chest pain  Other reaction(s): Chest pain, Nausea / Vomiting    Cephalexin Other (See Comments)     Struggling to breath, almost passing out/ very low oxygen and pulse    Nsaids      Pt states she has hives and heart races   Other reaction(s): Tachycardia  Other reaction(s): Hives / Skin Rash, Tachycardia    Penicillin G      Other reaction(s): Hives / Skin Rash    Codeine      FEEL NUMB  Other reaction(s): feels numb    Diclofenac      Other reaction(s): Hives    Diclofenac Sodium Hives    Diclofenac Sodium Hives    Diclofenac Sodium     Hydrochlorothiazide      Sob  Other reaction(s): sob    Ibuprofen Other (See Comments)     Other reaction(s): Tachycardia  Other reaction(s): Tachycardia    Macrobid [Nitrofurantoin Macrocrystal]      nausea    Motrin [Ibuprofen Micronized] Other (See Comments)     Tachycardia      Nitrofurantoin      Other reaction(s): nausea H&P  Other reaction(s): nausea H&P    Pcn [Penicillins] Hives    Peach [Prunus Persica] Itching and Swelling     Cannot eat raw peaches    Prednisone      Causes elevated blood pressure   Other reaction(s): Elevated high BP  H&P    Sulfa Antibiotics      Nausea, diarrhea  Other reaction(s): Nausea/Diarrhea H&P    Sulindac Other (See Comments)     Stomach pain  Other reaction(s): Stomach Pain        Social History:   reports that she quit smoking about 27 years ago. Her smoking use included cigarettes. She started smoking about 72 years ago. She has a 45.00 pack-year smoking history. She has never used smokeless tobacco. She reports that she does not drink alcohol and does not use drugs. Family History:  family history includes Asthma in her paternal uncle; Heart Attack in her father; Heart Disease in her father; High Blood Pressure in her mother; Other in her brother. ,     Physical Exam:  BP (!) 161/56   Pulse 54   Temp 97.2 °F (36.2 °C) (Oral)   Resp 18   Ht 5' 2\" (1.575 m)   Wt 157 lb (71.2 kg)   SpO2 93%   BMI 28.72 kg/m²     General appearance:  Appears comfortable. Well nourished  Eyes: Sclera clear, pupils equal  ENT: Moist mucus membranes, no thrush. Trachea midline. Cardiovascular: Regular rhythm, normal S1, S2.  No murmur, gallop, rub. No edema in lower extremities  Respiratory: Clear to auscultation bilaterally, no wheeze, good inspiratory effort  Gastrointestinal: Abdomen soft, non-tender, not distended, normal bowel sounds  Musculoskeletal: No cyanosis in digits, neck supple  Neurology: Cranial nerves grossly intact. Alert and oriented in time, place and person. No speech or motor deficits  Psychiatry: Appropriate affect. Not agitated  Skin: Warm, dry, normal turgor, no rash    Labs:  CBC:   Lab Results   Component Value Date/Time    WBC 4.6 01/26/2023 09:28 AM    RBC 3.30 01/26/2023 09:28 AM    HGB 9.3 01/26/2023 09:28 AM    HCT 28.8 01/26/2023 09:28 AM    MCV 87.4 01/26/2023 09:28 AM    MCH 28.3 01/26/2023 09:28 AM    MCHC 32.4 01/26/2023 09:28 AM    RDW 16.0 01/26/2023 09:28 AM     01/26/2023 09:28 AM    MPV 8.2 01/26/2023 09:28 AM     BMP:    Lab Results   Component Value Date/Time     01/26/2023 09:28 AM    K 4.4 01/26/2023 09:28 AM     01/26/2023 09:28 AM    CO2 26 01/26/2023 09:28 AM    BUN 30 01/26/2023 09:28 AM    CREATININE 1.2 01/26/2023 09:28 AM    CALCIUM 9.3 01/26/2023 09:28 AM    GFRAA >60 10/04/2022 04:22 AM    GFRAA >60 02/27/2013 05:18 AM    LABGLOM 44 01/26/2023 09:28 AM    GLUCOSE 106 01/26/2023 09:28 AM       Chest Xray:   EKG:    I visualized CXR images and EKG strips     Discussed  with     Problem List  Principal Problem:    Bacterial pneumonia  Resolved Problems:    * No resolved hospital problems.  *        Assessment/Plan:   Acute on chronic respiratory failure  -Oxygen saturation dropping concerning for possible pneumonia  -Recent COVID infection  -Patient will be treated with antibiotics monitor closely  Nasal cannula keep oxygen saturation above 90  -PT OT evaluation.  -Monitor closely               Chanel Yost MD    1/26/2023 1:57 PM

## 2023-01-26 NOTE — PROGRESS NOTES
Pharmacy Home Medication Reconciliation Note    A medication reconciliation has been completed for Asha Flaherty 1935    Pharmacy: [place] [phone] [facility] provided by: Patient and Daughter    The patient's home medication list is as follows: No current facility-administered medications on file prior to encounter. Current Outpatient Medications on File Prior to Encounter   Medication Sig Dispense Refill    traZODone (DESYREL) 50 MG tablet Take 50 mg by mouth nightly      dilTIAZem (CARDIZEM CD) 180 MG extended release capsule Take 1 capsule by mouth daily 90 capsule 3    dilTIAZem (CARDIZEM) 30 MG tablet As needed for episodes of tachycardia (Patient taking differently: Take 30 mg by mouth as needed As needed for episodes of tachycardia) 120 tablet 3    dicyclomine (BENTYL) 10 MG capsule TAKE 1 CAPSULE BY MOUTH FOUR TIMES DAILY AS NEEDED FOR ABDOMINAL PAIN (Patient taking differently: Take 10 mg by mouth as needed) 120 capsule 0    LORazepam (ATIVAN) 1 MG tablet Take 1 mg by mouth at bedtime. SITagliptin (JANUVIA) 100 MG tablet Take 100 mg by mouth daily      LORazepam (ATIVAN) 0.5 MG tablet Take 0.5 mg by mouth in the morning and at bedtime. In the morning and afternoon      ELIQUIS 5 MG TABS tablet TAKE 1 TABLET TWICE DAILY 180 tablet 0    budesonide-formoterol (SYMBICORT) 160-4.5 MCG/ACT AERO INHALE 2 PUFFS TWICE DAILY 3 each 0    [DISCONTINUED] loratadine (CLARITIN) 10 MG tablet Take 1 tablet by mouth in the morning. 90 tablet 0    fluticasone (FLONASE) 50 MCG/ACT nasal spray USE 2 SPRAYS NASALLY EVERY DAY (Patient taking differently: 2 sprays by Nasal route as needed) 48 g 1    atorvastatin (LIPITOR) 80 MG tablet TAKE 1 TABLET EVERY NIGHT 90 tablet 3    [DISCONTINUED] amLODIPine (NORVASC) 5 MG tablet Take 1 tablet by mouth in the morning.  30 tablet 5    meclizine (ANTIVERT) 12.5 MG tablet TAKE 1 TABLET THREE TIMES DAILY AS NEEDED 270 tablet 1    blood glucose test strips (TRUE METRIX BLOOD GLUCOSE TEST) strip USE TO TEST BLOOD SUGAR DAILY 100 strip 5    irbesartan (AVAPRO) 300 MG tablet TAKE 1 TABLET BY MOUTH EVERY NIGHT (Patient taking differently: 150 mg TAKE 1 TABLET BY MOUTH EVERY NIGHT) 90 tablet 0    rOPINIRole (REQUIP) 3 MG tablet TAKE 1 TABLET THREE TIMES DAILY 270 tablet 1    levothyroxine (SYNTHROID) 75 MCG tablet TAKE 1 TABLET BY MOUTH EVERY DAY 90 tablet 0    cloNIDine (CATAPRES) 0.1 MG tablet TAKE 1 TABLET TWICE DAILY 180 tablet 0    conjugated estrogens (PREMARIN) 0.625 MG/GM vaginal cream Place 1 g vaginally three times a week (Patient taking differently: Place 1 g vaginally Twice a Week Monday and Friday) 30 g 2    levalbuterol (XOPENEX) 0.63 MG/3ML nebulization USE 1 VIAL PER NEBULIZER EVERY 6 HOURS. MAX OF 4 TIMES PER 24 HOURS (Patient taking differently: Take 1 ampule by nebulization every 6 hours as needed) 1086 mL 3    tiotropium (SPIRIVA RESPIMAT) 2.5 MCG/ACT AERS inhaler Inhale 2 puffs into the lungs daily (Patient not taking: Reported on 1/26/2023) 1 each 1    Blood Glucose Monitoring Suppl (TRUE METRIX METER) w/Device KIT To use to check sugars 4 times daily 1 kit 0    Blood Glucose Calibration (TRUE METRIX LEVEL 2) Normal SOLN As needed 1 each 0    blood glucose test strips (TRUE METRIX BLOOD GLUCOSE TEST) strip 1 each by In Vitro route daily As needed.  100 each 3    TRUEplus Lancets 33G MISC 1 daily 100 each 3    Lancets MISC 1 each by Does not apply route 2 times daily 300 each 1    [DISCONTINUED] ondansetron (ZOFRAN) 4 MG tablet Take 1 tablet by mouth daily as needed for Nausea or Vomiting 30 tablet 0    OXYGEN Inhale 2 L into the lungs      Respiratory Therapy Supplies (NEBULIZER/TUBING/MOUTHPIECE) KIT 1 kit by Does not apply route 4 times daily as needed (shortness of breath) 1 kit 0    albuterol sulfate HFA (PROAIR HFA) 108 (90 Base) MCG/ACT inhaler Inhale 2 puffs into the lungs every 6 hours as needed for Wheezing (Patient taking differently: Inhale 2 puffs into the lungs in the morning and at bedtime) 1 Inhaler 6     Timing of last doses updated.     Thank you,  Lety Naidu CpHT

## 2023-01-27 ENCOUNTER — APPOINTMENT (OUTPATIENT)
Dept: MRI IMAGING | Age: 88
DRG: 194 | End: 2023-01-27
Payer: MEDICARE

## 2023-01-27 PROBLEM — J18.9 PNEUMONIA DUE TO INFECTIOUS ORGANISM: Status: ACTIVE | Noted: 2023-01-26

## 2023-01-27 LAB
ANION GAP SERPL CALCULATED.3IONS-SCNC: 10 MMOL/L (ref 3–16)
BLOOD CULTURE, ROUTINE: NORMAL
BUN BLDV-MCNC: 24 MG/DL (ref 7–20)
CALCIUM SERPL-MCNC: 9.5 MG/DL (ref 8.3–10.6)
CHLORIDE BLD-SCNC: 100 MMOL/L (ref 99–110)
CO2: 26 MMOL/L (ref 21–32)
CREAT SERPL-MCNC: 1 MG/DL (ref 0.6–1.2)
CULTURE, BLOOD 2: NORMAL
ESTIMATED AVERAGE GLUCOSE: 125.5 MG/DL
GFR SERPL CREATININE-BSD FRML MDRD: 54 ML/MIN/{1.73_M2}
GLUCOSE BLD-MCNC: 112 MG/DL (ref 70–99)
GLUCOSE BLD-MCNC: 118 MG/DL (ref 70–99)
GLUCOSE BLD-MCNC: 127 MG/DL (ref 70–99)
GLUCOSE BLD-MCNC: 162 MG/DL (ref 70–99)
GLUCOSE BLD-MCNC: 165 MG/DL (ref 70–99)
GLUCOSE BLD-MCNC: 176 MG/DL (ref 70–99)
HBA1C MFR BLD: 6 %
HCT VFR BLD CALC: 31.4 % (ref 36–48)
HEMOGLOBIN: 10.2 G/DL (ref 12–16)
MCH RBC QN AUTO: 28 PG (ref 26–34)
MCHC RBC AUTO-ENTMCNC: 32.5 G/DL (ref 31–36)
MCV RBC AUTO: 86.2 FL (ref 80–100)
PDW BLD-RTO: 16 % (ref 12.4–15.4)
PERFORMED ON: ABNORMAL
PLATELET # BLD: 130 K/UL (ref 135–450)
PMV BLD AUTO: 7.6 FL (ref 5–10.5)
POTASSIUM SERPL-SCNC: 4.8 MMOL/L (ref 3.5–5.1)
RBC # BLD: 3.65 M/UL (ref 4–5.2)
SODIUM BLD-SCNC: 136 MMOL/L (ref 136–145)
WBC # BLD: 7.5 K/UL (ref 4–11)

## 2023-01-27 PROCEDURE — APPNB30 APP NON BILLABLE TIME 0-30 MINS: Performed by: NURSE PRACTITIONER

## 2023-01-27 PROCEDURE — 36569 INSJ PICC 5 YR+ W/O IMAGING: CPT

## 2023-01-27 PROCEDURE — 97530 THERAPEUTIC ACTIVITIES: CPT

## 2023-01-27 PROCEDURE — 6370000000 HC RX 637 (ALT 250 FOR IP): Performed by: HOSPITALIST

## 2023-01-27 PROCEDURE — APPSS60 APP SPLIT SHARED TIME 46-60 MINUTES: Performed by: NURSE PRACTITIONER

## 2023-01-27 PROCEDURE — 6360000002 HC RX W HCPCS: Performed by: INTERNAL MEDICINE

## 2023-01-27 PROCEDURE — 6370000000 HC RX 637 (ALT 250 FOR IP): Performed by: INTERNAL MEDICINE

## 2023-01-27 PROCEDURE — 36415 COLL VENOUS BLD VENIPUNCTURE: CPT

## 2023-01-27 PROCEDURE — C1751 CATH, INF, PER/CENT/MIDLINE: HCPCS

## 2023-01-27 PROCEDURE — 02HV33Z INSERTION OF INFUSION DEVICE INTO SUPERIOR VENA CAVA, PERCUTANEOUS APPROACH: ICD-10-PCS | Performed by: FAMILY MEDICINE

## 2023-01-27 PROCEDURE — 92526 ORAL FUNCTION THERAPY: CPT

## 2023-01-27 PROCEDURE — 2580000003 HC RX 258: Performed by: HOSPITALIST

## 2023-01-27 PROCEDURE — 92610 EVALUATE SWALLOWING FUNCTION: CPT

## 2023-01-27 PROCEDURE — 2700000000 HC OXYGEN THERAPY PER DAY

## 2023-01-27 PROCEDURE — 97161 PT EVAL LOW COMPLEX 20 MIN: CPT

## 2023-01-27 PROCEDURE — 94640 AIRWAY INHALATION TREATMENT: CPT

## 2023-01-27 PROCEDURE — 80048 BASIC METABOLIC PNL TOTAL CA: CPT

## 2023-01-27 PROCEDURE — 1200000000 HC SEMI PRIVATE

## 2023-01-27 PROCEDURE — 94761 N-INVAS EAR/PLS OXIMETRY MLT: CPT

## 2023-01-27 PROCEDURE — 97165 OT EVAL LOW COMPLEX 30 MIN: CPT

## 2023-01-27 PROCEDURE — 72148 MRI LUMBAR SPINE W/O DYE: CPT

## 2023-01-27 PROCEDURE — 6360000002 HC RX W HCPCS: Performed by: NURSE PRACTITIONER

## 2023-01-27 PROCEDURE — 85027 COMPLETE CBC AUTOMATED: CPT

## 2023-01-27 PROCEDURE — 6370000000 HC RX 637 (ALT 250 FOR IP): Performed by: NURSE PRACTITIONER

## 2023-01-27 RX ORDER — ACETAMINOPHEN 325 MG/1
650 TABLET ORAL EVERY 6 HOURS
Status: DISCONTINUED | OUTPATIENT
Start: 2023-01-27 | End: 2023-01-28 | Stop reason: HOSPADM

## 2023-01-27 RX ORDER — SODIUM CHLORIDE 0.9 % (FLUSH) 0.9 %
5-40 SYRINGE (ML) INJECTION PRN
Status: DISCONTINUED | OUTPATIENT
Start: 2023-01-27 | End: 2023-01-28 | Stop reason: HOSPADM

## 2023-01-27 RX ORDER — SODIUM CHLORIDE 9 MG/ML
25 INJECTION, SOLUTION INTRAVENOUS PRN
Status: DISCONTINUED | OUTPATIENT
Start: 2023-01-27 | End: 2023-01-28 | Stop reason: HOSPADM

## 2023-01-27 RX ORDER — IPRATROPIUM BROMIDE AND ALBUTEROL SULFATE 2.5; .5 MG/3ML; MG/3ML
1 SOLUTION RESPIRATORY (INHALATION) EVERY 4 HOURS PRN
Status: DISCONTINUED | OUTPATIENT
Start: 2023-01-27 | End: 2023-01-28 | Stop reason: HOSPADM

## 2023-01-27 RX ORDER — LEVOFLOXACIN 5 MG/ML
500 INJECTION, SOLUTION INTRAVENOUS EVERY 24 HOURS
Status: DISCONTINUED | OUTPATIENT
Start: 2023-01-27 | End: 2023-01-28 | Stop reason: HOSPADM

## 2023-01-27 RX ORDER — IPRATROPIUM BROMIDE AND ALBUTEROL SULFATE 2.5; .5 MG/3ML; MG/3ML
1 SOLUTION RESPIRATORY (INHALATION) 3 TIMES DAILY
Status: DISCONTINUED | OUTPATIENT
Start: 2023-01-27 | End: 2023-01-28 | Stop reason: HOSPADM

## 2023-01-27 RX ORDER — ORPHENADRINE CITRATE 30 MG/ML
60 INJECTION INTRAMUSCULAR; INTRAVENOUS EVERY 12 HOURS
Status: COMPLETED | OUTPATIENT
Start: 2023-01-27 | End: 2023-01-28

## 2023-01-27 RX ORDER — SODIUM CHLORIDE 0.9 % (FLUSH) 0.9 %
5-40 SYRINGE (ML) INJECTION EVERY 12 HOURS SCHEDULED
Status: DISCONTINUED | OUTPATIENT
Start: 2023-01-27 | End: 2023-01-28 | Stop reason: HOSPADM

## 2023-01-27 RX ORDER — HYDROMORPHONE HYDROCHLORIDE 1 MG/ML
0.5 INJECTION, SOLUTION INTRAMUSCULAR; INTRAVENOUS; SUBCUTANEOUS EVERY 4 HOURS PRN
Status: DISCONTINUED | OUTPATIENT
Start: 2023-01-27 | End: 2023-01-28

## 2023-01-27 RX ORDER — LIDOCAINE 4 G/G
1 PATCH TOPICAL DAILY
Status: DISCONTINUED | OUTPATIENT
Start: 2023-01-27 | End: 2023-01-28 | Stop reason: HOSPADM

## 2023-01-27 RX ORDER — LIDOCAINE HYDROCHLORIDE 10 MG/ML
5 INJECTION, SOLUTION EPIDURAL; INFILTRATION; INTRACAUDAL; PERINEURAL ONCE
Status: DISCONTINUED | OUTPATIENT
Start: 2023-01-27 | End: 2023-01-28 | Stop reason: HOSPADM

## 2023-01-27 RX ORDER — DIAZEPAM 5 MG/1
5 TABLET ORAL EVERY 6 HOURS PRN
Status: DISCONTINUED | OUTPATIENT
Start: 2023-01-27 | End: 2023-01-28

## 2023-01-27 RX ORDER — MORPHINE SULFATE 2 MG/ML
1 INJECTION, SOLUTION INTRAMUSCULAR; INTRAVENOUS ONCE
Status: COMPLETED | OUTPATIENT
Start: 2023-01-27 | End: 2023-01-27

## 2023-01-27 RX ADMIN — IPRATROPIUM BROMIDE AND ALBUTEROL SULFATE 1 AMPULE: 2.5; .5 SOLUTION RESPIRATORY (INHALATION) at 07:50

## 2023-01-27 RX ADMIN — Medication 2 PUFF: at 20:34

## 2023-01-27 RX ADMIN — SODIUM CHLORIDE: 9 INJECTION, SOLUTION INTRAVENOUS at 21:25

## 2023-01-27 RX ADMIN — HYDROMORPHONE HYDROCHLORIDE 0.5 MG: 1 INJECTION, SOLUTION INTRAMUSCULAR; INTRAVENOUS; SUBCUTANEOUS at 09:34

## 2023-01-27 RX ADMIN — IPRATROPIUM BROMIDE AND ALBUTEROL SULFATE 1 AMPULE: 2.5; .5 SOLUTION RESPIRATORY (INHALATION) at 13:34

## 2023-01-27 RX ADMIN — CLONIDINE HYDROCHLORIDE 0.1 MG: 0.1 TABLET ORAL at 21:19

## 2023-01-27 RX ADMIN — ACETAMINOPHEN 650 MG: 325 TABLET ORAL at 09:21

## 2023-01-27 RX ADMIN — POLYETHYLENE GLYCOL 3350 17 G: 17 POWDER, FOR SOLUTION ORAL at 21:22

## 2023-01-27 RX ADMIN — Medication 2 PUFF: at 07:50

## 2023-01-27 RX ADMIN — SODIUM CHLORIDE, PRESERVATIVE FREE 10 ML: 5 INJECTION INTRAVENOUS at 21:20

## 2023-01-27 RX ADMIN — APIXABAN 5 MG: 5 TABLET, FILM COATED ORAL at 09:23

## 2023-01-27 RX ADMIN — ACETAMINOPHEN 650 MG: 325 TABLET ORAL at 14:41

## 2023-01-27 RX ADMIN — HYDROMORPHONE HYDROCHLORIDE 0.5 MG: 1 INJECTION, SOLUTION INTRAMUSCULAR; INTRAVENOUS; SUBCUTANEOUS at 21:19

## 2023-01-27 RX ADMIN — ACETAMINOPHEN 650 MG: 325 TABLET ORAL at 21:18

## 2023-01-27 RX ADMIN — LEVOFLOXACIN 500 MG: 5 INJECTION, SOLUTION INTRAVENOUS at 21:25

## 2023-01-27 RX ADMIN — TRAMADOL HYDROCHLORIDE 50 MG: 50 TABLET ORAL at 05:24

## 2023-01-27 RX ADMIN — ORPHENADRINE CITRATE 60 MG: 30 INJECTION INTRAMUSCULAR; INTRAVENOUS at 11:04

## 2023-01-27 RX ADMIN — CLONIDINE HYDROCHLORIDE 0.1 MG: 0.1 TABLET ORAL at 09:23

## 2023-01-27 RX ADMIN — DIAZEPAM 5 MG: 5 TABLET ORAL at 22:59

## 2023-01-27 RX ADMIN — ORPHENADRINE CITRATE 60 MG: 30 INJECTION INTRAMUSCULAR; INTRAVENOUS at 21:19

## 2023-01-27 RX ADMIN — DIAZEPAM 5 MG: 5 TABLET ORAL at 14:41

## 2023-01-27 RX ADMIN — APIXABAN 5 MG: 5 TABLET, FILM COATED ORAL at 21:18

## 2023-01-27 RX ADMIN — LEVOTHYROXINE SODIUM 75 MCG: 75 TABLET ORAL at 05:46

## 2023-01-27 RX ADMIN — MORPHINE SULFATE 1 MG: 2 INJECTION, SOLUTION INTRAMUSCULAR; INTRAVENOUS at 02:06

## 2023-01-27 RX ADMIN — IPRATROPIUM BROMIDE AND ALBUTEROL SULFATE 1 AMPULE: 2.5; .5 SOLUTION RESPIRATORY (INHALATION) at 20:33

## 2023-01-27 RX ADMIN — DILTIAZEM HYDROCHLORIDE 180 MG: 180 CAPSULE, COATED, EXTENDED RELEASE ORAL at 09:22

## 2023-01-27 RX ADMIN — ATORVASTATIN CALCIUM 80 MG: 80 TABLET, FILM COATED ORAL at 09:30

## 2023-01-27 ASSESSMENT — PAIN DESCRIPTION - FREQUENCY: FREQUENCY: CONTINUOUS

## 2023-01-27 ASSESSMENT — PAIN SCALES - GENERAL
PAINLEVEL_OUTOF10: 0
PAINLEVEL_OUTOF10: 7
PAINLEVEL_OUTOF10: 8
PAINLEVEL_OUTOF10: 8
PAINLEVEL_OUTOF10: 5
PAINLEVEL_OUTOF10: 0
PAINLEVEL_OUTOF10: 9
PAINLEVEL_OUTOF10: 9
PAINLEVEL_OUTOF10: 4
PAINLEVEL_OUTOF10: 8
PAINLEVEL_OUTOF10: 9

## 2023-01-27 ASSESSMENT — PAIN DESCRIPTION - LOCATION
LOCATION: BACK;RIB CAGE
LOCATION: BACK
LOCATION: ABDOMEN;BACK
LOCATION: BACK
LOCATION: ABDOMEN
LOCATION: BACK;RIB CAGE

## 2023-01-27 ASSESSMENT — PAIN DESCRIPTION - DESCRIPTORS
DESCRIPTORS: DISCOMFORT;HEAVINESS;NAGGING
DESCRIPTORS: ACHING;GNAWING
DESCRIPTORS: ACHING
DESCRIPTORS: ACHING;THROBBING

## 2023-01-27 ASSESSMENT — PAIN - FUNCTIONAL ASSESSMENT: PAIN_FUNCTIONAL_ASSESSMENT: ACTIVITIES ARE NOT PREVENTED

## 2023-01-27 ASSESSMENT — PAIN DESCRIPTION - ORIENTATION
ORIENTATION: RIGHT;MID;LOWER
ORIENTATION: RIGHT;MID;LOWER;LEFT
ORIENTATION: LEFT;MID;LOWER
ORIENTATION: RIGHT
ORIENTATION: RIGHT

## 2023-01-27 ASSESSMENT — PAIN DESCRIPTION - PAIN TYPE
TYPE: ACUTE PAIN

## 2023-01-27 ASSESSMENT — PAIN SCALES - WONG BAKER
WONGBAKER_NUMERICALRESPONSE: 4;0
WONGBAKER_NUMERICALRESPONSE: 4;0

## 2023-01-27 NOTE — PLAN OF CARE
Problem: ABCDS Injury Assessment  Goal: Absence of physical injury  Outcome: Progressing     Problem: Skin/Tissue Integrity  Goal: Absence of new skin breakdown  Description: 1. Monitor for areas of redness and/or skin breakdown  2. Assess vascular access sites hourly  3. Every 4-6 hours minimum:  Change oxygen saturation probe site  4. Every 4-6 hours:  If on nasal continuous positive airway pressure, respiratory therapy assess nares and determine need for appliance change or resting period.   Outcome: Progressing     Problem: Pain  Goal: Verbalizes/displays adequate comfort level or baseline comfort level  Outcome: Progressing     Problem: Safety - Adult  Goal: Free from fall injury  Outcome: Progressing

## 2023-01-27 NOTE — PLAN OF CARE
[facility]        Assessment & Plan of Care    History of Present Illness: Ms. Jonathan Hollis is a 80 y.o. female who presented to the ED yesterday with a 3 day history of right lower back pain that she describes as severe, sore/achy, constant, and radiates around to the right flank and right groin region. She denies recent falls or trauma, uncertain but reports possibility that she pulled a muscle. No alleviating or aggravating factors noted. Denies anything similar, and denies any chronic back pain issues. Other associated symptoms include dysuria, black colored stool, and constipation for the last couple of months for which she takes Miralax at home. Last BM was 2 days ago. She denies nausea, vomiting, diarrhea, hematuria, chest pain, SOB beyond baseline, or bulge/swelling/distention in the right groin/abdomen area. Prior abdominal surgeries include appendectomy as a child and cholecystectomy. Colonoscopy in the remote past.  Medical history includes obstructive sleep apnea, chronic kidney disease, hypertension, CHF, hyperlipidemia, IBS, atrial fibrillation anticoagulated on Eliquis, COPD on chronic nasal cannula oxygen at 2L, CAD. At present she reports improved pain, was a 10 out of 10 on admission currently rates a 4 out of 10.     Physical Exam:  CONSTITUTIONAL:  alert, no apparent distress and mildly obese  NEUROLOGIC:  Mental Status Exam:  Level of Alertness:   awake  Orientation:   person, place, time  EYES:  sclera clear  ENT:  normocepalic, without obvious abnormality  NECK:  supple, symmetrical, trachea midline  LUNGS:  clear to auscultation  CARDIOVASCULAR:  regular rate and rhythm and no murmur noted  ABDOMEN: soft, non-distended, non-tender, has mild right flank tenderness, voluntary guarding absent, no masses palpated, normal bowel sounds,  scars noted right lower quadrant, and hernia absent  Extremities: no edema  SKIN:  no bruising or bleeding and normal skin color, texture, turgor    Assessment:  Right groin pain--resolved  Back pain  Pneumonia  COPD  Atrial fibrillation, on Eliquis  Hypertension  CAD  Anemia    Plan:  1. CT reviewed--no hernia noted in right lower abdomen/groin area, pain currently resolved from this area, only complains of back pain, does report black stool--which have previously been tested and negative for blood (10/3/2022), no nausea/vomiting--tolerating diet; continued supportive care, no intervention or further imaging planned from a surgical perspective, check occult stool  2. Regular diet as tolerated; monitor bowel function  3. IV hydration; monitor and correct electrolytes  4. Antibiotics per IM for pneumonia  5. Activity as tolerated--PT/OT following  6. PRN analgesics and antiemetics--minimizing narcotics as tolerated  7. Management of medical comorbid etiologies per primary team and consulting services    EDUCATION:  Educated patient on plan of care and disease process--all questions answered. Plans discussed with patient and nursing. Reviewed and discussed with Dr. Fernando Guerra consult to follow.       Signed:  KATHY Ceuvas - CNP  1/27/2023 1:32 PM

## 2023-01-27 NOTE — PROGRESS NOTES
Shift assessment completed. Routine vitals obtained. Scheduled medications given. PRN IV push Dilaudid given by VINNY De La Rosa. Patient is awake, alert and oriented. Respirations are easy and unlabored. Patient does not appear to be in distress, resting comfortably at this time on the chair. Call light within reach. Fall precautions in place.

## 2023-01-27 NOTE — PROGRESS NOTES
01/27/23 0031   RT Protocol   History Pulmonary Disease 2   Respiratory pattern 2   Breath sounds 2   Cough 0   Indications for Bronchodilator Therapy On home bronchodilators   Bronchodilator Assessment Score 6

## 2023-01-27 NOTE — PROGRESS NOTES
Facility/Department: [address] ONCOLOGY  Initial Assessment  DYSPHAGIA BEDSIDE SWALLOW EVALUATION     Patient: Tete Dominguez   : 1935   MRN: [id number]      Evaluation Date: 2023   Admitting Diagnosis: Bacterial pneumonia [J15.9]  General weakness [R53.1]  Increased oxygen demand [R68.89]  Pneumonia due to infectious organism, unspecified laterality, unspecified part of lung [J18.9]  Pain: Denies                                                       H&P: Tete Dominguez is a 80 y.o. female who presented with complaints of heart pounding while at home. Patient was brought in by daughter with concern for patient sleeping excessively and acting herself. Patient recently having some right flank pain. Difficulty moving around. Recently diagnosed with COVID infections. Patient has noted increased cough and shortness of breath. Patient normally wears 2 days of nasal cannula. Daughter is concerned based on weakness and excessive sleepiness she is unable to move despite having pain. Patient arrival to ER was found to hypoxic in the 80s despite being intubated. Work-up otherwise revealing for pneumonia    Imaging:  Chest X-ray:  23  Impression   No acute cardiopulmonary findings. Prior Modified Barium Swallow Study: 10/16/2019  Modified Barium Swallow evaluation completed on 10/16/2019. Results indicate mild oropharyngeal dysphagia characterized by delayed swallow initiation and intermittent laryngeal penetration with thin liquids. Thin liquids via teaspoon revealed pooling over tip of epiglottis to laryngeal inlet and pyriforms, penetration of material was noted prior to swallow initiation. Thin liquids via cup revealed pooling over tip of epiglottis to pyriforms, delayed swallow initiation was noted with intermittent laryngeal penetration during the swallow. Penetrated material entered the airway, remained above the vocal cords and was ejected from the airway. Intermittent laryngeal penetration was also noted with nectar thick liquids. No aspiration was viewed with any texture during study. Prolonged mastication was noted with regular solids with effortful bolus propulsion, adequate oral and pharyngeal clearing was noted with all textures. Overall, delayed swallow initiation and mildly reduced hyolaryngeal excursion contribute to intermittent laryngeal penetration, recommend use of strict aspiration precautions to reduce overall risk. History/Prior Level of Function:   Living Status: Home with daughter  Prior Dysphagia History: Pt previously seen by SLP services most recently in 2019 for completion of MBS (see above) and in 2020 with recommendations for a regular texture diet with thin liquids/no straws, meds as tolerated. Pt reported consuming a regular texture diet with thin liquids at home. Reason for referral: SLP evaluation orders received due to new diagnosis of PNA     Dysphagia Impressions/Diagnosis: Oropharyngeal Dysphagia   Pt alert but very [place] and currently on 2L O2 via nasal cannula. Pt denies onset of dysphagia but was agreeable to evaluation. Oral motor exam was within functional limits. Various textures provided to assess swallow function, fed by pt. Oral phase characterized by mildly prolonged however functional mastication and suspected premature bolus loss to pharynx. Thin liquids via cup/straw revealed concern for a mildly delayed swallow initiation however no overt clinical s/s of aspiration at bedside. Pt demonstrated effective oral clearing of regular solids. Education provided regarding use of general swallow precautions to maximize safety with intake and pt v/u. At this time, recommend continuation of a regular texture diet with thin liquids, meds whole with water and dysphagia tx follow up 1-2x to ensure tolerance.      Recommended Diet and Intervention 1/27/2023:  Diet Solids Recommendation:  Regular texture diet  Liquid Consistency Recommendation: Thin liquids  Recommended form of Meds: Meds whole with water        Compensatory Swallowing Strategies:  Upright as possible with all PO intake , Small bites/sips , Eat/feed slowly, Remain upright 30-45 min     SHORT TERM DYSPHAGIA GOALS/PLAN OF CARE: Speech therapy for dysphagia tx 1-2 times to ensure diet tolerance.   Pt will functionally tolerate recommended diet with no overt clinical s/s of aspiration     Dysphagia Therapeutic Intervention:  Diet Tolerance Monitoring , Patient/Family Education     Discharge Recommendations: Discharge recommendations to be determined pending ongoing follow-up during acute care stay    Patient Positioning: Upright in chair    Current Diet Level (prior to evaluation): Regular texture diet  Thin liquids      Respiratory Status:   []Room Air   [x]O2 via nasal cannula: 2L   []Other:    Dentition:  []Adequate  [x]Dentures   []Missing Many Teeth  []Edentulous  []Other:    Baseline Vocal Quality:  [x]Normal  []Dysphonic   []Aphonic   []Hoarse  []Wet  []Weak  []Other:    Volitional Cough:  Elicited: Strong     Volitional Swallow:   []Absent   []Delayed     [x]Adequate     []Required use of drink     Oral Mechanism Exam:  [x]WFL []Mild   [] Moderate  []Severe  []To be assessed  Impaired:   []Left side      []Right side    []Labial ROM/Coordination    []Labial Symmetry   []Lingual ROM/Coordination   []Lingual Symmetry  []Gag  []Other:     Oral Phase: []WFL [x]Mild   [] Moderate  []Severe  []To be assessed   [x]Impaired/Prolonged Mastication:   []Oral Holding:   []Spillage Left:   []Spillage Right:  []Pocketing Left:   []Pocketing Right:   []Decreased Anterior to Posterior Transit:   [x]Suspected Premature Bolus Loss:   []Lingual/Palatal Residue:   []Other:     Pharyngeal Phase: []WFL [x]Mild   [] Moderate  []Severe  []To be assessed   [x]Delayed Swallow:   []Suspected Pharyngeal Pooling:   []Decreased Laryngeal Elevation:   []Absent Swallow:  []Wet Vocal Quality:   []Throat Clearing-Immediate:   []Throat Clearing-Delayed:   []Cough-Immediate:   []Cough-Delayed:  []Change in Vital Signs:  []Suspected Delayed Pharyngeal Clearing:  []Other:     Eating Assistance:  [x]Independent  []Setup or clean-up assistance   [] Supervision or touching assistance   [] Partial or moderate assistance   [] Substantial or maximal assistance  [] Dependent     EDUCATION:   Provided education regarding role of SLP, results of assessment, recommendations and general speech pathology plan of care. [x] Pt verbalized understanding and agreement   [] Pt requires ongoing learning   [] No evidence of comprehension     If patient discharges prior to next visit, this note will serve as discharge.      Treatment time:  Timed Code Treatment Minutes: 0  Total Treatment time: 25 min    Electronically signed by:    Orly Wilkerson M.A., [address] [place] [address]  Speech-Language Pathologist

## 2023-01-27 NOTE — RT PROTOCOL NOTE
RT Inhaler-Nebulizer Bronchodilator Protocol Note    There is a bronchodilator order in the chart from a provider indicating to follow the RT Bronchodilator Protocol and there is an Initiate RT Inhaler-Nebulizer Bronchodilator Protocol order as well (see protocol at bottom of note). CXR Findings:  XR CHEST PORTABLE    Result Date: 1/26/2023  No acute cardiopulmonary findings. The findings from the last RT Protocol Assessment were as follows:   History Pulmonary Disease: Chronic pulmonary disease  Respiratory Pattern: Dyspnea on exertion or RR 21-25 bpm  Breath Sounds: Slightly diminished and/or crackles  Cough: Strong, spontaneous, non-productive  Indication for Bronchodilator Therapy: On home bronchodilators  Bronchodilator Assessment Score: 6    Aerosolized bronchodilator medication orders have been revised according to the RT Inhaler-Nebulizer Bronchodilator Protocol below. Respiratory Therapist to perform RT Therapy Protocol Assessment initially then follow the protocol. Repeat RT Therapy Protocol Assessment PRN for score 0-3 or on second treatment, BID, and PRN for scores above 3. No Indications - adjust the frequency to every 6 hours PRN wheezing or bronchospasm, if no treatments needed after 48 hours then discontinue using Per Protocol order mode. If indication present, adjust the RT bronchodilator orders based on the Bronchodilator Assessment Score as indicated below. Use Inhaler orders unless patient has one or more of the following: on home nebulizer, not able to hold breath for 10 seconds, is not alert and oriented, cannot activate and use MDI correctly, or respiratory rate 25 breaths per minute or more, then use the equivalent nebulizer order(s) with same Frequency and PRN reasons based on the score. If a patient is on this medication at home then do not decrease Frequency below that used at home.     0-3 - enter or revise RT bronchodilator order(s) to equivalent RT Bronchodilator order with Frequency of every 4 hours PRN for wheezing or increased work of breathing using Per Protocol order mode. 4-6 - enter or revise RT Bronchodilator order(s) to two equivalent RT bronchodilator orders with one order with BID Frequency and one order with Frequency of every 4 hours PRN wheezing or increased work of breathing using Per Protocol order mode. 7-10 - enter or revise RT Bronchodilator order(s) to two equivalent RT bronchodilator orders with one order with TID Frequency and one order with Frequency of every 4 hours PRN wheezing or increased work of breathing using Per Protocol order mode. 11-13 - enter or revise RT Bronchodilator order(s) to one equivalent RT bronchodilator order with QID Frequency and an Albuterol order with Frequency of every 4 hours PRN wheezing or increased work of breathing using Per Protocol order mode. Greater than 13 - enter or revise RT Bronchodilator order(s) to one equivalent RT bronchodilator order with every 4 hours Frequency and an Albuterol order with Frequency of every 2 hours PRN wheezing or increased work of breathing using Per Protocol order mode. RT to enter RT Home Evaluation for COPD & MDI Assessment order using Per Protocol order mode.     Electronically signed by Cintia Balderrama RCP on 1/27/2023 at 12:32 AM

## 2023-01-27 NOTE — PROGRESS NOTES
Hospitalist Progress Note      PCP: Berkley Zazueta MD    Date of Admission: 1/26/2023    Chief Complaint: Abdominal pain    Hospital Course: 81 yo F with COPD/ILD, chronic respiratory failure with hypoxia on 2 L NC, Afib on Eliquis, CAD, CKD 3, DM 2 came to ER with complaints of abdominal pain, back pain and AMS. Started on IV Levaquin for PNA seen on CT. Ordered for MRI L spine given back pain. Requested Surgery consult for RLQ and R groin pain. Subjective:  Patient with pain in R lower back and RLQ/R groin. No CP, SOB, HA or fevers. No diarrhea or melena.        Medications:  Reviewed    Infusion Medications    dextrose      sodium chloride       Scheduled Medications    ipratropium-albuterol  1 ampule Inhalation TID    lidocaine  1 patch TransDERmal Daily    acetaminophen  650 mg Oral Q6H    orphenadrine  60 mg IntraVENous Q12H    levofloxacin  500 mg IntraVENous Q24H    atorvastatin  80 mg Oral Daily    mometasone-formoterol  2 puff Inhalation BID    cloNIDine  0.1 mg Oral BID    dilTIAZem  180 mg Oral Daily    apixaban  5 mg Oral BID    levothyroxine  75 mcg Oral Daily    sodium chloride flush  5-40 mL IntraVENous 2 times per day    insulin lispro  0-8 Units SubCUTAneous TID WC    insulin lispro  0-4 Units SubCUTAneous Nightly     PRN Meds: ipratropium-albuterol, diazePAM, HYDROmorphone, dextrose bolus **OR** dextrose bolus, glucagon (rDNA), dextrose, sodium chloride flush, sodium chloride, ondansetron **OR** ondansetron, polyethylene glycol, acetaminophen **OR** acetaminophen, traMADol      Intake/Output Summary (Last 24 hours) at 1/27/2023 1230  Last data filed at 1/27/2023 0830  Gross per 24 hour   Intake 720 ml   Output 200 ml   Net 520 ml       Physical Exam Performed:    BP (!) 147/73   Pulse 61   Temp (!) 96.4 °F (35.8 °C) (Oral) Comment: Patient had just had drink of cold water  Resp 14   Ht 5' 2\" (1.575 m)   Wt 156 lb 4 oz (70.9 kg)   SpO2 94%   BMI 28.58 kg/m²     General appearance: No apparent distress, appears stated age and cooperative. HEENT: Pupils equal, round, and reactive to light. Conjunctivae/corneas clear. Neck: Supple, with full range of motion. No jugular venous distention. Trachea midline. Respiratory:  Normal respiratory effort. Clear to auscultation, bilaterally without Rales/Wheezes/Rhonchi. Cardiovascular: Regular rate and rhythm with normal S1/S2 without murmurs, rubs or gallops. Abdomen: Soft, tender in RLQ/R groin, non-distended with normal bowel sounds. Musculoskeletal: No clubbing, cyanosis or edema bilaterally. R lower paraspinal tenderness  Skin: Skin color, texture, turgor normal.  No rashes or lesions. Neurologic:  Neurovascularly intact without any focal sensory/motor deficits. Cranial nerves: II-XII intact, grossly non-focal.  Psychiatric: Alert and oriented, thought content appropriate, normal insight  Capillary Refill: Brisk, 3 seconds, normal   Peripheral Pulses: +2 palpable, equal bilaterally       Labs:   Recent Labs     01/26/23 0928 01/27/23 0515   WBC 4.6 7.5   HGB 9.3* 10.2*   HCT 28.8* 31.4*    130*     Recent Labs     01/26/23 0928 01/27/23 0515    136   K 4.4 4.8    100   CO2 26 26   BUN 30* 24*   CREATININE 1.2 1.0   CALCIUM 9.3 9.5     Recent Labs     01/26/23 0928   AST 16   ALT 15   BILITOT 0.3   ALKPHOS 122     No results for input(s): INR in the last 72 hours. Recent Labs     01/26/23 0928   TROPONINI <0.01       Urinalysis:      Lab Results   Component Value Date/Time    NITRU Negative 01/26/2023 09:28 AM    WBCUA 0 01/26/2023 09:28 AM    BACTERIA None Seen 01/26/2023 09:28 AM    RBCUA 0 01/26/2023 09:28 AM    BLOODU Negative 01/26/2023 09:28 AM    SPECGRAV 1.020 01/26/2023 09:28 AM    GLUCOSEU Negative 01/26/2023 09:28 AM       Radiology:  CT ABDOMEN PELVIS W IV CONTRAST Additional Contrast? None   Final Result   1. New left basilar airspace disease concerning for developing pneumonia.    Recommend chest radiograph in 8 weeks to confirm resolution. 2. New 1.0 cm complex left upper pole renal cyst.  Recommend nonemergent MRI   of the abdomen with without contrast using renal mass protocol. XR CHEST PORTABLE   Final Result   No acute cardiopulmonary findings. MRI LUMBAR SPINE WO CONTRAST    (Results Pending)       IP CONSULT TO GENERAL SURGERY    Assessment/Plan:    Active Hospital Problems    Diagnosis     Bacterial pneumonia [J15.9]      Priority: Medium    Chronic renal disease, stage III ([facility] [541277] [N18.30]      Priority: Medium    Chronic obstructive pulmonary disease ([address] [J44.9]      Priority: Medium    PAF (paroxysmal atrial fibrillation) ([facility] [I48.0]     Chronic respiratory failure with hypoxia ([facility] [J96.11]     Coronary artery disease involving native coronary artery of native heart with angina pectoris ([facility] [I25.119]     Interstitial lung disease ([facility] [J84.9]      PNA  Continue IV Levaquin  2. Back pain   - Check MRI L spine to r/o fx   - Start Norflex 60 mg IV q12h X 3 doses   - Start Tylenol 650 mg q6h   - Start Lidoderm patches   - Start Valium 5 mg PO q6h PRN spasm   - Dilaudid IV PRN pain   - PT/OT eval  3. Anxiety   - Use Valium PO instead of Ativan PRN anxiety  4. Chronic respiratory failure with hypoxia   - Continue 2 L O2 via NC  5. COPD   - Continue Dulera   - Continue Duoneb tid  6. Afib     - Continue Cardizem   - Continue Eliquis  7. CAD   - Continue Eliquis and Lipitor  8. R groin/RLQ pain   - Will ask for Surgery to evaluate to r/o hernia or other intraabdominal source for pain    DVT Prophylaxis: Eliquis  Diet: ADULT DIET; Regular; 5 carb choices (75 gm/meal)  Code Status: Full Code  PT/OT Eval Status: Requested    Dispo - Home    Discussed with patient, nursing and CM. Hopefully home by end of weekend if MRI L spine negative.     Ana Gonzales MD

## 2023-01-27 NOTE — PROGRESS NOTES
[facility] Department   Phone: [phone]    Occupational Therapy    [x] Initial Evaluation            [] Daily Treatment Note         [] Discharge Summary      Patient: Neeta Butler   : 1935   MRN: [id number]   Date of Service:  2023    Admitting Diagnosis:  Bacterial pneumonia  Current Admission Summary: 80 y.o. female who presented with complaints of heart pounding while at home. Patient was brought in by daughter with concern for patient sleeping excessively and acting herself. Patient recently having some right flank pain. Difficulty moving around. Recently diagnosed with COVID infections. Patient has noted increased cough and shortness of breath. Patient normally wears 2 days of nasal cannula. Daughter is concerned based on weakness and excessive sleepiness she is unable to move despite having pain. Patient arrival to ER was found to hypoxic in the 80s despite being intubated. Work-up otherwise revealing for pneumonia  Past Medical History:  has a past medical history of Arthritis, Atrial fibrillation ([address] Bursitis, Diabetes mellitus type II, controlled ([address] GERD (gastroesophageal reflux disease), HTN (hypertension), Hyperlipidemia, Hypothyroid, Insomnia, Lumbago, Parkinson disease ([address] Restless legs syndrome (RLS), and SVT (supraventricular tachycardia) ([address] Past Surgical History:  has a past surgical history that includes Appendectomy; Colonoscopy; and Cholecystectomy, laparoscopic (2013). Discharge Recommendations: Neeta Butler scored a 20/24 on the AM-PAC ADL Inpatient form. Current research shows that an AM-PAC score of 18 or greater is typically associated with a discharge to the patient's home setting. Based on the patient's AM-PAC score, and their current ADL deficits, it is recommended that the patient have 2-3 sessions per week of Occupational Therapy at d/c to increase the patient's independence.   At this time, this patient demonstrates the endurance and safety to discharge home with home services (home vs OP services) and a follow up treatment frequency of 2-3x/wk. Please see assessment section for further patient specific details. If patient discharges prior to next session this note will serve as a discharge summary. Please see below for the latest assessment towards goals. HOME HEALTH CARE: LEVEL 2 SOCIAL     - Initial home health evaluation to occur within 24-48 hours, in patient home   - Therapy to evaluate with goal of regaining prior level of functioning   - Therapy to evaluate if patient has [address] needs for personal care   -  evaluation within 24-48 hours, includes evaluation of resources and insurance to determine AL/IL/LTC/Medicaid options   - [place] on Aging Referral   - [address] to discuss home support and care needs post discharge within two weeks of discharge.       DME Required For Discharge: no DME required at discharge    Precautions/Restrictions: medium fall risk  Weight Bearing Restrictions: no restrictions  [] Right Upper Extremity  [] Left Upper Extremity [] Right Lower Extremity  [] Left Lower Extremity     Required Braces/Orthotics: no braces required   [] Right  [] Left  Positional Restrictions:no positional restrictions    Pre-Admission Information   Lives With: daughter    Type of Home: house, bilevel  Home Layout: two level, bedroom/bathroom upstairs, 7 stairs to 2nd level with B HR  Home Access:  1 step to enter without rails  (has post to hold onto)  Bathroom Layout: walk in shower  Bathroom Equipment: grab bars in shower, shower chair  Toilet Height: standard height  Home Equipment: rollator - 4 wheeled walker, oxygen, (2L/min O2), transport chair  Transfer Assistance: Independent without use of device  Ambulation Assistance:Independent without use of device  ADL Assistance: independent with all ADL's  IADL Assistance: requires assistance with all homemaking tasks  Active :        [] Yes  [x] No (Dtr drives pt)  Current Employment: retired. Occupation: [employer] years  Hobbies: watch reality TV  Recent Falls: no falls    Examination   Vision:   Vision Gross Assessment: Impaired, Vision Corrective Device: wears glasses at all times, and Visual History: macular degeneration  Hearing:   hard of hearing, left hearing aid, right hearing aid  Sensation:   reports numbness and tingling in (B) LE  ROM:   (B) UE AROM WFL  Strength:   (B) UE strength grossly [facility]    Therapist Clinical Decision Making (Complexity): low complexity  Clinical Presentation: stable      Subjective  General: Pt seated in recliner upon arrival, agreeable to evaluation. Denies pain  Pain: 4/10. Location: back, R flank  Pain Interventions: not applicable        Activities of Daily Living  Basic Activities of Daily Living  General Comments: Declines to complete  Instrumental Activities of Daily Living  No IADL completed on this date. Functional Mobility  Bed Mobility  Sit to Supine: supervision  Scooting: supervision  Comments:  Transfers  Sit to stand transfer:stand by assistance  Stand to sit transfer: stand by assistance  Stand step transfer: stand by assistance  Comments: EOB > recliner with RW  Functional Mobility:  Sitting Balance: supervision. Standing Balance: stand by assistance. No functional mobility completed on this date secondary to transport present for pt to go to MRI--pt requesting stand step transfers only this date.     Other Therapeutic Interventions    Functional Outcomes  AM-PAC Inpatient Daily Activity Raw Score: 20    Cognition  WFL  Orientation:    alert and oriented x 4  Command Following:   [facility]     Education  Barriers To Learning: hearing  Patient Education: patient educated on goals, OT role and benefits, plan of care, transfer training, discharge recommendations  Learning Assessment:  patient verbalizes understanding, would benefit from continued reinforcement    Assessment  Activity Tolerance: Tolerated fair--limited in activity d/t pain but able to complete transfers with SBA  Impairments Requiring Therapeutic Intervention: decreased functional mobility, decreased ADL status, decreased strength, decreased endurance, decreased balance, decreased IADL  Prognosis: good  Clinical Assessment: The patient is a 80 y.o. female who presents below their baseline level of function due to above deficits, associated with PNA. Typically, pt is mod I for mobility and independent for her ADL with assist of dtr for IADL. Currently, pt is SBA for transfers with RW and supervision for bed mobility.  Continued OT indicated in order to promote return to PLOF    Safety Interventions: patient left in bed, bed alarm in place, call light within reach, gait belt, and nurse notified    Plan  Frequency: 3-5 x/per week  Current Treatment Recommendations: strengthening, balance training, functional mobility training, transfer training, endurance training, patient/caregiver education, and ADL/self-care training    Goals    Short Term Goals:  Time Frame: by discharge  Patient will complete upper body ADL at [facility] independent   Patient will complete lower body ADL at [facility] independent   Patient will complete toileting at modified independent   Patient will complete grooming at [facility] independent   Patient will complete functional transfers at [facility] independent   Patient will complete functional mobility at [facility]     Therapy Session Time     Individual Group Co-treatment   Time In    1411   Time Out    1434   Minutes    23        Timed Code Treatment Minutes:   8 minutes  Total Treatment Minutes:  23 minutes       Electronically Signed By: ANDER Ramirez, [address] [place] [address] OTR/L [id number]

## 2023-01-27 NOTE — PROGRESS NOTES
[facility] [address]   Phone: [phone]    Physical Therapy    [] Initial Evaluation            [] Daily Treatment Note         [] Discharge Summary      Patient: Saida Taylor   : 1935   MRN: [id number]   Date of Service:  2023  Admitting Diagnosis: Bacterial pneumonia    Current Admission Summary: Saida Taylor is a 80 y.o. female with past medical history of obstructive sleep apnea, chronic kidney disease, hypertension, CHF, hyperlipidemia, IBS, atrial fibrillation anticoagulated on Eliquis, COPD on chronic nasal cannula oxygen at 2 L, CAD who presents the ED with complaint of abdominal pain. For the past 3 days has had pain to her right lower back/flank that radiates into the right lower abdomen. She states she has had some nausea. Denies any vomiting. Denies decreased oral intake. She has had some dysuria but denies any frequency urgency. Denies any hematuria. Denies any changes in bowel movements. Denies vaginal bleeding or discharge. Denies any chest pain. Has chronic shortness of breath from her COPD but denies any worsening shortness of breath this time. Reports he has had chronic cough as well but denies any worsening cough at this time. Cough is nonproductive. She is on 2 L nasal cannula oxygen this time and she states this is chronic. She denies any increased oxygen requirement. She reports she has had past surgical history of appendectomy as a child and cholecystectomy. Denies any other surgeries to the abdomen the past.  Denies taking any over-the-counter medication for symptom control. Describes pain as an 8/10 and became concerned and came to the ED by EMS for further evaluation treatment.     Past Medical History:  has a past medical history of Arthritis, Atrial fibrillation ([address] Bursitis, Diabetes mellitus type II, controlled ([address] GERD (gastroesophageal reflux disease), HTN (hypertension), Hyperlipidemia, Hypothyroid, Insomnia, Lumbago, Parkinson disease ([address] Restless legs syndrome (RLS), and SVT (supraventricular tachycardia) ([facility] Past Surgical History:  has a past surgical history that includes Appendectomy; Colonoscopy; and Cholecystectomy, laparoscopic (2/24/2013). Discharge Recommendations: Juanjo Guerrier scored a 21/24 on the AM-PAC short mobility form. Current research shows that an AM-PAC score of 18 or greater is typically associated with a discharge to the patient's home setting. Based on the patient's AM-PAC score and their current functional mobility deficits, it is recommended that the patient have 2-3 sessions per week of Physical Therapy at d/c to increase the patient's independence. At this time, this patient demonstrates the endurance and safety to discharge home with home health PT and a follow up treatment frequency of 2-3x/wk. Please see assessment section for further patient specific details. If patient discharges prior to next session this note will serve as a discharge summary. Please see below for the latest assessment towards goals. HOME HEALTH CARE: LEVEL 2 SOCIAL  - Initial home health evaluation to occur within 24-48 hours, in patient home   - Therapy to evaluate with goal of regaining prior level of functioning   - Therapy to evaluate if patient has [address] needs for personal care   -  evaluation within 24-48 hours, includes evaluation of resources and insurance to determine AL/IL/LTC/Medicaid options   - [facility] Referral   - Rohan Nguyen to discuss home support and care needs post discharge within two weeks of discharge.     DME Required For Discharge: no DME required at discharge  Precautions/Restrictions: high fall risk  Weight Bearing Restrictions: weight bearing as tolerated  [] Right Upper Extremity  [] Left Upper Extremity [] Right Lower Extremity  [] Left Lower Extremity     Required Braces/Orthotics: no braces required   [] Right  [] Left  Positional Restrictions:no positional restrictions    Pre-Admission Information   Lives With: daughter               Type of Home: house, bilevel  Home Layout: two level, bedroom/bathroom upstairs, 7 stairs to 2nd level with B HR  Home Access:  1 step to enter without rails  (has post to hold onto)  Bathroom Layout: walk in shower  Bathroom Equipment: grab bars in shower, shower chair  Toilet Height: standard height  Home Equipment: rollator - 4 wheeled walker, oxygen, (2L/min O2), transport chair  Transfer Assistance: Independent without use of device  Ambulation Assistance:Independent without use of device  ADL Assistance: independent with all ADL's  IADL Assistance: requires assistance with all homemaking tasks  Active :        [] Yes                 [x] No   (Dtr drives pt)  Current Employment: retired. 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 2L O2 via nasal cannula. Posture:   WFL  Sensation:   reduced light touch to (B) LE - hx of neuropathy  Proprioception:    WFL  Tone:   Normotonic  Coordination Testing:   WFL    ROM:   (B) LE AROM WFL  Strength:   (B) LE strength grossly 4  Therapist Clinical Decision Making (Complexity): low complexity  Clinical Presentation: stable      Subjective  General: Patient reports pain is better but still persists on right side of low back/flank. Pain: 4/10.   Location: right flank/back  Pain Interventions: repositioned        Functional Mobility  Bed Mobility  Supine to Sit: supervision  Sit to Supine: supervision  Scooting: supervision  Comments:  Transfers  Sit to stand transfer: stand by assistance  Stand to sit transfer: stand by assistance  Bed to chair transfer: stand by assistance  Comments:  Gait belt donned. RW used to transfer chair to bed. Ambulation  Ambulation not tested on this date secondary to patient feeling whoozy from medication. Distance: N/A  Gait Mechanics: N/A  Comments:    Stair Mobility  Stair mobility not completed on this date. Comments:  Wheelchair Mobility:  No w/c mobility completed on this date. Comments:  Balance  Static Sitting Balance: good: independent with functional balance in unsupported position  Dynamic Sitting Balance: good: independent with functional balance in unsupported position  Static Standing Balance: fair (-): maintains balance at SUPV with use of UE support  Dynamic Standing Balance: fair (-): maintains balance at SBA with use of UE support  Comments:    Other Therapeutic Interventions    Functional Outcomes  AM-PAC Inpatient Mobility Raw Score : 21              Cognition  WFL  Orientation:    alert and oriented x 4  Command Following:   [facility]    Education  Barriers To Learning: hearing  Patient Education: patient educated on goals, PT role and benefits, plan of care, general safety, functional mobility training, proper use of assistive device/equipment, transfer training, discharge recommendations  Learning Assessment:  patient verbalizes and demonstrates understanding    Assessment  Activity Tolerance: Generalized weakness, balance, endurance deficits. Impairments Requiring Therapeutic Intervention: decreased functional mobility, decreased strength, decreased endurance, decreased balance  Prognosis: good  Clinical Assessment: Patient MOD I w/ RW at home, presently only SBA/SUPV for mobility but unable to ambulate d/t feelings of \"whooziness\". Based on bed mobility and transfers not needing more than SBA/SUPV, patient appears safe to d/c home where patient has 24 hour assist from daughter.   Recommend home health PT for weakness, balance, and endurance deficits. Safety Interventions: patient left in bed, bed alarm in place, call light within reach, gait belt, patient at risk for falls, and nurse notified    Plan  Frequency: 3-5 x/per week  Current Treatment Recommendations: strengthening, balance training, functional mobility training, transfer training, gait training, stair training, endurance training, patient/caregiver education, safety education, and equipment evaluation/education    Goals  Patient Goals: Return home ASAP. Short Term Goals:  Time Frame: Discharge.   Patient will complete bed mobility at modified independent   Patient will complete transfers at [facility]   Patient will ambulate 50 ft with use of rolling walker at modified independent  Patient will ascend/descend 7 stairs with (B) handrail at stand by assistance    Therapy Session Time      Individual Group Co-treatment   Time In     1411   Time Out     1434   Minutes     23     Timed Code Treatment Minutes:  8 Minutes  Total Treatment Minutes:  23       Electronically Signed By: Bryan Avelar PT, DPT, ATC-R [id number]

## 2023-01-28 VITALS
HEIGHT: 62 IN | WEIGHT: 156.8 LBS | RESPIRATION RATE: 18 BRPM | BODY MASS INDEX: 28.85 KG/M2 | HEART RATE: 60 BPM | SYSTOLIC BLOOD PRESSURE: 160 MMHG | TEMPERATURE: 97.8 F | DIASTOLIC BLOOD PRESSURE: 76 MMHG | OXYGEN SATURATION: 94 %

## 2023-01-28 LAB
ANION GAP SERPL CALCULATED.3IONS-SCNC: 9 MMOL/L (ref 3–16)
BUN BLDV-MCNC: 21 MG/DL (ref 7–20)
CALCIUM SERPL-MCNC: 9.6 MG/DL (ref 8.3–10.6)
CHLORIDE BLD-SCNC: 100 MMOL/L (ref 99–110)
CO2: 29 MMOL/L (ref 21–32)
CREAT SERPL-MCNC: 1 MG/DL (ref 0.6–1.2)
GFR SERPL CREATININE-BSD FRML MDRD: 54 ML/MIN/{1.73_M2}
GLUCOSE BLD-MCNC: 117 MG/DL (ref 70–99)
GLUCOSE BLD-MCNC: 123 MG/DL (ref 70–99)
GLUCOSE BLD-MCNC: 88 MG/DL (ref 70–99)
HCT VFR BLD CALC: 31 % (ref 36–48)
HEMOGLOBIN: 9.9 G/DL (ref 12–16)
MCH RBC QN AUTO: 27.7 PG (ref 26–34)
MCHC RBC AUTO-ENTMCNC: 32 G/DL (ref 31–36)
MCV RBC AUTO: 86.6 FL (ref 80–100)
PDW BLD-RTO: 15.7 % (ref 12.4–15.4)
PERFORMED ON: ABNORMAL
PERFORMED ON: NORMAL
PLATELET # BLD: 121 K/UL (ref 135–450)
PMV BLD AUTO: 7.6 FL (ref 5–10.5)
POTASSIUM SERPL-SCNC: 4.6 MMOL/L (ref 3.5–5.1)
RBC # BLD: 3.58 M/UL (ref 4–5.2)
SODIUM BLD-SCNC: 138 MMOL/L (ref 136–145)
WBC # BLD: 4.7 K/UL (ref 4–11)

## 2023-01-28 PROCEDURE — 99222 1ST HOSP IP/OBS MODERATE 55: CPT | Performed by: SURGERY

## 2023-01-28 PROCEDURE — 94640 AIRWAY INHALATION TREATMENT: CPT

## 2023-01-28 PROCEDURE — 94761 N-INVAS EAR/PLS OXIMETRY MLT: CPT

## 2023-01-28 PROCEDURE — 6360000002 HC RX W HCPCS: Performed by: INTERNAL MEDICINE

## 2023-01-28 PROCEDURE — 6370000000 HC RX 637 (ALT 250 FOR IP): Performed by: NURSE PRACTITIONER

## 2023-01-28 PROCEDURE — 85027 COMPLETE CBC AUTOMATED: CPT

## 2023-01-28 PROCEDURE — 6370000000 HC RX 637 (ALT 250 FOR IP): Performed by: INTERNAL MEDICINE

## 2023-01-28 PROCEDURE — 80048 BASIC METABOLIC PNL TOTAL CA: CPT

## 2023-01-28 PROCEDURE — 2580000003 HC RX 258: Performed by: HOSPITALIST

## 2023-01-28 PROCEDURE — 2500000003 HC RX 250 WO HCPCS: Performed by: INTERNAL MEDICINE

## 2023-01-28 PROCEDURE — 6370000000 HC RX 637 (ALT 250 FOR IP): Performed by: HOSPITALIST

## 2023-01-28 PROCEDURE — 36415 COLL VENOUS BLD VENIPUNCTURE: CPT

## 2023-01-28 PROCEDURE — 2700000000 HC OXYGEN THERAPY PER DAY

## 2023-01-28 RX ORDER — HYDROMORPHONE HYDROCHLORIDE 1 MG/ML
0.5 INJECTION, SOLUTION INTRAMUSCULAR; INTRAVENOUS; SUBCUTANEOUS ONCE
Status: DISCONTINUED | OUTPATIENT
Start: 2023-01-28 | End: 2023-01-28 | Stop reason: HOSPADM

## 2023-01-28 RX ORDER — ACETAMINOPHEN 650 MG/1
650 SUPPOSITORY RECTAL EVERY 6 HOURS PRN
Status: DISCONTINUED | OUTPATIENT
Start: 2023-01-28 | End: 2023-01-28 | Stop reason: HOSPADM

## 2023-01-28 RX ORDER — HYDROCODONE BITARTRATE AND ACETAMINOPHEN 5; 325 MG/1; MG/1
1 TABLET ORAL EVERY 6 HOURS PRN
Status: DISCONTINUED | OUTPATIENT
Start: 2023-01-28 | End: 2023-01-28 | Stop reason: HOSPADM

## 2023-01-28 RX ORDER — LEVOFLOXACIN 500 MG/1
500 TABLET, FILM COATED ORAL DAILY
Qty: 3 TABLET | Refills: 0 | Status: ON HOLD | OUTPATIENT
Start: 2023-01-28 | End: 2023-02-03 | Stop reason: HOSPADM

## 2023-01-28 RX ORDER — LABETALOL HYDROCHLORIDE 5 MG/ML
10 INJECTION, SOLUTION INTRAVENOUS ONCE
Status: COMPLETED | OUTPATIENT
Start: 2023-01-28 | End: 2023-01-28

## 2023-01-28 RX ORDER — LIDOCAINE 4 G/G
1 PATCH TOPICAL DAILY
Qty: 4 PATCH | Refills: 0 | Status: SHIPPED | OUTPATIENT
Start: 2023-01-29

## 2023-01-28 RX ORDER — LEVOFLOXACIN 5 MG/ML
500 INJECTION, SOLUTION INTRAVENOUS EVERY 24 HOURS
Qty: 300 ML | Refills: 0 | Status: SHIPPED | OUTPATIENT
Start: 2023-01-28 | End: 2023-01-28 | Stop reason: HOSPADM

## 2023-01-28 RX ORDER — ACETAMINOPHEN 325 MG/1
650 TABLET ORAL EVERY 6 HOURS PRN
Status: DISCONTINUED | OUTPATIENT
Start: 2023-01-28 | End: 2023-01-28 | Stop reason: HOSPADM

## 2023-01-28 RX ORDER — HYDROCODONE BITARTRATE AND ACETAMINOPHEN 5; 325 MG/1; MG/1
1 TABLET ORAL EVERY 6 HOURS PRN
Qty: 6 TABLET | Refills: 0 | Status: SHIPPED | OUTPATIENT
Start: 2023-01-28 | End: 2023-01-30

## 2023-01-28 RX ORDER — BISACODYL 10 MG
10 SUPPOSITORY, RECTAL RECTAL DAILY PRN
Status: DISCONTINUED | OUTPATIENT
Start: 2023-01-28 | End: 2023-01-28 | Stop reason: HOSPADM

## 2023-01-28 RX ADMIN — Medication 10 ML: at 10:28

## 2023-01-28 RX ADMIN — CLONIDINE HYDROCHLORIDE 0.1 MG: 0.1 TABLET ORAL at 09:19

## 2023-01-28 RX ADMIN — DILTIAZEM HYDROCHLORIDE 180 MG: 180 CAPSULE, COATED, EXTENDED RELEASE ORAL at 09:19

## 2023-01-28 RX ADMIN — LABETALOL HYDROCHLORIDE 10 MG: 5 INJECTION INTRAVENOUS at 05:53

## 2023-01-28 RX ADMIN — Medication 2 PUFF: at 08:40

## 2023-01-28 RX ADMIN — HYDROMORPHONE HYDROCHLORIDE 0.5 MG: 1 INJECTION, SOLUTION INTRAMUSCULAR; INTRAVENOUS; SUBCUTANEOUS at 01:43

## 2023-01-28 RX ADMIN — ACETAMINOPHEN 650 MG: 325 TABLET ORAL at 01:43

## 2023-01-28 RX ADMIN — DIAZEPAM 5 MG: 5 TABLET ORAL at 04:28

## 2023-01-28 RX ADMIN — HYDROCODONE BITARTRATE AND ACETAMINOPHEN 1 TABLET: 5; 325 TABLET ORAL at 13:25

## 2023-01-28 RX ADMIN — ATORVASTATIN CALCIUM 80 MG: 80 TABLET, FILM COATED ORAL at 09:19

## 2023-01-28 RX ADMIN — LEVOTHYROXINE SODIUM 75 MCG: 75 TABLET ORAL at 05:36

## 2023-01-28 RX ADMIN — ORPHENADRINE CITRATE 60 MG: 30 INJECTION INTRAMUSCULAR; INTRAVENOUS at 09:26

## 2023-01-28 RX ADMIN — IPRATROPIUM BROMIDE AND ALBUTEROL SULFATE 1 AMPULE: 2.5; .5 SOLUTION RESPIRATORY (INHALATION) at 14:13

## 2023-01-28 RX ADMIN — IPRATROPIUM BROMIDE AND ALBUTEROL SULFATE 1 AMPULE: 2.5; .5 SOLUTION RESPIRATORY (INHALATION) at 08:40

## 2023-01-28 RX ADMIN — ACETAMINOPHEN 650 MG: 325 TABLET ORAL at 09:19

## 2023-01-28 RX ADMIN — SODIUM CHLORIDE, PRESERVATIVE FREE 10 ML: 5 INJECTION INTRAVENOUS at 09:27

## 2023-01-28 RX ADMIN — HYDROMORPHONE HYDROCHLORIDE 0.5 MG: 1 INJECTION, SOLUTION INTRAMUSCULAR; INTRAVENOUS; SUBCUTANEOUS at 05:59

## 2023-01-28 RX ADMIN — APIXABAN 5 MG: 5 TABLET, FILM COATED ORAL at 09:19

## 2023-01-28 ASSESSMENT — PAIN SCALES - WONG BAKER
WONGBAKER_NUMERICALRESPONSE: 4;0

## 2023-01-28 ASSESSMENT — PAIN SCALES - GENERAL
PAINLEVEL_OUTOF10: 8
PAINLEVEL_OUTOF10: 8
PAINLEVEL_OUTOF10: 7

## 2023-01-28 ASSESSMENT — PAIN DESCRIPTION - LOCATION
LOCATION: ABDOMEN
LOCATION: ABDOMEN

## 2023-01-28 ASSESSMENT — PAIN DESCRIPTION - ORIENTATION: ORIENTATION: RIGHT

## 2023-01-28 NOTE — PROGRESS NOTES
Arrived to place midline in patient with, Mo RN, pre procedure and allergies reviewed, no issues accessing basilic vein, pt tolerated well, blood return and flushed well. Pt left in stable condition and bed braked and in lowest position. Pt call light within reach.  Handoff to RN

## 2023-01-28 NOTE — DISCHARGE SUMMARY
Hospital Medicine Discharge Summary    Patient: June Pierre     Gender: female  : 1935   Age: 80 y.o. MRN: [id number]    Admitting Physician: Whitney Velásquez MD  Discharge Physician: Hannah Matos DO       Admit Date: 2023   Discharge Date:   2023    Disposition:  Home Health    Discharge Diagnoses: Active Hospital Problems    Diagnosis Date Noted    Pneumonia due to infectious organism [J18.9] 2023     Priority: Medium    Chronic renal disease, stage III ([facility] [address] [164980] [N18.30] 2022     Priority: Medium    Chronic obstructive pulmonary disease ([facility] [address] [J44.9] 2022     Priority: Medium    PAF (paroxysmal atrial fibrillation) ([facility] [I48.0] 2020    Chronic respiratory failure with hypoxia [facility]) [J96.11] 2020    Coronary artery disease involving native coronary artery of native heart with angina pectoris ([facility] [I25.119] 2020    Interstitial lung disease ([facility] [J84.9] 2019       Follow-up appointments:  one week    Outpatient to do list: f/u with PCP in 1-2 wks                                      F/u with Pain management specialist as scheduled                                      F/u with G/S PRN       Condition at Discharge:  [address] Course:    June Pierre is a 80 y.o. female who presented with complaints of heart pounding while at home. Patient was brought in by daughter with concern for patient sleeping excessively and acting herself. Patient recently having some right flank pain. Difficulty moving around. Recently diagnosed with COVID infections. Patient has noted increased cough and shortness of breath. Patient normally wears 2 days of nasal cannula. Daughter is concerned based on weakness and excessive sleepiness she is unable to move despite having pain.   Patient arrival to ER was found to hypoxic in the 80s despite home O2 Work-up otherwise revealing for pneumonia    Admit, G/S, LLL Pna POA resolving with ATB, AMS POA resolved with PNA Tx; Pt c/o R flank/gron pain and G/S ruled out any hernia; MRI L spine showed [place] disease and mild DJD; recommend outpatient therapy; Pt/family agreeable; dc home with home health in stable condition; f/u as above    Discharge Medications:   Current Discharge Medication List        START taking these medications    Details   HYDROcodone-acetaminophen (NORCO) 5-325 MG per tablet Take 1 tablet by mouth every 6 hours as needed (pain 8-10) for up to 6 doses. Max Daily Amount: 4 tablets  Qty: 6 tablet, Refills: 0    Comments: Reduce doses taken as pain becomes manageable  Associated Diagnoses: General weakness      lidocaine 4 % external patch Place 1 patch onto the skin daily  Qty: 4 patch, Refills: 0      levoFLOXacin (LEVAQUIN) 500 MG tablet Take 1 tablet by mouth daily for 3 days  Qty: 3 tablet, Refills: 0           Current Discharge Medication List        Current Discharge Medication List        CONTINUE these medications which have NOT CHANGED    Details   dilTIAZem (CARDIZEM CD) 180 MG extended release capsule Take 1 capsule by mouth daily  Qty: 90 capsule, Refills: 3      dilTIAZem (CARDIZEM) 30 MG tablet As needed for episodes of tachycardia  Qty: 120 tablet, Refills: 3      !! LORazepam (ATIVAN) 1 MG tablet Take 1 mg by mouth at bedtime. SITagliptin (JANUVIA) 100 MG tablet Take 100 mg by mouth daily      !! LORazepam (ATIVAN) 0.5 MG tablet Take 0.5 mg by mouth in the morning and at bedtime.  In the morning and afternoon      ELIQUIS 5 MG TABS tablet TAKE 1 TABLET TWICE DAILY  Qty: 180 tablet, Refills: 0      budesonide-formoterol (SYMBICORT) 160-4.5 MCG/ACT AERO INHALE 2 PUFFS TWICE DAILY  Qty: 3 each, Refills: 0    Associated Diagnoses: Wheezing      fluticasone (FLONASE) 50 MCG/ACT nasal spray USE 2 SPRAYS NASALLY EVERY DAY  Qty: 48 g, Refills: 1      atorvastatin (LIPITOR) 80 MG tablet TAKE 1 TABLET EVERY NIGHT  Qty: 90 tablet, Refills: 3      meclizine (ANTIVERT) 12.5 MG tablet TAKE 1 TABLET THREE TIMES DAILY AS NEEDED  Qty: 270 tablet, Refills: 1      !! blood glucose test strips (TRUE METRIX BLOOD GLUCOSE TEST) strip USE TO TEST BLOOD SUGAR DAILY  Qty: 100 strip, Refills: 5    Associated Diagnoses: Controlled type 2 diabetes mellitus without complication, without long-term current use of insulin ([facility])      irbesartan (AVAPRO) 300 MG tablet TAKE 1 TABLET BY MOUTH EVERY NIGHT  Qty: 90 tablet, Refills: 0      rOPINIRole (REQUIP) 3 MG tablet TAKE 1 TABLET THREE TIMES DAILY  Qty: 270 tablet, Refills: 1      levothyroxine (SYNTHROID) 75 MCG tablet TAKE 1 TABLET BY MOUTH EVERY DAY  Qty: 90 tablet, Refills: 0    Associated Diagnoses: Acquired hypothyroidism      cloNIDine (CATAPRES) 0.1 MG tablet TAKE 1 TABLET TWICE DAILY  Qty: 180 tablet, Refills: 0      conjugated estrogens (PREMARIN) 0.625 MG/GM vaginal cream Place 1 g vaginally three times a week  Qty: 30 g, Refills: 2      levalbuterol (XOPENEX) 0.63 MG/3ML nebulization USE 1 VIAL PER NEBULIZER EVERY 6 HOURS. MAX OF 4 TIMES PER 24 HOURS  Qty: 1086 mL, Refills: 3    Comments: **Patient requests 90 days supply**      tiotropium (SPIRIVA RESPIMAT) 2.5 MCG/ACT AERS inhaler Inhale 2 puffs into the lungs daily  Qty: 1 each, Refills: 1      Blood Glucose Monitoring Suppl (TRUE METRIX METER) w/Device KIT To use to check sugars 4 times daily  Qty: 1 kit, Refills: 0    Associated Diagnoses: Controlled type 2 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 disease, without long-term current use of insulin ([facility])      Blood Glucose Calibration (TRUE METRIX LEVEL 2) Normal SOLN As needed  Qty: 1 each, Refills: 0    Associated Diagnoses: Controlled type 2 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 disease, without long-term current use of insulin ([facility]      ! ! blood glucose test strips (TRUE METRIX BLOOD GLUCOSE TEST) strip 1 each by In Vitro route daily As needed.   Qty: 100 each, Refills: 3    Associated Diagnoses: Controlled type 2 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 disease, without long-term current use of insulin ([address]      ! ! TRUEplus Lancets 33G MISC 1 daily  Qty: 100 each, Refills: 3    Associated Diagnoses: Controlled type 2 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 disease, without long-term current use of insulin ([address]      ! ! Lancets MISC 1 each by Does not apply route 2 times daily  Qty: 300 each, Refills: 1    Associated Diagnoses: Hyperglycemia      OXYGEN Inhale 2 L into the lungs      Respiratory Therapy Supplies (NEBULIZER/TUBING/MOUTHPIECE) KIT 1 kit by Does not apply route 4 times daily as needed (shortness of breath)  Qty: 1 kit, Refills: 0    Associated Diagnoses: Chronic respiratory failure with hypoxia ([facility])      albuterol sulfate HFA (PROAIR HFA) 108 (90 Base) MCG/ACT inhaler Inhale 2 puffs into the lungs every 6 hours as needed for Wheezing  Qty: 1 Inhaler, Refills: 6    Associated Diagnoses: Wheezing       !! - Potential duplicate medications found. Please discuss with provider. Current Discharge Medication List        STOP taking these medications       traZODone (DESYREL) 50 MG tablet Comments:   Reason for Stopping:         dicyclomine (BENTYL) 10 MG capsule Comments:   Reason for Stopping:         loratadine (CLARITIN) 10 MG tablet Comments:   Reason for Stopping:         amLODIPine (NORVASC) 5 MG tablet Comments:   Reason for Stopping:         ondansetron (ZOFRAN) 4 MG tablet Comments:   Reason for Stopping:                                Note that greater than 30 minutes was spent in preparing discharge papers, discussing discharge with patient, medication review, etc.       Signed:    Jorgito Tariq DO   1/28/2023      Thank you Randee Birmingham MD for the opportunity to be involved in this patient's care.  If you have any questions or concerns please feel free to contact me at [facility]

## 2023-01-28 NOTE — PROGRESS NOTES
Shift assessment completed. Routine vitals completed. Scheduled medications given. Patient is awake, alert and oriented. Respirations are unlabored. Patient does not appear to be in distress, resting in bed at this time. Call light within reach.

## 2023-01-28 NOTE — CONSULTS
[facility]     Consult                                                     Patient Name: Rika Gibson  MRN: [id number]  [place]: 1935  Admission date: 1/26/2023  8:56 AM   Date of evaluation: 1/28/2023  Primary Care Physician: Anusha Williamson MD  Reason for consult: Abdominal pain  History of Present Illness:    Ms. Nelli Santiago is a 80 y.o. female who presents emergency department 2 days ago with several day history of right low back pain that is severe aching constant and radiates around to the right flank and groin. Pain improved since admission. Denies falls trauma. Admits to dysuria dark stools and constipation for which she takes laxatives. Admits to constipation today as well she attributes to decreased activity. Surgical history includes appendectomy and cholecystectomy. Medical conditions include obstructive sleep apnea, chronic kidney disease, hypertension, congestive heart failure, hyperlipidemia, irritable bowel syndrome, atrial fibrillation with anticoagulation on Eliquis, COPD on home oxygen and coronary artery disease. Surgery is consulted for further evaluation of the right lower quadrant pain and to rule out hernia.   CT imaging does not show hernia and patient does not have history of a bulge     Past Medical History:        Diagnosis Date    Arthritis     Atrial fibrillation ([address]     Bursitis     neck and down bilateral shoulders    Diabetes mellitus type II, controlled (HCC)     GERD (gastroesophageal reflux disease)     HTN (hypertension)     Hyperlipidemia     Hypothyroid     Insomnia     Lumbago     Parkinson disease (HCC)     Restless legs syndrome (RLS)     SVT (supraventricular tachycardia) ([address]        Past Surgical History:        Procedure Laterality Date    APPENDECTOMY      CHOLECYSTECTOMY, LAPAROSCOPIC  2/24/2013    COLONOSCOPY         Scheduled Meds:   HYDROmorphone  0.5 mg IntraVENous Once    ipratropium-albuterol 1 ampule Inhalation TID    lidocaine  1 patch TransDERmal Daily    acetaminophen  650 mg Oral Q6H    levofloxacin  500 mg IntraVENous Q24H    lidocaine 1 % injection  5 mL IntraDERmal Once    sodium chloride flush  5-40 mL IntraVENous 2 times per day    atorvastatin  80 mg Oral Daily    mometasone-formoterol  2 puff Inhalation BID    cloNIDine  0.1 mg Oral BID    dilTIAZem  180 mg Oral Daily    apixaban  5 mg Oral BID    levothyroxine  75 mcg Oral Daily    sodium chloride flush  5-40 mL IntraVENous 2 times per day    insulin lispro  0-8 Units SubCUTAneous TID WC    insulin lispro  0-4 Units SubCUTAneous Nightly     Continuous Infusions:   sodium chloride      dextrose      sodium chloride Stopped (01/27/23 2158)     PRN Meds:.bisacodyl, bisacodyl, HYDROcodone 5 mg - acetaminophen, acetaminophen **OR** acetaminophen, ipratropium-albuterol, sodium chloride flush, sodium chloride, dextrose bolus **OR** dextrose bolus, glucagon (rDNA), dextrose, sodium chloride flush, sodium chloride, ondansetron **OR** ondansetron, polyethylene glycol    Prior to Admission medications    Medication Sig Start Date End Date Taking? Authorizing Provider   HYDROcodone-acetaminophen (NORCO) 5-325 MG per tablet Take 1 tablet by mouth every 6 hours as needed (pain 8-10) for up to 6 doses.  Max Daily Amount: 4 tablets 1/28/23 1/30/23 Yes Juan Vanegas,    lidocaine 4 % external patch Place 1 patch onto the skin daily 1/29/23  Yes Juan Vanegas DO   levoFLOXacin (LEVAQUIN) 500 MG tablet Take 1 tablet by mouth daily for 3 days 1/28/23 1/31/23 Yes Juan Vanegas DO   dilTIAZem (CARDIZEM CD) 180 MG extended release capsule Take 1 capsule by mouth daily 11/4/22   iNng Marie MD   dilTIAZem (CARDIZEM) 30 MG tablet As needed for episodes of tachycardia  Patient taking differently: Take 30 mg by mouth as needed As needed for episodes of tachycardia 11/1/22   Ning Marie MD   LORazepam (ATIVAN) 1 MG tablet Take 1 mg by mouth at bedtime. Historical Provider, MD   SITagliptin (JANUVIA) 100 MG tablet Take 100 mg by mouth daily    Historical Provider, MD   LORazepam (ATIVAN) 0.5 MG tablet Take 0.5 mg by mouth in the morning and at bedtime. In the morning and afternoon    Historical Provider, MD   ELIQUIS 5 MG TABS tablet TAKE 1 TABLET TWICE DAILY 8/26/22   Herminia Mckeon MD   budesonide-formoterol [facility]) 160-4.5 MCG/ACT AERO INHALE 2 PUFFS TWICE DAILY 8/23/22   Herminia Mckeon MD   fluticasone (FLONASE) 50 MCG/ACT nasal spray USE 2 SPRAYS NASALLY EVERY DAY  Patient taking differently: 2 sprays by Nasal route as needed 8/2/22   Herminia Mckeon MD   atorvastatin (LIPITOR) 80 MG tablet TAKE 1 TABLET EVERY NIGHT 8/2/22   Wannetta Litten, APRN - CNP   amLODIPine (NORVASC) 5 MG tablet Take 1 tablet by mouth in the morning. 8/1/22 8/22/22  Wannetta Litten, APRN - CNP   meclizine (ANTIVERT) 12.5 MG tablet TAKE 1 TABLET THREE TIMES DAILY AS NEEDED 7/12/22   Herminia Mckeon MD   blood glucose test strips (TRUE METRIX BLOOD GLUCOSE TEST) strip USE TO TEST BLOOD SUGAR DAILY 7/11/22   Herminia Mckeon MD   irbesartan (AVAPRO) 300 MG tablet TAKE 1 TABLET BY MOUTH EVERY NIGHT  Patient taking differently: 150 mg TAKE 1 TABLET BY MOUTH EVERY NIGHT 7/1/22   Herminia Mckeon MD   rOPINIRole (REQUIP) 3 MG tablet TAKE 1 TABLET THREE TIMES DAILY 6/27/22   Herminia Mckeon MD   levothyroxine (SYNTHROID) 75 MCG tablet TAKE 1 TABLET BY MOUTH EVERY DAY 6/7/22   Herminia Mckeon MD   cloNIDine (CATAPRES) 0.1 MG tablet TAKE 1 TABLET TWICE DAILY 5/27/22   Justin Calixto MD   conjugated estrogens (PREMARIN) 0.625 MG/GM vaginal cream Place 1 g vaginally three times a week 5/18/22   Herminia Mckeon MD   levalbuterol (XOPENEX) 0.63 MG/3ML nebulization USE 1 VIAL PER NEBULIZER EVERY 6 HOURS.  MAX OF 4 TIMES PER 24 HOURS  Patient taking differently: Take 1 ampule by nebulization every 6 hours as needed 4/26/22   Teo Bradford MD   tiotropium (SPIRIVA RESPIMAT) 2.5 MCG/ACT AERS inhaler Inhale 2 puffs into the lungs daily 22   Dulce Maria Anne PA-C   Blood Glucose Monitoring Suppl (TRUE METRIX METER) w/Device KIT To use to check sugars 4 times daily 3/24/22   Celina Hernández MD   Blood Glucose Calibration (TRUE METRIX LEVEL 2) Normal SOLN As needed 3/23/22   Raven Moe MD   blood glucose test strips (TRUE METRIX BLOOD GLUCOSE TEST) strip 1 each by In Vitro route daily As needed. 3/23/22   Raven Moe MD   TRUEplus Lancets 33G MISC 1 daily 3/23/22   Raven Moe MD   Lancets MISC 1 each by Does not apply route 2 times daily 21   Raven Moe MD   OXYGEN Inhale 2 L into the lungs    Historical Provider, MD   Respiratory Therapy Supplies (NEBULIZER/TUBING/MOUTHPIECE) KIT 1 kit by Does not apply route 4 times daily as needed (shortness of breath) 3/26/20   Raven Moe MD   albuterol sulfate HFA (PROAIR HFA) 108 (90 Base) MCG/ACT inhaler Inhale 2 puffs into the lungs every 6 hours as needed for Wheezing 10/25/19   Renata Mcqueen MD        Allergies:  Adhesive tape, Cefaclor, Cephalexin, Nsaids, Penicillin g, Codeine, Diclofenac, Diclofenac sodium, Diclofenac sodium, Diclofenac sodium, Hydrochlorothiazide, Ibuprofen, Macrobid [nitrofurantoin macrocrystal], Motrin [ibuprofen micronized], Nitrofurantoin, Pcn [penicillins], Peach [prunus persica], Prednisone, Sulfa antibiotics, and Sulindac    Social History:   Social History     Socioeconomic History    Marital status:       Spouse name: None    Number of children: 7    Years of education: 9    Highest education level: None   Tobacco Use    Smoking status: Former     Packs/day: 1.00     Years: 45.00     Pack years: 45.00     Types: Cigarettes     Start date: 9/15/1950     Quit date: 1995     Years since quittin.0    Smokeless tobacco: Never   Vaping Use    Vaping Use: Never used   Substance and Sexual Activity    Alcohol use: No     Alcohol/week: 0.0 standard drinks    Drug use: No    Sexual activity: Not Currently     Partners: Male       Family History:    Family History   Problem Relation Age of Onset    High Blood Pressure Mother     Heart Attack Father     Heart Disease Father     Other Brother     Asthma Paternal Uncle        Review of Systems:  CONSTITUTIONAL:  Negative except as above  HEENT:  negative  RESPIRATORY:  negative  CARDIOVASCULAR:  negative  GASTROINTESTINAL:  negative except as above   GENITOURINARY:  negative  HEMATOLOGIC/LYMPHATIC:  negative  NEUROLOGICAL:  Negative      Vital Signs:  Patient Vitals for the past 24 hrs:   BP Temp Temp src Pulse Resp SpO2 Weight   23 1106 (!) 160/76 97.8 °F (36.6 °C) Oral 69 18 90 % --   23 0915 (!) 185/63 97.4 °F (36.3 °C) Oral 71 18 90 % --   23 0845 -- -- -- 65 18 93 % --   23 0841 -- -- -- 65 18 93 % --   23 0407 (!) 195/76 97.9 °F (36.6 °C) Oral 64 18 95 % 156 lb 12.8 oz (71.1 kg)   23 0030 (!) 178/74 97.2 °F (36.2 °C) Oral 78 18 94 % --   23 2115 (!) 181/72 97.5 °F (36.4 °C) Oral 78 18 94 % --   23 -- -- -- 65 18 94 % --   23 1630 (!) 167/73 97.4 °F (36.3 °C) Oral 64 16 93 % --   23 1335 -- -- -- -- 16 92 % --      TEMPERATURE HISTORY 24H: Temp (24hrs), Av.5 °F (36.4 °C), Min:97.2 °F (36.2 °C), Max:97.9 °F (36.6 °C)    BLOOD PRESSURE HISTORY: Systolic (22AXV), TYZ:734 , Min:147 , CRP:496    Diastolic (57CKS), WRH:03, Min:63, Max:76    Admission Weight: 157 lb (71.2 kg)       Intake/Output Summary (Last 24 hours) at 2023 1138  Last data filed at 2023 0532  Gross per 24 hour   Intake 832.06 ml   Output --   Net 832.06 ml     Drain/tube Output:         Physical Exam:  Constitutional:  well-developed, well-nourished,   Neurologic: awake, Orientation:  Oriented to person, place, time, follows commands, clear speech, cranial nerves grossly intact  Psychiatric: normal affect, no hallucinations  Eyes:  sclera clear, no visible discharge  Head, ears, nose, mouth, throat: normocephalic, without obvious abnormality, supple, symmetrical, trachea midline, no JVD, mucous membranes moist  Cardiovascular: regular rate and rhythm   Respiratory: clear to auscultation  GI: soft, non-distended, non-tender, no palpable hernia  Lymph: no palpable lymphadenopathy  Skin: no bruising or bleeding, normal skin color, texture, turgor, and no redness, warmth, or swelling    Labs:    CBC:    Recent Labs     01/26/23 0928 01/27/23 0515 01/28/23 0440   WBC 4.6 7.5 4.7   HGB 9.3* 10.2* 9.9*   HCT 28.8* 31.4* 31.0*    130* 121*     BMP:    Recent Labs     01/26/23 0928 01/27/23 0515 01/28/23 0440    136 138   K 4.4 4.8 4.6    100 100   CO2 26 26 29   BUN 30* 24* 21*   CREATININE 1.2 1.0 1.0   GLUCOSE 106* 112* 117*     Hepatic:   Recent Labs     01/26/23 0928   AST 16   ALT 15   BILITOT 0.3   ALKPHOS 122     Amylase: No results for input(s): AMYLASE in the last 72 hours. Lipase:   Recent Labs     01/26/23 0928   LIPASE 17.0     Mag:    No results for input(s): MG in the last 72 hours. Phos:   No results for input(s): PHOS in the last 72 hours. Coags: No results for input(s): INR, APTT in the last 72 hours. Imaging:  I have personally reviewed the following films:  MRI LUMBAR SPINE WO CONTRAST    Result Date: 1/27/2023  EXAMINATION: MRI OF THE LUMBAR SPINE WITHOUT CONTRAST, 1/27/2023 3:07 pm TECHNIQUE: Multiplanar multisequence MRI of the lumbar spine was performed without the administration of intravenous contrast. COMPARISON: CT from 01/26/2023 HISTORY: ORDERING SYSTEM PROVIDED HISTORY: Back pain TECHNOLOGIST PROVIDED HISTORY: Reason for exam:->Back pain Reason for Exam: Back pain FINDINGS: BONES/ALIGNMENT: There is transitional spine anatomy. The last rib-bearing vertebra is T12. The S1 vertebra is lumbarized and there is a fully formed disc space at S1-S2. For reference the L5-S1 disc space is located on series 6, image 25. Minimal retrolisthesis of L1 on L2. Grade 1 anterolisthesis of L5 on S1. Vertebral heights are unchanged. Bone marrow signal is benign. SPINAL CORD: The conus terminates normally. SOFT TISSUES: No paraspinal mass identified. L1-L2: Disc bulge causes mild thecal sac and mild bilateral foraminal stenosis. L2-L3: Disc bulge causes mild thecal sac and mild bilateral foraminal stenosis. Baastrup disease. L3-L4: Mild facet hypertrophy causes mild left foraminal stenosis. Baastrup disease. L4-L5: Facet ligament flavum hypertrophy without stenosis. Baastrup disease. L5-S1: Disc height loss and facet hypertrophy cause mild bilateral foraminal stenosis. Baastrup disease and facet hypertrophy. Only mild degenerative disc disease. Transitional spine anatomy. CT ABDOMEN PELVIS W IV CONTRAST Additional Contrast? None    Result Date: 1/26/2023  EXAMINATION: CT OF THE ABDOMEN AND PELVIS WITH CONTRAST 1/26/2023 10:15 am TECHNIQUE: CT of the abdomen and pelvis was performed with the administration of intravenous contrast. Multiplanar reformatted images are provided for review. Automated exposure control, iterative reconstruction, and/or weight based adjustment of the mA/kV was utilized to reduce the radiation dose to as low as reasonably achievable. COMPARISON: None. HISTORY: ORDERING SYSTEM PROVIDED HISTORY: flank pain - rlq abd paain - ro pyelo TECHNOLOGIST PROVIDED HISTORY: Reason for exam:->flank pain - rlq abd paain - ro pyelo Additional Contrast?->None Decision Support Exception - unselect if not a suspected or confirmed emergency medical condition->Emergency Medical Condition (MA) Reason for Exam: flank pain - rlq abd paain - ro pyelo FINDINGS: Lower Chest: Motion artifact degrades image quality. There is bibasilar scarring. Emphysema involves the bilateral lungs. Airspace disease in the left lower lobe is concerning for developing pneumonia.   No change in the 8 mm solid right lower lobe pulmonary nodule, no follow-up imaging is recommended. Coronary artery calcifications are a marker of atherosclerosis. Organs: The liver, spleen, pancreas, adrenal glands and right kidney are unremarkable. Status post cholecystectomy with mild intrahepatic ductal dilatation. However, there is a new 1.0 x 1.0 cm complex left upper pole renal cyst. GI/Bowel: There is no bowel obstruction. Status post appendectomy. Scattered diverticula involve the sigmoid colon without adjacent inflammation. Pelvis: The pelvic viscera are within normal limits. Peritoneum/Retroperitoneum: There is no evidence of free fluid or adenopathy. Significant atherosclerosis involves the abdominal aorta and major branch vessels. Bones/Soft Tissues: Degenerative changes involve the thoracolumbar spine and bilateral hips. The bones are demineralized. 1. New left basilar airspace disease concerning for developing pneumonia. Recommend chest radiograph in 8 weeks to confirm resolution. 2. New 1.0 cm complex left upper pole renal cyst.  Recommend nonemergent MRI of the abdomen with without contrast using renal mass protocol. XR CHEST PORTABLE    Result Date: 1/26/2023  EXAMINATION: ONE XRAY VIEW OF THE CHEST 1/26/2023 9:51 am COMPARISON: Chest x-ray dated 30 September 2022 HISTORY: ORDERING SYSTEM PROVIDED HISTORY: cough TECHNOLOGIST PROVIDED HISTORY: Reason for exam:->cough Reason for Exam: cough FINDINGS: No acute airspace infiltrate. No pneumothorax or pleural effusion. Stable cardiomediastinal silhouette     No acute cardiopulmonary findings.        Cultures:  Relevant cultures documented under results section     Assessment:  Patient Active Problem List   Diagnosis    Hypertensive urgency    Hyperlipidemia    IBS (irritable bowel syndrome)    Atrial fibrillation with RVR (HCC)    Overactive bladder    Osteoarthritis    Controlled type 2 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 disease, without long-term current use of insulin (HCC)    Restless legs syndrome    ANTHONY (obstructive sleep apnea)    Allergic rhinitis    COPD, moderate (HCC)    Vertigo    Interstitial lung disease (HCC)    Bronchiectasis without complication (HCC)    Coronary artery disease involving native coronary artery of native heart with angina pectoris (HCC)    PAF (paroxysmal atrial fibrillation) (HCC)    Hypothyroid    Chronic respiratory failure with hypoxia (HCC)    SOB (shortness of breath)    Ataxia    Primary hypertension    Centrilobular emphysema (HCC)    Pulmonary nodule    Ptosis of eyelid, bilateral    Chronic obstructive pulmonary disease (HCC)    Chronic renal disease, stage III (HCC) [400983]    Chronic fatigue    Hypertensive urgency, malignant    Acute dysfunction of Eustachian tube, bilateral    Bilateral hearing loss    Impacted cerumen of right ear    Dizziness    Acute on chronic diastolic (congestive) heart failure (HCC)    Acute anemia    Hyponatremia    Pneumonia due to infectious organism     Right groin pain  Chronic back pain, baastrup disease  Pneumonia  COPD  CHF  Atrial fibrillation  Medical coagulopathy, Eliquis  CAD    Plan:  1. No evidence of hernia by history exam or imaging. Does not need surgical exploration  2. MRI findings with baastrup disease plan patient's current back pain and has appointment with pain specialist after discharge  3. Tolerated diet  4. Antibiotics per primary team for pneumonia  5. Activity as tolerated  6. Defer management of major medical comorbidities to primary consulting teams  7. No further acute general surgery issues, does not need follow-up with general surgery after discharge. Anticipate discharge later today per primary team.  We will follow as needed, call with questions    This plan was discussed at length with the patient. She was understanding and in agreement with the plan    Lázaro Herrera MD, FACS  1/28/2023  11:38 AM

## 2023-01-30 LAB
BLOOD CULTURE, ROUTINE: NORMAL
CULTURE, BLOOD 2: NORMAL

## 2023-01-31 ENCOUNTER — HOSPITAL ENCOUNTER (INPATIENT)
Age: 88
LOS: 4 days | Discharge: HOME | DRG: 552 | End: 2023-02-04
Attending: EMERGENCY MEDICINE | Admitting: INTERNAL MEDICINE
Payer: MEDICARE

## 2023-01-31 ENCOUNTER — APPOINTMENT (OUTPATIENT)
Dept: CT IMAGING | Age: 88
DRG: 552 | End: 2023-01-31
Payer: MEDICARE

## 2023-01-31 DIAGNOSIS — M54.9 INTRACTABLE BACK PAIN: Primary | ICD-10-CM

## 2023-01-31 DIAGNOSIS — M54.50 ACUTE RIGHT-SIDED LOW BACK PAIN WITHOUT SCIATICA: ICD-10-CM

## 2023-01-31 DIAGNOSIS — I16.0 HYPERTENSIVE URGENCY: ICD-10-CM

## 2023-01-31 LAB
A/G RATIO: 1.3 (ref 1.1–2.2)
ALBUMIN SERPL-MCNC: 4.6 G/DL (ref 3.4–5)
ALP BLD-CCNC: 157 U/L (ref 40–129)
ALT SERPL-CCNC: 22 U/L (ref 10–40)
ANION GAP SERPL CALCULATED.3IONS-SCNC: 14 MMOL/L (ref 3–16)
AST SERPL-CCNC: 21 U/L (ref 15–37)
BACTERIA: NORMAL /HPF
BASOPHILS ABSOLUTE: 0 K/UL (ref 0–0.2)
BASOPHILS RELATIVE PERCENT: 0.7 %
BILIRUB SERPL-MCNC: 0.5 MG/DL (ref 0–1)
BILIRUBIN URINE: NEGATIVE
BLOOD, URINE: ABNORMAL
BUN BLDV-MCNC: 21 MG/DL (ref 7–20)
CALCIUM SERPL-MCNC: 10.4 MG/DL (ref 8.3–10.6)
CHLORIDE BLD-SCNC: 99 MMOL/L (ref 99–110)
CLARITY: CLEAR
CO2: 24 MMOL/L (ref 21–32)
COLOR: YELLOW
CREAT SERPL-MCNC: 1.1 MG/DL (ref 0.6–1.2)
EOSINOPHILS ABSOLUTE: 0.2 K/UL (ref 0–0.6)
EOSINOPHILS RELATIVE PERCENT: 3.3 %
EPITHELIAL CELLS, UA: 0 /HPF (ref 0–5)
GFR SERPL CREATININE-BSD FRML MDRD: 49 ML/MIN/{1.73_M2}
GLUCOSE BLD-MCNC: 130 MG/DL (ref 70–99)
GLUCOSE URINE: NEGATIVE MG/DL
HCT VFR BLD CALC: 37.5 % (ref 36–48)
HEMOGLOBIN: 12.3 G/DL (ref 12–16)
HYALINE CASTS: 0 /LPF (ref 0–8)
KETONES, URINE: NEGATIVE MG/DL
LEUKOCYTE ESTERASE, URINE: NEGATIVE
LYMPHOCYTES ABSOLUTE: 1.2 K/UL (ref 1–5.1)
LYMPHOCYTES RELATIVE PERCENT: 19.6 %
MCH RBC QN AUTO: 28.1 PG (ref 26–34)
MCHC RBC AUTO-ENTMCNC: 32.7 G/DL (ref 31–36)
MCV RBC AUTO: 86.1 FL (ref 80–100)
MICROSCOPIC EXAMINATION: YES
MONOCYTES ABSOLUTE: 0.3 K/UL (ref 0–1.3)
MONOCYTES RELATIVE PERCENT: 5.3 %
NEUTROPHILS ABSOLUTE: 4.2 K/UL (ref 1.7–7.7)
NEUTROPHILS RELATIVE PERCENT: 71.1 %
NITRITE, URINE: NEGATIVE
PDW BLD-RTO: 15.8 % (ref 12.4–15.4)
PH UA: 7.5 (ref 5–8)
PLATELET # BLD: 170 K/UL (ref 135–450)
PMV BLD AUTO: 8 FL (ref 5–10.5)
POTASSIUM REFLEX MAGNESIUM: 4 MMOL/L (ref 3.5–5.1)
PROTEIN UA: 100 MG/DL
RBC # BLD: 4.35 M/UL (ref 4–5.2)
RBC UA: 1 /HPF (ref 0–4)
SODIUM BLD-SCNC: 137 MMOL/L (ref 136–145)
SPECIFIC GRAVITY UA: 1.01 (ref 1–1.03)
TOTAL PROTEIN: 8.2 G/DL (ref 6.4–8.2)
URINE REFLEX TO CULTURE: ABNORMAL
URINE TYPE: ABNORMAL
UROBILINOGEN, URINE: 0.2 E.U./DL
WBC # BLD: 6 K/UL (ref 4–11)
WBC UA: 0 /HPF (ref 0–5)

## 2023-01-31 PROCEDURE — 2580000003 HC RX 258: Performed by: INTERNAL MEDICINE

## 2023-01-31 PROCEDURE — 80053 COMPREHEN METABOLIC PANEL: CPT

## 2023-01-31 PROCEDURE — 6370000000 HC RX 637 (ALT 250 FOR IP): Performed by: EMERGENCY MEDICINE

## 2023-01-31 PROCEDURE — 1200000000 HC SEMI PRIVATE

## 2023-01-31 PROCEDURE — 96374 THER/PROPH/DIAG INJ IV PUSH: CPT

## 2023-01-31 PROCEDURE — 96376 TX/PRO/DX INJ SAME DRUG ADON: CPT

## 2023-01-31 PROCEDURE — 6360000002 HC RX W HCPCS: Performed by: INTERNAL MEDICINE

## 2023-01-31 PROCEDURE — 6360000004 HC RX CONTRAST MEDICATION: Performed by: EMERGENCY MEDICINE

## 2023-01-31 PROCEDURE — 85025 COMPLETE CBC W/AUTO DIFF WBC: CPT

## 2023-01-31 PROCEDURE — 96375 TX/PRO/DX INJ NEW DRUG ADDON: CPT

## 2023-01-31 PROCEDURE — 74177 CT ABD & PELVIS W/CONTRAST: CPT

## 2023-01-31 PROCEDURE — 81001 URINALYSIS AUTO W/SCOPE: CPT

## 2023-01-31 PROCEDURE — 6360000002 HC RX W HCPCS: Performed by: EMERGENCY MEDICINE

## 2023-01-31 PROCEDURE — 6370000000 HC RX 637 (ALT 250 FOR IP): Performed by: INTERNAL MEDICINE

## 2023-01-31 PROCEDURE — 99285 EMERGENCY DEPT VISIT HI MDM: CPT

## 2023-01-31 RX ORDER — HYDRALAZINE HYDROCHLORIDE 20 MG/ML
5 INJECTION INTRAMUSCULAR; INTRAVENOUS ONCE
Status: COMPLETED | OUTPATIENT
Start: 2023-01-31 | End: 2023-01-31

## 2023-01-31 RX ORDER — DILTIAZEM HYDROCHLORIDE 180 MG/1
180 CAPSULE, COATED, EXTENDED RELEASE ORAL ONCE
Status: COMPLETED | OUTPATIENT
Start: 2023-01-31 | End: 2023-01-31

## 2023-01-31 RX ORDER — LOSARTAN POTASSIUM 25 MG/1
50 TABLET ORAL NIGHTLY
Status: DISCONTINUED | OUTPATIENT
Start: 2023-01-31 | End: 2023-02-04 | Stop reason: HOSPADM

## 2023-01-31 RX ORDER — CLONIDINE HYDROCHLORIDE 0.1 MG/1
0.1 TABLET ORAL ONCE
Status: COMPLETED | OUTPATIENT
Start: 2023-01-31 | End: 2023-01-31

## 2023-01-31 RX ORDER — LIDOCAINE 4 G/G
1 PATCH TOPICAL DAILY
Status: DISCONTINUED | OUTPATIENT
Start: 2023-01-31 | End: 2023-02-04 | Stop reason: HOSPADM

## 2023-01-31 RX ORDER — ALBUTEROL SULFATE 90 UG/1
2 AEROSOL, METERED RESPIRATORY (INHALATION) EVERY 4 HOURS PRN
Status: DISCONTINUED | OUTPATIENT
Start: 2023-01-31 | End: 2023-02-04 | Stop reason: HOSPADM

## 2023-01-31 RX ORDER — LACTULOSE 10 G/15ML
20 SOLUTION ORAL 3 TIMES DAILY
Status: COMPLETED | OUTPATIENT
Start: 2023-01-31 | End: 2023-01-31

## 2023-01-31 RX ORDER — POLYETHYLENE GLYCOL 3350 17 G/17G
17 POWDER, FOR SOLUTION ORAL DAILY PRN
Status: DISCONTINUED | OUTPATIENT
Start: 2023-01-31 | End: 2023-02-04 | Stop reason: HOSPADM

## 2023-01-31 RX ORDER — DILTIAZEM HYDROCHLORIDE 180 MG/1
180 CAPSULE, COATED, EXTENDED RELEASE ORAL DAILY
Status: DISCONTINUED | OUTPATIENT
Start: 2023-01-31 | End: 2023-02-04 | Stop reason: HOSPADM

## 2023-01-31 RX ORDER — MORPHINE SULFATE 2 MG/ML
2 INJECTION, SOLUTION INTRAMUSCULAR; INTRAVENOUS EVERY 4 HOURS PRN
Status: DISCONTINUED | OUTPATIENT
Start: 2023-01-31 | End: 2023-02-01

## 2023-01-31 RX ORDER — SODIUM CHLORIDE 9 MG/ML
INJECTION, SOLUTION INTRAVENOUS PRN
Status: DISCONTINUED | OUTPATIENT
Start: 2023-01-31 | End: 2023-02-04 | Stop reason: HOSPADM

## 2023-01-31 RX ORDER — MORPHINE SULFATE 4 MG/ML
4 INJECTION, SOLUTION INTRAMUSCULAR; INTRAVENOUS ONCE
Status: COMPLETED | OUTPATIENT
Start: 2023-01-31 | End: 2023-01-31

## 2023-01-31 RX ORDER — ACETAMINOPHEN 325 MG/1
650 TABLET ORAL EVERY 6 HOURS PRN
Status: DISCONTINUED | OUTPATIENT
Start: 2023-01-31 | End: 2023-02-01

## 2023-01-31 RX ORDER — CLONIDINE HYDROCHLORIDE 0.1 MG/1
0.1 TABLET ORAL 2 TIMES DAILY
Status: DISCONTINUED | OUTPATIENT
Start: 2023-01-31 | End: 2023-02-04 | Stop reason: HOSPADM

## 2023-01-31 RX ORDER — ATORVASTATIN CALCIUM 80 MG/1
80 TABLET, FILM COATED ORAL NIGHTLY
Status: DISCONTINUED | OUTPATIENT
Start: 2023-01-31 | End: 2023-02-04 | Stop reason: HOSPADM

## 2023-01-31 RX ORDER — ONDANSETRON 2 MG/ML
4 INJECTION INTRAMUSCULAR; INTRAVENOUS EVERY 6 HOURS PRN
Status: DISCONTINUED | OUTPATIENT
Start: 2023-01-31 | End: 2023-02-04 | Stop reason: HOSPADM

## 2023-01-31 RX ORDER — MORPHINE SULFATE 2 MG/ML
2 INJECTION, SOLUTION INTRAMUSCULAR; INTRAVENOUS ONCE
Status: COMPLETED | OUTPATIENT
Start: 2023-01-31 | End: 2023-01-31

## 2023-01-31 RX ORDER — ACETAMINOPHEN 650 MG/1
650 SUPPOSITORY RECTAL EVERY 6 HOURS PRN
Status: DISCONTINUED | OUTPATIENT
Start: 2023-01-31 | End: 2023-02-01

## 2023-01-31 RX ORDER — SODIUM CHLORIDE 0.9 % (FLUSH) 0.9 %
5-40 SYRINGE (ML) INJECTION PRN
Status: DISCONTINUED | OUTPATIENT
Start: 2023-01-31 | End: 2023-02-04 | Stop reason: HOSPADM

## 2023-01-31 RX ORDER — ONDANSETRON 4 MG/1
4 TABLET, ORALLY DISINTEGRATING ORAL EVERY 8 HOURS PRN
Status: DISCONTINUED | OUTPATIENT
Start: 2023-01-31 | End: 2023-02-04 | Stop reason: HOSPADM

## 2023-01-31 RX ORDER — ALBUTEROL SULFATE 90 UG/1
2 AEROSOL, METERED RESPIRATORY (INHALATION) EVERY 6 HOURS PRN
Status: DISCONTINUED | OUTPATIENT
Start: 2023-01-31 | End: 2023-01-31

## 2023-01-31 RX ORDER — SODIUM CHLORIDE 0.9 % (FLUSH) 0.9 %
5-40 SYRINGE (ML) INJECTION EVERY 12 HOURS SCHEDULED
Status: DISCONTINUED | OUTPATIENT
Start: 2023-01-31 | End: 2023-02-04 | Stop reason: HOSPADM

## 2023-01-31 RX ADMIN — HYDRALAZINE HYDROCHLORIDE 5 MG: 20 INJECTION INTRAMUSCULAR; INTRAVENOUS at 08:59

## 2023-01-31 RX ADMIN — MORPHINE SULFATE 2 MG: 2 INJECTION, SOLUTION INTRAMUSCULAR; INTRAVENOUS at 08:59

## 2023-01-31 RX ADMIN — IOPAMIDOL 75 ML: 755 INJECTION, SOLUTION INTRAVENOUS at 08:41

## 2023-01-31 RX ADMIN — LOSARTAN POTASSIUM 50 MG: 25 TABLET, FILM COATED ORAL at 19:53

## 2023-01-31 RX ADMIN — LACTULOSE 20 G: 20 SOLUTION ORAL at 15:50

## 2023-01-31 RX ADMIN — MORPHINE SULFATE 2 MG: 2 INJECTION, SOLUTION INTRAMUSCULAR; INTRAVENOUS at 19:54

## 2023-01-31 RX ADMIN — CLONIDINE HYDROCHLORIDE 0.1 MG: 0.1 TABLET ORAL at 15:50

## 2023-01-31 RX ADMIN — APIXABAN 5 MG: 5 TABLET, FILM COATED ORAL at 20:00

## 2023-01-31 RX ADMIN — MORPHINE SULFATE 2 MG: 2 INJECTION, SOLUTION INTRAMUSCULAR; INTRAVENOUS at 15:51

## 2023-01-31 RX ADMIN — Medication 10 ML: at 19:54

## 2023-01-31 RX ADMIN — ATORVASTATIN CALCIUM 80 MG: 80 TABLET, FILM COATED ORAL at 19:53

## 2023-01-31 RX ADMIN — CLONIDINE HYDROCHLORIDE 0.1 MG: 0.1 TABLET ORAL at 09:36

## 2023-01-31 RX ADMIN — MORPHINE SULFATE 2 MG: 2 INJECTION, SOLUTION INTRAMUSCULAR; INTRAVENOUS at 23:45

## 2023-01-31 RX ADMIN — MORPHINE SULFATE 4 MG: 4 INJECTION, SOLUTION INTRAMUSCULAR; INTRAVENOUS at 07:33

## 2023-01-31 RX ADMIN — DILTIAZEM HYDROCHLORIDE 180 MG: 180 CAPSULE, EXTENDED RELEASE ORAL at 09:36

## 2023-01-31 ASSESSMENT — PAIN DESCRIPTION - ONSET
ONSET: ON-GOING

## 2023-01-31 ASSESSMENT — PAIN DESCRIPTION - PAIN TYPE
TYPE: CHRONIC PAIN
TYPE: ACUTE PAIN
TYPE: ACUTE PAIN
TYPE: CHRONIC PAIN

## 2023-01-31 ASSESSMENT — LIFESTYLE VARIABLES
HOW OFTEN DO YOU HAVE A DRINK CONTAINING ALCOHOL: NEVER
HOW OFTEN DO YOU HAVE A DRINK CONTAINING ALCOHOL: NEVER
HOW MANY STANDARD DRINKS CONTAINING ALCOHOL DO YOU HAVE ON A TYPICAL DAY: PATIENT DOES NOT DRINK
HOW MANY STANDARD DRINKS CONTAINING ALCOHOL DO YOU HAVE ON A TYPICAL DAY: PATIENT DOES NOT DRINK

## 2023-01-31 ASSESSMENT — PAIN DESCRIPTION - LOCATION
LOCATION: BACK
LOCATION: ABDOMEN;BACK
LOCATION: BACK
LOCATION: BACK;ABDOMEN
LOCATION: BACK
LOCATION: ABDOMEN;BACK

## 2023-01-31 ASSESSMENT — PAIN DESCRIPTION - FREQUENCY
FREQUENCY: CONTINUOUS

## 2023-01-31 ASSESSMENT — PAIN SCALES - GENERAL
PAINLEVEL_OUTOF10: 7
PAINLEVEL_OUTOF10: 10
PAINLEVEL_OUTOF10: 3
PAINLEVEL_OUTOF10: 6
PAINLEVEL_OUTOF10: 6
PAINLEVEL_OUTOF10: 7
PAINLEVEL_OUTOF10: 8
PAINLEVEL_OUTOF10: 10
PAINLEVEL_OUTOF10: 8
PAINLEVEL_OUTOF10: 7

## 2023-01-31 ASSESSMENT — PAIN DESCRIPTION - ORIENTATION
ORIENTATION: RIGHT
ORIENTATION: LOWER
ORIENTATION: LOWER
ORIENTATION: RIGHT

## 2023-01-31 ASSESSMENT — PAIN - FUNCTIONAL ASSESSMENT
PAIN_FUNCTIONAL_ASSESSMENT: PREVENTS OR INTERFERES WITH MANY ACTIVE NOT PASSIVE ACTIVITIES
PAIN_FUNCTIONAL_ASSESSMENT: PREVENTS OR INTERFERES SOME ACTIVE ACTIVITIES AND ADLS
PAIN_FUNCTIONAL_ASSESSMENT: 0-10
PAIN_FUNCTIONAL_ASSESSMENT: PREVENTS OR INTERFERES SOME ACTIVE ACTIVITIES AND ADLS
PAIN_FUNCTIONAL_ASSESSMENT: PREVENTS OR INTERFERES SOME ACTIVE ACTIVITIES AND ADLS

## 2023-01-31 ASSESSMENT — PAIN DESCRIPTION - DESCRIPTORS
DESCRIPTORS: ACHING;THROBBING
DESCRIPTORS: ACHING;THROBBING
DESCRIPTORS: ACHING

## 2023-01-31 ASSESSMENT — PAIN SCALES - WONG BAKER: WONGBAKER_NUMERICALRESPONSE: 4;0

## 2023-01-31 NOTE — RT PROTOCOL NOTE
RT Inhaler-Nebulizer Bronchodilator Protocol Note    There is a bronchodilator order in the chart from a provider indicating to follow the RT Bronchodilator Protocol and there is an Initiate RT Inhaler-Nebulizer Bronchodilator Protocol order as well (see protocol at bottom of note). CXR Findings:  No results found. The findings from the last RT Protocol Assessment were as follows:   History Pulmonary Disease: Smoker 15 pack years or more  Respiratory Pattern: Regular pattern and RR 12-20 bpm  Breath Sounds: Slightly diminished and/or crackles  Cough: Strong, spontaneous, non-productive  Indication for Bronchodilator Therapy:    Bronchodilator Assessment Score: 3    Aerosolized bronchodilator medication orders have been revised according to the RT Inhaler-Nebulizer Bronchodilator Protocol below. Respiratory Therapist to perform RT Therapy Protocol Assessment initially then follow the protocol. Repeat RT Therapy Protocol Assessment PRN for score 0-3 or on second treatment, BID, and PRN for scores above 3. No Indications - adjust the frequency to every 6 hours PRN wheezing or bronchospasm, if no treatments needed after 48 hours then discontinue using Per Protocol order mode. If indication present, adjust the RT bronchodilator orders based on the Bronchodilator Assessment Score as indicated below. Use Inhaler orders unless patient has one or more of the following: on home nebulizer, not able to hold breath for 10 seconds, is not alert and oriented, cannot activate and use MDI correctly, or respiratory rate 25 breaths per minute or more, then use the equivalent nebulizer order(s) with same Frequency and PRN reasons based on the score. If a patient is on this medication at home then do not decrease Frequency below that used at home.     0-3 - enter or revise RT bronchodilator order(s) to equivalent RT Bronchodilator order with Frequency of every 4 hours PRN for wheezing or increased work of breathing using Per Protocol order mode. 4-6 - enter or revise RT Bronchodilator order(s) to two equivalent RT bronchodilator orders with one order with BID Frequency and one order with Frequency of every 4 hours PRN wheezing or increased work of breathing using Per Protocol order mode. 7-10 - enter or revise RT Bronchodilator order(s) to two equivalent RT bronchodilator orders with one order with TID Frequency and one order with Frequency of every 4 hours PRN wheezing or increased work of breathing using Per Protocol order mode. 11-13 - enter or revise RT Bronchodilator order(s) to one equivalent RT bronchodilator order with QID Frequency and an Albuterol order with Frequency of every 4 hours PRN wheezing or increased work of breathing using Per Protocol order mode. Greater than 13 - enter or revise RT Bronchodilator order(s) to one equivalent RT bronchodilator order with every 4 hours Frequency and an Albuterol order with Frequency of every 2 hours PRN wheezing or increased work of breathing using Per Protocol order mode. RT to enter RT Home Evaluation for COPD & MDI Assessment order using Per Protocol order mode.     Electronically signed by Traci Petty RCP on 1/31/2023 at 5:45 PM

## 2023-01-31 NOTE — PROGRESS NOTES
Pharmacy Home Medication Reconciliation Note    A medication reconciliation has been completed for Ishmael Lazo 1935    Pharmacy: [place] [id number] [address]  Information provided by: patient    The patient's home medication list is as follows: No current facility-administered medications on file prior to encounter. Current Outpatient Medications on File Prior to Encounter   Medication Sig Dispense Refill    [] HYDROcodone-acetaminophen (NORCO) 5-325 MG per tablet Take 1 tablet by mouth every 6 hours as needed (pain 8-10) for up to 6 doses. Max Daily Amount: 4 tablets 6 tablet 0    lidocaine 4 % external patch Place 1 patch onto the skin daily 4 patch 0    levoFLOXacin (LEVAQUIN) 500 MG tablet Take 1 tablet by mouth daily for 3 days 3 tablet 0    dilTIAZem (CARDIZEM CD) 180 MG extended release capsule Take 1 capsule by mouth daily 90 capsule 3    dilTIAZem (CARDIZEM) 30 MG tablet As needed for episodes of tachycardia (Patient taking differently: Take 30 mg by mouth as needed As needed for episodes of tachycardia) 120 tablet 3    LORazepam (ATIVAN) 1 MG tablet Take 1 mg by mouth at bedtime. SITagliptin (JANUVIA) 100 MG tablet Take 100 mg by mouth daily      LORazepam (ATIVAN) 0.5 MG tablet Take 0.5 mg by mouth in the morning and at bedtime. In the morning and afternoon      ELIQUIS 5 MG TABS tablet TAKE 1 TABLET TWICE DAILY 180 tablet 0    budesonide-formoterol (SYMBICORT) 160-4.5 MCG/ACT AERO INHALE 2 PUFFS TWICE DAILY 3 each 0    fluticasone (FLONASE) 50 MCG/ACT nasal spray USE 2 SPRAYS NASALLY EVERY DAY (Patient taking differently: 2 sprays by Nasal route as needed) 48 g 1    atorvastatin (LIPITOR) 80 MG tablet TAKE 1 TABLET EVERY NIGHT (Patient taking differently: Take 80 mg by mouth nightly) 90 tablet 3    [DISCONTINUED] amLODIPine (NORVASC) 5 MG tablet Take 1 tablet by mouth in the morning.  30 tablet 5    meclizine (ANTIVERT) 12.5 MG tablet TAKE 1 TABLET THREE TIMES DAILY AS NEEDED 270 tablet 1    blood glucose test strips (TRUE METRIX BLOOD GLUCOSE TEST) strip USE TO TEST BLOOD SUGAR DAILY 100 strip 5    irbesartan (AVAPRO) 300 MG tablet TAKE 1 TABLET BY MOUTH EVERY NIGHT (Patient taking differently: Take 150 mg by mouth at bedtime) 90 tablet 0    rOPINIRole (REQUIP) 3 MG tablet TAKE 1 TABLET THREE TIMES DAILY 270 tablet 1    levothyroxine (SYNTHROID) 75 MCG tablet TAKE 1 TABLET BY MOUTH EVERY DAY (Patient not taking: Reported on 1/31/2023) 90 tablet 0    cloNIDine (CATAPRES) 0.1 MG tablet TAKE 1 TABLET TWICE DAILY 180 tablet 0    conjugated estrogens (PREMARIN) 0.625 MG/GM vaginal cream Place 1 g vaginally three times a week 30 g 2    levalbuterol (XOPENEX) 0.63 MG/3ML nebulization USE 1 VIAL PER NEBULIZER EVERY 6 HOURS. MAX OF 4 TIMES PER 24 HOURS (Patient taking differently: Take 1 ampule by nebulization every 6 hours as needed) 1086 mL 3    tiotropium (SPIRIVA RESPIMAT) 2.5 MCG/ACT AERS inhaler Inhale 2 puffs into the lungs daily 1 each 1    Blood Glucose Monitoring Suppl (TRUE METRIX METER) w/Device KIT To use to check sugars 4 times daily 1 kit 0    Blood Glucose Calibration (TRUE METRIX LEVEL 2) Normal SOLN As needed 1 each 0    blood glucose test strips (TRUE METRIX BLOOD GLUCOSE TEST) strip 1 each by In Vitro route daily As needed. 100 each 3    TRUEplus Lancets 33G MISC 1 daily 100 each 3    Lancets MISC 1 each by Does not apply route 2 times daily 300 each 1    OXYGEN Inhale 2 L into the lungs      Respiratory Therapy Supplies (NEBULIZER/TUBING/MOUTHPIECE) KIT 1 kit by Does not apply route 4 times daily as needed (shortness of breath) 1 kit 0    albuterol sulfate HFA (PROAIR HFA) 108 (90 Base) MCG/ACT inhaler Inhale 2 puffs into the lungs every 6 hours as needed for Wheezing 1 Inhaler 6       Of note, patient states she was given an antibiotic for PNA but was unable to take. This appears to be levofloxacin. Timing of last doses updated.     Thank you,  Zoila Hewitt, PharmD, BCCCP

## 2023-01-31 NOTE — ED NOTES
Pt leaving ED at this time, no acute distress upon leaving      [facility], RN  01/31/23 [id number]

## 2023-01-31 NOTE — PROGRESS NOTES
Patient seen in ED, room 08. Admission completed except for: 4 Eyes Assessment, Immunizations, Covid Vaccines, Rights and Responsibilities, Orientation to room, Plan of Care, education, white board, height and weight, pain assessment and head to toe assessment. Patient is alert and oriented X 4. Patient lives at home with her daughter and is being admitted for intractable back pain. Home Medication Reconciliation has been completed per pharmacist.  Plan of care updated if indicated. All questions answered. Patient reports she does receive [address] but is not sure of the name of the Service and receives cooking, light housekeeping services. Patient also stated she received one time service for meal on wheels but this is not continuous. Patient does wear 2 Liters Oxygen at home.

## 2023-01-31 NOTE — ED NOTES
Report called to Josephine Sam RN on [facility].      Porfirio Ramires RN  01/31/23 [id number]

## 2023-01-31 NOTE — PROGRESS NOTES
Pt arrived to room from ER via stretcher. Daughter with pt. Pt oriented to room, staff and call light. Pt placed on tele, assessment completed. Pt states abd/back pain rated at 10. Tony catheter in place draining pale yellow urine to gravity bag. Bed alarm in place and in lowest position for safety, call light in reach.

## 2023-01-31 NOTE — H&P
HOSPITALISTS HISTORY AND PHYSICAL    1/31/2023 1:32 PM    Patient Information:  Tristan Wiggins is a 80 y.o. female [id number]  PCP:  Douglas Gee MD (Tel: [phone] )    Chief complaint:    Chief Complaint   Patient presents with    Back Pain     Pt brought in from home by [facility] EMT. Pt c/o chronic back pain that is getting worse, Pain increased yesterday evening. Pain radiates to pelvic area. Denies fall or injury. PT states MRI showed arthritis. History of Present Illness:  Audrey Mark is a 80 y.o. female   Who came from home with worsening bilateral lower back pain and difficulty walking. Patient was recently admitted to our hospital for similar episode control with pain meds had MRI which showed [place] disease. Patient denies any urinary retention or constipation no numbness in her feet chills or chest pain  REVIEW OF SYSTEMS:   Constitutional: Negative for fever,chills or night sweats  ENT: Negative for rhinorrhea, epistaxis, hoarseness, sore throat. Respiratory: Negative for shortness of breath,wheezing  Cardiovascular: Negative for chest pain, palpitations   Gastrointestinal: Negative for nausea, vomiting, diarrhea  Genitourinary: Negative for polyuria, dysuria   Hematologic/Lymphatic: Negative for bleeding tendency, easy bruising  Musculoskeletal: Negative for myalgias and arthralgias  Neurologic: Negative for confusion,dysarthria. Skin: Negative for itching,rash  Psychiatric: Negative for depression,anxiety, agitation. Endocrine: Negative for polydipsia,polyuria,heat /cold intolerance.     Past Medical History:   has a past medical history of Arthritis, Atrial fibrillation ([address] Bursitis, Diabetes mellitus type II, controlled ([address] GERD (gastroesophageal reflux disease), HTN (hypertension), Hyperlipidemia, Hypothyroid, Insomnia, Lumbago, Parkinson disease ([facility] Restless legs syndrome (RLS), and SVT (supraventricular tachycardia) ([facility] Past Surgical History:   has a past surgical history that includes Appendectomy; Colonoscopy; and Cholecystectomy, laparoscopic (2/24/2013). Medications:  No current facility-administered medications on file prior to encounter. Current Outpatient Medications on File Prior to Encounter   Medication Sig Dispense Refill    lidocaine 4 % external patch Place 1 patch onto the skin daily 4 patch 0    levoFLOXacin (LEVAQUIN) 500 MG tablet Take 1 tablet by mouth daily for 3 days 3 tablet 0    dilTIAZem (CARDIZEM CD) 180 MG extended release capsule Take 1 capsule by mouth daily 90 capsule 3    dilTIAZem (CARDIZEM) 30 MG tablet As needed for episodes of tachycardia (Patient taking differently: Take 30 mg by mouth as needed As needed for episodes of tachycardia) 120 tablet 3    LORazepam (ATIVAN) 1 MG tablet Take 1 mg by mouth at bedtime. SITagliptin (JANUVIA) 100 MG tablet Take 100 mg by mouth daily      LORazepam (ATIVAN) 0.5 MG tablet Take 0.5 mg by mouth in the morning and at bedtime. In the morning and afternoon      ELIQUIS 5 MG TABS tablet TAKE 1 TABLET TWICE DAILY 180 tablet 0    budesonide-formoterol (SYMBICORT) 160-4.5 MCG/ACT AERO INHALE 2 PUFFS TWICE DAILY 3 each 0    fluticasone (FLONASE) 50 MCG/ACT nasal spray USE 2 SPRAYS NASALLY EVERY DAY (Patient taking differently: 2 sprays by Nasal route as needed) 48 g 1    atorvastatin (LIPITOR) 80 MG tablet TAKE 1 TABLET EVERY NIGHT (Patient taking differently: Take 80 mg by mouth nightly) 90 tablet 3    [DISCONTINUED] amLODIPine (NORVASC) 5 MG tablet Take 1 tablet by mouth in the morning.  30 tablet 5    meclizine (ANTIVERT) 12.5 MG tablet TAKE 1 TABLET THREE TIMES DAILY AS NEEDED 270 tablet 1    blood glucose test strips (TRUE METRIX BLOOD GLUCOSE TEST) strip USE TO TEST BLOOD SUGAR DAILY 100 strip 5    irbesartan (AVAPRO) 300 MG tablet TAKE 1 TABLET BY MOUTH EVERY NIGHT (Patient taking differently: Take 150 mg by mouth at bedtime) 90 tablet 0    rOPINIRole (REQUIP) 3 MG tablet TAKE 1 TABLET THREE TIMES DAILY 270 tablet 1    levothyroxine (SYNTHROID) 75 MCG tablet TAKE 1 TABLET BY MOUTH EVERY DAY (Patient not taking: Reported on 1/31/2023) 90 tablet 0    cloNIDine (CATAPRES) 0.1 MG tablet TAKE 1 TABLET TWICE DAILY 180 tablet 0    conjugated estrogens (PREMARIN) 0.625 MG/GM vaginal cream Place 1 g vaginally three times a week 30 g 2    levalbuterol (XOPENEX) 0.63 MG/3ML nebulization USE 1 VIAL PER NEBULIZER EVERY 6 HOURS. MAX OF 4 TIMES PER 24 HOURS (Patient taking differently: Take 1 ampule by nebulization every 6 hours as needed) 1086 mL 3    tiotropium (SPIRIVA RESPIMAT) 2.5 MCG/ACT AERS inhaler Inhale 2 puffs into the lungs daily 1 each 1    Blood Glucose Monitoring Suppl (TRUE METRIX METER) w/Device KIT To use to check sugars 4 times daily 1 kit 0    Blood Glucose Calibration (TRUE METRIX LEVEL 2) Normal SOLN As needed 1 each 0    blood glucose test strips (TRUE METRIX BLOOD GLUCOSE TEST) strip 1 each by In Vitro route daily As needed. 100 each 3    TRUEplus Lancets 33G MISC 1 daily 100 each 3    Lancets MISC 1 each by Does not apply route 2 times daily 300 each 1    OXYGEN Inhale 2 L into the lungs      Respiratory Therapy Supplies (NEBULIZER/TUBING/MOUTHPIECE) KIT 1 kit by Does not apply route 4 times daily as needed (shortness of breath) 1 kit 0    albuterol sulfate HFA (PROAIR HFA) 108 (90 Base) MCG/ACT inhaler Inhale 2 puffs into the lungs every 6 hours as needed for Wheezing 1 Inhaler 6       Allergies:   Allergies   Allergen Reactions    Adhesive Tape Anaphylaxis    Cefaclor Nausea And Vomiting     Other reaction(s): Chest pain  Other reaction(s): Chest pain, Nausea / Vomiting    Cephalexin Other (See Comments)     Struggling to breath, almost passing out/ very low oxygen and pulse    Nsaids      Pt states she has hives and heart races   Other reaction(s): Tachycardia  Other reaction(s): Hives / Skin Rash, Tachycardia    Penicillin G      Other reaction(s): Hives / Skin Rash    Codeine      FEEL NUMB  Other reaction(s): feels numb    Diclofenac      Other reaction(s): Hives    Diclofenac Sodium Hives    Diclofenac Sodium Hives    Diclofenac Sodium     Hydrochlorothiazide      Sob  Other reaction(s): sob    Ibuprofen Other (See Comments)     Other reaction(s): Tachycardia  Other reaction(s): Tachycardia    Macrobid [Nitrofurantoin Macrocrystal]      nausea    Motrin [Ibuprofen Micronized] Other (See Comments)     Tachycardia      Nitrofurantoin      Other reaction(s): nausea H&P  Other reaction(s): nausea H&P    Pcn [Penicillins] Hives    Peach [Prunus Persica] Itching and Swelling     Cannot eat raw peaches, but can eat canned peaches    Prednisone      Causes elevated blood pressure   Other reaction(s): Elevated high BP  H&P    Sulfa Antibiotics      Nausea, diarrhea  Other reaction(s): Nausea/Diarrhea H&P    Sulindac Other (See Comments)     Stomach pain  Other reaction(s): Stomach Pain        Social History:  Patient Lives at home   reports that she quit smoking about 27 years ago. Her smoking use included cigarettes. She started smoking about 72 years ago. She has a 45.00 pack-year smoking history. She has never used smokeless tobacco. She reports that she does not drink alcohol and does not use drugs. Family History:  family history includes Asthma in her paternal uncle; Heart Attack in her father; Heart Disease in her father; High Blood Pressure in her mother; Tuberculosis in her brother. ,     Physical Exam:  BP (!) 142/60   Pulse 71   Temp 98.1 °F (36.7 °C) (Oral)   Resp 22   SpO2 94%     General appearance:  Appears comfortable.  AAOx3  HEENT: atraumatic, Pupils equal, muscous membranes moist, no masses appreciated  Cardiovascular: Regular rate and rhythm no murmurs appreciated  Respiratory: CTAB no wheezing  Gastrointestinal: Abdomen soft, non-tender, BS+  EXT: no edema  Neurology: no gross focal deficts  Psychiatry: Appropriate affect. Not agitated  Skin: Warm, dry, no rashes appreciated    Labs:  CBC:   Lab Results   Component Value Date/Time    WBC 6.0 01/31/2023 07:23 AM    RBC 4.35 01/31/2023 07:23 AM    HGB 12.3 01/31/2023 07:23 AM    HCT 37.5 01/31/2023 07:23 AM    MCV 86.1 01/31/2023 07:23 AM    MCH 28.1 01/31/2023 07:23 AM    MCHC 32.7 01/31/2023 07:23 AM    RDW 15.8 01/31/2023 07:23 AM     01/31/2023 07:23 AM    MPV 8.0 01/31/2023 07:23 AM     BMP:    Lab Results   Component Value Date/Time     01/31/2023 07:23 AM    K 4.0 01/31/2023 07:23 AM    CL 99 01/31/2023 07:23 AM    CO2 24 01/31/2023 07:23 AM    BUN 21 01/31/2023 07:23 AM    CREATININE 1.1 01/31/2023 07:23 AM    CALCIUM 10.4 01/31/2023 07:23 AM    GFRAA >60 10/04/2022 04:22 AM    GFRAA >60 02/27/2013 05:18 AM    LABGLOM 49 01/31/2023 07:23 AM    GLUCOSE 130 01/31/2023 07:23 AM     CT ABDOMEN PELVIS W IV CONTRAST Additional Contrast? None   Final Result   Severely distended urinary bladder and large colonic stool burden. Interval resolution of left lower lobe consolidation. Recent imaging reviewed    Problem List  Principal Problem:    Intractable back pain  Resolved Problems:    * No resolved hospital problems. *        Assessment/Plan:   Intractable back pain  - iv morphine  = pt ot   = neuro suregry consult    ?urinary retention on ct bladder scan urology ocnsult    Constipation  -lactulose    Paf: home meds and eliqui      DVT prophylaxis eliquis  Code status full code        Admit as inpatient I anticipate hospitalization spanning more than two midnights for investigation and treatment of the above medically necessary diagnoses. Please note that some part of this chart was generated using Dragon dictation software.  Although every effort was made to ensure the accuracy of this automated transcription, some errors in transcription may have occurred inadvertently. If you may need any clarification, please do not hesitate to contact me through [facility].        dEy Neal MD    1/31/2023 1:32 PM

## 2023-01-31 NOTE — ED PROVIDER NOTES
[address] [facility]    Patient Identification  Pt Name: Milena Sommer  MRN: [id number]  Chris 1935  Date of evaluation: 1/31/2023  Provider: Lenka Reza MD  PCP: Stan Vigil MD    Chief Complaint  Back Pain (Pt brought in from home by [facility] EMT. Pt c/o chronic back pain that is getting worse, Pain increased yesterday evening. Pain radiates to pelvic area. Denies fall or injury. PT states MRI showed arthritis. )      HPI  History provided by patient   This is a 80 y.o. female who presents to the ED for back pain. Right lower back. Radiates towards right hip. Does not shoot down the leg. This is the same back pain that she was having when she was in the hospital this past week that she get a CT scan/MRI for. It is been refractory to Vicodin that she took this morning. Did not use the pain patch that was prescribed to her because she did not know whether or not she was allergic. No fevers or chills. No chest pain or shortness of breath. No nausea or vomiting. No numbness or tingling. She has had this back pain in the past.  No saddle anesthesia. No IV drug abuse. No weakness in the legs. Her pain was so bad she thought she would pass out. She was not able to take her medications this morning. She has not been taking her pneumonia antibiotics because she states that she is allergic    ROS  12 systems reviewed, pertinent positives/negatives per HPI otherwise noted to be negative.     I have reviewed the following nursing documentation:  Allergies: Adhesive tape, Cefaclor, Cephalexin, Nsaids, Penicillin g, Codeine, Diclofenac, Diclofenac sodium, Diclofenac sodium, Diclofenac sodium, Hydrochlorothiazide, Ibuprofen, Macrobid [nitrofurantoin macrocrystal], Motrin [ibuprofen micronized], Nitrofurantoin, Pcn [penicillins], Peach [prunus persica], Prednisone, Sulfa antibiotics, and Sulindac    Past medical history:   Past Medical History:   Diagnosis Date    Arthritis     Atrial fibrillation (HCC)     Bursitis     neck and down bilateral shoulders    Diabetes mellitus type II, controlled ([facility])     GERD (gastroesophageal reflux disease)     HTN (hypertension)     Hyperlipidemia     Hypothyroid     Insomnia     Lumbago     Parkinson disease ([facility])     Restless legs syndrome (RLS)     SVT (supraventricular tachycardia) ([facility])      Past surgical history:   Past Surgical History:   Procedure Laterality Date    APPENDECTOMY      CHOLECYSTECTOMY, LAPAROSCOPIC  2/24/2013    COLONOSCOPY         Home medications:   Previous Medications    ALBUTEROL SULFATE HFA (PROAIR HFA) 108 (90 BASE) MCG/ACT INHALER    Inhale 2 puffs into the lungs every 6 hours as needed for Wheezing    ATORVASTATIN (LIPITOR) 80 MG TABLET    TAKE 1 TABLET EVERY NIGHT    BLOOD GLUCOSE CALIBRATION (TRUE METRIX LEVEL 2) NORMAL SOLN    As needed    BLOOD GLUCOSE MONITORING SUPPL (TRUE METRIX METER) W/DEVICE KIT    To use to check sugars 4 times daily    BLOOD GLUCOSE TEST STRIPS (TRUE METRIX BLOOD GLUCOSE TEST) STRIP    1 each by In Vitro route daily As needed.     BLOOD GLUCOSE TEST STRIPS (TRUE METRIX BLOOD GLUCOSE TEST) STRIP    USE TO TEST BLOOD SUGAR DAILY    BUDESONIDE-FORMOTEROL (SYMBICORT) 160-4.5 MCG/ACT AERO    INHALE 2 PUFFS TWICE DAILY    CLONIDINE (CATAPRES) 0.1 MG TABLET    TAKE 1 TABLET TWICE DAILY    CONJUGATED ESTROGENS (PREMARIN) 0.625 MG/GM VAGINAL CREAM    Place 1 g vaginally three times a week    DILTIAZEM (CARDIZEM CD) 180 MG EXTENDED RELEASE CAPSULE    Take 1 capsule by mouth daily    DILTIAZEM (CARDIZEM) 30 MG TABLET    As needed for episodes of tachycardia    ELIQUIS 5 MG TABS TABLET    TAKE 1 TABLET TWICE DAILY    FLUTICASONE (FLONASE) 50 MCG/ACT NASAL SPRAY    USE 2 SPRAYS NASALLY EVERY DAY    IRBESARTAN (AVAPRO) 300 MG TABLET    TAKE 1 TABLET BY MOUTH EVERY NIGHT    LANCETS MISC    1 each by Does not apply route 2 times daily    LEVALBUTEROL (XOPENEX) 0.63 MG/3ML NEBULIZATION    USE 1 VIAL PER NEBULIZER EVERY 6 HOURS. MAX OF 4 TIMES PER 24 HOURS    LEVOFLOXACIN (LEVAQUIN) 500 MG TABLET    Take 1 tablet by mouth daily for 3 days    LEVOTHYROXINE (SYNTHROID) 75 MCG TABLET    TAKE 1 TABLET BY MOUTH EVERY DAY    LIDOCAINE 4 % EXTERNAL PATCH    Place 1 patch onto the skin daily    LORAZEPAM (ATIVAN) 0.5 MG TABLET    Take 0.5 mg by mouth in the morning and at bedtime. In the morning and afternoon    LORAZEPAM (ATIVAN) 1 MG TABLET    Take 1 mg by mouth at bedtime. MECLIZINE (ANTIVERT) 12.5 MG TABLET    TAKE 1 TABLET THREE TIMES DAILY AS NEEDED    OXYGEN    Inhale 2 L into the lungs    RESPIRATORY THERAPY SUPPLIES (NEBULIZER/TUBING/MOUTHPIECE) KIT    1 kit by Does not apply route 4 times daily as needed (shortness of breath)    ROPINIROLE (REQUIP) 3 MG TABLET    TAKE 1 TABLET THREE TIMES DAILY    SITAGLIPTIN (JANUVIA) 100 MG TABLET    Take 100 mg by mouth daily    TIOTROPIUM (SPIRIVA RESPIMAT) 2.5 MCG/ACT AERS INHALER    Inhale 2 puffs into the lungs daily    TRUEPLUS LANCETS 33G MISC    1 daily       Social history:  reports that she quit smoking about 27 years ago. Her smoking use included cigarettes. She started smoking about 72 years ago. She has a 45.00 pack-year smoking history. She has never used smokeless tobacco. She reports that she does not drink alcohol and does not use drugs. Family history:    Family History   Problem Relation Age of Onset    High Blood Pressure Mother     Heart Attack Father     Heart Disease Father     Other Brother     Asthma Paternal Uncle          Exam  ED Triage Vitals [01/31/23 0549]   BP Temp Temp Source Heart Rate Resp SpO2 Height Weight   (!) 198/66 98.1 °F (36.7 °C) Oral 71 18 99 % -- --     Nursing note and vitals reviewed. Constitutional: In no acute distress  HENT:      Head: Normocephalic      Ears: External ears normal.      Nose: Nose normal.     Mouth: Membrane mucosa moist   Eyes: No discharge. Neck: Supple.  Trachea midline. Cardiovascular: Regular rate. Warm extremities  Pulmonary/Chest: Effort normal. No respiratory distress. Abdominal: Soft. No distension. Nontender  : Deferred  Rectal: Deferred   Musculoskeletal: Moves all extremities. No gross deformity. 5 out of 5 strength bilateral hip, knee, ankle, big toe flexors/extensors. Normal sensation to light palpation throughout entire right lower extremity. Normal tone and right leg. 2+ DP pulse. No leg swelling. Good range of motion about the right hip without change in pain  Neurological: Alert and oriented. Face symmetric. Speech is clear. Skin: Warm and dry. Psychiatric: Normal mood and affect. Behavior is normal.    Procedures        Radiology  CT ABDOMEN PELVIS W IV CONTRAST Additional Contrast? None   Final Result   Severely distended urinary bladder and large colonic stool burden. Interval resolution of left lower lobe consolidation.              Labs  Results for orders placed or performed during the hospital encounter of 01/31/23   CBC with Auto Differential   Result Value Ref Range    WBC 6.0 4.0 - 11.0 K/uL    RBC 4.35 4.00 - 5.20 M/uL    Hemoglobin 12.3 12.0 - 16.0 g/dL    Hematocrit 37.5 36.0 - 48.0 %    MCV 86.1 80.0 - 100.0 fL    MCH 28.1 26.0 - 34.0 pg    MCHC 32.7 31.0 - 36.0 g/dL    RDW 15.8 (H) 12.4 - 15.4 %    Platelets 971 891 - 285 K/uL    MPV 8.0 5.0 - 10.5 fL    Neutrophils % 71.1 %    Lymphocytes % 19.6 %    Monocytes % 5.3 %    Eosinophils % 3.3 %    Basophils % 0.7 %    Neutrophils Absolute 4.2 1.7 - 7.7 K/uL    Lymphocytes Absolute 1.2 1.0 - 5.1 K/uL    Monocytes Absolute 0.3 0.0 - 1.3 K/uL    Eosinophils Absolute 0.2 0.0 - 0.6 K/uL    Basophils Absolute 0.0 0.0 - 0.2 K/uL   CMP w/ Reflex to MG   Result Value Ref Range    Sodium 137 136 - 145 mmol/L    Potassium reflex Magnesium 4.0 3.5 - 5.1 mmol/L    Chloride 99 99 - 110 mmol/L    CO2 24 21 - 32 mmol/L    Anion Gap 14 3 - 16    Glucose 130 (H) 70 - 99 mg/dL    BUN 21 (H) 7 - 20 mg/dL    Creatinine 1.1 0.6 - 1.2 mg/dL    Est, Glom Filt Rate 49 (A) >60    Calcium 10.4 8.3 - 10.6 mg/dL    Total Protein 8.2 6.4 - 8.2 g/dL    Albumin 4.6 3.4 - 5.0 g/dL    Albumin/Globulin Ratio 1.3 1.1 - 2.2    Total Bilirubin 0.5 0.0 - 1.0 mg/dL    Alkaline Phosphatase 157 (H) 40 - 129 U/L    ALT 22 10 - 40 U/L    AST 21 15 - 37 U/L   Urinalysis with Reflex to Culture    Specimen: Urine   Result Value Ref Range    Color, UA Yellow Straw/Yellow    Clarity, UA Clear Clear    Glucose, Ur Negative Negative mg/dL    Bilirubin Urine Negative Negative    Ketones, Urine Negative Negative mg/dL    Specific Gravity, UA 1.010 1.005 - 1.030    Blood, Urine TRACE (A) Negative    pH, UA 7.5 5.0 - 8.0    Protein,  (A) Negative mg/dL    Urobilinogen, Urine 0.2 <2.0 E.U./dL    Nitrite, Urine Negative Negative    Leukocyte Esterase, Urine Negative Negative    Microscopic Examination YES     Urine Type NotGiven     Urine Reflex to Culture Not Indicated    Microscopic Urinalysis   Result Value Ref Range    Bacteria, UA None Seen None Seen /HPF    Hyaline Casts, UA 0 0 - 8 /LPF    WBC, UA 0 0 - 5 /HPF    RBC, UA 1 0 - 4 /HPF    Epithelial Cells, UA 0 0 - 5 /HPF       Screenings   [place] Coma Scale  Eye Opening: Spontaneous  Best Verbal Response: Oriented  Best Motor Response: Obeys commands  [place] Coma Scale Score: 15       MDM and ED Course  This is a 80 y.o. female who presents to the ED for right lower back pain that radiates towards her right groin area. 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 She just received MRI on 1/27 for this back pain which showed no cord compressive disease. No focal neurodeficits on my exam.  No red flag symptoms. At this point, I do not believe that repeat MRI is indicated. Doubt right hip joint effusion. No significant pain on me moving her right hip. May be UTI/pyelonephritis. Will check urinalysis and CT abdomen pelvis. Ureteric calculus also on differential. Appendicitis, hernia also in differential.    Patient significantly hypertensive to 198/66 on arrival.  May be secondary to pain. Will give morphine initially and reassess. Aortic pathology also in differential.  This will be checked with the CT abdomen pelvis     --------------    Reassessed patient. Still having significant pain at 7 out of 10. Will give additional dose of morphine. Still hypertensive. We will give some hydralazine.    ----------------  Reassessed patient. Still in pain. Still hypertensive. Patient feels like she is in too much pain to walk around. Will admit to hospitalist service for blood pressure control, pain control, PT OT evaluation. She does not want to consider surgery for this      [unfilled]    Is this patient to be included in the SEP-1 Core Measure due to severe sepsis or septic shock? No   Exclusion criteria - the patient is NOT to be included for SEP-1 Core Measure due to: Infection is not suspected        Final Impression  1. Acute right-sided low back pain without sciatica    2. Hypertensive urgency        Blood pressure (!) 188/62, pulse 73, temperature 98.1 °F (36.7 °C), temperature source Oral, resp. rate 17, SpO2 96 %, not currently breastfeeding. Disposition:  DISPOSITION Decision To Admit 01/31/2023 10:22:42 AM      Patient Referrals:  No follow-up provider specified. Discharge Medications:  New Prescriptions    No medications on file       Discontinued Medications:  Discontinued Medications    No medications on file       This chart was generated using the [address] [place] [address] "[employer]ation system. I created this record but it may contain dictation errors given the limitations of this technology.         Sarah Mcgregor MD  01/31/23 5632

## 2023-02-01 LAB
ANION GAP SERPL CALCULATED.3IONS-SCNC: 8 MMOL/L (ref 3–16)
BASOPHILS ABSOLUTE: 0 K/UL (ref 0–0.2)
BASOPHILS RELATIVE PERCENT: 0.7 %
BUN BLDV-MCNC: 22 MG/DL (ref 7–20)
CALCIUM SERPL-MCNC: 9.8 MG/DL (ref 8.3–10.6)
CHLORIDE BLD-SCNC: 100 MMOL/L (ref 99–110)
CO2: 26 MMOL/L (ref 21–32)
CREAT SERPL-MCNC: 1 MG/DL (ref 0.6–1.2)
EOSINOPHILS ABSOLUTE: 0.3 K/UL (ref 0–0.6)
EOSINOPHILS RELATIVE PERCENT: 3.7 %
GFR SERPL CREATININE-BSD FRML MDRD: 54 ML/MIN/{1.73_M2}
GLUCOSE BLD-MCNC: 115 MG/DL (ref 70–99)
HCT VFR BLD CALC: 32.5 % (ref 36–48)
HEMOGLOBIN: 10.7 G/DL (ref 12–16)
LYMPHOCYTES ABSOLUTE: 1.5 K/UL (ref 1–5.1)
LYMPHOCYTES RELATIVE PERCENT: 22.4 %
MCH RBC QN AUTO: 28.2 PG (ref 26–34)
MCHC RBC AUTO-ENTMCNC: 32.9 G/DL (ref 31–36)
MCV RBC AUTO: 85.8 FL (ref 80–100)
MONOCYTES ABSOLUTE: 0.5 K/UL (ref 0–1.3)
MONOCYTES RELATIVE PERCENT: 7.9 %
NEUTROPHILS ABSOLUTE: 4.5 K/UL (ref 1.7–7.7)
NEUTROPHILS RELATIVE PERCENT: 65.3 %
PDW BLD-RTO: 16 % (ref 12.4–15.4)
PLATELET # BLD: 157 K/UL (ref 135–450)
PMV BLD AUTO: 7.8 FL (ref 5–10.5)
POTASSIUM REFLEX MAGNESIUM: 4.1 MMOL/L (ref 3.5–5.1)
RBC # BLD: 3.79 M/UL (ref 4–5.2)
SODIUM BLD-SCNC: 134 MMOL/L (ref 136–145)
WBC # BLD: 6.9 K/UL (ref 4–11)

## 2023-02-01 PROCEDURE — 1200000000 HC SEMI PRIVATE

## 2023-02-01 PROCEDURE — 97535 SELF CARE MNGMENT TRAINING: CPT

## 2023-02-01 PROCEDURE — 94640 AIRWAY INHALATION TREATMENT: CPT

## 2023-02-01 PROCEDURE — 6370000000 HC RX 637 (ALT 250 FOR IP): Performed by: INTERNAL MEDICINE

## 2023-02-01 PROCEDURE — 2580000003 HC RX 258: Performed by: INTERNAL MEDICINE

## 2023-02-01 PROCEDURE — 94761 N-INVAS EAR/PLS OXIMETRY MLT: CPT

## 2023-02-01 PROCEDURE — 6370000000 HC RX 637 (ALT 250 FOR IP): Performed by: NURSE PRACTITIONER

## 2023-02-01 PROCEDURE — 97162 PT EVAL MOD COMPLEX 30 MIN: CPT

## 2023-02-01 PROCEDURE — 6360000002 HC RX W HCPCS: Performed by: INTERNAL MEDICINE

## 2023-02-01 PROCEDURE — 80048 BASIC METABOLIC PNL TOTAL CA: CPT

## 2023-02-01 PROCEDURE — 97530 THERAPEUTIC ACTIVITIES: CPT

## 2023-02-01 PROCEDURE — 6370000000 HC RX 637 (ALT 250 FOR IP): Performed by: EMERGENCY MEDICINE

## 2023-02-01 PROCEDURE — 2700000000 HC OXYGEN THERAPY PER DAY

## 2023-02-01 PROCEDURE — 36415 COLL VENOUS BLD VENIPUNCTURE: CPT

## 2023-02-01 PROCEDURE — 85025 COMPLETE CBC W/AUTO DIFF WBC: CPT

## 2023-02-01 PROCEDURE — 97166 OT EVAL MOD COMPLEX 45 MIN: CPT

## 2023-02-01 RX ORDER — GABAPENTIN 100 MG/1
100 CAPSULE ORAL 3 TIMES DAILY
Status: DISCONTINUED | OUTPATIENT
Start: 2023-02-01 | End: 2023-02-04 | Stop reason: HOSPADM

## 2023-02-01 RX ORDER — BETHANECHOL CHLORIDE 10 MG/1
10 TABLET ORAL 2 TIMES DAILY
Status: DISCONTINUED | OUTPATIENT
Start: 2023-02-01 | End: 2023-02-04 | Stop reason: HOSPADM

## 2023-02-01 RX ORDER — OXYCODONE HYDROCHLORIDE 5 MG/1
5 TABLET ORAL EVERY 4 HOURS PRN
Status: DISCONTINUED | OUTPATIENT
Start: 2023-02-01 | End: 2023-02-04 | Stop reason: HOSPADM

## 2023-02-01 RX ORDER — PEG-3350, SODIUM SULFATE, SODIUM CHLORIDE, POTASSIUM CHLORIDE, SODIUM ASCORBATE AND ASCORBIC ACID 7.5-2.691G
100 KIT ORAL ONCE
Status: COMPLETED | OUTPATIENT
Start: 2023-02-01 | End: 2023-02-01

## 2023-02-01 RX ORDER — HYDROMORPHONE HYDROCHLORIDE 1 MG/ML
0.25 INJECTION, SOLUTION INTRAMUSCULAR; INTRAVENOUS; SUBCUTANEOUS EVERY 4 HOURS PRN
Status: DISCONTINUED | OUTPATIENT
Start: 2023-02-01 | End: 2023-02-04 | Stop reason: HOSPADM

## 2023-02-01 RX ORDER — METHOCARBAMOL 500 MG/1
500 TABLET, FILM COATED ORAL 4 TIMES DAILY PRN
Status: DISCONTINUED | OUTPATIENT
Start: 2023-02-01 | End: 2023-02-04 | Stop reason: HOSPADM

## 2023-02-01 RX ORDER — ACETAMINOPHEN 500 MG
1000 TABLET ORAL EVERY 8 HOURS SCHEDULED
Status: DISCONTINUED | OUTPATIENT
Start: 2023-02-01 | End: 2023-02-04 | Stop reason: HOSPADM

## 2023-02-01 RX ORDER — OXYCODONE HYDROCHLORIDE 5 MG/1
10 TABLET ORAL EVERY 4 HOURS PRN
Status: DISCONTINUED | OUTPATIENT
Start: 2023-02-01 | End: 2023-02-04 | Stop reason: HOSPADM

## 2023-02-01 RX ADMIN — ACETAMINOPHEN 1000 MG: 500 TABLET ORAL at 21:46

## 2023-02-01 RX ADMIN — APIXABAN 5 MG: 5 TABLET, FILM COATED ORAL at 08:27

## 2023-02-01 RX ADMIN — Medication 2 PUFF: at 21:00

## 2023-02-01 RX ADMIN — Medication 10 ML: at 08:28

## 2023-02-01 RX ADMIN — PEG-3350, SODIUM SULFATE, SODIUM CHLORIDE, POTASSIUM CHLORIDE, SODIUM ASCORBATE AND ASCORBIC ACID 100 G: KIT at 13:03

## 2023-02-01 RX ADMIN — BISACODYL 10 MG: 5 TABLET, COATED ORAL at 16:14

## 2023-02-01 RX ADMIN — MORPHINE SULFATE 2 MG: 2 INJECTION, SOLUTION INTRAMUSCULAR; INTRAVENOUS at 09:42

## 2023-02-01 RX ADMIN — LOSARTAN POTASSIUM 50 MG: 25 TABLET, FILM COATED ORAL at 21:46

## 2023-02-01 RX ADMIN — APIXABAN 5 MG: 5 TABLET, FILM COATED ORAL at 21:46

## 2023-02-01 RX ADMIN — DILTIAZEM HYDROCHLORIDE 180 MG: 180 CAPSULE, EXTENDED RELEASE ORAL at 08:27

## 2023-02-01 RX ADMIN — Medication 10 ML: at 21:47

## 2023-02-01 RX ADMIN — CLONIDINE HYDROCHLORIDE 0.1 MG: 0.1 TABLET ORAL at 08:27

## 2023-02-01 RX ADMIN — GABAPENTIN 100 MG: 100 CAPSULE ORAL at 16:14

## 2023-02-01 RX ADMIN — BETHANECHOL CHLORIDE 10 MG: 10 TABLET ORAL at 21:46

## 2023-02-01 RX ADMIN — OXYCODONE HYDROCHLORIDE 10 MG: 5 TABLET ORAL at 13:00

## 2023-02-01 RX ADMIN — ACETAMINOPHEN 1000 MG: 500 TABLET ORAL at 16:15

## 2023-02-01 RX ADMIN — ATORVASTATIN CALCIUM 80 MG: 80 TABLET, FILM COATED ORAL at 21:46

## 2023-02-01 RX ADMIN — Medication 2 PUFF: at 08:45

## 2023-02-01 RX ADMIN — CLONIDINE HYDROCHLORIDE 0.1 MG: 0.1 TABLET ORAL at 21:46

## 2023-02-01 RX ADMIN — GABAPENTIN 100 MG: 100 CAPSULE ORAL at 21:46

## 2023-02-01 RX ADMIN — BETHANECHOL CHLORIDE 10 MG: 10 TABLET ORAL at 09:41

## 2023-02-01 RX ADMIN — MORPHINE SULFATE 2 MG: 2 INJECTION, SOLUTION INTRAMUSCULAR; INTRAVENOUS at 04:29

## 2023-02-01 ASSESSMENT — PAIN DESCRIPTION - PAIN TYPE
TYPE: CHRONIC PAIN
TYPE: CHRONIC PAIN

## 2023-02-01 ASSESSMENT — PAIN SCALES - GENERAL
PAINLEVEL_OUTOF10: 0
PAINLEVEL_OUTOF10: 8
PAINLEVEL_OUTOF10: 8
PAINLEVEL_OUTOF10: 4
PAINLEVEL_OUTOF10: 2
PAINLEVEL_OUTOF10: 8
PAINLEVEL_OUTOF10: 4
PAINLEVEL_OUTOF10: 8
PAINLEVEL_OUTOF10: 7
PAINLEVEL_OUTOF10: 8
PAINLEVEL_OUTOF10: 8

## 2023-02-01 ASSESSMENT — PAIN DESCRIPTION - FREQUENCY
FREQUENCY: CONTINUOUS
FREQUENCY: CONTINUOUS

## 2023-02-01 ASSESSMENT — PAIN DESCRIPTION - ORIENTATION
ORIENTATION: LOWER
ORIENTATION: LOWER
ORIENTATION: RIGHT
ORIENTATION: RIGHT

## 2023-02-01 ASSESSMENT — PAIN DESCRIPTION - LOCATION
LOCATION: BACK
LOCATION: ABDOMEN;BACK;GROIN
LOCATION: BACK

## 2023-02-01 ASSESSMENT — PAIN DESCRIPTION - ONSET
ONSET: ON-GOING
ONSET: ON-GOING

## 2023-02-01 ASSESSMENT — PAIN - FUNCTIONAL ASSESSMENT
PAIN_FUNCTIONAL_ASSESSMENT: PREVENTS OR INTERFERES WITH MANY ACTIVE NOT PASSIVE ACTIVITIES
PAIN_FUNCTIONAL_ASSESSMENT: PREVENTS OR INTERFERES WITH MANY ACTIVE NOT PASSIVE ACTIVITIES

## 2023-02-01 ASSESSMENT — PAIN DESCRIPTION - DESCRIPTORS
DESCRIPTORS: ACHING;THROBBING
DESCRIPTORS: ACHING;THROBBING

## 2023-02-01 NOTE — PROGRESS NOTES
Hospitalist Progress Note      PCP: Tami Conley MD    Date of Admission: 1/31/2023      Chief Complaint: Intractable back pain      Hospital Course:  Soumya Weber is a 80 y.o. F with PMHx of AFIB on Eliquis, T2 DM, HTN, recently admitted with lower back pain, diagnosed with [place] disease, and discharged home, presented with complaints of worsening bilateral lower back pain and difficulty walking. Patient denied any urinary retention, constipation, focal weakness, LE numbness, fever or chills. Subjective: Patient seen and examined. c/o severe back pain with movements. Constipated +. No BM. No fever or chills. Medications:  Reviewed    Infusion Medications    sodium chloride       Scheduled Medications    bethanechol  10 mg Oral BID    [START ON 2/2/2023] naloxegol  25 mg Oral QAM AC    bisacodyl  10 mg Oral Once    acetaminophen  1,000 mg Oral 3 times per day    gabapentin  100 mg Oral TID    lidocaine  1 patch TransDERmal Daily    atorvastatin  80 mg Oral Nightly    mometasone-formoterol  2 puff Inhalation BID    cloNIDine  0.1 mg Oral BID    dilTIAZem  180 mg Oral Daily    apixaban  5 mg Oral BID    losartan  50 mg Oral Nightly    sodium chloride flush  5-40 mL IntraVENous 2 times per day     PRN Meds: milk and molasses, oxyCODONE **OR** oxyCODONE, methocarbamol, HYDROmorphone, sodium chloride flush, sodium chloride, ondansetron **OR** ondansetron, polyethylene glycol, albuterol sulfate HFA      Intake/Output Summary (Last 24 hours) at 2/1/2023 1459  Last data filed at 2/1/2023 0400  Gross per 24 hour   Intake 860 ml   Output 1370 ml   Net -510 ml       Physical Exam Performed:    BP (!) 128/50   Pulse 63   Temp 97.3 °F (36.3 °C) (Temporal)   Resp 20   Ht 5' 2\" (1.575 m)   Wt 156 lb 14.4 oz (71.2 kg)   SpO2 96%   BMI 28.70 kg/m²     General appearance: No apparent distress, appears stated age and cooperative. HEENT: Pupils equal, round, and reactive to light. Conjunctivae clear. Neck: Supple, with full range of motion. No jugular venous distention. Trachea midline. Respiratory:  Normal respiratory effort. Clear to auscultation, bilaterally without Rales/Wheezes/Rhonchi. Cardiovascular: Regular rate and rhythm with normal S1/S2 without murmurs, rubs or gallops. Abdomen: Soft, non-tender, non-distended with normal bowel sounds. Musculoskeletal: No clubbing, cyanosis or edema bilaterally. Full range of motion without deformity. Skin: Skin color, texture, turgor normal.  No rashes or lesions. Neurologic:  Neurovascularly intact without any focal sensory/motor deficits. Cranial nerves: II-XII intact, grossly non-focal.  Psychiatric: Alert and oriented, thought content appropriate, normal insight  Capillary Refill: Brisk, 3 seconds, normal   Peripheral Pulses: +2 palpable, equal bilaterally       Labs:   Recent Labs     01/31/23 0723 02/01/23 0431   WBC 6.0 6.9   HGB 12.3 10.7*   HCT 37.5 32.5*    157     Recent Labs     01/31/23 0723 02/01/23 0431    134*   K 4.0 4.1   CL 99 100   CO2 24 26   BUN 21* 22*   CREATININE 1.1 1.0   CALCIUM 10.4 9.8     Recent Labs     01/31/23 0723   AST 21   ALT 22   BILITOT 0.5   ALKPHOS 157*     No results for input(s): INR in the last 72 hours. No results for input(s): Irvin Rosemary in the last 72 hours. Urinalysis:      Lab Results   Component Value Date/Time    NITRU Negative 01/31/2023 07:23 AM    WBCUA 0 01/31/2023 07:23 AM    BACTERIA None Seen 01/31/2023 07:23 AM    RBCUA 1 01/31/2023 07:23 AM    BLOODU TRACE 01/31/2023 07:23 AM    SPECGRAV 1.010 01/31/2023 07:23 AM    GLUCOSEU Negative 01/31/2023 07:23 AM       Radiology:  CT ABDOMEN PELVIS W IV CONTRAST Additional Contrast? None   Final Result   Severely distended urinary bladder and large colonic stool burden. Interval resolution of left lower lobe consolidation.              IP CONSULT TO HOSPITALIST  IP CONSULT TO UROLOGY  IP CONSULT TO NEUROSURGERY  IP CONSULT TO PAIN MANAGEMENT    Assessment/Plan:    Active Hospital Problems    Diagnosis     Intractable back pain [M54.9]      Priority: Medium     Intractable lower back pain:  Lumbar MRI showed mild L5-S1 spondylolisthesis. s transitional spine anatomy. NSGY consult appreciated: Back pain is multifactorial in etiology. No emergent neurosurgical intervention indicated. Recommended medication management and outpatient follow-up to pain specialist for palliative injections. Pain management consulted. PT OT ordered. Constipation:  CT: Large colonic stool burden. Started on bowel regimen and Movantik. Urinary Retention:  CT: distended urinary bladder. Urology consult appreciated: Started on Urecholine twice daily. Monitor PVRs and straight cath if > 400 cc's. Treat constipation. OOB as tolerated. Paroxysmal AFIB:  Continue Eliquis, Cardizem and clonidine. HTN: Monitor BP on home medications. DVT Prophylaxis: Eliquis  Diet: ADULT DIET;  Regular  ADULT ORAL NUTRITION SUPPLEMENT; Dinner, Breakfast; Standard High Calorie/High Protein Oral Supplement  Code Status: Full Code  PT/OT Eval Status: SNF    Dispo - once medically stable      Ernestine Kenyon MD

## 2023-02-01 NOTE — CONSULTS
Neurosurgery Consult Note    Reason for Consult: Intractable back pain  Requesting Provider: Dr. Yamilka Tello    Subjective:  [address] / HPI:  Franchesca Armando is a 80 y.o. female patient being seen for complaints of ongoing severe back pain. She was recently discharged for same issues. Her pain is located in her back. Pain is described as an aching and burning. It is aggravated by activity. She denies any leg pain, numbness or tingling. The biggest complaint is pain in the right lower spine and that will radiate into her right groin. She was in the process of being referred to pain management. Because of her worsening back pain she was brought back to the hospital by squad stating it was getting worse. Since admission she recently had a dose of oxycodone and states this is the best her pain has been. Overall her pain is better since admission and tolerable.     Past Medical History:   Diagnosis Date    Arthritis     Atrial fibrillation (HCC)     Bursitis     neck and down bilateral shoulders    Diabetes mellitus type II, controlled ([facility])     GERD (gastroesophageal reflux disease)     HTN (hypertension)     Hyperlipidemia     Hypothyroid     Insomnia     Lumbago     Parkinson disease ([facility])     Restless legs syndrome (RLS)     SVT (supraventricular tachycardia) ([facility])      Past Surgical History:   Procedure Laterality Date    APPENDECTOMY      CHOLECYSTECTOMY, LAPAROSCOPIC  2/24/2013    COLONOSCOPY       Adhesive tape, Cefaclor, Cephalexin, Nsaids, Penicillin g, Codeine, Diclofenac, Diclofenac sodium, Diclofenac sodium, Diclofenac sodium, Hydrochlorothiazide, Ibuprofen, Macrobid [nitrofurantoin macrocrystal], Motrin [ibuprofen micronized], Nitrofurantoin, Pcn [penicillins], Peach [prunus persica], Prednisone, Sulfa antibiotics, and Sulindac    Current Facility-Administered Medications:     bethanechol (URECHOLINE) tablet 10 mg, 10 mg, Oral, BID, KATHY Cee - CNP, 10 mg at 02/01/23 0941    [START ON 2023] naloxegol (MOVANTIK) tablet 25 mg, 25 mg, Oral, QAM AC, Timo Miner MD    bisacodyl (DULCOLAX) EC tablet 10 mg, 10 mg, Oral, Once, Timo Miner MD    milk and molasses enema 240 mL, 240 mL, Rectal, Once PRN, Timo Miner MD    acetaminophen (TYLENOL) tablet 1,000 mg, 1,000 mg, Oral, 3 times per day, Timo Miner MD    gabapentin (NEURONTIN) capsule 100 mg, 100 mg, Oral, TID, Timo Miner MD    oxyCODONE (ROXICODONE) immediate release tablet 5 mg, 5 mg, Oral, Q4H PRN **OR** oxyCODONE (ROXICODONE) immediate release tablet 10 mg, 10 mg, Oral, Q4H PRN, Timo Miner MD, 10 mg at 23 1300    methocarbamol (ROBAXIN) tablet 500 mg, 500 mg, Oral, 4x Daily PRN, Timo Mienr MD    HYDROmorphone HCl PF (DILAUDID) injection 0.25 mg, 0.25 mg, IntraVENous, Q4H PRN, Timo Miner MD    lidocaine 4 % external patch 1 patch, 1 patch, TransDERmal, Daily, Benji Kumar MD, 1 patch at 23 0941    atorvastatin (LIPITOR) tablet 80 mg, 80 mg, Oral, Nightly, Aaron Babinski, MD, 80 mg at 23    mometasone-formoterol (DULERA) 200-5 MCG/ACT inhaler 2 puff, 2 puff, Inhalation, BID, Aaron Babinski, MD, 2 puff at 23 0845    cloNIDine (CATAPRES) tablet 0.1 mg, 0.1 mg, Oral, BID, Aaron Babinski, MD, 0.1 mg at 23 08    dilTIAZem (CARDIZEM CD) extended release capsule 180 mg, 180 mg, Oral, Daily, Aaron Babinski, MD, 180 mg at 23 0827    apixaban (ELIQUIS) tablet 5 mg, 5 mg, Oral, BID, Aaron Babinski, MD, 5 mg at 23 08    losartan (COZAAR) tablet 50 mg, 50 mg, Oral, Nightly, Aaron Babinski, MD, 50 mg at 23    sodium chloride flush 0.9 % injection 5-40 mL, 5-40 mL, IntraVENous, 2 times per day, Aaron Babinski, MD, 10 mL at 23    sodium chloride flush 0.9 % injection 5-40 mL, 5-40 mL, IntraVENous, PRN, Aaron Babinski, MD    0.9 % sodium chloride infusion, , IntraVENous, PRN, Aaron Babinski, MD    ondansetron (Lucille Adkins) disintegrating tablet 4 mg, 4 mg, Oral, Q8H PRN **OR** ondansetron (ZOFRAN) injection 4 mg, 4 mg, IntraVENous, Q6H PRN, Fiona Smith MD    polyethylene glycol (GLYCOLAX) packet 17 g, 17 g, Oral, Daily PRN, Fiona Smith MD    albuterol sulfate HFA (PROVENTIL;VENTOLIN;PROAIR) 108 (90 Base) MCG/ACT inhaler 2 puff, 2 puff, Inhalation, Q4H PRN, Fiona Smith MD  Social History     Socioeconomic History    Marital status:      Spouse name: Not on file    Number of children: 7    Years of education: 9    Highest education level: Not on file   Occupational History    Occupation: retired     Comment: retired from [employer]   Tobacco Use    Smoking status: Former     Packs/day: 1.00     Years: 45.00     Pack years: 45.00     Types: Cigarettes     Start date: 9/15/1950     Quit date: 1995     Years since quittin.1    Smokeless tobacco: Never   Vaping Use    Vaping Use: Never used   Substance and Sexual Activity    Alcohol use: No     Alcohol/week: 0.0 standard drinks    Drug use: No    Sexual activity: Not Currently   Other Topics Concern    Not on file   Social History Narrative    Not on file     Social Determinants of Health     Financial Resource Strain: Not on file   Food Insecurity: Not on file   Transportation Needs: Not on file   Physical Activity: Not on file   Stress: Not on file   Social Connections: Not on file   Intimate Partner Violence: Not on file   Housing Stability: Not on file     Family History   Problem Relation Age of Onset    High Blood Pressure Mother     Heart Attack Father     Heart Disease Father     Tuberculosis Brother     Asthma Paternal Uncle        ROS: Complete 10 point ROS was obtained with positives in HPI, otherwise:  Pt denies fevers, denies chills. Pt denies chest pain, denies SOB, denies nausea, denies vomiting, denies headache.       PHYSICAL EXAMINATION:  BP (!) 128/50   Pulse 63   Temp 97.3 °F (36.3 °C) (Temporal)   Resp 20   Ht 5' 2\" (1.575 m)   Wt 156 lb 14.4 oz (71.2 kg)   SpO2 96%   BMI 28.70 kg/m²   GENERAL:  alert slightly drowsy from pain medicine but able to answer questions appropriately   HEENT:  sclera clear and neck supple  NEUROLOGIC:  Awake, alert, oriented to name, place and time. Cranial nerves II-XII are grossly intact. Motor is 5 out of 5 bilaterally. Patient sitting up in chair. Straight leg raise negative bilaterally. Reflexes decreased bilaterally. Gait deferred at this time as she is too drowsy.     LABORATORY DATA:   CBC with Differential:    Lab Results   Component Value Date/Time    WBC 6.9 02/01/2023 04:31 AM    RBC 3.79 02/01/2023 04:31 AM    HGB 10.7 02/01/2023 04:31 AM    HCT 32.5 02/01/2023 04:31 AM     02/01/2023 04:31 AM    MCV 85.8 02/01/2023 04:31 AM    MCH 28.2 02/01/2023 04:31 AM    MCHC 32.9 02/01/2023 04:31 AM    RDW 16.0 02/01/2023 04:31 AM    SEGSPCT 73.1 02/21/2013 01:48 PM    BANDSPCT 6 02/27/2021 05:50 AM    METASPCT 3 02/15/2020 03:30 PM    LYMPHOPCT 22.4 02/01/2023 04:31 AM    MONOPCT 7.9 02/01/2023 04:31 AM    BASOPCT 0.7 02/01/2023 04:31 AM    MONOSABS 0.5 02/01/2023 04:31 AM    LYMPHSABS 1.5 02/01/2023 04:31 AM    EOSABS 0.3 02/01/2023 04:31 AM    BASOSABS 0.0 02/01/2023 04:31 AM    DIFFTYPE Auto 02/21/2013 01:48 PM     CMP:    Lab Results   Component Value Date/Time     02/01/2023 04:31 AM    K 4.1 02/01/2023 04:31 AM     02/01/2023 04:31 AM    CO2 26 02/01/2023 04:31 AM    BUN 22 02/01/2023 04:31 AM    CREATININE 1.0 02/01/2023 04:31 AM    GFRAA >60 10/04/2022 04:22 AM    GFRAA >60 02/27/2013 05:18 AM    AGRATIO 1.3 01/31/2023 07:23 AM    LABGLOM 54 02/01/2023 04:31 AM    GLUCOSE 115 02/01/2023 04:31 AM    PROT 8.2 01/31/2023 07:23 AM    PROT 6.9 02/21/2013 01:48 PM    LABALBU 4.6 01/31/2023 07:23 AM    CALCIUM 9.8 02/01/2023 04:31 AM    BILITOT 0.5 01/31/2023 07:23 AM    ALKPHOS 157 01/31/2023 07:23 AM    AST 21 01/31/2023 07:23 AM    ALT 22 01/31/2023 07:23 AM     PT/INR:    Lab Results   Component Value Date/Time    PROTIME 20.0 09/30/2022 04:40 PM    INR 1.71 09/30/2022 04:40 PM     PTT:    Lab Results   Component Value Date/Time    APTT 48.4 09/30/2022 04:40 PM   [APTT}  U/A:    Lab Results   Component Value Date/Time    NITRITE Trace 01/15/2021 02:53 PM    COLORU Yellow 01/31/2023 07:23 AM    PROTEINU 100 01/31/2023 07:23 AM    PHUR 7.5 01/31/2023 07:23 AM    LABCAST 1-3 Hyaline 07/18/2019 03:50 PM    WBCUA 0 01/31/2023 07:23 AM    RBCUA 1 01/31/2023 07:23 AM    YEAST 0 08/11/2020 03:47 PM    BACTERIA None Seen 01/31/2023 07:23 AM    CLARITYU Clear 01/31/2023 07:23 AM    SPECGRAV 1.010 01/31/2023 07:23 AM    LEUKOCYTESUR Negative 01/31/2023 07:23 AM    UROBILINOGEN 0.2 01/31/2023 07:23 AM    BILIRUBINUR Negative 01/31/2023 07:23 AM    BILIRUBINUR Negative 01/15/2021 02:53 PM    BLOODU TRACE 01/31/2023 07:23 AM    GLUCOSEU Negative 01/31/2023 07:23 AM        IMAGING STUDIES:   MRI of the Lumbar-Sacral Spine:  Date: 1/27/23; Result:   FINDINGS:   BONES/ALIGNMENT: There is transitional spine anatomy. The last rib-bearing   vertebra is T12. The S1 vertebra is lumbarized and there is a fully formed   disc space at S1-S2. For reference the L5-S1 disc space is located on series   6, image 25. Minimal retrolisthesis of L1 on L2. Grade 1 anterolisthesis of L5 on S1. Vertebral heights are unchanged. Bone marrow signal is benign. SPINAL CORD: The conus terminates normally. SOFT TISSUES: No paraspinal mass identified. L1-L2: Disc bulge causes mild thecal sac and mild bilateral foraminal   stenosis. L2-L3: Disc bulge causes mild thecal sac and mild bilateral foraminal   stenosis. Baastrup disease. L3-L4: Mild facet hypertrophy causes mild left foraminal stenosis. Baastrup   disease. L4-L5: Facet ligament flavum hypertrophy without stenosis. Baastrup disease.        L5-S1: Disc height loss and facet hypertrophy cause mild bilateral foraminal stenosis. Impression   Baastrup disease and facet hypertrophy. Only mild degenerative disc disease. Transitional spine anatomy. IMPRESSION/PLAN:  Principal Problem:    Intractable back pain   Patient with complaints of intractable back pain which is now better controlled with the oxycodone rather than the hydrocodone that she was discharged home with. There are no red flags on exam.  Motor strength is symmetric. Lumbar MRI shows transitional spine anatomy. She does have a mild L5-S1 spondylolisthesis. Back pain is multifactorial in etiology. No emergent neurosurgical intervention indicated. Agree with medication management and outpatient follow-up to pain specialist for palliative injections. The patient's symptoms, exam, testing and plan of care have been discussed with Dr. Mary Zapien. We will sign off. Thank you again for this consultation.     Sloan Aguila, KATHY - CNP  2/1/2023

## 2023-02-01 NOTE — PROGRESS NOTES
[facility]   Phone: [phone]    Physical Therapy    [x] Initial Evaluation            [] Daily Treatment Note         [] Discharge Summary      Patient: Kem Rao   : 1935   MRN: [id number]   Date of Service:  2023  Admitting Diagnosis: Intractable back pain  Current Admission Summary: This is a 80 y.o. female who presents to the ED for back pain. Right lower back. Radiates towards right hip. Does not shoot down the leg. This is the same back pain that she was having when she was in the hospital this past week that she get a CT scan/MRI for. It is been refractory to Vicodin that she took this morning. Did not use the pain patch that was prescribed to her because she did not know whether or not she was allergic  MRI 23 (previous admission): Baastrup disease and facet hypertrophy    Past Medical History:  has a past medical history of Arthritis, Atrial fibrillation ([address] Bursitis, Diabetes mellitus type II, controlled ([address] GERD (gastroesophageal reflux disease), HTN (hypertension), Hyperlipidemia, Hypothyroid, Insomnia, Lumbago, Parkinson disease ([address] Restless legs syndrome (RLS), and SVT (supraventricular tachycardia) ([address] Past Surgical History:  has a past surgical history that includes Appendectomy; Colonoscopy; and Cholecystectomy, laparoscopic (2013). Discharge Recommendations: Kem Rao scored a 15/24 on the AM-PAC short mobility form. Current research shows that an AM-PAC score of 17 or less is typically not associated with a discharge to the patient's home setting. Based on the patient's AM-PAC score and their current functional mobility deficits, it is recommended that the patient have 3-5 sessions per week of Physical Therapy at d/c to increase the patient's independence. Please see assessment section for further patient specific details.     If patient discharges prior to next session this note will serve as a discharge summary. Please see below for the latest assessment towards goals. DME Required For Discharge: DME to be determined at next level of care  Precautions/Restrictions: high fall risk  Weight Bearing Restrictions: no restrictions  Required Braces/Orthotics: no braces required  Positional Restrictions:no positional restrictions, nursing approval obtained prior to evaluation    Pre-Admission Information   Lives With: daughter               Type of Home: house, bilevel  Home Layout: two level, bedroom/bathroom upstairs, 7 stairs to 2nd level with B HR  Home Access:  1 step to enter without rails  (has post to hold onto)  Bathroom Layout: walk in shower  Bathroom Equipment: grab bars in shower, shower chair  Toilet Height: standard height  Home Equipment: rollator - 4 wheeled walker, oxygen, (2L/min O2), transport chair  Transfer Assistance: Independent without use of device  Ambulation Assistance:Independent without use of device  ADL Assistance: independent with all ADL's  IADL Assistance: requires assistance with all homemaking tasks  Active :        [] Yes                 [x] No   (Dtr drives pt)  Current Employment: retired. Occupation: [employer] years  Hobbies: watch reality TV  Recent Falls: no falls    Examination   Vision:   Vision Gross Assessment: Impaired, Vision Corrective Device: wears glasses at all times, and Visual History: macular degeneration  Hearing:   hard of hearing, left hearing aid, right hearing aid  Observation:   Tony in place, bruising noted to BUEs, small skin tear on LUE above elbow with band-aid over. Removed per patient request and meplilex placed. Posture:   Rounded shoulders, flexed posture throughout    ROM:   (B) LE AROM WFL  Strength:   (B) LE strength grossly WFL  Therapist Clinical Decision Making (Complexity): medium complexity  Clinical Presentation: evolving      Subjective  General: Supine in bed upon arrival with alarm on.  Agreeable to therapy. Pain: 9/10. Location: low back, R groin  Pain Interventions: RN notified of patient request for pain medication, ice applied, and repositioned        Functional Mobility  Bed Mobility  Supine to Sit: stand by assistance  Scooting: stand by assistance  Comments:  Transfers  Sit to stand transfer: contact guard assistance  Stand to sit transfer: contact guard assistance  Stand step transfer: contact guard assistance, with RW, flexed posture, small shuffling steps  Comments: Reporting, \"I feel so much better in the chair! \" Reported feeling \"woozy\" following pain medication and mobility. /53  Ambulation  Ambulation not tested on this date secondary to increased pain. Distance:  Gait Mechanics:   Comments:    Stair Mobility  Stair mobility not completed on this date.   Comments:    Balance  Static Sitting Balance: fair (+): maintains balance at SBA/supervision without use of UE support  Dynamic Sitting Balance: fair (+): maintains balance at SBA/supervision without use of UE support  Static Standing Balance: fair (-): maintains balance at CGA with use of UE support  Dynamic Standing Balance: fair (-): maintains balance at CGA with use of UE support  Comments:    Other Therapeutic Interventions    Functional Outcomes  AM-PAC Inpatient Mobility Raw Score : 15              Cognition  WFL  Memory: decreased short term memory (ie forgot she had taken her pain medication at beginning of session)  Orientation:    alert and oriented x 4  Command Following:   [facility]    Education  Barriers To Learning: visual and hearing  Patient Education: patient educated on goals, PT role and benefits, plan of care, general safety, functional mobility training, transfer training, discharge recommendations  Learning Assessment:  patient verbalizes understanding, would benefit from continued reinforcement    Assessment  Activity Tolerance: limited by fear of pain, report of feeling \"woozy\" following transfer  Impairments Requiring Therapeutic Intervention: decreased functional mobility, decreased strength, decreased safety awareness, decreased endurance, decreased balance  Prognosis: fair  Clinical Assessment: Patient not at baseline function and would benefit from skilled PT to address above deficits and facilitate return to baseline function. Mobility currently at [facility] and unable to ambulate household distances due to fear of pain and feeling \"woozy\" following pain medication and mobility.     Safety Interventions: patient left in chair, chair alarm in place, call light within reach, patient at risk for falls, and nurse notified    Plan  Frequency: 3-5 x/per week  Current Treatment Recommendations: strengthening, balance training, functional mobility training, transfer training, gait training, stair training, endurance training, home exercise program, and safety education    Goals  Patient Goals: did not state   Short Term Goals:  Time Frame: discharge  Patient will complete bed mobility at supervision   Patient will complete transfers at supervision   Patient will ambulate 50 ft with use of rolling walker at contact guard assistance  Patient will ascend/descend 4 stairs with (R) ascending handrail at minimal assistance    Therapy Session Time      Individual Group Co-treatment   Time In     1254   Time Out     1334   Minutes     40     Timed Code Treatment Minutes:  25 Minutes  Total Treatment Minutes:  40       Electronically Signed By: Zuhair Perry, PT        Thanks, Zuhair Perry PT, DPT [id number]

## 2023-02-01 NOTE — CARE COORDINATION
02/01/23 1533   Readmission Assessment   Number of Days since last admission? 1-7 days   Previous Disposition Home with Family   Who is being Interviewed Patient   What was the patient's/caregiver's perception as to why they think they needed to return back to the hospital? Other (Comment)  (Was in a lot of pain.)   Did you visit your Primary Care Physician after you left the hospital, before you returned this time? No   Why weren't you able to visit your PCP? Did not have an appointment   Did you see a specialist, such as Cardiac, Pulmonary, Orthopedic Physician, etc. after you left the hospital? No   Who advised the patient to return to the hospital? Self-referral   Does the patient report anything that got in the way of taking their medications? No   In our efforts to provide the best possible care to you and others like you, can you think of anything that we could have done to help you after you left the hospital the first time, so that you might not have needed to return so soon?  Other (Comment)  (Nothing hospital staff could have done.)

## 2023-02-01 NOTE — ACP (ADVANCE CARE PLANNING)
Advance Care Planning   Healthcare Decision Maker:    Primary Decision Maker: Jorgito Lynch Child - [phone]    Primary Decision Maker: Angel Amador - Child - [phone]    Primary Decision Maker: Jasmin Davina - Child - [phone]    Click here to complete Healthcare Decision Makers including selection of the Healthcare Decision Maker Relationship (ie \"Primary\"). Today we documented Decision Maker(s) consistent with Legal Next of Kin hierarchy.

## 2023-02-01 NOTE — CARE COORDINATION
BISHOP/EMANUEL completed re-admission assessment with patient. Patient stated that she just had pain medication and does not feel like answering any more questions. Patient states that if she needs to go to a SNF that she prefers [facility]. BISHOP/EMANUEL will continue to follow.     Electronically signed by KIET Mckeon on 2/1/2023 at 3:41 PM

## 2023-02-01 NOTE — CONSULTS
Urology Consult Note  [facility]     Patient: Ester Chopra MRN: [id number]  Room/Bed: H0M-6529/8709-32   YOB: 1935  Age/Sex: 80 y. o.female  Admission Date: 1/31/2023     Date of Service:  2/1/2023    Consulting Provider: KATHY Cooper CNP  Admitting/Requesting Physician: Ananias Lombard, MD  Primary Care Physician: Montserrat Worthington MD    Reason for Consult: Incomplete bladder emptying    ASSESSMENT/PLAN     81 yo female   Admitted with intractable back pain  Bladder distension on CT, no hydro  Renal function preserved  Reports voiding at baseline      Recommendations:  Urecholine BID  OOB as tolerated  Monitor for retention, str cath residuals >400cc's  Urology will follow, call with any questions    All the patients questions were answered. She understands the plan as listed above. HISTORY     Chief Complaint:   Chief Complaint   Patient presents with    Back Pain     Pt brought in from home by [facility]. Pt c/o chronic back pain that is getting worse, Pain increased yesterday evening. Pain radiates to pelvic area. Denies fall or injury. PT states MRI showed arthritis. History of Present Illness: Ester Chopra is a 80 y.o. female with incomplete bladder emptying. Onset of symptoms was days ago with improving course since that time. Symptoms are aggravated by debility. Symptoms improved with urecholine. Associated symptoms include incomplete bladder emptying. Patient also reports hx of oab. She has tried the following treatments: urecholine. Past Medical History:  She has a past medical history of Arthritis, Atrial fibrillation ([address] Bursitis, Diabetes mellitus type II, controlled ([address] GERD (gastroesophageal reflux disease), HTN (hypertension), Hyperlipidemia, Hypothyroid, Insomnia, Lumbago, Parkinson disease ([address] Restless legs syndrome (RLS), and SVT (supraventricular tachycardia) ([address]      Past Surgical History:  She has a past surgical history that includes Appendectomy; Colonoscopy; and Cholecystectomy, laparoscopic (2/24/2013). Allergies: Allergies   Allergen Reactions    Adhesive Tape Anaphylaxis    Cefaclor Nausea And Vomiting     Other reaction(s): Chest pain  Other reaction(s): Chest pain, Nausea / Vomiting    Cephalexin Other (See Comments)     Struggling to breath, almost passing out/ very low oxygen and pulse    Nsaids      Pt states she has hives and heart races   Other reaction(s): Tachycardia  Other reaction(s): Hives / Skin Rash, Tachycardia    Penicillin G      Other reaction(s): Hives / Skin Rash    Codeine      FEEL NUMB  Other reaction(s): feels numb    Diclofenac      Other reaction(s): Hives    Diclofenac Sodium Hives    Diclofenac Sodium Hives    Diclofenac Sodium     Hydrochlorothiazide      Sob  Other reaction(s): sob    Ibuprofen Other (See Comments)     Other reaction(s): Tachycardia  Other reaction(s): Tachycardia    Macrobid [Nitrofurantoin Macrocrystal]      nausea    Motrin [Ibuprofen Micronized] Other (See Comments)     Tachycardia      Nitrofurantoin      Other reaction(s): nausea H&P  Other reaction(s): nausea H&P    Pcn [Penicillins] Hives    Peach [Prunus Persica] Itching and Swelling     Cannot eat raw peaches, but can eat canned peaches    Prednisone      Causes elevated blood pressure   Other reaction(s): Elevated high BP  H&P    Sulfa Antibiotics      Nausea, diarrhea  Other reaction(s): Nausea/Diarrhea H&P    Sulindac Other (See Comments)     Stomach pain  Other reaction(s): Stomach Pain        Social History:  She reports that she quit smoking about 27 years ago. Her smoking use included cigarettes. She started smoking about 72 years ago. She has a 45.00 pack-year smoking history. She has never used smokeless tobacco. She reports that she does not drink alcohol and does not use drugs. Family History:  family history includes Asthma in her paternal uncle;  Heart Attack in her father; Heart Disease in her father; High Blood Pressure in her mother; Tuberculosis in her brother. Medications:  Scheduled Meds:   bethanechol  10 mg Oral BID    lidocaine  1 patch TransDERmal Daily    atorvastatin  80 mg Oral Nightly    mometasone-formoterol  2 puff Inhalation BID    cloNIDine  0.1 mg Oral BID    dilTIAZem  180 mg Oral Daily    apixaban  5 mg Oral BID    losartan  50 mg Oral Nightly    sodium chloride flush  5-40 mL IntraVENous 2 times per day     Continuous Infusions:   sodium chloride       PRN Meds:sodium chloride flush, sodium chloride, ondansetron **OR** ondansetron, polyethylene glycol, acetaminophen **OR** acetaminophen, morphine, albuterol sulfate HFA    Review of Systems:  Pertinent positives/negatives reviewed in HPI. All other systems reviewed and negative, unless noted below. Constitutional: Negative  Genitourinary: Negative  HEENT: Negative   Cardiovascular: Negative   Respiratory: Negative   Gastrointestinal: Negative   Musculoskeletal: Negative   Neurological: Negative   Psychiatric: Negative   Integumentary: Negative     PHYSICAL EXAM     Vitals:    02/01/23 0800   BP: (!) 128/50   Pulse: 63   Resp: 15   Temp: 97.3 °F (36.3 °C)   SpO2: 94%     Constitutional: NAD, well-developed, well-nourished. HEENT: MMM. Hearing intact. Neck: no thyroid masses appreciated. Trachea is midline. Neck appears unremarkable. Lymph: no palpable adenopathy in supraclavicular, or axillary lymph nodes. Cardiovascular: Regular rate. No peripheral edema. ABDOMEN: Abdomen soft, non-tender, non-distended. No enlarged liver or spleen. No hernias noted. Stool occult blood not indicated. Respiratory: Respirations are even and non-labored. No audible breath sounds. Genitourinary: no CVAT, pelvic deferred. Psychiatric: A + O x 3, normal affect. Insight appears intact. Muskuloskeletal: EMILEE x 4  Skin: Pink, warm and dry. No rashes on face and arms.       Intake/Output Summary (Last 24 hours) at 2/1/2023 0827  Last data filed at 2/1/2023 0400  Gross per 24 hour   Intake 860 ml   Output 1370 ml   Net -510 ml       LABS     CBC:   Lab Results   Component Value Date/Time    WBC 6.9 02/01/2023 04:31 AM    RBC 3.79 02/01/2023 04:31 AM    HGB 10.7 02/01/2023 04:31 AM    HCT 32.5 02/01/2023 04:31 AM    MCV 85.8 02/01/2023 04:31 AM    MCH 28.2 02/01/2023 04:31 AM    MCHC 32.9 02/01/2023 04:31 AM    RDW 16.0 02/01/2023 04:31 AM     02/01/2023 04:31 AM    MPV 7.8 02/01/2023 04:31 AM     BMP:   Lab Results   Component Value Date/Time     02/01/2023 04:31 AM    K 4.1 02/01/2023 04:31 AM     02/01/2023 04:31 AM    CO2 26 02/01/2023 04:31 AM    BUN 22 02/01/2023 04:31 AM    CREATININE 1.0 02/01/2023 04:31 AM    GLUCOSE 115 02/01/2023 04:31 AM    CALCIUM 9.8 02/01/2023 04:31 AM     Urinalysis:   Lab Results   Component Value Date/Time    COLORU Yellow 01/31/2023 07:23 AM    GLUCOSEU Negative 01/31/2023 07:23 AM    BLOODU TRACE 01/31/2023 07:23 AM    NITRU Negative 01/31/2023 07:23 AM    LEUKOCYTESUR Negative 01/31/2023 07:23 AM     Urine culture: No results for input(s): LABURIN in the last 72 hours. IMAGING     MRI LUMBAR SPINE WO CONTRAST    Result Date: 1/30/2023  EXAMINATION: MRI OF THE LUMBAR SPINE WITHOUT CONTRAST, 1/27/2023 3:07 pm TECHNIQUE: Multiplanar multisequence MRI of the lumbar spine was performed without the administration of intravenous contrast. COMPARISON: CT from 01/26/2023 HISTORY: ORDERING SYSTEM PROVIDED HISTORY: Back pain TECHNOLOGIST PROVIDED HISTORY: Reason for exam:->Back pain Reason for Exam: Back pain FINDINGS: BONES/ALIGNMENT: There is transitional spine anatomy. The last rib-bearing vertebra is T12. The S1 vertebra is lumbarized and there is a fully formed disc space at S1-S2. For reference the L5-S1 disc space is located on series 6, image 25. Minimal retrolisthesis of L1 on L2. Grade 1 anterolisthesis of L5 on S1. Vertebral heights are unchanged.   Bone marrow signal is benign. SPINAL CORD: The conus terminates normally. SOFT TISSUES: No paraspinal mass identified. L1-L2: Disc bulge causes mild thecal sac and mild bilateral foraminal stenosis. L2-L3: Disc bulge causes mild thecal sac and mild bilateral foraminal stenosis. Baastrup disease. L3-L4: Mild facet hypertrophy causes mild left foraminal stenosis. Baastrup disease. L4-L5: Facet ligament flavum hypertrophy without stenosis. Baastrup disease. L5-S1: Disc height loss and facet hypertrophy cause mild bilateral foraminal stenosis. Baastrup disease and facet hypertrophy. Only mild degenerative disc disease. Transitional spine anatomy. CT ABDOMEN PELVIS W IV CONTRAST Additional Contrast? None    Result Date: 1/31/2023  EXAMINATION: CT OF THE ABDOMEN AND PELVIS WITH CONTRAST 1/31/2023 7:56 am TECHNIQUE: CT of the abdomen and pelvis was performed with the administration of intravenous contrast. Multiplanar reformatted images are provided for review. Automated exposure control, iterative reconstruction, and/or weight based adjustment of the mA/kV was utilized to reduce the radiation dose to as low as reasonably achievable. COMPARISON: CT images dated 01/26/2023 HISTORY: ORDERING SYSTEM PROVIDED HISTORY: right lower back pain radiating to right pelvic area TECHNOLOGIST PROVIDED HISTORY: Reason for exam:->right lower back pain radiating to right pelvic area Additional Contrast?->None Decision Support Exception - unselect if not a suspected or confirmed emergency medical condition->Emergency Medical Condition (MA) Reason for Exam: right lower back pain radiating to right pelvic area FINDINGS: Lower chest: Interval resolution of left lower lobe consolidation with no new focal consolidations identified. Liver: No suspicious hepatic lesions are present. Biliary system/Gallbladder: Mild intrahepatic biliary ductal dilation, likely secondary to cholecystectomy. The gallbladder is surgically absent.  Spleen: Unchanged appearance with tiny focus of low attenuation along the anterior spleen, likely reflects a splenic cyst or hemangioma. Pancreas: No evidence of pancreatic lesions or pancreatic ductal dilation. Adrenals: No suspicious adrenal nodules are present. Kidneys/Bladder: No evidence of hydronephrosis, nephrolithiasis, or suspicious mass lesion. The previously described complex renal cyst arising from the superior pole of the left kidney is not well visualized in today's examination, likely secondary to motion artifact. The urinary bladder is severely distended. Pelvic organs: The uterus is present. No suspicious adnexal masses are evident. GI tract: The stomach is normal in appearance. The intestines demonstrate no evidence of focal thickening or obstruction. The appendix is not well visualized, however there are no secondary signs to suggest appendicitis. A large colonic stool burden is present. Peritoneum: No evidence of ascites or peritoneal nodularity. Lymph nodes: No evidence of suspicious lymphadenopathy. Vasculature: A significant burden of vascular calcifications are present along the abdominal aorta without aneurysmal dilation. Atherosclerotic disease is noted at the origins of the abdominal aorta branch vessels. Soft Tissues/Bones: No acute soft tissue abnormalities are evident. The bones are diffusely demineralized, which somewhat limits evaluation. No acute fracture or suspicious osseous lesion is identified. Multilevel facet arthrosis is present, most significant along the lumbosacral junction. Severely distended urinary bladder and large colonic stool burden. Interval resolution of left lower lobe consolidation.      CT ABDOMEN PELVIS W IV CONTRAST Additional Contrast? None    Result Date: 1/26/2023  EXAMINATION: CT OF THE ABDOMEN AND PELVIS WITH CONTRAST 1/26/2023 10:15 am TECHNIQUE: CT of the abdomen and pelvis was performed with the administration of intravenous contrast. Multiplanar reformatted images are provided for review. Automated exposure control, iterative reconstruction, and/or weight based adjustment of the mA/kV was utilized to reduce the radiation dose to as low as reasonably achievable. COMPARISON: None. HISTORY: ORDERING SYSTEM PROVIDED HISTORY: flank pain - rlq abd paain - ro pyelo TECHNOLOGIST PROVIDED HISTORY: Reason for exam:->flank pain - rlq abd paain - ro pyelo Additional Contrast?->None Decision Support Exception - unselect if not a suspected or confirmed emergency medical condition->Emergency Medical Condition (MA) Reason for Exam: flank pain - rlq abd paain - ro pyelo FINDINGS: Lower Chest: Motion artifact degrades image quality. There is bibasilar scarring. Emphysema involves the bilateral lungs. Airspace disease in the left lower lobe is concerning for developing pneumonia. No change in the 8 mm solid right lower lobe pulmonary nodule, no follow-up imaging is recommended. Coronary artery calcifications are a marker of atherosclerosis. Organs: The liver, spleen, pancreas, adrenal glands and right kidney are unremarkable. Status post cholecystectomy with mild intrahepatic ductal dilatation. However, there is a new 1.0 x 1.0 cm complex left upper pole renal cyst. GI/Bowel: There is no bowel obstruction. Status post appendectomy. Scattered diverticula involve the sigmoid colon without adjacent inflammation. Pelvis: The pelvic viscera are within normal limits. Peritoneum/Retroperitoneum: There is no evidence of free fluid or adenopathy. Significant atherosclerosis involves the abdominal aorta and major branch vessels. Bones/Soft Tissues: Degenerative changes involve the thoracolumbar spine and bilateral hips. The bones are demineralized. 1. New left basilar airspace disease concerning for developing pneumonia. Recommend chest radiograph in 8 weeks to confirm resolution.  2. New 1.0 cm complex left upper pole renal cyst.  Recommend nonemergent MRI of the abdomen with without contrast using renal mass protocol. XR CHEST PORTABLE    Result Date: 1/26/2023  EXAMINATION: ONE XRAY VIEW OF THE CHEST 1/26/2023 9:51 am COMPARISON: Chest x-ray dated 30 September 2022 HISTORY: ORDERING SYSTEM PROVIDED HISTORY: cough TECHNOLOGIST PROVIDED HISTORY: Reason for exam:->cough Reason for Exam: cough FINDINGS: No acute airspace infiltrate. No pneumothorax or pleural effusion. Stable cardiomediastinal silhouette     No acute cardiopulmonary findings.             Electronically signed by: KATHY De Jesus CNP, 2/1/2023   [facility]  Office contact: [phone]

## 2023-02-01 NOTE — CARE COORDINATION
[facility] home care referral. Spoke with pt and re: home care plan of care/services. Agreeable. Demographic's verified. Will follow for home care.

## 2023-02-01 NOTE — PROGRESS NOTES
[address] [facility],[address] Msb Department   Phone: [phone]    Occupational Therapy    [x] Initial Evaluation            [] Daily Treatment Note         [] Discharge Summary      Patient: Lai Viramontes   : 1935   MRN: [id number]   Date of Service:  2023    Admitting Diagnosis:  Intractable back pain  Current Admission Summary: Lai Viramontes is a 80 y.o. female with incomplete bladder emptying. Onset of symptoms was days ago with improving course since that time. Symptoms are aggravated by debility. Symptoms improved with urecholine. Associated symptoms include incomplete bladder emptying. Patient also reports hx of oab. She has tried the following treatments: urecholine. Past Medical History:  has a past medical history of Arthritis, Atrial fibrillation ([address] Bursitis, Diabetes mellitus type II, controlled ([address] GERD (gastroesophageal reflux disease), HTN (hypertension), Hyperlipidemia, Hypothyroid, Insomnia, Lumbago, Parkinson disease ([address] Restless legs syndrome (RLS), and SVT (supraventricular tachycardia) ([address] Past Surgical History:  has a past surgical history that includes Appendectomy; Colonoscopy; and Cholecystectomy, laparoscopic (2013). Discharge Recommendations: Lai Viramontes scored a 13/24 on the AM-PAC ADL Inpatient form. Current research shows that an AM-PAC score of 17 or less is typically not associated with a discharge to the patient's home setting. Based on the patient's AM-PAC score and their current ADL deficits, it is recommended that the patient have 3-5 sessions per week of Occupational Therapy at d/c to increase the patient's independence. Please see assessment section for further patient specific details. If patient refuses, home health would be beneficial for return to OF.       HOME HEALTH CARE: LEVEL 3 SAFETY     - Initial home health evaluation to occur within 24-48 hours, in patient home   - Therapy evaluations in home within 24-48 hours of discharge; including DME and home safety   - Frontload therapy 5 days, then 3x a week   - Therapy to evaluate if patient has [address] needs for personal care   -  evaluation within 24-48 hours, includes evaluation of resources and insurance to determine [place], [place], LTC, and Medicaid options        If patient discharges prior to next session this note will serve as a discharge summary. Please see below for the latest assessment towards goals. DME Required For Discharge: DME to be determined pending patient progress    Precautions/Restrictions: high fall risk  Weight Bearing Restrictions: no restrictions  [] Right Upper Extremity  [] Left Upper Extremity [] Right Lower Extremity  [] Left Lower Extremity     Required Braces/Orthotics: no braces required   [] Right  [] Left  Positional Restrictions:no positional restrictions    Pre-Admission Information   Lives With: daughter               Type of Home: house, bilevel  Home Layout: two level, bedroom/bathroom upstairs, 7 stairs to 2nd level with B HR  Home Access:  1 step to enter without rails  (has post to hold onto)  Bathroom Layout: walk in shower  Bathroom Equipment: grab bars in shower, shower chair  Toilet Height: standard height  Home Equipment: rollator - 4 wheeled walker, oxygen, (2L/min O2), transport chair  Transfer Assistance: Independent without use of device  Ambulation Assistance:Independent without use of device  ADL Assistance: independent with all ADL's  IADL Assistance: requires assistance with all homemaking tasks  Active :        [] Yes                 [x] No   (Dtr drives pt)  Current Employment: retired.   Occupation: [employer] 30 years  Hobbies: watch reality TV  Recent Falls: no falls    Examination   Vision:   Vision Gross Assessment: Impaired, Vision Corrective Device: wears glasses at all times, and Visual History: macular degeneration  Hearing:   hard of hearing, left hearing aid, right hearing aid  Perception:   WFL  Observation:   General Observation:  2L O2  Posture:   Fair  Sensation:   denies numbness and tingling  Proprioception:    WFL  Tone:   Normotonic  Coordination Testing:   Coordination and Movement Description: (R) UE, (L) UE, fine motor impairments, gross motor impairments, decreased speed, decreased accuracy    ROM:   (B) UE AROM WFL  Strength:   (B) UE strength grossly WFL    Therapist Clinical Decision Making (Complexity): medium complexity  Clinical Presentation: evolving      Subjective  General: Patient supine in bed upon arrival, agreeable to therapy evaluation, encouragement needed for evaluation   Pain: 9/10. Location: lower back  Pain Interventions: RN notified, RN notified of patient request for pain medication, ice applied, and repositioned         Activities of Daily Living  Basic Activities of Daily Living  Upper Extremity Bathing: stand by assistance  Bathing Equipment: none  Bathing Comments: SBA for washing chest, arms, face seated EOB   Lower Extremity Dressing: maximum assistance requires verbal cueing Increased time to complete task  Dressing Equipment: none  Dressing Comments: maxA for donning/doffing socks  Toileting Comments: Patient with catheter  General Comments: Patient declined any further ADLs due to increased pain. Instrumental Activities of Daily Living  No IADL completed on this date. Functional Mobility  Bed Mobility  Supine to Sit: stand by assistance  Scooting: stand by assistance  Comments: Patient with HOB slightly elevated and with use of side rails, patient with increased time needed due to slowed movements and fearfulness due to increased pain.     Transfers  Sit to stand transfer:contact guard assistance  Stand to sit transfer: contact guard assistance  Stand step transfer: contact guard assistance  Comments: Patient with ~2-3 steps from bed to recliner with use of RW for support, patient with minimal verbal and physical cueing needed for hand placement, spatial awareness, and initiation for increased safety with functional transfers. Patient reported pain is better seated in recliner at end of session. Functional Mobility:  Sitting Balance: stand by assistance. Sitting Balance Comment: SBA for sitting EOB  Standing Balance: contact guard assistance.     Standing Balance Comment: functional transfers    Other Therapeutic Interventions    Functional Outcomes  AM-PAC Inpatient Daily Activity Raw Score: 13    Cognition  Overall Cognitive Status: Impaired  Attention Span: attends with cues to redirect, difficulty attending to directions, difficulty dividing attention  Memory: decreased short term memory  Safety Judgement: decreased awareness of need for assistance, decreased awareness of need for safety  Insights: decreased awareness of deficits  Comments: Patient with increased pain 9/10 and decreased vision, forgetting she had taken medications and what she had just finished doing   Orientation:    alert and oriented x 4  Command Following:   accurately follows one step commands     Education  Barriers To Learning: cognition, visual, and hearing  Patient Education: patient educated on goals, OT role and benefits, plan of care, precautions, ADL adaptive strategies, energy conservation, low vision education, disease specific education, transfer training, discharge recommendations  Learning Assessment:  patient verbalizes understanding, would benefit from continued reinforcement    Assessment  Activity Tolerance: Patient limited due to increased pain 9/10   Impairments Requiring Therapeutic Intervention: decreased functional mobility, decreased ADL status, decreased safety awareness, decreased cognition, decreased endurance, decreased balance, decreased vision, decreased IADL, decreased coordination, increased pain, decreased posture  Prognosis: fair and guarded  Clinical Assessment: Patient presents with the above deficits impacting occupational performance and functioning below baseline, skilled OT services needed to address deficits to facilitate safe return to PLOF.     Safety Interventions: patient left in chair, chair alarm in place, call light within reach, gait belt, patient at risk for falls, and nurse notified    Plan  Frequency: 3-5 x/per week  Current Treatment Recommendations: balance training, functional mobility training, transfer training, endurance training, patient/caregiver education, ADL/self-care training, IADL training, pain management, home exercise program, safety education, and positioning    Goals  Patient Goals: Patient wants no back pain    Short Term Goals:  Time Frame: discharge   Patient will complete upper body ADL at stand by assistance   Patient will complete lower body ADL at stand by assistance   Patient will complete toileting at stand by assistance   Patient will complete functional transfers at stand by assistance   Patient will complete functional mobility at stand by assistance     Therapy Session Time     Individual Group Co-treatment   Time In    1254   Time Out    1334   Minutes    40        Timed Code Treatment Minutes:   25 minutes    Total Treatment Minutes:  40 minutes        Electronically Signed By: ROSS Chahal/VENESSA [id number]

## 2023-02-01 NOTE — PROGRESS NOTES
Pt A&Ox4. SR. SpO2 98% on 2L NC. Resp are even&unlabored. /73 at start of shift, HS Cozaar admin. PRN Morphine admin for 8/10 chronic lower back pain. BP down to 147/68 c rate of 71. Pt's pain 3/10 after 2nd PRN Morphine dose, see MAR. Denies SOB/dizziness. [place] patent&draining. No BM noted but pt passing gas. Meghan& [place] care provided. Denture care provided/dentures placed in green cup at bedside per pt request. Call light&belongings c in reach. Fall precautions in placed. Lights dimmed for comfort. Pt's daughter Moustapha Mathur" called for updates. All questioned answered. Pt continues to have 8/10 back pain, PRN IV Morphine admin at 0429. /63 c HR at 65. Resp status stable. Pillow support re-adjusted. Pt requesting something stronger for pain or something added on. Message sent to Dr. Trisha Talamantes regarding pain&elevated BP, message read. Waiting for orders.

## 2023-02-02 LAB
A/G RATIO: 1.2 (ref 1.1–2.2)
ALBUMIN SERPL-MCNC: 3.3 G/DL (ref 3.4–5)
ALP BLD-CCNC: 122 U/L (ref 40–129)
ALT SERPL-CCNC: 19 U/L (ref 10–40)
ANION GAP SERPL CALCULATED.3IONS-SCNC: 12 MMOL/L (ref 3–16)
AST SERPL-CCNC: 24 U/L (ref 15–37)
BILIRUB SERPL-MCNC: 0.4 MG/DL (ref 0–1)
BUN BLDV-MCNC: 33 MG/DL (ref 7–20)
CALCIUM SERPL-MCNC: 9.1 MG/DL (ref 8.3–10.6)
CHLORIDE BLD-SCNC: 97 MMOL/L (ref 99–110)
CO2: 23 MMOL/L (ref 21–32)
CREAT SERPL-MCNC: 1.5 MG/DL (ref 0.6–1.2)
GFR SERPL CREATININE-BSD FRML MDRD: 33 ML/MIN/{1.73_M2}
GLUCOSE BLD-MCNC: 116 MG/DL (ref 70–99)
GLUCOSE BLD-MCNC: 128 MG/DL (ref 70–99)
GLUCOSE BLD-MCNC: 144 MG/DL (ref 70–99)
GLUCOSE BLD-MCNC: 174 MG/DL (ref 70–99)
GLUCOSE BLD-MCNC: 191 MG/DL (ref 70–99)
HCT VFR BLD CALC: 29.2 % (ref 36–48)
HEMOGLOBIN: 9.9 G/DL (ref 12–16)
MAGNESIUM: 2.2 MG/DL (ref 1.8–2.4)
MCH RBC QN AUTO: 28.8 PG (ref 26–34)
MCHC RBC AUTO-ENTMCNC: 33.9 G/DL (ref 31–36)
MCV RBC AUTO: 85.1 FL (ref 80–100)
PDW BLD-RTO: 15.9 % (ref 12.4–15.4)
PERFORMED ON: ABNORMAL
PHOSPHORUS: 5.9 MG/DL (ref 2.5–4.9)
PLATELET # BLD: 142 K/UL (ref 135–450)
PMV BLD AUTO: 7.9 FL (ref 5–10.5)
POTASSIUM SERPL-SCNC: 4.6 MMOL/L (ref 3.5–5.1)
RBC # BLD: 3.43 M/UL (ref 4–5.2)
SODIUM BLD-SCNC: 132 MMOL/L (ref 136–145)
TOTAL PROTEIN: 6 G/DL (ref 6.4–8.2)
WBC # BLD: 8.9 K/UL (ref 4–11)

## 2023-02-02 PROCEDURE — 6370000000 HC RX 637 (ALT 250 FOR IP): Performed by: INTERNAL MEDICINE

## 2023-02-02 PROCEDURE — 84100 ASSAY OF PHOSPHORUS: CPT

## 2023-02-02 PROCEDURE — 2700000000 HC OXYGEN THERAPY PER DAY

## 2023-02-02 PROCEDURE — 6370000000 HC RX 637 (ALT 250 FOR IP): Performed by: NURSE PRACTITIONER

## 2023-02-02 PROCEDURE — 51798 US URINE CAPACITY MEASURE: CPT

## 2023-02-02 PROCEDURE — 83735 ASSAY OF MAGNESIUM: CPT

## 2023-02-02 PROCEDURE — 80053 COMPREHEN METABOLIC PANEL: CPT

## 2023-02-02 PROCEDURE — 85027 COMPLETE CBC AUTOMATED: CPT

## 2023-02-02 PROCEDURE — 2580000003 HC RX 258: Performed by: INTERNAL MEDICINE

## 2023-02-02 PROCEDURE — 1200000000 HC SEMI PRIVATE

## 2023-02-02 PROCEDURE — 94640 AIRWAY INHALATION TREATMENT: CPT

## 2023-02-02 PROCEDURE — 97535 SELF CARE MNGMENT TRAINING: CPT

## 2023-02-02 PROCEDURE — 97530 THERAPEUTIC ACTIVITIES: CPT

## 2023-02-02 PROCEDURE — 97116 GAIT TRAINING THERAPY: CPT

## 2023-02-02 PROCEDURE — 6370000000 HC RX 637 (ALT 250 FOR IP): Performed by: EMERGENCY MEDICINE

## 2023-02-02 PROCEDURE — 36415 COLL VENOUS BLD VENIPUNCTURE: CPT

## 2023-02-02 PROCEDURE — 94761 N-INVAS EAR/PLS OXIMETRY MLT: CPT

## 2023-02-02 RX ORDER — FLUTICASONE PROPIONATE 50 MCG
2 SPRAY, SUSPENSION (ML) NASAL DAILY PRN
Status: DISCONTINUED | OUTPATIENT
Start: 2023-02-02 | End: 2023-02-04 | Stop reason: HOSPADM

## 2023-02-02 RX ORDER — LEVOTHYROXINE SODIUM 0.07 MG/1
75 TABLET ORAL DAILY
Status: DISCONTINUED | OUTPATIENT
Start: 2023-02-02 | End: 2023-02-04 | Stop reason: HOSPADM

## 2023-02-02 RX ORDER — LORAZEPAM 0.5 MG/1
0.5 TABLET ORAL
Status: DISCONTINUED | OUTPATIENT
Start: 2023-02-02 | End: 2023-02-04 | Stop reason: HOSPADM

## 2023-02-02 RX ORDER — INSULIN LISPRO 100 [IU]/ML
0-4 INJECTION, SOLUTION INTRAVENOUS; SUBCUTANEOUS NIGHTLY
Status: DISCONTINUED | OUTPATIENT
Start: 2023-02-02 | End: 2023-02-04 | Stop reason: HOSPADM

## 2023-02-02 RX ORDER — SODIUM CHLORIDE 9 MG/ML
INJECTION, SOLUTION INTRAVENOUS CONTINUOUS
Status: DISCONTINUED | OUTPATIENT
Start: 2023-02-02 | End: 2023-02-04

## 2023-02-02 RX ORDER — DEXTROSE MONOHYDRATE 100 MG/ML
INJECTION, SOLUTION INTRAVENOUS CONTINUOUS PRN
Status: DISCONTINUED | OUTPATIENT
Start: 2023-02-02 | End: 2023-02-04 | Stop reason: HOSPADM

## 2023-02-02 RX ORDER — MECLIZINE HCL 12.5 MG/1
12.5 TABLET ORAL 3 TIMES DAILY PRN
Status: DISCONTINUED | OUTPATIENT
Start: 2023-02-02 | End: 2023-02-04 | Stop reason: HOSPADM

## 2023-02-02 RX ORDER — LORAZEPAM 1 MG/1
1 TABLET ORAL NIGHTLY
Status: DISCONTINUED | OUTPATIENT
Start: 2023-02-02 | End: 2023-02-04 | Stop reason: HOSPADM

## 2023-02-02 RX ORDER — INSULIN LISPRO 100 [IU]/ML
0-4 INJECTION, SOLUTION INTRAVENOUS; SUBCUTANEOUS
Status: DISCONTINUED | OUTPATIENT
Start: 2023-02-02 | End: 2023-02-04 | Stop reason: HOSPADM

## 2023-02-02 RX ORDER — ROPINIROLE 1 MG/1
1 TABLET, FILM COATED ORAL 3 TIMES DAILY
Status: DISCONTINUED | OUTPATIENT
Start: 2023-02-02 | End: 2023-02-04 | Stop reason: HOSPADM

## 2023-02-02 RX ORDER — ALOGLIPTIN 6.25 MG/1
6.25 TABLET, FILM COATED ORAL DAILY
Status: DISCONTINUED | OUTPATIENT
Start: 2023-02-02 | End: 2023-02-04 | Stop reason: HOSPADM

## 2023-02-02 RX ORDER — LEVALBUTEROL INHALATION SOLUTION 0.63 MG/3ML
1 SOLUTION RESPIRATORY (INHALATION) EVERY 6 HOURS PRN
Status: DISCONTINUED | OUTPATIENT
Start: 2023-02-02 | End: 2023-02-04 | Stop reason: HOSPADM

## 2023-02-02 RX ADMIN — APIXABAN 2.5 MG: 5 TABLET, FILM COATED ORAL at 09:33

## 2023-02-02 RX ADMIN — OXYCODONE 5 MG: 5 TABLET ORAL at 09:30

## 2023-02-02 RX ADMIN — CLONIDINE HYDROCHLORIDE 0.1 MG: 0.1 TABLET ORAL at 09:29

## 2023-02-02 RX ADMIN — GABAPENTIN 100 MG: 100 CAPSULE ORAL at 20:36

## 2023-02-02 RX ADMIN — CLONIDINE HYDROCHLORIDE 0.1 MG: 0.1 TABLET ORAL at 20:37

## 2023-02-02 RX ADMIN — GABAPENTIN 100 MG: 100 CAPSULE ORAL at 09:00

## 2023-02-02 RX ADMIN — NALOXEGOL OXALATE 25 MG: 25 TABLET, FILM COATED ORAL at 05:03

## 2023-02-02 RX ADMIN — APIXABAN 2.5 MG: 5 TABLET, FILM COATED ORAL at 20:36

## 2023-02-02 RX ADMIN — OXYCODONE 5 MG: 5 TABLET ORAL at 22:56

## 2023-02-02 RX ADMIN — ROPINIROLE HYDROCHLORIDE 1 MG: 1 TABLET, FILM COATED ORAL at 09:29

## 2023-02-02 RX ADMIN — ACETAMINOPHEN 1000 MG: 500 TABLET ORAL at 14:59

## 2023-02-02 RX ADMIN — SODIUM CHLORIDE: 9 INJECTION, SOLUTION INTRAVENOUS at 11:28

## 2023-02-02 RX ADMIN — DILTIAZEM HYDROCHLORIDE 180 MG: 180 CAPSULE, EXTENDED RELEASE ORAL at 09:29

## 2023-02-02 RX ADMIN — BETHANECHOL CHLORIDE 10 MG: 10 TABLET ORAL at 20:37

## 2023-02-02 RX ADMIN — LORAZEPAM 0.5 MG: 0.5 TABLET ORAL at 16:14

## 2023-02-02 RX ADMIN — LORAZEPAM 1 MG: 1 TABLET ORAL at 20:36

## 2023-02-02 RX ADMIN — ALOGLIPTIN 6.25 MG: 6.25 TABLET, FILM COATED ORAL at 09:30

## 2023-02-02 RX ADMIN — GABAPENTIN 100 MG: 100 CAPSULE ORAL at 14:59

## 2023-02-02 RX ADMIN — Medication 10 ML: at 09:35

## 2023-02-02 RX ADMIN — Medication 10 ML: at 20:40

## 2023-02-02 RX ADMIN — ATORVASTATIN CALCIUM 80 MG: 80 TABLET, FILM COATED ORAL at 20:36

## 2023-02-02 RX ADMIN — OXYCODONE 5 MG: 5 TABLET ORAL at 00:08

## 2023-02-02 RX ADMIN — ROPINIROLE HYDROCHLORIDE 1 MG: 1 TABLET, FILM COATED ORAL at 14:59

## 2023-02-02 RX ADMIN — ROPINIROLE HYDROCHLORIDE 1 MG: 1 TABLET, FILM COATED ORAL at 20:37

## 2023-02-02 RX ADMIN — ACETAMINOPHEN 1000 MG: 500 TABLET ORAL at 20:36

## 2023-02-02 RX ADMIN — Medication 2 PUFF: at 09:13

## 2023-02-02 RX ADMIN — LEVOTHYROXINE SODIUM 75 MCG: 0.07 TABLET ORAL at 09:29

## 2023-02-02 RX ADMIN — OXYCODONE 5 MG: 5 TABLET ORAL at 16:14

## 2023-02-02 RX ADMIN — BETHANECHOL CHLORIDE 10 MG: 10 TABLET ORAL at 09:33

## 2023-02-02 RX ADMIN — ACETAMINOPHEN 1000 MG: 500 TABLET ORAL at 05:03

## 2023-02-02 RX ADMIN — Medication 2 PUFF: at 21:00

## 2023-02-02 RX ADMIN — LORAZEPAM 0.5 MG: 0.5 TABLET ORAL at 09:30

## 2023-02-02 ASSESSMENT — PAIN DESCRIPTION - DESCRIPTORS
DESCRIPTORS: ACHING
DESCRIPTORS: ACHING;DISCOMFORT;GNAWING

## 2023-02-02 ASSESSMENT — PAIN - FUNCTIONAL ASSESSMENT: PAIN_FUNCTIONAL_ASSESSMENT: ACTIVITIES ARE NOT PREVENTED

## 2023-02-02 ASSESSMENT — PAIN SCALES - GENERAL
PAINLEVEL_OUTOF10: 4
PAINLEVEL_OUTOF10: 8
PAINLEVEL_OUTOF10: 4
PAINLEVEL_OUTOF10: 8
PAINLEVEL_OUTOF10: 6
PAINLEVEL_OUTOF10: 4

## 2023-02-02 ASSESSMENT — PAIN DESCRIPTION - LOCATION
LOCATION: FOOT
LOCATION: BACK
LOCATION: FOOT
LOCATION: BACK
LOCATION: BACK
LOCATION: ABDOMEN;BACK;GROIN

## 2023-02-02 ASSESSMENT — PAIN DESCRIPTION - ORIENTATION
ORIENTATION: LEFT
ORIENTATION: LOWER
ORIENTATION: LOWER
ORIENTATION: LEFT
ORIENTATION: RIGHT

## 2023-02-02 ASSESSMENT — PAIN DESCRIPTION - PAIN TYPE: TYPE: ACUTE PAIN

## 2023-02-02 NOTE — CARE COORDINATION
SW reviewed pt's chart and saw that if pt was recommended SNF, that she would prefer [facility]. SW faxed a referral to this facility. Received a call back from Mauricio Trammell who accepted the patient. Met with patient to discuss this plan. Pt is agreeable to this SNF at discharge. Possible discharge ready for tomorrow. SW started pt's precert this evening. [address] ID = [id number]. Status is currently \"Pending. \"  SW will continue to follow. Discharge Plan:  [facility] when stable.     Electronically signed by Micah Barber, KIET, JOSE MW on 2/2/2023 at 4:51 PM

## 2023-02-02 NOTE — PROGRESS NOTES
[facility] [address] Department   Phone: [phone]    Physical Therapy    [x] Initial Evaluation            [] Daily Treatment Note         [] Discharge Summary      Patient: Williams Mcfadden   : 1935   MRN: [id number]   Date of Service:  2023  Admitting Diagnosis: Intractable back pain  Current Admission Summary: This is a 80 y.o. female who presents to the ED for back pain. Right lower back. Radiates towards right hip. Does not shoot down the leg. This is the same back pain that she was having when she was in the hospital this past week that she get a CT scan/MRI for. It is been refractory to Vicodin that she took this morning. 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 Agree with medication management and outpatient follow-up to pain specialist for palliative injections  Past Medical History:  has a past medical history of Arthritis, Atrial fibrillation ([address] Bursitis, Diabetes mellitus type II, controlled ([address] GERD (gastroesophageal reflux disease), HTN (hypertension), Hyperlipidemia, Hypothyroid, Insomnia, Lumbago, Parkinson disease ([address] Restless legs syndrome (RLS), and SVT (supraventricular tachycardia) ([address] Past Surgical History:  has a past surgical history that includes Appendectomy; Colonoscopy; and Cholecystectomy, laparoscopic (2013). Discharge Recommendations: Williams Mcfadden scored a 19/24 on the AM-PAC short mobility form. Current research shows that an AM-PAC score of 17 or less is typically not associated with a discharge to the patient's home setting.  Based on the patient's AM-PAC score and their current functional mobility deficits, it is recommended that the patient have 3-5 sessions per week of Physical Therapy at d/c to increase the patient's independence. Please see assessment section for further patient specific details. If patient discharges prior to next session this note will serve as a discharge summary. Please see below for the latest assessment towards goals. DME Required For Discharge: DME to be determined at next level of care  Precautions/Restrictions: high fall risk  Weight Bearing Restrictions: no restrictions  Required Braces/Orthotics: no braces required  Positional Restrictions:no positional restrictions, nursing approval obtained prior to evaluation    Pre-Admission Information   Lives With: daughter               Type of Home: house, bilevel  Home Layout: two level, bedroom/bathroom upstairs, 7 stairs to 2nd level with B HR  Home Access:  1 step to enter without rails  (has post to hold onto)  Bathroom Layout: walk in shower  Bathroom Equipment: grab bars in shower, shower chair  Toilet Height: standard height  Home Equipment: rollator - 4 wheeled walker, oxygen, (2L/min O2), transport chair  Transfer Assistance: Independent without use of device  Ambulation Assistance:Independent without use of device  ADL Assistance: independent with all ADL's  IADL Assistance: requires assistance with all homemaking tasks  Active :        [] Yes                 [x] No   (Dtr drives pt)  Current Employment: retired. Occupation: [employer] 30 years  Hobbies: watch reality TV  Recent Falls: no falls    Examination   Vision:   Vision Gross Assessment: Impaired, Vision Corrective Device: wears glasses at all times, and Visual History: macular degeneration  Hearing:   hard of hearing, left hearing aid, right hearing aid  Observation:   Bruising noted to BUEs, small skin tear on LUE above elbow       Subjective  General: Supine in bed upon arrival with alarm on. Agreeable to therapy. Pain: 4/10.   Location: low back, R groin at rest, increased to 8/10 with mobility  Pain Interventions: RN notified and repositioned        Functional Mobility  Bed Mobility  Supine to Sit: stand by assistance  Sit to Supine: stand by assistance  Scooting: stand by assistance  Comments:  Transfers  Sit to stand transfer: stand by assistance  Stand to sit transfer: stand by assistance  Comments: from bed and BC x 2 during session  Ambulation  Surface:level surface  Assistive Device: rolling walker  Assistance: stand by assistance  Distance:10' + 30'  Gait Mechanics: steady gait, flexed posture, decreased denis  Comments:    Stair Mobility  Stair mobility not completed on this date. Comments: pt declined    Balance  Static Sitting Balance: good: independent with functional balance in unsupported position  Dynamic Sitting Balance: good: independent with functional balance in unsupported position  Static Standing Balance: fair (-): maintains balance at SBA with use of UE support  Dynamic Standing Balance: fair (-): maintains balance at SBA with use of UE support  Comments:    Other Therapeutic Interventions  See OT note for ADL task, frequent rest breaks needed.   Functional Outcomes  AM-PAC Inpatient Mobility Raw Score : 19              Cognition  WFL  Memory: decreased short term memory   Orientation:    alert and oriented x 4  Command Following:   [facility]    Education  Barriers To Learning: visual and hearing  Patient Education: patient educated on goals, PT role and benefits, plan of care, general safety, functional mobility training, transfer training, discharge recommendations  Learning Assessment:  patient verbalizes understanding, would benefit from continued reinforcement    Assessment  Activity Tolerance: limited by pain  Impairments Requiring Therapeutic Intervention: decreased functional mobility, decreased strength, decreased safety awareness, decreased endurance, decreased balance  Prognosis: fair  Clinical Assessment: Patient not at baseline function and would benefit from skilled PT to address above deficits and facilitate return to baseline function. Significant improvement in mobility tolerance. Able to perform short distance ambulation with SBA this date. Increased pain with mobility with need for frequent rest breaks, but able to continue. Safety Interventions: patient left in bed, bed alarm in place, call light within reach, patient at risk for falls, and nurse notified    Plan  Frequency: 3-5 x/per week  Current Treatment Recommendations: strengthening, balance training, functional mobility training, transfer training, gait training, stair training, endurance training, home exercise program, and safety education    Goals  Patient Goals: did not state   Short Term Goals:  Time Frame: discharge  Patient will complete bed mobility at supervision   Patient will complete transfers at supervision   Patient will ambulate 50 ft with use of rolling walker at contact guard assistance  Patient will ascend/descend 4 stairs with (R) ascending handrail at minimal assistance  No goals met this treatment.       Therapy Session Time      Individual Group Co-treatment   Time In     4384   Time Out     1202   Minutes     30     Timed Code Treatment Minutes:  30 Minutes  Total Treatment Minutes:  30       Electronically Signed By: Angela Martinez PT        Thanks, Angela Martinez PT, DPT [id number]

## 2023-02-02 NOTE — PROGRESS NOTES
Urology Progress Note  [facility]    Provider: KATHY Sanchez CNP  Patient ID:  Admission Date: 2023 Name: Grant Adjutant Date: 2023 MRN: [id number]   Patient Location: [id number] : 1935  Attending: Timo Miner MD Date of Service: 2023  PCP: Jhoan Sanchez MD     Diagnoses:  1. Acute right-sided low back pain without sciatica    2. Hypertensive urgency         Assessment/Plan:  81 yo female   Admitted with intractable back pain  Bladder distension on CT, no hydro  Renal function preserved  Reports voiding at baseline        Recommendations:  Urecholine BID  OOB as tolerated  VT today  -Monitor for retention, str cath residuals >400cc's  Urology will follow, call with any questions     The patient had a chance to ask questions which were answered. she understands the above plan. Subjective:   Rosalia Woodruff is a 80 y.o. female. She was seen and examined this morning. Today we discussed voiding trial.      Objective:   Vitals:  Vitals:    23 0800   BP: (!) 125/103   Pulse: 84   Resp: 19   Temp: 97.6 °F (36.4 °C)   SpO2: 98%       Intake/Output Summary (Last 24 hours) at 2023 6746  Last data filed at 2023 6827  Gross per 24 hour   Intake --   Output 900 ml   Net -900 ml     Physical Exam:  Gen: Alert and oriented x3, no acute distress  Skin: warmand well perfused, no rashes noted on the face, or arms.      Labs:  Lab Results   Component Value Date    WBC 8.9 2023    HGB 9.9 (L) 2023    HCT 29.2 (L) 2023    MCV 85.1 2023     2023     Lab Results   Component Value Date    CREATININE 1.5 (H) 2023    BUN 33 (H) 2023     (L) 2023    K 4.6 2023    CL 97 (L) 2023    CO2 23 2023       KATHY Sanchez CNP   2023

## 2023-02-02 NOTE — PROGRESS NOTES
[address] [facility] Department   Phone: [phone]    Occupational Therapy    [] Initial Evaluation            [x] Daily Treatment Note         [] Discharge Summary      Patient: Mateusz Agustin   : 1935   MRN: [id number]   Date of Service:  2023    Admitting Diagnosis:  Intractable back pain  Current Admission Summary: Mateusz Agustin is a 80 y.o. female with incomplete bladder emptying. Onset of symptoms was days ago with improving course since that time. Symptoms are aggravated by debility. Symptoms improved with urecholine. Associated symptoms include incomplete bladder emptying. Patient also reports hx of oab. She has tried the following treatments: urecholine. Past Medical History:  has a past medical history of Arthritis, Atrial fibrillation ([address] Bursitis, Diabetes mellitus type II, controlled ([address] GERD (gastroesophageal reflux disease), HTN (hypertension), Hyperlipidemia, Hypothyroid, Insomnia, Lumbago, Parkinson disease ([address] Restless legs syndrome (RLS), and SVT (supraventricular tachycardia) ([address] Past Surgical History:  has a past surgical history that includes Appendectomy; Colonoscopy; and Cholecystectomy, laparoscopic (2013). Discharge Recommendations: Mateusz Agustin scored a 17/24 on the AM-PAC ADL Inpatient form. Current research shows that an AM-PAC score of 18 or greater is typically associated with a discharge to the patient's home setting. Based on the patient's AM-PAC score, and their current ADL deficits, it is recommended that the patient have 2-3 sessions per week of Occupational Therapy at d/c to increase the patient's independence. At this time, this patient demonstrates the endurance and safety to discharge home with home health care (home vs OP services) and a follow up treatment frequency of 2-3x/wk. Please see assessment section for further patient specific details.     HOME HEALTH CARE: LEVEL 3 SAFETY     - Initial home health evaluation to occur within 24-48 hours, in patient home   - Therapy evaluations in home within 24-48 hours of discharge; including DME and home safety   - Frontload therapy 5 days, then 3x a week   - Therapy to evaluate if patient has [address] needs for personal care   -  evaluation within 24-48 hours, includes evaluation of resources and insurance to determine [place], [place], LTC, and Medicaid options        If patient discharges prior to next session this note will serve as a discharge summary. Please see below for the latest assessment towards goals. DME Required For Discharge: DME to be determined pending patient progress    Precautions/Restrictions: high fall risk  Weight Bearing Restrictions: no restrictions  [] Right Upper Extremity  [] Left Upper Extremity [] Right Lower Extremity  [] Left Lower Extremity     Required Braces/Orthotics: no braces required   [] Right  [] Left  Positional Restrictions:no positional restrictions    Pre-Admission Information   Lives With: daughter               Type of Home: house, bilevel  Home Layout: two level, bedroom/bathroom upstairs, 7 stairs to 2nd level with B HR  Home Access:  1 step to enter without rails  (has post to hold onto)  Bathroom Layout: walk in shower  Bathroom Equipment: grab bars in shower, shower chair  Toilet Height: standard height  Home Equipment: rollator - 4 wheeled walker, oxygen, (2L/min O2), transport chair  Transfer Assistance: Independent without use of device  Ambulation Assistance:Independent without use of device  ADL Assistance: independent with all ADL's  IADL Assistance: requires assistance with all homemaking tasks  Active :        [] Yes                 [x] No   (Dtr drives pt)  Current Employment: retired.   Occupation: [employer] 30 years  Hobbies: watch reality TV  Recent Falls: no falls      Subjective  General: Patient supine in bed with RN present upon arrival, agreeable to therapy co-treatment. Co-treatment completed to assess further functional mobility and ambulation. Pain: 7/10. Location: following functional mobility tasks  Pain Interventions: RN notified and repositioned         Activities of Daily Living  Basic Activities of Daily Living  Grooming: stand by assistance  Grooming Comments: SBA for grooming tasks seated on BSC and in stance, washed and brushed hair, increased time needed for thoroughness   Upper Extremity Bathing: stand by assistance Increased time to complete task  Lower Extremity Bathing: contact guard assistance requires verbal cueing Increased time to complete task   Bathing Equipment: none  Bathing Comments: SBA for washing chest, arms, face, legs, buttocks, erica area in stance and CGA for washing feet, seated on BSC. Increased time needed for thoroughness and task completion    Upper Extremity Dressing: stand by assistance  Lower Extremity Dressing: maximum assistance requires verbal cueing Increased time to complete task  Dressing Equipment: none  Dressing Comments: SBA for donning/doffing gown, maxA for donning/doffing brief, CGA for donning/doffing shoes, patient with increased time needed for task completion, sequencing, and initiation. General Comments: Patient with minimal verbal cueing needed for hand placement, sequencing, initiation, and task completion. Patient with increased time needed for thoroughness and task completion. Instrumental Activities of Daily Living  No IADL completed on this date. Functional Mobility  Bed Mobility  Supine to Sit: stand by assistance  Scooting: stand by assistance  Comments: Patient with HOB slightly elevated and with use of side rails, patient with increased time needed due to slowed movements and fearfulness due to increased pain.     Transfers  Sit to stand transfer:contact guard assistance  Stand to sit transfer: contact guard assistance  Stand step transfer: contact guard assistance  Comments: Patient with ~2-3 steps from bed to recliner with use of RW for support, patient with minimal verbal and physical cueing needed for hand placement, spatial awareness, and initiation for increased safety with functional transfers. Patient reported pain is better seated in recliner at end of session. Functional Mobility:  Sitting Balance: stand by assistance. Sitting Balance Comment: SBA for sitting EOB/BSC  Standing Balance: stand by assistance.     Standing Balance Comment: functional transfers  Functional Mobility: .  stand by assistance  Functional Mobility Device Use: rolling walker  Functional Mobility Comment: Patient ambulated ~50ft in room and to/from bathroom, patient with minimal verbal cueing needed for spatial awareness and line management for increased safety with functional mobility tasks    Other Therapeutic Interventions    Functional Outcomes  -PAC Inpatient Daily Activity Raw Score: 17    Cognition  Overall Cognitive Status: Impaired  Attention Span: attends with cues to redirect, difficulty attending to directions, difficulty dividing attention  Memory: decreased short term memory  Comments: Patient with continued short term memory deficits and forgetting she had completed various functional ADL activities   Orientation:    alert and oriented x 4  Command Following:   accurately follows one step commands     Education  Barriers To Learning: cognition, visual, and hearing  Patient Education: patient educated on goals, OT role and benefits, plan of care, precautions, ADL adaptive strategies, energy conservation, low vision education, disease specific education, transfer training, discharge recommendations  Learning Assessment:  patient verbalizes understanding, would benefit from continued reinforcement    Assessment  Activity Tolerance: Patient tolerated treatment well   Impairments Requiring Therapeutic Intervention: decreased functional mobility, decreased ADL status, decreased safety awareness, decreased cognition, decreased endurance, decreased balance, decreased vision, decreased IADL, decreased coordination, increased pain  Prognosis: fair and guarded  Clinical Assessment: Patient presents with the above deficits impacting occupational performance and functioning below baseline, skilled OT services needed to address deficits to facilitate safe return to PLOF. Patient with SBA for functional transfers/mobility and SBA progressing to maxA for functional ADL completion.     Safety Interventions: patient left in bed, bed alarm in place, call light within reach, gait belt, patient at risk for falls, and nurse notified    Plan  Frequency: 3-5 x/per week  Current Treatment Recommendations: balance training, functional mobility training, transfer training, endurance training, patient/caregiver education, ADL/self-care training, IADL training, pain management, home exercise program, safety education, and positioning    Goals  Patient Goals: Patient wants no back pain    Short Term Goals:  Time Frame: discharge    Patient will complete upper body ADL at stand by assistance - GOAL MET 2/2  Patient will complete lower body ADL at stand by assistance   Patient will complete toileting at stand by assistance   Patient will complete functional transfers at stand by assistance - GOAL MET 2/2   Patient will complete functional mobility at stand by assistance - GOAL MET 2/2    2/2/23 NEW GOALS :    Patient will complete upper body ADL at modified independence  Patient will complete functional transfers at supervision   Patient will complete functional mobility at supervision     Therapy Session Time     Individual Group Co-treatment   Time In    1132   Time Out    1202   Minutes    30        Timed Code Treatment Minutes:   30 minutes    Total Treatment Minutes:  30 minutes        Electronically Signed By: ROSS Ortega/VENESSA [id number]

## 2023-02-02 NOTE — DISCHARGE INSTR - COC
Continuity of Care Form    Patient Name: Madison Melchor   :  1935  MRN:  [id number]    Admit date:  2023  Discharge date:  23    Code Status Order: Full Code   Advance Directives:     Admitting Physician:  Renetta Riley MD  PCP: Waldemar Choi MD    Discharging Nurse:   [address] Unit/Room#: [id number]/0171-89  Discharging Unit Phone Number: [phone]    Emergency Contact:   Extended Emergency Contact Information  Primary Emergency Contact: Javier Patton Phone: [phone]  Relation: Child  Secondary Emergency Contact: Arnlod Breen  Mobile Phone: [phone]  Relation: Child    Past Surgical History:  Past Surgical History:   Procedure Laterality Date    APPENDECTOMY      CHOLECYSTECTOMY, LAPAROSCOPIC  2013    COLONOSCOPY         Immunization History:   Immunization History   Administered Date(s) Administered    COVID-19, PFIZER GRAY top, DO NOT Dilute, (age 15 y+), IM, 30 mcg/0.3 mL 2022    COVID-19, PFIZER PURPLE top, DILUTE for use, (age 15 y+), 30mcg/0.3mL 2021, 2021, 2021    Influenza Vaccine, unspecified formulation 10/24/2015    Influenza Virus Vaccine 09/10/2013, 2014    Influenza, FLUAD, (age 72 y+), Adjuvanted, 0.5mL 10/22/2020, 2021    Influenza, High Dose (Fluzone 65 yrs and older) 2016, 2017, 2018    Influenza, Triv, inactivated, subunit, adjuvanted, IM (Fluad 65 yrs and older) 2019, 10/22/2020    Pneumococcal Conjugate 13-valent ([id number]) 2015    Pneumococcal Polysaccharide ([id number]) 09/10/2010, 2016    Td, unspecified formulation 2005    Tdap (Boostrix, Adacel) 2018       Active Problems:  Patient Active Problem List   Diagnosis Code    Hypertensive urgency I16.0    Hyperlipidemia E78.5    IBS (irritable bowel syndrome) K58.9    Atrial fibrillation with RVR (HCC) I48.91    Overactive bladder N32.81    Osteoarthritis M19.90    Controlled type 2 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 disease, without long-term current use of insulin ([facility]) E11.22, N18.2    Restless legs syndrome G25.81    ANTHONY (obstructive sleep apnea) G47.33    Allergic rhinitis J30.9    COPD, moderate ([facility]) J44.9    Vertigo R42    Interstitial lung disease ([facility]) J84.9    Bronchiectasis without complication ([facility]) I55.4    Coronary artery disease involving native coronary artery of native heart with angina pectoris ([facility]) I25.119    PAF (paroxysmal atrial fibrillation) ([facility]) I48.0    Hypothyroid E03.9    Chronic respiratory failure with hypoxia ([facility]) J96.11    SOB (shortness of breath) R06.02    Ataxia R27.0    Primary hypertension I10    Centrilobular emphysema ([facility]) J43.2    Pulmonary nodule R91.1    Ptosis of eyelid, bilateral H02.403    Chronic obstructive pulmonary disease ([facility]) J44.9    Chronic renal disease, stage III [facility]) [[id number]] N18.30    Chronic fatigue R53.82    Hypertensive urgency, malignant I16.0    Acute dysfunction of Eustachian tube, bilateral H69.83    Bilateral hearing loss H91.93    Impacted cerumen of right ear H61.21    Dizziness R42    Acute on chronic diastolic (congestive) heart failure ([facility]) I50.33    Acute anemia D64.9    Hyponatremia E87.1    Pneumonia due to infectious organism J18.9    Intractable back pain M54.9       Isolation/Infection:   Isolation            No Isolation          Patient Infection Status       Infection Onset Added Last Indicated Last Indicated By Review Planned Expiration Resolved Resolved By    None active    Resolved    COVID-19 (Rule Out) 04/19/22 04/19/22 04/19/22 COVID-19 & Influenza Combo (Ordered)   04/19/22 Rule-Out Test Resulted    COVID-19 (Rule Out) 02/24/21 02/24/21 02/24/21 Respiratory Panel, Molecular, with COVID-19 (Restricted: peds pts or suitable admitted adults) (Ordered)   02/24/21 Rule-Out Test Resulted    COVID-19 (Rule Out) 02/22/21 02/22/21 02/22/21 COVID-19 (Ordered)   02/23/21 Rule-Out Test Resulted            Nurse Assessment:  Last Vital Signs: BP (!) 125/103   Pulse 79   Temp 97.6 °F (36.4 °C) (Temporal)   Resp 18   Ht 5' 2\" (1.575 m)   Wt 154 lb 4.8 oz (70 kg)   SpO2 96%   BMI 28.22 kg/m²     Last documented pain score (0-10 scale): Pain Level: 4  Last Weight:   Wt Readings from Last 1 Encounters:   02/02/23 154 lb 4.8 oz (70 kg)     Mental Status:  oriented    IV Access:  - None    Nursing Mobility/ADLs:  Walking   Assisted  Transfer  Assisted  Bathing  Assisted  Dressing  Assisted  Toileting  Assisted  Feeding  Independent  Med Admin  Assisted  Med Delivery   whole    Wound Care Documentation and Therapy:        Elimination:  Continence: Bowel: No  Bladder: No  Urinary Catheter: None   Colostomy/Ileostomy/Ileal Conduit: No       Date of Last BM: 02/04/23    Intake/Output Summary (Last 24 hours) at 2/2/2023 1239  Last data filed at 2/2/2023 0800  Gross per 24 hour   Intake 360 ml   Output 1100 ml   Net -740 ml     I/O last 3 completed shifts: In: 740 [P.O.:730; I.V.:10]  Out: 1720 [Urine:1720]    Safety Concerns:     None    Impairments/Disabilities:      None    Nutrition Therapy:  Current Nutrition Therapy:   - Oral Diet:  General    Routes of Feeding: Oral  Liquids: No Restrictions  Daily Fluid Restriction: no  Last Modified Barium Swallow with Video (Video Swallowing Test): not done    Treatments at the Time of Hospital Discharge:   Respiratory Treatments: yes  Oxygen Therapy:  is on oxygen at 2 L/min per nasal cannula.   Ventilator:    - No ventilator support    Rehab Therapies: Physical Therapy and Occupational Therapy  Weight Bearing Status/Restrictions: Partial weight bearing (30-50%) only on leg left leg  Other Medical Equipment (for information only, NOT a DME order):  home 02,which pt already has it  Other Treatments:     Patient's personal belongings (please select all that are sent with patient):      RN SIGNATURE:  Electronically signed by Trina Marks RN on 2/4/23 at 3:02 PM EST    CASE MANAGEMENT/SOCIAL WORK SECTION    Inpatient Status Date: 01/31/2023 - 02/04/2023    Readmission Risk Assessment Score:  Readmission Risk              Risk of Unplanned Readmission:  40           Discharging to Facility/ [address] [address])  [address] [place] [address]  Phone: [phone]  Fax: [phone]             / signature: Electronically signed by Constantino Batres RN on 2/4/23 at 1:51 PM EST    PHYSICIAN SECTION    Prognosis: Fair    Condition at Discharge: Stable    Rehab Potential (if transferring to Rehab): Fair    Recommended Labs or Other Treatments After Discharge:   Repeat BMP in 2 to 3 days to monitor sodium. Irbesartan held until renal function improves. Monitor hemoglobin levels. Ensure daily bowel movements. Physician Certification: I certify the above information and transfer of Rose Boothe  is necessary for the continuing treatment of the diagnosis listed and that she requires Home care for greater 30 days.      Update Admission H&P: No change in H&P    PHYSICIAN SIGNATURE:  Electronically signed by Baltazar Leventhal, MD on 2/3/23 at 11:17 AM EST

## 2023-02-02 NOTE — PLAN OF CARE
Problem: Pain  Goal: Verbalizes/displays adequate comfort level or baseline comfort level  Outcome: Progressing  Flowsheets (Taken 2/2/2023 0140)  Verbalizes/displays adequate comfort level or baseline comfort level:   Encourage patient to monitor pain and request assistance   Assess pain using appropriate pain scale   Administer analgesics based on type and severity of pain and evaluate response   Implement non-pharmacological measures as appropriate and evaluate response  Note: Reports pain 8/10 groin/abd towards back on right side. Managed with scheduled 1000mg tylenol and PRN 5mg Roxicodone. Pt satisfied. Problem: Safety - Adult  Goal: Free from fall injury  Outcome: Progressing  Note: Safety precautions in place. Bed locked, alarmed, lowest position. Call light in reach, gripper socks on. Educated on requesting assistance before getting out pf bed, verbalizes understanding, agreeable. No falls or physical injury. Problem: Discharge Planning  Goal: Discharge to home or other facility with appropriate resources  Outcome: Progressing     Problem: Skin/Tissue Integrity  Goal: Absence of new skin breakdown  Description: 1. Monitor for areas of redness and/or skin breakdown  2. Assess vascular access sites hourly  3. Every 4-6 hours minimum:  Change oxygen saturation probe site  4. Every 4-6 hours:  If on nasal continuous positive airway pressure, respiratory therapy assess nares and determine need for appliance change or resting period. Outcome: Progressing  Note: No signs of new skin breakdown.      Problem: ABCDS Injury Assessment  Goal: Absence of physical injury  Outcome: Progressing

## 2023-02-02 NOTE — PROGRESS NOTES
Hospitalist Progress Note      PCP: Andrew To MD    Date of Admission: 1/31/2023      Chief Complaint: Intractable back pain      Hospital Course:  Christina Martinez is a 80 y.o. F with PMHx of AFIB on Eliquis, T2 DM, HTN, recently admitted with lower back pain, diagnosed with Wise disease, and discharged home, presented with complaints of worsening bilateral lower back pain and difficulty walking. Patient denied any urinary retention, constipation, focal weakness, LE numbness, fever or chills. Subjective: Patient seen and examined. Back pain improved. Constipated resolved with several BMs. No fever or chills.         Medications:  Reviewed    Infusion Medications    dextrose      sodium chloride 75 mL/hr at 02/02/23 1128    sodium chloride       Scheduled Medications    levothyroxine  75 mcg Oral Daily    LORazepam  0.5 mg Oral BID AC    LORazepam  1 mg Oral Nightly    rOPINIRole  1 mg Oral TID    tiotropium  2 puff Inhalation Daily    insulin lispro  0-4 Units SubCUTAneous TID WC    insulin lispro  0-4 Units SubCUTAneous Nightly    alogliptin  6.25 mg Oral Daily    apixaban  2.5 mg Oral BID    bethanechol  10 mg Oral BID    naloxegol  25 mg Oral QAM AC    acetaminophen  1,000 mg Oral 3 times per day    gabapentin  100 mg Oral TID    lidocaine  1 patch TransDERmal Daily    atorvastatin  80 mg Oral Nightly    mometasone-formoterol  2 puff Inhalation BID    cloNIDine  0.1 mg Oral BID    dilTIAZem  180 mg Oral Daily    [Held by provider] losartan  50 mg Oral Nightly    sodium chloride flush  5-40 mL IntraVENous 2 times per day     PRN Meds: fluticasone, levalbuterol, meclizine, dextrose bolus **OR** dextrose bolus, glucagon (rDNA), dextrose, oxyCODONE **OR** oxyCODONE, methocarbamol, HYDROmorphone, sodium chloride flush, sodium chloride, ondansetron **OR** ondansetron, polyethylene glycol, albuterol sulfate HFA      Intake/Output Summary (Last 24 hours) at 2/2/2023 1637  Last data filed at 2/2/2023 0800  Gross per 24 hour   Intake 360 ml   Output 700 ml   Net -340 ml       Physical Exam Performed:    BP (!) 130/46   Pulse 70   Temp 98.2 °F (36.8 °C) (Temporal)   Resp 19   Ht 5' 2\" (1.575 m)   Wt 154 lb 4.8 oz (70 kg)   SpO2 96%   BMI 28.22 kg/m²     General appearance: No apparent distress, appears stated age and cooperative. HEENT: Pupils equal, round, and reactive to light. Conjunctivae clear. Neck: Supple, with full range of motion. No jugular venous distention. Trachea midline. Respiratory:  Normal respiratory effort. Clear to auscultation, bilaterally without Rales/Wheezes/Rhonchi. Cardiovascular: Regular rate and rhythm with normal S1/S2 without murmurs, rubs or gallops. Abdomen: Soft, non-tender, non-distended with normal bowel sounds. Musculoskeletal: No clubbing, cyanosis or edema bilaterally. Full range of motion without deformity. Skin: Skin color, texture, turgor normal.  No rashes or lesions. Neurologic:  Neurovascularly intact without any focal sensory/motor deficits. Cranial nerves: II-XII intact, grossly non-focal.  Psychiatric: Alert and oriented, thought content appropriate, normal insight  Capillary Refill: Brisk, 3 seconds, normal   Peripheral Pulses: +2 palpable, equal bilaterally       Labs:   Recent Labs     01/31/23 0723 02/01/23 0431 02/02/23 0442   WBC 6.0 6.9 8.9   HGB 12.3 10.7* 9.9*   HCT 37.5 32.5* 29.2*    157 142     Recent Labs     01/31/23 0723 02/01/23  0431 02/02/23  0442    134* 132*   K 4.0 4.1 4.6   CL 99 100 97*   CO2 24 26 23   BUN 21* 22* 33*   CREATININE 1.1 1.0 1.5*   CALCIUM 10.4 9.8 9.1   PHOS  --   --  5.9*     Recent Labs     01/31/23 0723 02/02/23 0442   AST 21 24   ALT 22 19   BILITOT 0.5 0.4   ALKPHOS 157* 122     No results for input(s): INR in the last 72 hours. No results for input(s): Hamp Memory in the last 72 hours.     Urinalysis:      Lab Results   Component Value Date/Time    NITRU Negative 01/31/2023 07:23 AM    WBCUA 0 01/31/2023 07:23 AM    BACTERIA None Seen 01/31/2023 07:23 AM    RBCUA 1 01/31/2023 07:23 AM    BLOODU TRACE 01/31/2023 07:23 AM    SPECGRAV 1.010 01/31/2023 07:23 AM    GLUCOSEU Negative 01/31/2023 07:23 AM       Radiology:  CT ABDOMEN PELVIS W IV CONTRAST Additional Contrast? None   Final Result   Severely distended urinary bladder and large colonic stool burden. Interval resolution of left lower lobe consolidation. IP CONSULT TO HOSPITALIST  IP CONSULT TO UROLOGY  IP CONSULT TO NEUROSURGERY  IP CONSULT TO PAIN MANAGEMENT    Assessment/Plan:    Active Hospital Problems    Diagnosis     Intractable back pain [M54.9]      Priority: Medium     CAROLANN:  Baseline Scr ~1.0. Creatinine trended up to 1.5. Cozaar held. Started on IV NS at 75 cc/h  Monitor BMP. Avoid nephrotoxins. Intractable lower back pain: Improved. Lumbar MRI showed mild L5-S1 spondylolisthesis. s transitional spine anatomy. NSGY consult appreciated: Back pain is multifactorial in etiology. No emergent neurosurgical intervention indicated. Recommended medication management and outpatient follow-up to pain specialist for palliative injections. PT OT recommending SNF. Constipation: Improved  CT: Large colonic stool burden. Continue Movantik. Urinary Retention:  CT: distended urinary bladder. Urology consult appreciated: Started on Urecholine twice daily. Tony discontinued for voiding trial.    Monitor PVRs and straight cath if > 400 cc's. Treat constipation. OOB as tolerated. Paroxysmal AFIB:  Continue Eliquis, Cardizem and clonidine. HTN: Monitor BP on home medications. DVT Prophylaxis: Eliquis  Diet: ADULT DIET;  Regular  ADULT ORAL NUTRITION SUPPLEMENT; Dinner, Breakfast; Standard High Calorie/High Protein Oral Supplement  Code Status: Full Code  PT/OT Eval Status: SNF    Dispo - once medically stable      Baltazar Leventhal, MD

## 2023-02-03 PROBLEM — R33.9 URINARY RETENTION: Status: ACTIVE | Noted: 2023-02-03

## 2023-02-03 PROBLEM — R79.89 ELEVATED SERUM CREATININE: Status: ACTIVE | Noted: 2023-02-03

## 2023-02-03 PROBLEM — E87.1 HYPONATREMIA: Status: ACTIVE | Noted: 2023-02-03

## 2023-02-03 PROBLEM — M48.061 LUMBAR FORAMINAL STENOSIS: Status: ACTIVE | Noted: 2023-02-03

## 2023-02-03 PROBLEM — K59.00 CONSTIPATION: Status: ACTIVE | Noted: 2023-02-03

## 2023-02-03 LAB
A/G RATIO: 1.3 (ref 1.1–2.2)
ALBUMIN SERPL-MCNC: 3.5 G/DL (ref 3.4–5)
ALP BLD-CCNC: 121 U/L (ref 40–129)
ALT SERPL-CCNC: 19 U/L (ref 10–40)
ANION GAP SERPL CALCULATED.3IONS-SCNC: 9 MMOL/L (ref 3–16)
AST SERPL-CCNC: 21 U/L (ref 15–37)
BILIRUB SERPL-MCNC: 0.3 MG/DL (ref 0–1)
BUN BLDV-MCNC: 39 MG/DL (ref 7–20)
CALCIUM SERPL-MCNC: 9.2 MG/DL (ref 8.3–10.6)
CHLORIDE BLD-SCNC: 100 MMOL/L (ref 99–110)
CO2: 22 MMOL/L (ref 21–32)
CREAT SERPL-MCNC: 1.3 MG/DL (ref 0.6–1.2)
FOLATE: 11.48 NG/ML (ref 4.78–24.2)
GFR SERPL CREATININE-BSD FRML MDRD: 40 ML/MIN/{1.73_M2}
GLUCOSE BLD-MCNC: 133 MG/DL (ref 70–99)
GLUCOSE BLD-MCNC: 135 MG/DL (ref 70–99)
GLUCOSE BLD-MCNC: 142 MG/DL (ref 70–99)
GLUCOSE BLD-MCNC: 150 MG/DL (ref 70–99)
GLUCOSE BLD-MCNC: 157 MG/DL (ref 70–99)
HCT VFR BLD CALC: 28.6 % (ref 36–48)
HEMOGLOBIN: 9.6 G/DL (ref 12–16)
MAGNESIUM: 2 MG/DL (ref 1.8–2.4)
MCH RBC QN AUTO: 28.6 PG (ref 26–34)
MCHC RBC AUTO-ENTMCNC: 33.4 G/DL (ref 31–36)
MCV RBC AUTO: 85.5 FL (ref 80–100)
PDW BLD-RTO: 15.9 % (ref 12.4–15.4)
PERFORMED ON: ABNORMAL
PHOSPHORUS: 4.4 MG/DL (ref 2.5–4.9)
PLATELET # BLD: 129 K/UL (ref 135–450)
PMV BLD AUTO: 7.8 FL (ref 5–10.5)
POTASSIUM SERPL-SCNC: 4.8 MMOL/L (ref 3.5–5.1)
RBC # BLD: 3.34 M/UL (ref 4–5.2)
SODIUM BLD-SCNC: 131 MMOL/L (ref 136–145)
TOTAL PROTEIN: 6.2 G/DL (ref 6.4–8.2)
VITAMIN B-12: 522 PG/ML (ref 211–911)
VITAMIN D 25-HYDROXY: 17.9 NG/ML
WBC # BLD: 5.9 K/UL (ref 4–11)

## 2023-02-03 PROCEDURE — 1200000000 HC SEMI PRIVATE

## 2023-02-03 PROCEDURE — 82746 ASSAY OF FOLIC ACID SERUM: CPT

## 2023-02-03 PROCEDURE — 6370000000 HC RX 637 (ALT 250 FOR IP): Performed by: INTERNAL MEDICINE

## 2023-02-03 PROCEDURE — 2580000003 HC RX 258: Performed by: INTERNAL MEDICINE

## 2023-02-03 PROCEDURE — 94640 AIRWAY INHALATION TREATMENT: CPT

## 2023-02-03 PROCEDURE — 83735 ASSAY OF MAGNESIUM: CPT

## 2023-02-03 PROCEDURE — 6370000000 HC RX 637 (ALT 250 FOR IP): Performed by: NURSE PRACTITIONER

## 2023-02-03 PROCEDURE — 36415 COLL VENOUS BLD VENIPUNCTURE: CPT

## 2023-02-03 PROCEDURE — 82306 VITAMIN D 25 HYDROXY: CPT

## 2023-02-03 PROCEDURE — 82728 ASSAY OF FERRITIN: CPT

## 2023-02-03 PROCEDURE — 84100 ASSAY OF PHOSPHORUS: CPT

## 2023-02-03 PROCEDURE — 82607 VITAMIN B-12: CPT

## 2023-02-03 PROCEDURE — 83540 ASSAY OF IRON: CPT

## 2023-02-03 PROCEDURE — 85027 COMPLETE CBC AUTOMATED: CPT

## 2023-02-03 PROCEDURE — 83550 IRON BINDING TEST: CPT

## 2023-02-03 PROCEDURE — 80053 COMPREHEN METABOLIC PANEL: CPT

## 2023-02-03 PROCEDURE — 6370000000 HC RX 637 (ALT 250 FOR IP): Performed by: EMERGENCY MEDICINE

## 2023-02-03 RX ORDER — METHOCARBAMOL 500 MG/1
500 TABLET, FILM COATED ORAL 4 TIMES DAILY PRN
Qty: 30 TABLET | Refills: 0
Start: 2023-02-03 | End: 2023-02-04 | Stop reason: SDUPTHER

## 2023-02-03 RX ORDER — GABAPENTIN 100 MG/1
100 CAPSULE ORAL 3 TIMES DAILY
Qty: 90 CAPSULE | Refills: 3
Start: 2023-02-03 | End: 2023-02-04 | Stop reason: SDUPTHER

## 2023-02-03 RX ORDER — OXYCODONE HYDROCHLORIDE 5 MG/1
5 TABLET ORAL EVERY 6 HOURS PRN
Qty: 10 TABLET | Refills: 0
Start: 2023-02-03 | End: 2023-02-04 | Stop reason: SDUPTHER

## 2023-02-03 RX ORDER — BETHANECHOL CHLORIDE 10 MG/1
10 TABLET ORAL 2 TIMES DAILY
Qty: 90 TABLET | Refills: 3
Start: 2023-02-03 | End: 2023-02-04 | Stop reason: SDUPTHER

## 2023-02-03 RX ORDER — POLYETHYLENE GLYCOL 3350 17 G/17G
17 POWDER, FOR SOLUTION ORAL DAILY
Qty: 527 G | Refills: 1
Start: 2023-02-03 | End: 2023-03-05

## 2023-02-03 RX ADMIN — ATORVASTATIN CALCIUM 80 MG: 80 TABLET, FILM COATED ORAL at 21:17

## 2023-02-03 RX ADMIN — ROPINIROLE HYDROCHLORIDE 1 MG: 1 TABLET, FILM COATED ORAL at 21:16

## 2023-02-03 RX ADMIN — APIXABAN 2.5 MG: 5 TABLET, FILM COATED ORAL at 08:51

## 2023-02-03 RX ADMIN — OXYCODONE 5 MG: 5 TABLET ORAL at 10:31

## 2023-02-03 RX ADMIN — Medication 2 PUFF: at 07:47

## 2023-02-03 RX ADMIN — ALOGLIPTIN 6.25 MG: 6.25 TABLET, FILM COATED ORAL at 08:51

## 2023-02-03 RX ADMIN — ROPINIROLE HYDROCHLORIDE 1 MG: 1 TABLET, FILM COATED ORAL at 08:51

## 2023-02-03 RX ADMIN — APIXABAN 2.5 MG: 5 TABLET, FILM COATED ORAL at 21:17

## 2023-02-03 RX ADMIN — LEVOTHYROXINE SODIUM 75 MCG: 0.07 TABLET ORAL at 08:51

## 2023-02-03 RX ADMIN — LORAZEPAM 0.5 MG: 0.5 TABLET ORAL at 06:33

## 2023-02-03 RX ADMIN — GABAPENTIN 100 MG: 100 CAPSULE ORAL at 08:51

## 2023-02-03 RX ADMIN — GABAPENTIN 100 MG: 100 CAPSULE ORAL at 21:17

## 2023-02-03 RX ADMIN — LORAZEPAM 1 MG: 1 TABLET ORAL at 21:16

## 2023-02-03 RX ADMIN — BETHANECHOL CHLORIDE 10 MG: 10 TABLET ORAL at 21:16

## 2023-02-03 RX ADMIN — BETHANECHOL CHLORIDE 10 MG: 10 TABLET ORAL at 08:51

## 2023-02-03 RX ADMIN — Medication 2 PUFF: at 18:33

## 2023-02-03 RX ADMIN — ROPINIROLE HYDROCHLORIDE 1 MG: 1 TABLET, FILM COATED ORAL at 14:40

## 2023-02-03 RX ADMIN — DILTIAZEM HYDROCHLORIDE 180 MG: 180 CAPSULE, EXTENDED RELEASE ORAL at 08:51

## 2023-02-03 RX ADMIN — TIOTROPIUM BROMIDE INHALATION SPRAY 2 PUFF: 3.12 SPRAY, METERED RESPIRATORY (INHALATION) at 07:47

## 2023-02-03 RX ADMIN — CLONIDINE HYDROCHLORIDE 0.1 MG: 0.1 TABLET ORAL at 08:51

## 2023-02-03 RX ADMIN — Medication 10 ML: at 08:51

## 2023-02-03 RX ADMIN — NALOXEGOL OXALATE 25 MG: 25 TABLET, FILM COATED ORAL at 06:33

## 2023-02-03 RX ADMIN — ACETAMINOPHEN 1000 MG: 500 TABLET ORAL at 14:46

## 2023-02-03 RX ADMIN — GABAPENTIN 100 MG: 100 CAPSULE ORAL at 14:40

## 2023-02-03 RX ADMIN — ACETAMINOPHEN 1000 MG: 500 TABLET ORAL at 06:33

## 2023-02-03 RX ADMIN — CLONIDINE HYDROCHLORIDE 0.1 MG: 0.1 TABLET ORAL at 21:17

## 2023-02-03 RX ADMIN — SODIUM CHLORIDE: 9 INJECTION, SOLUTION INTRAVENOUS at 18:36

## 2023-02-03 RX ADMIN — OXYCODONE 5 MG: 5 TABLET ORAL at 04:47

## 2023-02-03 RX ADMIN — LORAZEPAM 0.5 MG: 0.5 TABLET ORAL at 15:52

## 2023-02-03 RX ADMIN — ACETAMINOPHEN 1000 MG: 500 TABLET ORAL at 21:16

## 2023-02-03 RX ADMIN — OXYCODONE 5 MG: 5 TABLET ORAL at 18:25

## 2023-02-03 ASSESSMENT — PAIN DESCRIPTION - ORIENTATION
ORIENTATION: RIGHT

## 2023-02-03 ASSESSMENT — PAIN SCALES - GENERAL
PAINLEVEL_OUTOF10: 8
PAINLEVEL_OUTOF10: 4
PAINLEVEL_OUTOF10: 6
PAINLEVEL_OUTOF10: 2
PAINLEVEL_OUTOF10: 6
PAINLEVEL_OUTOF10: 6
PAINLEVEL_OUTOF10: 4
PAINLEVEL_OUTOF10: 0
PAINLEVEL_OUTOF10: 3

## 2023-02-03 ASSESSMENT — PAIN DESCRIPTION - DESCRIPTORS
DESCRIPTORS: ACHING

## 2023-02-03 ASSESSMENT — PAIN DESCRIPTION - LOCATION
LOCATION: BACK

## 2023-02-03 NOTE — PROGRESS NOTES
Urology Progress Note  [facility]    Provider: Lequita Aase, APRN - CNP  Patient ID:  Admission Date: 2023 Name: Kam Mayers Date: 2023 MRN: [id number]   Patient Location: [id number] : 1935  Attending: Anjali Allen MD Date of Service: 2/3/2023  PCP: Guillermo Pimentel MD     Diagnoses:  1. Acute right-sided low back pain without sciatica    2. Hypertensive urgency         Assessment/Plan:  79 yo female   Admitted with intractable back pain  Bladder distension on CT, no hydro  Renal function preserved  Reports voiding at baseline        Recommendations:  Urecholine BID  OOB as tolerated  VT today- yesterday  -reports voiding at baseline today  -Bladder scan with PVR  -Cr improving  Urology will sign out, call with any questions     The patient had a chance to ask questions which were answered. she understands the above plan. Subjective:   Blake Edwards is a 80 y.o. female. She was seen and examined this morning. Today we discussed voiding trial.      Objective:   Vitals:  Vitals:    23   BP: (!) 139/50   Pulse: 66   Resp: 15   Temp: 97.2 °F (36.2 °C)   SpO2: 95%       Intake/Output Summary (Last 24 hours) at 2/3/2023 0829  Last data filed at 2/3/2023 0445  Gross per 24 hour   Intake 10 ml   Output 850 ml   Net -840 ml       Physical Exam:  Gen: Alert and oriented x3, no acute distress  Skin: warmand well perfused, no rashes noted on the face, or arms.      Labs:  Lab Results   Component Value Date    WBC 5.9 2023    HGB 9.6 (L) 2023    HCT 28.6 (L) 2023    MCV 85.5 2023     (L) 2023     Lab Results   Component Value Date    CREATININE 1.3 (H) 2023    BUN 39 (H) 2023     (L) 2023    K 4.8 2023     2023    CO2 22 2023       Lequita Aase, APRN - CNP   2/3/2023

## 2023-02-03 NOTE — DISCHARGE SUMMARY
[facility]    Patient Demographics    Patient. Renae Ace  Date of Birth. 1935  MRN. [id number]     Primary care provider. Elio Prescott MD  (Tel: [phone])    Admit date: 1/31/2023    Discharge date (blank if same as Note Date): Note Date: 2/4/2023     Reason for Hospitalization. Chief Complaint   Patient presents with    Back Pain     Pt brought in from home by [facility]. Pt c/o chronic back pain that is getting worse, Pain increased yesterday evening. Pain radiates to pelvic area. Denies fall or injury. PT states MRI showed arthritis. Significant Findings. Principal Problem:    Intractable back pain  Active Problems:    Lumbar foraminal stenosis    Constipation    Hyponatremia    Urinary retention    Elevated serum creatinine  Resolved Problems:    * No resolved hospital problems. *       Problems and results from this hospitalization that need follow up. Low back pain  Mild hyponatremia  Constipation  Anemia  Urinary retention    Significant test results and incidental findings. CT ABDOMEN PELVIS W IV CONTRAST Additional Contrast? None   Final Result   Severely distended urinary bladder and large colonic stool burden. Interval resolution of left lower lobe consolidation. Invasive procedures and treatments. None     [facility]. Renae Ace is a 80 y.o. F with PMHx of AFIB on Eliquis, T2 DM, HTN, recently admitted with lower back pain, diagnosed with Vinton disease, and discharged home, presented with complaints of worsening bilateral lower back pain and difficulty walking. Patient denied any urinary retention, constipation, focal weakness, LE numbness, fever or chills. Intractable lower back pain: Improved. Lumbar MRI showed mild L5-S1 spondylolisthesis. s transitional spine anatomy.     NSGY consult appreciated: Back pain is multifactorial in etiology. No emergent neurosurgical intervention indicated. Recommended medication management and outpatient follow-up to pain specialist for palliative injections. PT OT recommended SNF. Continue current pain management. CAROLANN: resolved. Baseline Scr ~1.0-1.2. Creatinine trended up to 1.5. ARB held. Resolved IV hydration. Constipation: Improved  CT: Large colonic stool burden. Continue Movantik while on narcotics. Urinary Retention: Resolved  CT: distended urinary bladder. Urology consulted: Passed voiding trial.    Started on Urecholine twice daily. Monitor PVRs and straight cath if > 400 cc's. Ensure daily bowel movements. OOB as tolerated. Paroxysmal AFIB: Continue Eliquis, Cardizem and clonidine. HTN: Monitor BP on Cardizem and clonidine. Resume ARB once renal function improves/as needed. *    Anemia:  Anemia work-up pending. Monitor H&H and transfuse as needed. Goal hemoglobin greater than 7 g/dL. Mild hyponatremia: Monitor sodium level. Consults. IP CONSULT TO HOSPITALIST  IP CONSULT TO UROLOGY  IP CONSULT TO NEUROSURGERY  IP CONSULT TO PAIN MANAGEMENT  IP CONSULT TO HOME CARE NEEDS    Physical examination on discharge day. /75   Pulse 64   Temp 98.6 °F (37 °C) (Oral)   Resp 18   Ht 5' 2\" (1.575 m)   Wt 157 lb 6.5 oz (71.4 kg)   SpO2 96%   BMI 28.79 kg/m²   General appearance. Alert. Looks comfortable. HEENT. Sclera clear. Moist mucus membranes. Cardiovascular. Regular rate and rhythm, normal S1, S2. No murmur. Respiratory. Not using accessory muscles. Clear to auscultation bilaterally, no wheeze. Gastrointestinal. Abdomen soft, non-tender, not distended, normal bowel sounds  Neurology. Facial symmetry. No speech deficits. Moving all extremities equally. Extremities. No edema in lower extremities. Mild lower back tenderness. Skin.  Warm, dry, normal turgor      Condition at time of discharge: Stable    Medication instructions provided to patient at discharge. Medication List        START taking these medications      bethanechol 10 MG tablet  Commonly known as: URECHOLINE  Take 1 tablet by mouth 2 times daily     gabapentin 100 MG capsule  Commonly known as: NEURONTIN  Take 1 capsule by mouth 3 times daily for 30 days. methocarbamol 500 MG tablet  Commonly known as: ROBAXIN  Take 1 tablet by mouth 4 times daily as needed (Muscle spasm/back pain)     naloxegol 25 MG Tabs tablet  Commonly known as: MOVANTIK  Take 1 tablet by mouth every morning (before breakfast)     oxyCODONE 5 MG immediate release tablet  Commonly known as: ROXICODONE  Take 1 tablet by mouth every 6 hours as needed for Pain for up to 5 days. Max Daily Amount: 20 mg     polyethylene glycol 17 g packet  Commonly known as: GLYCOLAX  Take 17 g by mouth daily            CHANGE how you take these medications      atorvastatin 80 MG tablet  Commonly known as: LIPITOR  TAKE 1 TABLET EVERY NIGHT  What changed: See the new instructions. dilTIAZem 30 MG tablet  Commonly known as: CARDIZEM  As needed for episodes of tachycardia  What changed:   how much to take  how to take this  when to take this  reasons to take this     fluticasone 50 MCG/ACT nasal spray  Commonly known as: FLONASE  USE 2 SPRAYS NASALLY EVERY DAY  What changed: See the new instructions. levalbuterol 0.63 MG/3ML nebulization  Commonly known as: XOPENEX  USE 1 VIAL PER NEBULIZER EVERY 6 HOURS. MAX OF 4 TIMES PER 24 HOURS  What changed: See the new instructions. SITagliptin 100 MG tablet  Commonly known as: JANUVIA  Take 0.5 tablets by mouth daily  What changed: how much to take     True Metrix Blood Glucose Test strip  Generic drug: blood glucose test strips  1 each by In Vitro route daily As needed. What changed: Another medication with the same name was removed. Continue taking this medication, and follow the directions you see here.             CONTINUE taking these medications      albuterol sulfate HFA 108 (90 Base) MCG/ACT inhaler  Commonly known as: ProAir HFA  Inhale 2 puffs into the lungs every 6 hours as needed for Wheezing     budesonide-formoterol 160-4.5 MCG/ACT Aero  Commonly known as: Symbicort  INHALE 2 PUFFS TWICE DAILY     cloNIDine 0.1 MG tablet  Commonly known as: CATAPRES  TAKE 1 TABLET TWICE DAILY     dilTIAZem 180 MG extended release capsule  Commonly known as: CARDIZEM CD  Take 1 capsule by mouth daily     Eliquis 5 MG Tabs tablet  Generic drug: apixaban  TAKE 1 TABLET TWICE DAILY     * Lancets Misc  1 each by Does not apply route 2 times daily     * TRUEplus Lancets 33G Misc  1 daily     levothyroxine 75 MCG tablet  Commonly known as: SYNTHROID  TAKE 1 TABLET BY MOUTH EVERY DAY     lidocaine 4 % external patch  Place 1 patch onto the skin daily     * LORazepam 1 MG tablet  Commonly known as: ATIVAN     * LORazepam 0.5 MG tablet  Commonly known as: ATIVAN     meclizine 12.5 MG tablet  Commonly known as: ANTIVERT  TAKE 1 TABLET THREE TIMES DAILY AS NEEDED     Nebulizer/Tubing/Mouthpiece Kit  1 kit by Does not apply route 4 times daily as needed (shortness of breath)     OXYGEN     Premarin 0.625 MG/GM Crea vaginal cream  Generic drug: estrogens conjugated  Place 1 g vaginally three times a week     rOPINIRole 3 MG tablet  Commonly known as: REQUIP  TAKE 1 TABLET THREE TIMES DAILY     tiotropium 2.5 MCG/ACT Aers inhaler  Commonly known as: Spiriva Respimat  Inhale 2 puffs into the lungs daily     True Metrix Level 2 Normal Soln  As needed     True Metrix Meter w/Device Kit  To use to check sugars 4 times daily           * This list has 4 medication(s) that are the same as other medications prescribed for you. Read the directions carefully, and ask your doctor or other care provider to review them with you.                 STOP taking these medications      amLODIPine 5 MG tablet  Commonly known as: NORVASC     irbesartan 300 MG tablet  Commonly known as: AVAPRO     levoFLOXacin 500 MG tablet  Commonly known as: LEVAQUIN               Where to Get Your Medications        These medications were sent to [address], [address]  [address] [place] [id number]      Phone: [phone]   bethanechol 10 MG tablet  gabapentin 100 MG capsule  methocarbamol 500 MG tablet  naloxegol 25 MG Tabs tablet  oxyCODONE 5 MG immediate release tablet       Information about where to get these medications is not yet available    Ask your nurse or doctor about these medications  polyethylene glycol 17 g packet  SITagliptin 100 MG tablet         Discharge recommendations given to patient. Follow Up. With PCP in 1 week   Disposition. SNF  Activity. activity as tolerated  Diet: ADULT DIET; Regular  ADULT ORAL NUTRITION SUPPLEMENT; Dinner, Breakfast; Standard High Calorie/High Protein Oral Supplement      Spent 35 minutes in discharge process.     Signed:  Britt Duran MD     2/4/2023 2:04 PM

## 2023-02-03 NOTE — CARE COORDINATION
SW has been checking pt's [facility] precert status all day. Status as of 4:15pm today is still \"Pending. \"  SW called and updated pt's dtr this information as she was calling and asking. Will continue to follow. Addendum:  Checked [facility] website again at 4:81TP and pt's precert is still pending.     Electronically signed by KIET Lewis, FABIANO on 2/3/2023 at 4:16 PM

## 2023-02-03 NOTE — PROGRESS NOTES
Pt. Resting in bed after breakfast. VSS, afebrile. Excellent PO food/fluid for breakfast. Pt. Pain to left lower back. Lidocaine patch for pain at this time. Right AC PIV infiltrated right arm edematous. Normal Saline infusion stopped. PIV removed and arm wrapped in kerlix and loose ace wrap to protect the integrity of skin. Pt. Skin thin and fragile. Scattered bruising edma and redness to bilateral upper extremities. Skin tear to left AC area. Silicone intact and paper tape. Pt. Drinking and eating without problem. Plan discharge to rehab today, awaiting further plans. Up to bathroom with PCA this AM with stand by assist. Bed alarm intact and call light in reach.

## 2023-02-03 NOTE — PROGRESS NOTES
Pre-Cert pending. PIV replaced and IVF's restarted. Pt in bed watching TV call light in reach and bed alarm intact.

## 2023-02-03 NOTE — PROGRESS NOTES
Assessment completed. VSS. Patient awake, alert, and in bed. Medications given per MAR. Patient complaining of back pain 4/10 at this time. . No coverage needed. Safety precautions in place. Call light within reach. Will continue to monitor.

## 2023-02-04 VITALS
HEART RATE: 74 BPM | HEIGHT: 62 IN | OXYGEN SATURATION: 96 % | RESPIRATION RATE: 18 BRPM | DIASTOLIC BLOOD PRESSURE: 77 MMHG | BODY MASS INDEX: 28.97 KG/M2 | SYSTOLIC BLOOD PRESSURE: 165 MMHG | WEIGHT: 157.41 LBS | TEMPERATURE: 98.6 F

## 2023-02-04 LAB
A/G RATIO: 1.4 (ref 1.1–2.2)
ALBUMIN SERPL-MCNC: 3.6 G/DL (ref 3.4–5)
ALP BLD-CCNC: 112 U/L (ref 40–129)
ALT SERPL-CCNC: 19 U/L (ref 10–40)
ANION GAP SERPL CALCULATED.3IONS-SCNC: 6 MMOL/L (ref 3–16)
AST SERPL-CCNC: 22 U/L (ref 15–37)
BILIRUB SERPL-MCNC: <0.2 MG/DL (ref 0–1)
BUN BLDV-MCNC: 32 MG/DL (ref 7–20)
CALCIUM SERPL-MCNC: 9.3 MG/DL (ref 8.3–10.6)
CHLORIDE BLD-SCNC: 103 MMOL/L (ref 99–110)
CO2: 22 MMOL/L (ref 21–32)
CREAT SERPL-MCNC: 0.9 MG/DL (ref 0.6–1.2)
FERRITIN: 139.6 NG/ML (ref 15–150)
GFR SERPL CREATININE-BSD FRML MDRD: >60 ML/MIN/{1.73_M2}
GLUCOSE BLD-MCNC: 118 MG/DL (ref 70–99)
GLUCOSE BLD-MCNC: 120 MG/DL (ref 70–99)
GLUCOSE BLD-MCNC: 172 MG/DL (ref 70–99)
HCT VFR BLD CALC: 27.3 % (ref 36–48)
HEMOGLOBIN: 9.2 G/DL (ref 12–16)
IRON SATURATION: 13 % (ref 15–50)
IRON: 35 UG/DL (ref 37–145)
MAGNESIUM: 1.9 MG/DL (ref 1.8–2.4)
MCH RBC QN AUTO: 28.7 PG (ref 26–34)
MCHC RBC AUTO-ENTMCNC: 33.6 G/DL (ref 31–36)
MCV RBC AUTO: 85.4 FL (ref 80–100)
PDW BLD-RTO: 16 % (ref 12.4–15.4)
PERFORMED ON: ABNORMAL
PERFORMED ON: ABNORMAL
PHOSPHORUS: 3.3 MG/DL (ref 2.5–4.9)
PLATELET # BLD: 135 K/UL (ref 135–450)
PMV BLD AUTO: 7.8 FL (ref 5–10.5)
POTASSIUM SERPL-SCNC: 4.8 MMOL/L (ref 3.5–5.1)
RBC # BLD: 3.2 M/UL (ref 4–5.2)
SODIUM BLD-SCNC: 131 MMOL/L (ref 136–145)
TOTAL IRON BINDING CAPACITY: 270 UG/DL (ref 260–445)
TOTAL PROTEIN: 6.2 G/DL (ref 6.4–8.2)
WBC # BLD: 5.2 K/UL (ref 4–11)

## 2023-02-04 PROCEDURE — 6370000000 HC RX 637 (ALT 250 FOR IP): Performed by: INTERNAL MEDICINE

## 2023-02-04 PROCEDURE — 6370000000 HC RX 637 (ALT 250 FOR IP): Performed by: NURSE PRACTITIONER

## 2023-02-04 PROCEDURE — 94640 AIRWAY INHALATION TREATMENT: CPT

## 2023-02-04 PROCEDURE — 97116 GAIT TRAINING THERAPY: CPT

## 2023-02-04 PROCEDURE — 2580000003 HC RX 258: Performed by: INTERNAL MEDICINE

## 2023-02-04 PROCEDURE — 36415 COLL VENOUS BLD VENIPUNCTURE: CPT

## 2023-02-04 PROCEDURE — 6370000000 HC RX 637 (ALT 250 FOR IP): Performed by: EMERGENCY MEDICINE

## 2023-02-04 PROCEDURE — 97530 THERAPEUTIC ACTIVITIES: CPT

## 2023-02-04 PROCEDURE — 94761 N-INVAS EAR/PLS OXIMETRY MLT: CPT

## 2023-02-04 PROCEDURE — 80053 COMPREHEN METABOLIC PANEL: CPT

## 2023-02-04 PROCEDURE — 97535 SELF CARE MNGMENT TRAINING: CPT

## 2023-02-04 PROCEDURE — 83735 ASSAY OF MAGNESIUM: CPT

## 2023-02-04 PROCEDURE — 84100 ASSAY OF PHOSPHORUS: CPT

## 2023-02-04 PROCEDURE — 85027 COMPLETE CBC AUTOMATED: CPT

## 2023-02-04 PROCEDURE — 2700000000 HC OXYGEN THERAPY PER DAY

## 2023-02-04 RX ORDER — OXYCODONE HYDROCHLORIDE 5 MG/1
5 TABLET ORAL EVERY 6 HOURS PRN
Qty: 20 TABLET | Refills: 0 | Status: SHIPPED | OUTPATIENT
Start: 2023-02-04 | End: 2023-02-09

## 2023-02-04 RX ORDER — BETHANECHOL CHLORIDE 10 MG/1
10 TABLET ORAL 2 TIMES DAILY
Qty: 90 TABLET | Refills: 3 | Status: SHIPPED | OUTPATIENT
Start: 2023-02-04

## 2023-02-04 RX ORDER — GABAPENTIN 100 MG/1
100 CAPSULE ORAL 3 TIMES DAILY
Qty: 90 CAPSULE | Refills: 0 | Status: SHIPPED | OUTPATIENT
Start: 2023-02-04 | End: 2023-03-06

## 2023-02-04 RX ORDER — METHOCARBAMOL 500 MG/1
500 TABLET, FILM COATED ORAL 4 TIMES DAILY PRN
Qty: 30 TABLET | Refills: 0 | Status: SHIPPED | OUTPATIENT
Start: 2023-02-04 | End: 2023-02-14

## 2023-02-04 RX ADMIN — APIXABAN 2.5 MG: 5 TABLET, FILM COATED ORAL at 09:15

## 2023-02-04 RX ADMIN — GABAPENTIN 100 MG: 100 CAPSULE ORAL at 09:15

## 2023-02-04 RX ADMIN — BETHANECHOL CHLORIDE 10 MG: 10 TABLET ORAL at 09:15

## 2023-02-04 RX ADMIN — DILTIAZEM HYDROCHLORIDE 180 MG: 180 CAPSULE, EXTENDED RELEASE ORAL at 09:15

## 2023-02-04 RX ADMIN — OXYCODONE HYDROCHLORIDE 10 MG: 5 TABLET ORAL at 14:58

## 2023-02-04 RX ADMIN — OXYCODONE HYDROCHLORIDE 10 MG: 5 TABLET ORAL at 09:21

## 2023-02-04 RX ADMIN — CLONIDINE HYDROCHLORIDE 0.1 MG: 0.1 TABLET ORAL at 09:15

## 2023-02-04 RX ADMIN — ROPINIROLE HYDROCHLORIDE 1 MG: 1 TABLET, FILM COATED ORAL at 14:58

## 2023-02-04 RX ADMIN — LEVOTHYROXINE SODIUM 75 MCG: 0.07 TABLET ORAL at 09:15

## 2023-02-04 RX ADMIN — ACETAMINOPHEN 1000 MG: 500 TABLET ORAL at 06:29

## 2023-02-04 RX ADMIN — Medication 10 ML: at 09:22

## 2023-02-04 RX ADMIN — ALOGLIPTIN 6.25 MG: 6.25 TABLET, FILM COATED ORAL at 09:15

## 2023-02-04 RX ADMIN — ROPINIROLE HYDROCHLORIDE 1 MG: 1 TABLET, FILM COATED ORAL at 09:15

## 2023-02-04 RX ADMIN — NALOXEGOL OXALATE 25 MG: 25 TABLET, FILM COATED ORAL at 07:29

## 2023-02-04 RX ADMIN — LORAZEPAM 0.5 MG: 0.5 TABLET ORAL at 06:29

## 2023-02-04 RX ADMIN — Medication 2 PUFF: at 08:47

## 2023-02-04 RX ADMIN — OXYCODONE 5 MG: 5 TABLET ORAL at 03:00

## 2023-02-04 RX ADMIN — GABAPENTIN 100 MG: 100 CAPSULE ORAL at 14:59

## 2023-02-04 ASSESSMENT — PAIN DESCRIPTION - ORIENTATION
ORIENTATION: LEFT
ORIENTATION: RIGHT
ORIENTATION: RIGHT;LOWER

## 2023-02-04 ASSESSMENT — PAIN SCALES - WONG BAKER
WONGBAKER_NUMERICALRESPONSE: 0

## 2023-02-04 ASSESSMENT — PAIN DESCRIPTION - DESCRIPTORS
DESCRIPTORS: ACHING

## 2023-02-04 ASSESSMENT — PAIN DESCRIPTION - ONSET
ONSET: ON-GOING
ONSET: ON-GOING

## 2023-02-04 ASSESSMENT — PAIN DESCRIPTION - LOCATION
LOCATION: BACK

## 2023-02-04 ASSESSMENT — PAIN SCALES - GENERAL
PAINLEVEL_OUTOF10: 0
PAINLEVEL_OUTOF10: 7
PAINLEVEL_OUTOF10: 7
PAINLEVEL_OUTOF10: 0
PAINLEVEL_OUTOF10: 8
PAINLEVEL_OUTOF10: 6
PAINLEVEL_OUTOF10: 5

## 2023-02-04 ASSESSMENT — PAIN DESCRIPTION - PAIN TYPE
TYPE: ACUTE PAIN
TYPE: ACUTE PAIN

## 2023-02-04 ASSESSMENT — PAIN - FUNCTIONAL ASSESSMENT
PAIN_FUNCTIONAL_ASSESSMENT: ACTIVITIES ARE NOT PREVENTED
PAIN_FUNCTIONAL_ASSESSMENT: ACTIVITIES ARE NOT PREVENTED

## 2023-02-04 ASSESSMENT — PAIN DESCRIPTION - FREQUENCY
FREQUENCY: CONTINUOUS
FREQUENCY: CONTINUOUS

## 2023-02-04 NOTE — PROGRESS NOTES
Assessment completed. VSS. Patient awake, alert, and in bed. Medications given per MAR. Back pain 2/10. Fluids maintained. . No coverage needed. Safety precautions in place. Call light within reach. Will continue to monitor.

## 2023-02-04 NOTE — PROGRESS NOTES
[facility]   Phone: [phone]    Physical Therapy    [] Initial Evaluation            [x] Daily Treatment Note         [] Discharge Summary      Patient: Suleman Gunn   : 1935   MRN: [id number]   Date of Service:  2023  Admitting Diagnosis: Intractable back pain  Current Admission Summary: This is a 80 y.o. female who presents to the ED for back pain. Right lower back. Radiates towards right hip. Does not shoot down the leg. This is the same back pain that she was having when she was in the hospital this past week that she get a CT scan/MRI for. It is been refractory to Vicodin that she took this morning. 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 Agree with medication management and outpatient follow-up to pain specialist for palliative injections  Past Medical History:  has a past medical history of Arthritis, Atrial fibrillation ([address] Bursitis, Diabetes mellitus type II, controlled ([address] GERD (gastroesophageal reflux disease), HTN (hypertension), Hyperlipidemia, Hypothyroid, Insomnia, Lumbago, Parkinson disease ([address] Restless legs syndrome (RLS), and SVT (supraventricular tachycardia) ([address] Past Surgical History:  has a past surgical history that includes Appendectomy; Colonoscopy; and Cholecystectomy, laparoscopic (2013). Discharge Recommendations: Suleman Gunn scored a / on the AM-PAC short mobility form. Current research shows that an AM-PAC score of 18 or greater is typically associated with a discharge to the patient's home setting.  Based on the patient's AM-PAC score and their current functional mobility deficits, it is recommended that the patient have 2-3 sessions per week of Physical Therapy at d/c to increase the patient's independence. At this time, this patient demonstrates the endurance and safety to discharge home with Home PT and a follow up treatment frequency of 2-3x/wk. Please see assessment section for further patient specific details. HOME HEALTH CARE: LEVEL 2 SOCIAL     - Initial home health evaluation to occur within 24-48 hours, in patient home   - Therapy to evaluate with goal of regaining prior level of functioning   - Therapy to evaluate if patient has [address] needs for personal care   -  evaluation within 24-48 hours, includes evaluation of resources and insurance to determine AL/IL/LTC/Medicaid options   - [place] [facility] Referral   - [address] to discuss home support and care needs post discharge within two weeks of discharge. If patient discharges prior to next session this note will serve as a discharge summary. Please see below for the latest assessment towards goals.      DME Required For Discharge: patient has all required DME for discharge  Precautions/Restrictions: high fall risk  Weight Bearing Restrictions: no restrictions  Required Braces/Orthotics: no braces required  Positional Restrictions:no positional restrictions, nursing approval obtained prior to evaluation    Pre-Admission Information   Lives With: daughter               Type of Home: house, bilevel  Home Layout: two level, bedroom/bathroom upstairs, 7 stairs to 2nd level with B HR  Home Access:  1 step to enter without rails  (has post to hold onto)  Bathroom Layout: walk in shower  Bathroom Equipment: grab bars in shower, shower chair  Toilet Height: standard height  Home Equipment: rollator - 4 wheeled walker, oxygen, (2L/min O2), transport chair  Transfer Assistance: Independent without use of device  Ambulation Assistance:Independent without use of device  ADL Assistance: independent with all ADL's  IADL Assistance: requires assistance with all homemaking tasks  Active :        [] Yes                 [x] No   (Dtr drives pt)  Current Employment: retired. Occupation: [employer] years  Hobbies: watch reality TV  Recent Falls: no falls    Examination   Vision:   Vision Gross Assessment: Impaired, Vision Corrective Device: wears glasses at all times, and Visual History: macular degeneration  Hearing:   hard of hearing, left hearing aid, right hearing aid  Observation:   Bruising noted to BUEs, small skin tear on LUE above elbow       Subjective  General: Pt seated up in chair upon arrival. Requesting pain meds but just had them per nursing and very concerned that she have them at home. Pt agreeable to working with PT. Pain: 3/10 low back, faces  Pain Interventions: RN notified and repositioned        Functional Mobility  Bed Mobility  Bed mobility not completed on this date. Comments: Pt in chair at beginning of treatment and remained in chair at end of session. Transfers  Sit to stand transfer: supervision  Stand to sit transfer: supervision  Toilet transfer: supervision - from [facility] pulled up to sink  Comments: transfers from chair and [facility]  Ambulation  Surface:level surface  Assistive Device: rolling walker  Assistance: stand by assistance  Distance:20 ft, 120 ft  Gait Mechanics: steady gait, flexed posture,  Comments:  Pt amb to sink and stood for 8 min for oral care, bathing, gown change. Pt sat to change brief and then amb in hallway to stairs and back to recliner chair. Pt 93% on 2LO2. Stair Mobility  Number of Steps: 12  Step Height: 6 inch  Hand Rails: (B) handrail  Device: rolling walker  Other Appliance: supplemental O2  Assistance: contact guard assistance  Comments: Pt amb up steps with SBA, down steps with CGA. Pt uses bilat handrails at home.     Balance  Static Sitting Balance: good: independent with functional balance in unsupported position  Dynamic Sitting Balance: good: independent with functional balance in unsupported position  Static Standing Balance: fair (-): maintains balance at SBA with use of UE support  Dynamic Standing Balance: fair (-): maintains balance at SBA with use of UE support  Comments: RW in standing. Other Therapeutic Interventions  Bathing, dressing, gown and brief change with set up assist only. Discussed DC recommendations and home set up with pt and daughter at length. Functional Outcomes  AM-PAC Inpatient Mobility Raw Score : 19              Cognition  WFL  Memory: decreased short term memory   Orientation:    alert and oriented x 4  Command Following:   [facility]  Barriers To Learning: visual and hearing  Patient Education: patient educated on goals, PT role and benefits, plan of care, general safety, functional mobility training, transfer training, discharge recommendations  Learning Assessment:  patient verbalizes understanding, would benefit from continued reinforcement    Assessment  Activity Tolerance: limited by pain  Impairments Requiring Therapeutic Intervention: decreased functional mobility, decreased strength, decreased safety awareness, decreased endurance, decreased balance  Prognosis: fair  Clinical Assessment: Pt seen as cotx due to case management priority for DC recommendations. Pt improved and seems to be functioning close to her baseline. Pt safe with mobility to return home with home PT. Patient not at baseline function and would benefit from skilled PT to address above deficits and facilitate return to baseline function. Able to perform short distance ambulation with SBA this date. Increased pain with mobility with need for frequent rest breaks, but able to continue.      Safety Interventions: patient left in chair, chair alarm in place, call light within reach, gait belt, patient at risk for falls, nurse notified, and family/caregiver present -daughter coming during treatment    Plan  Frequency: 3-5 x/per week  Current Treatment Recommendations: strengthening, balance training, functional mobility training, transfer training, gait training, stair training, endurance training, home exercise program, and safety education    Goals  Patient Goals: did not state   Short Term Goals:  Time Frame: discharge  Patient will complete bed mobility at supervision   Patient will complete transfers at supervision   Patient will ambulate 50 ft with use of rolling walker at contact guard assistance  Patient will ascend/descend 4 stairs with (R) ascending handrail at minimal assistance  No goals met in full this date, 2/4.       Therapy Session Time      Individual Group Co-treatment   Time In     0348   Time Out     1342   Minutes     27     Total Treatment Minutes:  27 Minutes       Electronically Signed By: Jd Coreas [id number]

## 2023-02-04 NOTE — PROGRESS NOTES
Hospitalist Progress Note      PCP: Marina Prasad MD    Date of Admission: 1/31/2023      Chief Complaint: Intractable back pain      Hospital Course:  Mike Henry is a 80 y.o. F with PMHx of AFIB on Eliquis, T2 DM, HTN, recently admitted with lower back pain, diagnosed with [place] disease, and discharged home, presented with complaints of worsening bilateral lower back pain and difficulty walking. Patient denied any urinary retention, constipation, focal weakness, LE numbness, fever or chills. Subjective: Patient seen and examined. Back pain improved. Constipated resolved with several BMs. No fever or chills.         Medications:  Reviewed    Infusion Medications    dextrose      sodium chloride       Scheduled Medications    iron sucrose (VENOFER) iv piggyback 100 mL  300 mg IntraVENous Once    levothyroxine  75 mcg Oral Daily    LORazepam  0.5 mg Oral BID AC    LORazepam  1 mg Oral Nightly    rOPINIRole  1 mg Oral TID    tiotropium  2 puff Inhalation Daily    insulin lispro  0-4 Units SubCUTAneous TID WC    insulin lispro  0-4 Units SubCUTAneous Nightly    alogliptin  6.25 mg Oral Daily    apixaban  2.5 mg Oral BID    bethanechol  10 mg Oral BID    naloxegol  25 mg Oral QAM AC    acetaminophen  1,000 mg Oral 3 times per day    gabapentin  100 mg Oral TID    lidocaine  1 patch TransDERmal Daily    atorvastatin  80 mg Oral Nightly    mometasone-formoterol  2 puff Inhalation BID    cloNIDine  0.1 mg Oral BID    dilTIAZem  180 mg Oral Daily    [Held by provider] losartan  50 mg Oral Nightly    sodium chloride flush  5-40 mL IntraVENous 2 times per day     PRN Meds: fluticasone, levalbuterol, meclizine, dextrose bolus **OR** dextrose bolus, glucagon (rDNA), dextrose, oxyCODONE **OR** oxyCODONE, methocarbamol, HYDROmorphone, sodium chloride flush, sodium chloride, ondansetron **OR** ondansetron, polyethylene glycol, albuterol sulfate HFA      Intake/Output Summary (Last 24 hours) at 2/4/2023 1336  Last data filed at 2/4/2023 0905  Gross per 24 hour   Intake 837 ml   Output 900 ml   Net -63 ml       Physical Exam Performed:    /75   Pulse 64   Temp 98.6 °F (37 °C) (Oral)   Resp 18   Ht 5' 2\" (1.575 m)   Wt 157 lb 6.5 oz (71.4 kg)   SpO2 96%   BMI 28.79 kg/m²     General appearance: No apparent distress, appears stated age and cooperative. HEENT: Pupils equal, round, and reactive to light. Conjunctivae clear. Neck: Supple, with full range of motion. No jugular venous distention. Trachea midline. Respiratory:  Normal respiratory effort. Clear to auscultation, bilaterally without Rales/Wheezes/Rhonchi. Cardiovascular: Regular rate and rhythm with normal S1/S2 without murmurs, rubs or gallops. Abdomen: Soft, non-tender, non-distended with normal bowel sounds. Musculoskeletal: No clubbing, cyanosis or edema bilaterally. Full range of motion without deformity. Skin: Skin color, texture, turgor normal.  No rashes or lesions. Neurologic:  Neurovascularly intact without any focal sensory/motor deficits. Cranial nerves: II-XII intact, grossly non-focal.  Psychiatric: Alert and oriented, thought content appropriate, normal insight  Capillary Refill: Brisk, 3 seconds, normal   Peripheral Pulses: +2 palpable, equal bilaterally       Labs:   Recent Labs     02/02/23 0442 02/03/23 0451 02/04/23 0446   WBC 8.9 5.9 5.2   HGB 9.9* 9.6* 9.2*   HCT 29.2* 28.6* 27.3*    129* 135     Recent Labs     02/02/23 0442 02/03/23 0451 02/04/23 0446   * 131* 131*   K 4.6 4.8 4.8   CL 97* 100 103   CO2 23 22 22   BUN 33* 39* 32*   CREATININE 1.5* 1.3* 0.9   CALCIUM 9.1 9.2 9.3   PHOS 5.9* 4.4 3.3     Recent Labs     02/02/23 0442 02/03/23 0451 02/04/23 0446   AST 24 21 22   ALT 19 19 19   BILITOT 0.4 0.3 <0.2   ALKPHOS 122 121 112     No results for input(s): INR in the last 72 hours. No results for input(s): [place] in the last 72 hours.     Urinalysis:      Lab Results Component Value Date/Time    NITRU Negative 01/31/2023 07:23 AM    WBCUA 0 01/31/2023 07:23 AM    BACTERIA None Seen 01/31/2023 07:23 AM    RBCUA 1 01/31/2023 07:23 AM    BLOODU TRACE 01/31/2023 07:23 AM    SPECGRAV 1.010 01/31/2023 07:23 AM    GLUCOSEU Negative 01/31/2023 07:23 AM       Radiology:  CT ABDOMEN PELVIS W IV CONTRAST Additional Contrast? None   Final Result   Severely distended urinary bladder and large colonic stool burden. Interval resolution of left lower lobe consolidation. IP CONSULT TO HOSPITALIST  IP CONSULT TO UROLOGY  IP CONSULT TO NEUROSURGERY  IP CONSULT TO PAIN MANAGEMENT    Assessment/Plan:    Active Hospital Problems    Diagnosis     Lumbar foraminal stenosis [M48.061]      Priority: Medium    Constipation [K59.00]      Priority: Medium    Hyponatremia [E87.1]      Priority: Medium    Urinary retention [R33.9]      Priority: Medium    Elevated serum creatinine [R79.89]      Priority: Medium    Intractable back pain [M54.9]      Priority: Medium     CAROLANN: improved. Baseline Scr ~1.0. Creatinine trended up to 1.5. Cozaar held. Stop IV NS at 75 cc/h  Monitor BMP. Avoid nephrotoxins. Intractable lower back pain: Improved. Lumbar MRI showed mild L5-S1 spondylolisthesis. s transitional spine anatomy. NSGY consult appreciated: Back pain is multifactorial in etiology. No emergent neurosurgical intervention indicated. Recommended medication management and outpatient follow-up to pain specialist for palliative injections. PT OT recommending SNF. Constipation: Improved  CT: Large colonic stool burden. Continue Movantik. Urinary Retention:  CT: distended urinary bladder. Urology consult appreciated: Started on Urecholine twice daily. Tony discontinued for voiding trial.    Monitor PVRs and straight cath if > 400 cc's. Treat constipation. OOB as tolerated. Paroxysmal AFIB:  Continue Eliquis, Cardizem and clonidine.       HTN: Monitor BP on home medications. DVT Prophylaxis: Eliquis  Diet: ADULT DIET;  Regular  ADULT ORAL NUTRITION SUPPLEMENT; Dinner, Breakfast; Standard High Calorie/High Protein Oral Supplement  Code Status: Full Code  PT/OT Eval Status: SNF    Dispo - once medically stable      Lang Erazo MD

## 2023-02-04 NOTE — PROGRESS NOTES
[address] [facility],[address] Msb Department   Phone: [phone]    Occupational Therapy    [] Initial Evaluation            [x] Daily Treatment Note         [] Discharge Summary      Patient: Olivia Najera   : 1935   MRN: [id number]   Date of Service:  2023    Admitting Diagnosis:  Intractable back pain  Current Admission Summary: Olivia Najera is a 80 y.o. female with incomplete bladder emptying. Onset of symptoms was days ago with improving course since that time. Symptoms are aggravated by debility. Symptoms improved with urecholine. Associated symptoms include incomplete bladder emptying. Patient also reports hx of oab. She has tried the following treatments: urecholine. Past Medical History:  has a past medical history of Arthritis, Atrial fibrillation ([address] Bursitis, Diabetes mellitus type II, controlled ([address] GERD (gastroesophageal reflux disease), HTN (hypertension), Hyperlipidemia, Hypothyroid, Insomnia, Lumbago, Parkinson disease ([address] Restless legs syndrome (RLS), and SVT (supraventricular tachycardia) ([address] Past Surgical History:  has a past surgical history that includes Appendectomy; Colonoscopy; and Cholecystectomy, laparoscopic (2013). Discharge Recommendations: Olivia Najera scored a 21/24 on the AM-PAC ADL Inpatient form. Current research shows that an AM-PAC score of 18 or greater is typically associated with a discharge to the patient's home setting. Based on the patient's AM-PAC score, and their current ADL deficits, it is recommended that the patient have 2-3 sessions per week of Occupational Therapy at d/c to increase the patient's independence. At this time, this patient demonstrates the endurance and safety to discharge home with home health care (home vs OP services) and a follow up treatment frequency of 2-3x/wk. Please see assessment section for further patient specific details.     HOME HEALTH CARE: LEVEL 2 SOCIAL     - Initial home health evaluation to occur within 24-48 hours, in patient home   - Therapy to evaluate with goal of regaining prior level of functioning   - Therapy to evaluate if patient has [address] needs for personal care   -  evaluation within 24-48 hours, includes evaluation of resources and insurance to determine AL/IL/LTC/Medicaid options   - [place] on Aging Referral   - [address] to discuss home support and care needs post discharge within two weeks of discharge. DME Required For Discharge: DME to be determined pending patient progress    Precautions/Restrictions: high fall risk  Weight Bearing Restrictions: no restrictions  [] Right Upper Extremity  [] Left Upper Extremity [] Right Lower Extremity  [] Left Lower Extremity     Required Braces/Orthotics: no braces required   [] Right  [] Left  Positional Restrictions:no positional restrictions    Pre-Admission Information   Lives With: daughter               Type of Home: house, bilevel  Home Layout: two level, bedroom/bathroom upstairs, 7 stairs to 2nd level with B HR  Home Access:  1 step to enter without rails  (has post to hold onto)  Bathroom Layout: walk in shower  Bathroom Equipment: grab bars in shower, shower chair  Toilet Height: standard height  Home Equipment: rollator - 4 wheeled walker, oxygen, (2L/min O2), transport chair  Transfer Assistance: Independent without use of device  Ambulation Assistance:Independent with 0LG  ADL Assistance: independent with all ADL's  IADL Assistance: requires assistance with all homemaking tasks - daughter assists with medication management   Active :        [] Yes                 [x] No   (Dtr drives pt)  Current Employment: retired.   Occupation: [employer] 30 years  Hobbies: watch reality TV  Recent Falls: no falls      Subjective  General: Patient  in chair on arrival - agreeable to therapy - states she doesn't think her pain medication is available but would take some if it is - does not complain of pain during activity and requests to complete ADLs   Pain: Patient does not rate upon questioning  Pain Interventions: RN notified and repositioned         Activities of Daily Living  Basic Activities of Daily Living  Grooming: supervision  Grooming Comments: stood about 8 minutes at sink to complete oral care and sponge bathing   Upper Extremity Bathing: supervision  Lower Extremity Bathing: supervision   Bathing Equipment: none  Bathing Comments: pt stood to complete washing except for feet, sat on BSC to wash feet     Upper Extremity Dressing: stand by assistance  Lower Extremity Dressing: stand by assistance  Dressing Equipment: none  Dressing Comments: donned gown, brief and socks   General Comments: pt sequenced through activities without verbal cues in a reasonable amount of time      Instrumental Activities of Daily Living  No IADL completed on this date. Functional Mobility  Bed Mobility  Bed mobility not completed on this date. Comments: pt up in chair beginning and end of session   Transfers  Sit to stand transfer:supervision  Stand to sit transfer: supervision  Stand step transfer: supervision  Comments: RW and 02 line   Functional Mobility:  Sitting Balance: modified independent. Sitting Balance Comment: on bSC to wash feet   Standing Balance: supervision. Standing Balance Comment: functional transfers, ambulation  Functional Mobility: .   supervision  Functional Mobility Device Use: rolling walker  Functional Mobility Comment: Patient ambulated in room and hallway with supervision and RW - up/down flight of steps with one handrail and SBA ascending, CGA descending (use of HHA on L side due to railing on both sides at home     Other Therapeutic Interventions    Functional Outcomes  AM-PAC Inpatient Daily Activity Raw Score: 21    Cognition  Overall Cognitive Status: Impaired  Attention Span: attends with cues to redirect, difficulty attending to directions, difficulty dividing attention  Memory: decreased short term memory  Comments: Patient with continued short term memory deficits but processing through ADLs with good accuracy and speed    Orientation:    alert and oriented x 4  Command Following:   accurately follows one step commands     Education  Barriers To Learning: cognition, visual, and hearing  Patient Education: patient educated on goals, OT role and benefits, plan of care, precautions, ADL adaptive strategies, energy conservation, low vision education, disease specific education, transfer training, discharge recommendations  Learning Assessment:  patient verbalizes understanding, would benefit from continued reinforcement    Assessment  Activity Tolerance: Patient tolerated treatment well   Impairments Requiring Therapeutic Intervention: decreased functional mobility, decreased ADL status, decreased safety awareness, decreased cognition, decreased endurance, decreased balance, decreased vision, decreased IADL, decreased coordination, increased pain  Prognosis: fair and guarded  Clinical Assessment: Patient presents with the above deficits impacting occupational performance and functioning below baseline, skilled OT services needed to address deficits to facilitate safe return to PLOF.   Patient with SBA for functional transfers/mobility and supervision/SBA for ADLs  Safety Interventions: patient left in bed, bed alarm in place, call light within reach, gait belt, patient at risk for falls, and nurse notified    Plan  Frequency: 3-5 x/per week  Current Treatment Recommendations: balance training, functional mobility training, transfer training, endurance training, patient/caregiver education, ADL/self-care training, IADL training, pain management, home exercise program, safety education, and positioning    Goals  Patient Goals: Patient wants no back pain    Short Term Goals:  Time Frame: discharge    Patient will complete upper body ADL at stand by assistance - GOAL MET 2/2  Patient will complete lower body ADL at stand by assistance  - GOAL MET 2/4  Patient will complete toileting at stand by assistance - not addressed today however was able to complete pericare and clothing management for sponge bathing SBA  Patient will complete functional transfers at stand by assistance - GOAL MET 2/2   Patient will complete functional mobility at stand by assistance - GOAL MET 2/2 2/2/23 NEW GOALS :    Patient will complete upper body ADL at modified independence  Patient will complete functional transfers at supervision   Patient will complete functional mobility at supervision     Therapy Session Time     Individual Group Co-treatment   Time In    1316   Time Out    1342   Minutes    26        Timed Code Treatment Minutes:  Timed Code Treatment Minutes: 26 Minutes   Total Treatment Minutes:  26       Electronically Signed By: ROSS Figueroa/VENESSA [id number]

## 2023-02-04 NOTE — PROGRESS NOTES
Patient's daughter called and had concerns about her mother getting a new IV placed. Educated her on how the doctors would like to continue to fluids for one more day. All other questions answered at this time.

## 2023-02-04 NOTE — PROGRESS NOTES
Patient's daughter, Ronelle Rinne, updated that her mother would be moving at 3A unit in room [phone]. All questions answered. Patient left 5C unit by transport with belongings.

## 2023-02-04 NOTE — PROGRESS NOTES
Data- discharge order received, pt verbalized agreement to discharge, disposition to previous residence, no needs for HHC/DME. Action- discharge instructions prepared and given to pt, pt verbalized understanding. Medication information packet given r/t NEW and/or CHANGED prescriptions emphasizing name/purpose/side effects, pt verbalized understanding. Discharge instruction summary: Diet- regular, Activity- stand by assist, Primary Care Physician as follows: Tami Conley -[phone] f/u appointment reviewed and complete, immunizations reviewed and complete, prescription medications filled . Inpatient surgical procedure precautions reviewed:    1. WEIGHT: Admit Weight: 151 lb 1.6 oz (68.5 kg) (01/31/23 1450)        Today  Weight: 157 lb 6.5 oz (71.4 kg) (02/04/23 0358)       2. O2 SAT.: SpO2: 96 % (02/04/23 1131)    Response- Pt belongings gathered, IV removed. Disposition is home (no HHC/DME needs), transported with RN, taken to lobby via w/c w/ wheelchair, no complications.

## 2023-02-04 NOTE — CARE COORDINATION
CM spoke to patients daughter Lashay Dai and informed her of the insurance company's intent to deny the SNF stay. Per Lashay Dai, she works during the day and there is no ne that can be with the patient 24 hours a day.  Lashay Dai is aware that a peer to peer is being offered, but she notes that if that is not successful, she would like to appeal.

## 2023-02-04 NOTE — CARE COORDINATION
Discharge Planning Note:    Received voice mail from Sydnee at [facility]:    - Intent to deny [facility] [address] SNF offering a [id number]    Will need the following information:    Patient's Name: Rose Boothe  Patient's KMX:[id number]  Insurance Provider: Kimi Garnica  Member ID Number: [id number]    Phone: [phone] option 5    - Must be completed by 1130 EST on Monday 02/06/2023. Will continue to follow.     MARQUIS Davalos RN    [facility]  Phone: [phone]

## 2023-02-04 NOTE — CARE COORDINATION
CM notified attending of insurance company's intent to deny for SNF and that a peer to peer is being offered. CM will continue to follow.

## 2023-02-04 NOTE — CARE COORDINATION
CM spoke to patients daughter Lashay Dai and she was present when therapy worked with the patient and is aware that the current recommendation is home with home care. She stated that patient had been active with [facility] in the past and it is ok to make a referral to them again. Referral made to [facility], informed [facility] that discharge order is on chart.

## 2023-02-04 NOTE — DISCHARGE INSTRUCTIONS
Follow-up with PCP for monitoring of sodium level. Ensure daily bowel movements while on pain medications. Follow-up with pain management doctor after discharge for further management.

## 2023-02-07 ENCOUNTER — HOSPITAL ENCOUNTER (OUTPATIENT)
Age: 88
Setting detail: SPECIMEN
Discharge: HOME | End: 2023-02-07
Payer: MEDICARE

## 2023-02-07 LAB
ANION GAP SERPL CALCULATED.3IONS-SCNC: 14 MMOL/L (ref 3–16)
BUN BLDV-MCNC: 34 MG/DL (ref 7–20)
CALCIUM SERPL-MCNC: 9.1 MG/DL (ref 8.3–10.6)
CHLORIDE BLD-SCNC: 103 MMOL/L (ref 99–110)
CO2: 23 MMOL/L (ref 21–32)
CREAT SERPL-MCNC: 1.1 MG/DL (ref 0.6–1.2)
GFR SERPL CREATININE-BSD FRML MDRD: 49 ML/MIN/{1.73_M2}
GLUCOSE BLD-MCNC: 108 MG/DL (ref 70–99)
POTASSIUM SERPL-SCNC: 4.1 MMOL/L (ref 3.5–5.1)
SODIUM BLD-SCNC: 140 MMOL/L (ref 136–145)

## 2023-02-07 PROCEDURE — 36415 COLL VENOUS BLD VENIPUNCTURE: CPT

## 2023-02-07 PROCEDURE — 80048 BASIC METABOLIC PNL TOTAL CA: CPT

## 2023-02-08 NOTE — CARE COORDINATION
Case management received a call from pt's  at [facility] [phone], asking for an update on pt's status and discharge plan. She also requested pt's discharge paperwork be faxed if pt was discharge. SW faxed AVS and informed on cover sheet that pt discharged last Saturday.     Electronically signed by KIET Canela, FABIANO on 2/8/2023 at 6:47 PM

## 2023-02-10 NOTE — PROGRESS NOTES
Physician Progress Note      PATIENT:               Malissa Cerna  CSN #:                  [id number]  :                       1935  ADMIT DATE:       2023 8:56 AM  DISCH DATE:        2023 4:29 PM  RESPONDING  PROVIDER #:        Adriana Sheffield MD          QUERY TEXT:    Patient admitted with pneumonia. Noted documentation of Chronic respiratory   failure by ED IM provider and Acute on chronic respiratory failure in HP. If possible, please document in progress notes and discharge summary if you   are evaluating and /or treating any of the following: The medical record reflects the following:  Risk Factors: 79 yo with COPD and chronic O2 use    Clinical Indicators: Pt on 2-3L O2 and O2 sats 90-95% during admission. No   blood gases drawn  ED, She has chronic hypoxia, wears 2L at home. No respiratory distress. There   is no conversational dyspnea. No accessory muscle use is noted. HP, Acute on chronic respiratory failure -Oxygen saturation dropping   concerning for possible pneumonia  IM , chronic respiratory failure with hypoxia on 2 L NC    Treatment: O2, supportive care, abx for PNA  Options provided:  -- Chronic respiratory failure confirmed and Acute on chronic respiratory   failure ruled out  -- Chronic respiratory failure ruled out and Acute on chronic respiratory   failure confirmed, as evidenced by, please provide evidence. -- Other - I will add my own diagnosis  -- Disagree - Not applicable / Not valid  -- Disagree - Clinically unable to determine / Unknown  -- Refer to Clinical Documentation Reviewer    PROVIDER RESPONSE TEXT:    After study, Chronic respiratory failure confirmed and Acute on chronic   respiratory failure ruled out. Query created by: Sen Barba on 2023 3:11 PM      QUERY TEXT:    Pt admitted with pneumonia. Pt noted to have COPD/ILD, CKD, and DM.  If   possible, please document in the progress notes and discharge summary if you   are evaluating and/or treating any of the following:      Note: CAP and HCAP indicate where the pneumonia was acquired, not a specific   type. The medical record reflects the following:  Risk Factors: 79 yo with COPD, DM, and CKD3, Chronic resp failure    Clinical Indicators: 1/26-2-3L O2, CT -  New left basilar airspace disease   concerning for developing pneumonia  IM note 1/27, Started on IV Levaquin for PNA seen on CT. Treatment: IV Levaquin, supportive care, O2  Options provided:  -- This patient is being treated for gram negative pneumonia  -- Other - I will add my own diagnosis  -- Disagree - Not applicable / Not valid  -- Disagree - Clinically unable to determine / Unknown  -- Refer to Clinical Documentation Reviewer    PROVIDER RESPONSE TEXT:    This patient is being treated for gram negative pneumonia. Query created by:  Ryan Boss on 2/8/2023 3:11 PM      Electronically signed by:  Loyda Ruiz MD 2/10/2023 10:51 AM

## 2023-02-17 RX ORDER — APIXABAN 5 MG/1
TABLET, FILM COATED ORAL
Qty: 180 TABLET | Refills: 0 | OUTPATIENT
Start: 2023-02-17

## 2023-02-17 RX ORDER — MECLIZINE HCL 12.5 MG/1
TABLET ORAL
Qty: 270 TABLET | Refills: 1 | OUTPATIENT
Start: 2023-02-17

## 2023-02-20 ENCOUNTER — TELEPHONE (OUTPATIENT)
Dept: CARDIOLOGY CLINIC | Age: 88
End: 2023-02-20

## 2023-02-20 NOTE — TELEPHONE ENCOUNTER
Chayo Martinez. daughter called to talk with someone from the office regarding the Pt coming into A-Fib. Please call to discuss .   Please advise

## 2023-02-20 NOTE — TELEPHONE ENCOUNTER
Spoke  with patients daughter. States her /80 and HR   in the  50-60 are Good . She has had one dose Cardizem. And daughter states she is doing well .      Scheduling letter sent

## 2023-02-20 NOTE — LETTER
[facility]  EP Procedure Sheet    2/20/23  Blake Ewdards  1935  EP Procedures  [x] Pacemaker implant (single/dual) [] EP Study   [] ICD implant (single/dual) [] Atrial flutter ablation (TRACEY Y/N)   [] Biv implant ICD [] Tilt Table   [] Biv implant PPM [] Atrial fibrillation ablation (TRACEY Yes)   [] Generator Change (PPM/ICD/BiV) [] SVT ablation   [] Lead revision (RV/LA/RA) (<1 month) [] PVC ablation     [] Lead extraction +/- upgrade (BiV/PPM/ICD) [] VT Ischemic/ non-ischemic   [] Loop implant/ removal [] VT RVOT   [] Cardioversion [] VT Left sided   [] TRACEY [] AVN ablation   Equipment  [] Medtronic  [] ANABELLA Mapping System   [] [place] [] [address]   [] [employer] [] CryoAblation   [] Biotronik [] Laser Lead Extraction   EP Procedures Scheduling Request  # hours Requested  [x]1 []2 []2-4 [] 4-6 Scheduled  Date:   Specific Day  Completed    Anesthesia []yes [x]no F/u Date:   CT surgery backup []yes [x]no     Overnight stay      Performing MD [x]RMM []MXA   []MKW [] CMV First vs repeat   []1st [] 2nd [] 3rd   Pre-Procedure Labs / Imaging  [] PT/INR [] Type & cross   [] CBC [] Units PRBC   [] BMP/Mg [] Units FFP   [] Venogram [] Cardiac CTA for Pulmonary vein mapping     RN INITIALS: DW    Patient Instructions  Do not eat or drink after midnight the night prior to procedure  Dx: Cayla Public   ICD-10 code:  I48.19,  R00.1 .1   Medication Instructions: Hold []Xarelto []Eliquis []Coumadin []pradaxa for

## 2023-02-20 NOTE — TELEPHONE ENCOUNTER
They are interested in moving forward with either ablation or pacemaker, whichever [facility] feels is best.  Advised that [facility] is  out of town and will get back to them in next few days.

## 2023-02-22 NOTE — TELEPHONE ENCOUNTER
Spoke with Arlyn Carrasquillo. Patient is fatigued. Denies SOB or dizziness. Heart rate is 100 and daughter states she is going in and out of afib. Daughter states they gave her a 3o Mg dose of cardizem and heart rate is improving    Discussed signs and symptoms of concern and indications for the ER. Arlyn Carrasquillo verbalized understanding.

## 2023-02-22 NOTE — TELEPHONE ENCOUNTER
Rachel Frias, daughter called to inform the office that the Pt is going in and out of A-Fib and she does not know what else to do. Rachel Frias wants to know if she should take the Pt to the ER. Eliceo Cortes is giving her the meds as instructed. Per Lennilam Frias she is requesting a call back to discuss this matter [phone].   Please  advise

## 2023-02-23 ENCOUNTER — APPOINTMENT (OUTPATIENT)
Dept: GENERAL RADIOLOGY | Age: 88
DRG: 242 | End: 2023-02-23
Payer: MEDICARE

## 2023-02-23 ENCOUNTER — TELEPHONE (OUTPATIENT)
Dept: OTHER | Facility: CLINIC | Age: 88
End: 2023-02-23

## 2023-02-23 ENCOUNTER — TELEPHONE (OUTPATIENT)
Dept: CARDIOLOGY CLINIC | Age: 88
End: 2023-02-23

## 2023-02-23 ENCOUNTER — HOSPITAL ENCOUNTER (INPATIENT)
Age: 88
LOS: 5 days | Discharge: OTHER INSTITUTION | DRG: 242 | End: 2023-02-28
Attending: INTERNAL MEDICINE | Admitting: INTERNAL MEDICINE
Payer: MEDICARE

## 2023-02-23 DIAGNOSIS — R77.8 ELEVATED TROPONIN: ICD-10-CM

## 2023-02-23 DIAGNOSIS — F51.01 PRIMARY INSOMNIA: ICD-10-CM

## 2023-02-23 DIAGNOSIS — R42 DIZZINESS: ICD-10-CM

## 2023-02-23 DIAGNOSIS — I48.0 PAROXYSMAL ATRIAL FIBRILLATION (HCC): Primary | ICD-10-CM

## 2023-02-23 DIAGNOSIS — F41.9 ANXIETY: ICD-10-CM

## 2023-02-23 DIAGNOSIS — R06.02 SHORTNESS OF BREATH: ICD-10-CM

## 2023-02-23 PROBLEM — R00.1 BRADYCARDIA: Status: ACTIVE | Noted: 2023-02-23

## 2023-02-23 PROBLEM — I50.33 ACUTE ON CHRONIC DIASTOLIC CHF (CONGESTIVE HEART FAILURE) (HCC): Status: ACTIVE | Noted: 2023-02-23

## 2023-02-23 LAB
A/G RATIO: 1.4 (ref 1.1–2.2)
ALBUMIN SERPL-MCNC: 3.8 G/DL (ref 3.4–5)
ALP BLD-CCNC: 150 U/L (ref 40–129)
ALT SERPL-CCNC: 13 U/L (ref 10–40)
ANION GAP SERPL CALCULATED.3IONS-SCNC: 8 MMOL/L (ref 3–16)
APTT: 36.5 SEC (ref 23–34.3)
AST SERPL-CCNC: 14 U/L (ref 15–37)
BACTERIA: NORMAL /HPF
BASOPHILS ABSOLUTE: 0 K/UL (ref 0–0.2)
BASOPHILS RELATIVE PERCENT: 0.8 %
BILIRUB SERPL-MCNC: 0.3 MG/DL (ref 0–1)
BILIRUBIN URINE: NEGATIVE
BLOOD, URINE: NEGATIVE
BUN BLDV-MCNC: 24 MG/DL (ref 7–20)
CALCIUM SERPL-MCNC: 9.7 MG/DL (ref 8.3–10.6)
CHLORIDE BLD-SCNC: 106 MMOL/L (ref 99–110)
CLARITY: CLEAR
CO2: 29 MMOL/L (ref 21–32)
COLOR: YELLOW
CREAT SERPL-MCNC: 1.1 MG/DL (ref 0.6–1.2)
EOSINOPHILS ABSOLUTE: 0.2 K/UL (ref 0–0.6)
EOSINOPHILS RELATIVE PERCENT: 3.9 %
EPITHELIAL CELLS, UA: 0 /HPF (ref 0–5)
GFR SERPL CREATININE-BSD FRML MDRD: 49 ML/MIN/{1.73_M2}
GLUCOSE BLD-MCNC: 108 MG/DL (ref 70–99)
GLUCOSE BLD-MCNC: 114 MG/DL (ref 70–99)
GLUCOSE BLD-MCNC: 199 MG/DL (ref 70–99)
GLUCOSE URINE: NEGATIVE MG/DL
HCT VFR BLD CALC: 28.9 % (ref 36–48)
HEMOGLOBIN: 9.5 G/DL (ref 12–16)
HYALINE CASTS: 0 /LPF (ref 0–8)
INR BLD: 1.49 (ref 0.87–1.14)
KETONES, URINE: NEGATIVE MG/DL
LEUKOCYTE ESTERASE, URINE: NEGATIVE
LYMPHOCYTES ABSOLUTE: 1.3 K/UL (ref 1–5.1)
LYMPHOCYTES RELATIVE PERCENT: 25.8 %
MCH RBC QN AUTO: 28.4 PG (ref 26–34)
MCHC RBC AUTO-ENTMCNC: 32.9 G/DL (ref 31–36)
MCV RBC AUTO: 86.4 FL (ref 80–100)
MICROSCOPIC EXAMINATION: YES
MONOCYTES ABSOLUTE: 0.4 K/UL (ref 0–1.3)
MONOCYTES RELATIVE PERCENT: 8.1 %
NEUTROPHILS ABSOLUTE: 3 K/UL (ref 1.7–7.7)
NEUTROPHILS RELATIVE PERCENT: 61.4 %
NITRITE, URINE: NEGATIVE
PDW BLD-RTO: 16.4 % (ref 12.4–15.4)
PERFORMED ON: ABNORMAL
PERFORMED ON: ABNORMAL
PH UA: 6.5 (ref 5–8)
PLATELET # BLD: 143 K/UL (ref 135–450)
PMV BLD AUTO: 8.4 FL (ref 5–10.5)
POTASSIUM SERPL-SCNC: 4.4 MMOL/L (ref 3.5–5.1)
PRO-BNP: 4454 PG/ML (ref 0–449)
PROTEIN UA: 30 MG/DL
PROTHROMBIN TIME: 18 SEC (ref 11.7–14.5)
RBC # BLD: 3.35 M/UL (ref 4–5.2)
RBC UA: 0 /HPF (ref 0–4)
SODIUM BLD-SCNC: 143 MMOL/L (ref 136–145)
SPECIFIC GRAVITY UA: 1.01 (ref 1–1.03)
TOTAL PROTEIN: 6.6 G/DL (ref 6.4–8.2)
TROPONIN: 0.02 NG/ML
URINE REFLEX TO CULTURE: ABNORMAL
URINE TYPE: ABNORMAL
UROBILINOGEN, URINE: 0.2 E.U./DL
WBC # BLD: 5 K/UL (ref 4–11)
WBC UA: 1 /HPF (ref 0–5)

## 2023-02-23 PROCEDURE — 94640 AIRWAY INHALATION TREATMENT: CPT

## 2023-02-23 PROCEDURE — 6370000000 HC RX 637 (ALT 250 FOR IP): Performed by: PHYSICIAN ASSISTANT

## 2023-02-23 PROCEDURE — 71045 X-RAY EXAM CHEST 1 VIEW: CPT

## 2023-02-23 PROCEDURE — 51702 INSERT TEMP BLADDER CATH: CPT

## 2023-02-23 PROCEDURE — 2700000000 HC OXYGEN THERAPY PER DAY

## 2023-02-23 PROCEDURE — 6360000002 HC RX W HCPCS: Performed by: INTERNAL MEDICINE

## 2023-02-23 PROCEDURE — 85025 COMPLETE CBC W/AUTO DIFF WBC: CPT

## 2023-02-23 PROCEDURE — 74018 RADEX ABDOMEN 1 VIEW: CPT

## 2023-02-23 PROCEDURE — 84484 ASSAY OF TROPONIN QUANT: CPT

## 2023-02-23 PROCEDURE — 85610 PROTHROMBIN TIME: CPT

## 2023-02-23 PROCEDURE — 99285 EMERGENCY DEPT VISIT HI MDM: CPT

## 2023-02-23 PROCEDURE — 81001 URINALYSIS AUTO W/SCOPE: CPT

## 2023-02-23 PROCEDURE — 6370000000 HC RX 637 (ALT 250 FOR IP): Performed by: INTERNAL MEDICINE

## 2023-02-23 PROCEDURE — 2580000003 HC RX 258: Performed by: INTERNAL MEDICINE

## 2023-02-23 PROCEDURE — 1200000000 HC SEMI PRIVATE

## 2023-02-23 PROCEDURE — 80053 COMPREHEN METABOLIC PANEL: CPT

## 2023-02-23 PROCEDURE — 93005 ELECTROCARDIOGRAM TRACING: CPT | Performed by: INTERNAL MEDICINE

## 2023-02-23 PROCEDURE — 83880 ASSAY OF NATRIURETIC PEPTIDE: CPT

## 2023-02-23 PROCEDURE — 94761 N-INVAS EAR/PLS OXIMETRY MLT: CPT

## 2023-02-23 PROCEDURE — 83036 HEMOGLOBIN GLYCOSYLATED A1C: CPT

## 2023-02-23 PROCEDURE — 2500000003 HC RX 250 WO HCPCS: Performed by: INTERNAL MEDICINE

## 2023-02-23 PROCEDURE — 85730 THROMBOPLASTIN TIME PARTIAL: CPT

## 2023-02-23 RX ORDER — DEXTROSE MONOHYDRATE 100 MG/ML
INJECTION, SOLUTION INTRAVENOUS CONTINUOUS PRN
Status: DISCONTINUED | OUTPATIENT
Start: 2023-02-23 | End: 2023-02-23 | Stop reason: SDUPTHER

## 2023-02-23 RX ORDER — INSULIN LISPRO 100 [IU]/ML
0-8 INJECTION, SOLUTION INTRAVENOUS; SUBCUTANEOUS
Status: DISCONTINUED | OUTPATIENT
Start: 2023-02-24 | End: 2023-02-28 | Stop reason: HOSPADM

## 2023-02-23 RX ORDER — POLYETHYLENE GLYCOL 3350 17 G/17G
17 POWDER, FOR SOLUTION ORAL DAILY
Status: DISCONTINUED | OUTPATIENT
Start: 2023-02-23 | End: 2023-02-28 | Stop reason: HOSPADM

## 2023-02-23 RX ORDER — OXYCODONE HYDROCHLORIDE AND ACETAMINOPHEN 5; 325 MG/1; MG/1
1 TABLET ORAL 2 TIMES DAILY PRN
Status: DISCONTINUED | OUTPATIENT
Start: 2023-02-23 | End: 2023-02-25

## 2023-02-23 RX ORDER — FUROSEMIDE 10 MG/ML
40 INJECTION INTRAMUSCULAR; INTRAVENOUS 2 TIMES DAILY
Status: DISCONTINUED | OUTPATIENT
Start: 2023-02-23 | End: 2023-02-25

## 2023-02-23 RX ORDER — INSULIN LISPRO 100 [IU]/ML
0-4 INJECTION, SOLUTION INTRAVENOUS; SUBCUTANEOUS NIGHTLY
Status: DISCONTINUED | OUTPATIENT
Start: 2023-02-23 | End: 2023-02-28 | Stop reason: HOSPADM

## 2023-02-23 RX ORDER — ALBUTEROL SULFATE 90 UG/1
2 AEROSOL, METERED RESPIRATORY (INHALATION) EVERY 6 HOURS PRN
Status: DISCONTINUED | OUTPATIENT
Start: 2023-02-23 | End: 2023-02-28 | Stop reason: HOSPADM

## 2023-02-23 RX ORDER — LORAZEPAM 1 MG/1
1 TABLET ORAL NIGHTLY
Status: DISCONTINUED | OUTPATIENT
Start: 2023-02-23 | End: 2023-02-28 | Stop reason: HOSPADM

## 2023-02-23 RX ORDER — BENZONATATE 100 MG/1
1 CAPSULE ORAL 3 TIMES DAILY PRN
COMMUNITY
Start: 2023-01-03

## 2023-02-23 RX ORDER — LIDOCAINE 4 G/G
1 PATCH TOPICAL DAILY
Status: DISCONTINUED | OUTPATIENT
Start: 2023-02-23 | End: 2023-02-28 | Stop reason: HOSPADM

## 2023-02-23 RX ORDER — FLUTICASONE PROPIONATE 50 MCG
2 SPRAY, SUSPENSION (ML) NASAL PRN
Status: DISCONTINUED | OUTPATIENT
Start: 2023-02-23 | End: 2023-02-28 | Stop reason: HOSPADM

## 2023-02-23 RX ORDER — ONDANSETRON 2 MG/ML
4 INJECTION INTRAMUSCULAR; INTRAVENOUS EVERY 6 HOURS PRN
Status: DISCONTINUED | OUTPATIENT
Start: 2023-02-23 | End: 2023-02-28 | Stop reason: HOSPADM

## 2023-02-23 RX ORDER — ONDANSETRON 4 MG/1
4 TABLET, ORALLY DISINTEGRATING ORAL EVERY 8 HOURS PRN
Status: DISCONTINUED | OUTPATIENT
Start: 2023-02-23 | End: 2023-02-28 | Stop reason: HOSPADM

## 2023-02-23 RX ORDER — SODIUM CHLORIDE 9 MG/ML
INJECTION, SOLUTION INTRAVENOUS PRN
Status: DISCONTINUED | OUTPATIENT
Start: 2023-02-23 | End: 2023-02-28 | Stop reason: HOSPADM

## 2023-02-23 RX ORDER — POLYETHYLENE GLYCOL 3350 17 G/17G
17 POWDER, FOR SOLUTION ORAL DAILY PRN
Status: DISCONTINUED | OUTPATIENT
Start: 2023-02-23 | End: 2023-02-28 | Stop reason: HOSPADM

## 2023-02-23 RX ORDER — ATORVASTATIN CALCIUM 80 MG/1
80 TABLET, FILM COATED ORAL NIGHTLY
Status: DISCONTINUED | OUTPATIENT
Start: 2023-02-23 | End: 2023-02-28 | Stop reason: HOSPADM

## 2023-02-23 RX ORDER — ACETAMINOPHEN 325 MG/1
650 TABLET ORAL EVERY 6 HOURS PRN
Status: DISCONTINUED | OUTPATIENT
Start: 2023-02-23 | End: 2023-02-28 | Stop reason: HOSPADM

## 2023-02-23 RX ORDER — DILTIAZEM HYDROCHLORIDE 180 MG/1
180 CAPSULE, COATED, EXTENDED RELEASE ORAL DAILY
Status: DISCONTINUED | OUTPATIENT
Start: 2023-02-24 | End: 2023-02-24

## 2023-02-23 RX ORDER — HYDRALAZINE HYDROCHLORIDE 20 MG/ML
10 INJECTION INTRAMUSCULAR; INTRAVENOUS EVERY 4 HOURS PRN
Status: DISCONTINUED | OUTPATIENT
Start: 2023-02-23 | End: 2023-02-28 | Stop reason: HOSPADM

## 2023-02-23 RX ORDER — LEVALBUTEROL INHALATION SOLUTION 0.63 MG/3ML
1 SOLUTION RESPIRATORY (INHALATION) EVERY 6 HOURS PRN
Status: DISCONTINUED | OUTPATIENT
Start: 2023-02-23 | End: 2023-02-28 | Stop reason: HOSPADM

## 2023-02-23 RX ORDER — SODIUM CHLORIDE 0.9 % (FLUSH) 0.9 %
5-40 SYRINGE (ML) INJECTION PRN
Status: DISCONTINUED | OUTPATIENT
Start: 2023-02-23 | End: 2023-02-28 | Stop reason: HOSPADM

## 2023-02-23 RX ORDER — ACETAMINOPHEN 650 MG/1
650 SUPPOSITORY RECTAL EVERY 6 HOURS PRN
Status: DISCONTINUED | OUTPATIENT
Start: 2023-02-23 | End: 2023-02-28 | Stop reason: HOSPADM

## 2023-02-23 RX ORDER — LORAZEPAM 0.5 MG/1
0.5 TABLET ORAL EVERY 8 HOURS
Status: DISCONTINUED | OUTPATIENT
Start: 2023-02-23 | End: 2023-02-28 | Stop reason: HOSPADM

## 2023-02-23 RX ORDER — ROPINIROLE 1 MG/1
1 TABLET, FILM COATED ORAL 3 TIMES DAILY
Status: DISCONTINUED | OUTPATIENT
Start: 2023-02-23 | End: 2023-02-28 | Stop reason: HOSPADM

## 2023-02-23 RX ORDER — BETHANECHOL CHLORIDE 10 MG/1
10 TABLET ORAL 2 TIMES DAILY
Status: DISCONTINUED | OUTPATIENT
Start: 2023-02-23 | End: 2023-02-28 | Stop reason: HOSPADM

## 2023-02-23 RX ORDER — DILTIAZEM HYDROCHLORIDE 5 MG/ML
10 INJECTION INTRAVENOUS ONCE
Status: COMPLETED | OUTPATIENT
Start: 2023-02-23 | End: 2023-02-23

## 2023-02-23 RX ORDER — LEVOTHYROXINE SODIUM 0.07 MG/1
75 TABLET ORAL DAILY
Status: DISCONTINUED | OUTPATIENT
Start: 2023-02-23 | End: 2023-02-28 | Stop reason: HOSPADM

## 2023-02-23 RX ORDER — ALBUTEROL SULFATE 2.5 MG/3ML
2.5 SOLUTION RESPIRATORY (INHALATION) 2 TIMES DAILY
Status: DISCONTINUED | OUTPATIENT
Start: 2023-02-23 | End: 2023-02-28 | Stop reason: HOSPADM

## 2023-02-23 RX ORDER — OXYCODONE HYDROCHLORIDE AND ACETAMINOPHEN 5; 325 MG/1; MG/1
1 TABLET ORAL 2 TIMES DAILY PRN
COMMUNITY
Start: 2023-02-14

## 2023-02-23 RX ORDER — DEXTROSE MONOHYDRATE 100 MG/ML
INJECTION, SOLUTION INTRAVENOUS CONTINUOUS PRN
Status: DISCONTINUED | OUTPATIENT
Start: 2023-02-23 | End: 2023-02-28 | Stop reason: HOSPADM

## 2023-02-23 RX ORDER — ENOXAPARIN SODIUM 100 MG/ML
40 INJECTION SUBCUTANEOUS DAILY
Status: DISCONTINUED | OUTPATIENT
Start: 2023-02-23 | End: 2023-02-25

## 2023-02-23 RX ORDER — SODIUM CHLORIDE 0.9 % (FLUSH) 0.9 %
5-40 SYRINGE (ML) INJECTION EVERY 12 HOURS SCHEDULED
Status: DISCONTINUED | OUTPATIENT
Start: 2023-02-23 | End: 2023-02-28 | Stop reason: HOSPADM

## 2023-02-23 RX ORDER — GABAPENTIN 100 MG/1
100 CAPSULE ORAL 3 TIMES DAILY
Status: DISCONTINUED | OUTPATIENT
Start: 2023-02-23 | End: 2023-02-28 | Stop reason: HOSPADM

## 2023-02-23 RX ADMIN — DILTIAZEM HYDROCHLORIDE 5 MG/HR: 5 INJECTION INTRAVENOUS at 16:31

## 2023-02-23 RX ADMIN — LORAZEPAM 1 MG: 1 TABLET ORAL at 20:28

## 2023-02-23 RX ADMIN — POLYETHYLENE GLYCOL 3350 17 G: 17 POWDER, FOR SOLUTION ORAL at 15:21

## 2023-02-23 RX ADMIN — Medication 2 PUFF: at 15:26

## 2023-02-23 RX ADMIN — Medication 2 PUFF: at 21:33

## 2023-02-23 RX ADMIN — GABAPENTIN 100 MG: 100 CAPSULE ORAL at 20:27

## 2023-02-23 RX ADMIN — OXYCODONE AND ACETAMINOPHEN 1 TABLET: 5; 325 TABLET ORAL at 22:02

## 2023-02-23 RX ADMIN — DILTIAZEM HYDROCHLORIDE 10 MG: 5 INJECTION INTRAVENOUS at 16:24

## 2023-02-23 RX ADMIN — Medication 10 ML: at 20:31

## 2023-02-23 RX ADMIN — HYDRALAZINE HYDROCHLORIDE 10 MG: 20 INJECTION INTRAMUSCULAR; INTRAVENOUS at 15:52

## 2023-02-23 RX ADMIN — ALBUTEROL SULFATE 2.5 MG: 2.5 SOLUTION RESPIRATORY (INHALATION) at 21:28

## 2023-02-23 RX ADMIN — GABAPENTIN 100 MG: 100 CAPSULE ORAL at 15:20

## 2023-02-23 RX ADMIN — ATORVASTATIN CALCIUM 80 MG: 80 TABLET, FILM COATED ORAL at 20:27

## 2023-02-23 RX ADMIN — FUROSEMIDE 40 MG: 10 INJECTION, SOLUTION INTRAMUSCULAR; INTRAVENOUS at 16:24

## 2023-02-23 RX ADMIN — ROPINIROLE HYDROCHLORIDE 1 MG: 1 TABLET, FILM COATED ORAL at 20:28

## 2023-02-23 RX ADMIN — ROPINIROLE HYDROCHLORIDE 1 MG: 1 TABLET, FILM COATED ORAL at 15:20

## 2023-02-23 RX ADMIN — BETHANECHOL CHLORIDE 10 MG: 10 TABLET ORAL at 20:26

## 2023-02-23 RX ADMIN — LORAZEPAM 0.5 MG: 0.5 TABLET ORAL at 15:20

## 2023-02-23 ASSESSMENT — ENCOUNTER SYMPTOMS
DIARRHEA: 0
CHEST TIGHTNESS: 0
SHORTNESS OF BREATH: 1
ABDOMINAL PAIN: 0
NAUSEA: 0
VOMITING: 0
COUGH: 0
WHEEZING: 0

## 2023-02-23 ASSESSMENT — PAIN SCALES - GENERAL
PAINLEVEL_OUTOF10: 0
PAINLEVEL_OUTOF10: 0
PAINLEVEL_OUTOF10: 3

## 2023-02-23 ASSESSMENT — LIFESTYLE VARIABLES
HOW MANY STANDARD DRINKS CONTAINING ALCOHOL DO YOU HAVE ON A TYPICAL DAY: PATIENT DOES NOT DRINK
HOW OFTEN DO YOU HAVE A DRINK CONTAINING ALCOHOL: NEVER

## 2023-02-23 ASSESSMENT — PAIN DESCRIPTION - LOCATION: LOCATION: HIP

## 2023-02-23 NOTE — H&P
HOSPITALISTS HISTORY AND PHYSICAL    2/23/2023 3:56 PM    Patient Information:  Lydia Clarke is a 80 y.o. female [id number]  PCP:  Marylu Mckinney MD (Tel: [phone] )    Chief complaint:    Chief Complaint   Patient presents with    Dizziness     Via ems, from home, states she has felt dizzy since Sunday, reports hx of afib, states she is supposed to get a pacemaker placed soon. Denies cp, sob at this time. Pt wears 2L oxygen at baseline. History of Present Illness:  Alyse Morales is a 80 y.o. female with COPD/ILD, chronic respiratory failure with hypoxia on 2 L NC, Afib on Eliquis, CAD, CKD 3, DM 2 came to ER with complaints of dizziness. Daughter is RN and noted patient has been hypertensive at home with HR to 40s and then 140s. Patient is awaiting PPM placement with EP. Patient denies CP, SOB, HA or fevers. Has constipation and uses Miralax. No abdominal pain, nausea or vomiting. No palpitations or syncope. Has noted some orthopnea and BL LE edema. Otherwise complete ROS is negative unless listed above. REVIEW OF SYSTEMS:   Pertinent positives as noted in HPI. All other systems were reviewed and are negative. Past Medical History:   has a past medical history of Arthritis, Atrial fibrillation ([address] Bursitis, COPD (chronic obstructive pulmonary disease) ([address] Diabetes mellitus type II, controlled ([address] GERD (gastroesophageal reflux disease), HTN (hypertension), Hyperlipidemia, Hypothyroid, Insomnia, Lumbago, Parkinson disease ([address] Restless legs syndrome (RLS), and SVT (supraventricular tachycardia) ([address] Past Surgical History:   has a past surgical history that includes Appendectomy; Colonoscopy; and Cholecystectomy, laparoscopic (2/24/2013). Medications:  No current facility-administered medications on file prior to encounter.      Current Outpatient Medications on File Prior to Encounter   Medication Sig Dispense Refill    benzonatate (TESSALON) 100 MG capsule Take 1 capsule by mouth 3 times daily as needed      oxyCODONE-acetaminophen (PERCOCET) 5-325 MG per tablet Take 1 tablet by mouth 2 times daily as needed. bethanechol (URECHOLINE) 10 MG tablet Take 1 tablet by mouth 2 times daily 90 tablet 3    gabapentin (NEURONTIN) 100 MG capsule Take 1 capsule by mouth 3 times daily for 30 days. 90 capsule 0    naloxegol (MOVANTIK) 25 MG TABS tablet Take 1 tablet by mouth every morning (before breakfast) 30 tablet 2    polyethylene glycol (GLYCOLAX) 17 g packet Take 17 g by mouth daily 527 g 1    SITagliptin (JANUVIA) 100 MG tablet Take 0.5 tablets by mouth daily (Patient taking differently: Take 50 mg by mouth daily 1 tablet) 30 tablet 3    lidocaine 4 % external patch Place 1 patch onto the skin daily 4 patch 0    dilTIAZem (CARDIZEM CD) 180 MG extended release capsule Take 1 capsule by mouth daily 90 capsule 3    dilTIAZem (CARDIZEM) 30 MG tablet As needed for episodes of tachycardia (Patient taking differently: Take 30 mg by mouth as needed As needed for episodes of tachycardia) 120 tablet 3    LORazepam (ATIVAN) 1 MG tablet Take 1 mg by mouth at bedtime. LORazepam (ATIVAN) 0.5 MG tablet Take 0.5 mg by mouth in the morning and at bedtime. In the morning and afternoon      ELIQUIS 5 MG TABS tablet TAKE 1 TABLET TWICE DAILY 180 tablet 0    budesonide-formoterol (SYMBICORT) 160-4.5 MCG/ACT AERO INHALE 2 PUFFS TWICE DAILY 3 each 0    fluticasone (FLONASE) 50 MCG/ACT nasal spray USE 2 SPRAYS NASALLY EVERY DAY (Patient taking differently: 2 sprays by Nasal route as needed) 48 g 1    atorvastatin (LIPITOR) 80 MG tablet TAKE 1 TABLET EVERY NIGHT (Patient taking differently: Take 80 mg by mouth nightly) 90 tablet 3    [DISCONTINUED] amLODIPine (NORVASC) 5 MG tablet Take 1 tablet by mouth in the morning.  30 tablet 5    meclizine (ANTIVERT) 12.5 MG tablet TAKE 1 TABLET THREE TIMES DAILY AS NEEDED 270 tablet 1    rOPINIRole (REQUIP) 3 MG tablet TAKE 1 TABLET THREE TIMES DAILY 270 tablet 1    levothyroxine (SYNTHROID) 75 MCG tablet TAKE 1 TABLET BY MOUTH EVERY DAY 90 tablet 0    cloNIDine (CATAPRES) 0.1 MG tablet TAKE 1 TABLET TWICE DAILY (Patient taking differently: in the morning, at noon, and at bedtime) 180 tablet 0    conjugated estrogens (PREMARIN) 0.625 MG/GM vaginal cream Place 1 g vaginally three times a week 30 g 2    levalbuterol (XOPENEX) 0.63 MG/3ML nebulization USE 1 VIAL PER NEBULIZER EVERY 6 HOURS. MAX OF 4 TIMES PER 24 HOURS (Patient taking differently: Take 1 ampule by nebulization every 6 hours as needed) 1086 mL 3    tiotropium (SPIRIVA RESPIMAT) 2.5 MCG/ACT AERS inhaler Inhale 2 puffs into the lungs daily 1 each 1    Blood Glucose Monitoring Suppl (TRUE METRIX METER) w/Device KIT To use to check sugars 4 times daily 1 kit 0    Blood Glucose Calibration (TRUE METRIX LEVEL 2) Normal SOLN As needed 1 each 0    blood glucose test strips (TRUE METRIX BLOOD GLUCOSE TEST) strip 1 each by In Vitro route daily As needed. 100 each 3    TRUEplus Lancets 33G MISC 1 daily 100 each 3    Lancets MISC 1 each by Does not apply route 2 times daily 300 each 1    OXYGEN Inhale 2 L into the lungs      Respiratory Therapy Supplies (NEBULIZER/TUBING/MOUTHPIECE) KIT 1 kit by Does not apply route 4 times daily as needed (shortness of breath) 1 kit 0    albuterol sulfate HFA (PROAIR HFA) 108 (90 Base) MCG/ACT inhaler Inhale 2 puffs into the lungs every 6 hours as needed for Wheezing 1 Inhaler 6       Allergies:   Allergies   Allergen Reactions    Adhesive Tape Anaphylaxis    Cefaclor Nausea And Vomiting     Other reaction(s): Chest pain  Other reaction(s): Chest pain, Nausea / Vomiting    Cephalexin Other (See Comments)     Struggling to breath, almost passing out/ very low oxygen and pulse    Nsaids      Pt states she has hives and heart races   Other reaction(s): Tachycardia  Other reaction(s): Hives / Skin Rash, Tachycardia    Penicillin G      Other reaction(s): Hives / Skin Rash    Codeine      FEEL NUMB  Other reaction(s): feels numb    Diclofenac      Other reaction(s): Hives    Diclofenac Sodium Hives    Diclofenac Sodium Hives    Diclofenac Sodium     Hydrochlorothiazide      Sob  Other reaction(s): sob    Ibuprofen Other (See Comments)     Other reaction(s): Tachycardia  Other reaction(s): Tachycardia    Macrobid [Nitrofurantoin Macrocrystal]      nausea    Motrin [Ibuprofen Micronized] Other (See Comments)     Tachycardia      Nitrofurantoin      Other reaction(s): nausea H&P  Other reaction(s): nausea H&P    Pcn [Penicillins] Hives    Peach [Prunus Persica] Itching and Swelling     Cannot eat raw peaches, but can eat canned peaches    Prednisone      Causes elevated blood pressure   Other reaction(s): Elevated high BP  H&P  Cannot tolerate the dose pack but can individual doses    Sulfa Antibiotics Itching     Nausea, diarrhea  Other reaction(s): Nausea/Diarrhea H&P    Sulindac Other (See Comments)     Stomach pain  Other reaction(s): Stomach Pain        Social History:  Patient Lives with daughter   reports that she quit smoking about 27 years ago. Her smoking use included cigarettes. She started smoking about 72 years ago. She has a 45.00 pack-year smoking history. She has never used smokeless tobacco. She reports that she does not drink alcohol and does not use drugs. Family History:  family history includes Asthma in her paternal uncle; Heart Attack in her father; Heart Disease in her father; High Blood Pressure in her mother; Tuberculosis in her brother. ,    Physical Exam:  BP (!) 171/75   Pulse 89   Temp 98.1 °F (36.7 °C) (Oral)   Resp 18   Ht 5' 2\" (1.575 m)   Wt 167 lb 1.7 oz (75.8 kg)   SpO2 96%   BMI 30.56 kg/m²     General appearance: No apparent distress, appears stated age and cooperative. HEENT: Pupils equal, round, and reactive to light. Conjunctivae/corneas clear. Neck: Supple, with full range of motion. No jugular venous distention. Trachea midline. Respiratory:  Bibasilar rales. No wheezing or rhonchi  Cardiovascular: Regular rate and rhythm with normal S1/S2 without murmurs, rubs or gallops. 1+ BL LE edema  Abdomen: Soft, obese, non-distended with normal bowel sounds. Musculoskeletal: No clubbing, cyanosis or edema bilaterally. R lower paraspinal tenderness  Skin: Skin color, texture, turgor normal.  No rashes or lesions. Neurologic:  Neurovascularly intact without any focal sensory/motor deficits. Cranial nerves: II-XII intact, grossly non-focal.  Psychiatric: Alert and oriented, thought content appropriate, normal insight  Capillary Refill: Brisk, 3 seconds, normal   Peripheral Pulses: +2 palpable, equal bilaterally     Labs:  CBC:   Lab Results   Component Value Date/Time    WBC 5.0 02/23/2023 09:40 AM    RBC 3.35 02/23/2023 09:40 AM    HGB 9.5 02/23/2023 09:40 AM    HCT 28.9 02/23/2023 09:40 AM    MCV 86.4 02/23/2023 09:40 AM    MCH 28.4 02/23/2023 09:40 AM    MCHC 32.9 02/23/2023 09:40 AM    RDW 16.4 02/23/2023 09:40 AM     02/23/2023 09:40 AM    MPV 8.4 02/23/2023 09:40 AM     BMP:    Lab Results   Component Value Date/Time     02/23/2023 09:40 AM    K 4.4 02/23/2023 09:40 AM    K 4.1 02/01/2023 04:31 AM     02/23/2023 09:40 AM    CO2 29 02/23/2023 09:40 AM    BUN 24 02/23/2023 09:40 AM    CREATININE 1.1 02/23/2023 09:40 AM    CALCIUM 9.7 02/23/2023 09:40 AM    GFRAA >60 10/04/2022 04:22 AM    GFRAA >60 02/27/2013 05:18 AM    LABGLOM 49 02/23/2023 09:40 AM    GLUCOSE 108 02/23/2023 09:40 AM     XR ABDOMEN (KUB) (SINGLE AP VIEW)   Preliminary Result   1. Nonspecific, nonobstructive bowel gas pattern. 2. Mild-moderate fecal loading of the ascending/transverse colon as described   above. XR CHEST PORTABLE   Final Result   Mild bibasilar airspace disease and small bilateral pleural effusions.    Consider atelectasis versus pneumonia in the appropriate clinical settings             Problem List  Principal Problem:    Acute on chronic diastolic (congestive) heart failure ([facility])  Active Problems:    ANTHONY (obstructive sleep apnea)    Chronic obstructive pulmonary disease ([facility])    Chronic renal disease, stage III ([facility]) [393763]    Constipation    Bradycardia    Acute on chronic diastolic CHF (congestive heart failure) ([facility])    DM2 (diabetes mellitus, type 2) ([facility])    COPD, moderate ([facility])    Interstitial lung disease ([address]    Bronchiectasis without complication [facility]    Coronary artery disease involving native coronary artery of native heart with angina pectoris ([facility]    Primary hypertension  Resolved Problems:    * No resolved hospital problems. *        Assessment/Plan:   Acute on chronic diastolic CHF  Start Lasix 40 mg IV bid  FR 1.5 L daily  Daily weight on scale  Strict I/O  CHF RN consult  Cardiology consult to assist in management  Repeat BNP  2. Afib with RVR   - Start Cardizem gtt   - Having episodes of bradycardia and will benefit from PPM   - Hold Eliquis for PPM placement  3. Constipation   - KUB with constipation   - Offered enema, wants to continue Miralax   - Continue Movantik  4. DM 2   - SSI  5. Chronic respiratory failure with hypoxia   - Continue O2 at 2 L via NC (baseline)  6. COPD   - Continue Spiriva, Dulera and Albuterol      DVT prophylaxis Lovenox  Code status Full code  Diet General, FR 1.5 L  IV access Peripheral  Tony Catheter No    Admit as inpatient. I anticipate hospitalization spanning more than two midnights for investigation and treatment of the above medically necessary diagnoses. Discussed with patient and daughter. Discussed with nursing.     Vaibhav Butler MD    2/23/2023 3:56 PM

## 2023-02-23 NOTE — PROGRESS NOTES
02/23/23 1500   RT Protocol   History Pulmonary Disease 2   Respiratory pattern 0   Breath sounds 4   Cough 2   Bronchodilator Assessment Score 8

## 2023-02-23 NOTE — ED PROVIDER NOTES
EKG:  Read by me in the absence of a cardiologist shows: Atrial fibrillation, rate 89, intervals normal, axis XIII degrees, low voltages, no acute injury pattern, rhythm change when compared to EKG from 26 January 2023    I did not perform face-to-face evaluation and was not otherwise involved in this patient's care.  Please see PA/NP note for further information on this visit.        No Rajan MD  02/23/23 [id number]

## 2023-02-23 NOTE — CARE COORDINATION
02/23/23 1613   Readmission Assessment   Number of Days since last admission? 8-30 days   Previous Disposition Home with Home Health  (Home w/[facility])   Who is being Interviewed Patient;Caregiver  (Patient and family)   What was the patient's/caregiver's perception as to why they think they needed to return back to the hospital? Other (Comment)  (Patient got weaker and unable to care for self at home even with family assistance.)   Did you visit your Primary Care Physician after you left the hospital, before you returned this time? Yes   Did you see a specialist, such as Cardiac, Pulmonary, Orthopedic Physician, etc. after you left the hospital? No   Who advised the patient to return to the hospital? [facility] Staff   Does the patient report anything that got in the way of taking their medications? No   In our efforts to provide the best possible care to you and others like you, can you think of anything that we could have done to help you after you left the hospital the first time, so that you might not have needed to return so soon? Other (Comment)  (Patient was declined SNF by insurance last admission. Family stated this was the reason she is back. Patient is active with as much home services as possible.   Active w/[facility] and [facility] with aides 5 hrs/day Mon - Fri.)     Electronically signed by KIET Brooks LSW on 2/23/2023 at 4:15 PM

## 2023-02-23 NOTE — CARE COORDINATION
Active [facility] prior to hospitalization. Current services include: SN PT OT SLP MSW,HHA. Will continue to follow for disposition at discharge. Electronically signed by Ela Arnold LPN on 7/54/17 at 5:10 PM EST

## 2023-02-23 NOTE — ED PROVIDER NOTES
[facility]        Pt Name: Ronni Torres  MRN: [id number]  [place] 1935  Date of evaluation: 2/23/2023  Provider: Massimo Altamirano PA-C  PCP: Benji Daily MD  Note Started: 9:24 AM EST 2/23/23      DIVYA. I have evaluated this patient. My supervising physician was available for consultation. CHIEF COMPLAINT       Chief Complaint   Patient presents with    Dizziness     Via ems, from home, states she has felt dizzy since Sunday, reports hx of afib, states she is supposed to get a pacemaker placed soon. Denies cp, sob at this time. Pt wears 2L oxygen at baseline. HISTORY OF PRESENT ILLNESS: 1 or more Elements     History from : Patient    Limitations to history : None    Ronni Torres is a 80 y.o. female with a history of hypertension, hyperlipidemia, CAD, CHF, paroxysmal atrial fibrillation, SVT, COPD, emphysema, ANTHONY, CKD, diabetes and Parkinson's disease who presents to the emergency department today stating since Saturday she has been experiencing shortness of breath and dizziness with exertion. She feels this is related to her atrial fibrillation. She states she is supposed to be getting a pacemaker placed soon. She does wear 2 L oxygen at baseline. She denies having any chest pain, palpitations or diaphoresis. She denies fevers, chills, coughing or recent illness. She is noted to be in atrial fibrillation on arrival.    Nursing Notes were all reviewed and agreed with or any disagreements were addressed in the HPI. REVIEW OF SYSTEMS :      Review of Systems   Constitutional:  Negative for chills and fever. Respiratory:  Positive for shortness of breath. Negative for cough, chest tightness and wheezing. Cardiovascular:  Negative for chest pain, palpitations and leg swelling. Gastrointestinal:  Negative for abdominal pain, diarrhea, nausea and vomiting.    Genitourinary:  Negative for difficulty urinating, dysuria, flank pain, frequency and hematuria. Neurological:  Positive for dizziness. Negative for weakness, light-headedness, numbness and headaches. All other systems reviewed and are negative. Positives and Pertinent negatives as per HPI. SURGICAL HISTORY     Past Surgical History:   Procedure Laterality Date    APPENDECTOMY      CHOLECYSTECTOMY, LAPAROSCOPIC  2/24/2013    COLONOSCOPY         CURRENTMEDICATIONS       Previous Medications    ALBUTEROL SULFATE HFA (PROAIR HFA) 108 (90 BASE) MCG/ACT INHALER    Inhale 2 puffs into the lungs every 6 hours as needed for Wheezing    ATORVASTATIN (LIPITOR) 80 MG TABLET    TAKE 1 TABLET EVERY NIGHT    BETHANECHOL (URECHOLINE) 10 MG TABLET    Take 1 tablet by mouth 2 times daily    BLOOD GLUCOSE CALIBRATION (TRUE METRIX LEVEL 2) NORMAL SOLN    As needed    BLOOD GLUCOSE MONITORING SUPPL (TRUE METRIX METER) W/DEVICE KIT    To use to check sugars 4 times daily    BLOOD GLUCOSE TEST STRIPS (TRUE METRIX BLOOD GLUCOSE TEST) STRIP    1 each by In Vitro route daily As needed. BUDESONIDE-FORMOTEROL (SYMBICORT) 160-4.5 MCG/ACT AERO    INHALE 2 PUFFS TWICE DAILY    CLONIDINE (CATAPRES) 0.1 MG TABLET    TAKE 1 TABLET TWICE DAILY    CONJUGATED ESTROGENS (PREMARIN) 0.625 MG/GM VAGINAL CREAM    Place 1 g vaginally three times a week    DILTIAZEM (CARDIZEM CD) 180 MG EXTENDED RELEASE CAPSULE    Take 1 capsule by mouth daily    DILTIAZEM (CARDIZEM) 30 MG TABLET    As needed for episodes of tachycardia    ELIQUIS 5 MG TABS TABLET    TAKE 1 TABLET TWICE DAILY    FLUTICASONE (FLONASE) 50 MCG/ACT NASAL SPRAY    USE 2 SPRAYS NASALLY EVERY DAY    GABAPENTIN (NEURONTIN) 100 MG CAPSULE    Take 1 capsule by mouth 3 times daily for 30 days. LANCETS MISC    1 each by Does not apply route 2 times daily    LEVALBUTEROL (XOPENEX) 0.63 MG/3ML NEBULIZATION    USE 1 VIAL PER NEBULIZER EVERY 6 HOURS.  MAX OF 4 TIMES PER 24 HOURS    LEVOTHYROXINE (SYNTHROID) 75 MCG TABLET    TAKE 1 TABLET BY MOUTH EVERY DAY    LIDOCAINE 4 % EXTERNAL PATCH    Place 1 patch onto the skin daily    LORAZEPAM (ATIVAN) 0.5 MG TABLET    Take 0.5 mg by mouth in the morning and at bedtime. In the morning and afternoon    LORAZEPAM (ATIVAN) 1 MG TABLET    Take 1 mg by mouth at bedtime. MECLIZINE (ANTIVERT) 12.5 MG TABLET    TAKE 1 TABLET THREE TIMES DAILY AS NEEDED    NALOXEGOL (MOVANTIK) 25 MG TABS TABLET    Take 1 tablet by mouth every morning (before breakfast)    OXYGEN    Inhale 2 L into the lungs    POLYETHYLENE GLYCOL (GLYCOLAX) 17 G PACKET    Take 17 g by mouth daily    RESPIRATORY THERAPY SUPPLIES (NEBULIZER/TUBING/MOUTHPIECE) KIT    1 kit by Does not apply route 4 times daily as needed (shortness of breath)    ROPINIROLE (REQUIP) 3 MG TABLET    TAKE 1 TABLET THREE TIMES DAILY    SITAGLIPTIN (JANUVIA) 100 MG TABLET    Take 0.5 tablets by mouth daily    TIOTROPIUM (SPIRIVA RESPIMAT) 2.5 MCG/ACT AERS INHALER    Inhale 2 puffs into the lungs daily    TRUEPLUS LANCETS 33G MISC    1 daily       ALLERGIES     Adhesive tape, Cefaclor, Cephalexin, Nsaids, Penicillin g, Codeine, Diclofenac, Diclofenac sodium, Diclofenac sodium, Diclofenac sodium, Hydrochlorothiazide, Ibuprofen, Macrobid [nitrofurantoin macrocrystal], Motrin [ibuprofen micronized], Nitrofurantoin, Pcn [penicillins], Peach [prunus persica], Prednisone, Sulfa antibiotics, and Sulindac    FAMILYHISTORY       Family History   Problem Relation Age of Onset    High Blood Pressure Mother     Heart Attack Father     Heart Disease Father     Tuberculosis Brother     Asthma Paternal Uncle         SOCIAL HISTORY       Social History     Tobacco Use    Smoking status: Former     Packs/day: 1.00     Years: 45.00     Pack years: 45.00     Types: Cigarettes     Start date: 9/15/1950     Quit date: 1995     Years since quittin.1    Smokeless tobacco: Never   Vaping Use    Vaping Use: Never used   Substance Use Topics    Alcohol use:  No Alcohol/week: 0.0 standard drinks    Drug use: No       SCREENINGS        [place] Coma Scale  Eye Opening: Spontaneous  Best Verbal Response: Oriented  Best Motor Response: Obeys commands  Desire Coma Scale Score: 15                CIWA Assessment  BP: (!) 141/76  Heart Rate: 76           PHYSICAL EXAM  1 or more Elements     ED Triage Vitals [02/23/23 0859]   BP Temp Temp src Heart Rate Resp SpO2 Height Weight   (!) 119/91 -- -- (!) 109 18 97 % -- --       Physical Exam  Vitals and nursing note reviewed. Constitutional:       Appearance: She is well-developed. She is not diaphoretic. HENT:      Head: Normocephalic and atraumatic. Eyes:      General:         Right eye: No discharge. Left eye: No discharge. Extraocular Movements: Extraocular movements intact. Conjunctiva/sclera: Conjunctivae normal.      Pupils: Pupils are equal, round, and reactive to light. Cardiovascular:      Rate and Rhythm: Tachycardia present. Rhythm irregular. Pulses: Normal pulses. Heart sounds: Normal heart sounds. Pulmonary:      Effort: Pulmonary effort is normal. No respiratory distress. Breath sounds: Normal breath sounds. No wheezing or rales. Abdominal:      General: Abdomen is flat. Bowel sounds are normal.      Palpations: Abdomen is soft. Musculoskeletal:         General: Normal range of motion. Cervical back: Normal range of motion and neck supple. Skin:     General: Skin is warm and dry. Capillary Refill: Capillary refill takes less than 2 seconds. Coloration: Skin is not pale. Neurological:      Mental Status: She is alert and oriented to person, place, and time.    Psychiatric:         Behavior: Behavior normal.           DIAGNOSTIC RESULTS   LABS:    Labs Reviewed   CBC WITH AUTO DIFFERENTIAL - Abnormal; Notable for the following components:       Result Value    RBC 3.35 (*)     Hemoglobin 9.5 (*)     Hematocrit 28.9 (*)     RDW 16.4 (*)     All other components within normal limits   COMPREHENSIVE METABOLIC PANEL - Abnormal; Notable for the following components:    Glucose 108 (*)     BUN 24 (*)     Est, Glom Filt Rate 49 (*)     Alkaline Phosphatase 150 (*)     AST 14 (*)     All other components within normal limits   URINALYSIS WITH REFLEX TO CULTURE - Abnormal; Notable for the following components:    Protein, UA 30 (*)     All other components within normal limits   TROPONIN - Abnormal; Notable for the following components:    Troponin 0.02 (*)     All other components within normal limits   APTT - Abnormal; Notable for the following components:    aPTT 36.5 (*)     All other components within normal limits   PROTIME-INR - Abnormal; Notable for the following components:    Protime 18.0 (*)     INR 1.49 (*)     All other components within normal limits   BRAIN NATRIURETIC PEPTIDE - Abnormal; Notable for the following components:    Pro-BNP 4,454 (*)     All other components within normal limits   MICROSCOPIC URINALYSIS       When ordered only abnormal lab results are displayed. All other labs were within normal range or not returned as of this dictation. EKG: When ordered, EKG's are interpreted by the Emergency Department Physician in the absence of a cardiologist.  Please see their note for interpretation of EKG. RADIOLOGY:   Non-plain film images such as CT, Ultrasound and MRI are read by the radiologist. Plain radiographic images are visualized and preliminarily interpreted by the ED Provider with the below findings:        Interpretation per the Radiologist below, if available at the time of this note:    XR CHEST PORTABLE   Final Result   Mild bibasilar airspace disease and small bilateral pleural effusions. Consider atelectasis versus pneumonia in the appropriate clinical settings         XR ABDOMEN (KUB) (SINGLE AP VIEW)    (Results Pending)     No results found. No results found.     PROCEDURES   Unless otherwise noted below, none Procedures    CRITICAL CARE TIME (.cctime)       PAST MEDICAL HISTORY      has a past medical history of Arthritis, Atrial fibrillation ([facility] Bursitis, Diabetes mellitus type II, controlled ([facility] GERD (gastroesophageal reflux disease), HTN (hypertension), Hyperlipidemia, Hypothyroid, Insomnia, Lumbago, Parkinson disease ([facility] Restless legs syndrome (RLS), and SVT (supraventricular tachycardia) ([facility] Chronic Conditions affecting Care: None    EMERGENCY DEPARTMENT COURSE and DIFFERENTIAL DIAGNOSIS/MDM:   Vitals:    Vitals:    02/23/23 0859 02/23/23 0945 02/23/23 1000   BP: (!) 119/91 (!) 147/72 (!) 141/76   Pulse: (!) 109 94 76   Resp: 18 21 16   SpO2: 97% 95% 94%       Patient was given the following medications:  Medications - No data to display          Is this patient to be included in the SEP-1 Core Measure due to severe sepsis or septic shock? No   Exclusion criteria - the patient is NOT to be included for SEP-1 Core Measure due to: Infection is not suspected    CONSULTS: (Who and What was discussed)  None  Discussion with Other Profesionals : None    Social Determinants : None    Records Reviewed : None    CC/HPI Summary, DDx, ED Course, and Reassessment: Patient presented to the emergency department today stating since Saturday she has been experiencing somewhat acute onset shortness of breath and dizziness with exertion. She also feels like she is back into atrial fibrillation. She states she is scheduled to have a pacemaker placed soon. She is on home oxygen, 2 L. Her EKG does show atrial fibrillation with a rate around 100. Patient's troponin is elevated at 0.02.  proBNP is 4454 and chest x-ray shows mild bibasilar airspace disease and small bilateral pleural effusions. Patient denies having any fevers, chills or coughing. I do not clinically suspect pneumonia. CBC is without leukocytosis or profound anemia. BMP with a slightly elevated BUN.   Urine analysis shows no signs of infection. Disposition Considerations (include 1 Tests not done, Shared Decision Making, Pt Expectation of Test or Tx.): I had a lengthy discussion with the patient regarding testing completed in the emergency department today as well as the results. Her EKG does show atrial fibrillation and she does take diltiazem as needed. She is anticoagulated with Eliquis. I feel she will require admission to the hospital secondary to her elevated troponin. Patient is agreeable with this. Hospitalist is consulted who is agreeable with admission    Admission      I am the Primary Clinician of Record. FINAL IMPRESSION      1. Paroxysmal atrial fibrillation (HCC)    2. Shortness of breath    3. Dizziness    4. Elevated troponin          DISPOSITION/PLAN     DISPOSITION Decision To Admit 02/23/2023 10:43:32 AM      PATIENT REFERRED TO:  No follow-up provider specified.     DISCHARGE MEDICATIONS:  New Prescriptions    No medications on file       DISCONTINUED MEDICATIONS:  Discontinued Medications    No medications on file              (Please note that portions of this note were completed with a voice recognition program.  Efforts were made to edit the dictations but occasionally words are mis-transcribed.)    Marlene Orozco PA-C (electronically signed)           Marlene Orozco PA-C  02/23/23 1355

## 2023-02-23 NOTE — CARE COORDINATION
Case Management Assessment  Initial Evaluation    Date/Time of Evaluation: 2/23/2023 4:21 PM  Assessment Completed by: KIET Mcintosh, JOSE MW    If patient is discharged prior to next notation, then this note serves as note for discharge by case management. Patient Name: New Nesbitt                   YOB: 1935  Diagnosis: Shortness of breath [R06.02]  Dizziness [R42]  Paroxysmal atrial fibrillation (HCC) [I48.0]  Elevated troponin [R77.8]  Acute on chronic diastolic CHF (congestive heart failure) ([facility] [I50.33]                   Date / Time: 2/23/2023  8:51 AM    Patient Admission Status: Inpatient   Readmission Risk (Low < 19, Mod (19-27), High > 27): Readmission Risk Score: 28.3    Current PCP: Mich Davis MD  PCP verified by CM? Yes    Chart Reviewed: Yes      History Provided by: Patient, Child/Family  Patient Orientation: Alert and Oriented    Patient Cognition: Alert    Hospitalization in the last 30 days (Readmission):  Yes    If yes, Readmission Assessment in CM Navigator will be completed. Advance Directives:      Code Status: Full Code   Patient's Primary Decision Maker is: Legal Next of Kin    Primary Decision Maker: Kira Braswell - Child - [phone]    Discharge Planning:    Patient lives with: Children (w/dtr Caffie Jermain) Type of Home: House (Bi-level - 1 step in)  Primary Care Giver: Self  Patient Support Systems include: Family Members (Lives with dtr,  Has other family that assists and checks on patient.)   Current Financial resources: Medicare, Medicaid  Current community resources: None  Current services prior to admission: [employer], [employer], Home Care (Active w/[facility] and [facility] for aide 5 hrs/day Mon - Fri)            Current DME: Other (Comment) (grab bars, shower chair, rollator, 4 wheel walker, oxygen, transport chair)            Type of Home Care services:  [employer], Nursing Services    ADLS  Prior functional level:  Independent in ADLs/IADLs (Uses 4WW at all times)  Current functional level: Other (see comment) (Unsure. PT/OT pending.)    PT AM-PAC:   /24  OT AM-PAC:   /24    Family can provide assistance at DC: No (Family reported cannot assist with all of pt's needs.)  Would you like Case Management to discuss the discharge plan with any other family members/significant others, and if so, who? Yes (w/all family)  Plans to Return to Present Housing: Unknown at present  Other Identified Issues/Barriers to RETURNING to current housing:  None  Potential Assistance needed at discharge: Piyush Torres            Potential DME:    Patient expects to discharge to: [address] [place] [address] for transportation at discharge: Family    Financial    Payor: Jane Brown / Plan: Kimi Garnica / Product Type: *No Product type* /     Does insurance require precert for SNF: Yes    Potential assistance Purchasing Medications: No  Meds-to-Beds request:        [facility], [address]  [place]  [address]  Phone: [phone] Fax: [phone]    [facility] Mail [facility] - [place] [phone] -  [phone]  [address] [place] [address]  Phone: [phone] Fax: [phone]      Notes:    Factors facilitating achievement of predicted outcomes: Family support, Cooperative, and Pleasant    Barriers to discharge: None    Additional Case Management Notes:   Patient is active w/[facility] (family reported some inconsistency with services. Reported this to Pauline Correia). Also active w/[facility] for home aide 5hrs/day Mon - Friday. Pt lives with dtr and has some support from family but all family members reporting they are unable to assist with all of patient's needs. Patient was denied SNF by insurance last admission and not is a readmit. PT/OT is pending. If SNF is recommended, pt would like to try [facility] again.     The Plan for Transition of Care is related to the following treatment goals of Shortness of breath [R06.02]  Dizziness [R42]  Paroxysmal atrial fibrillation (HCC) [I48.0]  Elevated troponin [R77.8]  Acute on chronic diastolic CHF (congestive heart failure) (HCC) [D73.19]    IF APPLICABLE: The Patient and/or patient representative Idalmis Garcia and her family were provided with a choice of provider and agrees with the discharge plan. Freedom of choice list with basic dialogue that supports the patient's individualized plan of care/goals and shares the quality data associated with the providers was provided to: Patient   Patient Representative Name:       The Patient and/or Patient Representative Agree with the Discharge Plan?  Yes    KIET Randle LSW  Case Management Department    Electronically signed by KIET Randle LSW on 2/23/2023 at 4:23 PM

## 2023-02-23 NOTE — TELEPHONE ENCOUNTER
Admission orders were placed prior to writer contacting ED provider to inform of 30 day readmission risk

## 2023-02-23 NOTE — DISCHARGE INSTR - COC
Continuity of Care Form    Patient Name: Javier Martinez   :  1935  MRN:  [id number]    Admit date:  2023  Discharge date:  2023    Code Status Order: Full Code   Advance Directives:     Admitting Physician:  Michael Lagos MD  PCP: Senia Orozco MD    Discharging Nurse: Anna Lyles Unit/Room#: 5JP-8517/4761-32  Discharging Unit Phone Number: [phone]    Emergency Contact:   Extended Emergency Contact Information  Primary Emergency Contact: Javier Patton Phone: [phone]  Relation: Child  Secondary Emergency Contact: Arnold Breen  Mobile Phone: [phone]  Relation: Child    Past Surgical History:  Past Surgical History:   Procedure Laterality Date    APPENDECTOMY      CHOLECYSTECTOMY, LAPAROSCOPIC  2013    COLONOSCOPY         Immunization History:   Immunization History   Administered Date(s) Administered    COVID-19, PFIZER GRAY top, DO NOT Dilute, (age 15 y+), IM, 30 mcg/0.3 mL 2022    COVID-19, PFIZER PURPLE top, DILUTE for use, (age 15 y+), 30mcg/0.3mL 2021, 2021, 2021    Influenza Vaccine, unspecified formulation 10/24/2015    Influenza Virus Vaccine 09/10/2013, 2014    Influenza, FLUAD, (age 72 y+), Adjuvanted, 0.5mL 10/22/2020, 2021    Influenza, High Dose (Fluzone 65 yrs and older) 2016, 2017, 2018    Influenza, Triv, inactivated, subunit, adjuvanted, IM (Fluad 65 yrs and older) 2019, 10/22/2020    Pneumococcal Conjugate 13-valent (Ppyzvty38) 2015    Pneumococcal Polysaccharide (Vpgcgxsnm69) 09/10/2010, 2016    Td, unspecified formulation 2005    Tdap (Boostrix, Adacel) 2018       Active Problems:  Patient Active Problem List   Diagnosis Code    Hypertensive urgency I16.0    Hyperlipidemia E78.5    IBS (irritable bowel syndrome) K58.9    Atrial fibrillation with RVR (HCC) I48.91    Overactive bladder N32.81    Osteoarthritis M19.90    DM2 (diabetes mellitus, type 2) ([facility]) E11.9    Restless legs syndrome G25.81    ANTHONY (obstructive sleep apnea) G47.33    Allergic rhinitis J30.9    COPD, moderate ([facility]) J44.9    Vertigo R42    Interstitial lung disease ([facility]) J84.9    Bronchiectasis without complication ([facility]) H17.1    Coronary artery disease involving native coronary artery of native heart with angina pectoris ([facility]) I25.119    PAF (paroxysmal atrial fibrillation) ([facility]) I48.0    Hypothyroid E03.9    Chronic respiratory failure with hypoxia ([facility]) J96.11    SOB (shortness of breath) R06.02    Ataxia R27.0    Primary hypertension I10    Centrilobular emphysema ([facility]) J43.2    Pulmonary nodule R91.1    Ptosis of eyelid, bilateral H02.403    Chronic obstructive pulmonary disease ([facility]) J44.9    Chronic renal disease, stage III [facility]) [[id number]] N18.30    Chronic fatigue R53.82    Hypertensive urgency, malignant I16.0    Acute dysfunction of Eustachian tube, bilateral H69.83    Bilateral hearing loss H91.93    Impacted cerumen of right ear H61.21    Dizziness R42    Acute on chronic diastolic (congestive) heart failure ([facility]) I50.33    Acute anemia D64.9    Pneumonia due to infectious organism J18.9    Intractable back pain M54.9    Lumbar foraminal stenosis M48.061    Constipation K59.00    Hyponatremia E87.1    Urinary retention R33.9    Elevated serum creatinine R79.89    Bradycardia R00.1    Acute on chronic diastolic CHF (congestive heart failure) ([facility]) I50.33       Isolation/Infection:   Isolation            No Isolation          Patient Infection Status       Infection Onset Added Last Indicated Last Indicated By Review Planned Expiration Resolved Resolved By    None active    Resolved    COVID-19 (Rule Out) 04/19/22 04/19/22 04/19/22 COVID-19 & Influenza Combo (Ordered)   04/19/22 Rule-Out Test Resulted    COVID-19 (Rule Out) 02/24/21 02/24/21 02/24/21 Respiratory Panel, Molecular, with COVID-19 (Restricted: peds pts or suitable admitted adults) (Ordered)   02/24/21 Rule-Out Test Resulted    COVID-19 (Rule Out) 02/22/21 02/22/21 02/22/21 COVID-19 (Ordered)   02/23/21 Rule-Out Test Resulted            Nurse Assessment:  Last Vital Signs: BP (!) 171/75   Pulse 83   Temp 98.1 °F (36.7 °C) (Oral)   Resp 18   Ht 5' 2\" (1.575 m)   Wt 167 lb 1.7 oz (75.8 kg)   SpO2 97%   BMI 30.56 kg/m²     Last documented pain score (0-10 scale): Pain Level: 0  Last Weight:   Wt Readings from Last 1 Encounters:   02/23/23 167 lb 1.7 oz (75.8 kg)     Mental Status:  oriented and alert    IV Access:  - None    Nursing Mobility/ADLs:  Walking   Assisted  Transfer  Assisted  Bathing  Assisted  Dressing  Assisted  Toileting  Assisted  Feeding  Independent  Med Admin  Assisted  Med Delivery   whole    Wound Care Documentation and Therapy:        Elimination:  Continence: Bowel: Yes  Bladder: Yes  Urinary Catheter: Insertion Date: 02/23/23 and Removal Date 02/27/23    Colostomy/Ileostomy/Ileal Conduit: No       Date of Last BM: 02/27/23    Intake/Output Summary (Last 24 hours) at 2/23/2023 1455  Last data filed at 2/23/2023 0953  Gross per 24 hour   Intake --   Output 300 ml   Net -300 ml     No intake/output data recorded. Safety Concerns: At Risk for Falls    Impairments/Disabilities:      Hearing Bilateral hearing aides    Nutrition Therapy:  Current Nutrition Therapy:   - Oral Diet:  General    Routes of Feeding: Oral  Liquids: Thin Liquids  Daily Fluid Restriction: no  Last Modified Barium Swallow with Video (Video Swallowing Test): not done    Treatments at the Time of Hospital Discharge:   Respiratory Treatments: na  Oxygen Therapy:  is on oxygen at 2 L/min per nasal cannula.   Ventilator:    - No ventilator support    Rehab Therapies: Physical Therapy and Occupational Therapy  Weight Bearing Status/Restrictions: No weight bearing restrictions  Other Medical Equipment (for information only, NOT a DME order):  cane  Other Treatments: na    Patient's personal belongings (please select all that are sent with patient):  Glasses, Hearing Aides bilateral    RN SIGNATURE:  Electronically signed by Frank Chatman RN on 2/28/23 at 3:51 PM EST    CASE MANAGEMENT/SOCIAL WORK SECTION    Inpatient Status Date: 2/23/2023    Readmission Risk Assessment Score:  Readmission Risk              Risk of Unplanned Readmission:  31           Discharging to Facility/ Agency   Name:  [facility] of [address] [facility]. [place], [address]  Phone: [address] [place] [address] (if applicable)   Name:  Address:  Dialysis Schedule:  Phone:  Fax:    / signature: Electronically signed by Linda Bo RN on 2/27/23 at 3:51 PM EST    PHYSICIAN SECTION    Prognosis: Good    Condition at Discharge: Stable    Rehab Potential (if transferring to Rehab): N/A    Recommended Labs or Other Treatments After Discharge: CBC in 2 days and 1 week, BMP weekly x 2    Physician Certification: I certify the above information and transfer of Tyrone Rubi  is necessary for the continuing treatment of the diagnosis listed and that she requires [facility] for less 30 days.      Update Admission H&P: Changes in H&P as follows - s/p PPM implant    PHYSICIAN SIGNATURE:  Electronically signed by KATHY Frank CNP on 2/27/23 at 3:03 PM EST

## 2023-02-23 NOTE — PROGRESS NOTES
Patient seen in ED, room 19. Admission completed with the following exceptions:  4 Eyes Assessment, Immunizations, Covid Vaccines, Rights and Responsibilities, Orientation to room, Plan of Care, Education/Learning Assessment and Education Plan, white board, height and weight, pain assessment and head to toe assessment. Patient is alert and oriented X 4. Patient lives home with her daughter and is being admitted for acute on chronic diastolic congestive heart failure. Home Medications as well as Outside Sources has been verbally reviewed with patient and daughter/legal next of kin Tony Cabello and updated as appropriate and is now Completed. Plan of care updated if indicated. All questions answered. Patient has her portable oxygen tank with her. Patient wears 2 Liters of oxygen during the day and at night.

## 2023-02-23 NOTE — TELEPHONE ENCOUNTER
Called pt, daughter's cell # on file, LVM to call to get PM implant scheduled. Please transfer call to [address] [facility].

## 2023-02-23 NOTE — TELEPHONE ENCOUNTER
Pt daughter ho called stating the pt is in the ER MFF pt /107  .     If you have any questions pls call    Minerva Taylor   [phone]

## 2023-02-24 ENCOUNTER — NURSE ONLY (OUTPATIENT)
Dept: CARDIOLOGY CLINIC | Age: 88
End: 2023-02-24

## 2023-02-24 PROBLEM — I49.5 TACHYCARDIA-BRADYCARDIA SYNDROME (HCC): Status: ACTIVE | Noted: 2023-02-24

## 2023-02-24 LAB
ANION GAP SERPL CALCULATED.3IONS-SCNC: 13 MMOL/L (ref 3–16)
BASOPHILS ABSOLUTE: 0 K/UL (ref 0–0.2)
BASOPHILS RELATIVE PERCENT: 0.7 %
BUN BLDV-MCNC: 24 MG/DL (ref 7–20)
CALCIUM SERPL-MCNC: 10.3 MG/DL (ref 8.3–10.6)
CHLORIDE BLD-SCNC: 101 MMOL/L (ref 99–110)
CO2: 27 MMOL/L (ref 21–32)
CREAT SERPL-MCNC: 1 MG/DL (ref 0.6–1.2)
EKG ATRIAL RATE: 416 BPM
EKG ATRIAL RATE: 80 BPM
EKG DIAGNOSIS: NORMAL
EKG DIAGNOSIS: NORMAL
EKG P AXIS: 38 DEGREES
EKG P-R INTERVAL: 152 MS
EKG Q-T INTERVAL: 372 MS
EKG Q-T INTERVAL: 414 MS
EKG QRS DURATION: 74 MS
EKG QRS DURATION: 82 MS
EKG QTC CALCULATION (BAZETT): 452 MS
EKG QTC CALCULATION (BAZETT): 477 MS
EKG R AXIS: 13 DEGREES
EKG R AXIS: 17 DEGREES
EKG T AXIS: 28 DEGREES
EKG T AXIS: 56 DEGREES
EKG VENTRICULAR RATE: 80 BPM
EKG VENTRICULAR RATE: 89 BPM
EOSINOPHILS ABSOLUTE: 0.3 K/UL (ref 0–0.6)
EOSINOPHILS RELATIVE PERCENT: 4 %
ESTIMATED AVERAGE GLUCOSE: 128.4 MG/DL
GFR SERPL CREATININE-BSD FRML MDRD: 54 ML/MIN/{1.73_M2}
GLUCOSE BLD-MCNC: 122 MG/DL (ref 70–99)
GLUCOSE BLD-MCNC: 129 MG/DL (ref 70–99)
GLUCOSE BLD-MCNC: 155 MG/DL (ref 70–99)
GLUCOSE BLD-MCNC: 172 MG/DL (ref 70–99)
GLUCOSE BLD-MCNC: 178 MG/DL (ref 70–99)
HBA1C MFR BLD: 6.1 %
HCT VFR BLD CALC: 34.2 % (ref 36–48)
HEMOGLOBIN: 11.2 G/DL (ref 12–16)
LYMPHOCYTES ABSOLUTE: 1.7 K/UL (ref 1–5.1)
LYMPHOCYTES RELATIVE PERCENT: 26.1 %
MCH RBC QN AUTO: 28 PG (ref 26–34)
MCHC RBC AUTO-ENTMCNC: 32.7 G/DL (ref 31–36)
MCV RBC AUTO: 85.8 FL (ref 80–100)
MONOCYTES ABSOLUTE: 0.5 K/UL (ref 0–1.3)
MONOCYTES RELATIVE PERCENT: 8.1 %
NEUTROPHILS ABSOLUTE: 4 K/UL (ref 1.7–7.7)
NEUTROPHILS RELATIVE PERCENT: 61.1 %
PDW BLD-RTO: 16.7 % (ref 12.4–15.4)
PERFORMED ON: ABNORMAL
PLATELET # BLD: 175 K/UL (ref 135–450)
PMV BLD AUTO: 8.4 FL (ref 5–10.5)
POTASSIUM REFLEX MAGNESIUM: 4.5 MMOL/L (ref 3.5–5.1)
PRO-BNP: 4230 PG/ML (ref 0–449)
RBC # BLD: 3.99 M/UL (ref 4–5.2)
SODIUM BLD-SCNC: 141 MMOL/L (ref 136–145)
WBC # BLD: 6.5 K/UL (ref 4–11)

## 2023-02-24 PROCEDURE — 93010 ELECTROCARDIOGRAM REPORT: CPT | Performed by: INTERNAL MEDICINE

## 2023-02-24 PROCEDURE — 36415 COLL VENOUS BLD VENIPUNCTURE: CPT

## 2023-02-24 PROCEDURE — C1785 PMKR, DUAL, RATE-RESP: HCPCS

## 2023-02-24 PROCEDURE — 6370000000 HC RX 637 (ALT 250 FOR IP): Performed by: INTERNAL MEDICINE

## 2023-02-24 PROCEDURE — C1769 GUIDE WIRE: HCPCS

## 2023-02-24 PROCEDURE — 99153 MOD SED SAME PHYS/QHP EA: CPT

## 2023-02-24 PROCEDURE — 6360000002 HC RX W HCPCS

## 2023-02-24 PROCEDURE — 2700000000 HC OXYGEN THERAPY PER DAY

## 2023-02-24 PROCEDURE — 2580000003 HC RX 258: Performed by: INTERNAL MEDICINE

## 2023-02-24 PROCEDURE — C1894 INTRO/SHEATH, NON-LASER: HCPCS

## 2023-02-24 PROCEDURE — 1200000000 HC SEMI PRIVATE

## 2023-02-24 PROCEDURE — C1898 LEAD, PMKR, OTHER THAN TRANS: HCPCS

## 2023-02-24 PROCEDURE — C1887 CATHETER, GUIDING: HCPCS

## 2023-02-24 PROCEDURE — 99152 MOD SED SAME PHYS/QHP 5/>YRS: CPT

## 2023-02-24 PROCEDURE — 93005 ELECTROCARDIOGRAM TRACING: CPT | Performed by: INTERNAL MEDICINE

## 2023-02-24 PROCEDURE — 99223 1ST HOSP IP/OBS HIGH 75: CPT | Performed by: INTERNAL MEDICINE

## 2023-02-24 PROCEDURE — 83880 ASSAY OF NATRIURETIC PEPTIDE: CPT

## 2023-02-24 PROCEDURE — 33208 INSRT HEART PM ATRIAL & VENT: CPT

## 2023-02-24 PROCEDURE — 33208 INSRT HEART PM ATRIAL & VENT: CPT | Performed by: INTERNAL MEDICINE

## 2023-02-24 PROCEDURE — 85025 COMPLETE CBC W/AUTO DIFF WBC: CPT

## 2023-02-24 PROCEDURE — 02HK3JZ INSERTION OF PACEMAKER LEAD INTO RIGHT VENTRICLE, PERCUTANEOUS APPROACH: ICD-10-PCS | Performed by: INTERNAL MEDICINE

## 2023-02-24 PROCEDURE — 02H63JZ INSERTION OF PACEMAKER LEAD INTO RIGHT ATRIUM, PERCUTANEOUS APPROACH: ICD-10-PCS | Performed by: INTERNAL MEDICINE

## 2023-02-24 PROCEDURE — 0JH606Z INSERTION OF PACEMAKER, DUAL CHAMBER INTO CHEST SUBCUTANEOUS TISSUE AND FASCIA, OPEN APPROACH: ICD-10-PCS | Performed by: INTERNAL MEDICINE

## 2023-02-24 PROCEDURE — 2580000003 HC RX 258

## 2023-02-24 PROCEDURE — 6360000002 HC RX W HCPCS: Performed by: INTERNAL MEDICINE

## 2023-02-24 PROCEDURE — 94640 AIRWAY INHALATION TREATMENT: CPT

## 2023-02-24 PROCEDURE — 6370000000 HC RX 637 (ALT 250 FOR IP): Performed by: PHYSICIAN ASSISTANT

## 2023-02-24 PROCEDURE — 2500000003 HC RX 250 WO HCPCS

## 2023-02-24 PROCEDURE — 80048 BASIC METABOLIC PNL TOTAL CA: CPT

## 2023-02-24 PROCEDURE — 94761 N-INVAS EAR/PLS OXIMETRY MLT: CPT

## 2023-02-24 RX ORDER — DILTIAZEM HYDROCHLORIDE 240 MG/1
240 CAPSULE, COATED, EXTENDED RELEASE ORAL DAILY
Status: DISCONTINUED | OUTPATIENT
Start: 2023-02-25 | End: 2023-02-28 | Stop reason: HOSPADM

## 2023-02-24 RX ADMIN — Medication 10 ML: at 10:46

## 2023-02-24 RX ADMIN — LORAZEPAM 1 MG: 1 TABLET ORAL at 20:51

## 2023-02-24 RX ADMIN — Medication 2 PUFF: at 21:14

## 2023-02-24 RX ADMIN — Medication 2 PUFF: at 09:42

## 2023-02-24 RX ADMIN — ENOXAPARIN SODIUM 40 MG: 40 INJECTION SUBCUTANEOUS at 11:24

## 2023-02-24 RX ADMIN — BETHANECHOL CHLORIDE 10 MG: 10 TABLET ORAL at 09:07

## 2023-02-24 RX ADMIN — POLYETHYLENE GLYCOL 3350 17 G: 17 POWDER, FOR SOLUTION ORAL at 09:07

## 2023-02-24 RX ADMIN — LORAZEPAM 0.5 MG: 0.5 TABLET ORAL at 05:54

## 2023-02-24 RX ADMIN — ATORVASTATIN CALCIUM 80 MG: 80 TABLET, FILM COATED ORAL at 20:50

## 2023-02-24 RX ADMIN — Medication 10 ML: at 20:50

## 2023-02-24 RX ADMIN — NALOXEGOL OXALATE 25 MG: 25 TABLET, FILM COATED ORAL at 05:54

## 2023-02-24 RX ADMIN — OXYCODONE AND ACETAMINOPHEN 1 TABLET: 5; 325 TABLET ORAL at 16:24

## 2023-02-24 RX ADMIN — ALBUTEROL SULFATE 2.5 MG: 2.5 SOLUTION RESPIRATORY (INHALATION) at 09:37

## 2023-02-24 RX ADMIN — LEVOTHYROXINE SODIUM 75 MCG: 0.07 TABLET ORAL at 09:06

## 2023-02-24 RX ADMIN — ACETAMINOPHEN 650 MG: 325 TABLET ORAL at 21:31

## 2023-02-24 RX ADMIN — GABAPENTIN 100 MG: 100 CAPSULE ORAL at 16:24

## 2023-02-24 RX ADMIN — BETHANECHOL CHLORIDE 10 MG: 10 TABLET ORAL at 20:51

## 2023-02-24 RX ADMIN — GABAPENTIN 100 MG: 100 CAPSULE ORAL at 09:06

## 2023-02-24 RX ADMIN — ROPINIROLE HYDROCHLORIDE 1 MG: 1 TABLET, FILM COATED ORAL at 16:24

## 2023-02-24 RX ADMIN — ROPINIROLE HYDROCHLORIDE 1 MG: 1 TABLET, FILM COATED ORAL at 09:06

## 2023-02-24 RX ADMIN — FUROSEMIDE 40 MG: 10 INJECTION, SOLUTION INTRAMUSCULAR; INTRAVENOUS at 10:43

## 2023-02-24 RX ADMIN — TIOTROPIUM BROMIDE INHALATION SPRAY 2 PUFF: 3.12 SPRAY, METERED RESPIRATORY (INHALATION) at 09:42

## 2023-02-24 RX ADMIN — DILTIAZEM HYDROCHLORIDE 180 MG: 180 CAPSULE, EXTENDED RELEASE ORAL at 09:05

## 2023-02-24 RX ADMIN — ALBUTEROL SULFATE 2.5 MG: 2.5 SOLUTION RESPIRATORY (INHALATION) at 21:14

## 2023-02-24 RX ADMIN — GABAPENTIN 100 MG: 100 CAPSULE ORAL at 20:51

## 2023-02-24 RX ADMIN — ROPINIROLE HYDROCHLORIDE 1 MG: 1 TABLET, FILM COATED ORAL at 20:50

## 2023-02-24 RX ADMIN — FUROSEMIDE 40 MG: 10 INJECTION, SOLUTION INTRAMUSCULAR; INTRAVENOUS at 17:40

## 2023-02-24 ASSESSMENT — PAIN DESCRIPTION - LOCATION
LOCATION: BACK
LOCATION: CHEST
LOCATION: CHEST

## 2023-02-24 ASSESSMENT — PAIN SCALES - GENERAL
PAINLEVEL_OUTOF10: 0
PAINLEVEL_OUTOF10: 5
PAINLEVEL_OUTOF10: 0
PAINLEVEL_OUTOF10: 3
PAINLEVEL_OUTOF10: 5

## 2023-02-24 ASSESSMENT — PAIN DESCRIPTION - PAIN TYPE: TYPE: SURGICAL PAIN

## 2023-02-24 ASSESSMENT — PAIN DESCRIPTION - DESCRIPTORS
DESCRIPTORS: DISCOMFORT
DESCRIPTORS: DISCOMFORT

## 2023-02-24 ASSESSMENT — PAIN DESCRIPTION - ORIENTATION
ORIENTATION: LEFT
ORIENTATION: LEFT

## 2023-02-24 ASSESSMENT — PAIN SCALES - WONG BAKER: WONGBAKER_NUMERICALRESPONSE: 0

## 2023-02-24 NOTE — CONSULTS
[address] 1935    History:  Past Medical History:   Diagnosis Date    Arthritis     Atrial fibrillation ([address]     Bursitis     neck and down bilateral shoulders    COPD (chronic obstructive pulmonary disease) ([facility])     Diabetes mellitus type II, controlled ([facility])     Diastolic CHF ([facility])     GERD (gastroesophageal reflux disease)     HTN (hypertension)     Hyperlipidemia     Hypothyroid     Lumbago     Parkinson disease ([facility])     Restless legs syndrome (RLS)     SVT (supraventricular tachycardia) ([facility])        ECHO:  8/22/22   Summary   Left ventricular systolic function is normal with ejection fraction   estimated at 55-60 %. No regional wall motion abnormalities are noted. There is mild concentric left ventricular hypertrophy. There is reversal of E/A inflow velocities across the mitral valve. Mild-to-moderate aortic regurgitation is present. Mitral annular calcification is present. Mild mitral & tricuspid regurgitation. History of sleep apnea: No  [place] Screen ordered: Yes    DM History: Yes  Consult for DM educator: No      Last Hospital Admission: 1/31/23 with back pain  Code Status: full  Discharge plans: from home    Family Present: no    Patient currently off the floor for procedures. Robin Palacios was admitted with dizziness. Per EP, patient experiencing tachy nancy syndrome. Patient has been seen in the past for HF education during October 22 admission. Daughter is RN and patient does follow with EP as an outpatient   Will leave handout on HF education at bedside and if still here Monday will revisit for reinforcement of education. 1. WEIGHT: Admit Weight: 167 lb 1.7 oz (75.8 kg)      Today  Weight: 165 lb 3.2 oz (74.9 kg)   2.  I/O   Intake/Output Summary (Last 24 hours) at 2/24/2023 1456  Last data filed at 2/24/2023 1043  Gross per 24 hour   Intake 10 ml   Output 3675 ml   Net -3665 ml       Recommendations: 1.She will need a one week hospital follow up scheduled prior to discharge. 2. Educate further on fluid restriction 48 oz- 64 oz during inpatient stay so she can understand how to measure intake at home. 3. Continue to educate on S/S.   4. Emphasize daily weights, diet, and knowing when and who to call  5. Provided patient with CHF Resource Line for questions and concerns.       PIPE ALVARADO RN 2/24/2023 2:56 PM

## 2023-02-24 NOTE — DISCHARGE INSTR - DIET
Good nutrition is important when healing from an illness, injury, or surgery. Follow any nutrition recommendations given to you during your hospital stay. If you were given an oral nutrition supplement while in the hospital, continue to take this supplement at home. You can take it with meals, in-between meals, and/or before bedtime. These supplements can be purchased at most local grocery stores, pharmacies, and chain super-stores. If you have any questions about your diet or nutrition, call the hospital and ask for the dietitian. Heart Failure Nutrition Therapy  This diet will help you feel better and support your heart by reducing symptoms of fluid retention, shortness of breath and swelling. You should focus on:  Limiting sodium in your diet by reading labels and limiting foods high in sodium. Limit your daily sodium intake to 3,000 mg per day. Select foods with 140 mg of sodium or less per serving. Foods with more than 300 mg of sodium per serving may not fit into a reduced-sodium meal plan. Check serving sizes. If you eat more than 1 serving, you will get more sodium than the amount listed. Limiting fluid in your diet. Ask your doctor how much fluid you can have per day  Remember foods that are liquid at room temperature such as popsicles, soup, ice cream and Jell-O are fluids. Checking your weight to make sure you're not retaining too much fluid. Weigh yourself every morning. If you gain 3 or more pounds in one day or 5 pounds within 1 week, call your doctor. Foods to choose and avoid:  Avoid processed foods. Eat more fresh foods. Fresh and frozen fruits and vegetables are good choices. Choose fresh meats. Avoid processed meats such as ng, sausage and hot dogs. Do not add salt to your food while cooking or at the table. Try dry or fresh herbs, pepper, lemon juice, or a sodium-free seasoning blend such as Mrs. Dash to add flavor to food. Do not use a salt substitute.   Use caution at [employer] foods are high in sodium. Ask for your food to be cooked without salt and request sauces and dressing to come on the side. 

## 2023-02-24 NOTE — PROCEDURES
[address]     Electrophysiology Procedure Note       Date of Procedure: 2/24/2023  Patient's Name: Vidhya Cespedes  YOB: 1935   Medical Record Number: [id number]  Referring Physician: Deng Ferrell MD  Procedure Performed by: Malini Nelson MD    Procedures performed:  Insertion of dual chamber pacemaker under fluoroscopic guidance   IV sedation    Fentanyl 300 mcg and Versed 6 Mg  Sedation was administered by trained nursing staff under direct physician supervision  Programming and analysis of dual chamber pacemaker    Ins/Out 500 mL / 0 mL  EBL less than 20 mL     Indication of the procedure: Tachycardia bradycardia syndrome    HPI:  Vidhya Cespedes is a 80 y.o. female history of paroxysmal atrial fibrillation, symptomatic episodes of RVR with underlying sinus bradycardia, rhythm and rate control strategies for atrial fibrillation have been difficult secondary to underlying bradycardia. She is referred for dual-chamber pacemaker. Details of procedure: The patient was brought to the electrophysiology laboratory in stable condition. The patient was in a fasting and non-sedated state. The risks, benefits and alternatives of the procedure were discussed with the patient. The risks including, but not limited to, the risks of vascular injury, bleeding, infection, device malfunction, lead dislodgement, radiation exposure, injury to cardiac and surrounding structures (including pneumothorax), stroke, myocardial infarction and death were discussed in detail. Patient opted to proceed with the device implantation. Written informed consent was signed and placed in the chart. Vancomycin was given as prophylactic antibiotic. The patient was prepped and draped in a sterile fashion. A timeout protocol was completed to identify the patient and the procedure being performed. IV sedation was provided with IV Versed, Fentanyl.      Under ultrasound guidance, using a modified Seldinger technique, a micropuncture needle was used to obtain venous access at the level of the axillary vein. A micropuncture wire was introduced and passed to the level of the IVC. The needle was removed and a micropuncture placed over the wire. The wire and dilator were removed and a 0.35\" J wire was passed to the level of the IVC. The dilator was removed. This was repeated once for a total of 2 wires. An small linear incision was made in the left pectoral area after administration of lidocaine. The pectoralis muscle was exposed using blunt dissection and electrocautery. The wires were then internalized into the pocket. Over one of the wires a 7.5 Fr peel away sheath and dilator was passed. The wire and dilator was removed. RV septal Pacing lead    Medtronic C315 sheath in addition to Glide wire was used and the sheath was advanced into the RA. The glide wire and dilator was removed. Then Medtronic lead 3830 was advanced into the sheath. The sheath was carefully advanced into the RV. The sheath and lead were used for pace mapping. Using continuous pace mapping, we mapped locations on the septum to achieve a relatively narrow QRS initially, having w pattern on lead V1, and have aVL and aVR opposite axis. The lead was fixated on the septum with rotating the lead. Deep septal pacing achieved. Global QRS duration was 90 ms, different morphology from that of intrinsic QRS and no selective LB capture was noted. Peak R wave time in lead V5 was 70 ms. The delivery sheath was cut and removed. The 7 Fr sheath was peeled away and removed. The lead was anchored in place using the suture sleeve and interrupted 0-silk. RA Lead    Over one of the other wires a 7 Fr peel away sheath and dilator was passed. The wire and dilator was removed. An actively fixated RA pacing lead was introduced and passed to the level of the RA.  The stylet was removed, a curved stylet was placed, and the lead retracted to the level of the appendage. The lead was fixated in place. The curved stylet was removed and exchanged for a straight stylet. The lead demonstrated good stability with gentle retraction and advancement under fluoroscopy. Testing confirmed excellent lead characteristics. The 7 Fr sheath was pealed away. The lead was anchored in place using the suture sleeve and interrupted 0-silk. Pocket    Additional lidocaine was administered. A pocket was created using electrocautery and blunt dissection. The pocket was washed with antibiotic solution. The leads were then connected to the new dual chamber pacemaker pulse generator which was then placed into the pocket. The device was anchored to the underlying muscle using 0-silk. The pocket was then closed in three separate subcutaneous layers using 2-0 & 3-0 vicryl and subcuticular layer using 4-0 vicryl. Steri strips were placed over the incision site. A dressing was over the wound. All sponge and needle counts were reported as correct at the end of the procedure. The patient tolerated the procedure well and there were no complications. Post-sedation evaluation was completed. Patient was transported to the holding area in stable condition.      Lead and device information:           Assessment:  Successful implantation of a dual chamber pacemaker with septal pacing lead and resultant narrow QRS (<120ms)      Plan:   PA and lateral chest xray in AM   Device interrogation in AM  Avoid heparin products for next 48 hours  Wound check and device interrogation in 1 week, subsequent device interrogation in 3 months  Patient will follow up with Dr Carmen Gallego MD  [facility]   Office: [phone]  Fax: [phone]

## 2023-02-24 NOTE — PROGRESS NOTES
Physical/Occupational Therapy Attempt  Juliano Baker    PT/OT orders received and appreciated. Chart reviewed. Pt currently FLASH. Will follow-up as pt status and schedule allow. No charge.   Xavier Carbajal, PT, DPT #[id number]  Reynaldo Dowell OTR/L  [id number]

## 2023-02-24 NOTE — PROGRESS NOTES
Hospitalist Progress Note      PCP: Marylu Mckinney MD    Date of Admission: 2/23/2023    Chief Complaint: Dizziness    Hospital Course:  80 y.o. female with COPD/ILD, chronic respiratory failure with hypoxia on 2 L NC, Afib on Eliquis, CAD, CKD 3, DM 2 came to ER with complaints of dizziness. Daughter is RN and noted patient has been hypertensive at home with HR to 40s and then 140s. Patient awaiting PPM placement with EP. Admitted as inpatient for acute diastolic CHF and tachy-nancy syndrome. Started on IV Lasix. Seen by EP. Plan for PPM and ablation. Subjective: Patient less SOB. Less edema. No CP, HA or abdominal pain.        Medications:  Reviewed    Infusion Medications    sodium chloride      dextrose       Scheduled Medications    [START ON 2/25/2023] dilTIAZem  240 mg Oral Daily    milk and molasses  240 mL Rectal Once    atorvastatin  80 mg Oral Nightly    bethanechol  10 mg Oral BID    mometasone-formoterol  2 puff Inhalation BID    gabapentin  100 mg Oral TID    levothyroxine  75 mcg Oral Daily    lidocaine  1 patch TransDERmal Daily    LORazepam  0.5 mg Oral Q8H    LORazepam  1 mg Oral Nightly    naloxegol  25 mg Oral QAM AC    polyethylene glycol  17 g Oral Daily    rOPINIRole  1 mg Oral TID    tiotropium  2 puff Inhalation Daily    sodium chloride flush  5-40 mL IntraVENous 2 times per day    enoxaparin  40 mg SubCUTAneous Daily    furosemide  40 mg IntraVENous BID    albuterol  2.5 mg Nebulization BID    insulin lispro  0-8 Units SubCUTAneous TID WC    insulin lispro  0-4 Units SubCUTAneous Nightly     PRN Meds: albuterol sulfate HFA, fluticasone, levalbuterol, sodium chloride flush, sodium chloride, ondansetron **OR** ondansetron, polyethylene glycol, acetaminophen **OR** acetaminophen, dextrose bolus **OR** dextrose bolus, glucagon (rDNA), dextrose, hydrALAZINE, oxyCODONE-acetaminophen      Intake/Output Summary (Last 24 hours) at 2/24/2023 1442  Last data filed at 2/24/2023 1043  Gross per 24 hour   Intake 10 ml   Output 3675 ml   Net -3665 ml       Physical Exam Performed:    BP (!) 160/81   Pulse 77   Temp 97.8 °F (36.6 °C) (Oral)   Resp 16   Ht 5' 2\" (1.575 m)   Wt 165 lb 3.2 oz (74.9 kg)   SpO2 95%   BMI 30.22 kg/m²     General appearance: No apparent distress, appears stated age and cooperative. HEENT: Pupils equal, round, and reactive to light. Conjunctivae/corneas clear. Neck: Supple, with full range of motion. No jugular venous distention. Trachea midline. Respiratory:  No rales. No wheezing or rhonchi  Cardiovascular: Regular rate and rhythm with normal S1/S2 without murmurs, rubs or gallops. No BL LE edema  Abdomen: Soft, obese, non-distended with normal bowel sounds. Musculoskeletal: No clubbing, cyanosis or edema bilaterally. Skin: Skin color, texture, turgor normal.  No rashes or lesions. Neurologic:  Neurovascularly intact without any focal sensory/motor deficits.  Cranial nerves: II-XII intact, grossly non-focal.  Psychiatric: Alert and oriented, thought content appropriate, normal insight  Capillary Refill: Brisk, 3 seconds, normal   Peripheral Pulses: +2 palpable, equal bilaterally       Labs:   Recent Labs     02/23/23  0940 02/24/23  0458   WBC 5.0 6.5   HGB 9.5* 11.2*   HCT 28.9* 34.2*    175     Recent Labs     02/23/23  0940 02/24/23  0458    141   K 4.4 4.5    101   CO2 29 27   BUN 24* 24*   CREATININE 1.1 1.0   CALCIUM 9.7 10.3     Recent Labs     02/23/23  0940   AST 14*   ALT 13   BILITOT 0.3   ALKPHOS 150*     Recent Labs     02/23/23  0940   INR 1.49*     Recent Labs     02/23/23  0940   TROPONINI 0.02*       Urinalysis:      Lab Results   Component Value Date/Time    NITRU Negative 02/23/2023 09:39 AM    WBCUA 1 02/23/2023 09:39 AM    BACTERIA None Seen 02/23/2023 09:39 AM    RBCUA 0 02/23/2023 09:39 AM    BLOODU Negative 02/23/2023 09:39 AM    SPECGRAV 1.010 02/23/2023 09:39 AM    GLUCOSEU Negative 02/23/2023 09:39 AM       Radiology:  XR ABDOMEN (KUB) (SINGLE AP VIEW)   Final Result   1. Nonspecific, nonobstructive bowel gas pattern.   2. Mild-moderate fecal loading of the ascending/transverse colon as described   above.         XR CHEST PORTABLE   Final Result   Mild bibasilar airspace disease and small bilateral pleural effusions.   Consider atelectasis versus pneumonia in the appropriate clinical settings             IP CONSULT TO SPIRITUAL SERVICES  IP CONSULT TO CARDIOLOGY  IP CONSULT TO HEART FAILURE NURSE/COORDINATOR    Assessment/Plan:    Active Hospital Problems    Diagnosis     ANTHONY (obstructive sleep apnea) [G47.33]      Priority: High    Bradycardia [R00.1]      Priority: Medium    Acute on chronic diastolic CHF (congestive heart failure) ([facility]) [I50.33]      Priority: Medium    Constipation [K59.00]      Priority: Medium    Acute on chronic diastolic (congestive) heart failure ([facility]) [I50.33]      Priority: Medium    Chronic renal disease, stage III ([facility]) [919795] [N18.30]      Priority: Medium    Chronic obstructive pulmonary disease ([facility]) [J44.9]      Priority: Medium    Primary hypertension [I10]     Coronary artery disease involving native coronary artery of native heart with angina pectoris ([facility]) [I25.119]     Bronchiectasis without complication ([facility]) [J47.9]     Interstitial lung disease ([facility]) [J84.9]     COPD, moderate ([facility]) [J44.9]     DM2 (diabetes mellitus, type 2) ([facility]) [E11.9]      Acute diastolic CHF  Continue Lasix 40 mg IV bid  FR 1.5 L daily  Daily weight on scale  Strict I/O  CHF RN consult  Cardiology consult to assist in management  Repeat BNP still > 4K  2.         Afib with RVR              -           Off Cardizem gtt   - On Cardizem PO              -           Having episodes of bradycardia and will benefit from PPM              -           Hold Eliquis for PPM placement   -  EP plans PPM and ablation  3.         Constipation              -           KUB with constipation              -            Continue Miralax              -           Continue Movantik  4. DM 2              -           SSI  5. Chronic respiratory failure with hypoxia              -           Continue O2 at 2 L via NC (baseline)  6. COPD              -           Continue Spiriva, Dulera and Albuterol    DVT Prophylaxis: Lovenox  Diet: Diet NPO Exceptions are: Sips of Water with Meds  Code Status: Full Code  PT/OT Eval Status: Requested    Dispo - Unclear    Discussed with patient, nursing and CM. For PPM and ablation per EP.     Sonia Shearer MD

## 2023-02-24 NOTE — PROGRESS NOTES
Speech Language Pathology  Attempt/Hold Note    Ronni Torres  1935    SLP evaluation and treatment orders received and appreciated for this pt. SLP attempted to see pt at bedside this date for clinical swallow evaluation. Chart reviewed, spoke with RN. Pt currently NPO for planned pacemaker placement this date. Will hold and attempt again as pt is medically appropriate.     Aide Villarreal, [address] [place] [address]  Speech-Language Pathologist  [phone]

## 2023-02-24 NOTE — CONSULTS
Sarah 81   Electrophysiology Consultation   Date: 2/24/2023  Reason for Consultation: tachy nancy syndrome  Consult Requesting Physician: Tyrone Garcia MD     Chief Complaint   Patient presents with    Dizziness     Via ems, from home, states she has felt dizzy since Sunday, reports hx of afib, states she is supposed to get a pacemaker placed soon. Denies cp, sob at this time. Pt wears 2L oxygen at baseline. HPI: Kiana Sarmiento is a 80 y.o. female   with PMHx PAF, HTN, HLD, DM, hypothyroidism and Complex atherosclerotic plaque in descending aorta noted on TRACEY-evaluated by for complaints of dizziness. Daughter is RN and noted patient has been hypertensive at home with HR to 40s and then 140s. He has had multiple admissions due to A-fib and RVR and been treated with propafenone and flecainide in the past.  Propafenone was stopped because of it possibly causing SIADH. Flecainide caused her to be bradycardic.   Past Medical History:   Diagnosis Date    Arthritis     Atrial fibrillation (HCC)     Bursitis     neck and down bilateral shoulders    COPD (chronic obstructive pulmonary disease) (HCC)     Diabetes mellitus type II, controlled (HCC)     Diastolic CHF (HCC)     GERD (gastroesophageal reflux disease)     HTN (hypertension)     Hyperlipidemia     Hypothyroid     Lumbago     Parkinson disease (HCC)     Restless legs syndrome (RLS)     SVT (supraventricular tachycardia) ([facility])         Past Surgical History:   Procedure Laterality Date    APPENDECTOMY      CHOLECYSTECTOMY, LAPAROSCOPIC  2/24/2013    COLONOSCOPY         Allergies   Allergen Reactions    Adhesive Tape Anaphylaxis    Cefaclor Nausea And Vomiting     Other reaction(s): Chest pain  Other reaction(s): Chest pain, Nausea / Vomiting    Cephalexin Other (See Comments)     Struggling to breath, almost passing out/ very low oxygen and pulse    Nsaids      Pt states she has hives and heart races   Other reaction(s): Tachycardia  Other reaction(s): Hives / Skin Rash, Tachycardia    Penicillin G      Other reaction(s): Hives / Skin Rash    Codeine      FEEL NUMB  Other reaction(s): feels numb    Diclofenac      Other reaction(s): Hives    Diclofenac Sodium Hives    Diclofenac Sodium Hives    Diclofenac Sodium     Hydrochlorothiazide      Sob  Other reaction(s): sob    Ibuprofen Other (See Comments)     Other reaction(s): Tachycardia  Other reaction(s): Tachycardia    Macrobid [Nitrofurantoin Macrocrystal]      nausea    Motrin [Ibuprofen Micronized] Other (See Comments)     Tachycardia      Nitrofurantoin      Other reaction(s): nausea H&P  Other reaction(s): nausea H&P    Pcn [Penicillins] Hives    Peach [Prunus Persica] Itching and Swelling     Cannot eat raw peaches, but can eat canned peaches    Prednisone      Causes elevated blood pressure   Other reaction(s): Elevated high BP  H&P  Cannot tolerate the dose pack but can individual doses    Sulfa Antibiotics Itching     Nausea, diarrhea  Other reaction(s): Nausea/Diarrhea H&P    Sulindac Other (See Comments)     Stomach pain  Other reaction(s): Stomach Pain       Social History:  Reviewed. reports that she quit smoking about 27 years ago. Her smoking use included cigarettes. She started smoking about 72 years ago. She has a 45.00 pack-year smoking history. She has never used smokeless tobacco. She reports that she does not drink alcohol and does not use drugs. Family History:  Reviewed. family history includes Asthma in her paternal uncle; Heart Attack in her father; Heart Disease in her father; High Blood Pressure in her mother; Tuberculosis in her brother. Review of System:  All other systems reviewed and are negative except for that noted above.  Pertinent negatives are:     General: negative for fever, chills   Ophthalmic ROS: negative for - eye pain or loss of vision  ENT ROS: negative for - headaches, sore throat   Respiratory: negative for - cough, sputum  Cardiovascular: Reviewed in HPI  Gastrointestinal: negative for - abdominal pain, diarrhea, N/V  Hematology: negative for - bleeding, blood clots, bruising or jaundice  Genito-Urinary:  negative for - Dysuria or incontinence  Musculoskeletal: negative for - Joint swelling, muscle pain  Neurological: negative for - confusion, dizziness, headaches   Psychiatric: No anxiety, no depression. Dermatological: negative for - rash    Physical Examination:  Vitals:    23 0937   BP:    Pulse:    Resp:    Temp:    SpO2: 99%      In: -   Out: 3975    Wt Readings from Last 3 Encounters:   23 165 lb 3.2 oz (74.9 kg)   23 157 lb 6.5 oz (71.4 kg)   23 156 lb 12.8 oz (71.1 kg)     Temp  Av.1 °F (36.7 °C)  Min: 97.6 °F (36.4 °C)  Max: 98.2 °F (36.8 °C)  Pulse  Av  Min: 72  Max: 126  BP  Min: 126/70  Max: 178/89  SpO2  Av.4 %  Min: 94 %  Max: 99 %    Intake/Output Summary (Last 24 hours) at 2023 1103  Last data filed at 2023 5250  Gross per 24 hour   Intake --   Output 3675 ml   Net -3675 ml       Telemetry:  Atrial fibrillation sinus rhythm, sinus rhythm  Constitutional: Oriented. No distress. Head: Normocephalic and atraumatic. Mouth/Throat: Oropharynx is clear and moist.   Eyes: Conjunctivae normal. EOM are normal.   Neck: Neck supple. No rigidity. No JVD present. Cardiovascular: Normal rate, irregular rhythm, S1&S2. Pulmonary/Chest: Bilateral respiratory sounds. No wheezes, No rhonchi. Abdominal: Soft. Bowel sounds present. No distension, No tenderness. Musculoskeletal: No tenderness. No edema    Lymphadenopathy: Has no cervical adenopathy. Neurological: Alert and oriented. Cranial nerve appears intact, No Gross deficit   Skin: Skin is warm and dry. No rash noted. Psychiatric: Has a normal behavior     Labs, diagnostic and imaging results reviewed. Reviewed.    Recent Labs     23  0940 23  0458    141   K 4.4 4.5    101   CO2 29 27   BUN 24* 24* CREATININE 1.1 1.0     Recent Labs     02/23/23  0940 02/24/23  0458   WBC 5.0 6.5   HGB 9.5* 11.2*   HCT 28.9* 34.2*   MCV 86.4 85.8    175     Lab Results   Component Value Date/Time    CKTOTAL 80 02/22/2021 04:29 PM    TROPONINI 0.02 02/23/2023 09:40 AM     No results found for: BNP  Lab Results   Component Value Date/Time    PROTIME 18.0 02/23/2023 09:40 AM    PROTIME 20.0 09/30/2022 04:40 PM    PROTIME 14.9 08/22/2022 05:17 AM    INR 1.49 02/23/2023 09:40 AM    INR 1.71 09/30/2022 04:40 PM    INR 1.18 08/22/2022 05:17 AM     Lab Results   Component Value Date/Time    CHOL 148 08/22/2022 05:17 AM    HDL 52 08/22/2022 05:17 AM    HDL 32 06/23/2011 06:45 AM    TRIG 150 08/22/2022 05:17 AM       ECG: Atrial fibrillationLow voltage QRSAbnormal   Echo:  Conclusions      Summary   Left ventricular systolic function is normal with ejection fraction   estimated at 55-60 %. No regional wall motion abnormalities are noted. There is mild concentric left ventricular hypertrophy. There is reversal of E/A inflow velocities across the mitral valve. Mild-to-moderate aortic regurgitation is present. Mitral annular calcification is present. Mild mitral & tricuspid regurgitation.   Cath:     Scheduled Meds:   milk and molasses  240 mL Rectal Once    atorvastatin  80 mg Oral Nightly    bethanechol  10 mg Oral BID    mometasone-formoterol  2 puff Inhalation BID    dilTIAZem  180 mg Oral Daily    gabapentin  100 mg Oral TID    levothyroxine  75 mcg Oral Daily    lidocaine  1 patch TransDERmal Daily    LORazepam  0.5 mg Oral Q8H    LORazepam  1 mg Oral Nightly    naloxegol  25 mg Oral QAM AC    polyethylene glycol  17 g Oral Daily    rOPINIRole  1 mg Oral TID    tiotropium  2 puff Inhalation Daily    sodium chloride flush  5-40 mL IntraVENous 2 times per day    enoxaparin  40 mg SubCUTAneous Daily    furosemide  40 mg IntraVENous BID    albuterol  2.5 mg Nebulization BID    insulin lispro  0-8 Units SubCUTAneous TID WC    insulin lispro  0-4 Units SubCUTAneous Nightly     Continuous Infusions:   sodium chloride      dextrose      dilTIAZem Stopped (02/24/23 0748)     PRN Meds:.albuterol sulfate HFA, fluticasone, levalbuterol, sodium chloride flush, sodium chloride, ondansetron **OR** ondansetron, polyethylene glycol, acetaminophen **OR** acetaminophen, dextrose bolus **OR** dextrose bolus, glucagon (rDNA), dextrose, hydrALAZINE, oxyCODONE-acetaminophen     Patient Active Problem List    Diagnosis Date Noted    ANTHONY (obstructive sleep apnea)     Bradycardia 02/23/2023    Acute on chronic diastolic CHF (congestive heart failure) ([facility] 02/23/2023    Lumbar foraminal stenosis 02/03/2023    Constipation 02/03/2023    Hyponatremia 02/03/2023    Urinary retention 02/03/2023    Elevated serum creatinine 02/03/2023    Intractable back pain 01/31/2023    Pneumonia due to infectious organism 01/26/2023    Acute anemia 10/03/2022    Acute on chronic diastolic (congestive) heart failure ([facility] 10/01/2022    Acute dysfunction of Eustachian tube, bilateral 08/22/2022    Bilateral hearing loss 08/22/2022    Impacted cerumen of right ear 08/22/2022    Dizziness 08/22/2022    Hypertensive urgency, malignant 08/21/2022    Chronic fatigue 07/14/2022    Chronic renal disease, stage III [facility]) [553707] 06/13/2022    Chronic obstructive pulmonary disease ([facility] 04/19/2022    Ptosis of eyelid, bilateral 10/26/2021    Centrilobular emphysema ([facility] 09/08/2021    Pulmonary nodule 09/08/2021    Primary hypertension     Ataxia 11/12/2020    SOB (shortness of breath) 02/25/2020    PAF (paroxysmal atrial fibrillation) ([address] 02/19/2020    Hypothyroid 02/19/2020    Chronic respiratory failure with hypoxia ([facility] 02/19/2020    Coronary artery disease involving native coronary artery of native heart with angina pectoris ([facility] 02/18/2020    Bronchiectasis without complication ([facility] 28/07/9820    Interstitial lung disease ([facility] 09/20/2019    Vertigo 07/31/2019 COPD, moderate ([facility] 03/23/2019    Allergic rhinitis 01/09/2019    DM2 (diabetes mellitus, type 2) ([facility] 01/18/2017    Restless legs syndrome 01/18/2017    Osteoarthritis 06/18/2015    Atrial fibrillation with RVR ([facility] 12/11/2013    Overactive bladder 12/11/2013    IBS (irritable bowel syndrome) 05/02/2013    Hypertensive urgency     Hyperlipidemia       Active Hospital Problems    Diagnosis Date Noted    ANTHONY (obstructive sleep apnea) [G47.33]      Priority: High    Bradycardia [R00.1] 02/23/2023     Priority: Medium    Acute on chronic diastolic CHF (congestive heart failure) ([address] [I50.33] 02/23/2023     Priority: Medium    Constipation [K59.00] 02/03/2023     Priority: Medium    Acute on chronic diastolic (congestive) heart failure ([address] [I50.33] 10/01/2022     Priority: Medium    Chronic renal disease, stage III ([facility] [062570] [N18.30] 06/13/2022     Priority: Medium    Chronic obstructive pulmonary disease ([facility] [J44.9] 04/19/2022     Priority: Medium    Primary hypertension [I10]     Coronary artery disease involving native coronary artery of native heart with angina pectoris ([facility] [I25.119] 02/18/2020    Bronchiectasis without complication ([facility] [K87.2] 10/25/2019    Interstitial lung disease ([facility] [J84.9] 09/20/2019    COPD, moderate ([facility] [J44.9] 03/23/2019    DM2 (diabetes mellitus, type 2) ([facility] [E11.9] 01/18/2017       Assessment:       Plan:    - Tachy nancy syndrome    Pacemaker has been discussed before. She previously refused. Now with bradycardia at home which was symptomatic. She also has episodes of tachycardia that is hard to control. The risks, benefits and alternatives of the ablation procedure were discussed with the patient.  The risks including, but not limited to, the risks of bleeding, infection, radiation exposure, injury to vascular, cardiac and surrounding structures (including pneumothorax), stroke, cardiac perforation, tamponade, need for emergent open heart surgery, need for pacemaker implantation, Injury to the phrenic nerve, injury to the esophagus, myocardial infarction and death were discussed in detail. The patient opted to proceed with the ablation. - paroxysmal atrial fibrillation    Recurrent Atrial fibrillation . Symptoatic. Unable to tolerate antiarrhythmic drugs due to bradycardia. Side effects with procainamide. Bradycardia with flecainide. Resume anticoagulation after pacemaker implant. I will increase the dose of diltiazem after pacemaker implant to help prevent significant tachycardia during A-fib.    -Hyperlipidemia               On Lipitor 80 mg      -PAD and complex Aortic atherosclerosis              On High intensity statin, lipitor 80 mg daily     -COPD              Follows with Dr. Genie Evans              Has been on  Home O2 for a year   Continue inhalers. -HTN    BP is well controlled. Continue current meds.    -Acute diastolic heart failure  Continue IV diuretic. Monitor intake and output. Daily weight. Monitor electrolytes. I independently reviewed and interpreted ECG, cardiac labs, other relevant labs,  echo  . Unless otherwise reflected in the note, my interpretation is in agreement with the report. Thank you for allowing me to participate in the care of [place]     NOTE: This report was transcribed using voice recognition software. Every effort was made to ensure accuracy, however, inadvertent computerized transcription errors may be present.

## 2023-02-24 NOTE — PROGRESS NOTES
Nutrition Education    Provided brief heart failure diet education on low sodium guidelines. Reviewed appropriate seasoning alternatives. Pt currently tries to follow a low sodium diet at home and does not add salt to food. Pt states understanding of education. Time spent with patient: 5 minutes. Educated on low Na+  Learners: Patient and Family  Readiness: Eager  Method: Explanation and Handout (placed in discharge instructions)  Response: Verbalizes Understanding  Contact name and number provided.     Suri Hernandez RD, LD  Contact Number: [phone]

## 2023-02-25 ENCOUNTER — APPOINTMENT (OUTPATIENT)
Dept: GENERAL RADIOLOGY | Age: 88
DRG: 242 | End: 2023-02-25
Payer: MEDICARE

## 2023-02-25 LAB
ANION GAP SERPL CALCULATED.3IONS-SCNC: 14 MMOL/L (ref 3–16)
BASOPHILS ABSOLUTE: 0 K/UL (ref 0–0.2)
BASOPHILS RELATIVE PERCENT: 0.6 %
BUN BLDV-MCNC: 28 MG/DL (ref 7–20)
CALCIUM SERPL-MCNC: 9.9 MG/DL (ref 8.3–10.6)
CHLORIDE BLD-SCNC: 99 MMOL/L (ref 99–110)
CO2: 27 MMOL/L (ref 21–32)
CREAT SERPL-MCNC: 1.3 MG/DL (ref 0.6–1.2)
EOSINOPHILS ABSOLUTE: 0.2 K/UL (ref 0–0.6)
EOSINOPHILS RELATIVE PERCENT: 3.7 %
GFR SERPL CREATININE-BSD FRML MDRD: 40 ML/MIN/{1.73_M2}
GLUCOSE BLD-MCNC: 122 MG/DL (ref 70–99)
GLUCOSE BLD-MCNC: 135 MG/DL (ref 70–99)
GLUCOSE BLD-MCNC: 140 MG/DL (ref 70–99)
GLUCOSE BLD-MCNC: 141 MG/DL (ref 70–99)
GLUCOSE BLD-MCNC: 176 MG/DL (ref 70–99)
HCT VFR BLD CALC: 35.7 % (ref 36–48)
HEMOGLOBIN: 11.4 G/DL (ref 12–16)
LYMPHOCYTES ABSOLUTE: 1.4 K/UL (ref 1–5.1)
LYMPHOCYTES RELATIVE PERCENT: 21.2 %
MCH RBC QN AUTO: 27.6 PG (ref 26–34)
MCHC RBC AUTO-ENTMCNC: 31.8 G/DL (ref 31–36)
MCV RBC AUTO: 86.9 FL (ref 80–100)
MONOCYTES ABSOLUTE: 0.5 K/UL (ref 0–1.3)
MONOCYTES RELATIVE PERCENT: 7.4 %
NEUTROPHILS ABSOLUTE: 4.5 K/UL (ref 1.7–7.7)
NEUTROPHILS RELATIVE PERCENT: 67.1 %
PDW BLD-RTO: 17.1 % (ref 12.4–15.4)
PERFORMED ON: ABNORMAL
PLATELET # BLD: 169 K/UL (ref 135–450)
PMV BLD AUTO: 8.1 FL (ref 5–10.5)
POTASSIUM REFLEX MAGNESIUM: 4.7 MMOL/L (ref 3.5–5.1)
RBC # BLD: 4.11 M/UL (ref 4–5.2)
SODIUM BLD-SCNC: 140 MMOL/L (ref 136–145)
WBC # BLD: 6.7 K/UL (ref 4–11)

## 2023-02-25 PROCEDURE — 94761 N-INVAS EAR/PLS OXIMETRY MLT: CPT

## 2023-02-25 PROCEDURE — 93005 ELECTROCARDIOGRAM TRACING: CPT | Performed by: INTERNAL MEDICINE

## 2023-02-25 PROCEDURE — 71046 X-RAY EXAM CHEST 2 VIEWS: CPT

## 2023-02-25 PROCEDURE — 93280 PM DEVICE PROGR EVAL DUAL: CPT | Performed by: INTERNAL MEDICINE

## 2023-02-25 PROCEDURE — 85025 COMPLETE CBC W/AUTO DIFF WBC: CPT

## 2023-02-25 PROCEDURE — 36415 COLL VENOUS BLD VENIPUNCTURE: CPT

## 2023-02-25 PROCEDURE — 6360000002 HC RX W HCPCS: Performed by: INTERNAL MEDICINE

## 2023-02-25 PROCEDURE — 6370000000 HC RX 637 (ALT 250 FOR IP): Performed by: INTERNAL MEDICINE

## 2023-02-25 PROCEDURE — 2500000003 HC RX 250 WO HCPCS: Performed by: INTERNAL MEDICINE

## 2023-02-25 PROCEDURE — 1200000000 HC SEMI PRIVATE

## 2023-02-25 PROCEDURE — 6370000000 HC RX 637 (ALT 250 FOR IP): Performed by: PHYSICIAN ASSISTANT

## 2023-02-25 PROCEDURE — 94640 AIRWAY INHALATION TREATMENT: CPT

## 2023-02-25 PROCEDURE — 99233 SBSQ HOSP IP/OBS HIGH 50: CPT | Performed by: INTERNAL MEDICINE

## 2023-02-25 PROCEDURE — 92610 EVALUATE SWALLOWING FUNCTION: CPT

## 2023-02-25 PROCEDURE — 2700000000 HC OXYGEN THERAPY PER DAY

## 2023-02-25 PROCEDURE — 80048 BASIC METABOLIC PNL TOTAL CA: CPT

## 2023-02-25 PROCEDURE — 2580000003 HC RX 258: Performed by: INTERNAL MEDICINE

## 2023-02-25 RX ORDER — FLECAINIDE ACETATE 50 MG/1
50 TABLET ORAL EVERY 12 HOURS SCHEDULED
Status: DISCONTINUED | OUTPATIENT
Start: 2023-02-25 | End: 2023-02-28 | Stop reason: HOSPADM

## 2023-02-25 RX ORDER — METOPROLOL TARTRATE 5 MG/5ML
5 INJECTION INTRAVENOUS ONCE
Status: COMPLETED | OUTPATIENT
Start: 2023-02-25 | End: 2023-02-25

## 2023-02-25 RX ORDER — FUROSEMIDE 20 MG/1
20 TABLET ORAL DAILY
Status: DISCONTINUED | OUTPATIENT
Start: 2023-02-25 | End: 2023-02-26

## 2023-02-25 RX ORDER — CLONIDINE HYDROCHLORIDE 0.1 MG/1
0.1 TABLET ORAL 3 TIMES DAILY
Status: DISCONTINUED | OUTPATIENT
Start: 2023-02-25 | End: 2023-02-28 | Stop reason: HOSPADM

## 2023-02-25 RX ORDER — OXYCODONE HYDROCHLORIDE AND ACETAMINOPHEN 5; 325 MG/1; MG/1
1 TABLET ORAL EVERY 4 HOURS PRN
Status: DISCONTINUED | OUTPATIENT
Start: 2023-02-25 | End: 2023-02-28 | Stop reason: HOSPADM

## 2023-02-25 RX ORDER — FLECAINIDE ACETATE 100 MG/1
100 TABLET ORAL ONCE
Status: COMPLETED | OUTPATIENT
Start: 2023-02-25 | End: 2023-02-25

## 2023-02-25 RX ADMIN — Medication 10 ML: at 10:29

## 2023-02-25 RX ADMIN — Medication 2 PUFF: at 09:27

## 2023-02-25 RX ADMIN — FLECAINIDE ACETATE 100 MG: 100 TABLET ORAL at 12:12

## 2023-02-25 RX ADMIN — ATORVASTATIN CALCIUM 80 MG: 80 TABLET, FILM COATED ORAL at 20:26

## 2023-02-25 RX ADMIN — ACETAMINOPHEN 650 MG: 325 TABLET ORAL at 20:27

## 2023-02-25 RX ADMIN — LORAZEPAM 1 MG: 1 TABLET ORAL at 20:26

## 2023-02-25 RX ADMIN — APIXABAN 5 MG: 5 TABLET, FILM COATED ORAL at 10:03

## 2023-02-25 RX ADMIN — LORAZEPAM 0.5 MG: 0.5 TABLET ORAL at 06:24

## 2023-02-25 RX ADMIN — Medication 10 ML: at 20:27

## 2023-02-25 RX ADMIN — ROPINIROLE HYDROCHLORIDE 1 MG: 1 TABLET, FILM COATED ORAL at 10:04

## 2023-02-25 RX ADMIN — HYDRALAZINE HYDROCHLORIDE 10 MG: 20 INJECTION INTRAMUSCULAR; INTRAVENOUS at 06:28

## 2023-02-25 RX ADMIN — GABAPENTIN 100 MG: 100 CAPSULE ORAL at 10:03

## 2023-02-25 RX ADMIN — ROPINIROLE HYDROCHLORIDE 1 MG: 1 TABLET, FILM COATED ORAL at 15:40

## 2023-02-25 RX ADMIN — GABAPENTIN 100 MG: 100 CAPSULE ORAL at 20:26

## 2023-02-25 RX ADMIN — ALBUTEROL SULFATE 2.5 MG: 2.5 SOLUTION RESPIRATORY (INHALATION) at 09:23

## 2023-02-25 RX ADMIN — GABAPENTIN 100 MG: 100 CAPSULE ORAL at 15:41

## 2023-02-25 RX ADMIN — CLONIDINE HYDROCHLORIDE 0.1 MG: 0.1 TABLET ORAL at 10:58

## 2023-02-25 RX ADMIN — BETHANECHOL CHLORIDE 10 MG: 10 TABLET ORAL at 20:27

## 2023-02-25 RX ADMIN — EMPAGLIFLOZIN 10 MG: 10 TABLET, FILM COATED ORAL at 10:03

## 2023-02-25 RX ADMIN — OXYCODONE AND ACETAMINOPHEN 1 TABLET: 5; 325 TABLET ORAL at 04:53

## 2023-02-25 RX ADMIN — NALOXEGOL OXALATE 25 MG: 25 TABLET, FILM COATED ORAL at 06:23

## 2023-02-25 RX ADMIN — BETHANECHOL CHLORIDE 10 MG: 10 TABLET ORAL at 10:03

## 2023-02-25 RX ADMIN — ROPINIROLE HYDROCHLORIDE 1 MG: 1 TABLET, FILM COATED ORAL at 20:26

## 2023-02-25 RX ADMIN — OXYCODONE AND ACETAMINOPHEN 1 TABLET: 5; 325 TABLET ORAL at 13:17

## 2023-02-25 RX ADMIN — DILTIAZEM HYDROCHLORIDE 240 MG: 240 CAPSULE, COATED, EXTENDED RELEASE ORAL at 10:03

## 2023-02-25 RX ADMIN — FLECAINIDE ACETATE 50 MG: 50 TABLET ORAL at 20:26

## 2023-02-25 RX ADMIN — FUROSEMIDE 20 MG: 20 TABLET ORAL at 10:04

## 2023-02-25 RX ADMIN — APIXABAN 5 MG: 5 TABLET, FILM COATED ORAL at 20:26

## 2023-02-25 RX ADMIN — LEVOTHYROXINE SODIUM 75 MCG: 0.07 TABLET ORAL at 10:03

## 2023-02-25 RX ADMIN — TIOTROPIUM BROMIDE INHALATION SPRAY 2 PUFF: 3.12 SPRAY, METERED RESPIRATORY (INHALATION) at 09:34

## 2023-02-25 RX ADMIN — ACETAMINOPHEN 650 MG: 325 TABLET ORAL at 10:25

## 2023-02-25 RX ADMIN — METOPROLOL TARTRATE 5 MG: 5 INJECTION, SOLUTION INTRAVENOUS at 12:11

## 2023-02-25 ASSESSMENT — PAIN DESCRIPTION - FREQUENCY
FREQUENCY: INTERMITTENT
FREQUENCY: CONTINUOUS

## 2023-02-25 ASSESSMENT — PAIN SCALES - GENERAL
PAINLEVEL_OUTOF10: 9
PAINLEVEL_OUTOF10: 9
PAINLEVEL_OUTOF10: 3
PAINLEVEL_OUTOF10: 8
PAINLEVEL_OUTOF10: 5
PAINLEVEL_OUTOF10: 0

## 2023-02-25 ASSESSMENT — PAIN DESCRIPTION - ONSET
ONSET: ON-GOING
ONSET: ON-GOING

## 2023-02-25 ASSESSMENT — PAIN DESCRIPTION - LOCATION
LOCATION: CHEST
LOCATION: CHEST;INCISION

## 2023-02-25 ASSESSMENT — PAIN DESCRIPTION - ORIENTATION
ORIENTATION: LEFT

## 2023-02-25 ASSESSMENT — PAIN DESCRIPTION - PAIN TYPE
TYPE: SURGICAL PAIN

## 2023-02-25 ASSESSMENT — PAIN DESCRIPTION - DESCRIPTORS
DESCRIPTORS: DISCOMFORT

## 2023-02-25 NOTE — PROGRESS NOTES
Notified by RN that patient has developed Atrial fibrillation with RVR. Start Flecainide and also IV metoprolol for tachycardia.      Liv Orta MD, MPH  [place] [address]   Office: [phone]  Fax: [phone]

## 2023-02-25 NOTE — PLAN OF CARE
Problem: ABCDS Injury Assessment  Goal: Absence of physical injury  Outcome: Progressing     Problem: Skin/Tissue Integrity  Goal: Absence of new skin breakdown  Description: 1. Monitor for areas of redness and/or skin breakdown  2. Assess vascular access sites hourly  3. Every 4-6 hours minimum:  Change oxygen saturation probe site  4. Every 4-6 hours:  If on nasal continuous positive airway pressure, respiratory therapy assess nares and determine need for appliance change or resting period.   Outcome: Progressing     Problem: Discharge Planning  Goal: Discharge to home or other facility with appropriate resources  Outcome: Progressing     Problem: Pain  Goal: Verbalizes/displays adequate comfort level or baseline comfort level  Outcome: Progressing  Flowsheets (Taken 2/24/2023 1618 by Sherry Clarke RN)  Verbalizes/displays adequate comfort level or baseline comfort level:   Encourage patient to monitor pain and request assistance   Assess pain using appropriate pain scale   Administer analgesics based on type and severity of pain and evaluate response     Problem: Safety - Adult  Goal: Free from fall injury  Outcome: Progressing     Problem: Chronic Conditions and Co-morbidities  Goal: Patient's chronic conditions and co-morbidity symptoms are monitored and maintained or improved  Outcome: Progressing     Problem: Cardiovascular - Adult  Goal: Maintains optimal cardiac output and hemodynamic stability  Outcome: Progressing  Goal: Absence of cardiac dysrhythmias or at baseline  Outcome: Progressing

## 2023-02-25 NOTE — PROGRESS NOTES
Speech Language Pathology  Facility/Department: [address]  Speech Language Pathology  DYSPHAGIA BEDSIDE SWALLOW EVALUATION     Patient: Williams Khan   : 1935   MRN: [id number]      Evaluation Date: 2023   Admitting Diagnosis: Shortness of breath [R06.02]  Dizziness [R42]  Paroxysmal atrial fibrillation (HCC) [I48.0]  Elevated troponin [R77.8]  Acute on chronic diastolic CHF (congestive heart failure) (HCC) [I50.33]  Pain: Denies                                                       H&P:  Williams Khan is a 80 y.o. female with COPD/ILD, chronic respiratory failure with hypoxia on 2 L NC, Afib on Eliquis, CAD, CKD 3, DM 2 came to ER with complaints of dizziness. Daughter is RN and noted patient has been hypertensive at home with HR to 40s and then 140s. Patient is awaiting PPM placement with EP. Patient denies CP, SOB, HA or fevers. Has constipation and uses Miralax. No abdominal pain, nausea or vomiting. No palpitations or syncope. Has noted some orthopnea and BL LE edema. Otherwise complete ROS is negative unless listed above. Imaging:  Chest X-ray:  2023  No change in the mild pulmonary vascular congestion. There is biapical and   bibasilar scarring. The lungs are mildly hyperexpanded. Cardiomegaly is   stable status post interval placement of dual lead pacemaker. The leads are   intact overlying the right ventricle and atrium. There is no pneumothorax or   pleural effusion. History/Prior Level of Function:     Reason for referral: SLP evaluation orders received due to respiratory status     Dysphagia Impressions/Diagnosis: Oropharyngeal Dysphagia   Pt sitting upright in bed on 2L O2 via nasal canula. Pt alert and oriented x4. Pt's oral mechanism grossly within functional limits for an individual her age with adequate lingual and labial ROM/strength.   Pt with absent laryngeal elevation via palpation during volitional swallow with and without liquid wash. Pt's swallow of nectar thick liquid, thin liquid, dysphagia III solids, and regular solids assessed. Pt with no overt s/s of aspiration with all texture and liquid trials this date, adequate bolus formation with timely swallow with regular solids. Pt verbalized that she does not feel that she has issues with swallowing at this time. Recommend pt continue regular texture diet with thin liquids; meds whole with water or as tolerated. Recommended Diet and Intervention 2/25/2023:  Diet Solids Recommendation:  Regular texture diet  Liquid Consistency Recommendation: Thin liquids  Recommended form of Meds: Meds whole with water or as tolerated          Compensatory Swallowing Strategies: Alternate solids/liquids , Upright as possible with all PO intake , Eat/feed slowly, Remain upright 30-45 min     SHORT TERM DYSPHAGIA GOALS/PLAN OF CARE: Speech therapy for dysphagia tx No further follow-up indicated         Discharge Recommendations: No further follow-up appears indicated at this time.      Patient Positioning: Upright in bed     Current Diet Level (prior to evaluation): Regular texture diet  Thin liquids      Respiratory Status:   []Room Air   [x]O2 via nasal cannula: 2L O2 via nasal canula at baseline    []Other:    Dentition:  [x]Adequate  []Dentures   []Missing Many Teeth  []Edentulous  []Other:    Baseline Vocal Quality:  [x]Normal  []Dysphonic   []Aphonic   []Hoarse  []Wet  []Weak  []Other:    Volitional Cough:  Elicited: Congested     Volitional Swallow:   []Absent   [x]Delayed     []Adequate     []Required use of drink     Oral Mechanism Exam:  [x]WFL []Mild   [] Moderate  []Severe  []To be assessed  Impaired:   []Left side      []Right side    []Labial ROM/Coordination    []Labial Symmetry   []Lingual ROM/Coordination   []Lingual Symmetry  []Gag  []Other:     Oral Phase: [x]WFL []Mild   [] Moderate  []Severe  []To be assessed   []Impaired/Prolonged Mastication:   []Oral Holding:   []Spillage Left:   []Spillage Right:  []Pocketing Left:   []Pocketing Right:   []Decreased Anterior to Posterior Transit:   []Suspected Premature Bolus Loss:   []Lingual/Palatal Residue:   []Other:     Pharyngeal Phase: [x]WFL [x]Mild   [] Moderate  []Severe  []To be assessed   []Delayed Swallow:   []Suspected Pharyngeal Pooling:   [x]Decreased Laryngeal Elevation:   []Absent Swallow:  []Wet Vocal Quality:   []Throat Clearing-Immediate:   []Throat Clearing-Delayed:   []Cough-Immediate:   []Cough-Delayed:  []Change in Vital Signs:  []Suspected Delayed Pharyngeal Clearing:  []Other:     Eating Assistance:  [x]Independent  []Setup or clean-up assistance   [] Supervision or touching assistance   [] Partial or moderate assistance   [] Substantial or maximal assistance  [] Dependent     EDUCATION:   Provided education regarding role of SLP, results of assessment, recommendations and general speech pathology plan of care. [x] Pt verbalized understanding and agreement   [] Pt requires ongoing learning   [] No evidence of comprehension     If patient discharges prior to next visit, this note will serve as discharge.      Treatment time:  Timed Code Treatment Minutes: 0  Total Treatment time: 30 minutes     Electronically signed by:      Robetr Goodman M.A., [place]   Speech-Language Pathologist

## 2023-02-25 NOTE — PROGRESS NOTES
[facility] PROGRESS NOTE    2/25/2023 2:06 PM        Name: Kath Lehman . Admitted: 2/23/2023  Primary Care Provider: Grecia Terrell MD (Tel: [phone])      Brief History: 79 yo female with hx COPD/ILD, chronic hypoxic respiratory failure (on 2 liters O2), PAF (on Eliquis), CAD, CKD stage 3, DM2, tachy nancy syndrome. Presented to hospital with c/o shortness of breath and dizziness. Was in atrial fib with RVR on presentation. 2/24/2023: Insertion of dual chamber pacemaker under fluoroscopic guidance     Subjective:  Asleep, awakens easily. Says she is feels okay, has some cramping after lunch but seems to be improving. Was noted to develop atrial fib with RVR this morning with rates to 160s. Cardiology started on Flecainide and IV metoprolol. Rate improved this afternoon. Denies chest pain, shortness of breath, palpitations.  Pacer site edematous but no hematoma, swath in place    Reviewed interval ancillary notes    Current Medications  empagliflozin (JARDIANCE) tablet 10 mg, Daily  apixaban (ELIQUIS) tablet 5 mg, BID  furosemide (LASIX) tablet 20 mg, Daily  cloNIDine (CATAPRES) tablet 0.1 mg, TID  oxyCODONE-acetaminophen (PERCOCET) 5-325 MG per tablet 1 tablet, Q4H PRN  flecainide (TAMBOCOR) tablet 50 mg, 2 times per day  dilTIAZem (CARDIZEM CD) extended release capsule 240 mg, Daily  milk and molasses enema 240 mL, Once  albuterol sulfate HFA (PROVENTIL;VENTOLIN;PROAIR) 108 (90 Base) MCG/ACT inhaler 2 puff, Q6H PRN  atorvastatin (LIPITOR) tablet 80 mg, Nightly  bethanechol (URECHOLINE) tablet 10 mg, BID  mometasone-formoterol (DULERA) 200-5 MCG/ACT inhaler 2 puff, BID  fluticasone (FLONASE) 50 MCG/ACT nasal spray 2 spray, PRN  gabapentin (NEURONTIN) capsule 100 mg, TID  levalbuterol (XOPENEX) nebulization 0.63 mg, Q6H PRN  levothyroxine (SYNTHROID) tablet 75 mcg, Daily  lidocaine 4 % external patch 1 patch, Daily  LORazepam (ATIVAN) tablet 0.5 mg, Q8H  LORazepam (ATIVAN) tablet 1 mg, Nightly  naloxegol (MOVANTIK) tablet 25 mg, QAM AC  polyethylene glycol (GLYCOLAX) packet 17 g, Daily  rOPINIRole (REQUIP) tablet 1 mg, TID  tiotropium (SPIRIVA RESPIMAT) 2.5 MCG/ACT inhaler 2 puff, Daily  sodium chloride flush 0.9 % injection 5-40 mL, 2 times per day  sodium chloride flush 0.9 % injection 5-40 mL, PRN  0.9 % sodium chloride infusion, PRN  ondansetron (ZOFRAN-ODT) disintegrating tablet 4 mg, Q8H PRN   Or  ondansetron (ZOFRAN) injection 4 mg, Q6H PRN  polyethylene glycol (GLYCOLAX) packet 17 g, Daily PRN  acetaminophen (TYLENOL) tablet 650 mg, Q6H PRN   Or  acetaminophen (TYLENOL) suppository 650 mg, Q6H PRN  dextrose bolus 10% 125 mL, PRN   Or  dextrose bolus 10% 250 mL, PRN  glucagon (rDNA) injection 1 mg, PRN  dextrose 10 % infusion, Continuous PRN  albuterol (PROVENTIL) nebulizer solution 2.5 mg, BID  hydrALAZINE (APRESOLINE) injection 10 mg, Q4H PRN  insulin lispro (HUMALOG) injection vial 0-8 Units, TID WC  insulin lispro (HUMALOG) injection vial 0-4 Units, Nightly        Objective:  BP (!) 92/56   Pulse 67   Temp 97.8 °F (36.6 °C) (Oral)   Resp 18   Ht 5' 2\" (1.575 m)   Wt 151 lb 14.4 oz (68.9 kg)   SpO2 96%   BMI 27.78 kg/m²     Intake/Output Summary (Last 24 hours) at 2/25/2023 1406  Last data filed at 2/25/2023 6723  Gross per 24 hour   Intake 480 ml   Output 3450 ml   Net -2970 ml      Wt Readings from Last 3 Encounters:   02/25/23 151 lb 14.4 oz (68.9 kg)   02/04/23 157 lb 6.5 oz (71.4 kg)   01/28/23 156 lb 12.8 oz (71.1 kg)       General appearance:  Appears comfortable  Eyes: Sclera clear. Pupils equal.  ENT: Moist oral mucosa. Trachea midline, no adenopathy. Cardiovascular: Regular rhythm, normal S1, S2. No murmur. No edema in lower extremities. Pacer site mildly edematous, no hematoma, swath in place  Respiratory: Not using accessory muscles. Good inspiratory effort.  Clear to auscultation bilaterally, no wheeze or crackles. GI: Abdomen soft, no tenderness, not distended, normal bowel sounds  Musculoskeletal: No cyanosis in digits, neck supple  Neurology: CN 2-12 grossly intact. No speech or motor deficits  Psych: Normal affect. Alert and oriented in time, place and person  Skin: Warm, dry, normal turgor    Labs and Tests:  CBC:   Recent Labs     02/23/23  0940 02/24/23  0458 02/25/23  0509   WBC 5.0 6.5 6.7   HGB 9.5* 11.2* 11.4*    175 169     BMP:    Recent Labs     02/23/23  0940 02/24/23  0458 02/25/23  0509    141 140   K 4.4 4.5 4.7    101 99   CO2 29 27 27   BUN 24* 24* 28*   CREATININE 1.1 1.0 1.3*   GLUCOSE 108* 122* 140*     Hepatic:   Recent Labs     02/23/23  0940   AST 14*   ALT 13   BILITOT 0.3   ALKPHOS 150*       CXR 2/23/2023:  Mild bibasilar airspace disease and small bilateral pleural effusions. Consider atelectasis versus pneumonia in the appropriate clinical settings     KUB 2/23/2023:  1. Nonspecific, nonobstructive bowel gas pattern. 2. Mild-moderate fecal loading of the ascending/transverse colon as described   above. CXR 2/25/2023:  1. Status post uncomplicated dual lead pacemaker placement. Problem List  Principal Problem:    Acute on chronic diastolic (congestive) heart failure ([facility])  Active Problems:    ANTHONY (obstructive sleep apnea)    Chronic obstructive pulmonary disease ([facility])    Chronic renal disease, stage III ([facility]) [912863]    Constipation    Bradycardia    Acute on chronic diastolic CHF (congestive heart failure) ([facility])    Tachycardia-bradycardia syndrome ([facility])    DM2 (diabetes mellitus, type 2) ([facility])    Paroxysmal atrial fibrillation ([facility])    COPD, moderate ([facility])    Interstitial lung disease ([address]    Bronchiectasis without complication ([address]    Coronary artery disease involving native coronary artery of native heart with angina pectoris ([address]    Primary hypertension  Resolved Problems:    * No resolved hospital problems. *       Assessment & Plan:   Acute dCHF. Presented with dyspnea. Echo (8/2022) with EF 55-60%. CXR with bilateral pleural effusions, proBNP 4454. Likely secondary to atrial fib with RVR. Started on IV Lasix with good response. Creatinine trending up, 1.0->1.3, transitioned to po Lasix today and started on Jardiance per cardiology. Tachy nancy syndrome / PPM Hx multiple admissions with atrial fib with RVR. Has been treated with antiarrhythmics in past but poorly tolerated due to bradycardia. Now s/p PPM implant (2/24). Post procedure CXR with no complicating features. Noted to have atrial fib with RVR on telemetry this am with rate to 160s, has been started on Flecainide. Continue diltiazem and apixaban. Elevated troponin 0.02. Likely supply demand mismatch in setting of CHF and RVR. No acute changes on EKG. Patient denies chest pain. Evaluated by cardiology, no further w/u planned at present. Continue medical management with high intensity statin, no asa secondary DOAC. CAROLANN. Creatinine 1.0->1.3. Likely secondary over diuresis. Cardiology transitioned to po Lasix today. BMP in am.   Constipation. KUB with constipation. Likely opioid induced, she is on Percocet at home for chronic pain. Given milk and molasses enema 2/23 with good results. Continue Miralax and Movantik. DM2. A1c 6.1 this admission. Take Januvia at home. Being covered by medium dose correction. BG values reviewed, 122-176. Continue current management. COPD / chronic hypoxic respiratory failure. Stable, not in exacerbation, O2 needs stable at 2 liters which is baseline. Continue Spiriva, Dulera and Albuterol. Normocytic anemia. Hgb 9.5 on presentation, has improved with diuresis. Iron deficiency on recent labs (2/3/2023) and she received IV venofer. Continue to monitor. Disposition: PT/OT consults pending. Patient lives with daughter. Diet: ADULT DIET;  Regular; Low Fat/Low Chol/High Fiber/2 gm Na  Code:Full Code  DVT PPX: apixaban      KATHY Zhang - CNP   2/25/2023 2:06 PM

## 2023-02-25 NOTE — PROGRESS NOTES
[address] [place] [address]   Electrophysiology   Date: 2/25/2023  Reason for Follow up: Atrial fibrillation      Chief Complaint   Patient presents with    Dizziness     Via ems, from home, states she has felt dizzy since Sunday, reports hx of afib, states she is supposed to get a pacemaker placed soon. Denies cp, sob at this time. Pt wears 2L oxygen at baseline. CC: Dizziness, SOB   HPI: New N[place]bitt is a 80 y.o. female presented with shortness of breath, dizziness and atrial fibrillation. History of tachy-nancy syndrome. S/p Dual chamber pacemaker yesterday. History of paroxysmal atrial fibrillation intermittent with bradycardia. Propafenone discontinued due to possibility of causing SIADH. Flecainide stopped due to bradycardia in the past.    COPD/ILD and chronic respiratory failure on Oxygen     Patient seen and examined. No major events overnight. Denies having chest pain, shortness of breath, dyspnea on exertion, Orthopnea, PND at the time of this visit. Assessment:   Tachy-bradycardia syndrome  Paroxysmal atrial fibrillation  HTN  HLD  DM  Hypothyroidism  PAD, complex atherosclerotic plaque ascending aorta noted on TRACEY  HFpEF    Plan:   Device interrogation today with normal function. Chest x-ray with no pneumothorax. Severe exacerbation of atrial fibrillation, tachybradycardia syndrome leading to hospitalization  Change to PO lasix today. Check electrolytes. High [id number] score, high risk for stroke/thromboembolism. Resume Eliquis. - Added Jardiance 10 mg/day   - Re-check Pro-BNP     - Restoration and maintenance of sinus rhythm is important   - BP control and treatment  - Aggressive risk factor modifications  - Medication compliance   - Daily weight   - Follow up with heart failure clinic. High dose statin for PAD. Follow up with pulmonary for COPD. Discussed with nursing staff.      Relevant available labs, and cardiovascular diagnostics, documents reviewed. ECG: Sinus rhythm   Echo: : Normal EF, Mod MR   CXR: No pneumothorax  Creatinine 1.3  Hemoglobin 11.4    I independently reviewed cardiac tracings / rhythm ECGs strips: noted sinus rhythm, PAC  Reviewed ECGs. Reviewed device interrogation. Complex medical condition with multiple medical problems affecting prognosis and outcome of EP interventions  Severe exacerbation of underlying medical condition requiring hospitalization and at risk of decompensation. I independently reviewed relevant and available cardiac diagnostic tests ECG, CXR, Echo, Stress test, Device interrogation, and Holter. Physical Examination:  Vitals:    23 0800   BP: (!) 162/69   Pulse: 76   Resp:    Temp:    SpO2:       In: 490 [P.O.:480; I.V.:10]  Out: 4000    Wt Readings from Last 3 Encounters:   23 151 lb 14.4 oz (68.9 kg)   23 157 lb 6.5 oz (71.4 kg)   23 156 lb 12.8 oz (71.1 kg)     Temp  Av.8 °F (36.6 °C)  Min: 97.6 °F (36.4 °C)  Max: 97.9 °F (36.6 °C)  Pulse  Av.9  Min: 72  Max: 84  BP  Min: 144/78  Max: 179/79  SpO2  Av.6 %  Min: 93 %  Max: 99 %    Intake/Output Summary (Last 24 hours) at 2023 0920  Last data filed at 2023 7515  Gross per 24 hour   Intake 490 ml   Output 3450 ml   Net -2960 ml       Constitutional: Oriented. No distress. Cardiovascular: Normal rate, regular rhythm, S1&S2. Pulmonary/Chest: Bilateral respiratory sounds. No rhonchi. Incision site intact. Abdominal: Soft. No tenderness   Musculoskeletal: No edema    Neurological: Alert and oriented.  Follows command    Active Hospital Problems    Diagnosis Date Noted    ANTHONY (obstructive sleep apnea) [G47.33]      Priority: High    Tachycardia-bradycardia syndrome ([facility] [I49.5] 2023     Priority: Medium    Bradycardia [R00.1] 2023     Priority: Medium    Acute on chronic diastolic CHF (congestive heart failure) ([facility] [I50.33] 2023     Priority: Medium    Constipation [K59.00] 02/03/2023     Priority: Medium    Acute on chronic diastolic (congestive) heart failure ([facility] [I50.33] 10/01/2022     Priority: Medium    Chronic renal disease, stage III [facility]) [602221] [N18.30] 06/13/2022     Priority: Medium    Chronic obstructive pulmonary disease ([facility] [J44.9] 04/19/2022     Priority: Medium    DM2 (diabetes mellitus, type 2) ([facility] [E11.9] 01/18/2017     Priority: Low    Primary hypertension [I10]     Coronary artery disease involving native coronary artery of native heart with angina pectoris ([facility] [I25.119] 02/18/2020    Bronchiectasis without complication ([facility] [H18.9] 10/25/2019    Interstitial lung disease ([facility] [J84.9] 09/20/2019    COPD, moderate ([facility] [J44.9] 03/23/2019    Paroxysmal atrial fibrillation ([facility] [I48.0] 12/12/2018       Scheduled Meds:   dilTIAZem  240 mg Oral Daily    milk and molasses  240 mL Rectal Once    atorvastatin  80 mg Oral Nightly    bethanechol  10 mg Oral BID    mometasone-formoterol  2 puff Inhalation BID    gabapentin  100 mg Oral TID    levothyroxine  75 mcg Oral Daily    lidocaine  1 patch TransDERmal Daily    LORazepam  0.5 mg Oral Q8H    LORazepam  1 mg Oral Nightly    naloxegol  25 mg Oral QAM AC    polyethylene glycol  17 g Oral Daily    rOPINIRole  1 mg Oral TID    tiotropium  2 puff Inhalation Daily    sodium chloride flush  5-40 mL IntraVENous 2 times per day    enoxaparin  40 mg SubCUTAneous Daily    furosemide  40 mg IntraVENous BID    albuterol  2.5 mg Nebulization BID    insulin lispro  0-8 Units SubCUTAneous TID WC    insulin lispro  0-4 Units SubCUTAneous Nightly     Continuous Infusions:   sodium chloride      dextrose       PRN Meds:.albuterol sulfate HFA, fluticasone, levalbuterol, sodium chloride flush, sodium chloride, ondansetron **OR** ondansetron, polyethylene glycol, acetaminophen **OR** acetaminophen, dextrose bolus **OR** dextrose bolus, glucagon (rDNA), dextrose, hydrALAZINE, oxyCODONE-acetaminophen     Prior to Admission medications    Medication Sig Start Date End Date Taking? Authorizing Provider   benzonatate (TESSALON) 100 MG capsule Take 1 capsule by mouth 3 times daily as needed 1/3/23   Historical Provider, MD   oxyCODONE-acetaminophen (PERCOCET) 5-325 MG per tablet Take 1 tablet by mouth 2 times daily as needed. 2/14/23   Historical Provider, MD   bethanechol (URECHOLINE) 10 MG tablet Take 1 tablet by mouth 2 times daily 2/4/23   Marcellus Daily MD   gabapentin (NEURONTIN) 100 MG capsule Take 1 capsule by mouth 3 times daily for 30 days. 2/4/23 3/6/23  Marcellus Daily MD   naloxegol (MOVANTIK) 25 MG TABS tablet Take 1 tablet by mouth every morning (before breakfast) 2/4/23   Marcellus Daily MD   polyethylene glycol (GLYCOLAX) 17 g packet Take 17 g by mouth daily 2/3/23 3/5/23  Marcellus Daily MD   SITagliptin (JANUVIA) 100 MG tablet Take 0.5 tablets by mouth daily  Patient taking differently: Take 50 mg by mouth daily 1 tablet 2/3/23   Marcellus Daily MD   lidocaine 4 % external patch Place 1 patch onto the skin daily 1/29/23   Gayathri Vanegas,    dilTIAZem (CARDIZEM CD) 180 MG extended release capsule Take 1 capsule by mouth daily 11/4/22   Constantino Benitez MD   dilTIAZem (CARDIZEM) 30 MG tablet As needed for episodes of tachycardia  Patient taking differently: Take 30 mg by mouth as needed As needed for episodes of tachycardia 11/1/22   Constantino Benitez MD   LORazepam (ATIVAN) 1 MG tablet Take 1 mg by mouth at bedtime. Historical Provider, MD   LORazepam (ATIVAN) 0.5 MG tablet Take 0.5 mg by mouth in the morning and at bedtime.  In the morning and afternoon    Historical Provider, MD   ELIQUIS 5 MG TABS tablet TAKE 1 TABLET TWICE DAILY 8/26/22   Juliet Flaherty MD   budesonide-formoterol [facility]) 160-4.5 MCG/ACT AERO INHALE 2 PUFFS TWICE DAILY 8/23/22   Juliet Flaherty MD   fluticasone (FLONASE) 50 MCG/ACT nasal spray USE 2 SPRAYS NASALLY EVERY DAY  Patient taking differently: 2 sprays by Nasal route as needed 8/2/22   Dutch Lesch, MD   atorvastatin (LIPITOR) 80 MG tablet TAKE 1 TABLET EVERY NIGHT  Patient taking differently: Take 80 mg by mouth nightly 8/2/22   KAHTY Sebastian CNP   amLODIPine (NORVASC) 5 MG tablet Take 1 tablet by mouth in the morning. 8/1/22 8/22/22  KATHY Sebastian CNP   meclizine (ANTIVERT) 12.5 MG tablet TAKE 1 TABLET THREE TIMES DAILY AS NEEDED 7/12/22   Dutch Lesch, MD   rOPINIRole (REQUIP) 3 MG tablet TAKE 1 TABLET THREE TIMES DAILY 6/27/22   Dutch Lesch, MD   levothyroxine (SYNTHROID) 75 MCG tablet TAKE 1 TABLET BY MOUTH EVERY DAY 6/7/22   Dutch Lesch, MD   cloNIDine (CATAPRES) 0.1 MG tablet TAKE 1 TABLET TWICE DAILY  Patient taking differently: in the morning, at noon, and at bedtime 5/27/22   Jose Alberto Sutherland MD   conjugated estrogens (PREMARIN) 0.625 MG/GM vaginal cream Place 1 g vaginally three times a week 5/18/22   Dutch Lesch, MD   levalbuterol (XOPENEX) 0.63 MG/3ML nebulization USE 1 VIAL PER NEBULIZER EVERY 6 HOURS. MAX OF 4 TIMES PER 24 HOURS  Patient taking differently: Take 1 ampule by nebulization every 6 hours as needed 4/26/22   Leah Hayes MD   tiotropium (SPIRIVA RESPIMAT) 2.5 MCG/ACT AERS inhaler Inhale 2 puffs into the lungs daily 4/21/22   Zee Wallace PA-C   Blood Glucose Monitoring Suppl (TRUE METRIX METER) w/Device KIT To use to check sugars 4 times daily 3/24/22   Jose Alberto Sutherland MD   Blood Glucose Calibration (TRUE METRIX LEVEL 2) Normal SOLN As needed 3/23/22   Dutch Lesch, MD   blood glucose test strips (TRUE METRIX BLOOD GLUCOSE TEST) strip 1 each by In Vitro route daily As needed.  3/23/22   Dutch Lesch, MD   TRUEplus Lancets 33G MISC 1 daily 3/23/22   Dutch Lesch, MD   Lancets MISC 1 each by Does not apply route 2 times daily 6/30/21   Dutch Lesch, MD   OXYGEN Inhale 2 L into the lungs    Historical Provider, MD   Respiratory Therapy Supplies (NEBULIZER/TUBING/MOUTHPIECE) KIT 1 kit by Ruth not apply route 4 times daily as needed (shortness of breath) 3/26/20   Jeny Storey MD   albuterol sulfate HFA (PROAIR HFA) 108 (90 Base) MCG/ACT inhaler Inhale 2 puffs into the lungs every 6 hours as needed for Wheezing 10/25/19   Remigio Gutierrez MD       Past Medical History:   Diagnosis Date    Arthritis     Atrial fibrillation (HCC)     Bursitis     neck and down bilateral shoulders    COPD (chronic obstructive pulmonary disease) ([facility])     Diabetes mellitus type II, controlled (HCC)     Diastolic CHF ([facility])     GERD (gastroesophageal reflux disease)     HTN (hypertension)     Hyperlipidemia     Hypothyroid     Lumbago     Parkinson disease ([facility])     Restless legs syndrome (RLS)     SVT (supraventricular tachycardia) ([facility])         Past Surgical History:   Procedure Laterality Date    APPENDECTOMY      CHOLECYSTECTOMY, LAPAROSCOPIC  2/24/2013    COLONOSCOPY         Allergies   Allergen Reactions    Adhesive Tape Anaphylaxis    Cefaclor Nausea And Vomiting     Other reaction(s): Chest pain  Other reaction(s): Chest pain, Nausea / Vomiting    Cephalexin Other (See Comments)     Struggling to breath, almost passing out/ very low oxygen and pulse    Nsaids      Pt states she has hives and heart races   Other reaction(s): Tachycardia  Other reaction(s): Hives / Skin Rash, Tachycardia    Penicillin G      Other reaction(s): Hives / Skin Rash    Codeine      FEEL NUMB  Other reaction(s): feels numb    Diclofenac      Other reaction(s): Hives    Diclofenac Sodium Hives    Diclofenac Sodium Hives    Diclofenac Sodium     Hydrochlorothiazide      Sob  Other reaction(s): sob    Ibuprofen Other (See Comments)     Other reaction(s): Tachycardia  Other reaction(s): Tachycardia    Macrobid [Nitrofurantoin Macrocrystal]      nausea    Motrin [Ibuprofen Micronized] Other (See Comments)     Tachycardia      Nitrofurantoin      Other reaction(s): nausea H&P  Other reaction(s): nausea H&P    Pcn [Penicillins] Hives    Peach [Prunus Persica] Itching and Swelling     Cannot eat raw peaches, but can eat canned peaches    Prednisone      Causes elevated blood pressure   Other reaction(s): Elevated high BP  H&P  Cannot tolerate the dose pack but can individual doses    Sulfa Antibiotics Itching     Nausea, diarrhea  Other reaction(s): Nausea/Diarrhea H&P    Sulindac Other (See Comments)     Stomach pain  Other reaction(s): Stomach Pain        reports that she quit smoking about 27 years ago. Her smoking use included cigarettes. She started smoking about 72 years ago. She has a 45.00 pack-year smoking history. She has never used smokeless tobacco. She reports that she does not drink alcohol and does not use drugs. All questions and concerns were addressed to the patient/family. Alternatives to my treatment were discussed. I have discussed the above stated plan and the patient verbalized understanding and agreed with the plan. NOTE: This report was transcribed using voice recognition software. Every effort was made to ensure accuracy, however, inadvertent computerized transcription errors may be present.      Elizabeth Lang MD, MPH  [facility]    Office: [phone]  Fax: [phone]

## 2023-02-26 LAB
A/G RATIO: 1.2 (ref 1.1–2.2)
ALBUMIN SERPL-MCNC: 3.5 G/DL (ref 3.4–5)
ALP BLD-CCNC: 155 U/L (ref 40–129)
ALT SERPL-CCNC: 11 U/L (ref 10–40)
ANION GAP SERPL CALCULATED.3IONS-SCNC: 14 MMOL/L (ref 3–16)
AST SERPL-CCNC: 16 U/L (ref 15–37)
BILIRUB SERPL-MCNC: 0.5 MG/DL (ref 0–1)
BUN BLDV-MCNC: 44 MG/DL (ref 7–20)
CALCIUM SERPL-MCNC: 9.7 MG/DL (ref 8.3–10.6)
CHLORIDE BLD-SCNC: 98 MMOL/L (ref 99–110)
CO2: 25 MMOL/L (ref 21–32)
CREAT SERPL-MCNC: 1.7 MG/DL (ref 0.6–1.2)
EKG ATRIAL RATE: 122 BPM
EKG DIAGNOSIS: NORMAL
EKG Q-T INTERVAL: 310 MS
EKG QRS DURATION: 84 MS
EKG QTC CALCULATION (BAZETT): 474 MS
EKG R AXIS: 20 DEGREES
EKG T AXIS: 203 DEGREES
EKG VENTRICULAR RATE: 141 BPM
GFR SERPL CREATININE-BSD FRML MDRD: 29 ML/MIN/{1.73_M2}
GLUCOSE BLD-MCNC: 114 MG/DL (ref 70–99)
GLUCOSE BLD-MCNC: 129 MG/DL (ref 70–99)
GLUCOSE BLD-MCNC: 142 MG/DL (ref 70–99)
GLUCOSE BLD-MCNC: 156 MG/DL (ref 70–99)
PERFORMED ON: ABNORMAL
POTASSIUM REFLEX MAGNESIUM: 4.8 MMOL/L (ref 3.5–5.1)
PRO-BNP: 625 PG/ML (ref 0–449)
SODIUM BLD-SCNC: 137 MMOL/L (ref 136–145)
TOTAL PROTEIN: 6.5 G/DL (ref 6.4–8.2)

## 2023-02-26 PROCEDURE — 97161 PT EVAL LOW COMPLEX 20 MIN: CPT

## 2023-02-26 PROCEDURE — 99233 SBSQ HOSP IP/OBS HIGH 50: CPT | Performed by: NURSE PRACTITIONER

## 2023-02-26 PROCEDURE — 83880 ASSAY OF NATRIURETIC PEPTIDE: CPT

## 2023-02-26 PROCEDURE — 80053 COMPREHEN METABOLIC PANEL: CPT

## 2023-02-26 PROCEDURE — 2700000000 HC OXYGEN THERAPY PER DAY

## 2023-02-26 PROCEDURE — 97530 THERAPEUTIC ACTIVITIES: CPT

## 2023-02-26 PROCEDURE — 97535 SELF CARE MNGMENT TRAINING: CPT

## 2023-02-26 PROCEDURE — 94640 AIRWAY INHALATION TREATMENT: CPT

## 2023-02-26 PROCEDURE — 36415 COLL VENOUS BLD VENIPUNCTURE: CPT

## 2023-02-26 PROCEDURE — 6370000000 HC RX 637 (ALT 250 FOR IP): Performed by: INTERNAL MEDICINE

## 2023-02-26 PROCEDURE — 2580000003 HC RX 258: Performed by: INTERNAL MEDICINE

## 2023-02-26 PROCEDURE — 1200000000 HC SEMI PRIVATE

## 2023-02-26 PROCEDURE — 97166 OT EVAL MOD COMPLEX 45 MIN: CPT

## 2023-02-26 PROCEDURE — 2580000003 HC RX 258: Performed by: NURSE PRACTITIONER

## 2023-02-26 PROCEDURE — 94761 N-INVAS EAR/PLS OXIMETRY MLT: CPT

## 2023-02-26 PROCEDURE — 93010 ELECTROCARDIOGRAM REPORT: CPT | Performed by: INTERNAL MEDICINE

## 2023-02-26 RX ORDER — SODIUM CHLORIDE 0.9 % (FLUSH) 0.9 %
5-40 SYRINGE (ML) INJECTION PRN
Status: DISCONTINUED | OUTPATIENT
Start: 2023-02-26 | End: 2023-02-28 | Stop reason: HOSPADM

## 2023-02-26 RX ORDER — SODIUM CHLORIDE 9 MG/ML
INJECTION, SOLUTION INTRAVENOUS CONTINUOUS
Status: DISCONTINUED | OUTPATIENT
Start: 2023-02-26 | End: 2023-02-28

## 2023-02-26 RX ORDER — SODIUM CHLORIDE 9 MG/ML
INJECTION, SOLUTION INTRAVENOUS PRN
Status: DISCONTINUED | OUTPATIENT
Start: 2023-02-26 | End: 2023-02-28 | Stop reason: HOSPADM

## 2023-02-26 RX ORDER — SODIUM CHLORIDE 0.9 % (FLUSH) 0.9 %
5-40 SYRINGE (ML) INJECTION EVERY 12 HOURS SCHEDULED
Status: DISCONTINUED | OUTPATIENT
Start: 2023-02-26 | End: 2023-02-28 | Stop reason: HOSPADM

## 2023-02-26 RX ADMIN — ROPINIROLE HYDROCHLORIDE 1 MG: 1 TABLET, FILM COATED ORAL at 09:00

## 2023-02-26 RX ADMIN — DILTIAZEM HYDROCHLORIDE 240 MG: 240 CAPSULE, COATED, EXTENDED RELEASE ORAL at 09:00

## 2023-02-26 RX ADMIN — ROPINIROLE HYDROCHLORIDE 1 MG: 1 TABLET, FILM COATED ORAL at 21:05

## 2023-02-26 RX ADMIN — Medication 10 ML: at 21:08

## 2023-02-26 RX ADMIN — OXYCODONE AND ACETAMINOPHEN 1 TABLET: 5; 325 TABLET ORAL at 17:30

## 2023-02-26 RX ADMIN — OXYCODONE AND ACETAMINOPHEN 1 TABLET: 5; 325 TABLET ORAL at 01:33

## 2023-02-26 RX ADMIN — ACETAMINOPHEN 650 MG: 325 TABLET ORAL at 14:23

## 2023-02-26 RX ADMIN — FLECAINIDE ACETATE 50 MG: 50 TABLET ORAL at 09:00

## 2023-02-26 RX ADMIN — APIXABAN 5 MG: 5 TABLET, FILM COATED ORAL at 21:06

## 2023-02-26 RX ADMIN — Medication 10 ML: at 09:21

## 2023-02-26 RX ADMIN — OXYCODONE AND ACETAMINOPHEN 1 TABLET: 5; 325 TABLET ORAL at 22:08

## 2023-02-26 RX ADMIN — GABAPENTIN 100 MG: 100 CAPSULE ORAL at 21:07

## 2023-02-26 RX ADMIN — CLONIDINE HYDROCHLORIDE 0.1 MG: 0.1 TABLET ORAL at 09:00

## 2023-02-26 RX ADMIN — EMPAGLIFLOZIN 10 MG: 10 TABLET, FILM COATED ORAL at 09:00

## 2023-02-26 RX ADMIN — Medication 10 ML: at 09:01

## 2023-02-26 RX ADMIN — APIXABAN 5 MG: 5 TABLET, FILM COATED ORAL at 09:01

## 2023-02-26 RX ADMIN — ROPINIROLE HYDROCHLORIDE 1 MG: 1 TABLET, FILM COATED ORAL at 14:23

## 2023-02-26 RX ADMIN — TIOTROPIUM BROMIDE INHALATION SPRAY 2 PUFF: 3.12 SPRAY, METERED RESPIRATORY (INHALATION) at 08:37

## 2023-02-26 RX ADMIN — LEVOTHYROXINE SODIUM 75 MCG: 0.07 TABLET ORAL at 09:00

## 2023-02-26 RX ADMIN — GABAPENTIN 100 MG: 100 CAPSULE ORAL at 14:23

## 2023-02-26 RX ADMIN — CLONIDINE HYDROCHLORIDE 0.1 MG: 0.1 TABLET ORAL at 21:05

## 2023-02-26 RX ADMIN — LORAZEPAM 0.5 MG: 0.5 TABLET ORAL at 06:17

## 2023-02-26 RX ADMIN — LORAZEPAM 1 MG: 1 TABLET ORAL at 21:06

## 2023-02-26 RX ADMIN — ATORVASTATIN CALCIUM 80 MG: 80 TABLET, FILM COATED ORAL at 21:06

## 2023-02-26 RX ADMIN — LORAZEPAM 0.5 MG: 0.5 TABLET ORAL at 14:23

## 2023-02-26 RX ADMIN — FLECAINIDE ACETATE 50 MG: 50 TABLET ORAL at 21:07

## 2023-02-26 RX ADMIN — BETHANECHOL CHLORIDE 10 MG: 10 TABLET ORAL at 21:06

## 2023-02-26 RX ADMIN — BETHANECHOL CHLORIDE 10 MG: 10 TABLET ORAL at 09:00

## 2023-02-26 RX ADMIN — FUROSEMIDE 20 MG: 20 TABLET ORAL at 09:00

## 2023-02-26 RX ADMIN — Medication 2 PUFF: at 08:35

## 2023-02-26 RX ADMIN — GABAPENTIN 100 MG: 100 CAPSULE ORAL at 09:00

## 2023-02-26 RX ADMIN — SODIUM CHLORIDE: 9 INJECTION, SOLUTION INTRAVENOUS at 12:13

## 2023-02-26 RX ADMIN — LORAZEPAM 0.5 MG: 0.5 TABLET ORAL at 22:04

## 2023-02-26 RX ADMIN — Medication 2 PUFF: at 20:25

## 2023-02-26 ASSESSMENT — PAIN DESCRIPTION - DESCRIPTORS
DESCRIPTORS: ACHING
DESCRIPTORS: DISCOMFORT

## 2023-02-26 ASSESSMENT — PAIN SCALES - GENERAL
PAINLEVEL_OUTOF10: 0
PAINLEVEL_OUTOF10: 7
PAINLEVEL_OUTOF10: 0
PAINLEVEL_OUTOF10: 8

## 2023-02-26 ASSESSMENT — PAIN DESCRIPTION - PAIN TYPE: TYPE: SURGICAL PAIN

## 2023-02-26 ASSESSMENT — PAIN DESCRIPTION - ORIENTATION
ORIENTATION: RIGHT
ORIENTATION: LEFT

## 2023-02-26 ASSESSMENT — PAIN DESCRIPTION - LOCATION
LOCATION: BACK
LOCATION: CHEST;SHOULDER

## 2023-02-26 ASSESSMENT — PAIN - FUNCTIONAL ASSESSMENT: PAIN_FUNCTIONAL_ASSESSMENT: ACTIVITIES ARE NOT PREVENTED

## 2023-02-26 NOTE — PLAN OF CARE
Problem: ABCDS Injury Assessment  Goal: Absence of physical injury  2/25/2023 2124 by Charline Jauregui RN  Outcome: Progressing  2/25/2023 1914 by David Newton RN  Outcome: Progressing     Problem: Skin/Tissue Integrity  Goal: Absence of new skin breakdown  Description: 1. Monitor for areas of redness and/or skin breakdown  2. Assess vascular access sites hourly  3. Every 4-6 hours minimum:  Change oxygen saturation probe site  4. Every 4-6 hours:  If on nasal continuous positive airway pressure, respiratory therapy assess nares and determine need for appliance change or resting period.   2/25/2023 2124 by Charline Jauregui RN  Outcome: Progressing  2/25/2023 1914 by David Newton RN  Outcome: Progressing     Problem: Discharge Planning  Goal: Discharge to home or other facility with appropriate resources  2/25/2023 2124 by Charline Jauregui RN  Outcome: Progressing  2/25/2023 1914 by David Newton RN  Outcome: Progressing     Problem: Pain  Goal: Verbalizes/displays adequate comfort level or baseline comfort level  2/25/2023 2124 by Charline Jauregui RN  Outcome: Progressing  2/25/2023 1914 by David Newton RN  Outcome: Progressing  Flowsheets (Taken 2/25/2023 1025 by Poonam Go RN)  Verbalizes/displays adequate comfort level or baseline comfort level: Administer analgesics based on type and severity of pain and evaluate response     Problem: Safety - Adult  Goal: Free from fall injury  2/25/2023 2124 by Charline Jauregui RN  Outcome: Progressing  2/25/2023 1914 by David Newton RN  Outcome: Progressing     Problem: Chronic Conditions and Co-morbidities  Goal: Patient's chronic conditions and co-morbidity symptoms are monitored and maintained or improved  2/25/2023 2124 by Charline Jauregui RN  Outcome: Progressing  2/25/2023 1914 by David Newton RN  Outcome: Progressing     Problem: Cardiovascular - Adult  Goal: Maintains optimal cardiac output and hemodynamic stability  2/25/2023 2124 by Lina Bay RN  Outcome: Progressing  2/25/2023 1914 by Magi St RN  Outcome: Progressing  Flowsheets (Taken 2/25/2023 0800 by Rani De Guzman RN)  Maintains optimal cardiac output and hemodynamic stability:   Monitor blood pressure and heart rate   Monitor urine output and notify Licensed Independent Practitioner for values outside of normal range   Assess for signs of decreased cardiac output   Administer vasoactive medications as ordered  Goal: Absence of cardiac dysrhythmias or at baseline  2/25/2023 2124 by Lina Bay RN  Outcome: Progressing  2/25/2023 1914 by Magi St RN  Outcome: Progressing

## 2023-02-26 NOTE — PROGRESS NOTES
[facility] Department   Phone: [phone]    Physical Therapy    [x] Initial Evaluation            [] Daily Treatment Note         [] Discharge Summary      Patient: Josephine Forrest   : 1935   MRN: [id number]   Date of Service:  2023  Admitting Diagnosis: Acute on chronic diastolic (congestive) heart failure ([address]  Current Admission Summary: 79 yo female with hx COPD/ILD, chronic hypoxic respiratory failure (on 2 liters O2), PAF (on Eliquis), CAD, CKD stage 3, DM2, tachy nancy syndrome. Presented to hospital with c/o shortness of breath and dizziness. Was in atrial fib with RVR on presentation. 2023: Insertion of dual chamber pacemaker under fluoroscopic guidance   Past Medical History:  has a past medical history of Arthritis, Atrial fibrillation ([address] Bursitis, COPD (chronic obstructive pulmonary disease) ([address] Diabetes mellitus type II, controlled ([address] Diastolic CHF ([address] GERD (gastroesophageal reflux disease), HTN (hypertension), Hyperlipidemia, Hypothyroid, Lumbago, Parkinson disease ([address] Restless legs syndrome (RLS), and SVT (supraventricular tachycardia) ([address] Past Surgical History:  has a past surgical history that includes Appendectomy; Colonoscopy; and Cholecystectomy, laparoscopic (2013). Discharge Recommendations: Josephine Forrest scored a 17/ on the AM-PAC short mobility form. Current research shows that an AM-PAC score of 17 or less is typically not associated with a discharge to the patient's home setting. Based on the patient's AM-PAC score and their current functional mobility deficits, it is recommended that the patient have 3-5 sessions per week of Physical Therapy at d/c to increase the patient's independence. Please see assessment section for further patient specific details. If patient discharges prior to next session this note will serve as a discharge summary.   Please see below for the latest assessment towards goals. DME Required For Discharge: DME to be determined at next level of care  Precautions/Restrictions: high fall risk, up as tolerated  Weight Bearing Restrictions: no restrictions  [] Right Upper Extremity  [] Left Upper Extremity [] Right Lower Extremity  [] Left Lower Extremity     Required Braces/Orthotics: sling and swathe for 24 hrs   [] Right  [x] Left  Positional Restrictions: Pacemaker - No shoulder flexion > 90* (L)    Pre-Admission Information   Lives With: daughter    Type of Home: house  Home Layout: two level, 6 stairs to 2nd level with R ascending HR  Home Access:  3 step to enter without rails   Bathroom Layout: walk in shower  Bathroom Equipment: grab bars in shower  Toilet Height: elevated height  Home Equipment: rollator - 4 wheeled walker, 2L O2 chronically  Transfer Assistance: modified independent with use of rollator walker  Ambulation Assistance:modified independent with use of rollator walker  ADL Assistance:  Supervision for bathing and dressing  IADL Assistance: requires assistance with all homemaking tasks  Active :        [] Yes  [] No  Hand Dominance: [] Left  [] Right  Current Employment: retired. Occupation: [employer]  Recent Falls: Pt denies falls in the past 3 months. *PLOF information provided by pts daughter. Above information is based on function in January 2023. Per pts daughter, the pt has had a decline in physical function over the past 3 months and has been requiring physical assistance for performance of ADLs and functional mobility tasks with use of a rollator walker.      Examination   Vision:   Vision Gross Assessment: Impaired and Vision Corrective Device: wears glasses at all times  Hearing:   hard of hearing, left hearing aid, right hearing aid  Observation:   General Observation:  Pt on 2L O2 via nasal cannula on arrival.  Posture:   Mild forward head, rounded shoulders, and increased thoracic kyphosis in sitting and standing. Sensation:   denies numbness and tingling  ROM:   (B) LE AROM WFL  Strength:   (B) LE strength grossly 4/5  Therapist Clinical Decision Making (Complexity): low complexity  Clinical Presentation: stable      Subjective  General: Pt seated in recliner on arrival, agreeable to participate in PT/OT initial evaluation. Pain: 0/10- pt denies pain  Pain Interventions: not applicable     Functional Mobility  Bed Mobility  Bed mobility not completed on this date. Comments: Pt seated in chair on arrival and at the end of the initial evaluation. Transfers  Sit to stand transfer: contact guard assistance  Stand to sit transfer: contact guard assistance  Comments: R HHA  Ambulation  Surface:level surface  Assistive Device: hand-held assist  Assistance: contact guard assistance, minimal assistance- CGA with intermittent periods of min A  Distance: 15' + 15'  Gait Mechanics: Shortened step lengths, shuffling, decreased speed, no overt losses of balance  Comments:    Stair Mobility  Stair mobility not completed on this date. Comments:  Wheelchair Mobility:  No w/c mobility completed on this date. Comments:  Balance  Static Sitting Balance: fair (+): maintains balance at SBA/supervision without use of UE support  Static Standing Balance: fair (-): maintains balance at CGA with use of UE support  Dynamic Standing Balance: fair (-): maintains balance at CGA with use of UE support  Comments:    Other Therapeutic Interventions  Pt participated in ADLs with OT. See OT note for assist levels.      Functional Outcomes  AM-PAC Inpatient Mobility Raw Score : 17              Cognition  Overall Cognitive Status: Impaired  Arousal/Alterness: delayed responses to stimuli  Attention Span: attends with cues to redirect, difficulty dividing attention  Memory: decreased short term memory  Insights: decreased awareness of deficits  Initiation: requires cues for some  Sequencing: requires cues for some  Orientation:    alert and oriented x 4  Command Following:   impaired- pt follows one step commands with increased time and repetition of instruction    Education  Barriers To Learning: cognition and hearing  Patient Education: patient educated on goals, PT role and benefits, plan of care, general safety, functional mobility training, family education, discharge recommendations  Learning Assessment:  patient verbalizes understanding, would benefit from continued reinforcement    Assessment  Activity Tolerance: Pts SpO2 remained >90% throughout the initial evaluation and HR was between 60-80 bpm throughout. Impairments Requiring Therapeutic Intervention: decreased functional mobility, decreased strength, decreased safety awareness, decreased cognition, decreased endurance, decreased balance  Prognosis: good  Clinical Assessment: Pt is an 81 y/o female who presents to the hospital with SOB and dizziness. Pt has experienced a steady decline in her physical function over the past 3 months with several hospitalizations. Pt requires assistance with all functional mobility tasks and ADLs secondary to this decline. Today, the pt required CGA-min A for performance of all functional mobility tasks and demonstrated impaired activity tolerance and endurance. Pt will benefit from skilled PT to facilitate return to PLOF and to promote independence.   Safety Interventions: patient left in chair, chair alarm in place, call light within reach, gait belt, nurse notified, and family/caregiver present    Plan  Frequency: 3-5 x/per week  Current Treatment Recommendations: strengthening, balance training, functional mobility training, transfer training, gait training, endurance training, patient/caregiver education, home exercise program, and safety education    Goals  Patient Goals: Pt did not state   Short Term Goals:  Time Frame: By discharge  Patient will complete bed mobility at contact guard assistance   Patient will complete transfers at stand by assistance   Patient will ambulate 50 ft with use of LRAD at contact guard assistance    Therapy Session Time      Individual Group Co-treatment   Time In     1046   Time Out     1133   Minutes     47     Timed Code Treatment Minutes: 32 Minutes  Total Treatment Minutes: 47 Minutes        Electronically Signed By: Ladonna Ling, PT  Kelsey Lovell PT, DPT [id number]

## 2023-02-26 NOTE — PROGRESS NOTES
[facility] PROGRESS NOTE    2/26/2023 9:41 AM        Name: Mercy Arias . Admitted: 2/23/2023  Primary Care Provider: Emmanuel Martin MD (Tel: [phone])      Brief History: 79 yo female with hx COPD/ILD, chronic hypoxic respiratory failure (on 2 liters O2), PAF (on Eliquis), CAD, CKD stage 3, DM2, tachy nancy syndrome. Presented to hospital with c/o shortness of breath and dizziness. Was in atrial fib with RVR on presentation. 2/24/2023: Insertion of dual chamber pacemaker under fluoroscopic guidance     Subjective:  Resting in bed. States she is feeling okay today. Denies shortness of breath, chest pain, abdominal pain, nausea. She is thirsty. Renal function worsening. Notes some discomfort pacer site, swath in place. Back in sinus rhythm this morning.      Reviewed interval ancillary notes    Current Medications  sodium chloride flush 0.9 % injection 5-40 mL, 2 times per day  sodium chloride flush 0.9 % injection 5-40 mL, PRN  0.9 % sodium chloride infusion, PRN  empagliflozin (JARDIANCE) tablet 10 mg, Daily  apixaban (ELIQUIS) tablet 5 mg, BID  furosemide (LASIX) tablet 20 mg, Daily  cloNIDine (CATAPRES) tablet 0.1 mg, TID  oxyCODONE-acetaminophen (PERCOCET) 5-325 MG per tablet 1 tablet, Q4H PRN  flecainide (TAMBOCOR) tablet 50 mg, 2 times per day  dilTIAZem (CARDIZEM CD) extended release capsule 240 mg, Daily  milk and molasses enema 240 mL, Once  albuterol sulfate HFA (PROVENTIL;VENTOLIN;PROAIR) 108 (90 Base) MCG/ACT inhaler 2 puff, Q6H PRN  atorvastatin (LIPITOR) tablet 80 mg, Nightly  bethanechol (URECHOLINE) tablet 10 mg, BID  mometasone-formoterol (DULERA) 200-5 MCG/ACT inhaler 2 puff, BID  fluticasone (FLONASE) 50 MCG/ACT nasal spray 2 spray, PRN  gabapentin (NEURONTIN) capsule 100 mg, TID  levalbuterol (XOPENEX) nebulization 0.63 mg, Q6H PRN  levothyroxine (SYNTHROID) tablet 75 mcg, Daily  lidocaine 4 % external patch 1 patch, Daily  LORazepam (ATIVAN) tablet 0.5 mg, Q8H  LORazepam (ATIVAN) tablet 1 mg, Nightly  naloxegol (MOVANTIK) tablet 25 mg, QAM AC  polyethylene glycol (GLYCOLAX) packet 17 g, Daily  rOPINIRole (REQUIP) tablet 1 mg, TID  tiotropium (SPIRIVA RESPIMAT) 2.5 MCG/ACT inhaler 2 puff, Daily  sodium chloride flush 0.9 % injection 5-40 mL, 2 times per day  sodium chloride flush 0.9 % injection 5-40 mL, PRN  0.9 % sodium chloride infusion, PRN  ondansetron (ZOFRAN-ODT) disintegrating tablet 4 mg, Q8H PRN   Or  ondansetron (ZOFRAN) injection 4 mg, Q6H PRN  polyethylene glycol (GLYCOLAX) packet 17 g, Daily PRN  acetaminophen (TYLENOL) tablet 650 mg, Q6H PRN   Or  acetaminophen (TYLENOL) suppository 650 mg, Q6H PRN  dextrose bolus 10% 125 mL, PRN   Or  dextrose bolus 10% 250 mL, PRN  glucagon (rDNA) injection 1 mg, PRN  dextrose 10 % infusion, Continuous PRN  albuterol (PROVENTIL) nebulizer solution 2.5 mg, BID  hydrALAZINE (APRESOLINE) injection 10 mg, Q4H PRN  insulin lispro (HUMALOG) injection vial 0-8 Units, TID WC  insulin lispro (HUMALOG) injection vial 0-4 Units, Nightly      Objective:  /69   Pulse 81   Temp 97.9 °F (36.6 °C)   Resp 18   Ht 5' 2\" (1.575 m)   Wt 156 lb 4.9 oz (70.9 kg)   SpO2 95%   BMI 28.59 kg/m²     Intake/Output Summary (Last 24 hours) at 2/26/2023 0941  Last data filed at 2/26/2023 0400  Gross per 24 hour   Intake 550 ml   Output 3200 ml   Net -2650 ml      Wt Readings from Last 3 Encounters:   02/26/23 156 lb 4.9 oz (70.9 kg)   02/04/23 157 lb 6.5 oz (71.4 kg)   01/28/23 156 lb 12.8 oz (71.1 kg)     General:  Awake, alert, oriented in NAD  Skin:  Warm and dry.  No unusual bruising or rash  Neck:  Supple.  No JVD appreciated  Chest:  Normal effort.  Clear to auscultation, no wheezes/rhonchi/rales  Cardiovascular:  RRR, normal S1/S2, no murmur/gallop/rub, dressing intact to pacer site, no hematoma  Abdomen:  Soft, nontender, +bowel  sounds  Extremities:  No edema  Neurological: No focal deficits  Psychological: Normal mood and affect    Labs and Tests:  CBC:   Recent Labs     02/24/23  0458 02/25/23  0509   WBC 6.5 6.7   HGB 11.2* 11.4*    169     BMP:    Recent Labs     02/24/23  0458 02/25/23  0509 02/26/23  0639    140 137   K 4.5 4.7 4.8    99 98*   CO2 27 27 25   BUN 24* 28* 44*   CREATININE 1.0 1.3* 1.7*   GLUCOSE 122* 140* 129*     Hepatic:   Recent Labs     02/26/23  0639   AST 16   ALT 11   BILITOT 0.5   ALKPHOS 155*       CXR 2/23/2023:  Mild bibasilar airspace disease and small bilateral pleural effusions. Consider atelectasis versus pneumonia in the appropriate clinical settings     KUB 2/23/2023:  1. Nonspecific, nonobstructive bowel gas pattern. 2. Mild-moderate fecal loading of the ascending/transverse colon as described   above. CXR 2/25/2023:  1. Status post uncomplicated dual lead pacemaker placement. Problem List  Principal Problem:    Acute on chronic diastolic (congestive) heart failure ([facility])  Active Problems:    ANTHONY (obstructive sleep apnea)    Chronic obstructive pulmonary disease ([facility])    Chronic renal disease, stage III ([facility]) [186050]    Constipation    Bradycardia    Acute on chronic diastolic CHF (congestive heart failure) ([facility])    Tachycardia-bradycardia syndrome ([facility])    DM2 (diabetes mellitus, type 2) ([facility])    Paroxysmal atrial fibrillation ([facility])    COPD, moderate ([facility])    Interstitial lung disease ([address]    Bronchiectasis without complication ([address]    Coronary artery disease involving native coronary artery of native heart with angina pectoris ([address]    Primary hypertension  Resolved Problems:    * No resolved hospital problems. *       Assessment & Plan:   Acute dCHF. Presented with dyspnea. Echo (8/2022) with EF 55-60%. CXR with bilateral pleural effusions, proBNP 4454. Likely secondary to atrial fib with RVR. Started on IV Lasix with good response, transitioned to po 2/25.  Creatinine trending up, 1.0->1.3->1.7, hold Lasix and Jardiance. Tachy nancy syndrome. Hx multiple admissions with atrial fib with RVR. Has been treated with antiarrhythmics in past but poorly tolerated due to bradycardia. Now s/p PPM implant (2/24). Post procedure CXR with no complicating features. Started on Flecainide 2/25 for recurrent atrial fib with RVR. In sinus rhythm today. Continue diltiazem and apixaban. Elevated troponin 0.02. Likely supply demand mismatch in setting of CHF and RVR. No acute changes on EKG. Patient denies chest pain. Evaluated by cardiology, no further w/u planned at present. Continue medical management with high intensity statin, no asa secondary DOAC. CAROLANN. Creatinine 1.0->1.3->1.7. Likely multifactorial: over diuresis, hypoperfusion secondary RVR, hypotension. Hold Lasix and Jardiance for now. Start gentle hydration at 50 ml/hr. BMP in am.    Constipation. KUB with constipation. Likely opioid induced, she is on Percocet at home for chronic pain. Given milk and molasses enema 2/23 with good results. Continue Miralax and Movantik. Resolved. DM2. A1c 6.1 this admission. Take Januvia at home. Being covered by medium dose correction. BG values reviewed, 114-141. Continue current management. COPD / chronic hypoxic respiratory failure. Stable, not in exacerbation, O2 needs stable at 2 liters which is baseline. Continue Spiriva, Dulera and Albuterol. Normocytic anemia. Hgb 9.5 on presentation, has improved with diuresis. Iron deficiency on recent labs (2/3/2023) and she received IV venofer. Continue to monitor. Disposition: PT/OT consults pending. Patient lives with daughter. Diet: ADULT DIET;  Regular  Code:Full Code  DVT PPX: apixaban      KATHY Howard CNP   2/26/2023 9:41 AM

## 2023-02-26 NOTE — PROGRESS NOTES
RN referral for patient requesting prayer.  Visited with patient who spoke of health concerns and a desire for prayer.  Prayer with patient around her named concerns.

## 2023-02-26 NOTE — PROGRESS NOTES
[address] [facility],[address] Msb Department   Phone: [phone]    Occupational Therapy    [x] Initial Evaluation            [] Daily Treatment Note         [] Discharge Summary      Patient: Alexia Reece   : 1935   MRN: [id number]   Date of Service:  2023    Admitting Diagnosis:  Acute on chronic diastolic (congestive) heart failure ([address]  Current Admission Summary: 81 yo female with hx COPD/ILD, chronic hypoxic respiratory failure (on 2 liters O2), PAF (on Eliquis), CAD, CKD stage 3, DM2, tachy nancy syndrome. Presented to hospital with c/o shortness of breath and dizziness. Was in atrial fib with RVR on presentation. Pacemaker placed   Past Medical History:  has a past medical history of Arthritis, Atrial fibrillation ([address] Bursitis, COPD (chronic obstructive pulmonary disease) ([address] Diabetes mellitus type II, controlled ([address] Diastolic CHF ([address] GERD (gastroesophageal reflux disease), HTN (hypertension), Hyperlipidemia, Hypothyroid, Lumbago, Parkinson disease ([address] Restless legs syndrome (RLS), and SVT (supraventricular tachycardia) ([address] Past Surgical History:  has a past surgical history that includes Appendectomy; Colonoscopy; and Cholecystectomy, laparoscopic (2013). Discharge Recommendations: Alexia Reece scored a 14 on the AM-PAC ADL Inpatient form. Current research shows that an AM-PAC score of 17 or less is typically not associated with a discharge to the patient's home setting. Based on the patient's AM-PAC score and their current ADL deficits, it is recommended that the patient have 3-5 sessions per week of Occupational Therapy at d/c to increase the patient's independence. Please see assessment section for further patient specific details. If patient discharges prior to next session this note will serve as a discharge summary. Please see below for the latest assessment towards goals.       DME Required For Discharge: DME to be determined at next level of care    Precautions/Restrictions: high fall risk  Weight Bearing Restrictions: no restrictions  [] Right Upper Extremity  [] Left Upper Extremity [] Right Lower Extremity  [] Left Lower Extremity     Required Braces/Orthotics: sling and swathe for 24 hrs -pacemaker insertion    [] Right  [] Left  Positional Restrictions:Pacemaker - No shoulder flexion > 90* - wearing swath at time of eval     Pre-Admission Information   Lives With: daughter               Type of Home: house  Home Layout: two level, 6 stairs to 2nd level with R ascending HR  Home Access:  3 step to enter without rails   Bathroom Layout: walk in shower  Bathroom Equipment: grab bars in shower  Toilet Height: elevated height  Home Equipment: rollator - 4 wheeled walker  Transfer Assistance: modified independent with use of rollator walker  Ambulation Assistance:modified independent with use of rollator walker  ADL Assistance:  Supervision for bathing and dressing  IADL Assistance: requires assistance with all homemaking tasks  Active :        [] Yes                 [] No  Hand Dominance: [] Left                 [] Right  Current Employment: retired. Occupation: [employer]  Recent Falls: Pt denies falls in the past 3 months. *PLOF information provided by pts daughter. Above information is based on function in January 2023. Per pts daughter, the pt has had a decline in physical function over the past 3 months and has been requiring physical assistance for performance of ADLs and functional mobility tasks with use of a rollator walker. Examination   Vision:   Vision Corrective Device: wears glasses at all times  Hearing:   hard of hearing, left hearing aid, right hearing aid  Perception:   WFL  Observation:   General Observation:  pacemaker dressing dry and intact, swath to L arm, telemetry- 2L 02  Posture:    Forward flexed posture, rounded shoulders  Sensation:   WFL  Proprioception: WFL  Tone:   Normotonic  Coordination Testing:   WFL    ROM:   Not tested due to recent pacemaker insertion   Strength:   Not tested due to recent pacemaker insertion     Therapist Clinical Decision Making (Complexity): medium complexity  Clinical Presentation: stable      Subjective  General: pt in chair on arrival with daughter at bedside   Pain: 0/10  Pain Interventions: not applicable        Activities of Daily Living  Basic Activities of Daily Living  Grooming: contact guard assistance  Upper Extremity Bathing: moderate assistance  Lower Extremity Bathing: maximum assistance   Upper Extremity Dressing: maximum assistance  Lower Extremity Dressing: maximum assistance  Toileting Comments: coronado at this time     Functional Mobility  Bed Mobility  Bed mobility not completed on this date.   Comments:  Transfers  Sit to stand transfer:contact guard assistance  Stand to sit transfer: contact guard assistance  Comments:  Functional Mobility:  Functional Mobility: .  contact guard assistance, minimal assistance  Functional Mobility Activity: to/from bathroom  Functional Mobility Device Use: hand-held assist  Functional Mobility Comment: to/from bathroom sink to use mouthwash - HHA and min A, fatigues quickly, 2L 02     Other Therapeutic Interventions    Functional Outcomes  AM-PAC Inpatient Daily Activity Raw Score: 14    Cognition  Overall Cognitive Status: Impaired  Arousal/Alterness: delayed responses to stimuli  Following Commands: follows one step commands with repetition, follows one step commands with increased time  Attention Span: appears intact  Memory: decreased short term memory  Safety Judgement: decreased awareness of need for assistance, decreased awareness of need for safety  Problem Solving: decreased awareness of errors, assistance required to identify errors made, assistance required to correct errors made  Insights: decreased awareness of deficits  Initiation: requires cues for some  Sequencing: requires cues for some  Comments: [place] exacerbates cognition/communication   Orientation:    alert and oriented x 4  Command Following:   accurately follows one step commands     Education  Barriers To Learning: hearing  Patient Education: patient educated on OT role and benefits, plan of care, precautions, family education, transfer training, discharge recommendations  Learning Assessment:  patient verbalizes understanding, would benefit from continued reinforcement    Assessment  Activity Tolerance: pt's 02 saturation and HR remained stable and WFL with activity, fatigues quickly   Impairments Requiring Therapeutic Intervention: decreased functional mobility, decreased ADL status, decreased safety awareness, decreased endurance, decreased balance  Prognosis: good  Clinical Assessment: Patient below baseline function, has been having slow decline since January. Would benefit from continued occupational therapy for education and adaptation to pacemaker precautions, increased activity tolerance/endurance and return to independence with ADLs and functional mobility. Patient's activity tolerance/ADLS/mobility significantly limited by recent hospitalizations and pacemaker surgery. Recommend continued skilled OT while at the hospital and upon discharge to address above functional deficits. Family at bedside and supportive.    Safety Interventions: patient left in chair, chair alarm in place, call light within reach, nurse notified, and family/caregiver present    Plan  Frequency: 3-5 x/per week  Current Treatment Recommendations: strengthening, balance training, functional mobility training, transfer training, endurance training, ADL/self-care training, and safety education    Goals  Patient Goals: to return home    Short Term Goals:  Time Frame: discharge   Patient will complete upper body ADL at minimal assistance   Patient will complete lower body ADL at moderate assistance   Patient will complete toileting at minimal assistance   Patient will complete grooming at stand by assistance   Patient will complete functional transfers at contact guard assistance   Patient will complete functional mobility at contact guard assistance     Therapy Session Time     Individual Group Co-treatment   Time In    1046   Time Out    1133   Minutes    47        Timed Code Treatment Minutes:  Timed Code Treatment Minutes: 32 Minutes  Total Treatment Minutes:  52       Electronically Signed By: Royce Crain OT

## 2023-02-26 NOTE — DISCHARGE INSTRUCTIONS
The [place] Sleepiness Scale       The [place] Sleepiness Scale is widely used in the field of sleep medicine as a subjective measure of a patient's sleepiness. The test is a list of eight situations in which you rate your tendency to become sleepy on a scale of 0, no chance to 3, high chance of dozing. Your score is based on a scale of 0 to 24. The scale estimates whether you are experiencing excessive sleepiness that possibly requires medical attention. How Sleepy Are You? How sleepy are you to doze off or fall asleep in the following situations? You should rate your chances of dozing off, not just feeling tired. Even if you have not done some of these things recently try to determine how they would have affected you.  For each situation, decide whether or not you would have:     0 = No chance of dozing 1 = Slight chance of dozing   2 = Moderate chance of  dozing 3 = High change of dozing       Situation                                                                                     Chance of Dozing    Sitting and reading  0 =  []  1 =    [] 2 =    [] 3 =    [x]    Watching TV  0 =  []  1 =    [] 2 =    [] 3 =    [x]      Sitting inactive in public place (e.g., a theater or a meeting)  0 =  [x]  1 =    [] 2 =    [] 3 =    []    As a passenger in a car for an hour without a break          0  =  [x]  1 =    [] 2 =    [] 3 =    []    Lying down to rest in the afternoon when circumstances permit    0 =  []  1 =    [] 2 =    [] 3 =    [x]    Sitting and talking to someone  0 =  []  1 =    [] 2 =    [] 3 =    [x]      Sitting quietly after a lunch without alcohol  0 =  [x]  1 =    [] 2 =    [] 3 =    []    In a car, while stopped for a few minutes in traffic                                                                      0 =  [x]  1 =    [] 2 =    [] 3 =    []    Total Score = 12     If your total score is 10 or greater, you are experiencing excessive sleepiness and should consider seeking a medical follow-up. Take a copy of this screening test to your primary care physician on your next office visit. Interpretation:      0 -   7: It is unlikely that you are abnormally sleepy. 8 -   9: You have an average amount of daytime sleepiness. 10 - 15: You may be excessively sleepy depending on the situation. You may want to consider seeking medical attention. 16 - 24: You are excessively sleepy and should consider seeking medical attention.       Electronically signed by Henry Aguila RCP on 2/26/2023 at 12:34 PM

## 2023-02-26 NOTE — PROGRESS NOTES
The [place] Sleepiness Scale       The [place] Sleepiness Scale is widely used in the field of sleep medicine as a subjective measure of a patient's sleepiness. The test is a list of eight situations in which you rate your tendency to become sleepy on a scale of 0, no chance to 3, high chance of dozing. Your score is based on a scale of 0 to 24. The scale estimates whether you are experiencing excessive sleepiness that possibly requires medical attention. How Sleepy Are You? How sleepy are you to doze off or fall asleep in the following situations? You should rate your chances of dozing off, not just feeling tired. Even if you have not done some of these things recently try to determine how they would have affected you.  For each situation, decide whether or not you would have:     0 = No chance of dozing 1 = Slight chance of dozing   2 = Moderate chance of  dozing 3 = High change of dozing       Situation                                                                                     Chance of Dozing    Sitting and reading  0 =  []  1 =    [] 2 =    [] 3 =    [x]    Watching TV  0 =  []  1 =    [] 2 =    [] 3 =    [x]      Sitting inactive in public place (e.g., a theater or a meeting)  0 =  [x]  1 =    [] 2 =    [] 3 =    []    As a passenger in a car for an hour without a break          0  =  [x]  1 =    [] 2 =    [] 3 =    []    Lying down to rest in the afternoon when circumstances permit    0 =  []  1 =    [] 2 =    [] 3 =    [x]    Sitting and talking to someone  0 =  []  1 =    [] 2 =    [] 3 =    [x]      Sitting quietly after a lunch without alcohol  0 =  [x]  1 =    [] 2 =    [] 3 =    []    In a car, while stopped for a few minutes in traffic                                                                      0 =  [x]  1 =    [] 2 =    [] 3 =    []    Total Score = 12     If your total score is 10 or greater, you are experiencing excessive sleepiness and should consider seeking a medical follow-up. Take a copy of this screening test to your primary care physician on your next office visit. Interpretation:      0 -   7: It is unlikely that you are abnormally sleepy. 8 -   9: You have an average amount of daytime sleepiness. 10 - 15: You may be excessively sleepy depending on the situation. You may want to consider seeking medical attention. 16 - 24: You are excessively sleepy and should consider seeking medical attention.       Electronically signed by Jack Godfrey RCP on 2/26/2023 at 12:34 PM

## 2023-02-26 NOTE — PLAN OF CARE
Problem: ABCDS Injury Assessment  Goal: Absence of physical injury  Outcome: Progressing     Problem: Skin/Tissue Integrity  Goal: Absence of new skin breakdown  Description: 1. Monitor for areas of redness and/or skin breakdown  2. Assess vascular access sites hourly  3. Every 4-6 hours minimum:  Change oxygen saturation probe site  4. Every 4-6 hours:  If on nasal continuous positive airway pressure, respiratory therapy assess nares and determine need for appliance change or resting period.   Outcome: Progressing     Problem: Discharge Planning  Goal: Discharge to home or other facility with appropriate resources  Outcome: Progressing     Problem: Pain  Goal: Verbalizes/displays adequate comfort level or baseline comfort level  Outcome: Progressing  Flowsheets (Taken 2/25/2023 1025 by Jalen Marsh RN)  Verbalizes/displays adequate comfort level or baseline comfort level: Administer analgesics based on type and severity of pain and evaluate response     Problem: Safety - Adult  Goal: Free from fall injury  Outcome: Progressing     Problem: Chronic Conditions and Co-morbidities  Goal: Patient's chronic conditions and co-morbidity symptoms are monitored and maintained or improved  Outcome: Progressing     Problem: Cardiovascular - Adult  Goal: Maintains optimal cardiac output and hemodynamic stability  Outcome: Progressing  Flowsheets (Taken 2/25/2023 0800 by Jalen Marsh RN)  Maintains optimal cardiac output and hemodynamic stability:   Monitor blood pressure and heart rate   Monitor urine output and notify Licensed Independent Practitioner for values outside of normal range   Assess for signs of decreased cardiac output   Administer vasoactive medications as ordered  Goal: Absence of cardiac dysrhythmias or at baseline  Outcome: Progressing

## 2023-02-26 NOTE — PROGRESS NOTES
[facility]   Cardiology Progress Note     Date: 2/26/2023  Admit Date: 2/23/2023     Reason for consultation: Tachy-nancy syndrome     Chief Complaint:   Chief Complaint   Patient presents with    Dizziness     Via ems, from home, states she has felt dizzy since Sunday, reports hx of afib, states she is supposed to get a pacemaker placed soon. Denies cp, sob at this time. Pt wears 2L oxygen at baseline. History of Present Illness: History obtained from patient and medical record. Alyse Morales is a 80 y.o. female with a past medical history of PAF, HTN, HLD, DM, hypothyroidism and Complex atherosclerotic plaque in descending aorta noted on TRACEY-evaluated by for complaints of dizziness. Daughter is RN and noted patient has been hypertensive at home with HR to 40s and then 140s. He has had multiple admissions due to A-fib and RVR and been treated with propafenone and flecainide in the past.  Propafenone was stopped because of it possibly causing SIADH. Flecainide caused her to be bradycardic. (per consult note)    Interval Hx: Today, she is feeling better. No CP or SOB. On baseline O2. Some left chest procedure site tenderness. A fib RVR yesterday, concerted back to SR yesterday afternoon. Patient seen and examined. Clinical notes reviewed. Telemetry reviewed. No new complaints today. No major events overnight. Denies having chest pain, palpitations, shortness of breath, orthopnea/PND, cough, or dizziness at the time of this visit.       Past Medical History:  Past Medical History:   Diagnosis Date    Arthritis     Atrial fibrillation (HCC)     Bursitis     neck and down bilateral shoulders    COPD (chronic obstructive pulmonary disease) (HCC)     Diabetes mellitus type II, controlled (HCC)     Diastolic CHF (HCC)     GERD (gastroesophageal reflux disease)     HTN (hypertension)     Hyperlipidemia     Hypothyroid     Lumbago     Parkinson disease (HCC)     Restless legs syndrome (RLS)     SVT (supraventricular tachycardia) (HCC)         Past Surgical History:    has a past surgical history that includes Appendectomy; Colonoscopy; and Cholecystectomy, laparoscopic (2/24/2013). Social History:  Reviewed. reports that she quit smoking about 27 years ago. Her smoking use included cigarettes. She started smoking about 72 years ago. She has a 45.00 pack-year smoking history. She has never used smokeless tobacco. She reports that she does not drink alcohol and does not use drugs. Allergies:   Allergies   Allergen Reactions    Adhesive Tape Anaphylaxis    Cefaclor Nausea And Vomiting     Other reaction(s): Chest pain  Other reaction(s): Chest pain, Nausea / Vomiting    Cephalexin Other (See Comments)     Struggling to breath, almost passing out/ very low oxygen and pulse    Nsaids      Pt states she has hives and heart races   Other reaction(s): Tachycardia  Other reaction(s): Hives / Skin Rash, Tachycardia    Penicillin G      Other reaction(s): Hives / Skin Rash    Codeine      FEEL NUMB  Other reaction(s): feels numb    Diclofenac      Other reaction(s): Hives    Diclofenac Sodium Hives    Diclofenac Sodium Hives    Diclofenac Sodium     Hydrochlorothiazide      Sob  Other reaction(s): sob    Ibuprofen Other (See Comments)     Other reaction(s): Tachycardia  Other reaction(s): Tachycardia    Macrobid [Nitrofurantoin Macrocrystal]      nausea    Motrin [Ibuprofen Micronized] Other (See Comments)     Tachycardia      Nitrofurantoin      Other reaction(s): nausea H&P  Other reaction(s): nausea H&P    Pcn [Penicillins] Hives    Peach [Prunus Persica] Itching and Swelling     Cannot eat raw peaches, but can eat canned peaches    Prednisone      Causes elevated blood pressure   Other reaction(s): Elevated high BP  H&P  Cannot tolerate the dose pack but can individual doses    Sulfa Antibiotics Itching     Nausea, diarrhea  Other reaction(s): Nausea/Diarrhea H&P    Sulindac Other (See Comments) Stomach pain  Other reaction(s): Stomach Pain       Family History:  Reviewed. family history includes Asthma in her paternal uncle; Heart Attack in her father; Heart Disease in her father; High Blood Pressure in her mother; Tuberculosis in her brother. Denies family history of sudden cardiac death, arrhythmia, premature CAD    Home Meds:  Prior to Visit Medications    Medication Sig Taking? Authorizing Provider   benzonatate (TESSALON) 100 MG capsule Take 1 capsule by mouth 3 times daily as needed  Historical Provider, MD   oxyCODONE-acetaminophen (PERCOCET) 5-325 MG per tablet Take 1 tablet by mouth 2 times daily as needed. Historical Provider, MD   bethanechol (URECHOLINE) 10 MG tablet Take 1 tablet by mouth 2 times daily  Shay Perez MD   gabapentin (NEURONTIN) 100 MG capsule Take 1 capsule by mouth 3 times daily for 30 days. Shay Perez MD   naloxegol (MOVANTIK) 25 MG TABS tablet Take 1 tablet by mouth every morning (before breakfast)  Shay Perez MD   polyethylene glycol (GLYCOLAX) 17 g packet Take 17 g by mouth daily  Shay Perez MD   SITagliptin (JANUVIA) 100 MG tablet Take 0.5 tablets by mouth daily  Patient taking differently: Take 50 mg by mouth daily 1 tablet  Shay Perez MD   lidocaine 4 % external patch Place 1 patch onto the skin daily  Kylah Vanegas DO   dilTIAZem (CARDIZEM CD) 180 MG extended release capsule Take 1 capsule by mouth daily  Shyam Duenas MD   dilTIAZem (CARDIZEM) 30 MG tablet As needed for episodes of tachycardia  Patient taking differently: Take 30 mg by mouth as needed As needed for episodes of tachycardia  Shyam Duenas MD   LORazepam (ATIVAN) 1 MG tablet Take 1 mg by mouth at bedtime. Historical Provider, MD   LORazepam (ATIVAN) 0.5 MG tablet Take 0.5 mg by mouth in the morning and at bedtime.  In the morning and afternoon  Historical Provider, MD   ELIQUIS 5 MG TABS tablet TAKE 1 TABLET TWICE DAILY  Jeny Storey MD   budesonide-formoterol (SYMBICORT) 160-4.5 MCG/ACT AERO INHALE 2 PUFFS TWICE DAILY  Opal Castañeda MD   fluticasone (FLONASE) 50 MCG/ACT nasal spray USE 2 SPRAYS NASALLY EVERY DAY  Patient taking differently: 2 sprays by Nasal route as needed  Opal Castañeda MD   atorvastatin (LIPITOR) 80 MG tablet TAKE 1 TABLET EVERY NIGHT  Patient taking differently: Take 80 mg by mouth nightly  KATHY Higginbotham CNP   amLODIPine (NORVASC) 5 MG tablet Take 1 tablet by mouth in the morning. KATHY Higginbotham CNP   meclizine (ANTIVERT) 12.5 MG tablet TAKE 1 TABLET THREE TIMES DAILY AS NEEDED  Opal Castañeda MD   rOPINIRole (REQUIP) 3 MG tablet TAKE 1 TABLET THREE TIMES DAILY  Opal Castañeda MD   levothyroxine (SYNTHROID) 75 MCG tablet TAKE 1 TABLET BY MOUTH EVERY DAY  Opal Castañeda MD   cloNIDine (CATAPRES) 0.1 MG tablet TAKE 1 TABLET TWICE DAILY  Patient taking differently: in the morning, at noon, and at bedtime  Mariajose Cooper MD   conjugated estrogens (PREMARIN) 0.625 MG/GM vaginal cream Place 1 g vaginally three times a week  Opal Castañeda MD   levalbuterol (XOPENEX) 0.63 MG/3ML nebulization USE 1 VIAL PER NEBULIZER EVERY 6 HOURS. MAX OF 4 TIMES PER 24 HOURS  Patient taking differently: Take 1 ampule by nebulization every 6 hours as needed  Tatyana Avilez MD   tiotropium (SPIRIVA RESPIMAT) 2.5 MCG/ACT AERS inhaler Inhale 2 puffs into the lungs daily  Orly Whitfield PA-C   Blood Glucose Monitoring Suppl (TRUE METRIX METER) w/Device KIT To use to check sugars 4 times daily  Mariajose Cooper MD   Blood Glucose Calibration (TRUE METRIX LEVEL 2) Normal SOLN As needed  Opal Castañeda MD   blood glucose test strips (TRUE METRIX BLOOD GLUCOSE TEST) strip 1 each by In Vitro route daily As needed.   Opal Castañeda MD   TRUEplus Lancets 33G MISC 1 daily  Opal Castañeda MD   Lancets MISC 1 each by Does not apply route 2 times daily  Opal Castañeda MD   OXYGEN Inhale 2 L into the lungs  Historical Provider, MD   Respiratory Therapy Supplies (NEBULIZER/TUBING/MOUTHPIECE) KIT 1 kit by Does not apply route 4 times daily as needed (shortness of breath)  Dutch Lesch, MD   albuterol sulfate HFA (PROAIR HFA) 108 (90 Base) MCG/ACT inhaler Inhale 2 puffs into the lungs every 6 hours as needed for Wheezing  Leah Hayes MD        Scheduled Meds:   sodium chloride flush  5-40 mL IntraVENous 2 times per day    apixaban  5 mg Oral BID    cloNIDine  0.1 mg Oral TID    flecainide  50 mg Oral 2 times per day    dilTIAZem  240 mg Oral Daily    milk and molasses  240 mL Rectal Once    atorvastatin  80 mg Oral Nightly    bethanechol  10 mg Oral BID    mometasone-formoterol  2 puff Inhalation BID    gabapentin  100 mg Oral TID    levothyroxine  75 mcg Oral Daily    lidocaine  1 patch TransDERmal Daily    LORazepam  0.5 mg Oral Q8H    LORazepam  1 mg Oral Nightly    naloxegol  25 mg Oral QAM AC    polyethylene glycol  17 g Oral Daily    rOPINIRole  1 mg Oral TID    tiotropium  2 puff Inhalation Daily    sodium chloride flush  5-40 mL IntraVENous 2 times per day    albuterol  2.5 mg Nebulization BID    insulin lispro  0-8 Units SubCUTAneous TID WC    insulin lispro  0-4 Units SubCUTAneous Nightly     Continuous Infusions:   sodium chloride      sodium chloride      sodium chloride      dextrose       PRN Meds:sodium chloride flush, sodium chloride, oxyCODONE-acetaminophen, albuterol sulfate HFA, fluticasone, levalbuterol, sodium chloride flush, sodium chloride, ondansetron **OR** ondansetron, polyethylene glycol, acetaminophen **OR** acetaminophen, dextrose bolus **OR** dextrose bolus, glucagon (rDNA), dextrose, hydrALAZINE     Review of Systems:  Constitutional: Negative for fever, night sweats, chills,  Skin: Negative for rash, pruritus, bleeding, or bruising    HEENT: Negative for vision changes, ringing in the ears, dysphagia  Respiratory: Reviewed in HPI  Cardiovascular: Reviewed in HPI  Gastrointestinal: Negative for abdominal pain, N/V/D, constipation, or black/tarry stools  Genito-Urinary: Negative for dysuria, incontinence, or hematuria  Musculoskeletal: Negative for joint swelling, muscle pain, or injuries  Neurological/Psych: Negative for confusion, seizures, headaches, or TIA-like symptoms. No anxiety or depression. Physical Examination:  Vitals:    02/26/23 0852   BP:    Pulse: 81   Resp: 18   Temp:    SpO2: 95%      In: 550 [P.O.:550]  Out: 3200    Wt Readings from Last 3 Encounters:   02/26/23 156 lb 4.9 oz (70.9 kg)   02/04/23 157 lb 6.5 oz (71.4 kg)   01/28/23 156 lb 12.8 oz (71.1 kg)       Intake/Output Summary (Last 24 hours) at 2/26/2023 1203  Last data filed at 2/26/2023 0400  Gross per 24 hour   Intake 550 ml   Output 3200 ml   Net -2650 ml       Telemetry: Personally Reviewed  - Sinus rhythm   Constitutional: Cooperative and in no apparent distress, and appears well nourished  Skin: Warm and pink; no pallor, cyanosis, clubbing, or bruising. Left chest procedure dressing c/d/I. Very small amount of old drainage. HEENT: Symmetric and normocephalic. PERRL. Conjunctiva pink with clear sclera. Mucus membranes moist.   Cardiovascular: Regular rate and rhythm. S1/S2 present without murmurs, no rubs or gallops. Peripheral pulses 2+, capillary refill < 3 seconds. No elevation of JVP. No peripheral edema  Respiratory: Respirations symmetric and unlabored. Lungs clear to auscultation bilaterally, no wheezing, crackles, or rhonchi  Gastrointestinal: Abdomen soft and round. Bowel sounds active without tenderness. Musculoskeletal: Bilateral upper and lower extremity strength generalized weakness    Neurologic/Psych: Awake and orientated to person, place and time.  Calm affect, appropriate mood    Pertinent labs, diagnostic, device, and imaging results reviewed as a part of this visit    Labs:    BMP:   Recent Labs     02/24/23  0458 02/25/23  0509 02/26/23  0639    140 137   K 4.5 4.7 4.8    99 98* CO2 27 27 25   BUN 24* 28* 44*   CREATININE 1.0 1.3* 1.7*     Estimated Creatinine Clearance: 21 mL/min (A) (based on SCr of 1.7 mg/dL (H)). CBC:   Recent Labs     23  0458 23  0509   WBC 6.5 6.7   HGB 11.2* 11.4*   HCT 34.2* 35.7*   MCV 85.8 86.9    169     Thyroid:   Lab Results   Component Value Date/Time    TSH 2.50 2020 09:49 AM    Y4UBNAT 0.56 2022 03:20 PM     Lipids:   Lab Results   Component Value Date/Time    CHOL 148 2022 05:17 AM    HDL 52 2022 05:17 AM    HDL 32 2011 06:45 AM    TRIG 150 2022 05:17 AM     LFTS:   Lab Results   Component Value Date/Time    ALT 11 2023 06:39 AM    AST 16 2023 06:39 AM     2021 05:19 AM    ALKPHOS 155 2023 06:39 AM    PROT 6.5 2023 06:39 AM    PROT 6.9 2013 01:48 PM    AGRATIO 1.2 2023 06:39 AM    BILITOT 0.5 2023 06:39 AM     Cardiac Enzymes:   Lab Results   Component Value Date/Time    CKTOTAL 80 2021 04:29 PM    TROPONINI 0.02 2023 09:40 AM    TROPONINI <0.01 2023 09:28 AM    TROPONINI <0.01 2022 10:07 PM     Coags:   Lab Results   Component Value Date/Time    PROTIME 18.0 2023 09:40 AM    INR 1.49 2023 09:40 AM       EC23  Atrial fibrillation with rapid ventricular response  Nonspecific ST and T wave abnormality , probably digitalis effect  Abnormal ECG  When compared with ECG of 2023 09:06,  Atrial fibrillation has replaced Sinus rhythm  Vent. rate has increased BY 61 BPM  Nonspecific T wave abnormality now evident in Inferior leads  Nonspecific T wave abnormality, worse in Lateral leads    ECHO:  22   Summary   Left ventricular systolic function is normal with ejection fraction   estimated at 55-60 %. No regional wall motion abnormalities are noted. There is mild concentric left ventricular hypertrophy. There is reversal of E/A inflow velocities across the mitral valve.    Mild-to-moderate aortic regurgitation is present. Mitral annular calcification is present. Mild mitral & tricuspid regurgitation. CXR: 2/25/23  1. Status post uncomplicated dual lead pacemaker placement.     Problem List:   Patient Active Problem List    Diagnosis Date Noted    ANTHONY (obstructive sleep apnea)     Tachycardia-bradycardia syndrome ([address] 02/24/2023    Bradycardia 02/23/2023    Acute on chronic diastolic CHF (congestive heart failure) ([address] 02/23/2023    Lumbar foraminal stenosis 02/03/2023    Constipation 02/03/2023    Hyponatremia 02/03/2023    Urinary retention 02/03/2023    Elevated serum creatinine 02/03/2023    Intractable back pain 01/31/2023    Pneumonia due to infectious organism 01/26/2023    Acute anemia 10/03/2022    Acute on chronic diastolic (congestive) heart failure ([address] 10/01/2022    Acute dysfunction of Eustachian tube, bilateral 08/22/2022    Bilateral hearing loss 08/22/2022    Impacted cerumen of right ear 08/22/2022    Dizziness 08/22/2022    Hypertensive urgency, malignant 08/21/2022    Chronic fatigue 07/14/2022    Chronic renal disease, stage III [facility]) [356822] 06/13/2022    Chronic obstructive pulmonary disease ([address] 04/19/2022    Ptosis of eyelid, bilateral 10/26/2021    Centrilobular emphysema ([address] 09/08/2021    Pulmonary nodule 09/08/2021    Primary hypertension     Ataxia 11/12/2020    SOB (shortness of breath) 02/25/2020    PAF (paroxysmal atrial fibrillation) ([address] 02/19/2020    Hypothyroid 02/19/2020    Chronic respiratory failure with hypoxia ([address] 02/19/2020    Coronary artery disease involving native coronary artery of native heart with angina pectoris ([address] 02/18/2020    Bronchiectasis without complication ([address] 64/47/7240    Interstitial lung disease ([address] 09/20/2019    Vertigo 07/31/2019    COPD, moderate ([address] 03/23/2019    Allergic rhinitis 01/09/2019    Paroxysmal atrial fibrillation ([address] 12/12/2018    DM2 (diabetes mellitus, type 2) ([facility] 01/18/2017    Restless legs syndrome 01/18/2017    Osteoarthritis 06/18/2015    Atrial fibrillation with RVR ([address] 12/11/2013    Overactive bladder 12/11/2013    IBS (irritable bowel syndrome) 05/02/2013    Hypertensive urgency     Hyperlipidemia         Assessment and Plan:     Tachy-Lewis syndrome   ~ s/p dual chamber PPM 2/24/23  ~ device interrogation yesterday with normal function     Paroxysmal atrial fibrillation   ~ propafenone d/c'd d/t possibly causing SIADH.   ~ when into a fib RVR yesterday. Flecainide restarted in addition to dose of IV metoprolol. Converted back to SR yesterday afternoon. Currently SR in 62s. ~ continue diltiazem and AC with eliquis     Acute diastolic CHF   ~ last echo 8/2022 with EF 55-60%  ~ diuresed with IV lasix with improvement. proBNP [id number]. Now stopped d/t elevated creatinine. Jardiance also on hold. CAROLANN   ~ creatinine 1.7 today (1.1 on admit). Recheck in AM   ~ lasix and jardiacne on hold. Receiving gentle hydration. HTN- controlled   HLD- on statin   DM   COPD/ILD- chronically wears 2L of O2  PAD    Multiple medical conditions with risk of decompensation. All pertinent information and plan of care discussed with the physician. All questions and concerns were addressed to the patient. Alternatives to my treatment were discussed. I have discussed the above stated plan with patient and the nurse. The patient verbalized understanding and agreed with the plan. Thank you for allowing to us to participate in the care of Tiana Salgado. Total visit time > 35 minutes; > 50% spend counseling / coordinating care. I reviewed interval history, physical exam, review of data including labs, imaging, development and implementation of treatment plan and coordination of complex care. Counseled on risk factor modifications. Gold Medrano APRN-CNP  [address]   Office: [phone]    NOTE:  This report was transcribed using voice recognition software.   Every effort was made to ensure accuracy; however, inadvertent computerized transcription errors may be present.

## 2023-02-27 ENCOUNTER — NURSE ONLY (OUTPATIENT)
Dept: CARDIOLOGY CLINIC | Age: 88
End: 2023-02-27
Payer: MEDICARE

## 2023-02-27 DIAGNOSIS — I48.0 PAROXYSMAL ATRIAL FIBRILLATION (HCC): Primary | ICD-10-CM

## 2023-02-27 DIAGNOSIS — Z95.0 PACEMAKER: ICD-10-CM

## 2023-02-27 DIAGNOSIS — I48.91 ATRIAL FIBRILLATION WITH RVR (HCC): ICD-10-CM

## 2023-02-27 DIAGNOSIS — I49.5 TACHYCARDIA-BRADYCARDIA SYNDROME (HCC): ICD-10-CM

## 2023-02-27 DIAGNOSIS — Z95.0 PACEMAKER: Primary | ICD-10-CM

## 2023-02-27 DIAGNOSIS — I48.0 PAF (PAROXYSMAL ATRIAL FIBRILLATION) (HCC): ICD-10-CM

## 2023-02-27 DIAGNOSIS — R00.1 BRADYCARDIA: ICD-10-CM

## 2023-02-27 LAB
ANION GAP SERPL CALCULATED.3IONS-SCNC: 9 MMOL/L (ref 3–16)
BUN BLDV-MCNC: 44 MG/DL (ref 7–20)
CALCIUM SERPL-MCNC: 9 MG/DL (ref 8.3–10.6)
CHLORIDE BLD-SCNC: 100 MMOL/L (ref 99–110)
CO2: 26 MMOL/L (ref 21–32)
CREAT SERPL-MCNC: 1.4 MG/DL (ref 0.6–1.2)
GFR SERPL CREATININE-BSD FRML MDRD: 36 ML/MIN/{1.73_M2}
GLUCOSE BLD-MCNC: 121 MG/DL (ref 70–99)
GLUCOSE BLD-MCNC: 128 MG/DL (ref 70–99)
GLUCOSE BLD-MCNC: 136 MG/DL (ref 70–99)
GLUCOSE BLD-MCNC: 160 MG/DL (ref 70–99)
GLUCOSE BLD-MCNC: 202 MG/DL (ref 70–99)
HCT VFR BLD CALC: 29.7 % (ref 36–48)
HEMOGLOBIN: 9.9 G/DL (ref 12–16)
MCH RBC QN AUTO: 28.4 PG (ref 26–34)
MCHC RBC AUTO-ENTMCNC: 33.3 G/DL (ref 31–36)
MCV RBC AUTO: 85.2 FL (ref 80–100)
PDW BLD-RTO: 16.4 % (ref 12.4–15.4)
PERFORMED ON: ABNORMAL
PLATELET # BLD: 139 K/UL (ref 135–450)
PMV BLD AUTO: 8.1 FL (ref 5–10.5)
POTASSIUM SERPL-SCNC: 4.9 MMOL/L (ref 3.5–5.1)
RBC # BLD: 3.48 M/UL (ref 4–5.2)
SODIUM BLD-SCNC: 135 MMOL/L (ref 136–145)
WBC # BLD: 8.9 K/UL (ref 4–11)

## 2023-02-27 PROCEDURE — 80048 BASIC METABOLIC PNL TOTAL CA: CPT

## 2023-02-27 PROCEDURE — 97110 THERAPEUTIC EXERCISES: CPT

## 2023-02-27 PROCEDURE — 85027 COMPLETE CBC AUTOMATED: CPT

## 2023-02-27 PROCEDURE — 6370000000 HC RX 637 (ALT 250 FOR IP): Performed by: INTERNAL MEDICINE

## 2023-02-27 PROCEDURE — 2580000003 HC RX 258: Performed by: INTERNAL MEDICINE

## 2023-02-27 PROCEDURE — 1200000000 HC SEMI PRIVATE

## 2023-02-27 PROCEDURE — 94761 N-INVAS EAR/PLS OXIMETRY MLT: CPT

## 2023-02-27 PROCEDURE — 2580000003 HC RX 258: Performed by: NURSE PRACTITIONER

## 2023-02-27 PROCEDURE — 36415 COLL VENOUS BLD VENIPUNCTURE: CPT

## 2023-02-27 PROCEDURE — 99232 SBSQ HOSP IP/OBS MODERATE 35: CPT | Performed by: INTERNAL MEDICINE

## 2023-02-27 PROCEDURE — 2700000000 HC OXYGEN THERAPY PER DAY

## 2023-02-27 PROCEDURE — 94640 AIRWAY INHALATION TREATMENT: CPT

## 2023-02-27 PROCEDURE — 97530 THERAPEUTIC ACTIVITIES: CPT

## 2023-02-27 RX ADMIN — DILTIAZEM HYDROCHLORIDE 240 MG: 240 CAPSULE, COATED, EXTENDED RELEASE ORAL at 08:28

## 2023-02-27 RX ADMIN — ATORVASTATIN CALCIUM 80 MG: 80 TABLET, FILM COATED ORAL at 21:00

## 2023-02-27 RX ADMIN — CLONIDINE HYDROCHLORIDE 0.1 MG: 0.1 TABLET ORAL at 08:28

## 2023-02-27 RX ADMIN — FLECAINIDE ACETATE 50 MG: 50 TABLET ORAL at 08:29

## 2023-02-27 RX ADMIN — LORAZEPAM 0.5 MG: 0.5 TABLET ORAL at 15:31

## 2023-02-27 RX ADMIN — Medication 2 PUFF: at 09:00

## 2023-02-27 RX ADMIN — GABAPENTIN 100 MG: 100 CAPSULE ORAL at 08:29

## 2023-02-27 RX ADMIN — LEVOTHYROXINE SODIUM 75 MCG: 0.07 TABLET ORAL at 08:29

## 2023-02-27 RX ADMIN — LORAZEPAM 0.5 MG: 0.5 TABLET ORAL at 08:28

## 2023-02-27 RX ADMIN — TIOTROPIUM BROMIDE INHALATION SPRAY 2 PUFF: 3.12 SPRAY, METERED RESPIRATORY (INHALATION) at 09:01

## 2023-02-27 RX ADMIN — LORAZEPAM 0.5 MG: 0.5 TABLET ORAL at 23:55

## 2023-02-27 RX ADMIN — GABAPENTIN 100 MG: 100 CAPSULE ORAL at 15:31

## 2023-02-27 RX ADMIN — ROPINIROLE HYDROCHLORIDE 1 MG: 1 TABLET, FILM COATED ORAL at 21:00

## 2023-02-27 RX ADMIN — BETHANECHOL CHLORIDE 10 MG: 10 TABLET ORAL at 21:01

## 2023-02-27 RX ADMIN — GABAPENTIN 100 MG: 100 CAPSULE ORAL at 21:00

## 2023-02-27 RX ADMIN — APIXABAN 5 MG: 5 TABLET, FILM COATED ORAL at 21:00

## 2023-02-27 RX ADMIN — Medication 10 ML: at 21:01

## 2023-02-27 RX ADMIN — LORAZEPAM 1 MG: 1 TABLET ORAL at 21:01

## 2023-02-27 RX ADMIN — CLONIDINE HYDROCHLORIDE 0.1 MG: 0.1 TABLET ORAL at 21:01

## 2023-02-27 RX ADMIN — APIXABAN 5 MG: 5 TABLET, FILM COATED ORAL at 08:29

## 2023-02-27 RX ADMIN — ROPINIROLE HYDROCHLORIDE 1 MG: 1 TABLET, FILM COATED ORAL at 08:29

## 2023-02-27 RX ADMIN — ROPINIROLE HYDROCHLORIDE 1 MG: 1 TABLET, FILM COATED ORAL at 15:31

## 2023-02-27 RX ADMIN — FLECAINIDE ACETATE 50 MG: 50 TABLET ORAL at 21:00

## 2023-02-27 RX ADMIN — CLONIDINE HYDROCHLORIDE 0.1 MG: 0.1 TABLET ORAL at 15:31

## 2023-02-27 RX ADMIN — ACETAMINOPHEN 650 MG: 325 TABLET ORAL at 21:00

## 2023-02-27 RX ADMIN — BETHANECHOL CHLORIDE 10 MG: 10 TABLET ORAL at 08:28

## 2023-02-27 RX ADMIN — Medication 2 PUFF: at 19:40

## 2023-02-27 RX ADMIN — SODIUM CHLORIDE: 9 INJECTION, SOLUTION INTRAVENOUS at 05:11

## 2023-02-27 ASSESSMENT — PAIN SCALES - WONG BAKER
WONGBAKER_NUMERICALRESPONSE: 0

## 2023-02-27 ASSESSMENT — PAIN SCALES - GENERAL
PAINLEVEL_OUTOF10: 0

## 2023-02-27 NOTE — PLAN OF CARE
Problem: Discharge Planning  Goal: Discharge to home or other facility with appropriate resources  2/27/2023 1204 by Ptaricia Liz RN  Outcome: Progressing  2/27/2023 1158 by Patricia Liz RN  Outcome: Progressing  2/26/2023 2315 by Duane Browner, RN  Outcome: Progressing  Flowsheets (Taken 2/26/2023 2000)  Discharge to home or other facility with appropriate resources:   Identify barriers to discharge with patient and caregiver   Identify discharge learning needs (meds, wound care, etc)     Problem: Chronic Conditions and Co-morbidities  Goal: Patient's chronic conditions and co-morbidity symptoms are monitored and maintained or improved  2/27/2023 1204 by Patricia Liz RN  Outcome: Progressing  2/27/2023 1158 by Particia Liz RN  Outcome: Progressing     Problem: Cardiovascular - Adult  Goal: Maintains optimal cardiac output and hemodynamic stability  2/27/2023 1204 by Patricia Liz RN  Outcome: Progressing  2/27/2023 1158 by Patricia Liz RN  Outcome: Progressing  2/26/2023 2315 by Duane Browner, RN  Outcome: Progressing  Flowsheets (Taken 2/26/2023 2000)  Maintains optimal cardiac output and hemodynamic stability: Monitor blood pressure and heart rate  Goal: Absence of cardiac dysrhythmias or at baseline  2/27/2023 1204 by Patricia Liz RN  Outcome: Progressing  2/27/2023 1158 by Patricia Liz RN  Outcome: Progressing  2/26/2023 2315 by Duane Browner, RN  Outcome: Progressing

## 2023-02-27 NOTE — CARE COORDINATION
Discharge Planning  Therapy recommending SNF placement , referral called to [facility] as documented  per patient preference . Elena Acosta in Admissions is reviewing the case , will call back with Updates.

## 2023-02-27 NOTE — CARE COORDINATION
Discharge Planning;  Patient has been accepted for SNF placement at [facility]. Precert has been initiated.     [address] pre-cert request submitted via [facility] Access Portal.  Requested Facility:  [facility]  Anticipated Admit Date to SNF:  2/28/2023  [facility] #:  [id number]

## 2023-02-27 NOTE — PROGRESS NOTES
[facility] HOSPITALISTS PROGRESS NOTE    2/27/2023 12:36 PM        Name: Jina Parra . Admitted: 2/23/2023  Primary Care Provider: Pema Morgan MD (Tel: [phone])      Brief History: 79 yo female with hx COPD/ILD, chronic hypoxic respiratory failure (on 2 liters O2), PAF (on Eliquis), CAD, CKD stage 3, DM2, tachy nancy syndrome. Presented to hospital with c/o shortness of breath and dizziness. Was in atrial fib with RVR on presentation. 2/24/2023: Insertion of dual chamber pacemaker under fluoroscopic guidance     Subjective:  Up in chair says she is feeling better. Notes operative discomfort but adequately controlled. Denies shortness of breath, palpitations, lightheadedness. Renal numbers improving with IV fluids.      Reviewed interval ancillary notes    Current Medications  sodium chloride flush 0.9 % injection 5-40 mL, 2 times per day  sodium chloride flush 0.9 % injection 5-40 mL, PRN  0.9 % sodium chloride infusion, PRN  0.9 % sodium chloride infusion, Continuous  apixaban (ELIQUIS) tablet 5 mg, BID  cloNIDine (CATAPRES) tablet 0.1 mg, TID  oxyCODONE-acetaminophen (PERCOCET) 5-325 MG per tablet 1 tablet, Q4H PRN  flecainide (TAMBOCOR) tablet 50 mg, 2 times per day  dilTIAZem (CARDIZEM CD) extended release capsule 240 mg, Daily  milk and molasses enema 240 mL, Once  albuterol sulfate HFA (PROVENTIL;VENTOLIN;PROAIR) 108 (90 Base) MCG/ACT inhaler 2 puff, Q6H PRN  atorvastatin (LIPITOR) tablet 80 mg, Nightly  bethanechol (URECHOLINE) tablet 10 mg, BID  mometasone-formoterol (DULERA) 200-5 MCG/ACT inhaler 2 puff, BID  fluticasone (FLONASE) 50 MCG/ACT nasal spray 2 spray, PRN  gabapentin (NEURONTIN) capsule 100 mg, TID  levalbuterol (XOPENEX) nebulization 0.63 mg, Q6H PRN  levothyroxine (SYNTHROID) tablet 75 mcg, Daily  lidocaine 4 % external patch 1 patch, Daily  LORazepam (ATIVAN) tablet 0.5 mg, Q8H  LORazepam (ATIVAN) tablet 1 mg, Nightly  naloxegol (MOVANTIK) tablet 25 mg, QAM AC  polyethylene glycol (GLYCOLAX) packet 17 g, Daily  rOPINIRole (REQUIP) tablet 1 mg, TID  tiotropium (SPIRIVA RESPIMAT) 2.5 MCG/ACT inhaler 2 puff, Daily  sodium chloride flush 0.9 % injection 5-40 mL, 2 times per day  sodium chloride flush 0.9 % injection 5-40 mL, PRN  0.9 % sodium chloride infusion, PRN  ondansetron (ZOFRAN-ODT) disintegrating tablet 4 mg, Q8H PRN   Or  ondansetron (ZOFRAN) injection 4 mg, Q6H PRN  polyethylene glycol (GLYCOLAX) packet 17 g, Daily PRN  acetaminophen (TYLENOL) tablet 650 mg, Q6H PRN   Or  acetaminophen (TYLENOL) suppository 650 mg, Q6H PRN  dextrose bolus 10% 125 mL, PRN   Or  dextrose bolus 10% 250 mL, PRN  glucagon (rDNA) injection 1 mg, PRN  dextrose 10 % infusion, Continuous PRN  albuterol (PROVENTIL) nebulizer solution 2.5 mg, BID  hydrALAZINE (APRESOLINE) injection 10 mg, Q4H PRN  insulin lispro (HUMALOG) injection vial 0-8 Units, TID WC  insulin lispro (HUMALOG) injection vial 0-4 Units, Nightly      Objective:  BP (!) 147/72   Pulse 63   Temp 97.8 °F (36.6 °C) (Oral)   Resp 16   Ht 5' 2\" (1.575 m)   Wt 155 lb 9.6 oz (70.6 kg)   SpO2 94%   BMI 28.46 kg/m²     Intake/Output Summary (Last 24 hours) at 2/27/2023 1236  Last data filed at 2/27/2023 0940  Gross per 24 hour   Intake 610 ml   Output 2351 ml   Net -1741 ml      Wt Readings from Last 3 Encounters:   02/27/23 155 lb 9.6 oz (70.6 kg)   02/04/23 157 lb 6.5 oz (71.4 kg)   01/28/23 156 lb 12.8 oz (71.1 kg)     General:  Awake, alert, oriented in NAD  Skin:  Warm and dry.  No unusual bruising or rash  Neck:  Supple.  No JVD appreciated  Chest:  Normal effort.  Clear to auscultation, dressing intact to pacer site, left arm in swath  Cardiovascular:  RRR, normal S1/S2, no murmur/gallop/rub  Abdomen:  Soft, nontender, +bowel sounds  Extremities:  No edema  Neurological: No focal deficits  Psychological: Normal mood and  affect      Labs and Tests:  CBC:   Recent Labs     02/25/23  0509 02/27/23  0454   WBC 6.7 8.9   HGB 11.4* 9.9*    139     BMP:    Recent Labs     02/25/23  0509 02/26/23  0639 02/27/23  0454    137 135*   K 4.7 4.8 4.9   CL 99 98* 100   CO2 27 25 26   BUN 28* 44* 44*   CREATININE 1.3* 1.7* 1.4*   GLUCOSE 140* 129* 128*     Hepatic:   Recent Labs     02/26/23  0639   AST 16   ALT 11   BILITOT 0.5   ALKPHOS 155*       CXR 2/23/2023:  Mild bibasilar airspace disease and small bilateral pleural effusions. Consider atelectasis versus pneumonia in the appropriate clinical settings     KUB 2/23/2023:  1. Nonspecific, nonobstructive bowel gas pattern. 2. Mild-moderate fecal loading of the ascending/transverse colon as described   above. CXR 2/25/2023:  1. Status post uncomplicated dual lead pacemaker placement. Problem List  Principal Problem:    Acute on chronic diastolic (congestive) heart failure (HCC)  Active Problems:    ANTHONY (obstructive sleep apnea)    Chronic obstructive pulmonary disease ([facility])    Chronic renal disease, stage III ([facility]) [452601]    Constipation    Bradycardia    Acute on chronic diastolic CHF (congestive heart failure) ([facility])    Tachycardia-bradycardia syndrome ([facility])    DM2 (diabetes mellitus, type 2) ([facility])    Paroxysmal atrial fibrillation ([facility])    COPD, moderate ([facility])    Interstitial lung disease ([address]    Bronchiectasis without complication ([address]    Coronary artery disease involving native coronary artery of native heart with angina pectoris ([address]    Primary hypertension  Resolved Problems:    * No resolved hospital problems. *       Assessment & Plan:   Acute dCHF. Presented with dyspnea. Echo (8/2022) with EF 55-60%. CXR with bilateral pleural effusions, proBNP 4454. Likely secondary to atrial fib with RVR. Started on IV Lasix with good response, transitioned to po 2/25. Lasix and Jardiance stopped 2/26 due to CAROLANN, creatinine 1.0->1.7. Receiving gentle hydration.      Tachy nancy syndrome. Hx multiple admissions with atrial fib with RVR. Has been treated with antiarrhythmics in past but poorly tolerated due to bradycardia. Now s/p PPM implant (2/24). Post procedure CXR with no complicating features. Started on Flecainide 2/25 for recurrent atrial fib with RVR. Remains in atrial paced rhythm. Continue diltiazem and apixaban. EP has signed off. Elevated troponin 0.02. Likely supply demand mismatch in setting of CHF and RVR. No acute changes on EKG. Patient denies chest pain. Evaluated by cardiology, no further w/u planned at present. Continue medical management with high intensity statin, no asa secondary DOAC. CAROLANN. Creatinine 1.0->1.3->1.7. Likely multifactorial: over diuresis, hypoperfusion secondary RVR, hypotension. Hold Lasix and Jardiance for now. Improved with IV fluids, creatinine 1.4 today. Continue NS at 50 ml/hr today. BMP in am. Would hold Jardiance on DC given CAROLANN, can resume if renal function stable on hospital follow up. Constipation. KUB with constipation. Likely opioid induced, she is on Percocet at home for chronic pain. Given milk and molasses enema 2/23 with good results. Continue Miralax and Movantik. Resolved. DM2. A1c 6.1 this admission. Take Januvia at home. Being covered by medium dose correction. BG values reviewed, 136-160. Continue current management. COPD / chronic hypoxic respiratory failure. Stable, not in exacerbation, O2 needs stable at 2 liters which is baseline. Continue Spiriva, Dulera and Albuterol. Normocytic anemia. Hgb 9.5 on presentation, has improved with diuresis. Iron deficiency on recent labs (2/3/2023) and she received IV venofer. Continue to monitor. Disposition: Patient lives with daughter. PT/OT evaluated recommend SNF on DC. Patient agreeable. Case management assisting with placement. Likely ready for DC tomorrow if renal function continues to improve. Diet: ADULT DIET;  Regular  Code:Full Code  DVT PPX: apixaban      Flavio Kilgore, KATHY - CNP   2/27/2023 12:36 PM

## 2023-02-27 NOTE — PROGRESS NOTES
[facility] Post Op Implant/PDE Device Check  Rep Checked Device   Device shows normal function  No parameters have been changed during the current session.

## 2023-02-27 NOTE — PLAN OF CARE
Problem: ABCDS Injury Assessment  Goal: Absence of physical injury  Outcome: Progressing     Problem: Discharge Planning  Goal: Discharge to home or other facility with appropriate resources  2/27/2023 1158 by Yoandy Evans RN  Outcome: Progressing  2/26/2023 2315 by Maria Montes RN  Outcome: Progressing  Flowsheets (Taken 2/26/2023 2000)  Discharge to home or other facility with appropriate resources:   Identify barriers to discharge with patient and caregiver   Identify discharge learning needs (meds, wound care, etc)     Problem: Pain  Goal: Verbalizes/displays adequate comfort level or baseline comfort level  2/27/2023 1158 by Yoandy Evans RN  Outcome: Progressing  2/26/2023 2315 by Maria Montes RN  Outcome: Progressing     Problem: Safety - Adult  Goal: Free from fall injury  2/27/2023 1158 by Yoandy Evans RN  Outcome: Progressing  2/26/2023 2315 by Maria Montes RN  Outcome: Progressing     Problem: Chronic Conditions and Co-morbidities  Goal: Patient's chronic conditions and co-morbidity symptoms are monitored and maintained or improved  Outcome: Progressing     Problem: Cardiovascular - Adult  Goal: Maintains optimal cardiac output and hemodynamic stability  2/27/2023 1158 by Yoandy Evans RN  Outcome: Progressing  2/26/2023 2315 by Maria Montes RN  Outcome: Progressing  Flowsheets (Taken 2/26/2023 2000)  Maintains optimal cardiac output and hemodynamic stability: Monitor blood pressure and heart rate  Goal: Absence of cardiac dysrhythmias or at baseline  2/27/2023 1158 by Yoandy Evans RN  Outcome: Progressing  2/26/2023 2315 by Maria Montes RN  Outcome: Progressing     Problem: Cardiovascular - Adult  Goal: Absence of cardiac dysrhythmias or at baseline  2/27/2023 1158 by Yoandy Evans RN  Outcome: Progressing  2/26/2023 2315 by Maria Montes RN  Outcome: Progressing

## 2023-02-27 NOTE — PROGRESS NOTES
[facility] Department   Phone: [phone]    Physical Therapy    [] Initial Evaluation            [x] Daily Treatment Note         [] Discharge Summary      Patient: Michael Pickens   : 1935   MRN: [id number]   Date of Service:  2023  Admitting Diagnosis: Acute on chronic diastolic (congestive) heart failure ([address]  Current Admission Summary: 79 yo female with hx COPD/ILD, chronic hypoxic respiratory failure (on 2 liters O2), PAF (on Eliquis), CAD, CKD stage 3, DM2, tachy nancy syndrome. Presented to hospital with c/o shortness of breath and dizziness. Was in atrial fib with RVR on presentation. 2023: Insertion of dual chamber pacemaker under fluoroscopic guidance   Past Medical History:  has a past medical history of Arthritis, Atrial fibrillation ([address] Bursitis, COPD (chronic obstructive pulmonary disease) ([address] Diabetes mellitus type II, controlled ([address] Diastolic CHF ([address] GERD (gastroesophageal reflux disease), HTN (hypertension), Hyperlipidemia, Hypothyroid, Lumbago, Parkinson disease ([address] Restless legs syndrome (RLS), and SVT (supraventricular tachycardia) ([address] Past Surgical History:  has a past surgical history that includes Appendectomy; Colonoscopy; and Cholecystectomy, laparoscopic (2013). Discharge Recommendations: Michael Pickens scored a 17/ on the AM-PAC short mobility form. Current research shows that an AM-PAC score of 17 or less is typically not associated with a discharge to the patient's home setting. Based on the patient's AM-PAC score and their current functional mobility deficits, it is recommended that the patient have 3-5 sessions per week of Physical Therapy at d/c to increase the patient's independence. Please see assessment section for further patient specific details. If patient discharges prior to next session this note will serve as a discharge summary.   Please see below for the latest assessment towards goals. DME Required For Discharge: DME to be determined at next level of care  Precautions/Restrictions: high fall risk, up as tolerated  Weight Bearing Restrictions: no restrictions  [] Right Upper Extremity  [] Left Upper Extremity [] Right Lower Extremity  [] Left Lower Extremity     Required Braces/Orthotics: sling and swathe for 24 hrs   [] Right  [x] Left  Positional Restrictions: Pacemaker - No shoulder flexion > 90* (L)    Pre-Admission Information   Lives With: daughter    Type of Home: house  Home Layout: two level, 6 stairs to 2nd level with R ascending HR  Home Access:  3 step to enter without rails   Bathroom Layout: walk in shower  Bathroom Equipment: grab bars in shower  Toilet Height: elevated height  Home Equipment: rollator - 4 wheeled walker, 2L O2 chronically  Transfer Assistance: modified independent with use of rollator walker  Ambulation Assistance:modified independent with use of rollator walker  ADL Assistance:  Supervision for bathing and dressing  IADL Assistance: requires assistance with all homemaking tasks  Active :        [] Yes  [] No  Hand Dominance: [] Left  [] Right  Current Employment: retired. Occupation: [employer]  Recent Falls: Pt denies falls in the past 3 months. *PLOF information provided by pts daughter. Above information is based on function in January 2023. Per pts daughter, the pt has had a decline in physical function over the past 3 months and has been requiring physical assistance for performance of ADLs and functional mobility tasks with use of a rollator walker. Subjective  General: Patient seated in recliner chair upon arrival. Patient is agreeable to PT. Reports feeling cold and tired this AM.  Pain: 0/10- pt denies pain  Pain Interventions: not applicable     Functional Mobility  Bed Mobility  Bed mobility not completed on this date.   Comments: Patient seated in chair on arrival and at the end session  Transfers  Sit to stand transfer: contact guard assistance  Stand to sit transfer: contact guard assistance  Comments: Patient completed STS from recliner chair with no AD and increased time  Ambulation  Surface:level surface  Assistive Device: hand-held assist  Assistance: contact guard assistance  Distance: 10'  Gait Mechanics: Shortened step lengths, shuffling, decreased speed, no overt losses of balance  Comments:  Patient ambulated short distance within room, decreased denis, no LOB  Stair Mobility  Stair mobility not completed on this date. Comments:  Wheelchair Mobility:  No w/c mobility completed on this date.   Comments:  Balance  Static Sitting Balance: fair (+): maintains balance at SBA/supervision without use of UE support  Dynamic Sitting Balance: fair (+): maintains balance at SBA/supervision without use of UE support  Static Standing Balance: fair (-): maintains balance at CGA with use of UE support  Dynamic Standing Balance: fair (-): maintains balance at CGA with use of UE support  Comments: Patient required HHA in standing to maintain balance secondary to generalized weakness    Other Therapeutic Interventions  Donned swathe as requested by patient  Prepared patient's lunch tray due to inability to cut  Seated Exercises: B ankle pumps, LAQ, and marching 20x each    Functional Outcomes  AM-PAC Inpatient Mobility Raw Score : 17              Cognition  Overall Cognitive Status: Impaired  Arousal/Alterness: delayed responses to stimuli  Attention Span: attends with cues to redirect, difficulty dividing attention  Memory: decreased short term memory  Insights: decreased awareness of deficits  Initiation: requires cues for some  Sequencing: requires cues for some  Orientation:    alert and oriented x 4  Command Following:   impaired- pt follows one step commands with increased time and repetition of instruction    Education  Barriers To Learning: cognition and hearing  Patient Education: patient educated on goals, PT role and benefits, plan of care, general safety, functional mobility training, family education, discharge recommendations  Learning Assessment:  patient verbalizes understanding, would benefit from continued reinforcement    Assessment  Activity Tolerance: Fair; patient limited by fatigue  Impairments Requiring Therapeutic Intervention: decreased functional mobility, decreased strength, decreased safety awareness, decreased cognition, decreased endurance, decreased balance  Prognosis: good  Clinical Assessment: Patient continues to require CGA with all transfers and ambulation this date. Patient utilizes HHA with ambulation due to L UE precautions.  Patient will continue to benefit from skilled PT in order to return to [facility] with all functional mobility prior to d/c from hospital.    Safety Interventions: patient left in chair, chair alarm in place, call light within reach, gait belt, and nurse notified    Plan  Frequency: 3-5 x/per week  Current Treatment Recommendations: strengthening, balance training, functional mobility training, transfer training, gait training, endurance training, patient/caregiver education, home exercise program, and safety education    Goals  Patient Goals: Pt did not state   Short Term Goals:  Time Frame: By discharge  Patient will complete bed mobility at contact guard assistance   Patient will complete transfers at stand by assistance   Patient will ambulate 50 ft with use of LRAD at contact guard assistance  NO GOALS MET THIS DATE    Therapy Session Time      Individual Group Co-treatment   Time In 1139       Time Out 1208       Minutes 29         Timed Code Treatment Minutes: 29 Minutes  Total Treatment Minutes: 29 Minutes        Electronically Signed By: Mack Koyanagi, [id number] [facility] PT, DPT, [id number]

## 2023-02-27 NOTE — PROGRESS NOTES
[address] [place] [address]   Electrophysiology   Date: 2/27/2023  Reason for Follow up: Atrial fibrillation      Chief Complaint   Patient presents with    Dizziness     Via ems, from home, states she has felt dizzy since Sunday, reports hx of afib, states she is supposed to get a pacemaker placed soon. Denies cp, sob at this time. Pt wears 2L oxygen at baseline. CC: Dizziness, SOB   HPI: Ivy Paez is a 80 y.o. female presented with shortness of breath, dizziness and atrial fibrillation. History of tachy-nancy syndrome. S/p Dual chamber pacemaker yesterday. History of paroxysmal atrial fibrillation intermittent with bradycardia. Propafenone discontinued due to possibility of causing SIADH. Flecainide stopped due to bradycardia in the past.    COPD/ILD and chronic respiratory failure on Oxygen   Patient had episodes of atrial fibrillation with rapid ventricular response. She was started back on flecainide. Today she states that she is feeling better. She still has a Tony catheter in place. Denies having any chest pain or shortness of breath. Rhythm stable. Assessment:   Tachy-bradycardia syndrome  Paroxysmal atrial fibrillation  HTN  HLD  DM  Hypothyroidism  PAD, complex atherosclerotic plaque ascending aorta noted on TRACEY  HFpEF    Plan:     No atrial fibrillation recurrence since starting flecainide. She was not able to tolerate it before due to bradycardia, now she has pacemaker. Continue with flecainide 50 mg twice daily. Continue diltiazem 240 mg daily. High [id number] score, high risk for stroke/thromboembolism. Eliquis 5 mg bid    S/p dual chamber PPM.   Device interrogation and chest x-ray normal.    HFpEF:   Due to increase Cr. Jardiance held. Cr 1.4 today. 1.7 > 1.4   Higher risk of morbidity and mortality due to Afib,age, CKD, multiple co morbidities. High dose statin for PAD. Follow up with pulmonary for COPD.      Aggressive blood pressure control and risk factor modifications recommended. Multiple comorbidities high risk for decompensation. No further EP recommendations. Follow up in device clinic and office in 3 months. Can be discharged. Will sign off. Discussed with nursing staff. Relevant available labs, and cardiovascular diagnostics, documents reviewed. ECG: Sinus rhythm   Echo: : Normal EF, Mod MR   CXR: No pneumothorax    Creatinine 1.4  Sodium 135  proBNP 625 reduced from 4230     I independently reviewed cardiac tracings / rhythm ECGs strips: Sinus rhythm, PAC  Reviewed ECGs. Reviewed device interrogation. Complex medical condition with multiple medical problems affecting prognosis and outcome of EP interventions  Severe exacerbation of underlying medical condition requiring hospitalization and at risk of decompensation. I independently reviewed relevant and available cardiac diagnostic tests ECG, CXR, Echo, Stress test, Device interrogation, and Holter. Physical Examination:  Vitals:    23 0901   BP:    Pulse: 63   Resp: 16   Temp:    SpO2: 94%      In: 610 [P.O.:610]  Out: 2351    Wt Readings from Last 3 Encounters:   23 155 lb 9.6 oz (70.6 kg)   23 157 lb 6.5 oz (71.4 kg)   23 156 lb 12.8 oz (71.1 kg)     Temp  Av.9 °F (36.6 °C)  Min: 97.6 °F (36.4 °C)  Max: 98 °F (36.7 °C)  Pulse  Av.8  Min: 60  Max: 73  BP  Min: 122/83  Max: 148/69  SpO2  Av.8 %  Min: 93 %  Max: 98 %    Intake/Output Summary (Last 24 hours) at 2023 1050  Last data filed at 2023 0940  Gross per 24 hour   Intake 610 ml   Output 2351 ml   Net -1741 ml         Constitutional: Oriented. No distress. Cardiovascular: Normal rate, regular rhythm, S1&S2. Pulmonary/Chest: Bilateral respiratory sounds. No rhonchi. Incision site intact. Abdominal: Soft. No tenderness   Musculoskeletal: No edema    Neurological: Alert and oriented.  Follows command    Active Hospital Problems    Diagnosis Date Noted    ANTHONY (obstructive sleep apnea) [G47.33]      Priority: High    Tachycardia-bradycardia syndrome ([facility] [I49.5] 02/24/2023     Priority: Medium    Bradycardia [R00.1] 02/23/2023     Priority: Medium    Acute on chronic diastolic CHF (congestive heart failure) ([facility] [I50.33] 02/23/2023     Priority: Medium    Constipation [K59.00] 02/03/2023     Priority: Medium    Acute on chronic diastolic (congestive) heart failure ([facility] [I50.33] 10/01/2022     Priority: Medium    Chronic renal disease, stage III ([facility] [873589] [N18.30] 06/13/2022     Priority: Medium    Chronic obstructive pulmonary disease ([facility] [J44.9] 04/19/2022     Priority: Medium    DM2 (diabetes mellitus, type 2) ([facility] [E11.9] 01/18/2017     Priority: Low    Primary hypertension [I10]     Coronary artery disease involving native coronary artery of native heart with angina pectoris ([facility] [I25.119] 02/18/2020    Bronchiectasis without complication ([facility] [C47.6] 10/25/2019    Interstitial lung disease ([facility] [J84.9] 09/20/2019    COPD, moderate ([facility] [J44.9] 03/23/2019    Paroxysmal atrial fibrillation ([facility] [I48.0] 12/12/2018       Scheduled Meds:   sodium chloride flush  5-40 mL IntraVENous 2 times per day    apixaban  5 mg Oral BID    cloNIDine  0.1 mg Oral TID    flecainide  50 mg Oral 2 times per day    dilTIAZem  240 mg Oral Daily    milk and molasses  240 mL Rectal Once    atorvastatin  80 mg Oral Nightly    bethanechol  10 mg Oral BID    mometasone-formoterol  2 puff Inhalation BID    gabapentin  100 mg Oral TID    levothyroxine  75 mcg Oral Daily    lidocaine  1 patch TransDERmal Daily    LORazepam  0.5 mg Oral Q8H    LORazepam  1 mg Oral Nightly    naloxegol  25 mg Oral QAM AC    polyethylene glycol  17 g Oral Daily    rOPINIRole  1 mg Oral TID    tiotropium  2 puff Inhalation Daily    sodium chloride flush  5-40 mL IntraVENous 2 times per day    albuterol  2.5 mg Nebulization BID    insulin lispro  0-8 Units SubCUTAneous TID     insulin lispro  0-4 Units SubCUTAneous Nightly     Continuous Infusions:   sodium chloride      sodium chloride 50 mL/hr at 02/27/23 0813    sodium chloride      dextrose       PRN Meds:.sodium chloride flush, sodium chloride, oxyCODONE-acetaminophen, albuterol sulfate HFA, fluticasone, levalbuterol, sodium chloride flush, sodium chloride, ondansetron **OR** ondansetron, polyethylene glycol, acetaminophen **OR** acetaminophen, dextrose bolus **OR** dextrose bolus, glucagon (rDNA), dextrose, hydrALAZINE     Prior to Admission medications    Medication Sig Start Date End Date Taking? Authorizing Provider   benzonatate (TESSALON) 100 MG capsule Take 1 capsule by mouth 3 times daily as needed 1/3/23   Historical Provider, MD   oxyCODONE-acetaminophen (PERCOCET) 5-325 MG per tablet Take 1 tablet by mouth 2 times daily as needed. 2/14/23   Historical Provider, MD   bethanechol (URECHOLINE) 10 MG tablet Take 1 tablet by mouth 2 times daily 2/4/23   Mitul Aguirre MD   gabapentin (NEURONTIN) 100 MG capsule Take 1 capsule by mouth 3 times daily for 30 days.  2/4/23 3/6/23  Mitul Aguirre MD   naloxegol (MOVANTIK) 25 MG TABS tablet Take 1 tablet by mouth every morning (before breakfast) 2/4/23   Mitul Aguirre MD   polyethylene glycol (GLYCOLAX) 17 g packet Take 17 g by mouth daily 2/3/23 3/5/23  Mitul Aguirre MD   SITagliptin (JANUVIA) 100 MG tablet Take 0.5 tablets by mouth daily  Patient taking differently: Take 50 mg by mouth daily 1 tablet 2/3/23   Mitul Aguirre MD   lidocaine 4 % external patch Place 1 patch onto the skin daily 1/29/23   Darrius Vanegas DO   dilTIAZem (CARDIZEM CD) 180 MG extended release capsule Take 1 capsule by mouth daily 11/4/22   Ceasar Hi MD   dilTIAZem (CARDIZEM) 30 MG tablet As needed for episodes of tachycardia  Patient taking differently: Take 30 mg by mouth as needed As needed for episodes of tachycardia 11/1/22   Ceasar Hi MD   LORazepam (ATIVAN) 1 MG tablet Take 1 mg by mouth at bedtime. Historical Provider, MD   LORazepam (ATIVAN) 0.5 MG tablet Take 0.5 mg by mouth in the morning and at bedtime. In the morning and afternoon    Historical Provider, MD   ELIQUREMI 5 MG TABS tablet TAKE 1 TABLET TWICE DAILY 8/26/22   Juliet Flaherty MD   budesonide-formoterol [facility]) 160-4.5 MCG/ACT AERO INHALE 2 PUFFS TWICE DAILY 8/23/22   Juliet Flaherty MD   fluticasone (FLONASE) 50 MCG/ACT nasal spray USE 2 SPRAYS NASALLY EVERY DAY  Patient taking differently: 2 sprays by Nasal route as needed 8/2/22   Juliet Flaherty MD   atorvastatin (LIPITOR) 80 MG tablet TAKE 1 TABLET EVERY NIGHT  Patient taking differently: Take 80 mg by mouth nightly 8/2/22   KATHY Lara CNP   amLODIPine (NORVASC) 5 MG tablet Take 1 tablet by mouth in the morning. 8/1/22 8/22/22  KATHY Lara CNP   meclizine (ANTIVERT) 12.5 MG tablet TAKE 1 TABLET THREE TIMES DAILY AS NEEDED 7/12/22   Juliet Flaherty MD   rOPINIRole (REQUIP) 3 MG tablet TAKE 1 TABLET THREE TIMES DAILY 6/27/22   Juliet Flaherty MD   levothyroxine (SYNTHROID) 75 MCG tablet TAKE 1 TABLET BY MOUTH EVERY DAY 6/7/22   Juliet Flaherty MD   cloNIDine (CATAPRES) 0.1 MG tablet TAKE 1 TABLET TWICE DAILY  Patient taking differently: in the morning, at noon, and at bedtime 5/27/22   Alicia Bacon MD   conjugated estrogens (PREMARIN) 0.625 MG/GM vaginal cream Place 1 g vaginally three times a week 5/18/22   Juliet Flaherty MD   levalbuterol (XOPENEX) 0.63 MG/3ML nebulization USE 1 VIAL PER NEBULIZER EVERY 6 HOURS.  MAX OF 4 TIMES PER 24 HOURS  Patient taking differently: Take 1 ampule by nebulization every 6 hours as needed 4/26/22   Huber Delgado MD   tiotropium (SPIRIVA RESPIMAT) 2.5 MCG/ACT AERS inhaler Inhale 2 puffs into the lungs daily 4/21/22   Noé Boothe PA-C   Blood Glucose Monitoring Suppl (TRUE METRIX METER) w/Device KIT To use to check sugars 4 times daily 3/24/22   Alicia Bacon MD   Blood Glucose Calibration (TRUE METRIX LEVEL 2) Normal SOLN As needed 3/23/22   Ely Davis MD   blood glucose test strips (TRUE METRIX BLOOD GLUCOSE TEST) strip 1 each by In Vitro route daily As needed.  3/23/22   Ely Davis MD   TRUEplus Lancets 33G MISC 1 daily 3/23/22   Ely Davis MD   Lancets MISC 1 each by Does not apply route 2 times daily 6/30/21   Ely Davis MD   OXYGEN Inhale 2 L into the lungs    Historical Provider, MD   Respiratory Therapy Supplies (NEBULIZER/TUBING/MOUTHPIECE) KIT 1 kit by Does not apply route 4 times daily as needed (shortness of breath) 3/26/20   Ely Davis MD   albuterol sulfate HFA (PROAIR HFA) 108 (90 Base) MCG/ACT inhaler Inhale 2 puffs into the lungs every 6 hours as needed for Wheezing 10/25/19   Salinas Banks MD       Past Medical History:   Diagnosis Date    Arthritis     Atrial fibrillation ([address]     Bursitis     neck and down bilateral shoulders    COPD (chronic obstructive pulmonary disease) ([address]     Diabetes mellitus type II, controlled ([address]     Diastolic CHF ([address]     GERD (gastroesophageal reflux disease)     HTN (hypertension)     Hyperlipidemia     Hypothyroid     Lumbago     Parkinson disease ([facility])     Restless legs syndrome (RLS)     SVT (supraventricular tachycardia) ([facility])         Past Surgical History:   Procedure Laterality Date    APPENDECTOMY      CHOLECYSTECTOMY, LAPAROSCOPIC  2/24/2013    COLONOSCOPY         Allergies   Allergen Reactions    Adhesive Tape Anaphylaxis    Cefaclor Nausea And Vomiting     Other reaction(s): Chest pain  Other reaction(s): Chest pain, Nausea / Vomiting    Cephalexin Other (See Comments)     Struggling to breath, almost passing out/ very low oxygen and pulse    Nsaids      Pt states she has hives and heart races   Other reaction(s): Tachycardia  Other reaction(s): Hives / Skin Rash, Tachycardia    Penicillin G      Other reaction(s): Hives / Skin Rash    Codeine      FEEL NUMB  Other reaction(s): feels numb    Diclofenac      Other reaction(s): Hives    Diclofenac Sodium Hives    Diclofenac Sodium Hives    Diclofenac Sodium     Hydrochlorothiazide      Sob  Other reaction(s): sob    Ibuprofen Other (See Comments)     Other reaction(s): Tachycardia  Other reaction(s): Tachycardia    Macrobid [Nitrofurantoin Macrocrystal]      nausea    Motrin [Ibuprofen Micronized] Other (See Comments)     Tachycardia      Nitrofurantoin      Other reaction(s): nausea H&P  Other reaction(s): nausea H&P    Pcn [Penicillins] Hives    Peach [Prunus Persica] Itching and Swelling     Cannot eat raw peaches, but can eat canned peaches    Prednisone      Causes elevated blood pressure   Other reaction(s): Elevated high BP  H&P  Cannot tolerate the dose pack but can individual doses    Sulfa Antibiotics Itching     Nausea, diarrhea  Other reaction(s): Nausea/Diarrhea H&P    Sulindac Other (See Comments)     Stomach pain  Other reaction(s): Stomach Pain        reports that she quit smoking about 27 years ago. Her smoking use included cigarettes. She started smoking about 72 years ago. She has a 45.00 pack-year smoking history. She has never used smokeless tobacco. She reports that she does not drink alcohol and does not use drugs. All questions and concerns were addressed to the patient/family. Alternatives to my treatment were discussed. I have discussed the above stated plan and the patient verbalized understanding and agreed with the plan. NOTE: This report was transcribed using voice recognition software. Every effort was made to ensure accuracy, however, inadvertent computerized transcription errors may be present.      Michele Hall MD, MPH  [place] [address]   Office: [phone]  Fax: [phone]

## 2023-02-28 VITALS
HEART RATE: 61 BPM | HEIGHT: 62 IN | TEMPERATURE: 97.5 F | DIASTOLIC BLOOD PRESSURE: 78 MMHG | WEIGHT: 161.4 LBS | RESPIRATION RATE: 18 BRPM | BODY MASS INDEX: 29.7 KG/M2 | SYSTOLIC BLOOD PRESSURE: 143 MMHG | OXYGEN SATURATION: 98 %

## 2023-02-28 LAB
ANION GAP SERPL CALCULATED.3IONS-SCNC: 8 MMOL/L (ref 3–16)
BUN BLDV-MCNC: 36 MG/DL (ref 7–20)
CALCIUM SERPL-MCNC: 8.9 MG/DL (ref 8.3–10.6)
CHLORIDE BLD-SCNC: 105 MMOL/L (ref 99–110)
CO2: 24 MMOL/L (ref 21–32)
CREAT SERPL-MCNC: 1.1 MG/DL (ref 0.6–1.2)
GFR SERPL CREATININE-BSD FRML MDRD: 49 ML/MIN/{1.73_M2}
GLUCOSE BLD-MCNC: 113 MG/DL (ref 70–99)
GLUCOSE BLD-MCNC: 133 MG/DL (ref 70–99)
GLUCOSE BLD-MCNC: 133 MG/DL (ref 70–99)
GLUCOSE BLD-MCNC: 138 MG/DL (ref 70–99)
HCT VFR BLD CALC: 26.7 % (ref 36–48)
HEMOGLOBIN: 8.5 G/DL (ref 12–16)
MCH RBC QN AUTO: 27.4 PG (ref 26–34)
MCHC RBC AUTO-ENTMCNC: 31.9 G/DL (ref 31–36)
MCV RBC AUTO: 86 FL (ref 80–100)
PDW BLD-RTO: 16.5 % (ref 12.4–15.4)
PERFORMED ON: ABNORMAL
PLATELET # BLD: 122 K/UL (ref 135–450)
PMV BLD AUTO: 8 FL (ref 5–10.5)
POTASSIUM SERPL-SCNC: 4.6 MMOL/L (ref 3.5–5.1)
RBC # BLD: 3.1 M/UL (ref 4–5.2)
SARS-COV-2, NAAT: NOT DETECTED
SODIUM BLD-SCNC: 137 MMOL/L (ref 136–145)
WBC # BLD: 5.3 K/UL (ref 4–11)

## 2023-02-28 PROCEDURE — 80048 BASIC METABOLIC PNL TOTAL CA: CPT

## 2023-02-28 PROCEDURE — 2580000003 HC RX 258: Performed by: INTERNAL MEDICINE

## 2023-02-28 PROCEDURE — 36415 COLL VENOUS BLD VENIPUNCTURE: CPT

## 2023-02-28 PROCEDURE — 6370000000 HC RX 637 (ALT 250 FOR IP): Performed by: INTERNAL MEDICINE

## 2023-02-28 PROCEDURE — 87635 SARS-COV-2 COVID-19 AMP PRB: CPT

## 2023-02-28 PROCEDURE — 94640 AIRWAY INHALATION TREATMENT: CPT

## 2023-02-28 PROCEDURE — 85027 COMPLETE CBC AUTOMATED: CPT

## 2023-02-28 RX ORDER — LORAZEPAM 1 MG/1
TABLET ORAL
Qty: 6 TABLET | Refills: 0 | Status: SHIPPED | OUTPATIENT
Start: 2023-02-28 | End: 2023-05-30

## 2023-02-28 RX ORDER — FLUTICASONE PROPIONATE 50 MCG
2 SPRAY, SUSPENSION (ML) NASAL PRN
Status: CANCELLED | COMMUNITY
Start: 2023-02-28

## 2023-02-28 RX ORDER — LORAZEPAM 0.5 MG/1
TABLET ORAL
Qty: 9 TABLET | Refills: 0 | Status: SHIPPED | OUTPATIENT
Start: 2023-02-28 | End: 2023-02-28 | Stop reason: HOSPADM

## 2023-02-28 RX ORDER — LORAZEPAM 1 MG/1
1 TABLET ORAL NIGHTLY
Qty: 30 TABLET | Refills: 0 | Status: CANCELLED | COMMUNITY
Start: 2023-02-28

## 2023-02-28 RX ORDER — FUROSEMIDE 20 MG/1
20 TABLET ORAL DAILY PRN
Qty: 30 TABLET | Refills: 0 | DISCHARGE
Start: 2023-02-28

## 2023-02-28 RX ORDER — LORAZEPAM 0.5 MG/1
0.5 TABLET ORAL 2 TIMES DAILY
Qty: 60 TABLET | Refills: 0 | Status: CANCELLED | COMMUNITY
Start: 2023-02-28

## 2023-02-28 RX ORDER — CLONIDINE HYDROCHLORIDE 0.1 MG/1
0.1 TABLET ORAL 3 TIMES DAILY
Status: CANCELLED | COMMUNITY
Start: 2023-02-28

## 2023-02-28 RX ORDER — DILTIAZEM HYDROCHLORIDE 240 MG/1
240 CAPSULE, COATED, EXTENDED RELEASE ORAL DAILY
Qty: 30 CAPSULE | Refills: 0 | DISCHARGE
Start: 2023-03-01

## 2023-02-28 RX ORDER — LORAZEPAM 1 MG/1
TABLET ORAL
Qty: 3 TABLET | Refills: 0 | Status: SHIPPED | OUTPATIENT
Start: 2023-02-28 | End: 2023-02-28 | Stop reason: HOSPADM

## 2023-02-28 RX ORDER — FLECAINIDE ACETATE 50 MG/1
50 TABLET ORAL EVERY 12 HOURS SCHEDULED
Qty: 60 TABLET | Refills: 3 | DISCHARGE
Start: 2023-02-28

## 2023-02-28 RX ADMIN — ACETAMINOPHEN 650 MG: 325 TABLET ORAL at 03:06

## 2023-02-28 RX ADMIN — GABAPENTIN 100 MG: 100 CAPSULE ORAL at 08:17

## 2023-02-28 RX ADMIN — GABAPENTIN 100 MG: 100 CAPSULE ORAL at 14:50

## 2023-02-28 RX ADMIN — Medication 10 ML: at 08:20

## 2023-02-28 RX ADMIN — ACETAMINOPHEN 650 MG: 325 TABLET ORAL at 11:17

## 2023-02-28 RX ADMIN — FLECAINIDE ACETATE 50 MG: 50 TABLET ORAL at 08:18

## 2023-02-28 RX ADMIN — ROPINIROLE HYDROCHLORIDE 1 MG: 1 TABLET, FILM COATED ORAL at 08:18

## 2023-02-28 RX ADMIN — Medication 2 PUFF: at 07:03

## 2023-02-28 RX ADMIN — OXYCODONE AND ACETAMINOPHEN 1 TABLET: 5; 325 TABLET ORAL at 16:13

## 2023-02-28 RX ADMIN — LORAZEPAM 0.5 MG: 0.5 TABLET ORAL at 08:18

## 2023-02-28 RX ADMIN — APIXABAN 5 MG: 5 TABLET, FILM COATED ORAL at 08:17

## 2023-02-28 RX ADMIN — CLONIDINE HYDROCHLORIDE 0.1 MG: 0.1 TABLET ORAL at 08:17

## 2023-02-28 RX ADMIN — LORAZEPAM 0.5 MG: 0.5 TABLET ORAL at 14:50

## 2023-02-28 RX ADMIN — DILTIAZEM HYDROCHLORIDE 240 MG: 240 CAPSULE, COATED, EXTENDED RELEASE ORAL at 08:17

## 2023-02-28 RX ADMIN — NALOXEGOL OXALATE 25 MG: 25 TABLET, FILM COATED ORAL at 06:07

## 2023-02-28 RX ADMIN — POLYETHYLENE GLYCOL 3350 17 G: 17 POWDER, FOR SOLUTION ORAL at 08:15

## 2023-02-28 RX ADMIN — CLONIDINE HYDROCHLORIDE 0.1 MG: 0.1 TABLET ORAL at 14:50

## 2023-02-28 RX ADMIN — LEVOTHYROXINE SODIUM 75 MCG: 0.07 TABLET ORAL at 08:17

## 2023-02-28 RX ADMIN — BETHANECHOL CHLORIDE 10 MG: 10 TABLET ORAL at 08:17

## 2023-02-28 RX ADMIN — TIOTROPIUM BROMIDE INHALATION SPRAY 2 PUFF: 3.12 SPRAY, METERED RESPIRATORY (INHALATION) at 07:06

## 2023-02-28 RX ADMIN — ROPINIROLE HYDROCHLORIDE 1 MG: 1 TABLET, FILM COATED ORAL at 14:50

## 2023-02-28 ASSESSMENT — PAIN SCALES - GENERAL
PAINLEVEL_OUTOF10: 2
PAINLEVEL_OUTOF10: 5
PAINLEVEL_OUTOF10: 0

## 2023-02-28 ASSESSMENT — PAIN DESCRIPTION - ORIENTATION: ORIENTATION: RIGHT

## 2023-02-28 ASSESSMENT — PAIN DESCRIPTION - DESCRIPTORS: DESCRIPTORS: ACHING

## 2023-02-28 ASSESSMENT — PAIN DESCRIPTION - LOCATION
LOCATION: HEAD
LOCATION: HEAD

## 2023-02-28 NOTE — PLAN OF CARE
Problem: ABCDS Injury Assessment  Goal: Absence of physical injury  2/28/2023 3181 by Flori Graham RN  Outcome: Progressing  Flowsheets (Taken 2/28/2023 0027)  Absence of Physical Injury: Implement safety measures based on patient assessment     Problem: Discharge Planning  Goal: Discharge to home or other facility with appropriate resources  2/28/2023 0027 by Flori Graham RN  Outcome: Progressing  Flowsheets (Taken 2/28/2023 0027)  Discharge to home or other facility with appropriate resources: Identify barriers to discharge with patient and caregiver     Problem: Pain  Goal: Verbalizes/displays adequate comfort level or baseline comfort level  2/28/2023 0027 by Flori Graham RN  Outcome: Progressing  Flowsheets (Taken 2/28/2023 0027)  Verbalizes/displays adequate comfort level or baseline comfort level:   Encourage patient to monitor pain and request assistance   Assess pain using appropriate pain scale     Problem: Chronic Conditions and Co-morbidities  Goal: Patient's chronic conditions and co-morbidity symptoms are monitored and maintained or improved  2/28/2023 0027 by Flori Graham RN  Outcome: Progressing  Flowsheets (Taken 2/28/2023 0027)  Care Plan - Patient's Chronic Conditions and Co-Morbidity Symptoms are Monitored and Maintained or Improved: Monitor and assess patient's chronic conditions and comorbid symptoms for stability, deterioration, or improvement     Problem: Cardiovascular - Adult  Goal: Maintains optimal cardiac output and hemodynamic stability  2/28/2023 0027 by Flori Graham RN  Outcome: Progressing  Flowsheets (Taken 2/28/2023 0027)  Maintains optimal cardiac output and hemodynamic stability:   Assess for signs of decreased cardiac output   Administer fluid and/or volume expanders as ordered     Problem: Cardiovascular - Adult  Goal: Absence of cardiac dysrhythmias or at baseline  2/28/2023 0027 by Flori Graham RN  Outcome: Progressing  Flowsheets (Taken 2/28/2023 7417)  Absence of cardiac dysrhythmias or at baseline: Monitor cardiac rate and rhythm     Problem: Respiratory - Adult  Goal: Achieves optimal ventilation and oxygenation  Outcome: Progressing  Flowsheets (Taken 2/28/2023 0027)  Achieves optimal ventilation and oxygenation: Assess for changes in respiratory status     Problem: Musculoskeletal - Adult  Goal: Return mobility to safest level of function  Outcome: Progressing  Flowsheets (Taken 2/28/2023 0027)  Return Mobility to Safest Level of Function: Assess patient stability and activity tolerance for standing, transferring and ambulating with or without assistive devices     Problem: Musculoskeletal - Adult  Goal: Maintain proper alignment of affected body part  Outcome: Progressing  Flowsheets (Taken 2/28/2023 0027)  Maintain proper alignment of affected body part: Support and protect limb and body alignment per provider's orders     Problem: Genitourinary - Adult  Goal: Urinary catheter remains patent  Outcome: Completed     Problem: Metabolic/Fluid and Electrolytes - Adult  Goal: Electrolytes maintained within normal limits  Outcome: Progressing  Flowsheets (Taken 2/28/2023 0027)  Electrolytes maintained within normal limits: Administer electrolyte replacement as ordered     Problem: Metabolic/Fluid and Electrolytes - Adult  Goal: Hemodynamic stability and optimal renal function maintained  Outcome: Progressing  Flowsheets (Taken 2/28/2023 0027)  Hemodynamic stability and optimal renal function maintained: Monitor intake, output and patient weight     Problem: Metabolic/Fluid and Electrolytes - Adult  Goal: Glucose maintained within prescribed range  Outcome: Progressing  Flowsheets (Taken 2/28/2023 0027)  Glucose maintained within prescribed range: Monitor blood glucose as ordered

## 2023-02-28 NOTE — CARE COORDINATION
Discharge note:      CM/SW has been notified of discharge. Patient noted to have the following needs at discharge. CM/SW has coordinated the following services: SNF  placement    Patient discharged to: [facility] [address] [facility] [facility], [address] [place] [address]  Phone: [phone]     SW/DC [facility] faxed, [address] and [facility]   Narcotic Prescriptions faxed were:  Sanjay   RN: Michele Erickson will call report       Medical Transport with: [email or web address]  ([phone])    time: 1800  Family advised of discharge?:  Daughters- in the room  HENS Submitted?:   SHENA  ID  [id number]  All discharge needs met per case management. All CM/SW needs met, will sign off.

## 2023-02-28 NOTE — DISCHARGE SUMMARY
[facility]    Patient Demographics    Patient. Horacio Latham  Date of Birth. 1935  MRN. [id number]     Primary care provider. Neeta Elizabeth MD  (Tel: [phone])    Admit date: 2/23/2023    Discharge date (blank if same as Note Date): Note Date: 2/28/2023     Reason for Hospitalization. Chief Complaint   Patient presents with    Dizziness     Via ems, from home, states she has felt dizzy since Sunday, reports hx of afib, states she is supposed to get a pacemaker placed soon. Denies cp, sob at this time. Pt wears 2L oxygen at baseline. Significant Findings. Principal Problem:    Acute on chronic diastolic (congestive) heart failure (HCC)  Active Problems:    ANTHONY (obstructive sleep apnea)    Chronic obstructive pulmonary disease (HCC)    Chronic renal disease, stage III (HCC) [529594]    Constipation    Bradycardia    Acute on chronic diastolic CHF (congestive heart failure) (HCC)    Tachycardia-bradycardia syndrome (HCC)    DM2 (diabetes mellitus, type 2) ([facility])    Paroxysmal atrial fibrillation (HCC)    COPD, moderate (HCC)    Interstitial lung disease ([address]    Bronchiectasis without complication ([address]    Coronary artery disease involving native coronary artery of native heart with angina pectoris ([address]    Primary hypertension  Resolved Problems:    * No resolved hospital problems. [facility] Course. 81yo female with hx of COPD/ILD with chronic hypoxic respiratory failure on 2 lpm, PAF on ELiquis, CAD, CKD3, DM2, tachy-nnacy syndrome who presented with SOB and dizziness and noted to be in AF with RVR. S/p dual chamber PPM 2/24/2023. Cardiology resumed flecainide for rhythm management. Converted to SR. Also treated for acute CHF with IV lasix and transitioned to oral lasix on 2/25. Veronica Sanes stopped 2/26 due to CAROLANN now resolved. CAROLANN treated with gentle hydration.   Received molasses enema for constipation and treated with daily miralax. Normocytic anemia, received IV venofer. No s/s of bleeding. Hgb 9.5 on admission and 8.5 today. Needs to be followed as OP. Assessment and Plan:  Acute on Chronic Diastolic HF   - Appears compensated   - Diuresed well on low-dose lasix   - Developed CAROLANN on oral lasix 20 mg daily   - Will discharge on PRN lasix given CAROLANN   - Jardiance held for CAROLANN  Tachy-Lewis Syndrome/Atrial fibrillation with RVR   - S/p dual chamber PPM   - Cleared for discharge by cards   - Continue with flecainide 50 mg bid and diltiazem 240 mg daily     - AC with Eliquis, no reports of bleeding, chronic anemia requires monitoring  CAROLANN   - Resolved with IVF, likely due to over diuresis   - Resume Jardiance  Constipation   - S/p molasses enema 2/23   - Continue daily miralax  DM2   - A1C 6.1   - Resume Januvia  COPD with chronic Hypoxia   - On baseline O2, will continue inhalers with spiriva, Dulera, and albuterol prn  Normocytic Anemia   - Hgb 8.5 today, likely dilutional, no s/s of bleeding   - Baseline hg 9-11   - Received IV Venofer, repeat CBC in 2 days      Discharge recommendations given to patient. Follow Up. PCP in 1 week, Cardiology 1 week  Disposition. SNF  Activity. activity as tolerated  Diet: ADULT DIET; Regular      Problems and results from this hospitalization that need follow up. CHF; HF follow up in 1 week  CAROLANN- BMP in 1 week  Anemia- CBC in 2 days and 1 week    Significant test results and incidental findings. XR CHEST (2 VW)   Final Result   1. Status post uncomplicated dual lead pacemaker placement. XR ABDOMEN (KUB) (SINGLE AP VIEW)   Final Result   1. Nonspecific, nonobstructive bowel gas pattern. 2. Mild-moderate fecal loading of the ascending/transverse colon as described   above. XR CHEST PORTABLE   Final Result   Mild bibasilar airspace disease and small bilateral pleural effusions.    Consider atelectasis versus pneumonia in the appropriate clinical settings Invasive procedures and treatments. S/p dual chamber PPM 2/24    Consults. IP CONSULT TO SPIRITUAL SERVICES  IP CONSULT TO CARDIOLOGY  IP CONSULT TO HEART FAILURE NURSE/COORDINATOR    Physical examination on discharge day. /70   Pulse 65   Temp 97.2 °F (36.2 °C) (Oral)   Resp 18   Ht 5' 2\" (1.575 m)   Wt 161 lb 6.4 oz (73.2 kg)   SpO2 96%   BMI 29.52 kg/m²   General appearance. Alert. Looks comfortable. Well nourished. HEENT. Sclera clear. Moist mucus membranes. Cardiovascular. Regular rate and rhythm, normal S1, S2. No murmur. No significant peripheral edema  Respiratory. Breathing unlabored, not using accessory muscles. Clear to auscultation bilaterally, no wheeze, crackles or rhonchi. Gastrointestinal. Abdomen soft, non-tender, not distended, normal bowel sounds. Neurology. Facial symmetry. No speech deficits. Moving all extremities equally. Extremities. No focal weakness. Pulses palpable. Skin. Warm, dry, normal turgor    Condition at time of discharge: Fair    Medication instructions provided to patient at discharge. Medication List        START taking these medications      flecainide 50 MG tablet  Commonly known as: TAMBOCOR  Take 1 tablet by mouth every 12 hours     furosemide 20 MG tablet  Commonly known as: Lasix  Take 1 tablet by mouth daily as needed (weight gain >3 lbs, increase SOB)            CHANGE how you take these medications      atorvastatin 80 MG tablet  Commonly known as: LIPITOR  TAKE 1 TABLET EVERY NIGHT  What changed: See the new instructions. cloNIDine 0.1 MG tablet  Commonly known as: CATAPRES  TAKE 1 TABLET TWICE DAILY  What changed: See the new instructions.      dilTIAZem 240 MG extended release capsule  Commonly known as: CARDIZEM CD  Take 1 capsule by mouth daily  Start taking on: March 1, 2023  What changed:   medication strength  how much to take     dilTIAZem 30 MG tablet  Commonly known as: CARDIZEM  As needed for episodes of tachycardia  What changed:   how much to take  how to take this  when to take this  reasons to take this     fluticasone 50 MCG/ACT nasal spray  Commonly known as: FLONASE  USE 2 SPRAYS NASALLY EVERY DAY  What changed: See the new instructions. levalbuterol 0.63 MG/3ML nebulization  Commonly known as: XOPENEX  USE 1 VIAL PER NEBULIZER EVERY 6 HOURS. MAX OF 4 TIMES PER 24 HOURS  What changed: See the new instructions. LORazepam 1 MG tablet  Commonly known as: ATIVAN  TAKE 1/2 TABLET BY MOUTH TWICE DAILY AND 1 EVERY NIGHT AT BEDTIME AS NEEDED FOR ANXIETY  What changed:   how much to take  how to take this  when to take this  additional instructions  Another medication with the same name was removed. Continue taking this medication, and follow the directions you see here. SITagliptin 100 MG tablet  Commonly known as: JANUVIA  Take 0.5 tablets by mouth daily  What changed: additional instructions            CONTINUE taking these medications      albuterol sulfate  (90 Base) MCG/ACT inhaler  Commonly known as: ProAir HFA  Inhale 2 puffs into the lungs every 6 hours as needed for Wheezing     benzonatate 100 MG capsule  Commonly known as: TESSALON     bethanechol 10 MG tablet  Commonly known as: URECHOLINE  Take 1 tablet by mouth 2 times daily     budesonide-formoterol 160-4.5 MCG/ACT Aero  Commonly known as: Symbicort  INHALE 2 PUFFS TWICE DAILY     Eliquis 5 MG Tabs tablet  Generic drug: apixaban  TAKE 1 TABLET TWICE DAILY     gabapentin 100 MG capsule  Commonly known as: NEURONTIN  Take 1 capsule by mouth 3 times daily for 30 days.      * Lancets Misc  1 each by Does not apply route 2 times daily     * TRUEplus Lancets 33G Misc  1 daily     levothyroxine 75 MCG tablet  Commonly known as: SYNTHROID  TAKE 1 TABLET BY MOUTH EVERY DAY     lidocaine 4 % external patch  Place 1 patch onto the skin daily     meclizine 12.5 MG tablet  Commonly known as: ANTIVERT  TAKE 1 TABLET THREE TIMES DAILY AS NEEDED naloxegol 25 MG Tabs tablet  Commonly known as: MOVANTIK  Take 1 tablet by mouth every morning (before breakfast)     Nebulizer/Tubing/Mouthpiece Kit  1 kit by Does not apply route 4 times daily as needed (shortness of breath)     oxyCODONE-acetaminophen 5-325 MG per tablet  Commonly known as: PERCOCET     OXYGEN     polyethylene glycol 17 g packet  Commonly known as: GLYCOLAX  Take 17 g by mouth daily     Premarin 0.625 MG/GM Crea vaginal cream  Generic drug: estrogens conjugated  Place 1 g vaginally three times a week     rOPINIRole 3 MG tablet  Commonly known as: REQUIP  TAKE 1 TABLET THREE TIMES DAILY     tiotropium 2.5 MCG/ACT Aers inhaler  Commonly known as: Spiriva Respimat  Inhale 2 puffs into the lungs daily     True Metrix Blood Glucose Test strip  Generic drug: blood glucose test strips  1 each by In Vitro route daily As needed. True Metrix Level 2 Normal Soln  As needed     True Metrix Meter w/Device Kit  To use to check sugars 4 times daily           * This list has 2 medication(s) that are the same as other medications prescribed for you. Read the directions carefully, and ask your doctor or other care provider to review them with you. STOP taking these medications      amLODIPine 5 MG tablet  Commonly known as: NORVASC               Where to Get Your Medications        You can get these medications from any pharmacy    Bring a paper prescription for each of these medications  LORazepam 1 MG tablet       Information about where to get these medications is not yet available    Ask your nurse or doctor about these medications  dilTIAZem 240 MG extended release capsule  flecainide 50 MG tablet  furosemide 20 MG tablet       Spent 40 minutes in discharge process.     Signed:  KATHY Smith CNP     2/28/2023 8:16 AM

## 2023-02-28 NOTE — CARE COORDINATION
[address] Nguyen authorization received via [facility] Access Portal    Plan Auth ID:  [id number]  Sha Almaguer ID:    Service:  SNF  Approval Dates:  2/28/2023-   3/2/2023  Next Review Date:  3/2/2023

## 2023-02-28 NOTE — CARE COORDINATION
Discharge Planning; Discharge order noted for SNF placement -[facility] . Transport was scheduled with pickup time of 1800 via [facility].  Patient and daughters were updated & were in agreement. Rapid covid-19 test done . Luis Duff at the facility's admissions was updated of the pickup too.

## 2023-03-01 NOTE — PROGRESS NOTES
Sarah 81     Outpatient Follow Up Note    CHIEF COMPLAINT / HPI: Hospital Follow Up secondary to atrial fibrillation, chronic diastolic heart failure     Hospital record has been reviewed  Hospital Course progressed as follows per discharge summary:     2/23/23-2/28/23  Hospital Course  79yo female with hx of COPD/ILD with chronic hypoxic respiratory failure on 2 lpm, PAF on ELiquis, CAD, CKD3, DM2, tachy-lewis syndrome who presented with SOB and dizziness and noted to be in AF with RVR. S/p dual chamber PPM 2/24/2023. Cardiology resumed flecainide for rhythm management. Converted to SR. Also treated for acute CHF with IV lasix and transitioned to oral lasix on 2/25. Kadeem Cuellar stopped 2/26 due to CAROLANN now resolved. CAROLANN treated with gentle hydration. Received molasses enema for constipation and treated with daily miralax. Normocytic anemia, received IV venofer. No s/s of bleeding. Hgb 9.5 on admission and 8.5 today. Needs to be followed as OP.       Assessment and Plan:  Acute on Chronic Diastolic HF              - Appears compensated              - Diuresed well on low-dose lasix              - Developed CAROLANN on oral lasix 20 mg daily              - Will discharge on PRN lasix given CAROLANN              - Jardiance held for CAROLANN  Tachy-Lewis Syndrome/Atrial fibrillation with RVR              - S/p dual chamber PPM              - Cleared for discharge by cards              - Continue with flecainide 50 mg bid and diltiazem 240 mg daily                - AC with Eliquis, no reports of bleeding, chronic anemia requires monitoring  CAROLANN              - Resolved with IVF, likely due to over diuresis              - Resume Jardiance  Constipation              - S/p molasses enema 2/23              - Continue daily miralax  DM2              - A1C 6.1              - Resume Januvia  COPD with chronic Hypoxia              - On baseline O2, will continue inhalers with spiriva, Dulera, and albuterol prn  Normocytic Anemia              - Hgb 8.5 today, likely dilutional, no s/s of bleeding              - Baseline hg 9-11              - Received IV Venofer, repeat CBC in 2 days      Tete Dominguez is 80 y.o. female who presents today for a routine follow up after a recent hospitalization related to the above mentioned issues. Subjective:   Since the time of discharge, the patient says she is losing weight and her shortness of breath is at baseline. Unsure if she is taking lasix. She denies chest pain, palpitations, dizziness, or edema. Ms Luigi Barnes is currently at [facility] and is receiving PT and OT there. Her daughter is worried that they are pushing her too hard after her recent PPM implantation. With regard to medication therapy the patient has been compliant with prescribed regimen. They have tolerated therapy to date.      Past Medical History:   Diagnosis Date    Arthritis     Atrial fibrillation (HCC)     Bursitis     neck and down bilateral shoulders    COPD (chronic obstructive pulmonary disease) (HCC)     Diabetes mellitus type II, controlled (HCC)     Diastolic CHF (HCC)     GERD (gastroesophageal reflux disease)     HTN (hypertension)     Hyperlipidemia     Hypothyroid     Lumbago     Parkinson disease (HCC)     Restless legs syndrome (RLS)     SVT (supraventricular tachycardia) ([facility])      Social History:    Social History     Tobacco Use   Smoking Status Former    Packs/day: 1.00    Years: 45.00    Pack years: 45.00    Types: Cigarettes    Start date: 9/15/1950    Quit date: 1995    Years since quittin.1   Smokeless Tobacco Never     Current Medications:  Current Outpatient Medications   Medication Sig Dispense Refill    flecainide (TAMBOCOR) 50 MG tablet Take 1 tablet by mouth every 12 hours 60 tablet 3    dilTIAZem (CARDIZEM CD) 240 MG extended release capsule Take 1 capsule by mouth daily 30 capsule 0    LORazepam (ATIVAN) 1 MG tablet TAKE 1/2 TABLET BY MOUTH TWICE DAILY AND 1 EVERY NIGHT AT BEDTIME AS NEEDED FOR ANXIETY 6 tablet 0    furosemide (LASIX) 20 MG tablet Take 1 tablet by mouth daily as needed (weight gain >3 lbs, increase SOB) 30 tablet 0    benzonatate (TESSALON) 100 MG capsule Take 1 capsule by mouth 3 times daily as needed      oxyCODONE-acetaminophen (PERCOCET) 5-325 MG per tablet Take 1 tablet by mouth 2 times daily as needed. bethanechol (URECHOLINE) 10 MG tablet Take 1 tablet by mouth 2 times daily 90 tablet 3    gabapentin (NEURONTIN) 100 MG capsule Take 1 capsule by mouth 3 times daily for 30 days.  90 capsule 0    naloxegol (MOVANTIK) 25 MG TABS tablet Take 1 tablet by mouth every morning (before breakfast) 30 tablet 2    polyethylene glycol (GLYCOLAX) 17 g packet Take 17 g by mouth daily 527 g 1    SITagliptin (JANUVIA) 100 MG tablet Take 0.5 tablets by mouth daily 30 tablet 3    lidocaine 4 % external patch Place 1 patch onto the skin daily 4 patch 0    dilTIAZem (CARDIZEM) 30 MG tablet As needed for episodes of tachycardia (Patient taking differently: Take 30 mg by mouth as needed As needed for episodes of tachycardia) 120 tablet 3    ELIQUIS 5 MG TABS tablet TAKE 1 TABLET TWICE DAILY 180 tablet 0    budesonide-formoterol (SYMBICORT) 160-4.5 MCG/ACT AERO INHALE 2 PUFFS TWICE DAILY 3 each 0    fluticasone (FLONASE) 50 MCG/ACT nasal spray USE 2 SPRAYS NASALLY EVERY DAY (Patient taking differently: 2 sprays by Nasal route as needed) 48 g 1    atorvastatin (LIPITOR) 80 MG tablet TAKE 1 TABLET EVERY NIGHT (Patient taking differently: Take 80 mg by mouth nightly) 90 tablet 3    meclizine (ANTIVERT) 12.5 MG tablet TAKE 1 TABLET THREE TIMES DAILY AS NEEDED 270 tablet 1    rOPINIRole (REQUIP) 3 MG tablet TAKE 1 TABLET THREE TIMES DAILY 270 tablet 1    levothyroxine (SYNTHROID) 75 MCG tablet TAKE 1 TABLET BY MOUTH EVERY DAY 90 tablet 0    cloNIDine (CATAPRES) 0.1 MG tablet TAKE 1 TABLET TWICE DAILY (Patient taking differently: in the morning, at noon, and at bedtime) 180 tablet 0    conjugated estrogens (PREMARIN) 0.625 MG/GM vaginal cream Place 1 g vaginally three times a week 30 g 2    levalbuterol (XOPENEX) 0.63 MG/3ML nebulization USE 1 VIAL PER NEBULIZER EVERY 6 HOURS. MAX OF 4 TIMES PER 24 HOURS (Patient taking differently: Take 1 ampule by nebulization every 6 hours as needed) 1086 mL 3    tiotropium (SPIRIVA RESPIMAT) 2.5 MCG/ACT AERS inhaler Inhale 2 puffs into the lungs daily 1 each 1    Blood Glucose Monitoring Suppl (TRUE METRIX METER) w/Device KIT To use to check sugars 4 times daily 1 kit 0    Blood Glucose Calibration (TRUE METRIX LEVEL 2) Normal SOLN As needed 1 each 0    blood glucose test strips (TRUE METRIX BLOOD GLUCOSE TEST) strip 1 each by In Vitro route daily As needed. 100 each 3    TRUEplus Lancets 33G MISC 1 daily 100 each 3    Lancets MISC 1 each by Does not apply route 2 times daily 300 each 1    OXYGEN Inhale 2 L into the lungs      Respiratory Therapy Supplies (NEBULIZER/TUBING/MOUTHPIECE) KIT 1 kit by Does not apply route 4 times daily as needed (shortness of breath) 1 kit 0    albuterol sulfate HFA (PROAIR HFA) 108 (90 Base) MCG/ACT inhaler Inhale 2 puffs into the lungs every 6 hours as needed for Wheezing 1 Inhaler 6     No current facility-administered medications for this visit. REVIEW OF SYSTEMS:   CONSTITUTIONAL: No major weight gain or loss, fatigue, weakness, night sweats or fever. There's been no change in energy level, sleep pattern, or activity level. HEENT: No new vision difficulties or ringing in the ears. RESPIRATORY: No new SOB, PND, orthopnea or cough. + continuous 2L O2  CARDIOVASCULAR: See HPI  GI: No nausea, vomiting, diarrhea, constipation, abdominal pain or changes in bowel habits. : No urinary frequency, urgency, incontinence hematuria or dysuria. SKIN: No cyanosis or skin lesions. MUSCULOSKELETAL: No new muscle or joint pain. NEUROLOGICAL: No syncope or TIA-like symptoms.   PSYCHIATRIC: No anxiety, pain, insomnia or depression    Objective:   PHYSICAL EXAM:        VITALS:  BP (!) 116/58 (Site: Right Lower Arm, Position: Sitting, Cuff Size: Medium Adult)   Pulse 60   Ht 5' 2\" (1.575 m)   Wt 154 lb (69.9 kg)   SpO2 95%   BMI 28.17 kg/m²     CONSTITUTIONAL: Cooperative, no apparent distress, and appears well nourished / developed  NEUROLOGIC:  Awake and orientated to person, place and time. PSYCH: Calm affect. SKIN: Warm and dry. HEENT: Sclera non-icteric, normocephalic, neck supple, no elevation of JVP, normal carotid pulses with no bruits and thyroid normal size. LUNGS:  No increased work of breathing and clear to auscultation, or wheezing. +2L continuous oxygen   CARDIOVASCULAR:  Regular rate and rhythm with no murmurs, gallops, rubs, or abnormal heart sounds, normal PMI. The apical impulses not displaced. Heart tones are crisp and normal                                                                     ABDOMEN:  Normal bowel sounds, non-distended and non-tender to palpation   EXT: No edema, no calf tenderness. Pulses are present bilaterally.     DATA:    Lab Results   Component Value Date    ALT 11 02/26/2023    AST 16 02/26/2023     (H) 02/23/2021    ALKPHOS 155 (H) 02/26/2023    BILITOT 0.5 02/26/2023     Lab Results   Component Value Date    CREATININE 1.1 02/28/2023    BUN 36 (H) 02/28/2023     02/28/2023    K 4.6 02/28/2023     02/28/2023    CO2 24 02/28/2023     Lab Results   Component Value Date    TSH 2.50 09/28/2020    [id number] 0.56 (L) 04/19/2022     Lab Results   Component Value Date    WBC 5.3 02/28/2023    HGB 8.5 (L) 02/28/2023    HCT 26.7 (L) 02/28/2023    MCV 86.0 02/28/2023     (L) 02/28/2023     No components found for: CHLPL  Lab Results   Component Value Date    TRIG 150 08/22/2022    TRIG 96 12/09/2021    TRIG 226 (H) 08/12/2017     Lab Results   Component Value Date    HDL 52 08/22/2022    HDL 48 12/09/2021    HDL 39 (L) 08/12/2017     Lab Results   Component Value Date    LDLCALC 66 08/22/2022    LDLCALC 60 12/09/2021    LDLCALC 140 (H) 08/12/2017     Lab Results   Component Value Date    LABVLDL 30 08/22/2022    LABVLDL 19 12/09/2021    LABVLDL 45 08/12/2017     Radiology Review:  Pertinent images / reports were reviewed as a part of this visit and reveals the following:    Last Echo: 8/22/22  Summary  Left ventricular systolic function is normal with ejection fraction estimated at 55-60 %. No regional wall motion abnormalities are noted. There is mild concentric left ventricular hypertrophy. There is reversal of E/A inflow velocities across the mitral valve. Mild-to-moderate aortic regurgitation is present. Mitral annular calcification is present. Mild mitral & tricuspid regurgitation. Procedure:  S/p Dual chamber pacemaker 2/24/23    Last ECG: 3/7/23  Electronic atrial pacemaker   Low voltage in precordial leads.    -Poor R-wave progression -may be secondary to pulmonary disease   consider old anterior infarct. -  Negative precordial T-waves. [id number] Score for Atrial Fibrillation Stroke Risk   Risk   Factors  Component Value   C CHF Yes 1   H HTN Yes 1   A2 Age >= 76 Yes,  (80 y.o.) 2   D DM Yes 1   S2 Prior Stroke/TIA No 0   V Vascular Disease No 0   A Age 74-69 No,  (80 y.o.) 0   Sc Sex female 1    [id number]  Score  6   Score last updated 3/1/23 6:11 AM EST    Click here for a link to the UpToDate guideline \"Atrial Fibrillation: Anticoagulation therapy to prevent embolization    Disclaimer: Risk Score calculation is dependent on accuracy of patient problem list and past encounter diagnosis. Assessment:      Diagnosis Orders   1. Tachycardia-bradycardia syndrome ([address]   ~ s/p dual chamber PPM 2/24/23, device and site check today after appt        2.  Paroxysmal atrial fibrillation (HCC)   ~ denies palpitations, SR on EKG today   ~ follows with EP, appt 5/2  ~ propafenone d/c'd d/t to possibly causing SIADH   ~ eliquis / diltiazem / flecainide        3. Chronic diastolic CHF (congestive heart failure) (HCC)   ~ stable, appears compensated  ~ Echo 8/22/22 EF 55-60%  ~ PRN lasix       4. Mixed hyperlipidemia   ~ controlled on atorvastatin 80  ~ Lipids 2/2023  HDL 52 LDL 66 Trig 150       5. COPD  ~ chronic 2L O2  ~ managed by Dr Mayra Williamson   BNP      7.      DM    8. Anemia           ~ IV venofer received in hospital. Will repeat blood work     Patient  is stable since hospital discharge. Plan:   Get blood work checked today. Will call daughter with results  Appt scheduled with EP 5/2/23. Will scheduled with HFNP as requested in 6-8 weeks     I have addressed the patient's cardiac risk factors and adjusted pharmacologic treatment as needed. In addition, I have reinforced the need for patient directed risk factor modification. Further evaluation will be based upon the patient's clinical course and testing results. All questions and concerns were addressed to the patient/family. The patient currently  is not smoking. The risks related to smoking were reviewed with the patient. Recommend maintaining a smoke-free lifestyle. Daily weight, low sodium diet were discussed. Patient instructed to call the office with a weight gain: > 3 # over night or 5# in one week; swelling, SOB/orthopnea/PND    Anti-coagulation has been recommended / prescribed for this patient. Education conducted on adverse reactions including bleeding was discussed. The patient verbalizes understanding. Pt is not on a BB; no MI  Pt is not on an ace-i/ARB; no sHF  Pt is on a statin      Saturated fat diet discussed  Exercise program discussed    Thank you for allowing to us to participate in the care of Jesus Patterson.       [facility]  Documentation of today's visit sent to PCP

## 2023-03-03 ENCOUNTER — TELEPHONE (OUTPATIENT)
Dept: CARDIOLOGY CLINIC | Age: 88
End: 2023-03-03

## 2023-03-03 NOTE — TELEPHONE ENCOUNTER
Spoke to pt's daughter and advised her of message below, she v/u. Also states pt has appt next Tuesday, I advised her that wound site will be checked then and not to get it wet. Call complete.

## 2023-03-03 NOTE — TELEPHONE ENCOUNTER
Ronney Apley states pt had device implanted on 2/24/23. She is wanting to know when they can remove the wound dressing. Please call Ronney Apley to advise.

## 2023-03-07 ENCOUNTER — NURSE ONLY (OUTPATIENT)
Dept: CARDIOLOGY CLINIC | Age: 88
End: 2023-03-07
Payer: MEDICARE

## 2023-03-07 ENCOUNTER — OFFICE VISIT (OUTPATIENT)
Dept: CARDIOLOGY CLINIC | Age: 88
End: 2023-03-07
Payer: MEDICARE

## 2023-03-07 ENCOUNTER — HOSPITAL ENCOUNTER (OUTPATIENT)
Age: 88
Discharge: HOME | End: 2023-03-07
Payer: MEDICARE

## 2023-03-07 VITALS
DIASTOLIC BLOOD PRESSURE: 58 MMHG | HEART RATE: 60 BPM | BODY MASS INDEX: 28.34 KG/M2 | HEIGHT: 62 IN | SYSTOLIC BLOOD PRESSURE: 116 MMHG | OXYGEN SATURATION: 95 % | WEIGHT: 154 LBS

## 2023-03-07 DIAGNOSIS — I49.5 TACHYCARDIA-BRADYCARDIA SYNDROME (HCC): ICD-10-CM

## 2023-03-07 DIAGNOSIS — I48.0 PAROXYSMAL ATRIAL FIBRILLATION (HCC): ICD-10-CM

## 2023-03-07 DIAGNOSIS — I48.0 PAF (PAROXYSMAL ATRIAL FIBRILLATION) (HCC): ICD-10-CM

## 2023-03-07 DIAGNOSIS — I48.91 ATRIAL FIBRILLATION WITH RVR (HCC): ICD-10-CM

## 2023-03-07 DIAGNOSIS — I49.5 TACHYCARDIA-BRADYCARDIA SYNDROME (HCC): Primary | ICD-10-CM

## 2023-03-07 DIAGNOSIS — E78.2 MIXED HYPERLIPIDEMIA: ICD-10-CM

## 2023-03-07 DIAGNOSIS — R06.02 SHORTNESS OF BREATH: ICD-10-CM

## 2023-03-07 DIAGNOSIS — Z95.0 PACEMAKER: ICD-10-CM

## 2023-03-07 DIAGNOSIS — I48.0 PAROXYSMAL ATRIAL FIBRILLATION (HCC): Primary | ICD-10-CM

## 2023-03-07 DIAGNOSIS — I50.32 CHRONIC DIASTOLIC CHF (CONGESTIVE HEART FAILURE) (HCC): ICD-10-CM

## 2023-03-07 DIAGNOSIS — R00.1 BRADYCARDIA: ICD-10-CM

## 2023-03-07 LAB
ANION GAP SERPL CALCULATED.3IONS-SCNC: 15 MMOL/L (ref 3–16)
BUN BLDV-MCNC: 26 MG/DL (ref 7–20)
CALCIUM SERPL-MCNC: 9.2 MG/DL (ref 8.3–10.6)
CHLORIDE BLD-SCNC: 102 MMOL/L (ref 99–110)
CO2: 21 MMOL/L (ref 21–32)
CREAT SERPL-MCNC: 1.4 MG/DL (ref 0.6–1.2)
GFR SERPL CREATININE-BSD FRML MDRD: 36 ML/MIN/{1.73_M2}
GLUCOSE BLD-MCNC: 140 MG/DL (ref 70–99)
HCT VFR BLD CALC: 30.5 % (ref 36–48)
HEMOGLOBIN: 9.8 G/DL (ref 12–16)
MCH RBC QN AUTO: 27.9 PG (ref 26–34)
MCHC RBC AUTO-ENTMCNC: 32.2 G/DL (ref 31–36)
MCV RBC AUTO: 86.6 FL (ref 80–100)
PDW BLD-RTO: 17.4 % (ref 12.4–15.4)
PLATELET # BLD: 125 K/UL (ref 135–450)
PMV BLD AUTO: 8.6 FL (ref 5–10.5)
POTASSIUM SERPL-SCNC: 4.1 MMOL/L (ref 3.5–5.1)
PRO-BNP: 559 PG/ML (ref 0–449)
RBC # BLD: 3.53 M/UL (ref 4–5.2)
SODIUM BLD-SCNC: 138 MMOL/L (ref 136–145)
WBC # BLD: 7.4 K/UL (ref 4–11)

## 2023-03-07 PROCEDURE — 1036F TOBACCO NON-USER: CPT | Performed by: NURSE PRACTITIONER

## 2023-03-07 PROCEDURE — G8427 DOCREV CUR MEDS BY ELIG CLIN: HCPCS | Performed by: NURSE PRACTITIONER

## 2023-03-07 PROCEDURE — 80048 BASIC METABOLIC PNL TOTAL CA: CPT

## 2023-03-07 PROCEDURE — 1111F DSCHRG MED/CURRENT MED MERGE: CPT | Performed by: NURSE PRACTITIONER

## 2023-03-07 PROCEDURE — 99214 OFFICE O/P EST MOD 30 MIN: CPT | Performed by: NURSE PRACTITIONER

## 2023-03-07 PROCEDURE — G8417 CALC BMI ABV UP PARAM F/U: HCPCS | Performed by: NURSE PRACTITIONER

## 2023-03-07 PROCEDURE — 36415 COLL VENOUS BLD VENIPUNCTURE: CPT

## 2023-03-07 PROCEDURE — G8484 FLU IMMUNIZE NO ADMIN: HCPCS | Performed by: NURSE PRACTITIONER

## 2023-03-07 PROCEDURE — 93000 ELECTROCARDIOGRAM COMPLETE: CPT | Performed by: NURSE PRACTITIONER

## 2023-03-07 PROCEDURE — 1090F PRES/ABSN URINE INCON ASSESS: CPT | Performed by: NURSE PRACTITIONER

## 2023-03-07 PROCEDURE — 1123F ACP DISCUSS/DSCN MKR DOCD: CPT | Performed by: NURSE PRACTITIONER

## 2023-03-07 PROCEDURE — 93280 PM DEVICE PROGR EVAL DUAL: CPT | Performed by: INTERNAL MEDICINE

## 2023-03-07 PROCEDURE — 85027 COMPLETE CBC AUTOMATED: CPT

## 2023-03-07 PROCEDURE — 83880 ASSAY OF NATRIURETIC PEPTIDE: CPT

## 2023-03-07 NOTE — PROGRESS NOTES
Patient comes in for programming evaluation for her pacemaker. S/p Medtronic [id number] [place] XT DR LEONE with Dr. Sherlyn Ruiz on 2/24/2023  All sensing and pacing parameters are within normal range. Battery life 12.9 years  AP 55.5%.  <0.1%. AT/AF with a 1.0% burden. RVR noted. Episodes noted on 2/25/2023 lasting ~ 2 1/2 hours. Patient remains on Eliquis, Cardizem and flecainide. Patients incision is healing nicely. Outside bandaged removed today. Wound clean, dry and intact. Patient and family educated on wound care. All questions answered. Patient to call the office immediately with any signs on infection. Please see interrogation for more detail. Patient will see Israel Christian and follow up in 3 months in office or remotely. Home monitor received today. Patient being discharged from rehab facility next Monday-daughter will set up once she is home.

## 2023-03-19 NOTE — ED NOTES
Patient walked out of department after vitals.      Sheron Espinoza RN  03/19/23 3904 Pt IV infiltrated. Judy LOPEZ notified. Pharmacy called for instructions nad stated no intervention needed besides compress if pt wants. IV meds were stopped and IV removed.       Laura Rios RN  09/30/22 [id number]

## 2023-03-28 ENCOUNTER — TELEPHONE (OUTPATIENT)
Dept: CARDIOLOGY CLINIC | Age: 88
End: 2023-03-28

## 2023-03-28 ENCOUNTER — NURSE ONLY (OUTPATIENT)
Dept: CARDIOLOGY CLINIC | Age: 88
End: 2023-03-28
Payer: MEDICARE

## 2023-03-28 DIAGNOSIS — I50.32 CHRONIC DIASTOLIC CHF (CONGESTIVE HEART FAILURE) (HCC): Primary | ICD-10-CM

## 2023-03-28 DIAGNOSIS — I49.5 TACHYCARDIA-BRADYCARDIA SYNDROME (HCC): ICD-10-CM

## 2023-03-28 DIAGNOSIS — R00.1 BRADYCARDIA: ICD-10-CM

## 2023-03-28 DIAGNOSIS — I48.91 ATRIAL FIBRILLATION WITH RVR (HCC): ICD-10-CM

## 2023-03-28 DIAGNOSIS — Z95.0 PACEMAKER: ICD-10-CM

## 2023-03-28 DIAGNOSIS — I48.0 PAF (PAROXYSMAL ATRIAL FIBRILLATION) (HCC): ICD-10-CM

## 2023-03-28 PROCEDURE — 93296 REM INTERROG EVL PM/IDS: CPT | Performed by: INTERNAL MEDICINE

## 2023-03-28 PROCEDURE — 93294 REM INTERROG EVL PM/LDLS PM: CPT | Performed by: INTERNAL MEDICINE

## 2023-03-28 NOTE — PROGRESS NOTES
We received remote transmission from patient's monitor at home. Transmission shows normal sensing and pacing function. EP physician will review. See interrogation under cardiology tab in the [address] [place] [address] [address] field for more details.  < 0.1% (MVP On)  AP 85.0%    End of 91-day monitoring period 3-28-23.

## 2023-03-28 NOTE — TELEPHONE ENCOUNTER
----- Message from KATHY Guevara CNP sent at 3/28/2023 10:33 AM EDT -----  I had called [facility] asking for repeat BMP and it was not done (they said I just needed to give verbal order). I have BMP order in chart now. Can you please call facility and ask them to draw? And fax order if necessary? Thanks!

## 2023-03-28 NOTE — TELEPHONE ENCOUNTER
Pt has been discharged from  of  3 weeks ago. She is home with [facility]. I spoke with Any Mcgee and requested BMP for pt. Nurse will be visiting tomorrow. Eris Diaz : [phone]. She is staying with her daughter Yesenia Montanez.

## 2023-04-03 ENCOUNTER — HOSPITAL ENCOUNTER (OUTPATIENT)
Age: 88
Discharge: HOME | End: 2023-04-03
Payer: MEDICARE

## 2023-04-03 DIAGNOSIS — I50.32 CHRONIC DIASTOLIC CHF (CONGESTIVE HEART FAILURE) (HCC): ICD-10-CM

## 2023-04-03 LAB
ANION GAP SERPL CALCULATED.3IONS-SCNC: 15 MMOL/L (ref 3–16)
BUN SERPL-MCNC: 29 MG/DL (ref 7–20)
CALCIUM SERPL-MCNC: 9.3 MG/DL (ref 8.3–10.6)
CHLORIDE SERPL-SCNC: 101 MMOL/L (ref 99–110)
CO2 SERPL-SCNC: 21 MMOL/L (ref 21–32)
CREAT SERPL-MCNC: 1.1 MG/DL (ref 0.6–1.2)
GFR SERPLBLD CREATININE-BSD FMLA CKD-EPI: 49 ML/MIN/{1.73_M2}
GLUCOSE SERPL-MCNC: 112 MG/DL (ref 70–99)
POTASSIUM SERPL-SCNC: 4.9 MMOL/L (ref 3.5–5.1)
SODIUM SERPL-SCNC: 137 MMOL/L (ref 136–145)

## 2023-04-03 PROCEDURE — 80048 BASIC METABOLIC PNL TOTAL CA: CPT

## 2023-04-03 PROCEDURE — 36415 COLL VENOUS BLD VENIPUNCTURE: CPT

## 2023-04-04 ENCOUNTER — TELEPHONE (OUTPATIENT)
Dept: CARDIOLOGY CLINIC | Age: 88
End: 2023-04-04

## 2023-04-04 NOTE — TELEPHONE ENCOUNTER
----- Message from KATHY Chamberlain CNP sent at 4/4/2023  8:47 AM EDT -----  Please let pt know that kidney function has improved

## 2023-04-06 ENCOUNTER — TELEPHONE (OUTPATIENT)
Dept: PULMONOLOGY | Age: 88
End: 2023-04-06

## 2023-04-06 NOTE — TELEPHONE ENCOUNTER
Daughter called patient is having some sinus drainage, cough, no fever. She is on Cipro for a UTI. Using her inhalers and nebulizer. Cough is bothering her the most wanted to know if there is anything she can take for this?     Please advise

## 2023-04-06 NOTE — TELEPHONE ENCOUNTER
Patient called and has been coughing for about the last three days with clear phlegm is on an antibiotic for a UTI.     Lexi [address]: [phone]

## 2023-04-07 PROBLEM — I50.43 CHF (CONGESTIVE HEART FAILURE), NYHA CLASS I, ACUTE ON CHRONIC, COMBINED (HCC): Status: ACTIVE | Noted: 2023-04-07

## 2023-04-07 PROBLEM — D50.9 IRON DEFICIENCY ANEMIA: Status: ACTIVE | Noted: 2023-04-07

## 2023-04-07 PROBLEM — I50.9 ACUTE ON CHRONIC CONGESTIVE HEART FAILURE (HCC): Status: ACTIVE | Noted: 2023-04-07

## 2023-04-07 PROBLEM — I43 CARDIOMYOPATHY DUE TO HYPERTENSION, WITH HEART FAILURE (HCC): Status: ACTIVE | Noted: 2023-04-07

## 2023-04-07 PROBLEM — I50.43 ACUTE ON CHRONIC COMBINED SYSTOLIC AND DIASTOLIC CONGESTIVE HEART FAILURE (HCC): Status: ACTIVE | Noted: 2022-10-01

## 2023-04-07 PROBLEM — I11.0 CARDIOMYOPATHY DUE TO HYPERTENSION, WITH HEART FAILURE (HCC): Status: ACTIVE | Noted: 2023-04-07

## 2023-04-08 PROBLEM — I25.83 CORONARY ARTERY DISEASE DUE TO LIPID RICH PLAQUE: Status: ACTIVE | Noted: 2020-02-18

## 2023-04-08 PROBLEM — N18.2 STAGE 2 CHRONIC KIDNEY DISEASE: Status: ACTIVE | Noted: 2023-04-08

## 2023-04-08 PROBLEM — I25.10 CORONARY ARTERY DISEASE DUE TO LIPID RICH PLAQUE: Status: ACTIVE | Noted: 2020-02-18

## 2023-04-12 RX ORDER — FUROSEMIDE 20 MG/1
20 TABLET ORAL DAILY PRN
Qty: 30 TABLET | Refills: 0 | Status: SHIPPED | OUTPATIENT
Start: 2023-04-12

## 2023-04-14 PROBLEM — D64.9 ANEMIA: Status: ACTIVE | Noted: 2023-04-14

## 2023-04-27 ENCOUNTER — HOSPITAL ENCOUNTER (OUTPATIENT)
Dept: ONCOLOGY | Age: 88
Setting detail: INFUSION SERIES
Discharge: HOME | End: 2023-04-27
Payer: MEDICARE

## 2023-04-27 VITALS
TEMPERATURE: 96.9 F | DIASTOLIC BLOOD PRESSURE: 74 MMHG | HEART RATE: 61 BPM | OXYGEN SATURATION: 96 % | RESPIRATION RATE: 16 BRPM | SYSTOLIC BLOOD PRESSURE: 137 MMHG

## 2023-04-27 DIAGNOSIS — I50.32 CHRONIC DIASTOLIC CHF (CONGESTIVE HEART FAILURE) (HCC): ICD-10-CM

## 2023-04-27 DIAGNOSIS — D50.9 IRON DEFICIENCY ANEMIA, UNSPECIFIED IRON DEFICIENCY ANEMIA TYPE: ICD-10-CM

## 2023-04-27 DIAGNOSIS — D64.9 ANEMIA, UNSPECIFIED TYPE: Primary | ICD-10-CM

## 2023-04-27 LAB
ANION GAP SERPL CALCULATED.3IONS-SCNC: 10 MMOL/L (ref 3–16)
BUN SERPL-MCNC: 20 MG/DL (ref 7–20)
CALCIUM SERPL-MCNC: 9.4 MG/DL (ref 8.3–10.6)
CHLORIDE SERPL-SCNC: 107 MMOL/L (ref 99–110)
CO2 SERPL-SCNC: 25 MMOL/L (ref 21–32)
CREAT SERPL-MCNC: 1 MG/DL (ref 0.6–1.2)
GFR SERPLBLD CREATININE-BSD FMLA CKD-EPI: 54 ML/MIN/{1.73_M2}
GLUCOSE SERPL-MCNC: 112 MG/DL (ref 70–99)
NT-PROBNP SERPL-MCNC: 672 PG/ML (ref 0–449)
POTASSIUM SERPL-SCNC: 4.8 MMOL/L (ref 3.5–5.1)
SODIUM SERPL-SCNC: 142 MMOL/L (ref 136–145)

## 2023-04-27 PROCEDURE — 2580000003 HC RX 258: Performed by: CLINICAL NURSE SPECIALIST

## 2023-04-27 PROCEDURE — 96365 THER/PROPH/DIAG IV INF INIT: CPT

## 2023-04-27 PROCEDURE — 99211 OFF/OP EST MAY X REQ PHY/QHP: CPT

## 2023-04-27 PROCEDURE — 6360000002 HC RX W HCPCS: Performed by: CLINICAL NURSE SPECIALIST

## 2023-04-27 PROCEDURE — 80048 BASIC METABOLIC PNL TOTAL CA: CPT

## 2023-04-27 PROCEDURE — 83880 ASSAY OF NATRIURETIC PEPTIDE: CPT

## 2023-04-27 RX ORDER — SODIUM CHLORIDE 9 MG/ML
5-250 INJECTION, SOLUTION INTRAVENOUS PRN
Status: DISCONTINUED | OUTPATIENT
Start: 2023-04-27 | End: 2023-04-28 | Stop reason: HOSPADM

## 2023-04-27 RX ORDER — SODIUM CHLORIDE 0.9 % (FLUSH) 0.9 %
5-40 SYRINGE (ML) INJECTION PRN
Status: DISCONTINUED | OUTPATIENT
Start: 2023-04-27 | End: 2023-04-28 | Stop reason: HOSPADM

## 2023-04-27 RX ORDER — SODIUM CHLORIDE 0.9 % (FLUSH) 0.9 %
5-40 SYRINGE (ML) INJECTION PRN
Status: CANCELLED | OUTPATIENT
Start: 2023-05-04

## 2023-04-27 RX ORDER — SODIUM CHLORIDE 9 MG/ML
5-250 INJECTION, SOLUTION INTRAVENOUS PRN
Status: CANCELLED | OUTPATIENT
Start: 2023-05-04

## 2023-04-27 RX ADMIN — Medication 10 ML: at 15:11

## 2023-04-27 RX ADMIN — SODIUM CHLORIDE 200 ML/HR: 9 INJECTION, SOLUTION INTRAVENOUS at 15:12

## 2023-04-27 RX ADMIN — Medication 200 MG: at 15:13

## 2023-04-27 NOTE — PROGRESS NOTES
Patient  to infusion Center for first of two doses of venofer. Pt reporting she had 2 doses when Inpatient. AVS printed and reviewed. Vlad infusion with no adverse reaction noted. Pt scheduled to return next week for same treatment.

## 2023-04-28 ENCOUNTER — TELEPHONE (OUTPATIENT)
Dept: CARDIOLOGY CLINIC | Age: 88
End: 2023-04-28

## 2023-05-01 RX ORDER — ROPINIROLE 3 MG/1
TABLET, FILM COATED ORAL
Qty: 270 TABLET | Refills: 1 | OUTPATIENT
Start: 2023-05-01

## 2023-05-01 RX ORDER — FLUTICASONE PROPIONATE 50 MCG
SPRAY, SUSPENSION (ML) NASAL
Qty: 48 G | Refills: 1 | OUTPATIENT
Start: 2023-05-01

## 2023-05-01 NOTE — PROGRESS NOTES
Patient comes in for programming evaluation for her pacemaker. S/p Medtronic [id number] [place] XT DR LEONE with Dr. Chelsie De Guzman on 2/24/2023  Tachycardia bradycardia syndrome    All sensing and pacing parameters are within normal range. Battery life 12.0 years  AP 76.9%.  1.4%. AT/AF with a 4.3% burden. RVR noted. Last on 4/29/2023, longest ~18 hours. OBSERVATIONS (2)  - AT/AF >= 6 h for 3 d.  - Patient Activity less than 1 h/d for 6 weeks. Patient remains on Eliquis, Cardizem and flecainide. 3 month in office check today-outputs lowered to preserve battery life and atrial therapies turned on to the [address] settings today. Please see interrogation for more detail. Patient will see Dr. Elvie Forte today and follow up in 3 months in office or remotely.

## 2023-05-02 ENCOUNTER — OFFICE VISIT (OUTPATIENT)
Dept: CARDIOLOGY CLINIC | Age: 88
End: 2023-05-02
Payer: MEDICARE

## 2023-05-02 ENCOUNTER — NURSE ONLY (OUTPATIENT)
Dept: CARDIOLOGY CLINIC | Age: 88
End: 2023-05-02
Payer: MEDICARE

## 2023-05-02 VITALS
BODY MASS INDEX: 29.08 KG/M2 | OXYGEN SATURATION: 95 % | DIASTOLIC BLOOD PRESSURE: 80 MMHG | SYSTOLIC BLOOD PRESSURE: 128 MMHG | HEART RATE: 63 BPM | HEIGHT: 62 IN | WEIGHT: 158 LBS

## 2023-05-02 DIAGNOSIS — I48.91 ATRIAL FIBRILLATION WITH RVR (HCC): ICD-10-CM

## 2023-05-02 DIAGNOSIS — I50.43 ACUTE ON CHRONIC COMBINED SYSTOLIC AND DIASTOLIC CONGESTIVE HEART FAILURE (HCC): ICD-10-CM

## 2023-05-02 DIAGNOSIS — I10 PRIMARY HYPERTENSION: ICD-10-CM

## 2023-05-02 DIAGNOSIS — I48.0 PAF (PAROXYSMAL ATRIAL FIBRILLATION) (HCC): ICD-10-CM

## 2023-05-02 DIAGNOSIS — I49.5 TACHYCARDIA-BRADYCARDIA SYNDROME (HCC): ICD-10-CM

## 2023-05-02 DIAGNOSIS — R00.1 BRADYCARDIA: ICD-10-CM

## 2023-05-02 DIAGNOSIS — Z95.0 PACEMAKER: ICD-10-CM

## 2023-05-02 DIAGNOSIS — G47.33 OSA (OBSTRUCTIVE SLEEP APNEA): ICD-10-CM

## 2023-05-02 DIAGNOSIS — I48.0 PAROXYSMAL ATRIAL FIBRILLATION (HCC): Primary | ICD-10-CM

## 2023-05-02 PROCEDURE — G8417 CALC BMI ABV UP PARAM F/U: HCPCS | Performed by: INTERNAL MEDICINE

## 2023-05-02 PROCEDURE — 1111F DSCHRG MED/CURRENT MED MERGE: CPT | Performed by: INTERNAL MEDICINE

## 2023-05-02 PROCEDURE — 1036F TOBACCO NON-USER: CPT | Performed by: INTERNAL MEDICINE

## 2023-05-02 PROCEDURE — 1123F ACP DISCUSS/DSCN MKR DOCD: CPT | Performed by: INTERNAL MEDICINE

## 2023-05-02 PROCEDURE — 99214 OFFICE O/P EST MOD 30 MIN: CPT | Performed by: INTERNAL MEDICINE

## 2023-05-02 PROCEDURE — G8427 DOCREV CUR MEDS BY ELIG CLIN: HCPCS | Performed by: INTERNAL MEDICINE

## 2023-05-02 PROCEDURE — 1090F PRES/ABSN URINE INCON ASSESS: CPT | Performed by: INTERNAL MEDICINE

## 2023-05-02 PROCEDURE — 93280 PM DEVICE PROGR EVAL DUAL: CPT | Performed by: INTERNAL MEDICINE

## 2023-05-02 RX ORDER — METOPROLOL SUCCINATE 25 MG/1
25 TABLET, EXTENDED RELEASE ORAL DAILY
Qty: 30 TABLET | Refills: 3 | Status: SHIPPED | OUTPATIENT
Start: 2023-05-02

## 2023-05-03 RX ORDER — METOPROLOL SUCCINATE 25 MG/1
25 TABLET, EXTENDED RELEASE ORAL DAILY
Qty: 90 TABLET | OUTPATIENT
Start: 2023-05-03

## 2023-05-04 ENCOUNTER — HOSPITAL ENCOUNTER (OUTPATIENT)
Dept: ONCOLOGY | Age: 88
Setting detail: INFUSION SERIES
Discharge: HOME | End: 2023-05-04
Payer: MEDICARE

## 2023-05-04 VITALS
HEART RATE: 60 BPM | RESPIRATION RATE: 16 BRPM | TEMPERATURE: 98.1 F | SYSTOLIC BLOOD PRESSURE: 143 MMHG | DIASTOLIC BLOOD PRESSURE: 67 MMHG

## 2023-05-04 DIAGNOSIS — D50.9 IRON DEFICIENCY ANEMIA, UNSPECIFIED IRON DEFICIENCY ANEMIA TYPE: ICD-10-CM

## 2023-05-04 DIAGNOSIS — D64.9 ANEMIA, UNSPECIFIED TYPE: Primary | ICD-10-CM

## 2023-05-04 PROCEDURE — 6360000002 HC RX W HCPCS: Performed by: CLINICAL NURSE SPECIALIST

## 2023-05-04 PROCEDURE — 96365 THER/PROPH/DIAG IV INF INIT: CPT

## 2023-05-04 PROCEDURE — 2580000003 HC RX 258: Performed by: CLINICAL NURSE SPECIALIST

## 2023-05-04 PROCEDURE — 99211 OFF/OP EST MAY X REQ PHY/QHP: CPT

## 2023-05-04 RX ORDER — SODIUM CHLORIDE 9 MG/ML
5-250 INJECTION, SOLUTION INTRAVENOUS PRN
Status: DISCONTINUED | OUTPATIENT
Start: 2023-05-04 | End: 2023-05-04

## 2023-05-04 RX ORDER — SODIUM CHLORIDE 9 MG/ML
5-250 INJECTION, SOLUTION INTRAVENOUS PRN
Status: CANCELLED | OUTPATIENT
Start: 2023-05-04

## 2023-05-04 RX ORDER — SODIUM CHLORIDE 0.9 % (FLUSH) 0.9 %
5-40 SYRINGE (ML) INJECTION PRN
Status: DISCONTINUED | OUTPATIENT
Start: 2023-05-04 | End: 2023-05-04

## 2023-05-04 RX ORDER — SODIUM CHLORIDE 0.9 % (FLUSH) 0.9 %
5-40 SYRINGE (ML) INJECTION PRN
Status: CANCELLED | OUTPATIENT
Start: 2023-05-04

## 2023-05-04 RX ADMIN — Medication 200 MG: at 15:01

## 2023-05-04 RX ADMIN — SODIUM CHLORIDE 100 ML/HR: 9 INJECTION, SOLUTION INTRAVENOUS at 15:00

## 2023-05-04 RX ADMIN — SODIUM CHLORIDE, PRESERVATIVE FREE 10 ML: 5 INJECTION INTRAVENOUS at 15:00

## 2023-05-04 NOTE — PROGRESS NOTES
Patient to department in wheelchair with care taker at side. or second of two doses of venofer. Tolerated venofer infusion well with no adverse rx noted. Given avs with information about venofer. Verbally told about most common possible side effects. To follow up with provider regarding any further plan of care.

## 2023-05-09 ENCOUNTER — OFFICE VISIT (OUTPATIENT)
Dept: PULMONOLOGY | Age: 88
End: 2023-05-09
Payer: MEDICARE

## 2023-05-09 VITALS — HEART RATE: 68 BPM | OXYGEN SATURATION: 96 %

## 2023-05-09 DIAGNOSIS — J43.2 CENTRILOBULAR EMPHYSEMA (HCC): ICD-10-CM

## 2023-05-09 DIAGNOSIS — R06.2 WHEEZING: ICD-10-CM

## 2023-05-09 DIAGNOSIS — J47.9 BRONCHIECTASIS WITHOUT COMPLICATION (HCC): ICD-10-CM

## 2023-05-09 DIAGNOSIS — J96.11 CHRONIC RESPIRATORY FAILURE WITH HYPOXIA (HCC): ICD-10-CM

## 2023-05-09 DIAGNOSIS — R91.1 PULMONARY NODULE: ICD-10-CM

## 2023-05-09 DIAGNOSIS — J44.9 COPD, MODERATE (HCC): Primary | ICD-10-CM

## 2023-05-09 PROCEDURE — 1036F TOBACCO NON-USER: CPT | Performed by: INTERNAL MEDICINE

## 2023-05-09 PROCEDURE — 1111F DSCHRG MED/CURRENT MED MERGE: CPT | Performed by: INTERNAL MEDICINE

## 2023-05-09 PROCEDURE — G8417 CALC BMI ABV UP PARAM F/U: HCPCS | Performed by: INTERNAL MEDICINE

## 2023-05-09 PROCEDURE — 99214 OFFICE O/P EST MOD 30 MIN: CPT | Performed by: INTERNAL MEDICINE

## 2023-05-09 PROCEDURE — 1123F ACP DISCUSS/DSCN MKR DOCD: CPT | Performed by: INTERNAL MEDICINE

## 2023-05-09 PROCEDURE — 3023F SPIROM DOC REV: CPT | Performed by: INTERNAL MEDICINE

## 2023-05-09 PROCEDURE — G8427 DOCREV CUR MEDS BY ELIG CLIN: HCPCS | Performed by: INTERNAL MEDICINE

## 2023-05-09 PROCEDURE — 1090F PRES/ABSN URINE INCON ASSESS: CPT | Performed by: INTERNAL MEDICINE

## 2023-05-09 RX ORDER — BUDESONIDE, GLYCOPYRROLATE, AND FORMOTEROL FUMARATE 160; 9; 4.8 UG/1; UG/1; UG/1
2 AEROSOL, METERED RESPIRATORY (INHALATION) 2 TIMES DAILY
Qty: 1 EACH | Refills: 5 | Status: SHIPPED | OUTPATIENT
Start: 2023-05-09

## 2023-05-09 NOTE — PROGRESS NOTES
[facility]  Follow Up Note  Claudette Lisle, MD       Tura Dandy   YOB: 1935    Date of Visit:  5/9/2023    Assessment/Plan:  1. SOB  Stable    2. COPD, moderate/Emphysema/Bronchiectasis  CT Chest 3/20:    Impression   Emphysema. Bilateral bronchiectasis with mucous plugging. Scattered   bilateral airspace disease is partially improved as compared to exam dated   02/19/2020 with residual opacity at bilateral lung bases. New 5 mm nodule or focus of mucous plugging within the right middle lobe. Additional pulmonary nodules are not significantly changed. Continued   attention on CT follow-up recommended. Sequela of granulomatous disease. I have independently reviewed pulmonary function testing. PFT reveals mild to moderate COPD with no bronchodilator response. Medication Management:  The patient benefits from the medical regimen and reports no complications. Symbicort   Spiriva respimat  Xopenex  She now has a good HHN    3. Bronchiectasis/PN  Stable    4. PN  CT Chest 4/22  Impression   1. No active pulmonary disease. 2. COPD. 3. Increase in size of the right lower lobe pulmonary nodule measuring 8.6 mm.     CT last year showed the nodule had increased  Repeat CT chest without contrast      5. Gastroesophageal reflux disease, esophagitis presence not specified    6. Chronic hypoxemic Respirtory Failure  O2 sats are acceptable on supplemental O2. The patient benefits from the use of supplemental O2.    6 months    Chief Complaint   Patient presents with    Shortness of Breath     6 month Since last here had pacemaker placement     Discuss Medications     Spiriva Respimat is too hard for her to use        HPI  The patient presents with a chief complaint of moderate shortness of breath related to mild COPD of many years duration. He has mild associated cough. Exertion is a modifying factor.      She has had some

## 2023-05-10 DIAGNOSIS — N18.2 CONTROLLED TYPE 2 DIABETES MELLITUS WITH STAGE 2 CHRONIC KIDNEY DISEASE, WITHOUT LONG-TERM CURRENT USE OF INSULIN (HCC): ICD-10-CM

## 2023-05-10 DIAGNOSIS — E11.22 CONTROLLED TYPE 2 DIABETES MELLITUS WITH STAGE 2 CHRONIC KIDNEY DISEASE, WITHOUT LONG-TERM CURRENT USE OF INSULIN (HCC): ICD-10-CM

## 2023-05-10 RX ORDER — CALCIUM CITRATE/VITAMIN D3 200MG-6.25
TABLET ORAL
Qty: 100 STRIP | OUTPATIENT
Start: 2023-05-10

## 2023-05-19 RX ORDER — FLUTICASONE PROPIONATE 50 MCG
SPRAY, SUSPENSION (ML) NASAL
Qty: 48 G | Refills: 1 | OUTPATIENT
Start: 2023-05-19

## 2023-05-25 ENCOUNTER — OFFICE VISIT (OUTPATIENT)
Dept: CARDIOLOGY CLINIC | Age: 88
End: 2023-05-25

## 2023-05-25 ENCOUNTER — HOSPITAL ENCOUNTER (OUTPATIENT)
Age: 88
Discharge: HOME | End: 2023-05-25
Payer: MEDICARE

## 2023-05-25 ENCOUNTER — HOSPITAL ENCOUNTER (OUTPATIENT)
Dept: GENERAL RADIOLOGY | Age: 88
Discharge: HOME | End: 2023-05-25
Payer: MEDICARE

## 2023-05-25 VITALS
HEART RATE: 62 BPM | SYSTOLIC BLOOD PRESSURE: 124 MMHG | HEIGHT: 62 IN | WEIGHT: 160 LBS | BODY MASS INDEX: 29.44 KG/M2 | DIASTOLIC BLOOD PRESSURE: 68 MMHG | OXYGEN SATURATION: 93 %

## 2023-05-25 DIAGNOSIS — I50.32 CHRONIC DIASTOLIC CHF (CONGESTIVE HEART FAILURE) (HCC): ICD-10-CM

## 2023-05-25 DIAGNOSIS — R06.02 SOB (SHORTNESS OF BREATH): ICD-10-CM

## 2023-05-25 DIAGNOSIS — J44.9 CHRONIC OBSTRUCTIVE PULMONARY DISEASE, UNSPECIFIED COPD TYPE (HCC): ICD-10-CM

## 2023-05-25 DIAGNOSIS — I50.32 CHRONIC DIASTOLIC CHF (CONGESTIVE HEART FAILURE) (HCC): Primary | ICD-10-CM

## 2023-05-25 DIAGNOSIS — D50.9 IRON DEFICIENCY ANEMIA, UNSPECIFIED IRON DEFICIENCY ANEMIA TYPE: ICD-10-CM

## 2023-05-25 DIAGNOSIS — N28.9 RENAL INSUFFICIENCY: ICD-10-CM

## 2023-05-25 DIAGNOSIS — I48.0 PAF (PAROXYSMAL ATRIAL FIBRILLATION) (HCC): ICD-10-CM

## 2023-05-25 DIAGNOSIS — I10 HTN (HYPERTENSION), BENIGN: ICD-10-CM

## 2023-05-25 LAB
ANION GAP SERPL CALCULATED.3IONS-SCNC: 16 MMOL/L (ref 3–16)
BUN SERPL-MCNC: 33 MG/DL (ref 7–20)
CALCIUM SERPL-MCNC: 9.1 MG/DL (ref 8.3–10.6)
CHLORIDE SERPL-SCNC: 103 MMOL/L (ref 99–110)
CO2 SERPL-SCNC: 21 MMOL/L (ref 21–32)
CREAT SERPL-MCNC: 1.4 MG/DL (ref 0.6–1.2)
DEPRECATED RDW RBC AUTO: 18.3 % (ref 12.4–15.4)
GFR SERPLBLD CREATININE-BSD FMLA CKD-EPI: 36 ML/MIN/{1.73_M2}
GLUCOSE SERPL-MCNC: 105 MG/DL (ref 70–99)
HCT VFR BLD AUTO: 34.3 % (ref 36–48)
HGB BLD-MCNC: 11.3 G/DL (ref 12–16)
MCH RBC QN AUTO: 30.6 PG (ref 26–34)
MCHC RBC AUTO-ENTMCNC: 32.9 G/DL (ref 31–36)
MCV RBC AUTO: 93 FL (ref 80–100)
NT-PROBNP SERPL-MCNC: 362 PG/ML (ref 0–449)
PLATELET # BLD AUTO: 102 K/UL (ref 135–450)
PMV BLD AUTO: 9.4 FL (ref 5–10.5)
POTASSIUM SERPL-SCNC: 4.5 MMOL/L (ref 3.5–5.1)
RBC # BLD AUTO: 3.68 M/UL (ref 4–5.2)
SODIUM SERPL-SCNC: 140 MMOL/L (ref 136–145)
WBC # BLD AUTO: 6 K/UL (ref 4–11)

## 2023-05-25 PROCEDURE — 80048 BASIC METABOLIC PNL TOTAL CA: CPT

## 2023-05-25 PROCEDURE — 85027 COMPLETE CBC AUTOMATED: CPT

## 2023-05-25 PROCEDURE — 71046 X-RAY EXAM CHEST 2 VIEWS: CPT

## 2023-05-25 PROCEDURE — 83880 ASSAY OF NATRIURETIC PEPTIDE: CPT

## 2023-05-25 PROCEDURE — 36415 COLL VENOUS BLD VENIPUNCTURE: CPT

## 2023-05-25 NOTE — PROGRESS NOTES
present. Mild mitral & tricuspid regurgitation. Assessment:    1. Chronic diastolic CHF (congestive heart failure) ([address] on arb and sglt2   2. Chronic obstructive pulmonary disease, unspecified COPD type ([address]    3. Renal insufficiency    4. Iron deficiency anemia, unspecified iron deficiency anemia type    5. HTN (hypertension), benign    6. PAF (paroxysmal atrial fibrillation) ([facility]    7. Shortness of breath     Plan:   Patient Instructions   Increase lasix to 20 mg every day and take an extra 20 mg today  Check blood work and chest xray today  Continue all other medications  Wear your oxygen all the time  RTO in 2-3 months    I spoke to infusion center and they can not give IV lasix today but are available tomorrow if cxr shows HF.       I appreciate the opportunity of cooperating in the care of this individual.    Cornell Roland, KATHY - CNS, CNS, 5/25/2023, 3:59 PM

## 2023-05-25 NOTE — PATIENT INSTRUCTIONS
Increase lasix to 20 mg every day and take an extra 20 mg today  Check blood work and chest xray today  Continue all other medications  Wear your oxygen all the time  RTO in 2-3 months

## 2023-05-26 ENCOUNTER — HOSPITAL ENCOUNTER (OUTPATIENT)
Dept: ONCOLOGY | Age: 88
Setting detail: INFUSION SERIES
Discharge: HOME | End: 2023-05-26
Payer: MEDICARE

## 2023-05-26 ENCOUNTER — TELEPHONE (OUTPATIENT)
Dept: CARDIOLOGY CLINIC | Age: 88
End: 2023-05-26

## 2023-05-26 VITALS — DIASTOLIC BLOOD PRESSURE: 56 MMHG | HEART RATE: 59 BPM | RESPIRATION RATE: 16 BRPM | SYSTOLIC BLOOD PRESSURE: 125 MMHG

## 2023-05-26 PROCEDURE — 96374 THER/PROPH/DIAG INJ IV PUSH: CPT

## 2023-05-26 PROCEDURE — 6360000002 HC RX W HCPCS: Performed by: CLINICAL NURSE SPECIALIST

## 2023-05-26 PROCEDURE — 99211 OFF/OP EST MAY X REQ PHY/QHP: CPT

## 2023-05-26 RX ORDER — FUROSEMIDE 10 MG/ML
80 INJECTION INTRAMUSCULAR; INTRAVENOUS ONCE
Status: COMPLETED | OUTPATIENT
Start: 2023-05-26 | End: 2023-05-26

## 2023-05-26 RX ADMIN — FUROSEMIDE 80 MG: 10 INJECTION, SOLUTION INTRAMUSCULAR; INTRAVENOUS at 13:43

## 2023-05-26 NOTE — TELEPHONE ENCOUNTER
----- Message from KATHY Jameson sent at 5/26/2023  8:24 AM EDT -----  Her hemoglobin is better  Her chest xray showed fluid in her lungs and if not better this morning I suggest a dose of IV lasix in the infusion center which I can arrange  thanks    Spoke to patient's daughter she stated she is a bit better not coughing as much did cough up some phlegm. She is willing to do IV lasix has to be scheduled after 12 noon today. Spoke to patient's daughter she verbalized understanding.

## 2023-05-26 NOTE — PROGRESS NOTES
Patient  to infusion Center for 80 mg of IVP Lasix. IV obtained, blood return noted, IV 80 mg of Lasix given per order. AVS reviewed, printed and reviewed. Vlad injection with no adverse reaction noted. Pt discharged home with family.

## 2023-05-30 ENCOUNTER — TELEPHONE (OUTPATIENT)
Dept: CARDIOLOGY CLINIC | Age: 88
End: 2023-05-30

## 2023-05-30 DIAGNOSIS — I50.32 CHRONIC DIASTOLIC CHF (CONGESTIVE HEART FAILURE) (HCC): Primary | ICD-10-CM

## 2023-05-30 NOTE — TELEPHONE ENCOUNTER
Spoke to patient's daughter patient gained 4 pounds yesterday and had wheezing. she is having a better day today and has lost 3 pounds should they continue this dose?  Lasix 20 mg tablet taking 40 mg bid

## 2023-05-30 NOTE — TELEPHONE ENCOUNTER
Pt called ho called stating after the IV lasix pt was having hard time, they called the on call  and it was recommended for the pt to take 2 LASIX PM and 2 LASIX PM, pt daughter would like to know how long they should do that ? Or should they be doing something different?     Pls advise thank you

## 2023-05-30 NOTE — TELEPHONE ENCOUNTER
Continue the lasix 40 mg twice a day until her weight is down to 153  Check blood work in 2 weeks  If no improvement by the end of the week I should see her in the office next week  thanks

## 2023-05-31 NOTE — TELEPHONE ENCOUNTER
Tried to reach patient's daughter [facility] about nprg instructions. Asked her to call with any questions.

## 2023-06-05 ENCOUNTER — HOSPITAL ENCOUNTER (OUTPATIENT)
Dept: CT IMAGING | Age: 88
Discharge: HOME | End: 2023-06-05
Payer: MEDICARE

## 2023-06-05 ENCOUNTER — HOSPITAL ENCOUNTER (OUTPATIENT)
Age: 88
Discharge: HOME | End: 2023-06-05
Payer: MEDICARE

## 2023-06-05 DIAGNOSIS — R91.1 PULMONARY NODULE: ICD-10-CM

## 2023-06-05 DIAGNOSIS — I50.32 CHRONIC DIASTOLIC CHF (CONGESTIVE HEART FAILURE) (HCC): ICD-10-CM

## 2023-06-05 LAB
ANION GAP SERPL CALCULATED.3IONS-SCNC: 13 MMOL/L (ref 3–16)
BUN SERPL-MCNC: 62 MG/DL (ref 7–20)
CALCIUM SERPL-MCNC: 9.3 MG/DL (ref 8.3–10.6)
CHLORIDE SERPL-SCNC: 100 MMOL/L (ref 99–110)
CO2 SERPL-SCNC: 26 MMOL/L (ref 21–32)
CREAT SERPL-MCNC: 2 MG/DL (ref 0.6–1.2)
GFR SERPLBLD CREATININE-BSD FMLA CKD-EPI: 24 ML/MIN/{1.73_M2}
GLUCOSE SERPL-MCNC: 95 MG/DL (ref 70–99)
POTASSIUM SERPL-SCNC: 4.1 MMOL/L (ref 3.5–5.1)
SODIUM SERPL-SCNC: 139 MMOL/L (ref 136–145)

## 2023-06-05 PROCEDURE — 36415 COLL VENOUS BLD VENIPUNCTURE: CPT

## 2023-06-05 PROCEDURE — 80048 BASIC METABOLIC PNL TOTAL CA: CPT

## 2023-06-05 PROCEDURE — 83880 ASSAY OF NATRIURETIC PEPTIDE: CPT

## 2023-06-05 PROCEDURE — 71250 CT THORAX DX C-: CPT

## 2023-06-06 LAB — NT-PROBNP SERPL-MCNC: 597 PG/ML (ref 0–449)

## 2023-06-07 ENCOUNTER — TELEPHONE (OUTPATIENT)
Dept: CARDIOLOGY CLINIC | Age: 88
End: 2023-06-07

## 2023-06-07 DIAGNOSIS — I50.32 CHRONIC DIASTOLIC CHF (CONGESTIVE HEART FAILURE) (HCC): Primary | ICD-10-CM

## 2023-06-07 RX ORDER — FUROSEMIDE 20 MG/1
20 TABLET ORAL DAILY PRN
Qty: 30 TABLET | Refills: 0 | Status: SHIPPED
Start: 2023-06-07

## 2023-06-07 NOTE — TELEPHONE ENCOUNTER
----- Message from KATHY Sifuentes - CNS sent at 6/6/2023 11:54 AM EDT -----  See how she is feeling as she looks dry on the blood work  Confirm diuretics dosing  Thanks    Spoke to patient's daughter patient has been taking furosemide 20 mg every day twice a day.  Current weight is 155.7

## 2023-06-23 ENCOUNTER — TELEPHONE (OUTPATIENT)
Dept: PULMONOLOGY | Age: 88
End: 2023-06-23

## 2023-06-23 RX ORDER — GUAIFENESIN/DEXTROMETHORPHAN 100-10MG/5
5 SYRUP ORAL 3 TIMES DAILY PRN
Qty: 105 ML | Refills: 0 | Status: SHIPPED | OUTPATIENT
Start: 2023-06-23 | End: 2023-06-30

## 2023-06-23 RX ORDER — LEVOFLOXACIN 500 MG/1
500 TABLET, FILM COATED ORAL DAILY
Qty: 7 TABLET | Refills: 0 | Status: SHIPPED | OUTPATIENT
Start: 2023-06-23 | End: 2023-06-30

## 2023-06-23 RX ORDER — PREDNISONE 20 MG/1
40 TABLET ORAL DAILY
Qty: 14 TABLET | Refills: 0 | Status: SHIPPED | OUTPATIENT
Start: 2023-06-23 | End: 2023-06-30

## 2023-06-23 RX ORDER — LEVALBUTEROL INHALATION SOLUTION 0.63 MG/3ML
1 SOLUTION RESPIRATORY (INHALATION) EVERY 6 HOURS PRN
Qty: 3 ML | Refills: 5 | Status: SHIPPED | OUTPATIENT
Start: 2023-06-23

## 2023-06-23 NOTE — TELEPHONE ENCOUNTER
Spoke with patient's daughter, Asa Milner. Patient has had chest congestion, productive cough of brown sputum, scratchy throat and hoarse for about the last 5 days. Denies fever. She is using the Symbicort, Sprivia and Xopenex. LAST OFFICE VISIT  with Dr. Richard Pinto was 05/09/23. Daughter is requesting antibiotic to keep from having to take pt to ER over the weekend. Patient uses [facility]/ [place].

## 2023-06-23 NOTE — TELEPHONE ENCOUNTER
Pt left vm saying she is congested and coughing up brown mucus.     Would like something call in.    [facility] # [phone]

## 2023-06-27 ENCOUNTER — NURSE ONLY (OUTPATIENT)
Dept: CARDIOLOGY CLINIC | Age: 88
End: 2023-06-27
Payer: MEDICARE

## 2023-06-27 DIAGNOSIS — I49.5 TACHYCARDIA-BRADYCARDIA SYNDROME (HCC): ICD-10-CM

## 2023-06-27 DIAGNOSIS — I48.0 PAF (PAROXYSMAL ATRIAL FIBRILLATION) (HCC): ICD-10-CM

## 2023-06-27 DIAGNOSIS — I48.91 ATRIAL FIBRILLATION WITH RVR (HCC): ICD-10-CM

## 2023-06-27 DIAGNOSIS — R00.1 BRADYCARDIA: ICD-10-CM

## 2023-06-27 DIAGNOSIS — Z95.0 PACEMAKER: ICD-10-CM

## 2023-06-27 LAB
CATARACTS: NEGATIVE
DIABETIC RETINOPATHY: NEGATIVE
GLAUCOMA: NEGATIVE
INTRAOCULAR PRESSURE EYE: 10
INTRAOCULAR PRESSURE EYE: 10
VISUAL ACUITY DISTANCE LEFT EYE: NORMAL
VISUAL ACUITY DISTANCE RIGHT EYE: NORMAL

## 2023-06-27 PROCEDURE — 93294 REM INTERROG EVL PM/LDLS PM: CPT | Performed by: INTERNAL MEDICINE

## 2023-06-27 PROCEDURE — 93296 REM INTERROG EVL PM/IDS: CPT | Performed by: INTERNAL MEDICINE

## 2023-07-10 ENCOUNTER — HOSPITAL ENCOUNTER (OUTPATIENT)
Dept: PET IMAGING | Age: 88
Discharge: HOME | End: 2023-07-10
Payer: MEDICARE

## 2023-07-10 DIAGNOSIS — R91.1 LUNG NODULE: ICD-10-CM

## 2023-07-10 PROCEDURE — A9552 F18 FDG: HCPCS | Performed by: INTERNAL MEDICINE

## 2023-07-10 PROCEDURE — 78815 PET IMAGE W/CT SKULL-THIGH: CPT

## 2023-07-10 PROCEDURE — 3430000000 HC RX DIAGNOSTIC RADIOPHARMACEUTICAL: Performed by: INTERNAL MEDICINE

## 2023-07-10 RX ORDER — FLUDEOXYGLUCOSE F 18 200 MCI/ML
16.43 INJECTION, SOLUTION INTRAVENOUS
Status: COMPLETED | OUTPATIENT
Start: 2023-07-10 | End: 2023-07-10

## 2023-07-10 RX ADMIN — FLUDEOXYGLUCOSE F 18 16.43 MILLICURIE: 200 INJECTION, SOLUTION INTRAVENOUS at 10:15

## 2023-07-14 NOTE — RESULT ENCOUNTER NOTE
Please call the patient  PET scan is negative  Continue CT follow up  Repeat CT in 6 months, please scheudle.

## 2023-07-25 ENCOUNTER — OFFICE VISIT (OUTPATIENT)
Dept: CARDIOLOGY CLINIC | Age: 88
End: 2023-07-25
Payer: MEDICARE

## 2023-07-25 VITALS
HEIGHT: 62 IN | DIASTOLIC BLOOD PRESSURE: 70 MMHG | OXYGEN SATURATION: 97 % | SYSTOLIC BLOOD PRESSURE: 120 MMHG | BODY MASS INDEX: 29.44 KG/M2 | WEIGHT: 160 LBS | HEART RATE: 62 BPM

## 2023-07-25 DIAGNOSIS — I48.0 PAF (PAROXYSMAL ATRIAL FIBRILLATION) (HCC): ICD-10-CM

## 2023-07-25 DIAGNOSIS — J44.9 CHRONIC OBSTRUCTIVE PULMONARY DISEASE, UNSPECIFIED COPD TYPE (HCC): ICD-10-CM

## 2023-07-25 DIAGNOSIS — I50.32 CHRONIC DIASTOLIC CHF (CONGESTIVE HEART FAILURE) (HCC): Primary | ICD-10-CM

## 2023-07-25 DIAGNOSIS — N28.9 RENAL INSUFFICIENCY: ICD-10-CM

## 2023-07-25 DIAGNOSIS — I10 HTN (HYPERTENSION), BENIGN: ICD-10-CM

## 2023-07-25 DIAGNOSIS — D50.9 IRON DEFICIENCY ANEMIA, UNSPECIFIED IRON DEFICIENCY ANEMIA TYPE: ICD-10-CM

## 2023-07-25 PROCEDURE — G8417 CALC BMI ABV UP PARAM F/U: HCPCS | Performed by: CLINICAL NURSE SPECIALIST

## 2023-07-25 PROCEDURE — 99214 OFFICE O/P EST MOD 30 MIN: CPT | Performed by: CLINICAL NURSE SPECIALIST

## 2023-07-25 PROCEDURE — G8427 DOCREV CUR MEDS BY ELIG CLIN: HCPCS | Performed by: CLINICAL NURSE SPECIALIST

## 2023-07-25 PROCEDURE — 3023F SPIROM DOC REV: CPT | Performed by: CLINICAL NURSE SPECIALIST

## 2023-07-25 PROCEDURE — 1036F TOBACCO NON-USER: CPT | Performed by: CLINICAL NURSE SPECIALIST

## 2023-07-25 PROCEDURE — 1123F ACP DISCUSS/DSCN MKR DOCD: CPT | Performed by: CLINICAL NURSE SPECIALIST

## 2023-07-25 PROCEDURE — 1090F PRES/ABSN URINE INCON ASSESS: CPT | Performed by: CLINICAL NURSE SPECIALIST

## 2023-07-25 NOTE — PROGRESS NOTES
[facility]  Progress Note    Primary Care Doctor:  Jackolyn Favre, MD    Chief Complaint   Patient presents with    Follow-up    Congestive Heart Failure        History of Present Illness:  80 y.o. female with history of 80year old female with history of hypertension, hyperlipidemia, CAD, AF, pacemaker 2/24/2023, chronic anticoag with eliquis, chronic diastolic heart failure, tobacco abuse, COPD, ronnie, oxygen dependence and DM  4/7-10/2023 for shortness of breath, wheezing, cough and chest tightness for 3 days. She was diagnosed with UTI,antibiotic. Lasix was prn and she was not taking this at home. Anemic and received 2 doses of IV venofer. She has been drinking liberally and likes salty snacks. She is on 2 L of oxygen at home. She was started on valsartan and clonidine increased due to hypertension. I had the pleasure of seeing Jaja Pool in follow up for diastolic heart failure. She is in a wheel chair, with her daughter and aid, Ermias Hanson. She is on 2 L of oxygen via concentrator. She had PET scan done and good results. She denies any increased shortness of breath, palpitations, lightheadedness, edema or chest pain. Her PCP has referred her to nephrology. She had blood work done last week which are not available at this time. She is taking all her medications and lasix is 40 mg alternating with 20 mg    Past Medical History:   has a past medical history of Arthritis, Atrial fibrillation ([address]), Bursitis, COPD (chronic obstructive pulmonary disease) ([address]), Diabetes mellitus type II, controlled ([address]), Diastolic CHF ([address]), GERD (gastroesophageal reflux disease), HTN (hypertension), Hyperlipidemia, Hypothyroid, Lumbago, Parkinson disease ([address]), Restless legs syndrome (RLS), and SVT (supraventricular tachycardia) ([address] Surgical History:   has a past surgical history that includes Appendectomy; Colonoscopy;  Cholecystectomy, laparoscopic (02/24/2013); and Pacemaker insertion

## 2023-07-27 ENCOUNTER — TELEPHONE (OUTPATIENT)
Dept: CARDIOLOGY CLINIC | Age: 88
End: 2023-07-27

## 2023-07-27 NOTE — TELEPHONE ENCOUNTER
Blood work from Dr Alexey Maurer office 7/17/2023  Creat improved to 1.35 potassium 4.5  Hgb 10.9 but not iron deficient  Much improved  Continue current medications  thanks

## 2023-08-01 ENCOUNTER — TELEPHONE (OUTPATIENT)
Dept: PULMONOLOGY | Age: 88
End: 2023-08-01

## 2023-08-01 RX ORDER — METOPROLOL SUCCINATE 25 MG/1
25 TABLET, EXTENDED RELEASE ORAL DAILY
Qty: 90 TABLET | Refills: 1 | Status: SHIPPED | OUTPATIENT
Start: 2023-08-01

## 2023-08-01 NOTE — TELEPHONE ENCOUNTER
Last OV:11/01/2022  Flako  Last Labs:Ekg-04/07/2023  Brandon  Next OV: 11/02/2023  Zoila Mcneal Courser  Last Refill: Metoprolol-05/02/2023 Flako

## 2023-08-01 NOTE — TELEPHONE ENCOUNTER
Patients daughter called and said that patient has been coughing up brown phlegm been going on for about 2 days getting progressively worse. Chest hurts has wheezing.      PH: [phone] [facility] [address] [place] [address] ([facility]), [address] [place] [address] [phone]

## 2023-08-02 RX ORDER — LEVOFLOXACIN 500 MG/1
500 TABLET, FILM COATED ORAL DAILY
Qty: 7 TABLET | Refills: 0 | Status: SHIPPED | OUTPATIENT
Start: 2023-08-02 | End: 2023-08-09

## 2023-08-02 RX ORDER — PREDNISONE 20 MG/1
40 TABLET ORAL DAILY
Qty: 14 TABLET | Refills: 0 | Status: SHIPPED | OUTPATIENT
Start: 2023-08-02 | End: 2023-08-09

## 2023-10-02 ENCOUNTER — TELEPHONE (OUTPATIENT)
Dept: PULMONOLOGY | Age: 88
End: 2023-10-02

## 2023-10-02 RX ORDER — BUDESONIDE, GLYCOPYRROLATE, AND FORMOTEROL FUMARATE 160; 9; 4.8 UG/1; UG/1; UG/1
2 AEROSOL, METERED RESPIRATORY (INHALATION) 2 TIMES DAILY
Qty: 1 EACH | Refills: 5 | Status: SHIPPED | OUTPATIENT
Start: 2023-10-02

## 2023-10-02 NOTE — TELEPHONE ENCOUNTER
Patient called and needs a refill on medication.      [facility] [address] ([facility]), [address] [facility]     [address] [address]: [phone]

## 2023-10-02 NOTE — TELEPHONE ENCOUNTER
Last appointment:  5/9/2023    Next appointment:  11/10/2023    Last refill:     budesonide-formoterol (SYMBICORT) 160-4.5 MCG/ACT inhaler 2 puff   [[id number]]  Order Details  Ordered Dose: 2 puff Route: Inhalation Frequency: 2 TIMES DAILY RESP   Admin Dose: 2 puff      Scheduled Start Date/Time: 04/07/23 1345 End Date/Time: 04/07/23 1331

## 2023-10-03 ENCOUNTER — APPOINTMENT (OUTPATIENT)
Dept: CT IMAGING | Age: 88
End: 2023-10-03
Attending: EMERGENCY MEDICINE
Payer: MEDICARE

## 2023-10-03 ENCOUNTER — APPOINTMENT (OUTPATIENT)
Dept: GENERAL RADIOLOGY | Age: 88
End: 2023-10-03
Payer: MEDICARE

## 2023-10-03 ENCOUNTER — HOSPITAL ENCOUNTER (EMERGENCY)
Age: 88
Discharge: HOME | End: 2023-10-03
Attending: EMERGENCY MEDICINE
Payer: MEDICARE

## 2023-10-03 VITALS
BODY MASS INDEX: 29.26 KG/M2 | WEIGHT: 159 LBS | SYSTOLIC BLOOD PRESSURE: 157 MMHG | DIASTOLIC BLOOD PRESSURE: 69 MMHG | TEMPERATURE: 98.3 F | HEART RATE: 64 BPM | RESPIRATION RATE: 11 BRPM | OXYGEN SATURATION: 96 % | HEIGHT: 62 IN

## 2023-10-03 DIAGNOSIS — S22.31XA CLOSED FRACTURE OF ONE RIB OF RIGHT SIDE, INITIAL ENCOUNTER: ICD-10-CM
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
## 2023-10-03 PROBLEM — R00.1 BRADYCARDIA: Status: RESOLVED | Noted: 2023-02-23 | Resolved: 2023-10-03

## 2023-10-03 PROBLEM — I16.0 HYPERTENSIVE URGENCY, MALIGNANT: Status: RESOLVED | Noted: 2022-08-21 | Resolved: 2023-10-03

## 2023-10-03 PROBLEM — E87.1 HYPONATREMIA: Status: RESOLVED | Noted: 2023-02-03 | Resolved: 2023-10-03

## 2023-10-03 PROBLEM — R79.89 ELEVATED SERUM CREATININE: Status: RESOLVED | Noted: 2023-02-03 | Resolved: 2023-10-03

## 2023-10-03 PROBLEM — R33.9 URINARY RETENTION: Status: RESOLVED | Noted: 2023-02-03 | Resolved: 2023-10-03

## 2023-10-03 PROBLEM — J18.9 PNEUMONIA DUE TO INFECTIOUS ORGANISM: Status: RESOLVED | Noted: 2023-01-26 | Resolved: 2023-10-03

## 2023-10-03 LAB
ALBUMIN SERPL-MCNC: 4.4 G/DL (ref 3.4–5)
ALBUMIN/GLOB SERPL: 2 {RATIO} (ref 1.1–2.2)
ALP SERPL-CCNC: 164 U/L (ref 40–129)
ALT SERPL-CCNC: 15 U/L (ref 10–40)
ANION GAP SERPL CALCULATED.3IONS-SCNC: 11 MMOL/L (ref 3–16)
AST SERPL-CCNC: 29 U/L (ref 15–37)
BASOPHILS # BLD: 0 K/UL (ref 0–0.2)
BASOPHILS NFR BLD: 0.5 %
BILIRUB SERPL-MCNC: 0.3 MG/DL (ref 0–1)
BUN SERPL-MCNC: 36 MG/DL (ref 7–20)
CALCIUM SERPL-MCNC: 9.3 MG/DL (ref 8.3–10.6)
CHLORIDE SERPL-SCNC: 103 MMOL/L (ref 99–110)
CO2 SERPL-SCNC: 22 MMOL/L (ref 21–32)
CREAT SERPL-MCNC: 1.5 MG/DL (ref 0.6–1.2)
DEPRECATED RDW RBC AUTO: 14.6 % (ref 12.4–15.4)
EKG ATRIAL RATE: 62 BPM
EKG DIAGNOSIS: NORMAL
EKG Q-T INTERVAL: 438 MS
EKG QRS DURATION: 94 MS
EKG QTC CALCULATION (BAZETT): 451 MS
EKG R AXIS: 10 DEGREES
EKG T AXIS: 63 DEGREES
EKG VENTRICULAR RATE: 64 BPM
EOSINOPHIL # BLD: 0.2 K/UL (ref 0–0.6)
EOSINOPHIL NFR BLD: 2.8 %
GFR SERPLBLD CREATININE-BSD FMLA CKD-EPI: 33 ML/MIN/{1.73_M2}
GLUCOSE SERPL-MCNC: 123 MG/DL (ref 70–99)
HCT VFR BLD AUTO: 38.5 % (ref 36–48)
HGB BLD-MCNC: 12.3 G/DL (ref 12–16)
LYMPHOCYTES # BLD: 1.6 K/UL (ref 1–5.1)
LYMPHOCYTES NFR BLD: 28.8 %
MCH RBC QN AUTO: 31.3 PG (ref 26–34)
MCHC RBC AUTO-ENTMCNC: 31.8 G/DL (ref 31–36)
MCV RBC AUTO: 98.2 FL (ref 80–100)
MONOCYTES # BLD: 0.4 K/UL (ref 0–1.3)
MONOCYTES NFR BLD: 6.3 %
NEUTROPHILS # BLD: 3.5 K/UL (ref 1.7–7.7)
NEUTROPHILS NFR BLD: 61.6 %
PLATELET # BLD AUTO: 131 K/UL (ref 135–450)
PMV BLD AUTO: 8.9 FL (ref 5–10.5)
POTASSIUM SERPL-SCNC: 5.6 MMOL/L (ref 3.5–5.1)
PROT SERPL-MCNC: 6.6 G/DL (ref 6.4–8.2)
RBC # BLD AUTO: 3.92 M/UL (ref 4–5.2)
SODIUM SERPL-SCNC: 136 MMOL/L (ref 136–145)
WBC # BLD AUTO: 5.7 K/UL (ref 4–11)

## 2023-10-03 PROCEDURE — 72125 CT NECK SPINE W/O DYE: CPT

## 2023-10-03 PROCEDURE — 85025 COMPLETE CBC W/AUTO DIFF WBC: CPT

## 2023-10-03 PROCEDURE — 83880 ASSAY OF NATRIURETIC PEPTIDE: CPT

## 2023-10-03 PROCEDURE — 6370000000 HC RX 637 (ALT 250 FOR IP): Performed by: EMERGENCY MEDICINE

## 2023-10-03 PROCEDURE — 6360000002 HC RX W HCPCS: Performed by: EMERGENCY MEDICINE

## 2023-10-03 PROCEDURE — 93010 ELECTROCARDIOGRAM REPORT: CPT | Performed by: INTERNAL MEDICINE

## 2023-10-03 PROCEDURE — 71250 CT THORAX DX C-: CPT

## 2023-10-03 PROCEDURE — 93005 ELECTROCARDIOGRAM TRACING: CPT | Performed by: EMERGENCY MEDICINE

## 2023-10-03 PROCEDURE — 73060 X-RAY EXAM OF HUMERUS: CPT

## 2023-10-03 PROCEDURE — 99285 EMERGENCY DEPT VISIT HI MDM: CPT

## 2023-10-03 PROCEDURE — 70450 CT HEAD/BRAIN W/O DYE: CPT

## 2023-10-03 PROCEDURE — 73030 X-RAY EXAM OF SHOULDER: CPT

## 2023-10-03 PROCEDURE — 80053 COMPREHEN METABOLIC PANEL: CPT

## 2023-10-03 PROCEDURE — 96374 THER/PROPH/DIAG INJ IV PUSH: CPT

## 2023-10-03 RX ORDER — LIDOCAINE 50 MG/G
1 PATCH TOPICAL DAILY
Qty: 30 PATCH | Refills: 0 | Status: SHIPPED | OUTPATIENT
Start: 2023-10-03

## 2023-10-03 RX ORDER — METOPROLOL SUCCINATE 25 MG/1
25 TABLET, EXTENDED RELEASE ORAL ONCE
Status: COMPLETED | OUTPATIENT
Start: 2023-10-03 | End: 2023-10-03

## 2023-10-03 RX ORDER — CITALOPRAM HYDROBROMIDE 10 MG/1
10 TABLET ORAL DAILY
COMMUNITY

## 2023-10-03 RX ORDER — CLONIDINE HYDROCHLORIDE 0.1 MG/1
0.1 TABLET ORAL ONCE
Status: COMPLETED | OUTPATIENT
Start: 2023-10-03 | End: 2023-10-03

## 2023-10-03 RX ORDER — MORPHINE SULFATE 4 MG/ML
4 INJECTION, SOLUTION INTRAMUSCULAR; INTRAVENOUS
Status: COMPLETED | OUTPATIENT
Start: 2023-10-03 | End: 2023-10-03

## 2023-10-03 RX ORDER — OXYCODONE HYDROCHLORIDE 5 MG/1
5 TABLET ORAL EVERY 6 HOURS PRN
Qty: 12 TABLET | Refills: 0 | Status: SHIPPED | OUTPATIENT
Start: 2023-10-03 | End: 2023-10-06

## 2023-10-03 RX ADMIN — METOPROLOL SUCCINATE 25 MG: 25 TABLET, EXTENDED RELEASE ORAL at 10:54

## 2023-10-03 RX ADMIN — MORPHINE SULFATE 4 MG: 4 INJECTION, SOLUTION INTRAMUSCULAR; INTRAVENOUS at 08:50

## 2023-10-03 RX ADMIN — CLONIDINE HYDROCHLORIDE 0.1 MG: 0.1 TABLET ORAL at 10:55

## 2023-10-03 ASSESSMENT — PAIN SCALES - GENERAL
PAINLEVEL_OUTOF10: 9
PAINLEVEL_OUTOF10: 8
PAINLEVEL_OUTOF10: 9

## 2023-10-03 ASSESSMENT — PAIN DESCRIPTION - LOCATION: LOCATION: CHEST;BACK

## 2023-10-03 ASSESSMENT — PAIN - FUNCTIONAL ASSESSMENT: PAIN_FUNCTIONAL_ASSESSMENT: 0-10

## 2023-10-03 NOTE — DISCHARGE INSTRUCTIONS
You have a small fracture to rib 12 on the right side. Use Tylenol for pain along with the Lidoderm patch. Please return for any concern.

## 2023-10-03 NOTE — ED PROVIDER NOTES
Tenderness: There is abdominal tenderness (Generalized abdominal tenderness). There is no guarding or rebound. Musculoskeletal:         General: Normal range of motion. Cervical back: Normal range of motion. No rigidity. Right lower leg: No edema. Left lower leg: No edema. Skin:     General: Skin is warm and dry. Findings: No rash. Neurological:      General: No focal deficit present. Mental Status: She is alert and oriented to person, place, and time. Psychiatric:         Mood and Affect: Mood normal.         Behavior: Behavior normal.        BRIEF DIFFERENTIAL DIAGNOSIS  1. Traumatic thoracic injury  2. Traumatic abdominal injury  3. Contusion  4. Arm fracture  5. Head injury    INDEPENDENT EKG INTERPRETATION:  None    LABS  I have personally reviewed all labs for this visit.    Results for orders placed or performed during the hospital encounter of 10/03/23   CBC with Auto Differential   Result Value Ref Range    WBC 5.7 4.0 - 11.0 K/uL    RBC 3.92 (L) 4.00 - 5.20 M/uL    Hemoglobin 12.3 12.0 - 16.0 g/dL    Hematocrit 38.5 36.0 - 48.0 %    MCV 98.2 80.0 - 100.0 fL    MCH 31.3 26.0 - 34.0 pg    MCHC 31.8 31.0 - 36.0 g/dL    RDW 14.6 12.4 - 15.4 %    Platelets 671 (L) 712 - 450 K/uL    MPV 8.9 5.0 - 10.5 fL    Neutrophils % 61.6 %    Lymphocytes % 28.8 %    Monocytes % 6.3 %    Eosinophils % 2.8 %    Basophils % 0.5 %    Neutrophils Absolute 3.5 1.7 - 7.7 K/uL    Lymphocytes Absolute 1.6 1.0 - 5.1 K/uL    Monocytes Absolute 0.4 0.0 - 1.3 K/uL    Eosinophils Absolute 0.2 0.0 - 0.6 K/uL    Basophils Absolute 0.0 0.0 - 0.2 K/uL   CMP w/ Reflex to MG   Result Value Ref Range    Sodium 136 136 - 145 mmol/L    Potassium reflex Magnesium 5.6 (H) 3.5 - 5.1 mmol/L    Chloride 103 99 - 110 mmol/L    CO2 22 21 - 32 mmol/L    Anion Gap 11 3 - 16    Glucose 123 (H) 70 - 99 mg/dL    BUN 36 (H) 7 - 20 mg/dL    Creatinine 1.5 (H) 0.6 - 1.2 mg/dL    Est, Glom Filt Rate 33 (A) >60    Calcium 9.3

## 2023-10-03 NOTE — PROGRESS NOTES
[facility]   Electrophysiology  Prince Baker, APRN-MU  Attending EP: Dr. Ruma Callahan    Date: 11/2/2023  I had the privilege of visiting Chaitanya Lopez in the office. Chief Complaint:   Chief Complaint   Patient presents with    Device Check     With appt     History of Present Illness: History obtained from patient and medical record. Chaitanya Lopez is 80 y.o. female with a past medical history of dCHF, HTN, HLD, DM, COPD on oxygen, hypothyroid, and atrial fibrillation. S/p TRACEY/DCCV (8/31/18, Dr. Jose Persaud) and placed on propafenone and Xarelto. S/p DCCV (3/12/21, 10/4/22). In February of 2023, she presented with dizziness and found to have symptomatic bradycardia s/p PPM (2/24/23). -Interval history: Today, Chaitanya Lopez is being seen for routine follow up. She is doing well, present in a wheel chair on 2L of oxygen (baseline). Her device is functioning normally. She has low AF burden. Her last significant was on October 14th lasting 10 hours. Her daughter had to have emergent open heart surgery that day so she was very stressed. No s/s of CHF. They monitor her very closely for swelling/weight gain. Denies having chest pain, palpitations, orthopnea/PND, cough, or dizziness at the time of this visit. With regard to medication therapy the patient has been compliant with prescribed regimen. They have tolerated therapy to date. Allergies:   Allergies   Allergen Reactions    Adhesive Tape     Cephalexin Shortness Of Breath and Other (See Comments)     Struggling to breath, almost passing out/ very low oxygen and pulse    Hydrochlorothiazide Shortness Of Breath     Sob  Other reaction(s): sob    Peach [Prunus Persica] Itching and Swelling     Cannot eat raw peaches, but can eat canned peaches    Cefaclor Nausea And Vomiting     Other reaction(s): Chest pain, Nausea / Vomiting    Nsaids      Pt states she has hives and heart races   Hives / Skin Rash, Tachycardia

## 2023-10-04 NOTE — TELEPHONE ENCOUNTER
Pts daughter called in stating that the pharmacy still does not have the script. Please update.      [phone]

## 2023-10-06 ENCOUNTER — TELEPHONE (OUTPATIENT)
Dept: CARDIOLOGY CLINIC | Age: 88
End: 2023-10-06

## 2023-10-06 ENCOUNTER — TELEPHONE (OUTPATIENT)
Dept: PULMONOLOGY | Age: 88
End: 2023-10-06

## 2023-10-06 DIAGNOSIS — R06.02 SOB (SHORTNESS OF BREATH): ICD-10-CM

## 2023-10-06 DIAGNOSIS — R06.02 SOB (SHORTNESS OF BREATH): Primary | ICD-10-CM

## 2023-10-06 LAB — NT-PROBNP SERPL-MCNC: 920 PG/ML (ref 0–449)

## 2023-10-06 PROCEDURE — 93296 REM INTERROG EVL PM/IDS: CPT | Performed by: INTERNAL MEDICINE

## 2023-10-06 PROCEDURE — 93294 REM INTERROG EVL PM/LDLS PM: CPT | Performed by: INTERNAL MEDICINE

## 2023-10-06 RX ORDER — PREDNISONE 20 MG/1
40 TABLET ORAL DAILY
Qty: 10 TABLET | Refills: 0 | Status: SHIPPED | OUTPATIENT
Start: 2023-10-06 | End: 2023-10-11

## 2023-10-06 RX ORDER — AZITHROMYCIN 250 MG/1
250 TABLET, FILM COATED ORAL SEE ADMIN INSTRUCTIONS
Qty: 6 TABLET | Refills: 0 | Status: SHIPPED | OUTPATIENT
Start: 2023-10-06 | End: 2023-10-11

## 2023-10-06 NOTE — TELEPHONE ENCOUNTER
Pt daughter calling stating pt has fluid around her heart, has gained 5 days in the last day, please advise   Thank you

## 2023-10-06 NOTE — TELEPHONE ENCOUNTER
Pharmacy was informed that it is ok to take since it is only a 5 day antibiotic per Dr Janeth Rodrigues

## 2023-10-06 NOTE — TELEPHONE ENCOUNTER
Add bnp to blood in lab from 10/3/2023  Ask her to take lasix 40 mg daily until her weight is down those 5 pounds   She has an appt next week with me  Make sure she is taking the jardiance every day

## 2023-10-06 NOTE — TELEPHONE ENCOUNTER
[facility] pharmacy called and said patient is on citalopram and said those cross with the [facility] and wanted to make sure it was ok

## 2023-10-06 NOTE — TELEPHONE ENCOUNTER
Spoke to patient's daughter she gave the patient 40mg lasix tablets this morning. Her feet are swollen and sob, She gave her 60 mg lasix yesterday, she has gained 5 pounds in 3 days even with the extra lasix she has gain a pound, breathing today a bit better, She was on 4 L of oxygen now down 2 L, She has a fractured rib she went to the ER Tuesday morning she has been seen by PCP for follow up.  Current weight 165,   Monday she was 160, Wednesday 159, Thursday 164

## 2023-10-08 NOTE — TELEPHONE ENCOUNTER
Pt advise Reviewed discharge instructions and prescription x 1 sent to selected Pharmacy w/ pt and visitor, verbalized understanding to information provided including follow up care w/ PCP, return precautions and medication, denied further questions/concerns.  Pt ambulated from ED w/ visitor.

## 2023-10-10 ENCOUNTER — OFFICE VISIT (OUTPATIENT)
Dept: CARDIOLOGY CLINIC | Age: 88
End: 2023-10-10
Payer: MEDICARE

## 2023-10-10 VITALS
BODY MASS INDEX: 29.08 KG/M2 | DIASTOLIC BLOOD PRESSURE: 60 MMHG | SYSTOLIC BLOOD PRESSURE: 120 MMHG | HEIGHT: 62 IN | WEIGHT: 158 LBS | OXYGEN SATURATION: 98 % | HEART RATE: 48 BPM

## 2023-10-10 DIAGNOSIS — I10 HTN (HYPERTENSION), BENIGN: ICD-10-CM

## 2023-10-10 DIAGNOSIS — J44.9 CHRONIC OBSTRUCTIVE PULMONARY DISEASE, UNSPECIFIED COPD TYPE (HCC): ICD-10-CM

## 2023-10-10 DIAGNOSIS — R00.1 BRADYCARDIA: ICD-10-CM

## 2023-10-10 DIAGNOSIS — N28.9 RENAL INSUFFICIENCY: ICD-10-CM

## 2023-10-10 DIAGNOSIS — I50.32 CHRONIC DIASTOLIC CHF (CONGESTIVE HEART FAILURE) (HCC): Primary | ICD-10-CM

## 2023-10-10 DIAGNOSIS — D50.9 IRON DEFICIENCY ANEMIA, UNSPECIFIED IRON DEFICIENCY ANEMIA TYPE: ICD-10-CM

## 2023-10-10 DIAGNOSIS — I48.0 PAF (PAROXYSMAL ATRIAL FIBRILLATION) (HCC): ICD-10-CM

## 2023-10-10 PROCEDURE — 1123F ACP DISCUSS/DSCN MKR DOCD: CPT | Performed by: CLINICAL NURSE SPECIALIST

## 2023-10-10 PROCEDURE — 93000 ELECTROCARDIOGRAM COMPLETE: CPT | Performed by: CLINICAL NURSE SPECIALIST

## 2023-10-10 PROCEDURE — 3023F SPIROM DOC REV: CPT | Performed by: CLINICAL NURSE SPECIALIST

## 2023-10-10 PROCEDURE — 99214 OFFICE O/P EST MOD 30 MIN: CPT | Performed by: CLINICAL NURSE SPECIALIST

## 2023-10-10 PROCEDURE — G8427 DOCREV CUR MEDS BY ELIG CLIN: HCPCS | Performed by: CLINICAL NURSE SPECIALIST

## 2023-10-10 PROCEDURE — G8484 FLU IMMUNIZE NO ADMIN: HCPCS | Performed by: CLINICAL NURSE SPECIALIST

## 2023-10-10 PROCEDURE — 1090F PRES/ABSN URINE INCON ASSESS: CPT | Performed by: CLINICAL NURSE SPECIALIST

## 2023-10-10 PROCEDURE — G8417 CALC BMI ABV UP PARAM F/U: HCPCS | Performed by: CLINICAL NURSE SPECIALIST

## 2023-10-10 PROCEDURE — 1036F TOBACCO NON-USER: CPT | Performed by: CLINICAL NURSE SPECIALIST

## 2023-10-10 RX ORDER — CLONIDINE HYDROCHLORIDE 0.1 MG/1
0.1 TABLET ORAL DAILY
Qty: 60 TABLET | Refills: 0 | Status: SHIPPED
Start: 2023-10-10

## 2023-10-10 NOTE — PROGRESS NOTES
[facility]  Progress Note    Primary Care Doctor:  Lawyer Kali MD    Chief Complaint   Patient presents with    Follow-up    Congestive Heart Failure        History of Present Illness:  80 y.o. female with history of 80year old female with history of hypertension, hyperlipidemia, CAD, AF, pacemaker 2/24/2023, chronic anticoag with eliquis, chronic diastolic heart failure, tobacco abuse, COPD, ronnie, oxygen dependence and DM  4/7-10/2023 for shortness of breath, wheezing, cough and chest tightness for 3 days. She was diagnosed with UTI,antibiotic. Lasix was prn and she was not taking this at home. Anemic and received 2 doses of IV venofer. She has been drinking liberally and likes salty snacks. She is on 2 L of oxygen at home. She was started on valsartan and clonidine increased due to hypertension. I had the pleasure of seeing Tino Cleveland in follow up for diastolic heart failure. She is in a wheel chair, with her daughter and aid, Chaparrita Jung. She is on 2 L of oxygen via concentrator. She recently fell and broke twelfth rib. She was in the ER and CT showed increased fluid but no changes made. Her daughter called me about ankle swelling and lasix changed to 40 mg alternating with 20 mg. Her swelling in ankles is gone. Her HR today is 48 and EKG shows HR 59 sinus bradycardia. She is on clonidine, dilt, flecanide and metoprolol. No chest pain, palpitations, lightheadedness, edema or increased shortness of breath. During the leg swelling, her oxygen was up to 5 L     Past Medical History:   has a past medical history of Arthritis, Atrial fibrillation ([address]), Bursitis, COPD (chronic obstructive pulmonary disease) ([address]), Diabetes mellitus type II, controlled ([address]), Diastolic CHF ([address]), GERD (gastroesophageal reflux disease), HTN (hypertension), Hyperlipidemia, Hypothyroid, Lumbago, Parkinson disease, Restless legs syndrome (RLS), and SVT (supraventricular tachycardia).   Surgical none

## 2023-10-24 ENCOUNTER — HOSPITAL ENCOUNTER (OUTPATIENT)
Age: 88
Discharge: HOME | End: 2023-10-24
Payer: MEDICARE

## 2023-10-24 DIAGNOSIS — I50.32 CHRONIC DIASTOLIC CHF (CONGESTIVE HEART FAILURE) (HCC): ICD-10-CM

## 2023-10-24 LAB
ANION GAP SERPL CALCULATED.3IONS-SCNC: 10 MMOL/L (ref 3–16)
BUN SERPL-MCNC: 39 MG/DL (ref 7–20)
CALCIUM SERPL-MCNC: 9.2 MG/DL (ref 8.3–10.6)
CHLORIDE SERPL-SCNC: 102 MMOL/L (ref 99–110)
CO2 SERPL-SCNC: 26 MMOL/L (ref 21–32)
CREAT SERPL-MCNC: 1.4 MG/DL (ref 0.6–1.2)
GFR SERPLBLD CREATININE-BSD FMLA CKD-EPI: 36 ML/MIN/{1.73_M2}
GLUCOSE SERPL-MCNC: 143 MG/DL (ref 70–99)
NT-PROBNP SERPL-MCNC: 1244 PG/ML (ref 0–449)
POTASSIUM SERPL-SCNC: 4.7 MMOL/L (ref 3.5–5.1)
SODIUM SERPL-SCNC: 138 MMOL/L (ref 136–145)

## 2023-10-24 PROCEDURE — 36415 COLL VENOUS BLD VENIPUNCTURE: CPT

## 2023-10-24 PROCEDURE — 80048 BASIC METABOLIC PNL TOTAL CA: CPT

## 2023-10-24 PROCEDURE — 83880 ASSAY OF NATRIURETIC PEPTIDE: CPT

## 2023-10-25 ENCOUNTER — TELEPHONE (OUTPATIENT)
Dept: CARDIOLOGY CLINIC | Age: 88
End: 2023-10-25

## 2023-10-25 NOTE — TELEPHONE ENCOUNTER
----- Message from KATHY Person - CNS sent at 10/25/2023  9:44 AM EDT -----  Her fluid level is up  Is she more shortness of breath, using more oxygen, swelling  thanks    Spoke to patient's daughter he weight was up and was given extra lasix today and she is down 3 pounds 156.8 is her current weight. She is taking lasix 40 mg daily for a few days due to wheezing. Oxygen is stable no change, no swelling just weight gain.

## 2023-11-02 ENCOUNTER — NURSE ONLY (OUTPATIENT)
Dept: CARDIOLOGY CLINIC | Age: 88
End: 2023-11-02

## 2023-11-02 ENCOUNTER — OFFICE VISIT (OUTPATIENT)
Dept: CARDIOLOGY CLINIC | Age: 88
End: 2023-11-02

## 2023-11-02 VITALS — OXYGEN SATURATION: 99 % | HEART RATE: 60 BPM | SYSTOLIC BLOOD PRESSURE: 124 MMHG | DIASTOLIC BLOOD PRESSURE: 62 MMHG

## 2023-11-02 DIAGNOSIS — Z95.0 PACEMAKER: ICD-10-CM

## 2023-11-02 DIAGNOSIS — J43.2 CENTRILOBULAR EMPHYSEMA (HCC): ICD-10-CM

## 2023-11-02 DIAGNOSIS — I25.83 CORONARY ARTERY DISEASE DUE TO LIPID RICH PLAQUE: ICD-10-CM

## 2023-11-02 DIAGNOSIS — I49.5 TACHYCARDIA-BRADYCARDIA SYNDROME (HCC): ICD-10-CM

## 2023-11-02 DIAGNOSIS — G47.33 OSA (OBSTRUCTIVE SLEEP APNEA): ICD-10-CM

## 2023-11-02 DIAGNOSIS — Z95.0 PACEMAKER: Primary | ICD-10-CM

## 2023-11-02 DIAGNOSIS — I10 PRIMARY HYPERTENSION: ICD-10-CM

## 2023-11-02 DIAGNOSIS — I48.0 PAROXYSMAL ATRIAL FIBRILLATION (HCC): Primary | ICD-10-CM

## 2023-11-02 DIAGNOSIS — I25.10 CORONARY ARTERY DISEASE DUE TO LIPID RICH PLAQUE: ICD-10-CM

## 2023-11-02 PROBLEM — H69.83 ACUTE DYSFUNCTION OF EUSTACHIAN TUBE, BILATERAL: Status: RESOLVED | Noted: 2022-08-22 | Resolved: 2023-11-02

## 2023-11-02 PROBLEM — I50.33 ACUTE ON CHRONIC DIASTOLIC HEART FAILURE (HCC): Status: RESOLVED | Noted: 2023-02-23 | Resolved: 2023-11-02

## 2023-11-02 PROBLEM — I50.9 ACUTE ON CHRONIC CONGESTIVE HEART FAILURE (HCC): Status: RESOLVED | Noted: 2023-04-07 | Resolved: 2023-11-02

## 2023-11-02 PROBLEM — H69.93 ACUTE DYSFUNCTION OF EUSTACHIAN TUBE, BILATERAL: Status: RESOLVED | Noted: 2022-08-22 | Resolved: 2023-11-02

## 2023-11-02 PROBLEM — J44.1 COPD EXACERBATION (HCC): Status: RESOLVED | Noted: 2022-04-19 | Resolved: 2023-11-02

## 2023-11-27 NOTE — TELEPHONE ENCOUNTER
Received refill request for DILTIAZEM from [facility] pharmacy.    Last ov: 11/02/2023 NPSR    Last Refill: 11/01/2022 #120 w/ 3 refills    Next appointment: 05/07/2024 NPSR

## 2023-11-29 ENCOUNTER — OFFICE VISIT (OUTPATIENT)
Dept: PULMONOLOGY | Age: 88
End: 2023-11-29
Payer: MEDICARE

## 2023-11-29 VITALS
HEIGHT: 62 IN | SYSTOLIC BLOOD PRESSURE: 108 MMHG | BODY MASS INDEX: 28.34 KG/M2 | WEIGHT: 154 LBS | OXYGEN SATURATION: 94 % | HEART RATE: 75 BPM | DIASTOLIC BLOOD PRESSURE: 78 MMHG

## 2023-11-29 DIAGNOSIS — J43.2 CENTRILOBULAR EMPHYSEMA (HCC): ICD-10-CM

## 2023-11-29 DIAGNOSIS — I11.0 CARDIOMYOPATHY DUE TO HYPERTENSION, WITH HEART FAILURE (HCC): ICD-10-CM

## 2023-11-29 DIAGNOSIS — I43 CARDIOMYOPATHY DUE TO HYPERTENSION, WITH HEART FAILURE (HCC): ICD-10-CM

## 2023-11-29 DIAGNOSIS — J96.11 CHRONIC RESPIRATORY FAILURE WITH HYPOXIA (HCC): ICD-10-CM

## 2023-11-29 DIAGNOSIS — J47.9 BRONCHIECTASIS WITHOUT COMPLICATION (HCC): ICD-10-CM

## 2023-11-29 DIAGNOSIS — J44.9 COPD, MODERATE (HCC): Primary | ICD-10-CM

## 2023-11-29 PROCEDURE — 3023F SPIROM DOC REV: CPT | Performed by: INTERNAL MEDICINE

## 2023-11-29 PROCEDURE — G8484 FLU IMMUNIZE NO ADMIN: HCPCS | Performed by: INTERNAL MEDICINE

## 2023-11-29 PROCEDURE — G8417 CALC BMI ABV UP PARAM F/U: HCPCS | Performed by: INTERNAL MEDICINE

## 2023-11-29 PROCEDURE — 99214 OFFICE O/P EST MOD 30 MIN: CPT | Performed by: INTERNAL MEDICINE

## 2023-11-29 PROCEDURE — 1123F ACP DISCUSS/DSCN MKR DOCD: CPT | Performed by: INTERNAL MEDICINE

## 2023-11-29 PROCEDURE — G8427 DOCREV CUR MEDS BY ELIG CLIN: HCPCS | Performed by: INTERNAL MEDICINE

## 2023-11-29 PROCEDURE — 1090F PRES/ABSN URINE INCON ASSESS: CPT | Performed by: INTERNAL MEDICINE

## 2023-11-29 PROCEDURE — 1036F TOBACCO NON-USER: CPT | Performed by: INTERNAL MEDICINE

## 2023-11-29 RX ORDER — BENZONATATE 100 MG/1
100 CAPSULE ORAL 3 TIMES DAILY PRN
Qty: 90 CAPSULE | Refills: 3 | Status: SHIPPED | OUTPATIENT
Start: 2023-11-29 | End: 2024-03-28

## 2023-11-29 RX ORDER — DOXYCYCLINE HYCLATE 100 MG/1
100 CAPSULE ORAL 2 TIMES DAILY
Qty: 20 CAPSULE | Refills: 0 | Status: SHIPPED | OUTPATIENT
Start: 2023-11-29 | End: 2023-12-09

## 2023-11-29 NOTE — PROGRESS NOTES
Nausea And Vomiting     Nausea, diarrhea  Other reaction(s): Nausea/Diarrhea H&P     Prior to Visit Medications    Medication Sig Taking? Authorizing Provider   dilTIAZem (CARDIZEM) 30 MG tablet TAKE 1 TABLET EVERY 4 HOURS AS NEEDED FOR TACHYCARDIA MAXIMUM 3/DAY Yes KATHY Kasper CNP   cloNIDine (CATAPRES) 0.1 MG tablet Take 1 tablet by mouth daily Yes Aura Walsh APRN - CNS   citalopram (CELEXA) 10 MG tablet Take 1 tablet by mouth daily Yes Bing Bearden MD   lidocaine (LIDODERM) 5 % Place 1 patch onto the skin daily 12 hours on, 12 hours off. Yes Steffi Estrada,    Budeson-Glycopyrrol-Formoterol (BREZTRI AEROSPHERE) 160-9-4.8 MCG/ACT AERO Inhale 2 puffs into the lungs 2 times daily Yes Leyla Vargas MD   metoprolol succinate (TOPROL XL) 25 MG extended release tablet TAKE 1 TABLET BY MOUTH DAILY Yes KATHY Kasper CNP   levalbuterol (XOPENEX) 0.63 MG/3ML nebulization Take 3 mLs by nebulization every 6 hours as needed for Wheezing Yes Leyla Vargas MD   furosemide (LASIX) 20 MG tablet Take 1 tablet by mouth daily as needed (weight gain >3 lbs, increase SOB) 40 mg alternating with 20 mg Yes Aura Walsh APRN - CNS   empagliflozin (JARDIANCE) 10 MG tablet Take 1 tablet by mouth daily Yes Angélica Shaw MD   valsartan (DIOVAN) 80 MG tablet Take 1 tablet by mouth daily Yes Angélica Shaw MD   ferrous sulfate (IRON 325) 325 (65 Fe) MG tablet Take 1 tablet by mouth 2 times daily (with meals) Yes Angélica Shaw MD   CALMOSEPTINE 0.44-20.6 % OINT ointment Apply 1 application  topically in the morning, at noon, and at bedtime Yes Bing Bearden MD   gabapentin (NEURONTIN) 100 MG capsule Take 1 capsule by mouth 3 times daily.  Yes Bing Bearden MD   flecainide (TAMBOCOR) 50 MG tablet Take 1 tablet by mouth every 12 hours Yes KATHY Duval CNP   dilTIAZem (CARDIZEM CD) 240 MG extended release capsule Take 1 capsule by mouth daily Yes Adriana Rhodes,

## 2023-12-26 ENCOUNTER — TELEPHONE (OUTPATIENT)
Dept: PULMONOLOGY | Age: 88
End: 2023-12-26

## 2023-12-26 DIAGNOSIS — R05.1 ACUTE COUGH: Primary | ICD-10-CM

## 2023-12-26 RX ORDER — DOXYCYCLINE HYCLATE 100 MG/1
100 CAPSULE ORAL 2 TIMES DAILY
Qty: 14 CAPSULE | Refills: 0 | Status: SHIPPED | OUTPATIENT
Start: 2023-12-26 | End: 2024-01-05

## 2023-12-26 NOTE — TELEPHONE ENCOUNTER
As a new policy our physicians are asking that we get a little more info and testing before they start to send meds to pharmacies. Please answer the questions below and respond. Also I have put in a order for the combo test for COVID, Flu and RSV. Please visit any [facility] lab to have that completed depending on your symptoms. Once you respond I will forward your message to th physician.    Patient needs to answer yes or no to questions below:    DAUGHTER DOES NOT WANT TO TAKE PATIENT OUT OF THE HOUSE.    Symptom Duration-4 Days   Cough- YES BROWN SPUTUM   Headache-YES  Fever-NO  Covid test, Flu or RSV taken- COVID NEG, NO FLU OR RSV  Sore Throat-NO  Shortness of breath- NO  Wheezing- NO  Body Aches-NO  Fatigue- NO  Any Over the counter meds tried- NO MEDS TRIED     Pharmacy-  [id number] [place], [place] - [address] - P [phone] - F [phone] [[id number]]

## 2023-12-26 NOTE — TELEPHONE ENCOUNTER
Patients daughter left VM and said patient is coughing up brown phlegm and would like an antibiotic called in     PH: [phone]

## 2023-12-26 NOTE — PROGRESS NOTES
Patient of Dr. Akira Patel hx of COPD, Lung nodules  Mayur Palencia daughter called given patient symptoms improved but did not completely resolve which was cough, hoarse voice, no SOB, or fever. She was given doxy by Dr. Akira Patel x 10 days however she feels patient might need longer course, will re-order for additional 7 days, advised to come to ER if worse or any concerning signs and symptoms.     Ming Cameron MD   Pulmonary and Critical Care Medicine  [facility]

## 2024-01-01 ENCOUNTER — TELEPHONE (OUTPATIENT)
Dept: FAMILY MEDICINE CLINIC | Age: 89
End: 2024-01-01

## 2024-01-05 PROCEDURE — 93296 REM INTERROG EVL PM/IDS: CPT | Performed by: INTERNAL MEDICINE

## 2024-01-05 PROCEDURE — 93294 REM INTERROG EVL PM/LDLS PM: CPT | Performed by: INTERNAL MEDICINE

## 2024-01-09 RX ORDER — ATORVASTATIN CALCIUM 80 MG/1
80 TABLET, FILM COATED ORAL NIGHTLY
Qty: 90 TABLET | Refills: 1 | Status: SHIPPED | OUTPATIENT
Start: 2024-01-09

## 2024-01-09 NOTE — TELEPHONE ENCOUNTER
Received refill request for Atorvastatin from [facility] pharmacy.    Last ov:11/02/2023 NPSR    Last labs:04/08/2023 Lipid    Last Refill:08/02/2022    Next appointment:01/16/2024 JOAQUINAG

## 2024-01-09 NOTE — TELEPHONE ENCOUNTER
Medication Refill    Medication needing refilled:  atorvastatin (LIPITOR)   Dosage of the medication:  80 MG tablet   How are you taking this medication (QD, BID, TID, QID, PRN):  TAKE 1 TABLET EVERY NIGHT   30 or 90 day supply called in:  90 day refill  When will you run out of your medication:  Pt is out  Which Pharmacy are we sending the medication to?:  [facility] #[id number] [address] - P [phone] - F [phone]

## 2024-01-16 ENCOUNTER — OFFICE VISIT (OUTPATIENT)
Dept: CARDIOLOGY CLINIC | Age: 89
End: 2024-01-16
Payer: MEDICARE

## 2024-01-16 VITALS
BODY MASS INDEX: 29.96 KG/M2 | WEIGHT: 162.8 LBS | OXYGEN SATURATION: 96 % | HEART RATE: 61 BPM | HEIGHT: 62 IN | SYSTOLIC BLOOD PRESSURE: 120 MMHG | DIASTOLIC BLOOD PRESSURE: 58 MMHG

## 2024-01-16 DIAGNOSIS — I48.0 PAF (PAROXYSMAL ATRIAL FIBRILLATION) (HCC): ICD-10-CM

## 2024-01-16 DIAGNOSIS — D50.9 IRON DEFICIENCY ANEMIA, UNSPECIFIED IRON DEFICIENCY ANEMIA TYPE: ICD-10-CM

## 2024-01-16 DIAGNOSIS — I10 HTN (HYPERTENSION), BENIGN: ICD-10-CM

## 2024-01-16 DIAGNOSIS — I50.32 CHRONIC DIASTOLIC CHF (CONGESTIVE HEART FAILURE) (HCC): Primary | ICD-10-CM

## 2024-01-16 DIAGNOSIS — J44.9 CHRONIC OBSTRUCTIVE PULMONARY DISEASE, UNSPECIFIED COPD TYPE (HCC): ICD-10-CM

## 2024-01-16 DIAGNOSIS — N28.9 RENAL INSUFFICIENCY: ICD-10-CM

## 2024-01-16 PROCEDURE — G8484 FLU IMMUNIZE NO ADMIN: HCPCS | Performed by: CLINICAL NURSE SPECIALIST

## 2024-01-16 PROCEDURE — 99214 OFFICE O/P EST MOD 30 MIN: CPT | Performed by: CLINICAL NURSE SPECIALIST

## 2024-01-16 PROCEDURE — 1090F PRES/ABSN URINE INCON ASSESS: CPT | Performed by: CLINICAL NURSE SPECIALIST

## 2024-01-16 PROCEDURE — 1123F ACP DISCUSS/DSCN MKR DOCD: CPT | Performed by: CLINICAL NURSE SPECIALIST

## 2024-01-16 PROCEDURE — G8427 DOCREV CUR MEDS BY ELIG CLIN: HCPCS | Performed by: CLINICAL NURSE SPECIALIST

## 2024-01-16 PROCEDURE — 3023F SPIROM DOC REV: CPT | Performed by: CLINICAL NURSE SPECIALIST

## 2024-01-16 PROCEDURE — 1036F TOBACCO NON-USER: CPT | Performed by: CLINICAL NURSE SPECIALIST

## 2024-01-16 PROCEDURE — G8417 CALC BMI ABV UP PARAM F/U: HCPCS | Performed by: CLINICAL NURSE SPECIALIST

## 2024-01-16 NOTE — PROGRESS NOTES
[facility]  Progress Note    Primary Care Doctor:  Aron Vivas MD    Chief Complaint   Patient presents with    Congestive Heart Failure        History of Present Illness:  88 y.o. female with history of 87 year old female with history of hypertension, hyperlipidemia, CAD, AF, pacemaker 2/24/2023, chronic anticoag with eliquis, chronic diastolic heart failure, tobacco abuse, COPD, ronnie, oxygen dependence and DM  4/7-10/2023 for shortness of breath, wheezing, cough and chest tightness for 3 days.  She was diagnosed with UTI,antibiotic.  Lasix was prn and she was not taking this at home. Anemic and received 2 doses of IV venofer.  She has been drinking liberally and likes salty snacks.  She is on 2 L of oxygen at home.  She was started on valsartan and clonidine increased due to hypertension.         I had the pleasure of seeing Cleo Stauffer in follow up for diastolic heart failure.  She is in a wheel chair, with her daughter and aid, Mima.  She is on 2 L of oxygen via concentrator.  Her weight 158-154-162.  She had blood work in December done at Dr Vivas office.  She denies any chest pain, palpitations, lightheadedness, edema or increased shortness of breath.  Her daughter gives her extra lasix when her weight goes up or with \"gurgling\" sounds.  She is taking all her medications.       Past Medical History:   has a past medical history of Arthritis, Atrial fibrillation (HCC), Bursitis, COPD (chronic obstructive pulmonary disease) (HCC), Diabetes mellitus type II, controlled (HCC), Diastolic CHF (HCC), GERD (gastroesophageal reflux disease), HTN (hypertension), Hyperlipidemia, Hypothyroid, Lumbago, Parkinson disease, Restless legs syndrome (RLS), and SVT (supraventricular tachycardia).  Surgical History:   has a past surgical history that includes Appendectomy; Colonoscopy; Cholecystectomy, laparoscopic (02/24/2013); and Pacemaker insertion (02/24/2023).   Social History:   reports that

## 2024-01-17 ENCOUNTER — TELEPHONE (OUTPATIENT)
Dept: CARDIOLOGY CLINIC | Age: 89
End: 2024-01-17

## 2024-01-17 DIAGNOSIS — I50.32 CHRONIC DIASTOLIC CHF (CONGESTIVE HEART FAILURE) (HCC): Primary | ICD-10-CM

## 2024-01-17 RX ORDER — ERGOCALCIFEROL 1.25 MG/1
50000 CAPSULE ORAL WEEKLY
Qty: 12 CAPSULE | Refills: 1 | Status: SHIPPED | OUTPATIENT
Start: 2024-01-17

## 2024-01-17 NOTE — TELEPHONE ENCOUNTER
Blood work from Dr Vivas office 12/5/2023 shows vitamin d of 14.8  creat 1.67   Patient needs to start vitamin d 55671 iu once a week I will send to her pharmacy   No bnp   I ask the daughter, Maxine to have her get blood work in the next month bnp and bmp

## 2024-02-05 ENCOUNTER — APPOINTMENT (OUTPATIENT)
Dept: GENERAL RADIOLOGY | Age: 89
DRG: 683 | End: 2024-02-05
Payer: MEDICARE

## 2024-02-05 ENCOUNTER — HOSPITAL ENCOUNTER (INPATIENT)
Age: 89
LOS: 2 days | Discharge: HOME | DRG: 683 | End: 2024-02-07
Attending: EMERGENCY MEDICINE | Admitting: STUDENT IN AN ORGANIZED HEALTH CARE EDUCATION/TRAINING PROGRAM
Payer: MEDICARE

## 2024-02-05 DIAGNOSIS — N39.0 URINARY TRACT INFECTION WITHOUT HEMATURIA, SITE UNSPECIFIED: ICD-10-CM

## 2024-02-05 DIAGNOSIS — J44.1 COPD EXACERBATION (HCC): Primary | ICD-10-CM

## 2024-02-05 DIAGNOSIS — J90 BILATERAL PLEURAL EFFUSION: ICD-10-CM

## 2024-02-05 DIAGNOSIS — R06.00 DYSPNEA, UNSPECIFIED TYPE: ICD-10-CM

## 2024-02-05 DIAGNOSIS — J18.9 PNEUMONIA DUE TO INFECTIOUS ORGANISM, UNSPECIFIED LATERALITY, UNSPECIFIED PART OF LUNG: ICD-10-CM

## 2024-02-05 DIAGNOSIS — N17.9 AKI (ACUTE KIDNEY INJURY) (HCC): ICD-10-CM

## 2024-02-05 PROBLEM — R06.02 SHORTNESS OF BREATH: Status: ACTIVE | Noted: 2024-02-05

## 2024-02-05 LAB
ALBUMIN SERPL-MCNC: 4.3 G/DL (ref 3.4–5)
ALBUMIN/GLOB SERPL: 1.7 {RATIO} (ref 1.1–2.2)
ALP SERPL-CCNC: 205 U/L (ref 40–129)
ALT SERPL-CCNC: 44 U/L (ref 10–40)
ANION GAP SERPL CALCULATED.3IONS-SCNC: 11 MMOL/L (ref 3–16)
AST SERPL-CCNC: 26 U/L (ref 15–37)
BACTERIA URNS QL MICRO: ABNORMAL /HPF
BACTERIA URNS QL MICRO: ABNORMAL /HPF
BASOPHILS # BLD: 0 K/UL (ref 0–0.2)
BASOPHILS NFR BLD: 0.4 %
BILIRUB SERPL-MCNC: 0.3 MG/DL (ref 0–1)
BILIRUB UR QL STRIP.AUTO: NEGATIVE
BILIRUB UR QL STRIP.AUTO: NEGATIVE
BUN SERPL-MCNC: 54 MG/DL (ref 7–20)
CALCIUM SERPL-MCNC: 8.8 MG/DL (ref 8.3–10.6)
CHLORIDE SERPL-SCNC: 99 MMOL/L (ref 99–110)
CLARITY UR: CLEAR
CLARITY UR: CLEAR
CO2 SERPL-SCNC: 27 MMOL/L (ref 21–32)
COLOR UR: YELLOW
COLOR UR: YELLOW
CREAT SERPL-MCNC: 2.8 MG/DL (ref 0.6–1.2)
DEPRECATED RDW RBC AUTO: 14.1 % (ref 12.4–15.4)
EKG ATRIAL RATE: 357 BPM
EKG DIAGNOSIS: NORMAL
EKG P-R INTERVAL: 210 MS
EKG Q-T INTERVAL: 416 MS
EKG QRS DURATION: 96 MS
EKG QTC CALCULATION (BAZETT): 452 MS
EKG R AXIS: 12 DEGREES
EKG T AXIS: 55 DEGREES
EKG VENTRICULAR RATE: 71 BPM
EOSINOPHIL # BLD: 0.1 K/UL (ref 0–0.6)
EOSINOPHIL NFR BLD: 1.3 %
EPI CELLS #/AREA URNS AUTO: 2 /HPF (ref 0–5)
EPI CELLS #/AREA URNS HPF: ABNORMAL /HPF (ref 0–5)
FLUAV RNA RESP QL NAA+PROBE: NOT DETECTED
FLUBV RNA RESP QL NAA+PROBE: NOT DETECTED
GFR SERPLBLD CREATININE-BSD FMLA CKD-EPI: 16 ML/MIN/{1.73_M2}
GLUCOSE BLD-MCNC: 107 MG/DL (ref 70–99)
GLUCOSE BLD-MCNC: 151 MG/DL (ref 70–99)
GLUCOSE SERPL-MCNC: 117 MG/DL (ref 70–99)
GLUCOSE UR STRIP.AUTO-MCNC: NEGATIVE MG/DL
GLUCOSE UR STRIP.AUTO-MCNC: NEGATIVE MG/DL
HCT VFR BLD AUTO: 34.3 % (ref 36–48)
HGB BLD-MCNC: 11.3 G/DL (ref 12–16)
HGB UR QL STRIP.AUTO: NEGATIVE
HGB UR QL STRIP.AUTO: NEGATIVE
HYALINE CASTS #/AREA URNS AUTO: 5 /LPF (ref 0–8)
KETONES UR STRIP.AUTO-MCNC: NEGATIVE MG/DL
KETONES UR STRIP.AUTO-MCNC: NEGATIVE MG/DL
LEUKOCYTE ESTERASE UR QL STRIP.AUTO: ABNORMAL
LEUKOCYTE ESTERASE UR QL STRIP.AUTO: ABNORMAL
LYMPHOCYTES # BLD: 1.9 K/UL (ref 1–5.1)
LYMPHOCYTES NFR BLD: 19.8 %
MCH RBC QN AUTO: 31.8 PG (ref 26–34)
MCHC RBC AUTO-ENTMCNC: 32.8 G/DL (ref 31–36)
MCV RBC AUTO: 96.9 FL (ref 80–100)
MONOCYTES # BLD: 0.4 K/UL (ref 0–1.3)
MONOCYTES NFR BLD: 4.3 %
NEUTROPHILS # BLD: 7.1 K/UL (ref 1.7–7.7)
NEUTROPHILS NFR BLD: 74.2 %
NITRITE UR QL STRIP.AUTO: NEGATIVE
NITRITE UR QL STRIP.AUTO: NEGATIVE
NT-PROBNP SERPL-MCNC: 1395 PG/ML (ref 0–449)
PERFORMED ON: ABNORMAL
PERFORMED ON: ABNORMAL
PH UR STRIP.AUTO: 5 [PH] (ref 5–8)
PH UR STRIP.AUTO: 5 [PH] (ref 5–8)
PLATELET # BLD AUTO: 106 K/UL (ref 135–450)
PMV BLD AUTO: 8.1 FL (ref 5–10.5)
POTASSIUM SERPL-SCNC: 5.1 MMOL/L (ref 3.5–5.1)
PROT SERPL-MCNC: 6.9 G/DL (ref 6.4–8.2)
PROT UR STRIP.AUTO-MCNC: NEGATIVE MG/DL
PROT UR STRIP.AUTO-MCNC: NEGATIVE MG/DL
RBC # BLD AUTO: 3.54 M/UL (ref 4–5.2)
RBC CLUMPS #/AREA URNS AUTO: 0 /HPF (ref 0–4)
SARS-COV-2 RNA RESP QL NAA+PROBE: NOT DETECTED
SODIUM SERPL-SCNC: 137 MMOL/L (ref 136–145)
SP GR UR STRIP.AUTO: 1.01 (ref 1–1.03)
SP GR UR STRIP.AUTO: 1.01 (ref 1–1.03)
UA COMPLETE W REFLEX CULTURE PNL UR: YES
UA DIPSTICK W REFLEX MICRO PNL UR: YES
UA DIPSTICK W REFLEX MICRO PNL UR: YES
URN SPEC COLLECT METH UR: ABNORMAL
URN SPEC COLLECT METH UR: ABNORMAL
UROBILINOGEN UR STRIP-ACNC: 0.2 E.U./DL
UROBILINOGEN UR STRIP-ACNC: 0.2 E.U./DL
WBC # BLD AUTO: 9.6 K/UL (ref 4–11)
WBC #/AREA URNS AUTO: 61 /HPF (ref 0–5)
WBC #/AREA URNS HPF: ABNORMAL /HPF (ref 0–5)

## 2024-02-05 PROCEDURE — 96374 THER/PROPH/DIAG INJ IV PUSH: CPT

## 2024-02-05 PROCEDURE — 82570 ASSAY OF URINE CREATININE: CPT

## 2024-02-05 PROCEDURE — 6360000002 HC RX W HCPCS: Performed by: STUDENT IN AN ORGANIZED HEALTH CARE EDUCATION/TRAINING PROGRAM

## 2024-02-05 PROCEDURE — 80053 COMPREHEN METABOLIC PANEL: CPT

## 2024-02-05 PROCEDURE — 99285 EMERGENCY DEPT VISIT HI MDM: CPT

## 2024-02-05 PROCEDURE — 6370000000 HC RX 637 (ALT 250 FOR IP): Performed by: NURSE PRACTITIONER

## 2024-02-05 PROCEDURE — 2580000003 HC RX 258: Performed by: PHYSICIAN ASSISTANT

## 2024-02-05 PROCEDURE — 84300 ASSAY OF URINE SODIUM: CPT

## 2024-02-05 PROCEDURE — 6360000002 HC RX W HCPCS: Performed by: PHYSICIAN ASSISTANT

## 2024-02-05 PROCEDURE — 93005 ELECTROCARDIOGRAM TRACING: CPT | Performed by: PHYSICIAN ASSISTANT

## 2024-02-05 PROCEDURE — 6370000000 HC RX 637 (ALT 250 FOR IP): Performed by: PHYSICIAN ASSISTANT

## 2024-02-05 PROCEDURE — 2580000003 HC RX 258: Performed by: STUDENT IN AN ORGANIZED HEALTH CARE EDUCATION/TRAINING PROGRAM

## 2024-02-05 PROCEDURE — 87086 URINE CULTURE/COLONY COUNT: CPT

## 2024-02-05 PROCEDURE — 2060000000 HC ICU INTERMEDIATE R&B

## 2024-02-05 PROCEDURE — 84156 ASSAY OF PROTEIN URINE: CPT

## 2024-02-05 PROCEDURE — 94761 N-INVAS EAR/PLS OXIMETRY MLT: CPT

## 2024-02-05 PROCEDURE — 85025 COMPLETE CBC W/AUTO DIFF WBC: CPT

## 2024-02-05 PROCEDURE — 71045 X-RAY EXAM CHEST 1 VIEW: CPT

## 2024-02-05 PROCEDURE — 93010 ELECTROCARDIOGRAM REPORT: CPT | Performed by: INTERNAL MEDICINE

## 2024-02-05 PROCEDURE — 6370000000 HC RX 637 (ALT 250 FOR IP): Performed by: STUDENT IN AN ORGANIZED HEALTH CARE EDUCATION/TRAINING PROGRAM

## 2024-02-05 PROCEDURE — 83880 ASSAY OF NATRIURETIC PEPTIDE: CPT

## 2024-02-05 PROCEDURE — 2700000000 HC OXYGEN THERAPY PER DAY

## 2024-02-05 PROCEDURE — 81001 URINALYSIS AUTO W/SCOPE: CPT

## 2024-02-05 PROCEDURE — 87636 SARSCOV2 & INF A&B AMP PRB: CPT

## 2024-02-05 PROCEDURE — 94640 AIRWAY INHALATION TREATMENT: CPT

## 2024-02-05 RX ORDER — ONDANSETRON 2 MG/ML
4 INJECTION INTRAMUSCULAR; INTRAVENOUS EVERY 6 HOURS PRN
Status: DISCONTINUED | OUTPATIENT
Start: 2024-02-05 | End: 2024-02-07 | Stop reason: HOSPADM

## 2024-02-05 RX ORDER — ONDANSETRON 4 MG/1
4 TABLET, ORALLY DISINTEGRATING ORAL EVERY 8 HOURS PRN
Status: DISCONTINUED | OUTPATIENT
Start: 2024-02-05 | End: 2024-02-07 | Stop reason: HOSPADM

## 2024-02-05 RX ORDER — IPRATROPIUM BROMIDE AND ALBUTEROL SULFATE 2.5; .5 MG/3ML; MG/3ML
1 SOLUTION RESPIRATORY (INHALATION)
Status: DISCONTINUED | OUTPATIENT
Start: 2024-02-05 | End: 2024-02-06

## 2024-02-05 RX ORDER — GLUCAGON 1 MG/ML
1 KIT INJECTION PRN
Status: DISCONTINUED | OUTPATIENT
Start: 2024-02-05 | End: 2024-02-07 | Stop reason: HOSPADM

## 2024-02-05 RX ORDER — DEXTROSE MONOHYDRATE 100 MG/ML
INJECTION, SOLUTION INTRAVENOUS CONTINUOUS PRN
Status: DISCONTINUED | OUTPATIENT
Start: 2024-02-05 | End: 2024-02-07 | Stop reason: HOSPADM

## 2024-02-05 RX ORDER — BETHANECHOL CHLORIDE 10 MG/1
10 TABLET ORAL 2 TIMES DAILY
Status: DISCONTINUED | OUTPATIENT
Start: 2024-02-05 | End: 2024-02-07 | Stop reason: HOSPADM

## 2024-02-05 RX ORDER — POLYETHYLENE GLYCOL 3350 17 G/17G
17 POWDER, FOR SOLUTION ORAL DAILY PRN
Status: DISCONTINUED | OUTPATIENT
Start: 2024-02-05 | End: 2024-02-07 | Stop reason: HOSPADM

## 2024-02-05 RX ORDER — METOPROLOL SUCCINATE 25 MG/1
25 TABLET, EXTENDED RELEASE ORAL DAILY
Status: DISCONTINUED | OUTPATIENT
Start: 2024-02-05 | End: 2024-02-07 | Stop reason: HOSPADM

## 2024-02-05 RX ORDER — SODIUM CHLORIDE 9 MG/ML
INJECTION, SOLUTION INTRAVENOUS PRN
Status: DISCONTINUED | OUTPATIENT
Start: 2024-02-05 | End: 2024-02-07 | Stop reason: HOSPADM

## 2024-02-05 RX ORDER — CLONIDINE HYDROCHLORIDE 0.1 MG/1
0.1 TABLET ORAL DAILY
Status: DISCONTINUED | OUTPATIENT
Start: 2024-02-05 | End: 2024-02-07 | Stop reason: HOSPADM

## 2024-02-05 RX ORDER — IPRATROPIUM BROMIDE AND ALBUTEROL SULFATE 2.5; .5 MG/3ML; MG/3ML
1 SOLUTION RESPIRATORY (INHALATION) EVERY 4 HOURS PRN
Status: DISCONTINUED | OUTPATIENT
Start: 2024-02-05 | End: 2024-02-07 | Stop reason: HOSPADM

## 2024-02-05 RX ORDER — LORAZEPAM 0.5 MG/1
0.5 TABLET ORAL EVERY 8 HOURS PRN
Status: DISCONTINUED | OUTPATIENT
Start: 2024-02-05 | End: 2024-02-07 | Stop reason: HOSPADM

## 2024-02-05 RX ORDER — ACETAMINOPHEN 325 MG/1
650 TABLET ORAL EVERY 6 HOURS PRN
Status: DISCONTINUED | OUTPATIENT
Start: 2024-02-05 | End: 2024-02-07 | Stop reason: HOSPADM

## 2024-02-05 RX ORDER — LEVOTHYROXINE SODIUM 0.07 MG/1
75 TABLET ORAL DAILY
Status: DISCONTINUED | OUTPATIENT
Start: 2024-02-06 | End: 2024-02-07 | Stop reason: HOSPADM

## 2024-02-05 RX ORDER — INSULIN LISPRO 100 [IU]/ML
0-4 INJECTION, SOLUTION INTRAVENOUS; SUBCUTANEOUS NIGHTLY
Status: DISCONTINUED | OUTPATIENT
Start: 2024-02-05 | End: 2024-02-07 | Stop reason: HOSPADM

## 2024-02-05 RX ORDER — IPRATROPIUM BROMIDE AND ALBUTEROL SULFATE 2.5; .5 MG/3ML; MG/3ML
1 SOLUTION RESPIRATORY (INHALATION)
Status: DISCONTINUED | OUTPATIENT
Start: 2024-02-05 | End: 2024-02-05 | Stop reason: SDUPTHER

## 2024-02-05 RX ORDER — SODIUM CHLORIDE 9 MG/ML
INJECTION, SOLUTION INTRAVENOUS CONTINUOUS
Status: ACTIVE | OUTPATIENT
Start: 2024-02-05 | End: 2024-02-06

## 2024-02-05 RX ORDER — ALBUTEROL SULFATE 90 UG/1
2 AEROSOL, METERED RESPIRATORY (INHALATION) EVERY 6 HOURS PRN
Status: DISCONTINUED | OUTPATIENT
Start: 2024-02-05 | End: 2024-02-07 | Stop reason: HOSPADM

## 2024-02-05 RX ORDER — ROPINIROLE 1 MG/1
2 TABLET, FILM COATED ORAL 3 TIMES DAILY
Status: DISCONTINUED | OUTPATIENT
Start: 2024-02-05 | End: 2024-02-07 | Stop reason: HOSPADM

## 2024-02-05 RX ORDER — IPRATROPIUM BROMIDE AND ALBUTEROL SULFATE 2.5; .5 MG/3ML; MG/3ML
1 SOLUTION RESPIRATORY (INHALATION) ONCE
Status: COMPLETED | OUTPATIENT
Start: 2024-02-05 | End: 2024-02-05

## 2024-02-05 RX ORDER — CITALOPRAM 20 MG/1
10 TABLET ORAL DAILY
Status: DISCONTINUED | OUTPATIENT
Start: 2024-02-05 | End: 2024-02-07 | Stop reason: HOSPADM

## 2024-02-05 RX ORDER — INSULIN LISPRO 100 [IU]/ML
0-4 INJECTION, SOLUTION INTRAVENOUS; SUBCUTANEOUS
Status: DISCONTINUED | OUTPATIENT
Start: 2024-02-05 | End: 2024-02-07 | Stop reason: HOSPADM

## 2024-02-05 RX ORDER — GABAPENTIN 100 MG/1
100 CAPSULE ORAL 3 TIMES DAILY
Status: DISCONTINUED | OUTPATIENT
Start: 2024-02-05 | End: 2024-02-07 | Stop reason: HOSPADM

## 2024-02-05 RX ORDER — IPRATROPIUM BROMIDE AND ALBUTEROL SULFATE 2.5; .5 MG/3ML; MG/3ML
1 SOLUTION RESPIRATORY (INHALATION)
Status: DISCONTINUED | OUTPATIENT
Start: 2024-02-05 | End: 2024-02-05

## 2024-02-05 RX ORDER — ALBUTEROL SULFATE 2.5 MG/3ML
2.5 SOLUTION RESPIRATORY (INHALATION) ONCE
Status: COMPLETED | OUTPATIENT
Start: 2024-02-05 | End: 2024-02-05

## 2024-02-05 RX ORDER — ACETAMINOPHEN 650 MG/1
650 SUPPOSITORY RECTAL EVERY 6 HOURS PRN
Status: DISCONTINUED | OUTPATIENT
Start: 2024-02-05 | End: 2024-02-07 | Stop reason: HOSPADM

## 2024-02-05 RX ORDER — PREDNISONE 20 MG/1
40 TABLET ORAL DAILY
Status: DISCONTINUED | OUTPATIENT
Start: 2024-02-05 | End: 2024-02-07 | Stop reason: HOSPADM

## 2024-02-05 RX ORDER — SODIUM CHLORIDE 0.9 % (FLUSH) 0.9 %
5-40 SYRINGE (ML) INJECTION EVERY 12 HOURS SCHEDULED
Status: DISCONTINUED | OUTPATIENT
Start: 2024-02-05 | End: 2024-02-07 | Stop reason: HOSPADM

## 2024-02-05 RX ORDER — FLECAINIDE ACETATE 50 MG/1
50 TABLET ORAL EVERY 12 HOURS SCHEDULED
Status: DISCONTINUED | OUTPATIENT
Start: 2024-02-05 | End: 2024-02-07 | Stop reason: HOSPADM

## 2024-02-05 RX ORDER — ATORVASTATIN CALCIUM 80 MG/1
80 TABLET, FILM COATED ORAL NIGHTLY
Status: DISCONTINUED | OUTPATIENT
Start: 2024-02-05 | End: 2024-02-07 | Stop reason: HOSPADM

## 2024-02-05 RX ORDER — SODIUM CHLORIDE 0.9 % (FLUSH) 0.9 %
5-40 SYRINGE (ML) INJECTION PRN
Status: DISCONTINUED | OUTPATIENT
Start: 2024-02-05 | End: 2024-02-07 | Stop reason: HOSPADM

## 2024-02-05 RX ADMIN — ROPINIROLE HYDROCHLORIDE 2 MG: 1 TABLET, FILM COATED ORAL at 20:45

## 2024-02-05 RX ADMIN — SODIUM CHLORIDE, PRESERVATIVE FREE 10 ML: 5 INJECTION INTRAVENOUS at 20:44

## 2024-02-05 RX ADMIN — SODIUM CHLORIDE: 9 INJECTION, SOLUTION INTRAVENOUS at 16:12

## 2024-02-05 RX ADMIN — AZITHROMYCIN MONOHYDRATE 500 MG: 500 INJECTION, POWDER, LYOPHILIZED, FOR SOLUTION INTRAVENOUS at 15:03

## 2024-02-05 RX ADMIN — IPRATROPIUM BROMIDE AND ALBUTEROL SULFATE 1 DOSE: .5; 3 SOLUTION RESPIRATORY (INHALATION) at 20:47

## 2024-02-05 RX ADMIN — GABAPENTIN 100 MG: 100 CAPSULE ORAL at 16:06

## 2024-02-05 RX ADMIN — PREDNISONE 40 MG: 20 TABLET ORAL at 16:06

## 2024-02-05 RX ADMIN — IPRATROPIUM BROMIDE AND ALBUTEROL SULFATE 1 DOSE: 2.5; .5 SOLUTION RESPIRATORY (INHALATION) at 16:26

## 2024-02-05 RX ADMIN — FLECAINIDE ACETATE 50 MG: 50 TABLET ORAL at 20:45

## 2024-02-05 RX ADMIN — APIXABAN 2.5 MG: 5 TABLET, FILM COATED ORAL at 20:45

## 2024-02-05 RX ADMIN — BETHANECHOL CHLORIDE 10 MG: 10 TABLET ORAL at 20:45

## 2024-02-05 RX ADMIN — METOPROLOL SUCCINATE 25 MG: 25 TABLET, EXTENDED RELEASE ORAL at 20:48

## 2024-02-05 RX ADMIN — ATORVASTATIN CALCIUM 80 MG: 80 TABLET, FILM COATED ORAL at 20:45

## 2024-02-05 RX ADMIN — CEFTRIAXONE SODIUM 1000 MG: 1 INJECTION, POWDER, FOR SOLUTION INTRAMUSCULAR; INTRAVENOUS at 12:58

## 2024-02-05 RX ADMIN — IPRATROPIUM BROMIDE AND ALBUTEROL SULFATE 1 DOSE: .5; 3 SOLUTION RESPIRATORY (INHALATION) at 10:27

## 2024-02-05 RX ADMIN — ALBUTEROL SULFATE 2.5 MG: 2.5 SOLUTION RESPIRATORY (INHALATION) at 10:27

## 2024-02-05 RX ADMIN — GABAPENTIN 100 MG: 100 CAPSULE ORAL at 20:45

## 2024-02-05 RX ADMIN — ROPINIROLE HYDROCHLORIDE 2 MG: 1 TABLET, FILM COATED ORAL at 16:06

## 2024-02-05 RX ADMIN — LORAZEPAM 0.5 MG: 0.5 TABLET ORAL at 21:28

## 2024-02-05 ASSESSMENT — PAIN SCALES - GENERAL
PAINLEVEL_OUTOF10: 6
PAINLEVEL_OUTOF10: 8

## 2024-02-05 ASSESSMENT — PAIN DESCRIPTION - ORIENTATION
ORIENTATION: MID;LOWER
ORIENTATION: MID;LOWER

## 2024-02-05 ASSESSMENT — ENCOUNTER SYMPTOMS
NAUSEA: 0
VOMITING: 0
SHORTNESS OF BREATH: 1
DIARRHEA: 0
COUGH: 0
RHINORRHEA: 0
CONSTIPATION: 0
EYE PAIN: 0
ABDOMINAL PAIN: 0
SORE THROAT: 0
BACK PAIN: 0

## 2024-02-05 ASSESSMENT — PAIN - FUNCTIONAL ASSESSMENT: PAIN_FUNCTIONAL_ASSESSMENT: NONE - DENIES PAIN

## 2024-02-05 ASSESSMENT — LIFESTYLE VARIABLES
HOW OFTEN DO YOU HAVE A DRINK CONTAINING ALCOHOL: NEVER
HOW MANY STANDARD DRINKS CONTAINING ALCOHOL DO YOU HAVE ON A TYPICAL DAY: PATIENT DOES NOT DRINK

## 2024-02-05 ASSESSMENT — PAIN DESCRIPTION - LOCATION
LOCATION: PELVIS
LOCATION: PELVIS

## 2024-02-05 NOTE — ED PROVIDER NOTES
[facility]  EMERGENCY DEPARTMENT ENCOUNTER        Pt Name: Cleo Stauffer  MRN: [id number]  Birthdate 1935  Date of evaluation: 2/5/2024  Provider: JESSICA Trejo  PCP: Aron Vivas MD  Note Started: 5:26 PM EST 2/5/24       I have seen and evaluated this patient with my supervising physician Ariana Calderon MD.      CHIEF COMPLAINT       Chief Complaint   Patient presents with    Shortness of Breath     From home via [facility] cc shortness of breath onset this morning. EMS states history of COPD and CHF, pt uses 2L oxygen at baseline.       HISTORY OF PRESENT ILLNESS: 1 or more Elements     History from : Patient    Limitations to history : None    Cleo Stauffer is a 88 y.o. female who presents to the emergency room due to shortness of breath with onset starting this morning that is worse than her normal baseline.  She does use 2 L nasal cannula oxygen at baseline.  She does have a history of COPD and CHF.  Got worse this morning so for this reason she called EMS.    Nursing Notes were all reviewed and agreed with or any disagreements were addressed in the HPI.    REVIEW OF SYSTEMS :      Review of Systems   Constitutional:  Negative for chills, diaphoresis and fever.   HENT:  Negative for congestion, rhinorrhea and sore throat.    Eyes:  Negative for pain and visual disturbance.   Respiratory:  Positive for shortness of breath. Negative for cough.    Cardiovascular:  Negative for chest pain and leg swelling.   Gastrointestinal:  Negative for abdominal pain, constipation, diarrhea, nausea and vomiting.   Genitourinary:  Negative for difficulty urinating, dysuria and frequency.   Musculoskeletal:  Negative for back pain and neck pain.   Skin:  Negative for rash and wound.   Neurological:  Negative for dizziness and light-headedness.       Positives and Pertinent negatives as per HPI.     SURGICAL HISTORY     Past Surgical History:   Procedure

## 2024-02-05 NOTE — H&P
V2.0  History and Physical      Name:  Cleo Stauffer /Age/Sex: 1935  (88 y.o. female)   MRN & CSN:  [id number] & [id number] Encounter Date/Time: 2024 1:07 PM EST   Location:  [facility]3372/3372-01 PCP: Aron Vivas MD       Hospital Day: 1    Assessment and Plan:   Cleo Stauffer is a 88 y.o. female with a pmh of hypertension, hyperlipidemia, CAD, AF, pacemaker 2023, chronic anticoag with eliquis, chronic diastolic heart failure, tobacco abuse, COPD, oxygen dependence and DM who presents with Shortness of breath    Hospital Problems             Last Modified POA    * (Principal) Shortness of breath 2024 Yes       Plan:  # Shortness of breath:  -Secondary to most likely COPD exacerbation.  -Patient presented with shortness of breath.  Endorses worsening cough and sputum production.  Endorses changes in color of her sputum.  -Albuterol  -DuoNeb  -Prednisone  -Azithromycin    # CAROLANN:  -Secondary to possible prerenal  -Gentle IV fluid  -Nephrology consulted    # UTI:  -Patient endorses urinary urgency and frequency.  -UA positive for leukocyte esterase.  -Urine culture  -Ceftriaxone    # CHF:  -Not in exacerbation  -Strict in and out  -Daily weight  -Holding Lasix    # A-fib:  -Continue Eliquis and metoprolol and flecainide    # Hypothyroidism:  -Continue levothyroxine    # Diabetes mellitus:  -Low-dose sliding scale  -Hypoglycemic protocol        Disposition:   Current Living situation: Home  Expected Disposition: Home  Estimated D/C: 2-3 days    Diet ADULT DIET; Regular; 3 carb choices (45 gm/meal)   DVT Prophylaxis [] Lovenox, []  Heparin, [] SCDs, [] Ambulation,  [x] Eliquis, [] Xarelto, [] Coumadin   Code Status Full Code   Surrogate Decision Maker/ POA      Personally reviewed Lab Studies and Imaging     Discussed management of the case with ED attending who recommended to admit patient for shortness of breath.    EKG interpreted personally and results no ST

## 2024-02-05 NOTE — ED PROVIDER NOTES
This patient was seen by the Mid-Level Provider. I have seen and examined the patient, agree with the workup, evaluation, management and diagnosis. Care plan has been discussed. My assessment reveals an 88-year-old female who presents with shortness of breath.  This is a 88-year-old female presents from home with shortness of breath.  Patient has a history of COPD and congestive heart failure and currently uses 2 L of oxygen at baseline.  She has been feeling bad since this morning.  She denies any chest pain.  She denies any fevers or chills.          Radiology results:    XR CHEST PORTABLE   Final Result   Small bilateral pleural effusions with bibasilar pulmonary opacities.  This   could reflect atelectasis and/or pneumonia               LABS:    Labs Reviewed   CBC WITH AUTO DIFFERENTIAL - Abnormal; Notable for the following components:       Result Value    RBC 3.54 (*)     Hemoglobin 11.3 (*)     Hematocrit 34.3 (*)     Platelets 106 (*)     All other components within normal limits   COMPREHENSIVE METABOLIC PANEL W/ REFLEX TO MG FOR LOW K - Abnormal; Notable for the following components:    Glucose 117 (*)     BUN 54 (*)     Creatinine 2.8 (*)     Est, Glom Filt Rate 16 (*)     Alkaline Phosphatase 205 (*)     ALT 44 (*)     All other components within normal limits   URINALYSIS WITH REFLEX TO CULTURE - Abnormal; Notable for the following components:    Leukocyte Esterase, Urine MODERATE (*)     All other components within normal limits   BRAIN NATRIURETIC PEPTIDE - Abnormal; Notable for the following components:    Pro-BNP 1,395 (*)     All other components within normal limits   MICROSCOPIC URINALYSIS - Abnormal; Notable for the following components:    WBC, UA 10-20 (*)     Bacteria, UA 1+ (*)     All other components within normal limits   COVID-19 & INFLUENZA COMBO   CULTURE, URINE           EKG:    Sinus rhythm at a rate of 71 beats a minute with no acute ST elevations or depressions or pathologic Q

## 2024-02-05 NOTE — ED NOTES
ED TO INPATIENT SBAR HANDOFF    Patient Name: Cleo Stauffer   :  1935  88 y.o.   MRN:  [id number]  Preferred Name    ED Room #:  ED-0026/26  Family/Caregiver Present yes   Restraints no   Sitter no   Sepsis Risk Score Sepsis Risk Score: 2.14    Situation  Code Status: Prior No additional code details.    Allergies: Adhesive tape, Cephalexin, Hydrochlorothiazide, Peach [prunus persica], Cefaclor, Nsaids, Penicillin g, Codeine, Diclofenac sodium, Ibuprofen, Macrobid [nitrofurantoin macrocrystal], Motrin [ibuprofen micronized], Pcn [penicillins], Prednisone, Sulindac, Nitrofurantoin, and Sulfa antibiotics  Weight: Patient Vitals for the past 96 hrs (Last 3 readings):   Weight   24 1029 76.2 kg (168 lb)     Arrived from: home  Chief Complaint:   Chief Complaint   Patient presents with    Shortness of Breath     From home via [facility] EMS cc shortness of breath onset this morning. EMS states history of COPD and CHF, pt uses 2L oxygen at baseline.     Hospital Problem/Diagnosis:  Principal Problem:    Shortness of breath  Resolved Problems:    * No resolved hospital problems. *    Imaging:   XR CHEST PORTABLE   Final Result   Small bilateral pleural effusions with bibasilar pulmonary opacities.  This   could reflect atelectasis and/or pneumonia           Abnormal labs:   Abnormal Labs Reviewed   CBC WITH AUTO DIFFERENTIAL - Abnormal; Notable for the following components:       Result Value    RBC 3.54 (*)     Hemoglobin 11.3 (*)     Hematocrit 34.3 (*)     Platelets 106 (*)     All other components within normal limits   COMPREHENSIVE METABOLIC PANEL W/ REFLEX TO MG FOR LOW K - Abnormal; Notable for the following components:    Glucose 117 (*)     BUN 54 (*)     Creatinine 2.8 (*)     Est, Glom Filt Rate 16 (*)     Alkaline Phosphatase 205 (*)     ALT 44 (*)     All other components within normal limits   URINALYSIS WITH REFLEX TO CULTURE - Abnormal; Notable for the following components:

## 2024-02-05 NOTE — PROGRESS NOTES
Pharmacy Note - Renal dose adjustment made per P/T protocol    Original order:  Apixaban 5 mg PO BID    Estimated Creatinine Clearance: 13 mL/min (A) (based on SCr of 2.8 mg/dL (H)).    Recent Labs     02/05/24  1054   BUN 54*   CREATININE 2.8*       Renally adjusted order:  Apixaban 2.5 mg PO BID for Afib for age > 80 and Scr > 1.5     Please call pharmacy with any questions.    Thank you,  Lupe Arriaga [facility]  2/5/2024 3:35 PM

## 2024-02-06 ENCOUNTER — APPOINTMENT (OUTPATIENT)
Dept: ULTRASOUND IMAGING | Age: 89
DRG: 683 | End: 2024-02-06
Payer: MEDICARE

## 2024-02-06 PROBLEM — J44.1 COPD EXACERBATION (HCC): Chronic | Status: ACTIVE | Noted: 2022-04-19

## 2024-02-06 LAB
ANION GAP SERPL CALCULATED.3IONS-SCNC: 14 MMOL/L (ref 3–16)
ANISOCYTOSIS BLD QL SMEAR: ABNORMAL
BACTERIA UR CULT: NORMAL
BASOPHILS # BLD: 0 K/UL (ref 0–0.2)
BASOPHILS NFR BLD: 0.1 %
BUN SERPL-MCNC: 50 MG/DL (ref 7–20)
CALCIUM SERPL-MCNC: 9.2 MG/DL (ref 8.3–10.6)
CHLORIDE SERPL-SCNC: 100 MMOL/L (ref 99–110)
CO2 SERPL-SCNC: 23 MMOL/L (ref 21–32)
CREAT SERPL-MCNC: 2 MG/DL (ref 0.6–1.2)
CREAT UR-MCNC: 85 MG/DL (ref 28–259)
DEPRECATED RDW RBC AUTO: 13.9 % (ref 12.4–15.4)
EOSINOPHIL # BLD: 0 K/UL (ref 0–0.6)
EOSINOPHIL NFR BLD: 0.1 %
GFR SERPLBLD CREATININE-BSD FMLA CKD-EPI: 24 ML/MIN/{1.73_M2}
GLUCOSE BLD-MCNC: 142 MG/DL (ref 70–99)
GLUCOSE BLD-MCNC: 174 MG/DL (ref 70–99)
GLUCOSE BLD-MCNC: 181 MG/DL (ref 70–99)
GLUCOSE BLD-MCNC: 194 MG/DL (ref 70–99)
GLUCOSE SERPL-MCNC: 185 MG/DL (ref 70–99)
HCT VFR BLD AUTO: 32.6 % (ref 36–48)
HGB BLD-MCNC: 10.9 G/DL (ref 12–16)
LYMPHOCYTES # BLD: 0.6 K/UL (ref 1–5.1)
LYMPHOCYTES NFR BLD: 12.9 %
MCH RBC QN AUTO: 32.3 PG (ref 26–34)
MCHC RBC AUTO-ENTMCNC: 33.3 G/DL (ref 31–36)
MCV RBC AUTO: 97 FL (ref 80–100)
MONOCYTES # BLD: 0.1 K/UL (ref 0–1.3)
MONOCYTES NFR BLD: 1.6 %
NEUTROPHILS # BLD: 3.7 K/UL (ref 1.7–7.7)
NEUTROPHILS NFR BLD: 85.3 %
OVALOCYTES BLD QL SMEAR: ABNORMAL
PERFORMED ON: ABNORMAL
PLATELET # BLD AUTO: 92 K/UL (ref 135–450)
PLATELET BLD QL SMEAR: ABNORMAL
PMV BLD AUTO: 8.4 FL (ref 5–10.5)
POTASSIUM SERPL-SCNC: 5.4 MMOL/L (ref 3.5–5.1)
PROT UR-MCNC: 8 MG/DL
RBC # BLD AUTO: 3.37 M/UL (ref 4–5.2)
SLIDE REVIEW: ABNORMAL
SODIUM SERPL-SCNC: 137 MMOL/L (ref 136–145)
SODIUM UR-SCNC: 46 MMOL/L
WBC # BLD AUTO: 4.3 K/UL (ref 4–11)

## 2024-02-06 PROCEDURE — 85025 COMPLETE CBC W/AUTO DIFF WBC: CPT

## 2024-02-06 PROCEDURE — 6370000000 HC RX 637 (ALT 250 FOR IP): Performed by: STUDENT IN AN ORGANIZED HEALTH CARE EDUCATION/TRAINING PROGRAM

## 2024-02-06 PROCEDURE — 2580000003 HC RX 258: Performed by: STUDENT IN AN ORGANIZED HEALTH CARE EDUCATION/TRAINING PROGRAM

## 2024-02-06 PROCEDURE — 6360000002 HC RX W HCPCS: Performed by: STUDENT IN AN ORGANIZED HEALTH CARE EDUCATION/TRAINING PROGRAM

## 2024-02-06 PROCEDURE — 6370000000 HC RX 637 (ALT 250 FOR IP): Performed by: INTERNAL MEDICINE

## 2024-02-06 PROCEDURE — 87449 NOS EACH ORGANISM AG IA: CPT

## 2024-02-06 PROCEDURE — 36415 COLL VENOUS BLD VENIPUNCTURE: CPT

## 2024-02-06 PROCEDURE — 1200000000 HC SEMI PRIVATE

## 2024-02-06 PROCEDURE — 94761 N-INVAS EAR/PLS OXIMETRY MLT: CPT

## 2024-02-06 PROCEDURE — 94640 AIRWAY INHALATION TREATMENT: CPT

## 2024-02-06 PROCEDURE — 80048 BASIC METABOLIC PNL TOTAL CA: CPT

## 2024-02-06 PROCEDURE — 2700000000 HC OXYGEN THERAPY PER DAY

## 2024-02-06 PROCEDURE — 76770 US EXAM ABDO BACK WALL COMP: CPT

## 2024-02-06 RX ORDER — LANOLIN ALCOHOL/MO/W.PET/CERES
3 CREAM (GRAM) TOPICAL NIGHTLY
Status: DISCONTINUED | OUTPATIENT
Start: 2024-02-06 | End: 2024-02-07 | Stop reason: HOSPADM

## 2024-02-06 RX ORDER — PANTOPRAZOLE SODIUM 40 MG/1
40 TABLET, DELAYED RELEASE ORAL
Status: DISCONTINUED | OUTPATIENT
Start: 2024-02-06 | End: 2024-02-07 | Stop reason: HOSPADM

## 2024-02-06 RX ORDER — LACTOBACILLUS RHAMNOSUS GG 10B CELL
2 CAPSULE ORAL
Status: DISCONTINUED | OUTPATIENT
Start: 2024-02-06 | End: 2024-02-07 | Stop reason: HOSPADM

## 2024-02-06 RX ORDER — TRAZODONE HYDROCHLORIDE 50 MG/1
50 TABLET ORAL NIGHTLY PRN
Status: DISCONTINUED | OUTPATIENT
Start: 2024-02-06 | End: 2024-02-07 | Stop reason: HOSPADM

## 2024-02-06 RX ORDER — IPRATROPIUM BROMIDE AND ALBUTEROL SULFATE 2.5; .5 MG/3ML; MG/3ML
1 SOLUTION RESPIRATORY (INHALATION)
Status: DISCONTINUED | OUTPATIENT
Start: 2024-02-06 | End: 2024-02-07 | Stop reason: HOSPADM

## 2024-02-06 RX ORDER — NIFEDIPINE 30 MG/1
30 TABLET, EXTENDED RELEASE ORAL 2 TIMES DAILY
Status: DISCONTINUED | OUTPATIENT
Start: 2024-02-06 | End: 2024-02-07 | Stop reason: HOSPADM

## 2024-02-06 RX ADMIN — GABAPENTIN 100 MG: 100 CAPSULE ORAL at 14:25

## 2024-02-06 RX ADMIN — FLECAINIDE ACETATE 50 MG: 50 TABLET ORAL at 08:47

## 2024-02-06 RX ADMIN — NIFEDIPINE 30 MG: 30 TABLET, EXTENDED RELEASE ORAL at 21:26

## 2024-02-06 RX ADMIN — PANTOPRAZOLE SODIUM 40 MG: 40 TABLET, DELAYED RELEASE ORAL at 18:04

## 2024-02-06 RX ADMIN — APIXABAN 2.5 MG: 5 TABLET, FILM COATED ORAL at 21:26

## 2024-02-06 RX ADMIN — CLONIDINE HYDROCHLORIDE 0.1 MG: 0.1 TABLET ORAL at 08:41

## 2024-02-06 RX ADMIN — CITALOPRAM HYDROBROMIDE 10 MG: 20 TABLET ORAL at 08:41

## 2024-02-06 RX ADMIN — ROPINIROLE HYDROCHLORIDE 2 MG: 1 TABLET, FILM COATED ORAL at 08:34

## 2024-02-06 RX ADMIN — IPRATROPIUM BROMIDE AND ALBUTEROL SULFATE 1 DOSE: .5; 3 SOLUTION RESPIRATORY (INHALATION) at 21:07

## 2024-02-06 RX ADMIN — GABAPENTIN 100 MG: 100 CAPSULE ORAL at 08:41

## 2024-02-06 RX ADMIN — GABAPENTIN 100 MG: 100 CAPSULE ORAL at 21:26

## 2024-02-06 RX ADMIN — FLECAINIDE ACETATE 50 MG: 50 TABLET ORAL at 21:26

## 2024-02-06 RX ADMIN — CEFTRIAXONE SODIUM 1000 MG: 1 INJECTION, POWDER, FOR SOLUTION INTRAMUSCULAR; INTRAVENOUS at 16:01

## 2024-02-06 RX ADMIN — MELATONIN TAB 3 MG 3 MG: 3 TAB at 21:26

## 2024-02-06 RX ADMIN — LEVOTHYROXINE SODIUM 75 MCG: 0.07 TABLET ORAL at 05:59

## 2024-02-06 RX ADMIN — NIFEDIPINE 30 MG: 30 TABLET, EXTENDED RELEASE ORAL at 14:25

## 2024-02-06 RX ADMIN — POLYETHYLENE GLYCOL 3350 17 G: 17 POWDER, FOR SOLUTION ORAL at 12:01

## 2024-02-06 RX ADMIN — ROPINIROLE HYDROCHLORIDE 2 MG: 1 TABLET, FILM COATED ORAL at 21:26

## 2024-02-06 RX ADMIN — IPRATROPIUM BROMIDE AND ALBUTEROL SULFATE 1 DOSE: .5; 3 SOLUTION RESPIRATORY (INHALATION) at 11:41

## 2024-02-06 RX ADMIN — APIXABAN 2.5 MG: 5 TABLET, FILM COATED ORAL at 08:41

## 2024-02-06 RX ADMIN — IPRATROPIUM BROMIDE AND ALBUTEROL SULFATE 1 DOSE: .5; 3 SOLUTION RESPIRATORY (INHALATION) at 00:58

## 2024-02-06 RX ADMIN — Medication 2 CAPSULE: at 14:25

## 2024-02-06 RX ADMIN — BETHANECHOL CHLORIDE 10 MG: 10 TABLET ORAL at 08:41

## 2024-02-06 RX ADMIN — IPRATROPIUM BROMIDE AND ALBUTEROL SULFATE 1 DOSE: .5; 3 SOLUTION RESPIRATORY (INHALATION) at 15:10

## 2024-02-06 RX ADMIN — ACETAMINOPHEN 650 MG: 325 TABLET ORAL at 00:39

## 2024-02-06 RX ADMIN — IPRATROPIUM BROMIDE AND ALBUTEROL SULFATE 1 DOSE: .5; 3 SOLUTION RESPIRATORY (INHALATION) at 05:15

## 2024-02-06 RX ADMIN — BETHANECHOL CHLORIDE 10 MG: 10 TABLET ORAL at 21:26

## 2024-02-06 RX ADMIN — ROPINIROLE HYDROCHLORIDE 2 MG: 1 TABLET, FILM COATED ORAL at 14:25

## 2024-02-06 RX ADMIN — METOPROLOL SUCCINATE 25 MG: 25 TABLET, EXTENDED RELEASE ORAL at 08:34

## 2024-02-06 RX ADMIN — SODIUM CHLORIDE, PRESERVATIVE FREE 10 ML: 5 INJECTION INTRAVENOUS at 08:41

## 2024-02-06 RX ADMIN — PREDNISONE 40 MG: 20 TABLET ORAL at 08:41

## 2024-02-06 RX ADMIN — AZITHROMYCIN MONOHYDRATE 500 MG: 500 INJECTION, POWDER, LYOPHILIZED, FOR SOLUTION INTRAVENOUS at 12:21

## 2024-02-06 RX ADMIN — IPRATROPIUM BROMIDE AND ALBUTEROL SULFATE 1 DOSE: .5; 3 SOLUTION RESPIRATORY (INHALATION) at 07:35

## 2024-02-06 RX ADMIN — SODIUM CHLORIDE, PRESERVATIVE FREE 10 ML: 5 INJECTION INTRAVENOUS at 21:27

## 2024-02-06 RX ADMIN — ATORVASTATIN CALCIUM 80 MG: 80 TABLET, FILM COATED ORAL at 21:26

## 2024-02-06 ASSESSMENT — PAIN SCALES - GENERAL
PAINLEVEL_OUTOF10: 3
PAINLEVEL_OUTOF10: 6
PAINLEVEL_OUTOF10: 0

## 2024-02-06 ASSESSMENT — PAIN DESCRIPTION - DESCRIPTORS: DESCRIPTORS: ACHING

## 2024-02-06 ASSESSMENT — PAIN DESCRIPTION - ORIENTATION
ORIENTATION: LOWER
ORIENTATION: LOWER

## 2024-02-06 ASSESSMENT — PAIN DESCRIPTION - LOCATION
LOCATION: ABDOMEN
LOCATION: ABDOMEN

## 2024-02-06 NOTE — RT PROTOCOL NOTE
RT bronchodilator order(s) to equivalent RT Bronchodilator order with Frequency of every 4 hours PRN for wheezing or increased work of breathing using Per Protocol order mode.        4-6 - enter or revise RT Bronchodilator order(s) to two equivalent RT bronchodilator orders with one order with BID Frequency and one order with Frequency of every 4 hours PRN wheezing or increased work of breathing using Per Protocol order mode.        7-10 - enter or revise RT Bronchodilator order(s) to two equivalent RT bronchodilator orders with one order with TID Frequency and one order with Frequency of every 4 hours PRN wheezing or increased work of breathing using Per Protocol order mode.       11-13 - enter or revise RT Bronchodilator order(s) to one equivalent RT bronchodilator order with QID Frequency and an Albuterol order with Frequency of every 4 hours PRN wheezing or increased work of breathing using Per Protocol order mode.      Greater than 13 - enter or revise RT Bronchodilator order(s) to one equivalent RT bronchodilator order with every 4 hours Frequency and an Albuterol order with Frequency of every 2 hours PRN wheezing or increased work of breathing using Per Protocol order mode.     RT to enter RT Home Evaluation for COPD & MDI Assessment order using Per Protocol order mode.    Electronically signed by Ct Ramos RCP on 2/6/2024 at 6:57 PM

## 2024-02-06 NOTE — PROGRESS NOTES
[facility]   Nephrology progress Note  [phone]  [phone]   [employer]           Reason for Consult: CAROLANN/ CKD   The patient is a 88 y.o. female with significant past medical history of T2DM, atrial fibrillation on Eliquis,  diastolic CHF, COPD, hypertension, hyperlipidemia, Parkinson's disease, who presents to the ER with progressive shortness of breath.  This is accompanied by cough and productive sputum which is changed from her usual white to yellow.  She denies any hemoptysis or chest pain or fever chills rigors    We are consulted for serum creatinine of 2.8 and a bun of 54 with a K of 5.1 and a bicarb of 27.  Her baseline serum creatinine is 1-1.2.   She is on furosemide and Jardiance    Interval History:    2/6/24: stable     PHYSICAL EXAM:    Vitals:    BP (!) 189/73   Pulse 72   Temp 98.2 °F (36.8 °C) (Oral)   Resp 16   Ht 1.575 m (5' 2\")   Wt 75.4 kg (166 lb 3.2 oz)   SpO2 93%   BMI 30.40 kg/m²   I/O last 3 completed shifts:  In: 547.8 [I.V.:499.7; IV Piggyback:48]  Out: 550 [Urine:550]  No intake/output data recorded.    Physical Exam:  Gen:  alert, oriented x 3  PERRL , EOM +  Pallor +, No icterus  JVP not raised   CV: RRR no murmur or rub .Pacemaker   Lungs:B/ L air entry, Normal breath sounds   Abd: soft, bowel sounds + , No organomegaly , appendectomy scar , cholecystectomy scar   Ext: No edema, no cyanosis  Skin: Warm.  No rash  Neuro: no focal deficit       DATA:    CBC with Differential:    Lab Results   Component Value Date/Time    WBC 4.3 02/06/2024 06:21 AM    RBC 3.37 02/06/2024 06:21 AM    HGB 10.9 02/06/2024 06:21 AM    HCT 32.6 02/06/2024 06:21 AM    PLT 92 02/06/2024 06:21 AM    MCV 97.0 02/06/2024 06:21 AM    MCH 32.3 02/06/2024 06:21 AM    MCHC 33.3 02/06/2024 06:21 AM    RDW 13.9 02/06/2024 06:21 AM    SEGSPCT 73.1 02/21/2013 01:48 PM    BANDSPCT 6 02/27/2021 05:50 AM    METASPCT 3 02/15/2020 03:30 PM    LYMPHOPCT 12.9 02/06/2024 06:21 AM

## 2024-02-06 NOTE — PROGRESS NOTES
Admit Date: 2/5/2024  Diet: ADULT DIET; Regular; 3 carb choices (45 gm/meal)    CC: SOB    Interval history:   Overnight, there were no acute events. Patient's vitals remained stable    Patient was seen this morning in bed with her daughter at bedside.  Patient endorsed that she has shortness of breath with cough and productive sputum production.  Per daughter and patient, this appears to be similar to her prior COPD exacerbations. Patient also endorsed dysuria. Patient denies fevers, chills, nausea, vomiting, chest pain, diarrhea, constipation, dysuria, urinary frequency or urgency.     Plan:     -Continue Prednisone  -Continue Duonebs  -Continue Azithromycin  -Probiotics  -Continue Rocephin for UTI  -Monitor Cr    Assessment:   Cleo Stauffer is a 88 y.o. female with PMH of  hypertension, hyperlipidemia, CAD, AF, pacemaker 2/24/2023, chronic anticoag with eliquis, chronic diastolic heart failure, tobacco abuse, COPD, oxygen dependence and DM  who was admitted with COPD exacerbation    Shortness of breath:  -Secondary to most likely COPD exacerbation.  -Patient presented with shortness of breath.  Endorses worsening cough and sputum production.  Endorses changes in color of her sputum.  -Albuterol  -DuoNeb  -Prednisone  -Azithromycin     CAROLANN:  -Secondary to possible prerenal  -Gentle IV fluid  -Nephrology consulted     UTI:  -Patient endorses urinary urgency and frequency.  -UA positive for leukocyte esterase.  -Urine culture  -Ceftriaxone     CHF:  -Not in exacerbation  -Strict in and out  -Daily weight  -Holding Lasix     A-fib:  -Continue Eliquis and metoprolol and flecainide     Hypothyroidism:  -Continue levothyroxine     Diabetes mellitus:  -Low-dose sliding scale  -Hypoglycemic protocol    Code Status: Full Code   FEN: ADULT DIET; Regular; 3 carb choices (45 gm/meal)   DVT PPX: [] Lovenox, []Heparin, [] SCDs,[x] Eliquis, [] Xarelto, [] Coumadin  DISPO: [x] GMF, [] ICU, [] CVU    [place],

## 2024-02-06 NOTE — PROGRESS NOTES
02/06/24 1857   RT Protocol   History Pulmonary Disease 2   Respiratory pattern 0   Breath sounds 2   Cough 0   Bronchodilator Assessment Score 4

## 2024-02-06 NOTE — PLAN OF CARE
Problem: Safety - Adult  Goal: Free from fall injury  2/6/2024 1504 by Surekha Gonzalez RN  Outcome: Progressing     Problem: Discharge Planning  Goal: Discharge to home or other facility with appropriate resources  2/6/2024 1504 by Surekha Gonzalez RN  Outcome: Progressing     Problem: ABCDS Injury Assessment  Goal: Absence of physical injury  2/6/2024 1504 by Surekha Gonzalez RN  Outcome: Progressing     Problem: Pain  Goal: Verbalizes/displays adequate comfort level or baseline comfort level  2/6/2024 1504 by Surekha Gonzalez RN  Outcome: Progressing     Problem: Chronic Conditions and Co-morbidities  Goal: Patient's chronic conditions and co-morbidity symptoms are monitored and maintained or improved  2/6/2024 1504 by Surekha Gonzalez RN  Outcome: Progressing     Problem: Respiratory - Adult  Goal: Achieves optimal ventilation and oxygenation  Outcome: Progressing     Problem: Infection - Adult  Goal: Absence of infection at discharge  Outcome: Progressing     Problem: Cardiovascular - Adult  Goal: Absence of cardiac dysrhythmias or at baseline  Outcome: Progressing     Problem: Musculoskeletal - Adult  Goal: Return ADL status to a safe level of function  Outcome: Progressing     Problem: Genitourinary - Adult  Goal: Absence of urinary retention  Outcome: Progressing     Problem: Metabolic/Fluid and Electrolytes - Adult  Goal: Electrolytes maintained within normal limits  Outcome: Progressing

## 2024-02-06 NOTE — PLAN OF CARE
Problem: Discharge Planning  Goal: Discharge to home or other facility with appropriate resources  Outcome: Progressing  Flowsheets (Taken 2/5/2024 2042)  Discharge to home or other facility with appropriate resources:   Identify barriers to discharge with patient and caregiver   Arrange for needed discharge resources and transportation as appropriate   Identify discharge learning needs (meds, wound care, etc)   Refer to discharge planning if patient needs post-hospital services based on physician order or complex needs related to functional status, cognitive ability or social support system     Problem: Safety - Adult  Goal: Free from fall injury  Outcome: Progressing     Problem: ABCDS Injury Assessment  Goal: Absence of physical injury  Outcome: Progressing     Problem: Pain  Goal: Verbalizes/displays adequate comfort level or baseline comfort level  Outcome: Progressing     Problem: Chronic Conditions and Co-morbidities  Goal: Patient's chronic conditions and co-morbidity symptoms are monitored and maintained or improved  Outcome: Progressing  Flowsheets (Taken 2/5/2024 2042)  Care Plan - Patient's Chronic Conditions and Co-Morbidity Symptoms are Monitored and Maintained or Improved:   Monitor and assess patient's chronic conditions and comorbid symptoms for stability, deterioration, or improvement   Collaborate with multidisciplinary team to address chronic and comorbid conditions and prevent exacerbation or deterioration   Update acute care plan with appropriate goals if chronic or comorbid symptoms are exacerbated and prevent overall improvement and discharge

## 2024-02-07 VITALS
BODY MASS INDEX: 29.63 KG/M2 | SYSTOLIC BLOOD PRESSURE: 124 MMHG | DIASTOLIC BLOOD PRESSURE: 57 MMHG | OXYGEN SATURATION: 96 % | TEMPERATURE: 98.4 F | HEART RATE: 60 BPM | WEIGHT: 161 LBS | RESPIRATION RATE: 18 BRPM | HEIGHT: 62 IN

## 2024-02-07 LAB
ANION GAP SERPL CALCULATED.3IONS-SCNC: 14 MMOL/L (ref 3–16)
BASOPHILS # BLD: 0 K/UL (ref 0–0.2)
BASOPHILS NFR BLD: 0.2 %
BUN SERPL-MCNC: 40 MG/DL (ref 7–20)
CALCIUM SERPL-MCNC: 9.9 MG/DL (ref 8.3–10.6)
CHLORIDE SERPL-SCNC: 101 MMOL/L (ref 99–110)
CO2 SERPL-SCNC: 23 MMOL/L (ref 21–32)
CREAT SERPL-MCNC: 1.5 MG/DL (ref 0.6–1.2)
DEPRECATED RDW RBC AUTO: 13.7 % (ref 12.4–15.4)
EOSINOPHIL # BLD: 0.1 K/UL (ref 0–0.6)
EOSINOPHIL NFR BLD: 0.4 %
GFR SERPLBLD CREATININE-BSD FMLA CKD-EPI: 33 ML/MIN/{1.73_M2}
GLUCOSE BLD-MCNC: 120 MG/DL (ref 70–99)
GLUCOSE BLD-MCNC: 144 MG/DL (ref 70–99)
GLUCOSE SERPL-MCNC: 124 MG/DL (ref 70–99)
HCT VFR BLD AUTO: 33.7 % (ref 36–48)
HGB BLD-MCNC: 11.1 G/DL (ref 12–16)
LEGIONELLA AG UR QL: NORMAL
LYMPHOCYTES # BLD: 1.8 K/UL (ref 1–5.1)
LYMPHOCYTES NFR BLD: 13.3 %
MAGNESIUM SERPL-MCNC: 2.7 MG/DL (ref 1.8–2.4)
MCH RBC QN AUTO: 31.9 PG (ref 26–34)
MCHC RBC AUTO-ENTMCNC: 32.9 G/DL (ref 31–36)
MCV RBC AUTO: 97.2 FL (ref 80–100)
MONOCYTES # BLD: 0.6 K/UL (ref 0–1.3)
MONOCYTES NFR BLD: 4.7 %
NEUTROPHILS # BLD: 10.8 K/UL (ref 1.7–7.7)
NEUTROPHILS NFR BLD: 81.4 %
PERFORMED ON: ABNORMAL
PERFORMED ON: ABNORMAL
PLATELET # BLD AUTO: 119 K/UL (ref 135–450)
PMV BLD AUTO: 8.6 FL (ref 5–10.5)
POTASSIUM SERPL-SCNC: 5.1 MMOL/L (ref 3.5–5.1)
RBC # BLD AUTO: 3.46 M/UL (ref 4–5.2)
SODIUM SERPL-SCNC: 138 MMOL/L (ref 136–145)
WBC # BLD AUTO: 13.2 K/UL (ref 4–11)

## 2024-02-07 PROCEDURE — 80048 BASIC METABOLIC PNL TOTAL CA: CPT

## 2024-02-07 PROCEDURE — 2580000003 HC RX 258: Performed by: STUDENT IN AN ORGANIZED HEALTH CARE EDUCATION/TRAINING PROGRAM

## 2024-02-07 PROCEDURE — 97530 THERAPEUTIC ACTIVITIES: CPT

## 2024-02-07 PROCEDURE — 85025 COMPLETE CBC W/AUTO DIFF WBC: CPT

## 2024-02-07 PROCEDURE — 97535 SELF CARE MNGMENT TRAINING: CPT

## 2024-02-07 PROCEDURE — 97116 GAIT TRAINING THERAPY: CPT

## 2024-02-07 PROCEDURE — 97161 PT EVAL LOW COMPLEX 20 MIN: CPT

## 2024-02-07 PROCEDURE — 6370000000 HC RX 637 (ALT 250 FOR IP): Performed by: STUDENT IN AN ORGANIZED HEALTH CARE EDUCATION/TRAINING PROGRAM

## 2024-02-07 PROCEDURE — 94640 AIRWAY INHALATION TREATMENT: CPT

## 2024-02-07 PROCEDURE — 36415 COLL VENOUS BLD VENIPUNCTURE: CPT

## 2024-02-07 PROCEDURE — 83735 ASSAY OF MAGNESIUM: CPT

## 2024-02-07 PROCEDURE — 6370000000 HC RX 637 (ALT 250 FOR IP): Performed by: INTERNAL MEDICINE

## 2024-02-07 PROCEDURE — 97165 OT EVAL LOW COMPLEX 30 MIN: CPT

## 2024-02-07 RX ORDER — LACTOBACILLUS RHAMNOSUS GG 10B CELL
2 CAPSULE ORAL
Qty: 6 CAPSULE | Refills: 0 | Status: SHIPPED | OUTPATIENT
Start: 2024-02-08 | End: 2024-02-11

## 2024-02-07 RX ORDER — NIFEDIPINE 30 MG/1
30 TABLET, EXTENDED RELEASE ORAL 2 TIMES DAILY
Qty: 180 TABLET | Refills: 0 | Status: SHIPPED | OUTPATIENT
Start: 2024-02-07 | End: 2024-05-07

## 2024-02-07 RX ORDER — LEVOFLOXACIN 250 MG/1
250 TABLET, FILM COATED ORAL DAILY
Qty: 5 TABLET | Refills: 0 | Status: SHIPPED | OUTPATIENT
Start: 2024-02-08 | End: 2024-02-13

## 2024-02-07 RX ORDER — AZITHROMYCIN 500 MG/1
500 TABLET, FILM COATED ORAL DAILY
Qty: 1 TABLET | Refills: 0 | Status: SHIPPED | OUTPATIENT
Start: 2024-02-08 | End: 2024-02-09

## 2024-02-07 RX ORDER — FAMOTIDINE 20 MG/1
20 TABLET, FILM COATED ORAL 2 TIMES DAILY
Qty: 180 TABLET | Refills: 1 | Status: CANCELLED | OUTPATIENT
Start: 2024-02-07

## 2024-02-07 RX ORDER — PREDNISONE 20 MG/1
40 TABLET ORAL DAILY
Qty: 6 TABLET | Refills: 0 | Status: SHIPPED | OUTPATIENT
Start: 2024-02-08 | End: 2024-02-11

## 2024-02-07 RX ORDER — PANTOPRAZOLE SODIUM 40 MG/1
40 TABLET, DELAYED RELEASE ORAL DAILY
Qty: 3 TABLET | Refills: 0 | Status: SHIPPED | OUTPATIENT
Start: 2024-02-08 | End: 2024-02-11

## 2024-02-07 RX ADMIN — LEVOTHYROXINE SODIUM 75 MCG: 0.07 TABLET ORAL at 05:13

## 2024-02-07 RX ADMIN — GABAPENTIN 100 MG: 100 CAPSULE ORAL at 08:31

## 2024-02-07 RX ADMIN — CITALOPRAM HYDROBROMIDE 10 MG: 20 TABLET ORAL at 08:30

## 2024-02-07 RX ADMIN — NIFEDIPINE 30 MG: 30 TABLET, EXTENDED RELEASE ORAL at 08:30

## 2024-02-07 RX ADMIN — IPRATROPIUM BROMIDE AND ALBUTEROL SULFATE 1 DOSE: .5; 3 SOLUTION RESPIRATORY (INHALATION) at 12:30

## 2024-02-07 RX ADMIN — PREDNISONE 40 MG: 20 TABLET ORAL at 08:30

## 2024-02-07 RX ADMIN — SODIUM CHLORIDE, PRESERVATIVE FREE 10 ML: 5 INJECTION INTRAVENOUS at 08:31

## 2024-02-07 RX ADMIN — APIXABAN 2.5 MG: 5 TABLET, FILM COATED ORAL at 08:31

## 2024-02-07 RX ADMIN — ROPINIROLE HYDROCHLORIDE 2 MG: 1 TABLET, FILM COATED ORAL at 08:30

## 2024-02-07 RX ADMIN — METOPROLOL SUCCINATE 25 MG: 25 TABLET, EXTENDED RELEASE ORAL at 08:31

## 2024-02-07 RX ADMIN — CLONIDINE HYDROCHLORIDE 0.1 MG: 0.1 TABLET ORAL at 08:30

## 2024-02-07 RX ADMIN — PANTOPRAZOLE SODIUM 40 MG: 40 TABLET, DELAYED RELEASE ORAL at 05:13

## 2024-02-07 RX ADMIN — Medication 2 CAPSULE: at 08:30

## 2024-02-07 RX ADMIN — BETHANECHOL CHLORIDE 10 MG: 10 TABLET ORAL at 08:30

## 2024-02-07 RX ADMIN — FLECAINIDE ACETATE 50 MG: 50 TABLET ORAL at 08:31

## 2024-02-07 ASSESSMENT — PAIN SCALES - GENERAL: PAINLEVEL_OUTOF10: 0

## 2024-02-07 NOTE — PROGRESS NOTES
AVS reviewed with patient and daughter. Verbalized understanding. All belongings collected and sent with patient. Daughter to get prescriptions from outpatient pharmacy

## 2024-02-07 NOTE — CARE COORDINATION
[facility] Received home care referral. Spoke with pt 's daughter Maxine and re: home care plan of care/services. Agreeable. Demographic's verified. Will follow for home care.    
Aware of discharge with home care. Home care orders sent to [facility].  Discharge planner notified.    
Freedom of choice list with basic dialogue that supports the patient's individualized plan of care/goals and shares the quality data associated with the providers was provided to: Patient   Patient Representative Name:       The Patient and/or Patient Representative Agree with the Discharge Plan?      Sirisha Ann  Case Management Department  Ph:[phone]

## 2024-02-07 NOTE — PLAN OF CARE
Problem: Discharge Planning  Goal: Discharge to home or other facility with appropriate resources  Outcome: Completed     Problem: Safety - Adult  Goal: Free from fall injury  Outcome: Completed     Problem: ABCDS Injury Assessment  Goal: Absence of physical injury  Outcome: Completed     Problem: Pain  Goal: Verbalizes/displays adequate comfort level or baseline comfort level  Outcome: Completed     Problem: Chronic Conditions and Co-morbidities  Goal: Patient's chronic conditions and co-morbidity symptoms are monitored and maintained or improved  Outcome: Completed     Problem: Respiratory - Adult  Goal: Achieves optimal ventilation and oxygenation  Outcome: Completed     Problem: Infection - Adult  Goal: Absence of infection at discharge  Outcome: Completed     Problem: Cardiovascular - Adult  Goal: Absence of cardiac dysrhythmias or at baseline  Outcome: Completed     Problem: Musculoskeletal - Adult  Goal: Return ADL status to a safe level of function  Outcome: Completed     Problem: Genitourinary - Adult  Goal: Absence of urinary retention  Outcome: Completed     Problem: Metabolic/Fluid and Electrolytes - Adult  Goal: Electrolytes maintained within normal limits  Outcome: Completed     Problem: Skin/Tissue Integrity - Adult  Goal: Skin integrity remains intact  Outcome: Completed

## 2024-02-07 NOTE — PROGRESS NOTES
[facility] - Inpatient Rehabilitation Department   Phone: [phone]    Physical Therapy    [x] Initial Evaluation            [] Daily Treatment Note         [] Discharge Summary      Patient: Cleo Stauffer   : 1935   MRN: [id number]   Date of Service:  2024  Admitting Diagnosis: COPD exacerbation (HCC)  Current Admission Summary: Cleo Stauffer is a 88 y.o. female who presents to the emergency room due to shortness of breath with onset starting this morning that is worse than her normal baseline.  She does use 2 L nasal cannula oxygen at baseline.  She does have a history of COPD and CHF.  Got worse this morning so for this reason she called EMS.   Past Medical History:  has a past medical history of Arthritis, Atrial fibrillation (HCC), Bursitis, COPD (chronic obstructive pulmonary disease) (HCC), Diabetes mellitus type II, controlled (HCC), Diastolic CHF (HCC), GERD (gastroesophageal reflux disease), HTN (hypertension), Hyperlipidemia, Hypothyroid, Lumbago, Parkinson disease, Restless legs syndrome (RLS), and SVT (supraventricular tachycardia).  Past Surgical History:  has a past surgical history that includes Appendectomy; Colonoscopy; Cholecystectomy, laparoscopic (2013); and Pacemaker insertion (2023).  Discharge Recommendations: Cleo Stauffer scored a 18/24 on the AM-PAC short mobility form. Current research shows that an AM-PAC score of 18 or greater is typically associated with a discharge to the patient's home setting. Based on the patient's AM-PAC score and their current functional mobility deficits, it is recommended that the patient have 2-3 sessions per week of Physical Therapy at d/c to increase the patient's independence.  At this time, this patient demonstrates the endurance and safety to discharge home with (OP services) and a follow up treatment frequency of 2-3x/wk.  Please see assessment section for further patient specific details.    If

## 2024-02-07 NOTE — PROGRESS NOTES
CLINICAL PHARMACY NOTE: MEDS TO BEDS    Total # of Prescriptions Filled: 7   The following medications were delivered to the patient:  Levofloxacin 250mg  Pantoprazole 40mg  Prednisone 20m  Acidophilus capsules  Azithroymcin 500mg  Nifedipine ER 30mg  Eliquis 2.5mg    Additional Documentation:     Medications were picked up in the Outpatient Pharmacy by patient's daughter Becky Cruz Salazar SALCEDOhT

## 2024-02-07 NOTE — PROGRESS NOTES
Physician Progress Note      PATIENT:               SERAFIN ALMENDAREZ  [facility] #:                  [id number]  :                       1935  ADMIT DATE:       2024 10:10 AM  DISCH DATE:  RESPONDING  PROVIDER #:        PREETHI DANIELLE MD          QUERY TEXT:    Patient admitted with CAROLANN. Documentation reflects Pneumonia in  ED Provider   Notes .  If possible, please document in the progress notes and discharge   summary if Pneumonia was:    The medical record reflects the following:  Risk Factors: 89yo with COPD  Clinical Indicators: XR CHEST PORTABLE \"This could reflect atelectasis   and/or pneumonia\"  ED Provider Notes  \"In addition, her chest x-ray shows some bilateral   pleural effusions and possible pneumonia\".  Treatment: IV Ceftriaxone, Azithromycin  Options provided:  -- Pneumonia confirmed after study  -- Pneumonia ruled out after study  -- Other - I will add my own diagnosis  -- Disagree - Not applicable / Not valid  -- Disagree - Clinically unable to determine / Unknown  -- Refer to Clinical Documentation Reviewer    PROVIDER RESPONSE TEXT:    Pneumonia ruled out after study.    Query created by: Elizabeth Price on 2024 10:37 AM      QUERY TEXT:    Patient admitted with CAROLANN, noted to have atrial fibrillation and is maintained   on Eliquis. If possible, please document in progress notes and discharge   summary if you are evaluating and/or treating any of the following:    The medical record reflects the following:  Risk Factors: 89yo female, CHF, HTN  Clinical Indicators: H&P \"A-fib -Continue Eliquis and metoprolol and   flecainide\"  Treatment: Eliquis  Options provided:  -- Secondary hypercoagulable state in a patient with atrial fibrillation  -- Other - I will add my own diagnosis  -- Disagree - Not applicable / Not valid  -- Disagree - Clinically unable to determine / Unknown  -- Refer to Clinical Documentation Reviewer    PROVIDER RESPONSE TEXT:    This patient has

## 2024-02-07 NOTE — DISCHARGE INSTRUCTIONS
Please call and make an appointment with your PCP within 1 week  Please call and make an appointment with Nephrology  Please take all your medications as prescribed including any new ones on discharge

## 2024-02-07 NOTE — PROGRESS NOTES
Assessment complete. VSS. Patient ambulated to bathroom and then returned to chair. Gait steady. Respirations even and unlabored on her baseline oxygen of 2L. Bed linens changed. Call light within reach. Fall precautions in place. Bed in low, locked position. No additional needs noted at this time.

## 2024-02-07 NOTE — DISCHARGE INSTR - COC
first \"education.  Telehealth and/ or Remote Patient  Monitoringas applicable and available                       Patient's personal belongings (please select all that are sent with patient):  None    RN SIGNATURE:  Electronically signed by Kathleen Liz RN on 2/7/24 at 2:07 PM EST    CASE MANAGEMENT/SOCIAL WORK SECTION    Inpatient Status Date: 2/5/24    Readmission Risk Assessment Score: 19%  Readmission Risk              Risk of Unplanned Readmission:  29         [facility])  [address] [address]  Phone: [phone]  Fax: [phone]                  / signature: Electronically signed by Sirisha Ann on 2/7/24 at 2:20 PM EST    PHYSICIAN SECTION    Prognosis: Good    Condition at Discharge: Stable    Rehab Potential (if transferring to Rehab): Good    Recommended Labs or Other Treatments After Discharge:    Physician Certification: I certify the above information and transfer of Cleo Stauffer  is necessary for the continuing treatment of the diagnosis listed and that she requires Home Care for greater 30 days.     Update Admission H&P: No change in H&P    PHYSICIAN SIGNATURE:  Electronically signed by Que Luna MD on 2/7/24 at 12:43 PM EST

## 2024-02-07 NOTE — DISCHARGE SUMMARY
vaginal cream  Generic drug: estrogens conjugated  Place 1 g vaginally three times a week     rOPINIRole 3 MG tablet  Commonly known as: REQUIP  TAKE 1 TABLET THREE TIMES DAILY     True Metrix Blood Glucose Test strip  Generic drug: blood glucose test strips  1 each by In Vitro route daily As needed.     True Metrix Level 2 Normal Soln  As needed     True Metrix Meter w/Device Kit  To use to check sugars 4 times daily     vitamin D 1.25 MG (80579 UT) Caps capsule  Commonly known as: ERGOCALCIFEROL  Take 1 capsule by mouth once a week           * This list has 2 medication(s) that are the same as other medications prescribed for you. Read the directions carefully, and ask your doctor or other care provider to review them with you.                STOP taking these medications      amLODIPine 5 MG tablet  Commonly known as: NORVASC     benzonatate 100 MG capsule  Commonly known as: Tessalon Perles     bethanechol 10 MG tablet  Commonly known as: URECHOLINE     dilTIAZem 30 MG tablet  Commonly known as: CARDIZEM     empagliflozin 10 MG tablet  Commonly known as: JARDIANCE     fluticasone 50 MCG/ACT nasal spray  Commonly known as: FLONASE     naloxegol 25 MG Tabs tablet  Commonly known as: MOVANTIK     valsartan 80 MG tablet  Commonly known as: DIOVAN               Where to Get Your Medications        These medications were sent to [facility] - [place], [place] - [facility]  - P [phone] - F [phone]  [address] [facility] [address]      Phone: [phone]   apixaban 2.5 MG Tabs tablet  azithromycin 500 MG tablet  lactobacillus capsule  levoFLOXacin 250 MG tablet  NIFEdipine 30 MG extended release tablet  pantoprazole 40 MG tablet  predniSONE 20 MG tablet        Time Spent Discharging patient 35 minutes    Electronically signed by Que Luna MD on 2/7/2024 at 2:20 PM

## 2024-02-07 NOTE — PROGRESS NOTES
[facility]   Nephrology progress Note  [phone]  [phone]   [employer]           Reason for Consult: CAROLANN/ CKD   The patient is a 88 y.o. female with significant past medical history of T2DM, atrial fibrillation on Eliquis,  diastolic CHF, COPD, hypertension, hyperlipidemia, Parkinson's disease, who presents to the ER with progressive shortness of breath.  This is accompanied by cough and productive sputum which is changed from her usual white to yellow.  She denies any hemoptysis or chest pain or fever chills rigors    We are consulted for serum creatinine of 2.8 and a bun of 54 with a K of 5.1 and a bicarb of 27.  Her baseline serum creatinine is 1-1.2.   She is on furosemide and Jardiance    Interval History:    2/6/24: stable   2/7/24: feels better, no fever     PHYSICAL EXAM:    Vitals:    BP (!) 169/71   Pulse 63   Temp 98.1 °F (36.7 °C) (Oral)   Resp 16   Ht 1.575 m (5' 2\")   Wt 73 kg (161 lb)   SpO2 96%   BMI 29.45 kg/m²   I/O last 3 completed shifts:  In: 547.8 [I.V.:499.7; IV Piggyback:48]  Out: 550 [Urine:550]  I/O this shift:  In: 10 [I.V.:10]  Out: 50 [Urine:50]    Physical Exam:  Gen:  alert, oriented x 3  PERRL , EOM +  Pallor +, No icterus  JVP not raised   CV: RRR no murmur or rub .Pacemaker   Lungs:B/ L air entry, Normal breath sounds   Abd: soft, bowel sounds + , No organomegaly , appendectomy scar , cholecystectomy scar   Ext: No edema, no cyanosis  Skin: Warm.  No rash  Neuro: no focal deficit       DATA:    CBC with Differential:    Lab Results   Component Value Date/Time    WBC 13.2 02/07/2024 06:04 AM    RBC 3.46 02/07/2024 06:04 AM    HGB 11.1 02/07/2024 06:04 AM    HCT 33.7 02/07/2024 06:04 AM     02/07/2024 06:04 AM    MCV 97.2 02/07/2024 06:04 AM    MCH 31.9 02/07/2024 06:04 AM    MCHC 32.9 02/07/2024 06:04 AM    RDW 13.7 02/07/2024 06:04 AM    SEGSPCT 73.1 02/21/2013 01:48 PM    BANDSPCT 6 02/27/2021 05:50 AM    METASPCT 3 02/15/2020 03:30

## 2024-02-07 NOTE — PROGRESS NOTES
[facility] - Inpatient Rehabilitation Department   Phone: [phone]    Occupational Therapy    [x] Initial Evaluation            [] Daily Treatment Note         [] Discharge Summary      Patient: Cleo Stauffer   : 1935   MRN: [id number]   Date of Service:  2024    Admitting Diagnosis:  COPD exacerbation (HCC)  Current Admission Summary: Cleo Stauffer is a 88 y.o. female who presents to the emergency room due to shortness of breath with onset starting this morning that is worse than her normal baseline.  She does use 2 L nasal cannula oxygen at baseline.  She does have a history of COPD and CHF.  Got worse this morning so for this reason she called EMS.   Past Medical History:  has a past medical history of Arthritis, Atrial fibrillation (HCC), Bursitis, COPD (chronic obstructive pulmonary disease) (HCC), Diabetes mellitus type II, controlled (HCC), Diastolic CHF (HCC), GERD (gastroesophageal reflux disease), HTN (hypertension), Hyperlipidemia, Hypothyroid, Lumbago, Parkinson disease, Restless legs syndrome (RLS), and SVT (supraventricular tachycardia).  Past Surgical History:  has a past surgical history that includes Appendectomy; Colonoscopy; Cholecystectomy, laparoscopic (2013); and Pacemaker insertion (2023).    Discharge Recommendations: Cleo Stauffer scored a 20/24 on the AM-PAC ADL Inpatient form. Current research shows that an AM-PAC score of 18 or greater is typically associated with a discharge to the patient's home setting. Based on the patient's AM-PAC score, and their current ADL deficits, it is recommended that the patient have 2-3 sessions per week of Occupational Therapy at d/c to increase the patient's independence.  At this time, this patient demonstrates the endurance and safety to discharge home with HHOT and a follow up treatment frequency of 2-3x/wk.     Please see assessment section for further patient specific details.     HOME HEALTH

## 2024-02-14 ENCOUNTER — TELEPHONE (OUTPATIENT)
Dept: PULMONOLOGY | Age: 89
End: 2024-02-14

## 2024-02-14 ENCOUNTER — APPOINTMENT (OUTPATIENT)
Dept: GENERAL RADIOLOGY | Age: 89
DRG: 291 | End: 2024-02-14
Payer: MEDICARE

## 2024-02-14 ENCOUNTER — HOSPITAL ENCOUNTER (INPATIENT)
Age: 89
LOS: 5 days | Discharge: HOME HEALTH | DRG: 291 | End: 2024-02-19
Attending: INTERNAL MEDICINE | Admitting: INTERNAL MEDICINE
Payer: MEDICARE

## 2024-02-14 DIAGNOSIS — N28.9 ACUTE ON CHRONIC RENAL INSUFFICIENCY: ICD-10-CM

## 2024-02-14 DIAGNOSIS — N18.9 ACUTE ON CHRONIC RENAL INSUFFICIENCY: ICD-10-CM

## 2024-02-14 DIAGNOSIS — I50.23 ACUTE ON CHRONIC SYSTOLIC HEART FAILURE (HCC): Primary | ICD-10-CM

## 2024-02-14 PROBLEM — I50.33 ACUTE ON CHRONIC HEART FAILURE WITH NORMAL EJECTION FRACTION (HCC): Status: ACTIVE | Noted: 2024-02-14

## 2024-02-14 LAB
ANION GAP SERPL CALCULATED.3IONS-SCNC: 13 MMOL/L (ref 3–16)
ANISOCYTOSIS BLD QL SMEAR: ABNORMAL
BASE EXCESS BLDV CALC-SCNC: -2 MMOL/L (ref -3–3)
BASOPHILS # BLD: 0 K/UL (ref 0–0.2)
BASOPHILS NFR BLD: 0 %
BILIRUB UR QL STRIP.AUTO: NEGATIVE
BUN SERPL-MCNC: 60 MG/DL (ref 7–20)
CALCIUM SERPL-MCNC: 8.8 MG/DL (ref 8.3–10.6)
CHLORIDE SERPL-SCNC: 99 MMOL/L (ref 99–110)
CLARITY UR: CLEAR
CO2 BLDV-SCNC: 55 MMOL/L
CO2 SERPL-SCNC: 21 MMOL/L (ref 21–32)
COHGB MFR BLDV: 2.7 % (ref 0–1.5)
COLOR UR: YELLOW
CREAT SERPL-MCNC: 2.2 MG/DL (ref 0.6–1.2)
DEPRECATED RDW RBC AUTO: 14.3 % (ref 12.4–15.4)
DO-HGB MFR BLDV: 3 %
EOSINOPHIL # BLD: 0.3 K/UL (ref 0–0.6)
EOSINOPHIL NFR BLD: 2 %
FLUAV RNA RESP QL NAA+PROBE: NOT DETECTED
FLUBV RNA RESP QL NAA+PROBE: NOT DETECTED
GFR SERPLBLD CREATININE-BSD FMLA CKD-EPI: 21 ML/MIN/{1.73_M2}
GLUCOSE SERPL-MCNC: 252 MG/DL (ref 70–99)
GLUCOSE UR STRIP.AUTO-MCNC: NEGATIVE MG/DL
HCO3 BLDV-SCNC: 23.4 MMOL/L (ref 23–29)
HCT VFR BLD AUTO: 33.7 % (ref 36–48)
HGB BLD-MCNC: 11.1 G/DL (ref 12–16)
HGB UR QL STRIP.AUTO: NEGATIVE
KETONES UR STRIP.AUTO-MCNC: NEGATIVE MG/DL
LEUKOCYTE ESTERASE UR QL STRIP.AUTO: NEGATIVE
LYMPHOCYTES # BLD: 0.5 K/UL (ref 1–5.1)
LYMPHOCYTES NFR BLD: 4 %
MCH RBC QN AUTO: 31.9 PG (ref 26–34)
MCHC RBC AUTO-ENTMCNC: 32.9 G/DL (ref 31–36)
MCV RBC AUTO: 97 FL (ref 80–100)
METHGB MFR BLDV: 0.2 %
MONOCYTES # BLD: 0.3 K/UL (ref 0–1.3)
MONOCYTES NFR BLD: 2 %
NEUTROPHILS # BLD: 11.9 K/UL (ref 1.7–7.7)
NEUTROPHILS NFR BLD: 92 %
NEUTS VAC BLD QL SMEAR: PRESENT
NITRITE UR QL STRIP.AUTO: NEGATIVE
NT-PROBNP SERPL-MCNC: 4163 PG/ML (ref 0–449)
O2 CT VFR BLDV CALC: 15 VOL %
O2 THERAPY: ABNORMAL
PCO2 BLDV: 41.7 MMHG (ref 40–50)
PH BLDV: 7.36 [PH] (ref 7.35–7.45)
PH UR STRIP.AUTO: 5 [PH] (ref 5–8)
PLATELET # BLD AUTO: 116 K/UL (ref 135–450)
PLATELET BLD QL SMEAR: ABNORMAL
PMV BLD AUTO: 8.7 FL (ref 5–10.5)
PO2 BLDV: 83.8 MMHG (ref 25–40)
POLYCHROMASIA BLD QL SMEAR: ABNORMAL
POTASSIUM SERPL-SCNC: 5 MMOL/L (ref 3.5–5.1)
PROCALCITONIN SERPL IA-MCNC: 0.1 NG/ML (ref 0–0.15)
PROT UR STRIP.AUTO-MCNC: NEGATIVE MG/DL
RBC # BLD AUTO: 3.47 M/UL (ref 4–5.2)
SAO2 % BLDV: 97 %
SARS-COV-2 RNA RESP QL NAA+PROBE: NOT DETECTED
SLIDE REVIEW: ABNORMAL
SODIUM SERPL-SCNC: 133 MMOL/L (ref 136–145)
SP GR UR STRIP.AUTO: 1.01 (ref 1–1.03)
UA COMPLETE W REFLEX CULTURE PNL UR: NORMAL
UA DIPSTICK W REFLEX MICRO PNL UR: NORMAL
URN SPEC COLLECT METH UR: NORMAL
UROBILINOGEN UR STRIP-ACNC: 0.2 E.U./DL
WBC # BLD AUTO: 12.9 K/UL (ref 4–11)

## 2024-02-14 PROCEDURE — 85025 COMPLETE CBC W/AUTO DIFF WBC: CPT

## 2024-02-14 PROCEDURE — 71045 X-RAY EXAM CHEST 1 VIEW: CPT

## 2024-02-14 PROCEDURE — 87040 BLOOD CULTURE FOR BACTERIA: CPT

## 2024-02-14 PROCEDURE — 87636 SARSCOV2 & INF A&B AMP PRB: CPT

## 2024-02-14 PROCEDURE — 80048 BASIC METABOLIC PNL TOTAL CA: CPT

## 2024-02-14 PROCEDURE — 83880 ASSAY OF NATRIURETIC PEPTIDE: CPT

## 2024-02-14 PROCEDURE — 93005 ELECTROCARDIOGRAM TRACING: CPT

## 2024-02-14 PROCEDURE — 82803 BLOOD GASES ANY COMBINATION: CPT

## 2024-02-14 PROCEDURE — 84145 PROCALCITONIN (PCT): CPT

## 2024-02-14 PROCEDURE — 81003 URINALYSIS AUTO W/O SCOPE: CPT

## 2024-02-14 PROCEDURE — 1200000000 HC SEMI PRIVATE

## 2024-02-14 PROCEDURE — 99285 EMERGENCY DEPT VISIT HI MDM: CPT

## 2024-02-14 RX ORDER — AZITHROMYCIN 500 MG/1
500 INJECTION, POWDER, LYOPHILIZED, FOR SOLUTION INTRAVENOUS ONCE
Status: DISCONTINUED | OUTPATIENT
Start: 2024-02-14 | End: 2024-02-14 | Stop reason: SDUPTHER

## 2024-02-14 RX ORDER — FUROSEMIDE 10 MG/ML
40 INJECTION INTRAMUSCULAR; INTRAVENOUS ONCE
Status: COMPLETED | OUTPATIENT
Start: 2024-02-14 | End: 2024-02-15

## 2024-02-14 ASSESSMENT — PAIN - FUNCTIONAL ASSESSMENT: PAIN_FUNCTIONAL_ASSESSMENT: NONE - DENIES PAIN

## 2024-02-14 NOTE — TELEPHONE ENCOUNTER
Javier with [facility] left VM and said patient is SOB , no productive cough, hearing crackling in left and right lobes, oxygen between 88-92 lowest gone down was 80 patient is currently on 4L of oxygen.     PH: [phone]

## 2024-02-15 PROBLEM — I50.23 ACUTE ON CHRONIC SYSTOLIC HEART FAILURE (HCC): Status: ACTIVE | Noted: 2023-04-07

## 2024-02-15 LAB
ANION GAP SERPL CALCULATED.3IONS-SCNC: 13 MMOL/L (ref 3–16)
BUN SERPL-MCNC: 56 MG/DL (ref 7–20)
CALCIUM SERPL-MCNC: 8.8 MG/DL (ref 8.3–10.6)
CHLORIDE SERPL-SCNC: 102 MMOL/L (ref 99–110)
CO2 SERPL-SCNC: 23 MMOL/L (ref 21–32)
CREAT SERPL-MCNC: 2.1 MG/DL (ref 0.6–1.2)
EST. AVERAGE GLUCOSE BLD GHB EST-MCNC: 142.7 MG/DL
GFR SERPLBLD CREATININE-BSD FMLA CKD-EPI: 22 ML/MIN/{1.73_M2}
GLUCOSE BLD-MCNC: 146 MG/DL (ref 70–99)
GLUCOSE BLD-MCNC: 150 MG/DL (ref 70–99)
GLUCOSE BLD-MCNC: 169 MG/DL (ref 70–99)
GLUCOSE BLD-MCNC: 186 MG/DL (ref 70–99)
GLUCOSE BLD-MCNC: 97 MG/DL (ref 70–99)
GLUCOSE SERPL-MCNC: 140 MG/DL (ref 70–99)
HBA1C MFR BLD: 6.6 %
MAGNESIUM SERPL-MCNC: 2.2 MG/DL (ref 1.8–2.4)
PERFORMED ON: ABNORMAL
PERFORMED ON: NORMAL
POTASSIUM SERPL-SCNC: 4.8 MMOL/L (ref 3.5–5.1)
SODIUM SERPL-SCNC: 138 MMOL/L (ref 136–145)
TSH SERPL DL<=0.005 MIU/L-ACNC: 0.97 UIU/ML (ref 0.27–4.2)

## 2024-02-15 PROCEDURE — 6370000000 HC RX 637 (ALT 250 FOR IP): Performed by: INTERNAL MEDICINE

## 2024-02-15 PROCEDURE — 83735 ASSAY OF MAGNESIUM: CPT

## 2024-02-15 PROCEDURE — 83036 HEMOGLOBIN GLYCOSYLATED A1C: CPT

## 2024-02-15 PROCEDURE — 2700000000 HC OXYGEN THERAPY PER DAY

## 2024-02-15 PROCEDURE — 80048 BASIC METABOLIC PNL TOTAL CA: CPT

## 2024-02-15 PROCEDURE — 2580000003 HC RX 258: Performed by: INTERNAL MEDICINE

## 2024-02-15 PROCEDURE — 6360000002 HC RX W HCPCS: Performed by: INTERNAL MEDICINE

## 2024-02-15 PROCEDURE — 93306 TTE W/DOPPLER COMPLETE: CPT

## 2024-02-15 PROCEDURE — 1200000000 HC SEMI PRIVATE

## 2024-02-15 PROCEDURE — 99223 1ST HOSP IP/OBS HIGH 75: CPT | Performed by: INTERNAL MEDICINE

## 2024-02-15 PROCEDURE — 84443 ASSAY THYROID STIM HORMONE: CPT

## 2024-02-15 PROCEDURE — 94640 AIRWAY INHALATION TREATMENT: CPT

## 2024-02-15 PROCEDURE — 94761 N-INVAS EAR/PLS OXIMETRY MLT: CPT

## 2024-02-15 RX ORDER — ROPINIROLE 1 MG/1
3 TABLET, FILM COATED ORAL 3 TIMES DAILY
Status: DISCONTINUED | OUTPATIENT
Start: 2024-02-15 | End: 2024-02-15

## 2024-02-15 RX ORDER — MECLIZINE HCL 12.5 MG/1
12.5 TABLET ORAL 3 TIMES DAILY PRN
Status: DISCONTINUED | OUTPATIENT
Start: 2024-02-15 | End: 2024-02-19 | Stop reason: HOSPADM

## 2024-02-15 RX ORDER — LEVOFLOXACIN 5 MG/ML
500 INJECTION, SOLUTION INTRAVENOUS
Status: DISCONTINUED | OUTPATIENT
Start: 2024-02-15 | End: 2024-02-19 | Stop reason: HOSPADM

## 2024-02-15 RX ORDER — ROPINIROLE 1 MG/1
3 TABLET, FILM COATED ORAL 3 TIMES DAILY
Status: DISCONTINUED | OUTPATIENT
Start: 2024-02-15 | End: 2024-02-19 | Stop reason: HOSPADM

## 2024-02-15 RX ORDER — SODIUM CHLORIDE 9 MG/ML
INJECTION, SOLUTION INTRAVENOUS PRN
Status: DISCONTINUED | OUTPATIENT
Start: 2024-02-15 | End: 2024-02-19 | Stop reason: HOSPADM

## 2024-02-15 RX ORDER — METOPROLOL SUCCINATE 25 MG/1
25 TABLET, EXTENDED RELEASE ORAL DAILY
Status: DISCONTINUED | OUTPATIENT
Start: 2024-02-15 | End: 2024-02-15

## 2024-02-15 RX ORDER — SODIUM CHLORIDE 0.9 % (FLUSH) 0.9 %
5-40 SYRINGE (ML) INJECTION EVERY 12 HOURS SCHEDULED
Status: DISCONTINUED | OUTPATIENT
Start: 2024-02-15 | End: 2024-02-19 | Stop reason: HOSPADM

## 2024-02-15 RX ORDER — POLYETHYLENE GLYCOL 3350 17 G/17G
17 POWDER, FOR SOLUTION ORAL DAILY PRN
Status: DISCONTINUED | OUTPATIENT
Start: 2024-02-15 | End: 2024-02-19 | Stop reason: HOSPADM

## 2024-02-15 RX ORDER — PANTOPRAZOLE SODIUM 40 MG/1
40 TABLET, DELAYED RELEASE ORAL DAILY
Status: DISCONTINUED | OUTPATIENT
Start: 2024-02-15 | End: 2024-02-19 | Stop reason: HOSPADM

## 2024-02-15 RX ORDER — FLECAINIDE ACETATE 50 MG/1
50 TABLET ORAL EVERY 12 HOURS SCHEDULED
Status: DISCONTINUED | OUTPATIENT
Start: 2024-02-15 | End: 2024-02-19 | Stop reason: HOSPADM

## 2024-02-15 RX ORDER — SODIUM CHLORIDE 0.9 % (FLUSH) 0.9 %
5-40 SYRINGE (ML) INJECTION PRN
Status: DISCONTINUED | OUTPATIENT
Start: 2024-02-15 | End: 2024-02-19 | Stop reason: HOSPADM

## 2024-02-15 RX ORDER — DEXTROSE MONOHYDRATE 100 MG/ML
INJECTION, SOLUTION INTRAVENOUS CONTINUOUS PRN
Status: DISCONTINUED | OUTPATIENT
Start: 2024-02-15 | End: 2024-02-19 | Stop reason: HOSPADM

## 2024-02-15 RX ORDER — ONDANSETRON 2 MG/ML
4 INJECTION INTRAMUSCULAR; INTRAVENOUS EVERY 6 HOURS PRN
Status: DISCONTINUED | OUTPATIENT
Start: 2024-02-15 | End: 2024-02-19 | Stop reason: HOSPADM

## 2024-02-15 RX ORDER — FUROSEMIDE 10 MG/ML
20 INJECTION INTRAMUSCULAR; INTRAVENOUS DAILY
Status: DISCONTINUED | OUTPATIENT
Start: 2024-02-15 | End: 2024-02-15

## 2024-02-15 RX ORDER — CLONIDINE HYDROCHLORIDE 0.1 MG/1
0.1 TABLET ORAL 2 TIMES DAILY
Status: DISCONTINUED | OUTPATIENT
Start: 2024-02-15 | End: 2024-02-16

## 2024-02-15 RX ORDER — GABAPENTIN 100 MG/1
100 CAPSULE ORAL 3 TIMES DAILY
Status: DISCONTINUED | OUTPATIENT
Start: 2024-02-15 | End: 2024-02-15

## 2024-02-15 RX ORDER — ACETAMINOPHEN 650 MG/1
650 SUPPOSITORY RECTAL EVERY 6 HOURS PRN
Status: DISCONTINUED | OUTPATIENT
Start: 2024-02-15 | End: 2024-02-19 | Stop reason: HOSPADM

## 2024-02-15 RX ORDER — CITALOPRAM 20 MG/1
10 TABLET ORAL DAILY
Status: DISCONTINUED | OUTPATIENT
Start: 2024-02-15 | End: 2024-02-19 | Stop reason: HOSPADM

## 2024-02-15 RX ORDER — LEVOTHYROXINE SODIUM 88 UG/1
88 TABLET ORAL DAILY
Status: DISCONTINUED | OUTPATIENT
Start: 2024-02-15 | End: 2024-02-19 | Stop reason: HOSPADM

## 2024-02-15 RX ORDER — FERROUS SULFATE 325(65) MG
325 TABLET ORAL 2 TIMES DAILY WITH MEALS
Status: DISCONTINUED | OUTPATIENT
Start: 2024-02-15 | End: 2024-02-19 | Stop reason: HOSPADM

## 2024-02-15 RX ORDER — NIFEDIPINE 30 MG/1
30 TABLET, EXTENDED RELEASE ORAL 2 TIMES DAILY
Status: DISCONTINUED | OUTPATIENT
Start: 2024-02-15 | End: 2024-02-15

## 2024-02-15 RX ORDER — ALBUTEROL SULFATE 90 UG/1
2 AEROSOL, METERED RESPIRATORY (INHALATION) EVERY 6 HOURS PRN
Status: DISCONTINUED | OUTPATIENT
Start: 2024-02-15 | End: 2024-02-19 | Stop reason: HOSPADM

## 2024-02-15 RX ORDER — DILTIAZEM HYDROCHLORIDE 240 MG/1
240 CAPSULE, COATED, EXTENDED RELEASE ORAL DAILY
Status: DISCONTINUED | OUTPATIENT
Start: 2024-02-15 | End: 2024-02-19 | Stop reason: HOSPADM

## 2024-02-15 RX ORDER — GABAPENTIN 100 MG/1
100 CAPSULE ORAL 3 TIMES DAILY
Status: DISCONTINUED | OUTPATIENT
Start: 2024-02-15 | End: 2024-02-19 | Stop reason: HOSPADM

## 2024-02-15 RX ORDER — ACETAMINOPHEN 325 MG/1
650 TABLET ORAL EVERY 6 HOURS PRN
Status: DISCONTINUED | OUTPATIENT
Start: 2024-02-15 | End: 2024-02-19 | Stop reason: HOSPADM

## 2024-02-15 RX ORDER — LEVOTHYROXINE SODIUM 88 UG/1
88 TABLET ORAL
COMMUNITY
Start: 2023-12-11

## 2024-02-15 RX ORDER — LORAZEPAM 0.5 MG/1
0.5 TABLET ORAL 2 TIMES DAILY
Status: DISCONTINUED | OUTPATIENT
Start: 2024-02-15 | End: 2024-02-19 | Stop reason: HOSPADM

## 2024-02-15 RX ORDER — FUROSEMIDE 10 MG/ML
40 INJECTION INTRAMUSCULAR; INTRAVENOUS 2 TIMES DAILY
Status: DISCONTINUED | OUTPATIENT
Start: 2024-02-15 | End: 2024-02-18

## 2024-02-15 RX ORDER — ONDANSETRON 4 MG/1
4 TABLET, ORALLY DISINTEGRATING ORAL EVERY 8 HOURS PRN
Status: DISCONTINUED | OUTPATIENT
Start: 2024-02-15 | End: 2024-02-19 | Stop reason: HOSPADM

## 2024-02-15 RX ORDER — ATORVASTATIN CALCIUM 80 MG/1
80 TABLET, FILM COATED ORAL NIGHTLY
Status: DISCONTINUED | OUTPATIENT
Start: 2024-02-15 | End: 2024-02-19 | Stop reason: HOSPADM

## 2024-02-15 RX ORDER — INSULIN LISPRO 100 [IU]/ML
0-4 INJECTION, SOLUTION INTRAVENOUS; SUBCUTANEOUS
Status: DISCONTINUED | OUTPATIENT
Start: 2024-02-15 | End: 2024-02-19 | Stop reason: HOSPADM

## 2024-02-15 RX ORDER — ERGOCALCIFEROL 1.25 MG/1
50000 CAPSULE ORAL WEEKLY
Status: DISCONTINUED | OUTPATIENT
Start: 2024-02-21 | End: 2024-02-19 | Stop reason: HOSPADM

## 2024-02-15 RX ORDER — GLUCAGON 1 MG/ML
1 KIT INJECTION PRN
Status: DISCONTINUED | OUTPATIENT
Start: 2024-02-15 | End: 2024-02-19 | Stop reason: HOSPADM

## 2024-02-15 RX ORDER — LEVALBUTEROL INHALATION SOLUTION 0.63 MG/3ML
1 SOLUTION RESPIRATORY (INHALATION) EVERY 6 HOURS PRN
Status: DISCONTINUED | OUTPATIENT
Start: 2024-02-15 | End: 2024-02-19 | Stop reason: HOSPADM

## 2024-02-15 RX ORDER — INSULIN LISPRO 100 [IU]/ML
0-4 INJECTION, SOLUTION INTRAVENOUS; SUBCUTANEOUS NIGHTLY
Status: DISCONTINUED | OUTPATIENT
Start: 2024-02-15 | End: 2024-02-19 | Stop reason: HOSPADM

## 2024-02-15 RX ADMIN — LORAZEPAM 0.5 MG: 0.5 TABLET ORAL at 20:17

## 2024-02-15 RX ADMIN — DILTIAZEM HYDROCHLORIDE 240 MG: 240 CAPSULE, EXTENDED RELEASE ORAL at 12:59

## 2024-02-15 RX ADMIN — FLECAINIDE ACETATE 50 MG: 50 TABLET ORAL at 20:18

## 2024-02-15 RX ADMIN — GABAPENTIN 100 MG: 100 CAPSULE ORAL at 20:18

## 2024-02-15 RX ADMIN — ACETAMINOPHEN 650 MG: 325 TABLET ORAL at 12:37

## 2024-02-15 RX ADMIN — ROPINIROLE HYDROCHLORIDE 3 MG: 1 TABLET, FILM COATED ORAL at 09:27

## 2024-02-15 RX ADMIN — EMPAGLIFLOZIN 10 MG: 10 TABLET, FILM COATED ORAL at 16:52

## 2024-02-15 RX ADMIN — FERROUS SULFATE TAB 325 MG (65 MG ELEMENTAL FE) 325 MG: 325 (65 FE) TAB at 09:27

## 2024-02-15 RX ADMIN — FLECAINIDE ACETATE 50 MG: 50 TABLET ORAL at 10:33

## 2024-02-15 RX ADMIN — LEVOFLOXACIN 500 MG: 5 INJECTION, SOLUTION INTRAVENOUS at 02:53

## 2024-02-15 RX ADMIN — CLONIDINE HYDROCHLORIDE 0.1 MG: 0.1 TABLET ORAL at 21:07

## 2024-02-15 RX ADMIN — LORAZEPAM 0.5 MG: 0.5 TABLET ORAL at 09:28

## 2024-02-15 RX ADMIN — NIFEDIPINE 30 MG: 30 TABLET, EXTENDED RELEASE ORAL at 09:28

## 2024-02-15 RX ADMIN — Medication 2 PUFF: at 09:07

## 2024-02-15 RX ADMIN — Medication 10 ML: at 20:19

## 2024-02-15 RX ADMIN — ROPINIROLE HYDROCHLORIDE 3 MG: 1 TABLET, FILM COATED ORAL at 20:17

## 2024-02-15 RX ADMIN — ACETAMINOPHEN 650 MG: 325 TABLET ORAL at 02:26

## 2024-02-15 RX ADMIN — TIOTROPIUM BROMIDE INHALATION SPRAY 2 PUFF: 3.12 SPRAY, METERED RESPIRATORY (INHALATION) at 09:08

## 2024-02-15 RX ADMIN — AZITHROMYCIN MONOHYDRATE 500 MG: 500 INJECTION, POWDER, LYOPHILIZED, FOR SOLUTION INTRAVENOUS at 00:12

## 2024-02-15 RX ADMIN — Medication 10 ML: at 09:30

## 2024-02-15 RX ADMIN — GABAPENTIN 100 MG: 100 CAPSULE ORAL at 09:28

## 2024-02-15 RX ADMIN — FUROSEMIDE 20 MG: 10 INJECTION, SOLUTION INTRAMUSCULAR; INTRAVENOUS at 09:29

## 2024-02-15 RX ADMIN — APIXABAN 2.5 MG: 5 TABLET, FILM COATED ORAL at 09:29

## 2024-02-15 RX ADMIN — CITALOPRAM HYDROBROMIDE 10 MG: 20 TABLET ORAL at 09:29

## 2024-02-15 RX ADMIN — SODIUM CHLORIDE: 9 INJECTION, SOLUTION INTRAVENOUS at 02:51

## 2024-02-15 RX ADMIN — ATORVASTATIN CALCIUM 80 MG: 80 TABLET, FILM COATED ORAL at 20:18

## 2024-02-15 RX ADMIN — Medication 2 PUFF: at 20:51

## 2024-02-15 RX ADMIN — PANTOPRAZOLE SODIUM 40 MG: 40 TABLET, DELAYED RELEASE ORAL at 09:28

## 2024-02-15 RX ADMIN — ROPINIROLE HYDROCHLORIDE 3 MG: 1 TABLET, FILM COATED ORAL at 02:26

## 2024-02-15 RX ADMIN — LEVOTHYROXINE SODIUM 88 MCG: 0.09 TABLET ORAL at 05:51

## 2024-02-15 RX ADMIN — GABAPENTIN 100 MG: 100 CAPSULE ORAL at 14:22

## 2024-02-15 RX ADMIN — FUROSEMIDE 40 MG: 10 INJECTION, SOLUTION INTRAMUSCULAR; INTRAVENOUS at 00:04

## 2024-02-15 RX ADMIN — ROPINIROLE HYDROCHLORIDE 3 MG: 1 TABLET, FILM COATED ORAL at 14:22

## 2024-02-15 RX ADMIN — GABAPENTIN 100 MG: 100 CAPSULE ORAL at 02:26

## 2024-02-15 RX ADMIN — LORAZEPAM 0.5 MG: 0.5 TABLET ORAL at 02:26

## 2024-02-15 RX ADMIN — APIXABAN 2.5 MG: 5 TABLET, FILM COATED ORAL at 02:26

## 2024-02-15 RX ADMIN — FUROSEMIDE 40 MG: 10 INJECTION, SOLUTION INTRAMUSCULAR; INTRAVENOUS at 16:53

## 2024-02-15 RX ADMIN — ATORVASTATIN CALCIUM 80 MG: 80 TABLET, FILM COATED ORAL at 02:26

## 2024-02-15 RX ADMIN — APIXABAN 2.5 MG: 5 TABLET, FILM COATED ORAL at 20:17

## 2024-02-15 RX ADMIN — CLONIDINE HYDROCHLORIDE 0.1 MG: 0.1 TABLET ORAL at 09:28

## 2024-02-15 ASSESSMENT — PAIN SCALES - GENERAL
PAINLEVEL_OUTOF10: 7
PAINLEVEL_OUTOF10: 0

## 2024-02-15 ASSESSMENT — PAIN DESCRIPTION - LOCATION: LOCATION: ABDOMEN

## 2024-02-15 ASSESSMENT — PAIN DESCRIPTION - ORIENTATION: ORIENTATION: MID

## 2024-02-15 ASSESSMENT — PAIN DESCRIPTION - DESCRIPTORS: DESCRIPTORS: ACHING;DISCOMFORT

## 2024-02-15 NOTE — ED NOTES
RN attempted to start IV on pt.  Pt and family refused stating they only wanted it to be done via ultrasound.  RN told family risks of not having an IV started promptly.  Pt and family insisted that ultrasound machine was used.   RN notified provider and will continue to monitor.

## 2024-02-15 NOTE — ED PROVIDER NOTES
5.0 - 10.5 fL    PLATELET SLIDE REVIEW Decreased     SLIDE REVIEW see below     Neutrophils % 92.0 %    Lymphocytes % 4.0 %    Monocytes % 2.0 %    Eosinophils % 2.0 %    Basophils % 0.0 %    Neutrophils Absolute 11.9 (H) 1.7 - 7.7 K/uL    Lymphocytes Absolute 0.5 (L) 1.0 - 5.1 K/uL    Monocytes Absolute 0.3 0.0 - 1.3 K/uL    Eosinophils Absolute 0.3 0.0 - 0.6 K/uL    Basophils Absolute 0.0 0.0 - 0.2 K/uL    Vacuolated Neutrophils Present (A)     Anisocytosis Occasional (A)     Polychromasia Occasional (A)    Basic Metabolic Panel   Result Value Ref Range    Sodium 133 (L) 136 - 145 mmol/L    Potassium 5.0 3.5 - 5.1 mmol/L    Chloride 99 99 - 110 mmol/L    CO2 21 21 - 32 mmol/L    Anion Gap 13 3 - 16    Glucose 252 (H) 70 - 99 mg/dL    BUN 60 (H) 7 - 20 mg/dL    Creatinine 2.2 (H) 0.6 - 1.2 mg/dL    Est, Glom Filt Rate 21 (A) >60    Calcium 8.8 8.3 - 10.6 mg/dL   Procalcitonin   Result Value Ref Range    Procalcitonin 0.10 0.00 - 0.15 ng/mL   Urinalysis with Reflex to Culture    Specimen: Urine   Result Value Ref Range    Color, UA Yellow Straw/Yellow    Clarity, UA Clear Clear    Glucose, Ur Negative Negative mg/dL    Bilirubin Urine Negative Negative    Ketones, Urine Negative Negative mg/dL    Specific Gravity, UA 1.015 1.005 - 1.030    Blood, Urine Negative Negative    pH, UA 5.0 5.0 - 8.0    Protein, UA Negative Negative mg/dL    Urobilinogen, Urine 0.2 <2.0 E.U./dL    Nitrite, Urine Negative Negative    Leukocyte Esterase, Urine Negative Negative    Microscopic Examination Not Indicated     Urine Type NotGiven     Urine Reflex to Culture Not Indicated    Brain Natriuretic Peptide   Result Value Ref Range    Pro-BNP 4,163 (H) 0 - 449 pg/mL       SEP-1  Is this patient to be included in the SEP-1 Core Measure due to severe sepsis or septic shock?   NO    Screenings                    Medications Given During ED Visit  Medications   furosemide (LASIX) injection 40 mg (has no administration in time range)

## 2024-02-15 NOTE — CARE COORDINATION
Case Management Assessment  Initial Evaluation    Date/Time of Evaluation: 2/15/2024 3:55 PM   Assessment Completed by: JAYE DESOUZA RN    If patient is discharged prior to next notation, then this note serves as note for discharge by case management.    Patient Name: Cleo Stauffer                   YOB: 1935  Diagnosis: Acute on chronic systolic heart failure (HCC) [I50.23]  Acute on chronic renal insufficiency [N28.9, N18.9]  Acute on chronic heart failure with normal ejection fraction (HCC) [I50.33]                   Date / Time: 2/14/2024  8:19 PM    Patient Admission Status: Inpatient   Readmission Risk (Low < 19, Mod (19-27), High > 27): Readmission Risk Score: 20.6    Current PCP: Aron Vivas MD  PCP verified by CM? Yes    Chart Reviewed: Yes      History Provided by: Child/Family (daughters are at bedside)  Patient Orientation: Alert and Oriented    Patient Cognition: Alert (per chart, sleeping at this time)    Hospitalization in the last 30 days (Readmission):  Yes    If yes, Readmission Assessment in  Navigator will be completed.    Advance Directives:      Code Status: Full Code   Patient's Primary Decision Maker is: Legal Next of Kin    Primary Decision Maker: Maxine Maki - Child - [phone]    Discharge Planning:    Patient lives with: Children Type of Home: House (Bi level [facility] 3 steps to enter and 6 steps up to where pt's living area is)  Primary Care Giver: Other (Comment) (daughter states has been weaker)  Patient Support Systems include: Children, Family Members   Current Financial resources: Medicare, Medicaid  Current community resources: Other (Comment) (COA)  Current services prior to admission: Home Care, Oxygen Therapy, Other (Comment) (daughter cannot remember oxygen supplier at this time)            Current DME:              Type of Home Care services:  PT, OT, Nursing Services    ADLS  Prior functional level: Assistance with the following:, Other (see

## 2024-02-15 NOTE — CARE COORDINATION
02/15/24 1555   Readmission Assessment   Number of Days since last admission? 8-30 days   Previous Disposition Home with Home Health  ([facility])   Who is being Interviewed Caregiver  (daughter Santa at bedside, pt sleeping)   What was the patient's/caregiver's perception as to why they think they needed to return back to the hospital? Other (Comment)  (daughter states patient was SOB)   Did you visit your Primary Care Physician after you left the hospital, before you returned this time? Yes   Did you see a specialist, such as Cardiac, Pulmonary, Orthopedic Physician, etc. after you left the hospital? No  (has appt with kidney doctor on 2/27 and gets blood drawn on 2/20)   Who advised the patient to return to the hospital? Caregiver;Other (Comment)  (pt lives with her daughter Santa)   Does the patient report anything that got in the way of taking their medications? No   In our efforts to provide the best possible care to you and others like you, can you think of anything that we could have done to help you after you left the hospital the first time, so that you might not have needed to return so soon? Other (Comment)  (daughter had no complaints about instructions but concerned that pt maybe should have stayed longer before being discharge)

## 2024-02-15 NOTE — H&P
V2.0  History and Physical      Name:  Cleo Stauffer /Age/Sex: 1935  (88 y.o. female)   MRN & CSN:  [id number] & [id number] Encounter Date/Time: 2/15/2024 1:29 AM EST   Location:  [id number] PCP: Aron Vivas MD       Hospital Day: 2    Assessment and Plan:   Cleo Stauffer is a 88 y.o. female with a pmh of hypertension, hyperlipidemia, A-fib/pacemaker, chronic diastolic CHF (EF 50 to 55% in ), COPD, chronic hypoxic respiratory failure (2 L/min of oxygen), diabetes type 2, hypothyroidism and Parkinson's disease presenting with shaking started today.        Hospital Problems             Last Modified POA    * (Principal) Acute on chronic heart failure with normal ejection fraction (HCC) 2024 Yes       1.  Acute on chronic diastolic CHF: Suspecting fluid overload.  Started on IV Lasix daily.  Fluid restriction.  Monitor chemistry daily.  Order echo.  Cardiology consult.    2.  Fever: No definite source of infection.  No evidence of UTI.  She may probably have fever with chills (shaking).  I will start on IV Levaquin despite negative procalcitonin.  Follow-up blood culture drawn in ER.    3.  Acute on chronic kidney disease: BUN/creatinine ratio suggests prerenal.  Avoid nephrotoxic medication.  Monitor chemistry daily.  Consult nephrology.    4.  Uncontrolled diabetes type 2: Not on medication.  Check hemoglobin A1c.  Monitor glucose and cover with insulin sliding scale.    5.  Paroxysmal atrial fibrillation/pacemaker: On Cardizem and Tambocor.  Continue Eliquis for stroke prophylaxis.    6.  COPD/chronic hypoxic respiratory failure: Continue bronchodilators and home oxygen.    Chronic problems include hypertension, hyperlipidemia, hypothyroidism, Parkinson's disease and restless leg syndrome.      DVT prophylaxis: On Eliquis      Disposition:   Current Living situation: She lives with her daughter.  She ambulates with a walker.  Expected Disposition: Home  Estimated D/C:

## 2024-02-15 NOTE — ED NOTES
ED TO INPATIENT SBAR HANDOFF    Patient Name: Cleo Stauffer   :  1935  88 y.o.   MRN:  [id number]  Preferred Name  Cleo  ED Room #:  ED-0013/13  Family/Caregiver Present yes. Her daughter is at bedside.  Restraints no   Sitter no   Sepsis Risk Score Sepsis Risk Score: 8.62    Situation  Code Status: Prior No additional code details.    Allergies: Adhesive tape, Cephalexin, Hydrochlorothiazide, Peach [prunus persica], Cefaclor, Nsaids, Penicillin g, Codeine, Diclofenac sodium, Ibuprofen, Macrobid [nitrofurantoin macrocrystal], Motrin [ibuprofen micronized], Pcn [penicillins], Prednisone, Sulindac, Nitrofurantoin, and Sulfa antibiotics  Weight: Patient Vitals for the past 96 hrs (Last 3 readings):   Weight   24 74.4 kg (164 lb)     Arrived from: home  Chief Complaint:   Chief Complaint   Patient presents with    Shortness of Breath     Pt arrived from home. Her daughter sent her via EMS d/t shortness of breath all day. COPD hx. Pt said she went to PCP for a UTI. She stated she did take antibiotics. Pt stated that she began having temors a couple hours ago.     Hospital Problem/Diagnosis:  Principal Problem:    Acute on chronic heart failure with normal ejection fraction (HCC)  Resolved Problems:    * No resolved hospital problems. *    Imaging:   XR CHEST PORTABLE   Final Result   1.  Congestive heart failure is most likely given the radiographic findings;   pneumonia is also a consideration in areas of consolidation with pleural   effusion.   2. Calcific atherosclerosis aorta.   3. Cardiomegaly.           Abnormal labs:   Abnormal Labs Reviewed   BLOOD GAS, VENOUS - Abnormal; Notable for the following components:       Result Value    pO2, Nadeem 83.8 (*)     Carboxyhemoglobin 2.7 (*)     All other components within normal limits   CBC WITH AUTO DIFFERENTIAL - Abnormal; Notable for the following components:    WBC 12.9 (*)     RBC 3.47 (*)     Hemoglobin 11.1 (*)     Hematocrit 33.7 (*)

## 2024-02-15 NOTE — PLAN OF CARE
Problem: Pain  Goal: Verbalizes/displays adequate comfort level or baseline comfort level  Outcome: Progressing     Problem: Safety - Adult  Goal: Free from fall injury  Outcome: Progressing     Problem: ABCDS Injury Assessment  Goal: Absence of physical injury  Outcome: Progressing  Flowsheets (Taken 2/15/2024 4413)  Absence of Physical Injury: Implement safety measures based on patient assessment

## 2024-02-16 LAB
ANION GAP SERPL CALCULATED.3IONS-SCNC: 9 MMOL/L (ref 3–16)
BUN SERPL-MCNC: 42 MG/DL (ref 7–20)
CALCIUM SERPL-MCNC: 9 MG/DL (ref 8.3–10.6)
CHLORIDE SERPL-SCNC: 101 MMOL/L (ref 99–110)
CHOLEST SERPL-MCNC: 107 MG/DL (ref 0–199)
CO2 SERPL-SCNC: 27 MMOL/L (ref 21–32)
CREAT SERPL-MCNC: 1.9 MG/DL (ref 0.6–1.2)
DEPRECATED RDW RBC AUTO: 14.2 % (ref 12.4–15.4)
GFR SERPLBLD CREATININE-BSD FMLA CKD-EPI: 25 ML/MIN/{1.73_M2}
GLUCOSE BLD-MCNC: 119 MG/DL (ref 70–99)
GLUCOSE BLD-MCNC: 128 MG/DL (ref 70–99)
GLUCOSE BLD-MCNC: 129 MG/DL (ref 70–99)
GLUCOSE BLD-MCNC: 132 MG/DL (ref 70–99)
GLUCOSE SERPL-MCNC: 119 MG/DL (ref 70–99)
HCT VFR BLD AUTO: 30.1 % (ref 36–48)
HDLC SERPL-MCNC: 47 MG/DL (ref 40–60)
HGB BLD-MCNC: 10.4 G/DL (ref 12–16)
LDLC SERPL CALC-MCNC: 46 MG/DL
MAGNESIUM SERPL-MCNC: 2.1 MG/DL (ref 1.8–2.4)
MCH RBC QN AUTO: 32.7 PG (ref 26–34)
MCHC RBC AUTO-ENTMCNC: 34.4 G/DL (ref 31–36)
MCV RBC AUTO: 95.1 FL (ref 80–100)
PERFORMED ON: ABNORMAL
PLATELET # BLD AUTO: 99 K/UL (ref 135–450)
PMV BLD AUTO: 8.3 FL (ref 5–10.5)
POTASSIUM SERPL-SCNC: 3.9 MMOL/L (ref 3.5–5.1)
RBC # BLD AUTO: 3.17 M/UL (ref 4–5.2)
SODIUM SERPL-SCNC: 137 MMOL/L (ref 136–145)
TRIGL SERPL-MCNC: 70 MG/DL (ref 0–150)
VLDLC SERPL CALC-MCNC: 14 MG/DL
WBC # BLD AUTO: 7.6 K/UL (ref 4–11)

## 2024-02-16 PROCEDURE — 6370000000 HC RX 637 (ALT 250 FOR IP): Performed by: INTERNAL MEDICINE

## 2024-02-16 PROCEDURE — 2580000003 HC RX 258: Performed by: INTERNAL MEDICINE

## 2024-02-16 PROCEDURE — 6360000002 HC RX W HCPCS: Performed by: INTERNAL MEDICINE

## 2024-02-16 PROCEDURE — 1200000000 HC SEMI PRIVATE

## 2024-02-16 PROCEDURE — 83735 ASSAY OF MAGNESIUM: CPT

## 2024-02-16 PROCEDURE — 80061 LIPID PANEL: CPT

## 2024-02-16 PROCEDURE — 94761 N-INVAS EAR/PLS OXIMETRY MLT: CPT

## 2024-02-16 PROCEDURE — 82728 ASSAY OF FERRITIN: CPT

## 2024-02-16 PROCEDURE — 85027 COMPLETE CBC AUTOMATED: CPT

## 2024-02-16 PROCEDURE — 99233 SBSQ HOSP IP/OBS HIGH 50: CPT | Performed by: CLINICAL NURSE SPECIALIST

## 2024-02-16 PROCEDURE — 80048 BASIC METABOLIC PNL TOTAL CA: CPT

## 2024-02-16 PROCEDURE — 94640 AIRWAY INHALATION TREATMENT: CPT

## 2024-02-16 PROCEDURE — 83540 ASSAY OF IRON: CPT

## 2024-02-16 PROCEDURE — 83550 IRON BINDING TEST: CPT

## 2024-02-16 PROCEDURE — 2700000000 HC OXYGEN THERAPY PER DAY

## 2024-02-16 RX ORDER — CLONIDINE HYDROCHLORIDE 0.1 MG/1
0.05 TABLET ORAL 2 TIMES DAILY
Status: DISCONTINUED | OUTPATIENT
Start: 2024-02-16 | End: 2024-02-19 | Stop reason: HOSPADM

## 2024-02-16 RX ADMIN — GABAPENTIN 100 MG: 100 CAPSULE ORAL at 09:18

## 2024-02-16 RX ADMIN — APIXABAN 2.5 MG: 5 TABLET, FILM COATED ORAL at 22:47

## 2024-02-16 RX ADMIN — PANTOPRAZOLE SODIUM 40 MG: 40 TABLET, DELAYED RELEASE ORAL at 09:18

## 2024-02-16 RX ADMIN — DILTIAZEM HYDROCHLORIDE 240 MG: 240 CAPSULE, EXTENDED RELEASE ORAL at 09:17

## 2024-02-16 RX ADMIN — CLONIDINE HYDROCHLORIDE 0.05 MG: 0.1 TABLET ORAL at 22:46

## 2024-02-16 RX ADMIN — LORAZEPAM 0.5 MG: 0.5 TABLET ORAL at 09:18

## 2024-02-16 RX ADMIN — EMPAGLIFLOZIN 10 MG: 10 TABLET, FILM COATED ORAL at 09:19

## 2024-02-16 RX ADMIN — FLECAINIDE ACETATE 50 MG: 50 TABLET ORAL at 09:18

## 2024-02-16 RX ADMIN — FUROSEMIDE 40 MG: 10 INJECTION, SOLUTION INTRAMUSCULAR; INTRAVENOUS at 09:18

## 2024-02-16 RX ADMIN — TIOTROPIUM BROMIDE INHALATION SPRAY 2 PUFF: 3.12 SPRAY, METERED RESPIRATORY (INHALATION) at 08:28

## 2024-02-16 RX ADMIN — ROPINIROLE HYDROCHLORIDE 3 MG: 1 TABLET, FILM COATED ORAL at 13:38

## 2024-02-16 RX ADMIN — CLONIDINE HYDROCHLORIDE 0.1 MG: 0.1 TABLET ORAL at 09:18

## 2024-02-16 RX ADMIN — GABAPENTIN 100 MG: 100 CAPSULE ORAL at 13:38

## 2024-02-16 RX ADMIN — CITALOPRAM HYDROBROMIDE 10 MG: 20 TABLET ORAL at 09:17

## 2024-02-16 RX ADMIN — Medication 10 ML: at 22:47

## 2024-02-16 RX ADMIN — FUROSEMIDE 40 MG: 10 INJECTION, SOLUTION INTRAMUSCULAR; INTRAVENOUS at 18:28

## 2024-02-16 RX ADMIN — ROPINIROLE HYDROCHLORIDE 3 MG: 1 TABLET, FILM COATED ORAL at 22:46

## 2024-02-16 RX ADMIN — FLECAINIDE ACETATE 50 MG: 50 TABLET ORAL at 22:47

## 2024-02-16 RX ADMIN — ATORVASTATIN CALCIUM 80 MG: 80 TABLET, FILM COATED ORAL at 22:47

## 2024-02-16 RX ADMIN — Medication 10 ML: at 09:19

## 2024-02-16 RX ADMIN — LORAZEPAM 0.5 MG: 0.5 TABLET ORAL at 22:46

## 2024-02-16 RX ADMIN — LEVOTHYROXINE SODIUM 88 MCG: 0.09 TABLET ORAL at 05:40

## 2024-02-16 RX ADMIN — GABAPENTIN 100 MG: 100 CAPSULE ORAL at 22:47

## 2024-02-16 RX ADMIN — ROPINIROLE HYDROCHLORIDE 3 MG: 1 TABLET, FILM COATED ORAL at 09:17

## 2024-02-16 RX ADMIN — APIXABAN 2.5 MG: 5 TABLET, FILM COATED ORAL at 09:18

## 2024-02-16 RX ADMIN — Medication 2 PUFF: at 08:28

## 2024-02-16 ASSESSMENT — PAIN SCALES - GENERAL
PAINLEVEL_OUTOF10: 0

## 2024-02-16 NOTE — PLAN OF CARE
Problem: Pain  Goal: Verbalizes/displays adequate comfort level or baseline comfort level  Outcome: Progressing     Problem: Safety - Adult  Goal: Free from fall injury  Outcome: Progressing     Problem: ABCDS Injury Assessment  Goal: Absence of physical injury  Outcome: Progressing     Problem: Chronic Conditions and Co-morbidities  Goal: Patient's chronic conditions and co-morbidity symptoms are monitored and maintained or improved  Outcome: Progressing  Flowsheets (Taken 2/15/2024 2000)  Care Plan - Patient's Chronic Conditions and Co-Morbidity Symptoms are Monitored and Maintained or Improved:   Collaborate with multidisciplinary team to address chronic and comorbid conditions and prevent exacerbation or deterioration   Monitor and assess patient's chronic conditions and comorbid symptoms for stability, deterioration, or improvement   Update acute care plan with appropriate goals if chronic or comorbid symptoms are exacerbated and prevent overall improvement and discharge

## 2024-02-16 NOTE — DISCHARGE INSTR - COC
Continuity of Care Form  HF Pathway      _x_ Daily Visits x 3     _x_Cardiovascular Assessment. Titrate O2 to keep SaO2 greater than 90%    _x_ Daily Weights- Baseline Wt: 160 lb   Call MD if:   3 pound weight gain or loss in one day OR 5 pound weight gain in one week    _x_No added salt diet (3-4 G sodium) and 64 oz fluid restriction     _x_ Labs:   BMP,BNP     Please start on    Frequency: Weekly x 4              Fax results to: [facility]: [phone]        _x_ Med List attached:   Hold Coreg/Metoprolol if HR less than 45 or patient symptomatic*   Hold ACE/ARB if SBP less than 85 or patient symptomatic*   Do not hold Spironolactone (aldactone) for hypotension/bradycardia   Call MD for questions: [facility] Clinic: [phone]    _x_Follow up appointment with cardiology: date/time:  with Aura Walsh         Patient Name: Cleo Stauffer   :  1935  MRN:  [id number]    Admit date:  2024  Discharge date:  24    Code Status Order: Full Code   Advance Directives:     Admitting Physician:  Siomara Haji MD  PCP: Aron Vivas MD    Discharging Nurse: arvind  Discharging Hospital Unit/Room#: CVU-2912/2912-01  Discharging Unit Phone Number: [phone]    Emergency Contact:   Extended Emergency Contact Information  Primary Emergency Contact: Maxine Maki  Home Phone: [phone]  Relation: Child  Secondary Emergency Contact: Arnold Breen  Mobile Phone: [phone]  Relation: Child    Past Surgical History:  Past Surgical History:   Procedure Laterality Date    APPENDECTOMY      CHOLECYSTECTOMY, LAPAROSCOPIC  2013    COLONOSCOPY      PACEMAKER INSERTION  2023    pacemaker       Immunization History:   Immunization History   Administered Date(s) Administered    COVID-19, PFIZER GRAY top, DO NOT Dilute, (age 12 y+), IM, 30 mcg/0.3 mL 2022    COVID-19, PFIZER PURPLE top, DILUTE for use, (age 12 y+), 30mcg/0.3mL 2021, 2021, 2021    Influenza Vaccine,

## 2024-02-16 NOTE — CARE COORDINATION
Therapy ordered today and still pending. CM will follow for recommendations.     Ranjana Ferraro RN, BSN  [phone]

## 2024-02-17 LAB
ANION GAP SERPL CALCULATED.3IONS-SCNC: 12 MMOL/L (ref 3–16)
BUN SERPL-MCNC: 36 MG/DL (ref 7–20)
CALCIUM SERPL-MCNC: 9.3 MG/DL (ref 8.3–10.6)
CHLORIDE SERPL-SCNC: 100 MMOL/L (ref 99–110)
CO2 SERPL-SCNC: 27 MMOL/L (ref 21–32)
CREAT SERPL-MCNC: 2 MG/DL (ref 0.6–1.2)
FERRITIN SERPL IA-MCNC: 347.3 NG/ML (ref 15–150)
GFR SERPLBLD CREATININE-BSD FMLA CKD-EPI: 24 ML/MIN/{1.73_M2}
GLUCOSE BLD-MCNC: 105 MG/DL (ref 70–99)
GLUCOSE BLD-MCNC: 118 MG/DL (ref 70–99)
GLUCOSE BLD-MCNC: 138 MG/DL (ref 70–99)
GLUCOSE SERPL-MCNC: 105 MG/DL (ref 70–99)
IRON SATN MFR SERPL: 14 % (ref 15–50)
IRON SERPL-MCNC: 25 UG/DL (ref 37–145)
MAGNESIUM SERPL-MCNC: 2 MG/DL (ref 1.8–2.4)
NT-PROBNP SERPL-MCNC: 3240 PG/ML (ref 0–449)
PERFORMED ON: ABNORMAL
POTASSIUM SERPL-SCNC: 4.1 MMOL/L (ref 3.5–5.1)
SODIUM SERPL-SCNC: 139 MMOL/L (ref 136–145)
TIBC SERPL-MCNC: 174 UG/DL (ref 260–445)

## 2024-02-17 PROCEDURE — 6360000002 HC RX W HCPCS: Performed by: INTERNAL MEDICINE

## 2024-02-17 PROCEDURE — 80048 BASIC METABOLIC PNL TOTAL CA: CPT

## 2024-02-17 PROCEDURE — 2580000003 HC RX 258: Performed by: CLINICAL NURSE SPECIALIST

## 2024-02-17 PROCEDURE — 6360000002 HC RX W HCPCS: Performed by: CLINICAL NURSE SPECIALIST

## 2024-02-17 PROCEDURE — 6370000000 HC RX 637 (ALT 250 FOR IP): Performed by: INTERNAL MEDICINE

## 2024-02-17 PROCEDURE — 2580000003 HC RX 258: Performed by: INTERNAL MEDICINE

## 2024-02-17 PROCEDURE — 83735 ASSAY OF MAGNESIUM: CPT

## 2024-02-17 PROCEDURE — 94640 AIRWAY INHALATION TREATMENT: CPT

## 2024-02-17 PROCEDURE — 83880 ASSAY OF NATRIURETIC PEPTIDE: CPT

## 2024-02-17 PROCEDURE — 36415 COLL VENOUS BLD VENIPUNCTURE: CPT

## 2024-02-17 PROCEDURE — 99232 SBSQ HOSP IP/OBS MODERATE 35: CPT | Performed by: CLINICAL NURSE SPECIALIST

## 2024-02-17 PROCEDURE — 94761 N-INVAS EAR/PLS OXIMETRY MLT: CPT

## 2024-02-17 PROCEDURE — 1200000000 HC SEMI PRIVATE

## 2024-02-17 PROCEDURE — 2700000000 HC OXYGEN THERAPY PER DAY

## 2024-02-17 RX ADMIN — Medication 10 ML: at 08:15

## 2024-02-17 RX ADMIN — Medication 2 PUFF: at 19:32

## 2024-02-17 RX ADMIN — LEVOTHYROXINE SODIUM 88 MCG: 0.09 TABLET ORAL at 06:20

## 2024-02-17 RX ADMIN — FLECAINIDE ACETATE 50 MG: 50 TABLET ORAL at 20:29

## 2024-02-17 RX ADMIN — GABAPENTIN 100 MG: 100 CAPSULE ORAL at 08:14

## 2024-02-17 RX ADMIN — APIXABAN 2.5 MG: 5 TABLET, FILM COATED ORAL at 08:15

## 2024-02-17 RX ADMIN — GABAPENTIN 100 MG: 100 CAPSULE ORAL at 16:47

## 2024-02-17 RX ADMIN — Medication 2 PUFF: at 10:52

## 2024-02-17 RX ADMIN — ROPINIROLE HYDROCHLORIDE 3 MG: 1 TABLET, FILM COATED ORAL at 08:15

## 2024-02-17 RX ADMIN — CLONIDINE HYDROCHLORIDE 0.05 MG: 0.1 TABLET ORAL at 20:28

## 2024-02-17 RX ADMIN — EMPAGLIFLOZIN 10 MG: 10 TABLET, FILM COATED ORAL at 08:15

## 2024-02-17 RX ADMIN — TIOTROPIUM BROMIDE INHALATION SPRAY 2 PUFF: 3.12 SPRAY, METERED RESPIRATORY (INHALATION) at 10:51

## 2024-02-17 RX ADMIN — Medication 10 ML: at 20:34

## 2024-02-17 RX ADMIN — GABAPENTIN 100 MG: 100 CAPSULE ORAL at 20:29

## 2024-02-17 RX ADMIN — FLECAINIDE ACETATE 50 MG: 50 TABLET ORAL at 08:15

## 2024-02-17 RX ADMIN — LORAZEPAM 0.5 MG: 0.5 TABLET ORAL at 20:27

## 2024-02-17 RX ADMIN — LORAZEPAM 0.5 MG: 0.5 TABLET ORAL at 08:15

## 2024-02-17 RX ADMIN — CITALOPRAM HYDROBROMIDE 10 MG: 20 TABLET ORAL at 08:14

## 2024-02-17 RX ADMIN — PANTOPRAZOLE SODIUM 40 MG: 40 TABLET, DELAYED RELEASE ORAL at 08:15

## 2024-02-17 RX ADMIN — FERROUS SULFATE TAB 325 MG (65 MG ELEMENTAL FE) 325 MG: 325 (65 FE) TAB at 08:15

## 2024-02-17 RX ADMIN — SODIUM CHLORIDE 200 MG: 9 INJECTION, SOLUTION INTRAVENOUS at 13:52

## 2024-02-17 RX ADMIN — LEVOFLOXACIN 500 MG: 5 INJECTION, SOLUTION INTRAVENOUS at 02:32

## 2024-02-17 RX ADMIN — FUROSEMIDE 40 MG: 10 INJECTION, SOLUTION INTRAMUSCULAR; INTRAVENOUS at 08:15

## 2024-02-17 RX ADMIN — DILTIAZEM HYDROCHLORIDE 240 MG: 240 CAPSULE, EXTENDED RELEASE ORAL at 08:15

## 2024-02-17 RX ADMIN — FUROSEMIDE 40 MG: 10 INJECTION, SOLUTION INTRAMUSCULAR; INTRAVENOUS at 16:47

## 2024-02-17 RX ADMIN — FERROUS SULFATE TAB 325 MG (65 MG ELEMENTAL FE) 325 MG: 325 (65 FE) TAB at 16:47

## 2024-02-17 RX ADMIN — ROPINIROLE HYDROCHLORIDE 3 MG: 1 TABLET, FILM COATED ORAL at 16:47

## 2024-02-17 RX ADMIN — APIXABAN 2.5 MG: 5 TABLET, FILM COATED ORAL at 20:27

## 2024-02-17 RX ADMIN — CLONIDINE HYDROCHLORIDE 0.05 MG: 0.1 TABLET ORAL at 08:15

## 2024-02-17 RX ADMIN — ROPINIROLE HYDROCHLORIDE 3 MG: 1 TABLET, FILM COATED ORAL at 20:27

## 2024-02-17 RX ADMIN — ATORVASTATIN CALCIUM 80 MG: 80 TABLET, FILM COATED ORAL at 20:28

## 2024-02-17 NOTE — PLAN OF CARE
Problem: Pain  Goal: Verbalizes/displays adequate comfort level or baseline comfort level  Outcome: Progressing     Problem: Safety - Adult  Goal: Free from fall injury  Outcome: Progressing     Problem: ABCDS Injury Assessment  Goal: Absence of physical injury  Outcome: Progressing     Problem: Chronic Conditions and Co-morbidities  Goal: Patient's chronic conditions and co-morbidity symptoms are monitored and maintained or improved  Outcome: Progressing     Problem: Discharge Planning  Goal: Discharge to home or other facility with appropriate resources  Outcome: Progressing     Problem: Skin/Tissue Integrity  Goal: Absence of new skin breakdown  Description: 1.  Monitor for areas of redness and/or skin breakdown  2.  Assess vascular access sites hourly  3.  Every 4-6 hours minimum:  Change oxygen saturation probe site  4.  Every 4-6 hours:  If on nasal continuous positive airway pressure, respiratory therapy assess nares and determine need for appliance change or resting period.  Outcome: Progressing

## 2024-02-18 LAB
ALBUMIN SERPL-MCNC: 3.6 G/DL (ref 3.4–5)
ANION GAP SERPL CALCULATED.3IONS-SCNC: 13 MMOL/L (ref 3–16)
BACTERIA BLD CULT ORG #2: NORMAL
BACTERIA BLD CULT: NORMAL
BUN SERPL-MCNC: 35 MG/DL (ref 7–20)
CALCIUM SERPL-MCNC: 9.2 MG/DL (ref 8.3–10.6)
CHLORIDE SERPL-SCNC: 98 MMOL/L (ref 99–110)
CO2 SERPL-SCNC: 27 MMOL/L (ref 21–32)
CREAT SERPL-MCNC: 2.2 MG/DL (ref 0.6–1.2)
DEPRECATED RDW RBC AUTO: 13.9 % (ref 12.4–15.4)
GFR SERPLBLD CREATININE-BSD FMLA CKD-EPI: 21 ML/MIN/{1.73_M2}
GLUCOSE BLD-MCNC: 105 MG/DL (ref 70–99)
GLUCOSE BLD-MCNC: 113 MG/DL (ref 70–99)
GLUCOSE BLD-MCNC: 131 MG/DL (ref 70–99)
GLUCOSE BLD-MCNC: 144 MG/DL (ref 70–99)
GLUCOSE BLD-MCNC: 167 MG/DL (ref 70–99)
GLUCOSE SERPL-MCNC: 112 MG/DL (ref 70–99)
HCT VFR BLD AUTO: 33.3 % (ref 36–48)
HGB BLD-MCNC: 11.1 G/DL (ref 12–16)
MAGNESIUM SERPL-MCNC: 2 MG/DL (ref 1.8–2.4)
MCH RBC QN AUTO: 31.9 PG (ref 26–34)
MCHC RBC AUTO-ENTMCNC: 33.4 G/DL (ref 31–36)
MCV RBC AUTO: 95.5 FL (ref 80–100)
NT-PROBNP SERPL-MCNC: 1555 PG/ML (ref 0–449)
PERFORMED ON: ABNORMAL
PHOSPHATE SERPL-MCNC: 4.8 MG/DL (ref 2.5–4.9)
PLATELET # BLD AUTO: 123 K/UL (ref 135–450)
PMV BLD AUTO: 8.8 FL (ref 5–10.5)
POTASSIUM SERPL-SCNC: 3.8 MMOL/L (ref 3.5–5.1)
RBC # BLD AUTO: 3.49 M/UL (ref 4–5.2)
SODIUM SERPL-SCNC: 138 MMOL/L (ref 136–145)
WBC # BLD AUTO: 6.6 K/UL (ref 4–11)

## 2024-02-18 PROCEDURE — 83735 ASSAY OF MAGNESIUM: CPT

## 2024-02-18 PROCEDURE — 6370000000 HC RX 637 (ALT 250 FOR IP): Performed by: INTERNAL MEDICINE

## 2024-02-18 PROCEDURE — 97165 OT EVAL LOW COMPLEX 30 MIN: CPT

## 2024-02-18 PROCEDURE — 99232 SBSQ HOSP IP/OBS MODERATE 35: CPT | Performed by: CLINICAL NURSE SPECIALIST

## 2024-02-18 PROCEDURE — 36415 COLL VENOUS BLD VENIPUNCTURE: CPT

## 2024-02-18 PROCEDURE — 97530 THERAPEUTIC ACTIVITIES: CPT

## 2024-02-18 PROCEDURE — 6360000002 HC RX W HCPCS: Performed by: CLINICAL NURSE SPECIALIST

## 2024-02-18 PROCEDURE — 97116 GAIT TRAINING THERAPY: CPT

## 2024-02-18 PROCEDURE — 1200000000 HC SEMI PRIVATE

## 2024-02-18 PROCEDURE — 2580000003 HC RX 258: Performed by: INTERNAL MEDICINE

## 2024-02-18 PROCEDURE — 85027 COMPLETE CBC AUTOMATED: CPT

## 2024-02-18 PROCEDURE — 83880 ASSAY OF NATRIURETIC PEPTIDE: CPT

## 2024-02-18 PROCEDURE — 94640 AIRWAY INHALATION TREATMENT: CPT

## 2024-02-18 PROCEDURE — 51798 US URINE CAPACITY MEASURE: CPT

## 2024-02-18 PROCEDURE — 97161 PT EVAL LOW COMPLEX 20 MIN: CPT

## 2024-02-18 PROCEDURE — 2580000003 HC RX 258: Performed by: CLINICAL NURSE SPECIALIST

## 2024-02-18 PROCEDURE — 80069 RENAL FUNCTION PANEL: CPT

## 2024-02-18 RX ORDER — FUROSEMIDE 40 MG/1
40 TABLET ORAL DAILY
Status: DISCONTINUED | OUTPATIENT
Start: 2024-02-18 | End: 2024-02-19 | Stop reason: HOSPADM

## 2024-02-18 RX ADMIN — LORAZEPAM 0.5 MG: 0.5 TABLET ORAL at 08:15

## 2024-02-18 RX ADMIN — DILTIAZEM HYDROCHLORIDE 240 MG: 240 CAPSULE, EXTENDED RELEASE ORAL at 08:15

## 2024-02-18 RX ADMIN — FLECAINIDE ACETATE 50 MG: 50 TABLET ORAL at 20:04

## 2024-02-18 RX ADMIN — POLYETHYLENE GLYCOL 3350 17 G: 17 POWDER, FOR SOLUTION ORAL at 13:08

## 2024-02-18 RX ADMIN — ROPINIROLE HYDROCHLORIDE 3 MG: 1 TABLET, FILM COATED ORAL at 08:14

## 2024-02-18 RX ADMIN — APIXABAN 2.5 MG: 5 TABLET, FILM COATED ORAL at 20:04

## 2024-02-18 RX ADMIN — Medication 10 ML: at 08:15

## 2024-02-18 RX ADMIN — Medication 2 PUFF: at 21:38

## 2024-02-18 RX ADMIN — Medication 10 ML: at 20:08

## 2024-02-18 RX ADMIN — GABAPENTIN 100 MG: 100 CAPSULE ORAL at 15:46

## 2024-02-18 RX ADMIN — APIXABAN 2.5 MG: 5 TABLET, FILM COATED ORAL at 08:14

## 2024-02-18 RX ADMIN — ROPINIROLE HYDROCHLORIDE 3 MG: 1 TABLET, FILM COATED ORAL at 15:46

## 2024-02-18 RX ADMIN — Medication 2 PUFF: at 11:41

## 2024-02-18 RX ADMIN — ACETAMINOPHEN 650 MG: 325 TABLET ORAL at 00:28

## 2024-02-18 RX ADMIN — PANTOPRAZOLE SODIUM 40 MG: 40 TABLET, DELAYED RELEASE ORAL at 08:15

## 2024-02-18 RX ADMIN — CITALOPRAM HYDROBROMIDE 10 MG: 20 TABLET ORAL at 08:14

## 2024-02-18 RX ADMIN — GABAPENTIN 100 MG: 100 CAPSULE ORAL at 08:15

## 2024-02-18 RX ADMIN — ATORVASTATIN CALCIUM 80 MG: 80 TABLET, FILM COATED ORAL at 20:03

## 2024-02-18 RX ADMIN — FERROUS SULFATE TAB 325 MG (65 MG ELEMENTAL FE) 325 MG: 325 (65 FE) TAB at 08:15

## 2024-02-18 RX ADMIN — EMPAGLIFLOZIN 10 MG: 10 TABLET, FILM COATED ORAL at 08:15

## 2024-02-18 RX ADMIN — CLONIDINE HYDROCHLORIDE 0.05 MG: 0.1 TABLET ORAL at 20:04

## 2024-02-18 RX ADMIN — ROPINIROLE HYDROCHLORIDE 3 MG: 1 TABLET, FILM COATED ORAL at 20:04

## 2024-02-18 RX ADMIN — GABAPENTIN 100 MG: 100 CAPSULE ORAL at 20:04

## 2024-02-18 RX ADMIN — FLECAINIDE ACETATE 50 MG: 50 TABLET ORAL at 08:15

## 2024-02-18 RX ADMIN — TIOTROPIUM BROMIDE INHALATION SPRAY 2 PUFF: 3.12 SPRAY, METERED RESPIRATORY (INHALATION) at 11:41

## 2024-02-18 RX ADMIN — SODIUM CHLORIDE 200 MG: 9 INJECTION, SOLUTION INTRAVENOUS at 11:50

## 2024-02-18 RX ADMIN — FUROSEMIDE 40 MG: 40 TABLET ORAL at 11:48

## 2024-02-18 RX ADMIN — FERROUS SULFATE TAB 325 MG (65 MG ELEMENTAL FE) 325 MG: 325 (65 FE) TAB at 18:25

## 2024-02-18 RX ADMIN — LORAZEPAM 0.5 MG: 0.5 TABLET ORAL at 20:04

## 2024-02-18 RX ADMIN — CLONIDINE HYDROCHLORIDE 0.05 MG: 0.1 TABLET ORAL at 08:14

## 2024-02-18 RX ADMIN — LEVOTHYROXINE SODIUM 88 MCG: 0.09 TABLET ORAL at 06:57

## 2024-02-18 NOTE — CARE COORDINATION
SW reviewed pt's chart for therapy evaluations.  Pt was evaluated and recommended for HHC.  She is already active w/[facility].  Also active already with home oxygen.  No other needs identified at this time.    Electronically signed by KIET Dai LSW on 2/18/2024 at 12:16 PM

## 2024-02-18 NOTE — RT PROTOCOL NOTE
RT Inhaler-Nebulizer Bronchodilator Protocol Note    There is a bronchodilator order in the chart from a provider indicating to follow the RT Bronchodilator Protocol and there is an “Initiate RT Inhaler-Nebulizer Bronchodilator Protocol” order as well (see protocol at bottom of note).    CXR Findings:  XR CHEST PORTABLE    Result Date: 2/14/2024  1.  Congestive heart failure is most likely given the radiographic findings; pneumonia is also a consideration in areas of consolidation with pleural effusion. 2. Calcific atherosclerosis aorta. 3. Cardiomegaly.       The findings from the last RT Protocol Assessment were as follows:   History Pulmonary Disease: Smoker 15 pack years or more  Respiratory Pattern: Regular pattern and RR 12-20 bpm  Breath Sounds: Slightly diminished and/or crackles  Cough: Strong, spontaneous, non-productive  Indication for Bronchodilator Therapy: Wheezing associated with pulm disorder  Bronchodilator Assessment Score: 3    Aerosolized bronchodilator medication orders have been revised according to the RT Inhaler-Nebulizer Bronchodilator Protocol below.    Respiratory Therapist to perform RT Therapy Protocol Assessment initially then follow the protocol.  Repeat RT Therapy Protocol Assessment PRN for score 0-3 or on second treatment, BID, and PRN for scores above 3.    No Indications - adjust the frequency to every 6 hours PRN wheezing or bronchospasm, if no treatments needed after 48 hours then discontinue using Per Protocol order mode.     If indication present, adjust the RT bronchodilator orders based on the Bronchodilator Assessment Score as indicated below.  Use Inhaler orders unless patient has one or more of the following: on home nebulizer, not able to hold breath for 10 seconds, is not alert and oriented, cannot activate and use MDI correctly, or respiratory rate 25 breaths per minute or more, then use the equivalent nebulizer order(s) with same Frequency and PRN reasons based on the 
increased work of breathing using Per Protocol order mode.        4-6 - enter or revise RT Bronchodilator order(s) to two equivalent RT bronchodilator orders with one order with BID Frequency and one order with Frequency of every 4 hours PRN wheezing or increased work of breathing using Per Protocol order mode.        7-10 - enter or revise RT Bronchodilator order(s) to two equivalent RT bronchodilator orders with one order with TID Frequency and one order with Frequency of every 4 hours PRN wheezing or increased work of breathing using Per Protocol order mode.       11-13 - enter or revise RT Bronchodilator order(s) to one equivalent RT bronchodilator order with QID Frequency and an Albuterol order with Frequency of every 4 hours PRN wheezing or increased work of breathing using Per Protocol order mode.      Greater than 13 - enter or revise RT Bronchodilator order(s) to one equivalent RT bronchodilator order with every 4 hours Frequency and an Albuterol order with Frequency of every 2 hours PRN wheezing or increased work of breathing using Per Protocol order mode.     RT to enter RT Home Evaluation for COPD & MDI Assessment order using Per Protocol order mode.    Electronically signed by Castillo Bradshaw RCP on 2/18/2024 at 12:24 AM

## 2024-02-19 VITALS
HEIGHT: 62 IN | OXYGEN SATURATION: 99 % | WEIGHT: 149.19 LBS | HEART RATE: 67 BPM | SYSTOLIC BLOOD PRESSURE: 125 MMHG | BODY MASS INDEX: 27.45 KG/M2 | DIASTOLIC BLOOD PRESSURE: 71 MMHG | TEMPERATURE: 97.1 F | RESPIRATION RATE: 18 BRPM

## 2024-02-19 LAB
ALBUMIN SERPL-MCNC: 3.6 G/DL (ref 3.4–5)
ANION GAP SERPL CALCULATED.3IONS-SCNC: 11 MMOL/L (ref 3–16)
BUN SERPL-MCNC: 33 MG/DL (ref 7–20)
CALCIUM SERPL-MCNC: 9.2 MG/DL (ref 8.3–10.6)
CHLORIDE SERPL-SCNC: 101 MMOL/L (ref 99–110)
CO2 SERPL-SCNC: 26 MMOL/L (ref 21–32)
CREAT SERPL-MCNC: 1.9 MG/DL (ref 0.6–1.2)
DEPRECATED RDW RBC AUTO: 13.8 % (ref 12.4–15.4)
EKG ATRIAL RATE: 64 BPM
EKG DIAGNOSIS: NORMAL
EKG P AXIS: 72 DEGREES
EKG P-R INTERVAL: 196 MS
EKG Q-T INTERVAL: 378 MS
EKG QRS DURATION: 82 MS
EKG QTC CALCULATION (BAZETT): 389 MS
EKG R AXIS: 28 DEGREES
EKG T AXIS: 9 DEGREES
EKG VENTRICULAR RATE: 64 BPM
GFR SERPLBLD CREATININE-BSD FMLA CKD-EPI: 25 ML/MIN/{1.73_M2}
GLUCOSE BLD-MCNC: 108 MG/DL (ref 70–99)
GLUCOSE BLD-MCNC: 145 MG/DL (ref 70–99)
GLUCOSE BLD-MCNC: 192 MG/DL (ref 70–99)
GLUCOSE SERPL-MCNC: 123 MG/DL (ref 70–99)
HCT VFR BLD AUTO: 33.5 % (ref 36–48)
HGB BLD-MCNC: 11.4 G/DL (ref 12–16)
MAGNESIUM SERPL-MCNC: 2 MG/DL (ref 1.8–2.4)
MCH RBC QN AUTO: 32.4 PG (ref 26–34)
MCHC RBC AUTO-ENTMCNC: 34.1 G/DL (ref 31–36)
MCV RBC AUTO: 95 FL (ref 80–100)
PERFORMED ON: ABNORMAL
PHOSPHATE SERPL-MCNC: 3.8 MG/DL (ref 2.5–4.9)
PLATELET # BLD AUTO: 110 K/UL (ref 135–450)
PMV BLD AUTO: 7.7 FL (ref 5–10.5)
POTASSIUM SERPL-SCNC: 4 MMOL/L (ref 3.5–5.1)
RBC # BLD AUTO: 3.53 M/UL (ref 4–5.2)
SODIUM SERPL-SCNC: 138 MMOL/L (ref 136–145)
WBC # BLD AUTO: 7.4 K/UL (ref 4–11)

## 2024-02-19 PROCEDURE — 6370000000 HC RX 637 (ALT 250 FOR IP): Performed by: INTERNAL MEDICINE

## 2024-02-19 PROCEDURE — 51798 US URINE CAPACITY MEASURE: CPT

## 2024-02-19 PROCEDURE — 99232 SBSQ HOSP IP/OBS MODERATE 35: CPT | Performed by: NURSE PRACTITIONER

## 2024-02-19 PROCEDURE — 2580000003 HC RX 258: Performed by: INTERNAL MEDICINE

## 2024-02-19 PROCEDURE — 83735 ASSAY OF MAGNESIUM: CPT

## 2024-02-19 PROCEDURE — 6370000000 HC RX 637 (ALT 250 FOR IP): Performed by: NURSE PRACTITIONER

## 2024-02-19 PROCEDURE — 6360000002 HC RX W HCPCS: Performed by: INTERNAL MEDICINE

## 2024-02-19 PROCEDURE — 2700000000 HC OXYGEN THERAPY PER DAY

## 2024-02-19 PROCEDURE — 94761 N-INVAS EAR/PLS OXIMETRY MLT: CPT

## 2024-02-19 PROCEDURE — 94640 AIRWAY INHALATION TREATMENT: CPT

## 2024-02-19 PROCEDURE — 85027 COMPLETE CBC AUTOMATED: CPT

## 2024-02-19 PROCEDURE — 36415 COLL VENOUS BLD VENIPUNCTURE: CPT

## 2024-02-19 PROCEDURE — 80069 RENAL FUNCTION PANEL: CPT

## 2024-02-19 RX ORDER — BETHANECHOL CHLORIDE 10 MG/1
10 TABLET ORAL 3 TIMES DAILY
Status: DISCONTINUED | OUTPATIENT
Start: 2024-02-19 | End: 2024-02-19 | Stop reason: HOSPADM

## 2024-02-19 RX ORDER — FUROSEMIDE 40 MG/1
40 TABLET ORAL DAILY
Qty: 60 TABLET | Refills: 3 | Status: SHIPPED | OUTPATIENT
Start: 2024-02-20

## 2024-02-19 RX ORDER — LANOLIN ALCOHOL/MO/W.PET/CERES
3 CREAM (GRAM) TOPICAL NIGHTLY PRN
Status: DISCONTINUED | OUTPATIENT
Start: 2024-02-19 | End: 2024-02-19 | Stop reason: HOSPADM

## 2024-02-19 RX ORDER — BETHANECHOL CHLORIDE 10 MG/1
10 TABLET ORAL 3 TIMES DAILY
Qty: 90 TABLET | Refills: 3 | Status: SHIPPED | OUTPATIENT
Start: 2024-02-19

## 2024-02-19 RX ADMIN — APIXABAN 2.5 MG: 5 TABLET, FILM COATED ORAL at 10:27

## 2024-02-19 RX ADMIN — FERROUS SULFATE TAB 325 MG (65 MG ELEMENTAL FE) 325 MG: 325 (65 FE) TAB at 10:26

## 2024-02-19 RX ADMIN — EMPAGLIFLOZIN 10 MG: 10 TABLET, FILM COATED ORAL at 10:28

## 2024-02-19 RX ADMIN — Medication 2 PUFF: at 07:19

## 2024-02-19 RX ADMIN — DILTIAZEM HYDROCHLORIDE 240 MG: 240 CAPSULE, EXTENDED RELEASE ORAL at 10:28

## 2024-02-19 RX ADMIN — GABAPENTIN 100 MG: 100 CAPSULE ORAL at 13:57

## 2024-02-19 RX ADMIN — LEVOFLOXACIN 500 MG: 5 INJECTION, SOLUTION INTRAVENOUS at 02:13

## 2024-02-19 RX ADMIN — CLONIDINE HYDROCHLORIDE 0.05 MG: 0.1 TABLET ORAL at 10:25

## 2024-02-19 RX ADMIN — FUROSEMIDE 40 MG: 40 TABLET ORAL at 10:28

## 2024-02-19 RX ADMIN — Medication 10 ML: at 10:29

## 2024-02-19 RX ADMIN — ROPINIROLE HYDROCHLORIDE 3 MG: 1 TABLET, FILM COATED ORAL at 10:26

## 2024-02-19 RX ADMIN — PANTOPRAZOLE SODIUM 40 MG: 40 TABLET, DELAYED RELEASE ORAL at 10:25

## 2024-02-19 RX ADMIN — BETHANECHOL CHLORIDE 10 MG: 10 TABLET ORAL at 16:03

## 2024-02-19 RX ADMIN — FLECAINIDE ACETATE 50 MG: 50 TABLET ORAL at 10:24

## 2024-02-19 RX ADMIN — CITALOPRAM HYDROBROMIDE 10 MG: 20 TABLET ORAL at 10:25

## 2024-02-19 RX ADMIN — LEVOTHYROXINE SODIUM 88 MCG: 0.09 TABLET ORAL at 07:13

## 2024-02-19 RX ADMIN — ROPINIROLE HYDROCHLORIDE 3 MG: 1 TABLET, FILM COATED ORAL at 13:57

## 2024-02-19 RX ADMIN — GABAPENTIN 100 MG: 100 CAPSULE ORAL at 10:27

## 2024-02-19 RX ADMIN — LORAZEPAM 0.5 MG: 0.5 TABLET ORAL at 10:28

## 2024-02-19 RX ADMIN — TIOTROPIUM BROMIDE INHALATION SPRAY 2 PUFF: 3.12 SPRAY, METERED RESPIRATORY (INHALATION) at 07:19

## 2024-02-19 RX ADMIN — MELATONIN TAB 3 MG 3 MG: 3 TAB at 02:08

## 2024-02-19 NOTE — CARE COORDINATION
Aware of discharge with home care. Home care orders sent to [facility].Discharge planner notified.

## 2024-02-19 NOTE — CARE COORDINATION
02/19/24 1146   IMM Letter   IMM Letter given to Patient/Family/Significant other/Guardian/POA/by: IMM given to the pt. The pt is agreeable to discharge.   IMM Letter date given: 02/19/24   IMM Letter time given: 1145

## 2024-02-19 NOTE — CARE COORDINATION
Case Management -  Discharge Note      Patient Name: Cleo Stauffer                   YOB: 1935            Readmission Risk (Low < 19, Mod (19-27), High > 27): Readmission Risk Score: 21.9    Current PCP: Aron Vivas MD    ([facility]) Important Message from Medicare:    Date: 2/19/2024    PT AM-PAC: 18 /24  OT AM-PAC: 19 /24    Patient/patient representative has been educated on the benefits of Home Health Care as well as the possible risks of declining recommended services. Patient/patient representative has acknowledged the information provided and decided on the following discharge plan. Patient/ patient representative has been provided freedom of choice regarding service provider, supported by basic dialogue that supports the patient's individualized plan of care/goals.    Home Care Information:   Is patient resuming current home health care services: Yes     Home Care Agency:   [facility] ([facility])  [address] [facility] [address]  Phone: [phone]  Fax: [phone]                     Services: PT/OT/RN  Home Health Order Obtained: Yes    Home health agency notified of discharge.      Financial    Payor: [employer] MEDICARE / Plan: [employer] GOLD PLUS HMO / Product Type: *No Product type* /     Pharmacy:  Potential assistance Purchasing Medications: No  Meds-to-Beds request: Yes      [id number] [place], [place] - [address] -  [phone] - F [phone]  [address]  [facility] [address]  Phone: [phone] Fax: [phone]      Notes:    Additional Case Management Notes: Ayesha from [facility] is aware the pt will be discharging home on 2/19/2024.    Electronically signed by KIET Barrera on 2/19/2024 at 11:43 AM

## 2024-02-19 NOTE — CONSULTS
[facility]  HEART FAILURE PROGRAM      Cleo Stauffer 1935    History:  Past Medical History:   Diagnosis Date    Arthritis     Atrial fibrillation (HCC)     Bursitis     neck and down bilateral shoulders    COPD (chronic obstructive pulmonary disease) (HCC)     Diabetes mellitus type II, controlled (HCC)     Diastolic CHF (HCC)     GERD (gastroesophageal reflux disease)     HTN (hypertension)     Hyperlipidemia     Hypothyroid     Lumbago     Parkinson disease     Restless legs syndrome (RLS)     SVT (supraventricular tachycardia)        ECHO:  2/15/24  Summary   -Normal left ventricle size, wall thickness, and systolic function with an   estimated ejection fraction of 60-65%. No regional wall motion abnormalities   are seen.   -Indeterminate diastolic function.   -The right ventricle is normal in size and function.   -Pacer / ICD wire is visualized in the right ventricle.   -The left atrium is dilated.   -Mild aortic regurgitation is present.   -Mild mitral regurgitation is present.   -Mild pulmonic regurgitation present.   -Mild tricuspid regurgitation.       ACE/ARB/ARNi: valsartan 80 mg daily- stopped on last admission  BB:   Aldosterone Antagonist:   SGLT2: jardiance 10 mg daily    History of sleep apnea: No    [place] Screen ordered: Yes    DM History: Yes  Consult for DM educator: No      Last Hospital Admission: - with copd exacerbation  Code Status: full   Discharge plans: home with home care    Family Present: no    Cleo Stauffer was admitted to the hospital with increased shortness of breath. Patient is known to HF RN and follows with HF NP as an outpatient. She was recently admitted and some of her HF medications were stopped. Patient does weigh daily. Baseline is 159 lb. She  notice weight went 3 lb above baseline. She tells me she forgot to call. Patient does not add salt to flavor foods. Her daughter helps fill her mediset weekly. She is compliant with taking 
See nephrology progress note from 2/15/24 for recommendations.     Grupo Ricketts, DO   
infection  4.  Anemia:  chronic  5.  Copd  6.  Relative bradycardia    Plan:   The patient is volume overloaded due to holding her Jardiance, reducing lasix, and giving too much fluid last admission.  She does NOT have acute renal failure, as creatinine around 2.2 is her baseline  Restart jardiance 10 mg po qd  Increase lasix 40 mg IV bid  Her heart rate is quite slow for her degree of CHF.  She is on diltiazem, clonidine, and metoprolol that all slow heart rate.  Suggest stopping metoprolol  She could benefit from small dose of spironolactone; however her kidney function does not allow this  Her echo is no worse than usual.  Consider other causes of her fatigue and debility.  CHF education reinforced.  ~salt restriction  ~fluid restriction  ~medication compliance  ~daily weights and notify of any significant weight gain/loss  ~establish with CHF nurse  ~outpatient follow-up with our CHF team    The patient was seen for > 75 minutes. I reviewed interval history, physical exam, review of data including labs, imaging, development and implementation of treatment plan and coordination of complex care. More than 50% of the time was devoted to counseling the patient on their diagnoses/treatments, as well as coordination of care with the other care teams        I appreciate the opportunity of cooperating in the care of this patient.    Bette Garcia M.D., [facility]

## 2024-02-19 NOTE — CARE COORDINATION
[facility] Received home care referral. Spoke with pt and re: home care plan of care/services. Agreeable. Demographic's verified. Will follow for home care.

## 2024-02-19 NOTE — PROGRESS NOTES
[facility]    2/18/2024 9:06 AM        Name: Cleo Stauffer .              Admitted: 2/14/2024  Primary Care Provider: Aron Vivas MD (Tel: [phone])      Subjective:    No nausea vomiting or chest pain, sob improved diuresed well  Reviewed interval ancillary notes    Current Medications  furosemide (LASIX) tablet 40 mg, Daily  iron sucrose (VENOFER) 200 mg in sodium chloride 0.9 % 100 mL IVPB, Q24H  cloNIDine (CATAPRES) tablet 0.05 mg, BID  albuterol sulfate HFA (PROVENTIL;VENTOLIN;PROAIR) 108 (90 Base) MCG/ACT inhaler 2 puff, Q6H PRN  apixaban (ELIQUIS) tablet 2.5 mg, BID  atorvastatin (LIPITOR) tablet 80 mg, Nightly  menthol-zinc oxide (CALMOSEPTINE) 0.44-20.6 % ointment, TID  citalopram (CELEXA) tablet 10 mg, Daily  dilTIAZem (CARDIZEM CD) extended release capsule 240 mg, Daily  ferrous sulfate (IRON 325) tablet 325 mg, BID WC  flecainide (TAMBOCOR) tablet 50 mg, 2 times per day  levalbuterol (XOPENEX) nebulization 0.63 mg, Q6H PRN  levothyroxine (SYNTHROID) tablet 88 mcg, Daily  LORazepam (ATIVAN) tablet 0.5 mg, BID  meclizine (ANTIVERT) tablet 12.5 mg, TID PRN  pantoprazole (PROTONIX) tablet 40 mg, Daily  [START ON 2/21/2024] vitamin D (ERGOCALCIFEROL) capsule 50,000 Units, Weekly  sodium chloride flush 0.9 % injection 5-40 mL, 2 times per day  sodium chloride flush 0.9 % injection 5-40 mL, PRN  0.9 % sodium chloride infusion, PRN  ondansetron (ZOFRAN-ODT) disintegrating tablet 4 mg, Q8H PRN   Or  ondansetron (ZOFRAN) injection 4 mg, Q6H PRN  polyethylene glycol (GLYCOLAX) packet 17 g, Daily PRN  acetaminophen (TYLENOL) tablet 650 mg, Q6H PRN   Or  acetaminophen (TYLENOL) suppository 650 mg, Q6H PRN  dextrose bolus 10% 125 mL, PRN   Or  dextrose bolus 10% 250 mL, PRN  glucagon injection 1 mg, PRN  dextrose 10 % infusion, Continuous PRN  insulin lispro (HUMALOG) injection vial 0-4 Units, TID 
                                                                  [facility]    2/16/2024 11:42 AM        Name: Cleo Stauffer .              Admitted: 2/14/2024  Primary Care Provider: Aron Vivas MD (Tel: [phone])      Subjective:    Bed still short of breath no nausea vomiting or chest pain    Reviewed interval ancillary notes    Current Medications  albuterol sulfate HFA (PROVENTIL;VENTOLIN;PROAIR) 108 (90 Base) MCG/ACT inhaler 2 puff, Q6H PRN  apixaban (ELIQUIS) tablet 2.5 mg, BID  atorvastatin (LIPITOR) tablet 80 mg, Nightly  menthol-zinc oxide (CALMOSEPTINE) 0.44-20.6 % ointment, TID  citalopram (CELEXA) tablet 10 mg, Daily  cloNIDine (CATAPRES) tablet 0.1 mg, BID  dilTIAZem (CARDIZEM CD) extended release capsule 240 mg, Daily  ferrous sulfate (IRON 325) tablet 325 mg, BID WC  flecainide (TAMBOCOR) tablet 50 mg, 2 times per day  levalbuterol (XOPENEX) nebulization 0.63 mg, Q6H PRN  levothyroxine (SYNTHROID) tablet 88 mcg, Daily  LORazepam (ATIVAN) tablet 0.5 mg, BID  meclizine (ANTIVERT) tablet 12.5 mg, TID PRN  pantoprazole (PROTONIX) tablet 40 mg, Daily  [START ON 2/21/2024] vitamin D (ERGOCALCIFEROL) capsule 50,000 Units, Weekly  sodium chloride flush 0.9 % injection 5-40 mL, 2 times per day  sodium chloride flush 0.9 % injection 5-40 mL, PRN  0.9 % sodium chloride infusion, PRN  ondansetron (ZOFRAN-ODT) disintegrating tablet 4 mg, Q8H PRN   Or  ondansetron (ZOFRAN) injection 4 mg, Q6H PRN  polyethylene glycol (GLYCOLAX) packet 17 g, Daily PRN  acetaminophen (TYLENOL) tablet 650 mg, Q6H PRN   Or  acetaminophen (TYLENOL) suppository 650 mg, Q6H PRN  dextrose bolus 10% 125 mL, PRN   Or  dextrose bolus 10% 250 mL, PRN  glucagon injection 1 mg, PRN  dextrose 10 % infusion, Continuous PRN  insulin lispro (HUMALOG) injection vial 0-4 Units, TID WC  insulin lispro (HUMALOG) injection vial 0-4 Units, Nightly  mometasone-formoterol (DULERA) 200-5 MCG/ACT inhaler 
      [facility]   Daily Progress Note      Admit Date:  2/14/2024    HPI:    Ms. Stauffer 88 year old female with history of hypertension, hyperlipidemia, CAD, AF, pacemaker 2/24/2023, chronic anticoag with eliquis, chronic diastolic heart failure, tobacco abuse, COPD, ronnie, oxygen dependence and DM     She presented to the hospital for shortness of breath and tremor.  She was recently discharged for COPD treatment, given IV fluids elevated creatinine of 2.8 (baseline is 1.6).  her lasix and jardiance were stopped.  Cxr with pulmonary congestion and started on IV lasix.      Subjective:  Patient is being seen for acute on chronic diastolic heart failure. There were no acute overnight cardiac events. She is sitting up in the chair on 3 L of oxygen which is her baseline.  She feels much better her best weight is 162 (165 today)   Creat 2.2->1.9-2.0 bnp 4163 (baseline is around 1200)    Objective:   /70   Pulse 60   Temp 98.3 °F (36.8 °C) (Oral)   Resp 16   Ht 1.575 m (5' 2\")   Wt 74.9 kg (165 lb 2 oz)   SpO2 96%   BMI 30.20 kg/m²     Intake/Output Summary (Last 24 hours) at 2/17/2024 1131  Last data filed at 2/17/2024 1000  Gross per 24 hour   Intake 960 ml   Output 2900 ml   Net -1940 ml          Physical Exam:  General:  Awake, alert, oriented in NAD, pale   Skin:  Warm and dry.  No unusual bruising or rash  Neck:  Supple.  No JVD or carotid bruit appreciated  Chest:  Normal effort.  Rales in left base  Cardiovascular:  RRR, S1/S2, no murmur/gallop/rub  Abdomen:  Soft, nontender, +bowel sounds  Extremities:  No edema  Neurological: No focal deficits  Psychological: Normal mood and affect      Medications:    cloNIDine  0.05 mg Oral BID    apixaban  2.5 mg Oral BID    atorvastatin  80 mg Oral Nightly    menthol-zinc oxide   Topical TID    citalopram  10 mg Oral Daily    dilTIAZem  240 mg Oral Daily    ferrous sulfate  325 mg Oral BID WC    flecainide  50 mg Oral 2 times per day    
      [facility]   Daily Progress Note      Admit Date:  2/14/2024    HPI:    Ms. Stauffer 88 year old female with history of hypertension, hyperlipidemia, CAD, AF, pacemaker 2/24/2023, chronic anticoag with eliquis, chronic diastolic heart failure, tobacco abuse, COPD, ronnie, oxygen dependence and DM     She presented to the hospital for shortness of breath and tremor.  She was recently discharged for COPD treatment, given IV fluids elevated creatinine of 2.8 (baseline is 1.6).  her lasix and jardiance were stopped.  Cxr with pulmonary congestion and started on IV lasix.      Subjective:  Patient is being seen for acute on chronic diastolic heart failure. There were no acute overnight cardiac events. She is sitting up in the chair on 3 L of oxygen which is her baseline.  She was walking with PT this morning.  She states she is very tired.  She has a coronado in place.-3.47 Lout   Creat 2.2->1.9-2.0-2.2 bnp [id number] (baseline is around 1200)    Objective:   /70   Pulse 61   Temp 97.4 °F (36.3 °C) (Oral)   Resp 14   Ht 1.575 m (5' 2\")   Wt 73.8 kg (162 lb 11.2 oz)   SpO2 99%   BMI 29.76 kg/m²     Intake/Output Summary (Last 24 hours) at 2/18/2024 1138  Last data filed at 2/18/2024 0845  Gross per 24 hour   Intake 730 ml   Output 1350 ml   Net -620 ml          Physical Exam:  General:  Awake, alert, oriented in NAD, pale   Skin:  Warm and dry.  No unusual bruising or rash  Neck:  Supple.  No JVD or carotid bruit appreciated  Chest:  Normal effort.  No rales, rhonchi or wheezing  Cardiovascular:  RRR, S1/S2, no murmur/gallop/rub  Abdomen:  Soft, nontender, +bowel sounds  Extremities:  No edema  Neurological: No focal deficits  Psychological: Normal mood and affect      Medications:    furosemide  40 mg Oral Daily    iron sucrose  200 mg IntraVENous Q24H    cloNIDine  0.05 mg Oral BID    apixaban  2.5 mg Oral BID    atorvastatin  80 mg Oral Nightly    menthol-zinc oxide   Topical TID    
   02/15/24 0444   RT Protocol   History Pulmonary Disease 1   Respiratory pattern 0   Breath sounds 2   Cough 0   Bronchodilator Assessment Score 3       
  [facility] - Inpatient Rehabilitation Department   Phone: [phone]    Occupational Therapy    [] Initial Evaluation            [] Daily Treatment Note         [] Discharge Summary      Patient: Cleo Stauffer   : 1935   MRN: [id number]   Date of Service:  2024    Admitting Diagnosis:  Acute on chronic heart failure with normal ejection fraction (HCC)  Current Admission Summary: Per H&P \" 88 y.o. female who was brought in by EMS transportation for shortness of breath and tremor.  Patient was recently seen for similar complaint and admitted to the hospital.  Patient was discharged on the fifth of this month.  Patient was treated with the azithromycin and prednisone.  Patient was also treated with Rocephin for the UTI. \"  Past Medical History:  has a past medical history of Arthritis, Atrial fibrillation ([facility]), Bursitis, COPD (chronic obstructive pulmonary disease) ([facility]), Diabetes mellitus type II, controlled (HCC), Diastolic CHF (HCC), GERD (gastroesophageal reflux disease), HTN (hypertension), Hyperlipidemia, Hypothyroid, Lumbago, Parkinson disease, Restless legs syndrome (RLS), and SVT (supraventricular tachycardia).  Past Surgical History:  has a past surgical history that includes Appendectomy; Colonoscopy; Cholecystectomy, laparoscopic (2013); and Pacemaker insertion (2023).    Discharge Recommendations: Cleo Stauffer scored a 19/24 on the AM-PAC ADL Inpatient form. Current research shows that an AM-PAC score of 18 or greater is typically associated with a discharge to the patient's home setting. Based on the patient's AM-PAC score, and their current ADL deficits, it is recommended that the patient have 2-3 sessions per week of Occupational Therapy at d/c to increase the patient's independence.  At this time, this patient demonstrates the endurance and safety to discharge home with home health OT services (home vs OP services) and a follow up treatment 
  Nephrology Progress Note  [phone]  [phone]   [place]          CC:  We are following this patient for CAROLANN on CKD    The patient has known significant past medical history of T2DM, atrial fibrillation on Eliquis,  diastolic CHF, COPD, hypertension, hyperlipidemia, and Parkinson's disease. She was recently admitted with COPD flair and UTI. Seen for CAROLANN then with creatinine peak of 2.8. FENa then was not low. Improved off diuretics. Lasix restarted at discharge but off Diovan and Jardiance    Readmitted  with worsening dyspnea and tremor. Urine does not suggest UTI this admission. Initial treatment for respiratory infection and had IV lasix      SUBJECTIVE:    Patient feels better today  Less dyspnea and tremor improved  Had previous cough - this seems better as well    Excellent U/O  Cr stable to better    No  CP. No wheezing. No N/V, abd pain, or diarrhea.     No visitors      Physical Exam:    VITALS:  BP (!) 122/51   Pulse 62   Temp 97 °F (36.1 °C) (Tympanic)   Resp 16   Ht 1.575 m (5' 2\")   Wt 76.3 kg (168 lb 3.4 oz)   SpO2 94%   BMI 30.77 kg/m²   TEMPERATURE:  Current - Temp: 97 °F (36.1 °C); Max - Temp  Av °F (37.2 °C)  Min: 97 °F (36.1 °C)  Max: 100.7 °F (38.2 °C)  RESPIRATIONS RANGE: Resp  Av.4  Min: 16  Max: 25  PULSE RANGE: Pulse  Av.4  Min: 60  Max: 74  BLOOD PRESSURE RANGE:  Systolic (24hrs), Av , Min:100 , Max:133  ; Diastolic (24hrs), Av, Min:40, Max:93   PULSE OXIMETRY RANGE: SpO2  Av.8 %  Min: 94 %  Max: 100 %  24HR INTAKE/OUTPUT:    Intake/Output Summary (Last 24 hours) at 2/15/2024 1115  Last data filed at 2/15/2024 0554  Gross per 24 hour   Intake 326.72 ml   Output 875 ml   Net -548.28 ml       Constitutional:  NAD  HEENT:  no oral lesions  Lungs:  no wheeze, no rales  Cardiovascular:  RRR, no murmur, left side pacer  Abd:  soft NT  Ext:  no edema  Skin:  no rash  Neuro: non-focal      DATA:    CBC:   Recent Labs     24  2132   WBC 12.9* 
  Nephrology Progress Note  [phone]  [phone]   [employer]          CC:  We are following this patient for CAROLANN on CKD    The patient has known significant past medical history of T2DM, atrial fibrillation on Eliquis,  diastolic CHF, COPD, hypertension, hyperlipidemia, and Parkinson's disease. She was recently admitted with COPD flair and UTI. Seen for CAROLANN then with creatinine peak of 2.8. FENa then was not low. Improved off diuretics. Lasix restarted at discharge but off Diovan and Jardiance    Readmitted  with worsening dyspnea and tremor. Urine does not suggest UTI this admission. Initial treatment for respiratory infection and had IV lasix      SUBJECTIVE:    Patient feels better today  Less dyspnea and tremor improved  Had previous cough - this seems better as well    Excellent U/O  Cr stable to better    No  CP. No wheezing. No N/V, abd pain, or diarrhea.     No visitors      Physical Exam:    VITALS:  BP (!) 111/90   Pulse 67   Temp 97.3 °F (36.3 °C) (Temporal)   Resp 18   Ht 1.575 m (5' 2\")   Wt 73.3 kg (161 lb 9.6 oz)   SpO2 94%   BMI 29.56 kg/m²   TEMPERATURE:  Current - Temp: 97.3 °F (36.3 °C); Max - Temp  Av.4 °F (36.3 °C)  Min: 97.2 °F (36.2 °C)  Max: 97.5 °F (36.4 °C)  RESPIRATIONS RANGE: Resp  Av.8  Min: 18  Max: 20  PULSE RANGE: Pulse  Av.5  Min: 60  Max: 67  BLOOD PRESSURE RANGE:  Systolic (24hrs), Av , Min:110 , Max:126   ; Diastolic (24hrs), Av, Min:47, Max:90  PULSE OXIMETRY RANGE: SpO2  Av %  Min: 88 %  Max: 96 %  24HR INTAKE/OUTPUT:    Intake/Output Summary (Last 24 hours) at 2024 1020  Last data filed at 2024 0545  Gross per 24 hour   Intake 120 ml   Output 1060 ml   Net -940 ml         Constitutional:  NAD  HEENT:  no oral lesions  Lungs:  no wheeze, no rales  Cardiovascular:  RRR, no murmur, left side pacer  Abd:  soft NT  Ext:  no edema  Skin:  no rash  Neuro: non-focal      DATA:    CBC:   Recent Labs     24  2132 24  0440 
  Visit us at [employer] or call us at [address] [place] [address]    NEPHROLOGY PROGRESS NOTE    Patient: Cleo Stauffer MRN: [id number]     YOB: 1935  Age: 88 y.o.  Sex: female    Unit: [facility] 3A  NURSING Room/Bed: 3AN-3302/3302-01 Location: [facility]     Admitting Physician: JOSÉ LUIS GARCIA    Primary Care Physician: Aron Vivas MD          LOS: 4 days       Reason for evaluation:   CAROLANN on CKD3b      SUBJECTIVE:      The patient was seen and examined. Notes and labs reviewed.  There were no complications over night.    Patient's review of systems: SOB improved, denies CP      OBJECTIVE:     Vitals:    02/17/24 1101 02/17/24 2015 02/18/24 0640 02/18/24 0813   BP:  111/70  124/70   Pulse: 60 60  61   Resp: 16 16  14   Temp:  98.2 °F (36.8 °C)  97.4 °F (36.3 °C)   TempSrc:  Oral  Oral   SpO2: 96% 95%  99%   Weight:   73.8 kg (162 lb 11.2 oz)    Height:           Intake and Output:      Intake/Output Summary (Last 24 hours) at 2/18/2024 1806  Last data filed at 2/18/2024 1259  Gross per 24 hour   Intake 970 ml   Output 1475 ml   Net -505 ml       Continuous Infusions:   sodium chloride 5 mL/hr at 02/15/24 0425    dextrose         Current meds:   furosemide  40 mg Oral Daily    cloNIDine  0.05 mg Oral BID    apixaban  2.5 mg Oral BID    atorvastatin  80 mg Oral Nightly    menthol-zinc oxide   Topical TID    citalopram  10 mg Oral Daily    dilTIAZem  240 mg Oral Daily    ferrous sulfate  325 mg Oral BID WC    flecainide  50 mg Oral 2 times per day    levothyroxine  88 mcg Oral Daily    LORazepam  0.5 mg Oral BID    pantoprazole  40 mg Oral Daily    [START ON 2/21/2024] vitamin D  50,000 Units Oral Weekly    sodium chloride flush  5-40 mL IntraVENous 2 times per day    insulin lispro  0-4 Units SubCUTAneous TID WC    insulin lispro  0-4 Units SubCUTAneous Nightly    mometasone-formoterol  2 puff Inhalation BID RT    tiotropium  2 puff Inhalation Daily RT    levofloxacin  500 mg 
  Visit us at [employer] or call us at [address] [place] [address]    NEPHROLOGY PROGRESS NOTE    Patient: Cleo Stauffer MRN: [id number]     YOB: 1935  Age: 88 y.o.  Sex: female    Unit: [facility] 3A  NURSING Room/Bed: 3AN-3302/3302-01 Location: [facility]     Admitting Physician: JOSÉ LUIS GARCIA    Primary Care Physician: Aron Vivas MD          LOS: 5 days       Reason for evaluation:   CAROLANN on CKD3b      SUBJECTIVE:      The patient was seen and examined. Notes and labs reviewed.  There were no complications over night.    Patient's review of systems: SOB improved, denies CP  Awaiting removal of coronado      OBJECTIVE:     Vitals:    02/18/24 2138 02/19/24 0724 02/19/24 1006 02/19/24 1010   BP:    121/62   Pulse: 60 64  60   Resp: 14 16  16   Temp:    98.6 °F (37 °C)   TempSrc:    Oral   SpO2: 91% 97%  97%   Weight:   67.7 kg (149 lb 3 oz)    Height:           Intake and Output:      Intake/Output Summary (Last 24 hours) at 2/19/2024 1310  Last data filed at 2/19/2024 1010  Gross per 24 hour   Intake 150 ml   Output --   Net 150 ml       Continuous Infusions:   sodium chloride 5 mL/hr at 02/15/24 0425    dextrose         Current meds:   furosemide  40 mg Oral Daily    cloNIDine  0.05 mg Oral BID    apixaban  2.5 mg Oral BID    atorvastatin  80 mg Oral Nightly    menthol-zinc oxide   Topical TID    citalopram  10 mg Oral Daily    dilTIAZem  240 mg Oral Daily    ferrous sulfate  325 mg Oral BID WC    flecainide  50 mg Oral 2 times per day    levothyroxine  88 mcg Oral Daily    LORazepam  0.5 mg Oral BID    pantoprazole  40 mg Oral Daily    [START ON 2/21/2024] vitamin D  50,000 Units Oral Weekly    sodium chloride flush  5-40 mL IntraVENous 2 times per day    insulin lispro  0-4 Units SubCUTAneous TID WC    insulin lispro  0-4 Units SubCUTAneous Nightly    mometasone-formoterol  2 puff Inhalation BID RT    tiotropium  2 puff Inhalation Daily RT    levofloxacin  500 mg IntraVENous Q48H 
  [facility] - Inpatient Rehabilitation Department   Phone: [phone]    Physical Therapy    [x] Initial Evaluation            [] Daily Treatment Note         [] Discharge Summary      Patient: Cleo Stauffer   : 1935   MRN: [id number]   Date of Service:  2024  Admitting Diagnosis: Acute on chronic heart failure with normal ejection fraction (HCC)  Current Admission Summary: Ms. Stauffer 88 year old female with history of hypertension, hyperlipidemia, CAD, AF, pacemaker 2023, chronic anticoag with eliquis, chronic diastolic heart failure, tobacco abuse, COPD, ronnie, oxygen dependence and DM. She presented to the hospital for shortness of breath and tremor.  She was recently discharged for COPD treatment, given IV fluids elevated creatinine of 2.8 (baseline is 1.6).  her lasix and jardiance were stopped.  Cxr with pulmonary congestion and started on IV lasix.    Past Medical History:  has a past medical history of Arthritis, Atrial fibrillation (HCC), Bursitis, COPD (chronic obstructive pulmonary disease) (HCC), Diabetes mellitus type II, controlled (HCC), Diastolic CHF (HCC), GERD (gastroesophageal reflux disease), HTN (hypertension), Hyperlipidemia, Hypothyroid, Lumbago, Parkinson disease, Restless legs syndrome (RLS), and SVT (supraventricular tachycardia).  Past Surgical History:  has a past surgical history that includes Appendectomy; Colonoscopy; Cholecystectomy, laparoscopic (2013); and Pacemaker insertion (2023).  Discharge Recommendations: Cleo Stauffer scored a 18/24 on the AM-PAC short mobility form. Current research shows that an AM-PAC score of 18 or greater is typically associated with a discharge to the patient's home setting. Based on the patient's AM-PAC score and their current functional mobility deficits, it is recommended that the patient have 2-3 sessions per week of Physical Therapy at d/c to increase the patient's independence.  At this 
1544  Pt attempted to urinate, unable to do so.  When I asked if she ever has had urinary retention, dtr states yes and she was just taken off urecholine last week on previous admit here.  Dr. Urban notified.    1630  bladder scan shows 138ml, Dr. Urban said ok to discharge, iv removed, pt and daughter verbalized understanding, discharged home w/ homecare.   
Bladder scan completed per nephro after pt stated she had been unable to urinate since coronado removal, bladder scan showed 348mL urine, coronado catheter placed at this time, see flow sheets, pt tolerated well, urine draining into standard bag.   
CLINICAL PHARMACY NOTE: MEDS TO BEDS    Total # of Prescriptions Filled: 2   The following medications were delivered to the patient:  JARDIANCE 10MG  FUROSEMIDE 40MG    Additional Documentation:  Delivered to patients room = signed  Ok to be delivered per RN June Rangel - Pharmacy Tech.   
Nutrition Education    Provided heart failure diet education to family member present who pt lives with. Education included low sodium diet guidelines (3-4 gm Na+/day) and fluid restriction (64 oz/day).  Reviewed foods to choose and foods to avoid, along with label reading and ways to add flavor to food. Currently tries to follow a low sodium diet at home and does not add salt to food.  Family member states understanding of education.  Time spent: 10 minutes.      Educated on 2/15  Learners: Family  Readiness: Acceptance  Method: Explanation and Handout  Response: Verbalizes Understanding  Contact name and number provided.    Yaquelin Christianson MS, RD, LD  Contact Number: [phone]    
Pt  up to BR with walker and contact guard.  Tony removed per order.  Pt tolerated well.   
Pt admitted to CVU 12. Attached to tele box 12. Hospitalist messaged regarding orders.   
Pt says her mouth is sore from dulera inhaler.  
[facility]  HEART FAILURE  Progress Note      Admit Date 2/14/2024     Reason for Consult:      Reason for Consultation/Chief Complaint: SOB    HPI:    Cleo Stauffer is a 88 y.o. female with PMH HTN, HLD, CAD, AF, PPM, HfpEF, COPD, DM admitted with SOB and tremor. She has diuresed about 2800 with IV lasix. She had some urinary retention and coronado back in now.       Subjective:  Patient is being seen for CHF. There were no acute overnight cardiac events.   Today Ms. Stauffer is sitting in the chair and feels well, denies chest pain, shortness of breath, palpitations, or dizziness.       Baseline Weight: 160   Wt Readings from Last 3 Encounters:   02/18/24 73.8 kg (162 lb 11.2 oz)   02/07/24 73 kg (161 lb)   01/16/24 73.8 kg (162 lb 12.8 oz)         Cardiac Testing:   Echo 2/14/2024   Summary   -Normal left ventricle size, wall thickness, and systolic function with an   estimated ejection fraction of 60-65%. No regional wall motion abnormalities   are seen.   -Indeterminate diastolic function.   -The right ventricle is normal in size and function.   -Pacer / ICD wire is visualized in the right ventricle.   -The left atrium is dilated.   -Mild aortic regurgitation is present.   -Mild mitral regurgitation is present.   -Mild pulmonic regurgitation present.   -Mild tricuspid regurgitation.    NYHA Class III    Objective:   /75   Pulse 64   Temp 97.4 °F (36.3 °C) (Oral)   Resp 16   Ht 1.575 m (5' 2\")   Wt 73.8 kg (162 lb 11.2 oz)   SpO2 97%   BMI 29.76 kg/m²     Intake/Output Summary (Last 24 hours) at 2/19/2024 0850  Last data filed at 2/18/2024 1259  Gross per 24 hour   Intake 480 ml   Output 125 ml   Net 355 ml      In: 960 [P.O.:960]  Out: 125       Physical Exam:  General Appearance:  Well Nourished  Respiratory:  Resp Auscultation: Normal breath sounds without dullness  Cardiovascular:  Auscultation: Regular rate and rhythm, normal S1S2, no m/g/r/c  Palpation: Normal    Pedal Pulses: 
Shift assessment completed, VSS, scheduled medications given per MAR, pt denies any pain or discomfort at this time. All questions asked and answered, pt verbalizes understanding. Pt gown changed at this time per pt request. Breaks locked, bed in lowest position and call light within reach.   
Shift assessment completed, VSS, scheduled medications given per MAR, pt up with PT/OT, settled in chair at this time, denies any pain or discomfort, pt denies any questions or concerns at this time. Breaks locked, chair alarm set and call light within reach.   
lispro (HUMALOG) injection vial 0-4 Units, Nightly  mometasone-formoterol (DULERA) 200-5 MCG/ACT inhaler 2 puff, BID RT  tiotropium (SPIRIVA RESPIMAT) 2.5 MCG/ACT inhaler 2 puff, Daily RT  levoFLOXacin (LEVAQUIN) 500 MG/100ML infusion 500 mg, Q48H  gabapentin (NEURONTIN) capsule 100 mg, TID  rOPINIRole (REQUIP) tablet 3 mg, TID  furosemide (LASIX) injection 40 mg, BID  empagliflozin (JARDIANCE) tablet 10 mg, Daily        Objective:  /70   Pulse 60   Temp 98.3 °F (36.8 °C) (Oral)   Resp 16   Ht 1.575 m (5' 2\")   Wt 74.9 kg (165 lb 2 oz)   SpO2 96%   BMI 30.20 kg/m²     Intake/Output Summary (Last 24 hours) at 2/17/2024 1243  Last data filed at 2/17/2024 1000  Gross per 24 hour   Intake 720 ml   Output 2500 ml   Net -1780 ml        Wt Readings from Last 3 Encounters:   02/17/24 74.9 kg (165 lb 2 oz)   02/07/24 73 kg (161 lb)   01/16/24 73.8 kg (162 lb 12.8 oz)       General appearance:  Appears comfortable. AAOx3  HEENT: atraumatic, Pupils equal, muscous membranes moist, no masses appreciated  Cardiovascular: Regular rate and rhythm no murmurs appreciated  Respiratory: CTAB no wheezing  Gastrointestinal: Abdomen soft, non-tender, BS+  EXT: no edema  Neurology: no gross focal deficts  Psychiatry: Appropriate affect. Not agitated  Skin: Warm, dry, no rashes appreciated    Labs and Tests:  CBC:   Recent Labs     02/14/24  2132 02/16/24  0440   WBC 12.9* 7.6   HGB 11.1* 10.4*   * 99*       BMP:    Recent Labs     02/15/24  0249 02/16/24  0440 02/17/24  0729    137 139   K 4.8 3.9 4.1    101 100   CO2 23 27 27   BUN 56* 42* 36*   CREATININE 2.1* 1.9* 2.0*   GLUCOSE 140* 119* 105*       Hepatic: No results for input(s): \"AST\", \"ALT\", \"ALB\", \"BILITOT\", \"ALKPHOS\" in the last 72 hours.  XR CHEST PORTABLE   Final Result   1.  Congestive heart failure is most likely given the radiographic findings;   pneumonia is also a consideration in areas of consolidation with pleural   effusion.   2. Calcific 
mometasone-formoterol  2 puff Inhalation BID RT    tiotropium  2 puff Inhalation Daily RT    levofloxacin  500 mg IntraVENous Q48H    gabapentin  100 mg Oral TID    rOPINIRole  3 mg Oral TID    furosemide  40 mg IntraVENous BID    empagliflozin  10 mg Oral Daily         DATA:    CBC:   Recent Labs     02/14/24 2132 02/16/24 0440   WBC 12.9* 7.6   RBC 3.47* 3.17*   HGB 11.1* 10.4*   HCT 33.7* 30.1*   * 99*       BMP:    Recent Labs     02/15/24  0249 02/16/24  0440 02/17/24  0729    137 139   K 4.8 3.9 4.1    101 100   CO2 23 27 27   BUN 56* 42* 36*   CREATININE 2.1* 1.9* 2.0*   CALCIUM 8.8 9.0 9.3   GLUCOSE 140* 119* 105*       Phosphorus:  No results for input(s): \"PHOS\" in the last 72 hours.  Magnesium:    Recent Labs     02/15/24  0249 02/16/24  0440 02/17/24  0729   MG 2.20 2.10 2.00         IMPRESSION/RECOMMENDATIONS:      CAROLANN on CKD  Does indeed have CAROLANN - baseline Cr near 1.5-1.8 Oct23  Recent peak Cr 2.8  Suspect CRS now  Patient complaining of urinary frequency before admission but recent US with no urinary retention  - continue IVP lasix  - avoid ACE/ARB at present  - Cr improved and now stable around 2 mg/dl  - Jardiance restarted  CKD stage 3b  Baseline Cr 1.5-1.8 in 2023  Seen by Dr Beach's group in 2018, Dr Romero in 2022, and by [facility] in 2024 (Dr Mera)  COPD   Known  Possible bronchitis as well  On abx  Acute on chronic diastolic HF  Repeat echo 2/15/24 - EF 60-65 %; indeterminate diastolic function  ON lasix IV and Jardiance  PAF  Has pacer  Controlled  HTN  BP on lower side  Was on both nifedipine and diltiazem - nifedipine stopped  Needs diltiazem for AF  - Wean clonidine and try to stop if appropriate  DM 2  Anemia  Mild - monitor  Platelets lower - monitor      Opal Lopez MD     
Mild-to-moderate aortic regurgitation is present.   Mitral annular calcification is present.   Mild mitral & tricuspid regurgitation.    Assessment:    1.  Acute on chronic diastolic heart failure improving on jardiance and lasix  2.  Chronic renal insuffiiciency  3.  Anemia   4.  COPD  5.  Bradycardia       Plan:    Continue IV lasix 40 mg twice a day  Jardiance 10 mg daily po   Add iron studies and ferritin  CHF nurse following for diet education, fluid restriction and daily weights  Continue cardizem 240 mg daily and clonidine 0.1 mg twice a day  Stop metoprolol   Check bnp tomorrow    Hopefully discharge in next 1-2 days    NYHA IV    Discussed with patient who is agreeable with plan of care.     Thank you for allowing me to participate in the care of your patient.    DELFINO LAY, APRN - CNS, CNS

## 2024-02-20 ENCOUNTER — TELEPHONE (OUTPATIENT)
Dept: OTHER | Age: 89
End: 2024-02-20

## 2024-02-20 NOTE — DISCHARGE SUMMARY
tablet Take 1 tablet by mouth 3 times daily, Disp-90 tablet, R-3Normal           Discharge Medication List as of 2/19/2024  4:12 PM        CONTINUE these medications which have CHANGED    Details   furosemide (LASIX) 40 MG tablet Take 1 tablet by mouth daily, Disp-60 tablet, R-3Normal           Discharge Medication List as of 2/19/2024  4:12 PM        CONTINUE these medications which have NOT CHANGED    Details   levothyroxine (SYNTHROID) 88 MCG tablet Take 1 tablet by mouth every morning (before breakfast)Historical Med      apixaban (ELIQUIS) 2.5 MG TABS tablet Take 1 tablet by mouth 2 times daily, Disp-180 tablet, R-0Normal      pantoprazole (PROTONIX) 40 MG tablet Take 1 tablet by mouth daily for 3 days, Disp-3 tablet, R-0Normal      vitamin D (ERGOCALCIFEROL) 1.25 MG (82343 UT) CAPS capsule Take 1 capsule by mouth once a week, Disp-12 capsule, R-1Normal      atorvastatin (LIPITOR) 80 MG tablet Take 1 tablet by mouth nightly, Disp-90 tablet, R-1Normal      cloNIDine (CATAPRES) 0.1 MG tablet Take 1 tablet by mouth daily, Disp-60 tablet, R-0NO PRINT      citalopram (CELEXA) 10 MG tablet Take 1 tablet by mouth dailyHistorical Med      Budeson-Glycopyrrol-Formoterol (BREZTRI AEROSPHERE) 160-9-4.8 MCG/ACT AERO Inhale 2 puffs into the lungs 2 times daily, Disp-1 each, R-5Normal      levalbuterol (XOPENEX) 0.63 MG/3ML nebulization Take 3 mLs by nebulization every 6 hours as needed for Wheezing, Disp-3 mL, R-5Normal      ferrous sulfate (IRON 325) 325 (65 Fe) MG tablet Take 1 tablet by mouth 2 times daily (with meals), Disp-30 tablet, R-3Normal      CALMOSEPTINE 0.44-20.6 % OINT ointment Apply 1 application  topically in the morning, at noon, and at bedtime, DAWHistorical Med      gabapentin (NEURONTIN) 100 MG capsule Take 1 capsule by mouth 3 times daily.Historical Med      flecainide (TAMBOCOR) 50 MG tablet Take 1 tablet by mouth every 12 hours, Disp-60 tablet, R-3DC to SNF      dilTIAZem (CARDIZEM CD) 240 MG extended

## 2024-02-20 NOTE — TELEPHONE ENCOUNTER
[facility]  HEART FAILURE PROGRAM  TELEPHONE ENCOUNTER FORM    Cleo Stauffer 1935    ASSESSMENT:   Baseline weight: 149 lbs  Current weight: 149 lbs  Patient weighing daily: Yes  What are your symptoms of heart failure: dyspnea, edema  Are you having any symptoms:  dtr felt she went into afib but seems better now  Patient knows who to call with symptoms: Yes  Diet history:      Patient states sodium limitation is : 3000 mg      Patient states fluid limitation is 64 oz  Patient following diet as instructed: Yes  Medication history: taking medications as instructed Yes; medication side effects noted No  Patient is being active at home: Yes  Patient knows date and time of follow up appointment: Yes   Patient has transportation to appointments: Yes    RECOMMENDATIONS:   Medication: taking as prescribed  Diet: no added salt   MD/ Clinic appointment: 2/26 with Aura Walsh  Other: Spoke with patient's daughter. Doing okay. Was up walking around this am and HR was elevated, dtr thought she was back in atrial fib. Gave her medications and improved. Aide at home with patient now. Daughter knows to call HF team with any questions or concerns.

## 2024-02-21 ENCOUNTER — TELEPHONE (OUTPATIENT)
Dept: PULMONOLOGY | Age: 89
End: 2024-02-21

## 2024-02-21 LAB
ANION GAP SERPL CALCULATED.3IONS-SCNC: 18 MMOL/L (ref 3–16)
BUN SERPL-MCNC: 40 MG/DL (ref 7–20)
CALCIUM SERPL-MCNC: 9.6 MG/DL (ref 8.3–10.6)
CHLORIDE SERPL-SCNC: 91 MMOL/L (ref 99–110)
CO2 SERPL-SCNC: 25 MMOL/L (ref 21–32)
CREAT SERPL-MCNC: 2.8 MG/DL (ref 0.6–1.2)
GFR SERPLBLD CREATININE-BSD FMLA CKD-EPI: 16 ML/MIN/{1.73_M2}
GLUCOSE SERPL-MCNC: 106 MG/DL (ref 70–99)
NT-PROBNP SERPL-MCNC: 6777 PG/ML (ref 0–449)
POTASSIUM SERPL-SCNC: 3.9 MMOL/L (ref 3.5–5.1)
SODIUM SERPL-SCNC: 134 MMOL/L (ref 136–145)

## 2024-02-21 NOTE — TELEPHONE ENCOUNTER
Called and spoke with daughter, she is confused about patients medication regimen.    Patient recently got out of the hospital as well and I advised that we schedule a visit to discuss meds and have follow up from hospital.    Patient is scheduled for 2/28/24, daughter would still like to know if you can give her some guidance until appointment.

## 2024-02-21 NOTE — TELEPHONE ENCOUNTER
Patients daughter called and said patient has been in and out of the hospital and wants to know patient when she was first in hospital they gave her breathing treatments said when she was in recently they put her on inhalers and wants to know what patient should be doing as far as taking her breathing treatments if it needs to be with inhaler or with machine     PH: [phone]

## 2024-02-22 ENCOUNTER — TELEPHONE (OUTPATIENT)
Dept: CARDIOLOGY CLINIC | Age: 89
End: 2024-02-22

## 2024-02-22 NOTE — TELEPHONE ENCOUNTER
----- Message from KATHY Foster CNP sent at 2/22/2024  9:53 AM EST -----  Labs show lots of extra fluid, increase lasix to twice a day until OV with RG.     Spoke to patient's daughter Maxine she verbalized understanding.    Patient's daughter calling and asking about Bethanechol and the extra lasix will it hurt her kidneys?

## 2024-02-26 ENCOUNTER — OFFICE VISIT (OUTPATIENT)
Dept: CARDIOLOGY CLINIC | Age: 89
End: 2024-02-26

## 2024-02-26 VITALS
BODY MASS INDEX: 28.34 KG/M2 | SYSTOLIC BLOOD PRESSURE: 120 MMHG | WEIGHT: 154 LBS | HEIGHT: 62 IN | DIASTOLIC BLOOD PRESSURE: 52 MMHG | HEART RATE: 59 BPM | OXYGEN SATURATION: 95 %

## 2024-02-26 DIAGNOSIS — J44.9 CHRONIC OBSTRUCTIVE PULMONARY DISEASE, UNSPECIFIED COPD TYPE (HCC): ICD-10-CM

## 2024-02-26 DIAGNOSIS — I50.32 CHRONIC DIASTOLIC CHF (CONGESTIVE HEART FAILURE) (HCC): Primary | ICD-10-CM

## 2024-02-26 DIAGNOSIS — D50.9 IRON DEFICIENCY ANEMIA, UNSPECIFIED IRON DEFICIENCY ANEMIA TYPE: ICD-10-CM

## 2024-02-26 DIAGNOSIS — I48.0 PAF (PAROXYSMAL ATRIAL FIBRILLATION) (HCC): ICD-10-CM

## 2024-02-26 DIAGNOSIS — N28.9 RENAL INSUFFICIENCY: ICD-10-CM

## 2024-02-26 DIAGNOSIS — I10 HTN (HYPERTENSION), BENIGN: ICD-10-CM

## 2024-02-26 RX ORDER — FUROSEMIDE 40 MG/1
60 TABLET ORAL DAILY
Qty: 60 TABLET | Refills: 0
Start: 2024-02-26

## 2024-02-26 RX ORDER — BETHANECHOL CHLORIDE 10 MG/1
10 TABLET ORAL 3 TIMES DAILY
Qty: 90 TABLET | Refills: 0
Start: 2024-02-26

## 2024-02-26 NOTE — PROGRESS NOTES
[facility]  Progress Note    Primary Care Doctor:  Aron Vivas MD    Chief Complaint   Patient presents with    Congestive Heart Failure    Dizziness        History of Present Illness:  88 y.o. female with history of 87 year old female with history of hypertension, hyperlipidemia, CAD, AF, pacemaker 2/24/2023, chronic anticoag with eliquis, chronic diastolic heart failure, tobacco abuse, COPD, ronnie, oxygen dependence and DM  4/7-10/2023 for shortness of breath, wheezing, cough and chest tightness for 3 days.  She was diagnosed with UTI,antibiotic.  Lasix was prn and she was not taking this at home. Anemic and received 2 doses of IV venofer.  She has been drinking liberally and likes salty snacks.  She is on 2 L of oxygen at home.  She was started on valsartan and clonidine increased due to hypertension.         I had the pleasure of seeing Cleo Stauffer in follow up for diastolic heart failure and hospitalization 2/14-19/2024 for shortness of breath recently discharged for COPD treatment, given IV fluids and lasix, urecholine and jardiance had been stopped.  She was given IV lasix and resumed on her medications.  She is in a wheel chair, with her daughter and aid, Mima.  She is on 2 L of oxygen via concentrator.  Her weight is 149-154 and 152 at home.  She has [facility] who are checking blood work.  She has no ankle swelling and denies chest pain, palpitations, lightheadedness or increased shortness of breath.  She is urinating fine and no coronado catheter.  She is only taking the urecholine twice a day and is suppose to be on this three times a day.  All other medications are correct.      Phone call 2/20/2024     Past Medical History:   has a past medical history of Arthritis, Atrial fibrillation (HCC), Bursitis, COPD (chronic obstructive pulmonary disease) (HCC), Diabetes mellitus type II, controlled (HCC), Diastolic CHF (HCC), GERD (gastroesophageal reflux disease), HTN (hypertension),

## 2024-02-26 NOTE — PATIENT INSTRUCTIONS
Cut lasix to 60 mg once a day  Home care to draw blood work weekly  Increase urecholine three times a day   Continue all other medications  RTO in 1 month

## 2024-02-27 ENCOUNTER — TELEPHONE (OUTPATIENT)
Dept: CARDIOLOGY CLINIC | Age: 89
End: 2024-02-27

## 2024-02-27 ENCOUNTER — HOSPITAL ENCOUNTER (OUTPATIENT)
Age: 89
Setting detail: SPECIMEN
Discharge: HOME | End: 2024-02-27
Payer: MEDICARE

## 2024-02-27 LAB
ANION GAP SERPL CALCULATED.3IONS-SCNC: 14 MMOL/L (ref 3–16)
BUN SERPL-MCNC: 35 MG/DL (ref 7–20)
CALCIUM SERPL-MCNC: 9.4 MG/DL (ref 8.3–10.6)
CHLORIDE SERPL-SCNC: 96 MMOL/L (ref 99–110)
CO2 SERPL-SCNC: 24 MMOL/L (ref 21–32)
CREAT SERPL-MCNC: 1.8 MG/DL (ref 0.6–1.2)
GFR SERPLBLD CREATININE-BSD FMLA CKD-EPI: 27 ML/MIN/{1.73_M2}
GLUCOSE SERPL-MCNC: 129 MG/DL (ref 70–99)
NT-PROBNP SERPL-MCNC: 1300 PG/ML (ref 0–449)
POTASSIUM SERPL-SCNC: 4.4 MMOL/L (ref 3.5–5.1)
SODIUM SERPL-SCNC: 134 MMOL/L (ref 136–145)

## 2024-02-27 PROCEDURE — 83880 ASSAY OF NATRIURETIC PEPTIDE: CPT

## 2024-02-27 PROCEDURE — 36415 COLL VENOUS BLD VENIPUNCTURE: CPT

## 2024-02-27 PROCEDURE — 80048 BASIC METABOLIC PNL TOTAL CA: CPT

## 2024-02-27 NOTE — TELEPHONE ENCOUNTER
Summer from [facility] called regarding lab orders that pt's daughter said to have been put in yesterday.    Please advise call to advise    [phone]

## 2024-02-28 ENCOUNTER — HOSPITAL ENCOUNTER (OUTPATIENT)
Dept: GENERAL RADIOLOGY | Age: 89
Discharge: HOME | End: 2024-02-28
Payer: MEDICARE

## 2024-02-28 ENCOUNTER — OFFICE VISIT (OUTPATIENT)
Dept: PULMONOLOGY | Age: 89
End: 2024-02-28
Payer: MEDICARE

## 2024-02-28 ENCOUNTER — HOSPITAL ENCOUNTER (OUTPATIENT)
Age: 89
Discharge: HOME | End: 2024-02-28
Payer: MEDICARE

## 2024-02-28 VITALS
HEIGHT: 62 IN | DIASTOLIC BLOOD PRESSURE: 78 MMHG | OXYGEN SATURATION: 92 % | HEART RATE: 71 BPM | SYSTOLIC BLOOD PRESSURE: 122 MMHG | BODY MASS INDEX: 28.52 KG/M2 | WEIGHT: 155 LBS

## 2024-02-28 DIAGNOSIS — J44.9 COPD, MODERATE (HCC): ICD-10-CM

## 2024-02-28 DIAGNOSIS — J47.9 BRONCHIECTASIS WITHOUT COMPLICATION (HCC): ICD-10-CM

## 2024-02-28 DIAGNOSIS — I11.0 CARDIOMYOPATHY DUE TO HYPERTENSION, WITH HEART FAILURE (HCC): ICD-10-CM

## 2024-02-28 DIAGNOSIS — I43 CARDIOMYOPATHY DUE TO HYPERTENSION, WITH HEART FAILURE (HCC): ICD-10-CM

## 2024-02-28 DIAGNOSIS — J44.9 COPD, MODERATE (HCC): Primary | ICD-10-CM

## 2024-02-28 DIAGNOSIS — J43.2 CENTRILOBULAR EMPHYSEMA (HCC): ICD-10-CM

## 2024-02-28 PROCEDURE — 99214 OFFICE O/P EST MOD 30 MIN: CPT | Performed by: INTERNAL MEDICINE

## 2024-02-28 PROCEDURE — 3023F SPIROM DOC REV: CPT | Performed by: INTERNAL MEDICINE

## 2024-02-28 PROCEDURE — G8484 FLU IMMUNIZE NO ADMIN: HCPCS | Performed by: INTERNAL MEDICINE

## 2024-02-28 PROCEDURE — G8417 CALC BMI ABV UP PARAM F/U: HCPCS | Performed by: INTERNAL MEDICINE

## 2024-02-28 PROCEDURE — G8427 DOCREV CUR MEDS BY ELIG CLIN: HCPCS | Performed by: INTERNAL MEDICINE

## 2024-02-28 PROCEDURE — 1090F PRES/ABSN URINE INCON ASSESS: CPT | Performed by: INTERNAL MEDICINE

## 2024-02-28 PROCEDURE — 71046 X-RAY EXAM CHEST 2 VIEWS: CPT

## 2024-02-28 PROCEDURE — 1123F ACP DISCUSS/DSCN MKR DOCD: CPT | Performed by: INTERNAL MEDICINE

## 2024-02-28 PROCEDURE — 1111F DSCHRG MED/CURRENT MED MERGE: CPT | Performed by: INTERNAL MEDICINE

## 2024-02-28 PROCEDURE — 1036F TOBACCO NON-USER: CPT | Performed by: INTERNAL MEDICINE

## 2024-02-28 RX ORDER — ONDANSETRON 4 MG/1
4 TABLET, FILM COATED ORAL EVERY 8 HOURS PRN
COMMUNITY
Start: 2024-01-27

## 2024-02-28 NOTE — PROGRESS NOTES
application  topically in the morning, at noon, and at bedtime  Bing Bearden MD   gabapentin (NEURONTIN) 100 MG capsule Take 1 capsule by mouth 3 times daily.  Bing Bearden MD   flecainide (TAMBOCOR) 50 MG tablet Take 1 tablet by mouth every 12 hours  Nickie Roche APRN - CNP   dilTIAZem (CARDIZEM CD) 240 MG extended release capsule Take 1 capsule by mouth daily  Nickie Roche APRN - CNP   LORazepam (ATIVAN) 1 MG tablet TAKE 1/2 TABLET BY MOUTH TWICE DAILY AND 1 EVERY NIGHT AT BEDTIME AS NEEDED FOR ANXIETY  Nickie Roche APRN - CNP   amLODIPine (NORVASC) 5 MG tablet Take 1 tablet by mouth in the morning.  Kyle Caro APRN - CNP   meclizine (ANTIVERT) 12.5 MG tablet TAKE 1 TABLET THREE TIMES DAILY AS NEEDED  Aston Khan MD   rOPINIRole (REQUIP) 3 MG tablet TAKE 1 TABLET THREE TIMES DAILY  Aston Khan MD   conjugated estrogens (PREMARIN) 0.625 MG/GM vaginal cream Place 1 g vaginally three times a week  Aston Khan MD   Blood Glucose Monitoring Suppl (TRUE METRIX METER) w/Device KIT To use to check sugars 4 times daily  Ela Bowden MD   Blood Glucose Calibration (TRUE METRIX LEVEL 2) Normal SOLN As needed  Aston Khan MD   blood glucose test strips (TRUE METRIX BLOOD GLUCOSE TEST) strip 1 each by In Vitro route daily As needed.  Aston Khan MD   TRUEplus Lancets 33G MISC 1 daily  Aston Khan MD   Lancets MISC 1 each by Does not apply route 2 times daily  Aston Khan MD   OXYGEN Inhale 2 L into the lungs  Bing Bearden MD   Respiratory Therapy Supplies (NEBULIZER/TUBING/MOUTHPIECE) KIT 1 kit by Does not apply route 4 times daily as needed (shortness of breath)  Aston Khan MD   albuterol sulfate HFA (PROAIR HFA) 108 (90 Base) MCG/ACT inhaler Inhale 2 puffs into the lungs every 6 hours as needed for Wheezing  Rob Borja MD       Vitals:    02/28/24 1539   Height: 1.575 m (5' 2\")     Body mass index is 28.17

## 2024-03-07 ENCOUNTER — HOSPITAL ENCOUNTER (OUTPATIENT)
Age: 89
Setting detail: SPECIMEN
Discharge: HOME | End: 2024-03-07
Payer: MEDICARE

## 2024-03-07 LAB
ANION GAP SERPL CALCULATED.3IONS-SCNC: 12 MMOL/L (ref 3–16)
BUN SERPL-MCNC: 47 MG/DL (ref 7–20)
CALCIUM SERPL-MCNC: 9.7 MG/DL (ref 8.3–10.6)
CHLORIDE SERPL-SCNC: 101 MMOL/L (ref 99–110)
CO2 SERPL-SCNC: 24 MMOL/L (ref 21–32)
CREAT SERPL-MCNC: 1.9 MG/DL (ref 0.6–1.2)
GFR SERPLBLD CREATININE-BSD FMLA CKD-EPI: 25 ML/MIN/{1.73_M2}
GLUCOSE SERPL-MCNC: 121 MG/DL (ref 70–99)
NT-PROBNP SERPL-MCNC: 1039 PG/ML (ref 0–449)
POTASSIUM SERPL-SCNC: 4.8 MMOL/L (ref 3.5–5.1)
SODIUM SERPL-SCNC: 137 MMOL/L (ref 136–145)

## 2024-03-07 PROCEDURE — 36415 COLL VENOUS BLD VENIPUNCTURE: CPT

## 2024-03-07 PROCEDURE — 80048 BASIC METABOLIC PNL TOTAL CA: CPT

## 2024-03-07 PROCEDURE — 83880 ASSAY OF NATRIURETIC PEPTIDE: CPT

## 2024-03-20 ENCOUNTER — TELEPHONE (OUTPATIENT)
Dept: CARDIOLOGY CLINIC | Age: 89
End: 2024-03-20

## 2024-03-20 NOTE — TELEPHONE ENCOUNTER
Home care states he was unsuccessful at getting pt's labs today. She is a hard stick. Asking if he should try again tomorrow or wait until next week. Please call to advise.

## 2024-03-21 NOTE — TELEPHONE ENCOUNTER
Spoke to NKECHI he will try again tomorrow and let the daughter know to take her to the lab if unsuccessful tomorrow

## 2024-03-26 ENCOUNTER — HOSPITAL ENCOUNTER (OUTPATIENT)
Age: 89
Setting detail: SPECIMEN
Discharge: HOME | End: 2024-03-26
Payer: MEDICARE

## 2024-03-26 LAB
ANION GAP SERPL CALCULATED.3IONS-SCNC: 15 MMOL/L (ref 3–16)
BUN SERPL-MCNC: 42 MG/DL (ref 7–20)
CALCIUM SERPL-MCNC: 9.4 MG/DL (ref 8.3–10.6)
CHLORIDE SERPL-SCNC: 101 MMOL/L (ref 99–110)
CO2 SERPL-SCNC: 25 MMOL/L (ref 21–32)
CREAT SERPL-MCNC: 1.8 MG/DL (ref 0.6–1.2)
GFR SERPLBLD CREATININE-BSD FMLA CKD-EPI: 27 ML/MIN/{1.73_M2}
GLUCOSE SERPL-MCNC: 143 MG/DL (ref 70–99)
NT-PROBNP SERPL-MCNC: 1294 PG/ML (ref 0–449)
POTASSIUM SERPL-SCNC: 4.8 MMOL/L (ref 3.5–5.1)
SODIUM SERPL-SCNC: 141 MMOL/L (ref 136–145)

## 2024-03-26 PROCEDURE — 80048 BASIC METABOLIC PNL TOTAL CA: CPT

## 2024-03-26 PROCEDURE — 83880 ASSAY OF NATRIURETIC PEPTIDE: CPT

## 2024-03-26 PROCEDURE — 36415 COLL VENOUS BLD VENIPUNCTURE: CPT

## 2024-03-27 ENCOUNTER — TELEPHONE (OUTPATIENT)
Dept: CARDIOLOGY CLINIC | Age: 89
End: 2024-03-27

## 2024-03-27 NOTE — TELEPHONE ENCOUNTER
----- Message from KATHY Mcdonald - CNS sent at 3/26/2024  2:01 PM EDT -----  Blood work is great  Continue all current medications  Call her daughter   Thanks      Spoke to patient's daughter she verbalized understanding.

## 2024-03-28 ENCOUNTER — OFFICE VISIT (OUTPATIENT)
Dept: CARDIOLOGY CLINIC | Age: 89
End: 2024-03-28
Payer: MEDICARE

## 2024-03-28 VITALS
DIASTOLIC BLOOD PRESSURE: 60 MMHG | WEIGHT: 155 LBS | HEART RATE: 62 BPM | SYSTOLIC BLOOD PRESSURE: 110 MMHG | BODY MASS INDEX: 28.52 KG/M2 | HEIGHT: 62 IN | OXYGEN SATURATION: 98 %

## 2024-03-28 DIAGNOSIS — I48.0 PAF (PAROXYSMAL ATRIAL FIBRILLATION) (HCC): ICD-10-CM

## 2024-03-28 DIAGNOSIS — D50.9 IRON DEFICIENCY ANEMIA, UNSPECIFIED IRON DEFICIENCY ANEMIA TYPE: ICD-10-CM

## 2024-03-28 DIAGNOSIS — N28.9 RENAL INSUFFICIENCY: ICD-10-CM

## 2024-03-28 DIAGNOSIS — I50.32 CHRONIC DIASTOLIC CHF (CONGESTIVE HEART FAILURE) (HCC): Primary | ICD-10-CM

## 2024-03-28 DIAGNOSIS — I10 HTN (HYPERTENSION), BENIGN: ICD-10-CM

## 2024-03-28 DIAGNOSIS — J44.9 CHRONIC OBSTRUCTIVE PULMONARY DISEASE, UNSPECIFIED COPD TYPE (HCC): ICD-10-CM

## 2024-03-28 PROCEDURE — G8484 FLU IMMUNIZE NO ADMIN: HCPCS | Performed by: CLINICAL NURSE SPECIALIST

## 2024-03-28 PROCEDURE — 99214 OFFICE O/P EST MOD 30 MIN: CPT | Performed by: CLINICAL NURSE SPECIALIST

## 2024-03-28 PROCEDURE — 1090F PRES/ABSN URINE INCON ASSESS: CPT | Performed by: CLINICAL NURSE SPECIALIST

## 2024-03-28 PROCEDURE — G8417 CALC BMI ABV UP PARAM F/U: HCPCS | Performed by: CLINICAL NURSE SPECIALIST

## 2024-03-28 PROCEDURE — 1036F TOBACCO NON-USER: CPT | Performed by: CLINICAL NURSE SPECIALIST

## 2024-03-28 PROCEDURE — G8427 DOCREV CUR MEDS BY ELIG CLIN: HCPCS | Performed by: CLINICAL NURSE SPECIALIST

## 2024-03-28 PROCEDURE — 3023F SPIROM DOC REV: CPT | Performed by: CLINICAL NURSE SPECIALIST

## 2024-03-28 PROCEDURE — 1123F ACP DISCUSS/DSCN MKR DOCD: CPT | Performed by: CLINICAL NURSE SPECIALIST

## 2024-03-28 NOTE — PROGRESS NOTES
memory, mentation, behavior.  Psychiatric: No anxiety, no depression.  Endocrine: No malaise, fatigue or temperature intolerance. No excessive thirst, fluid intake, or urination. No tremor.  Hematologic/Lymphatic: No abnormal bruising or bleeding, blood clots or swollen lymph nodes.  Allergic/Immunologic: No nasal congestion or hives.    Physical Examination:    Vitals:    03/28/24 1533   BP: 110/60   Site: Right Upper Arm   Position: Sitting   Cuff Size: Medium Adult   Pulse: 62   SpO2: 98%   Weight: 70.3 kg (155 lb)   Height: 1.575 m (5' 2\")          Constitutional and General Appearance: Warm and dry, no apparent distress, normal coloration  HEENT:  Normocephalic, atraumatic  Respiratory:  Normal excursion and expansion without use of accessory muscles  Resp Auscultation: clear bilaterally, no rales, rhonchi or wheezing  Cardiovascular:  The apical impulses not displaced  Heart tones are crisp and normal  JVP normal cm H2O  Regular rate and rhythm  Peripheral pulses are symmetrical and full  There is no clubbing, cyanosis of the extremities.  no edema  Pedal Pulses: 2+ and equal   Abdomen:  No masses or tenderness  Liver/Spleen: No Abnormalities Noted  Neurological/Psychiatric:  Alert and oriented in all spheres  Moves all extremities well  Exhibits normal gait balance and coordination  No abnormalities of mood, affect, memory, mentation, or behavior are noted    Lab Data:    CBC:   Lab Results   Component Value Date/Time    WBC 7.4 02/19/2024 04:43 AM    WBC 6.6 02/18/2024 05:06 AM    WBC 7.6 02/16/2024 04:40 AM    RBC 3.53 02/19/2024 04:43 AM    RBC 3.49 02/18/2024 05:06 AM    RBC 3.17 02/16/2024 04:40 AM    HGB 11.4 02/19/2024 04:43 AM    HGB 11.1 02/18/2024 05:06 AM    HGB 10.4 02/16/2024 04:40 AM    HCT 33.5 02/19/2024 04:43 AM    HCT 33.3 02/18/2024 05:06 AM    HCT 30.1 02/16/2024 04:40 AM    MCV 95.0 02/19/2024 04:43 AM    MCV 95.5 02/18/2024 05:06 AM    MCV 95.1 02/16/2024 04:40 AM    RDW 13.8 02/19/2024

## 2024-03-28 NOTE — PATIENT INSTRUCTIONS
Try cutting back on the gabapentin  Continue all other medications  Try miralax 1/2 scoop a day to help with constipation  Monthly blood work   RTO in 2-3 months

## 2024-04-05 ENCOUNTER — TELEPHONE (OUTPATIENT)
Dept: CARDIOLOGY CLINIC | Age: 89
End: 2024-04-05

## 2024-04-05 RX ORDER — BUDESONIDE, GLYCOPYRROLATE, AND FORMOTEROL FUMARATE 160; 9; 4.8 UG/1; UG/1; UG/1
2 AEROSOL, METERED RESPIRATORY (INHALATION) 2 TIMES DAILY
Qty: 10.7 G | Refills: 2 | Status: SHIPPED | OUTPATIENT
Start: 2024-04-05 | End: 2024-04-06

## 2024-04-05 NOTE — TELEPHONE ENCOUNTER
Maxine, daughter called wanted to know why the Pt monitor didn't alarm off when she was passing away.   Maxine is wanting to know if the monitor was not working correctly and why Maxine was not contacted when the Pt was going thru what she was going thru before she passed away.  Please call Maxine to discuss this matter.  Thank you

## 2024-04-05 NOTE — TELEPHONE ENCOUNTER
Patient's daughter has questions regarding device alerts. Reports she was found  Wednesday morning .     She is currently at work and requesting a callback at 2:30 pm

## 2024-04-05 NOTE — TELEPHONE ENCOUNTER
Maxine, daughter called to speak w/someone re: the Pt alert alarm.  Maxine is ok to talk now.  Please call to discuss Maxine concern.  Thank you

## 2024-06-11 NOTE — PATIENT INSTRUCTIONS
Continue propafenone 150 mg three times daily  Follow up in 6 months  Start taking 20 mg lasix daily and follow up with Dr. Gustafson Radha blood work to check your kidneys in 1-2 weeks before visit with Dr. Cade Santos Fall

## 2024-09-08 NOTE — PROGRESS NOTES
Subjective:      Patient ID: Lincoln Zarate is a 80 y.o. female. CC: Patient presents for re-evaluation of chronic health problems including insomnia, COPD, diabetes mellitus and arthritis. HPI Patient presents today for a follow-up on chronic medications and medical conditions. Patient has been having problems staying asleep. She wakes up in the middle of night and can't get back to sleep. Patient states she is always had difficulty with sleep. She is taking Lorazepam at nighttime and occasional through the day. She is taking her pain medication up to 3 times a day. She is on oxygen therapy and is under care of pulmonology. Plans are to repeat her CT scan and do a swallowing study in the next month. Patient has not been able to work with her oxygen at her workplace. She is very cognizant about wearing the oxygen on a regular basis. Patient typically states she sleep falls asleep but not o'clock at night and often is awake at 3 AM.  She tries not to take any daytime naps.     Review of Systems     Patient Active Problem List   Diagnosis    GERD (gastroesophageal reflux disease)    HTN (hypertension)    Hyperlipidemia    Insomnia    Palpitations    IBS (irritable bowel syndrome)    Overactive bladder    Osteoarthritis    Controlled type 2 diabetes mellitus without complication, without long-term current use of insulin (HCC)    Restless legs syndrome    ANTHONY (obstructive sleep apnea)    Paroxysmal atrial fibrillation (HCC)    Allergic rhinitis    Chronic obstructive pulmonary disease (HCC)    COPD, mild (HCC)    Vertigo    Aspiration into airway    Interstitial lung disease ([facility] [address]    Chronic cough       Outpatient Medications Marked as Taking for the 9/26/19 encounter (Office Visit) with Cary Garcia MD   Medication Sig Dispense Refill    rOPINIRole (REQUIP) 2 MG tablet One tablet 3 times daily 270 tablet 0    HYDROcodone-acetaminophen (NORCO) 5-325 MG per tablet Take 1 tablet fingers/toes warm to touch/no paresthesia/no swelling/no cyanosis of extremity/capillary refill time < 2 seconds

## 2024-10-01 NOTE — TELEPHONE ENCOUNTER
Last OV: 09/14/21 NPSR  Last Labs: 12/09/21   Next appt: none at this time and nothing on the recall list.
Please call patient and get scheduled with NPSR on his return    Jeff Yip, APRN-CNP
Pt scheduled for 06.07 with NPSR, call complete
Spoke to the pt-stated she has to consult with her daughter, before making any appointments. She is her transportation. Will call back to schedule.
[facility]: Preventive Care And Screening: Tobacco Use: Screening And Cessation Intervention: Patient screened for tobacco use and is an ex/non-smoker
Quality 47: Advance Care Plan: Advance Care Planning discussed and documented; advance care plan or surrogate decision maker documented in the medical record.
Detail Level: Detailed

## 2024-11-03 NOTE — CARE COORDINATION
[facility] Transitions Follow Up Call    3/25/2021    Patient: Jem Clemons  Patient : 1935   MRN: [id number]  Reason for Admission: PNA  Discharge Date: 3/12/21 RARS: Readmission Risk Score: 25         Spoke with: Daughter Fatoumata Acosta Transitions Subsequent and Final Call    Subsequent and Final Calls  Do you have any ongoing symptoms?: No  Have your medications changed?: No  Do you have any questions related to your medications?: No  Do you currently have any active services?: Yes  Are you currently active with any services?: Home Health  Do you have any needs or concerns that I can assist you with?: No  Identified Barriers: None  Care Transitions Interventions  Other Interventions: Follow Up: Daughter reports that patient is doing well, denies any difficulty breathing and is afebrile. [facility] PT and nurse will be visiting today, daughter is on hr way home from work to be with patient. CTN will continue with outreach follow up calls.   Future Appointments   Date Time Provider Alissa Bustillo   2021  3:00 PM Barbara DESOUZA, PFIZER 21 DAY SECOND DOSE Didi Rubins [facility]   2021  3:15 PM Alicia Fong MD [facility]   2021  2:30 PM KATHY Moreno - CNP FF Cardio [facility]   2021  3:30 PM Audie Paul MD PULM & CC [facility]       Nabila Serna RN 15

## 2025-03-13 NOTE — TELEPHONE ENCOUNTER
[facility] [facility] with [facility] calling to get verbal orders for nursing, PT, OT. States pt is being d/c'd from [address] today. Call back number [phone].     Please advise
Pt advise
approve
wheelchair